# Patient Record
Sex: MALE | Race: WHITE | Employment: FULL TIME | URBAN - METROPOLITAN AREA
[De-identification: names, ages, dates, MRNs, and addresses within clinical notes are randomized per-mention and may not be internally consistent; named-entity substitution may affect disease eponyms.]

---

## 2017-03-23 DIAGNOSIS — I10 ESSENTIAL HYPERTENSION: ICD-10-CM

## 2017-03-24 RX ORDER — LOSARTAN POTASSIUM 100 MG/1
100 TABLET ORAL DAILY
Qty: 30 TABLET | Refills: 0 | Status: SHIPPED | OUTPATIENT
Start: 2017-03-24 | End: 2017-04-04 | Stop reason: SDUPTHER

## 2017-04-04 ENCOUNTER — OFFICE VISIT (OUTPATIENT)
Dept: FAMILY MEDICINE CLINIC | Age: 67
End: 2017-04-04

## 2017-04-04 VITALS
DIASTOLIC BLOOD PRESSURE: 64 MMHG | HEIGHT: 69 IN | WEIGHT: 159 LBS | HEART RATE: 64 BPM | RESPIRATION RATE: 16 BRPM | BODY MASS INDEX: 23.55 KG/M2 | SYSTOLIC BLOOD PRESSURE: 132 MMHG

## 2017-04-04 DIAGNOSIS — K52.831 COLLAGENOUS COLITIS: ICD-10-CM

## 2017-04-04 DIAGNOSIS — N40.0 BENIGN NON-NODULAR PROSTATIC HYPERPLASIA WITHOUT LOWER URINARY TRACT SYMPTOMS: ICD-10-CM

## 2017-04-04 DIAGNOSIS — E78.00 PURE HYPERCHOLESTEROLEMIA: ICD-10-CM

## 2017-04-04 DIAGNOSIS — Z72.0 TOBACCO ABUSE: ICD-10-CM

## 2017-04-04 DIAGNOSIS — L30.9 ECZEMA OF BOTH HANDS: ICD-10-CM

## 2017-04-04 DIAGNOSIS — E73.9 LACTOSE INTOLERANCE: ICD-10-CM

## 2017-04-04 DIAGNOSIS — I10 ESSENTIAL HYPERTENSION: Primary | ICD-10-CM

## 2017-04-04 PROCEDURE — 99213 OFFICE O/P EST LOW 20 MIN: CPT | Performed by: FAMILY MEDICINE

## 2017-04-04 RX ORDER — LOSARTAN POTASSIUM 100 MG/1
100 TABLET ORAL DAILY
Qty: 30 TABLET | Refills: 0 | Status: SHIPPED | OUTPATIENT
Start: 2017-04-04 | End: 2017-05-24 | Stop reason: SDUPTHER

## 2017-04-04 RX ORDER — LORATADINE 10 MG/1
10 TABLET ORAL DAILY
Status: ON HOLD | COMMUNITY
End: 2022-01-01 | Stop reason: ALTCHOICE

## 2017-04-04 RX ORDER — VARENICLINE TARTRATE 25 MG
KIT ORAL
Qty: 1 EACH | Refills: 0 | Status: SHIPPED | OUTPATIENT
Start: 2017-04-04 | End: 2017-08-29 | Stop reason: ALTCHOICE

## 2017-04-04 RX ORDER — FLUOCINONIDE 0.5 MG/G
OINTMENT TOPICAL
Qty: 1 TUBE | Refills: 5 | Status: SHIPPED | OUTPATIENT
Start: 2017-04-04 | End: 2020-02-25 | Stop reason: SDUPTHER

## 2017-04-04 ASSESSMENT — PATIENT HEALTH QUESTIONNAIRE - PHQ9
2. FEELING DOWN, DEPRESSED OR HOPELESS: 0
SUM OF ALL RESPONSES TO PHQ QUESTIONS 1-9: 0
1. LITTLE INTEREST OR PLEASURE IN DOING THINGS: 0
SUM OF ALL RESPONSES TO PHQ9 QUESTIONS 1 & 2: 0

## 2017-04-04 ASSESSMENT — ENCOUNTER SYMPTOMS
COUGH: 0
SHORTNESS OF BREATH: 0
BLURRED VISION: 0
BLOOD IN STOOL: 0
ABDOMINAL PAIN: 0
DIARRHEA: 1
SORE THROAT: 0
BACK PAIN: 0
TROUBLE SWALLOWING: 0
NAUSEA: 0
CHEST TIGHTNESS: 0
CONSTIPATION: 0
EYE PAIN: 0

## 2017-04-26 ENCOUNTER — TELEPHONE (OUTPATIENT)
Dept: FAMILY MEDICINE CLINIC | Age: 67
End: 2017-04-26

## 2017-05-24 DIAGNOSIS — I10 ESSENTIAL HYPERTENSION: ICD-10-CM

## 2017-05-25 RX ORDER — LOSARTAN POTASSIUM 100 MG/1
100 TABLET ORAL DAILY
Qty: 30 TABLET | Refills: 3 | Status: SHIPPED | OUTPATIENT
Start: 2017-05-25 | End: 2017-08-29 | Stop reason: SDUPTHER

## 2017-07-17 DIAGNOSIS — E78.00 PURE HYPERCHOLESTEROLEMIA: ICD-10-CM

## 2017-07-18 RX ORDER — PRAVASTATIN SODIUM 40 MG
40 TABLET ORAL DAILY
Qty: 30 TABLET | Refills: 5 | Status: SHIPPED | OUTPATIENT
Start: 2017-07-18 | End: 2017-08-29 | Stop reason: SDUPTHER

## 2017-08-29 ENCOUNTER — OFFICE VISIT (OUTPATIENT)
Dept: FAMILY MEDICINE CLINIC | Age: 67
End: 2017-08-29

## 2017-08-29 VITALS
RESPIRATION RATE: 14 BRPM | HEART RATE: 68 BPM | DIASTOLIC BLOOD PRESSURE: 72 MMHG | SYSTOLIC BLOOD PRESSURE: 130 MMHG | HEIGHT: 69 IN | WEIGHT: 160.38 LBS | BODY MASS INDEX: 23.76 KG/M2

## 2017-08-29 DIAGNOSIS — J44.9 COPD, MILD (HCC): ICD-10-CM

## 2017-08-29 DIAGNOSIS — I10 ESSENTIAL HYPERTENSION: Primary | ICD-10-CM

## 2017-08-29 DIAGNOSIS — E78.00 PURE HYPERCHOLESTEROLEMIA: ICD-10-CM

## 2017-08-29 DIAGNOSIS — F17.200 SMOKER: ICD-10-CM

## 2017-08-29 DIAGNOSIS — J30.89 ALLERGIC RHINITIS DUE TO OTHER ALLERGEN: ICD-10-CM

## 2017-08-29 DIAGNOSIS — Z91.89 STREPTOCOCCUS PNEUMONIAE VACCINATION INDICATED: ICD-10-CM

## 2017-08-29 PROCEDURE — G0009 ADMIN PNEUMOCOCCAL VACCINE: HCPCS | Performed by: FAMILY MEDICINE

## 2017-08-29 PROCEDURE — 90732 PPSV23 VACC 2 YRS+ SUBQ/IM: CPT | Performed by: FAMILY MEDICINE

## 2017-08-29 PROCEDURE — 99213 OFFICE O/P EST LOW 20 MIN: CPT | Performed by: FAMILY MEDICINE

## 2017-08-29 RX ORDER — LOSARTAN POTASSIUM 100 MG/1
100 TABLET ORAL DAILY
Qty: 30 TABLET | Refills: 5 | Status: SHIPPED | OUTPATIENT
Start: 2017-08-29 | End: 2018-01-02 | Stop reason: SDUPTHER

## 2017-08-29 RX ORDER — PRAVASTATIN SODIUM 40 MG
40 TABLET ORAL DAILY
Qty: 30 TABLET | Refills: 5 | Status: SHIPPED | OUTPATIENT
Start: 2017-08-29 | End: 2018-01-02 | Stop reason: SDUPTHER

## 2017-08-29 RX ORDER — ALBUTEROL SULFATE 90 UG/1
2 AEROSOL, METERED RESPIRATORY (INHALATION) 4 TIMES DAILY
Qty: 1 INHALER | Refills: 4 | Status: SHIPPED | OUTPATIENT
Start: 2017-08-29 | End: 2018-10-16 | Stop reason: SDUPTHER

## 2017-08-29 ASSESSMENT — ENCOUNTER SYMPTOMS
TROUBLE SWALLOWING: 0
CONSTIPATION: 0
BLOOD IN STOOL: 0
SORE THROAT: 0
EYE PAIN: 0
COUGH: 0
CHEST TIGHTNESS: 0
BACK PAIN: 0
ORTHOPNEA: 0
ABDOMINAL PAIN: 0
SHORTNESS OF BREATH: 0
NAUSEA: 0

## 2018-01-02 ENCOUNTER — OFFICE VISIT (OUTPATIENT)
Dept: FAMILY MEDICINE CLINIC | Age: 68
End: 2018-01-02

## 2018-01-02 VITALS
HEIGHT: 69 IN | WEIGHT: 160.25 LBS | RESPIRATION RATE: 14 BRPM | BODY MASS INDEX: 23.74 KG/M2 | DIASTOLIC BLOOD PRESSURE: 74 MMHG | SYSTOLIC BLOOD PRESSURE: 130 MMHG | HEART RATE: 84 BPM

## 2018-01-02 DIAGNOSIS — I10 ESSENTIAL HYPERTENSION: ICD-10-CM

## 2018-01-02 DIAGNOSIS — K52.831 COLLAGENOUS COLITIS: ICD-10-CM

## 2018-01-02 DIAGNOSIS — J44.9 COPD, MILD (HCC): Primary | ICD-10-CM

## 2018-01-02 DIAGNOSIS — E78.00 PURE HYPERCHOLESTEROLEMIA: ICD-10-CM

## 2018-01-02 PROCEDURE — 99213 OFFICE O/P EST LOW 20 MIN: CPT | Performed by: FAMILY MEDICINE

## 2018-01-02 RX ORDER — LOSARTAN POTASSIUM 100 MG/1
100 TABLET ORAL DAILY
Qty: 30 TABLET | Refills: 5 | Status: SHIPPED | OUTPATIENT
Start: 2018-01-02 | End: 2018-05-08 | Stop reason: SDUPTHER

## 2018-01-02 RX ORDER — PRAVASTATIN SODIUM 40 MG
40 TABLET ORAL DAILY
Qty: 30 TABLET | Refills: 5 | Status: SHIPPED | OUTPATIENT
Start: 2018-01-02 | End: 2018-05-08 | Stop reason: SDUPTHER

## 2018-01-02 ASSESSMENT — ENCOUNTER SYMPTOMS
BLOOD IN STOOL: 0
COUGH: 0
CHEST TIGHTNESS: 0
BACK PAIN: 0
ABDOMINAL PAIN: 0
ORTHOPNEA: 0
SHORTNESS OF BREATH: 0
CONSTIPATION: 0
EYE PAIN: 0
NAUSEA: 0
SORE THROAT: 0
TROUBLE SWALLOWING: 0

## 2018-01-02 ASSESSMENT — COPD QUESTIONNAIRES: COPD: 1

## 2018-01-02 NOTE — PROGRESS NOTES
Subjective:      Patient ID: Ambar Banerjee is a 79 y.o. male. Tobacco  Addiction   stable      lipids  Stable       COPD   There is no chest tightness, cough or shortness of breath. This is a new problem. The current episode started today. The problem has been resolved. Pertinent negatives include no appetite change, chest pain, ear pain, fever, headaches, postnasal drip, sore throat or trouble swallowing. He reports complete improvement on treatment. Hypertension   This is a chronic problem. The current episode started more than 1 year ago. The problem has been resolved since onset. Pertinent negatives include no chest pain, headaches, orthopnea, palpitations, peripheral edema or shortness of breath. The current treatment provides significant improvement. There are no compliance problems. Past Medical History:   Diagnosis Date    Asthma     Colon polyps 2000    COPD, mild (HCC)     Eczema of hand     HTN (hypertension)     Hyperlipidemia     Smoker     Ulcerative colitis ([address]      Review of Systems   Constitutional: Negative for appetite change, fatigue and fever. HENT: Negative for congestion, ear pain, postnasal drip, sore throat and trouble swallowing. Eyes: Negative for pain. Respiratory: Negative for cough, chest tightness and shortness of breath. Cardiovascular: Negative for chest pain, palpitations, orthopnea and leg swelling. Gastrointestinal: Negative for abdominal pain, blood in stool, constipation and nausea. Genitourinary: Negative for difficulty urinating, frequency and urgency. Musculoskeletal: Negative for arthralgias, back pain, joint swelling and neck stiffness. Skin: Negative for rash. Neurological: Negative for dizziness, weakness and headaches. Hematological: Negative for adenopathy. Does not bruise/bleed easily. Psychiatric/Behavioral: Negative for behavioral problems, dysphoric mood and sleep disturbance.      /74 (Site: Right Arm, Position: Sitting, Cuff Size: Medium Adult)   Pulse 84   Resp 14   Ht 5' 9\" (1.753 m)   Wt 160 lb 4 oz (72.7 kg)   BMI 23.66 kg/m²   Objective:   Physical Exam   Constitutional: He is oriented to person, place, and time. He appears well-developed and well-nourished. HENT:   Head: Normocephalic and atraumatic. Right Ear: External ear normal.   Left Ear: External ear normal.   Nose: Nose normal.   Mouth/Throat: Oropharynx is clear and moist.   Eyes: Conjunctivae and EOM are normal. Pupils are equal, round, and reactive to light. Fundi nl   Neck: Normal range of motion. Neck supple. No thyromegaly present. Cardiovascular: Normal rate, regular rhythm, normal heart sounds and intact distal pulses. Pulmonary/Chest: Effort normal. He has wheezes (on  the  right ). He has no rales. Abdominal: Soft. Bowel sounds are normal. He exhibits no mass. There is no tenderness. Musculoskeletal: Normal range of motion. Lymphadenopathy:     He has no cervical adenopathy. Neurological: He is alert and oriented to person, place, and time. He has normal reflexes. No cranial nerve deficit. Skin: Skin is warm and dry. No rash noted. Psychiatric: He has a normal mood and affect. Nursing note and vitals reviewed. Assessment:      1. COPD, mild ([address]     2. Essential hypertension  losartan (COZAAR) 100 MG tablet   3. Pure hypercholesterolemia  pravastatin (PRAVACHOL) 40 MG tablet   4. Collagenous colitis           Plan:      Current Outpatient Prescriptions   Medication Sig Dispense Refill    losartan (COZAAR) 100 MG tablet Take 1 tablet by mouth daily 30 tablet 5    pravastatin (PRAVACHOL) 40 MG tablet Take 1 tablet by mouth daily 30 tablet 5    albuterol sulfate  (90 Base) MCG/ACT inhaler Inhale 2 puffs into the lungs 4 times daily 1 Inhaler 4    loratadine (CLARITIN) 10 MG tablet Take 10 mg by mouth 2 times daily      fluocinonide (LIDEX) 0.05 % ointment Apply topically 2 times daily.  1 Tube 5   

## 2018-04-10 RX ORDER — FLUTICASONE FUROATE AND VILANTEROL 100; 25 UG/1; UG/1
1 POWDER RESPIRATORY (INHALATION) DAILY
Qty: 60 EACH | Refills: 3 | Status: SHIPPED | OUTPATIENT
Start: 2018-04-10 | End: 2018-05-08

## 2018-05-08 ENCOUNTER — OFFICE VISIT (OUTPATIENT)
Dept: FAMILY MEDICINE CLINIC | Age: 68
End: 2018-05-08

## 2018-05-08 VITALS
HEART RATE: 72 BPM | BODY MASS INDEX: 23.92 KG/M2 | RESPIRATION RATE: 14 BRPM | HEIGHT: 69 IN | DIASTOLIC BLOOD PRESSURE: 76 MMHG | SYSTOLIC BLOOD PRESSURE: 128 MMHG | WEIGHT: 161.5 LBS

## 2018-05-08 DIAGNOSIS — J44.9 COPD, MILD (HCC): ICD-10-CM

## 2018-05-08 DIAGNOSIS — E78.00 PURE HYPERCHOLESTEROLEMIA: ICD-10-CM

## 2018-05-08 DIAGNOSIS — N40.0 BENIGN PROSTATIC HYPERPLASIA WITHOUT LOWER URINARY TRACT SYMPTOMS: ICD-10-CM

## 2018-05-08 DIAGNOSIS — K52.831 COLLAGENOUS COLITIS: ICD-10-CM

## 2018-05-08 DIAGNOSIS — I10 ESSENTIAL HYPERTENSION: Primary | ICD-10-CM

## 2018-05-08 PROCEDURE — 99213 OFFICE O/P EST LOW 20 MIN: CPT | Performed by: FAMILY MEDICINE

## 2018-05-08 RX ORDER — LOSARTAN POTASSIUM 100 MG/1
100 TABLET ORAL DAILY
Qty: 30 TABLET | Refills: 5 | Status: SHIPPED | OUTPATIENT
Start: 2018-05-08 | End: 2018-10-16 | Stop reason: SDUPTHER

## 2018-05-08 RX ORDER — PRAVASTATIN SODIUM 40 MG
40 TABLET ORAL DAILY
Qty: 30 TABLET | Refills: 5 | Status: SHIPPED | OUTPATIENT
Start: 2018-05-08 | End: 2018-10-16 | Stop reason: SDUPTHER

## 2018-05-08 ASSESSMENT — PATIENT HEALTH QUESTIONNAIRE - PHQ9
1. LITTLE INTEREST OR PLEASURE IN DOING THINGS: 0
2. FEELING DOWN, DEPRESSED OR HOPELESS: 0
SUM OF ALL RESPONSES TO PHQ QUESTIONS 1-9: 0
SUM OF ALL RESPONSES TO PHQ9 QUESTIONS 1 & 2: 0

## 2018-05-08 ASSESSMENT — ENCOUNTER SYMPTOMS
ORTHOPNEA: 0
CHEST TIGHTNESS: 0
DIFFICULTY BREATHING: 0
COUGH: 1
DIARRHEA: 1
WHEEZING: 1

## 2018-05-08 ASSESSMENT — COPD QUESTIONNAIRES: COPD: 1

## 2018-06-07 ENCOUNTER — HOSPITAL ENCOUNTER (OUTPATIENT)
Age: 68
Discharge: HOME | End: 2018-06-07
Payer: MEDICARE

## 2018-06-07 DIAGNOSIS — E78.00 PURE HYPERCHOLESTEROLEMIA: ICD-10-CM

## 2018-06-07 DIAGNOSIS — N40.0 BENIGN PROSTATIC HYPERPLASIA WITHOUT LOWER URINARY TRACT SYMPTOMS: ICD-10-CM

## 2018-06-07 DIAGNOSIS — I10 ESSENTIAL HYPERTENSION: ICD-10-CM

## 2018-06-07 LAB
ALBUMIN SERPL-MCNC: 4.1 G/DL (ref 3.5–5.1)
ALP BLD-CCNC: 49 U/L (ref 38–126)
ALT SERPL-CCNC: 14 U/L (ref 11–66)
ANION GAP SERPL CALCULATED.3IONS-SCNC: 11 MEQ/L (ref 8–16)
AST SERPL-CCNC: 22 U/L (ref 5–40)
BASOPHILS # BLD: 0.7 %
BASOPHILS ABSOLUTE: 0.1 THOU/MM3 (ref 0–0.1)
BILIRUB SERPL-MCNC: 0.5 MG/DL (ref 0.3–1.2)
BUN BLDV-MCNC: 16 MG/DL (ref 7–22)
CALCIUM SERPL-MCNC: 8.9 MG/DL (ref 8.5–10.5)
CHLORIDE BLD-SCNC: 104 MEQ/L (ref 98–111)
CHOLESTEROL, TOTAL: 174 MG/DL (ref 100–199)
CO2: 26 MEQ/L (ref 23–33)
CREAT SERPL-MCNC: 0.9 MG/DL (ref 0.4–1.2)
EOSINOPHIL # BLD: 2.3 %
EOSINOPHILS ABSOLUTE: 0.2 THOU/MM3 (ref 0–0.4)
GFR SERPL CREATININE-BSD FRML MDRD: 84 ML/MIN/1.73M2
GLUCOSE BLD-MCNC: 94 MG/DL (ref 70–108)
HCT VFR BLD CALC: 45 % (ref 42–52)
HDLC SERPL-MCNC: 54 MG/DL
HEMOGLOBIN: 15.6 GM/DL (ref 14–18)
LDL CHOLESTEROL CALCULATED: 98 MG/DL
LYMPHOCYTES # BLD: 27.5 %
LYMPHOCYTES ABSOLUTE: 2.1 THOU/MM3 (ref 1–4.8)
MCH RBC QN AUTO: 34.1 PG (ref 27–31)
MCHC RBC AUTO-ENTMCNC: 34.7 GM/DL (ref 33–37)
MCV RBC AUTO: 98.5 FL (ref 80–94)
MONOCYTES # BLD: 10.7 %
MONOCYTES ABSOLUTE: 0.8 THOU/MM3 (ref 0.4–1.3)
NUCLEATED RED BLOOD CELLS: 0 /100 WBC
PDW BLD-RTO: 13.3 % (ref 11.5–14.5)
PLATELET # BLD: 211 THOU/MM3 (ref 130–400)
PMV BLD AUTO: 8.3 FL (ref 7.4–10.4)
POTASSIUM SERPL-SCNC: 4.8 MEQ/L (ref 3.5–5.2)
PROSTATE SPECIFIC ANTIGEN: 0.9 NG/ML (ref 0–1)
RBC # BLD: 4.57 MILL/MM3 (ref 4.7–6.1)
SEG NEUTROPHILS: 58.8 %
SEGMENTED NEUTROPHILS ABSOLUTE COUNT: 4.6 THOU/MM3 (ref 1.8–7.7)
SODIUM BLD-SCNC: 141 MEQ/L (ref 135–145)
TOTAL PROTEIN: 7 G/DL (ref 6.1–8)
TRIGL SERPL-MCNC: 109 MG/DL (ref 0–199)
TSH SERPL DL<=0.05 MIU/L-ACNC: 2.09 UIU/ML (ref 0.4–4.2)
WBC # BLD: 7.8 THOU/MM3 (ref 4.8–10.8)

## 2018-06-07 PROCEDURE — 84153 ASSAY OF PSA TOTAL: CPT

## 2018-06-07 PROCEDURE — 85025 COMPLETE CBC W/AUTO DIFF WBC: CPT

## 2018-06-07 PROCEDURE — 80061 LIPID PANEL: CPT

## 2018-06-07 PROCEDURE — 84443 ASSAY THYROID STIM HORMONE: CPT

## 2018-06-07 PROCEDURE — 80053 COMPREHEN METABOLIC PANEL: CPT

## 2018-06-07 PROCEDURE — 36415 COLL VENOUS BLD VENIPUNCTURE: CPT

## 2018-06-11 ENCOUNTER — TELEPHONE (OUTPATIENT)
Dept: FAMILY MEDICINE CLINIC | Age: 68
End: 2018-06-11

## 2018-10-06 ENCOUNTER — CARE COORDINATION (OUTPATIENT)
Dept: CARE COORDINATION | Age: 68
End: 2018-10-06

## 2018-10-16 ENCOUNTER — OFFICE VISIT (OUTPATIENT)
Dept: FAMILY MEDICINE CLINIC | Age: 68
End: 2018-10-16

## 2018-10-16 VITALS
DIASTOLIC BLOOD PRESSURE: 74 MMHG | SYSTOLIC BLOOD PRESSURE: 122 MMHG | HEART RATE: 72 BPM | HEIGHT: 69 IN | RESPIRATION RATE: 14 BRPM | WEIGHT: 159 LBS | BODY MASS INDEX: 23.55 KG/M2

## 2018-10-16 DIAGNOSIS — L30.9 DERMATITIS: ICD-10-CM

## 2018-10-16 DIAGNOSIS — J44.9 COPD, MILD (HCC): ICD-10-CM

## 2018-10-16 DIAGNOSIS — Z23 NEED FOR INFLUENZA VACCINATION: ICD-10-CM

## 2018-10-16 DIAGNOSIS — E78.00 PURE HYPERCHOLESTEROLEMIA: ICD-10-CM

## 2018-10-16 DIAGNOSIS — I10 ESSENTIAL HYPERTENSION: Primary | ICD-10-CM

## 2018-10-16 PROCEDURE — 90688 IIV4 VACCINE SPLT 0.5 ML IM: CPT | Performed by: FAMILY MEDICINE

## 2018-10-16 PROCEDURE — G0008 ADMIN INFLUENZA VIRUS VAC: HCPCS | Performed by: FAMILY MEDICINE

## 2018-10-16 PROCEDURE — 99213 OFFICE O/P EST LOW 20 MIN: CPT | Performed by: FAMILY MEDICINE

## 2018-10-16 PROCEDURE — 1101F PT FALLS ASSESS-DOCD LE1/YR: CPT | Performed by: FAMILY MEDICINE

## 2018-10-16 RX ORDER — PRAVASTATIN SODIUM 40 MG
40 TABLET ORAL DAILY
Qty: 30 TABLET | Refills: 5 | Status: SHIPPED | OUTPATIENT
Start: 2018-10-16 | End: 2019-02-19 | Stop reason: SDUPTHER

## 2018-10-16 RX ORDER — MOMETASONE FUROATE 1 MG/ML
SOLUTION TOPICAL
Qty: 30 ML | Refills: 1 | Status: SHIPPED | OUTPATIENT
Start: 2018-10-16 | End: 2020-02-25 | Stop reason: ALTCHOICE

## 2018-10-16 RX ORDER — LOSARTAN POTASSIUM 100 MG/1
100 TABLET ORAL DAILY
Qty: 30 TABLET | Refills: 5 | Status: SHIPPED | OUTPATIENT
Start: 2018-10-16 | End: 2019-02-19 | Stop reason: SDUPTHER

## 2018-10-16 RX ORDER — ALBUTEROL SULFATE 90 UG/1
2 AEROSOL, METERED RESPIRATORY (INHALATION) 4 TIMES DAILY
Qty: 1 INHALER | Refills: 4 | Status: SHIPPED | OUTPATIENT
Start: 2018-10-16 | End: 2019-08-20 | Stop reason: SDUPTHER

## 2018-10-16 ASSESSMENT — ENCOUNTER SYMPTOMS
BLOOD IN STOOL: 0
SHORTNESS OF BREATH: 0
CONSTIPATION: 0
BACK PAIN: 0
CHEST TIGHTNESS: 0
EYE PAIN: 0
TROUBLE SWALLOWING: 0
ABDOMINAL PAIN: 0
SORE THROAT: 0
NAUSEA: 0
COUGH: 0

## 2018-10-16 ASSESSMENT — COPD QUESTIONNAIRES: COPD: 1

## 2018-10-16 NOTE — PROGRESS NOTES
into the lungs 4 times daily 1 Inhaler 4    umeclidinium-vilanterol (ANORO ELLIPTA) 62.5-25 MCG/INH AEPB inhaler Inhale 1 puff into the lungs daily 14 each 3    mometasone (ELOCON) 0.1 % lotion Apply topically daily. At  Night 30 mL 1    loratadine (CLARITIN) 10 MG tablet Take 10 mg by mouth 2 times daily      fluocinonide (LIDEX) 0.05 % ointment Apply topically 2 times daily. 1 Tube 5    aspirin 81 MG tablet Take 81 mg by mouth daily.  Multiple Vitamins-Minerals (CENTRUM SILVER PO) Take  by mouth daily. No current facility-administered medications for this visit.       Orders Placed This Encounter   Procedures    INFLUENZA, QUADV, 6 MO AND OLDER, IM, MDV, 0.5ML (FLULAVAL QUADV)       Elocon  For  Scalp and  Stop  Smoking    see in  6 mths   Hector Rebollar MD

## 2018-12-03 ENCOUNTER — CARE COORDINATION (OUTPATIENT)
Dept: CARE COORDINATION | Age: 68
End: 2018-12-03

## 2019-02-19 ENCOUNTER — OFFICE VISIT (OUTPATIENT)
Dept: FAMILY MEDICINE CLINIC | Age: 69
End: 2019-02-19

## 2019-02-19 VITALS
WEIGHT: 159.25 LBS | SYSTOLIC BLOOD PRESSURE: 126 MMHG | HEIGHT: 69 IN | HEART RATE: 72 BPM | DIASTOLIC BLOOD PRESSURE: 74 MMHG | BODY MASS INDEX: 23.59 KG/M2 | RESPIRATION RATE: 16 BRPM

## 2019-02-19 DIAGNOSIS — E73.9 LACTOSE INTOLERANCE: ICD-10-CM

## 2019-02-19 DIAGNOSIS — E78.00 PURE HYPERCHOLESTEROLEMIA: ICD-10-CM

## 2019-02-19 DIAGNOSIS — K52.831 COLLAGENOUS COLITIS: ICD-10-CM

## 2019-02-19 DIAGNOSIS — I10 ESSENTIAL HYPERTENSION: Primary | ICD-10-CM

## 2019-02-19 DIAGNOSIS — J44.9 COPD, MILD (HCC): ICD-10-CM

## 2019-02-19 PROCEDURE — 3017F COLORECTAL CA SCREEN DOC REV: CPT | Performed by: FAMILY MEDICINE

## 2019-02-19 PROCEDURE — 99213 OFFICE O/P EST LOW 20 MIN: CPT | Performed by: FAMILY MEDICINE

## 2019-02-19 PROCEDURE — 1123F ACP DISCUSS/DSCN MKR DOCD: CPT | Performed by: FAMILY MEDICINE

## 2019-02-19 PROCEDURE — G8420 CALC BMI NORM PARAMETERS: HCPCS | Performed by: FAMILY MEDICINE

## 2019-02-19 PROCEDURE — G8427 DOCREV CUR MEDS BY ELIG CLIN: HCPCS | Performed by: FAMILY MEDICINE

## 2019-02-19 PROCEDURE — 4040F PNEUMOC VAC/ADMIN/RCVD: CPT | Performed by: FAMILY MEDICINE

## 2019-02-19 PROCEDURE — 1101F PT FALLS ASSESS-DOCD LE1/YR: CPT | Performed by: FAMILY MEDICINE

## 2019-02-19 RX ORDER — PRAVASTATIN SODIUM 40 MG
40 TABLET ORAL DAILY
Qty: 30 TABLET | Refills: 5 | Status: SHIPPED | OUTPATIENT
Start: 2019-02-19 | End: 2019-08-20 | Stop reason: SDUPTHER

## 2019-02-19 RX ORDER — LOSARTAN POTASSIUM 100 MG/1
100 TABLET ORAL DAILY
Qty: 30 TABLET | Refills: 5 | Status: SHIPPED | OUTPATIENT
Start: 2019-02-19 | End: 2019-08-20 | Stop reason: SDUPTHER

## 2019-02-19 ASSESSMENT — PATIENT HEALTH QUESTIONNAIRE - PHQ9
SUM OF ALL RESPONSES TO PHQ QUESTIONS 1-9: 0
SUM OF ALL RESPONSES TO PHQ QUESTIONS 1-9: 0
1. LITTLE INTEREST OR PLEASURE IN DOING THINGS: 0
SUM OF ALL RESPONSES TO PHQ9 QUESTIONS 1 & 2: 0
2. FEELING DOWN, DEPRESSED OR HOPELESS: 0

## 2019-02-19 ASSESSMENT — ENCOUNTER SYMPTOMS
ORTHOPNEA: 0
SHORTNESS OF BREATH: 0
BLOOD IN STOOL: 0
ABDOMINAL PAIN: 0
SORE THROAT: 0
EYE PAIN: 0
CHEST TIGHTNESS: 0
COUGH: 0
BACK PAIN: 0
CONSTIPATION: 0
NAUSEA: 0
TROUBLE SWALLOWING: 0

## 2019-08-20 ENCOUNTER — OFFICE VISIT (OUTPATIENT)
Dept: FAMILY MEDICINE CLINIC | Age: 69
End: 2019-08-20

## 2019-08-20 VITALS
BODY MASS INDEX: 23.4 KG/M2 | HEIGHT: 69 IN | HEART RATE: 72 BPM | RESPIRATION RATE: 14 BRPM | SYSTOLIC BLOOD PRESSURE: 120 MMHG | DIASTOLIC BLOOD PRESSURE: 66 MMHG | WEIGHT: 158 LBS

## 2019-08-20 DIAGNOSIS — K52.831 COLLAGENOUS COLITIS: ICD-10-CM

## 2019-08-20 DIAGNOSIS — J44.9 COPD, MILD (HCC): ICD-10-CM

## 2019-08-20 DIAGNOSIS — N40.0 BENIGN PROSTATIC HYPERPLASIA WITHOUT LOWER URINARY TRACT SYMPTOMS: ICD-10-CM

## 2019-08-20 DIAGNOSIS — J45.20 MILD INTERMITTENT ASTHMA WITHOUT COMPLICATION: ICD-10-CM

## 2019-08-20 DIAGNOSIS — I10 ESSENTIAL HYPERTENSION: Primary | ICD-10-CM

## 2019-08-20 DIAGNOSIS — E78.00 PURE HYPERCHOLESTEROLEMIA: ICD-10-CM

## 2019-08-20 DIAGNOSIS — F17.200 SMOKER: ICD-10-CM

## 2019-08-20 PROCEDURE — 99213 OFFICE O/P EST LOW 20 MIN: CPT | Performed by: FAMILY MEDICINE

## 2019-08-20 PROCEDURE — 4040F PNEUMOC VAC/ADMIN/RCVD: CPT | Performed by: FAMILY MEDICINE

## 2019-08-20 PROCEDURE — 1123F ACP DISCUSS/DSCN MKR DOCD: CPT | Performed by: FAMILY MEDICINE

## 2019-08-20 PROCEDURE — G8420 CALC BMI NORM PARAMETERS: HCPCS | Performed by: FAMILY MEDICINE

## 2019-08-20 PROCEDURE — 3017F COLORECTAL CA SCREEN DOC REV: CPT | Performed by: FAMILY MEDICINE

## 2019-08-20 PROCEDURE — G8427 DOCREV CUR MEDS BY ELIG CLIN: HCPCS | Performed by: FAMILY MEDICINE

## 2019-08-20 RX ORDER — PRAVASTATIN SODIUM 40 MG
40 TABLET ORAL DAILY
Qty: 30 TABLET | Refills: 5 | Status: SHIPPED | OUTPATIENT
Start: 2019-08-20 | End: 2020-02-25 | Stop reason: SDUPTHER

## 2019-08-20 RX ORDER — LOSARTAN POTASSIUM 100 MG/1
100 TABLET ORAL DAILY
Qty: 30 TABLET | Refills: 5 | Status: SHIPPED | OUTPATIENT
Start: 2019-08-20 | End: 2020-02-25 | Stop reason: SDUPTHER

## 2019-08-20 RX ORDER — ALBUTEROL SULFATE 90 UG/1
2 AEROSOL, METERED RESPIRATORY (INHALATION) 4 TIMES DAILY
Qty: 1 INHALER | Refills: 4 | Status: SHIPPED | OUTPATIENT
Start: 2019-08-20 | End: 2020-03-31 | Stop reason: SDUPTHER

## 2019-08-20 ASSESSMENT — ENCOUNTER SYMPTOMS
HOARSE VOICE: 0
CHEST TIGHTNESS: 0
SHORTNESS OF BREATH: 0
ORTHOPNEA: 0
BACK PAIN: 0
BLOOD IN STOOL: 0
NAUSEA: 0
TROUBLE SWALLOWING: 0
SORE THROAT: 0
CONSTIPATION: 0
COUGH: 0
HEMOPTYSIS: 0
EYE PAIN: 0
ABDOMINAL PAIN: 0

## 2019-08-20 ASSESSMENT — COPD QUESTIONNAIRES: COPD: 1

## 2019-08-26 DIAGNOSIS — I10 ESSENTIAL HYPERTENSION: ICD-10-CM

## 2019-08-26 NOTE — TELEPHONE ENCOUNTER
The pharmacy is requesting a refill of the below medication which has been pended for you:     Requested Prescriptions     Pending Prescriptions Disp Refills    losartan (COZAAR) 100 MG tablet [Pharmacy Med Name: LOSARTAN 100MG TABLETS] 30 tablet 0     Sig: TAKE 1 TABLET BY MOUTH DAILY       Last Appointment Date: 8/20/2019  Next Appointment Date: Visit date not found    Allergies   Allergen Reactions    Penicillins Rash    Sulfa Antibiotics Rash

## 2019-08-27 RX ORDER — LOSARTAN POTASSIUM 100 MG/1
100 TABLET ORAL DAILY
Qty: 30 TABLET | Refills: 0 | OUTPATIENT
Start: 2019-08-27

## 2019-11-21 ENCOUNTER — TELEPHONE (OUTPATIENT)
Dept: FAMILY MEDICINE CLINIC | Age: 69
End: 2019-11-21

## 2019-11-21 RX ORDER — CEFDINIR 300 MG/1
300 CAPSULE ORAL 2 TIMES DAILY
Qty: 20 CAPSULE | Refills: 0 | Status: SHIPPED | OUTPATIENT
Start: 2019-11-21 | End: 2019-12-01

## 2019-12-30 ENCOUNTER — TELEPHONE (OUTPATIENT)
Dept: FAMILY MEDICINE CLINIC | Age: 69
End: 2019-12-30

## 2019-12-30 RX ORDER — CEFDINIR 300 MG/1
300 CAPSULE ORAL 2 TIMES DAILY
Qty: 20 CAPSULE | Refills: 0 | Status: SHIPPED | OUTPATIENT
Start: 2019-12-30 | End: 2020-01-09

## 2020-01-02 ENCOUNTER — HOSPITAL ENCOUNTER (OUTPATIENT)
Age: 70
Discharge: HOME | End: 2020-01-02
Payer: MEDICARE

## 2020-01-02 LAB
ALBUMIN SERPL-MCNC: 4.3 G/DL (ref 3.5–5.1)
ALP BLD-CCNC: 53 U/L (ref 38–126)
ALT SERPL-CCNC: 22 U/L (ref 11–66)
ANION GAP SERPL CALCULATED.3IONS-SCNC: 13 MEQ/L (ref 8–16)
AST SERPL-CCNC: 28 U/L (ref 5–40)
BASOPHILS # BLD: 0.7 %
BASOPHILS ABSOLUTE: 0 THOU/MM3 (ref 0–0.1)
BILIRUB SERPL-MCNC: 0.3 MG/DL (ref 0.3–1.2)
BUN BLDV-MCNC: 12 MG/DL (ref 7–22)
CALCIUM SERPL-MCNC: 9.9 MG/DL (ref 8.5–10.5)
CHLORIDE BLD-SCNC: 103 MEQ/L (ref 98–111)
CHOLESTEROL, TOTAL: 165 MG/DL (ref 100–199)
CO2: 25 MEQ/L (ref 23–33)
CREAT SERPL-MCNC: 0.9 MG/DL (ref 0.4–1.2)
EOSINOPHIL # BLD: 4.3 %
EOSINOPHILS ABSOLUTE: 0.2 THOU/MM3 (ref 0–0.4)
ERYTHROCYTE [DISTWIDTH] IN BLOOD BY AUTOMATED COUNT: 12.2 % (ref 11.5–14.5)
ERYTHROCYTE [DISTWIDTH] IN BLOOD BY AUTOMATED COUNT: 45.1 FL (ref 35–45)
GFR SERPL CREATININE-BSD FRML MDRD: 84 ML/MIN/1.73M2
GLUCOSE BLD-MCNC: 84 MG/DL (ref 70–108)
HCT VFR BLD CALC: 45.9 % (ref 42–52)
HDLC SERPL-MCNC: 56 MG/DL
HEMOGLOBIN: 15.7 GM/DL (ref 14–18)
IMMATURE GRANS (ABS): 0.02 THOU/MM3 (ref 0–0.07)
IMMATURE GRANULOCYTES: 0.3 %
LDL CHOLESTEROL CALCULATED: 86 MG/DL
LYMPHOCYTES # BLD: 25.6 %
LYMPHOCYTES ABSOLUTE: 1.5 THOU/MM3 (ref 1–4.8)
MCH RBC QN AUTO: 34.1 PG (ref 26–33)
MCHC RBC AUTO-ENTMCNC: 34.2 GM/DL (ref 32.2–35.5)
MCV RBC AUTO: 99.6 FL (ref 80–94)
MONOCYTES # BLD: 9.8 %
MONOCYTES ABSOLUTE: 0.6 THOU/MM3 (ref 0.4–1.3)
NUCLEATED RED BLOOD CELLS: 0 /100 WBC
PLATELET # BLD: 226 THOU/MM3 (ref 130–400)
PMV BLD AUTO: 9.9 FL (ref 9.4–12.4)
POTASSIUM SERPL-SCNC: 5 MEQ/L (ref 3.5–5.2)
PROSTATE SPECIFIC ANTIGEN: 0.63 NG/ML (ref 0–1)
RBC # BLD: 4.61 MILL/MM3 (ref 4.7–6.1)
SEG NEUTROPHILS: 59.3 %
SEGMENTED NEUTROPHILS ABSOLUTE COUNT: 3.4 THOU/MM3 (ref 1.8–7.7)
SODIUM BLD-SCNC: 141 MEQ/L (ref 135–145)
TOTAL PROTEIN: 7.5 G/DL (ref 6.1–8)
TRIGL SERPL-MCNC: 115 MG/DL (ref 0–199)
TSH SERPL DL<=0.05 MIU/L-ACNC: 1.02 UIU/ML (ref 0.4–4.2)
WBC # BLD: 5.8 THOU/MM3 (ref 4.8–10.8)

## 2020-01-02 PROCEDURE — 85025 COMPLETE CBC W/AUTO DIFF WBC: CPT

## 2020-01-02 PROCEDURE — 36415 COLL VENOUS BLD VENIPUNCTURE: CPT

## 2020-01-02 PROCEDURE — 80061 LIPID PANEL: CPT

## 2020-01-02 PROCEDURE — 80053 COMPREHEN METABOLIC PANEL: CPT

## 2020-01-02 PROCEDURE — 84443 ASSAY THYROID STIM HORMONE: CPT

## 2020-01-02 PROCEDURE — 84153 ASSAY OF PSA TOTAL: CPT

## 2020-01-03 ENCOUNTER — TELEPHONE (OUTPATIENT)
Dept: FAMILY MEDICINE CLINIC | Age: 70
End: 2020-01-03

## 2020-02-25 ENCOUNTER — OFFICE VISIT (OUTPATIENT)
Dept: FAMILY MEDICINE CLINIC | Age: 70
End: 2020-02-25

## 2020-02-25 VITALS
HEART RATE: 72 BPM | HEIGHT: 69 IN | WEIGHT: 154.5 LBS | RESPIRATION RATE: 14 BRPM | BODY MASS INDEX: 22.88 KG/M2 | DIASTOLIC BLOOD PRESSURE: 74 MMHG | SYSTOLIC BLOOD PRESSURE: 130 MMHG

## 2020-02-25 PROCEDURE — 1123F ACP DISCUSS/DSCN MKR DOCD: CPT | Performed by: FAMILY MEDICINE

## 2020-02-25 PROCEDURE — 3017F COLORECTAL CA SCREEN DOC REV: CPT | Performed by: FAMILY MEDICINE

## 2020-02-25 PROCEDURE — G8420 CALC BMI NORM PARAMETERS: HCPCS | Performed by: FAMILY MEDICINE

## 2020-02-25 PROCEDURE — 4040F PNEUMOC VAC/ADMIN/RCVD: CPT | Performed by: FAMILY MEDICINE

## 2020-02-25 PROCEDURE — 99213 OFFICE O/P EST LOW 20 MIN: CPT | Performed by: FAMILY MEDICINE

## 2020-02-25 PROCEDURE — G8427 DOCREV CUR MEDS BY ELIG CLIN: HCPCS | Performed by: FAMILY MEDICINE

## 2020-02-25 RX ORDER — VARENICLINE TARTRATE 1 MG/1
TABLET, FILM COATED ORAL
Qty: 60 TABLET | Refills: 3 | Status: SHIPPED | OUTPATIENT
Start: 2020-02-25 | End: 2021-01-06

## 2020-02-25 RX ORDER — VARENICLINE TARTRATE 25 MG
KIT ORAL
Qty: 1 BOX | Refills: 0 | Status: SHIPPED | OUTPATIENT
Start: 2020-02-25 | End: 2021-01-06

## 2020-02-25 RX ORDER — FLUOCINONIDE 0.5 MG/G
OINTMENT TOPICAL
Qty: 1 TUBE | Refills: 5 | Status: SHIPPED | OUTPATIENT
Start: 2020-02-25 | End: 2020-12-29 | Stop reason: SDUPTHER

## 2020-02-25 RX ORDER — PRAVASTATIN SODIUM 40 MG
40 TABLET ORAL DAILY
Qty: 30 TABLET | Refills: 5 | Status: SHIPPED | OUTPATIENT
Start: 2020-02-25 | End: 2020-06-30 | Stop reason: SDUPTHER

## 2020-02-25 RX ORDER — LOSARTAN POTASSIUM 100 MG/1
100 TABLET ORAL DAILY
Qty: 30 TABLET | Refills: 5 | Status: SHIPPED | OUTPATIENT
Start: 2020-02-25 | End: 2020-06-30 | Stop reason: SDUPTHER

## 2020-02-25 ASSESSMENT — ENCOUNTER SYMPTOMS
ORTHOPNEA: 0
COUGH: 1
SHORTNESS OF BREATH: 0
CHEST TIGHTNESS: 0
SPUTUM PRODUCTION: 0

## 2020-02-25 ASSESSMENT — PATIENT HEALTH QUESTIONNAIRE - PHQ9
SUM OF ALL RESPONSES TO PHQ QUESTIONS 1-9: 0
2. FEELING DOWN, DEPRESSED OR HOPELESS: 0
SUM OF ALL RESPONSES TO PHQ QUESTIONS 1-9: 0
SUM OF ALL RESPONSES TO PHQ9 QUESTIONS 1 & 2: 0
1. LITTLE INTEREST OR PLEASURE IN DOING THINGS: 0

## 2020-02-25 ASSESSMENT — COPD QUESTIONNAIRES: COPD: 1

## 2020-02-25 NOTE — PROGRESS NOTES
atraumatic. Right Ear: External ear normal.      Left Ear: External ear normal.      Nose: Nose normal.   Eyes:      Conjunctiva/sclera: Conjunctivae normal.      Pupils: Pupils are equal, round, and reactive to light. Comments: Fundi nl   Neck:      Musculoskeletal: Normal range of motion and neck supple. Thyroid: No thyromegaly. Cardiovascular:      Rate and Rhythm: Normal rate and regular rhythm. Heart sounds: Normal heart sounds. Pulmonary:      Effort: Pulmonary effort is normal.      Breath sounds: Normal breath sounds. No wheezing or rales. Comments:  Slight  Wheeze   Abdominal:      General: Bowel sounds are normal.      Palpations: Abdomen is soft. There is no mass. Tenderness: There is no abdominal tenderness. Musculoskeletal: Normal range of motion. Lymphadenopathy:      Cervical: No cervical adenopathy. Skin:     General: Skin is warm and dry. Findings: No rash. Neurological:      Mental Status: He is alert and oriented to person, place, and time. Cranial Nerves: No cranial nerve deficit. Deep Tendon Reflexes: Reflexes are normal and symmetric. Assessment:       Diagnosis Orders   1. Essential hypertension  losartan (COZAAR) 100 MG tablet   2. Pure hypercholesterolemia  pravastatin (PRAVACHOL) 40 MG tablet   3. Eczema of both hands  fluocinonide (LIDEX) 0.05 % ointment   4. Collagenous colitis     5. COPD, mild ([address]     6. Lactose intolerance     7. Smoker     8. Tobacco abuse           Plan:       Current Outpatient Medications   Medication Sig Dispense Refill    losartan (COZAAR) 100 MG tablet Take 1 tablet by mouth daily 30 tablet 5    pravastatin (PRAVACHOL) 40 MG tablet Take 1 tablet by mouth daily 30 tablet 5    fluocinonide (LIDEX) 0.05 % ointment Apply topically 2 times daily.  1 Tube 5    varenicline (CHANTIX CONTINUING MONTH MANUEL) 1 MG tablet Lone  Tab po bid 60 tablet 3    varenicline (CHANTIX STARTING MONTH MANUEL) 0.5 MG X 11 & 1 MG X 42 tablet Take by mouth. 1 box 0    Tiotropium Bromide-Olodaterol (STIOLTO RESPIMAT) 2.5-2.5 MCG/ACT AERS 2  puffs  daily 1 Inhaler 5    albuterol sulfate  (90 Base) MCG/ACT inhaler Inhale 2 puffs into the lungs 4 times daily 1 Inhaler 4    loratadine (CLARITIN) 10 MG tablet Take 10 mg by mouth 2 times daily      aspirin 81 MG tablet Take 81 mg by mouth daily.  Multiple Vitamins-Minerals (CENTRUM SILVER PO) Take  by mouth daily. No current facility-administered medications for this visit. No orders of the defined types were placed in this encounter. No results found for this visit on 02/25/20.       See in  4 mths   Elyse Guidry MD

## 2020-02-27 ENCOUNTER — TELEPHONE (OUTPATIENT)
Dept: FAMILY MEDICINE CLINIC | Age: 70
End: 2020-02-27

## 2020-03-31 RX ORDER — ALBUTEROL SULFATE 90 UG/1
2 AEROSOL, METERED RESPIRATORY (INHALATION) 4 TIMES DAILY
Qty: 1 INHALER | Refills: 4 | Status: ON HOLD | OUTPATIENT
Start: 2020-03-31 | End: 2021-01-19 | Stop reason: SDUPTHER

## 2020-06-30 ENCOUNTER — OFFICE VISIT (OUTPATIENT)
Dept: FAMILY MEDICINE CLINIC | Age: 70
End: 2020-06-30

## 2020-06-30 VITALS
HEART RATE: 68 BPM | HEIGHT: 69 IN | RESPIRATION RATE: 16 BRPM | WEIGHT: 157 LBS | BODY MASS INDEX: 23.25 KG/M2 | SYSTOLIC BLOOD PRESSURE: 148 MMHG | TEMPERATURE: 96.5 F | DIASTOLIC BLOOD PRESSURE: 66 MMHG

## 2020-06-30 PROCEDURE — 3017F COLORECTAL CA SCREEN DOC REV: CPT | Performed by: FAMILY MEDICINE

## 2020-06-30 PROCEDURE — G8427 DOCREV CUR MEDS BY ELIG CLIN: HCPCS | Performed by: FAMILY MEDICINE

## 2020-06-30 PROCEDURE — 4040F PNEUMOC VAC/ADMIN/RCVD: CPT | Performed by: FAMILY MEDICINE

## 2020-06-30 PROCEDURE — 1123F ACP DISCUSS/DSCN MKR DOCD: CPT | Performed by: FAMILY MEDICINE

## 2020-06-30 PROCEDURE — 99213 OFFICE O/P EST LOW 20 MIN: CPT | Performed by: FAMILY MEDICINE

## 2020-06-30 PROCEDURE — G8420 CALC BMI NORM PARAMETERS: HCPCS | Performed by: FAMILY MEDICINE

## 2020-06-30 RX ORDER — PRAVASTATIN SODIUM 40 MG
40 TABLET ORAL DAILY
Qty: 30 TABLET | Refills: 5 | Status: SHIPPED | OUTPATIENT
Start: 2020-06-30 | End: 2020-12-29 | Stop reason: SDUPTHER

## 2020-06-30 RX ORDER — LOSARTAN POTASSIUM 100 MG/1
100 TABLET ORAL DAILY
Qty: 30 TABLET | Refills: 5 | Status: SHIPPED | OUTPATIENT
Start: 2020-06-30 | End: 2020-12-29 | Stop reason: SDUPTHER

## 2020-06-30 ASSESSMENT — ENCOUNTER SYMPTOMS
TROUBLE SWALLOWING: 0
SHORTNESS OF BREATH: 0
NAUSEA: 0
BLOOD IN STOOL: 0
EYE PAIN: 0
CONSTIPATION: 0
CHEST TIGHTNESS: 0
COUGH: 0
SORE THROAT: 0
BACK PAIN: 0
ABDOMINAL PAIN: 0

## 2020-06-30 NOTE — PROGRESS NOTES
Subjective:      Patient ID: Marty Gr is a 71 y.o. male. Copd  Stable   Smokes  Better       Hypertension   This is a chronic problem. The current episode started more than 1 year ago. The problem has been resolved since onset. The problem is controlled. Pertinent negatives include no anxiety, chest pain, headaches, malaise/fatigue, palpitations, peripheral edema or shortness of breath. The current treatment provides significant improvement. There are no compliance problems. Hyperlipidemia   This is a chronic problem. The current episode started more than 1 year ago. The problem is controlled. Pertinent negatives include no chest pain or shortness of breath. Current antihyperlipidemic treatment includes statins. The current treatment provides significant improvement of lipids. There are no compliance problems. Past Medical History:   Diagnosis Date    Asthma     Collagenous colitis 2014       repeat  in  2024    Colon polyps 2000    COPD, mild (HCC)     Eczema of hand     HTN (hypertension)     Hyperlipidemia     Smoker      Review of Systems   Constitutional: Negative for fatigue, fever and malaise/fatigue. HENT: Negative for congestion, ear pain, postnasal drip, sore throat and trouble swallowing. Eyes: Negative for pain. Respiratory: Negative for cough, chest tightness and shortness of breath. Cardiovascular: Negative for chest pain, palpitations and leg swelling. Gastrointestinal: Negative for abdominal pain, blood in stool, constipation and nausea. Genitourinary: Negative for difficulty urinating, frequency and urgency. Musculoskeletal: Negative for arthralgias, back pain, joint swelling and neck stiffness. Skin: Negative for rash. Neurological: Negative for dizziness, weakness and headaches. Hematological: Negative for adenopathy. Does not bruise/bleed easily. Psychiatric/Behavioral: Negative for behavioral problems, dysphoric mood and sleep disturbance.      BP (!) 148/66 (Site: Right Upper Arm, Position: Sitting, Cuff Size: Medium Adult)   Pulse 68   Temp 96.5 °F (35.8 °C) (Skin)   Resp 16   Ht 5' 9\" (1.753 m)   Wt 157 lb (71.2 kg)   BMI 23.18 kg/m²   Objective:   Physical Exam  Vitals signs and nursing note reviewed. Constitutional:       Appearance: He is well-developed. HENT:      Head: Normocephalic and atraumatic. Right Ear: External ear normal.      Left Ear: External ear normal.      Nose: Nose normal.   Eyes:      Conjunctiva/sclera: Conjunctivae normal.      Pupils: Pupils are equal, round, and reactive to light. Comments: Fundi nl   Neck:      Musculoskeletal: Normal range of motion and neck supple. Thyroid: No thyromegaly. Cardiovascular:      Rate and Rhythm: Normal rate and regular rhythm. Heart sounds: Normal heart sounds. Pulmonary:      Effort: Pulmonary effort is normal.      Breath sounds: Normal breath sounds. No wheezing or rales. Comments:   Some  Decrease  Breathe  Sounds  Slight  wheeze  Abdominal:      General: Bowel sounds are normal.      Palpations: Abdomen is soft. There is no mass. Tenderness: There is no abdominal tenderness. Musculoskeletal: Normal range of motion. Lymphadenopathy:      Cervical: No cervical adenopathy. Skin:     General: Skin is warm and dry. Findings: No rash. Neurological:      Mental Status: He is alert and oriented to person, place, and time. Cranial Nerves: No cranial nerve deficit. Deep Tendon Reflexes: Reflexes are normal and symmetric. Assessment:       Diagnosis Orders   1. Essential hypertension  losartan (COZAAR) 100 MG tablet   2. Pure hypercholesterolemia  pravastatin (PRAVACHOL) 40 MG tablet   3. Lactose intolerance     4. Collagenous colitis     5.  COPD, mild ([address]           Plan:      Current Outpatient Medications   Medication Sig Dispense Refill    albuterol sulfate  (90 Base) MCG/ACT inhaler Inhale 2 puffs into the lungs 4

## 2020-08-06 ENCOUNTER — TELEPHONE (OUTPATIENT)
Dept: FAMILY MEDICINE CLINIC | Age: 70
End: 2020-08-06

## 2020-08-06 NOTE — TELEPHONE ENCOUNTER
Pt called req a brief note stating he has no history of heart disease. Pt needs this for insurance application.     Please call pt once note ready to P/U here

## 2020-08-06 NOTE — LETTER
[facility] [id number]  Phone: [phone]  Fax: [phone]    Freeman Jean MD        August 10, 2020     Patient: Lidia Sears   YOB: 1950   Date of Visit: 8/6/2020       To Whom It May Concern: It is my medical opinion that Ann Mccord  the above patient has no history of heart disease. he has a history of hypertension and hyperlipidemia but no history of any cardiac disease . Speedy Kitchen If you have any questions or concerns, please don't hesitate to call.     Sincerely,        Freeman Jean MD

## 2020-12-29 ENCOUNTER — OFFICE VISIT (OUTPATIENT)
Dept: FAMILY MEDICINE CLINIC | Age: 70
End: 2020-12-29

## 2020-12-29 VITALS
DIASTOLIC BLOOD PRESSURE: 82 MMHG | TEMPERATURE: 97.5 F | BODY MASS INDEX: 23.03 KG/M2 | SYSTOLIC BLOOD PRESSURE: 138 MMHG | HEIGHT: 69 IN | RESPIRATION RATE: 18 BRPM | HEART RATE: 84 BPM | WEIGHT: 155.5 LBS

## 2020-12-29 PROCEDURE — 3017F COLORECTAL CA SCREEN DOC REV: CPT | Performed by: FAMILY MEDICINE

## 2020-12-29 PROCEDURE — G0008 ADMIN INFLUENZA VIRUS VAC: HCPCS | Performed by: FAMILY MEDICINE

## 2020-12-29 PROCEDURE — 90688 IIV4 VACCINE SPLT 0.5 ML IM: CPT | Performed by: FAMILY MEDICINE

## 2020-12-29 PROCEDURE — G8427 DOCREV CUR MEDS BY ELIG CLIN: HCPCS | Performed by: FAMILY MEDICINE

## 2020-12-29 PROCEDURE — 4040F PNEUMOC VAC/ADMIN/RCVD: CPT | Performed by: FAMILY MEDICINE

## 2020-12-29 PROCEDURE — 1123F ACP DISCUSS/DSCN MKR DOCD: CPT | Performed by: FAMILY MEDICINE

## 2020-12-29 PROCEDURE — G8420 CALC BMI NORM PARAMETERS: HCPCS | Performed by: FAMILY MEDICINE

## 2020-12-29 PROCEDURE — 99214 OFFICE O/P EST MOD 30 MIN: CPT | Performed by: FAMILY MEDICINE

## 2020-12-29 PROCEDURE — 93000 ELECTROCARDIOGRAM COMPLETE: CPT | Performed by: FAMILY MEDICINE

## 2020-12-29 RX ORDER — LOSARTAN POTASSIUM 100 MG/1
100 TABLET ORAL DAILY
Qty: 30 TABLET | Refills: 5 | Status: ON HOLD | OUTPATIENT
Start: 2020-12-29 | End: 2021-01-19 | Stop reason: HOSPADM

## 2020-12-29 RX ORDER — FLUOCINONIDE 0.5 MG/G
OINTMENT TOPICAL
Qty: 1 TUBE | Refills: 5 | Status: ON HOLD | OUTPATIENT
Start: 2020-12-29 | End: 2021-01-06

## 2020-12-29 RX ORDER — METOPROLOL TARTRATE 50 MG/1
50 TABLET, FILM COATED ORAL 2 TIMES DAILY
Qty: 60 TABLET | Refills: 3 | Status: SHIPPED | OUTPATIENT
Start: 2020-12-29 | End: 2020-12-30

## 2020-12-29 RX ORDER — PRAVASTATIN SODIUM 40 MG
40 TABLET ORAL DAILY
Qty: 30 TABLET | Refills: 5 | Status: ON HOLD | OUTPATIENT
Start: 2020-12-29 | End: 2021-01-19 | Stop reason: HOSPADM

## 2020-12-29 SDOH — ECONOMIC STABILITY: INCOME INSECURITY: HOW HARD IS IT FOR YOU TO PAY FOR THE VERY BASICS LIKE FOOD, HOUSING, MEDICAL CARE, AND HEATING?: NOT HARD AT ALL

## 2020-12-29 SDOH — ECONOMIC STABILITY: FOOD INSECURITY: WITHIN THE PAST 12 MONTHS, YOU WORRIED THAT YOUR FOOD WOULD RUN OUT BEFORE YOU GOT MONEY TO BUY MORE.: NEVER TRUE

## 2020-12-29 SDOH — ECONOMIC STABILITY: FOOD INSECURITY: WITHIN THE PAST 12 MONTHS, THE FOOD YOU BOUGHT JUST DIDN'T LAST AND YOU DIDN'T HAVE MONEY TO GET MORE.: NEVER TRUE

## 2020-12-29 SDOH — ECONOMIC STABILITY: TRANSPORTATION INSECURITY
IN THE PAST 12 MONTHS, HAS LACK OF TRANSPORTATION KEPT YOU FROM MEETINGS, WORK, OR FROM GETTING THINGS NEEDED FOR DAILY LIVING?: NOT ASKED

## 2020-12-29 SDOH — ECONOMIC STABILITY: TRANSPORTATION INSECURITY
IN THE PAST 12 MONTHS, HAS THE LACK OF TRANSPORTATION KEPT YOU FROM MEDICAL APPOINTMENTS OR FROM GETTING MEDICATIONS?: NOT ASKED

## 2020-12-29 ASSESSMENT — ENCOUNTER SYMPTOMS
BLOOD IN STOOL: 0
BACK PAIN: 0
TROUBLE SWALLOWING: 0
CHEST TIGHTNESS: 0
NAUSEA: 0
ABDOMINAL PAIN: 0
ORTHOPNEA: 0
CONSTIPATION: 0
EYE PAIN: 0
SHORTNESS OF BREATH: 0
COUGH: 0
SORE THROAT: 0

## 2020-12-29 NOTE — PROGRESS NOTES
Subjective:      Patient ID: Jody Nuno is a 79 y.o. male. tobacco  addiction     Noted   1/2  pday    Copd  Stable         Hypertension  This is a chronic problem. The current episode started more than 1 year ago. The problem has been resolved since onset. The problem is controlled. Pertinent negatives include no chest pain, headaches, orthopnea, palpitations, peripheral edema or shortness of breath. Risk factors for coronary artery disease include smoking/tobacco exposure. The current treatment provides significant improvement. There are no compliance problems. Hyperlipidemia  This is a chronic problem. The current episode started more than 1 year ago. The problem is controlled. Pertinent negatives include no chest pain or shortness of breath. Current antihyperlipidemic treatment includes statins. The current treatment provides significant improvement of lipids. There are no compliance problems.       Past Medical History:   Diagnosis Date    Asthma     Collagenous colitis 2014       repeat  in  2024    Colon polyps 2000    COPD, mild (HCC)     Eczema of hand     HTN (hypertension)     Hyperlipidemia     Smoker      Past Surgical History:   Procedure Laterality Date    COLONOSCOPY  2011,6/30/14    nba cardenas-no polyps repeat 10 yr    HERNIA REPAIR      NASAL SINUS SURGERY  6/2008    polyps    OTHER SURGICAL HISTORY  DEC 7TH 2012 DR GET [facility]     IGS ENDOSCOPIC BI LAT MAXILLARY, ETHMOID, SPHENOID, FRONTAL SINUSOTOMY WITH SEPTOPLASTY AND TURBINOPLASTIES    SKIN CANCER EXCISION  9-2014    Left side of Forehead     TOOTH EXTRACTION  02/2017     Social History     Socioeconomic History    Marital status:      Spouse name: Not on file    Number of children: Not on file    Years of education: Not on file    Highest education level: Not on file   Occupational History    Not on file   Social Needs    Financial resource strain: Not hard at all   Harleen-Vianney insecurity Skin: Negative for rash. Neurological: Negative for dizziness, weakness and headaches. Hematological: Negative for adenopathy. Does not bruise/bleed easily. Psychiatric/Behavioral: Negative for behavioral problems, dysphoric mood and sleep disturbance. /82 (Site: Right Upper Arm, Position: Sitting, Cuff Size: Medium Adult)   Pulse 84   Temp 97.5 °F (36.4 °C) (Skin)   Resp 18   Ht 5' 9\" (1.753 m)   Wt 155 lb 8 oz (70.5 kg)   BMI 22.96 kg/m²   Objective:   Physical Exam  Vitals signs and nursing note reviewed. Constitutional:       Appearance: He is well-developed. HENT:      Head: Normocephalic and atraumatic. Right Ear: External ear normal.      Left Ear: External ear normal.      Nose: Nose normal.   Eyes:      Conjunctiva/sclera: Conjunctivae normal.      Pupils: Pupils are equal, round, and reactive to light. Comments: Fundi nl   Neck:      Musculoskeletal: Normal range of motion and neck supple. Thyroid: No thyromegaly. Cardiovascular:      Rate and Rhythm: Tachycardia present. Rhythm irregular. Heart sounds: Normal heart sounds. Pulmonary:      Effort: Pulmonary effort is normal.      Breath sounds: Normal breath sounds. No wheezing or rales. Abdominal:      General: Bowel sounds are normal.      Palpations: Abdomen is soft. There is no mass. Tenderness: There is no abdominal tenderness. Musculoskeletal: Normal range of motion. Lymphadenopathy:      Cervical: No cervical adenopathy. Skin:     General: Skin is warm and dry. Findings: No rash. Neurological:      Mental Status: He is alert and oriented to person, place, and time. Cranial Nerves: No cranial nerve deficit. Deep Tendon Reflexes: Reflexes are normal and symmetric.         ekg  Atrial  Fib   With  Rapid  Rate   Nonspecific   Changes   Assessment:       Diagnosis Orders   1.  Persistent atrial fibrillation (HCC)  ECHO Complete 2D W Doppler W Color Neal Cheema MD, Cardiology, [address] [place] [address]   2. Eczema of both hands  fluocinonide (LIDEX) 0.05 % ointment   3. Essential hypertension  losartan (COZAAR) 100 MG tablet   4. Pure hypercholesterolemia  pravastatin (PRAVACHOL) 40 MG tablet   5. COPD, mild ([address]     6. Mild intermittent asthma without complication     7. Smoker     8. Tachycardia  EKG 12 Lead         Plan:      Current Outpatient Medications   Medication Sig Dispense Refill    fluocinonide (LIDEX) 0.05 % ointment Apply topically 2 times daily. 1 Tube 5    losartan (COZAAR) 100 MG tablet Take 1 tablet by mouth daily 30 tablet 5    pravastatin (PRAVACHOL) 40 MG tablet Take 1 tablet by mouth daily 30 tablet 5    metoprolol tartrate (LOPRESSOR) 50 MG tablet Take 1 tablet by mouth 2 times daily 60 tablet 3    rivaroxaban (XARELTO) 20 MG TABS tablet Take 1 tablet by mouth daily (with breakfast) 28 tablet 0    albuterol sulfate  (90 Base) MCG/ACT inhaler Inhale 2 puffs into the lungs 4 times daily 1 Inhaler 4    Tiotropium Bromide-Olodaterol (STIOLTO RESPIMAT) 2.5-2.5 MCG/ACT AERS 2  puffs  daily 1 Inhaler 5    loratadine (CLARITIN) 10 MG tablet Take 10 mg by mouth 2 times daily      aspirin 81 MG tablet Take 81 mg by mouth daily.  Multiple Vitamins-Minerals (CENTRUM SILVER PO) Take  by mouth daily.  varenicline (CHANTIX CONTINUING MONTH PAK) 1 MG tablet Lone  Tab po bid (Patient not taking: Reported on 12/29/2020) 60 tablet 3    varenicline (CHANTIX STARTING MONTH MANUEL) 0.5 MG X 11 & 1 MG X 42 tablet Take by mouth. (Patient not taking: Reported on 12/29/2020) 1 box 0     No current facility-administered medications for this visit.       Orders Placed This Encounter   Procedures    INFLUENZA, QUADV, 0.5ML, 6 MO AND OLDER, IM, MDV, (Carleen Pal)   Neal Cheema MD, Cardiology, [address] [place] [address]     Referral Priority:   Routine     Referral Type:   Eval and Treat     Referral Reason:   Specialty Services Required Referred to Provider:   Noemy Rogers MD     Requested Specialty:   Cardiology     Number of Visits Requested:   1    EKG 12 Lead     Order Specific Question:   Reason for Exam?     Answer:   Irregular heart rate    ECHO Complete 2D W Doppler W Color     Standing Status:   Future     Standing Expiration Date:   12/29/2021     Order Specific Question:   Reason for exam:     Answer:   lv  function        see  Dr Vladislav Alexandre       see in  [address] MD [place]

## 2020-12-29 NOTE — PROGRESS NOTES
Immunizations Administered     Name Date Dose Route    Influenza, Quadv, IM, (6 mo and older Fluzone, Flulaval, Fluarix and 3 yrs and older Afluria) 12/29/2020 0.5 mL Intramuscular    Site: Deltoid- Left    Lot: [id number]    NDC: 71647-827-03

## 2020-12-29 NOTE — TELEPHONE ENCOUNTER
Scheduled pt for an echo at [facility] on Tuesday Jan 5th 2021 at 2:15 pm. 2nd floor [facility]. Pt informed.

## 2020-12-29 NOTE — TELEPHONE ENCOUNTER
The pharmacy is  requesting a refill of the below medication which has been pended for you:     Requested Prescriptions     Pending Prescriptions Disp Refills    metoprolol tartrate (LOPRESSOR) 50 MG tablet [Pharmacy Med Name: METOPROLOL TARTRATE 50MG TABLETS] 180 tablet      Sig: TAKE 1 TABLET BY MOUTH TWICE DAILY       Last Appointment Date: 12/29/2020  Next Appointment Date: 2/2/2021    Allergies   Allergen Reactions    Penicillins Rash    Sulfa Antibiotics Rash

## 2020-12-30 RX ORDER — METOPROLOL TARTRATE 50 MG/1
TABLET, FILM COATED ORAL
Qty: 180 TABLET | Refills: 1 | Status: ON HOLD | OUTPATIENT
Start: 2020-12-30 | End: 2021-01-19 | Stop reason: HOSPADM

## 2021-01-01 ENCOUNTER — TELEPHONE (OUTPATIENT)
Dept: CARDIOLOGY CLINIC | Age: 71
End: 2021-01-01

## 2021-01-01 ENCOUNTER — OFFICE VISIT (OUTPATIENT)
Dept: FAMILY MEDICINE CLINIC | Age: 71
End: 2021-01-01

## 2021-01-01 ENCOUNTER — HOSPITAL ENCOUNTER (OUTPATIENT)
Age: 71
Discharge: HOME | End: 2021-09-14
Payer: MEDICARE

## 2021-01-01 ENCOUNTER — PROCEDURE VISIT (OUTPATIENT)
Dept: CARDIOLOGY CLINIC | Age: 71
End: 2021-01-01
Payer: MEDICARE

## 2021-01-01 ENCOUNTER — OFFICE VISIT (OUTPATIENT)
Dept: CARDIOLOGY CLINIC | Age: 71
End: 2021-01-01
Payer: MEDICARE

## 2021-01-01 ENCOUNTER — HOSPITAL ENCOUNTER (EMERGENCY)
Age: 71
Discharge: HOME | End: 2021-09-14
Attending: EMERGENCY MEDICINE
Payer: MEDICARE

## 2021-01-01 ENCOUNTER — TELEPHONE (OUTPATIENT)
Dept: FAMILY MEDICINE CLINIC | Age: 71
End: 2021-01-01

## 2021-01-01 ENCOUNTER — NURSE ONLY (OUTPATIENT)
Dept: CARDIOLOGY CLINIC | Age: 71
End: 2021-01-01
Payer: MEDICARE

## 2021-01-01 ENCOUNTER — HOSPITAL ENCOUNTER (OUTPATIENT)
Age: 71
Discharge: HOME | End: 2021-09-07
Payer: MEDICARE

## 2021-01-01 VITALS
HEART RATE: 64 BPM | BODY MASS INDEX: 25.05 KG/M2 | HEIGHT: 68 IN | WEIGHT: 165.25 LBS | SYSTOLIC BLOOD PRESSURE: 118 MMHG | DIASTOLIC BLOOD PRESSURE: 66 MMHG | TEMPERATURE: 95.9 F | RESPIRATION RATE: 16 BRPM

## 2021-01-01 VITALS
SYSTOLIC BLOOD PRESSURE: 170 MMHG | WEIGHT: 160 LBS | HEART RATE: 55 BPM | RESPIRATION RATE: 15 BRPM | BODY MASS INDEX: 24.25 KG/M2 | TEMPERATURE: 98.8 F | DIASTOLIC BLOOD PRESSURE: 75 MMHG | OXYGEN SATURATION: 95 % | HEIGHT: 68 IN

## 2021-01-01 VITALS
HEART RATE: 58 BPM | TEMPERATURE: 97 F | BODY MASS INDEX: 24.83 KG/M2 | HEIGHT: 68 IN | RESPIRATION RATE: 16 BRPM | SYSTOLIC BLOOD PRESSURE: 128 MMHG | WEIGHT: 163.8 LBS | DIASTOLIC BLOOD PRESSURE: 72 MMHG

## 2021-01-01 VITALS
TEMPERATURE: 97 F | WEIGHT: 159.25 LBS | HEIGHT: 68 IN | SYSTOLIC BLOOD PRESSURE: 124 MMHG | RESPIRATION RATE: 16 BRPM | BODY MASS INDEX: 24.13 KG/M2 | HEART RATE: 72 BPM | DIASTOLIC BLOOD PRESSURE: 72 MMHG

## 2021-01-01 VITALS
DIASTOLIC BLOOD PRESSURE: 60 MMHG | SYSTOLIC BLOOD PRESSURE: 140 MMHG | HEART RATE: 55 BPM | WEIGHT: 160.8 LBS | OXYGEN SATURATION: 98 % | BODY MASS INDEX: 24.37 KG/M2 | HEIGHT: 68 IN

## 2021-01-01 DIAGNOSIS — I25.5 ISCHEMIC CARDIOMYOPATHY: ICD-10-CM

## 2021-01-01 DIAGNOSIS — I10 ESSENTIAL HYPERTENSION: ICD-10-CM

## 2021-01-01 DIAGNOSIS — G25.81 RESTLESS LEG SYNDROME: ICD-10-CM

## 2021-01-01 DIAGNOSIS — I10 ESSENTIAL HYPERTENSION: Primary | ICD-10-CM

## 2021-01-01 DIAGNOSIS — Z95.0 PACEMAKER: ICD-10-CM

## 2021-01-01 DIAGNOSIS — E78.00 PURE HYPERCHOLESTEROLEMIA: ICD-10-CM

## 2021-01-01 DIAGNOSIS — I25.10 CORONARY ARTERY DISEASE INVOLVING NATIVE CORONARY ARTERY OF NATIVE HEART WITHOUT ANGINA PECTORIS: Primary | ICD-10-CM

## 2021-01-01 DIAGNOSIS — E73.9 LACTOSE INTOLERANCE: ICD-10-CM

## 2021-01-01 DIAGNOSIS — Z01.89 ENCOUNTER FOR CARDIOVERSION PROCEDURE: ICD-10-CM

## 2021-01-01 DIAGNOSIS — Z95.0 PACEMAKER: Primary | ICD-10-CM

## 2021-01-01 DIAGNOSIS — I10 PRIMARY HYPERTENSION: Primary | ICD-10-CM

## 2021-01-01 DIAGNOSIS — I48.0 PAROXYSMAL ATRIAL FIBRILLATION (HCC): ICD-10-CM

## 2021-01-01 DIAGNOSIS — F51.01 PRIMARY INSOMNIA: ICD-10-CM

## 2021-01-01 DIAGNOSIS — K52.831 COLLAGENOUS COLITIS: ICD-10-CM

## 2021-01-01 DIAGNOSIS — R41.3 MEMORY CHANGES: Primary | ICD-10-CM

## 2021-01-01 DIAGNOSIS — Z95.1 S/P CABG X 3: ICD-10-CM

## 2021-01-01 DIAGNOSIS — I50.22 CHRONIC SYSTOLIC CONGESTIVE HEART FAILURE, NYHA CLASS 1 (HCC): Primary | ICD-10-CM

## 2021-01-01 DIAGNOSIS — J42 CHRONIC BRONCHITIS, UNSPECIFIED CHRONIC BRONCHITIS TYPE (HCC): ICD-10-CM

## 2021-01-01 DIAGNOSIS — I50.22 CHF (CONGESTIVE HEART FAILURE), NYHA CLASS II, CHRONIC, SYSTOLIC (HCC): ICD-10-CM

## 2021-01-01 DIAGNOSIS — F51.01 PRIMARY INSOMNIA: Primary | ICD-10-CM

## 2021-01-01 DIAGNOSIS — I50.22 CHRONIC SYSTOLIC CONGESTIVE HEART FAILURE, NYHA CLASS 1 (HCC): ICD-10-CM

## 2021-01-01 DIAGNOSIS — Z23 NEED FOR INFLUENZA VACCINATION: ICD-10-CM

## 2021-01-01 DIAGNOSIS — I25.10 CORONARY ARTERY DISEASE INVOLVING NATIVE CORONARY ARTERY OF NATIVE HEART WITHOUT ANGINA PECTORIS: ICD-10-CM

## 2021-01-01 DIAGNOSIS — J44.9 COPD, MILD (HCC): ICD-10-CM

## 2021-01-01 DIAGNOSIS — R03.0 ELEVATED BLOOD PRESSURE READING: ICD-10-CM

## 2021-01-01 DIAGNOSIS — Z00.00 ROUTINE GENERAL MEDICAL EXAMINATION AT A HEALTH CARE FACILITY: ICD-10-CM

## 2021-01-01 DIAGNOSIS — F17.200 SMOKER: ICD-10-CM

## 2021-01-01 DIAGNOSIS — Z98.890 S/P LEFT ATRIAL APPENDAGE LIGATION: ICD-10-CM

## 2021-01-01 LAB
ANION GAP SERPL CALCULATED.3IONS-SCNC: 11 MEQ/L (ref 8–16)
ANION GAP SERPL CALCULATED.3IONS-SCNC: 14 MEQ/L (ref 8–16)
BUN BLDV-MCNC: 21 MG/DL (ref 7–22)
BUN BLDV-MCNC: 34 MG/DL (ref 7–22)
CALCIUM SERPL-MCNC: 9.3 MG/DL (ref 8.5–10.5)
CALCIUM SERPL-MCNC: 9.4 MG/DL (ref 8.5–10.5)
CHLORIDE BLD-SCNC: 104 MEQ/L (ref 98–111)
CHLORIDE BLD-SCNC: 99 MEQ/L (ref 98–111)
CO2: 21 MEQ/L (ref 23–33)
CO2: 22 MEQ/L (ref 23–33)
CREAT SERPL-MCNC: 1.4 MG/DL (ref 0.4–1.2)
CREAT SERPL-MCNC: 2.3 MG/DL (ref 0.4–1.2)
EKG ATRIAL RATE: 55 BPM
EKG P AXIS: 70 DEGREES
EKG P-R INTERVAL: 192 MS
EKG Q-T INTERVAL: 468 MS
EKG QRS DURATION: 120 MS
EKG QTC CALCULATION (BAZETT): 447 MS
EKG R AXIS: 76 DEGREES
EKG T AXIS: 58 DEGREES
EKG VENTRICULAR RATE: 55 BPM
GFR SERPL CREATININE-BSD FRML MDRD: 28 ML/MIN/1.73M2
GFR SERPL CREATININE-BSD FRML MDRD: 50 ML/MIN/1.73M2
GLUCOSE BLD-MCNC: 86 MG/DL (ref 70–108)
GLUCOSE BLD-MCNC: 89 MG/DL (ref 70–108)
MAGNESIUM: 2 MG/DL (ref 1.6–2.4)
POTASSIUM SERPL-SCNC: 4.8 MEQ/L (ref 3.5–5.2)
POTASSIUM SERPL-SCNC: 5 MEQ/L (ref 3.5–5.2)
SODIUM BLD-SCNC: 135 MEQ/L (ref 135–145)
SODIUM BLD-SCNC: 136 MEQ/L (ref 135–145)

## 2021-01-01 PROCEDURE — G8420 CALC BMI NORM PARAMETERS: HCPCS | Performed by: FAMILY MEDICINE

## 2021-01-01 PROCEDURE — 93005 ELECTROCARDIOGRAM TRACING: CPT | Performed by: EMERGENCY MEDICINE

## 2021-01-01 PROCEDURE — 99214 OFFICE O/P EST MOD 30 MIN: CPT | Performed by: NURSE PRACTITIONER

## 2021-01-01 PROCEDURE — 99213 OFFICE O/P EST LOW 20 MIN: CPT | Performed by: FAMILY MEDICINE

## 2021-01-01 PROCEDURE — G8427 DOCREV CUR MEDS BY ELIG CLIN: HCPCS | Performed by: FAMILY MEDICINE

## 2021-01-01 PROCEDURE — 3017F COLORECTAL CA SCREEN DOC REV: CPT | Performed by: FAMILY MEDICINE

## 2021-01-01 PROCEDURE — 93280 PM DEVICE PROGR EVAL DUAL: CPT | Performed by: INTERNAL MEDICINE

## 2021-01-01 PROCEDURE — 36415 COLL VENOUS BLD VENIPUNCTURE: CPT

## 2021-01-01 PROCEDURE — 93296 REM INTERROG EVL PM/IDS: CPT | Performed by: INTERNAL MEDICINE

## 2021-01-01 PROCEDURE — G8417 CALC BMI ABV UP PARAM F/U: HCPCS | Performed by: FAMILY MEDICINE

## 2021-01-01 PROCEDURE — 4040F PNEUMOC VAC/ADMIN/RCVD: CPT | Performed by: FAMILY MEDICINE

## 2021-01-01 PROCEDURE — 1123F ACP DISCUSS/DSCN MKR DOCD: CPT | Performed by: FAMILY MEDICINE

## 2021-01-01 PROCEDURE — 83735 ASSAY OF MAGNESIUM: CPT

## 2021-01-01 PROCEDURE — 99282 EMERGENCY DEPT VISIT SF MDM: CPT

## 2021-01-01 PROCEDURE — 80048 BASIC METABOLIC PNL TOTAL CA: CPT

## 2021-01-01 PROCEDURE — 90756 CCIIV4 VACC ABX FREE IM: CPT | Performed by: FAMILY MEDICINE

## 2021-01-01 PROCEDURE — 93294 REM INTERROG EVL PM/LDLS PM: CPT | Performed by: INTERNAL MEDICINE

## 2021-01-01 PROCEDURE — G0008 ADMIN INFLUENZA VIRUS VAC: HCPCS | Performed by: FAMILY MEDICINE

## 2021-01-01 PROCEDURE — G0438 PPPS, INITIAL VISIT: HCPCS | Performed by: FAMILY MEDICINE

## 2021-01-01 RX ORDER — ATORVASTATIN CALCIUM 40 MG/1
40 TABLET, FILM COATED ORAL NIGHTLY
Qty: 90 TABLET | Refills: 3 | Status: SHIPPED | OUTPATIENT
Start: 2021-01-01

## 2021-01-01 RX ORDER — FUROSEMIDE 20 MG/1
TABLET ORAL
Qty: 180 TABLET | Refills: 3 | Status: SHIPPED | OUTPATIENT
Start: 2021-01-01 | End: 2021-01-01 | Stop reason: SDUPTHER

## 2021-01-01 RX ORDER — FUROSEMIDE 20 MG/1
TABLET ORAL
Qty: 90 TABLET | Refills: 3 | Status: SHIPPED | OUTPATIENT
Start: 2021-01-01 | End: 2022-01-01

## 2021-01-01 RX ORDER — ALPRAZOLAM 0.5 MG/1
0.5 TABLET ORAL NIGHTLY PRN
Qty: 30 TABLET | Refills: 0 | Status: SHIPPED | OUTPATIENT
Start: 2021-01-01 | End: 2021-01-01 | Stop reason: SDUPTHER

## 2021-01-01 RX ORDER — SACUBITRIL AND VALSARTAN 49; 51 MG/1; MG/1
1 TABLET, FILM COATED ORAL 2 TIMES DAILY
Qty: 60 TABLET | Refills: 3 | Status: SHIPPED | OUTPATIENT
Start: 2021-01-01 | End: 2021-01-01 | Stop reason: SDUPTHER

## 2021-01-01 RX ORDER — ROPINIROLE 0.5 MG/1
TABLET, FILM COATED ORAL
Qty: 90 TABLET | Refills: 1 | Status: ON HOLD | OUTPATIENT
Start: 2021-01-01 | End: 2022-01-01

## 2021-01-01 RX ORDER — ALPRAZOLAM 0.5 MG/1
0.5 TABLET ORAL NIGHTLY PRN
Qty: 30 TABLET | Refills: 1 | Status: SHIPPED | OUTPATIENT
Start: 2021-01-01 | End: 2022-01-01

## 2021-01-01 RX ORDER — SACUBITRIL AND VALSARTAN 49; 51 MG/1; MG/1
1 TABLET, FILM COATED ORAL 2 TIMES DAILY
Qty: 180 TABLET | Refills: 3 | Status: SHIPPED | OUTPATIENT
Start: 2021-01-01 | End: 2021-01-01 | Stop reason: SINTOL

## 2021-01-01 RX ORDER — METOPROLOL SUCCINATE 100 MG/1
100 TABLET, EXTENDED RELEASE ORAL DAILY
Qty: 90 TABLET | Refills: 3 | Status: SHIPPED | OUTPATIENT
Start: 2021-01-01 | End: 2022-01-01

## 2021-01-01 ASSESSMENT — ENCOUNTER SYMPTOMS
SORE THROAT: 0
VOMITING: 0
CHEST TIGHTNESS: 0
SHORTNESS OF BREATH: 0
NAUSEA: 0
SHORTNESS OF BREATH: 0
SORE THROAT: 0
NAUSEA: 0
BLOOD IN STOOL: 0
EYE PAIN: 0
TROUBLE SWALLOWING: 0
DIARRHEA: 0
BLOOD IN STOOL: 0
CHEST TIGHTNESS: 0
COUGH: 0
COUGH: 0
SORE THROAT: 0
CHEST TIGHTNESS: 0
NAUSEA: 0
SHORTNESS OF BREATH: 0
ABDOMINAL PAIN: 0
BACK PAIN: 0
TROUBLE SWALLOWING: 0
EYE REDNESS: 0
EYE PAIN: 0
CONSTIPATION: 0
EYE PAIN: 0
CONSTIPATION: 0
BACK PAIN: 0
ABDOMINAL PAIN: 0
ABDOMINAL DISTENTION: 0
VOICE CHANGE: 0
ORTHOPNEA: 0
BLOOD IN STOOL: 0
ABDOMINAL PAIN: 0
ABDOMINAL PAIN: 0
DIFFICULTY BREATHING: 0
SINUS PRESSURE: 0
BLOOD IN STOOL: 0
NAUSEA: 0
CONSTIPATION: 0
HOARSE VOICE: 0
CONSTIPATION: 0
COUGH: 0
COUGH: 0
WHEEZING: 0
PHOTOPHOBIA: 0
COUGH: 0
ORTHOPNEA: 0
SHORTNESS OF BREATH: 0
BACK PAIN: 0
SORE THROAT: 0
BACK PAIN: 0
RHINORRHEA: 0
ABDOMINAL DISTENTION: 0
COLOR CHANGE: 0
TROUBLE SWALLOWING: 0
NAUSEA: 0
VOMITING: 0
CHEST TIGHTNESS: 0
SHORTNESS OF BREATH: 0

## 2021-01-01 ASSESSMENT — COPD QUESTIONNAIRES: COPD: 1

## 2021-01-05 ENCOUNTER — HOSPITAL ENCOUNTER (OUTPATIENT)
Dept: NON INVASIVE DIAGNOSTICS | Age: 71
Discharge: HOME | DRG: 233 | End: 2021-01-05
Payer: MEDICARE

## 2021-01-05 DIAGNOSIS — I48.19 PERSISTENT ATRIAL FIBRILLATION (HCC): ICD-10-CM

## 2021-01-05 LAB
LV EF: 25 %
LVEF MODALITY: NORMAL

## 2021-01-05 PROCEDURE — 93306 TTE W/DOPPLER COMPLETE: CPT

## 2021-01-06 ENCOUNTER — HOSPITAL ENCOUNTER (INPATIENT)
Age: 71
LOS: 15 days | Discharge: HOME | DRG: 233 | End: 2021-01-21
Attending: EMERGENCY MEDICINE | Admitting: FAMILY MEDICINE
Payer: MEDICARE

## 2021-01-06 ENCOUNTER — APPOINTMENT (OUTPATIENT)
Dept: GENERAL RADIOLOGY | Age: 71
DRG: 233 | End: 2021-01-06
Payer: MEDICARE

## 2021-01-06 DIAGNOSIS — R77.8 ELEVATED TROPONIN: ICD-10-CM

## 2021-01-06 DIAGNOSIS — N17.9 ACUTE KIDNEY INJURY (HCC): ICD-10-CM

## 2021-01-06 DIAGNOSIS — J44.9 COPD, MILD (HCC): ICD-10-CM

## 2021-01-06 DIAGNOSIS — Z95.1 S/P CABG X 3: ICD-10-CM

## 2021-01-06 DIAGNOSIS — I48.91 ATRIAL FIBRILLATION, UNSPECIFIED TYPE (HCC): Primary | ICD-10-CM

## 2021-01-06 LAB
ALBUMIN SERPL-MCNC: 4.1 G/DL (ref 3.5–5.1)
ALP BLD-CCNC: 76 U/L (ref 38–126)
ALT SERPL-CCNC: 74 U/L (ref 11–66)
ANION GAP SERPL CALCULATED.3IONS-SCNC: 11 MEQ/L (ref 8–16)
AST SERPL-CCNC: 59 U/L (ref 5–40)
BACTERIA: ABNORMAL /HPF
BASOPHILS # BLD: 0.4 %
BASOPHILS ABSOLUTE: 0 THOU/MM3 (ref 0–0.1)
BILIRUB SERPL-MCNC: 0.6 MG/DL (ref 0.3–1.2)
BILIRUBIN DIRECT: < 0.2 MG/DL (ref 0–0.3)
BILIRUBIN URINE: NEGATIVE
BLOOD, URINE: NEGATIVE
BUN BLDV-MCNC: 35 MG/DL (ref 7–22)
CALCIUM SERPL-MCNC: 9.5 MG/DL (ref 8.5–10.5)
CASTS 2: ABNORMAL /LPF
CASTS UA: ABNORMAL /LPF
CHARACTER, URINE: CLEAR
CHLORIDE BLD-SCNC: 101 MEQ/L (ref 98–111)
CO2: 26 MEQ/L (ref 23–33)
COLOR: YELLOW
CREAT SERPL-MCNC: 1.3 MG/DL (ref 0.4–1.2)
CRYSTALS, UA: ABNORMAL
EKG ATRIAL RATE: 81 BPM
EKG Q-T INTERVAL: 332 MS
EKG QRS DURATION: 94 MS
EKG QTC CALCULATION (BAZETT): 475 MS
EKG R AXIS: 89 DEGREES
EKG T AXIS: 87 DEGREES
EKG VENTRICULAR RATE: 123 BPM
EOSINOPHIL # BLD: 2.6 %
EOSINOPHILS ABSOLUTE: 0.2 THOU/MM3 (ref 0–0.4)
EPITHELIAL CELLS, UA: ABNORMAL /HPF
ERYTHROCYTE [DISTWIDTH] IN BLOOD BY AUTOMATED COUNT: 12.7 % (ref 11.5–14.5)
ERYTHROCYTE [DISTWIDTH] IN BLOOD BY AUTOMATED COUNT: 47.9 FL (ref 35–45)
GFR SERPL CREATININE-BSD FRML MDRD: 55 ML/MIN/1.73M2
GLUCOSE BLD-MCNC: 139 MG/DL (ref 70–108)
GLUCOSE URINE: NEGATIVE MG/DL
HCT VFR BLD CALC: 47.6 % (ref 42–52)
HEMOGLOBIN: 16.2 GM/DL (ref 14–18)
IMMATURE GRANS (ABS): 0.05 THOU/MM3 (ref 0–0.07)
IMMATURE GRANULOCYTES: 0.5 %
KETONES, URINE: NEGATIVE
LEUKOCYTE ESTERASE, URINE: NEGATIVE
LIPASE: 43.3 U/L (ref 5.6–51.3)
LYMPHOCYTES # BLD: 28.9 %
LYMPHOCYTES ABSOLUTE: 2.7 THOU/MM3 (ref 1–4.8)
MAGNESIUM: 1.7 MG/DL (ref 1.6–2.4)
MCH RBC QN AUTO: 35.2 PG (ref 26–33)
MCHC RBC AUTO-ENTMCNC: 34 GM/DL (ref 32.2–35.5)
MCV RBC AUTO: 103.5 FL (ref 80–94)
MISCELLANEOUS 2: ABNORMAL
MONOCYTES # BLD: 8.2 %
MONOCYTES ABSOLUTE: 0.8 THOU/MM3 (ref 0.4–1.3)
NITRITE, URINE: NEGATIVE
NUCLEATED RED BLOOD CELLS: 0 /100 WBC
OSMOLALITY CALCULATION: 285.9 MOSMOL/KG (ref 275–300)
PH UA: 5 (ref 5–9)
PLATELET # BLD: 215 THOU/MM3 (ref 130–400)
PMV BLD AUTO: 10 FL (ref 9.4–12.4)
POTASSIUM REFLEX MAGNESIUM: 4.9 MEQ/L (ref 3.5–5.2)
PRO-BNP: 4443 PG/ML (ref 0–900)
PROTEIN UA: ABNORMAL
RBC # BLD: 4.6 MILL/MM3 (ref 4.7–6.1)
RBC URINE: ABNORMAL /HPF
RENAL EPITHELIAL, UA: ABNORMAL
SEG NEUTROPHILS: 59.4 %
SEGMENTED NEUTROPHILS ABSOLUTE COUNT: 5.6 THOU/MM3 (ref 1.8–7.7)
SODIUM BLD-SCNC: 138 MEQ/L (ref 135–145)
SPECIFIC GRAVITY, URINE: 1.02 (ref 1–1.03)
TOTAL PROTEIN: 7.2 G/DL (ref 6.1–8)
TROPONIN T: 0.17 NG/ML
TROPONIN T: 0.18 NG/ML
TSH SERPL DL<=0.05 MIU/L-ACNC: 2.05 UIU/ML (ref 0.4–4.2)
UROBILINOGEN, URINE: 0.2 EU/DL (ref 0–1)
WBC # BLD: 9.4 THOU/MM3 (ref 4.8–10.8)
WBC UA: ABNORMAL /HPF
YEAST: ABNORMAL

## 2021-01-06 PROCEDURE — 96365 THER/PROPH/DIAG IV INF INIT: CPT

## 2021-01-06 PROCEDURE — 99285 EMERGENCY DEPT VISIT HI MDM: CPT

## 2021-01-06 PROCEDURE — G0378 HOSPITAL OBSERVATION PER HR: HCPCS

## 2021-01-06 PROCEDURE — 81001 URINALYSIS AUTO W/SCOPE: CPT

## 2021-01-06 PROCEDURE — 99223 1ST HOSP IP/OBS HIGH 75: CPT | Performed by: INTERNAL MEDICINE

## 2021-01-06 PROCEDURE — 94640 AIRWAY INHALATION TREATMENT: CPT

## 2021-01-06 PROCEDURE — 84484 ASSAY OF TROPONIN QUANT: CPT

## 2021-01-06 PROCEDURE — 96375 TX/PRO/DX INJ NEW DRUG ADDON: CPT

## 2021-01-06 PROCEDURE — 84443 ASSAY THYROID STIM HORMONE: CPT

## 2021-01-06 PROCEDURE — 83880 ASSAY OF NATRIURETIC PEPTIDE: CPT

## 2021-01-06 PROCEDURE — 6370000000 HC RX 637 (ALT 250 FOR IP)

## 2021-01-06 PROCEDURE — 2500000003 HC RX 250 WO HCPCS: Performed by: NURSE PRACTITIONER

## 2021-01-06 PROCEDURE — 83690 ASSAY OF LIPASE: CPT

## 2021-01-06 PROCEDURE — 6370000000 HC RX 637 (ALT 250 FOR IP): Performed by: NURSE PRACTITIONER

## 2021-01-06 PROCEDURE — 36415 COLL VENOUS BLD VENIPUNCTURE: CPT

## 2021-01-06 PROCEDURE — 96366 THER/PROPH/DIAG IV INF ADDON: CPT

## 2021-01-06 PROCEDURE — 4A023N7 MEASUREMENT OF CARDIAC SAMPLING AND PRESSURE, LEFT HEART, PERCUTANEOUS APPROACH: ICD-10-PCS | Performed by: INTERNAL MEDICINE

## 2021-01-06 PROCEDURE — 93005 ELECTROCARDIOGRAM TRACING: CPT | Performed by: NURSE PRACTITIONER

## 2021-01-06 PROCEDURE — B2111ZZ FLUOROSCOPY OF MULTIPLE CORONARY ARTERIES USING LOW OSMOLAR CONTRAST: ICD-10-PCS | Performed by: INTERNAL MEDICINE

## 2021-01-06 PROCEDURE — 80076 HEPATIC FUNCTION PANEL: CPT

## 2021-01-06 PROCEDURE — 2580000003 HC RX 258: Performed by: NURSE PRACTITIONER

## 2021-01-06 PROCEDURE — 94760 N-INVAS EAR/PLS OXIMETRY 1: CPT

## 2021-01-06 PROCEDURE — 6370000000 HC RX 637 (ALT 250 FOR IP): Performed by: FAMILY MEDICINE

## 2021-01-06 PROCEDURE — 6370000000 HC RX 637 (ALT 250 FOR IP): Performed by: INTERNAL MEDICINE

## 2021-01-06 PROCEDURE — 83735 ASSAY OF MAGNESIUM: CPT

## 2021-01-06 PROCEDURE — B2151ZZ FLUOROSCOPY OF LEFT HEART USING LOW OSMOLAR CONTRAST: ICD-10-PCS | Performed by: INTERNAL MEDICINE

## 2021-01-06 PROCEDURE — 71045 X-RAY EXAM CHEST 1 VIEW: CPT

## 2021-01-06 PROCEDURE — 80048 BASIC METABOLIC PNL TOTAL CA: CPT

## 2021-01-06 PROCEDURE — 6360000002 HC RX W HCPCS: Performed by: NURSE PRACTITIONER

## 2021-01-06 PROCEDURE — 85025 COMPLETE CBC W/AUTO DIFF WBC: CPT

## 2021-01-06 PROCEDURE — 1200000003 HC TELEMETRY R&B

## 2021-01-06 RX ORDER — POTASSIUM CHLORIDE 7.45 MG/ML
10 INJECTION INTRAVENOUS PRN
Status: DISCONTINUED | OUTPATIENT
Start: 2021-01-06 | End: 2021-01-21 | Stop reason: HOSPADM

## 2021-01-06 RX ORDER — PROMETHAZINE HYDROCHLORIDE 25 MG/1
12.5 TABLET ORAL EVERY 6 HOURS PRN
Status: DISCONTINUED | OUTPATIENT
Start: 2021-01-06 | End: 2021-01-21 | Stop reason: HOSPADM

## 2021-01-06 RX ORDER — FUROSEMIDE 40 MG/1
40 TABLET ORAL DAILY
Status: DISCONTINUED | OUTPATIENT
Start: 2021-01-06 | End: 2021-01-16

## 2021-01-06 RX ORDER — DILTIAZEM HYDROCHLORIDE 5 MG/ML
10 INJECTION INTRAVENOUS ONCE
Status: COMPLETED | OUTPATIENT
Start: 2021-01-06 | End: 2021-01-06

## 2021-01-06 RX ORDER — SODIUM CHLORIDE 0.9 % (FLUSH) 0.9 %
10 SYRINGE (ML) INJECTION PRN
Status: DISCONTINUED | OUTPATIENT
Start: 2021-01-06 | End: 2021-01-12 | Stop reason: SDUPTHER

## 2021-01-06 RX ORDER — PRAVASTATIN SODIUM 40 MG
40 TABLET ORAL DAILY
Status: DISCONTINUED | OUTPATIENT
Start: 2021-01-06 | End: 2021-01-06 | Stop reason: ALTCHOICE

## 2021-01-06 RX ORDER — 0.9 % SODIUM CHLORIDE 0.9 %
1000 INTRAVENOUS SOLUTION INTRAVENOUS ONCE
Status: COMPLETED | OUTPATIENT
Start: 2021-01-06 | End: 2021-01-06

## 2021-01-06 RX ORDER — ASPIRIN 81 MG/1
81 TABLET, CHEWABLE ORAL DAILY
Status: DISCONTINUED | OUTPATIENT
Start: 2021-01-06 | End: 2021-01-21 | Stop reason: HOSPADM

## 2021-01-06 RX ORDER — MAGNESIUM SULFATE IN WATER 40 MG/ML
2 INJECTION, SOLUTION INTRAVENOUS PRN
Status: DISCONTINUED | OUTPATIENT
Start: 2021-01-06 | End: 2021-01-15

## 2021-01-06 RX ORDER — ACETAMINOPHEN 325 MG/1
650 TABLET ORAL EVERY 6 HOURS PRN
Status: DISCONTINUED | OUTPATIENT
Start: 2021-01-06 | End: 2021-01-12 | Stop reason: SDUPTHER

## 2021-01-06 RX ORDER — TIOTROPIUM BROMIDE AND OLODATEROL 3.124; 2.736 UG/1; UG/1
1 SPRAY, METERED RESPIRATORY (INHALATION)
Status: ON HOLD | COMMUNITY
End: 2021-01-19 | Stop reason: HOSPADM

## 2021-01-06 RX ORDER — ATORVASTATIN CALCIUM 10 MG/1
10 TABLET, FILM COATED ORAL NIGHTLY
Status: DISCONTINUED | OUTPATIENT
Start: 2021-01-06 | End: 2021-01-21 | Stop reason: HOSPADM

## 2021-01-06 RX ORDER — ONDANSETRON 2 MG/ML
4 INJECTION INTRAMUSCULAR; INTRAVENOUS EVERY 6 HOURS PRN
Status: DISCONTINUED | OUTPATIENT
Start: 2021-01-06 | End: 2021-01-12

## 2021-01-06 RX ORDER — ASPIRIN 81 MG/1
324 TABLET, CHEWABLE ORAL ONCE
Status: COMPLETED | OUTPATIENT
Start: 2021-01-06 | End: 2021-01-06

## 2021-01-06 RX ORDER — LOSARTAN POTASSIUM 100 MG/1
100 TABLET ORAL DAILY
Status: DISCONTINUED | OUTPATIENT
Start: 2021-01-06 | End: 2021-01-06

## 2021-01-06 RX ORDER — ALBUTEROL SULFATE 2.5 MG/3ML
2.5 SOLUTION RESPIRATORY (INHALATION) EVERY 6 HOURS PRN
Status: DISCONTINUED | OUTPATIENT
Start: 2021-01-06 | End: 2021-01-13

## 2021-01-06 RX ORDER — ACETAMINOPHEN 650 MG/1
650 SUPPOSITORY RECTAL EVERY 6 HOURS PRN
Status: DISCONTINUED | OUTPATIENT
Start: 2021-01-06 | End: 2021-01-12 | Stop reason: SDUPTHER

## 2021-01-06 RX ORDER — METOPROLOL TARTRATE 50 MG/1
50 TABLET, FILM COATED ORAL 2 TIMES DAILY
Status: DISCONTINUED | OUTPATIENT
Start: 2021-01-06 | End: 2021-01-08

## 2021-01-06 RX ORDER — POLYETHYLENE GLYCOL 3350 17 G/17G
17 POWDER, FOR SOLUTION ORAL DAILY PRN
Status: DISCONTINUED | OUTPATIENT
Start: 2021-01-06 | End: 2021-01-21 | Stop reason: HOSPADM

## 2021-01-06 RX ORDER — FLUOCINONIDE 0.5 MG/G
OINTMENT TOPICAL PRN
COMMUNITY
End: 2022-01-01 | Stop reason: SDUPTHER

## 2021-01-06 RX ORDER — FUROSEMIDE 10 MG/ML
20 INJECTION INTRAMUSCULAR; INTRAVENOUS ONCE
Status: COMPLETED | OUTPATIENT
Start: 2021-01-06 | End: 2021-01-06

## 2021-01-06 RX ORDER — M-VIT,TX,IRON,MINS/CALC/FOLIC 27MG-0.4MG
1 TABLET ORAL DAILY
Status: DISCONTINUED | OUTPATIENT
Start: 2021-01-06 | End: 2021-01-12 | Stop reason: SDUPTHER

## 2021-01-06 RX ORDER — HYDRALAZINE HYDROCHLORIDE 25 MG/1
25 TABLET, FILM COATED ORAL EVERY 8 HOURS SCHEDULED
Status: DISCONTINUED | OUTPATIENT
Start: 2021-01-06 | End: 2021-01-08

## 2021-01-06 RX ORDER — SODIUM CHLORIDE 0.9 % (FLUSH) 0.9 %
10 SYRINGE (ML) INJECTION EVERY 12 HOURS SCHEDULED
Status: DISCONTINUED | OUTPATIENT
Start: 2021-01-06 | End: 2021-01-12 | Stop reason: SDUPTHER

## 2021-01-06 RX ORDER — ISOSORBIDE MONONITRATE 60 MG/1
60 TABLET, EXTENDED RELEASE ORAL DAILY
Status: DISCONTINUED | OUTPATIENT
Start: 2021-01-06 | End: 2021-01-14

## 2021-01-06 RX ORDER — POTASSIUM CHLORIDE 20 MEQ/1
40 TABLET, EXTENDED RELEASE ORAL PRN
Status: DISCONTINUED | OUTPATIENT
Start: 2021-01-06 | End: 2021-01-21 | Stop reason: HOSPADM

## 2021-01-06 RX ADMIN — MULTIPLE VITAMINS W/ MINERALS TAB 1 TABLET: TAB at 11:45

## 2021-01-06 RX ADMIN — DILTIAZEM HYDROCHLORIDE 5 MG/HR: 5 INJECTION INTRAVENOUS at 03:49

## 2021-01-06 RX ADMIN — SODIUM CHLORIDE 1000 ML: 9 INJECTION, SOLUTION INTRAVENOUS at 04:37

## 2021-01-06 RX ADMIN — METOPROLOL TARTRATE 50 MG: 50 TABLET, FILM COATED ORAL at 20:37

## 2021-01-06 RX ADMIN — GLYCOPYRROLATE AND FORMOTEROL FUMARATE 2 PUFF: 9; 4.8 AEROSOL, METERED RESPIRATORY (INHALATION) at 22:24

## 2021-01-06 RX ADMIN — DILTIAZEM HYDROCHLORIDE 10 MG: 5 INJECTION INTRAVENOUS at 03:45

## 2021-01-06 RX ADMIN — DILTIAZEM HYDROCHLORIDE 5 MG/HR: 5 INJECTION INTRAVENOUS at 13:14

## 2021-01-06 RX ADMIN — ASPIRIN 324 MG: 81 TABLET, CHEWABLE ORAL at 05:33

## 2021-01-06 RX ADMIN — METOPROLOL TARTRATE 50 MG: 50 TABLET, FILM COATED ORAL at 11:45

## 2021-01-06 RX ADMIN — FUROSEMIDE 20 MG: 10 INJECTION, SOLUTION INTRAMUSCULAR; INTRAVENOUS at 05:33

## 2021-01-06 RX ADMIN — FUROSEMIDE 40 MG: 40 TABLET ORAL at 11:45

## 2021-01-06 RX ADMIN — ATORVASTATIN CALCIUM 10 MG: 10 TABLET, FILM COATED ORAL at 20:37

## 2021-01-06 RX ADMIN — ISOSORBIDE MONONITRATE 60 MG: 60 TABLET ORAL at 11:45

## 2021-01-06 RX ADMIN — HYDRALAZINE HYDROCHLORIDE 25 MG: 25 TABLET, FILM COATED ORAL at 13:15

## 2021-01-06 RX ADMIN — GLYCOPYRROLATE AND FORMOTEROL FUMARATE 2 PUFF: 9; 4.8 AEROSOL, METERED RESPIRATORY (INHALATION) at 08:08

## 2021-01-06 RX ADMIN — ASPIRIN 81 MG: 81 TABLET, CHEWABLE ORAL at 11:46

## 2021-01-06 ASSESSMENT — ENCOUNTER SYMPTOMS
SHORTNESS OF BREATH: 1
COUGH: 0
RHINORRHEA: 0
NAUSEA: 0
VOMITING: 0
BACK PAIN: 0
CHEST TIGHTNESS: 0

## 2021-01-06 ASSESSMENT — PAIN SCALES - GENERAL
PAINLEVEL_OUTOF10: 0
PAINLEVEL_OUTOF10: 0

## 2021-01-06 NOTE — ED NOTES
Called [facility] and informed Yusuf that the patient will be on their way to the unit. Patient transported via bed in a stable condition.       [place]  01/06/21 [id number]

## 2021-01-06 NOTE — ED NOTES
Yani Gallagher Pt. Resting in bed with even and unlabored respirations. Pt. Denies any pain. Pt. Updated about plan of care and treatment. Family at bedside. Pt. Has no further concerns, questions or needs at this time. Call light within reach.         Nona Siddiqui RN  01/06/21 1751

## 2021-01-06 NOTE — CONSULTS
[facility]    Patient's Name/Date of Birth: Ibeth Gardner / 1950 (67 y.o.)    Date: January 6, 2021     Referring Provider: Jerry Pastor MD    CHIEF COMPLAINT: SOB      HPI: This is a pleasant 79 y.o. male presents with evaluation of sob that awoke him from sleep. He was supposed to be followed up by Dr. Vini Francis. He was found to have Afib last week. HR was 140s. First episode. He could not catch his breath. Started on Dilitazem gtt. HR still 110-120s. BNP 4443. Trop 0.183--0.175. Cr 1.3.  TSH 2.0.  Hgb 16.2. Patient is on Xarelto as outpatient. EF is 25%, with severe global hypokinesis. Mild LE edema. No chest pain. No major drug use, or hx of CAD/MI. Denies major family hx. Drinks 6 beers per day x 2 years. No recent sick contacts. Lots of family stress with kids using drugs. He has worsening LE edema. Prior to this, he states no cardiac concerns. Echo:   Results for orders placed during the hospital encounter of 01/05/21   ECHO Complete 2D W Doppler W Color    Narrative Transthoracic Echocardiography Report (TTE)     Demographics      Patient Name    Glenda Kulkarni Gender               Male      MR #            [id number]    Race                                                    Ethnicity      Account #       [de-identified]    Room Number      Accession       [id number]    Date of Study        01/05/2021   Number      Date of Birth   1950   Referring Physician  Farzad Cortez      Age             79 year(s)   WHIT BrennanT                                   Interpreting         Finesse Koenig MD                                Physician     Procedure    Type of Study      TTE procedure:ECHOCARDIOGRAM COMPLETE 2D W DOPPLER W COLOR.      Procedure Date  Date: 01/05/2021 Start: 02:22 PM    Study Location: Echo Lab  Technical Quality: Adequate visualization    Indications:Evaluate LV size and function and Persistent atrial  fibrillation. Additional Medical History:smoker, hyperlipidemia, asthma, hypertension,  COPD    Patient Status: Routine    Height: 69 inches Weight: 155 pounds BSA: 1.85 m^2 BMI: 22.89 kg/m^2    BP: 138/82 mmHg     Conclusions      Summary   Ejection fraction is visually estimated at 25%. There was severe global hypokinesis of the left ventricle. Moderately dilated left atrium. The IVC is dilated . Signature      ----------------------------------------------------------------   Electronically signed by Isai Lopez MD (Interpreting   physician) on 01/05/2021 at 07:02 PM   ----------------------------------------------------------------      Findings      Mitral Valve   Trace mitral regurgitation is present. Aortic Valve   The aortic valve was trileaflet with normal thickness and cuspal   separation. DOPPLER: Transaortic velocity was within the normal range with   no evidence of aortic stenosis. There was no evidence of aortic   regurgitation. Tricuspid Valve   Trivial tricuspid regurgitation visualized. Pulmonic Valve   The pulmonic valve was not well visualized . Trivial pulmonic regurgitation visualized. Left Atrium   Moderately dilated left atrium. Left Ventricle   Ejection fraction is visually estimated at 25%. There was severe global hypokinesis of the left ventricle. Right Atrium   Right atrial size was normal.      Right Ventricle   The right ventricular size was normal with normal systolic function and   wall thickness. Pericardial Effusion   The pericardium was normal in appearance with no evidence of a pericardial   effusion. Pleural Effusion   No evidence of pleural effusion. Aorta / Great Vessels   The IVC is dilated .      M-Mode/2D Measurements & Calculations      LV Diastolic   LV Systolic Dimension: 4  AV Cusp Separation: 1.6 cmLA   Dimension: 4.3 cm Dimension: 4.5 cmAO Root   cm             LV Volume Diastolic: 68.0 Dimension: 2.9 cmLA Area: 19.4   LV FS:7 %      ml                        cm^2   LV PW          LV Volume Systolic: 57.2   Diastolic: 1.2 ml   cm             LV EDV/LV EDV Index: 49.1   Septum         ml/27 m^2LV ESV/LV ESV    RV Diastolic Dimension: 2.2 cm   Diastolic: 1.1 Index: 41.3 ml/22 m^2   cm             EF Calculated: 15.9 %     LA/Aorta: 1.55                     LV Length: 7.1 cm         LA volume/Index: 60.9 ml /33m^2      LV Area   Diastolic:   78.0 cm^2   LV Area   Systolic: 99.3   cm^2     Doppler Measurements & Calculations                                AV Peak Velocity: 71.8 LVOT Peak Velocity: 61.8                             cm/s                   cm/s                             AV Peak Gradient: 2.06 LVOT Peak Gradient: 2                             mmHg                   mmHg      MV E' Septal Velocity: 7   cm/s   MV A' Septal Velocity:   4.4 cm/s   MV E' Lateral Velocity:                          PV Peak Velocity: 55.7   9.2 cm/s                                         cm/s   MV A' Lateral Velocity:   AV DVI (Vmax):0.86     PV Peak Gradient: 1.24   4.4 cm/s                                         mmHg                                                       AR ED Velocity: 254 cm/s     [email or web address] [id number]        All labs, EKG's, diagnostic testing and images as well as cardiac cath, stress testing were reviewed during this encounter    Past Medical History:   Diagnosis Date    Asthma     Collagenous colitis 2014       repeat  in  2024    Colon polyps 2000    COPD, mild ([address]     Eczema of hand     HTN (hypertension)     Hyperlipidemia     Smoker      Past Surgical History:   Procedure Laterality Date    COLONOSCOPY  2011,6/30/14    nba cardenas-no polyps repeat 10 yr   [address]  NASAL SINUS SURGERY  6/2008    polyps    OTHER SURGICAL HISTORY  DEC 7TH 2012 DR VANAN [address] [facility] [place]     IGS ENDOSCOPIC BI LAT MAXILLARY, ETHMOID, SPHENOID, FRONTAL SINUSOTOMY WITH SEPTOPLASTY AND TURBINOPLASTIES    SKIN CANCER EXCISION  9-2014    Left side of Forehead     TOOTH EXTRACTION  02/2017     Current Facility-Administered Medications   Medication Dose Route Frequency Provider Last Rate Last Admin    dilTIAZem 125 mg in dextrose 5 % 125 mL infusion  5 mg/hr Intravenous Continuous ARCADIO Emerson - CNP 15 mL/hr at 01/06/21 0738 15 mg/hr at 01/06/21 0738    albuterol (PROVENTIL) nebulizer solution 2.5 mg  2.5 mg Nebulization Q6H PRN Galen Randle MD        aspirin chewable tablet 81 mg  81 mg Oral Daily Galen Randle MD        losartan (COZAAR) tablet 100 mg  100 mg Oral Daily Galen Randle MD        metoprolol tartrate (LOPRESSOR) tablet 50 mg  50 mg Oral BID Galen Randle MD        therapeutic multivitamin-minerals 1 tablet  1 tablet Oral Daily Galen Randle MD        rivaroxaban (XARELTO) tablet 20 mg  20 mg Oral Daily with breakfast Galen Randle MD        glycopyrrolate-formoterol (BEVESPI) 9-4.8 MCG/ACT inhaler 2 puff  2 puff Inhalation BID Galen Randle MD   2 puff at 01/06/21 5494    sodium chloride flush 0.9 % injection 10 mL  10 mL Intravenous 2 times per day Galen Randle MD        sodium chloride flush 0.9 % injection 10 mL  10 mL Intravenous PRN Galen Randle MD        promethazine (PHENERGAN) tablet 12.5 mg  12.5 mg Oral Q6H PRN Galen Randle MD        Or    ondansetron (ZOFRAN) injection 4 mg  4 mg Intravenous Q6H PRN Galen Randle MD        polyethylene glycol (GLYCOLAX) packet 17 g  17 g Oral Daily PRN Galen Randle MD        acetaminophen (TYLENOL) tablet 650 mg  650 mg Oral Q6H PRN Galen Randle MD        Or    acetaminophen (TYLENOL) suppository 650 mg  650 mg Rectal Q6H PRN Lali Martínez MD        potassium chloride (KLOR-CON M) extended release tablet 40 mEq  40 mEq Oral PRN Lali Martínez MD        Or    potassium bicarb-citric acid (EFFER-K) effervescent tablet 40 mEq  40 mEq Oral PRN Lali Martínez MD        Or    potassium chloride 10 mEq/100 mL IVPB (Peripheral Line)  10 mEq Intravenous PRN Lali Martínez MD        magnesium sulfate 2 g in 50 mL IVPB premix  2 g Intravenous PRN Lali Martínez MD        atorvastatin (LIPITOR) tablet 10 mg  10 mg Oral Nightly Lali Martínez MD         Prior to Admission medications    Medication Sig Start Date End Date Taking? Authorizing Provider   metoprolol tartrate (LOPRESSOR) 50 MG tablet TAKE 1 TABLET BY MOUTH TWICE DAILY 12/30/20  Yes Lali Martínez MD   losartan (COZAAR) 100 MG tablet Take 1 tablet by mouth daily 12/29/20  Yes Lali Martínez MD   pravastatin (PRAVACHOL) 40 MG tablet Take 1 tablet by mouth daily 12/29/20  Yes Lali Martínez MD   rivaroxaban (XARELTO) 20 MG TABS tablet Take 1 tablet by mouth daily (with breakfast) 12/29/20  Yes Lali Martínez MD   albuterol sulfate  (90 Base) MCG/ACT inhaler Inhale 2 puffs into the lungs 4 times daily 3/31/20  Yes Lali Martínez MD   aspirin 81 MG tablet Take 81 mg by mouth daily. Yes Historical Provider, MD   Multiple Vitamins-Minerals (CENTRUM SILVER PO) Take  by mouth daily. Yes Historical Provider, MD   fluocinonide (LIDEX) 0.05 % ointment Apply topically 2 times daily.  12/29/20   Lali Martínez MD   Tiotropium Bromide-Olodaterol (STIOLTO RESPIMAT) 2.5-2.5 MCG/ACT AERS 2  puffs  daily 2/25/20   Lali Martínez MD   loratadine (CLARITIN) 10 MG tablet Take 10 mg by mouth 2 times daily    Historical Provider, MD   Scheduled Meds:   aspirin  81 mg Oral Daily    losartan  100 mg Oral Daily    metoprolol tartrate  50 mg Oral BID    therapeutic multivitamin-minerals  1 tablet Oral Daily    rivaroxaban  20 organizations: Not on file     Relationship status: Not on file    Intimate partner violence     Fear of current or ex partner: Not on file     Emotionally abused: Not on file     Physically abused: Not on file     Forced sexual activity: Not on file   Other Topics Concern    Not on file   Social History Narrative    Not on file     ROS:   Constitutional: Denies any recent wt change. Eyes:  Denies any blurring or double vision, no glaucoma  Ears/Nose/Mouth/Throat:  Denies any chronic sinus/rhinitis, bleeding gums  Cardiovascular:  As described above. Respiratory:  Denies any frequent cough, wheezing or coughing up blood  Genitourinary:  Denies difficulty with urination and kidney stones  Gastrointestinal:  Denies any chronic problems with abdominal pain, nausea, vomiting or diarrhea  Musculoskeletal:  Denies any joint pain, back pain, or difficulty walking  Integumentary:  Denies any rash  Neurological:  No numbness or tingling  Endocrine:  Denies any polydipsia. Hematologic/Lymphatic:  Denies any hemorrhage or lymphatic drainage problems. Labs:  CBC:   Recent Labs     01/06/21  0258   WBC 9.4   HGB 16.2   HCT 47.6   .5*        BMP:   Recent Labs     01/06/21  0258      K 4.9      CO2 26   BUN 35*   CREATININE 1.3*   MG 1.7     Accucheck Glucoses: No results for input(s): POCGLU in the last 72 hours. Cardiac Enzymes: No results for input(s): CKTOTAL, CKMB, CKMBINDEX, TROPONINI in the last 72 hours. PT/INR: No results for input(s): PROTIME, INR in the last 72 hours. APTT: No results for input(s): APTT in the last 72 hours.   Liver Profile:  Lab Results   Component Value Date    AST 59 01/06/2021    ALT 74 01/06/2021    BILIDIR <0.2 01/06/2021    BILITOT 0.6 01/06/2021    ALKPHOS 76 01/06/2021     Lab Results   Component Value Date    CHOL 165 01/02/2020    HDL 56 01/02/2020    TRIG 115 01/02/2020     TSH:   Lab Results   Component Value Date    TSH 2.050 01/06/2021     UA:   Lab Results   Component Value Date    COLORU YELLOW 01/06/2021    PHUR 5.0 01/06/2021    WBCUA 0-2 01/06/2021    RBCUA 0-2 01/06/2021    YEAST NONE SEEN 01/06/2021    BACTERIA NONE SEEN 01/06/2021    LEUKOCYTESUR NEGATIVE 01/06/2021    UROBILINOGEN 0.2 01/06/2021    BILIRUBINUR NEGATIVE 01/06/2021    BLOODU NEGATIVE 01/06/2021    GLUCOSEU NEGATIVE 01/06/2021         Physical Exam:  Vitals:    01/06/21 0808   BP:    Pulse:    Resp:    Temp:    SpO2: 95%    No intake or output data in the 24 hours ending 01/06/21 0930   General:  No acute distress  Neck: Supple, no JVD, no carotid bruits  Heart: Irreg-irreg normal S1 and S2, no rubs, murmurs or gallops  Lungs: clear to ascultation no rales, wheezes, or rhonchi  Abdomen: positive bowel sounds, soft, non-tender, non-distended, no bruits, no masses  Extremities:no clubbing, cyanosis, 1-2+ edema  Neurologic: alert and oriented x 3, cranial nerves 2-12 grossly intact, motor and sensory intact, moving all extremities  Skin: No rashes    Assessment:  Afib RVR  New LVSF  Trop elevation  Elevated BNP  Normal TSH  + Smoker  COPD  JANETH - Cr 1.3  HTN    Plan:  1. New LV failure with Afib -  Could be tachy related or alcohol, but with smoking, age, CHF, needs cardiac cath  2. Continue Dilitazem  3. Hold Xarelto x 2 days  3. Stop ARB  4.   Start Entresto once Cr has recovered  5. Heparin to start tomorrow  6. Continue ASA, add Imdur/Hydralazine for BP  7. Will need Aldactone 25 mg q day based on renal function once recovers from JANETH  8. Will proceed DEJAH-DCCV once we we do not have to interrupt [address] after cath  9. Further recommendations based on results and clinical course    Thank you for allowing us to participate in the care of this patient. Please do not hesitate to call us with questions.     Electronically signed by Micheline Lopez MD on 1/6/2021 at 9:30 AM    Interventional Cardiology - [facility]

## 2021-01-06 NOTE — ED NOTES
Pt laying on cot with respirations easy and unlabored. Pt denies any further needs at this time. Will continue to monitor.       Lincoln Garcia RN  01/06/21 8019

## 2021-01-06 NOTE — ED PROVIDER NOTES
[facility] Emergency Department    CHIEF COMPLAINT       Chief Complaint   Patient presents with    Shortness of Breath       Nurses Notes reviewed and I agree except as noted in the HPI. HISTORY OF PRESENT ILLNESS    Nichol Palomino janey 79 y.o. male who presents to the ED for evaluation of shortness of breath. The patient woke up tonight and was very short of breath. He has a cardioversion scheduled at 0700 with Dr. Lorna Colon. He is already on blood thinners. Denies CP. Was found to be in afib last week and was sent from his PCP office to cardiology for an echo. Upon arrival, HR is 140's. HPI was provided by the patient. REVIEW OF SYSTEMS     Review of Systems   Constitutional: Negative for chills, fatigue and fever. HENT: Negative for congestion, ear discharge, ear pain, postnasal drip and rhinorrhea. Respiratory: Positive for shortness of breath. Negative for cough and chest tightness. Cardiovascular: Positive for palpitations. Negative for chest pain and leg swelling. Gastrointestinal: Negative for nausea and vomiting. Genitourinary: Negative for difficulty urinating, dysuria, enuresis, flank pain and hematuria. Musculoskeletal: Negative for back pain and joint swelling. Skin: Negative for rash. Neurological: Negative for dizziness, light-headedness, numbness and headaches. Psychiatric/Behavioral: Negative for agitation, behavioral problems and confusion. All other systems negative except as noted. PAST MEDICAL HISTORY     Past Medical History:   Diagnosis Date    Asthma     Collagenous colitis 2014       repeat  in  2024    Colon polyps 2000    COPD, mild (HCC)     Eczema of hand     HTN (hypertension)     Hyperlipidemia     Smoker        SURGICALHISTORY      has a past surgical history that includes Nasal sinus surgery (6/2008);  Colonoscopy (2011,6/30/14); other surgical history (DEC 7TH 2012 DR [facility] ); hernia repair; Skin cancer excision Yes     Alcohol/week: 0.0 standard drinks     Comment: very little    Drug use: No    Sexual activity: Not on file   Lifestyle    Physical activity     Days per week: Not on file     Minutes per session: Not on file    Stress: Not on file   Relationships    Social connections     Talks on phone: Not on file     Gets together: Not on file     Attends Sikhism service: Not on file     Active member of club or organization: Not on file     Attends meetings of clubs or organizations: Not on file     Relationship status: Not on file    Intimate partner violence     Fear of current or ex partner: Not on file     Emotionally abused: Not on file     Physically abused: Not on file     Forced sexual activity: Not on file   Other Topics Concern    Not on file   Social History Narrative    Not on file       PHYSICAL EXAM     INITIAL VITALS:  height is 5' 9\" (1.753 m) and weight is 155 lb (70.3 kg). His oral temperature is 97.4 °F (36.3 °C). His blood pressure is 110/65 and his pulse is 80. His respiration is 20 and oxygen saturation is 94%. Physical Exam  Vitals signs and nursing note reviewed. Constitutional:       General: He is not in acute distress. Appearance: He is well-developed. He is not diaphoretic. HENT:      Head: Normocephalic and atraumatic. Eyes:      General:         Right eye: No discharge. Left eye: No discharge. Conjunctiva/sclera: Conjunctivae normal.   Neck:      Musculoskeletal: Normal range of motion. Trachea: No tracheal deviation. Cardiovascular:      Rate and Rhythm: Tachycardia present. Rhythm irregularly irregular. Heart sounds: Normal heart sounds. No murmur. No gallop. Comments: Normal capillary refill  Pulmonary:      Effort: Pulmonary effort is normal. No respiratory distress. Breath sounds: Normal breath sounds. No stridor. Abdominal:      General: Bowel sounds are normal.      Palpations: Abdomen is soft.    Musculoskeletal: Normal range of motion. General: No tenderness or deformity. Skin:     General: Skin is warm and dry. Coloration: Skin is not pale. Findings: No erythema or rash. Neurological:      Mental Status: He is alert and oriented to person, place, and time. Cranial Nerves: No cranial nerve deficit. Psychiatric:         Behavior: Behavior normal.         DIFFERENTIAL DIAGNOSIS:   afib with RVR, CHF, ACS    DIAGNOSTIC RESULTS     EKG: All EKG's are interpreted by the Emergency Department Physician who eithersigns or Co-signs this chart in the absence of a cardiologist.       EKG 12 Lead (Preliminary result)   Component (Lab Inquiry)  Collection Time Result Time Ventricular Rate Atrial Rate QRS Duration Q-T Interval QTc Calculation (Bazett) R Axis T Axis   01/06/21 02:53:37 01/06/21 03:28:48 123 81 94 332 475 89 87   Previous Results   11/12/12 07:05:36 11/12/12 23:05:38 68 68 92 406 431 81 68         Preliminary result                Narrative:    Atrial fibrillation with rapid ventricular response   Nonspecific ST abnormality   Abnormal ECG   When compared with ECG of 12-NOV-2012 07:05,   Atrial fibrillation has replaced Sinus rhythm   Ventricular rate has increased BY  55 BPM   ST now depressed in Lateral leads                      RADIOLOGY: non-plainfilm images(s) such as CT, Ultrasound and MRI are read by the radiologist.  Plain radiographic images are visualized and preliminarily interpreted by the emergency physician unless otherwise stated below. XR CHEST PORTABLE   Final Result   Left lung base opacity could be developing pneumonia. Follow-up to    clearing recommended. This document has been electronically signed by:  Fátima Vang MD on 01/06/2021 03:56 AM               LABS:   Labs Reviewed   BASIC METABOLIC PANEL W/ REFLEX TO MG FOR LOW K - Abnormal; Notable for the following components:       Result Value    Glucose 139 (*)     BUN 35 (*)     CREATININE 1.3 (*)     All other components within normal limits   BRAIN NATRIURETIC PEPTIDE - Abnormal; Notable for the following components:    Pro-BNP 4443.0 (*)     All other components within normal limits   CBC WITH AUTO DIFFERENTIAL - Abnormal; Notable for the following components:    RBC 4.60 (*)     .5 (*)     MCH 35.2 (*)     RDW-SD 47.9 (*)     All other components within normal limits   TROPONIN - Abnormal; Notable for the following components:    Troponin T 0.183 (*)     All other components within normal limits   HEPATIC FUNCTION PANEL - Abnormal; Notable for the following components:    AST 59 (*)     ALT 74 (*)     All other components within normal limits   GLOMERULAR FILTRATION RATE, ESTIMATED - Abnormal; Notable for the following components:    Est, Glom Filt Rate 55 (*)     All other components within normal limits   URINE WITH REFLEXED MICRO - Abnormal; Notable for the following components:    Protein, UA TRACE (*)     All other components within normal limits   LIPASE   MAGNESIUM   TSH WITH REFLEX   ANION GAP   OSMOLALITY       EMERGENCY DEPARTMENT COURSE:   Vitals:    Vitals:    01/06/21 0437 01/06/21 0531 01/06/21 0653 01/06/21 0710   BP: 119/87 127/88 110/65    Pulse: 104 103 85 80   Resp: 19 18 20 20   Temp:       TempSrc:       SpO2: 94% 94% 93% 94%   Weight:       Height:             MDM                         MDM    Patient was seen in the ER for shortness of breath. Upon arrival he is found to be in afib with RVR. He is started on a cardizem gtt. Labs and imaging are ordered and reviewed. Patient has an acute kidney injury with an elevated creatinine. His BNP is up and his troponin is elevated. Patient is treated with lasix and ASA. I notified MANUEL Lackey for cardiology that the patient was here. I spoke to Dr. Smitha Glasgow and she instructed me to admit to Dr. Lis Jordan. Dr. Esvin Farr called me before patient went upstairs and he will assume care. Dr. Lis Jordan notified.       Medications   dilTIAZem injection 10 mg (10 mg Intravenous Given 1/6/21 9085)     Followed by   dilTIAZem 125 mg in dextrose 5 % 125 mL infusion (15 mg/hr Intravenous Rate/Dose Change 1/6/21 3923)   0.9 % sodium chloride bolus (1,000 mLs Intravenous New Bag 1/6/21 1850)   furosemide (LASIX) injection 20 mg (20 mg Intravenous Given 1/6/21 0533)   aspirin chewable tablet 324 mg (324 mg Oral Given 1/6/21 0533)         Patient was seen independently by myself. The patient's final impression and disposition and plan was determined by myself. Strict return precautions and follow up instructions were discussed with the patient prior to discharge, with which the patient agrees. Physical assessment findings, diagnostic testing(s) if applicable, and vital signs reviewed with patient/patient representative. Questions answered. Medications asdirected, including OTC medications for supportive care. Education provided on medications. Differential diagnosis(s) discussed with patient/patient representative. Home care/self care instructions reviewed withpatient/patient representative. Patient is to follow-up with family care provider in 2-3 days if no improvement. Patient is to go to the emergency department if symptoms worsen. Patient/patient representative isaware of care plan, questions answered, verbalizes understanding and is in agreement. CRITICAL CARE:   None    CONSULTS:  Dr. Alisia Mederos,  Dr. Mendes Care  MANUEL Pennington    PROCEDURES:  None    FINAL IMPRESSION     1. Atrial fibrillation, unspecified type (HCC)    2. Elevated troponin    3. Acute kidney injury [facility])          DISPOSITION/PLAN   DISPOSITION Admitted 01/06/2021 06:48:00 AM      PATIENT REFERREDTO:  No follow-up provider specified.     DISCHARGE MEDICATIONS:  New Prescriptions    No medications on file       (Please note that portions of this note were completed with a voice recognition program.  Efforts were made to edit the dictations but occasionally words are mis-transcribed.)         Vanessa Dorantes, ARCADIO - NOAH Dorantes, APRN - CNP  01/06/21 5579

## 2021-01-06 NOTE — Clinical Note
Patient Class: Observation [104]   REQUIRED: Diagnosis: Atrial fibrillation with rapid ventricular response ([facility] [address] [[id number]]   Estimated Length of Stay: Estimated stay of less than 2 midnights   Admitting Provider: Elizabeth  [[id number]]   Telemetry Bed Required?: Yes

## 2021-01-06 NOTE — ED NOTES
Pt. Resting in bed with even and unlabored respirations. Pt. Denies any pain at this time. Pt. Updated about plan for admission. Family at bedside. Pt. Has no further concerns, questions or needs at this time. Call light within reach.         Fredy Gallardo RN  01/06/21 2577

## 2021-01-06 NOTE — CARE COORDINATION
DISASTER CHARTING    1/6/21, 10:18 AM EST    DISCHARGE ONGOING EVALUATION:     Jamie Walters day: 0  Location: [address] [place] [address]A Reason for admit: Atrial fibrillation with rapid ventricular response ([facility] [address] [I48.91]   Barriers to Discharge: Admitted through ED as observation with SOB. Found to be in afib with RVR. Started on Cardizem gtt and consulted Cardiology. Plan to start [place] [place]. Heparin to start tomorrow. Planning cardiac cath. PCP: Aleja Atkins MD  Patient Goals/Plan/Treatment Preferences: Spoke with pt he lives at home with wife and is independent. He drives and and gets to appts. No difficulty paying for meds. He is current with his PCP. Denies any DME or HH needs.

## 2021-01-06 NOTE — FLOWSHEET NOTE
Advanced Directives Consult: The document was dropped off but the pt said that he wanted his wife to be present before it is completed. His nurse will call the  whenever he is ready. 01/06/21 1400   Encounter Summary   Services provided to: Patient   Referral/Consult From: Rounding   Support System Spouse   Continue Visiting Yes  (1/6 )   Complexity of Encounter Low   Length of Encounter 15 minutes   Advance Care Planning Yes   Routine   Type Initial   Assessment Approachable;Calm   Intervention Nurtured hope; Active listening;Empowerment   Outcome Acceptance;Expressed gratitude;Encouraged; Hopeful

## 2021-01-06 NOTE — ED NOTES
Pt arrives to the ED for sob that began about 0200. Pt states he woke up from sleeping and couldn't catch his breath. Pt states he has been in afib RVR for about a week now. Pt is supposed to see his cardiologist for cardioversion this morning at 0730. Pt states he smokes and has a hx of COPD. Pt denies any chest pain, fever or cough at this time. Pt does not appear to be in respiratory distress at this time. Pt is oxygenating at 98%. Pt vitals are stable. Pt respirations are even and unlabored.       Fabi Abad RN  01/06/21 0300       Fabi Abad RN  01/06/21 1408

## 2021-01-06 NOTE — ED NOTES
ED to inpatient nurses report    Chief Complaint   Patient presents with    Shortness of Breath      Present to ED from home  LOC: alert and orientated to name, place, date  Vital signs   Vitals:    01/06/21 0354 01/06/21 0437 01/06/21 0531 01/06/21 0653   BP: (!) 131/91 119/87 127/88 110/65   Pulse: 102 104 103 85   Resp: 20 19 18 20   Temp:       TempSrc:       SpO2: 92% 94% 94% 93%   Weight:       Height:          Oxygen Baseline room air     Current needs required room air  Bipap/Cpap No  LDAs:   Peripheral IV 01/06/21 Left Antecubital (Active)   Site Assessment Clean;Dry; Intact 01/06/21 0531   Line Status Infusing 01/06/21 0531   Dressing Status Clean;Dry; Intact 01/06/21 0531   Dressing Intervention New 01/06/21 0258     Mobility: Requires assistance * 1  Pending ED orders: none  Present condition: Pt resting in bed with even and unlabored respirations. Pt vitals are stable. Pt heart rate has been stabilized with medication per mar.    Person of contract, phone number   Our promise was given to patient    Electronically signed by Eric Quintana RN on 1/6/2021 at 7:07 AM       Eric Quintana RN  01/06/21 2054

## 2021-01-06 NOTE — PROGRESS NOTES
Pharmacy Medication History Note      List of current medications patient is taking is complete. Source of information: patient, Surescripts    Changes made to medication list:  Medications removed (include reason, ex. therapy complete or physician discontinued):  Aspirin 81mg-patient states it was stopped when xarelto was started    Medications added/doses adjusted:  Loratadine daily-previously listed as BID  Stiolto Respimat-pt states he takes 1 puff 1-2x a day    Other notes (ex. Recent course of antibiotics, Coumadin dosing):  Denies use of other OTC or herbal medications.         Allergies reviewed      Electronically signed by Wali العلي on 1/6/2021 at 10:39 AM

## 2021-01-06 NOTE — ED NOTES
Pt. Resting in bed with even and unlabored respirations. Pt. Denies any pain at this time. Pt. Updated about plan diltialzem. Family at bedside. Pt. Has no further concerns, questions or needs at this time. Call light within reach.         Emelina Miramontes RN  01/06/21 0403

## 2021-01-07 LAB
ALBUMIN SERPL-MCNC: 3.5 G/DL (ref 3.5–5.1)
ALP BLD-CCNC: 54 U/L (ref 38–126)
ALT SERPL-CCNC: 48 U/L (ref 11–66)
ANION GAP SERPL CALCULATED.3IONS-SCNC: 9 MEQ/L (ref 8–16)
APTT: 142.8 SECONDS (ref 22–38)
APTT: 28.4 SECONDS (ref 22–38)
AST SERPL-CCNC: 35 U/L (ref 5–40)
BASOPHILS # BLD: 0.4 %
BASOPHILS ABSOLUTE: 0 THOU/MM3 (ref 0–0.1)
BILIRUB SERPL-MCNC: 0.6 MG/DL (ref 0.3–1.2)
BILIRUBIN DIRECT: < 0.2 MG/DL (ref 0–0.3)
BUN BLDV-MCNC: 27 MG/DL (ref 7–22)
CALCIUM SERPL-MCNC: 8.9 MG/DL (ref 8.5–10.5)
CHLORIDE BLD-SCNC: 101 MEQ/L (ref 98–111)
CHOLESTEROL, TOTAL: 126 MG/DL (ref 100–199)
CO2: 25 MEQ/L (ref 23–33)
CREAT SERPL-MCNC: 1.3 MG/DL (ref 0.4–1.2)
EOSINOPHIL # BLD: 3.2 %
EOSINOPHILS ABSOLUTE: 0.3 THOU/MM3 (ref 0–0.4)
ERYTHROCYTE [DISTWIDTH] IN BLOOD BY AUTOMATED COUNT: 12.7 % (ref 11.5–14.5)
ERYTHROCYTE [DISTWIDTH] IN BLOOD BY AUTOMATED COUNT: 12.7 % (ref 11.5–14.5)
ERYTHROCYTE [DISTWIDTH] IN BLOOD BY AUTOMATED COUNT: 47.2 FL (ref 35–45)
ERYTHROCYTE [DISTWIDTH] IN BLOOD BY AUTOMATED COUNT: 48 FL (ref 35–45)
GFR SERPL CREATININE-BSD FRML MDRD: 55 ML/MIN/1.73M2
GLUCOSE BLD-MCNC: 101 MG/DL (ref 70–108)
HCT VFR BLD CALC: 38.5 % (ref 42–52)
HCT VFR BLD CALC: 39.6 % (ref 42–52)
HDLC SERPL-MCNC: 36 MG/DL
HEMOGLOBIN: 13.4 GM/DL (ref 14–18)
HEMOGLOBIN: 13.6 GM/DL (ref 14–18)
IMMATURE GRANS (ABS): 0.04 THOU/MM3 (ref 0–0.07)
IMMATURE GRANULOCYTES: 0.4 %
LDL CHOLESTEROL CALCULATED: 71 MG/DL
LYMPHOCYTES # BLD: 19 %
LYMPHOCYTES ABSOLUTE: 1.8 THOU/MM3 (ref 1–4.8)
MCH RBC QN AUTO: 35.1 PG (ref 26–33)
MCH RBC QN AUTO: 35.3 PG (ref 26–33)
MCHC RBC AUTO-ENTMCNC: 34.3 GM/DL (ref 32.2–35.5)
MCHC RBC AUTO-ENTMCNC: 34.8 GM/DL (ref 32.2–35.5)
MCV RBC AUTO: 101.3 FL (ref 80–94)
MCV RBC AUTO: 102.1 FL (ref 80–94)
MONOCYTES # BLD: 9.4 %
MONOCYTES ABSOLUTE: 0.9 THOU/MM3 (ref 0.4–1.3)
NUCLEATED RED BLOOD CELLS: 0 /100 WBC
PLATELET # BLD: 185 THOU/MM3 (ref 130–400)
PLATELET # BLD: 204 THOU/MM3 (ref 130–400)
PMV BLD AUTO: 9.8 FL (ref 9.4–12.4)
PMV BLD AUTO: 9.9 FL (ref 9.4–12.4)
POTASSIUM REFLEX MAGNESIUM: 4.1 MEQ/L (ref 3.5–5.2)
RBC # BLD: 3.8 MILL/MM3 (ref 4.7–6.1)
RBC # BLD: 3.88 MILL/MM3 (ref 4.7–6.1)
SEG NEUTROPHILS: 67.6 %
SEGMENTED NEUTROPHILS ABSOLUTE COUNT: 6.3 THOU/MM3 (ref 1.8–7.7)
SODIUM BLD-SCNC: 135 MEQ/L (ref 135–145)
TOTAL PROTEIN: 6.2 G/DL (ref 6.1–8)
TRIGL SERPL-MCNC: 96 MG/DL (ref 0–199)
WBC # BLD: 8.2 THOU/MM3 (ref 4.8–10.8)
WBC # BLD: 9.3 THOU/MM3 (ref 4.8–10.8)

## 2021-01-07 PROCEDURE — 6360000002 HC RX W HCPCS: Performed by: INTERNAL MEDICINE

## 2021-01-07 PROCEDURE — 6370000000 HC RX 637 (ALT 250 FOR IP): Performed by: FAMILY MEDICINE

## 2021-01-07 PROCEDURE — 85730 THROMBOPLASTIN TIME PARTIAL: CPT

## 2021-01-07 PROCEDURE — 6370000000 HC RX 637 (ALT 250 FOR IP): Performed by: INTERNAL MEDICINE

## 2021-01-07 PROCEDURE — 36415 COLL VENOUS BLD VENIPUNCTURE: CPT

## 2021-01-07 PROCEDURE — 80048 BASIC METABOLIC PNL TOTAL CA: CPT

## 2021-01-07 PROCEDURE — 80061 LIPID PANEL: CPT

## 2021-01-07 PROCEDURE — 2500000003 HC RX 250 WO HCPCS: Performed by: INTERNAL MEDICINE

## 2021-01-07 PROCEDURE — 94640 AIRWAY INHALATION TREATMENT: CPT

## 2021-01-07 PROCEDURE — 85027 COMPLETE CBC AUTOMATED: CPT

## 2021-01-07 PROCEDURE — 85025 COMPLETE CBC W/AUTO DIFF WBC: CPT

## 2021-01-07 PROCEDURE — 99232 SBSQ HOSP IP/OBS MODERATE 35: CPT | Performed by: PHYSICIAN ASSISTANT

## 2021-01-07 PROCEDURE — 1200000003 HC TELEMETRY R&B

## 2021-01-07 PROCEDURE — 80076 HEPATIC FUNCTION PANEL: CPT

## 2021-01-07 RX ORDER — HEPARIN SODIUM 10000 [USP'U]/100ML
18 INJECTION, SOLUTION INTRAVENOUS CONTINUOUS
Status: DISCONTINUED | OUTPATIENT
Start: 2021-01-07 | End: 2021-01-12

## 2021-01-07 RX ORDER — HEPARIN SODIUM 1000 [USP'U]/ML
40 INJECTION, SOLUTION INTRAVENOUS; SUBCUTANEOUS PRN
Status: DISCONTINUED | OUTPATIENT
Start: 2021-01-07 | End: 2021-01-14

## 2021-01-07 RX ORDER — HEPARIN SODIUM 1000 [USP'U]/ML
80 INJECTION, SOLUTION INTRAVENOUS; SUBCUTANEOUS ONCE
Status: COMPLETED | OUTPATIENT
Start: 2021-01-07 | End: 2021-01-07

## 2021-01-07 RX ORDER — HEPARIN SODIUM 1000 [USP'U]/ML
80 INJECTION, SOLUTION INTRAVENOUS; SUBCUTANEOUS PRN
Status: DISCONTINUED | OUTPATIENT
Start: 2021-01-07 | End: 2021-01-14

## 2021-01-07 RX ADMIN — ATORVASTATIN CALCIUM 10 MG: 10 TABLET, FILM COATED ORAL at 20:50

## 2021-01-07 RX ADMIN — GLYCOPYRROLATE AND FORMOTEROL FUMARATE 2 PUFF: 9; 4.8 AEROSOL, METERED RESPIRATORY (INHALATION) at 20:29

## 2021-01-07 RX ADMIN — ASPIRIN 81 MG: 81 TABLET, CHEWABLE ORAL at 08:04

## 2021-01-07 RX ADMIN — FUROSEMIDE 40 MG: 40 TABLET ORAL at 08:04

## 2021-01-07 RX ADMIN — HYDRALAZINE HYDROCHLORIDE 25 MG: 25 TABLET, FILM COATED ORAL at 16:56

## 2021-01-07 RX ADMIN — METOPROLOL TARTRATE 50 MG: 50 TABLET, FILM COATED ORAL at 20:50

## 2021-01-07 RX ADMIN — GLYCOPYRROLATE AND FORMOTEROL FUMARATE 2 PUFF: 9; 4.8 AEROSOL, METERED RESPIRATORY (INHALATION) at 10:05

## 2021-01-07 RX ADMIN — MULTIPLE VITAMINS W/ MINERALS TAB 1 TABLET: TAB at 08:03

## 2021-01-07 RX ADMIN — HEPARIN SODIUM 5550 UNITS: 1000 INJECTION INTRAVENOUS; SUBCUTANEOUS at 15:37

## 2021-01-07 RX ADMIN — HEPARIN SODIUM 18 UNITS/KG/HR: 10000 INJECTION, SOLUTION INTRAVENOUS at 15:37

## 2021-01-07 RX ADMIN — METOPROLOL TARTRATE 50 MG: 50 TABLET, FILM COATED ORAL at 08:03

## 2021-01-07 RX ADMIN — ISOSORBIDE MONONITRATE 60 MG: 60 TABLET ORAL at 08:03

## 2021-01-07 RX ADMIN — HYDRALAZINE HYDROCHLORIDE 25 MG: 25 TABLET, FILM COATED ORAL at 20:50

## 2021-01-07 ASSESSMENT — PAIN SCALES - GENERAL
PAINLEVEL_OUTOF10: 0
PAINLEVEL_OUTOF10: 0

## 2021-01-07 NOTE — H&P
[address] [place] [address]                              HISTORY AND PHYSICAL    PATIENT NAME: Jourdan Diaz                       :        1950  MED REC NO:   [id number]                           ROOM:       [id number]  ACCOUNT NO:   [de-identified]                           ADMIT DATE: 2021  PROVIDER:     Edgar Herrmann M.D.    [address]:  Shortness of breath and palpitations. HISTORY OF PRESENT ILLNESS:  This is a 80-year-old male who comes in  with symptoms of atrial fibrillation and a rapid ventricular rate at  this time. The patient actually was seen by the family physician  approximately one week ago. At that time, it was noted that he was  complaining of some palpitations, noted that he was in atrial fib with  rapid rate. The patient thus was started on some Lopressor at the time  because of the rapid rate. Echocardiogram was arranged in addition. EKG was consistent with tachycardia being present with a rapid  ventricular rate. The patient had been on losartan for blood pressure  and Lopressor was added in for the rapid heart rate. In general, he was  overall doing well; however, on the morning on admission today, he  apparently awoke with a sensation of palpitations and discomfort being  present along with shortness of breath being present. He states that he  felt more short of breath when he had been with the increased heart rate  being present. When the patient was seen last week, he was started on  Xarelto in the office along with the Lopressor. Echocardiogram was  already arranged. At presentation in the emergency room, his heart rate  was 140s to 160s and in atrial fibrillation and rapid rate. The patient  was started on Cardizem drip which decreased the patient's heart rate. The patient felt much improved following the Cardizem drip being  started.   Heart rate following the use of Cardizem dropped down to 60s  and 70s. EKG noted atrial fibrillation with nonspecific T-wave changes  being present. His chest x-ray was noted to be normal.  His BNP was  elevated which was to suggest that he had some congestive heart failure. Initially, this was felt to be most likely a diastolic congestive heart  failure; however, echocardiogram was obtained, was done the day before,  and on reading of the echo, it was noted that the patient had global  hypokinesis being present with ejection fraction of 25%. Thus, the  patient has underlying cardiac myopathy with systolic congestive heart  failure with the rapid ventricular atrial fibrillation being present. He needs appropriate workup considering his cardiomyopathy and causes  with the resultant atrial fibrillation. Consequently, the patient now  being admitted for further evaluation and treatment at this time. The  Cardizem drip has controlled his blood pressure and his heart rate at  this time. PAST MEDICAL HISTORY:  MEDICATIONS:  Notes medications prior to admission includes the Lidex  ointment to the hands, he is on Stiolto inhaler 2 puffs once a day,  Lopressor 50 mg twice a day, losartan 100 mg daily, Pravachol 40 mg  daily, Xarelto 20 mg once a day, albuterol inhaler 2 puffs q.4 hours as  needed, multivitamin, Claritin as needed. ALLERGIES:  Notes he has allergy to PENICILLIN and SULFA. SURGERIES:  Notes he has had a nasal sinus surgery in the past.  He has  had appropriate EGD in the past.  He has had hernia repair, tooth  extraction, and has had colonoscopy in the past in addition. HOSPITALIZATIONS:  No recent hospitalizations being present. ILLNESSES:  He has diffuse underlying COPD, tobacco addiction being  present, only stopped smoking four days ago. History of hypertension,  COPD, hyperlipidemia, collagenous colitis being present in addition.    Now with underlying atrial fibrillation and cardiomyopathy being present  in addition. 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 He has  been a current everyday smoker, about a pack a day, states that he  stopped four days ago. Alcohol, about six packets of beer daily in  addition. FAMILY HISTORY:  Heart disease in mother and father. Father had bypass  surgery. Twin brother also with bypass surgery. There is some diabetes  being present in addition. REVIEW OF SYSTEMS:  CONSTITUTIONAL:  He has had no fever or chills present, increased amount  of fatigue being present, however. EYES:  Without any blurred vision. EARS, NOSE, AND THROAT:  Otherwise clear, except he has some chronic  nasal congestion being present at this time. NECK:  Without complaints of pain, discomfort. CARDIAC:  Stable with palpitation being present, now better with the  Cardizem drip. He has not had any chest pressure or discomfort,  heaviness being present although at times, he felt some significant  shortness of breath being present. PULMONARY:  States shortness of breath because of the rapid heart rate,  he has not had any wheezing. He does have some COPD, but he has not  sensed any significant cough or wheeze present at this time. GI:  Does get some heartburn and indigestion. Otherwise, controlled  with medications. He denies any other significant heartburn or  indigestion or other symptoms being present at this time. SKIN:  Without lesions. MUSCULOSKELETAL:  Generalized aches and pains being present at this  time. NEUROLOGICAL:  No other numbness, weakness, dizziness, or  lightheadedness otherwise being present at this point. PHYSICAL EXAMINATION:  Notes,  VITAL SIGNS:  With a temp 97.8, pulse 83, respiratory rate 18, pulse is  irregular, blood pressure 155/94, 98% pulse ox. GENERAL:  Notes a middle-aged male, appearing in no distress at this  time. HEAD:  Atraumatic, normocephalic. EYES:  PERRL and EOMI with normal fundi. EARS:  Normal TMs.   NOSE:  Slight congestion. THROAT:  Without exudate. NECK:  Supple without adenopathy or increased thyroid. LUNGS:  Decreased in the bases, prolonged expiration phase but no wheeze  present. Lungs are clear, but decreased in the bases as stated. No  wheeze, little bit on expiration. CARDIAC:  Irregularly irregular rhythm. Normal S1 and S2.  Grade 1/6  systolic murmur. Rate is not rapid at this point. There are no carotid  bruits. Peripheral pulse 2+. No peripheral edema. There is no JVD. ABDOMEN:  Soft, positive bowel sounds, nontender. No  hepatosplenomegaly. No other masses noted. No distinct tenderness  noted. EXTREMITIES:  Full range of motion. No CVA tenderness. NEURO:  Alert and oriented x3. Cranial nerves II through XII intact. Motor and sensory intact. SKIN:  Without lesions. LABORATORY DATA:  Notes his sodium was 138, potassium 4.9, chloride 101,  CO2 of 26, BUN 35, creatinine of 1.3, magnesium normal at 1.7. BNP  elevated at 4443, troponin level initially elevated at 0.183. Liver  panel was noted to be normal.  TSH was noted to be 2.05. White count  was 9400, hemoglobin was 16.4, hematocrit 47.6, MCV with slight  elevation at 103.5. Urinalysis was otherwise essentially negative. EKG as following:  Controlled rates still noted, atrial fibrillation  with non-specific T-wave changes being present. Chest x-ray noted some  ill-defined left lung base opacity and right lung granuloma present, no  fracture, no other abnormalities being present at this time. Questionable atelectasis of left lobe versus developing pneumonia. IMPRESSION:  1. Atrial fibrillation with rapid ventricular rate. 2.  Cardiomyopathy with some underlying acute systolic CHF. 3.  Hypertension. 4.  Hyperlipidemia. 5.  COPD. PLAN:  At this time, go ahead and admit. We need further evaluation  concerning the cause of his cardiomyopathy. He clearly has not had this  evaluated.   At this point, his ejection fraction is only 25%. Heart  rate is controlled with Cardizem. Needs further evaluation concerning  cardiac status; appropriate labs have been obtained at this time,  consultation with Cardiology in addition at this point.         Margairto Waddell M.D.    D: 01/06/2021 23:41:33       T: 01/07/2021 1:36:25     [email or web address]  Job#: [id number]     Doc#: [id number]    CC:

## 2021-01-07 NOTE — PROGRESS NOTES
The pt. has been educated, and given written material for he and his wife to read. ACS/angioplasty Maryjane Kelin folder was explained to him. Heart cath procedure was thoroughly explained to him, from leaving the room in his bed, to returning to his room after procedure. Afib rhythm and Cardioversion were explained, with written material given to read. MI folder was given to the pt. Heart Healthy diet explained and dietary consult was placed fr him. New medications were thoroughly explained,  with side effects given. His wife Eleanor George arrived to  the room approx. 1115. She was reading the written material also, and stated that if she had questions that she would ask me. Both the pt. And his wife verbalized understanding.

## 2021-01-07 NOTE — CARE COORDINATION
DISASTER CHARTING    1/7/21, 3:10 PM EST    DISCHARGE ONGOING EVALUATION:     Reta Leo day: 1  Location: [address] [place] [address]A Reason for admit: Atrial fibrillation with rapid ventricular response ([facility] [address] [I48.91]  Atrial fibrillation with RVR ([facility] [address] [I48.91]   Barriers to Discharge: Echo showed EF of 25%, severe global hypokinesis of the left ventricle and moderately dilated left atrium. Planning cardiac cath tomorrow. New Life Vest ordered. Heparin gtt. Afib is now controlled. Troponins 0.183 and 0.175. BNP 4443. HF clinic ordered. PCP: Ila Valentino MD  Patient Goals/Plan/Treatment Preferences: Plans home with wife. Will need new Life Vest. Referral called to [place] with Digna Khalil. Paperwork faxed.

## 2021-01-07 NOTE — PLAN OF CARE
Problem: Pain Control  Goal: Maintain pain level at or below patient's acceptable level (or 5 if patient is unable to determine acceptable level)  Outcome: Ongoing  Flowsheets (Taken 1/6/2021 1727)  Patient's Stated Pain Goal: No pain  Note: Patient denies pain this shift. Continue to monitor for pain with assessments and purposeful hourly rounding. Problem: Cardiac:  Goal: Ability to maintain an adequate cardiac output will improve  Description: Ability to maintain an adequate cardiac output will improve  Outcome: Ongoing  Note: Patient remains on cardizem gtt. Rate controlled. Plan for heart cath on Friday. Problem: Respiratory:  Goal: Respiratory status will improve  Description: Respiratory status will improve  Outcome: Ongoing  Note: Patient denies shortness of breath this shift. Continue to monitor. Care plan reviewed with patient. Patient verbalizes understanding of the plan of care and contributes to goal setting.

## 2021-01-07 NOTE — PROGRESS NOTES
Cardiology Progress Note      Patient:  Tam Coker  YOB: 1950  MRN: [id number]   Acct: [de-identified]  Admit Date:  1/6/2021  Primary Cardiologist: none    Note per dr Johanna Ireland: SOB        HPI: This is a pleasant 79 y.o. male presents with evaluation of sob that awoke him from sleep. He was supposed to be followed up by Dr. Savannah Haile. He was found to have Afib last week. HR was 140s. First episode. He could not catch his breath. Started on Dilitazem gtt. HR still 110-120s. BNP 4443. Trop 0.183--0.175. Cr 1.3.  TSH 2.0.  Hgb 16.2. Patient is on Xarelto as outpatient. EF is 25%, with severe global hypokinesis. Mild LE edema. No chest pain. No major drug use, or hx of CAD/MI. Denies major family hx. Drinks 6 beers per day x 2 years. No recent sick contacts. Lots of family stress with kids using drugs. He has worsening LE edema. Prior to this, he states no cardiac concerns. \"    Subjective (Events in last 24 hours): pt awake and alert. NAD. No cp or sob.   No complaints today  On cdz gtt at 3mg/hr      Objective:   /63   Pulse 122   Temp 97.7 °F (36.5 °C) (Oral)   Resp 18   Ht 5' 9\" (1.753 m)   Wt 152 lb 14.4 oz (69.4 kg)   SpO2 96%   BMI 22.58 kg/m²        TELEMETRY: afib cvr    Physical Exam:  General Appearance: alert and oriented to person, place and time, in no acute distress  Cardiovascular: irregularly irregular  Pulmonary/Chest: clear to auscultation bilaterally- no wheezes, rales or rhonchi, normal air movement, no respiratory distress  Abdomen: soft, non-tender, non-distended, normal bowel sounds, no masses Extremities: no cyanosis, clubbing or edema, pulse   Skin: warm and dry, no rash or erythema  Head: normocephalic and atraumatic  Eyes: pupils equal, round, and reactive to light  Neck: supple and non-tender without mass, no thyromegaly   Musculoskeletal: normal range of motion, no joint swelling, deformity or tenderness  Neurological: alert, oriented, normal speech, no focal findings or movement disorder noted    Medications:    aspirin  81 mg Oral Daily    metoprolol tartrate  50 mg Oral BID    therapeutic multivitamin-minerals  1 tablet Oral Daily    glycopyrrolate-formoterol  2 puff Inhalation BID    sodium chloride flush  10 mL Intravenous 2 times per day    atorvastatin  10 mg Oral Nightly    hydrALAZINE  25 mg Oral 3 times per day    isosorbide mononitrate  60 mg Oral Daily    furosemide  40 mg Oral Daily      dilTIAZem (CARDIZEM) 125 mg in dextrose 5% 125 mL infusion 5 mg/hr (01/06/21 1314)         albuterol, 2.5 mg, Q6H PRN      sodium chloride flush, 10 mL, PRN      promethazine, 12.5 mg, Q6H PRN    Or      ondansetron, 4 mg, Q6H PRN      polyethylene glycol, 17 g, Daily PRN      acetaminophen, 650 mg, Q6H PRN    Or      acetaminophen, 650 mg, Q6H PRN      potassium chloride, 40 mEq, PRN    Or      potassium alternative oral replacement, 40 mEq, PRN    Or      potassium chloride, 10 mEq, PRN      magnesium sulfate, 2 g, PRN        Diagnostics:  TTE 1/5/21  Summary   Ejection fraction is visually estimated at 25%. There was severe global hypokinesis of the left ventricle. Moderately dilated left atrium. The IVC is dilated . Signature      ----------------------------------------------------------------   Electronically signed by Amelia Corcoran MD (Interpreting   physician) on 01/05/2021 at 07:02 PM    Lab Data:    Cardiac Enzymes:  No results for input(s): CKTOTAL, CKMB, CKMBINDEX, TROPONINI in the last 72 hours.     CBC:   Lab Results   Component Value Date    WBC 9.3 01/07/2021    RBC 3.88 01/07/2021    RBC 4.84 11/07/2011    HGB 13.6 01/07/2021    HCT 39.6 01/07/2021     01/07/2021       CMP:    Lab Results   Component Value Date     01/07/2021    K 4.1 01/07/2021     01/07/2021    CO2 25 01/07/2021    BUN 27 01/07/2021    CREATININE 1.3 01/07/2021    LABGLOM 55 01/07/2021    GLUCOSE 101 01/07/2021    GLUCOSE 94 11/07/2011    CALCIUM 8.9 01/07/2021       Hepatic Function Panel:    Lab Results   Component Value Date    ALKPHOS 54 01/07/2021    ALT 48 01/07/2021    AST 35 01/07/2021    PROT 6.2 01/07/2021    BILITOT 0.6 01/07/2021    BILIDIR <0.2 01/07/2021    LABALBU 3.5 01/07/2021    LABALBU 4.3 11/07/2011       Magnesium:    Lab Results   Component Value Date    MG 1.7 01/06/2021       PT/INR:  No results found for: PROTIME, INR    HgBA1c:  No results found for: LABA1C    FLP:    Lab Results   Component Value Date    TRIG 96 01/07/2021    HDL 36 01/07/2021    LDLCALC 71 01/07/2021       TSH:    Lab Results   Component Value Date    TSH 2.050 01/06/2021         Assessment:    Recent new onset afib rvr of unknown duration - now cvr   chadsvasc = 3   On xarelto prior to admission  New CMP - ef 25 per TTE 1/5/21 - ?ischemic vs nonischemic, ?tachy/ETOH induced  Elevated troponins  Elevated bnp  HTN  dyslipidemia  JANETH  Tobacco abuse  Hx COPD  ETOH abuse      Plan:    Daily I/o and weights  2 liter fluid restriction and 2gm sodium diet  Keep mag >2 and K >4, replace prn  Normal TSH  Cont asa/statin/BB/imdur/hydralazine/lasix  Hold ace/arb for now, consider entresto if kidneys ok and bp will tolerate  Hold xarelto  Start heparin gtt  Npo after midnight  LHC tomorrow  Will need DEJAH[facility]CV post [facility]  Referral to [facility] clinic         Electronically signed by Emily Leon PA-C on 1/7/2021 at 2:00 PM

## 2021-01-08 ENCOUNTER — APPOINTMENT (OUTPATIENT)
Dept: CARDIAC CATH/INVASIVE PROCEDURES | Age: 71
DRG: 233 | End: 2021-01-08
Payer: MEDICARE

## 2021-01-08 LAB
ABO CHECK: NORMAL
ANION GAP SERPL CALCULATED.3IONS-SCNC: 10 MEQ/L (ref 8–16)
ANTIBODY SCREEN: NORMAL
APTT: 44.2 SECONDS (ref 22–38)
APTT: 68.7 SECONDS (ref 22–38)
APTT: 81.8 SECONDS (ref 22–38)
APTT: 84.6 SECONDS (ref 22–38)
BUN BLDV-MCNC: 22 MG/DL (ref 7–22)
CALCIUM SERPL-MCNC: 9.1 MG/DL (ref 8.5–10.5)
CHLORIDE BLD-SCNC: 101 MEQ/L (ref 98–111)
CO2: 27 MEQ/L (ref 23–33)
CREAT SERPL-MCNC: 1 MG/DL (ref 0.4–1.2)
EKG ATRIAL RATE: 102 BPM
EKG Q-T INTERVAL: 370 MS
EKG QRS DURATION: 96 MS
EKG QTC CALCULATION (BAZETT): 479 MS
EKG R AXIS: 90 DEGREES
EKG T AXIS: 77 DEGREES
EKG VENTRICULAR RATE: 101 BPM
ERYTHROCYTE [DISTWIDTH] IN BLOOD BY AUTOMATED COUNT: 12.6 % (ref 11.5–14.5)
ERYTHROCYTE [DISTWIDTH] IN BLOOD BY AUTOMATED COUNT: 47.3 FL (ref 35–45)
GFR SERPL CREATININE-BSD FRML MDRD: 74 ML/MIN/1.73M2
GLUCOSE BLD-MCNC: 93 MG/DL (ref 70–108)
HCT VFR BLD CALC: 42 % (ref 42–52)
HEMOGLOBIN: 14.5 GM/DL (ref 14–18)
INR BLD: 1.19 (ref 0.85–1.13)
LV EF: 25 %
LVEF MODALITY: NORMAL
MAGNESIUM: 1.9 MG/DL (ref 1.6–2.4)
MCH RBC QN AUTO: 35.3 PG (ref 26–33)
MCHC RBC AUTO-ENTMCNC: 34.5 GM/DL (ref 32.2–35.5)
MCV RBC AUTO: 102.2 FL (ref 80–94)
PLATELET # BLD: 198 THOU/MM3 (ref 130–400)
PMV BLD AUTO: 10.2 FL (ref 9.4–12.4)
POTASSIUM REFLEX MAGNESIUM: 4.5 MEQ/L (ref 3.5–5.2)
RBC # BLD: 4.11 MILL/MM3 (ref 4.7–6.1)
RH FACTOR: NORMAL
SODIUM BLD-SCNC: 138 MEQ/L (ref 135–145)
WBC # BLD: 9.1 THOU/MM3 (ref 4.8–10.8)

## 2021-01-08 PROCEDURE — 2500000003 HC RX 250 WO HCPCS: Performed by: NURSE PRACTITIONER

## 2021-01-08 PROCEDURE — 6370000000 HC RX 637 (ALT 250 FOR IP): Performed by: INTERNAL MEDICINE

## 2021-01-08 PROCEDURE — 99232 SBSQ HOSP IP/OBS MODERATE 35: CPT | Performed by: PHYSICIAN ASSISTANT

## 2021-01-08 PROCEDURE — 93005 ELECTROCARDIOGRAM TRACING: CPT | Performed by: PHYSICIAN ASSISTANT

## 2021-01-08 PROCEDURE — 92960 CARDIOVERSION ELECTRIC EXT: CPT | Performed by: INTERNAL MEDICINE

## 2021-01-08 PROCEDURE — 2500000003 HC RX 250 WO HCPCS

## 2021-01-08 PROCEDURE — 80048 BASIC METABOLIC PNL TOTAL CA: CPT

## 2021-01-08 PROCEDURE — 2580000003 HC RX 258: Performed by: PHYSICIAN ASSISTANT

## 2021-01-08 PROCEDURE — 94761 N-INVAS EAR/PLS OXIMETRY MLT: CPT

## 2021-01-08 PROCEDURE — 6360000002 HC RX W HCPCS: Performed by: INTERNAL MEDICINE

## 2021-01-08 PROCEDURE — 93325 DOPPLER ECHO COLOR FLOW MAPG: CPT

## 2021-01-08 PROCEDURE — 85730 THROMBOPLASTIN TIME PARTIAL: CPT

## 2021-01-08 PROCEDURE — 5A2204Z RESTORATION OF CARDIAC RHYTHM, SINGLE: ICD-10-PCS | Performed by: INTERNAL MEDICINE

## 2021-01-08 PROCEDURE — 83735 ASSAY OF MAGNESIUM: CPT

## 2021-01-08 PROCEDURE — 86901 BLOOD TYPING SEROLOGIC RH(D): CPT

## 2021-01-08 PROCEDURE — 2580000003 HC RX 258: Performed by: NURSE PRACTITIONER

## 2021-01-08 PROCEDURE — 99223 1ST HOSP IP/OBS HIGH 75: CPT | Performed by: THORACIC SURGERY (CARDIOTHORACIC VASCULAR SURGERY)

## 2021-01-08 PROCEDURE — 93312 ECHO TRANSESOPHAGEAL: CPT

## 2021-01-08 PROCEDURE — 94640 AIRWAY INHALATION TREATMENT: CPT

## 2021-01-08 PROCEDURE — 2500000003 HC RX 250 WO HCPCS: Performed by: INTERNAL MEDICINE

## 2021-01-08 PROCEDURE — 36415 COLL VENOUS BLD VENIPUNCTURE: CPT

## 2021-01-08 PROCEDURE — 2709999900 HC NON-CHARGEABLE SUPPLY

## 2021-01-08 PROCEDURE — 6370000000 HC RX 637 (ALT 250 FOR IP): Performed by: PHYSICIAN ASSISTANT

## 2021-01-08 PROCEDURE — 6360000002 HC RX W HCPCS

## 2021-01-08 PROCEDURE — C1894 INTRO/SHEATH, NON-LASER: HCPCS

## 2021-01-08 PROCEDURE — 1200000003 HC TELEMETRY R&B

## 2021-01-08 PROCEDURE — 92960 CARDIOVERSION ELECTRIC EXT: CPT

## 2021-01-08 PROCEDURE — 6360000004 HC RX CONTRAST MEDICATION: Performed by: FAMILY MEDICINE

## 2021-01-08 PROCEDURE — 6360000002 HC RX W HCPCS: Performed by: FAMILY MEDICINE

## 2021-01-08 PROCEDURE — 86850 RBC ANTIBODY SCREEN: CPT

## 2021-01-08 PROCEDURE — C1769 GUIDE WIRE: HCPCS

## 2021-01-08 PROCEDURE — 85610 PROTHROMBIN TIME: CPT

## 2021-01-08 PROCEDURE — 6370000000 HC RX 637 (ALT 250 FOR IP)

## 2021-01-08 PROCEDURE — 93320 DOPPLER ECHO COMPLETE: CPT

## 2021-01-08 PROCEDURE — 2580000003 HC RX 258: Performed by: FAMILY MEDICINE

## 2021-01-08 PROCEDURE — 93458 L HRT ARTERY/VENTRICLE ANGIO: CPT

## 2021-01-08 PROCEDURE — 6360000002 HC RX W HCPCS: Performed by: PHYSICIAN ASSISTANT

## 2021-01-08 PROCEDURE — 6370000000 HC RX 637 (ALT 250 FOR IP): Performed by: FAMILY MEDICINE

## 2021-01-08 PROCEDURE — 85027 COMPLETE CBC AUTOMATED: CPT

## 2021-01-08 PROCEDURE — 93458 L HRT ARTERY/VENTRICLE ANGIO: CPT | Performed by: INTERNAL MEDICINE

## 2021-01-08 PROCEDURE — 86900 BLOOD TYPING SEROLOGIC ABO: CPT

## 2021-01-08 RX ORDER — DILTIAZEM HYDROCHLORIDE 120 MG/1
120 CAPSULE, COATED, EXTENDED RELEASE ORAL DAILY
Status: DISCONTINUED | OUTPATIENT
Start: 2021-01-08 | End: 2021-01-14

## 2021-01-08 RX ORDER — METOPROLOL SUCCINATE 100 MG/1
100 TABLET, EXTENDED RELEASE ORAL DAILY
Status: DISCONTINUED | OUTPATIENT
Start: 2021-01-08 | End: 2021-01-20

## 2021-01-08 RX ORDER — SODIUM CHLORIDE 0.9 % (FLUSH) 0.9 %
10 SYRINGE (ML) INJECTION PRN
Status: DISCONTINUED | OUTPATIENT
Start: 2021-01-08 | End: 2021-01-08

## 2021-01-08 RX ORDER — SODIUM CHLORIDE 0.9 % (FLUSH) 0.9 %
10 SYRINGE (ML) INJECTION EVERY 12 HOURS SCHEDULED
Status: DISCONTINUED | OUTPATIENT
Start: 2021-01-08 | End: 2021-01-08 | Stop reason: SDUPTHER

## 2021-01-08 RX ORDER — ACETAMINOPHEN 325 MG/1
650 TABLET ORAL EVERY 4 HOURS PRN
Status: DISCONTINUED | OUTPATIENT
Start: 2021-01-08 | End: 2021-01-08

## 2021-01-08 RX ORDER — SODIUM CHLORIDE 9 MG/ML
INJECTION, SOLUTION INTRAVENOUS CONTINUOUS
Status: DISCONTINUED | OUTPATIENT
Start: 2021-01-08 | End: 2021-01-10

## 2021-01-08 RX ORDER — SODIUM CHLORIDE 0.9 % (FLUSH) 0.9 %
10 SYRINGE (ML) INJECTION EVERY 12 HOURS SCHEDULED
Status: DISCONTINUED | OUTPATIENT
Start: 2021-01-08 | End: 2021-01-08

## 2021-01-08 RX ORDER — SODIUM CHLORIDE 0.9 % (FLUSH) 0.9 %
10 SYRINGE (ML) INJECTION PRN
Status: DISCONTINUED | OUTPATIENT
Start: 2021-01-08 | End: 2021-01-12 | Stop reason: SDUPTHER

## 2021-01-08 RX ADMIN — ISOSORBIDE MONONITRATE 60 MG: 60 TABLET ORAL at 11:56

## 2021-01-08 RX ADMIN — FUROSEMIDE 40 MG: 40 TABLET ORAL at 12:47

## 2021-01-08 RX ADMIN — DILTIAZEM HYDROCHLORIDE 120 MG: 120 CAPSULE, EXTENDED RELEASE ORAL at 12:47

## 2021-01-08 RX ADMIN — HYDRALAZINE HYDROCHLORIDE 25 MG: 25 TABLET, FILM COATED ORAL at 05:37

## 2021-01-08 RX ADMIN — SODIUM CHLORIDE: 9 INJECTION, SOLUTION INTRAVENOUS at 05:36

## 2021-01-08 RX ADMIN — ALBUTEROL SULFATE 2.5 MG: 2.5 SOLUTION RESPIRATORY (INHALATION) at 16:21

## 2021-01-08 RX ADMIN — IOPAMIDOL 70 ML: 755 INJECTION, SOLUTION INTRAVENOUS at 09:48

## 2021-01-08 RX ADMIN — SODIUM CHLORIDE: 9 INJECTION, SOLUTION INTRAVENOUS at 19:38

## 2021-01-08 RX ADMIN — HEPARIN SODIUM 2780 UNITS: 1000 INJECTION INTRAVENOUS; SUBCUTANEOUS at 20:00

## 2021-01-08 RX ADMIN — ACETAMINOPHEN 650 MG: 325 TABLET ORAL at 12:43

## 2021-01-08 RX ADMIN — METOPROLOL SUCCINATE 100 MG: 100 TABLET, FILM COATED, EXTENDED RELEASE ORAL at 11:53

## 2021-01-08 RX ADMIN — HEPARIN SODIUM 14 UNITS/KG/HR: 10000 INJECTION, SOLUTION INTRAVENOUS at 19:37

## 2021-01-08 RX ADMIN — GLYCOPYRROLATE AND FORMOTEROL FUMARATE 2 PUFF: 9; 4.8 AEROSOL, METERED RESPIRATORY (INHALATION) at 16:18

## 2021-01-08 RX ADMIN — ATORVASTATIN CALCIUM 10 MG: 10 TABLET, FILM COATED ORAL at 19:57

## 2021-01-08 RX ADMIN — ASPIRIN 81 MG: 81 TABLET, CHEWABLE ORAL at 11:48

## 2021-01-08 RX ADMIN — MULTIPLE VITAMINS W/ MINERALS TAB 1 TABLET: TAB at 11:56

## 2021-01-08 RX ADMIN — SODIUM CHLORIDE, PRESERVATIVE FREE 10 ML: 5 INJECTION INTRAVENOUS at 11:54

## 2021-01-08 RX ADMIN — DILTIAZEM HYDROCHLORIDE 2.5 MG/HR: 5 INJECTION INTRAVENOUS at 05:36

## 2021-01-08 RX ADMIN — MAGNESIUM SULFATE HEPTAHYDRATE 1 G: 500 INJECTION, SOLUTION INTRAMUSCULAR; INTRAVENOUS at 16:43

## 2021-01-08 ASSESSMENT — PAIN SCALES - GENERAL
PAINLEVEL_OUTOF10: 0
PAINLEVEL_OUTOF10: 6

## 2021-01-08 ASSESSMENT — ENCOUNTER SYMPTOMS
CHEST TIGHTNESS: 0
NAUSEA: 0
WHEEZING: 0
SHORTNESS OF BREATH: 0
ABDOMINAL DISTENTION: 0
CONSTIPATION: 0

## 2021-01-08 ASSESSMENT — PAIN DESCRIPTION - PAIN TYPE
TYPE: ACUTE PAIN
TYPE: ACUTE PAIN

## 2021-01-08 ASSESSMENT — PAIN DESCRIPTION - ORIENTATION: ORIENTATION: MID

## 2021-01-08 ASSESSMENT — PAIN DESCRIPTION - LOCATION: LOCATION: CHEST

## 2021-01-08 NOTE — BRIEF OP NOTE
[facility]  Sedation/Analgesia Post Sedation Record    Pt Name: Evette Marley  Account number: [de-identified]  MRN: [id number]  YOB: 1950  Procedure Performed By: Marcio Trinidad MD   Primary Care Physician: Galen Randle MD  Date: 1/8/2021    POST-PROCEDURE    Physicians/Assistants: Marcio Trinidad MD     Procedure Performed:Cardioversion      Sedation/Anesthesia: Versed/ Fentanyl and 2% xylocaine local anesthesia. Estimated Blood Loss: < 50 ml. Specimens Removed: None         Disposition of Specimen: N/A        Complications: No Immediate Complications. Post-procedure Diagnosis/Findings:        Attempted DEJAH guided DCCV*3, unsuccessful      Recommendations:  Cont anticoagulation. Monitor on telemetry. Medications adjusted. Above findings and plan of care were discussed with patient and family, questions were answered, agreeable with plan.        Marcio Trinidad MD, [facility]   Electronically signed 1/8/2021 at 10:40 AM  Interventional Cardiology

## 2021-01-08 NOTE — PROGRESS NOTES
Inpatient Cardiac Rehabilitation Consult    Received consult for Phase II Cardiac Rehabilitation. Cardiac Rehab education completed with patient. Spoke to pt and his wife. They are waiting to talk to the surgeon about possible [place]. Informed pt and wife that cardiac rehab will be working with him if they do decide surgery and if not then we will call him at home to talk more about rehab/schedule. Brochure given.

## 2021-01-08 NOTE — PROGRESS NOTES
Cardiology Progress Note      Patient:  Maggie Collins  YOB: 1950  MRN: [id number]   Acct: [de-identified]  Admit Date:  1/6/2021  Primary Cardiologist: none    Note per dr Thakur Care: SOB        HPI: This is a pleasant 79 y.o. male presents with evaluation of sob that awoke him from sleep. He was supposed to be followed up by Dr. Lewis Bundy. He was found to have Afib last week. HR was 140s. First episode. He could not catch his breath. Started on Dilitazem gtt. HR still 110-120s. BNP 4443. Trop 0.183--0.175. Cr 1.3.  TSH 2.0.  Hgb 16.2. Patient is on Xarelto as outpatient. EF is 25%, with severe global hypokinesis. Mild LE edema. No chest pain. No major drug use, or hx of CAD/MI. Denies major family hx. Drinks 6 beers per day x 2 years. No recent sick contacts. Lots of family stress with kids using drugs. He has worsening LE edema. Prior to this, he states no cardiac concerns. \"    Subjective (Events in last 24 hours):   Pt awake and alert. NAD. No cp or sob.   No complaints    On cdz gtt at 2.5 mg/hr  On heparin gtt      Objective:   /67   Pulse 86   Temp 98.3 °F (36.8 °C) (Oral)   Resp 18   Ht 5' 9\" (1.753 m)   Wt 149 lb 9.6 oz (67.9 kg)   SpO2 95%   BMI 22.09 kg/m²        TELEMETRY: afib cvr    Physical Exam:  General Appearance: alert and oriented to person, place and time, in no acute distress  Cardiovascular: irregularly irregular  Pulmonary/Chest: clear to auscultation bilaterally- no wheezes, rales or rhonchi, normal air movement, no respiratory distress  Abdomen: soft, non-tender, non-distended, normal bowel sounds, no masses Extremities: no cyanosis, clubbing or edema, pulse   Skin: warm and dry, no rash or erythema  Head: normocephalic and atraumatic  Eyes: pupils equal, round, and reactive to light  Neck: supple and non-tender without mass, no thyromegaly   Musculoskeletal: normal range of motion, no joint swelling, deformity or tenderness  Neurological: alert, oriented, normal speech, no focal findings or movement disorder noted    Medications:    sodium chloride flush  10 mL Intravenous 2 times per day    aspirin  81 mg Oral Daily    metoprolol tartrate  50 mg Oral BID    therapeutic multivitamin-minerals  1 tablet Oral Daily    glycopyrrolate-formoterol  2 puff Inhalation BID    sodium chloride flush  10 mL Intravenous 2 times per day    atorvastatin  10 mg Oral Nightly    hydrALAZINE  25 mg Oral 3 times per day    isosorbide mononitrate  60 mg Oral Daily    furosemide  40 mg Oral Daily      sodium chloride 75 mL/hr at 01/08/21 0536    heparin (PORCINE) Infusion 14 Units/kg/hr (01/08/21 0700)    dilTIAZem (CARDIZEM) 125 mg in dextrose 5% 125 mL infusion 2.5 mg/hr (01/08/21 0536)         sodium chloride flush, 10 mL, PRN      heparin (porcine), 80 Units/kg, PRN      heparin (porcine), 40 Units/kg, PRN      albuterol, 2.5 mg, Q6H PRN      sodium chloride flush, 10 mL, PRN      promethazine, 12.5 mg, Q6H PRN    Or      ondansetron, 4 mg, Q6H PRN      polyethylene glycol, 17 g, Daily PRN      acetaminophen, 650 mg, Q6H PRN    Or      acetaminophen, 650 mg, Q6H PRN      potassium chloride, 40 mEq, PRN    Or      potassium alternative oral replacement, 40 mEq, PRN    Or      potassium chloride, 10 mEq, PRN      magnesium sulfate, 2 g, PRN        Diagnostics:  TTE 1/5/21  Summary   Ejection fraction is visually estimated at 25%. There was severe global hypokinesis of the left ventricle. Moderately dilated left atrium. The IVC is dilated . Signature      ----------------------------------------------------------------   Electronically signed by Aubrey Rod MD (Interpreting   physician) on 01/05/2021 at 07:02 PM    Lab Data:    Cardiac Enzymes:  No results for input(s): CKTOTAL, CKMB, CKMBINDEX, TROPONINI in the last 72 hours.     CBC:   Lab Results   Component Value Date    WBC 9.1 01/08/2021    RBC 4.11 01/08/2021    RBC 4.84 11/07/2011    HGB 14.5 01/08/2021    HCT 42.0 01/08/2021     01/08/2021       CMP:    Lab Results   Component Value Date     01/08/2021    K 4.5 01/08/2021     01/08/2021    CO2 27 01/08/2021    BUN 22 01/08/2021    CREATININE 1.0 01/08/2021    LABGLOM 74 01/08/2021    GLUCOSE 93 01/08/2021    GLUCOSE 94 11/07/2011    CALCIUM 9.1 01/08/2021       Hepatic Function Panel:    Lab Results   Component Value Date    ALKPHOS 54 01/07/2021    ALT 48 01/07/2021    AST 35 01/07/2021    PROT 6.2 01/07/2021    BILITOT 0.6 01/07/2021    BILIDIR <0.2 01/07/2021    LABALBU 3.5 01/07/2021    LABALBU 4.3 11/07/2011       Magnesium:    Lab Results   Component Value Date    MG 1.7 01/06/2021       PT/INR:    Lab Results   Component Value Date    INR 1.19 01/08/2021       HgBA1c:  No results found for: LABA1C    FLP:    Lab Results   Component Value Date    TRIG 96 01/07/2021    HDL 36 01/07/2021    LDLCALC 71 01/07/2021       TSH:    Lab Results   Component Value Date    TSH 2.050 01/06/2021         Assessment:    Recent new onset afib rvr of unknown duration - now cvr   chadsvasc = 3   On xarelto prior to admission  New CMP - ef 25 per TTE 1/5/21 - ?ischemic vs nonischemic, ?tachy/ETOH induced  Elevated troponins  Elevated bnp  HTN  dyslipidemia  JANETH - improved  Tobacco abuse  Hx COPD  ETOH abuse      Plan:    Daily I/o and weights  2 liter fluid restriction and 2gm sodium diet  Keep mag >2 and K >4, replace prn  Normal TSH  Cont asa/statin/BB/imdur/hydralazine/lasix  Change lopressor to toprol xl  Stop cardizem gtt  uptitrate BB as needed for rate control  Hold ace/arb for now, consider entresto if kidneys ok and bp will tolerate  Hold xarelto  Cont heparin gtt  Npo  LHC today  DEJAH/CV post cath if no reason not to pursue  lifevest  Referral to chf clinic         Electronically signed by Cat Florentino PA-C on 1/8/2021 at 8:33 AM

## 2021-01-08 NOTE — PRE SEDATION
[facility]  Sedation/Analgesia History & Physical    Pt Name: Abby Arellano  Account number: [de-identified]  MRN: [id number]  YOB: 1950  Provider Performing Procedure: Eric Ortega MD  Referring Provider: Agusto Saunders MD   Primary Care Physician: Agusto Saunders MD  Date: 1/8/2021    PRE-PROCEDURE    Code Status: FULL CODE  Brief History/Pre-Procedure Diagnosis:     Recent new onset afib rvr of unknown duration - now cvr              chadsvasc = 3              On xarelto prior to admission  New CMP - ef 25 per TTE 1/5/21 - ?ischemic vs nonischemic, ?tachy/ETOH induced  Elevated troponins  Elevated bnp  HTN  dyslipidemia  JANETH - improved  Tobacco abuse  Hx COPD  ETOH abuse    Consent: : I have discussed with the patient risks, benefits, and alternatives (and relevant risks, benefits, and side effects related to alternatives or not receiving care), and likelihood of the success. The patient and/or representative appear to understand and agree to proceed. The discussion encompasses risks, benefits, and side effects related to the alternatives and the risks related to not receiving the proposed care, treatment, and services. The indication, risks and benefits of the procedure and possible therapeutic consequences and alternatives were discussed with the patient. The patient was given the opportunity to ask questions and to have them answered to his/her satisfaction.  Risks of the procedure include but are not limited to the following: Bleeding, hematoma including retroperitoneal hemmorhage, infection, pain and discomfort, injury to the aorta and other blood vessels, rhythm disturbance, low blood pressure, myocardial infarction, stroke, kidney damage/failure, myocardial perforation, allergic reactions to sedatives/contrast material, loss of pulse/vascular compromise requiring surgery, aneurysm/pseudoaneurysm formation, possible loss of a limb/hand/leg, needing blood transfusion, requiring emergent open heart surgery or emergent coronary intervention, the need for intubation/respiratory support, the requirement for defibrillation/cardioversion, and death. Alternatives to and omission of the suggested procedure were discussed. The patient had no further questions and wished to proceed; the consent form was signed. MEDICAL HISTORY  []ASHD/ANGINA/MI/CHF   []Hypertension  []Diabetes  []Hyperlipidemia  []Smoking  []Family Hx of ASHD  []Additional information:       has a past medical history of Asthma, Collagenous colitis, Colon polyps, COPD, mild ([address] Eczema of hand, HTN (hypertension), Hyperlipidemia, and Smoker. SURGICAL HISTORY   has a past surgical history that includes Nasal sinus surgery (6/2008); Colonoscopy (2011,6/30/14); other surgical history (DEC 7TH 2012 [facility] ); hernia repair; Skin cancer excision (0-4411); and Tooth Extraction (02/2017).   Additional information:       ALLERGIES   Allergies as of 01/06/2021 - Review Complete 01/06/2021   Allergen Reaction Noted    Penicillins Rash 04/16/2012    Sulfa antibiotics Rash 04/16/2012     Additional information:       MEDICATIONS   Aspirin  [] 81 mg  [] 325 mg  [] None  Antiplatelet drug therapy use last 5 days  []No []Yes  Coumadin Use Last 5 Days []No []Yes  Other anticoagulant use last 5 days  []No []Yes    Current Facility-Administered Medications:     0.9 % sodium chloride infusion, , Intravenous, Continuous, Flor Law PA-C, Last Rate: 75 mL/hr at 01/08/21 0536, New Bag at 01/08/21 0536    sodium chloride flush 0.9 % injection 10 mL, 10 mL, Intravenous, 2 times per day, Flor Law PA-C    sodium chloride flush 0.9 % injection 10 mL, 10 mL, Intravenous, PRN, Flor Law PA-C    metoprolol succinate (TOPROL XL) extended release tablet 100 mg, 100 mg, Oral, Daily, Flor Law PA-C    heparin (porcine) injection 5,550 Units, 80 Units/kg, Intravenous, PRN, Pilar Watkins MD    heparin (porcine) injection 2,780 Units, 40 Units/kg, Intravenous, PRN, Pilar Watkins MD    heparin 25,000 unit in sodium chloride 0.45% 250 mL infusion, 18 Units/kg/hr, Intravenous, Continuous, Pilar Watkins MD, Last Rate: 9.7 mL/hr at 01/08/21 0700, 14 Units/kg/hr at 01/08/21 0700    albuterol (PROVENTIL) nebulizer solution 2.5 mg, 2.5 mg, Nebulization, Q6H PRN, Agusto Saunders MD    aspirin chewable tablet 81 mg, 81 mg, Oral, Daily, Agusto Saunders MD, 81 mg at 01/07/21 0804    therapeutic multivitamin-minerals 1 tablet, 1 tablet, Oral, Daily, Agusto Saunders MD, 1 tablet at 01/07/21 0803    glycopyrrolate-formoterol (BEVESPI) 9-4.8 MCG/ACT inhaler 2 puff, 2 puff, Inhalation, BID, Agusto Saunders MD, 2 puff at 01/07/21 2029    sodium chloride flush 0.9 % injection 10 mL, 10 mL, Intravenous, 2 times per day, Agusto Saunders MD    sodium chloride flush 0.9 % injection 10 mL, 10 mL, Intravenous, PRN, Agusto Saunders MD    promethazine (PHENERGAN) tablet 12.5 mg, 12.5 mg, Oral, Q6H PRN **OR** ondansetron (ZOFRAN) injection 4 mg, 4 mg, Intravenous, Q6H PRN, Agusto Saunders MD    polyethylene glycol (GLYCOLAX) packet 17 g, 17 g, Oral, Daily PRN, Agusto Saunders MD    acetaminophen (TYLENOL) tablet 650 mg, 650 mg, Oral, Q6H PRN **OR** acetaminophen (TYLENOL) suppository 650 mg, 650 mg, Rectal, Q6H PRN, Agusto Saunders MD    potassium chloride (KLOR-CON M) extended release tablet 40 mEq, 40 mEq, Oral, PRN **OR** potassium bicarb-citric acid (EFFER-K) effervescent tablet 40 mEq, 40 mEq, Oral, PRN **OR** potassium chloride 10 mEq/100 mL IVPB (Peripheral Line), 10 mEq, Intravenous, PRN, Agusto Saunders MD    magnesium sulfate 2 g in 50 mL IVPB premix, 2 g, Intravenous, PRN, Agusto Saunders MD    atorvastatin (LIPITOR) tablet 10 mg, 10 mg, Oral, Nightly, Agusto Saunders MD, 10 mg at 01/07/21 2050    hydrALAZINE (APRESOLINE) tablet 25 mg, 25 mg, Oral, 3 times per day, Bradley Norton MD, 25 mg at 01/08/21 0537    isosorbide mononitrate (IMDUR) extended release tablet 60 mg, 60 mg, Oral, Daily, Bradley Norton MD, 60 mg at 01/07/21 0803    furosemide (LASIX) tablet 40 mg, 40 mg, Oral, Daily, Bradley Norton MD, 40 mg at 01/07/21 0804  Prior to Admission medications    Medication Sig Start Date End Date Taking? Authorizing Provider   fluocinonide (LIDEX) 0.05 % ointment Apply topically as needed   Yes Historical Provider, MD   Tiotropium Bromide-Olodaterol (STIOLTO RESPIMAT) 2.5-2.5 MCG/ACT AERS Inhale 1 puff into the lungs 1-2 times daily   Yes Historical Provider, MD   metoprolol tartrate (LOPRESSOR) 50 MG tablet TAKE 1 TABLET BY MOUTH TWICE DAILY 12/30/20  Yes Randall Belle MD   losartan (COZAAR) 100 MG tablet Take 1 tablet by mouth daily 12/29/20  Yes Randall Belle MD   pravastatin (PRAVACHOL) 40 MG tablet Take 1 tablet by mouth daily 12/29/20  Yes Randall Belle MD   rivaroxaban (XARELTO) 20 MG TABS tablet Take 1 tablet by mouth daily (with breakfast) 12/29/20  Yes Randall Belle MD   albuterol sulfate  (90 Base) MCG/ACT inhaler Inhale 2 puffs into the lungs 4 times daily 3/31/20  Yes Randall Belle MD   Multiple Vitamins-Minerals (CENTRUM SILVER PO) Take  by mouth daily. Yes Historical Provider, MD   loratadine (CLARITIN) 10 MG tablet Take 10 mg by mouth daily     Historical Provider, MD     Additional information:       VITAL SIGNS   Vitals:    01/08/21 0845   BP:    Pulse: 113   Resp:    Temp:    SpO2:        PHYSICAL:   General: No acute distress  HEENT:  Unremarkable for age  Neck: without increased JVD, carotid pulses 2+ bilaterally without bruits  Heart: IIR, S1 & S2 WNL.  No murmurs   Lungs: Clear to auscultation    Abdomen: BS present, without HSM, masses, or tenderness    Extremities: without C,C,E.  Pulses 2+ bilaterally   Mental Status: Alert & Oriented        PLANNED PROCEDURE   [x]Cath  [x]PCI                []Pacemaker/AICD  [x]DEJAH             [x]Cardioversion []Peripheral angiography/PTA  []Other:      SEDATION  Planned agent:[x]Midazolam []Meperidine []Sublimaze []Morphine  []Diazepam  []Other:     ASA Classification:  []1 []2 [x]3 []4 []5   Class 1: A normal healthy patient  Class 2: Pt with mild to moderate systemic disease  Class 3: Severe systemic disease or disturbance  Class 4: Severe systemic disorders that are already life threatening. Class 5: Moribund pt with little chances of survival, for more than 24 hours. Mallampati I Airway Classification:   []1 [x]2 []3 []4     [x]Pre-procedure diagnostic studies complete and results available. Comment:    [x]Previous sedation/anesthesia experiences assessed. Comment:    [x]The patient is an appropriate candidate to undergo the planned procedure sedation and anesthesia. (Refer to nursing sedation/analgesia documentation record)  [x]Formulation and discussion of sedation/procedure plan, risks, and expectations with patient and/or responsible adult completed. [x]Patient examined immediately prior to the procedure.  (Refer to nursing sedation/analgesia documentation record)      Angélica Christianson MD, Adryan Kruse    Electronically signed 1/8/2021 at 9:18 AM

## 2021-01-08 NOTE — PROCEDURES
[address] [id number]                            CARDIAC CATHETERIZATION    PATIENT NAME: Suresh Daniels                       :        1950  MED REC NO:   [id number]                           ROOM:       0037  ACCOUNT NO:   [de-identified]                           ADMIT DATE: 2021  PROVIDER:     Amber Monae MD    DATE OF PROCEDURE:  2021    INDICATIONS FOR PROCEDURE:  1. Severe new onset cardiomyopathy. 2.  Chest pain. 3.  Angina equivalent symptoms. 4.  Atrial fibrillation with rapid ventricular response. 5.  Acute congestive heart failure. 6.  Hypertension. 7.  Dyslipidemia. PROCEDURES PERFORMED:  1. Left cardiac catheterization, selective coronary angiogram.  2.  Left ventriculography. DESCRIPTION OF PROCEDURE:  After informed consent, the patient was  brought to the cardiac catheterization room. He was prepped and draped  in a sterile fashion. 2% lidocaine was injected in the skin and  subcutaneous tissue overlying the right radial artery. Using modified  Seldinger technique, I was able to obtain access in the right radial  artery. A 5/6 Slender sheath was inserted. Standard  antithrombotic/antispasmodic medications were given. I used 6-Sammarinese  JR-4, 6-Sammarinese JL-3.5 diagnostic catheters to complete the procedure. FINDINGS:  LEFT VENTRICULOGRAPHY:  Severe global hypokinesis was noted on the  ventriculography with an ejection fraction estimated at 25%. LVEDP:  20 mmHg. CORONARY ANGIOGRAM:  1. Right coronary artery:  Ostial RCA has 30% to 50% stenosis. The  proximal RCA has luminal irregularities. Mid RCA has chronic total  occlusion. 2.  Left main coronary artery:  The left main coronary artery has 70% to  80% bifurcation lesion. 3.  Ramus intermedius:  Ostial ramus intermedius has 20% stenosis  followed by mild luminal irregularities.   4.  Left circumflex artery:  Left circumflex artery has mild luminal  irregularities in the proximal segment. Large, dominant vessel. Distal  LCX into LPL has 60% to 70% stenosis. 5.  Left anterior descending artery:  Ostial LAD is diseased with 50% to  60% stenosis as well as CAD involving proximal segment of LAD, 70% to  80% in severity with mild calcification. Mid LAD has 70% stenosis,  bifurcation lesion. D1 is a relatively large vessel with 50% to 60%  ostial stenosis. Distal LAD with mild diffuse disease. MEDICATIONS:  See MAR. COMPLICATIONS:  None. ESTIMATED BLOOD LOSS:  Less than 50 mL. ACCESS:  Right radial artery access. Vasc Band was applied. Hemostasis  was achieved. IMPRESSION:  1. Severe ischemic cardiomyopathy. 2.  Multivessel coronary artery disease as discussed above including  severely stenotic lesion of distal left main coronary artery, 70% to 80%  in severity. RECOMMENDATIONS:  Continue inpatient care. Continue to monitor the  patient on telemetry. Continue aspirin. Continue on the statin. We  would recommend increasing the dose to 40 mg p.o. daily. Monitor volume  status. Consult Cardiovascular Surgery to assess for possible coronary  artery bypass graft surgery. Heart team discussion regarding  revascularization. Findings and plan of care was discussed with the patient. Questions  were answered.         Génesis Isabel MD    D: 01/08/2021 9:52:45       T: 01/08/2021 10:46:26     [email or web address]  Job#: [id number]     Doc#: [id number]    CC:

## 2021-01-08 NOTE — PROGRESS NOTES
Educated patient regarding heart failure management by explaining the importance of obtaining daily weights at the same time every day with approximately the same amount of clothing, how to recognize symptoms, low sodium diet and taking prescribed medications as ordered. Patient was given our heart failure booklet, a weight chart and a low sodium diet sheet. Patient was receptive to the education given and verbalized understanding. New patient appointment scheduled in CHF clinic.

## 2021-01-08 NOTE — BRIEF OP NOTE
[address] [id number]  Sedation/Analgesia Post Sedation Record    Pt Name: Mayuri Moreno  Account number: [de-identified]  MRN: [id number]  YOB: 1950  Procedure Performed By: Bridger Tobar MD   Primary Care Physician: Omid Crouch MD  Date: 1/8/2021    POST-PROCEDURE    Physicians/Assistants: Bridger Tobar MD     Procedure Performed:Cath      Sedation/Anesthesia: Versed/ Fentanyl and 2% xylocaine local anesthesia. Estimated Blood Loss: < 50 ml. Specimens Removed: None         Disposition of Specimen: N/A        Complications: No Immediate Complications. Post-procedure Diagnosis/Findings:        Severe ischemic CMP   Multivessel CAD, including 70%-80% distal LM stenosis    Recommendations:   Continue to monitor on telemetry   Consult CT surgery, heart barrera discussion regarding revascularization      Above findings and plan of care were discussed with patient, questions were answered, agreeable with plan.        Bridger Tobar MD, [facility]   Electronically signed 1/8/2021 at 9:44 AM  Interventional Cardiology

## 2021-01-08 NOTE — PROGRESS NOTES
Inpatient Cardiac Rehabilitation Consult    Received consult for Phase II Cardiac Rehabilitation.   Will follow Dioyn's progress

## 2021-01-08 NOTE — PROCEDURES
[address] [place] [address]                            CARDIAC CATHETERIZATION    PATIENT NAME: Meliza Choe                       :        1950  MED REC NO:   [id number]                           ROOM:       0037  ACCOUNT NO:   [de-identified]                           ADMIT DATE: 2021  PROVIDER:     Erci Ortega MD    DATE OF PROCEDURE:  2021    INDICATION FOR PROCEDURE:  1.  New-onset severe cardiomyopathy. 2.  Ejection fraction 25%. 3.  Atrial fibrillation with rapid ventricular response, poorly  controlled, symptomatic. 4.  Acute congestive heart failure. PROCEDURE PERFORMED:  Transesophageal echocardiogram-guided direct  current cardioversion attempt. DESCRIPTION OF PROCEDURE:  After informed consent, the patient was  brought to the cardiac catheterization room. The patient received  fentanyl, Versed. Lidocaine was sprayed in the pharynx. I proceeded  with transesophageal echocardiogram, which revealed no evidence for  intracardiac thrombus. Full DEJAH report to follow. After we ensured  that the patient received adequate sedation, I proceeded with an attempt  for direct current cardioversion. First 300 joules were delivered,  which was not successful in converting the patient to sinus rhythm. On  the second attempt, 360 joules were delivered. The patient transiently  converted to sinus rhythm with evidence for sinus bradycardia, however,  soon he flared back into atrial fibrillation. Third attempt was done  with 360 joules direct current cardioversion was given and again,  although, the patient transiently converted to sinus bradycardia, he  soon flipped back to atrial fibrillation. MEDICATIONS:  See MAR. COMPLICATIONS:  None. IMPRESSION:  1. Attempted DEJAH-guided direct current cardioversion, unsuccessful. 2.  Atrial fibrillation with rapid ventricular response. RECOMMENDATIONS:  1. Continue on anticoagulation. 2.  Continue to monitor on telemetry. 3.  We will adjust medications. The patient was started today on Toprol  higher dose. 4.  Lopressor was stopped. 5.  We will increase Cardizem dose. 6.  Above measures failed, may consider electrophysiology  recommendations. 7.  The patient also has multivessel coronary artery disease and is  being considered for CABG. If the patient goes for CABG, may consider  doing Maze procedure at the time of CABG. 8.  Findings and plan of care were discussed with the patient and his  wife. They are agreeable to the plan.         Rondi Gitelman, MD    D: 01/08/2021 10:33:26       T: 01/08/2021 10:44:24     [email or web address]  Job#: [id number]     Doc#: [id number]    CC:

## 2021-01-09 ENCOUNTER — APPOINTMENT (OUTPATIENT)
Dept: CT IMAGING | Age: 71
DRG: 233 | End: 2021-01-09
Payer: MEDICARE

## 2021-01-09 PROBLEM — I25.10 CORONARY ARTERY DISEASE INVOLVING NATIVE CORONARY ARTERY: Status: ACTIVE | Noted: 2021-01-09

## 2021-01-09 LAB
ANION GAP SERPL CALCULATED.3IONS-SCNC: 9 MEQ/L (ref 8–16)
APTT: 152.1 SECONDS (ref 22–38)
APTT: 49 SECONDS (ref 22–38)
APTT: 59.6 SECONDS (ref 22–38)
APTT: 63.7 SECONDS (ref 22–38)
BUN BLDV-MCNC: 17 MG/DL (ref 7–22)
CALCIUM SERPL-MCNC: 8.9 MG/DL (ref 8.5–10.5)
CHLORIDE BLD-SCNC: 103 MEQ/L (ref 98–111)
CO2: 26 MEQ/L (ref 23–33)
CREAT SERPL-MCNC: 1.1 MG/DL (ref 0.4–1.2)
ERYTHROCYTE [DISTWIDTH] IN BLOOD BY AUTOMATED COUNT: 13 % (ref 11.5–14.5)
ERYTHROCYTE [DISTWIDTH] IN BLOOD BY AUTOMATED COUNT: 49.1 FL (ref 35–45)
GFR SERPL CREATININE-BSD FRML MDRD: 66 ML/MIN/1.73M2
GLUCOSE BLD-MCNC: 89 MG/DL (ref 70–108)
HCT VFR BLD CALC: 39.9 % (ref 42–52)
HEMOGLOBIN: 13.6 GM/DL (ref 14–18)
MCH RBC QN AUTO: 35.1 PG (ref 26–33)
MCHC RBC AUTO-ENTMCNC: 34.1 GM/DL (ref 32.2–35.5)
MCV RBC AUTO: 103.1 FL (ref 80–94)
PLATELET # BLD: 180 THOU/MM3 (ref 130–400)
PMV BLD AUTO: 10.2 FL (ref 9.4–12.4)
POTASSIUM REFLEX MAGNESIUM: 5.3 MEQ/L (ref 3.5–5.2)
RBC # BLD: 3.87 MILL/MM3 (ref 4.7–6.1)
SODIUM BLD-SCNC: 138 MEQ/L (ref 135–145)
WBC # BLD: 8.2 THOU/MM3 (ref 4.8–10.8)

## 2021-01-09 PROCEDURE — 6370000000 HC RX 637 (ALT 250 FOR IP): Performed by: PHYSICIAN ASSISTANT

## 2021-01-09 PROCEDURE — 85027 COMPLETE CBC AUTOMATED: CPT

## 2021-01-09 PROCEDURE — 85730 THROMBOPLASTIN TIME PARTIAL: CPT

## 2021-01-09 PROCEDURE — 99232 SBSQ HOSP IP/OBS MODERATE 35: CPT | Performed by: PHYSICIAN ASSISTANT

## 2021-01-09 PROCEDURE — 71250 CT THORAX DX C-: CPT

## 2021-01-09 PROCEDURE — 2580000003 HC RX 258: Performed by: PHYSICIAN ASSISTANT

## 2021-01-09 PROCEDURE — 2060000000 HC ICU INTERMEDIATE R&B

## 2021-01-09 PROCEDURE — 80048 BASIC METABOLIC PNL TOTAL CA: CPT

## 2021-01-09 PROCEDURE — 94640 AIRWAY INHALATION TREATMENT: CPT

## 2021-01-09 PROCEDURE — 6370000000 HC RX 637 (ALT 250 FOR IP): Performed by: FAMILY MEDICINE

## 2021-01-09 PROCEDURE — 2500000003 HC RX 250 WO HCPCS: Performed by: INTERNAL MEDICINE

## 2021-01-09 PROCEDURE — 36415 COLL VENOUS BLD VENIPUNCTURE: CPT

## 2021-01-09 PROCEDURE — 6370000000 HC RX 637 (ALT 250 FOR IP): Performed by: INTERNAL MEDICINE

## 2021-01-09 RX ADMIN — GLYCOPYRROLATE AND FORMOTEROL FUMARATE 2 PUFF: 9; 4.8 AEROSOL, METERED RESPIRATORY (INHALATION) at 09:42

## 2021-01-09 RX ADMIN — ATORVASTATIN CALCIUM 10 MG: 10 TABLET, FILM COATED ORAL at 20:36

## 2021-01-09 RX ADMIN — ASPIRIN 81 MG: 81 TABLET, CHEWABLE ORAL at 09:35

## 2021-01-09 RX ADMIN — SODIUM CHLORIDE: 9 INJECTION, SOLUTION INTRAVENOUS at 09:43

## 2021-01-09 RX ADMIN — MULTIPLE VITAMINS W/ MINERALS TAB 1 TABLET: TAB at 09:35

## 2021-01-09 RX ADMIN — HEPARIN SODIUM 14.99 UNITS/KG/HR: 10000 INJECTION, SOLUTION INTRAVENOUS at 21:45

## 2021-01-09 RX ADMIN — FUROSEMIDE 40 MG: 40 TABLET ORAL at 09:35

## 2021-01-09 RX ADMIN — METOPROLOL SUCCINATE 100 MG: 100 TABLET, FILM COATED, EXTENDED RELEASE ORAL at 09:35

## 2021-01-09 RX ADMIN — SODIUM CHLORIDE: 9 INJECTION, SOLUTION INTRAVENOUS at 21:45

## 2021-01-09 RX ADMIN — ISOSORBIDE MONONITRATE 60 MG: 60 TABLET ORAL at 09:35

## 2021-01-09 RX ADMIN — DILTIAZEM HYDROCHLORIDE 120 MG: 120 CAPSULE, EXTENDED RELEASE ORAL at 09:35

## 2021-01-09 RX ADMIN — GLYCOPYRROLATE AND FORMOTEROL FUMARATE 2 PUFF: 9; 4.8 AEROSOL, METERED RESPIRATORY (INHALATION) at 20:15

## 2021-01-09 ASSESSMENT — PAIN SCALES - GENERAL
PAINLEVEL_OUTOF10: 0

## 2021-01-09 ASSESSMENT — ENCOUNTER SYMPTOMS
ABDOMINAL DISTENTION: 0
WHEEZING: 0
DIARRHEA: 0
ABDOMINAL PAIN: 0
VOMITING: 0
NAUSEA: 0
SHORTNESS OF BREATH: 0
CHEST TIGHTNESS: 0

## 2021-01-09 NOTE — PROGRESS NOTES
Family Medicine Progress Notes/Coverage    Today's Date: 1/9/21  [id number]  Medical Record # [id number]  CSN/Account # [de-identified]      Mr. Claudette Bottom admitted on 1/6/2021        Subjective / Interval History :     Covering Dr Aura Woodard  Failed cardioversion yesterday  Reviewed notes, consults, laboratory and radiology results,    Objective:       Physical Exam:  Patient Vitals for the past 24 hrs:   BP Temp Temp src Pulse Resp SpO2 Weight   01/09/21 0400 115/71 97.9 °F (36.6 °C) Oral 89 19 93 % 151 lb 6.4 oz (68.7 kg)   01/08/21 2315 129/80 97.8 °F (36.6 °C) Oral 80 18 96 % --   01/08/21 1945 109/72 97.6 °F (36.4 °C) Oral 105 18 95 % --   01/08/21 1500 115/69 -- -- 102 -- -- --   01/08/21 1400 91/68 -- -- 99 -- -- --   01/08/21 1300 (!) 103/58 -- -- 102 -- -- --   01/08/21 1200 126/68 -- -- 82 -- 96 % --   01/08/21 1130 127/69 97.8 °F (36.6 °C) Oral 82 18 94 % --   01/08/21 1115 123/70 -- -- 75 -- 93 % --   01/08/21 1100 118/69 -- -- 90 -- 92 % --   01/08/21 1045 134/88 -- -- 96 -- 94 % --   01/08/21 1030 118/69 -- -- 83 -- 93 % --   01/08/21 1015 134/88 -- -- 95 -- 93 % --       General Appearance:  Alert, cooperative, no distress, appears stated age   HEENT:  Neck:      Chest/Lungs:  Heart: CTA  IRRR   Abdomen:  Back: Soft non tender   Extremities:  Neurological Exam: No edema  WNL       Assessment:     Admitting Diagnosis:    Atrial fibrillation with rapid ventricular response (HCC) [I48.91]  Atrial fibrillation with RVR ([address] [I48.91]    Active Hospital Problems    Diagnosis Date Noted    Coronary artery disease involving native coronary artery [I25.10] 01/09/2021     Priority: High    Atrial fibrillation with rapid ventricular response ([facility] [address] [I48.91] 01/06/2021     Priority: High    Atrial fibrillation ([address] [I48.91] 01/06/2021     Priority: High    COPD, mild (HCC) [J44.9]      Priority: Medium    HTN (hypertension) [I10]      Priority: Medium    Smoker [F17.200] 11/25/2012     Priority: Medium    Hyperlipidemia [E78.5]      Priority: Medium       Plan:     Discussed plans with the nursing staff  CABG next week    Medications, Laboratories and Imaging results:    Scheduled Meds:   metoprolol succinate  100 mg Oral Daily    dilTIAZem  120 mg Oral Daily    aspirin  81 mg Oral Daily    therapeutic multivitamin-minerals  1 tablet Oral Daily    glycopyrrolate-formoterol  2 puff Inhalation BID    sodium chloride flush  10 mL Intravenous 2 times per day    atorvastatin  10 mg Oral Nightly    isosorbide mononitrate  60 mg Oral Daily    furosemide  40 mg Oral Daily     Continuous Infusions:   sodium chloride 75 mL/hr at 01/08/21 1938    heparin (PORCINE) Infusion 13 Units/kg/hr (01/09/21 0400)     PRN Meds:sodium chloride flush, heparin (porcine), heparin (porcine), albuterol, sodium chloride flush, promethazine **OR** ondansetron, polyethylene glycol, acetaminophen **OR** acetaminophen, potassium chloride **OR** potassium alternative oral replacement **OR** potassium chloride, magnesium sulfate    Imaging:    Lab Review :    Lab Results   Component Value Date    WBC 8.2 01/09/2021    HGB 13.6 (L) 01/09/2021    HCT 39.9 (L) 01/09/2021    .1 (H) 01/09/2021     01/09/2021     Lab Results   Component Value Date    CREATININE 1.0 01/08/2021    BUN 22 01/08/2021     01/08/2021    K 4.5 01/08/2021     01/08/2021    CO2 27 01/08/2021     No results found for: CKTOTAL, CKMB, CKMBINDEX, TROPONINI  Lab Results   Component Value Date    ALT 48 01/07/2021    AST 35 01/07/2021    ALKPHOS 54 01/07/2021    BILITOT 0.6 01/07/2021     Lab Results   Component Value Date    LIPASE 43.3 01/06/2021         Electronically signed by Nivia Moreno MD on 1/9/21 at 8:57 AM EST Carrie Lozano M.D.

## 2021-01-09 NOTE — CONSULTS
CT/CV Surgery Consult Note    2021 8:40 PM    Reason for Consult: Surgical evaluation for left main coronary artery disease    CC: Worsening of his shortness of breath, new onset of atrial fibrillation, lack of energy and occasional midsternal chest pain for 5-month    HPI:    Rony Roach. Yoko Dimas  is a 79 year old male with a PMH including hypertension, dyslipidemia, and chronic heavy smoking, who was found to have a severe left main and triple-vessel coronary artery disease on cardiac catheterization today. I was asked to evaluate the patient for coronary bypass grafting. He stated he developed worsening of shortness of breath 3 days before this admission. He saw his primary care physician as a routine follow-up 3 days ago. And he was found to have atrial fibrillations on electrocardiogram at the time of examination. He was sent for a work-up including echocardiogram.  He has been on Xarelto 20 mg daily for atrial fibrillation for 1 week. He states that he has been having intermittent vague anterior chest pain for 5-months. His chest pain usually associated with shortness of breath. The pain is 3 out of 10 in intensity, lasting less than 1 minute, resolved spontaneously at rest, and occurs 2-3 times per week. And his symptoms got worse over last 3 days.     Vital Signs: /72   Pulse 105   Temp 97.6 °F (36.4 °C) (Oral)   Resp 18   Ht 5' 9\" (1.753 m)   Wt 149 lb 9.6 oz (67.9 kg)   SpO2 95%   BMI 22.09 kg/m²    Temp (24hrs), Av.8 °F (36.6 °C), Min:97.6 °F (36.4 °C), Max:98.3 °F (36.8 °C)      PULSE OXIMETRY RANGE: SpO2  Av.2 %  Min: 92 %  Max: 96 %    SUPPLEMENTAL O2:       Labs:   CBC:     Recent Labs     21  0430 21  1524 21  1524 21  0545 21  1054 21  1844   WBC 9.3 8.2  --  9.1  --   --    HGB 13.6* 13.4*  --  14.5  --   --    HCT 39.6* 38.5*  --  42.0  --   --    .1* 101.3*  --  102.2*  --   --     204  --  198  --   --    APTT --  28.4   < > 81.8* 84.6* 44.2*   INR  --   --   --  1.19*  --   --     < > = values in this interval not displayed. BMP:   Recent Labs     01/06/21  0258 01/07/21  0430 01/08/21  0545    135 138   K 4.9 4.1 4.5    101 101   CO2 26 25 27   BUN 35* 27* 22   CREATININE 1.3* 1.3* 1.0   MG 1.7  --  1.9     Last HgA1C: No results found for: LABA1C    Imaging:  CXR: I have reviewed the CXR image. I have reviewed the cardiac catheterization and selective coronary angiograms. Left main; 70 to 80%, right coronary artery; totally occluded at its midportion, left anterior descending artery; 70 to 80%, circumflex artery 60 to 70%. Ejection fraction around 25%. Intake/Output Summary (Last 24 hours) at 1/8/2021 2040  Last data filed at 1/8/2021 2004  Gross per 24 hour   Intake 2733.88 ml   Output 0 ml   Net 2733.88 ml       Scheduled Meds:    metoprolol succinate  100 mg Oral Daily    dilTIAZem  120 mg Oral Daily    aspirin  81 mg Oral Daily    therapeutic multivitamin-minerals  1 tablet Oral Daily    glycopyrrolate-formoterol  2 puff Inhalation BID    sodium chloride flush  10 mL Intravenous 2 times per day    atorvastatin  10 mg Oral Nightly    isosorbide mononitrate  60 mg Oral Daily    furosemide  40 mg Oral Daily         PastMedical History:  Shahla Eldridge  has a past medical history of Asthma, Collagenous colitis, Colon polyps, COPD, mild ([address] Eczema of hand, HTN (hypertension), Hyperlipidemia, and Smoker. Past Surgical History:  The patient  has a past surgical history that includes Nasal sinus surgery (6/2008); Colonoscopy (2011,6/30/14); other surgical history (DEC 7TH 2012 DR Schultz [facility] ); hernia repair; Skin cancer excision (1-1541); and Tooth Extraction (02/2017). Allergies: The patient is allergic to penicillins and sulfa antibiotics. Family History:   This patient's family history includes Diabetes in his brother; Heart Disease in his brother, father, and mother. Social History:  [facility]  reports that he has been smoking cigarettes. He has a 40.00 pack-year smoking history. He has never used smokeless tobacco. He reports current alcohol use. He reports that he does not use drugs. He drinks beer 6 to 9 cans/day    ROS:  Constitutional: Negative for activity change, chills, fatigue, fever and unexpected weight change. HENT: Negative for congestion, facial swelling, sore throat, and changes in voice. Eyes: Negative for photophobia, redness, itching and visual disturbance. Respiratory: Negative for apnea, choking, shortness of breath, wheezing and stridor. Cardiovascular: Negative for chest pain, palpitations and leg swelling. Gastrointestinal: Negative for abdominal distention, constipation, nausea and vomiting. Endocrine: Negative for cold intolerance, heat intolerance, polyphagia and polyuria. Musculoskeletal: Negative for arthralgias, gait problem, and myalgias. Skin: Negative for color change, rash and wound. Allergic/Immunologic: Negative for food allergies and immunocompromised state. Neurological: Negative for dizziness, tremors, speech difficulty, weakness, numbness and headaches. Hematological: Negative for adenopathy. Does not bruise/bleed easily. Psychiatric/Behavioral: Negative for agitation, confusion, and dysphoric mood. Physical Exam:   General appearance:  No apparent distress, appears stated age and cooperative. HEENT:  Normal cephalic, atraumatic without obvious deformity. Conjunctivae/corneas clear. Neck: Supple, with full range of motion. No jugular venous distention. Trachea midline. No bruit of the carotid artery. Respiratory:  Normal respiratory effort. Clear to auscultation, bilaterally without rales/wheezes/rhonchi. Cardiovascular:   Irregular heartbeat. No obvious murmurs. Abdomen: Soft, non-tender, non-distended with normal bowel sounds. Musculoskeletal:  No clubbing, cyanosis or edema bilaterally.   Full range of motion without deformity. Mild varicose veins in the left leg. Skin: Skin color, texture, turgor normal.  No rashes or lesions. Neurologic:  Neurovascularly intact without any focal sensory/motor deficits. Psychiatric:  Alert and oriented, thought content appropriate, normal insight. Capillary Refill: Brisk,< 3 seconds   Peripheral Pulses: Palpable bilateral femoral pulses. Weak but palpable dorsalis pedis pulses. Problem List:  Patient Active Problem List   Diagnosis    Hyperlipidemia    Asthma    Smoker    Allergic rhinitis due to other allergen    Collagenous colitis    Lactose intolerance    HTN (hypertension)    COPD, mild (HCC)    Atrial fibrillation with rapid ventricular response (HCC)    Atrial fibrillation (HCC)       Assessment: Left main and triple-vessel coronary disease with reduced left ventricular function. Has been on Xarelto for atrial fibrillation. Last dose was 2 days ago. Plan: 1/8/21  1. Preop work-up. 2. Coronary to bypass grafting during this admission. 3.   4. Risk, benefits, and alternative of coronary bypass grafting were discussed at length with the patient in the presence of his wife and his best friend. He agreed to proceed. Issues discussed in detail with the patient, who understands and has no further questions. Time spent with patient: 80 minutes, of which more than 50% was spent counseling/coordinating the patient's care.     Anushka Barreto MD

## 2021-01-09 NOTE — PROGRESS NOTES
Progress Note  Date:1/9/2021       VVIX:2S-21/153-A  Patient Ivone Chu     YOB: 1950     Age:70 y.o. Subjective    Subjective:  Symptoms:  Stable. No shortness of breath, chest pain or diarrhea. Diet:  NPO. No nausea or vomiting. Activity level: Impaired due to weakness. Pain:  He reports no pain. Review of Systems   Constitutional: Negative for activity change, fatigue and fever. HENT: Negative for congestion. Respiratory: Negative for chest tightness, shortness of breath and wheezing. Cardiovascular: Negative for chest pain and palpitations. No chest pain and no palpitations    Gastrointestinal: Negative for abdominal distention, abdominal pain, diarrhea, nausea and vomiting. Musculoskeletal: Negative for arthralgias, joint swelling and myalgias. Neurological: Negative for dizziness, light-headedness and headaches.      Current Facility-Administered Medications   Medication Dose Route Frequency Provider Last Rate Last Admin    0.9 % sodium chloride infusion   Intravenous Continuous Loraine Pool PA-C 75 mL/hr at 01/08/21 1938 New Bag at 01/08/21 1938    sodium chloride flush 0.9 % injection 10 mL  10 mL Intravenous PRN Loraine Pool PA-C        metoprolol succinate (TOPROL XL) extended release tablet 100 mg  100 mg Oral Daily Loraine Pool PA-C   100 mg at 01/08/21 1153    dilTIAZem (CARDIZEM CD) extended release capsule 120 mg  120 mg Oral Daily Loraine Pool PA-C   120 mg at 01/08/21 1247    heparin (porcine) injection 5,550 Units  80 Units/kg Intravenous PRN Unique Squires MD        heparin (porcine) injection 2,780 Units  40 Units/kg Intravenous PRN Unique Squires MD   2,780 Units at 01/08/21 2000    heparin 25,000 unit in sodium chloride 0.45% 250 mL infusion  18 Units/kg/hr Intravenous Continuous Unique Squires MD 9 mL/hr at 01/09/21 0400 13 Units/kg/hr at 01/09/21 0400    albuterol (PROVENTIL) nebulizer solution 2.5 mg  2.5 mg Nebulization Q6H PRN Fernanda Arita MD   2.5 mg at 01/08/21 1621    aspirin chewable tablet 81 mg  81 mg Oral Daily Fernanda Arita MD   81 mg at 01/08/21 1148    therapeutic multivitamin-minerals 1 tablet  1 tablet Oral Daily Fernanda Arita MD   1 tablet at 01/08/21 1156    glycopyrrolate-formoterol (BEVESPI) 9-4.8 MCG/ACT inhaler 2 puff  2 puff Inhalation BID Fernanda Arita MD   2 puff at 01/08/21 1618    sodium chloride flush 0.9 % injection 10 mL  10 mL Intravenous 2 times per day Fernanda Arita MD   10 mL at 01/08/21 1154    sodium chloride flush 0.9 % injection 10 mL  10 mL Intravenous PRN Fernanda Arita MD        promethazine (PHENERGAN) tablet 12.5 mg  12.5 mg Oral Q6H PRN Fernanda Arita MD        Or    ondansetron [facility]) injection 4 mg  4 mg Intravenous Q6H PRN Fernanda Arita MD        polyethylene glycol (GLYCOLAX) packet 17 g  17 g Oral Daily PRN Fernanda Arita MD        acetaminophen (TYLENOL) tablet 650 mg  650 mg Oral Q6H PRN Fernanda Arita MD   650 mg at 01/08/21 1243    Or    acetaminophen (TYLENOL) suppository 650 mg  650 mg Rectal Q6H PRN Fernanda Arita MD        potassium chloride (KLOR-CON M) extended release tablet 40 mEq  40 mEq Oral PRN Fernanda Arita MD        Or    potassium bicarb-citric acid (EFFER-K) effervescent tablet 40 mEq  40 mEq Oral PRN Fernanda Arita MD        Or    potassium chloride 10 mEq/100 mL IVPB (Peripheral Line)  10 mEq Intravenous PRN Fernanda Arita MD        magnesium sulfate 2 g in 50 mL IVPB premix  2 g Intravenous PRN Fernanda Arita MD        atorvastatin (LIPITOR) tablet 10 mg  10 mg Oral Nightly Fernanda Arita MD   10 mg at 01/08/21 1957    isosorbide mononitrate (IMDUR) extended release tablet 60 mg  60 mg Oral Daily Mounika Hahn MD   60 mg at 01/08/21 1156    furosemide (LASIX) tablet 40 mg  40 mg Oral Daily Mounika Hahn MD   40 mg at 21 1247         Objective         Vitals Last 24 Hours:  TEMPERATURE:  Temp  Av.8 °F (36.6 °C)  Min: 97.6 °F (36.4 °C)  Max: 97.9 °F (36.6 °C)  RESPIRATIONS RANGE: Resp  Av.3  Min: 18  Max: 19  PULSE OXIMETRY RANGE: SpO2  Av.9 %  Min: 92 %  Max: 96 %  PULSE RANGE: Pulse  Av.4  Min: 75  Max: 113  BLOOD PRESSURE RANGE: Systolic (91WRS), WNM:668 , Min:91 , RCI:259   ; Diastolic (56ROS), NPA:24, Min:58, Max:88    I/O (24Hr): Intake/Output Summary (Last 24 hours) at 2021 0611  Last data filed at 2021 0400  Gross per 24 hour   Intake 2868.49 ml   Output 0 ml   Net 2868.49 ml     Objective:  General Appearance:  Comfortable. Vital signs: (most recent): Blood pressure 115/71, pulse 89, temperature 97.9 °F (36.6 °C), temperature source Oral, resp. rate 19, height 5' 9\" (1.753 m), weight 151 lb 6.4 oz (68.7 kg), SpO2 93 %. Vital signs are normal.  No fever. Output: Producing urine and producing stool. HEENT: Normal HEENT exam.    Lungs:  Normal effort and normal respiratory rate. Breath sounds clear to auscultation. There are wheezes (minimal). Heart: Normal rate. Irregular rhythm. S1 normal and S2 normal.  Positive for murmur. Abdomen: Abdomen is soft. Bowel sounds are normal.   There is no abdominal tenderness. Extremities: Normal range of motion. Neurological: Patient is alert and oriented to person, place and time. Patient has normal reflexes and normal muscle tone. Skin:  Warm and dry.       Labs/Imaging/Diagnostics    Labs:  CBC:  Recent Labs     21  1524 21  0545 21  0206   WBC 8.2 9.1 8.2   RBC 3.80* 4.11* 3.87*   HGB 13.4* 14.5 13.6*   HCT 38.5* 42.0 39.9*   .3* 102.2* 103.1*    198 180     CHEMISTRIES:  Recent Labs     21  0430 21  0545    138   K 4.1 4.5    101   CO2 25 27   BUN 27* 22   CREATININE 1.3* 1.0   GLUCOSE 101 93   MG  --  1.9     PT/INR:  Recent Labs     21  0545   INR 1.19* APTT:  Recent Labs     01/08/21  1054 01/08/21  1844 01/09/21  0206   APTT 84.6* 44.2* 152.1*     LIVER PROFILE:  Recent Labs     01/07/21  0430   AST 35   ALT 48   BILIDIR <0.2   BILITOT 0.6   ALKPHOS 54       Imaging Last 24 Hours:  Echo Transesophageal    Result Date: 1/8/2021  Transesophageal Echocardiography Report (DEJAH)   Demographics   Patient Name    Vinicius Jensen Gender                Male   MR #            [id number]    Race                                                  Ethnicity   Account #       [de-identified]    Room Number           1417   Accession       [id number]   Date of Study         01/08/2021  Number   Date of Birth   1950   Referring Physician   Azul Hager MD   Age             79 year(s)   Sonographer           Florestine Romberg, RDCS, RVT                                Interpreting          Vanessa Acharya MD                               Physician  Procedure Type of Study   DEJAH procedure:ECHOCARDIOGRAM TRANSESOPHAGEAL. Procedure Date Date: 01/08/2021 Start: 09:42 AM Study Location: Cath lab Technical Quality: Adequate visualization Indications:Atrial fibrillation and Rule out thrombus prior to cardioversion. Additional Medical History:A-fib, Hyperlipidemia, Asthma, Smoker, Hypertension, COPD. Patient Status: Routine Contrast Medium: Bubble Study. Height: 69 inches Weight: 149 pounds BSA: 1.82 m^2 BMI: 22 kg/m^2 BP: 130/67 mmHg DEJAH Performed By: the attending and the sonographer Procedure Medications   - Cetacaine topical spray . - Versed I.V. 2 mg.   - Fentanyl I.V. 50 mcg. Procedure Descriptor:Probe easily inserted by Cardiologist. Procedure performed without complications. The study included complete 2D imaging, complete spectral doppler, and color doppler. The transesophageal approach was used. Risk benefits and alternatives were explained to the patient and informed consent was obtained. Allergies   - See Epic. Conclusions   Summary  Left ventricle size is normal.  No asymmetrical left ventricular hypertrophy was seen. There was severe global hypokinesis of the left ventricle. Ejection fraction is visually estimated at 25%. Mildly dilated left atrium. Mildly enlarged right atrium size. Mild mitral regurgitation. Mild tricuspid regurgitation. Signature   ----------------------------------------------------------------  Electronically signed by Rene Carlos MD (Interpreting  physician) on 01/08/2021 at 02:28 PM  ----------------------------------------------------------------   Findings   Mitral Valve  Normal structure and function. Mild mitral regurgitation is present. Aortic Valve  The aortic valve was trileaflet with normal thickness and cuspal  separation. There was no echocardiographic evidence of a vegetation. DOPPLER: Transaortic velocity was within the normal range with no evidence  of aortic stenosis or regurgitaiton. Tricuspid Valve  Tricuspid valve is structurally normal.  Mild tricuspid regurgitation. Pulmonic Valve  The pulmonic valve leaflets exhibited normal thickness, no calcification,  and normal cuspal separation. There was no echocardiographic evidence of  vegetation. DOPPLER: The transpulmonic velocity was within the normal  range with no evidence for regurgitation. Left Atrium  Mildly dilated left atrium. No evidence of thrombus within left atrium. No evidence of patent foramen ovale. Mild spontaneous echo contrast was present in LA cavity. Left Ventricle  Left ventricle size is normal.  No asymmetrical left ventricular hypertrophy was seen. There was severe global hypokinesis of the left ventricle. Ejection fraction is visually estimated at 25%. Right Atrium  Mildly enlarged right atrium size. Right Ventricle  The right ventricular size was normal with normal systolic function and  wall thickness.    Pericardial Effusion  The pericardium was normal

## 2021-01-09 NOTE — PROGRESS NOTES
Comprehensive Nutrition Assessment    Type and Reason for Visit:  Initial, Consult, Patient Education(cardiac CHF diet)    Nutrition Recommendations/Plan:   Continue current diet and MVI. Bowel meds as needed. Nutrition Assessment:   Pt deemed low nutritional risk at this time. Awaiting MAZE/CAGB procedure. Diet education provided. Malnutrition Assessment:  Malnutrition Status:  No malnutrition    Context:  Acute Illness     Findings of the 6 clinical characteristics of malnutrition:  Energy Intake:  No significant decrease in energy intake  Weight Loss:  No significant weight loss     Body Fat Loss:  No significant body fat loss     Muscle Mass Loss:  No significant muscle mass loss    Fluid Accumulation:  Unable to assess     Strength:  Not Performed    Nutrition Related Findings:  Thin. Pt eating breakfast at time of visit earlier this morning, appetite good prior to admit and now. Awaiting MAZE and CAGB, likely early next week per pt. Reports his wife has heart issues so he is familar with heart healthy diet and watching sodium but admits he really wasn't following diet closely prior to admit. Reports bowels are not moving like they usually do, 1 BM recorded in the last 24 hours,  agreeable to [place] and decaf hot beverage with meals to assist with bowel regulation. 1/7/21: Cholesterol 126, HDL 36, LDL 71, TG 96. Rx: lasix, lipitor, MVI. Wounds:  None       Current Nutrition Therapies:    DIET CARDIAC; Dietary Nutrition Supplements: Other Oral Supplement (see comment)activia and hot decaf beverage with meals. Anthropometric Measures:  · Height: 5' 9\" (175.3 cm)  · Current Body Weight: 151 lb 6.4 oz (68.7 kg)(1/9/21)   · Admission Body Weight: 152 lb 14.4 oz (69.4 kg)(1/6/20 no edema)    · Usual Body Weight: (~ 155# per pt.   Per pt: 2/25/20: 154#8oz, 12/29/20: 155#8oz)     · Ideal Body Weight: 160 lbs;   · BMI: 22.3  · Adjusted Body Weight:  ; No Adjustment   · BMI Categories: Normal Weight (BMI 22.0 to 24.9) age over 72       Nutrition Diagnosis:   · Limited adherence to nutrition-related recommendations related to (not ready for diet/lifestyle change prior to admit) as evidenced by (pt report.)      Nutrition Interventions:   Food and/or Nutrient Delivery:  Continue Current Diet(Will provide activia and hot decaf beverage with meals to assist with bowel regulation.)  Nutrition Education/Counseling:  Education completed(Cardiac, CHF diet and fluid recommendations discussed, handouts provided as well 1/9/21.)   Coordination of Nutrition Care:  Continue to monitor while inpatient    Goals:     Pt will consume 75% or more of cardiac diet during LOS. Nutrition Monitoring and Evaluation:   Behavioral-Environmental Outcomes:  None Identified   Food/Nutrient Intake Outcomes:  Food and Nutrient Intake  Physical Signs/Symptoms Outcomes:  Biochemical Data, GI Status, Nutrition Focused Physical Findings, Weight, Skin     Discharge Planning:     Too soon to determine     Electronically signed by aRdha Ly RD, LD on 1/9/21 at 10:30 AM EST    Contact: [phone]

## 2021-01-09 NOTE — PROGRESS NOTES
normal speech, no focal findings or movement disorder noted  Right wrist - no hematoma, +2 radial pulse    Medications:    metoprolol succinate  100 mg Oral Daily    dilTIAZem  120 mg Oral Daily    aspirin  81 mg Oral Daily    therapeutic multivitamin-minerals  1 tablet Oral Daily    glycopyrrolate-formoterol  2 puff Inhalation BID    sodium chloride flush  10 mL Intravenous 2 times per day    atorvastatin  10 mg Oral Nightly    isosorbide mononitrate  60 mg Oral Daily    furosemide  40 mg Oral Daily      sodium chloride 75 mL/hr at 01/09/21 0943    heparin (PORCINE) Infusion 13 Units/kg/hr (01/09/21 0400)         sodium chloride flush, 10 mL, PRN      heparin (porcine), 80 Units/kg, PRN      heparin (porcine), 40 Units/kg, PRN      albuterol, 2.5 mg, Q6H PRN      sodium chloride flush, 10 mL, PRN      promethazine, 12.5 mg, Q6H PRN    Or      ondansetron, 4 mg, Q6H PRN      polyethylene glycol, 17 g, Daily PRN      acetaminophen, 650 mg, Q6H PRN    Or      acetaminophen, 650 mg, Q6H PRN      potassium chloride, 40 mEq, PRN    Or      potassium alternative oral replacement, 40 mEq, PRN    Or      potassium chloride, 10 mEq, PRN      magnesium sulfate, 2 g, PRN        Diagnostics:  TTE 1/5/21  Summary   Ejection fraction is visually estimated at 25%. There was severe global hypokinesis of the left ventricle. Moderately dilated left atrium. The IVC is dilated . Signature      ----------------------------------------------------------------   Electronically signed by Elma Hooks MD (Interpreting   physician) on 01/05/2021 at 07:02 PM    Cath 1/8/21  FINDINGS:  LEFT VENTRICULOGRAPHY:  Severe global hypokinesis was noted on the  ventriculography with an ejection fraction estimated at 25%.     LVEDP:  20 mmHg.     CORONARY ANGIOGRAM:  1. Right coronary artery:  Ostial RCA has 30% to 50% stenosis. The  proximal RCA has luminal irregularities.   Mid RCA has chronic total  occlusion. 2.  Left main coronary artery:  The left main coronary artery has 70% to  80% bifurcation lesion. 3.  Ramus intermedius:  Ostial ramus intermedius has 20% stenosis  followed by mild luminal irregularities. 4.  Left circumflex artery:  Left circumflex artery has mild luminal  irregularities in the proximal segment. Large, dominant vessel. Distal  LCX into LPL has 60% to 70% stenosis. 5.  Left anterior descending artery:  Ostial LAD is diseased with 50% to  60% stenosis as well as CAD involving proximal segment of LAD, 70% to  80% in severity with mild calcification. Mid LAD has 70% stenosis,  bifurcation lesion. D1 is a relatively large vessel with 50% to 60%  ostial stenosis. Distal LAD with mild diffuse disease.     MEDICATIONS:  See MAR.     COMPLICATIONS:  None.     ESTIMATED BLOOD LOSS:  Less than 50 mL.     ACCESS:  Right radial artery access. Vasc Band was applied. Hemostasis  was achieved.     IMPRESSION:  1. Severe ischemic cardiomyopathy. 2.  Multivessel coronary artery disease as discussed above including  severely stenotic lesion of distal left main coronary artery, 70% to 80%  in severity.     RECOMMENDATIONS:  Continue inpatient care. Continue to monitor the  patient on telemetry. Continue aspirin. Continue on the statin. We  would recommend increasing the dose to 40 mg p.o. daily. Monitor volume  status. Consult Cardiovascular Surgery to assess for possible coronary  artery bypass graft surgery. Heart team discussion regarding  revascularization.     Findings and plan of care was discussed with the patient. Questions  were answered.           Seth Sandhoff, MD  Lab Data:    Cardiac Enzymes:  No results for input(s): CKTOTAL, CKMB, CKMBINDEX, TROPONINI in the last 72 hours.     CBC:   Lab Results   Component Value Date    WBC 8.2 01/09/2021    RBC 3.87 01/09/2021    RBC 4.84 11/07/2011    HGB 13.6 01/09/2021    HCT 39.9 01/09/2021     01/09/2021       CMP: Lab Results   Component Value Date     01/08/2021    K 4.5 01/08/2021     01/08/2021    CO2 27 01/08/2021    BUN 22 01/08/2021    CREATININE 1.0 01/08/2021    LABGLOM 74 01/08/2021    GLUCOSE 93 01/08/2021    GLUCOSE 94 11/07/2011    CALCIUM 9.1 01/08/2021       Hepatic Function Panel:    Lab Results   Component Value Date    ALKPHOS 54 01/07/2021    ALT 48 01/07/2021    AST 35 01/07/2021    PROT 6.2 01/07/2021    BILITOT 0.6 01/07/2021    BILIDIR <0.2 01/07/2021    LABALBU 3.5 01/07/2021    LABALBU 4.3 11/07/2011       Magnesium:    Lab Results   Component Value Date    MG 1.9 01/08/2021       PT/INR:    Lab Results   Component Value Date    INR 1.19 01/08/2021       HgBA1c:  No results found for: LABA1C    FLP:    Lab Results   Component Value Date    TRIG 96 01/07/2021    HDL 36 01/07/2021    LDLCALC 71 01/07/2021       TSH:    Lab Results   Component Value Date    TSH 2.050 01/06/2021         Assessment:    Recent new onset afib rvr of unknown duration - now cvr   S/p attempted DEJAH/CVx3 - unsuccessful   chadsvasc = 3   On xarelto prior to admission  S/p cath 1/8/21 - severe MV CAD including distal LM 70-80 - CVS consulted  New severe ICMP - ef 25 per TTE 1/5/21  Elevated troponins  Elevated bnp  HTN  dyslipidemia  JANETH - improved  Tobacco abuse  Hx COPD  ETOH abuse      Plan:    Daily I/o and weights  2 liter fluid restriction and 2gm sodium diet  Keep mag >2 and K >4, replace prn  Normal TSH  Cont asa/statin/BB/cdz/imdur/lasix  uptitrate BB as needed for rate control  Hold ace/arb for now, consider entresto if kidneys ok and bp will tolerate  Hold xarelto  Cont heparin gtt  lifevest  Referral to chf clinic  [facility]S plan for CABG this coming week  Will see prn  Call with any concerns         Electronically signed by Tabatha Edwards PA-C on 1/9/2021 at 10:21 AM

## 2021-01-10 LAB
APTT: 64.1 SECONDS (ref 22–38)
APTT: 68.9 SECONDS (ref 22–38)
APTT: 78.4 SECONDS (ref 22–38)
BILIRUBIN URINE: NEGATIVE
BLOOD, URINE: NEGATIVE
CHARACTER, URINE: CLEAR
COLOR: YELLOW
EKG ATRIAL RATE: 119 BPM
EKG Q-T INTERVAL: 362 MS
EKG QRS DURATION: 104 MS
EKG QTC CALCULATION (BAZETT): 474 MS
EKG R AXIS: 78 DEGREES
EKG T AXIS: 57 DEGREES
EKG VENTRICULAR RATE: 103 BPM
GLUCOSE, URINE: NEGATIVE MG/DL
KETONES, URINE: NEGATIVE
LEUKOCYTE EST, POC: NEGATIVE
NITRITE, URINE: NEGATIVE
PH UA: 6.5 (ref 5–9)
PROTEIN UA: NEGATIVE MG/DL
SPECIFIC GRAVITY UA: 1.01 (ref 1–1.03)
UROBILINOGEN, URINE: 0.2 EU/DL (ref 0–1)

## 2021-01-10 PROCEDURE — 94760 N-INVAS EAR/PLS OXIMETRY 1: CPT

## 2021-01-10 PROCEDURE — 6370000000 HC RX 637 (ALT 250 FOR IP): Performed by: INTERNAL MEDICINE

## 2021-01-10 PROCEDURE — 2060000000 HC ICU INTERMEDIATE R&B

## 2021-01-10 PROCEDURE — 93005 ELECTROCARDIOGRAM TRACING: CPT | Performed by: THORACIC SURGERY (CARDIOTHORACIC VASCULAR SURGERY)

## 2021-01-10 PROCEDURE — 36415 COLL VENOUS BLD VENIPUNCTURE: CPT

## 2021-01-10 PROCEDURE — 6370000000 HC RX 637 (ALT 250 FOR IP): Performed by: FAMILY MEDICINE

## 2021-01-10 PROCEDURE — 2500000003 HC RX 250 WO HCPCS: Performed by: INTERNAL MEDICINE

## 2021-01-10 PROCEDURE — 94640 AIRWAY INHALATION TREATMENT: CPT

## 2021-01-10 PROCEDURE — 81003 URINALYSIS AUTO W/O SCOPE: CPT

## 2021-01-10 PROCEDURE — 85730 THROMBOPLASTIN TIME PARTIAL: CPT

## 2021-01-10 PROCEDURE — 2580000003 HC RX 258: Performed by: FAMILY MEDICINE

## 2021-01-10 PROCEDURE — 6370000000 HC RX 637 (ALT 250 FOR IP): Performed by: PHYSICIAN ASSISTANT

## 2021-01-10 RX ADMIN — ISOSORBIDE MONONITRATE 60 MG: 60 TABLET ORAL at 08:35

## 2021-01-10 RX ADMIN — HEPARIN SODIUM 15 UNITS/KG/HR: 10000 INJECTION, SOLUTION INTRAVENOUS at 23:09

## 2021-01-10 RX ADMIN — SODIUM CHLORIDE, PRESERVATIVE FREE 10 ML: 5 INJECTION INTRAVENOUS at 20:30

## 2021-01-10 RX ADMIN — METOPROLOL SUCCINATE 100 MG: 100 TABLET, FILM COATED, EXTENDED RELEASE ORAL at 08:35

## 2021-01-10 RX ADMIN — ATORVASTATIN CALCIUM 10 MG: 10 TABLET, FILM COATED ORAL at 20:30

## 2021-01-10 RX ADMIN — SODIUM CHLORIDE, PRESERVATIVE FREE 10 ML: 5 INJECTION INTRAVENOUS at 08:36

## 2021-01-10 RX ADMIN — DILTIAZEM HYDROCHLORIDE 120 MG: 120 CAPSULE, EXTENDED RELEASE ORAL at 08:35

## 2021-01-10 RX ADMIN — ASPIRIN 81 MG: 81 TABLET, CHEWABLE ORAL at 08:35

## 2021-01-10 RX ADMIN — FUROSEMIDE 40 MG: 40 TABLET ORAL at 08:35

## 2021-01-10 RX ADMIN — MULTIPLE VITAMINS W/ MINERALS TAB 1 TABLET: TAB at 08:35

## 2021-01-10 RX ADMIN — GLYCOPYRROLATE AND FORMOTEROL FUMARATE 2 PUFF: 9; 4.8 AEROSOL, METERED RESPIRATORY (INHALATION) at 17:46

## 2021-01-10 RX ADMIN — GLYCOPYRROLATE AND FORMOTEROL FUMARATE 2 PUFF: 9; 4.8 AEROSOL, METERED RESPIRATORY (INHALATION) at 09:11

## 2021-01-10 ASSESSMENT — PAIN SCALES - GENERAL: PAINLEVEL_OUTOF10: 0

## 2021-01-10 NOTE — FLOWSHEET NOTE
Pt is a 70y. o. male, propped up in bed watching television, in 4K-08.  was called to room, responding to a consult entered, regarding advance directives. Jagjit Mcwilliams shared that he and his wife need to do a fresh copy, after realizing they erred on a particular page.  presented documentation, explained and then walked through the document, filling it out to completion. Two photocopies were made and given to Jagjit Mcwilliams, original was faxed to medical records.  prayed with Jagjit Mcwilliams, whom expressed gratitude for our time together and began tearing up. Conversation began, but he received a call from his son-which he took. Giana Velazquez will note a follow-up visit be done tomorrow in regard. 01/10/21 0949   Encounter Summary   Services provided to: Patient   Referral/Consult From: Multi-disciplinary team   Support System Spouse   Continue Visiting Yes  (1/10)   Complexity of Encounter Moderate   Length of Encounter 45 minutes   Advance Care Planning Yes   Advance Directives (For Healthcare)   Pre-existing DNR Comfort Care/DNR Arrest/DNI Order No   Healthcare Directive No, patient does not have an advance directive for healthcare treatment   Information on Healthcare Directives Requested Yes   Patient Requests Assistance Yes, referral made to    Advance Directives Documents given;Documents explained; Healthcare power of attornery completed

## 2021-01-10 NOTE — PROGRESS NOTES
Family Medicine Progress Notes/Coverage    Today's Date: 1/10/21  4K-08/008-A  Medical Record # [id number]  CSN/Account # [de-identified]      Mr. Kalyani Manning admitted on 1/6/2021        Subjective / Interval History :     doing well, No SOB, No chest pain , No fatigue.  No nausea nor abdominal pain  Eating well  Reviewed notes, consults, laboratory and radiology results,    Objective:       Physical Exam:  Patient Vitals for the past 24 hrs:   BP Temp Temp src Pulse Resp SpO2 Weight   01/10/21 0911 -- -- -- -- -- 93 % --   01/10/21 0833 (!) 157/96 98.1 °F (36.7 °C) Oral 81 18 94 % --   01/10/21 0323 129/87 97.9 °F (36.6 °C) Oral 97 17 93 % 152 lb 6.4 oz (69.1 kg)   01/09/21 2329 139/62 98.5 °F (36.9 °C) Oral 105 17 99 % --   01/09/21 1949 (!) 141/65 98.5 °F (36.9 °C) Oral 96 16 94 % --   01/09/21 1707 (!) 142/85 98.6 °F (37 °C) Oral 98 17 95 % --   01/09/21 1632 122/84 98.2 °F (36.8 °C) Oral 94 20 95 % --   01/09/21 1137 (!) 127/58 98.8 °F (37.1 °C) Oral 87 20 95 % --       General Appearance:  Alert, cooperative, no distress, appears stated age   HEENT:  Neck:      Chest/Lungs:  Heart: Decrease BS otherwise CTA  IRRR   Abdomen:  Back: soft   Extremities:  Neurological Exam: No edema  WNL       Assessment:     Admitting Diagnosis:    Atrial fibrillation with rapid ventricular response (HCC) [I48.91]  Atrial fibrillation with RVR ([address] [I48.91]    Active Hospital Problems    Diagnosis Date Noted    Coronary artery disease involving native coronary artery [I25.10] 01/09/2021     Priority: High    Atrial fibrillation with rapid ventricular response ([address] [I48.91] 01/06/2021     Priority: High    Atrial fibrillation (HCC) [I48.91] 01/06/2021     Priority: High    COPD, mild (HCC) [J44.9]      Priority: Medium    HTN (hypertension) [I10]      Priority: Medium    Smoker [F17.200] 11/25/2012     Priority: Medium    Hyperlipidemia [E78.5]      Priority: Medium       Plan:     Discussed plans with the nursing staff  Awaiting CABG coming week  IVF to Int.     Medications, Laboratories and Imaging results:    Scheduled Meds:   metoprolol succinate  100 mg Oral Daily    dilTIAZem  120 mg Oral Daily    aspirin  81 mg Oral Daily    therapeutic multivitamin-minerals  1 tablet Oral Daily    glycopyrrolate-formoterol  2 puff Inhalation BID    sodium chloride flush  10 mL Intravenous 2 times per day    atorvastatin  10 mg Oral Nightly    isosorbide mononitrate  60 mg Oral Daily    furosemide  40 mg Oral Daily     Continuous Infusions:   sodium chloride 75 mL/hr at 01/09/21 2145    heparin (PORCINE) Infusion 14.986 Units/kg/hr (01/10/21 0631)     PRN Meds:sodium chloride flush, heparin (porcine), heparin (porcine), albuterol, sodium chloride flush, promethazine **OR** ondansetron, polyethylene glycol, acetaminophen **OR** acetaminophen, potassium chloride **OR** potassium alternative oral replacement **OR** potassium chloride, magnesium sulfate    Imaging:    Lab Review :    Lab Results   Component Value Date    WBC 8.2 01/09/2021    HGB 13.6 (L) 01/09/2021    HCT 39.9 (L) 01/09/2021    .1 (H) 01/09/2021     01/09/2021     Lab Results   Component Value Date    CREATININE 1.1 01/09/2021    BUN 17 01/09/2021     01/09/2021    K 5.3 (H) 01/09/2021     01/09/2021    CO2 26 01/09/2021     No results found for: CKTOTAL, CKMB, CKMBINDEX, TROPONINI  Lab Results   Component Value Date    ALT 48 01/07/2021    AST 35 01/07/2021    ALKPHOS 54 01/07/2021    BILITOT 0.6 01/07/2021     Lab Results   Component Value Date    LIPASE 43.3 01/06/2021         Electronically signed by My Choudhury MD on 1/10/21 at 10:49 AM ROLE Quinn M.D.

## 2021-01-11 ENCOUNTER — APPOINTMENT (OUTPATIENT)
Dept: INTERVENTIONAL RADIOLOGY/VASCULAR | Age: 71
DRG: 233 | End: 2021-01-11
Payer: MEDICARE

## 2021-01-11 ENCOUNTER — APPOINTMENT (OUTPATIENT)
Dept: GENERAL RADIOLOGY | Age: 71
DRG: 233 | End: 2021-01-11
Payer: MEDICARE

## 2021-01-11 ENCOUNTER — ANESTHESIA EVENT (OUTPATIENT)
Dept: OPERATING ROOM | Age: 71
DRG: 233 | End: 2021-01-11
Payer: MEDICARE

## 2021-01-11 LAB
ABO CHECK: NORMAL
ALBUMIN SERPL-MCNC: 3.9 G/DL (ref 3.5–5.1)
ALP BLD-CCNC: 54 U/L (ref 38–126)
ALT SERPL-CCNC: 82 U/L (ref 11–66)
ANION GAP SERPL CALCULATED.3IONS-SCNC: 9 MEQ/L (ref 8–16)
ANTIBODY SCREEN: NORMAL
APTT: 69.1 SECONDS (ref 22–38)
APTT: 74.7 SECONDS (ref 22–38)
AST SERPL-CCNC: 95 U/L (ref 5–40)
AVERAGE GLUCOSE: 108 MG/DL (ref 70–126)
BASE EXCESS (CALCULATED): 1.8 MMOL/L (ref -2.5–2.5)
BASOPHILS # BLD: 0.5 %
BASOPHILS ABSOLUTE: 0 THOU/MM3 (ref 0–0.1)
BILIRUB SERPL-MCNC: 0.4 MG/DL (ref 0.3–1.2)
BILIRUBIN DIRECT: < 0.2 MG/DL (ref 0–0.3)
BUN BLDV-MCNC: 18 MG/DL (ref 7–22)
CALCIUM SERPL-MCNC: 9.8 MG/DL (ref 8.5–10.5)
CHLORIDE BLD-SCNC: 99 MEQ/L (ref 98–111)
CO2: 28 MEQ/L (ref 23–33)
COLLECTED BY:: NORMAL
CREAT SERPL-MCNC: 1.2 MG/DL (ref 0.4–1.2)
DEVICE: NORMAL
EOSINOPHIL # BLD: 3.1 %
EOSINOPHILS ABSOLUTE: 0.3 THOU/MM3 (ref 0–0.4)
ERYTHROCYTE [DISTWIDTH] IN BLOOD BY AUTOMATED COUNT: 12.6 % (ref 11.5–14.5)
ERYTHROCYTE [DISTWIDTH] IN BLOOD BY AUTOMATED COUNT: 12.7 % (ref 11.5–14.5)
ERYTHROCYTE [DISTWIDTH] IN BLOOD BY AUTOMATED COUNT: 47.3 FL (ref 35–45)
ERYTHROCYTE [DISTWIDTH] IN BLOOD BY AUTOMATED COUNT: 47.8 FL (ref 35–45)
GFR SERPL CREATININE-BSD FRML MDRD: 60 ML/MIN/1.73M2
GLUCOSE BLD-MCNC: 136 MG/DL (ref 70–108)
HBA1C MFR BLD: 5.6 % (ref 4.4–6.4)
HCO3: 26 MMOL/L (ref 23–28)
HCT VFR BLD CALC: 43.2 % (ref 42–52)
HCT VFR BLD CALC: 44.3 % (ref 42–52)
HEMOGLOBIN: 15 GM/DL (ref 14–18)
HEMOGLOBIN: 15.1 GM/DL (ref 14–18)
IMMATURE GRANS (ABS): 0.03 THOU/MM3 (ref 0–0.07)
IMMATURE GRANULOCYTES: 0.4 %
INR BLD: 1.13 (ref 0.85–1.13)
LDL CHOLESTEROL DIRECT: 79.82 MG/DL
LYMPHOCYTES # BLD: 18.4 %
LYMPHOCYTES ABSOLUTE: 1.5 THOU/MM3 (ref 1–4.8)
MCH RBC QN AUTO: 34.7 PG (ref 26–33)
MCH RBC QN AUTO: 35.2 PG (ref 26–33)
MCHC RBC AUTO-ENTMCNC: 34.1 GM/DL (ref 32.2–35.5)
MCHC RBC AUTO-ENTMCNC: 34.7 GM/DL (ref 32.2–35.5)
MCV RBC AUTO: 101.4 FL (ref 80–94)
MCV RBC AUTO: 101.8 FL (ref 80–94)
MONOCYTES # BLD: 8.1 %
MONOCYTES ABSOLUTE: 0.7 THOU/MM3 (ref 0.4–1.3)
MRSA SCREEN RT-PCR: NEGATIVE
NUCLEATED RED BLOOD CELLS: 0 /100 WBC
O2 SATURATION: 96 %
PCO2: 39 MMHG (ref 35–45)
PH BLOOD GAS: 7.43 (ref 7.35–7.45)
PLATELET # BLD: 196 THOU/MM3 (ref 130–400)
PLATELET # BLD: 216 THOU/MM3 (ref 130–400)
PMV BLD AUTO: 10.2 FL (ref 9.4–12.4)
PMV BLD AUTO: 10.3 FL (ref 9.4–12.4)
PO2: 76 MMHG (ref 71–104)
POTASSIUM SERPL-SCNC: 4.6 MEQ/L (ref 3.5–5.2)
RBC # BLD: 4.26 MILL/MM3 (ref 4.7–6.1)
RBC # BLD: 4.35 MILL/MM3 (ref 4.7–6.1)
RH FACTOR: NORMAL
SARS-COV-2, NAAT: NOT DETECTED
SEG NEUTROPHILS: 69.5 %
SEGMENTED NEUTROPHILS ABSOLUTE COUNT: 5.8 THOU/MM3 (ref 1.8–7.7)
SODIUM BLD-SCNC: 136 MEQ/L (ref 135–145)
SOURCE, BLOOD GAS: NORMAL
TOTAL PROTEIN: 7.2 G/DL (ref 6.1–8)
VANCOMYCIN RESISTANT ENTEROCOCCUS: NEGATIVE
WBC # BLD: 8.3 THOU/MM3 (ref 4.8–10.8)
WBC # BLD: 8.5 THOU/MM3 (ref 4.8–10.8)

## 2021-01-11 PROCEDURE — 2580000003 HC RX 258: Performed by: FAMILY MEDICINE

## 2021-01-11 PROCEDURE — 87500 VANOMYCIN DNA AMP PROBE: CPT

## 2021-01-11 PROCEDURE — 36415 COLL VENOUS BLD VENIPUNCTURE: CPT

## 2021-01-11 PROCEDURE — 2500000003 HC RX 250 WO HCPCS: Performed by: INTERNAL MEDICINE

## 2021-01-11 PROCEDURE — 87641 MR-STAPH DNA AMP PROBE: CPT

## 2021-01-11 PROCEDURE — 2060000000 HC ICU INTERMEDIATE R&B

## 2021-01-11 PROCEDURE — 85025 COMPLETE CBC W/AUTO DIFF WBC: CPT

## 2021-01-11 PROCEDURE — 94640 AIRWAY INHALATION TREATMENT: CPT

## 2021-01-11 PROCEDURE — 85610 PROTHROMBIN TIME: CPT

## 2021-01-11 PROCEDURE — 36600 WITHDRAWAL OF ARTERIAL BLOOD: CPT

## 2021-01-11 PROCEDURE — 86850 RBC ANTIBODY SCREEN: CPT

## 2021-01-11 PROCEDURE — U0002 COVID-19 LAB TEST NON-CDC: HCPCS

## 2021-01-11 PROCEDURE — 86900 BLOOD TYPING SEROLOGIC ABO: CPT

## 2021-01-11 PROCEDURE — 85027 COMPLETE CBC AUTOMATED: CPT

## 2021-01-11 PROCEDURE — 93971 EXTREMITY STUDY: CPT

## 2021-01-11 PROCEDURE — 6360000002 HC RX W HCPCS: Performed by: PHYSICIAN ASSISTANT

## 2021-01-11 PROCEDURE — 71046 X-RAY EXAM CHEST 2 VIEWS: CPT

## 2021-01-11 PROCEDURE — 2580000003 HC RX 258: Performed by: PHYSICIAN ASSISTANT

## 2021-01-11 PROCEDURE — 6370000000 HC RX 637 (ALT 250 FOR IP): Performed by: INTERNAL MEDICINE

## 2021-01-11 PROCEDURE — 6370000000 HC RX 637 (ALT 250 FOR IP): Performed by: PHYSICIAN ASSISTANT

## 2021-01-11 PROCEDURE — 93880 EXTRACRANIAL BILAT STUDY: CPT

## 2021-01-11 PROCEDURE — 83036 HEMOGLOBIN GLYCOSYLATED A1C: CPT

## 2021-01-11 PROCEDURE — 83721 ASSAY OF BLOOD LIPOPROTEIN: CPT

## 2021-01-11 PROCEDURE — 82248 BILIRUBIN DIRECT: CPT

## 2021-01-11 PROCEDURE — 80053 COMPREHEN METABOLIC PANEL: CPT

## 2021-01-11 PROCEDURE — 87081 CULTURE SCREEN ONLY: CPT

## 2021-01-11 PROCEDURE — 6370000000 HC RX 637 (ALT 250 FOR IP): Performed by: FAMILY MEDICINE

## 2021-01-11 PROCEDURE — 85730 THROMBOPLASTIN TIME PARTIAL: CPT

## 2021-01-11 PROCEDURE — 86901 BLOOD TYPING SEROLOGIC RH(D): CPT

## 2021-01-11 PROCEDURE — 86923 COMPATIBILITY TEST ELECTRIC: CPT

## 2021-01-11 PROCEDURE — 82803 BLOOD GASES ANY COMBINATION: CPT

## 2021-01-11 PROCEDURE — 94760 N-INVAS EAR/PLS OXIMETRY 1: CPT

## 2021-01-11 RX ORDER — CHLORHEXIDINE GLUCONATE 4 G/100ML
SOLUTION TOPICAL SEE ADMIN INSTRUCTIONS
Status: DISCONTINUED | OUTPATIENT
Start: 2021-01-11 | End: 2021-01-12 | Stop reason: HOSPADM

## 2021-01-11 RX ORDER — SODIUM CHLORIDE 0.9 % (FLUSH) 0.9 %
10 SYRINGE (ML) INJECTION EVERY 12 HOURS SCHEDULED
Status: DISCONTINUED | OUTPATIENT
Start: 2021-01-11 | End: 2021-01-12 | Stop reason: HOSPADM

## 2021-01-11 RX ORDER — CHLORHEXIDINE GLUCONATE 0.12 MG/ML
15 RINSE ORAL ONCE
Status: COMPLETED | OUTPATIENT
Start: 2021-01-11 | End: 2021-01-12

## 2021-01-11 RX ORDER — SODIUM CHLORIDE 0.9 % (FLUSH) 0.9 %
10 SYRINGE (ML) INJECTION PRN
Status: DISCONTINUED | OUTPATIENT
Start: 2021-01-11 | End: 2021-01-12 | Stop reason: HOSPADM

## 2021-01-11 RX ORDER — CLINDAMYCIN PHOSPHATE 900 MG/50ML
900 INJECTION INTRAVENOUS
Status: ACTIVE | OUTPATIENT
Start: 2021-01-11 | End: 2021-01-12

## 2021-01-11 RX ORDER — AMIODARONE HYDROCHLORIDE 200 MG/1
200 TABLET ORAL 3 TIMES DAILY
Status: CANCELLED | OUTPATIENT
Start: 2021-01-11

## 2021-01-11 RX ADMIN — ATORVASTATIN CALCIUM 10 MG: 10 TABLET, FILM COATED ORAL at 21:32

## 2021-01-11 RX ADMIN — Medication 10 ML: at 21:33

## 2021-01-11 RX ADMIN — MAGNESIUM SULFATE HEPTAHYDRATE 1.5 G: 500 INJECTION, SOLUTION INTRAMUSCULAR; INTRAVENOUS at 21:33

## 2021-01-11 RX ADMIN — SODIUM CHLORIDE, PRESERVATIVE FREE 10 ML: 5 INJECTION INTRAVENOUS at 10:08

## 2021-01-11 RX ADMIN — ISOSORBIDE MONONITRATE 60 MG: 60 TABLET ORAL at 10:08

## 2021-01-11 RX ADMIN — MULTIPLE VITAMINS W/ MINERALS TAB 1 TABLET: TAB at 10:08

## 2021-01-11 RX ADMIN — ASPIRIN 81 MG: 81 TABLET, CHEWABLE ORAL at 10:08

## 2021-01-11 RX ADMIN — HEPARIN SODIUM 15 UNITS/KG/HR: 10000 INJECTION, SOLUTION INTRAVENOUS at 23:48

## 2021-01-11 RX ADMIN — GLYCOPYRROLATE AND FORMOTEROL FUMARATE 2 PUFF: 9; 4.8 AEROSOL, METERED RESPIRATORY (INHALATION) at 16:58

## 2021-01-11 RX ADMIN — METOPROLOL SUCCINATE 100 MG: 100 TABLET, FILM COATED, EXTENDED RELEASE ORAL at 10:08

## 2021-01-11 RX ADMIN — SODIUM CHLORIDE, PRESERVATIVE FREE 10 ML: 5 INJECTION INTRAVENOUS at 21:57

## 2021-01-11 RX ADMIN — DILTIAZEM HYDROCHLORIDE 120 MG: 120 CAPSULE, EXTENDED RELEASE ORAL at 10:08

## 2021-01-11 RX ADMIN — FUROSEMIDE 40 MG: 40 TABLET ORAL at 10:08

## 2021-01-11 ASSESSMENT — ENCOUNTER SYMPTOMS
WHEEZING: 0
NAUSEA: 0
DIARRHEA: 0
SHORTNESS OF BREATH: 0
VOMITING: 0
ABDOMINAL DISTENTION: 0

## 2021-01-11 NOTE — PLAN OF CARE
Consult received, see KARL note, Jan 11, home with wife and [facility]

## 2021-01-11 NOTE — CARE COORDINATION
DISCHARGE/PLANNING EVALUATION  1/11/21, 12:09 PM EST    Reason for Referral:  \"need HH after CABG this week\"  Mental Status:  Alert and oriented  Decision Making:  Makes his own decisions  Family/Social/Home Environment:  SW spoke to patient and his wife. They live in a street level apartment. They have a . She is able to manage the laundry and will cook, but have gone out to eat or their son and his wife will bring them food. They have 2 sons and they are both in the area. The patient was not using any DME to ambulate. He was using a bathtub shower with grab bar and he has a seat but was not using it. He also has a walk-in shower, just not using it. They are both able to drive. Current Services including food security, transportation and housekeeping:   See above  Current Equipment: see above  Payment Source:  Medicare and [employer]  Concerns or Barriers to Discharge: We discussed HH and the Medicare coverage. His wife had worked for [place] and prefers this agency. She will be able to be with him 24/7 after discharge. Post acute provider list with quality measures, geographic area and applicable managed care information provided. Questions regarding selection process answered:  No, prefers [place]    Teach Back Method used with patient and his wife regarding care plan and needs  Patient and wife verbalize understanding of the plan of care and contribute to goal setting. Patient goals, treatment preferences and discharge plan:  Referral will be made to [place] closer to discharge.     Electronically signed by MARIANA Camilo on 1/11/2021 at 12:09 PM

## 2021-01-11 NOTE — CARE COORDINATION
DISASTER CHARTING    1/11/21, 12:42 PM EST    DISCHARGE ONGOING EVALUATION:     Tien Reyes day: 5  Location: [facility]08/008-A Reason for admit: Atrial fibrillation with rapid ventricular response ([facility] [I48.91]  Atrial fibrillation with RVR ([facility] [I48.91]   Barriers to Discharge: A-fib. 1/8 Cardiac Cath w multi-vessel disease; CTS plans CABG/Maze procedure.  Heparin gtt continued, pre-op workup continued  PCP: Ish Cochran MD  Patient Goals/Plan/Treatment Preferences: plans home w spouse and new HH (nsg, therapy), monitor for possible [facility], Cardiac Rehab, CHF RN following; collaborated w Sergio Velazquez

## 2021-01-12 ENCOUNTER — APPOINTMENT (OUTPATIENT)
Dept: GENERAL RADIOLOGY | Age: 71
DRG: 233 | End: 2021-01-12
Payer: MEDICARE

## 2021-01-12 ENCOUNTER — ANESTHESIA (OUTPATIENT)
Dept: OPERATING ROOM | Age: 71
DRG: 233 | End: 2021-01-12
Payer: MEDICARE

## 2021-01-12 VITALS — DIASTOLIC BLOOD PRESSURE: 69 MMHG | OXYGEN SATURATION: 100 % | SYSTOLIC BLOOD PRESSURE: 138 MMHG | TEMPERATURE: 98.1 F

## 2021-01-12 LAB
ALLEN TEST: ABNORMAL
ALLEN TEST: ABNORMAL
ANION GAP SERPL CALCULATED.3IONS-SCNC: 9 MEQ/L (ref 8–16)
APTT: 38.2 SECONDS (ref 22–38)
BASE EXCESS (CALCULATED): -0.3 MMOL/L (ref -2.5–2.5)
BASE EXCESS (CALCULATED): -0.8 MMOL/L (ref -2.5–2.5)
BASE EXCESS (CALCULATED): -2.2 MMOL/L (ref -2.5–2.5)
BASE EXCESS (CALCULATED): -2.3 MMOL/L (ref -2.5–2.5)
BASE EXCESS (CALCULATED): 0.5 MMOL/L (ref -2.5–2.5)
BASE EXCESS (CALCULATED): 1.1 MMOL/L (ref -2.5–2.5)
BASE EXCESS (CALCULATED): 2.2 MMOL/L (ref -2.5–2.5)
BASE EXCESS (CALCULATED): 2.2 MMOL/L (ref -2.5–2.5)
BASE EXCESS (CALCULATED): 3.8 MMOL/L (ref -2.5–2.5)
BASE EXCESS MIXED: 4.4 MMOL/L (ref -2–3)
BUN BLDV-MCNC: 16 MG/DL (ref 7–22)
CALCIUM IONIZED SERUM: 0.83 MMOL/L (ref 1.12–1.32)
CALCIUM IONIZED SERUM: 0.85 MMOL/L (ref 1.12–1.32)
CALCIUM IONIZED SERUM: 0.89 MMOL/L (ref 1.12–1.32)
CALCIUM IONIZED SERUM: 1.04 MMOL/L (ref 1.12–1.32)
CALCIUM IONIZED SERUM: 1.05 MMOL/L (ref 1.12–1.32)
CALCIUM IONIZED SERUM: 1.07 MMOL/L (ref 1.12–1.32)
CALCIUM IONIZED SERUM: 1.4 MMOL/L (ref 1.12–1.32)
CALCIUM IONIZED: 0.91 MMOL/L (ref 1.12–1.32)
CALCIUM SERPL-MCNC: 7.3 MG/DL (ref 8.5–10.5)
CHLORIDE BLD-SCNC: 101 MEQ/L (ref 98–111)
CO2: 25 MEQ/L (ref 23–33)
COLLECTED BY:: ABNORMAL
COMMENT: ABNORMAL
CREAT SERPL-MCNC: 1.2 MG/DL (ref 0.4–1.2)
DEVICE: ABNORMAL
ERYTHROCYTE [DISTWIDTH] IN BLOOD BY AUTOMATED COUNT: 12.6 % (ref 11.5–14.5)
ERYTHROCYTE [DISTWIDTH] IN BLOOD BY AUTOMATED COUNT: 13 % (ref 11.5–14.5)
ERYTHROCYTE [DISTWIDTH] IN BLOOD BY AUTOMATED COUNT: 47.6 FL (ref 35–45)
ERYTHROCYTE [DISTWIDTH] IN BLOOD BY AUTOMATED COUNT: 48.7 FL (ref 35–45)
GFR SERPL CREATININE-BSD FRML MDRD: 60 ML/MIN/1.73M2
GLUCOSE BLD-MCNC: 134 MG/DL (ref 70–108)
GLUCOSE BLD-MCNC: 145 MG/DL (ref 70–108)
GLUCOSE BLD-MCNC: 159 MG/DL (ref 70–108)
GLUCOSE BLD-MCNC: 174 MG/DL (ref 70–108)
GLUCOSE BLD-MCNC: 176 MG/DL (ref 70–108)
GLUCOSE BLD-MCNC: 178 MG/DL (ref 70–108)
GLUCOSE BLD-MCNC: 94 MG/DL (ref 70–108)
GLUCOSE, WHOLE BLOOD: 114 MG/DL (ref 70–108)
GLUCOSE, WHOLE BLOOD: 119 MG/DL (ref 70–108)
GLUCOSE, WHOLE BLOOD: 126 MG/DL (ref 70–108)
GLUCOSE, WHOLE BLOOD: 127 MG/DL (ref 70–108)
GLUCOSE, WHOLE BLOOD: 136 MG/DL (ref 70–108)
GLUCOSE, WHOLE BLOOD: 138 MG/DL (ref 70–108)
GLUCOSE, WHOLE BLOOD: 141 MG/DL (ref 70–108)
GLUCOSE, WHOLE BLOOD: 144 MG/DL (ref 70–108)
GLUCOSE, WHOLE BLOOD: 145 MG/DL (ref 70–108)
GLUCOSE, WHOLE BLOOD: 155 MG/DL (ref 70–108)
HCO3, MIXED: 29 MMOL/L (ref 23–28)
HCO3: 24 MMOL/L (ref 23–28)
HCO3: 24 MMOL/L (ref 23–28)
HCO3: 26 MMOL/L (ref 23–28)
HCO3: 27 MMOL/L (ref 23–28)
HCO3: 27 MMOL/L (ref 23–28)
HCO3: 28 MMOL/L (ref 23–28)
HCT VFR BLD CALC: 22.6 % (ref 42–52)
HCT VFR BLD CALC: 24.8 % (ref 42–52)
HCT VFR BLD CALC: 32.9 % (ref 42–52)
HCT VFR BLD CALC: 36.5 % (ref 42–52)
HEMOGLOBIN FINGERSTICK, POC: 13.1 G/DL (ref 14–18)
HEMOGLOBIN FINGERSTICK, POC: 7.8 G/DL (ref 14–18)
HEMOGLOBIN FINGERSTICK, POC: 7.8 G/DL (ref 14–18)
HEMOGLOBIN FINGERSTICK, POC: 8.5 G/DL (ref 14–18)
HEMOGLOBIN FINGERSTICK, POC: 8.8 G/DL (ref 14–18)
HEMOGLOBIN FINGERSTICK, POC: 8.9 G/DL (ref 14–18)
HEMOGLOBIN: 11.2 GM/DL (ref 14–18)
HEMOGLOBIN: 13 GM/DL (ref 14–18)
HEMOGLOBIN: 8.1 GM/DL (ref 14–18)
HEMOGLOBIN: 8.5 GM/DL (ref 14–18)
IFIO2: 40
IFIO2: 40
IFIO2: 80
MAGNESIUM: 3.6 MG/DL (ref 1.6–2.4)
MCH RBC QN AUTO: 35 PG (ref 26–33)
MCH RBC QN AUTO: 35.7 PG (ref 26–33)
MCHC RBC AUTO-ENTMCNC: 34 GM/DL (ref 32.2–35.5)
MCHC RBC AUTO-ENTMCNC: 34.3 GM/DL (ref 32.2–35.5)
MCV RBC AUTO: 102.8 FL (ref 80–94)
MCV RBC AUTO: 104.2 FL (ref 80–94)
MODE: ABNORMAL
MODE: ABNORMAL
O2 SAT, MIXED: 81 %
O2 SATURATION: 100 %
O2 SATURATION: 94 %
O2 SATURATION: 94 %
PCO2, MIXED VENOUS: 44 MMHG (ref 41–51)
PCO2: 38 MMHG (ref 35–45)
PCO2: 41 MMHG (ref 35–45)
PCO2: 42 MMHG (ref 35–45)
PCO2: 42 MMHG (ref 35–45)
PCO2: 45 MMHG (ref 35–45)
PCO2: 47 MMHG (ref 35–45)
PCO2: 48 MMHG (ref 35–45)
PCO2: 49 MMHG (ref 35–45)
PCO2: 49 MMHG (ref 35–45)
PH BLOOD GAS: 7.3 (ref 7.35–7.45)
PH BLOOD GAS: 7.31 (ref 7.35–7.45)
PH BLOOD GAS: 7.32 (ref 7.35–7.45)
PH BLOOD GAS: 7.34 (ref 7.35–7.45)
PH BLOOD GAS: 7.38 (ref 7.35–7.45)
PH BLOOD GAS: 7.39 (ref 7.35–7.45)
PH BLOOD GAS: 7.42 (ref 7.35–7.45)
PH BLOOD GAS: 7.45 (ref 7.35–7.45)
PH BLOOD GAS: 7.45 (ref 7.35–7.45)
PH, MIXED: 7.43 (ref 7.31–7.41)
PIP: 16 CMH2O
PLATELET # BLD: 102 THOU/MM3 (ref 130–400)
PLATELET # BLD: 118 THOU/MM3 (ref 130–400)
PLATELET # BLD: 140 THOU/MM3 (ref 130–400)
PLATELET # BLD: 195 THOU/MM3 (ref 130–400)
PMV BLD AUTO: 10.3 FL (ref 9.4–12.4)
PMV BLD AUTO: 10.6 FL (ref 9.4–12.4)
PO2 MIXED: 44 MMHG (ref 25–40)
PO2: 195 MMHG (ref 71–104)
PO2: 256 MMHG (ref 71–104)
PO2: 310 MMHG (ref 71–104)
PO2: 346 MMHG (ref 71–104)
PO2: 359 MMHG (ref 71–104)
PO2: 369 MMHG (ref 71–104)
PO2: 594 MMHG (ref 71–104)
PO2: 79 MMHG (ref 71–104)
PO2: 80 MMHG (ref 71–104)
POTASSIUM SERPL-SCNC: 4.1 MEQ/L (ref 3.5–5.2)
POTASSIUM SERPL-SCNC: 4.7 MEQ/L (ref 3.5–5.2)
POTASSIUM, WHOLE BLOOD: 3.7 MEQ/L (ref 3.5–4.9)
POTASSIUM, WHOLE BLOOD: 4.2 MEQ/L (ref 3.5–4.9)
POTASSIUM, WHOLE BLOOD: 4.3 MEQ/L (ref 3.5–4.9)
POTASSIUM, WHOLE BLOOD: 4.5 MEQ/L (ref 3.5–4.9)
POTASSIUM, WHOLE BLOOD: 4.9 MEQ/L (ref 3.5–4.9)
POTASSIUM, WHOLE BLOOD: 5 MEQ/L (ref 3.5–4.9)
POTASSIUM, WHOLE BLOOD: 5.3 MEQ/L (ref 3.5–4.9)
RBC # BLD: 2.38 MILL/MM3 (ref 4.7–6.1)
RBC # BLD: 3.2 MILL/MM3 (ref 4.7–6.1)
SET PEEP: 5 MMHG
SET RESPIRATORY RATE: 16 BPM
SET RESPIRATORY RATE: 16 BPM
SET TIDAL VOLUME: 550 ML
SET TIDAL VOLUME: 550 ML
SODIUM BLD-SCNC: 135 MEQ/L (ref 135–145)
SODIUM, WHOLE BLOOD: 134 MEQ/L (ref 138–146)
SODIUM, WHOLE BLOOD: 135 MEQ/L (ref 138–146)
SODIUM, WHOLE BLOOD: 135 MEQ/L (ref 138–146)
SODIUM, WHOLE BLOOD: 136 MEQ/L (ref 138–146)
SODIUM, WHOLE BLOOD: 137 MEQ/L (ref 138–146)
SOURCE, BLOOD GAS: ABNORMAL
WBC # BLD: 18.3 THOU/MM3 (ref 4.8–10.8)
WBC # BLD: 24.4 THOU/MM3 (ref 4.8–10.8)

## 2021-01-12 PROCEDURE — P9041 ALBUMIN (HUMAN),5%, 50ML: HCPCS | Performed by: PHYSICIAN ASSISTANT

## 2021-01-12 PROCEDURE — 3600000008 HC SURGERY OHS BASE: Performed by: THORACIC SURGERY (CARDIOTHORACIC VASCULAR SURGERY)

## 2021-01-12 PROCEDURE — P9047 ALBUMIN (HUMAN), 25%, 50ML: HCPCS

## 2021-01-12 PROCEDURE — 6370000000 HC RX 637 (ALT 250 FOR IP): Performed by: FAMILY MEDICINE

## 2021-01-12 PROCEDURE — 2500000003 HC RX 250 WO HCPCS: Performed by: THORACIC SURGERY (CARDIOTHORACIC VASCULAR SURGERY)

## 2021-01-12 PROCEDURE — 3600000006 HC SURGERY LEVEL 6 BASE: Performed by: THORACIC SURGERY (CARDIOTHORACIC VASCULAR SURGERY)

## 2021-01-12 PROCEDURE — 2000000000 HC ICU R&B

## 2021-01-12 PROCEDURE — 2500000003 HC RX 250 WO HCPCS

## 2021-01-12 PROCEDURE — 3700000001 HC ADD 15 MINUTES (ANESTHESIA): Performed by: THORACIC SURGERY (CARDIOTHORACIC VASCULAR SURGERY)

## 2021-01-12 PROCEDURE — 94002 VENT MGMT INPAT INIT DAY: CPT

## 2021-01-12 PROCEDURE — 71045 X-RAY EXAM CHEST 1 VIEW: CPT

## 2021-01-12 PROCEDURE — 2580000003 HC RX 258: Performed by: PHYSICIAN ASSISTANT

## 2021-01-12 PROCEDURE — 6360000002 HC RX W HCPCS

## 2021-01-12 PROCEDURE — 84295 ASSAY OF SERUM SODIUM: CPT

## 2021-01-12 PROCEDURE — 80048 BASIC METABOLIC PNL TOTAL CA: CPT

## 2021-01-12 PROCEDURE — 06BQ3ZZ EXCISION OF LEFT SAPHENOUS VEIN, PERCUTANEOUS APPROACH: ICD-10-PCS | Performed by: THORACIC SURGERY (CARDIOTHORACIC VASCULAR SURGERY)

## 2021-01-12 PROCEDURE — P9045 ALBUMIN (HUMAN), 5%, 250 ML: HCPCS

## 2021-01-12 PROCEDURE — 2720000010 HC SURG SUPPLY STERILE: Performed by: THORACIC SURGERY (CARDIOTHORACIC VASCULAR SURGERY)

## 2021-01-12 PROCEDURE — 5A1935Z RESPIRATORY VENTILATION, LESS THAN 24 CONSECUTIVE HOURS: ICD-10-PCS | Performed by: THORACIC SURGERY (CARDIOTHORACIC VASCULAR SURGERY)

## 2021-01-12 PROCEDURE — 33533 CABG ARTERIAL SINGLE: CPT | Performed by: THORACIC SURGERY (CARDIOTHORACIC VASCULAR SURGERY)

## 2021-01-12 PROCEDURE — 85018 HEMOGLOBIN: CPT

## 2021-01-12 PROCEDURE — 02HV33Z INSERTION OF INFUSION DEVICE INTO SUPERIOR VENA CAVA, PERCUTANEOUS APPROACH: ICD-10-PCS | Performed by: FAMILY MEDICINE

## 2021-01-12 PROCEDURE — 6370000000 HC RX 637 (ALT 250 FOR IP): Performed by: THORACIC SURGERY (CARDIOTHORACIC VASCULAR SURGERY)

## 2021-01-12 PROCEDURE — 33518 CABG ARTERY-VEIN TWO: CPT | Performed by: PHYSICIAN ASSISTANT

## 2021-01-12 PROCEDURE — 2580000003 HC RX 258

## 2021-01-12 PROCEDURE — 33533 CABG ARTERIAL SINGLE: CPT | Performed by: PHYSICIAN ASSISTANT

## 2021-01-12 PROCEDURE — 03HY32Z INSERTION OF MONITORING DEVICE INTO UPPER ARTERY, PERCUTANEOUS APPROACH: ICD-10-PCS | Performed by: FAMILY MEDICINE

## 2021-01-12 PROCEDURE — 021109W BYPASS CORONARY ARTERY, TWO ARTERIES FROM AORTA WITH AUTOLOGOUS VENOUS TISSUE, OPEN APPROACH: ICD-10-PCS | Performed by: THORACIC SURGERY (CARDIOTHORACIC VASCULAR SURGERY)

## 2021-01-12 PROCEDURE — 6360000002 HC RX W HCPCS: Performed by: PHYSICIAN ASSISTANT

## 2021-01-12 PROCEDURE — 02100Z9 BYPASS CORONARY ARTERY, ONE ARTERY FROM LEFT INTERNAL MAMMARY, OPEN APPROACH: ICD-10-PCS | Performed by: THORACIC SURGERY (CARDIOTHORACIC VASCULAR SURGERY)

## 2021-01-12 PROCEDURE — P9041 ALBUMIN (HUMAN),5%, 50ML: HCPCS

## 2021-01-12 PROCEDURE — 33508 ENDOSCOPIC VEIN HARVEST: CPT | Performed by: PHYSICIAN ASSISTANT

## 2021-01-12 PROCEDURE — 84132 ASSAY OF SERUM POTASSIUM: CPT

## 2021-01-12 PROCEDURE — 82948 REAGENT STRIP/BLOOD GLUCOSE: CPT

## 2021-01-12 PROCEDURE — 82803 BLOOD GASES ANY COMBINATION: CPT

## 2021-01-12 PROCEDURE — 6370000000 HC RX 637 (ALT 250 FOR IP): Performed by: PHYSICIAN ASSISTANT

## 2021-01-12 PROCEDURE — 6360000002 HC RX W HCPCS: Performed by: THORACIC SURGERY (CARDIOTHORACIC VASCULAR SURGERY)

## 2021-01-12 PROCEDURE — 3600000016 HC SURGERY LEVEL 6 ADDTL 15MIN: Performed by: THORACIC SURGERY (CARDIOTHORACIC VASCULAR SURGERY)

## 2021-01-12 PROCEDURE — 85730 THROMBOPLASTIN TIME PARTIAL: CPT

## 2021-01-12 PROCEDURE — 82330 ASSAY OF CALCIUM: CPT

## 2021-01-12 PROCEDURE — 82947 ASSAY GLUCOSE BLOOD QUANT: CPT

## 2021-01-12 PROCEDURE — 85027 COMPLETE CBC AUTOMATED: CPT

## 2021-01-12 PROCEDURE — 93005 ELECTROCARDIOGRAM TRACING: CPT | Performed by: PHYSICIAN ASSISTANT

## 2021-01-12 PROCEDURE — 3600000018 HC SURGERY OHS ADDTL 15MIN: Performed by: THORACIC SURGERY (CARDIOTHORACIC VASCULAR SURGERY)

## 2021-01-12 PROCEDURE — 2500000003 HC RX 250 WO HCPCS: Performed by: PHYSICIAN ASSISTANT

## 2021-01-12 PROCEDURE — 36415 COLL VENOUS BLD VENIPUNCTURE: CPT

## 2021-01-12 PROCEDURE — 33508 ENDOSCOPIC VEIN HARVEST: CPT | Performed by: THORACIC SURGERY (CARDIOTHORACIC VASCULAR SURGERY)

## 2021-01-12 PROCEDURE — 37799 UNLISTED PX VASCULAR SURGERY: CPT

## 2021-01-12 PROCEDURE — 2709999900 HC NON-CHARGEABLE SUPPLY: Performed by: THORACIC SURGERY (CARDIOTHORACIC VASCULAR SURGERY)

## 2021-01-12 PROCEDURE — 83735 ASSAY OF MAGNESIUM: CPT

## 2021-01-12 PROCEDURE — 3700000000 HC ANESTHESIA ATTENDED CARE: Performed by: THORACIC SURGERY (CARDIOTHORACIC VASCULAR SURGERY)

## 2021-01-12 PROCEDURE — 6370000000 HC RX 637 (ALT 250 FOR IP)

## 2021-01-12 PROCEDURE — 94660 CPAP INITIATION&MGMT: CPT

## 2021-01-12 PROCEDURE — 33518 CABG ARTERY-VEIN TWO: CPT | Performed by: THORACIC SURGERY (CARDIOTHORACIC VASCULAR SURGERY)

## 2021-01-12 PROCEDURE — 02L70CK OCCLUSION OF LEFT ATRIAL APPENDAGE WITH EXTRALUMINAL DEVICE, OPEN APPROACH: ICD-10-PCS | Performed by: THORACIC SURGERY (CARDIOTHORACIC VASCULAR SURGERY)

## 2021-01-12 PROCEDURE — 94761 N-INVAS EAR/PLS OXIMETRY MLT: CPT

## 2021-01-12 DEVICE — DEVICE OCCL CLP L40MM PLUNG GRP FLX SHFT FOR GILLINOV: Type: IMPLANTABLE DEVICE | Site: HEART | Status: FUNCTIONAL

## 2021-01-12 DEVICE — CLIP LIG M TI 6 SIL HNDL FOR OPN AND ENDOSCP SGL APPL: Type: IMPLANTABLE DEVICE | Site: HEART | Status: FUNCTIONAL

## 2021-01-12 DEVICE — Z INACTIVE USE 2540311 LEAD PACE L475MM CHN A OR V MYOCARDIAL STEROID ELUT SIL: Type: IMPLANTABLE DEVICE | Site: HEART | Status: FUNCTIONAL

## 2021-01-12 DEVICE — CLIP LIG SM TI 6 BLU HNDL FOR OPN AND ENDOSCP SGL APPL: Type: IMPLANTABLE DEVICE | Site: HEART | Status: FUNCTIONAL

## 2021-01-12 RX ORDER — FENTANYL CITRATE 50 UG/ML
INJECTION, SOLUTION INTRAMUSCULAR; INTRAVENOUS PRN
Status: DISCONTINUED | OUTPATIENT
Start: 2021-01-12 | End: 2021-01-12 | Stop reason: SDUPTHER

## 2021-01-12 RX ORDER — NITROGLYCERIN 20 MG/100ML
INJECTION INTRAVENOUS PRN
Status: DISCONTINUED | OUTPATIENT
Start: 2021-01-12 | End: 2021-01-12 | Stop reason: SDUPTHER

## 2021-01-12 RX ORDER — VANCOMYCIN HYDROCHLORIDE 1 G/20ML
INJECTION, POWDER, LYOPHILIZED, FOR SOLUTION INTRAVENOUS PRN
Status: DISCONTINUED | OUTPATIENT
Start: 2021-01-12 | End: 2021-01-12 | Stop reason: HOSPADM

## 2021-01-12 RX ORDER — METOPROLOL TARTRATE 5 MG/5ML
2.5 INJECTION INTRAVENOUS EVERY 10 MIN PRN
Status: DISCONTINUED | OUTPATIENT
Start: 2021-01-12 | End: 2021-01-15

## 2021-01-12 RX ORDER — DEXTROSE MONOHYDRATE 25 G/50ML
12.5 INJECTION, SOLUTION INTRAVENOUS PRN
Status: DISCONTINUED | OUTPATIENT
Start: 2021-01-12 | End: 2021-01-21 | Stop reason: HOSPADM

## 2021-01-12 RX ORDER — ALBUTEROL SULFATE 90 UG/1
2 AEROSOL, METERED RESPIRATORY (INHALATION) EVERY 6 HOURS PRN
Status: DISCONTINUED | OUTPATIENT
Start: 2021-01-12 | End: 2021-01-13

## 2021-01-12 RX ORDER — SODIUM CHLORIDE 0.9 % (FLUSH) 0.9 %
10 SYRINGE (ML) INJECTION EVERY 12 HOURS SCHEDULED
Status: DISCONTINUED | OUTPATIENT
Start: 2021-01-12 | End: 2021-01-15

## 2021-01-12 RX ORDER — ONDANSETRON 2 MG/ML
4 INJECTION INTRAMUSCULAR; INTRAVENOUS EVERY 6 HOURS PRN
Status: DISCONTINUED | OUTPATIENT
Start: 2021-01-12 | End: 2021-01-12 | Stop reason: SDUPTHER

## 2021-01-12 RX ORDER — AMIODARONE HYDROCHLORIDE 200 MG/1
200 TABLET ORAL 2 TIMES DAILY
Status: DISCONTINUED | OUTPATIENT
Start: 2021-01-12 | End: 2021-01-15

## 2021-01-12 RX ORDER — AMIODARONE HYDROCHLORIDE 200 MG/1
200 TABLET ORAL ONCE
Status: COMPLETED | OUTPATIENT
Start: 2021-01-12 | End: 2021-01-12

## 2021-01-12 RX ORDER — EPHEDRINE SULFATE/0.9% NACL/PF 50 MG/5 ML
SYRINGE (ML) INTRAVENOUS PRN
Status: DISCONTINUED | OUTPATIENT
Start: 2021-01-12 | End: 2021-01-12 | Stop reason: SDUPTHER

## 2021-01-12 RX ORDER — SODIUM CHLORIDE, SODIUM GLUCONATE, SODIUM ACETATE, POTASSIUM CHLORIDE AND MAGNESIUM CHLORIDE 526; 502; 368; 37; 30 MG/100ML; MG/100ML; MG/100ML; MG/100ML; MG/100ML
INJECTION, SOLUTION INTRAVENOUS CONTINUOUS PRN
Status: DISCONTINUED | OUTPATIENT
Start: 2021-01-12 | End: 2021-01-12 | Stop reason: SDUPTHER

## 2021-01-12 RX ORDER — SENNOSIDES 8.8 MG/5ML
10 LIQUID ORAL DAILY PRN
Status: DISCONTINUED | OUTPATIENT
Start: 2021-01-12 | End: 2021-01-21 | Stop reason: HOSPADM

## 2021-01-12 RX ORDER — NICOTINE POLACRILEX 4 MG
15 LOZENGE BUCCAL PRN
Status: DISCONTINUED | OUTPATIENT
Start: 2021-01-12 | End: 2021-01-21 | Stop reason: HOSPADM

## 2021-01-12 RX ORDER — HEPARIN SODIUM 1000 [USP'U]/ML
INJECTION, SOLUTION INTRAVENOUS; SUBCUTANEOUS PRN
Status: DISCONTINUED | OUTPATIENT
Start: 2021-01-12 | End: 2021-01-12 | Stop reason: SDUPTHER

## 2021-01-12 RX ORDER — OXYCODONE HYDROCHLORIDE 5 MG/1
10 TABLET ORAL EVERY 4 HOURS PRN
Status: DISCONTINUED | OUTPATIENT
Start: 2021-01-12 | End: 2021-01-21 | Stop reason: HOSPADM

## 2021-01-12 RX ORDER — ACETAMINOPHEN 325 MG/1
650 TABLET ORAL EVERY 4 HOURS PRN
Status: DISCONTINUED | OUTPATIENT
Start: 2021-01-12 | End: 2021-01-21 | Stop reason: HOSPADM

## 2021-01-12 RX ORDER — ALBUMIN, HUMAN INJ 5% 5 %
SOLUTION INTRAVENOUS PRN
Status: DISCONTINUED | OUTPATIENT
Start: 2021-01-12 | End: 2021-01-12 | Stop reason: SDUPTHER

## 2021-01-12 RX ORDER — PROPOFOL 10 MG/ML
INJECTION, EMULSION INTRAVENOUS PRN
Status: DISCONTINUED | OUTPATIENT
Start: 2021-01-12 | End: 2021-01-12 | Stop reason: SDUPTHER

## 2021-01-12 RX ORDER — ACETAMINOPHEN 650 MG/1
650 SUPPOSITORY RECTAL EVERY 4 HOURS PRN
Status: DISCONTINUED | OUTPATIENT
Start: 2021-01-12 | End: 2021-01-15

## 2021-01-12 RX ORDER — MIDAZOLAM HYDROCHLORIDE 1 MG/ML
INJECTION INTRAMUSCULAR; INTRAVENOUS PRN
Status: DISCONTINUED | OUTPATIENT
Start: 2021-01-12 | End: 2021-01-12 | Stop reason: SDUPTHER

## 2021-01-12 RX ORDER — LIDOCAINE HCL/PF 100 MG/5ML
SYRINGE (ML) INJECTION PRN
Status: DISCONTINUED | OUTPATIENT
Start: 2021-01-12 | End: 2021-01-12 | Stop reason: SDUPTHER

## 2021-01-12 RX ORDER — ROCURONIUM BROMIDE 10 MG/ML
INJECTION, SOLUTION INTRAVENOUS PRN
Status: DISCONTINUED | OUTPATIENT
Start: 2021-01-12 | End: 2021-01-12 | Stop reason: SDUPTHER

## 2021-01-12 RX ORDER — ALBUMIN, HUMAN INJ 5% 5 %
SOLUTION INTRAVENOUS
Status: COMPLETED
Start: 2021-01-12 | End: 2021-01-12

## 2021-01-12 RX ORDER — VECURONIUM BROMIDE 1 MG/ML
INJECTION, POWDER, LYOPHILIZED, FOR SOLUTION INTRAVENOUS PRN
Status: DISCONTINUED | OUTPATIENT
Start: 2021-01-12 | End: 2021-01-12 | Stop reason: SDUPTHER

## 2021-01-12 RX ORDER — OXYCODONE HYDROCHLORIDE 5 MG/1
5 TABLET ORAL EVERY 4 HOURS PRN
Status: DISCONTINUED | OUTPATIENT
Start: 2021-01-12 | End: 2021-01-21 | Stop reason: HOSPADM

## 2021-01-12 RX ORDER — PHENYLEPHRINE HYDROCHLORIDE 10 MG/ML
INJECTION INTRAVENOUS PRN
Status: DISCONTINUED | OUTPATIENT
Start: 2021-01-12 | End: 2021-01-12 | Stop reason: SDUPTHER

## 2021-01-12 RX ORDER — SODIUM CHLORIDE 9 MG/ML
INJECTION, SOLUTION INTRAVENOUS CONTINUOUS
Status: DISCONTINUED | OUTPATIENT
Start: 2021-01-12 | End: 2021-01-15

## 2021-01-12 RX ORDER — CEFAZOLIN SODIUM 1 G/3ML
INJECTION, POWDER, FOR SOLUTION INTRAMUSCULAR; INTRAVENOUS PRN
Status: DISCONTINUED | OUTPATIENT
Start: 2021-01-12 | End: 2021-01-12 | Stop reason: SDUPTHER

## 2021-01-12 RX ORDER — ATORVASTATIN CALCIUM 20 MG/1
20 TABLET, FILM COATED ORAL NIGHTLY
Status: DISCONTINUED | OUTPATIENT
Start: 2021-01-13 | End: 2021-01-12

## 2021-01-12 RX ORDER — POTASSIUM CHLORIDE 29.8 MG/ML
20 INJECTION INTRAVENOUS PRN
Status: DISCONTINUED | OUTPATIENT
Start: 2021-01-12 | End: 2021-01-21 | Stop reason: HOSPADM

## 2021-01-12 RX ORDER — PAPAVERINE HYDROCHLORIDE 30 MG/ML
INJECTION INTRAMUSCULAR; INTRAVENOUS PRN
Status: DISCONTINUED | OUTPATIENT
Start: 2021-01-12 | End: 2021-01-12 | Stop reason: HOSPADM

## 2021-01-12 RX ORDER — PROTAMINE SULFATE 10 MG/ML
INJECTION, SOLUTION INTRAVENOUS PRN
Status: DISCONTINUED | OUTPATIENT
Start: 2021-01-12 | End: 2021-01-12 | Stop reason: SDUPTHER

## 2021-01-12 RX ORDER — ALBUMIN, HUMAN INJ 5% 5 %
25 SOLUTION INTRAVENOUS PRN
Status: DISCONTINUED | OUTPATIENT
Start: 2021-01-12 | End: 2021-01-15

## 2021-01-12 RX ORDER — DEXTROSE MONOHYDRATE 50 MG/ML
100 INJECTION, SOLUTION INTRAVENOUS PRN
Status: DISCONTINUED | OUTPATIENT
Start: 2021-01-12 | End: 2021-01-21 | Stop reason: HOSPADM

## 2021-01-12 RX ORDER — AMINOCAPROIC ACID 250 MG/ML
INJECTION, SOLUTION INTRAVENOUS PRN
Status: DISCONTINUED | OUTPATIENT
Start: 2021-01-12 | End: 2021-01-12 | Stop reason: SDUPTHER

## 2021-01-12 RX ORDER — PROTAMINE SULFATE 10 MG/ML
50 INJECTION, SOLUTION INTRAVENOUS
Status: ACTIVE | OUTPATIENT
Start: 2021-01-12 | End: 2021-01-12

## 2021-01-12 RX ORDER — EPINEPHRINE 1 MG/ML
INJECTION, SOLUTION, CONCENTRATE INTRAVENOUS PRN
Status: DISCONTINUED | OUTPATIENT
Start: 2021-01-12 | End: 2021-01-12 | Stop reason: SDUPTHER

## 2021-01-12 RX ORDER — ALBUMIN (HUMAN) 12.5 G/50ML
SOLUTION INTRAVENOUS PRN
Status: DISCONTINUED | OUTPATIENT
Start: 2021-01-12 | End: 2021-01-12 | Stop reason: SDUPTHER

## 2021-01-12 RX ORDER — SODIUM CHLORIDE 0.9 % (FLUSH) 0.9 %
10 SYRINGE (ML) INJECTION PRN
Status: DISCONTINUED | OUTPATIENT
Start: 2021-01-12 | End: 2021-01-21 | Stop reason: HOSPADM

## 2021-01-12 RX ORDER — ENALAPRILAT 2.5 MG/2ML
0.62 INJECTION INTRAVENOUS
Status: DISCONTINUED | OUTPATIENT
Start: 2021-01-12 | End: 2021-01-15

## 2021-01-12 RX ORDER — MORPHINE SULFATE 2 MG/ML
2 INJECTION, SOLUTION INTRAMUSCULAR; INTRAVENOUS
Status: DISCONTINUED | OUTPATIENT
Start: 2021-01-12 | End: 2021-01-15

## 2021-01-12 RX ORDER — M-VIT,TX,IRON,MINS/CALC/FOLIC 27MG-0.4MG
1 TABLET ORAL
Status: DISCONTINUED | OUTPATIENT
Start: 2021-01-13 | End: 2021-01-21 | Stop reason: HOSPADM

## 2021-01-12 RX ADMIN — ALBUMIN (HUMAN) 12.5 G: 25 SOLUTION INTRAVENOUS at 14:26

## 2021-01-12 RX ADMIN — VECURONIUM BROMIDE 6 MG: 10 INJECTION, POWDER, LYOPHILIZED, FOR SOLUTION INTRAVENOUS at 09:37

## 2021-01-12 RX ADMIN — OXYCODONE HYDROCHLORIDE 5 MG: 5 TABLET ORAL at 18:31

## 2021-01-12 RX ADMIN — ROCURONIUM BROMIDE 100 MG: 10 INJECTION INTRAVENOUS at 07:52

## 2021-01-12 RX ADMIN — PROPOFOL 30 MG: 10 INJECTION, EMULSION INTRAVENOUS at 07:55

## 2021-01-12 RX ADMIN — CALCIUM GLUCONATE 1 G: 98 INJECTION, SOLUTION INTRAVENOUS at 20:50

## 2021-01-12 RX ADMIN — MORPHINE SULFATE 2 MG: 2 INJECTION, SOLUTION INTRAMUSCULAR; INTRAVENOUS at 17:17

## 2021-01-12 RX ADMIN — AMINOCAPROIC ACID 5000 MG: 250 INJECTION, SOLUTION INTRAVENOUS at 14:58

## 2021-01-12 RX ADMIN — PHENYLEPHRINE HYDROCHLORIDE 100 MCG: 10 INJECTION INTRAVENOUS at 10:47

## 2021-01-12 RX ADMIN — AMINOCAPROIC ACID 5000 MG: 250 INJECTION, SOLUTION INTRAVENOUS at 08:29

## 2021-01-12 RX ADMIN — MORPHINE SULFATE 2 MG: 2 INJECTION, SOLUTION INTRAMUSCULAR; INTRAVENOUS at 20:47

## 2021-01-12 RX ADMIN — Medication 15 MG: at 15:11

## 2021-01-12 RX ADMIN — PHENYLEPHRINE HYDROCHLORIDE 100 MCG: 10 INJECTION INTRAVENOUS at 13:52

## 2021-01-12 RX ADMIN — SODIUM CHLORIDE: 9 INJECTION, SOLUTION INTRAVENOUS at 18:09

## 2021-01-12 RX ADMIN — CEFAZOLIN 2000 MG: 1 INJECTION, POWDER, FOR SOLUTION INTRAMUSCULAR; INTRAVENOUS; PARENTERAL at 12:54

## 2021-01-12 RX ADMIN — PROTAMINE SULFATE 180 MG: 10 INJECTION, SOLUTION INTRAVENOUS at 14:16

## 2021-01-12 RX ADMIN — Medication 30 MG: at 07:52

## 2021-01-12 RX ADMIN — SODIUM CHLORIDE, PRESERVATIVE FREE 10 ML: 5 INJECTION INTRAVENOUS at 21:33

## 2021-01-12 RX ADMIN — CEFAZOLIN 2000 MG: 1 INJECTION, POWDER, FOR SOLUTION INTRAMUSCULAR; INTRAVENOUS; PARENTERAL at 09:00

## 2021-01-12 RX ADMIN — Medication 10 MG: at 09:37

## 2021-01-12 RX ADMIN — PHENYLEPHRINE HYDROCHLORIDE 100 MCG: 10 INJECTION INTRAVENOUS at 11:31

## 2021-01-12 RX ADMIN — PHENYLEPHRINE HYDROCHLORIDE 100 MCG: 10 INJECTION INTRAVENOUS at 10:30

## 2021-01-12 RX ADMIN — FENTANYL CITRATE 50 MCG: 50 INJECTION, SOLUTION INTRAMUSCULAR; INTRAVENOUS at 09:18

## 2021-01-12 RX ADMIN — HEPARIN SODIUM 1000 UNITS: 1000 INJECTION INTRAVENOUS; SUBCUTANEOUS at 09:02

## 2021-01-12 RX ADMIN — Medication 5 MG: at 11:30

## 2021-01-12 RX ADMIN — ATORVASTATIN CALCIUM 10 MG: 10 TABLET, FILM COATED ORAL at 23:31

## 2021-01-12 RX ADMIN — PHENYLEPHRINE HYDROCHLORIDE 100 MCG: 10 INJECTION INTRAVENOUS at 10:24

## 2021-01-12 RX ADMIN — CEFAZOLIN 2 G: 10 INJECTION, POWDER, FOR SOLUTION INTRAVENOUS at 20:00

## 2021-01-12 RX ADMIN — FENTANYL CITRATE 50 MCG: 50 INJECTION, SOLUTION INTRAMUSCULAR; INTRAVENOUS at 08:59

## 2021-01-12 RX ADMIN — FENTANYL CITRATE 100 MCG: 50 INJECTION, SOLUTION INTRAMUSCULAR; INTRAVENOUS at 11:33

## 2021-01-12 RX ADMIN — PHENYLEPHRINE HYDROCHLORIDE 50 MCG/MIN: 10 INJECTION INTRAVENOUS at 14:17

## 2021-01-12 RX ADMIN — AMINOCAPROIC ACID 5000 MG: 250 INJECTION, SOLUTION INTRAVENOUS at 13:34

## 2021-01-12 RX ADMIN — HEPARIN SODIUM 5000 UNITS: 1000 INJECTION INTRAVENOUS; SUBCUTANEOUS at 11:38

## 2021-01-12 RX ADMIN — PHENYLEPHRINE HYDROCHLORIDE 100 MCG: 10 INJECTION INTRAVENOUS at 08:10

## 2021-01-12 RX ADMIN — PHENYLEPHRINE HYDROCHLORIDE 200 MCG: 10 INJECTION INTRAVENOUS at 11:09

## 2021-01-12 RX ADMIN — Medication 10 MG: at 11:15

## 2021-01-12 RX ADMIN — MIDAZOLAM HYDROCHLORIDE 2 MG: 1 INJECTION, SOLUTION INTRAMUSCULAR; INTRAVENOUS at 13:30

## 2021-01-12 RX ADMIN — AMIODARONE HYDROCHLORIDE 200 MG: 200 TABLET ORAL at 06:25

## 2021-01-12 RX ADMIN — SODIUM CHLORIDE, SODIUM GLUCONATE, SODIUM ACETATE, POTASSIUM CHLORIDE AND MAGNESIUM CHLORIDE: 526; 502; 368; 37; 30 INJECTION, SOLUTION INTRAVENOUS at 07:43

## 2021-01-12 RX ADMIN — PHENYLEPHRINE HYDROCHLORIDE 50 MCG: 10 INJECTION INTRAVENOUS at 11:13

## 2021-01-12 RX ADMIN — VECURONIUM BROMIDE 4 MG: 10 INJECTION, POWDER, LYOPHILIZED, FOR SOLUTION INTRAVENOUS at 13:30

## 2021-01-12 RX ADMIN — FENTANYL CITRATE 100 MCG: 50 INJECTION, SOLUTION INTRAMUSCULAR; INTRAVENOUS at 13:30

## 2021-01-12 RX ADMIN — HEPARIN SODIUM 5000 UNITS: 1000 INJECTION INTRAVENOUS; SUBCUTANEOUS at 10:49

## 2021-01-12 RX ADMIN — HEPARIN SODIUM 26000 UNITS: 1000 INJECTION INTRAVENOUS; SUBCUTANEOUS at 10:25

## 2021-01-12 RX ADMIN — PHENYLEPHRINE HYDROCHLORIDE 150 MCG: 10 INJECTION INTRAVENOUS at 08:01

## 2021-01-12 RX ADMIN — EPINEPHRINE 10 MCG: 1 INJECTION, SOLUTION, CONCENTRATE INTRAVENOUS at 08:23

## 2021-01-12 RX ADMIN — FAMOTIDINE 20 MG: 10 INJECTION INTRAVENOUS at 21:33

## 2021-01-12 RX ADMIN — PROPOFOL 50 MG: 10 INJECTION, EMULSION INTRAVENOUS at 07:52

## 2021-01-12 RX ADMIN — ALBUMIN (HUMAN) 25 G: 12.5 INJECTION, SOLUTION INTRAVENOUS at 16:08

## 2021-01-12 RX ADMIN — Medication 2 UNITS/HR: at 10:40

## 2021-01-12 RX ADMIN — METOPROLOL SUCCINATE 100 MG: 100 TABLET, FILM COATED, EXTENDED RELEASE ORAL at 06:25

## 2021-01-12 RX ADMIN — VECURONIUM BROMIDE 4 MG: 10 INJECTION, POWDER, LYOPHILIZED, FOR SOLUTION INTRAVENOUS at 11:13

## 2021-01-12 RX ADMIN — FENTANYL CITRATE 100 MCG: 50 INJECTION, SOLUTION INTRAMUSCULAR; INTRAVENOUS at 14:59

## 2021-01-12 RX ADMIN — Medication 10 MG: at 09:20

## 2021-01-12 RX ADMIN — PHENYLEPHRINE HYDROCHLORIDE 50 MCG: 10 INJECTION INTRAVENOUS at 11:03

## 2021-01-12 RX ADMIN — ALBUMIN (HUMAN) 25 G: 12.5 INJECTION, SOLUTION INTRAVENOUS at 21:09

## 2021-01-12 RX ADMIN — FENTANYL CITRATE 50 MCG: 50 INJECTION, SOLUTION INTRAMUSCULAR; INTRAVENOUS at 09:49

## 2021-01-12 RX ADMIN — ALBUMIN (HUMAN) 25 G: 12.5 INJECTION, SOLUTION INTRAVENOUS at 15:45

## 2021-01-12 RX ADMIN — EPINEPHRINE 5 MCG/MIN: 1 INJECTION INTRAMUSCULAR; INTRAVENOUS; SUBCUTANEOUS at 13:29

## 2021-01-12 RX ADMIN — NITROGLYCERIN 10 MCG: 20 INJECTION INTRAVENOUS at 09:50

## 2021-01-12 RX ADMIN — POTASSIUM CHLORIDE 20 MEQ: 29.8 INJECTION, SOLUTION INTRAVENOUS at 22:53

## 2021-01-12 RX ADMIN — SODIUM CHLORIDE 2.3 UNITS/HR: 9 INJECTION, SOLUTION INTRAVENOUS at 18:52

## 2021-01-12 RX ADMIN — Medication 15 ML: at 06:25

## 2021-01-12 RX ADMIN — ALBUMIN (HUMAN) 12.5 G: 12.5 SOLUTION INTRAVENOUS at 14:30

## 2021-01-12 RX ADMIN — FENTANYL CITRATE 50 MCG: 50 INJECTION, SOLUTION INTRAMUSCULAR; INTRAVENOUS at 09:43

## 2021-01-12 RX ADMIN — DESMOPRESSIN ACETATE 24 MCG: 4 INJECTION, SOLUTION INTRAVENOUS; SUBCUTANEOUS at 13:40

## 2021-01-12 RX ADMIN — FENTANYL CITRATE 100 MCG: 50 INJECTION, SOLUTION INTRAMUSCULAR; INTRAVENOUS at 07:52

## 2021-01-12 RX ADMIN — BISACODYL 10 MG: 5 TABLET ORAL at 23:31

## 2021-01-12 RX ADMIN — OXYCODONE HYDROCHLORIDE 10 MG: 5 TABLET ORAL at 23:48

## 2021-01-12 RX ADMIN — MIDAZOLAM HYDROCHLORIDE 2 MG: 1 INJECTION, SOLUTION INTRAMUSCULAR; INTRAVENOUS at 07:43

## 2021-01-12 RX ADMIN — EPINEPHRINE 20 MCG: 1 INJECTION, SOLUTION, CONCENTRATE INTRAVENOUS at 14:09

## 2021-01-12 RX ADMIN — AMIODARONE HYDROCHLORIDE 200 MG: 200 TABLET ORAL at 21:29

## 2021-01-12 RX ADMIN — PHENYLEPHRINE HYDROCHLORIDE 100 MCG: 10 INJECTION INTRAVENOUS at 14:14

## 2021-01-12 RX ADMIN — PHENYLEPHRINE HYDROCHLORIDE 150 MCG: 10 INJECTION INTRAVENOUS at 07:59

## 2021-01-12 RX ADMIN — PHENYLEPHRINE HYDROCHLORIDE 100 MCG: 10 INJECTION INTRAVENOUS at 11:23

## 2021-01-12 ASSESSMENT — PULMONARY FUNCTION TESTS
PIF_VALUE: 1
PIF_VALUE: 14
PIF_VALUE: 19
PIF_VALUE: 14
PIF_VALUE: 1
PIF_VALUE: 1
PIF_VALUE: 15
PIF_VALUE: 14
PIF_VALUE: 13
PIF_VALUE: 15
PIF_VALUE: 15
PIF_VALUE: 14
PIF_VALUE: 14
PIF_VALUE: 15
PIF_VALUE: 1
PIF_VALUE: 1
PIF_VALUE: 15
PIF_VALUE: 1
PIF_VALUE: 15
PIF_VALUE: 14
PIF_VALUE: 1
PIF_VALUE: 1
PIF_VALUE: 17
PIF_VALUE: 1
PIF_VALUE: 16
PIF_VALUE: 3
PIF_VALUE: 18
PIF_VALUE: 15
PIF_VALUE: 1
PIF_VALUE: 14
PIF_VALUE: 15
PIF_VALUE: 1
PIF_VALUE: 1
PIF_VALUE: 14
PIF_VALUE: 15
PIF_VALUE: 1
PIF_VALUE: 1
PIF_VALUE: 15
PIF_VALUE: 15
PIF_VALUE: 1
PIF_VALUE: 16
PIF_VALUE: 14
PIF_VALUE: 16
PIF_VALUE: 1
PIF_VALUE: 16
PIF_VALUE: 15
PIF_VALUE: 14
PIF_VALUE: 17
PIF_VALUE: 14
PIF_VALUE: 16
PIF_VALUE: 1
PIF_VALUE: 14
PIF_VALUE: 15
PIF_VALUE: 16
PIF_VALUE: 15
PIF_VALUE: 18
PIF_VALUE: 14
PIF_VALUE: 14
PIF_VALUE: 13
PIF_VALUE: 15
PIF_VALUE: 16
PIF_VALUE: 13
PIF_VALUE: 1
PIF_VALUE: 15
PIF_VALUE: 1
PIF_VALUE: 15
PIF_VALUE: 1
PIF_VALUE: 15
PIF_VALUE: 1
PIF_VALUE: 1
PIF_VALUE: 2
PIF_VALUE: 14
PIF_VALUE: 15
PIF_VALUE: 15
PIF_VALUE: 18
PIF_VALUE: 0
PIF_VALUE: 1
PIF_VALUE: 1
PIF_VALUE: 16
PIF_VALUE: 14
PIF_VALUE: 1
PIF_VALUE: 16
PIF_VALUE: 14
PIF_VALUE: 13
PIF_VALUE: 15
PIF_VALUE: 1
PIF_VALUE: 14
PIF_VALUE: 14
PIF_VALUE: 18
PIF_VALUE: 1
PIF_VALUE: 19
PIF_VALUE: 13
PIF_VALUE: 1
PIF_VALUE: 15
PIF_VALUE: 15
PIF_VALUE: 1
PIF_VALUE: 1
PIF_VALUE: 15
PIF_VALUE: 16
PIF_VALUE: 16
PIF_VALUE: 1
PIF_VALUE: 15
PIF_VALUE: 14
PIF_VALUE: 1
PIF_VALUE: 1
PIF_VALUE: 15
PIF_VALUE: 1
PIF_VALUE: 18
PIF_VALUE: 13
PIF_VALUE: 1
PIF_VALUE: 18
PIF_VALUE: 1
PIF_VALUE: 15
PIF_VALUE: 14
PIF_VALUE: 1
PIF_VALUE: 1
PIF_VALUE: 18
PIF_VALUE: 14
PIF_VALUE: 15
PIF_VALUE: 18
PIF_VALUE: 18
PIF_VALUE: 12
PIF_VALUE: 14
PIF_VALUE: 15
PIF_VALUE: 1
PIF_VALUE: 1
PIF_VALUE: 16
PIF_VALUE: 15
PIF_VALUE: 16
PIF_VALUE: 1
PIF_VALUE: 16
PIF_VALUE: 1
PIF_VALUE: 0
PIF_VALUE: 1
PIF_VALUE: 16
PIF_VALUE: 14
PIF_VALUE: 15
PIF_VALUE: 18
PIF_VALUE: 15
PIF_VALUE: 18
PIF_VALUE: 16
PIF_VALUE: 15
PIF_VALUE: 18
PIF_VALUE: 16
PIF_VALUE: 1
PIF_VALUE: 16
PIF_VALUE: 14
PIF_VALUE: 17
PIF_VALUE: 1
PIF_VALUE: 15
PIF_VALUE: 23
PIF_VALUE: 1
PIF_VALUE: 18
PIF_VALUE: 14
PIF_VALUE: 14
PIF_VALUE: 1
PIF_VALUE: 20
PIF_VALUE: 1
PIF_VALUE: 13
PIF_VALUE: 1
PIF_VALUE: 15
PIF_VALUE: 12
PIF_VALUE: 18
PIF_VALUE: 13
PIF_VALUE: 2
PIF_VALUE: 18
PIF_VALUE: 13
PIF_VALUE: 13
PIF_VALUE: 14
PIF_VALUE: 14
PIF_VALUE: 1
PIF_VALUE: 13
PIF_VALUE: 15
PIF_VALUE: 1
PIF_VALUE: 14
PIF_VALUE: 16
PIF_VALUE: 1
PIF_VALUE: 15
PIF_VALUE: 1
PIF_VALUE: 14
PIF_VALUE: 14
PIF_VALUE: 1
PIF_VALUE: 17
PIF_VALUE: 16
PIF_VALUE: 15
PIF_VALUE: 13
PIF_VALUE: 13
PIF_VALUE: 18
PIF_VALUE: 15
PIF_VALUE: 15
PIF_VALUE: 16
PIF_VALUE: 1
PIF_VALUE: 15
PIF_VALUE: 1
PIF_VALUE: 15
PIF_VALUE: 15
PIF_VALUE: 17
PIF_VALUE: 1
PIF_VALUE: 16
PIF_VALUE: 1
PIF_VALUE: 15
PIF_VALUE: 14
PIF_VALUE: 15
PIF_VALUE: 16
PIF_VALUE: 14
PIF_VALUE: 15
PIF_VALUE: 1
PIF_VALUE: 15
PIF_VALUE: 1
PIF_VALUE: 14
PIF_VALUE: 15
PIF_VALUE: 1
PIF_VALUE: 14
PIF_VALUE: 15
PIF_VALUE: 13
PIF_VALUE: 14
PIF_VALUE: 1
PIF_VALUE: 13
PIF_VALUE: 2
PIF_VALUE: 1
PIF_VALUE: 16
PIF_VALUE: 1
PIF_VALUE: 16
PIF_VALUE: 12
PIF_VALUE: 13
PIF_VALUE: 18
PIF_VALUE: 16
PIF_VALUE: 17
PIF_VALUE: 1
PIF_VALUE: 18
PIF_VALUE: 2

## 2021-01-12 ASSESSMENT — PAIN DESCRIPTION - DESCRIPTORS
DESCRIPTORS: ACHING;CONSTANT
DESCRIPTORS: ACHING;CONSTANT

## 2021-01-12 ASSESSMENT — PAIN SCALES - GENERAL
PAINLEVEL_OUTOF10: 10
PAINLEVEL_OUTOF10: 6
PAINLEVEL_OUTOF10: 4

## 2021-01-12 ASSESSMENT — PAIN DESCRIPTION - ORIENTATION: ORIENTATION: MID

## 2021-01-12 ASSESSMENT — PAIN DESCRIPTION - LOCATION: LOCATION: CHEST

## 2021-01-12 ASSESSMENT — PAIN DESCRIPTION - FREQUENCY: FREQUENCY: CONTINUOUS

## 2021-01-12 NOTE — ANESTHESIA PROCEDURE NOTES
Arterial Line:    An arterial line was placed using surface landmarks, in the OR for the following indication(s): Stanley Angry A 22 gauge (size), 1 and 1/4 inch (length), Arrow (type) catheter was placed, Seldinger technique not used, into the left radial artery, secured by suture, tape and Tegaderm. Anesthesia type: General    Events:  patient tolerated procedure well with no complications and EBL < 5mL. 1/12/2021 8:05 AM1/12/2021 8:10 AM  Anesthesiologist: Tri Vides MD  Resident/CRNA: ARCADIO Rosa - CRNA  Other anesthesia staff: Ilsa Hernandez RN  Performed:  Other anesthesia staff   Preanesthetic Checklist  Completed: patient identified, IV checked, site marked, risks and benefits discussed, surgical consent, monitors and equipment checked, pre-op evaluation, timeout performed, anesthesia consent given, oxygen available and patient being monitored

## 2021-01-12 NOTE — PROGRESS NOTES
Progress Note  Date:2021       Room:[address] [place] [address]  Patient Anna Maharaj     YOB: 1950     Age:70 y.o. Subjective    Subjective:  Symptoms:  Stable. No shortness of breath, chest pain, weakness or diarrhea. Diet:  Adequate intake. No nausea or vomiting. Activity level: Impaired due to weakness. Pain:  He reports no pain. Review of Systems   Constitutional: Negative for activity change. HENT: Negative for congestion. Respiratory: Negative for shortness of breath and wheezing. Cardiovascular: Negative for chest pain. Gastrointestinal: Negative for abdominal distention, diarrhea, nausea and vomiting. Genitourinary: Negative for difficulty urinating. Musculoskeletal: Negative for arthralgias. Neurological: Negative for dizziness, weakness and headaches. Hematological: Negative for adenopathy. Objective         Vitals Last 24 Hours:  TEMPERATURE:  Temp  Av.2 °F (36.8 °C)  Min: 97.6 °F (36.4 °C)  Max: 98.9 °F (37.2 °C)  RESPIRATIONS RANGE: Resp  Av  Min: 16  Max: 20  PULSE OXIMETRY RANGE: SpO2  Av.6 %  Min: 92 %  Max: 97 %  PULSE RANGE: Pulse  Av.3  Min: 69  Max: 96  BLOOD PRESSURE RANGE: Systolic (29SZO), KXB:059 , Min:122 , TDN:457   ; Diastolic (65ZHJ), LKZ:39, Min:60, Max:90    I/O (24Hr): Intake/Output Summary (Last 24 hours) at 2021 2312  Last data filed at 2021  Gross per 24 hour   Intake 941.93 ml   Output 0 ml   Net 941.93 ml     Objective:  General Appearance:  Comfortable. Vital signs: (most recent): Blood pressure 122/78, pulse 84, temperature 97.6 °F (36.4 °C), temperature source Oral, resp. rate 20, height 5' 9\" (1.753 m), weight 148 lb 3.2 oz (67.2 kg), SpO2 95 %. Vital signs are normal.    Output: Producing urine and producing stool. HEENT: Normal HEENT exam.    Lungs:  Normal effort and normal respiratory rate. Breath sounds clear to auscultation.   (No wheeze but slight prolongation on expiration)  Heart: Normal rate. Regular rhythm. S1 normal and S2 normal.  No murmur. Abdomen: Abdomen is soft. Bowel sounds are normal.   There is no abdominal tenderness. Extremities: Normal range of motion. Neurological: Patient is alert and oriented to person, place and time. Patient has normal reflexes and normal muscle tone. Labs/Imaging/Diagnostics    Labs:  CBC:  Recent Labs     01/09/21  0206 01/11/21  0535 01/11/21  1232   WBC 8.2 8.5 8.3   RBC 3.87* 4.26* 4.35*   HGB 13.6* 15.0 15.1   HCT 39.9* 43.2 44.3   .1* 101.4* 101.8*    196 216     CHEMISTRIES:  Recent Labs     01/09/21  0700 01/11/21  1232    136   K 5.3* 4.6    99   CO2 26 28   BUN 17 18   CREATININE 1.1 1.2   GLUCOSE 89 136*     PT/INR:  Recent Labs     01/11/21  1232   INR 1.13     APTT:  Recent Labs     01/10/21  1735 01/11/21  0535 01/11/21  1823   APTT 68.9* 74.7* 69.1*     LIVER PROFILE:  Recent Labs     01/11/21  1232   AST 95*   ALT 82*   BILIDIR <0.2   BILITOT 0.4   ALKPHOS 54       Imaging Last 24 Hours:  Xr Chest (2 Vw)    Result Date: 1/11/2021  PROCEDURE: XR CHEST (2 VW) CLINICAL INFORMATION: Pre-CABG testing COMPARISON: 1/6/2021 TECHNIQUE: PA and lateral views of the chest were obtained. 1. Lungs somewhat hyperinflated, suggesting underlying COPD. Evidence for old, healed granulomatous disease. 2. Cardiac silhouette small in size. Obscuration of the posterior costophrenic angles, suggesting mild residual atelectasis/pneumonia posteriorly. 3. Overall appearance of chest has improved since prior. **This report has been created using voice recognition software. It may contain minor errors which are inherent in voice recognition technology. ** Final report electronically signed by Dr. Sung Moreland on 1/11/2021 10:55 AM    Vl Dup Carotid Bilateral    Result Date: 1/11/2021  PROCEDURE: VL DUP CAROTID BILATERAL CLINICAL INFORMATION: Preop CABG; history of hypertension, hypercholesterolemia EVALUATED: Both greater and lesser saphenous veins TECHNIQUE: Multiple sonographic images of both greater and lesser saphenous veins were obtained and measurements were made. FINDINGS: The size ranges for the veins evaluated are listed below: RIGHT VEIN SFJ --------------------->3.9 mm GSV MID --------------> 1.7 mm GSV ABV KNEE ----------> 1.2 mm GSV BEL KNEE ----------> 1.6 mm GSV MID CALF -----------> 1.6 mm GSV DIST CALF ----------> 1.5 mm GSV MED MALL ----------> 1.6 mm LSV PROX --------------> 2.0 mm LSV MID -----------------> 1.6 mm LSV DIST ----------------> 1.8 mm LEFT VEIN SFJ -------------------->3.6 mm GSV MID --------------> 1.9 mm GSV ABV KNEE ----------> 1.7 mm GSV BEL KNEE ----------> 1.9 mm GSV MID CALF -----------> 1.4 mm GSV DIST CALF ----------> 1.9 mm GSV MED MALL ----------> 2.1 mm LSV PROX -------------> 1.5 mm LSV MID ----------------> 1.9 mm LSV DIST --------------> 2.6 mm     Status post vein mapping procedure with charted diameters as reported above. **This report has been created using voice recognition software. It may contain minor errors which are inherent in voice recognition technology. ** Final report electronically signed by Dr. Swapnil Adrian on 1/11/2021 8:23 AM    Assessment//Plan           Hospital Problems           Last Modified POA    Coronary artery disease involving native coronary artery 1/9/2021 Yes    Hyperlipidemia 1/9/2021 Yes    Smoker 1/9/2021 Yes    HTN (hypertension) 1/9/2021 Yes    COPD, mild ([address] 1/9/2021 Yes    Atrial fibrillation with rapid ventricular response ([address] 1/9/2021 Yes    Atrial fibrillation ([address] 1/9/2021 Yes        Assessment:    Condition: In stable condition. Improving. ( ashd stable and for  CABG in am    htn stable     copd  Stable      lipids  Stable     atrial   Fib    stable       ). Plan:   Out of bed and up to chair. Respiratory Plan:  continue  inhalers   Advance diet as tolerated. Administer medications as ordered.    (  For Surgery  In am      labs  For this  Am      overall stable ).        Electronically signed by Esmer Sin MD on 1/11/21 at 11:12 PM EST

## 2021-01-12 NOTE — ANESTHESIA PRE PROCEDURE
Department of Anesthesiology  Preprocedure Note       Name:  Kelli Rosario   Age:  79 y.o.  :  1950                                          MRN:  [id number]         Date:  2021      Surgeon: Mana Aquino):  Srinivas Randall MD    Procedure: Procedure(s):  CABG  X 3 WITH DEJAH    Medications prior to admission:   Prior to Admission medications    Medication Sig Start Date End Date Taking? Authorizing Provider   fluocinonide (LIDEX) 0.05 % ointment Apply topically as needed   Yes Historical Provider, MD   Tiotropium Bromide-Olodaterol (STIOLTO RESPIMAT) 2.5-2.5 MCG/ACT AERS Inhale 1 puff into the lungs 1-2 times daily   Yes Historical Provider, MD   metoprolol tartrate (LOPRESSOR) 50 MG tablet TAKE 1 TABLET BY MOUTH TWICE DAILY 20  Yes Gin Lizarraga MD   losartan (COZAAR) 100 MG tablet Take 1 tablet by mouth daily 20  Yes Gin Lizarraga MD   pravastatin (PRAVACHOL) 40 MG tablet Take 1 tablet by mouth daily 20  Yes Gin Lizarraga MD   rivaroxaban (XARELTO) 20 MG TABS tablet Take 1 tablet by mouth daily (with breakfast) 20  Yes Gin Lizarraga MD   albuterol sulfate  (90 Base) MCG/ACT inhaler Inhale 2 puffs into the lungs 4 times daily 3/31/20  Yes Gin Lizarraga MD   Multiple Vitamins-Minerals (CENTRUM SILVER PO) Take  by mouth daily.      Yes Historical Provider, MD   loratadine (CLARITIN) 10 MG tablet Take 10 mg by mouth daily     Historical Provider, MD       Current medications:    Current Facility-Administered Medications   Medication Dose Route Frequency Provider Last Rate Last Admin    sodium chloride flush 0.9 % injection 10 mL  10 mL Intravenous 2 times per day Elyssa Prieto PA-C   10 mL at 21    sodium chloride flush 0.9 % injection 10 mL  10 mL Intravenous PRN Elyssa Prieto PA-C        chlorhexidine (HIBICLENS) 4 % liquid   Topical See Admin Instructions Elyssa Prieto PA-C        sodium chloride flush 0.9 % injection 10 mL  10 mL Intravenous PRN Harlon Kussmaul, PA-C        metoprolol succinate (TOPROL XL) extended release tablet 100 mg  100 mg Oral Daily Harlon Kussmaul, PA-C   100 mg at 01/12/21 3748    dilTIAZem (CARDIZEM CD) extended release capsule 120 mg  120 mg Oral Daily Harlon Kussmaul, PA-C   120 mg at 01/11/21 1008    heparin (porcine) injection 5,550 Units  80 Units/kg Intravenous  [address], MD        heparin (porcine) injection 2,780 Units  40 Units/kg Intravenous  [address], MD   2,780 Units at 01/08/21 2000    heparin 25,000 unit in sodium chloride 0.45% 250 mL infusion  18 Units/kg/hr Intravenous Continuous [address], MD 10.4 mL/hr at 01/11/21 2348 15 Units/kg/hr at 01/11/21 2348    albuterol (PROVENTIL) nebulizer solution 2.5 mg  2.5 mg Nebulization Q6H PRN Ish Cochran MD   2.5 mg at 01/08/21 1621    aspirin chewable tablet 81 mg  81 mg Oral Daily Ish Cochran MD   81 mg at 01/11/21 1008    therapeutic multivitamin-minerals 1 tablet  1 tablet Oral Daily Ish Cochran MD   1 tablet at 01/11/21 1008    glycopyrrolate-formoterol (BEVESPI) 9-4.8 MCG/ACT inhaler 2 puff  2 puff Inhalation BID Ish Cochran MD   2 puff at 01/11/21 1658    sodium chloride flush 0.9 % injection 10 mL  10 mL Intravenous 2 times per day Ish Cochran MD   10 mL at 01/11/21 2157    sodium chloride flush 0.9 % injection 10 mL  10 mL Intravenous PRN Ish Cochran MD        promethazine (PHENERGAN) tablet 12.5 mg  12.5 mg Oral Q6H PRN Ish Cochran MD        Or    ondansetron [facility]) injection 4 mg  4 mg Intravenous Q6H PRN Ish Cochran MD        polyethylene glycol (GLYCOLAX) packet 17 g  17 g Oral Daily PRN Ish Cochran MD        acetaminophen (TYLENOL) tablet 650 mg  650 mg Oral Q6H PRN Ish Cochran MD   650 mg at 01/08/21 1243    Or    acetaminophen (TYLENOL) suppository 650 mg  650 mg Rectal Q6H PRN Ish Cochran MD        potassium chloride (KLOR-CON M) extended release tablet 40 mEq  40 mEq Oral PRN Shantell Pittman MD        Or    potassium bicarb-citric acid (EFFER-K) effervescent tablet 40 mEq  40 mEq Oral PRN Shantell Pittman MD        Or    potassium chloride 10 mEq/100 mL IVPB (Peripheral Line)  10 mEq Intravenous PRN Shantell Pittman MD        magnesium sulfate 2 g in 50 mL IVPB premix  2 g Intravenous PRN Shantell Pittman MD        atorvastatin (LIPITOR) tablet 10 mg  10 mg Oral Nightly Shantell Pittman MD   10 mg at 01/11/21 2132    isosorbide mononitrate (IMDUR) extended release tablet 60 mg  60 mg Oral Daily Librado MD Pelon   60 mg at 01/11/21 1008    furosemide (LASIX) tablet 40 mg  40 mg Oral Daily Librado MD Pelon   40 mg at 01/11/21 1008       Allergies:     Allergies   Allergen Reactions    Penicillins Rash    Sulfa Antibiotics Rash       Problem List:    Patient Active Problem List   Diagnosis Code    Hyperlipidemia E78.5    Asthma J45.909    Smoker F17.200    Allergic rhinitis due to other allergen J30.89    Collagenous colitis K52.831    Lactose intolerance E73.9    HTN (hypertension) I10    COPD, mild (HCC) J44.9    Atrial fibrillation with rapid ventricular response (HCC) I48.91    Atrial fibrillation (HCC) I48.91    Coronary artery disease involving native coronary artery I25.10       Past Medical History:        Diagnosis Date    Asthma     Collagenous colitis 2014       repeat  in  2024    Colon polyps 2000    COPD, mild (HCC)     Eczema of hand     HTN (hypertension)     Hyperlipidemia     Smoker        Past Surgical History:        Procedure Laterality Date    COLONOSCOPY  2011,6/30/14    nba cardenas-no polyps repeat 10 yr    HERNIA REPAIR      NASAL SINUS SURGERY  6/2008    polyps    OTHER SURGICAL HISTORY  DEC 7TH 2012 DR [address] [place]     IGS ENDOSCOPIC BI LAT MAXILLARY, ETHMOID, SPHENOID, FRONTAL SINUSOTOMY WITH SEPTOPLASTY AND TURBINOPLASTIES    SKIN CANCER EXCISION  9-2014    Left side of Forehead     TOOTH EXTRACTION  02/2017       Social History:    Social History     Tobacco Use    Smoking status: Current Every Day Smoker     Packs/day: 1.00     Years: 40.00     Pack years: 40.00     Types: Cigarettes    Smokeless tobacco: Never Used   Substance Use Topics    Alcohol use: Yes     Alcohol/week: 0.0 standard drinks     Comment: very little                                Ready to quit: Not Answered  Counseling given: Not Answered      Vital Signs (Current):   Vitals:    01/11/21 2330 01/12/21 0500 01/12/21 0505 01/12/21 0615   BP: 114/75 107/70 111/70 120/78   Pulse: 79 82     Resp: 17 18     Temp: 97.8 °F (36.6 °C) 97.9 °F (36.6 °C)     TempSrc: Oral Oral     SpO2: 96% 96%     Weight:   146 lb 14.4 oz (66.6 kg)    Height:   5' 9\" (1.753 m)                                               BP Readings from Last 3 Encounters:   01/12/21 120/78   12/29/20 138/82   06/30/20 (!) 148/66       NPO Status:                                                                                 BMI:   Wt Readings from Last 3 Encounters:   01/12/21 146 lb 14.4 oz (66.6 kg)   12/29/20 155 lb 8 oz (70.5 kg)   06/30/20 157 lb (71.2 kg)     Body mass index is 21.69 kg/m².     CBC:   Lab Results   Component Value Date    WBC 8.3 01/11/2021    RBC 4.35 01/11/2021    RBC 4.84 11/07/2011    HGB 15.1 01/11/2021    HCT 44.3 01/11/2021    .8 01/11/2021    RDW 13.3 06/07/2018     01/11/2021       CMP:   Lab Results   Component Value Date     01/11/2021    K 4.6 01/11/2021    K 5.3 01/09/2021    CL 99 01/11/2021    CO2 28 01/11/2021    BUN 18 01/11/2021    CREATININE 1.2 01/11/2021    LABGLOM 60 01/11/2021    GLUCOSE 136 01/11/2021    GLUCOSE 94 11/07/2011    PROT 7.2 01/11/2021    CALCIUM 9.8 01/11/2021    BILITOT 0.4 01/11/2021    ALKPHOS 54 01/11/2021    AST 95 01/11/2021    ALT 82 01/11/2021       POC Tests:   Recent Labs     01/12/21  0618   POCGLU 94 Coags:   Lab Results   Component Value Date    INR 1.13 01/11/2021    APTT 38.2 01/12/2021       HCG (If Applicable): No results found for: PREGTESTUR, PREGSERUM, HCG, HCGQUANT     ABGs: No results found for: PHART, PO2ART, BMF9IZV, QGW1XVZ, BEART, B7KKBJWU     Type & Screen (If Applicable):  Lab Results   Component Value Date    LABRH POS 01/11/2021       Drug/Infectious Status (If Applicable):  No results found for: HIV, HEPCAB    COVID-19 Screening (If Applicable):   Lab Results   Component Value Date    COVID19 NOT DETECTED 01/11/2021         Anesthesia Evaluation   no history of anesthetic complications:   Airway: Mallampati: II  TM distance: >3 FB   Neck ROM: full  Mouth opening: > = 3 FB Dental:          Pulmonary:normal exam    (+) COPD:            Patient did not smoke on day of surgery. Cardiovascular:  Exercise tolerance: good (>4 METS),   (+) hypertension:, CAD:, dysrhythmias: atrial fibrillation,         Rhythm: irregular  Rate: normal      Cleared by cardiology              Neuro/Psych:   Negative Neuro/Psych ROS              GI/Hepatic/Renal: Neg GI/Hepatic/Renal ROS            Endo/Other: Negative Endo/Other ROS             Pt had no PAT visit       Abdominal:           Vascular: negative vascular ROS. Anesthesia Plan      general     ASA 3       Induction: intravenous. arterial line, BIS, central line, PA catheter and DEJAH  MIPS: Postoperative ventilation. Anesthetic plan and risks discussed with patient. Use of blood products discussed with patient whom consented to blood products. Plan discussed with CRNA.                   Yann Pino MD   1/12/2021

## 2021-01-12 NOTE — CARE COORDINATION
DISASTER CHARTING    1/12/21, 11:00 AM EST    DISCHARGE ONGOING EVALUATION:     Serina Nam day: 6  Location: [facility] OR (General) POOL R* Reason for admit: Atrial fibrillation with rapid ventricular response ([address] [I48.91]  Atrial fibrillation with RVR ([address] [I48.91]   Barriers to Discharge:   A-fib.  1/8 Cardiac Cath w multi-vessel disease; CTS plans CABG/Maze procedure today; updated Linh, ICU CM  PCP: Lali Martínez MD  Patient Goals/Plan/Treatment Preferences: plans home w spouse and new HH (nsg, therapy), new [facility] referral made today, Cardiac Rehab, CHF RN following; collaborated w Kamille Blake, [address]

## 2021-01-12 NOTE — ANESTHESIA PROCEDURE NOTES
Central Venous Line:    A central venous line was placed using ultrasound guidance, in the OR for the following indication(s): central venous access and CVP monitoring. Sterility preparation included the following: hand hygiene performed prior to procedure, maximum sterile barriers used and sterile technique used to drape from head to toe. The patient was placed in Trendelenburg position. The right internal jugular vein was prepped. The site was prepped with Chloraprep. A 9 Fr (size), 10 (length), introducer single lumen was placed. During the procedure, the following specific steps were taken: target vein identified, needle advanced into vein and blood aspirated and guidewire advanced into vein. Intravenous verification was obtained by ultrasound, venous blood return and DEJAH. Post insertion care included: all ports aspirated, all ports flushed easily, guidewire removed intact, Biopatch applied, line sutured in place and dressing applied. During the procedure the patient experienced: patient tolerated procedure well with no complications. Insertion site scrubbed per usage guidelines?: Yes  Skin prep agent dried for 3 minutes prior to procedure?:yes  Anesthesia type: generalA(n) non-oximetric, 7.5 (size) Pulmonary Artery Catheter (PAC) was placed through the Introducer CVL in the right internal jugular vein. The PAC placement was confirmed by pressure tracing changes and DEJAH. The patient experienced the following events during the procedure: dysrhythmias and Patient developed episode of complete heart block. Catheter retracted and patient converted to sinus bradycardia. Bhargavi JACKSON Cath placed?: Yes  Staffing  Performed: Anesthesiologist   Anesthesiologist: Adriano Brandon MD  Preanesthetic Checklist  Completed: patient identified, IV checked, site marked, risks and benefits discussed, surgical consent, monitors and equipment checked, pre-op evaluation, timeout performed, anesthesia consent given, oxygen available and patient being monitored

## 2021-01-12 NOTE — OP NOTE
Operative Note      Patient: Xander Wallace  YOB: 1950  MRN: [id number]    Date of Procedure: 1/12/2021    Pre-Op Diagnosis: Unstable angina due to left main coronary artery stenosis with reduced left ventricular function to 25%, and paroxysmal atrial fibrillation. Post-Op Diagnosis: Unstable angina due to left main coronary artery stenosis with reduced left ventricular function to 25%, and paroxysmal atrial fibrillation       Procedure(s):  CABG  X 3 WITH DEJAH, Atrial Appendage Clip    a. Left internal mammary artery to left anterior descending artery. b.  Aorto-right coronary artery bypass grafting with a reversed greater saphenous vein graft. c.  Aorto-ramus intermedius branch bypass grafting with a reversed greater saphenous vein graft. d.  Left atrial appendage closure with a AtriCure clip. Surgeon(s):  Anushka Barreto MD    Assistant:   Physician Assistant: Isela Quick PA-C; Zach Hoffman PA-C   Because of the complexity of the procedure, 2 physician assistants scrubbed throughout the case. Anesthesia: General    Estimated Blood Loss (mL): 500 cc and Cell Saver    Complications: None. Specimen: None. Implants:  Implant Name Type Inv.  Item Serial No.  Lot No. LRB No. Used Action   CLIP LIG SM TI 6 ASHA HNDL FOR OPN AND ENDOSCP SGL APPL  CLIP LIG SM TI 6 ASHA HNDL FOR OPN AND ENDOSCP SGL APPL  seedchange [employer]-WD [id number] N/A 13 Implanted   CLIP LIG M TI 6 CARY HNDL FOR OPN AND ENDOSCP SGL APPL  CLIP LIG M TI 6 CARY HNDL FOR OPN AND ENDOSCP SGL APPL  seedchange [employer]-WD [id number] N/A 6 Implanted   ZINACTIVE USE [id number] LEAD PACE L475MM CHN A OR V MYOCARDIAL STEROID ELUT CARY  ZINACTIVE USE [id number] LEAD PACE L475MM CHN A OR V MYOCARDIAL STEROID ELUT CARY  [employer] CARDIAC SURGERY- [id number] N/A 1 Implanted   ZINACTIVE USE [id number] LEAD PACE L475MM CHN A OR V MYOCARDIAL STEROID ELUT CARY  ZINACTIVE USE [id number] LEAD PACE L475MM CHN A OR V MYOCARDIAL STEROID ELUT CARY  [employer] CARDIAC SURGERY- [id number] N/A 1 Implanted   DEVICE OCCL CLP L40MM PLUNG GRP FLX SHFT FOR GILLINOV  DEVICE OCCL CLP L40MM PLUNG GRP FLX SHFT FOR GILLINOV  ATRICURE INCEssentia Health [id number] N/A 1 Implanted         Drains:   Chest Tube 1 Anterior Pleural (Active)   Suction -20 cm H2O 01/12/21 1541   Chest Tube Airleak No 01/12/21 1541   Drainage Description Bright red 01/12/21 1541   Dressing Status Clean;Dry; Intact 01/12/21 1541   Dressing Type Dry dressing 01/12/21 1541   Site Assessment Not assessed 01/12/21 1541   Surrounding Skin Unable to view 01/12/21 1541   Output (ml) 2 ml 01/12/21 1630       Chest Tube 2 Anterior Mediastinal (Active)   Suction -20 cm H2O 01/12/21 1541   Chest Tube Airleak No 01/12/21 1541   Drainage Description Bright red 01/12/21 1541   Dressing Status Clean;Dry; Intact 01/12/21 1541   Dressing Type Dry dressing 01/12/21 1541   Site Assessment Not assessed 01/12/21 1541   Surrounding Skin Unable to view 01/12/21 1541   Output (ml) 9 ml 01/12/21 1630       Urethral Catheter 16 fr (Active)   Catheter Indications Perioperative use in selected surgeries including but not limited to urologic, pelvic or need for intraoperative monitoring of urinary output due to prolonged surgery, large volume infusion or need for diuretic therapy in surgery 01/12/21 1541   Site Assessment Pink 01/12/21 1541   Urine Color Yellow 01/12/21 1541   Urine Appearance Clear 01/12/21 1541   Output (mL) 20 mL 01/12/21 1630       Findings: Diffuse hypokinesis preoperatively, but improved after completion of the bypass grafting. Left internal mammary artery was 1.5 mm in diameter, has an excellent blood flow. Greater saphenous vein was found to be small and mildly calcified. Detailed Description of Procedure:   Patient was brought into the operating room and was placed in a supine position. After routine preparation, and general endotracheal anesthesia, he was prepped and draped in a straight fashion.     A median sternotomy incision was made and the heart was exposed. Pericardium was opened. Small amount of pericardial effusion was noted. No evidence of old myocardial infarction scar. No dizziness at the pericardial needle. Left pleural space was entered. Left internal mammary artery was taken down as a pedicled graft. It was divided distally after heparin was given. One chest tube was inserted to the left pleural space. The second surgical team harvested greater saphenous vein using endoscopic technique. The vein was taken out and was prepared for bypass grafting. The leg incision was closed later using absorbable sutures. A cardiopulmonary bypass circuit was established with a 22 [place] cannula in the distal portion of ascending aorta, a two-stage venous cannula through the right atrium auricular appendage, and an aortic root vent in the ascending aorta. He was carried onto cardiopulmonary bypass. The aorta was crossclamped and cardioplegia solution given through the aortic root. Local cooling was also obtained. Throughout the procedure, approximately 300 cc of cold blood cardioplegia was given at the completion of each distal anastomosis. The first the distal anastomosis was done in the distal portion of the right coronary artery anastomosing with a reversed greater saphenous graft. The right coronary artery was found to be a codominant system and small in diameter. It was only 1.2 mm in diameter. Blood flow was measured through the cardioplegia delivery system, which shows 50 cc/min. Second distal anastomosis was done in the ramus intermedius branch with a reversed greater saphenous vein graft. The ramus intermedius branch was found to be 1.6 mm in diameter. Blood flow through the graft was quite excellent. The left atrial appendage was closed with AtriCure clip.     The third the distal anastomosis was done in the proximal portion of the left anterior descending artery with the left internal mammary artery pedicled graft. The midportion of left anterior descending artery was found to be inside of myocardium. And distal one third was appeared on the epicardial surface. The proximal portion of left anterior descending artery was exposed near the diagonal branch bifurcation. From this area, dissection was continued distally until soft spot was found. An small atriotomy opening was made and anastomosis was performed. The aortic cross-clamp was released. The heart was defibrillated, and was paced temporarily. Ascending aorta was partially occluded with a side-biting clamp after complete de-airing. Two proximal openings were made. The saphenous vein graft was cut in length, beveled, and was anastomosed to a separate opening in an end-to-side fashion. Complete hemostasis was confirmed. Two temporary pacing wires were inserted. One to the right atrium and one to right ventricle. Cardiopulmonary bypass was weaned off without difficulty. Venous cannula was removed. Protamine was given to reverse the heparin. The arterial cannula was removed later. Upper half of the pericardium was closed. Lower half was left open. One additional large bore chest tube was inserted to the mediastinum. Sternum was closed with stainless steel wires. Total 8 wires were used in a simple fashion. Subcutaneous layer, fascia, and skin were closed with absorbable suture. An occlusive dressing was applied. He tolerated the procedure well. He was then transferred to the intensive care unit in a stable condition.             Electronically signed by Jonas Amaral MD on 1/12/2021 at 4:59 PM

## 2021-01-12 NOTE — ANESTHESIA PROCEDURE NOTES
Arterial Line:    An arterial line was placed using surface landmarks, in the OR for the following indication(s): continuous blood pressure monitoring and blood sampling needed. A 20 gauge (size), (length), Arrow (type) catheter was placed, Seldinger technique used, into the left radial artery, secured by tape, suture and Tegaderm. Anesthesia type: General    Events:  patient tolerated procedure well with no complications.   Anesthesiologist: Yue Bustamante MD  Performed: Anesthesiologist   Preanesthetic Checklist  Completed: patient identified, IV checked, site marked, risks and benefits discussed, surgical consent, monitors and equipment checked, pre-op evaluation, timeout performed, anesthesia consent given, oxygen available and patient being monitored

## 2021-01-12 NOTE — PROGRESS NOTES
[facility] Adult Cardiac Surgery Database Version 4.20 RISK SCORES     Procedure: Isolated CAB CALCULATE     Risk of Mortality:  2.953%    Renal Failure:  2.039%    Permanent Stroke:  1.770%    Prolonged Ventilation:  14.065%    DSW Infection:  0.185%    Reoperation:  3.420%    Morbidity or Mortality:  18.738%    Short Length of Stay:  29.225%    Long Length of Stay:  10.189%

## 2021-01-12 NOTE — ANESTHESIA PROCEDURE NOTES
Procedure Performed: DEJAH     Start Time:        End Time:      Preanesthesia Checklist:  Patient identified, IV assessed, risks and benefits discussed, monitors and equipment assessed, procedure being performed at surgeon's request and anesthesia consent obtained. General Procedure Information  Diagnostic Indications for Echo:  assessment of ascending aorta, assessment of surgical repair, hemodynamic monitoring and assessment of valve function  Physician Requesting Echo: Nancy Medina MD  CPT Code:  [id number] [id number]  Location performed:  OR  Intubated  Bite block placed  Heart visualized  Probe Insertion:  Easy  Probe Type:  3D  Modalities:  2D only, color flow mapping, continuous wave Doppler, pulse wave Doppler and M-mode    Echocardiographic and Doppler Measurements    Ventricles    Right Ventricle:  Cavity size normal.    Left Ventricle:  Cavity size normal.  Thrombus not present. Global Function mildly impaired. Ejection Fraction 45%. Ventricular Regional Function:  1- Basal Anteroseptal:  hypokinetic  2- Basal Anterior:  hypokinetic  3- Basal Anterolateral:  hypokinetic  4- Basal Inferolateral:  hypokinetic  5- Basal Inferior:  hypokinetic  6- Basal Inferoseptal:  hypokinetic  7- Mid Anteroseptal:  hypokinetic  8- Mid Anterior:  hypokinetic  9- Mid Anterolateral:  hypokinetic  10- Mid Inferolateral:  hypokinetic  11- Mid Inferior:  hypokinetic  12- Mid Inferoseptal:  hypokinetic  13- Apical Anterior:  hypokinetic  14- Apical Lateral:  hypokinetic  15- Apical Inferior:  hypokinetic  16- Apical Septal:  hypokinetic  17- [place]:  hypokinetic      Valves    Aortic Valve: Annulus normal.  Stenosis not present. Regurgitation none. Leaflets normal.  Leaflet motions normal.      Mitral Valve: Annulus normal.  Stenosis not present. Regurgitation mild. Leaflets normal.  Leaflet motions normal.      Tricuspid Valve: Annulus normal.  Stenosis not present. Regurgitation none.   Leaflets normal.  Leaflet motions normal.    Pulmonic Valve: Annulus normal.  Stenosis not present. Regurgitation mild. Aorta    Ascending Aorta:  Size normal.  Dissection not present. Aortic Arch:  Size normal.  Dissection not present. Descending Aorta:  Size normal.  Dissection not present. Atria    Right Atrium:  Size normal.    Left Atrium:  Size normal.  Spontaneous echo contrast not present. Thrombus not present. Left atrial appendage normal.      Septa    Atrial Septum:  Intra-atrial septal morphology normal.      Ventricular Septum:  Intra-ventricular septum morphology normal.          Other Findings  Pericardium:  normal      Anesthesia Information  Performed Personally  Anesthesiologist:  Enrique Fernandes MD      Echocardiogram Comments:       ASSISTED DE AIRING PROCESS. ASSISTED WEAN OFFCPB. LV FUNCTION NO RWMA, EF 45%  STROKE VOLUME 54 M, CO = 55 X 80 = 4.5 L/MIN  TRACE MR  OBLITERATED ZAHRA.   FINDINGS DISCUSSED WITH DR Miller Plants

## 2021-01-13 ENCOUNTER — APPOINTMENT (OUTPATIENT)
Dept: GENERAL RADIOLOGY | Age: 71
DRG: 233 | End: 2021-01-13
Payer: MEDICARE

## 2021-01-13 LAB
ANION GAP SERPL CALCULATED.3IONS-SCNC: 11 MEQ/L (ref 8–16)
ANION GAP SERPL CALCULATED.3IONS-SCNC: 14 MEQ/L (ref 8–16)
BUN BLDV-MCNC: 18 MG/DL (ref 7–22)
BUN BLDV-MCNC: 26 MG/DL (ref 7–22)
CALCIUM IONIZED: 1.01 MMOL/L (ref 1.12–1.32)
CALCIUM IONIZED: 1.08 MMOL/L (ref 1.12–1.32)
CALCIUM SERPL-MCNC: 8 MG/DL (ref 8.5–10.5)
CALCIUM SERPL-MCNC: 8.4 MG/DL (ref 8.5–10.5)
CHLORIDE BLD-SCNC: 100 MEQ/L (ref 98–111)
CHLORIDE BLD-SCNC: 104 MEQ/L (ref 98–111)
CO2: 19 MEQ/L (ref 23–33)
CO2: 24 MEQ/L (ref 23–33)
CREAT SERPL-MCNC: 1.5 MG/DL (ref 0.4–1.2)
CREAT SERPL-MCNC: 2.3 MG/DL (ref 0.4–1.2)
EKG ATRIAL RATE: 69 BPM
EKG ATRIAL RATE: 78 BPM
EKG P AXIS: 81 DEGREES
EKG P AXIS: 89 DEGREES
EKG P-R INTERVAL: 144 MS
EKG P-R INTERVAL: 154 MS
EKG Q-T INTERVAL: 434 MS
EKG Q-T INTERVAL: 434 MS
EKG QRS DURATION: 92 MS
EKG QRS DURATION: 94 MS
EKG QTC CALCULATION (BAZETT): 465 MS
EKG QTC CALCULATION (BAZETT): 494 MS
EKG R AXIS: 59 DEGREES
EKG R AXIS: 85 DEGREES
EKG T AXIS: 61 DEGREES
EKG T AXIS: 92 DEGREES
EKG VENTRICULAR RATE: 69 BPM
EKG VENTRICULAR RATE: 78 BPM
ERYTHROCYTE [DISTWIDTH] IN BLOOD BY AUTOMATED COUNT: 13 % (ref 11.5–14.5)
ERYTHROCYTE [DISTWIDTH] IN BLOOD BY AUTOMATED COUNT: 49.8 FL (ref 35–45)
GFR SERPL CREATININE-BSD FRML MDRD: 28 ML/MIN/1.73M2
GFR SERPL CREATININE-BSD FRML MDRD: 46 ML/MIN/1.73M2
GLUCOSE BLD-MCNC: 107 MG/DL (ref 70–108)
GLUCOSE BLD-MCNC: 108 MG/DL (ref 70–108)
GLUCOSE BLD-MCNC: 110 MG/DL (ref 70–108)
GLUCOSE BLD-MCNC: 110 MG/DL (ref 70–108)
GLUCOSE BLD-MCNC: 114 MG/DL (ref 70–108)
GLUCOSE BLD-MCNC: 114 MG/DL (ref 70–108)
GLUCOSE BLD-MCNC: 118 MG/DL (ref 70–108)
GLUCOSE BLD-MCNC: 126 MG/DL (ref 70–108)
GLUCOSE BLD-MCNC: 127 MG/DL (ref 70–108)
GLUCOSE BLD-MCNC: 148 MG/DL (ref 70–108)
GLUCOSE BLD-MCNC: 164 MG/DL (ref 70–108)
GLUCOSE BLD-MCNC: 167 MG/DL (ref 70–108)
GLUCOSE BLD-MCNC: 63 MG/DL (ref 70–108)
GLUCOSE BLD-MCNC: 91 MG/DL (ref 70–108)
HCT VFR BLD CALC: 26.2 % (ref 42–52)
HEMOGLOBIN: 8.7 GM/DL (ref 14–18)
MAGNESIUM: 2.8 MG/DL (ref 1.6–2.4)
MAGNESIUM: 2.9 MG/DL (ref 1.6–2.4)
MCH RBC QN AUTO: 34.9 PG (ref 26–33)
MCHC RBC AUTO-ENTMCNC: 33.2 GM/DL (ref 32.2–35.5)
MCV RBC AUTO: 105.2 FL (ref 80–94)
MRSA SCREEN: NORMAL
PLATELET # BLD: 109 THOU/MM3 (ref 130–400)
PMV BLD AUTO: 10.8 FL (ref 9.4–12.4)
POTASSIUM SERPL-SCNC: 4.9 MEQ/L (ref 3.5–5.2)
POTASSIUM SERPL-SCNC: 5.1 MEQ/L (ref 3.5–5.2)
POTASSIUM SERPL-SCNC: 5.3 MEQ/L (ref 3.5–5.2)
RBC # BLD: 2.49 MILL/MM3 (ref 4.7–6.1)
SODIUM BLD-SCNC: 133 MEQ/L (ref 135–145)
SODIUM BLD-SCNC: 139 MEQ/L (ref 135–145)
WBC # BLD: 17.8 THOU/MM3 (ref 4.8–10.8)

## 2021-01-13 PROCEDURE — 94640 AIRWAY INHALATION TREATMENT: CPT

## 2021-01-13 PROCEDURE — 82330 ASSAY OF CALCIUM: CPT

## 2021-01-13 PROCEDURE — 71045 X-RAY EXAM CHEST 1 VIEW: CPT

## 2021-01-13 PROCEDURE — 93005 ELECTROCARDIOGRAM TRACING: CPT | Performed by: PHYSICIAN ASSISTANT

## 2021-01-13 PROCEDURE — 6370000000 HC RX 637 (ALT 250 FOR IP): Performed by: PHYSICIAN ASSISTANT

## 2021-01-13 PROCEDURE — 97530 THERAPEUTIC ACTIVITIES: CPT

## 2021-01-13 PROCEDURE — 2500000003 HC RX 250 WO HCPCS: Performed by: THORACIC SURGERY (CARDIOTHORACIC VASCULAR SURGERY)

## 2021-01-13 PROCEDURE — 80048 BASIC METABOLIC PNL TOTAL CA: CPT

## 2021-01-13 PROCEDURE — 85027 COMPLETE CBC AUTOMATED: CPT

## 2021-01-13 PROCEDURE — 97166 OT EVAL MOD COMPLEX 45 MIN: CPT

## 2021-01-13 PROCEDURE — 36415 COLL VENOUS BLD VENIPUNCTURE: CPT

## 2021-01-13 PROCEDURE — 6370000000 HC RX 637 (ALT 250 FOR IP): Performed by: FAMILY MEDICINE

## 2021-01-13 PROCEDURE — 2000000000 HC ICU R&B

## 2021-01-13 PROCEDURE — 84132 ASSAY OF SERUM POTASSIUM: CPT

## 2021-01-13 PROCEDURE — 2500000003 HC RX 250 WO HCPCS: Performed by: PHYSICIAN ASSISTANT

## 2021-01-13 PROCEDURE — 6370000000 HC RX 637 (ALT 250 FOR IP): Performed by: THORACIC SURGERY (CARDIOTHORACIC VASCULAR SURGERY)

## 2021-01-13 PROCEDURE — 94761 N-INVAS EAR/PLS OXIMETRY MLT: CPT

## 2021-01-13 PROCEDURE — 37799 UNLISTED PX VASCULAR SURGERY: CPT

## 2021-01-13 PROCEDURE — 2580000003 HC RX 258: Performed by: PHYSICIAN ASSISTANT

## 2021-01-13 PROCEDURE — 6360000002 HC RX W HCPCS: Performed by: PHYSICIAN ASSISTANT

## 2021-01-13 PROCEDURE — 97110 THERAPEUTIC EXERCISES: CPT

## 2021-01-13 PROCEDURE — 94660 CPAP INITIATION&MGMT: CPT

## 2021-01-13 PROCEDURE — 97163 PT EVAL HIGH COMPLEX 45 MIN: CPT

## 2021-01-13 PROCEDURE — 83735 ASSAY OF MAGNESIUM: CPT

## 2021-01-13 PROCEDURE — 2700000000 HC OXYGEN THERAPY PER DAY

## 2021-01-13 PROCEDURE — 82948 REAGENT STRIP/BLOOD GLUCOSE: CPT

## 2021-01-13 RX ORDER — AMIODARONE HYDROCHLORIDE 200 MG/1
200 TABLET ORAL ONCE
Status: COMPLETED | OUTPATIENT
Start: 2021-01-13 | End: 2021-01-13

## 2021-01-13 RX ORDER — ALBUTEROL SULFATE 2.5 MG/3ML
2.5 SOLUTION RESPIRATORY (INHALATION) 3 TIMES DAILY
Status: DISCONTINUED | OUTPATIENT
Start: 2021-01-13 | End: 2021-01-21 | Stop reason: HOSPADM

## 2021-01-13 RX ORDER — ALBUTEROL SULFATE 2.5 MG/3ML
2.5 SOLUTION RESPIRATORY (INHALATION) EVERY 4 HOURS PRN
Status: DISCONTINUED | OUTPATIENT
Start: 2021-01-13 | End: 2021-01-21 | Stop reason: HOSPADM

## 2021-01-13 RX ORDER — AMIODARONE HYDROCHLORIDE 200 MG/1
200 TABLET ORAL ONCE
Status: DISCONTINUED | OUTPATIENT
Start: 2021-01-13 | End: 2021-01-15

## 2021-01-13 RX ORDER — ALBUMIN, HUMAN INJ 5% 5 %
25 SOLUTION INTRAVENOUS PRN
Status: DISCONTINUED | OUTPATIENT
Start: 2021-01-13 | End: 2021-01-13

## 2021-01-13 RX ADMIN — SODIUM CHLORIDE, PRESERVATIVE FREE 10 ML: 5 INJECTION INTRAVENOUS at 08:49

## 2021-01-13 RX ADMIN — CEFAZOLIN 2 G: 10 INJECTION, POWDER, FOR SOLUTION INTRAVENOUS at 04:22

## 2021-01-13 RX ADMIN — MORPHINE SULFATE 2 MG: 2 INJECTION, SOLUTION INTRAMUSCULAR; INTRAVENOUS at 09:08

## 2021-01-13 RX ADMIN — INSULIN LISPRO 2 UNITS: 100 INJECTION, SOLUTION INTRAVENOUS; SUBCUTANEOUS at 19:01

## 2021-01-13 RX ADMIN — OXYCODONE HYDROCHLORIDE 10 MG: 5 TABLET ORAL at 12:47

## 2021-01-13 RX ADMIN — SODIUM CHLORIDE, PRESERVATIVE FREE 10 ML: 5 INJECTION INTRAVENOUS at 20:58

## 2021-01-13 RX ADMIN — ALBUTEROL SULFATE 2.5 MG: 2.5 SOLUTION RESPIRATORY (INHALATION) at 17:30

## 2021-01-13 RX ADMIN — CALCIUM GLUCONATE 1 G: 98 INJECTION, SOLUTION INTRAVENOUS at 21:58

## 2021-01-13 RX ADMIN — AMIODARONE HYDROCHLORIDE 1 MG/MIN: 1.8 INJECTION, SOLUTION INTRAVENOUS at 14:50

## 2021-01-13 RX ADMIN — AMIODARONE HYDROCHLORIDE 200 MG: 200 TABLET ORAL at 12:46

## 2021-01-13 RX ADMIN — DEXTROSE MONOHYDRATE 7.5 G: 25 INJECTION, SOLUTION INTRAVENOUS at 08:39

## 2021-01-13 RX ADMIN — CEFAZOLIN 2 G: 10 INJECTION, POWDER, FOR SOLUTION INTRAVENOUS at 12:47

## 2021-01-13 RX ADMIN — CEFAZOLIN 2 G: 10 INJECTION, POWDER, FOR SOLUTION INTRAVENOUS at 20:55

## 2021-01-13 RX ADMIN — GLYCOPYRROLATE AND FORMOTEROL FUMARATE 2 PUFF: 9; 4.8 AEROSOL, METERED RESPIRATORY (INHALATION) at 20:16

## 2021-01-13 RX ADMIN — BISACODYL 10 MG: 5 TABLET ORAL at 08:50

## 2021-01-13 RX ADMIN — AMIODARONE HYDROCHLORIDE 200 MG: 200 TABLET ORAL at 08:50

## 2021-01-13 RX ADMIN — GLYCOPYRROLATE AND FORMOTEROL FUMARATE 2 PUFF: 9; 4.8 AEROSOL, METERED RESPIRATORY (INHALATION) at 09:04

## 2021-01-13 RX ADMIN — ATORVASTATIN CALCIUM 10 MG: 10 TABLET, FILM COATED ORAL at 20:57

## 2021-01-13 RX ADMIN — ALBUTEROL SULFATE 2.5 MG: 2.5 SOLUTION RESPIRATORY (INHALATION) at 13:28

## 2021-01-13 RX ADMIN — AMIODARONE HYDROCHLORIDE 150 MG: 1.5 INJECTION, SOLUTION INTRAVENOUS at 14:22

## 2021-01-13 RX ADMIN — ACETAMINOPHEN 650 MG: 325 TABLET ORAL at 20:57

## 2021-01-13 RX ADMIN — CALCIUM GLUCONATE 1 G: 98 INJECTION, SOLUTION INTRAVENOUS at 04:56

## 2021-01-13 RX ADMIN — FAMOTIDINE 20 MG: 10 INJECTION INTRAVENOUS at 08:48

## 2021-01-13 RX ADMIN — ASPIRIN 81 MG: 81 TABLET, CHEWABLE ORAL at 08:46

## 2021-01-13 RX ADMIN — BISACODYL 10 MG: 5 TABLET ORAL at 20:56

## 2021-01-13 RX ADMIN — FAMOTIDINE 20 MG: 10 INJECTION INTRAVENOUS at 20:56

## 2021-01-13 RX ADMIN — AMIODARONE HYDROCHLORIDE 0.5 MG/MIN: 1.8 INJECTION, SOLUTION INTRAVENOUS at 20:57

## 2021-01-13 RX ADMIN — OXYCODONE HYDROCHLORIDE 10 MG: 5 TABLET ORAL at 08:46

## 2021-01-13 ASSESSMENT — PAIN DESCRIPTION - ORIENTATION
ORIENTATION: MID
ORIENTATION: MID

## 2021-01-13 ASSESSMENT — PAIN DESCRIPTION - PAIN TYPE
TYPE: SURGICAL PAIN

## 2021-01-13 ASSESSMENT — PAIN - FUNCTIONAL ASSESSMENT: PAIN_FUNCTIONAL_ASSESSMENT: ACTIVITIES ARE NOT PREVENTED

## 2021-01-13 ASSESSMENT — ENCOUNTER SYMPTOMS
ABDOMINAL DISTENTION: 0
BACK PAIN: 0
DIARRHEA: 0
NAUSEA: 0
EYE DISCHARGE: 0
CHEST TIGHTNESS: 0
WHEEZING: 0
APNEA: 0
VOMITING: 0
SHORTNESS OF BREATH: 0

## 2021-01-13 ASSESSMENT — PAIN DESCRIPTION - LOCATION
LOCATION: STERNUM
LOCATION: CHEST
LOCATION: STERNUM

## 2021-01-13 ASSESSMENT — PAIN DESCRIPTION - FREQUENCY
FREQUENCY: CONTINUOUS
FREQUENCY: CONTINUOUS

## 2021-01-13 ASSESSMENT — PAIN DESCRIPTION - DESCRIPTORS
DESCRIPTORS: ACHING
DESCRIPTORS: ACHING;CONSTANT
DESCRIPTORS: ACHING;SHARP

## 2021-01-13 ASSESSMENT — PAIN SCALES - GENERAL: PAINLEVEL_OUTOF10: 9

## 2021-01-13 ASSESSMENT — PAIN DESCRIPTION - ONSET
ONSET: ON-GOING
ONSET: ON-GOING

## 2021-01-13 ASSESSMENT — PAIN DESCRIPTION - PROGRESSION
CLINICAL_PROGRESSION: NOT CHANGED
CLINICAL_PROGRESSION: GRADUALLY IMPROVING

## 2021-01-13 NOTE — FLOWSHEET NOTE
01/13/21 0935   Encounter Summary   Services provided to: Patient   Referral/Consult From: [facility]   Support System Spouse   Continue Visiting Yes  (1/13 NR)   Complexity of Encounter Low   Length of Encounter 15 minutes   Routine   Type Follow up   Assessment Sleeping   Intervention Prayer   Outcome Did not respond   During my (Post- heart surgery) encounter with the 79 yr old patient, I attempted to visit with the pt on ICU/4D . The patient appears to be resting now and I didnt want to disturb the patient. The pt was admitted due to coronary artery disease. I or another Meliza Wolff will attempt to visit the patient or the family at another time.

## 2021-01-13 NOTE — PROGRESS NOTES
[address] THERAPY  [facility] ICU 4D  EVALUATION    Time:   Time In: 7630  Time Out: 0840  Timed Code Treatment Minutes: 15 Minutes  Minutes: 30          Date: 2021  Patient Name: Chinedu Up,   Gender: male      MRN: [id number]  : 1950  (79 y.o.)  Referring Practitioner: Johnnie Ramirez PA-C  Diagnosis: Atrial Fibrillation with Rapid Ventricular Response  Additional Pertinent Hx: Pt presents with evaluation of sob that awoke him from sleep. He was supposed to be followed up by Dr. Antonio Krueger. He was found to have Afib last week. HR was 140s. First episode. He could not catch his breath. Pt underwent DEJAH and had CABG x 3 on 21. Restrictions/Precautions:  Restrictions/Precautions: Surgical Protocols  Position Activity Restriction  Sternal Precautions: No Pushing, No Pulling, 5# Lifting Restrictions  Other position/activity restrictions: Incision on RLE from the vein harvest    Subjective  Chart Reviewed: Yes, Orders, History and Physical    Subjective: Pleasant and cooperative. Pt was sitting in the chair at start of session. Comments: RN approved session. Pt agreed to work with therapy. Lethargy noted. He was C/O pain at his incision and chest tube sites once he had gotten up and returned to bed. Pt's nurse was able to provide pt with pain medication following session. Pain:  Pain Assessment  Patient Currently in Pain: Yes  Pain Assessment: Faces  Reid-Baker Pain Rating: Hurts even more  Pain Type: Surgical pain  Pain Location: Sternum; Chest  Pain Orientation: Mid  Pain Descriptors: Aching; Sharp  Pain Frequency: Continuous  Clinical Progression: Not changed  Patient's Stated Pain Goal: No pain  Response to Pain Intervention: Patient Satisfied  Multiple Pain Sites: No    Social/Functional History:  Lives With: Spouse  Type of Home: Apartment  Home Layout: One level  Home Access: Stairs to enter without rails  Entrance Stairs - Number of Steps: 1 step  Home Equipment: Cane   Bathroom Shower/Tub: Tub/Shower unit, Shower chair with back  [employer]: Standard  Bathroom Equipment: Grab bars in shower  Bathroom Accessibility: Accessible    Receives Help From: Family  ADL Assistance: [address]: Independent  Homemaking Responsibilities: Yes  Ambulation Assistance: Independent  Transfer Assistance: Independent    Active : Yes  Mode of Transportation: Car  Occupation: Full time employment  Type of occupation: [employer] for 48 years  Leisure & Hobbies: Follows sports  Additional Comments: Pt was independent prior to admission. He did not use any AD for ambulating. Pt spends alot of time of his feet while working. Pt sits if needed while taking a shower. Pt smokes . 5 ppd. Cognition/Orientation:  Overall Orientation Status: Impaired  Orientation Level: Oriented to place, Oriented to situation, Oriented to person, Disoriented to time  Overall Cognitive Status: Exceptions  Cognition Comment: Cues for safety awareness and problem solving current situation    ADL's:  Feeding: Minimal assistance(pt had several drinks from a cup and helped to hold into it at times while drinking from a straw)  Additional Comments: No other ADLs demonstrated. Pt has a turcios catheter. Vision - Basic Assessment  Prior Vision: Wears glasses only for reading    Functional Mobility:  Bed mobility  Sit to Supine: 2 Person assistance, Moderate assistance(help provided for his upper and lower body)  Comment: Pt did scoot while in supine to bring his shoulders into middle of bed. MAX A provided to bring his hips to middle of bed. Functional Mobility  Functional - Mobility Device: No device  Activity: Other(4 ft from chair to the bed)  Assist Level: Minimal assistance  Functional Mobility Comments: Pt walked with favoring of his LLE and forward posture noted. Pt had no LOB. Help needed to manage the multiple lines.      Balance:  Balance  Sitting Balance: Stand by assistance(at the edge of the chair while doing leg exercise and preparing to stand)  Standing Balance: Minimal assistance(preparing to take steps)  Standing Balance  Time: 30 seconds  Activity: preparing to take steps    Transfers:  Sit to stand: Minimal assistance(from recliner chair)  Stand to sit: Minimal assistance(to the chair)       Upper Extremity Assessment:Hand Dominance: Right  LUE AROM : WFL  Left Hand AROM: WFL  RUE AROM : WFL  Right Hand AROM: WFL    LUE Strength  L Hand General: 4/5  LUE Strength Comment: NT secondary to sternal precaution  RUE Strength  R Hand General: 4/5  RUE Strength Comment: NT secondary to sternal precaution    Sensation  Overall Sensation Status: Impaired(Tingling reported in his hands)  Fine Motor Skills  Fine Motor Comment: No difficulty noted with using B hands for holding onto his \"micha bear\" while moving or coughing. Activity Tolerance: Patient limited by pain, Patient limited by fatigue  Pt was using 4L of o2. He shows O2 saturation of 93%. Pt was breathing through his mouth most of the time. Cues needed to attempt pursed lip breathing. Pt stated that his pain was slightly better once settled in supine with head of bed elevated. His nurse was able to provide him with a pain medication at end of session. Assessment:  Assessment: Patient would benefit from continued skilled OT services to address above deficits. He presents with atrial fibrillation with rapid ventricular block. Pt underwent CABG x 3 on 1/12/21. He was independent prior to admission and did not use any AD for ambulation. He worked full time as a oates. Pt demonstrated doing leg exercises while in sitting with cues provided for the CABG Step II exercises. He transfers with MIN A from the recliner chair. MOD A x 2 for returning to supine when getting into bed. Pt followed sternal precautions well.   He had walked a few steps with MIN A and has an incision in his RLE.    Performance deficits / Impairments: Decreased functional mobility , Decreased ADL status, Decreased endurance, Decreased sensation, Decreased safe awareness, Decreased balance, Decreased strength  Prognosis: Good  REQUIRES OT FOLLOW UP: Yes  Decision Making: Medium Complexity    Treatment Initiated: Treatment and education initiated within context of evaluation. Evaluation time included review of current medical information, gathering information related to past medical, social and functional history, completion of standardized testing, formal and informal observation of tasks, assessment of data and development of plan of care and goals. Treatment time included skilled education and facilitation of tasks to increase safety and independence with ADL's for improved functional independence and quality of life. Discussed sternal precautions and the purpose of the \"micha bear\" to help him protect that sternal incision. Pt completed heel and toe raises x  6 Reps each with tactile and verbal cues provided. Pt has difficulty with pursed lip breathing. Pt indicated that he was not noticing that he had not smoked a cigarette. He stated that they had not given him a nicotine patch. Discharge Recommendations:  Continue to assess pending progress    Patient Education:  OT Education: OT Role, Plan of Care, Precautions  Patient Education: Benefit of using pursed lip breathing to help his lungs get more oxygen from the air he is breathing    Equipment Recommendations:  Equipment Needed: No    Plan:  Times per week: 6x- CABG  Current Treatment Recommendations: Functional Mobility Training, Endurance Training, Self-Care / ADL, Safety Education & Training, Strengthening, Balance Training, Patient/Caregiver Education & Training  Plan Comment: Pt would benefit from continued skilled OT services when medically stable and discharged from Acute.   He may need a stay on IP Rehab or a SNF prior to returning home.  Specific instructions for Next Treatment: Functional mobility; ADLs and standing tolerance; CABG Step II exercises; transfers and sternal precautions    Goals:  Patient goals : \"Get to feeling better and be able to return home. \" pt states. Short term goals  Time Frame for Short term goals: By discharge  Short term goal 1: Pt will demonstrate functional mobility walking to/from the bathroom with CGA while using any AD needed to prepare for doing self care at the sink. Short term goal 2: Pt will complete transfers to/from various surfaces with SBA while using rocking method and following sternal precautions to increase his independence with toileting routine. Short term goal 3: Pt will complete CABG Step II exercises x 12 reps each with cues as needed to increase his endurance and strength for ease of doing self care and functional mobility. Short term goal 4: Pt will tolerate standing ADLs for over 3 minutes while using 1-2 hand release with cues for pursed lip breathing to increase his endurance for ease of dressing or spongebathing. See long-term goal time frame for expected duration of plan of care. If no long-term goals established, a short length of stay is anticipated. Following session, patient left in safe position with all fall risk precautions in place.

## 2021-01-13 NOTE — CARE COORDINATION
DISASTER CHARTING    1/13/21, 10:46 AM EST    DISCHARGE ONGOING EVALUATION:     Chioma Lebron day: 7  Location: -04/004-A Reason for admit: Atrial fibrillation with rapid ventricular response ([facility] [address] [I48.91]  Atrial fibrillation with RVR ([facility] [address] [I48.91]     1/12 3v CABG, left atrial appendage clip  1/12 Intubated - 1/12 Extubated    Barriers to Discharge: POD #1. Extubated yesterday to bipap. Now on 2L O2 with sats 97%. Afebrile. NSR. Ox4. CT x2 to -20sx with 782 ml out since surgery. CR/PT/OT. Dietitian & HF RN consulted. Dietary ileus prevention protocol. Telemetry, I&O, daily weight, IS, sternal precautions, CT care, wound care, SCDs, turcios care, ambulate. Epi @ 1.5 mcg/min, insulin drip, Amio, nebs, asa, lipitor, IV ancef, pepcid, po lasix daily, inhaler, prn IV morphine, prn roxicodone, electrolyte replacement protocols. Creat up to 1.5, Mg 2.9, wbc 17.8, hgb 8.7, plt 109. PCP: Bhavya Kelley MD  Readmission Risk Score: 20  Patient Goals/Plan/Treatment Preferences: Home with wife and [facility]. Spoke with Marianela Pineda; states he does not have a walker, BSC, or shower chair/seat. He states his toilets at home are standard height; monitor for possible DME needs at discharge. SW on case.

## 2021-01-13 NOTE — PROGRESS NOTES
Progress Note  Date:2021       Room:[address] [place] [address]  Patient Kassi Du     YOB: 1950     Age:70 y.o. Subjective    Subjective:  Symptoms:  Stable. No shortness of breath, chest pain, weakness or diarrhea. Diet:  NPO. No nausea or vomiting. Activity level: Normal.    Pain:  He reports no pain. Review of Systems   Constitutional: Negative for activity change. Eyes: Negative for discharge. Respiratory: Negative for apnea, chest tightness, shortness of breath and wheezing. Cardiovascular: Negative for chest pain. Gastrointestinal: Negative for abdominal distention, diarrhea, nausea and vomiting. Genitourinary: Negative for decreased urine volume and difficulty urinating. Musculoskeletal: Negative for arthralgias and back pain. Neurological: Negative for tremors, facial asymmetry and weakness. Hematological: Negative for adenopathy. Objective         Vitals Last 24 Hours:  TEMPERATURE:  Temp  Av.7 °F (35.9 °C)  Min: 93.6 °F (34.2 °C)  Max: 99.1 °F (37.3 °C)  RESPIRATIONS RANGE: Resp  Av.5  Min: 15  Max: 32  PULSE OXIMETRY RANGE: SpO2  Av.1 %  Min: 81 %  Max: 100 %  PULSE RANGE: Pulse  Av.4  Min: 67  Max: 86  BLOOD PRESSURE RANGE: Systolic (91SAM), VJW:739 , Min:62 , CEA:158   ; Diastolic (14AJJ), DIS:99, Min:39, Max:90    I/O (24Hr): Intake/Output Summary (Last 24 hours) at 2021 0443  Last data filed at 2021 0400  Gross per 24 hour   Intake 4897.31 ml   Output 2537 ml   Net 2360.31 ml     Objective:  General Appearance:  Comfortable. Vital signs: (most recent): Blood pressure (!) 106/57, pulse 70, temperature 98.4 °F (36.9 °C), temperature source Core, resp. rate 15, height 5' 9\" (1.753 m), weight 146 lb 14.4 oz (66.6 kg), SpO2 95 %. Vital signs are normal.    Output: Producing urine and producing stool. HEENT: Normal HEENT exam.    Lungs:  Normal effort. There are decreased breath sounds.   (Very slight wheeze on expiration)  Heart: Normal rate. Irregular rhythm. S1 normal and S2 normal.  No murmur. Abdomen: Abdomen is soft. Bowel sounds are normal.   There is no abdominal tenderness. Extremities: Normal range of motion. Neurological: Patient is alert and oriented to person, place and time. Patient has normal reflexes and normal muscle tone. Skin:  Warm and dry. Labs/Imaging/Diagnostics    Labs:  CBC:  Recent Labs     01/12/21  1535 01/12/21  2215 01/13/21  0305   WBC 24.4* 18.3* 17.8*   RBC 3.20* 2.38* 2.49*   HGB 11.2* 8.5* 8.7*   HCT 32.9* 24.8* 26.2*   .8* 104.2* 105.2*   * 102* 109*     CHEMISTRIES:  Recent Labs     01/11/21  1232 01/11/21  1232 01/12/21  1433 01/12/21  1535 01/12/21  2210 01/13/21  0205 01/13/21  0305      < > 136* 135  --   --  139   K 4.6   < > 4.2 4.7 4.1 4.9 5.1   CL 99  --   --  101  --   --  104   CO2 28  --   --  25  --   --  24   BUN 18  --   --  16  --   --  18   CREATININE 1.2  --   --  1.2  --   --  1.5*   GLUCOSE 136*  --   --  145*  --   --  107   MG  --   --   --  3.6*  --   --  2.9*    < > = values in this interval not displayed. PT/INR:  Recent Labs     01/11/21  1232   INR 1.13     APTT:  Recent Labs     01/11/21  0535 01/11/21  1823 01/12/21  0551   APTT 74.7* 69.1* 38.2*     LIVER PROFILE:  Recent Labs     01/11/21  1232   AST 95*   ALT 82*   BILIDIR <0.2   BILITOT 0.4   ALKPHOS 54       Imaging Last 24 Hours:  Xr Chest (2 Vw)    Result Date: 1/11/2021  PROCEDURE: XR CHEST (2 VW) CLINICAL INFORMATION: Pre-CABG testing COMPARISON: 1/6/2021 TECHNIQUE: PA and lateral views of the chest were obtained. 1. Lungs somewhat hyperinflated, suggesting underlying COPD. Evidence for old, healed granulomatous disease. 2. Cardiac silhouette small in size. Obscuration of the posterior costophrenic angles, suggesting mild residual atelectasis/pneumonia posteriorly. 3. Overall appearance of chest has improved since prior.  **This report has been created using voice recognition software. It may contain minor errors which are inherent in voice recognition technology. ** Final report electronically signed by Dr. Cassidy Castro on 1/11/2021 10:55 AM    Xr Chest Portable    Result Date: 1/13/2021  Chest X-ray, 1 View COMPARISON:  [facility]  - XR CHEST PORTABLE  - 01/12/2021 03:55 PM EST FINDINGS: Bilateral lower lung atelectasis or infiltrates slightly increased from prior. No pleural effusion. No visible pneumothorax. Stable heart size. Atrial appendage exclusion device in place. No acute fracture. Status post median sternotomy. Left chest tube in place. Right internal jugular [place]-Dmitri catheter with tip projecting over the central mediastinum. Slightly increased bilateral lower lung atelectasis or infiltrates. No visible residual pneumothorax. Support devices as above. This document has been electronically signed by: Eric Lr MD on 01/13/2021 03:58 AM     Xr Chest Portable    Result Date: 1/12/2021  PROCEDURE: XR CHEST PORTABLE CLINICAL INFORMATION: Post op open heart surgery COMPARISON: 11/20/2021 TECHNIQUE: A single mobile view of the chest was obtained. 1. Normal heart size. An atrial appendage clip is present. NG tube passes into stomach. ET tube in good position. Metallic sternotomy sutures and clips are present from recent surgery. Right jugular [place]-Dmitri catheter with tip in main pulmonary outflow tract. A large bore chest tube projects over left side of chest. 2. Mild bibasilar postop atelectasis. No effusion. Suggestion of a small less than 10% pneumothorax left side. **This report has been created using voice recognition software. It may contain minor errors which are inherent in voice recognition technology. ** Final report electronically signed by Dr. Cassidy Castro on 1/12/2021 4:10 PM    Vl Dup Carotid Bilateral    Result Date: 1/11/2021  PROCEDURE: VL DUP CAROTID BILATERAL CLINICAL INFORMATION: Preop CABG; history of hypertension, hypercholesterolemia and coronary artery disease. COMPARISON: No prior study. TECHNIQUE: Grayscale and color Doppler sonographic imaging of the extracranial carotid arteries performed in longitudinal and transverse planes. TECHNICAL DATA: RIGHT PSV/EDV DIST CCA-------->65/16cm/s PROX ICA-------->134/44cm/s ECA---------------->90/19cm/s VERT-------------->41/8cm/s LEFT PSV/EDV DIST CCA-------->52/21cm/s PROX ICA-------->117/43cm/s ECA---------------->96/30cm/s VERT-------------->75/32cm/s FINDINGS: RIGHT: Right common carotid artery: Unremarkable. Right carotid bulb/internal carotid artery:  1. There is hyperechoic and calcified plaque within the right carotid bulb and proximal right internal carotid artery with flow velocities indicating 50-69% stenosis. Right external carotid artery: Unremarkable. Right vertebral artery:  Unremarkable with antegrade flow. LEFT: Left common carotid artery: Unremarkable. Left carotid bulb/internal carotid artery:  1. There is calcified plaque at the junction of the left carotid bulb and proximal left internal carotid artery with flow velocities indicating less than 50% stenosis. Left external carotid artery: Unremarkable. Left vertebral artery:  Unremarkable with antegrade flow. 1. There is hyperechoic and calcified plaque within the right carotid bulb and proximal right internal carotid artery with flow velocities indicating 50-69% stenosis. 2. There is calcified plaque at the junction of the left carotid bulb and proximal left internal carotid artery with flow velocities indicating less than 50% stenosis. **This report has been created using voice recognition software. It may contain minor errors which are inherent in voice recognition technology. ** Final report electronically signed by Dr. Julia Navarrete on 1/11/2021 8:23 AM    Vl Pre Op Vein Mapping    Result Date: 1/11/2021  PROCEDURE: ULTRASOUND lower extremity-venous/bilateral/VEIN MAPPING PROCEDURE: CLINICAL INFORMATION: pre-op for CABG VEINS EVALUATED: Both greater and lesser saphenous veins TECHNIQUE: Multiple sonographic images of both greater and lesser saphenous veins were obtained and measurements were made. FINDINGS: The size ranges for the veins evaluated are listed below: RIGHT VEIN SFJ --------------------->3.9 mm GSV MID --------------> 1.7 mm GSV ABV KNEE ----------> 1.2 mm GSV BEL KNEE ----------> 1.6 mm GSV MID CALF -----------> 1.6 mm GSV DIST CALF ----------> 1.5 mm GSV MED MALL ----------> 1.6 mm LSV PROX --------------> 2.0 mm LSV MID -----------------> 1.6 mm LSV DIST ----------------> 1.8 mm LEFT VEIN SFJ -------------------->3.6 mm GSV MID --------------> 1.9 mm GSV ABV KNEE ----------> 1.7 mm GSV BEL KNEE ----------> 1.9 mm GSV MID CALF -----------> 1.4 mm GSV DIST CALF ----------> 1.9 mm GSV MED MALL ----------> 2.1 mm LSV PROX -------------> 1.5 mm LSV MID ----------------> 1.9 mm LSV DIST --------------> 2.6 mm     Status post vein mapping procedure with charted diameters as reported above. **This report has been created using voice recognition software. It may contain minor errors which are inherent in voice recognition technology. ** Final report electronically signed by Dr. Benjy Mcnair on 1/11/2021 8:23 AM    Assessment//Plan           Hospital Problems           Last Modified POA    Coronary artery disease involving native coronary artery 1/9/2021 Yes    Hyperlipidemia 1/9/2021 Yes    Smoker 1/9/2021 Yes    HTN (hypertension) 1/9/2021 Yes    COPD, mild ([address] 1/9/2021 Yes    Atrial fibrillation with rapid ventricular response ([address] 1/9/2021 Yes    Atrial fibrillation ([address] 1/9/2021 Yes    S/P CABG x 3 1/12/2021 Yes        Assessment:    Condition: In stable condition. Unchanged. (Atrial fib   controlled rate and hope with bypass and in time to sonus      htn stable      copd stable    Lipids  On meds      ashd  For  cabg  today). Plan:   Transfer Plan:   for  cabg and post op ICU.   Out of bed and up to chair. Add bronchodilators and continue respiratory treatments. Consults: cardiology (  thoracic surgery). NPO. Administer medications as ordered. ( For  cabg  Today and then ICu     atrial fib rate  cotrolled      copd  Better as not  Been smoking ).        Electronically signed by Ila Valentino MD on 1/13/21 at 4:43 AM EST

## 2021-01-13 NOTE — ANESTHESIA POSTPROCEDURE EVALUATION
Department of Anesthesiology  Postprocedure Note    Patient: Naina Crandall  MRN: [id number]  YOB: 1950  Date of evaluation: 1/13/2021  Time:  3:07 PM     Procedure Summary     Date: 01/12/21 [place] / [place]: [place] [facility] [place] [facility]    Anesthesia Start: 5286 Anesthesia Stop: 8387    Procedure: CABG  X 3 WITH DEJAH, Atrial Appendage Clip (N/A Chest) Diagnosis: (CORONARY ARTERY DISEASE)    Surgeons: Reina Kramer MD Responsible Provider: Genoveva Rodriguez MD    Anesthesia Type: general ASA Status: 3          Anesthesia Type: general    Nessa Phase I:      Nessa Phase II:      Last vitals: Reviewed and per EMR flowsheets. Anesthesia Post Evaluation    Patient location during evaluation: ICU  Patient participation: complete - patient participated  Level of consciousness: awake and alert  Pain score: 3  Airway patency: patent  Nausea & Vomiting: no vomiting and no nausea  Complications: no  Cardiovascular status: hypotensive and vasoactive/inotropes  Respiratory status: acceptable and nasal cannula  Hydration status: stable  Comments: Patient still remains on epi drip. Patient converted to afib during the day. On amio gtt at this time. No plans for discharge out of icu at this time.

## 2021-01-13 NOTE — FLOWSHEET NOTE
01/13/21 0448   Provider Notification   Reason for Communication Review case  (EKG results)   Provider Name Dr. Rico Stern   Provider Notification Physician   Method of Communication Secure Message   Response No new orders   Notification Time 0448     Notified of pt EKG results this morning and current pt gtts, CI, CO, and labs. No new orders received.

## 2021-01-13 NOTE — CARE COORDINATION
1/13/21, 2:49 PM EST    DISCHARGE PLANNING EVALUATION    Spoke with patient's daughter and she expressed concerns about discharges plan. She stated that she would prefer patient go somewhere for rehab. Daughter is aware that patient would need to agree. Encouraged daughter to talk with patient and encourage patient to go to rehab. Will follow up with patient about options.

## 2021-01-13 NOTE — PROGRESS NOTES
bases  Dyspnea and level of distress   1 - Only on exertion or increased respiratory rate 21-25  Peak flow   Not able to perform    Total Score 1    Frequency and assessment based on total score   Score 0-1 Every 4 hours PRN for wheezing or increased respiratory distress. No need to reassess.

## 2021-01-13 NOTE — PROGRESS NOTES
Attempted to do incentive spirometer with patient at this time. I coached patient multiple time on how to perform and he was not able to lift the disc at all  out of several attempts. He was unable to take one deep inhalation. RN notified at this time, patient will require EZPAP.

## 2021-01-13 NOTE — PROGRESS NOTES
[address] [address]  INPATIENT PHYSICAL THERAPY  EVALUATION  [facility] ICU 4D - 4D-04/004-A    Time In: 3359  Time Out: 1400  Timed Code Treatment Minutes: 8 Minutes  Minutes: 19          Date: 2021  Patient Name: Hood Razo,  Gender:  male        MRN: [id number]  : 1950  (79 y.o.)      Referring Practitioner: Lani Calvin PA-C  Diagnosis: Atrial fibrillation with rapid ventricular response  Additional Pertinent Hx: Pt presents with evaluation of sob that awoke him from sleep. He was supposed to be followed up by Dr. Jayden Briggs. He was found to have Afib last week. HR was 140s. First episode. He could not catch his breath. Pt underwent DEJAH and had CABG x 3 on 21. Restrictions/Precautions:  Restrictions/Precautions: Surgical Protocols  Position Activity Restriction  Sternal Precautions: No Pushing, No Pulling, 5# Lifting Restrictions  Other position/activity restrictions: Incision on RLE from the vein harvest    Subjective:  Chart Reviewed: Yes  Patient assessed for rehabilitation services?: Yes  Family / Caregiver Present: Yes  Subjective: RN approved session initially, then holds OOB activity due to pt going into afib, ok'd supine therex. Pt lethargic, multiple cues to keep awake.     General:  Overall Orientation Status: Within Functional Limits    Vision: Impaired  Vision Exceptions: Wears glasses for reading    Hearing: Within functional limits         Pain: 7/10:    Social/Functional History:    Lives With: Spouse  Type of Home: Apartment  Home Layout: One level  Home Access: Stairs to enter without rails  Entrance Stairs - Number of Steps: 1 step  Home Equipment: Cane     Bathroom Shower/Tub: Tub/Shower unit, Shower chair with back  [employer]: Standard  Bathroom Equipment: Grab bars in shower  Bathroom Accessibility: Accessible    Receives Help From: Family  ADL Assistance: [facility] [address]: Independent  Homemaking Responsibilities: Yes  Ambulation Assistance: Independent  Transfer Assistance: Independent    Active : Yes  Mode of Transportation: Car  Occupation: Full time employment  Type of occupation: [employer] for 48 years  Leisure & Hobbies: Follows sports  Additional Comments: Pt was independent prior to admission. He did not use any AD for ambulating. Pt spends alot of time of his feet while working. Pt sits if needed while taking a shower. Pt smokes . 5 ppd. OBJECTIVE:  Range of Motion:  Bilateral Lower Extremity: WFL    Strength:  Bilateral Lower Extremity: Impaired - grossly 2+/5    **OOB mobility held per RN due to pt in afib, pt lethargy    Exercise:  Patient was guided in 1 set(s) 10 reps of exercise to both lower extremities. Ankle pumps, Quad sets, AAROM Heelslides and AAROM Hip abduction/adduction, shoulder shrugs. Exercises were completed for increased independence with functional mobility. Functional Outcome Measures: Not completed     ASSESSMENT:  Activity Tolerance:  Patient tolerance of  treatment: fair. Treatment Initiated: Treatment and education initiated within context of evaluation. Evaluation time included review of current medical information, gathering information related to past medical, social and functional history, completion of standardized testing, formal and informal observation of tasks, assessment of data and development of plan of care and goals. Treatment time included skilled education and facilitation of tasks to increase safety and independence with functional mobility for improved independence and quality of life. See above exercises, education on POC. Assessment: Body structures, Functions, Activity limitations: Decreased functional mobility , Increased pain, Decreased strength, Decreased endurance  Assessment: Pt tolerates session fair, limited by afib, lethargy. PT to assess OOB mobility tomorrow. PT to continue to progress strength and functional mobility.   Prognosis: Good    REQUIRES PT FOLLOW UP: Yes    Discharge Recommendations:  Discharge Recommendations: 24 hour supervision or assist, Home with Home health PT    Patient Education:  PT Education: PT Role, Plan of Care    Equipment Recommendations: Other: May need RW- will monitor for needs    Plan:  Times per week: 6x CABG  Current Treatment Recommendations: Strengthening    Goals:  Patient goals : to feel better  Short term goals  Time Frame for Short term goals: by discharge  Short term goal 1: Pt to tolerate supine LE ther ex x 20 reps to increase strength for improved mobility. Short term goal 2: PT to assess mobility. Long term goals  Time Frame for Long term goals : NA due to short length of stay. Following session, patient left in safe position with all fall risk precautions in place.

## 2021-01-13 NOTE — PROGRESS NOTES
[facility] RESPIRATORY ASSESSMENT PROGRAM (RAP)  30 kg and over      Patient Name: Yarely Rodriguez Room#: 4D-04/004-A : 1950     Admitting diagnosis:      ASSESSMENT     Vitals  Pulse: 68   Resp: 24  BP: (!) 107/56  SpO2: 99 %  Temp: 97.9 °F (36.6 °C)  Breath Sounds:Clear throughout with diminished bilateral bases  Comment: Encouraged IS usage    PEF   Predicted: 507  Personal Best: na Actual:  150 (29.5% of predicted)    Inspiratory Capacity:   Preoperative/predictive value: 2000 ml   33% of value: 660 ml      75% of value: 1500 ml   10 ml/kg of IBW: 707 ml   Patient's Actual Inspiratory Capacity: 500 ml    CARE PLAN  All Care Plan selections must be based on the approved algorithms located on line in the Policy and Procedures under Respiratory Assessment Adult Protocol Handbook    INDICATIONS FOR THERAPY BASED ON HISTORY AND ASSESSMENT    Bronchial Hygiene Goal: Improvement in sputum mobilization in patients with ineffective airway clearance. Reverse the atelectasis. No indications  Volume Expansion Goal: Prevent atelectasis, Absence or improvement in signs of atelectasis, improve respiratory muscle performance   Presence of pulmonary atelectasis or conditions predisposing to the development of pulmonary atelectasis. Example: Upper/lower abdominal surgery, thoracic surgery, surgery in COPD patient, prolonged bed rest, lack of pain control, presence of thoracic or abdominal binders. Inhaled Medications Goal: Improve respiratory functions in patients with airway disease and decrease WOB  Albuterol Indications   Home regimen. Ipratropium Bromide Indications   No indications  Oxygenation Goal: Reverse hypoxemia and improve tissue oxygenation. (See oxygen protocol order)   SpO2 < 90% (Check order for SpO2 goal).     THERAPIES SELECTED BASED ON ALGORITHMS    Bronchial Hygiene   No therapy recommended  Volume expansion   EZPAP  Inhaled Medication   Metered dose inhaler  Oxygenation   Nasal cannula    BRONCHODILATOR ASSESSMENT SCORE    Breath Sounds   0 - Normal or no wheezing with diminished bases  Dyspnea and level of distress   1 - Only on exertion or increased respiratory rate 21-25  Peak flow   4 - < 50% or unable to perform    Total Score 5    Frequency and assessment based on total score   Score 2-5 Three times a day and every 4 hours PRN for wheezing or increased respiratory distress. Reassess every day. ASSESSMENT OUTCOMES     Bronchial Hygiene    Other na and Not assessed. Volume Expansion   Resolution of fever    and Normal pulse rate        Inhaled Medication   Other na    Oxygenation   Other na    Action Plan Based on Assessment:    Change therapy based on algorithm. Comments: Will assess daily. Agrees with continuing Jeffry Truong but will ask physian about starting albuterol aerosols TID and Q4 prn.

## 2021-01-13 NOTE — PROGRESS NOTES
Pt beginning to awaken - follows commands x 4 extremtities. No eye opening at present . Nods head yes and no appropriately . 1630  Wife  At bedside . Expresses understanding of condition   1730 pt wake with eyes open - able to pick head up off bed - continues to follow commands x4 extremities . Placed on spontaneous via vent per rt . No distress . 1900 placed on cpap mask per rt- 1915 resting quietly on cpap . No distress .

## 2021-01-14 ENCOUNTER — APPOINTMENT (OUTPATIENT)
Dept: GENERAL RADIOLOGY | Age: 71
DRG: 233 | End: 2021-01-14
Payer: MEDICARE

## 2021-01-14 LAB
ANION GAP SERPL CALCULATED.3IONS-SCNC: 13 MEQ/L (ref 8–16)
APTT: 30.9 SECONDS (ref 22–38)
BUN BLDV-MCNC: 33 MG/DL (ref 7–22)
CALCIUM SERPL-MCNC: 8.4 MG/DL (ref 8.5–10.5)
CHLORIDE BLD-SCNC: 101 MEQ/L (ref 98–111)
CO2: 21 MEQ/L (ref 23–33)
CREAT SERPL-MCNC: 2.3 MG/DL (ref 0.4–1.2)
ERYTHROCYTE [DISTWIDTH] IN BLOOD BY AUTOMATED COUNT: 12.8 % (ref 11.5–14.5)
ERYTHROCYTE [DISTWIDTH] IN BLOOD BY AUTOMATED COUNT: 13 % (ref 11.5–14.5)
ERYTHROCYTE [DISTWIDTH] IN BLOOD BY AUTOMATED COUNT: 48.1 FL (ref 35–45)
ERYTHROCYTE [DISTWIDTH] IN BLOOD BY AUTOMATED COUNT: 49.8 FL (ref 35–45)
GFR SERPL CREATININE-BSD FRML MDRD: 28 ML/MIN/1.73M2
GLUCOSE BLD-MCNC: 126 MG/DL (ref 70–108)
GLUCOSE BLD-MCNC: 133 MG/DL (ref 70–108)
GLUCOSE BLD-MCNC: 136 MG/DL (ref 70–108)
GLUCOSE BLD-MCNC: 98 MG/DL (ref 70–108)
HCT VFR BLD CALC: 25.7 % (ref 42–52)
HCT VFR BLD CALC: 26 % (ref 42–52)
HEMOGLOBIN: 8.8 GM/DL (ref 14–18)
HEMOGLOBIN: 9 GM/DL (ref 14–18)
MAGNESIUM: 2.7 MG/DL (ref 1.6–2.4)
MCH RBC QN AUTO: 35.6 PG (ref 26–33)
MCH RBC QN AUTO: 35.6 PG (ref 26–33)
MCHC RBC AUTO-ENTMCNC: 34.2 GM/DL (ref 32.2–35.5)
MCHC RBC AUTO-ENTMCNC: 34.6 GM/DL (ref 32.2–35.5)
MCV RBC AUTO: 102.8 FL (ref 80–94)
MCV RBC AUTO: 104 FL (ref 80–94)
PLATELET # BLD: 87 THOU/MM3 (ref 130–400)
PLATELET # BLD: 97 THOU/MM3 (ref 130–400)
PMV BLD AUTO: 11.4 FL (ref 9.4–12.4)
PMV BLD AUTO: 11.6 FL (ref 9.4–12.4)
POTASSIUM SERPL-SCNC: 4.9 MEQ/L (ref 3.5–5.2)
RBC # BLD: 2.47 MILL/MM3 (ref 4.7–6.1)
RBC # BLD: 2.53 MILL/MM3 (ref 4.7–6.1)
REASON FOR REJECTION: NORMAL
REJECTED TEST: NORMAL
SODIUM BLD-SCNC: 135 MEQ/L (ref 135–145)
WBC # BLD: 14.1 THOU/MM3 (ref 4.8–10.8)
WBC # BLD: 15.7 THOU/MM3 (ref 4.8–10.8)

## 2021-01-14 PROCEDURE — 6360000002 HC RX W HCPCS: Performed by: THORACIC SURGERY (CARDIOTHORACIC VASCULAR SURGERY)

## 2021-01-14 PROCEDURE — 2700000000 HC OXYGEN THERAPY PER DAY

## 2021-01-14 PROCEDURE — 71045 X-RAY EXAM CHEST 1 VIEW: CPT

## 2021-01-14 PROCEDURE — 6360000002 HC RX W HCPCS: Performed by: PHYSICIAN ASSISTANT

## 2021-01-14 PROCEDURE — 97530 THERAPEUTIC ACTIVITIES: CPT

## 2021-01-14 PROCEDURE — 85027 COMPLETE CBC AUTOMATED: CPT

## 2021-01-14 PROCEDURE — APPSS30 APP SPLIT SHARED TIME 16-30 MINUTES: Performed by: PHYSICIAN ASSISTANT

## 2021-01-14 PROCEDURE — 93005 ELECTROCARDIOGRAM TRACING: CPT | Performed by: PHYSICIAN ASSISTANT

## 2021-01-14 PROCEDURE — 2500000003 HC RX 250 WO HCPCS: Performed by: PHYSICIAN ASSISTANT

## 2021-01-14 PROCEDURE — 6370000000 HC RX 637 (ALT 250 FOR IP): Performed by: PHYSICIAN ASSISTANT

## 2021-01-14 PROCEDURE — 2580000003 HC RX 258: Performed by: THORACIC SURGERY (CARDIOTHORACIC VASCULAR SURGERY)

## 2021-01-14 PROCEDURE — 2580000003 HC RX 258: Performed by: PHYSICIAN ASSISTANT

## 2021-01-14 PROCEDURE — 82948 REAGENT STRIP/BLOOD GLUCOSE: CPT

## 2021-01-14 PROCEDURE — 36415 COLL VENOUS BLD VENIPUNCTURE: CPT

## 2021-01-14 PROCEDURE — 2500000003 HC RX 250 WO HCPCS: Performed by: THORACIC SURGERY (CARDIOTHORACIC VASCULAR SURGERY)

## 2021-01-14 PROCEDURE — 94640 AIRWAY INHALATION TREATMENT: CPT

## 2021-01-14 PROCEDURE — 99232 SBSQ HOSP IP/OBS MODERATE 35: CPT | Performed by: NURSE PRACTITIONER

## 2021-01-14 PROCEDURE — 83735 ASSAY OF MAGNESIUM: CPT

## 2021-01-14 PROCEDURE — 2500000003 HC RX 250 WO HCPCS: Performed by: NURSE PRACTITIONER

## 2021-01-14 PROCEDURE — 6370000000 HC RX 637 (ALT 250 FOR IP): Performed by: FAMILY MEDICINE

## 2021-01-14 PROCEDURE — 2000000000 HC ICU R&B

## 2021-01-14 PROCEDURE — 94761 N-INVAS EAR/PLS OXIMETRY MLT: CPT

## 2021-01-14 PROCEDURE — 85730 THROMBOPLASTIN TIME PARTIAL: CPT

## 2021-01-14 PROCEDURE — 6370000000 HC RX 637 (ALT 250 FOR IP): Performed by: INTERNAL MEDICINE

## 2021-01-14 PROCEDURE — 80048 BASIC METABOLIC PNL TOTAL CA: CPT

## 2021-01-14 RX ORDER — METOPROLOL TARTRATE 50 MG/1
50 TABLET, FILM COATED ORAL 2 TIMES DAILY
Status: DISCONTINUED | OUTPATIENT
Start: 2021-01-14 | End: 2021-01-19

## 2021-01-14 RX ORDER — HEPARIN SODIUM 1000 [USP'U]/ML
80 INJECTION, SOLUTION INTRAVENOUS; SUBCUTANEOUS PRN
Status: DISCONTINUED | OUTPATIENT
Start: 2021-01-14 | End: 2021-01-18 | Stop reason: ALTCHOICE

## 2021-01-14 RX ORDER — HEPARIN SODIUM 10000 [USP'U]/100ML
INJECTION, SOLUTION INTRAVENOUS
Status: DISCONTINUED
Start: 2021-01-14 | End: 2021-01-15

## 2021-01-14 RX ORDER — MILRINONE LACTATE 1 MG/ML
50 INJECTION, SOLUTION INTRAVENOUS ONCE
Status: DISCONTINUED | OUTPATIENT
Start: 2021-01-14 | End: 2021-01-14

## 2021-01-14 RX ORDER — HEPARIN SODIUM 1000 [USP'U]/ML
40 INJECTION, SOLUTION INTRAVENOUS; SUBCUTANEOUS PRN
Status: DISCONTINUED | OUTPATIENT
Start: 2021-01-14 | End: 2021-01-18 | Stop reason: ALTCHOICE

## 2021-01-14 RX ORDER — HEPARIN SODIUM 1000 [USP'U]/ML
80 INJECTION, SOLUTION INTRAVENOUS; SUBCUTANEOUS ONCE
Status: DISCONTINUED | OUTPATIENT
Start: 2021-01-14 | End: 2021-01-14

## 2021-01-14 RX ORDER — HEPARIN SODIUM 10000 [USP'U]/100ML
18 INJECTION, SOLUTION INTRAVENOUS CONTINUOUS
Status: DISCONTINUED | OUTPATIENT
Start: 2021-01-14 | End: 2021-01-18

## 2021-01-14 RX ORDER — HEPARIN SODIUM 1000 [USP'U]/ML
INJECTION, SOLUTION INTRAVENOUS; SUBCUTANEOUS
Status: DISCONTINUED
Start: 2021-01-14 | End: 2021-01-15

## 2021-01-14 RX ADMIN — METOPROLOL TARTRATE 2.5 MG: 5 INJECTION INTRAVENOUS at 08:11

## 2021-01-14 RX ADMIN — HEPARIN SODIUM 18 UNITS/KG/HR: 10000 INJECTION, SOLUTION INTRAVENOUS at 14:27

## 2021-01-14 RX ADMIN — METOPROLOL TARTRATE 50 MG: 50 TABLET, FILM COATED ORAL at 20:17

## 2021-01-14 RX ADMIN — ALBUTEROL SULFATE 2.5 MG: 2.5 SOLUTION RESPIRATORY (INHALATION) at 07:59

## 2021-01-14 RX ADMIN — OXYCODONE HYDROCHLORIDE 5 MG: 5 TABLET ORAL at 00:13

## 2021-01-14 RX ADMIN — GLYCOPYRROLATE AND FORMOTEROL FUMARATE 2 PUFF: 9; 4.8 AEROSOL, METERED RESPIRATORY (INHALATION) at 18:11

## 2021-01-14 RX ADMIN — ENOXAPARIN SODIUM 30 MG: 30 INJECTION SUBCUTANEOUS at 10:57

## 2021-01-14 RX ADMIN — ALBUTEROL SULFATE 2.5 MG: 2.5 SOLUTION RESPIRATORY (INHALATION) at 23:59

## 2021-01-14 RX ADMIN — BISACODYL 10 MG: 5 TABLET ORAL at 10:57

## 2021-01-14 RX ADMIN — ENALAPRILAT 0.62 MG: 1.25 INJECTION INTRAVENOUS at 06:42

## 2021-01-14 RX ADMIN — ATORVASTATIN CALCIUM 10 MG: 10 TABLET, FILM COATED ORAL at 20:16

## 2021-01-14 RX ADMIN — AMIODARONE HYDROCHLORIDE 200 MG: 200 TABLET ORAL at 10:57

## 2021-01-14 RX ADMIN — AMIODARONE HYDROCHLORIDE 0.5 MG/MIN: 1.8 INJECTION, SOLUTION INTRAVENOUS at 10:48

## 2021-01-14 RX ADMIN — AMIODARONE HYDROCHLORIDE 200 MG: 200 TABLET ORAL at 20:16

## 2021-01-14 RX ADMIN — SODIUM CHLORIDE, PRESERVATIVE FREE 10 ML: 5 INJECTION INTRAVENOUS at 20:18

## 2021-01-14 RX ADMIN — SODIUM CHLORIDE 3300 MCG: 9 INJECTION, SOLUTION INTRAVENOUS at 05:20

## 2021-01-14 RX ADMIN — SODIUM CHLORIDE 0.2 MCG/KG/MIN: 9 INJECTION, SOLUTION INTRAVENOUS at 05:34

## 2021-01-14 RX ADMIN — CEFAZOLIN 2 G: 10 INJECTION, POWDER, FOR SOLUTION INTRAVENOUS at 04:08

## 2021-01-14 RX ADMIN — OXYCODONE HYDROCHLORIDE 5 MG: 5 TABLET ORAL at 04:14

## 2021-01-14 RX ADMIN — AMIODARONE HYDROCHLORIDE 0.5 MG/MIN: 1.8 INJECTION, SOLUTION INTRAVENOUS at 22:11

## 2021-01-14 RX ADMIN — ASPIRIN 81 MG: 81 TABLET, CHEWABLE ORAL at 10:57

## 2021-01-14 RX ADMIN — OXYCODONE HYDROCHLORIDE 5 MG: 5 TABLET ORAL at 17:31

## 2021-01-14 RX ADMIN — SODIUM CHLORIDE, PRESERVATIVE FREE 10 ML: 5 INJECTION INTRAVENOUS at 08:13

## 2021-01-14 RX ADMIN — ALBUTEROL SULFATE 2.5 MG: 2.5 SOLUTION RESPIRATORY (INHALATION) at 18:11

## 2021-01-14 RX ADMIN — METOPROLOL TARTRATE 2.5 MG: 5 INJECTION INTRAVENOUS at 07:02

## 2021-01-14 RX ADMIN — FUROSEMIDE 40 MG: 40 TABLET ORAL at 10:57

## 2021-01-14 RX ADMIN — FAMOTIDINE 20 MG: 10 INJECTION INTRAVENOUS at 10:57

## 2021-01-14 RX ADMIN — BISACODYL 10 MG: 5 TABLET ORAL at 20:16

## 2021-01-14 RX ADMIN — AMIODARONE HYDROCHLORIDE 150 MG: 1.5 INJECTION, SOLUTION INTRAVENOUS at 08:53

## 2021-01-14 RX ADMIN — OXYCODONE HYDROCHLORIDE 5 MG: 5 TABLET ORAL at 10:30

## 2021-01-14 RX ADMIN — ALBUTEROL SULFATE 2.5 MG: 2.5 SOLUTION RESPIRATORY (INHALATION) at 13:59

## 2021-01-14 RX ADMIN — ENALAPRILAT 0.62 MG: 1.25 INJECTION INTRAVENOUS at 15:04

## 2021-01-14 RX ADMIN — MULTIPLE VITAMINS W/ MINERALS TAB 1 TABLET: TAB at 10:57

## 2021-01-14 RX ADMIN — GLYCOPYRROLATE AND FORMOTEROL FUMARATE 2 PUFF: 9; 4.8 AEROSOL, METERED RESPIRATORY (INHALATION) at 07:46

## 2021-01-14 RX ADMIN — METOPROLOL TARTRATE 50 MG: 50 TABLET, FILM COATED ORAL at 10:58

## 2021-01-14 ASSESSMENT — PAIN SCALES - GENERAL
PAINLEVEL_OUTOF10: 6

## 2021-01-14 ASSESSMENT — PAIN DESCRIPTION - PAIN TYPE
TYPE: SURGICAL PAIN
TYPE: SURGICAL PAIN

## 2021-01-14 ASSESSMENT — PAIN DESCRIPTION - DESCRIPTORS: DESCRIPTORS: ACHING

## 2021-01-14 ASSESSMENT — PAIN DESCRIPTION - LOCATION: LOCATION: STERNUM

## 2021-01-14 ASSESSMENT — PAIN DESCRIPTION - ONSET: ONSET: ON-GOING

## 2021-01-14 NOTE — PROGRESS NOTES
Mobility:  Rolling to Right: Minimal Assistance   Supine to Sit: Minimal Assistance, cues for log rolling technique  Scooting: Minimal Assistance, several verbal cues to comply with sternal precautions when scooting to EOB    Transfers:  Sit to Stand: Minimal Assistance, X 1, good use of heart hugger pillow for compliance with sternal precautions  Stand to Sit:Minimal Assistance, X 1    Ambulation:  Minimal Assistance, X 2  Distance: 3 feet to recliner  Surface: Level Tile  Device:Hand-Held Assist  Gait Deviations: Forward Flexed Posture, Slow Nelly, Decreased Step Length Bilaterally and Decreased Gait Speed  **Verbal cues for upright posture and direction, tachy into 130's with mobility    Balance:  Static Sitting Balance:  Contact Guard Assistance  Static Standing Balance: Minimal Assistance  Dynamic Standing Balance: Minimal Assistance, X 2    Exercise:  Patient was guided in 1 set(s) 10 reps of exercise to both lower extremities. Ankle pumps, Quad sets, Hip abduction/adduction and Shoulder rolls. Exercises were completed for increased independence with functional mobility. Functional Outcome Measures: Completed  AM-PAC Inpatient Mobility Raw Score : 14  AM-PAC Inpatient T-Scale Score : 38.1    ASSESSMENT:  Assessment: Patient progressing toward established goals. Activity Tolerance:  Patient tolerance of  treatment: good. Equipment Recommendations: Other: May need RW- will monitor for needs  Discharge Recommendations:  Continue to assess pending progress, 24 hour supervision or assist    Plan: Times per week: 6x CABG  Current Treatment Recommendations: Strengthening, Balance Training, Transfer Training, Functional Mobility Training, Neuromuscular Re-education, Home Exercise Program, Gait Training, Stair training, Endurance Training, Patient/Caregiver Education & Training, Safety Education & Training, Equipment Evaluation, Education, & procurement    Patient Education  Patient Education: Precautions/Restrictions, Transfers, Gait    Goals:  Patient goals : to feel better  Short term goals  Time Frame for Short term goals: by discharge  Short term goal 1: Pt to transfer supine <--> sit SBA to enable pt to get in/out of bed. Short term goal 2: Pt to transfer sit <--> stand SBA for increased functional mobility. Short term goal 3: Pt to ambulate >250 feet with LRAD or without AD SBA for household mobility. Short term goal 4: Pt to ascend/descend 1 step without HR SBA for home entry. Long term goals  Time Frame for Long term goals : NA due to short length of stay. Following session, patient left in safe position with all fall risk precautions in place.

## 2021-01-14 NOTE — FLOWSHEET NOTE
1700-Dr Carter on unit. Hemodynamics reviewed. OK to remove PA catheter and arterial line. Will keep in unit overnight since pt not yet out of bed.

## 2021-01-14 NOTE — PROGRESS NOTES
Cardiology Progress Note      Patient:  Cameron Rivera  YOB: 1950  MRN: [id number]   Acct: [de-identified]  Admit Date:  1/6/2021  Primary Cardiologist: none  Seen by dr. Josue Painter    Per prior cardiology consult note-  CHIEF COMPLAINT: SOB        HPI: This is a pleasant 79 y.o. male presents with evaluation of sob that awoke him from sleep. He was supposed to be followed up by Dr. [facility]ginn[facility]. He was found to have Afib last week. HR was 140s. First episode. He could not catch his breath. Started on Dilitazem gtt. HR still 110-120s. BNP 4443. Trop 0.183--0.175. Cr 1.3.  TSH 2.0.  Hgb 16.2. Patient is on Xarelto as outpatient. EF is 25%, with severe global hypokinesis. Mild LE edema. No chest pain. No major drug use, or hx of CAD/MI. Denies major family hx. Drinks 6 beers per day x 2 years. No recent sick contacts. Lots of family stress with kids using drugs. He has worsening LE edema.   Prior to this, he states no cardiac concerns.         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 - underwent CABG x3 w/ LA clip EF 25%  He had AFB while waiting for CABG and had an unsuccessful CV x3     Post-op he went back into AFB RVR - on IV amio   Remains AFB -140 -- pt asymptomatic  / VSS    Pt and wife ok with DEJAH / CV         Objective:   BP (!) 129/94   Pulse 126   Temp 98.2 °F (36.8 °C) (Core)   Resp 22   Ht 5' 9\" (1.753 m)   Wt 146 lb 14.4 oz (66.6 kg)   SpO2 99%   BMI 21.69 kg/m²        TELEMETRY: AFB     Physical Exam:  General Appearance: alert and oriented to person, place and time, in no acute distress  Cardiovascular: irregular rhythm, normal S1 and S2, no murmurs, rubs, clicks, or gallops, distal pulses intact  Pulmonary/Chest: clear upper - diminished lower to auscultation bilaterally- no wheezes, rales or rhonchi, normal air movement, no respiratory distress  Abdomen: soft, non-tender, non-distended, normal bowel sounds, no masses Extremities: no cyanosis, clubbing or edema, pulses present    Skin: warm and dry, no rash or erythema  Head: normocephalic and atraumatic  Musculoskeletal: normal range of motion, no joint swelling, deformity or tenderness  Neurological: alert, oriented, normal speech, no focal findings or movement disorder noted    Medications:    metoprolol tartrate  50 mg Oral BID    famotidine (PEPCID) injection  20 mg Intravenous Daily    enoxaparin  30 mg Subcutaneous Daily    amiodarone  200 mg Oral Once    albuterol  2.5 mg Nebulization TID    insulin lispro  0-12 Units Subcutaneous TID WC    insulin lispro  0-6 Units Subcutaneous Nightly    sodium chloride flush  10 mL Intravenous 2 times per day    therapeutic multivitamin-minerals  1 tablet Oral Daily with breakfast    bisacodyl  10 mg Oral BID    calcium replacement protocol   Other RX Placeholder    amiodarone  200 mg Oral BID    [Held by provider] metoprolol succinate  100 mg Oral Daily    aspirin  81 mg Oral Daily    glycopyrrolate-formoterol  2 puff Inhalation BID    atorvastatin  10 mg Oral Nightly    furosemide  40 mg Oral Daily      milrinone 0.1 mcg/kg/min (01/14/21 1113)    amiodarone 0.5 mg/min (01/14/21 1048)    sodium chloride 20 mL/hr at 01/12/21 1809    dextrose      EPINEPHrine infusion Stopped (01/14/21 0428)         albuterol, 2.5 mg, Q4H PRN      sodium chloride flush, 10 mL, PRN      acetaminophen, 650 mg, Q4H PRN      acetaminophen, 650 mg, Q4H PRN      oxyCODONE, 5 mg, Q4H PRN    Or      oxyCODONE, 10 mg, Q4H PRN      morphine, 2 mg, Q15 Min PRN      enalaprilat, 0.625 mg, Q1H PRN      metoprolol, 2.5 mg, Q10 Min PRN      senna, 10 mL, Daily PRN      magnesium hydroxide, 30 mL, Daily PRN      potassium chloride, 20 mEq, PRN      magnesium sulfate, 1.5 g, PRN      albumin human, 25 g, PRN      glucose, 15 g, PRN      dextrose, 12.5 g, PRN      glucagon (rDNA), 1 mg, PRN      dextrose, 100 mL/hr, PRN      EPINEPHrine infusion, 1 mcg/min, Continuous PRN      promethazine, 12.5 mg, Q6H PRN      polyethylene glycol, 17 g, Daily PRN      potassium chloride, 40 mEq, PRN    Or      potassium alternative oral replacement, 40 mEq, PRN    Or      potassium chloride, 10 mEq, PRN      magnesium sulfate, 2 g, PRN        Diagnostics:  EKG:     DEJAH:   Electronically signed by Vanessa Acharya MD (Interpreting   physician) on 01/08/2021 at 02:28 PM   ----------------------------------------------------------------      Findings      Mitral Valve   Normal structure and function. Mild mitral regurgitation is present. Aortic Valve   The aortic valve was trileaflet with normal thickness and cuspal   separation. There was no echocardiographic evidence of a vegetation. DOPPLER: Transaortic velocity was within the normal range with no evidence   of aortic stenosis or regurgitaiton. Tricuspid Valve   Tricuspid valve is structurally normal.   Mild tricuspid regurgitation. Pulmonic Valve   The pulmonic valve leaflets exhibited normal thickness, no calcification,   and normal cuspal separation. There was no echocardiographic evidence of   vegetation. DOPPLER: The transpulmonic velocity was within the normal   range with no evidence for regurgitation. Left Atrium   Mildly dilated left atrium. No evidence of thrombus within left atrium. No evidence of patent foramen ovale. Mild spontaneous echo contrast was present in LA cavity. Left Ventricle   Left ventricle size is normal.   No asymmetrical left ventricular hypertrophy was seen. There was severe global hypokinesis of the left ventricle. Ejection fraction is visually estimated at 25%. Right Atrium   Mildly enlarged right atrium size. Right Ventricle   The right ventricular size was normal with normal systolic function and   wall thickness.       Pericardial Effusion   The pericardium was normal in appearance with no evidence of a pericardial   effusion. Pleural Effusion   No evidence of pleural effusion. Aorta / Great Vessels   -Aortic root dimension within normal limits.   -The Pulmonary artery is within normal limits. -IVC size is within normal limits with normal respiratory phasic changes. Procedure Descriptor   Probe easily inserted by Cardiologist.   Procedure performed without complications. The study included complete 2D imaging, complete spectral doppler, and   color doppler. The transesophageal approach was used. Risk benefits and alternatives were explained to the patient and informed   consent was obtained. Left Heart Cath:   TTE 1/5/21  Summary   Ejection fraction is visually estimated at 25%.   There was severe global hypokinesis of the left ventricle.   Moderately dilated left atrium.   The IVC is dilated .      Signature      ----------------------------------------------------------------   Electronically signed by Miley Ray MD (Interpreting   physician) on 01/05/2021 at 07:02 PM     Cath 1/8/21  FINDINGS:  LEFT VENTRICULOGRAPHY:  Severe global hypokinesis was noted on the  ventriculography with an ejection fraction estimated at 25%.     LVEDP:  20 mmHg.     CORONARY ANGIOGRAM:  1.  Right coronary artery:  Ostial RCA has 30% to 50% stenosis.  The  proximal RCA has luminal irregularities.  Mid RCA has chronic total  occlusion. 2.  Left main coronary artery:  The left main coronary artery has 70% to  80% bifurcation lesion. 3.  Ramus intermedius:  Ostial ramus intermedius has 20% stenosis  followed by mild luminal irregularities. 4.  Left circumflex artery:  Left circumflex artery has mild luminal  irregularities in the proximal segment.  Large, dominant vessel.  Distal  LCX into LPL has 60% to 70% stenosis.   5.  Left anterior descending artery:  Ostial LAD is diseased with 50% to  60% stenosis as well as CAD involving proximal segment of LAD, 70% 11/07/2011       Magnesium:    Lab Results   Component Value Date    MG 2.7 01/14/2021       PT/INR:    Lab Results   Component Value Date    INR 1.13 01/11/2021       HgBA1c:    Lab Results   Component Value Date    LABA1C 5.6 01/11/2021       FLP:    Lab Results   Component Value Date    TRIG 96 01/07/2021    HDL 36 01/07/2021    LDLCALC 71 01/07/2021    LDLDIRECT 79.82 01/11/2021       TSH:    Lab Results   Component Value Date    TSH 2.050 01/06/2021         Assessment:  PAFB - more persistent Post-op w/ RVR  - on IV amio   [id number] =    Failed CVx3  1/8/2021      MV CAD   S\p CABG  X 3 WITH DEJAH /  Atrial Appendage Clip    a. Left internal mammary artery to left anterior descending artery. b.  Aorto-right coronary artery bypass grafting with a reversed greater saphenous vein graft. c.  Aorto-ramus intermedius branch bypass grafting with a reversed greater saphenous vein graft. d.  Left atrial appendage closure with a AtriCure clip.         ICDMP EF 25%      HLP  HTN    Hx COPD   Hx ETOH         Plan:  · CTS asked us to do CV for AFB RVR- recurrent this admit w/ HxPAFB   · Pt has been off [address] and is s\p LA clip - he will need DEJAH and then [address]   · He does need LV also   · CDMP meds when stable BP        Electronically signed by ARCADIO Valero CNP on 1/14/2021 at 1:03 PM

## 2021-01-14 NOTE — PLAN OF CARE
[address] THERAPY MISSED TREATMENT NOTE  [facility] ICU 4D  4D-004-A      Date: 2021  Patient Name: Kelli Rosario        CSN: [id number]   : 1950  (79 y.o.)  Gender: male   Referring Practitioner: Oralia Kc PA-C  Diagnosis: Atrial Fibrillation with Rapid Ventricular Response         REASON FOR MISSED TREATMENT: Hold Treatment per Nursing Pt was having atrial fibrillation and RN in AM indicated to hold treatment this AM.  Pt was going to be having a DEJAH this afternoon. Will retry tomorrow.

## 2021-01-14 NOTE — PROGRESS NOTES
Pharmacy Renal Adjustment    Maggie Collins is a 79 y.o. male. Pharmacy renally adjust the following medications per P[employer]T approved policy: enoxaparin    Recent Labs     01/13/21  1530 01/14/21  0352   BUN 26* 33*     Recent Labs     01/13/21  1530 01/14/21  0352   CREATININE 2.3* 2.3*     Estimated Creatinine Clearance: 28 mL/min (A) (based on SCr of 2.3 mg/dL (H)).     Height:   Ht Readings from Last 1 Encounters:   01/12/21 5' 9\" (1.753 m)     Weight:  Wt Readings from Last 1 Encounters:   01/12/21 146 lb 14.4 oz (66.6 kg)     JANETH    Plan: Adjustments based on renal function:          Decrease enoxaparin 30 mg twice daily to 30 mg once daily                Ever Lobe, PharmD, BCPS, BCCCP  1/14/2021 10:40 AM

## 2021-01-14 NOTE — PROGRESS NOTES
CT/CV Surgery Progress Note    2021 8:35 AM  Surgeon:  Dr. Jameson Cantu     Subjective: Mr. Stephanie Coker is resting in bed. Only c/o is mild soreness. RA.  IV Primacor and Amio drip. He went back into Afib around 2pm yesterday and has been persistent ever since. I/O +425 cc past 8 hours. Chest tube drainage 490 cc past 24 hours. Vital Signs: /89   Pulse 125   Temp 98.2 °F (36.8 °C) (Core)   Resp 16   Ht 5' 9\" (1.753 m)   Wt 146 lb 14.4 oz (66.6 kg)   SpO2 99% Comment: weaned to RA  BMI 21.69 kg/m²    Temp (24hrs), Av.2 °F (36.8 °C), Min:97.9 °F (36.6 °C), Max:98.4 °F (36.9 °C)    Labs:   CBC:   Recent Labs     21  1232 21  1823 21  0551 21  0551 21  2215 21  0305 21  0352   WBC 8.3  --   --    < > 18.3* 17.8* 14.1*   HGB 15.1  --   --    < > 8.5* 8.7* 8.8*   HCT 44.3  --   --    < > 24.8* 26.2* 25.7*   .8*  --   --    < > 104.2* 105.2* 104.0*     --   --    < > 102* 109* 87*   APTT  --  69.1* 38.2*  --   --   --   --    INR 1.13  --   --   --   --   --   --     < > = values in this interval not displayed. BMP:   Recent Labs     21  03021  03021  1530 21  15321  0352     --  133*  --   --  135   K 5.1  --  5.3*  --   --  4.9     --  100  --   --  101   CO2 24  --  19*  --   --  21*   BUN 18  --  26*  --   --  33*   CREATININE 1.5*  --  2.3*  --   --  2.3*   MG 2.9*  --  2.8*  --   --  2.7*   POCGLU  --    < >  --    < > 167*  --     < > = values in this interval not displayed. Trop:   Lab Results   Component Value Date    TROPONINT 0.175 (A) 2021     Last HgA1C:   Lab Results   Component Value Date    LABA1C 5.6 2021     Imaging:  CXR 21  Trace left apical pneumothorax. Left chest tube in place. Similar bibasilar atelectasis or infiltrates. Support devices as above.          Intake/Output Summary (Last 24 hours) at 2021 0858  Last data filed at 1/14/2021 0532  Gross per 24 hour   Intake 1643.48 ml   Output 1215 ml   Net 428.48 ml     Scheduled Meds:    metoprolol tartrate  50 mg Oral BID    amiodarone  150 mg Intravenous Once    amiodarone  200 mg Oral Once    albuterol  2.5 mg Nebulization TID    insulin lispro  0-12 Units Subcutaneous TID WC    insulin lispro  0-6 Units Subcutaneous Nightly    sodium chloride flush  10 mL Intravenous 2 times per day    therapeutic multivitamin-minerals  1 tablet Oral Daily with breakfast    bisacodyl  10 mg Oral BID    famotidine (PEPCID) injection  20 mg Intravenous BID    calcium replacement protocol   Other RX Placeholder    amiodarone  200 mg Oral BID    [Held by provider] metoprolol succinate  100 mg Oral Daily    aspirin  81 mg Oral Daily    glycopyrrolate-formoterol  2 puff Inhalation BID    atorvastatin  10 mg Oral Nightly    isosorbide mononitrate  60 mg Oral Daily    furosemide  40 mg Oral Daily     ROS: All neg unless specifically mentioned in subjective section. Exam:  General Appearance: alert ,conversing, in no acute distress  Cardiovascular: IIRR with no murmurs, rubs, clicks, or gallops  Pulmonary/Chest: clear to auscultation bilaterally- no wheezes, rales or rhonchi, normal air movement, no respiratory distress  Neurological: alert, oriented, normal speech, no focal findings or movement disorder noted  Sternum: Incision healing appropriately and no wound dehiscence noted. Assessment:   Patient Active Problem List   Diagnosis    Hyperlipidemia    Asthma    Smoker    Allergic rhinitis due to other allergen    Collagenous colitis    Lactose intolerance    HTN (hypertension)    COPD, mild (HCC)    Atrial fibrillation with rapid ventricular response (HCC)    Atrial fibrillation ([address]    Coronary artery disease involving native coronary artery    S/P CABG x 3     Plan:   1. CXR reviewed- Continue daily CXR's   2.  Give an additional Amio 150 mg IV bolus over 10 minutes  3. Stop Cardizem due to negative inotropic effect with LVEF dysfunction  4. Start Metoprolol 50 mg one po BID for heart rate control of Afib with RVR  5. Pt had Lt atrial appenage clip placed during OHS  -  Consult Cardiology  -  Afib persist despite medical therapy, is it reasonable to consider CV since he was in SR for 36 hours post op and prior to being in Afib >48 hours  6.  Wean Primacor per protocol    The plan of care was discussed in detail with Dr. Lj Chen     Electronically signed by Hilary Mares PA-C on 1/14/2021 at 8:35 AM

## 2021-01-14 NOTE — PROGRESS NOTES
PHASE 1 CARDIAC REHABILITATION   CABG, AVR, MVR, TVR, AMI, PCI's  Exercise Physiologists    No tx per RN:

## 2021-01-14 NOTE — PROGRESS NOTES
Comprehensive Nutrition Assessment    Type and Reason for Visit:  Reassess, Consult(heart healthy nutrition education & ileus prevention protocol)    Nutrition Recommendations/Plan:   Pt currently NPO. Diet start per Dr as pt able. When diet is FL or more, plan magic cup tid & activa tid with decaf beverage tid ( ileus prevention protocol). Monitor renal status. Nutrition Assessment:    Pt. declining from a nutritional standpoint AEB NPO status . Remains at risk for further nutritional compromise r/t s/p OHS 1/12, extubated to bipap 1/12, no Bipap this morning, however per RN to continue NPO per Dr as Ramona Rowland need further intervention, AFib, JANETH and underlying medical condition ( COPD, HTN, tobacco hx. ). Nutrition recommendations/interventions as per above. Malnutrition Assessment:  Malnutrition Status: At risk for malnutrition (Comment)    Context:  Acute Illness     Findings of the 6 clinical characteristics of malnutrition:  Energy Intake:  (NPO from 1/12-1/14 so far)  Weight Loss:  (weight down since admit. Note lasix this admit.)     Body Fat Loss:  No significant body fat loss     Muscle Mass Loss:  No significant muscle mass loss    Fluid Accumulation:  Lasix this admit     Strength:  Not Performed    Estimated Daily Nutrient Needs:  Energy (kcal):  ~4632-6931 kcals (25-30); Weight Used for Energy Requirements:  Admission(69.4 kg)     Protein (g):  ~83 grams ( 1.2); Weight Used for Protein Requirements:  Admission(69.4 kg)        Fluid (ml/day):  per Dr;         Nutrition Related Findings: Thin pt seen, s/p OHS 1/12, extubated 1/12 to [facility]. Pt with no mask on at this time & reoprts not sure if had on last night. Pt with telesitter  in room, currently NPO & per Dee Jacinto RN, continuing NPO per Drs d/t may need further intervention. Pt reports no bm since admit & no bm recorded since admit 1/9. Pt agreeable to magic cup start when OK for po. meds include bm meds, lasix, humalog, MVI & pepcid.  IVF at 20 ml/hr. 1/12 BUN 16, Cr 1.2. 1/14 BUN 33, Cr 2.3, glucose 136, WBC 14.1, Hgb 8.8      Wounds:  Surgical Incision(s/p CABG X3 & Atrial Appendage 1/2)       Current Nutrition Therapies:    NPO. Diet start per Dr    Anthropometric Measures:  · Height: 5' 9\" (175.3 cm)  · Current Body Weight: 146 lb 13.2 oz (66.6 kg)(1/12 no edema)   · Admission Body Weight: 152 lb 14.4 oz (69.4 kg)(1/6/20 no edema)    · Usual Body Weight: (~ 155# per pt. Per pt: 2/25/20: 154#8oz, 12/29/20: 155#8oz)     · Ideal Body Weight: 160 lbs;     · BMI: 21.7  · Adjusted Body Weight:  ; No Adjustment   · BMI Categories: Normal Weight (BMI 22.0 to 24.9) age over 72       Nutrition Diagnosis:   · Inadequate oral intake related to inadequate protein-energy intake as evidenced by (NPO status)      Nutrition Interventions:   Food and/or Nutrient Delivery:  (diet start per Dr as pt able. When diet is FL or more, plan magic cup tid & activa tid with decaf beverage tid ( ileus prevention protocol))  Nutrition Education/Counseling:  Education completed(Cardiac, CHF diet and fluid recommendations discussed, handouts provided as well 1/9/21.)   Coordination of Nutrition Care:  Continue to monitor while inpatient    Goals:  75% or more of healthy diet to assist with healing during LOS       Nutrition Monitoring and Evaluation:   Behavioral-Environmental Outcomes:  None Identified   Food/Nutrient Intake Outcomes:  Diet Advancement/Tolerance(NPO status)  Physical Signs/Symptoms Outcomes:  Biochemical Data, GI Status, Nutrition Focused Physical Findings, Weight, Skin     Discharge Planning:     Too soon to determine     Electronically signed by Yinka Villalta RD, LD on 1/14/21 at 10:25 AM EST    Contact: [phone]

## 2021-01-14 NOTE — PROGRESS NOTES
[facility] RESPIRATORY ASSESSMENT PROGRAM (RAP)  30 kg and over      Patient Name: Ramon Arevalo Room#: 4D-04/004-A : 1950     Admitting diagnosis:      ASSESSMENT     Vitals  Pulse: 125   Resp: 16  BP: 133/89  SpO2: 99 %(weaned to RA)  Temp: 98.2 °F (36.8 °C)  Breath Sounds: clear/diminished  Comment: throughout all lung fields    Inspiratory Capacity:   Preoperative/predictive value: 2000 ml            33% of value: 660 ml                                        75% of value: 1500 ml              10 ml/kg of IBW: 707 ml           Patient's Actual Inspiratory Capacity: 750 ml    CARE PLAN  All Care Plan selections must be based on the approved algorithms located on line in the Policy and Procedures under Respiratory Assessment Adult Protocol Handbook    INDICATIONS FOR THERAPY BASED ON HISTORY AND ASSESSMENT    Bronchial Hygiene Goal: Improvement in sputum mobilization in patients with ineffective airway clearance. Reverse the atelectasis. No indications  Volume Expansion Goal: Prevent atelectasis, Absence or improvement in signs of atelectasis, improve respiratory muscle performance   Presence of pulmonary atelectasis or conditions predisposing to the development of pulmonary atelectasis. Example: Upper/lower abdominal surgery, thoracic surgery, surgery in COPD patient, prolonged bed rest, lack of pain control, presence of thoracic or abdominal binders. Inhaled Medications Goal: Improve respiratory functions in patients with airway disease and decrease WOB  Albuterol Indications   Home regimen. Ipratropium Bromide Indications   No indications  Oxygenation Goal: Reverse hypoxemia and improve tissue oxygenation.  (See oxygen protocol order)   No indications    THERAPIES SELECTED BASED ON ALGORITHMS    Bronchial Hygiene   No therapy recommended  Volume expansion   Incentive spirometry and EZPAP  Inhaled Medication   Hand held nebulizer and Metered dose inhaler  Oxygenation   No therapy recommended ASSESSMENT OUTCOMES     Bronchial Hygiene    Not assessed. Volume Expansion   Resolution of fever    and Normal pulse rate        Inhaled Medication   Not assessed. Oxygenation   Return of patient to home regime- pt weaned to [address] Based on Assessment:    Continue plan as ordered. Comments: Pt weaned to RA this a.m. and has been maintaining SpO2 goal. Pt continues to do EZPAP Q2WA for another 24hrs. Pt doing well with EZPAP. Will continue to assess daily.

## 2021-01-14 NOTE — CARE COORDINATION
DISASTER CHARTING    1/14/21, 12:32 PM EST    DISCHARGE ONGOING EVALUATION:     Jamie Walters day: 8  Location: -04/004-A Reason for admit: Atrial fibrillation with rapid ventricular response ([facility] [address] [I48.91]  Atrial fibrillation with RVR ([facility] [address] [I48.91]     1/12 3v CABG, left atrial appendage clip  1/12 Intubated - 1/12 Extubated    Barriers to Discharge: POD #2. Went into afib yesterday, started on amio bolus and drip. Additional amio bolus given again this morning. Cardiology consulted for persistent afib despite medical therapy - possible CV. Epi weaned off this morning. Weaning primacor. On room air. Afebrile. Afib 100-110's. Ox4. CT x2 to -20sx with 490 ml out in 24 hours. CR/PT/OT. Dietitian & HF RN consulted. Dietary ileus prevention protocol. Telemetry, I&O, daily weight, IS, sternal precautions, CT care, wound care, SCDs, turcios care, ambulate. Amio @ 0.5 mg/min, primacor @ 0.1 mcg/kg/min, Amio, nebs, asa, lipitor, lovenox, pepcid, po lasix daily, inhaler, SSI, prn IV lopressor, lopressor, prn IV morphine, prn roxicodone, electrolyte replacement protocols. IV ATB completed. CO2 21, BUN 33, Creat 2.3, wbc 14.1, hgb 8.8, plt 87. PCP: Aleja Atkins MD  Readmission Risk Score: 23%  Patient Goals/Plan/Treatment Preferences: Home with wife and [facility]. Monitor for DME needs. SW on case.

## 2021-01-14 NOTE — CARE COORDINATION
1/14/21, 11:13 AM EST    DISCHARGE PLANNING EVALUATION    SW spoke with Ruth Edwards with [facility], [facility].

## 2021-01-15 ENCOUNTER — ANESTHESIA (OUTPATIENT)
Dept: CARDIAC CATH/INVASIVE PROCEDURES | Age: 71
DRG: 233 | End: 2021-01-15
Payer: MEDICARE

## 2021-01-15 ENCOUNTER — ANESTHESIA EVENT (OUTPATIENT)
Dept: CARDIAC CATH/INVASIVE PROCEDURES | Age: 71
DRG: 233 | End: 2021-01-15
Payer: MEDICARE

## 2021-01-15 ENCOUNTER — APPOINTMENT (OUTPATIENT)
Dept: GENERAL RADIOLOGY | Age: 71
DRG: 233 | End: 2021-01-15
Payer: MEDICARE

## 2021-01-15 LAB
ANION GAP SERPL CALCULATED.3IONS-SCNC: 16 MEQ/L (ref 8–16)
APTT: 40.7 SECONDS (ref 22–38)
APTT: 67.6 SECONDS (ref 22–38)
APTT: > 200 SECONDS (ref 22–38)
BUN BLDV-MCNC: 32 MG/DL (ref 7–22)
CALCIUM SERPL-MCNC: 8.2 MG/DL (ref 8.5–10.5)
CHLORIDE BLD-SCNC: 99 MEQ/L (ref 98–111)
CO2: 18 MEQ/L (ref 23–33)
CREAT SERPL-MCNC: 1.8 MG/DL (ref 0.4–1.2)
EKG ATRIAL RATE: 100 BPM
EKG ATRIAL RATE: 117 BPM
EKG Q-T INTERVAL: 368 MS
EKG Q-T INTERVAL: 406 MS
EKG QRS DURATION: 106 MS
EKG QRS DURATION: 128 MS
EKG QTC CALCULATION (BAZETT): 513 MS
EKG QTC CALCULATION (BAZETT): 539 MS
EKG R AXIS: 70 DEGREES
EKG R AXIS: 73 DEGREES
EKG T AXIS: -37 DEGREES
EKG T AXIS: 36 DEGREES
EKG VENTRICULAR RATE: 106 BPM
EKG VENTRICULAR RATE: 117 BPM
ERYTHROCYTE [DISTWIDTH] IN BLOOD BY AUTOMATED COUNT: 13 % (ref 11.5–14.5)
ERYTHROCYTE [DISTWIDTH] IN BLOOD BY AUTOMATED COUNT: 45.9 FL (ref 35–45)
GFR SERPL CREATININE-BSD FRML MDRD: 37 ML/MIN/1.73M2
GLUCOSE BLD-MCNC: 102 MG/DL (ref 70–108)
GLUCOSE BLD-MCNC: 128 MG/DL (ref 70–108)
GLUCOSE BLD-MCNC: 86 MG/DL (ref 70–108)
GLUCOSE BLD-MCNC: 87 MG/DL (ref 70–108)
HCT VFR BLD CALC: 28.7 % (ref 42–52)
HEMOGLOBIN: 10.3 GM/DL (ref 14–18)
LV EF: 38 %
LVEF MODALITY: NORMAL
MAGNESIUM: 2.4 MG/DL (ref 1.6–2.4)
MCH RBC QN AUTO: 35 PG (ref 26–33)
MCHC RBC AUTO-ENTMCNC: 35.9 GM/DL (ref 32.2–35.5)
MCV RBC AUTO: 97.6 FL (ref 80–94)
PLATELET # BLD: 107 THOU/MM3 (ref 130–400)
PMV BLD AUTO: 11.7 FL (ref 9.4–12.4)
POTASSIUM SERPL-SCNC: 4.3 MEQ/L (ref 3.5–5.2)
RBC # BLD: 2.94 MILL/MM3 (ref 4.7–6.1)
REASON FOR REJECTION: NORMAL
REJECTED TEST: NORMAL
SODIUM BLD-SCNC: 133 MEQ/L (ref 135–145)
WBC # BLD: 18 THOU/MM3 (ref 4.8–10.8)

## 2021-01-15 PROCEDURE — 6370000000 HC RX 637 (ALT 250 FOR IP): Performed by: INTERNAL MEDICINE

## 2021-01-15 PROCEDURE — 93320 DOPPLER ECHO COMPLETE: CPT

## 2021-01-15 PROCEDURE — 6370000000 HC RX 637 (ALT 250 FOR IP): Performed by: PHYSICIAN ASSISTANT

## 2021-01-15 PROCEDURE — 83735 ASSAY OF MAGNESIUM: CPT

## 2021-01-15 PROCEDURE — 97535 SELF CARE MNGMENT TRAINING: CPT

## 2021-01-15 PROCEDURE — 2580000003 HC RX 258: Performed by: PHYSICIAN ASSISTANT

## 2021-01-15 PROCEDURE — 85027 COMPLETE CBC AUTOMATED: CPT

## 2021-01-15 PROCEDURE — 6360000002 HC RX W HCPCS: Performed by: NURSE ANESTHETIST, CERTIFIED REGISTERED

## 2021-01-15 PROCEDURE — 2060000000 HC ICU INTERMEDIATE R&B

## 2021-01-15 PROCEDURE — 6360000002 HC RX W HCPCS: Performed by: PHYSICIAN ASSISTANT

## 2021-01-15 PROCEDURE — 71045 X-RAY EXAM CHEST 1 VIEW: CPT

## 2021-01-15 PROCEDURE — 97530 THERAPEUTIC ACTIVITIES: CPT

## 2021-01-15 PROCEDURE — 93312 ECHO TRANSESOPHAGEAL: CPT

## 2021-01-15 PROCEDURE — 85730 THROMBOPLASTIN TIME PARTIAL: CPT

## 2021-01-15 PROCEDURE — 2500000003 HC RX 250 WO HCPCS: Performed by: NURSE PRACTITIONER

## 2021-01-15 PROCEDURE — 82948 REAGENT STRIP/BLOOD GLUCOSE: CPT

## 2021-01-15 PROCEDURE — 94761 N-INVAS EAR/PLS OXIMETRY MLT: CPT

## 2021-01-15 PROCEDURE — 97110 THERAPEUTIC EXERCISES: CPT

## 2021-01-15 PROCEDURE — 36415 COLL VENOUS BLD VENIPUNCTURE: CPT

## 2021-01-15 PROCEDURE — 94640 AIRWAY INHALATION TREATMENT: CPT

## 2021-01-15 PROCEDURE — 6370000000 HC RX 637 (ALT 250 FOR IP): Performed by: FAMILY MEDICINE

## 2021-01-15 PROCEDURE — 99232 SBSQ HOSP IP/OBS MODERATE 35: CPT | Performed by: NURSE PRACTITIONER

## 2021-01-15 PROCEDURE — 80048 BASIC METABOLIC PNL TOTAL CA: CPT

## 2021-01-15 PROCEDURE — 93325 DOPPLER ECHO COLOR FLOW MAPG: CPT

## 2021-01-15 PROCEDURE — 92960 CARDIOVERSION ELECTRIC EXT: CPT | Performed by: INTERNAL MEDICINE

## 2021-01-15 PROCEDURE — APPSS30 APP SPLIT SHARED TIME 16-30 MINUTES: Performed by: PHYSICIAN ASSISTANT

## 2021-01-15 PROCEDURE — 93005 ELECTROCARDIOGRAM TRACING: CPT | Performed by: NURSE PRACTITIONER

## 2021-01-15 PROCEDURE — 2500000003 HC RX 250 WO HCPCS: Performed by: THORACIC SURGERY (CARDIOTHORACIC VASCULAR SURGERY)

## 2021-01-15 PROCEDURE — 2500000003 HC RX 250 WO HCPCS: Performed by: PHYSICIAN ASSISTANT

## 2021-01-15 PROCEDURE — 97116 GAIT TRAINING THERAPY: CPT

## 2021-01-15 RX ORDER — PROPOFOL 10 MG/ML
INJECTION, EMULSION INTRAVENOUS PRN
Status: DISCONTINUED | OUTPATIENT
Start: 2021-01-15 | End: 2021-01-15 | Stop reason: SDUPTHER

## 2021-01-15 RX ORDER — AMIODARONE HYDROCHLORIDE 200 MG/1
200 TABLET ORAL DAILY
Status: DISCONTINUED | OUTPATIENT
Start: 2021-01-16 | End: 2021-01-16

## 2021-01-15 RX ADMIN — ATORVASTATIN CALCIUM 10 MG: 10 TABLET, FILM COATED ORAL at 20:55

## 2021-01-15 RX ADMIN — ALBUTEROL SULFATE 2.5 MG: 2.5 SOLUTION RESPIRATORY (INHALATION) at 14:49

## 2021-01-15 RX ADMIN — ALBUTEROL SULFATE 2.5 MG: 2.5 SOLUTION RESPIRATORY (INHALATION) at 08:35

## 2021-01-15 RX ADMIN — OXYCODONE HYDROCHLORIDE 5 MG: 5 TABLET ORAL at 09:46

## 2021-01-15 RX ADMIN — HEPARIN SODIUM 2660 UNITS: 1000 INJECTION INTRAVENOUS; SUBCUTANEOUS at 17:49

## 2021-01-15 RX ADMIN — METOPROLOL TARTRATE 50 MG: 50 TABLET, FILM COATED ORAL at 20:55

## 2021-01-15 RX ADMIN — ASPIRIN 81 MG: 81 TABLET, CHEWABLE ORAL at 09:46

## 2021-01-15 RX ADMIN — PROPOFOL 150 MG: 10 INJECTION, EMULSION INTRAVENOUS at 14:25

## 2021-01-15 RX ADMIN — SODIUM CHLORIDE, PRESERVATIVE FREE 10 ML: 5 INJECTION INTRAVENOUS at 09:46

## 2021-01-15 RX ADMIN — SODIUM CHLORIDE, PRESERVATIVE FREE 10 ML: 5 INJECTION INTRAVENOUS at 20:54

## 2021-01-15 RX ADMIN — FAMOTIDINE 20 MG: 10 INJECTION INTRAVENOUS at 09:46

## 2021-01-15 RX ADMIN — AMIODARONE HYDROCHLORIDE 200 MG: 200 TABLET ORAL at 10:03

## 2021-01-15 RX ADMIN — AMIODARONE HYDROCHLORIDE 0.5 MG/MIN: 1.8 INJECTION, SOLUTION INTRAVENOUS at 11:00

## 2021-01-15 RX ADMIN — GLYCOPYRROLATE AND FORMOTEROL FUMARATE 2 PUFF: 9; 4.8 AEROSOL, METERED RESPIRATORY (INHALATION) at 08:40

## 2021-01-15 RX ADMIN — MULTIPLE VITAMINS W/ MINERALS TAB 1 TABLET: TAB at 09:46

## 2021-01-15 RX ADMIN — AMIODARONE HYDROCHLORIDE 0.5 MG/MIN: 1.8 INJECTION, SOLUTION INTRAVENOUS at 23:48

## 2021-01-15 RX ADMIN — OXYCODONE HYDROCHLORIDE 5 MG: 5 TABLET ORAL at 17:28

## 2021-01-15 RX ADMIN — SODIUM CHLORIDE: 9 INJECTION, SOLUTION INTRAVENOUS at 05:28

## 2021-01-15 RX ADMIN — METOPROLOL TARTRATE 50 MG: 50 TABLET, FILM COATED ORAL at 09:46

## 2021-01-15 RX ADMIN — BISACODYL 10 MG: 5 TABLET ORAL at 20:55

## 2021-01-15 RX ADMIN — ALBUTEROL SULFATE 2.5 MG: 2.5 SOLUTION RESPIRATORY (INHALATION) at 19:52

## 2021-01-15 RX ADMIN — FUROSEMIDE 40 MG: 40 TABLET ORAL at 09:46

## 2021-01-15 RX ADMIN — HEPARIN SODIUM 14 UNITS/KG/HR: 10000 INJECTION, SOLUTION INTRAVENOUS at 15:30

## 2021-01-15 RX ADMIN — GLYCOPYRROLATE AND FORMOTEROL FUMARATE 2 PUFF: 9; 4.8 AEROSOL, METERED RESPIRATORY (INHALATION) at 19:51

## 2021-01-15 ASSESSMENT — PAIN SCALES - GENERAL
PAINLEVEL_OUTOF10: 0
PAINLEVEL_OUTOF10: 6
PAINLEVEL_OUTOF10: 0
PAINLEVEL_OUTOF10: 0
PAINLEVEL_OUTOF10: 6

## 2021-01-15 NOTE — PROGRESS NOTES
[facility] ICU 4D  Occupational Therapy  Daily Note  Time:   Time In: 845  Time Out: 0908  Timed Code Treatment Minutes: 23 Minutes  Minutes: 23          Date: 1/15/2021  Patient Name: Seven Vizcaino,   Gender: male      Room: [facility]004-A  MRN: [id number]  : 1950  (79 y.o.)  Referring Practitioner: Татьяна Scott PA-C  Diagnosis: Atrial Fibrillation with Rapid Ventricular Response  Additional Pertinent Hx: Pt presents with evaluation of sob that awoke him from sleep. He was supposed to be followed up by Dr. Steffanie Hurtado. He was found to have Afib last week. HR was 140s. First episode. He could not catch his breath. Pt underwent DEJAH and had CABG x 3 on 21. Restrictions/Precautions:  Restrictions/Precautions: Surgical Protocols  Position Activity Restriction  Sternal Precautions: No Pushing, No Pulling, 5# Lifting Restrictions  Other position/activity restrictions: Incision on RLE from the vein harvest     SUBJECTIVE: Pt lying supine upon arrival, requesting up to BR- RN present initially for line management during mobility to STS. PAIN: Did not rate: Surgical pain- Wife notifying RN at end of session for pain pill    COGNITION: Decreased Recall, Decreased Problem Solving, Decreased Safety Awareness and Impulsive    ADL:   Toileting: Maximum Assistance. For hygiene after BM- extended time on STS for attempt. Toilet Transfer: Minimal Assistance and X 1. From STS. BALANCE:  Sitting Balance:  Stand By Assistance. Standing Balance: Contact Guard Assistance, X 1, X 2.    x1-2 minutes within toileting task. BED MOBILITY:  Supine to Sit: Moderate Assistance, X 1 Rasing trunk to seated position in order to maintain sternal precautions  Scooting: Maximum Assistance, X 1 EOB    TRANSFERS:  Sit to Stand:  Contact Guard Assistance, X 1, X 2.    Stand to Sit: [employer], X 1, X 2.     Comment: Pt min impulsive iniating transfers- education for sternal precautions and safety awareness. FUNCTIONAL MOBILITY:  Assistive Device: Hand Held Assist  Assist Level:  Contact Guard Assistance and X 1-2. Distance:   Completed functional mobility within pt room from EOB to STS, STS to chair at slow pace, no LOB noted. Pt requires min cues for safety with line management- lenghty seated rest break after trial of mobility, mod fatigue noted. ADDITIONAL ACTIVITIES:  None completed this session- pt politely declined d/t pain/fatigue. ASSESSMENT:     Activity Tolerance:  Patient tolerance of  treatment: fair. Pt limited by pain/fatigue. Discharge Recommendations: Continue to assess pending progress   Equipment Recommendations: Equipment Needed: No  Plan: Times per week: 6x- CABG  Specific instructions for Next Treatment: Functional mobility; ADLs and standing tolerance; CABG Step II exercises; transfers and sternal precautions  Current Treatment Recommendations: Functional Mobility Training, Endurance Training, Self-Care / ADL, Safety Education & Training, Strengthening, Balance Training, Patient/Caregiver Education & Training  Plan Comment: Pt would benefit from continued skilled OT services when medically stable and discharged from Acute. He may need a stay on IP Rehab or a SNF prior to returning home. Patient Education  Patient Education: ADL's, Precautions and Safety with transfers and mobility. Goals  Short term goals  Time Frame for Short term goals: By discharge  Short term goal 1: Pt will demonstrate functional mobility walking to/from the bathroom with CGA while using any AD needed to prepare for doing self care at the sink. Short term goal 2: Pt will complete transfers to/from various surfaces with SBA while using rocking method and following sternal precautions to increase his independence with toileting routine.   Short term goal 3: Pt will complete CABG Step II exercises x 12 reps each with cues as needed to increase his endurance and strength for ease of doing self care and functional mobility. Short term goal 4: Pt will tolerate standing ADLs for over 3 minutes while using 1-2 hand release with cues for pursed lip breathing to increase his endurance for ease of dressing or spongebathing. Following session, patient left in safe position with all fall risk precautions in place.

## 2021-01-15 NOTE — PROGRESS NOTES
[facility] RESPIRATORY ASSESSMENT PROGRAM (RAP)  30 kg and over      Patient Name: Arielle Delarosa Room#: 4D-04/004-A : 1950     Admitting diagnosis:      ASSESSMENT     Vitals  Pulse: 111   Resp: 17  BP: 129/84  SpO2: 100 %  Temp: 98.2 °F (36.8 °C)  Breath Sounds:clear and diminished  Comment: encourage IS    PEF   Predicted:507  Personal Best: NA  ( Actual 230) 45% of predicted     Inspiratory Capacity:   Preoperative/predictive value: 2000 ml   33% of value: 660 ml      75% of value: 1500 ml   10 ml/kg of IBW: 707 ml   Patient's Actual Inspiratory Capacity: 700 ml    CARE PLAN  All Care Plan selections must be based on the approved algorithms located on line in the Policy and Procedures under Respiratory Assessment Adult Protocol Handbook    INDICATIONS FOR THERAPY BASED ON HISTORY AND ASSESSMENT    Bronchial Hygiene Goal: Improvement in sputum mobilization in patients with ineffective airway clearance. Reverse the atelectasis. No indications  Volume Expansion Goal: Prevent atelectasis, Absence or improvement in signs of atelectasis, improve respiratory muscle performance   Presence of pulmonary atelectasis or conditions predisposing to the development of pulmonary atelectasis. Example: Upper/lower abdominal surgery, thoracic surgery, surgery in COPD patient, prolonged bed rest, lack of pain control, presence of thoracic or abdominal binders. Inhaled Medications Goal: Improve respiratory functions in patients with airway disease and decrease WOB  Albuterol Indications   Home regimen. Ipratropium Bromide Indications   No indications  Oxygenation Goal: Reverse hypoxemia and improve tissue oxygenation.  (See oxygen protocol order)   No indications    THERAPIES SELECTED BASED ON ALGORITHMS    Bronchial Hygiene   No therapy recommended  Volume expansion   Incentive spirometry  Inhaled Medication   Hand held nebulizer and MDI  Oxygenation   No therapy recommended    BRONCHODILATOR ASSESSMENT SCORE    Breath Sounds   0 - Normal or no wheezing with diminished bases  Dyspnea and level of distress   0 - None, respiratory rate 12-20, regular pattern  Peak flow   4 - < 50% or unable to perform    Total Score 4    Frequency and assessment based on total score   Score 2-5 Three times a day and every 4 hours PRN for wheezing or increased respiratory distress. Reassess every day. ASSESSMENT OUTCOMES     Bronchial Hygiene    Not assessed. Volume Expansion   Decrease respiratory rate      Inhaled Medication   Return of patient to home regime    Oxygenation   SpO2 greater than or equal to physician's ordered SpO2 goal.    Action Plan Based on Assessment:    Change therapy based on algorithm. Comments: Continue albuterol and bevespi home regimine and encourage IS.

## 2021-01-15 NOTE — PROGRESS NOTES
PHASE 1 CARDIAC REHABILITATION   CABG, AVR, MVR, TVR, AMI, PCI's  Exercise Physiologists      Treatment Length (l: long, n: normal, s: short): n  Treatment Length Note:   Vitals Stable?: Yes. If No:     Any ECG-Rhythm Issues?: No.  If Yes:   Transfers (bc: Bed to Chair, cb: Chair to Bed): na  Strengthening/ROM Treatment Exercises: Step 2     DEJAH/cardioversion later today    Notes: Call light left within reach.     Education given:   Phase II Information (Given upon Discharge):

## 2021-01-15 NOTE — PROGRESS NOTES
[address] [address]  INPATIENT PHYSICAL THERAPY  DAILY NOTE  [facility] ICU 4D - 4D-04/004-A    Time In: 1320  Time Out: 1349  Timed Code Treatment Minutes: 29 Minutes  Minutes: 29          Date: 1/15/2021  Patient Name: Jones Ferguson,  Gender:  male        MRN: [id number]  : 1950  (79 y.o.)     Referring Practitioner: Katherine Muñoz PA-C  Diagnosis: Atrial fibrillation with rapid ventricular response  Additional Pertinent Hx: Pt presents with evaluation of sob that awoke him from sleep. He was supposed to be followed up by Dr. Cyn Ruano. He was found to have Afib last week. HR was 140s. First episode. He could not catch his breath. Pt underwent DEJAH and had CABG x 3 on 21. Prior Level of Function:  Lives With: Spouse  Type of Home: Apartment  Home Layout: One level  Home Access: Stairs to enter without rails  Entrance Stairs - Number of Steps: 1 step  Home Equipment: Cane   Bathroom Shower/Tub: Tub/Shower unit, Shower chair with back  [employer]: Standard  Bathroom Equipment: Grab bars in shower  Bathroom Accessibility: Accessible    Receives Help From: Family  ADL Assistance: [address]: Independent  Homemaking Responsibilities: Yes  Ambulation Assistance: Independent  Transfer Assistance: Independent  Active : Yes  Additional Comments: Pt was independent prior to admission. He did not use any AD for ambulating. Pt spends alot of time of his feet while working. Pt sits if needed while taking a shower. Pt smokes . 5 ppd. Restrictions/Precautions:  Restrictions/Precautions: Surgical Protocols  Position Activity Restriction  Sternal Precautions: No Pushing, No Pulling, 5# Lifting Restrictions  Other position/activity restrictions: Incision on RLE from the vein harvest    SUBJECTIVE: RN approved session, pt is seated in recliner, agreeable to PT. Pt continues to be sleepy, cues to keep awake throughout session.   Wife present, stating pt is going for DEJAH supine <--> sit SBA to enable pt to get in/out of bed. Short term goal 2: Pt to transfer sit <--> stand SBA for increased functional mobility. Short term goal 3: Pt to ambulate >250 feet with LRAD or without AD SBA for household mobility. Short term goal 4: Pt to ascend/descend 1 step without HR SBA for home entry. Long term goals  Time Frame for Long term goals : NA due to short length of stay. Following session, patient left in safe position with all fall risk precautions in place.

## 2021-01-15 NOTE — ANESTHESIA POSTPROCEDURE EVALUATION
Department of Anesthesiology  Postprocedure Note    Patient: Miguelangel Todd  MRN: [id number]  YOB: 1950  Date of evaluation: 1/15/2021  Time:  3:14 PM     Procedure Summary     Date: 01/15/21 Room / Location: [facility]    Anesthesia Start: 1425 Anesthesia Stop: 1450    Procedure: STR CARDIOVERSION W/ ANES Diagnosis:     Scheduled Providers:  Responsible Provider: Yann Pino MD    Anesthesia Type: MAC ASA Status: 3          Anesthesia Type: No value filed. Nessa Phase I:      Nessa Phase II:      Last vitals: Reviewed and per EMR flowsheets.        Anesthesia Post Evaluation    Patient location during evaluation: ICU  Patient participation: complete - patient participated  Level of consciousness: awake and alert  Airway patency: patent  Nausea & Vomiting: no nausea  Complications: no  Cardiovascular status: blood pressure returned to baseline and hemodynamically stable  Respiratory status: acceptable and spontaneous ventilation  Hydration status: euvolemic

## 2021-01-15 NOTE — PROGRESS NOTES
Attempted Synch DC-CV-200, 300  And 360 J  Plan   Rate control  Consider PVI   Cont anticaog  [id number]    Kris Suárez MD

## 2021-01-15 NOTE — PROGRESS NOTES
1518- Discussed plan of care with DINORAH Sykes to transfer patient to stepdown after pt returns to baseline post procedure. Deferred to cardiology for management of Heparin and Amiodarone infusions. Dorcas. APRN messaged regarding continuation of Heparin and Amiodarone infusions. Orders received to continue both infusions and to consult Dr. Luis Gomez with EP.

## 2021-01-15 NOTE — ANESTHESIA PRE PROCEDURE
Department of Anesthesiology  Preprocedure Note       Name:  Nhi Hess   Age:  79 y.o.  :  1950                                          MRN:  [id number]         Date:  1/15/2021      Surgeon: * No surgeons listed *    Procedure: * No procedures listed *    Medications prior to admission:   Prior to Admission medications    Medication Sig Start Date End Date Taking? Authorizing Provider   fluocinonide (LIDEX) 0.05 % ointment Apply topically as needed   Yes Historical Provider, MD   Tiotropium Bromide-Olodaterol (STIOLTO RESPIMAT) 2.5-2.5 MCG/ACT AERS Inhale 1 puff into the lungs 1-2 times daily   Yes Historical Provider, MD   metoprolol tartrate (LOPRESSOR) 50 MG tablet TAKE 1 TABLET BY MOUTH TWICE DAILY 20  Yes Reginaldo Onofre MD   losartan (COZAAR) 100 MG tablet Take 1 tablet by mouth daily 20  Yes Reginaldo Onofre MD   pravastatin (PRAVACHOL) 40 MG tablet Take 1 tablet by mouth daily 20  Yes Reginaldo Onofre MD   rivaroxaban (XARELTO) 20 MG TABS tablet Take 1 tablet by mouth daily (with breakfast) 20  Yes Reginaldo Onofre MD   albuterol sulfate  (90 Base) MCG/ACT inhaler Inhale 2 puffs into the lungs 4 times daily 3/31/20  Yes Reginaldo Onofre MD   Multiple Vitamins-Minerals (CENTRUM SILVER PO) Take  by mouth daily.      Yes Historical Provider, MD   loratadine (CLARITIN) 10 MG tablet Take 10 mg by mouth daily     Historical Provider, MD       Current medications:    Current Facility-Administered Medications   Medication Dose Route Frequency Provider Last Rate Last Admin    [START ON 2021] amiodarone (CORDARONE) tablet 200 mg  200 mg Oral Daily Claudio Parikh, APRN - CNP        milrinone lactate (PRIMACOR) 50,000 mcg in sodium chloride 0.9 % 250 mL infusion  0.2 mcg/kg/min Intravenous Continuous Jojo Das MD   Stopped at 21 1500    metoprolol tartrate (LOPRESSOR) tablet 50 mg  50 mg Oral BID Nora Ruby PA-C   50 mg at 01/15/21 0652  famotidine (PEPCID) injection 20 mg  20 mg Intravenous Daily Nicole Moss PA-C   20 mg at 01/15/21 0946    heparin (porcine) injection 5,330 Units  80 Units/kg Intravenous PRN Gustavo AcrIRIS herculesN - CNP        heparin (porcine) injection 2,660 Units  40 Units/kg Intravenous PRN Gustavo Acron, APRN - CNP        heparin 25,000 unit in sodium chloride 0.45% 250 mL infusion  18 Units/kg/hr Intravenous Continuous ARCADIO Stevens - CNP 9.3 mL/hr at 01/15/21 0945 14 Units/kg/hr at 01/15/21 0945    amiodarone (CORDARONE) tablet 200 mg  200 mg Oral Once Nicole Moss PA-C        albuterol (PROVENTIL) nebulizer solution 2.5 mg  2.5 mg Nebulization TID Nicole Moss PA-C   2.5 mg at 01/15/21 1449    albuterol (PROVENTIL) nebulizer solution 2.5 mg  2.5 mg Nebulization Q4H PRN MANUEL Ruelas-C   2.5 mg at 01/14/21 2359    amiodarone (NEXTERONE) 360 mg in dextrose 5% 200 ml  0.5 mg/min Intravenous Continuous Alex Shaw MD 16.7 mL/hr at 01/15/21 1100 0.5 mg/min at 01/15/21 1100    insulin lispro (HUMALOG) injection vial 0-12 Units  0-12 Units Subcutaneous TID WC Alex Shaw MD   2 Units at 01/13/21 1901    insulin lispro (HUMALOG) injection vial 0-6 Units  0-6 Units Subcutaneous Nightly Alex Shaw MD   1 Units at 01/13/21 2059    0.9 % sodium chloride infusion   Intravenous Continuous MANUEL Ruelas-C 20 mL/hr at 01/15/21 0528 New Bag at 01/15/21 0528    sodium chloride flush 0.9 % injection 10 mL  10 mL Intravenous 2 times per day Nicole Moss PA-C   10 mL at 01/15/21 0946    sodium chloride flush 0.9 % injection 10 mL  10 mL Intravenous PRN MANUEL Ruelas-C        acetaminophen (TYLENOL) tablet 650 mg  650 mg Oral Q4H PRN Nicole Moss PA-C   650 mg at 01/13/21 2057    acetaminophen (TYLENOL) suppository 650 mg  650 mg Rectal Q4H PRN Nicole Moss PA-C        oxyCODONE (ROXICODONE) immediate release tablet 5 mg  5 mg Oral Q4H PRN Nicole Moss PA-C   5 mg at 01/15/21 4362    Or    oxyCODONE (ROXICODONE) immediate release tablet 10 mg  10 mg Oral Q4H PRN Kevin Hernandezino, PA-C   10 mg at 01/13/21 1247    morphine (PF) injection 2 mg  2 mg Intravenous Q15 Min PRN Kevin Blackburn, PA-C   2 mg at 01/13/21 0908    enalaprilat (VASOTEC) injection 0.625 mg  0.625 mg Intravenous Q1H PRN Kevin Hernandezino, PA-C   0.625 mg at 01/14/21 1504    metoprolol (LOPRESSOR) injection 2.5 mg  2.5 mg Intravenous Q10 Min PRN Kevin Blackburn, PA-C   2.5 mg at 01/14/21 3089    therapeutic multivitamin-minerals 1 tablet  1 tablet Oral Daily with breakfast Kevin Blackburn PA-C   1 tablet at 01/15/21 0946    bisacodyl (DULCOLAX) EC tablet 10 mg  10 mg Oral BID Kevin Blackburn, PA-C   10 mg at 01/14/21 2016    senna (SENOKOT) 8.8 MG/5ML syrup 17.6 mg  10 mL Oral Daily PRN Kevin Blackburn, PA-C        magnesium hydroxide (MILK OF MAGNESIA) 400 MG/5ML suspension 30 mL  30 mL Oral Daily PRN Kevin Hernandezino, PA-C        potassium chloride 20 mEq/50 mL IVPB (Central Line)  20 mEq Intravenous PRN Kevin Blackburn, PA-C 50 mL/hr at 01/12/21 2253 20 mEq at 01/12/21 2253    magnesium sulfate 1.5 g in dextrose 5 % 100 mL IVPB  1.5 g Intravenous PRN Kevin Blackburn, PA-C        calcium replacement protocol   Other RX Placeholder Kevin Blackburn, PA-C        albumin human 5 % IV solution 25 g  25 g Intravenous PRN Kevin Blackburn, PA-C   25 g at 01/12/21 2109    glucose (GLUTOSE) 40 % oral gel 15 g  15 g Oral PRN Kevin Blackburn, PA-C        dextrose 50 % IV solution  12.5 g Intravenous PRN Kevin Blackburn, PA-C   7.5 g at 01/13/21 0839    glucagon (rDNA) injection 1 mg  1 mg Intramuscular PRN Kevin Blackburn, PA-C        dextrose 5 % solution  100 mL/hr Intravenous PRN Kevin Blackburn, PA-C        EPINEPHrine (EPINEPHrine HCL) 5 mg in dextrose 5 % 250 mL infusion  1 mcg/min Intravenous Continuous PRN Ilya Monsivais MD   Stopped at 01/14/21 0428    [Held by provider] metoprolol succinate (TOPROL XL) extended release tablet 100 mg  100 mg Oral Daily Lazaro Lopez PA-C   100 mg at 01/12/21 3680    aspirin chewable tablet 81 mg  81 mg Oral Daily Fernanda Arita MD   81 mg at 01/15/21 0946    glycopyrrolate-formoterol (BEVESPI) 9-4.8 MCG/ACT inhaler 2 puff  2 puff Inhalation BID Fernanda Arita MD   2 puff at 01/15/21 0840    promethazine (PHENERGAN) tablet 12.5 mg  12.5 mg Oral Q6H PRN Fernanda Arita MD        polyethylene glycol (GLYCOLAX) packet 17 g  17 g Oral Daily PRN Fernanda Arita MD        potassium chloride (KLOR-CON M) extended release tablet 40 mEq  40 mEq Oral PRN Fernanda Arita MD        Or    potassium bicarb-citric acid (EFFER-K) effervescent tablet 40 mEq  40 mEq Oral PRN Fernanda Arita MD        Or    potassium chloride 10 mEq/100 mL IVPB (Peripheral Line)  10 mEq Intravenous PRN Fernanda Arita MD        magnesium sulfate 2 g in 50 mL IVPB premix  2 g Intravenous PRN Fernanda Arita MD        atorvastatin (LIPITOR) tablet 10 mg  10 mg Oral Nightly Fernanda Arita MD   10 mg at 01/14/21 2016    furosemide (LASIX) tablet 40 mg  40 mg Oral Daily Mounika Hahn MD   40 mg at 01/15/21 0946       Allergies:     Allergies   Allergen Reactions    Penicillins Rash    Sulfa Antibiotics Rash       Problem List:    Patient Active Problem List   Diagnosis Code    Hyperlipidemia E78.5    Asthma J45.909    Smoker F17.200    Allergic rhinitis due to other allergen J30.89    Collagenous colitis K52.831    Lactose intolerance E73.9    HTN (hypertension) I10    COPD, mild (HCC) J44.9    Atrial fibrillation with rapid ventricular response (HCC) I48.91    Atrial fibrillation (HCC) I48.91    Coronary artery disease involving native coronary artery I25.10    S/P CABG x 3 Z95.1       Past Medical History:        Diagnosis Date    Asthma     Collagenous colitis 2014       repeat  in  2024    Colon polyps 2000    COPD, mild (HCC)     Eczema of hand     HTN (hypertension)     Hyperlipidemia     Smoker        Past Surgical History:        Procedure Laterality Date    COLONOSCOPY  2011,6/30/14    nba cardenas-no polyps repeat 10 yr    CORONARY ARTERY BYPASS GRAFT  01/12/2021    cabg x 3 with lima and atrial appendage clip  dr Maddie Siu N/A 1/12/2021    CABG  X 3 WITH DEJAH, Atrial Appendage Clip performed by Sandy Mathis MD at [address] [place] [address] Road  06/2008    polyps    OTHER SURGICAL HISTORY  DEC 7TH 2012 DR [address] [place]     IGS ENDOSCOPIC BI LAT MAXILLARY, ETHMOID, SPHENOID, FRONTAL SINUSOTOMY WITH SEPTOPLASTY AND TURBINOPLASTIES    SKIN CANCER EXCISION  09/2014    Left side of Forehead     TOOTH EXTRACTION  02/2017       Social History:    Social History     Tobacco Use    Smoking status: Current Every Day Smoker     Packs/day: 1.00     Years: 40.00     Pack years: 40.00     Types: Cigarettes    Smokeless tobacco: Never Used   Substance Use Topics    Alcohol use: Yes     Alcohol/week: 0.0 standard drinks     Comment: very little                                Ready to quit: Not Answered  Counseling given: Not Answered      Vital Signs (Current):   Vitals:    01/15/21 1440 01/15/21 1445 01/15/21 1449 01/15/21 1500   BP: 89/68 (!) 102/57  (!) 90/55   Pulse: 93 96  105   Resp: 13 14  12   Temp:       TempSrc:       SpO2: 100% 100% 100%    Weight:       Height:                                                  BP Readings from Last 3 Encounters:   01/15/21 (!) 90/55   01/12/21 138/69   12/29/20 138/82       NPO Status:                                                                                 BMI:   Wt Readings from Last 3 Encounters:   01/12/21 146 lb 14.4 oz (66.6 kg)   12/29/20 155 lb 8 oz (70.5 kg)   06/30/20 157 lb (71.2 kg)     Body mass index is 21.69 kg/m².     CBC:   Lab Results   Component Value Date    WBC 18.0 01/15/2021    RBC 2.94 01/15/2021    RBC 4.84 11/07/2011    HGB 10.3 01/15/2021    HCT 28.7 01/15/2021    MCV 97.6 01/15/2021    RDW 13.3 06/07/2018     01/15/2021       CMP:   Lab Results   Component Value Date     01/15/2021    K 4.3 01/15/2021    K 5.3 01/09/2021    CL 99 01/15/2021    CO2 18 01/15/2021    BUN 32 01/15/2021    CREATININE 1.8 01/15/2021    LABGLOM 37 01/15/2021    GLUCOSE 128 01/15/2021    GLUCOSE 94 11/07/2011    PROT 7.2 01/11/2021    CALCIUM 8.2 01/15/2021    BILITOT 0.4 01/11/2021    ALKPHOS 54 01/11/2021    AST 95 01/11/2021    ALT 82 01/11/2021       POC Tests:   Recent Labs     01/15/21  1213   POCGLU 86       Coags:   Lab Results   Component Value Date    INR 1.13 01/11/2021    APTT > 200.0 01/15/2021       HCG (If Applicable): No results found for: PREGTESTUR, PREGSERUM, HCG, HCGQUANT     ABGs: No results found for: PHART, PO2ART, CZU4FOA, CRH5ENZ, BEART, E9XACBJA     Type & Screen (If Applicable):  Lab Results   Component Value Date    LABRH POS 01/11/2021       Drug/Infectious Status (If Applicable):  No results found for: HIV, HEPCAB    COVID-19 Screening (If Applicable):   Lab Results   Component Value Date    COVID19 NOT DETECTED 01/11/2021         Anesthesia Evaluation  Patient summary reviewed and Nursing notes reviewed no history of anesthetic complications:   Airway: Mallampati: II  TM distance: >3 FB   Neck ROM: full  Mouth opening: > = 3 FB Dental: normal exam         Pulmonary:normal exam    (+) COPD:  asthma:                            Cardiovascular:    (+) hypertension:, CAD: obstructive, CABG/stent:, dysrhythmias: atrial fibrillation, hyperlipidemia      ECG reviewed  Rhythm: irregular    Echocardiogram reviewed         Beta Blocker:  Dose within 24 Hrs         Neuro/Psych:   Negative Neuro/Psych ROS              GI/Hepatic/Renal: Neg GI/Hepatic/Renal ROS            Endo/Other: Negative Endo/Other ROS                    Abdominal:           Vascular: negative vascular ROS.                                        Anesthesia Plan      MAC     ASA 3 Anesthetic plan and risks discussed with patient. Plan discussed with attending.                   ARCADIO Kendall - CRNA   1/15/2021

## 2021-01-15 NOTE — PROGRESS NOTES
Cardiology Progress Note      Patient:  Jones Ferguson  YOB: 1950  MRN: [id number]   Acct: [de-identified]  Admit Date:  1/6/2021  Primary Cardiologist: none  Seen by dr. Coty Silvestre    Per prior cardiology consult note-  CHIEF COMPLAINT: SOB        HPI: This is a pleasant 79 y.o. male presents with evaluation of sob that awoke him from sleep. He was supposed to be followed up by Dr. Cyn Ruano. He was found to have Afib last week. HR was 140s. First episode. He could not catch his breath. Started on Dilitazem gtt. HR still 110-120s. BNP 4443. Trop 0.183--0.175. Cr 1.3.  TSH 2.0.  Hgb 16.2. Patient is on Xarelto as outpatient. EF is 25%, with severe global hypokinesis. Mild LE edema. No chest pain. No major drug use, or hx of CAD/MI. Denies major family hx. Drinks 6 beers per day x 2 years. No recent sick contacts. Lots of family stress with kids using drugs. He has worsening LE edema.   Prior to this, he states no cardiac concerns.         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 - underwent CABG x3 w/ LA clip EF 25%  He had AFB while waiting for CABG and had an unsuccessful CV x3     Post-op he went back into AFB RVR - on IV amio   Remains AFB -140 -- pt asymptomatic  / VSS    Pt and wife ok with EDJAH / CV       1/15/2021  Not sure what happened with DEJAH / CV that was scheduled for yesterday     Pt up in chair - he is tired and sore   Tele - AFB remains with -120  BP stable - pt asymptomatic with AFB     Will arrange DEJAH / CV again for today       Objective:   /74   Pulse 113   Temp 98.2 °F (36.8 °C) (Oral)   Resp 17   Ht 5' 9\" (1.753 m)   Wt 146 lb 14.4 oz (66.6 kg)   SpO2 (!) 82%   BMI 21.69 kg/m²        TELEMETRY: AFB     Physical Exam:  General Appearance: alert and oriented to person, place and time, in no acute distress  Cardiovascular: irregular rhythm, normal S1 and S2, no murmurs, rubs, clicks, or gallops, distal pulses intact  Pulmonary/Chest: clear upper - diminished lower to auscultation bilaterally- no wheezes, rales or rhonchi, normal air movement, no respiratory distress  Abdomen: soft, non-tender, non-distended, normal bowel sounds, no masses Extremities: no cyanosis, clubbing or edema, pulses present    Skin: warm and dry, no rash or erythema  Head: normocephalic and atraumatic  Musculoskeletal: normal range of motion, no joint swelling, deformity or tenderness  Neurological: alert, oriented, normal speech, no focal findings or movement disorder noted    Medications:    metoprolol tartrate  50 mg Oral BID    famotidine (PEPCID) injection  20 mg Intravenous Daily    amiodarone  200 mg Oral Once    albuterol  2.5 mg Nebulization TID    insulin lispro  0-12 Units Subcutaneous TID WC    insulin lispro  0-6 Units Subcutaneous Nightly    sodium chloride flush  10 mL Intravenous 2 times per day    therapeutic multivitamin-minerals  1 tablet Oral Daily with breakfast    bisacodyl  10 mg Oral BID    calcium replacement protocol   Other RX Placeholder    amiodarone  200 mg Oral BID    [Held by provider] metoprolol succinate  100 mg Oral Daily    aspirin  81 mg Oral Daily    glycopyrrolate-formoterol  2 puff Inhalation BID    atorvastatin  10 mg Oral Nightly    furosemide  40 mg Oral Daily      milrinone Stopped (01/14/21 1500)    heparin (PORCINE) Infusion Stopped (01/15/21 1223)    amiodarone 0.5 mg/min (01/14/21 2211)    sodium chloride 20 mL/hr at 01/15/21 0528    dextrose      EPINEPHrine infusion Stopped (01/14/21 0428)         heparin (porcine), 80 Units/kg, PRN      heparin (porcine), 40 Units/kg, PRN      albuterol, 2.5 mg, Q4H PRN      sodium chloride flush, 10 mL, PRN      acetaminophen, 650 mg, Q4H PRN      acetaminophen, 650 mg, Q4H PRN      oxyCODONE, 5 mg, Q4H PRN    Or      oxyCODONE, 10 mg, Q4H PRN      morphine, 2 mg, Q15 Min PRN      enalaprilat, 0.625 mg, Q1H PRN      metoprolol, 2.5 mg, Q10 Min PRN      senna, 10 mL, Daily PRN      magnesium hydroxide, 30 mL, Daily PRN      potassium chloride, 20 mEq, PRN      magnesium sulfate, 1.5 g, PRN      albumin human, 25 g, PRN      glucose, 15 g, PRN      dextrose, 12.5 g, PRN      glucagon (rDNA), 1 mg, PRN      dextrose, 100 mL/hr, PRN      EPINEPHrine infusion, 1 mcg/min, Continuous PRN      promethazine, 12.5 mg, Q6H PRN      polyethylene glycol, 17 g, Daily PRN      potassium chloride, 40 mEq, PRN    Or      potassium alternative oral replacement, 40 mEq, PRN    Or      potassium chloride, 10 mEq, PRN      magnesium sulfate, 2 g, PRN        Diagnostics:  EKG:   15-DIDI-2021 08:27:47 [facility]-ICU ROUTINE RETRIEVAL  Atrial fibrillation with rapid ventricular response  Intraventricular conduction delay  Abnormal ECG  When compared with ECG of 14-JAN-2021 05:52,  Questionable change in QRS duration        DEJAH:   Electronically signed by Jhoana Cordova MD (Interpreting   physician) on 01/08/2021 at 02:28 PM   ----------------------------------------------------------------      Findings      Mitral Valve   Normal structure and function. Mild mitral regurgitation is present. Aortic Valve   The aortic valve was trileaflet with normal thickness and cuspal   separation. There was no echocardiographic evidence of a vegetation. DOPPLER: Transaortic velocity was within the normal range with no evidence   of aortic stenosis or regurgitaiton. Tricuspid Valve   Tricuspid valve is structurally normal.   Mild tricuspid regurgitation. Pulmonic Valve   The pulmonic valve leaflets exhibited normal thickness, no calcification,   and normal cuspal separation. There was no echocardiographic evidence of   vegetation. DOPPLER: The transpulmonic velocity was within the normal   range with no evidence for regurgitation.       Left Atrium   Mildly dilated left atrium. No evidence of thrombus within left atrium. No evidence of patent foramen ovale. Mild spontaneous echo contrast was present in LA cavity. Left Ventricle   Left ventricle size is normal.   No asymmetrical left ventricular hypertrophy was seen. There was severe global hypokinesis of the left ventricle. Ejection fraction is visually estimated at 25%. Right Atrium   Mildly enlarged right atrium size. Right Ventricle   The right ventricular size was normal with normal systolic function and   wall thickness. Pericardial Effusion   The pericardium was normal in appearance with no evidence of a pericardial   effusion. Pleural Effusion   No evidence of pleural effusion. Aorta / Great Vessels   -Aortic root dimension within normal limits.   -The Pulmonary artery is within normal limits. -IVC size is within normal limits with normal respiratory phasic changes. Procedure Descriptor   Probe easily inserted by Cardiologist.   Procedure performed without complications. The study included complete 2D imaging, complete spectral doppler, and   color doppler. The transesophageal approach was used. Risk benefits and alternatives were explained to the patient and informed   consent was obtained.       Left Heart Cath:   TTE 1/5/21  Summary   Ejection fraction is visually estimated at 25%.   There was severe global hypokinesis of the left ventricle.   Moderately dilated left atrium.   The IVC is dilated .      Signature      ----------------------------------------------------------------   Electronically signed by Antonio Puckett MD (Interpreting   physician) on 01/05/2021 at 07:02 PM     Cath 1/8/21  FINDINGS:  LEFT VENTRICULOGRAPHY:  Severe global hypokinesis was noted on the  ventriculography with an ejection fraction estimated at 25%.     LVEDP:  20 mmHg.     CORONARY ANGIOGRAM:  1.  Right coronary artery:  Ostial RCA has 30% to 50% stenosis.  The  proximal RCA has luminal irregularities.  Mid RCA has chronic total  occlusion. 2.  Left main coronary artery:  The left main coronary artery has 70% to  80% bifurcation lesion. 3.  Ramus intermedius:  Ostial ramus intermedius has 20% stenosis  followed by mild luminal irregularities. 4.  Left circumflex artery:  Left circumflex artery has mild luminal  irregularities in the proximal segment.  Large, dominant vessel.  Distal  LCX into LPL has 60% to 70% stenosis. 5.  Left anterior descending artery:  Ostial LAD is diseased with 50% to  60% stenosis as well as CAD involving proximal segment of LAD, 70% to  80% in severity with mild calcification.  Mid LAD has 70% stenosis,  bifurcation lesion.  D1 is a relatively large vessel with 50% to 60%  ostial stenosis.  Distal LAD with mild diffuse disease.     MEDICATIONS:  See MAR.     COMPLICATIONS:  None.     ESTIMATED BLOOD LOSS:  Less than 50 mL.     ACCESS:  Right radial artery access.  Vasc Band was applied.  Hemostasis  was achieved.     IMPRESSION:  1.  Severe ischemic cardiomyopathy. 2.  Multivessel coronary artery disease as discussed above including  severely stenotic lesion of distal left main coronary artery, 70% to 80%  in severity.     RECOMMENDATIONS:  Continue inpatient care.  Continue to monitor the  patient on telemetry.  Continue aspirin.  Continue on the statin.  We  would recommend increasing the dose to 40 mg p.o. daily.  Monitor volume  status.  Consult Cardiovascular Surgery to assess for possible coronary  artery bypass graft surgery.  Heart team discussion regarding  revascularization.     Findings and plan of care was discussed with the patient.  Questions  were answered.           Génesis Isabel MD    Lab Data:    Cardiac Enzymes:  No results for input(s): CKTOTAL, CKMB, CKMBINDEX, TROPONINI in the last 72 hours.     CBC:   Lab Results   Component Value Date    WBC 18.0 01/15/2021    RBC 2.94 01/15/2021    RBC 4.84 11/07/2011    HGB 10.3 01/15/2021    HCT 28.7 01/15/2021     01/15/2021       CMP:    Lab Results   Component Value Date     01/15/2021    K 4.3 01/15/2021    K 5.3 01/09/2021    CL 99 01/15/2021    CO2 18 01/15/2021    BUN 32 01/15/2021    CREATININE 1.8 01/15/2021    LABGLOM 37 01/15/2021    GLUCOSE 128 01/15/2021    GLUCOSE 94 11/07/2011    CALCIUM 8.2 01/15/2021       Hepatic Function Panel:    Lab Results   Component Value Date    ALKPHOS 54 01/11/2021    ALT 82 01/11/2021    AST 95 01/11/2021    PROT 7.2 01/11/2021    BILITOT 0.4 01/11/2021    BILIDIR <0.2 01/11/2021    LABALBU 3.9 01/11/2021    LABALBU 4.3 11/07/2011       Magnesium:    Lab Results   Component Value Date    MG 2.4 01/15/2021       PT/INR:    Lab Results   Component Value Date    INR 1.13 01/11/2021       HgBA1c:    Lab Results   Component Value Date    LABA1C 5.6 01/11/2021       FLP:    Lab Results   Component Value Date    TRIG 96 01/07/2021    HDL 36 01/07/2021    LDLCALC 71 01/07/2021    LDLDIRECT 79.82 01/11/2021       TSH:    Lab Results   Component Value Date    TSH 2.050 01/06/2021         Assessment:  PAFB - more persistent Post-op w/ RVR  - on IV amio   [id number] = 4   Failed CVx3  1/8/2021 (prior to OR)      MV CAD   S\p CABG  X 3 WITH DEJAH /  Atrial Appendage Clip    a. Left internal mammary artery to left anterior descending artery. b.  Aorto-right coronary artery bypass grafting with a reversed greater saphenous vein graft. c.  Aorto-ramus intermedius branch bypass grafting with a reversed greater saphenous vein graft. d.  Left atrial appendage closure with a AtriCure clip.         ICDMP EF 25%      HLP  HTN    Hx COPD   Hx ETOH         Plan:  · CTS asked us to do CV for AFB RVR- recurrent this admit w/ Hx PAFB   · Pt has been off [address] [place] [address] Road and is s\p LA clip - he will need DEJAH and then [address]   · He does need LV also   · CDMP meds when stable BP  · Once CV complete -- stop IV amio - watch EKG / QTC         Electronically signed by ARCADIO [place] - CNP on 1/15/2021 at 10:18 AM

## 2021-01-15 NOTE — PROGRESS NOTES
CT/CV Surgery Progress Note    1/15/2021 8:13 AM  Surgeon:  Dr. Jody Garcia     Subjective: Mr. Evi Stroud is resting in bed. Only c/o is mild soreness. RA.  IV Heparin and Amio drip. He went back into Afib around 2pm on 21 and has been persistent ever since. I/O +30 cc past 24 hours. Chest tube drainage 595 cc past 24 hours. Vital Signs: /70   Pulse 107   Temp 98 °F (36.7 °C) (Oral)   Resp 22   Ht 5' 9\" (1.753 m)   Wt 146 lb 14.4 oz (66.6 kg)   SpO2 96%   BMI 21.69 kg/m²    Temp (24hrs), Av.4 °F (36.9 °C), Min:97.6 °F (36.4 °C), Max:99.3 °F (37.4 °C)    Labs:   CBC:   Recent Labs     21  0305 21  0352 21  1525 21  2338 01/15/21  0705   WBC 17.8* 14.1* 15.7*  --   --    HGB 8.7* 8.8* 9.0*  --   --    HCT 26.2* 25.7* 26.0*  --   --    .2* 104.0* 102.8*  --   --    * 87* 97*  --   --    APTT  --   --  30.9 67.6* > 200.0*     BMP:   Recent Labs     21  1530 21  1530 21  0352 01/14/21  0352 01/14/21  2016 01/15/21  0705   *  --  135  --   --  133*   K 5.3*  --  4.9  --   --  4.3     --  101  --   --  99   CO2 19*  --  21*  --   --  18*   BUN 26*  --  33*  --   --  32*   CREATININE 2.3*  --  2.3*  --   --  1.8*   MG 2.8*  --  2.7*  --   --  2.4   POCGLU  --    < >  --    < > 133*  --     < > = values in this interval not displayed. Trop:   Lab Results   Component Value Date    TROPONINT 0.175 (A) 2021     Last HgA1C:   Lab Results   Component Value Date    LABA1C 5.6 2021     Imaging:  CXR 1/15/21  Decreased bibasilar atelectasis or infiltrates. Possible left pleural effusion. Support devices as above.            Intake/Output Summary (Last 24 hours) at 1/15/2021 0813  Last data filed at 1/15/2021 1852  Gross per 24 hour   Intake 1285 ml   Output 1255 ml   Net 30 ml     Scheduled Meds:    metoprolol tartrate  50 mg Oral BID    famotidine (PEPCID) injection  20 mg Intravenous Daily    amiodarone  200 mg Oral Once signed by Christoph Tran PA-C on 1/15/2021 at 8:13 AM

## 2021-01-15 NOTE — CARE COORDINATION
DISASTER CHARTING    1/15/21, 11:02 AM EST    DISCHARGE ONGOING EVALUATION:     Gorge Genao day: 9  Location: -04/004-A Reason for admit: Atrial fibrillation with rapid ventricular response ([facility] [address] [I48.91]  Atrial fibrillation with RVR ([address] [I48.91]     1/7 Echo with EF 25%  1/8 Cardiac Cath: MVD, EF 20%  1/8 Cardioverted x3 - failed  1/12 3v CABG, left atrial appendage clip; EF 45%  1/12 Intubated - 1/12 Extubated     Barriers to Discharge: POD #3. Primacor drip stopped yesterday afternoon. Plan for cardioversion today. On room air. Tmax 99.3. Afib 100-110's. Ox4. CT x2 to -20sx with 595 ml out in 24 hours. CR/PT/OT. Dietitian & HF RN consulted. Dietary ileus prevention protocol. Family Medicine, CVS, and Cardiology following. Telemetry, I&O, daily weight, IS, sternal precautions, CT care, wound care, SCDs, turcios care, ambulate. Amio @ 0.5 mg/min, heparin drip, amio, nebs, asa, lipitor, lovenox, pepcid, po lasix daily, inhaler, SSI, lopressor, prn IV morphine, prn roxicodone, electrolyte replacement protocols. Na+ 133, CO2 18, BUN 32, Creat 1.8, wbc 18, hgb 10.3, plt 107. Plan to transfer to [address] [place] [address] today after cardioverted. PCP: Jorge Saab MD  Readmission Risk Score: 23%  Patient Goals/Plan/Treatment Preferences: Home with wife and [facility]. Monitor for DME needs. Plan for Life Vest at discharge; referral made. SW on case.

## 2021-01-15 NOTE — PROGRESS NOTES
0- Dr. Andres Polanco at bedside preparing for DEJAH with cardioversion. 1424- Diprivan adminstered per CRNA    1425- Procedure initiated. 1429- Gunnar-synephrine given per anesthesia for BP 73/58  Pt saturations remain 100% via nasal cannula. 1433- Gunnar-synephrine given per anesthesia for BP 85/61    1434- Pt BP improved to 104/64    1437- Pt cardioverted at 200 J. Remains in atrial fib    1438- Pt cardioverted at 300 J. Remains in atrial fib. 1439- Pt cardioverted at 360 J. Remains in atrial fib.

## 2021-01-16 ENCOUNTER — APPOINTMENT (OUTPATIENT)
Dept: GENERAL RADIOLOGY | Age: 71
DRG: 233 | End: 2021-01-16
Payer: MEDICARE

## 2021-01-16 LAB
ANION GAP SERPL CALCULATED.3IONS-SCNC: 12 MEQ/L (ref 8–16)
APTT: 68.7 SECONDS (ref 22–38)
APTT: 71.2 SECONDS (ref 22–38)
APTT: 85.3 SECONDS (ref 22–38)
APTT: 97.4 SECONDS (ref 22–38)
BUN BLDV-MCNC: 34 MG/DL (ref 7–22)
CALCIUM SERPL-MCNC: 8.5 MG/DL (ref 8.5–10.5)
CHLORIDE BLD-SCNC: 100 MEQ/L (ref 98–111)
CO2: 24 MEQ/L (ref 23–33)
CREAT SERPL-MCNC: 1.6 MG/DL (ref 0.4–1.2)
EKG ATRIAL RATE: 117 BPM
EKG Q-T INTERVAL: 410 MS
EKG QRS DURATION: 134 MS
EKG QTC CALCULATION (BAZETT): 539 MS
EKG R AXIS: 76 DEGREES
EKG T AXIS: 8 DEGREES
EKG VENTRICULAR RATE: 104 BPM
ERYTHROCYTE [DISTWIDTH] IN BLOOD BY AUTOMATED COUNT: 13.6 % (ref 11.5–14.5)
ERYTHROCYTE [DISTWIDTH] IN BLOOD BY AUTOMATED COUNT: 51.5 FL (ref 35–45)
GFR SERPL CREATININE-BSD FRML MDRD: 43 ML/MIN/1.73M2
GLUCOSE BLD-MCNC: 100 MG/DL (ref 70–108)
GLUCOSE BLD-MCNC: 89 MG/DL (ref 70–108)
HCT VFR BLD CALC: 27.2 % (ref 42–52)
HEMOGLOBIN: 9.2 GM/DL (ref 14–18)
MAGNESIUM: 2.2 MG/DL (ref 1.6–2.4)
MCH RBC QN AUTO: 35.2 PG (ref 26–33)
MCHC RBC AUTO-ENTMCNC: 33.8 GM/DL (ref 32.2–35.5)
MCV RBC AUTO: 104.2 FL (ref 80–94)
PLATELET # BLD: 141 THOU/MM3 (ref 130–400)
PMV BLD AUTO: 11.1 FL (ref 9.4–12.4)
POTASSIUM SERPL-SCNC: 4.4 MEQ/L (ref 3.5–5.2)
RBC # BLD: 2.61 MILL/MM3 (ref 4.7–6.1)
SODIUM BLD-SCNC: 136 MEQ/L (ref 135–145)
WBC # BLD: 12.2 THOU/MM3 (ref 4.8–10.8)

## 2021-01-16 PROCEDURE — 85730 THROMBOPLASTIN TIME PARTIAL: CPT

## 2021-01-16 PROCEDURE — 6360000002 HC RX W HCPCS: Performed by: PHYSICIAN ASSISTANT

## 2021-01-16 PROCEDURE — 93005 ELECTROCARDIOGRAM TRACING: CPT | Performed by: NURSE PRACTITIONER

## 2021-01-16 PROCEDURE — 97116 GAIT TRAINING THERAPY: CPT

## 2021-01-16 PROCEDURE — 82948 REAGENT STRIP/BLOOD GLUCOSE: CPT

## 2021-01-16 PROCEDURE — 83735 ASSAY OF MAGNESIUM: CPT

## 2021-01-16 PROCEDURE — 71045 X-RAY EXAM CHEST 1 VIEW: CPT

## 2021-01-16 PROCEDURE — 6370000000 HC RX 637 (ALT 250 FOR IP): Performed by: PHYSICIAN ASSISTANT

## 2021-01-16 PROCEDURE — 6360000002 HC RX W HCPCS: Performed by: THORACIC SURGERY (CARDIOTHORACIC VASCULAR SURGERY)

## 2021-01-16 PROCEDURE — 2500000003 HC RX 250 WO HCPCS: Performed by: PHYSICIAN ASSISTANT

## 2021-01-16 PROCEDURE — 2580000003 HC RX 258: Performed by: PHYSICIAN ASSISTANT

## 2021-01-16 PROCEDURE — 94640 AIRWAY INHALATION TREATMENT: CPT

## 2021-01-16 PROCEDURE — 36415 COLL VENOUS BLD VENIPUNCTURE: CPT

## 2021-01-16 PROCEDURE — 6370000000 HC RX 637 (ALT 250 FOR IP): Performed by: THORACIC SURGERY (CARDIOTHORACIC VASCULAR SURGERY)

## 2021-01-16 PROCEDURE — 6370000000 HC RX 637 (ALT 250 FOR IP): Performed by: NURSE PRACTITIONER

## 2021-01-16 PROCEDURE — 97110 THERAPEUTIC EXERCISES: CPT

## 2021-01-16 PROCEDURE — 85027 COMPLETE CBC AUTOMATED: CPT

## 2021-01-16 PROCEDURE — 2060000000 HC ICU INTERMEDIATE R&B

## 2021-01-16 PROCEDURE — 80048 BASIC METABOLIC PNL TOTAL CA: CPT

## 2021-01-16 RX ORDER — FUROSEMIDE 10 MG/ML
40 INJECTION INTRAMUSCULAR; INTRAVENOUS 2 TIMES DAILY
Status: DISCONTINUED | OUTPATIENT
Start: 2021-01-16 | End: 2021-01-17

## 2021-01-16 RX ORDER — PANTOPRAZOLE SODIUM 40 MG/1
40 TABLET, DELAYED RELEASE ORAL
Status: DISCONTINUED | OUTPATIENT
Start: 2021-01-17 | End: 2021-01-21 | Stop reason: HOSPADM

## 2021-01-16 RX ORDER — AMIODARONE HYDROCHLORIDE 200 MG/1
200 TABLET ORAL 2 TIMES DAILY
Status: DISCONTINUED | OUTPATIENT
Start: 2021-01-16 | End: 2021-01-20

## 2021-01-16 RX ORDER — FUROSEMIDE 10 MG/ML
20 INJECTION INTRAMUSCULAR; INTRAVENOUS 2 TIMES DAILY
Status: DISCONTINUED | OUTPATIENT
Start: 2021-01-16 | End: 2021-01-16

## 2021-01-16 RX ADMIN — SODIUM CHLORIDE, PRESERVATIVE FREE 10 ML: 5 INJECTION INTRAVENOUS at 08:18

## 2021-01-16 RX ADMIN — FUROSEMIDE 40 MG: 40 TABLET ORAL at 08:21

## 2021-01-16 RX ADMIN — MULTIPLE VITAMINS W/ MINERALS TAB 1 TABLET: TAB at 08:21

## 2021-01-16 RX ADMIN — ACETAMINOPHEN 650 MG: 325 TABLET ORAL at 23:01

## 2021-01-16 RX ADMIN — AMIODARONE HYDROCHLORIDE 200 MG: 200 TABLET ORAL at 19:46

## 2021-01-16 RX ADMIN — ASPIRIN 81 MG: 81 TABLET, CHEWABLE ORAL at 08:19

## 2021-01-16 RX ADMIN — GLYCOPYRROLATE AND FORMOTEROL FUMARATE 2 PUFF: 9; 4.8 AEROSOL, METERED RESPIRATORY (INHALATION) at 08:50

## 2021-01-16 RX ADMIN — ALBUTEROL SULFATE 2.5 MG: 2.5 SOLUTION RESPIRATORY (INHALATION) at 18:41

## 2021-01-16 RX ADMIN — HEPARIN SODIUM 17 UNITS/KG/HR: 10000 INJECTION, SOLUTION INTRAVENOUS at 14:03

## 2021-01-16 RX ADMIN — FAMOTIDINE 20 MG: 10 INJECTION INTRAVENOUS at 08:19

## 2021-01-16 RX ADMIN — ACETAMINOPHEN 650 MG: 325 TABLET ORAL at 18:02

## 2021-01-16 RX ADMIN — ACETAMINOPHEN 650 MG: 325 TABLET ORAL at 14:01

## 2021-01-16 RX ADMIN — METOPROLOL TARTRATE 50 MG: 50 TABLET, FILM COATED ORAL at 19:46

## 2021-01-16 RX ADMIN — GLYCOPYRROLATE AND FORMOTEROL FUMARATE 2 PUFF: 9; 4.8 AEROSOL, METERED RESPIRATORY (INHALATION) at 18:41

## 2021-01-16 RX ADMIN — ALBUTEROL SULFATE 2.5 MG: 2.5 SOLUTION RESPIRATORY (INHALATION) at 13:17

## 2021-01-16 RX ADMIN — ALBUTEROL SULFATE 2.5 MG: 2.5 SOLUTION RESPIRATORY (INHALATION) at 08:50

## 2021-01-16 RX ADMIN — FUROSEMIDE 40 MG: 10 INJECTION, SOLUTION INTRAMUSCULAR; INTRAVENOUS at 11:57

## 2021-01-16 RX ADMIN — ATORVASTATIN CALCIUM 10 MG: 10 TABLET, FILM COATED ORAL at 19:46

## 2021-01-16 RX ADMIN — FUROSEMIDE 40 MG: 10 INJECTION, SOLUTION INTRAMUSCULAR; INTRAVENOUS at 17:20

## 2021-01-16 RX ADMIN — METOPROLOL TARTRATE 50 MG: 50 TABLET, FILM COATED ORAL at 08:20

## 2021-01-16 RX ADMIN — OXYCODONE HYDROCHLORIDE 5 MG: 5 TABLET ORAL at 08:18

## 2021-01-16 RX ADMIN — AMIODARONE HYDROCHLORIDE 200 MG: 200 TABLET ORAL at 08:20

## 2021-01-16 RX ADMIN — SODIUM CHLORIDE, PRESERVATIVE FREE 10 ML: 5 INJECTION INTRAVENOUS at 17:20

## 2021-01-16 ASSESSMENT — PAIN DESCRIPTION - DESCRIPTORS
DESCRIPTORS: DISCOMFORT
DESCRIPTORS: DISCOMFORT

## 2021-01-16 ASSESSMENT — PAIN DESCRIPTION - PAIN TYPE
TYPE: SURGICAL PAIN

## 2021-01-16 ASSESSMENT — PAIN DESCRIPTION - PROGRESSION
CLINICAL_PROGRESSION: NOT CHANGED
CLINICAL_PROGRESSION: NOT CHANGED
CLINICAL_PROGRESSION: GRADUALLY IMPROVING

## 2021-01-16 ASSESSMENT — ENCOUNTER SYMPTOMS
APNEA: 0
CONSTIPATION: 0
ABDOMINAL DISTENTION: 0
NAUSEA: 0
SHORTNESS OF BREATH: 0
ABDOMINAL PAIN: 0
CHEST TIGHTNESS: 0
DIARRHEA: 0
COUGH: 0

## 2021-01-16 ASSESSMENT — PAIN SCALES - GENERAL
PAINLEVEL_OUTOF10: 2
PAINLEVEL_OUTOF10: 3
PAINLEVEL_OUTOF10: 8
PAINLEVEL_OUTOF10: 3
PAINLEVEL_OUTOF10: 4

## 2021-01-16 ASSESSMENT — PAIN DESCRIPTION - LOCATION
LOCATION: INCISION;STERNUM

## 2021-01-16 ASSESSMENT — PAIN DESCRIPTION - ORIENTATION
ORIENTATION: MID
ORIENTATION: MID

## 2021-01-16 ASSESSMENT — PAIN DESCRIPTION - FREQUENCY
FREQUENCY: CONTINUOUS
FREQUENCY: INTERMITTENT
FREQUENCY: CONTINUOUS

## 2021-01-16 ASSESSMENT — PAIN - FUNCTIONAL ASSESSMENT: PAIN_FUNCTIONAL_ASSESSMENT: PREVENTS OR INTERFERES SOME ACTIVE ACTIVITIES AND ADLS

## 2021-01-16 ASSESSMENT — PAIN DESCRIPTION - ONSET
ONSET: ON-GOING
ONSET: ON-GOING

## 2021-01-16 NOTE — PROGRESS NOTES
Progress Note  Date:2021       Room:[facility]18/018-A  Patient Mariel Castaneda     YOB: 1950     Age:70 y.o. Subjective    Subjective:  Symptoms:  Stable. No shortness of breath, cough, chest pain or diarrhea. Diet:  Adequate intake. No nausea. Activity level: Returning to normal.    Pain:  He complains of pain that is mild. Review of Systems   Constitutional: Negative for activity change. HENT: Negative for congestion and dental problem. Respiratory: Negative for apnea, cough, chest tightness and shortness of breath. Cardiovascular: Negative for chest pain. Gastrointestinal: Negative for abdominal distention, abdominal pain, constipation, diarrhea and nausea. Genitourinary: Negative for difficulty urinating, dysuria, frequency and hematuria. Musculoskeletal: Negative for arthralgias. Neurological: Negative for dizziness, light-headedness, numbness and headaches. Hematological: Negative for adenopathy. Objective         Vitals Last 24 Hours:  TEMPERATURE:  Temp  Av.9 °F (36.6 °C)  Min: 97.6 °F (36.4 °C)  Max: 98.3 °F (36.8 °C)  RESPIRATIONS RANGE: Resp  Avg: 15.6  Min: 12  Max: 20  PULSE OXIMETRY RANGE: SpO2  Av.8 %  Min: 93 %  Max: 98 %  PULSE RANGE: Pulse  Av.4  Min: 104  Max: 122  BLOOD PRESSURE RANGE: Systolic (50FBQ), HAV:396 , Min:90 , BJR:996   ; Diastolic (10ZER), RSF:54, Min:55, Max:92    I/O (24Hr): Intake/Output Summary (Last 24 hours) at 2021 1452  Last data filed at 2021 1253  Gross per 24 hour   Intake 1124.2 ml   Output 800 ml   Net 324.2 ml     Objective:  General Appearance:  Comfortable. Vital signs: (most recent): Blood pressure (!) 142/86, pulse 110, temperature 97.6 °F (36.4 °C), temperature source Oral, resp. rate 18, height 5' 9\" (1.753 m), weight 164 lb 6.4 oz (74.6 kg), SpO2 93 %. Vital signs are normal.    Output: Producing urine and producing stool.     HEENT: Normal HEENT exam.    Lungs:  Normal effort and normal respiratory rate. Breath sounds clear to auscultation. There are decreased breath sounds. (  No  wheeze)  Heart: Normal rate. Irregular rhythm. S1 normal and S2 normal.  No murmur. Abdomen: Abdomen is soft and non-distended. Bowel sounds are normal.   There is no abdominal tenderness. Extremities: Normal range of motion. Neurological: Patient is alert and oriented to person, place and time. Labs/Imaging/Diagnostics    Labs:  CBC:  Recent Labs     01/14/21  1525 01/15/21  0705 01/16/21  0821   WBC 15.7* 18.0* 12.2*   RBC 2.53* 2.94* 2.61*   HGB 9.0* 10.3* 9.2*   HCT 26.0* 28.7* 27.2*   .8* 97.6* 104.2*   PLT 97* 107* 141     CHEMISTRIES:  Recent Labs     01/14/21  0352 01/15/21  0705 01/16/21  0821    133* 136   K 4.9 4.3 4.4    99 100   CO2 21* 18* 24   BUN 33* 32* 34*   CREATININE 2.3* 1.8* 1.6*   GLUCOSE 136* 128* 89   MG 2.7* 2.4 2.2     PT/INR:No results for input(s): PROTIME, INR in the last 72 hours. APTT:  Recent Labs     01/15/21  2242 01/16/21  0031 01/16/21  0821   APTT 97.4* 85.3* 68.7*     LIVER PROFILE:No results for input(s): AST, ALT, BILIDIR, BILITOT, ALKPHOS in the last 72 hours.     Imaging Last 24 Hours:  Echo Transesophageal    Result Date: 1/15/2021  Transesophageal Echocardiography Report (DEJAH)   Demographics   Patient Name    Go Altman Gender               Male   MR #            [id number]    Race                                                 Ethnicity   Account #       [de-identified]    Room Number          0018   Accession       [id number]   Date of Study        01/15/2021  Number   Date of Birth   1950   Referring Physician  Liu Msos   Age             79 year(s)   WHIT ParksT                                Interpreting         Liu Canales MD Physician  Procedure Type of Study   DEJAH procedure:ECHOCARDIOGRAM TRANSESOPHAGEAL. Procedure Date Date: 01/15/2021 Start: 02:03 PM Study Location: Bedside Indications:Atrial fibrillation. Additional Medical History:smoker, coronary artery disease, hyperlipidemia, COPD, atrial fibrillation, CABG, asthma Patient Status: Routine Height: 69 inches Weight: 149 pounds BSA: 1.82 m^2 BMI: 22 kg/m^2 BP: 132/74 mmHg Procedure Medications   - Lidocaine Viscous 2% . - Propofol 100 mg. Procedure Descriptor:-The transesophageal approach was used. -Probe inserted with no complications by cardiologist. -The study included complete 2D imaging, complete spectral doppler, and color doppler. -Procedure performed without complications. Allergies   - See Epic. Conclusions   Summary  Left ventricle size is normal.  Normal left ventricle wall thickness. There was moderate global hypokinesis of the left ventricle. Systolic function was moderately reduced. Ejection fraction is visually estimated in the range of 35% to 40%. Left atrium mild to moderately dilated  Right atrium mildly dilated  When this echo is compared with the echo from Jan 5, 2021, EF improved  from 25% to 37% now   Signature   ----------------------------------------------------------------  Electronically signed by Charanjit Vela MD (Interpreting  physician) on 01/15/2021 at 08:41 PM  ----------------------------------------------------------------   Findings   Mitral Valve  The mitral valve structure was normal with normal leaflet separation. DOPPLER: The transmitral velocity was within the normal range with no  evidence for mitral stenosis. Mild mitral regurgitation is present. Aortic Valve  The aortic valve was trileaflet with normal thickness and cuspal  separation. There was no echocardiographic evidence of a vegetation. DOPPLER: Transaortic velocity was within the normal range with no evidence  of aortic stenosis or regurgitaiton.    Tricuspid Valve  The tricuspid valve structure was normal with normal leaflet separation. There was no echocardiographic evidence of a vegetation. No evidence of tricuspid stenosis. Mild tricuspid regurgitation visualized. Pulmonic Valve  The pulmonic valve leaflets exhibited normal thickness, no calcification,  and normal cuspal separation. There was no echocardiographic evidence of  vegetation. DOPPLER: The transpulmonic velocity was within the normal  range with no evidence for regurgitation. Left Atrium  Left atrium mild to moderately dilated with no thrombus identified. APPENDAGE: The left atrial appendage size was normal with no thrombus  identified. DOPPLER: The function was normal (normal emptying velocity). Left Ventricle  Left ventricle size is normal.  Normal left ventricle wall thickness. There was moderate global hypokinesis of the left ventricle. Systolic function was moderately reduced. Ejection fraction is visually estimated in the range of 35% to 40%. Right Atrium  Right atrium mildly dilated with no thrombus identified. ATRIAL SEPTUM: No  defect or patent foramen ovale was identified. There was no left to-right  shunt on color doppler. Right Ventricle  The right ventricular size was normal with normal systolic function and  wall thickness. Pericardial Effusion  The pericardium was normal in appearance with no evidence of a pericardial  effusion. Pleural Effusion  No evidence of pleural effusion. Aorta / Great Vessels  -Aortic root dimension within normal limits.  -The Pulmonary artery is within normal limits. -IVC size is within normal limits with normal respiratory phasic changes. Procedure Descriptor  -The transesophageal approach was used. -Probe inserted with no complications by cardiologist.  -The study included complete 2D imaging, complete spectral doppler, and  color doppler.  -Procedure performed without complications. [email or web address] [id number]    Xr Chest Portable    Result Date: 1/16/2021  Chest X-ray, 1 View COMPARISON:  CR,SR  - XR CHEST PORTABLE  - 01/15/2021 03:11 AM EST FINDINGS: Increased atelectasis or infiltrate at the right lung base. Similar probable atelectasis at the left lung base. Calcified right lower lung granuloma. Mild blunting of the right costophrenic angle. No visible pneumothorax. Left chest tube and mediastinal drain have been removed. Stable cardiomegaly. No acute fracture. Status post median sternotomy. Increased atelectasis or infiltrate at the right lung base. Small right pleural effusion. This document has been electronically signed by: Elva Rodriguez MD on 01/16/2021 01:45 AM     Xr Chest Portable    Result Date: 1/15/2021  Chest X-ray, 1 View COMPARISON:  CR,SR  - XR CHEST PORTABLE  - 01/14/2021 12:53 AM EST FINDINGS: Decreased bibasilar atelectasis or infiltrates. Calcified right lower lung granuloma. Possible left pleural effusion. Possible trace left apical pneumothorax. Stable cardiomegaly. No acute fracture. Status post median sternotomy. [place]-Dmitri catheter has been removed. Left chest tube remains in place. Mediastinal drain in place. Decreased bibasilar atelectasis or infiltrates. Possible left pleural effusion. Support devices as above.  This document has been electronically signed by: Elva Rodriguez MD on 01/15/2021 04:04 AM     Assessment//Plan           Hospital Problems           Last Modified POA    Coronary artery disease involving native coronary artery 1/9/2021 Yes    Hyperlipidemia 1/9/2021 Yes    Smoker 1/9/2021 Yes    HTN (hypertension) 1/9/2021 Yes    COPD, mild ([address] 1/9/2021 Yes    Atrial fibrillation with rapid ventricular response ([address] 1/9/2021 Yes    Atrial fibrillation ([address] 1/9/2021 Yes    S/P CABG x 3 1/12/2021 Yes    Encounter for cardioversion procedure 1/15/2021 Yes        Assessment:    Condition: In stable condition. Improving. (Status post cabg and doing well    Ischemic cardiomyopathy and with bypass hopefully improves    Atrial fibrillation with controlled rate    Underlying COPD remaining stable    Hypertension stable    Anemia postoperatively Due to blood loss). Plan:   Out of bed and up to chair and per physical therapy. Consults: physical therapy, occupational therapy and cardiology. Advance diet as tolerated. Administer medications as ordered. (  Doing very well postoperatively    Heart rate controlled with the teacher for ablation    Following COPD and remaining stable And postop we'll resume tomorrow the stioto).        Electronically signed by Jerry Pastor MD on 1/16/21 at 2:52 PM EST

## 2021-01-16 NOTE — PROCEDURES
[address] [id number]                            CARDIAC CATHETERIZATION    PATIENT NAME: Starr Miranda                       :        1950  MED REC NO:   [id number]                           ROOM:       0018  ACCOUNT NO:   [de-identified]                           ADMIT DATE: 2021  PROVIDER:     Aletha Paulson. ZACKERY Neri Citizen:  01/15/2021    SYNCHRONIZED DIRECT CURRENT ELECTRICAL CARDIOVERSION    INDICATION:  Persistent atrial fibrillation. INDICATION FOR PROCEDURE:  This is a 79-year-old gentleman known to have  atrial fibrillation and recently have coronary artery bypass surgery  done and left atrial clip was performed. However, the patient is known  to have paroxysmal atrial fibrillation prior to surgery for several  months per the information from the family member. The patient has  failed cardioversion x3 on 2021 prior to the open heart surgery. However, postoperatively, I was called to perform cardioversion on this  patient and so, the patient was scheduled for DEJAH cardioversion. Informed consent obtained and we proceeded for cardioversion. PROCEDURES:  1.  Sedation by anesthesiologist.  DEJAH was performed to rule out  intracardiac thrombus, to make sure also the atrial clip was in placed. The patient is not on anticoagulation currently. 2.  Nonetheless DEJAH was performed. No intracardiac thrombus noted. Synchronized direct current electrical shock delivered. PROCEDURE DETAIL:  Under continuous EKG monitoring and intermittent  blood pressure monitoring, the patient was sedated and DEJAH was  performed. No intracardiac thrombus noted. Ejection fraction has  improved certainly, but left atrial clip are well situated. Synchronized electrical shock has been delivered with 200 joules first  then with 300 joules then failed.   That one converted to sinus but went  back into atrial fibrillation, then with 360 joules has been tried and  that also, the patient still remains in atrial fibrillation. So,  basically is an unsuccessful attempt. Complication none. The patient  regained consciousness, alert, awake, oriented. CONCLUSION:  Unsuccessful synchronized direct current electrical  cardioversion attempted with 200 joules, 300 joules, and 360 joules  biphasic. PLAN AND RECOMMENDATIONS:  Continue with rate control. Continue the  anticoagulation. Based on the course, we will gauge further care. Estrella Pond M.D.    D: 01/15/2021 17:56:57       T: 01/15/2021 18:51:38     [email or web address]  Job#: [id number]     Doc#: [id number]    CC:

## 2021-01-16 NOTE — PROGRESS NOTES
[address] [address]  INPATIENT PHYSICAL THERAPY  DAILY NOTE  [facility] ICU STEPDOWN TELEMETRY 4K - 4K-18/018-A    Time In: 1125  Time Out: 1108  Timed Code Treatment Minutes: 25 Minutes  Minutes: 35   (-10 minutes when MD came in to talk to pt and wife)       Date: 2021  Patient Name: Arielle Delarosa,  Gender:  male        MRN: [id number]  : 1950  (79 y.o.)     Referring Practitioner: Kevin Blackburn PA-C  Diagnosis: Atrial fibrillation with rapid ventricular response  Additional Pertinent Hx: Pt presents with evaluation of sob that awoke him from sleep. He was supposed to be followed up by Dr. Jose Luis Burr. He was found to have Afib last week. HR was 140s. First episode. He could not catch his breath. Pt underwent DEJAH and had CABG x 3 on 21. Prior Level of Function:  Lives With: Spouse  Type of Home: Apartment  Home Layout: One level  Home Access: Stairs to enter without rails  Entrance Stairs - Number of Steps: 1 step  Home Equipment: Cane   Bathroom Shower/Tub: Tub/Shower unit, Shower chair with back  [employer]: Standard  Bathroom Equipment: Grab bars in shower  Bathroom Accessibility: Accessible    Receives Help From: Family  ADL Assistance: [facility] [address]: Independent  Homemaking Responsibilities: Yes  Ambulation Assistance: Independent  Transfer Assistance: Independent  Active : Yes  Additional Comments: Pt was independent prior to admission. He did not use any AD for ambulating. Pt spends alot of time of his feet while working. Pt sits if needed while taking a shower. Pt smokes . 5 ppd. Restrictions/Precautions:  Restrictions/Precautions: Surgical Protocols  Position Activity Restriction  Sternal Precautions: No Pushing, No Pulling, 5# Lifting Restrictions  Other position/activity restrictions: Incision on RLE from the vein harvest     SUBJECTIVE: RN approved PT session.   Patient sitting up in a chair upon PT arrival, pleasant and agreeable to therapy. States he is feeling tired and has been sitting up in the chair since 6. PAIN: 3/10: chest    OBJECTIVE:  Bed Mobility:  Sit to Supine: Moderate Assistance, with verbal cues      Transfers:  Sit to Stand: Contact Guard Assistance, with verbal cues  Stand to Sit:Contact [employer] Assistance    Ambulation:  [address], with verbal cues   Distance: 15', 20', 35'  Surface: Level Tile  Device:No Device  Gait Deviations: Forward Flexed Posture, Slow Nelly, Decreased Step Length Bilaterally, Decreased Arm Swing, Decreased Trunk Rotation, Decreased Heel Strike Bilaterally and Mild Path Deviations. Frequent cues needed throughout all mobility for upright posture. Exercise:  Patient was guided in 1 set(s) 10 reps of exercise to both upper/lower extremities. Ankle pumps, Long arc quads and Shoulder rolls. Exercises were completed for increased independence with functional mobility. Functional Outcome Measures: Completed  AM-PAC Inpatient Mobility Raw Score : 17  AM-PAC Inpatient T-Scale Score : 42.13    ASSESSMENT:  Assessment: Patient progressing toward established goals. Activity Tolerance:  Patient tolerance of  treatment: good. Equipment Recommendations: Other: May need RW- will monitor for needs  Discharge Recommendations:  Continue to assess pending progress, 24 hour supervision or assist; home health PT    Plan: Times per week: 6x CABG  Current Treatment Recommendations: Strengthening, Balance Training, Transfer Training, Functional Mobility Training, Neuromuscular Re-education, Home Exercise Program, Gait Training, Stair training, Endurance Training, Patient/Caregiver Education & Training, Safety Education & Training, Equipment Evaluation, Education, & procurement    Patient Education  Patient Education: Plan of Care, Precautions/Restrictions, [employer] Mobility, Transfers, Gait, Verbal Exercise Instruction    Goals:  Patient goals : to feel better  Short term goals  Time Frame for Short term goals: by discharge  Short term goal 1: Pt to transfer supine <--> sit SBA to enable pt to get in/out of bed. Short term goal 2: Pt to transfer sit <--> stand SBA for increased functional mobility. Short term goal 3: Pt to ambulate >250 feet with LRAD or without AD SBA for household mobility. Short term goal 4: Pt to ascend/descend 1 step without HR SBA for home entry. Long term goals  Time Frame for Long term goals : NA due to short length of stay. Following session, patient left in safe position with all fall risk precautions in place.     Luis F Espinoza, PT, DPT

## 2021-01-17 ENCOUNTER — APPOINTMENT (OUTPATIENT)
Dept: GENERAL RADIOLOGY | Age: 71
DRG: 233 | End: 2021-01-17
Payer: MEDICARE

## 2021-01-17 LAB
ANION GAP SERPL CALCULATED.3IONS-SCNC: 14 MEQ/L (ref 8–16)
APTT: 66.2 SECONDS (ref 22–38)
APTT: 73.8 SECONDS (ref 22–38)
BUN BLDV-MCNC: 36 MG/DL (ref 7–22)
CALCIUM SERPL-MCNC: 8.8 MG/DL (ref 8.5–10.5)
CHLORIDE BLD-SCNC: 97 MEQ/L (ref 98–111)
CO2: 25 MEQ/L (ref 23–33)
CREAT SERPL-MCNC: 1.5 MG/DL (ref 0.4–1.2)
ERYTHROCYTE [DISTWIDTH] IN BLOOD BY AUTOMATED COUNT: 13.5 % (ref 11.5–14.5)
ERYTHROCYTE [DISTWIDTH] IN BLOOD BY AUTOMATED COUNT: 51.1 FL (ref 35–45)
GFR SERPL CREATININE-BSD FRML MDRD: 46 ML/MIN/1.73M2
GLUCOSE BLD-MCNC: 105 MG/DL (ref 70–108)
GLUCOSE BLD-MCNC: 105 MG/DL (ref 70–108)
HCT VFR BLD CALC: 29.6 % (ref 42–52)
HEMOGLOBIN: 9.9 GM/DL (ref 14–18)
MCH RBC QN AUTO: 35 PG (ref 26–33)
MCHC RBC AUTO-ENTMCNC: 33.4 GM/DL (ref 32.2–35.5)
MCV RBC AUTO: 104.6 FL (ref 80–94)
PLATELET # BLD: 214 THOU/MM3 (ref 130–400)
PMV BLD AUTO: 11 FL (ref 9.4–12.4)
POTASSIUM SERPL-SCNC: 3.7 MEQ/L (ref 3.5–5.2)
RBC # BLD: 2.83 MILL/MM3 (ref 4.7–6.1)
SODIUM BLD-SCNC: 136 MEQ/L (ref 135–145)
WBC # BLD: 12.5 THOU/MM3 (ref 4.8–10.8)

## 2021-01-17 PROCEDURE — 6370000000 HC RX 637 (ALT 250 FOR IP): Performed by: PHYSICIAN ASSISTANT

## 2021-01-17 PROCEDURE — 6360000002 HC RX W HCPCS: Performed by: THORACIC SURGERY (CARDIOTHORACIC VASCULAR SURGERY)

## 2021-01-17 PROCEDURE — 94760 N-INVAS EAR/PLS OXIMETRY 1: CPT

## 2021-01-17 PROCEDURE — 2580000003 HC RX 258: Performed by: PHYSICIAN ASSISTANT

## 2021-01-17 PROCEDURE — 80048 BASIC METABOLIC PNL TOTAL CA: CPT

## 2021-01-17 PROCEDURE — 85027 COMPLETE CBC AUTOMATED: CPT

## 2021-01-17 PROCEDURE — 6370000000 HC RX 637 (ALT 250 FOR IP): Performed by: THORACIC SURGERY (CARDIOTHORACIC VASCULAR SURGERY)

## 2021-01-17 PROCEDURE — 2500000003 HC RX 250 WO HCPCS: Performed by: PHYSICIAN ASSISTANT

## 2021-01-17 PROCEDURE — 94640 AIRWAY INHALATION TREATMENT: CPT

## 2021-01-17 PROCEDURE — 97110 THERAPEUTIC EXERCISES: CPT

## 2021-01-17 PROCEDURE — 2060000000 HC ICU INTERMEDIATE R&B

## 2021-01-17 PROCEDURE — 6360000002 HC RX W HCPCS: Performed by: PHYSICIAN ASSISTANT

## 2021-01-17 PROCEDURE — 71046 X-RAY EXAM CHEST 2 VIEWS: CPT

## 2021-01-17 PROCEDURE — 85730 THROMBOPLASTIN TIME PARTIAL: CPT

## 2021-01-17 PROCEDURE — 82948 REAGENT STRIP/BLOOD GLUCOSE: CPT

## 2021-01-17 PROCEDURE — 97530 THERAPEUTIC ACTIVITIES: CPT

## 2021-01-17 PROCEDURE — 36415 COLL VENOUS BLD VENIPUNCTURE: CPT

## 2021-01-17 RX ORDER — FUROSEMIDE 10 MG/ML
20 INJECTION INTRAMUSCULAR; INTRAVENOUS 2 TIMES DAILY
Status: DISCONTINUED | OUTPATIENT
Start: 2021-01-17 | End: 2021-01-21 | Stop reason: HOSPADM

## 2021-01-17 RX ADMIN — GLYCOPYRROLATE AND FORMOTEROL FUMARATE 2 PUFF: 9; 4.8 AEROSOL, METERED RESPIRATORY (INHALATION) at 07:50

## 2021-01-17 RX ADMIN — ALBUTEROL SULFATE 2.5 MG: 2.5 SOLUTION RESPIRATORY (INHALATION) at 12:23

## 2021-01-17 RX ADMIN — ACETAMINOPHEN 650 MG: 325 TABLET ORAL at 08:46

## 2021-01-17 RX ADMIN — SODIUM CHLORIDE, PRESERVATIVE FREE 10 ML: 5 INJECTION INTRAVENOUS at 09:21

## 2021-01-17 RX ADMIN — SODIUM CHLORIDE, PRESERVATIVE FREE 10 ML: 5 INJECTION INTRAVENOUS at 18:31

## 2021-01-17 RX ADMIN — ATORVASTATIN CALCIUM 10 MG: 10 TABLET, FILM COATED ORAL at 21:10

## 2021-01-17 RX ADMIN — FUROSEMIDE 40 MG: 10 INJECTION, SOLUTION INTRAMUSCULAR; INTRAVENOUS at 09:21

## 2021-01-17 RX ADMIN — PANTOPRAZOLE SODIUM 40 MG: 40 TABLET, DELAYED RELEASE ORAL at 07:56

## 2021-01-17 RX ADMIN — FUROSEMIDE 20 MG: 10 INJECTION, SOLUTION INTRAMUSCULAR; INTRAVENOUS at 18:31

## 2021-01-17 RX ADMIN — METOPROLOL TARTRATE 50 MG: 50 TABLET, FILM COATED ORAL at 21:10

## 2021-01-17 RX ADMIN — ALBUTEROL SULFATE 2.5 MG: 2.5 SOLUTION RESPIRATORY (INHALATION) at 07:50

## 2021-01-17 RX ADMIN — METOPROLOL TARTRATE 50 MG: 50 TABLET, FILM COATED ORAL at 07:56

## 2021-01-17 RX ADMIN — ACETAMINOPHEN 650 MG: 325 TABLET ORAL at 13:49

## 2021-01-17 RX ADMIN — AMIODARONE HYDROCHLORIDE 200 MG: 200 TABLET ORAL at 21:10

## 2021-01-17 RX ADMIN — HEPARIN SODIUM 17 UNITS/KG/HR: 10000 INJECTION, SOLUTION INTRAVENOUS at 12:02

## 2021-01-17 RX ADMIN — ASPIRIN 81 MG: 81 TABLET, CHEWABLE ORAL at 07:56

## 2021-01-17 RX ADMIN — ALBUTEROL SULFATE 2.5 MG: 2.5 SOLUTION RESPIRATORY (INHALATION) at 19:01

## 2021-01-17 RX ADMIN — GLYCOPYRROLATE AND FORMOTEROL FUMARATE 2 PUFF: 9; 4.8 AEROSOL, METERED RESPIRATORY (INHALATION) at 19:01

## 2021-01-17 RX ADMIN — ACETAMINOPHEN 650 MG: 325 TABLET ORAL at 21:10

## 2021-01-17 RX ADMIN — OXYCODONE HYDROCHLORIDE 5 MG: 5 TABLET ORAL at 18:31

## 2021-01-17 RX ADMIN — OXYCODONE HYDROCHLORIDE 5 MG: 5 TABLET ORAL at 23:02

## 2021-01-17 RX ADMIN — AMIODARONE HYDROCHLORIDE 200 MG: 200 TABLET ORAL at 07:57

## 2021-01-17 RX ADMIN — MULTIPLE VITAMINS W/ MINERALS TAB 1 TABLET: TAB at 07:56

## 2021-01-17 ASSESSMENT — PAIN SCALES - GENERAL
PAINLEVEL_OUTOF10: 0
PAINLEVEL_OUTOF10: 5
PAINLEVEL_OUTOF10: 5
PAINLEVEL_OUTOF10: 3
PAINLEVEL_OUTOF10: 5
PAINLEVEL_OUTOF10: 5

## 2021-01-17 ASSESSMENT — PAIN DESCRIPTION - LOCATION: LOCATION: INCISION;STERNUM

## 2021-01-17 ASSESSMENT — ENCOUNTER SYMPTOMS
CONSTIPATION: 0
ABDOMINAL DISTENTION: 0
WHEEZING: 0
DIARRHEA: 0
SHORTNESS OF BREATH: 0
VOMITING: 0
COUGH: 0
ANAL BLEEDING: 0

## 2021-01-17 ASSESSMENT — PAIN DESCRIPTION - PROGRESSION
CLINICAL_PROGRESSION: NOT CHANGED

## 2021-01-17 ASSESSMENT — PAIN DESCRIPTION - PAIN TYPE: TYPE: SURGICAL PAIN

## 2021-01-17 ASSESSMENT — PAIN - FUNCTIONAL ASSESSMENT: PAIN_FUNCTIONAL_ASSESSMENT: PREVENTS OR INTERFERES SOME ACTIVE ACTIVITIES AND ADLS

## 2021-01-17 ASSESSMENT — PAIN DESCRIPTION - DESCRIPTORS: DESCRIPTORS: DISCOMFORT

## 2021-01-17 ASSESSMENT — PAIN DESCRIPTION - FREQUENCY: FREQUENCY: CONTINUOUS

## 2021-01-17 NOTE — PLAN OF CARE
Problem: Impaired respiratory status  Goal: Clear lung sounds  Description: Clear lung sounds  Outcome: Ongoing   Continue Therapy as ordered to improve breath sounds/aeration.

## 2021-01-17 NOTE — PROGRESS NOTES
Cardiovascular Surgery Progress Note      Comfortable on RA  Rate controlled AF  CXR clear, no effusion  Peripheral edema improved  BMP pending  -BMP ordered, may start apixaban today if Cr normalized  -Decrease lasix

## 2021-01-17 NOTE — PROGRESS NOTES
I called lab and phlebotomy regarding stat BMP ordered at 11:00.  1348 I called lab and phlebotomy ext [phone] again regarding stat lab ordered. Someone will be coming to floor now.

## 2021-01-17 NOTE — PROGRESS NOTES
Yani Gallagher [address] ICU STEPDOWN TELEMETRY 4K  Occupational Therapy  Daily Note  Time:   Time In: 6694  Time Out: 9984  Timed Code Treatment Minutes: 28 Minutes  Minutes: 28          Date: 2021  Patient Name: Ramon Arevalo,   Gender: male      Room: [facility]18/018-A  MRN: [id number]  : 1950  (79 y.o.)  Referring Practitioner: Tere Gallardo PA-C  Diagnosis: Atrial Fibrillation with Rapid Ventricular Response  Additional Pertinent Hx: Pt presents with evaluation of sob that awoke him from sleep. He was supposed to be followed up by Dr. Willis Fraser. He was found to have Afib last week. HR was 140s. First episode. He could not catch his breath. Pt underwent DEJAH and had CABG x 3 on 21. Restrictions/Precautions:  Restrictions/Precautions: Surgical Protocols  Position Activity Restriction  Sternal Precautions: No Pushing, No Pulling, 5# Lifting Restrictions  Other position/activity restrictions: Incision on RLE from the vein harvest     SUBJECTIVE: pt sitting in recliner when arrived. Pt agreeable to OT     PAIN: pt stated little burning sensation down RLE when first stood up     COGNITION: Decreased Insight, Decreased Problem Solving and Decreased Safety Awareness    ADL:   No ADL's completed this session. Yani Gallagher BALANCE:  Standing Balance: Contact Guard Assistance, with verbal cues , with increased time for completion. standing at recliner     BED MOBILITY:  Not Tested    TRANSFERS:  Sit to Stand:  Contact Guard Assistance. from recliner   Stand to Sit: [employer]. to recliner     FUNCTIONAL MOBILITY:  Assistive Device: None  Assist Level:  Contact Guard Assistance, with verbal cues  and with increased time for completion. Distance: around RN station   Pt had no LOB but unsteady at times during mobility. Pt tends to push self too much at times and increase HR occurs. Talked a lot to pt of taking time and not pushing too hard but continue to exercise.  Pt was SOB upon returning to room long rest break needed to decrease HR        ADDITIONAL ACTIVITIES:  .Pt completed CABG Step II exercises x10 reps x1 set this date in order to increase strength and improve activity tolerance for ADLs and homemaking tasks. Pt exhibited mild  fatigue during task, requring short  rest breaks and min  VCs for technique. Pt was able to recall  2/3 sternal precautions     ASSESSMENT:     Activity Tolerance:  Patient tolerance of  treatment: good. Discharge Recommendations: Continue to assess pending progress   Equipment Recommendations: Equipment Needed: No  Plan: Times per week: 6x- CABG  Specific instructions for Next Treatment: Functional mobility; ADLs and standing tolerance; CABG Step II exercises; transfers and sternal precautions  Current Treatment Recommendations: Functional Mobility Training, Endurance Training, Self-Care / ADL, Safety Education & Training, Strengthening, Balance Training, Patient/Caregiver Education & Training  Plan Comment: Pt would benefit from continued skilled OT services when medically stable and discharged from Acute. He may need a stay on IP Rehab or a SNF prior to returning home. Patient Education  Patient Education: Energy Conservation, Home Exercise Program, Precautions and Importance of Increasing Activity    Goals  Short term goals  Time Frame for Short term goals: By discharge  Short term goal 1: Pt will demonstrate functional mobility walking to/from the bathroom with CGA while using any AD needed to prepare for doing self care at the sink. Short term goal 2: Pt will complete transfers to/from various surfaces with SBA while using rocking method and following sternal precautions to increase his independence with toileting routine. Short term goal 3: Pt will complete CABG Step II exercises x 12 reps each with cues as needed to increase his endurance and strength for ease of doing self care and functional mobility.   Short term goal 4: Pt will tolerate standing ADLs for over 3 minutes while using 1-2 hand release with cues for pursed lip breathing to increase his endurance for ease of dressing or spongebathing. Following session, patient left in safe position with all fall risk precautions in place.

## 2021-01-18 ENCOUNTER — APPOINTMENT (OUTPATIENT)
Dept: GENERAL RADIOLOGY | Age: 71
DRG: 233 | End: 2021-01-18
Payer: MEDICARE

## 2021-01-18 LAB
ANION GAP SERPL CALCULATED.3IONS-SCNC: 10 MEQ/L (ref 8–16)
APTT: 101.2 SECONDS (ref 22–38)
BUN BLDV-MCNC: 35 MG/DL (ref 7–22)
CALCIUM SERPL-MCNC: 8.5 MG/DL (ref 8.5–10.5)
CHLORIDE BLD-SCNC: 99 MEQ/L (ref 98–111)
CO2: 25 MEQ/L (ref 23–33)
CREAT SERPL-MCNC: 1.6 MG/DL (ref 0.4–1.2)
ERYTHROCYTE [DISTWIDTH] IN BLOOD BY AUTOMATED COUNT: 13.6 % (ref 11.5–14.5)
ERYTHROCYTE [DISTWIDTH] IN BLOOD BY AUTOMATED COUNT: 49.7 FL (ref 35–45)
GFR SERPL CREATININE-BSD FRML MDRD: 43 ML/MIN/1.73M2
GLUCOSE BLD-MCNC: 97 MG/DL (ref 70–108)
HCT VFR BLD CALC: 24.4 % (ref 42–52)
HEMOGLOBIN: 8.3 GM/DL (ref 14–18)
MCH RBC QN AUTO: 35 PG (ref 26–33)
MCHC RBC AUTO-ENTMCNC: 34 GM/DL (ref 32.2–35.5)
MCV RBC AUTO: 103 FL (ref 80–94)
PLATELET # BLD: 198 THOU/MM3 (ref 130–400)
PMV BLD AUTO: 10.5 FL (ref 9.4–12.4)
POTASSIUM SERPL-SCNC: 3.4 MEQ/L (ref 3.5–5.2)
RBC # BLD: 2.37 MILL/MM3 (ref 4.7–6.1)
SODIUM BLD-SCNC: 134 MEQ/L (ref 135–145)
WBC # BLD: 11.5 THOU/MM3 (ref 4.8–10.8)

## 2021-01-18 PROCEDURE — 85730 THROMBOPLASTIN TIME PARTIAL: CPT

## 2021-01-18 PROCEDURE — 80048 BASIC METABOLIC PNL TOTAL CA: CPT

## 2021-01-18 PROCEDURE — 6370000000 HC RX 637 (ALT 250 FOR IP): Performed by: PHYSICIAN ASSISTANT

## 2021-01-18 PROCEDURE — 6370000000 HC RX 637 (ALT 250 FOR IP): Performed by: THORACIC SURGERY (CARDIOTHORACIC VASCULAR SURGERY)

## 2021-01-18 PROCEDURE — 97116 GAIT TRAINING THERAPY: CPT

## 2021-01-18 PROCEDURE — 85027 COMPLETE CBC AUTOMATED: CPT

## 2021-01-18 PROCEDURE — 97110 THERAPEUTIC EXERCISES: CPT

## 2021-01-18 PROCEDURE — 71046 X-RAY EXAM CHEST 2 VIEWS: CPT

## 2021-01-18 PROCEDURE — 94640 AIRWAY INHALATION TREATMENT: CPT

## 2021-01-18 PROCEDURE — 36415 COLL VENOUS BLD VENIPUNCTURE: CPT

## 2021-01-18 PROCEDURE — 2060000000 HC ICU INTERMEDIATE R&B

## 2021-01-18 PROCEDURE — 6370000000 HC RX 637 (ALT 250 FOR IP): Performed by: NURSE PRACTITIONER

## 2021-01-18 PROCEDURE — 2580000003 HC RX 258: Performed by: PHYSICIAN ASSISTANT

## 2021-01-18 PROCEDURE — 6360000002 HC RX W HCPCS: Performed by: THORACIC SURGERY (CARDIOTHORACIC VASCULAR SURGERY)

## 2021-01-18 PROCEDURE — 94760 N-INVAS EAR/PLS OXIMETRY 1: CPT

## 2021-01-18 PROCEDURE — 97530 THERAPEUTIC ACTIVITIES: CPT

## 2021-01-18 PROCEDURE — 97535 SELF CARE MNGMENT TRAINING: CPT

## 2021-01-18 PROCEDURE — APPNB15 APP NON BILLABLE TIME 0-15 MINS: Performed by: NURSE PRACTITIONER

## 2021-01-18 PROCEDURE — 6360000002 HC RX W HCPCS: Performed by: PHYSICIAN ASSISTANT

## 2021-01-18 RX ADMIN — ATORVASTATIN CALCIUM 10 MG: 10 TABLET, FILM COATED ORAL at 20:37

## 2021-01-18 RX ADMIN — APIXABAN 5 MG: 5 TABLET, FILM COATED ORAL at 20:37

## 2021-01-18 RX ADMIN — SODIUM CHLORIDE, PRESERVATIVE FREE 10 ML: 5 INJECTION INTRAVENOUS at 09:48

## 2021-01-18 RX ADMIN — METOPROLOL TARTRATE 50 MG: 50 TABLET, FILM COATED ORAL at 20:37

## 2021-01-18 RX ADMIN — ALBUTEROL SULFATE 2.5 MG: 2.5 SOLUTION RESPIRATORY (INHALATION) at 20:57

## 2021-01-18 RX ADMIN — ACETAMINOPHEN 650 MG: 325 TABLET ORAL at 16:55

## 2021-01-18 RX ADMIN — OXYCODONE HYDROCHLORIDE 5 MG: 5 TABLET ORAL at 15:13

## 2021-01-18 RX ADMIN — OXYCODONE HYDROCHLORIDE 5 MG: 5 TABLET ORAL at 19:32

## 2021-01-18 RX ADMIN — SODIUM CHLORIDE, PRESERVATIVE FREE 10 ML: 5 INJECTION INTRAVENOUS at 17:01

## 2021-01-18 RX ADMIN — POTASSIUM CHLORIDE 40 MEQ: 1500 TABLET, EXTENDED RELEASE ORAL at 05:32

## 2021-01-18 RX ADMIN — METOPROLOL TARTRATE 50 MG: 50 TABLET, FILM COATED ORAL at 07:41

## 2021-01-18 RX ADMIN — MULTIPLE VITAMINS W/ MINERALS TAB 1 TABLET: TAB at 07:42

## 2021-01-18 RX ADMIN — GLYCOPYRROLATE AND FORMOTEROL FUMARATE 2 PUFF: 9; 4.8 AEROSOL, METERED RESPIRATORY (INHALATION) at 20:57

## 2021-01-18 RX ADMIN — OXYCODONE HYDROCHLORIDE 5 MG: 5 TABLET ORAL at 09:48

## 2021-01-18 RX ADMIN — FUROSEMIDE 20 MG: 10 INJECTION, SOLUTION INTRAMUSCULAR; INTRAVENOUS at 17:01

## 2021-01-18 RX ADMIN — ALBUTEROL SULFATE 2.5 MG: 2.5 SOLUTION RESPIRATORY (INHALATION) at 13:24

## 2021-01-18 RX ADMIN — ASPIRIN 81 MG: 81 TABLET, CHEWABLE ORAL at 07:41

## 2021-01-18 RX ADMIN — ACETAMINOPHEN 650 MG: 325 TABLET ORAL at 23:51

## 2021-01-18 RX ADMIN — AMIODARONE HYDROCHLORIDE 200 MG: 200 TABLET ORAL at 20:37

## 2021-01-18 RX ADMIN — APIXABAN 5 MG: 5 TABLET, FILM COATED ORAL at 12:36

## 2021-01-18 RX ADMIN — FUROSEMIDE 20 MG: 10 INJECTION, SOLUTION INTRAMUSCULAR; INTRAVENOUS at 09:48

## 2021-01-18 RX ADMIN — ACETAMINOPHEN 650 MG: 325 TABLET ORAL at 05:35

## 2021-01-18 RX ADMIN — GLYCOPYRROLATE AND FORMOTEROL FUMARATE 2 PUFF: 9; 4.8 AEROSOL, METERED RESPIRATORY (INHALATION) at 08:51

## 2021-01-18 RX ADMIN — ALBUTEROL SULFATE 2.5 MG: 2.5 SOLUTION RESPIRATORY (INHALATION) at 08:50

## 2021-01-18 RX ADMIN — ACETAMINOPHEN 650 MG: 325 TABLET ORAL at 12:42

## 2021-01-18 RX ADMIN — AMIODARONE HYDROCHLORIDE 200 MG: 200 TABLET ORAL at 07:42

## 2021-01-18 RX ADMIN — PANTOPRAZOLE SODIUM 40 MG: 40 TABLET, DELAYED RELEASE ORAL at 05:11

## 2021-01-18 ASSESSMENT — PAIN DESCRIPTION - ORIENTATION: ORIENTATION: MID

## 2021-01-18 ASSESSMENT — PAIN DESCRIPTION - PAIN TYPE
TYPE: SURGICAL PAIN

## 2021-01-18 ASSESSMENT — PAIN DESCRIPTION - PROGRESSION
CLINICAL_PROGRESSION: NOT CHANGED

## 2021-01-18 ASSESSMENT — PAIN DESCRIPTION - LOCATION
LOCATION: STERNUM
LOCATION: STERNUM

## 2021-01-18 ASSESSMENT — PAIN DESCRIPTION - FREQUENCY
FREQUENCY: CONTINUOUS
FREQUENCY: CONTINUOUS

## 2021-01-18 ASSESSMENT — PAIN SCALES - GENERAL
PAINLEVEL_OUTOF10: 5
PAINLEVEL_OUTOF10: 5
PAINLEVEL_OUTOF10: 3
PAINLEVEL_OUTOF10: 5

## 2021-01-18 ASSESSMENT — PAIN DESCRIPTION - DESCRIPTORS
DESCRIPTORS: ACHING
DESCRIPTORS: ACHING

## 2021-01-18 ASSESSMENT — PAIN DESCRIPTION - ONSET: ONSET: ON-GOING

## 2021-01-18 NOTE — FLOWSHEET NOTE
01/18/21 1059   Provider Notification   Reason for Communication Review case  (change eliquis from 2.5 mg to 5mg BID)   Provider Name Dave Perrin   Provider Notification Advance Practice Clinician (CNS, NP, CNM, CRNA, PA)   Method of Communication Secure Message   Response See orders   Notification Time 1100   cardiology change dose

## 2021-01-18 NOTE — PROGRESS NOTES
PHASE 1 CARDIAC REHABILITATION   CABG, AVR, MVR, TVR, AMI, PCI's  Exercise Physiologists      Treatment Length (l: long, n: normal, s: short): N  Treatment Length Note:   Vitals Stable?: Yes. If No:     Any ECG-Rhythm Issues?: No.  If Yes:   Transfers (bc: Bed to Chair, cb: Chair to Bed): pt in bed and returned to bed after walk  Strengthening/ROM Treatment Exercises: Step 2    FITT:     Frequency: 2 x per day   Intensity: <15 RPE, <120bpm   Time: >30-60 seconds longer or >20% increase in distance each walk   Type: Bed/Chair Exercises/stationary marching or ambulation    Aerobic treatment: Pt ambulated 100 ft. Stopped once because he was a little short of breath. Gait (i: Independent, s: Steady, u: Unsteady): S  Assists: 1  Patient tolerated treatment (w: well, f: fair, p: poor): W  Goals:    Short term:  Progression on each aerobic treatment. Long term: Independent ambulation and discharge. Ashwini Ireland is to follow sternal precautions. Will learn techniques for getting in and out of bed/chairs/car without using the arms. Using stairs will be addressed if applicable. Home activity instructions (Frequency, Intensity, Time, Type), and progression will be addressed prior to discharge (unless Ashwini Ireland goes to TCU or an ECF where they will follow PT/OT protocols). Notes: Call light left within reach.     Education given:   Phase II Information (Given upon Discharge):

## 2021-01-18 NOTE — PROGRESS NOTES
Charley Vale is a 79years old male who underwent coronary to bypass grafting x3 with ligation of left atrial appendage on 01/12/2021. This is postop day 6. He is awake and alert. Sitting in a chair. Vital signs are stable. Telemetry shows atrial fibrillation. Currently on intravenous heparin. He walked inside his room. Physical examination: Sternotomy incision healing well. Leg incision is also healing well. Heart sounds are regular. No obvious murmurs. Lung sounds are relatively clear. Laboratory findings: WBC 11.5k, creatinine 1.6, platelet count 237C, chest x-ray is pending. Assessment: Slowly progressing. Plan: Continue physical therapy. DC intravenous heparin. Switch to oral anticoagulation. Discharge home soon.

## 2021-01-18 NOTE — PLAN OF CARE
Problem: Impaired respiratory status  Goal: Clear lung sounds  Description: Clear lung sounds  1/17/2021 1948 by Hood Matute RCP  Outcome: Ongoing   Continue aerosol therapy to improve aeration

## 2021-01-18 NOTE — PROGRESS NOTES
Comprehensive Nutrition Assessment    Type and Reason for Visit:  Reassess    Nutrition Recommendations/Plan:   Continue currrent diet. Continue magic cup TID per pt request to aid with wound healing. May discontinue ONS  when discharged if po continues to be >75% . Continue MVI. Nutrition Assessment:     Pt improving from a nutritional standpoint AEB % intake. Remains at risk for further nutritional compromise r/t  S/p OHS 1/12, Afib, JANETH, increased nutrient needs for wound healing and underlying medical condition (COPD, HTN, tobacco Hx). Nutrition recommendations/interventions as per above. Malnutrition Assessment:  Malnutrition Status: At risk for malnutrition (Comment)    Context:  Acute Illness     Findings of the 6 clinical characteristics of malnutrition:  Energy Intake:  No significant decrease in energy intake  Weight Loss:  No significant weight loss     Body Fat Loss:  No significant body fat loss     Muscle Mass Loss:  No significant muscle mass loss    Fluid Accumulation:  Unable to assess     Strength:  Not Performed    Estimated Daily Nutrient Needs:  Energy (kcal):  ~5904-6417 kcals (25-30); Weight Used for Energy Requirements:  Admission(69.4 kg)     Protein (g):  ~83 grams ( 1.2); Weight Used for Protein Requirements:  Admission(69.4 kg)        Fluid (ml/day):  per Dr;   Nutrition Related Findings:  Pt seen ~1330, states good appetite, delcines ileus protocol Arzella Bowbells & hot decaf beverage), denies constipation, no further diet questions ( RD educated pt on diet 1/9/21). He denies difficulty chewing/swallowing foods. Denies N/V. BM x 2 (1/18)Meds: Lasix, MVI. labs: (1/18) na 134, K+ 3.4, BUN 35, Creatinine 1.6, Hemoglobin 8.3.       Wounds:  Surgical Incision(s/p CABG X3 & Atrial Appendage 1/2)       Current Nutrition Therapies:    Dietary Nutrition Supplements: Frozen Oral Supplement  DIET CARDIAC; Carb Control: 4 carb choices (60 gms)/meal; Low Sodium (2 GM)    Anthropometric Measures:  · Height: 5' 9\" (175.3 cm)  · Current Body Weight: 159 lb 4.8 oz (72.3 kg)(+ 1 pitting RLE & LLE edema)   · Admission Body Weight: 152 lb 14.4 oz (69.4 kg)(1/6/20 no edema)    · Usual Body Weight: (~ 155# per pt.   Per pt: 2/25/20: 154#8oz, 12/29/20: 155#8oz)     · Ideal Body Weight: 160 lbs;     · BMI: 23.5  · Adjusted Body Weight:  ; No Adjustment   · BMI Categories: Normal Weight (BMI 22.0 to 24.9) age over 72       Nutrition Diagnosis:   · Increased nutrient needs related to increase demand for energy/nutrients as evidenced by wounds      Nutrition Interventions:   Food and/or Nutrient Delivery:  Continue Current Diet, Continue Oral Nutrition Supplement, Vitamin Supplement  Nutrition Education/Counseling:  Education completed(Cardiac, CHF diet and fluid recommendations discussed, handouts provided as well 1/9/21.)   Coordination of Nutrition Care:  Continue to monitor while inpatient    Goals:  75% or more of healthy diet to assist with healing during LOS       Nutrition Monitoring and Evaluation:   Behavioral-Environmental Outcomes:  None Identified   Food/Nutrient Intake Outcomes:  Diet Advancement/Tolerance(NPO status)  Physical Signs/Symptoms Outcomes:  Biochemical Data, GI Status, Nutrition Focused Physical Findings, Weight, Skin     Discharge Planning:    Continue current diet     Electronically signed by Karissa Moore RD, LD on 1/18/21 at 3:08 PM EST    Contact: [phone]

## 2021-01-18 NOTE — CARE COORDINATION
DISASTER CHARTING    1/18/21, 12:59 PM EST    DISCHARGE ONGOING EVALUATION:     Lyubov Goodson day: 12  Location: [facility]18/018-A Reason for admit: Atrial fibrillation with rapid ventricular response ([facility] [I48.91]  Atrial fibrillation with RVR ([facility] [I48.91]   Barriers to Discharge: POD #6. Chest tubes removed on 1/15. Cardioverted x3 on 1/15; unsuccessful. Heparin drip stopped today and started on eliquis. On room air. Afebrile. Afib 50's. Ox4. +BM. CR/PT/OT. Dietitian & HF RN consulted. Dietary ileus prevention protocol. Family Medicine, CVS, and Cardiology following. Telemetry, I&O, daily weight, IS, sternal precautions, wound care, SCDs, ambulate. Amio, nebs, eliquis, asa, lipitor, IV lasix 20 mg bid, inhaler, lopressor, prn roxicodone, protonix, electrolyte replacement protocols. Na+ 134, K+ 3.4, BUN 35, Creat 1.6, wbc 11.5, hgb 8.3. PCP: Carissa Solis MD  Readmission Risk Score: 19%  Patient Goals/Plan/Treatment Preferences: Home with wife and [facility]. Monitor for DME needs. Plan for Life Vest at discharge; referral made.  SW on case.

## 2021-01-18 NOTE — PROGRESS NOTES
[address] [address]  INPATIENT PHYSICAL THERAPY  DAILY NOTE  [facility] ICU STEPDOWN TELEMETRY 4K - 4K-18/018-A      Time In: 1320  Time Out: 1401  Timed Code Treatment Minutes: 41 Minutes  Minutes: 41          Date: 2021  Patient Name: Nhi Hess,  Gender:  male        MRN: [id number]  : 1950  (79 y.o.)     Referring Practitioner: Arnav Noonan PA-C  Diagnosis: Atrial fibrillation with rapid ventricular response  Additional Pertinent Hx: Pt presents with evaluation of sob that awoke him from sleep. He was supposed to be followed up by Dr. Phuc Cabral. He was found to have Afib last week. HR was 140s. First episode. He could not catch his breath. Pt underwent DEJAH and had CABG x 3 on 21. Prior Level of Function:  Lives With: Spouse  Type of Home: Apartment  Home Layout: One level  Home Access: Stairs to enter without rails  Entrance Stairs - Number of Steps: 1 step  Home Equipment: Cane   Bathroom Shower/Tub: Tub/Shower unit, Shower chair with back  [employer]: Standard  Bathroom Equipment: Grab bars in shower  Bathroom Accessibility: Accessible    Receives Help From: Family  ADL Assistance: [address]: Independent  Homemaking Responsibilities: Yes  Ambulation Assistance: Independent  Transfer Assistance: Independent  Active : Yes  Additional Comments: Pt was independent prior to admission. He did not use any AD for ambulating. Pt spends alot of time of his feet while working. Pt sits if needed while taking a shower. Pt smokes . 5 ppd.     Restrictions/Precautions:  Restrictions/Precautions: Surgical Protocols  Position Activity Restriction  Sternal Precautions: No Pushing, No Pulling, 5# Lifting Restrictions  Other position/activity restrictions: Incision on RLE from the vein harvest       SUBJECTIVE: nursing ok'd therapy pt was in bed on arrival and agreeable to get up and wanted to lay back down at end of session- spent time educating pt on his Program, Gait Training, Stair training, Endurance Training, Patient/Caregiver Education & Training, Safety Education & Training, Equipment Evaluation, Education, & procurement    Patient Education  Patient Education: Plan of Care, Home Exercise Program, Precautions/Restrictions, Gait, Heart Booklet    Goals:  Patient goals : to feel better  Short term goals  Time Frame for Short term goals: by discharge  Short term goal 1: Pt to transfer supine <--> sit SBA to enable pt to get in/out of bed. MET  Short term goal 2: Pt to transfer sit <--> stand SBA for increased functional mobility. MET  Short term goal 3: Pt to ambulate >250 feet with LRAD or without AD SBA for household mobility. Short term goal 4: Pt to ascend/descend 1 step without HR SBA for home entry. Long term goals  Time Frame for Long term goals : NA due to short length of stay. Following session, patient left in safe position with all fall risk precautions in place.

## 2021-01-18 NOTE — PROGRESS NOTES
[address] ICU STEPDOWN TELEMETRY 4K  Occupational Therapy  Daily Note  Time:    Time In: 7006  Time Out: 5845  Timed Code Treatment Minutes: 24 Minutes  Minutes: 24          Date: 2021  Patient Name: Ibeth Gardner,   Gender: male      Room: [facility]018-A  MRN: [id number]  : 1950  (79 y.o.)  Referring Practitioner: Denis Garcia PA-C  Diagnosis: Atrial Fibrillation with Rapid Ventricular Response  Additional Pertinent Hx: Pt presents with evaluation of sob that awoke him from sleep. He was supposed to be followed up by Dr. Vini Francis. He was found to have Afib last week. HR was 140s. First episode. He could not catch his breath. Pt underwent DEJAH and had CABG x 3 on 21. Restrictions/Precautions:  Restrictions/Precautions: Surgical Protocols  Position Activity Restriction  Sternal Precautions: No Pushing, No Pulling, 5# Lifting Restrictions  Other position/activity restrictions: Incision on RLE from the vein harvest      SUBJECTIVE: Hari Topete RN approving session stating pt is now allowed up after he had his pacemaker lines sniped. Pt agreeable to OT session. Pt stating he feels like he did not much yesterday and his heart rate got to high. PAIN: 0 /10: no complaints throughout session     COGNITION: WNL    ADL:   Grooming: Contact Guard Assistance. standing at sink to wash hands prior to breakfast  Lower Extremity Dressing: Maximum Assistance. Julio Cordova BALANCE:  Sitting Balance:  Stand By Assistance. at EOB with no complaints  Standing Balance: Contact Guard Assistance. with occasional 1 UE uspport on IV pole during    BED MOBILITY:  Supine to Sit: Stand By Assistance with 1 cues for maintaining sternal precautions    TRANSFERS:  Sit to Stand:  Contact Guard Assistance. from eOB with 1 cues to maintain steranal precautions when scooting to EOB  Stand to Sit: Stand By Assistance.  into bedsdie chair with 1 cues for sternal precautions when pt attempting to use UEs to scoot and functional mobility. Short term goal 4: Pt will tolerate standing ADLs for over 3 minutes while using 1-2 hand release with cues for pursed lip breathing to increase his endurance for ease of dressing or spongebathing. Following session, patient left in safe position with all fall risk precautions in place.

## 2021-01-18 NOTE — PROGRESS NOTES
Progress Note  Date:2021       Room:[address] [place] [address]  Patient Omkar Garcia     YOB: 1950     Age:70 y.o. Subjective    Subjective:  Symptoms:  Stable. He reports chest pain (incisional). No shortness of breath, cough or diarrhea. Diet:  Adequate intake. No vomiting. Activity level: Returning to normal.    Pain:  He reports no pain. Review of Systems   HENT: Negative for congestion. Respiratory: Negative for cough, shortness of breath and wheezing. Cardiovascular: Positive for chest pain (incisional). Gastrointestinal: Negative for abdominal distention, anal bleeding, constipation, diarrhea and vomiting. Musculoskeletal: Negative for arthralgias and gait problem. Neurological: Negative for dizziness, seizures, facial asymmetry and light-headedness. Hematological: Does not bruise/bleed easily. Objective         Vitals Last 24 Hours:  TEMPERATURE:  Temp  Av.5 °F (36.9 °C)  Min: 98.2 °F (36.8 °C)  Max: 98.7 °F (37.1 °C)  RESPIRATIONS RANGE: Resp  Av  Min: 16  Max: 20  PULSE OXIMETRY RANGE: SpO2  Av.7 %  Min: 95 %  Max: 98 %  PULSE RANGE: Pulse  Av.8  Min: 93  Max: 118  BLOOD PRESSURE RANGE: Systolic (44ONA), XIMENA:087 , Min:97 , WSU:460   ; Diastolic (76MMF), VWR:39, Min:66, Max:82    I/O (24Hr): Intake/Output Summary (Last 24 hours) at 2021 2343  Last data filed at 2021 1956  Gross per 24 hour   Intake 801.04 ml   Output 1050 ml   Net -248.96 ml     Objective:  General Appearance:  Comfortable and uncomfortable. Vital signs: (most recent): Blood pressure 103/69, pulse 102, temperature 98.6 °F (37 °C), temperature source Oral, resp. rate 20, height 5' 9\" (1.753 m), weight 159 lb 14.4 oz (72.5 kg), SpO2 95 %. Vital signs are normal.    Output: Producing urine and producing stool. HEENT: Normal HEENT exam.    Lungs:  Normal effort and normal respiratory rate. Breath sounds clear to auscultation. No wheezes or rhonchi. (Slight wheeze on forced expiration)  Heart: Tachycardia. Irregular rhythm. S1 normal and S2 normal.  No murmur. Abdomen: Abdomen is soft. There is no abdominal tenderness. Extremities: Normal range of motion. Neurological: Patient is alert and oriented to person, place and time. Patient has normal reflexes and normal muscle tone. Skin:  Warm and dry. Labs/Imaging/Diagnostics    Labs:  CBC:  Recent Labs     01/15/21  0705 01/16/21  0821 01/17/21  0857   WBC 18.0* 12.2* 12.5*   RBC 2.94* 2.61* 2.83*   HGB 10.3* 9.2* 9.9*   HCT 28.7* 27.2* 29.6*   MCV 97.6* 104.2* 104.6*   * 141 214     CHEMISTRIES:  Recent Labs     01/15/21  0705 01/16/21  0821 01/17/21  1407   * 136 136   K 4.3 4.4 3.7   CL 99 100 97*   CO2 18* 24 25   BUN 32* 34* 36*   CREATININE 1.8* 1.6* 1.5*   GLUCOSE 128* 89 105   MG 2.4 2.2  --      PT/INR:No results for input(s): PROTIME, INR in the last 72 hours. APTT:  Recent Labs     01/16/21 2007 01/17/21  0857 01/17/21 2017   APTT 71.2* 73.8* 66.2*     LIVER PROFILE:No results for input(s): AST, ALT, BILIDIR, BILITOT, ALKPHOS in the last 72 hours. Imaging Last 24 Hours:  Xr Chest (2 Vw)    Result Date: 1/17/2021  PROCEDURE: XR CHEST (2 VW) CLINICAL INFORMATION: Post op open heart surgery. COMPARISON: Chest x-ray dated 16 January 2020 TECHNIQUE: PA and lateral views the chest. FINDINGS: There is mild cardiomegaly in this patient status post previous sternotomy. The mediastinum is not widened. There is evidence for granulomatous disease at the right lung base. There is a small right pleural effusion. There is increased density at both lung bases, improved compared to previous study dated the 16 January 2021. The pulmonary vascularity is normal. No suspicious osseous lesions are present. 1. Interval improvement previous study dated  16 January 2021. **This report has been created using voice recognition software.  It may contain minor errors which are inherent in voice recognition technology. ** Final report electronically signed by DR Aye Sterling on 1/17/2021 7:04 AM    Xr Chest Portable    Result Date: 1/16/2021  Chest X-ray, 1 View COMPARISON:  NIR,SR  - XR CHEST PORTABLE  - 01/15/2021 03:11 AM EST FINDINGS: Increased atelectasis or infiltrate at the right lung base. Similar probable atelectasis at the left lung base. Calcified right lower lung granuloma. Mild blunting of the right costophrenic angle. No visible pneumothorax. Left chest tube and mediastinal drain have been removed. Stable cardiomegaly. No acute fracture. Status post median sternotomy. Increased atelectasis or infiltrate at the right lung base. Small right pleural effusion. This document has been electronically signed by: Silas Wolfe MD on 01/16/2021 01:45 AM     Assessment//Plan           Hospital Problems           Last Modified POA    Coronary artery disease involving native coronary artery 1/9/2021 Yes    Hyperlipidemia 1/9/2021 Yes    Smoker 1/9/2021 Yes    HTN (hypertension) 1/9/2021 Yes    COPD, mild ([address] 1/9/2021 Yes    Atrial fibrillation with rapid ventricular response ([address] 1/9/2021 Yes    Atrial fibrillation ([address] 1/9/2021 Yes    S/P CABG x 3 1/12/2021 Yes    Encounter for cardioversion procedure 1/15/2021 Yes        Assessment:    Condition: In stable condition. Improving. (S/p cabg and  Stable      atrial  Fib  Noted and still with activity  Rapid  Rate      copd and use  Of inhaler and minimal wheeze     htn stable     lipids  Stable      gerd stable      collagenous colitis  Noted     ). Plan:   Encourage ambulation and per physical therapy. Continue respiratory treatments. Consults: physical therapy and occupational therapy. Administer medications as ordered. (Overall sable      heart  Rate  Too fast  With activity  On lopressor  With amiodarone      overall stable as  copd stable but heart rate  With atrial fib at times  Rapid  Rate ).        Electronically signed by Catalina Leo MD on 1/17/21 at 11:43 PM EST

## 2021-01-19 LAB
ANION GAP SERPL CALCULATED.3IONS-SCNC: 10 MEQ/L (ref 8–16)
BUN BLDV-MCNC: 31 MG/DL (ref 7–22)
CALCIUM SERPL-MCNC: 8.7 MG/DL (ref 8.5–10.5)
CHLORIDE BLD-SCNC: 95 MEQ/L (ref 98–111)
CO2: 24 MEQ/L (ref 23–33)
CREAT SERPL-MCNC: 1.3 MG/DL (ref 0.4–1.2)
ERYTHROCYTE [DISTWIDTH] IN BLOOD BY AUTOMATED COUNT: 13.9 % (ref 11.5–14.5)
ERYTHROCYTE [DISTWIDTH] IN BLOOD BY AUTOMATED COUNT: 51.7 FL (ref 35–45)
GFR SERPL CREATININE-BSD FRML MDRD: 55 ML/MIN/1.73M2
GLUCOSE BLD-MCNC: 107 MG/DL (ref 70–108)
HCT VFR BLD CALC: 25.5 % (ref 42–52)
HEMOGLOBIN: 8.5 GM/DL (ref 14–18)
MCH RBC QN AUTO: 34.6 PG (ref 26–33)
MCHC RBC AUTO-ENTMCNC: 33.3 GM/DL (ref 32.2–35.5)
MCV RBC AUTO: 103.7 FL (ref 80–94)
PLATELET # BLD: 256 THOU/MM3 (ref 130–400)
PMV BLD AUTO: 10.5 FL (ref 9.4–12.4)
POTASSIUM SERPL-SCNC: 4 MEQ/L (ref 3.5–5.2)
RBC # BLD: 2.46 MILL/MM3 (ref 4.7–6.1)
SODIUM BLD-SCNC: 129 MEQ/L (ref 135–145)
WBC # BLD: 14.6 THOU/MM3 (ref 4.8–10.8)

## 2021-01-19 PROCEDURE — 97535 SELF CARE MNGMENT TRAINING: CPT

## 2021-01-19 PROCEDURE — 80048 BASIC METABOLIC PNL TOTAL CA: CPT

## 2021-01-19 PROCEDURE — 36415 COLL VENOUS BLD VENIPUNCTURE: CPT

## 2021-01-19 PROCEDURE — 94760 N-INVAS EAR/PLS OXIMETRY 1: CPT

## 2021-01-19 PROCEDURE — 6370000000 HC RX 637 (ALT 250 FOR IP): Performed by: PHYSICIAN ASSISTANT

## 2021-01-19 PROCEDURE — 94640 AIRWAY INHALATION TREATMENT: CPT

## 2021-01-19 PROCEDURE — 85027 COMPLETE CBC AUTOMATED: CPT

## 2021-01-19 PROCEDURE — 2580000003 HC RX 258: Performed by: PHYSICIAN ASSISTANT

## 2021-01-19 PROCEDURE — 6370000000 HC RX 637 (ALT 250 FOR IP): Performed by: THORACIC SURGERY (CARDIOTHORACIC VASCULAR SURGERY)

## 2021-01-19 PROCEDURE — 6360000002 HC RX W HCPCS: Performed by: PHYSICIAN ASSISTANT

## 2021-01-19 PROCEDURE — APPSS60 APP SPLIT SHARED TIME 46-60 MINUTES: Performed by: PHYSICIAN ASSISTANT

## 2021-01-19 PROCEDURE — 2060000000 HC ICU INTERMEDIATE R&B

## 2021-01-19 PROCEDURE — 6360000002 HC RX W HCPCS: Performed by: THORACIC SURGERY (CARDIOTHORACIC VASCULAR SURGERY)

## 2021-01-19 PROCEDURE — 6370000000 HC RX 637 (ALT 250 FOR IP): Performed by: NURSE PRACTITIONER

## 2021-01-19 RX ORDER — ATORVASTATIN CALCIUM 40 MG/1
40 TABLET, FILM COATED ORAL NIGHTLY
Qty: 30 TABLET | Refills: 3 | Status: SHIPPED | OUTPATIENT
Start: 2021-01-19 | End: 2021-02-11 | Stop reason: SDUPTHER

## 2021-01-19 RX ORDER — ATORVASTATIN CALCIUM 10 MG/1
20 TABLET, FILM COATED ORAL NIGHTLY
Qty: 30 TABLET | Refills: 3 | Status: SHIPPED | OUTPATIENT
Start: 2021-01-19 | End: 2021-01-19

## 2021-01-19 RX ORDER — DIGOXIN 125 MCG
125 TABLET ORAL DAILY
Qty: 30 TABLET | Refills: 3 | Status: SHIPPED
Start: 2021-01-19 | End: 2021-01-27 | Stop reason: ALTCHOICE

## 2021-01-19 RX ORDER — POTASSIUM CHLORIDE 750 MG/1
10 TABLET, EXTENDED RELEASE ORAL DAILY
Qty: 30 TABLET | Refills: 0 | Status: SHIPPED | OUTPATIENT
Start: 2021-01-19 | End: 2021-01-27

## 2021-01-19 RX ORDER — AMIODARONE HYDROCHLORIDE 200 MG/1
200 TABLET ORAL 2 TIMES DAILY
Qty: 60 TABLET | Refills: 0 | Status: SHIPPED
Start: 2021-01-19 | End: 2021-01-20 | Stop reason: HOSPADM

## 2021-01-19 RX ORDER — POTASSIUM CHLORIDE 20 MEQ/1
20 TABLET, EXTENDED RELEASE ORAL DAILY
Qty: 30 TABLET | Refills: 0 | Status: SHIPPED | OUTPATIENT
Start: 2021-01-19 | End: 2021-01-19 | Stop reason: SDUPTHER

## 2021-01-19 RX ORDER — METOPROLOL TARTRATE 75 MG/1
75 TABLET, FILM COATED ORAL 2 TIMES DAILY
Qty: 60 TABLET | Refills: 3 | Status: SHIPPED
Start: 2021-01-19 | End: 2021-01-20 | Stop reason: HOSPADM

## 2021-01-19 RX ORDER — FUROSEMIDE 20 MG/1
20 TABLET ORAL DAILY
Qty: 30 TABLET | Refills: 0 | Status: SHIPPED | OUTPATIENT
Start: 2021-01-19 | End: 2021-01-27

## 2021-01-19 RX ORDER — METOPROLOL TARTRATE 50 MG/1
50 TABLET, FILM COATED ORAL 2 TIMES DAILY
Status: DISCONTINUED | OUTPATIENT
Start: 2021-01-19 | End: 2021-01-20

## 2021-01-19 RX ORDER — OXYCODONE HYDROCHLORIDE 5 MG/1
5 TABLET ORAL EVERY 8 HOURS PRN
Qty: 21 TABLET | Refills: 0 | Status: SHIPPED | OUTPATIENT
Start: 2021-01-19 | End: 2021-01-26

## 2021-01-19 RX ORDER — ALBUTEROL SULFATE 90 UG/1
2 AEROSOL, METERED RESPIRATORY (INHALATION) EVERY 6 HOURS PRN
Qty: 1 INHALER | Refills: 4
Start: 2021-01-19 | End: 2022-01-01 | Stop reason: SDUPTHER

## 2021-01-19 RX ORDER — ASPIRIN 81 MG/1
81 TABLET, CHEWABLE ORAL DAILY
Qty: 30 TABLET | Refills: 3 | Status: SHIPPED | OUTPATIENT
Start: 2021-01-20

## 2021-01-19 RX ORDER — DIGOXIN 125 MCG
125 TABLET ORAL DAILY
Status: DISCONTINUED | OUTPATIENT
Start: 2021-01-19 | End: 2021-01-21 | Stop reason: HOSPADM

## 2021-01-19 RX ADMIN — OXYCODONE HYDROCHLORIDE 5 MG: 5 TABLET ORAL at 20:06

## 2021-01-19 RX ADMIN — AMIODARONE HYDROCHLORIDE 200 MG: 200 TABLET ORAL at 08:16

## 2021-01-19 RX ADMIN — AMIODARONE HYDROCHLORIDE 200 MG: 200 TABLET ORAL at 20:02

## 2021-01-19 RX ADMIN — GLYCOPYRROLATE AND FORMOTEROL FUMARATE 2 PUFF: 9; 4.8 AEROSOL, METERED RESPIRATORY (INHALATION) at 07:47

## 2021-01-19 RX ADMIN — ALBUTEROL SULFATE 2.5 MG: 2.5 SOLUTION RESPIRATORY (INHALATION) at 16:00

## 2021-01-19 RX ADMIN — ACETAMINOPHEN 650 MG: 325 TABLET ORAL at 10:48

## 2021-01-19 RX ADMIN — METOPROLOL TARTRATE 50 MG: 50 TABLET, FILM COATED ORAL at 22:12

## 2021-01-19 RX ADMIN — OXYCODONE HYDROCHLORIDE 5 MG: 5 TABLET ORAL at 14:35

## 2021-01-19 RX ADMIN — OXYCODONE HYDROCHLORIDE 5 MG: 5 TABLET ORAL at 05:57

## 2021-01-19 RX ADMIN — PANTOPRAZOLE SODIUM 40 MG: 40 TABLET, DELAYED RELEASE ORAL at 05:57

## 2021-01-19 RX ADMIN — FUROSEMIDE 20 MG: 10 INJECTION, SOLUTION INTRAMUSCULAR; INTRAVENOUS at 16:44

## 2021-01-19 RX ADMIN — SODIUM CHLORIDE, PRESERVATIVE FREE 10 ML: 5 INJECTION INTRAVENOUS at 09:39

## 2021-01-19 RX ADMIN — ALBUTEROL SULFATE 2.5 MG: 2.5 SOLUTION RESPIRATORY (INHALATION) at 11:10

## 2021-01-19 RX ADMIN — SODIUM CHLORIDE, PRESERVATIVE FREE 10 ML: 5 INJECTION INTRAVENOUS at 20:02

## 2021-01-19 RX ADMIN — FUROSEMIDE 20 MG: 10 INJECTION, SOLUTION INTRAMUSCULAR; INTRAVENOUS at 09:39

## 2021-01-19 RX ADMIN — DIGOXIN 125 MCG: 125 TABLET ORAL at 12:08

## 2021-01-19 RX ADMIN — ALBUTEROL SULFATE 2.5 MG: 2.5 SOLUTION RESPIRATORY (INHALATION) at 07:47

## 2021-01-19 RX ADMIN — APIXABAN 5 MG: 5 TABLET, FILM COATED ORAL at 08:15

## 2021-01-19 RX ADMIN — MULTIPLE VITAMINS W/ MINERALS TAB 1 TABLET: TAB at 08:16

## 2021-01-19 RX ADMIN — ATORVASTATIN CALCIUM 10 MG: 10 TABLET, FILM COATED ORAL at 20:02

## 2021-01-19 RX ADMIN — GLYCOPYRROLATE AND FORMOTEROL FUMARATE 2 PUFF: 9; 4.8 AEROSOL, METERED RESPIRATORY (INHALATION) at 20:45

## 2021-01-19 RX ADMIN — APIXABAN 5 MG: 5 TABLET, FILM COATED ORAL at 20:02

## 2021-01-19 RX ADMIN — METOPROLOL TARTRATE 75 MG: 50 TABLET, FILM COATED ORAL at 08:17

## 2021-01-19 RX ADMIN — ASPIRIN 81 MG: 81 TABLET, CHEWABLE ORAL at 08:15

## 2021-01-19 ASSESSMENT — PAIN DESCRIPTION - FREQUENCY: FREQUENCY: CONTINUOUS

## 2021-01-19 ASSESSMENT — PAIN SCALES - GENERAL: PAINLEVEL_OUTOF10: 5

## 2021-01-19 ASSESSMENT — ENCOUNTER SYMPTOMS
CONSTIPATION: 0
APNEA: 0
SHORTNESS OF BREATH: 0
ANAL BLEEDING: 0
ABDOMINAL PAIN: 0
VOMITING: 0
COUGH: 0
NAUSEA: 0
ABDOMINAL DISTENTION: 0
BACK PAIN: 0
DIARRHEA: 0

## 2021-01-19 ASSESSMENT — PAIN DESCRIPTION - DESCRIPTORS: DESCRIPTORS: SHARP

## 2021-01-19 ASSESSMENT — PAIN DESCRIPTION - PAIN TYPE: TYPE: SURGICAL PAIN

## 2021-01-19 NOTE — DISCHARGE SUMMARY
CT/CV Surgery Discharge Summary     Pt Name: Tam Coker  MRN: [id number]  YOB: 1950  Primary Care Physician: Tonia Connors MD    Admit date:  1/6/2021  2:50 AM      Discharge date: 1/19/21    Disposition: Home    Admitting Diagnosis:   Unstable angina due to left main coronary artery stenosis with reduced left ventricular function to 25%, and paroxysmal atrial fibrillation           Discharge Diagnosis:   Unstable angina due to left main coronary artery stenosis with reduced left ventricular function to 25%, and paroxysmal atrial fibrillation         Condition: Stable    Problem List:   Patient Active Problem List   Diagnosis Code    Hyperlipidemia E78.5    Asthma J45.909    Smoker F17.200    Allergic rhinitis due to other allergen J30.89    Collagenous colitis K52.831    Lactose intolerance E73.9    HTN (hypertension) I10    COPD, mild (HCC) J44.9    Atrial fibrillation with rapid ventricular response (HCC) I48.91    Atrial fibrillation ([address] I48.91    Coronary artery disease involving native coronary artery I25.10    S/P CABG x 3 Z95.1    Encounter for cardioversion procedure Z01.89    S/P left atrial appendage ligation Z98.890    Ischemic cardiomyopathy I25.5    Chronic bronchitis ([address] J42         Procedures:     CABG  X 3 WITH DEJAH, Atrial Appendage Clip    a. Left internal mammary artery to left anterior descending artery. b.  Aorto-right coronary artery bypass grafting with a reversed greater saphenous vein graft. c.  Aorto-ramus intermedius branch bypass grafting with a reversed greater saphenous vein graft. d.  Left atrial appendage closure with a AtriCure clip.     Reason for Admission: John Green  is a 79 year old male with a PMH including hypertension, dyslipidemia, and chronic heavy smoking, who was found to have a severe left main and triple-vessel coronary artery disease on cardiac catheterization today. I was asked to evaluate the patient for coronary bypass grafting.     He stated he developed worsening of shortness of breath 3 days before this admission. He saw his primary care physician as a routine follow-up 3 days ago. And he was found to have atrial fibrillations on electrocardiogram at the time of examination. He was sent for a work-up including echocardiogram.  He has been on Xarelto 20 mg daily for atrial fibrillation for 1 week.     He states that he has been having intermittent vague anterior chest pain for 5-months. His chest pain usually associated with shortness of breath. The pain is 3 out of 10 in intensity, lasting less than 1 minute, resolved spontaneously at rest, and occurs 2-3 times per week. And his symptoms got worse over last 3 days. Hospital Course: Following an uncomplicated  CABG, the patient had an unremarkable and progressive convalescence without adverse events. He had a failed DEJAH cardioversion. At time of discharge, Monico Mcghee was alert, tolerating a regular diet, having bowel movements, ambulating on his own accord and had adequate analgesia on oral pain medications, and had no signs of any complications. Discharge Vitals:  height is 5' 9\" (1.753 m) and weight is 163 lb 4.8 oz (74.1 kg). His oral temperature is 98.5 °F (36.9 °C). His blood pressure is 105/74 and his pulse is 100. His respiration is 16 and oxygen saturation is 95%.      DISCHARGE INSTRUCTIONS:  Discharge Medications:         Medication List      START taking these medications    amiodarone 200 MG tablet  Commonly known as: CORDARONE  Take 1 tablet by mouth 2 times daily     apixaban 5 MG Tabs tablet  Commonly known as: ELIQUIS  Take 1 tablet by mouth 2 times daily Replaced by: glycopyrrolate-formoterol 9-4.8 MCG/ACT Aero  You also have another medication with the same name that you need to continue taking as instructed. varenicline 0.5 MG X 11 & 1 MG X 42 tablet  Commonly known as: Chantix Starting Month Pak     varenicline 1 MG tablet  Commonly known as: Chantix Continuing Month Pak           Where to Get Your Medications      These medications were sent to [address] [place] [address] , [address]  [facility] [address]    Phone: [phone]   · amiodarone 200 MG tablet  · apixaban 5 MG Tabs tablet  · aspirin 81 MG chewable tablet  · atorvastatin 10 MG tablet  · digoxin 125 MCG tablet  · furosemide 20 MG tablet  · glycopyrrolate-formoterol 9-4.8 MCG/ACT Aero  · Metoprolol Tartrate 75 MG Tabs  · potassium chloride 20 MEQ extended release tablet     You can get these medications from any pharmacy    Bring a paper prescription for each of these medications  · oxyCODONE 5 MG immediate release tablet       Diet: AHA diet as tolerated  Activity: As instructed on discharge, no lifting more than 10 lbs for one month, no driving while on narcotics. Aerobic activity (walking, climbing stairs, etc.) is encouraged. Wound Care: Clean wounds as instructed on discharge    Discharge Medications for PCI/MI (performed or attempted):    ASA:                            Yes                      Statin:                          Yes  ACE/ARB:                   No hypotension          P2Y12 Inhibitor:           No on asa and eliquis        Beta Blocker:               Yes        Cardiac Rehab:            Yes  Dietary Consult: Yes             PT NEEDS COMMODE DUE TO MEDICAL DECONDITIONING AND S/P CABG. PT NEED NEBULIZER DUE TO HX OF COPD AND HX OF CABG.      Follow-up:    1   Follow up with Jorge Saab MD 2-3 weeks

## 2021-01-19 NOTE — PROGRESS NOTES
PHASE 1 CARDIAC REHABILITATION   CABG, AVR, MVR, TVR, AMI, PCI's  Exercise Physiologists        Vitals Stable?: Yes. If No:     Any ECG-Rhythm Issues?: No.    Transfers (bc: Bed to Chair, cb: Chair to Bed): bc  Strengthening/ROM Treatment Exercises: Step na    FITT:     Frequency: 2 x per day   Intensity: <15 RPE, <120bpm   Time: >30-60 seconds longer or >20% increase in distance each walk   Type: Bed/Chair Exercises/stationary marching or ambulation    Aerobic treatment: Pt ambulated 180 ft        Gait (i: Independent, s: Steady, u: Unsteady): mostly steady  Assists: 1  Patient tolerated treatment (w: well, f: fair, p: poor): w  Goals:    Short term:  Progression on each aerobic treatment. Long term: Independent ambulation and discharge. Sravanthi Gusman is to follow sternal precautions. Will learn techniques for getting in and out of bed/chairs/car without using the arms. Using stairs will be addressed if applicable. Home activity instructions (Frequency, Intensity, Time, Type), and progression will be addressed prior to discharge (unless Sravanthi Gusman goes to TCU or an ECF where they will follow PT/OT protocols). Notes: Call light left within reach.     Education given:   Phase II Information (Given upon Discharge):

## 2021-01-19 NOTE — PROGRESS NOTES
Progress Note  Date:2021       Room:[facility]18/018-A  Patient Yolis Moise     YOB: 1950     Age:70 y.o. Subjective    Subjective:  Symptoms:  Stable. He reports chest pain (midline  incisioin pain). No shortness of breath, cough or diarrhea. Diet:  Adequate intake. No nausea or vomiting. Activity level: Returning to normal.    Pain:  He complains of pain that is mild. (Chest  incisional pain). Review of Systems   Constitutional: Negative for activity change, appetite change and chills. Respiratory: Negative for cough and shortness of breath. Cardiovascular: Positive for chest pain (midline  incisioin pain). Negative for leg swelling. Gastrointestinal: Negative for abdominal distention, constipation, diarrhea, nausea and vomiting. Genitourinary: Negative for difficulty urinating, dysuria and flank pain. Musculoskeletal: Negative for arthralgias, back pain and myalgias. Neurological: Negative for dizziness, light-headedness and headaches. Objective         Vitals Last 24 Hours:  TEMPERATURE:  Temp  Av.3 °F (36.8 °C)  Min: 97.7 °F (36.5 °C)  Max: 99.1 °F (37.3 °C)  RESPIRATIONS RANGE: Resp  Av  Min: 17  Max: 28  PULSE OXIMETRY RANGE: SpO2  Av.7 %  Min: 95 %  Max: 98 %  PULSE RANGE: Pulse  Av.3  Min: 59  Max: 118  BLOOD PRESSURE RANGE: Systolic (49OEZ), PYO:500 , Min:102 , IEL:656   ; Diastolic (51WUU), RES:52, Min:54, Max:77    I/O (24Hr): Intake/Output Summary (Last 24 hours) at 2021 0445  Last data filed at 2021 0424  Gross per 24 hour   Intake 1150 ml   Output 1225 ml   Net -75 ml     Objective:  General Appearance:  Comfortable. Vital signs: (most recent): Blood pressure 110/73, pulse 110, temperature 97.8 °F (36.6 °C), temperature source Oral, resp. rate 17, height 5' 9\" (1.753 m), weight 159 lb 4.8 oz (72.3 kg), SpO2 95 %. Vital signs are normal.    Output: Producing urine and producing stool.     HEENT: Normal HEENT atelectasis or pneumonia. **This report has been created using voice recognition software. It may contain minor errors which are inherent in voice recognition technology. ** Final report electronically signed by Dr. Beba Quintero on 1/18/2021 7:34 AM    Xr Chest (2 Vw)    Result Date: 1/17/2021  PROCEDURE: XR CHEST (2 VW) CLINICAL INFORMATION: Post op open heart surgery. COMPARISON: Chest x-ray dated 16 January 2020 TECHNIQUE: PA and lateral views the chest. FINDINGS: There is mild cardiomegaly in this patient status post previous sternotomy. The mediastinum is not widened. There is evidence for granulomatous disease at the right lung base. There is a small right pleural effusion. There is increased density at both lung bases, improved compared to previous study dated the 16 January 2021. The pulmonary vascularity is normal. No suspicious osseous lesions are present. 1. Interval improvement previous study dated  16 January 2021. **This report has been created using voice recognition software. It may contain minor errors which are inherent in voice recognition technology. ** Final report electronically signed by DR Viji Douglas on 1/17/2021 7:04 AM    Assessment//Plan           Hospital Problems           Last Modified POA    Coronary artery disease involving native coronary artery 1/9/2021 Yes    S/P left atrial appendage ligation 1/18/2021 Yes    Ischemic cardiomyopathy 1/18/2021 Yes    Chronic bronchitis ([address] 1/18/2021 Yes    Hyperlipidemia 1/9/2021 Yes    Smoker 1/9/2021 Yes    HTN (hypertension) 1/9/2021 Yes    COPD, mild ([address] 1/9/2021 Yes    Atrial fibrillation with rapid ventricular response ([address] 1/9/2021 Yes    Atrial fibrillation ([address] 1/9/2021 Yes    S/P CABG x 3 1/12/2021 Yes    Encounter for cardioversion procedure 1/15/2021 Yes        Assessment:    Condition: In stable condition. Improving.    (Copd as noted  And fairly stable  With inhalers      atrial Fib  Noted  And was on eliquis and at times rate  Is fast and as per surgery    Ischemic cardiomyopathy noted  And should in time improve with the bypass     htn stable     ashd post cabg doing well     Some diarrhea  With history of collagenous colitis      lipids  On meds    ). Plan:   Per physical therapy. Consults: physical therapy and occupational therapy. Advance diet as tolerated. Administer medications as ordered. (  Continue inhalers      copd stable      stable post op but heart rate slightly tachycardic).        Electronically signed by Sybil Galicia MD on 1/19/21 at 4:45 AM EST

## 2021-01-19 NOTE — PROGRESS NOTES
CLINICAL PHARMACY: DISCHARGE MED RECONCILIATION/REVIEW    [facility]) Select Patient?: Yes  Total # of Interventions Recommended: 4 - Increased Dose #: 1  - Decreased Dose #: 1  - New Order #: 1  - Updated Order #: 1   -   Total # Interventions Accepted: 4  Intervention Severity:   - Level 1 Intervention Present?: No   - Level 2 #: 0   - Level 3 #: 4   Time Spent (min): 60    Additional Documentation:    Chacha Awad PharmD, BCPS   1/19/2021  2:43 PM

## 2021-01-19 NOTE — PROGRESS NOTES
Spoke with Cardiology about increased HR. I titrated BB this AM but still elevated. Spoke with cardiology and they recommended trying digoxin . Will try digoxin and see how he does today. Dig level in one week.

## 2021-01-19 NOTE — PROGRESS NOTES
[facility] ICU STEPDOWN TELEMETRY 4K  Occupational Therapy  Daily Note  Time:   Time In: 7333  Time Out: 0932  Timed Code Treatment Minutes: 28 Minutes  Minutes: 28          Date: 2021  Patient Name: Amanda Woodson,   Gender: male      Room: [facility]18/018-A  MRN: [id number]  : 1950  (79 y.o.)  Referring Practitioner: Jim Dick PA-C  Diagnosis: Atrial Fibrillation with Rapid Ventricular Response  Additional Pertinent Hx: Pt presents with evaluation of sob that awoke him from sleep. He was supposed to be followed up by Dr. Laisha Desai. He was found to have Afib last week. HR was 140s. First episode. He could not catch his breath. Pt underwent DEJAH and had CABG x 3 on 21. Restrictions/Precautions:  Restrictions/Precautions: Surgical Protocols  Position Activity Restriction  Sternal Precautions: No Pushing, No Pulling, 5# Lifting Restrictions  Other position/activity restrictions: Incision on RLE from the vein harvest     SUBJECTIVE: Pleasant. Pt had just finished breakfast.  He was sitting in the chair. He agreed to do his spongebathing while sitting on the toilet seat. He returned to supine at the end of the session. Pt C/O pain at his incision only. PAIN: 5/10: sternal incision. Sharp ache. COGNITION: Decreased Problem Solving    ADL:   Grooming: Stand By Assistance. washing and drying and combing his hair  Bathing: Stand By Assistance. spongebathing while in sitting- pt did not wash below his knees  Upper Extremity Dressing: [employer].  don/doffing a hospital gown  Lower Extremity Dressing: Stand By Assistance. doffing his underwear  Toileting: Stand By Assistance. no difficulty noted with clothing management   Toilet Transfer: Stand By Assistance. don/doffing a standard toilet seat without using his arms strenuously. BALANCE:  Sitting Balance:  Stand By Assistance. Spongebathing and washing his hair  Standing Balance: Stand By Assistance. preparing to walk to the bathroom and combing his hair    BED MOBILITY:  Sit to Supine: Stand By Assistance  with head of bed elevated  Pt had use of the mechanical draw sheet to get boosted up in the bed  TRANSFERS:  Sit to Stand:  Stand By Assistance. from the recliner chair without using his arms  Stand to Sit: Stand By Assistance. to the edge of bed    FUNCTIONAL MOBILITY:  Assistive Device: None  Assist Level:  Stand By Assistance. Distance: To and from bathroom  Pt has an even gait. Pt had his LifeVest battery pack over his shoulder     ADDITIONAL ACTIVITIES:   Pt asked whether a rolling walker would help him. He stated that he is concerned about his transfers and following sternal precautions. Informed pt that the walker would not make that any easier. ASSESSMENT:     Activity Tolerance:  Patient tolerance of  treatment: fair. Pt sat for the duration of his bath and washing his hair. He stood at the sink for 35 seconds while combing his hair. Discharge Recommendations: Continue to assess pending progress  Home with spouse and Cardiac Rehab program when able. Equipment Recommendations: Equipment Needed: No  Plan: Times per week: 6x- CABG  Specific instructions for Next Treatment: Functional mobility; ADLs and standing tolerance; CABG Step II exercises; transfers and sternal precautions  Current Treatment Recommendations: Functional Mobility Training, Endurance Training, Self-Care / ADL, Safety Education & Training, Strengthening, Balance Training, Patient/Caregiver Education & Training  Plan Comment: Pt would benefit from continued skilled OT services when medically stable and discharged from Acute. He may need a stay on IP Rehab or a SNF prior to returning home.     Patient Education  Patient Education: Precautions and Reviewed Prior Education    Goals  Short term goals  Time Frame for Short term goals: By discharge  Short term goal 1: Pt will demonstrate functional mobility walking to/from

## 2021-01-19 NOTE — PROGRESS NOTES
Progress Note  Date:2021       Room:[facility]18/018-  Patient Marylou López     YOB: 1950     Age:70 y.o. Subjective    Subjective:  Symptoms:  Stable. He reports chest pain. No shortness of breath or cough. Diet:  Adequate intake. Activity level: Normal.    Pain:  He reports no pain. (  Chest  Wall pain). Review of Systems   Constitutional: Negative for activity change, fatigue and fever. Respiratory: Negative for apnea, cough and shortness of breath. Cardiovascular: Positive for chest pain. Chest  Wall pain   Gastrointestinal: Negative for abdominal distention, abdominal pain and anal bleeding. Genitourinary: Negative for difficulty urinating and dysuria. Musculoskeletal: Negative for arthralgias and back pain. Neurological: Negative for dizziness, facial asymmetry and headaches. Hematological: Negative for adenopathy. Objective         Vitals Last 24 Hours:  TEMPERATURE:  Temp  Av.3 °F (36.8 °C)  Min: 97.7 °F (36.5 °C)  Max: 99.1 °F (37.3 °C)  RESPIRATIONS RANGE: Resp  Av.3  Min: 16  Max: 21  PULSE OXIMETRY RANGE: SpO2  Av.8 %  Min: 94 %  Max: 98 %  PULSE RANGE: Pulse  Av.5  Min: 100  Max: 120  BLOOD PRESSURE RANGE: Systolic (90HOY), BZQ:369 , Min:102 , TVE:756   ; Diastolic (89GWW), EIA:16, Min:65, Max:77    I/O (24Hr): Intake/Output Summary (Last 24 hours) at 2021 1215  Last data filed at 2021 0424  Gross per 24 hour   Intake 790 ml   Output 825 ml   Net -35 ml     Objective:  General Appearance:  Comfortable. Vital signs: (most recent): Blood pressure 104/71, pulse 120, temperature 98.7 °F (37.1 °C), temperature source Oral, resp. rate 16, height 5' 9\" (1.753 m), weight 163 lb 4.8 oz (74.1 kg), SpO2 94 %. Vital signs are normal.  No fever. HEENT: Normal HEENT exam.    Lungs:  Normal effort and normal respiratory rate. Breath sounds clear to auscultation. Heart: Normal rate. Irregular rhythm.   S1 normal and S2 normal.  No murmur. Abdomen: Abdomen is soft. Bowel sounds are normal.   There is no abdominal tenderness. Neurological: Patient is alert and oriented to person, place and time. Patient has normal reflexes and normal muscle tone. Skin:  (  Chest incision  Healing  Well )    Labs/Imaging/Diagnostics    Labs:  CBC:  Recent Labs     01/17/21  0857 01/18/21  0356 01/19/21  0418   WBC 12.5* 11.5* 14.6*   RBC 2.83* 2.37* 2.46*   HGB 9.9* 8.3* 8.5*   HCT 29.6* 24.4* 25.5*   .6* 103.0* 103.7*    198 256     CHEMISTRIES:  Recent Labs     01/17/21  1407 01/18/21  0356 01/19/21  0418    134* 129*   K 3.7 3.4* 4.0   CL 97* 99 95*   CO2 25 25 24   BUN 36* 35* 31*   CREATININE 1.5* 1.6* 1.3*   GLUCOSE 105 97 107     PT/INR:No results for input(s): PROTIME, INR in the last 72 hours. APTT:  Recent Labs     01/17/21  0857 01/17/21 2017 01/18/21  0815   APTT 73.8* 66.2* 101.2*     LIVER PROFILE:No results for input(s): AST, ALT, BILIDIR, BILITOT, ALKPHOS in the last 72 hours. Imaging Last 24 Hours:  Xr Chest (2 Vw)    Result Date: 1/18/2021  PROCEDURE: XR CHEST (2 VW) CLINICAL INFORMATION: post op open heart COMPARISON:  1/17/2021 TECHNIQUE:  PA and lateral chest x-ray  FINDINGS: The heart size is normal. Small bilateral right greater than left pleural effusions are seen. No pneumothorax is seen. Mild bibasilar airspace opacities are present. There is mild diffuse osteopenia. There is evidence of prior median sternotomy and CABG. There is a calcified granuloma at the right lung base. 1. Small bilateral right greater than left pleural effusions. 2. Mild bibasilar airspace opacities may represent mild atelectasis or pneumonia. **This report has been created using voice recognition software. It may contain minor errors which are inherent in voice recognition technology. ** Final report electronically signed by Dr. Filomena Anaya on 1/18/2021 7:34 AM    Assessment//Plan Hospital Problems           Last Modified POA    Coronary artery disease involving native coronary artery 1/9/2021 Yes    S/P left atrial appendage ligation 1/18/2021 Yes    Ischemic cardiomyopathy 1/18/2021 Yes    Chronic bronchitis ([address] 1/18/2021 Yes    Hyperlipidemia 1/9/2021 Yes    Smoker 1/9/2021 Yes    HTN (hypertension) 1/9/2021 Yes    COPD, mild ([address] 1/9/2021 Yes    Atrial fibrillation with rapid ventricular response ([address] 1/9/2021 Yes    Atrial fibrillation ([address] 1/9/2021 Yes    S/P CABG x 3 1/12/2021 Yes    Encounter for cardioversion procedure 1/15/2021 Yes        Assessment:    Condition: In stable condition. Improving. (  atrila  Fib and  Still  Tachy      cabg and  Stable  Post op      post  Op  Anemia  Stable       Copd   Stable with  Inhalers       Ischemic  Cardiomyopathy  With life  Vest   For  Now ). Plan:   Discharge home. Start/continue incentive spirometry (  use  inhalers  with  spacers). Advance diet as tolerated. Administer medications as ordered. (  Stable       Home   Today       Stable  And  Use  Of  The  Inhaler).        Electronically signed by Rosario Meléndez MD on 1/19/21 at 12:15 PM EST

## 2021-01-20 ENCOUNTER — TELEPHONE (OUTPATIENT)
Dept: FAMILY MEDICINE CLINIC | Age: 71
End: 2021-01-20

## 2021-01-20 DIAGNOSIS — F41.9 ANXIETY: Primary | ICD-10-CM

## 2021-01-20 LAB
ANION GAP SERPL CALCULATED.3IONS-SCNC: 9 MEQ/L (ref 8–16)
BUN BLDV-MCNC: 35 MG/DL (ref 7–22)
CALCIUM SERPL-MCNC: 9.2 MG/DL (ref 8.5–10.5)
CHLORIDE BLD-SCNC: 95 MEQ/L (ref 98–111)
CO2: 29 MEQ/L (ref 23–33)
CREAT SERPL-MCNC: 1.4 MG/DL (ref 0.4–1.2)
ERYTHROCYTE [DISTWIDTH] IN BLOOD BY AUTOMATED COUNT: 14 % (ref 11.5–14.5)
ERYTHROCYTE [DISTWIDTH] IN BLOOD BY AUTOMATED COUNT: 52.5 FL (ref 35–45)
GFR SERPL CREATININE-BSD FRML MDRD: 50 ML/MIN/1.73M2
GLUCOSE BLD-MCNC: 105 MG/DL (ref 70–108)
GLUCOSE BLD-MCNC: 96 MG/DL (ref 70–108)
HCT VFR BLD CALC: 25.8 % (ref 42–52)
HEMOGLOBIN: 8.6 GM/DL (ref 14–18)
MCH RBC QN AUTO: 35 PG (ref 26–33)
MCHC RBC AUTO-ENTMCNC: 33.3 GM/DL (ref 32.2–35.5)
MCV RBC AUTO: 104.9 FL (ref 80–94)
PLATELET # BLD: 301 THOU/MM3 (ref 130–400)
PMV BLD AUTO: 10.2 FL (ref 9.4–12.4)
POTASSIUM SERPL-SCNC: 4.7 MEQ/L (ref 3.5–5.2)
RBC # BLD: 2.46 MILL/MM3 (ref 4.7–6.1)
SODIUM BLD-SCNC: 133 MEQ/L (ref 135–145)
WBC # BLD: 13.8 THOU/MM3 (ref 4.8–10.8)

## 2021-01-20 PROCEDURE — 85027 COMPLETE CBC AUTOMATED: CPT

## 2021-01-20 PROCEDURE — 97116 GAIT TRAINING THERAPY: CPT

## 2021-01-20 PROCEDURE — 6370000000 HC RX 637 (ALT 250 FOR IP): Performed by: PHYSICIAN ASSISTANT

## 2021-01-20 PROCEDURE — 6370000000 HC RX 637 (ALT 250 FOR IP): Performed by: FAMILY MEDICINE

## 2021-01-20 PROCEDURE — 99232 SBSQ HOSP IP/OBS MODERATE 35: CPT | Performed by: PHYSICIAN ASSISTANT

## 2021-01-20 PROCEDURE — 80048 BASIC METABOLIC PNL TOTAL CA: CPT

## 2021-01-20 PROCEDURE — 97110 THERAPEUTIC EXERCISES: CPT

## 2021-01-20 PROCEDURE — 94760 N-INVAS EAR/PLS OXIMETRY 1: CPT

## 2021-01-20 PROCEDURE — 6370000000 HC RX 637 (ALT 250 FOR IP): Performed by: THORACIC SURGERY (CARDIOTHORACIC VASCULAR SURGERY)

## 2021-01-20 PROCEDURE — 2580000003 HC RX 258: Performed by: PHYSICIAN ASSISTANT

## 2021-01-20 PROCEDURE — 6370000000 HC RX 637 (ALT 250 FOR IP): Performed by: NURSE PRACTITIONER

## 2021-01-20 PROCEDURE — 94640 AIRWAY INHALATION TREATMENT: CPT

## 2021-01-20 PROCEDURE — APPSS60 APP SPLIT SHARED TIME 46-60 MINUTES: Performed by: PHYSICIAN ASSISTANT

## 2021-01-20 PROCEDURE — 82948 REAGENT STRIP/BLOOD GLUCOSE: CPT

## 2021-01-20 PROCEDURE — 97535 SELF CARE MNGMENT TRAINING: CPT

## 2021-01-20 PROCEDURE — 6360000002 HC RX W HCPCS: Performed by: THORACIC SURGERY (CARDIOTHORACIC VASCULAR SURGERY)

## 2021-01-20 PROCEDURE — 6360000002 HC RX W HCPCS: Performed by: PHYSICIAN ASSISTANT

## 2021-01-20 PROCEDURE — 2060000000 HC ICU INTERMEDIATE R&B

## 2021-01-20 PROCEDURE — 97530 THERAPEUTIC ACTIVITIES: CPT

## 2021-01-20 PROCEDURE — 36415 COLL VENOUS BLD VENIPUNCTURE: CPT

## 2021-01-20 RX ORDER — METOPROLOL TARTRATE 100 MG/1
100 TABLET ORAL 2 TIMES DAILY
Status: DISCONTINUED | OUTPATIENT
Start: 2021-01-20 | End: 2021-01-21 | Stop reason: HOSPADM

## 2021-01-20 RX ORDER — MIDODRINE HYDROCHLORIDE 5 MG/1
5 TABLET ORAL
Status: DISCONTINUED | OUTPATIENT
Start: 2021-01-20 | End: 2021-01-21 | Stop reason: HOSPADM

## 2021-01-20 RX ORDER — ALPRAZOLAM 0.5 MG/1
0.5 TABLET ORAL 3 TIMES DAILY PRN
Qty: 30 TABLET | Refills: 0 | Status: SHIPPED | OUTPATIENT
Start: 2021-01-20 | End: 2021-02-19

## 2021-01-20 RX ORDER — METOPROLOL TARTRATE 50 MG/1
50 TABLET, FILM COATED ORAL 2 TIMES DAILY
Qty: 60 TABLET | Refills: 3 | Status: SHIPPED
Start: 2021-01-20 | End: 2021-01-21 | Stop reason: HOSPADM

## 2021-01-20 RX ORDER — METOPROLOL TARTRATE 50 MG/1
50 TABLET, FILM COATED ORAL 2 TIMES DAILY
Qty: 60 TABLET | Refills: 3 | Status: SHIPPED | OUTPATIENT
Start: 2021-01-20 | End: 2021-01-20

## 2021-01-20 RX ORDER — METOPROLOL TARTRATE 50 MG/1
50 TABLET, FILM COATED ORAL ONCE
Status: COMPLETED | OUTPATIENT
Start: 2021-01-20 | End: 2021-01-20

## 2021-01-20 RX ORDER — ALPRAZOLAM 0.5 MG/1
0.5 TABLET ORAL 3 TIMES DAILY PRN
Status: DISCONTINUED | OUTPATIENT
Start: 2021-01-20 | End: 2021-01-21 | Stop reason: HOSPADM

## 2021-01-20 RX ADMIN — BISACODYL 10 MG: 5 TABLET ORAL at 08:45

## 2021-01-20 RX ADMIN — FUROSEMIDE 20 MG: 10 INJECTION, SOLUTION INTRAMUSCULAR; INTRAVENOUS at 16:17

## 2021-01-20 RX ADMIN — APIXABAN 5 MG: 5 TABLET, FILM COATED ORAL at 19:54

## 2021-01-20 RX ADMIN — OXYCODONE HYDROCHLORIDE 5 MG: 5 TABLET ORAL at 09:54

## 2021-01-20 RX ADMIN — PANTOPRAZOLE SODIUM 40 MG: 40 TABLET, DELAYED RELEASE ORAL at 06:33

## 2021-01-20 RX ADMIN — MIDODRINE HYDROCHLORIDE 5 MG: 5 TABLET ORAL at 11:59

## 2021-01-20 RX ADMIN — FUROSEMIDE 20 MG: 10 INJECTION, SOLUTION INTRAMUSCULAR; INTRAVENOUS at 08:46

## 2021-01-20 RX ADMIN — ALBUTEROL SULFATE 2.5 MG: 2.5 SOLUTION RESPIRATORY (INHALATION) at 09:10

## 2021-01-20 RX ADMIN — ATORVASTATIN CALCIUM 10 MG: 10 TABLET, FILM COATED ORAL at 19:54

## 2021-01-20 RX ADMIN — METOPROLOL TARTRATE 50 MG: 50 TABLET, FILM COATED ORAL at 08:45

## 2021-01-20 RX ADMIN — OXYCODONE HYDROCHLORIDE 5 MG: 5 TABLET ORAL at 16:34

## 2021-01-20 RX ADMIN — MULTIPLE VITAMINS W/ MINERALS TAB 1 TABLET: TAB at 08:45

## 2021-01-20 RX ADMIN — APIXABAN 5 MG: 5 TABLET, FILM COATED ORAL at 08:46

## 2021-01-20 RX ADMIN — METOPROLOL TARTRATE 50 MG: 50 TABLET, FILM COATED ORAL at 13:25

## 2021-01-20 RX ADMIN — SODIUM CHLORIDE, PRESERVATIVE FREE 10 ML: 5 INJECTION INTRAVENOUS at 19:54

## 2021-01-20 RX ADMIN — ACETAMINOPHEN 650 MG: 325 TABLET ORAL at 06:34

## 2021-01-20 RX ADMIN — GLYCOPYRROLATE AND FORMOTEROL FUMARATE 2 PUFF: 9; 4.8 AEROSOL, METERED RESPIRATORY (INHALATION) at 09:18

## 2021-01-20 RX ADMIN — ACETAMINOPHEN 650 MG: 325 TABLET ORAL at 19:54

## 2021-01-20 RX ADMIN — ALPRAZOLAM 0.5 MG: 0.5 TABLET ORAL at 11:55

## 2021-01-20 RX ADMIN — SODIUM CHLORIDE, PRESERVATIVE FREE 10 ML: 5 INJECTION INTRAVENOUS at 08:47

## 2021-01-20 RX ADMIN — GLYCOPYRROLATE AND FORMOTEROL FUMARATE 2 PUFF: 9; 4.8 AEROSOL, METERED RESPIRATORY (INHALATION) at 20:22

## 2021-01-20 RX ADMIN — METOPROLOL TARTRATE 100 MG: 100 TABLET, FILM COATED ORAL at 19:55

## 2021-01-20 RX ADMIN — ALBUTEROL SULFATE 2.5 MG: 2.5 SOLUTION RESPIRATORY (INHALATION) at 13:59

## 2021-01-20 RX ADMIN — MIDODRINE HYDROCHLORIDE 5 MG: 5 TABLET ORAL at 16:17

## 2021-01-20 RX ADMIN — ALBUTEROL SULFATE 2.5 MG: 2.5 SOLUTION RESPIRATORY (INHALATION) at 20:22

## 2021-01-20 RX ADMIN — DIGOXIN 125 MCG: 125 TABLET ORAL at 08:45

## 2021-01-20 RX ADMIN — ASPIRIN 81 MG: 81 TABLET, CHEWABLE ORAL at 08:46

## 2021-01-20 ASSESSMENT — PAIN SCALES - GENERAL
PAINLEVEL_OUTOF10: 5
PAINLEVEL_OUTOF10: 5
PAINLEVEL_OUTOF10: 2
PAINLEVEL_OUTOF10: 4

## 2021-01-20 ASSESSMENT — ENCOUNTER SYMPTOMS
DIARRHEA: 0
WHEEZING: 1
ABDOMINAL DISTENTION: 0
BACK PAIN: 0
SHORTNESS OF BREATH: 0
ABDOMINAL PAIN: 0
SINUS PRESSURE: 0
NAUSEA: 0
COUGH: 0

## 2021-01-20 ASSESSMENT — PAIN DESCRIPTION - FREQUENCY
FREQUENCY: CONTINUOUS
FREQUENCY: CONTINUOUS

## 2021-01-20 ASSESSMENT — PAIN DESCRIPTION - PAIN TYPE
TYPE: SURGICAL PAIN

## 2021-01-20 ASSESSMENT — PAIN DESCRIPTION - ONSET: ONSET: ON-GOING

## 2021-01-20 ASSESSMENT — PAIN SCALES - WONG BAKER
WONGBAKER_NUMERICALRESPONSE: 4
WONGBAKER_NUMERICALRESPONSE: 4

## 2021-01-20 ASSESSMENT — PAIN DESCRIPTION - LOCATION
LOCATION: STERNUM
LOCATION: STERNUM

## 2021-01-20 ASSESSMENT — PAIN DESCRIPTION - DESCRIPTORS: DESCRIPTORS: ACHING

## 2021-01-20 ASSESSMENT — PAIN DESCRIPTION - ORIENTATION: ORIENTATION: MID

## 2021-01-20 NOTE — PROGRESS NOTES
[facility] ICU STEPDOWN TELEMETRY 4K  Occupational Therapy  Daily Note  Time:   Time In: 745  Time Out: 6992  Timed Code Treatment Minutes: 36 Minutes  Minutes: 36          Date: 2021  Patient Name: Hood Razo,   Gender: male      Room: [facility]18/018-A  MRN: [id number]  : 1950  (79 y.o.)  Referring Practitioner: Lani Calvin PA-C  Diagnosis: Atrial Fibrillation with Rapid Ventricular Response  Additional Pertinent Hx: Pt presents with evaluation of sob that awoke him from sleep. He was supposed to be followed up by Dr. Jayden Briggs. He was found to have Afib last week. HR was 140s. First episode. He could not catch his breath. Pt underwent DEJAH and had CABG x 3 on 21. Restrictions/Precautions:  Restrictions/Precautions: Surgical Protocols  Position Activity Restriction  Sternal Precautions: No Pushing, No Pulling, 5# Lifting Restrictions  Other position/activity restrictions: Incision on RLE from the vein harvest     SUBJECTIVE: Nursing okayed patient for treatment this AM.  Pt. Sitting up in bed side chair pleasant and agreeable to OT treatment. Pt. Anticipating return home this date. PAIN: 5/10: sternal incision site    COGNITION: WNL    ADL:   Grooming: Stand By Assistance. to stand at sink and complete oral and hair care. Toileting: Contact Guard Assistance. Toilet Transfer: Contact Guard Assistance. Delsa Severance BALANCE:  Standing Balance: Stand By Assistance, [address] to complete grooming task at sink. Pt. Stood for 3-4 mins. Consecutively with no LOB or increased SOB. BED MOBILITY:  Not Tested    TRANSFERS:  Sit to Stand:  Contact Guard Assistance. Stand to Sit: Contact Guard Assistance. FUNCTIONAL MOBILITY:  Assistive Device: None  Assist Level:  Contact Guard Assistance. Distance: To and from bathroom and [facility] distance in room and hallway. Pt. demo increased SOB with functional mobility into the hallway returned to room Pt. required seated rest break and O2 checked and at 94% HR at 133 BPM patient nurse notified and aware. ADDITIONAL ACTIVITIES:  Pt completed CABG Step II exercises x15 reps x1 set this date in order to increase strength and improve activity tolerance for ADLs and homemaking tasks. Pt exhibited minimal fatigue during task, requring brief rest breaks and  VCs for technique. Pt. Instructed on sternal precautions and voices recall. ASSESSMENT:     Activity Tolerance:  Patient tolerance of  treatment: fair. Discharge Recommendations: Pt. Will return home with spouse and [facility]  Equipment Recommendations: Equipment Needed: No  Plan: Times per week: 6x- CABG  Specific instructions for Next Treatment: Functional mobility; ADLs and standing tolerance; CABG Step II exercises; transfers and sternal precautions  Current Treatment Recommendations: Functional Mobility Training, Endurance Training, Self-Care / ADL, Safety Education & Training, Strengthening, Balance Training, Patient/Caregiver Education & Training  Plan Comment: Pt would benefit from continued skilled OT services when medically stable and discharged from Acute. He may need a stay on IP Rehab or a SNF prior to returning home. Patient Education  Patient Education: Plan of Care, ADL's, Energy Conservation, Home Exercise Program, Precautions and Importance of Increasing Activity and safety with functional mobility and transfers. Goals  Short term goals  Time Frame for Short term goals: By discharge  Short term goal 1: Pt will demonstrate functional mobility walking to/from the bathroom with CGA while using any AD needed to prepare for doing self care at the sink. Short term goal 2: Pt will complete transfers to/from various surfaces with SBA while using rocking method and following sternal precautions to increase his independence with toileting routine.   Short term goal 3: Pt will complete CABG Step II exercises x 12 reps each with cues as needed

## 2021-01-20 NOTE — PROGRESS NOTES
Progress Note  Date:2021       Room:[facility]18/018  Patient Veronica Moon     YOB: 1950     Age:70 y.o. Subjective    Subjective:  Symptoms:  Stable. He reports chest pain (chest  wall pain better ). No shortness of breath, cough or diarrhea. (  Still  Tachycardia and  Decrease  bp  With  Higher  Dose  Of  lopressor). Diet:  Adequate intake. No nausea. Activity level: Impaired due to weakness. Pain:  He complains of pain that is mild. Review of Systems   Constitutional: Negative for activity change and appetite change. HENT: Negative for congestion, postnasal drip and sinus pressure. Respiratory: Positive for wheezing (mild). Negative for cough and shortness of breath. Cardiovascular: Positive for chest pain (chest  wall pain better ). Negative for leg swelling. Sense  Some increase  Heart  Rate    Gastrointestinal: Negative for abdominal distention, abdominal pain, diarrhea and nausea. Genitourinary: Negative for difficulty urinating and frequency. Musculoskeletal: Negative for arthralgias and back pain. Skin: Negative for rash. Neurological: Negative for dizziness, facial asymmetry and light-headedness. Objective         Vitals Last 24 Hours:  TEMPERATURE:  Temp  Av.4 °F (36.9 °C)  Min: 98.3 °F (36.8 °C)  Max: 98.5 °F (36.9 °C)  RESPIRATIONS RANGE: Resp  Av.3  Min: 16  Max: 18  PULSE OXIMETRY RANGE: SpO2  Av.1 %  Min: 93 %  Max: 95 %  PULSE RANGE: Pulse  Av.4  Min: 104  Max: 121  BLOOD PRESSURE RANGE: Systolic (51OSG), SJP:228 , Min:91 , AUL:638   ; Diastolic (70SAR), TUL:67, Min:53, Max:84    I/O (24Hr): Intake/Output Summary (Last 24 hours) at 2021 1714  Last data filed at 2021 1557  Gross per 24 hour   Intake 640 ml   Output 1100 ml   Net -460 ml     Objective:  General Appearance:  Comfortable.     Vital signs: (most recent): Blood pressure 112/74, pulse 117, temperature 98.3 °F (36.8 °C), temperature source Oral, resp. rate 18, height 5' 9\" (1.753 m), weight 164 lb 3.2 oz (74.5 kg), SpO2 94 %. Vital signs are normal.    Output: Producing urine and producing stool. HEENT: Normal HEENT exam.    Lungs:  Normal effort and normal respiratory rate. (Slight wheeze)  Heart: Tachycardia. Irregular rhythm. S1 normal and S2 normal.  No murmur. Abdomen: Abdomen is soft. Bowel sounds are normal.   There is no abdominal tenderness. Extremities: Normal range of motion. Neurological: Patient is alert and oriented to person, place and time. Normal strength. Patient has normal reflexes and normal muscle tone. Skin:  Warm. Labs/Imaging/Diagnostics    Labs:  CBC:  Recent Labs     01/18/21 0356 01/19/21 0418 01/20/21 0318   WBC 11.5* 14.6* 13.8*   RBC 2.37* 2.46* 2.46*   HGB 8.3* 8.5* 8.6*   HCT 24.4* 25.5* 25.8*   .0* 103.7* 104.9*    256 301     CHEMISTRIES:  Recent Labs     01/18/21  0356 01/19/21 0418 01/20/21 0318   * 129* 133*   K 3.4* 4.0 4.7   CL 99 95* 95*   CO2 25 24 29   BUN 35* 31* 35*   CREATININE 1.6* 1.3* 1.4*   GLUCOSE 97 107 105     PT/INR:No results for input(s): PROTIME, INR in the last 72 hours. APTT:  Recent Labs     01/17/21 2017 01/18/21  0815   APTT 66.2* 101.2*     LIVER PROFILE:No results for input(s): AST, ALT, BILIDIR, BILITOT, ALKPHOS in the last 72 hours. Imaging Last 24 Hours:  No results found.   Assessment//Plan           Hospital Problems           Last Modified POA    Coronary artery disease involving native coronary artery 1/9/2021 Yes    S/P left atrial appendage ligation 1/18/2021 Yes    Ischemic cardiomyopathy 1/18/2021 Yes    Chronic bronchitis ([address] 1/18/2021 Yes    Hyperlipidemia 1/9/2021 Yes    Smoker 1/9/2021 Yes    HTN (hypertension) 1/9/2021 Yes    COPD, mild ([address] 1/9/2021 Yes    Atrial fibrillation with rapid ventricular response ([address] 1/9/2021 Yes    Atrial fibrillation ([address] 1/9/2021 Yes    S/P CABG x 3 1/12/2021 Yes Encounter for cardioversion procedure 1/15/2021 Yes        Assessment:    Condition: In stable condition. Unchanged. (Atrial  Fib  Still with  Rapid  Rate and meds  To  Be  Adjusted  Per  Cardio      Copd  Noted   Use  Of  Inhalers      post op  anemia  Stable     htn  Stable       gerd  stable      anxiety and use  Of  Low  Dose  xanax    ). Plan:   Per physical therapy. Consults: physical therapy, occupational therapy and cardiology. Advance diet as tolerated. Administer medications as ordered. ( Still  Tachycardic as on  Lanoxin and  the  Beta blocker       bp  Slight low      Anxiety    Noted   Xanax   To  Use as needed            ).        Electronically signed by Nikki Pineda MD on 1/20/21 at 5:14 PM EST

## 2021-01-20 NOTE — PROGRESS NOTES
Cardiology Progress Note      Patient:  Abby Arellano  YOB: 1950  MRN: [id number]   Acct: [de-identified]  Admit Date:  1/6/2021  Primary Cardiologist: none    Note per dr Flaco Ceballos: SOB        HPI: This is a pleasant 79 y.o. male presents with evaluation of sob that awoke him from sleep. He was supposed to be followed up by Dr. Yosvany Londono. He was found to have Afib last week. HR was 140s. First episode. He could not catch his breath. Started on Dilitazem gtt. HR still 110-120s. BNP 4443. Trop 0.183--0.175. Cr 1.3.  TSH 2.0.  Hgb 16.2. Patient is on Xarelto as outpatient. EF is 25%, with severe global hypokinesis. Mild LE edema. No chest pain. No major drug use, or hx of CAD/MI. Denies major family hx. Drinks 6 beers per day x 2 years. No recent sick contacts. Lots of family stress with kids using drugs. He has worsening LE edema. Prior to this, he states no cardiac concerns. \"    Subjective (Events in last 24 hours):   Pt awake and alert. NAD. No cp. +ANDERSON.   On RA  -120 at rest, up to 120-130 with ambulation    Objective:   /84   Pulse 104   Temp 98.4 °F (36.9 °C) (Oral)   Resp 16   Ht 5' 9\" (1.753 m)   Wt 164 lb 3.2 oz (74.5 kg)   SpO2 95%   BMI 24.25 kg/m²        TELEMETRY: afib rvr 112    Physical Exam:  General Appearance: alert and oriented to person, place and time, in no acute distress  Cardiovascular: irregularly irregular  Pulmonary/Chest: clear to auscultation bilaterally- no wheezes, rales or rhonchi, normal air movement, no respiratory distress  Abdomen: soft, non-tender, non-distended, normal bowel sounds, no masses Extremities: no cyanosis, clubbing or edema, pulse   Skin: warm and dry, no rash or erythema  Head: normocephalic and atraumatic  Eyes: pupils equal, round, and reactive to light  Neck: supple and non-tender without mass, no thyromegaly   Musculoskeletal: normal range of motion, no joint swelling, deformity or tenderness  Neurological: alert, oriented, normal speech, no focal findings or movement disorder noted  Right wrist - no hematoma, +2 radial pulse    Medications:    digoxin  125 mcg Oral Daily    metoprolol tartrate  50 mg Oral BID    apixaban  5 mg Oral BID    furosemide  20 mg Intravenous BID    pantoprazole  40 mg Oral QAM AC    albuterol  2.5 mg Nebulization TID    therapeutic multivitamin-minerals  1 tablet Oral Daily with breakfast    bisacodyl  10 mg Oral BID    calcium replacement protocol   Other RX Placeholder    [Held by provider] metoprolol succinate  100 mg Oral Daily    aspirin  81 mg Oral Daily    glycopyrrolate-formoterol  2 puff Inhalation BID    atorvastatin  10 mg Oral Nightly      dextrose           ALPRAZolam, 0.5 mg, TID PRN      albuterol, 2.5 mg, Q4H PRN      sodium chloride flush, 10 mL, PRN      acetaminophen, 650 mg, Q4H PRN      oxyCODONE, 5 mg, Q4H PRN    Or      oxyCODONE, 10 mg, Q4H PRN      senna, 10 mL, Daily PRN      potassium chloride, 20 mEq, PRN      glucose, 15 g, PRN      dextrose, 12.5 g, PRN      glucagon (rDNA), 1 mg, PRN      dextrose, 100 mL/hr, PRN      promethazine, 12.5 mg, Q6H PRN      polyethylene glycol, 17 g, Daily PRN      potassium chloride, 40 mEq, PRN    Or      potassium alternative oral replacement, 40 mEq, PRN    Or      potassium chloride, 10 mEq, PRN        Diagnostics:  TTE 1/5/21  Summary   Ejection fraction is visually estimated at 25%. There was severe global hypokinesis of the left ventricle. Moderately dilated left atrium. The IVC is dilated .       Signature      ----------------------------------------------------------------   Electronically signed by Eduar Hawk MD (Interpreting   physician) on 01/05/2021 at 07:02 PM    Cath 1/8/21  FINDINGS:  LEFT VENTRICULOGRAPHY:  Severe global hypokinesis was noted on the  ventriculography with an ejection fraction estimated at 25%.     LVEDP:  20 mmHg.     CORONARY ANGIOGRAM:  1. Right coronary artery:  Ostial RCA has 30% to 50% stenosis. The  proximal RCA has luminal irregularities. Mid RCA has chronic total  occlusion. 2.  Left main coronary artery:  The left main coronary artery has 70% to  80% bifurcation lesion. 3.  Ramus intermedius:  Ostial ramus intermedius has 20% stenosis  followed by mild luminal irregularities. 4.  Left circumflex artery:  Left circumflex artery has mild luminal  irregularities in the proximal segment. Large, dominant vessel. Distal  LCX into LPL has 60% to 70% stenosis. 5.  Left anterior descending artery:  Ostial LAD is diseased with 50% to  60% stenosis as well as CAD involving proximal segment of LAD, 70% to  80% in severity with mild calcification. Mid LAD has 70% stenosis,  bifurcation lesion. D1 is a relatively large vessel with 50% to 60%  ostial stenosis. Distal LAD with mild diffuse disease.     MEDICATIONS:  See MAR.     COMPLICATIONS:  None.     ESTIMATED BLOOD LOSS:  Less than 50 mL.     ACCESS:  Right radial artery access. Vasc Band was applied. Hemostasis  was achieved.     IMPRESSION:  1. Severe ischemic cardiomyopathy. 2.  Multivessel coronary artery disease as discussed above including  severely stenotic lesion of distal left main coronary artery, 70% to 80%  in severity.     RECOMMENDATIONS:  Continue inpatient care. Continue to monitor the  patient on telemetry. Continue aspirin. Continue on the statin. We  would recommend increasing the dose to 40 mg p.o. daily. Monitor volume  status. Consult Cardiovascular Surgery to assess for possible coronary  artery bypass graft surgery. Heart team discussion regarding  revascularization.     Findings and plan of care was discussed with the patient. Questions  were answered.           Yaneth Contreras MD  Lab Data:    Cardiac Enzymes:  No results for input(s): CKTOTAL, CKMB, CKMBINDEX, TROPONINI in the last 72 hours.     CBC:   Lab Results   Component Value Date WBC 13.8 01/20/2021    RBC 2.46 01/20/2021    RBC 4.84 11/07/2011    HGB 8.6 01/20/2021    HCT 25.8 01/20/2021     01/20/2021       CMP:    Lab Results   Component Value Date     01/20/2021    K 4.7 01/20/2021    K 5.3 01/09/2021    CL 95 01/20/2021    CO2 29 01/20/2021    BUN 35 01/20/2021    CREATININE 1.4 01/20/2021    LABGLOM 50 01/20/2021    GLUCOSE 105 01/20/2021    GLUCOSE 94 11/07/2011    CALCIUM 9.2 01/20/2021       Hepatic Function Panel:    Lab Results   Component Value Date    ALKPHOS 54 01/11/2021    ALT 82 01/11/2021    AST 95 01/11/2021    PROT 7.2 01/11/2021    BILITOT 0.4 01/11/2021    BILIDIR <0.2 01/11/2021    LABALBU 3.9 01/11/2021    LABALBU 4.3 11/07/2011       Magnesium:    Lab Results   Component Value Date    MG 2.2 01/16/2021       PT/INR:    Lab Results   Component Value Date    INR 1.13 01/11/2021       HgBA1c:    Lab Results   Component Value Date    LABA1C 5.6 01/11/2021       FLP:    Lab Results   Component Value Date    TRIG 96 01/07/2021    HDL 36 01/07/2021    LDLCALC 71 01/07/2021    LDLDIRECT 79.82 01/11/2021       TSH:    Lab Results   Component Value Date    TSH 2.050 01/06/2021         Assessment:    Recent new onset afib rvr of unknown duration - now cvr   S/p attempted DEJAH/CVx3 1/8/21 - unsuccessful   S/p Unsuccessful synchronized direct current electrical cardioversion attempted with 200 joules, 300 joules, and 360 joules biphasic. - 1/15/21   chadsvasc = 3   On xarelto prior to admission   Currently on eliquis  S/p cath 1/8/21 - severe MV CAD including distal LM 70-80 - CVS consulted  S/p CABG x3 with DEJAH, atrial appendage clip 1/12/21  New severe ICMP - ef 25 per TTE 1/5/21, ef 35-40 per DEJAH 1/15/21  Elevated troponins  Elevated bnp  HTN  dyslipidemia  JANETH - improved  Tobacco abuse  Hx COPD  ETOH abuse      Plan:    Daily I/o and weights  2 liter fluid restriction and 2gm sodium diet  Keep mag >2 and K >4, replace prn  Normal TSH  Cont asa/statin/BB/dig/lasix  Change lasix to po  Add midodrine 5 tid  Increase lopressor 100 bid  No ace/arb due to low bp and needing to uptitrate BB for tachycardia  EP consulted - dr Lindsay Ortega discussed with deven and recommended rate control  Referral to chf clinic  No discharge today  Monitor hr/bp    Addendum - discussed with dr Deni Winn 35-40 on DEJAH, ok to remove lifevest    Electronically signed by Giorgio Guerra PA-C on 1/20/2021 at 10:48 AM

## 2021-01-20 NOTE — PROGRESS NOTES
PHASE 1 CARDIAC REHABILITATION   CABG, AVR, MVR, TVR, AMI, PCI's  Exercise Physiologists      Treatment Length (l: long, n: normal, s: short): N  Treatment Length Note:   Vitals Stable?: Yes. If No:     Any ECG-Rhythm Issues?: No.  If Yes:   Transfers (bc: Bed to Chair, cb: Chair to Bed): pt just returned to bed. Waiting to be discharged home. Strengthening/ROM Treatment Exercises: FITT:     Frequency: 2 x per day   Intensity: <15 RPE, <120bpm   Time: >30-60 seconds longer or >20% increase in distance each walk   Type: Bed/Chair Exercises/stationary marching or ambulation    Aerobic treatment: Na. Pt just returned to bed. Walked with OT. Discharge education completed         Gait (i: Independent, s: Steady, u: Unsteady):   Assists:   Patient tolerated treatment (w: well, f: fair, p: poor):   Goals:    Short term:  Progression on each aerobic treatment. Long term: Independent ambulation and discharge. Suha Mancini is to follow sternal precautions. Will learn techniques for getting in and out of bed/chairs/car without using the arms. Using stairs will be addressed if applicable. Home activity instructions (Frequency, Intensity, Time, Type), and progression will be addressed prior to discharge (unless Suha Mancini goes to TCU or an ECF where they will follow PT/OT protocols). Notes: Call light left within reach. Education given: Suha Mancini would like to come here to [facility] for cardiac rehab. Went over home exercise, sternal precautions and scheduling outpatient rehab. Will call him in a few days to set up evaluation.    Phase II Information (Given upon Discharge):

## 2021-01-20 NOTE — DISCHARGE SUMMARY
CT/CV Surgery Discharge Summary     Pt Name: Nichol Palomino  MRN: [id number]  YOB: 1950  Primary Care Physician: Demetrio Gale MD    Admit date:  1/6/2021  2:50 AM      Discharge date: 1/20/21    Disposition: Home    Admitting Diagnosis:   Unstable angina due to left main coronary artery stenosis with reduced left ventricular function to 25%, and paroxysmal atrial fibrillation           Discharge Diagnosis:   Unstable angina due to left main coronary artery stenosis with reduced left ventricular function to 25%, and paroxysmal atrial fibrillation         Condition: Stable    Problem List:   Patient Active Problem List   Diagnosis Code    Hyperlipidemia E78.5    Asthma J45.909    Smoker F17.200    Allergic rhinitis due to other allergen J30.89    Collagenous colitis K52.831    Lactose intolerance E73.9    HTN (hypertension) I10    COPD, mild ([address] J44.9    Atrial fibrillation with rapid ventricular response (HCC) I48.91    Atrial fibrillation ([address] I48.91    Coronary artery disease involving native coronary artery I25.10    S/P CABG x 3 Z95.1    Encounter for cardioversion procedure Z01.89    S/P left atrial appendage ligation Z98.890    Ischemic cardiomyopathy I25.5    Chronic bronchitis ([address] J42         Procedures:     CABG  X 3 WITH DEJAH, Atrial Appendage Clip    a. Left internal mammary artery to left anterior descending artery. b.  Aorto-right coronary artery bypass grafting with a reversed greater saphenous vein graft. c.  Aorto-ramus intermedius branch bypass grafting with a reversed greater saphenous vein graft. d.  Left atrial appendage closure with a AtriCure clip.     Reason for Admission: Pérez Bose  is a 79 year old male with a PMH including hypertension, dyslipidemia, and chronic heavy smoking, who was found to have a severe left main and triple-vessel coronary artery disease on cardiac catheterization today. I was asked to evaluate the patient for coronary bypass grafting.     He stated he developed worsening of shortness of breath 3 days before this admission. He saw his primary care physician as a routine follow-up 3 days ago. And he was found to have atrial fibrillations on electrocardiogram at the time of examination. He was sent for a work-up including echocardiogram.  He has been on Xarelto 20 mg daily for atrial fibrillation for 1 week.     He states that he has been having intermittent vague anterior chest pain for 5-months. His chest pain usually associated with shortness of breath. The pain is 3 out of 10 in intensity, lasting less than 1 minute, resolved spontaneously at rest, and occurs 2-3 times per week. And his symptoms got worse over last 3 days. Hospital Course: Following an uncomplicated  CABG, the patient had an unremarkable and progressive convalescence without adverse events. He had a failed DEJAH cardioversion. At time of discharge, Emmett Almanzar was alert, tolerating a regular diet, having bowel movements, ambulating on his own accord and had adequate analgesia on oral pain medications, and had no signs of any complications. Discharge Vitals:  height is 5' 9\" (1.753 m) and weight is 164 lb 3.2 oz (74.5 kg). His oral temperature is 98.4 °F (36.9 °C). His blood pressure is 111/72 and his pulse is 108. His respiration is 16 and oxygen saturation is 93%.      DISCHARGE INSTRUCTIONS:  Discharge Medications:         Medication List      START taking these medications    amiodarone 200 MG tablet  Commonly known as: CORDARONE  Take 1 tablet by mouth 2 times daily     apixaban 5 MG Tabs tablet  Commonly known as: ELIQUIS  Take 1 tablet by mouth 2 times daily aspirin 81 MG chewable tablet  Take 1 tablet by mouth daily  Replaces: aspirin 81 MG tablet     atorvastatin 40 MG tablet  Commonly known as: LIPITOR  Take 1 tablet by mouth nightly     digoxin 125 MCG tablet  Commonly known as: LANOXIN  Take 1 tablet by mouth daily     furosemide 20 MG tablet  Commonly known as: Lasix  Take 1 tablet by mouth daily     oxyCODONE 5 MG immediate release tablet  Commonly known as: ROXICODONE  Take 1 tablet by mouth every 8 hours as needed for Pain for up to 7 days. potassium chloride 10 MEQ extended release tablet  Commonly known as: KLOR-CON M  Take 1 tablet by mouth daily     umeclidinium-vilanterol 62.5-25 MCG/INH Aepb inhaler  Commonly known as: ANORO ELLIPTA  Inhale 1 puff into the lungs daily        CHANGE how you take these medications    albuterol sulfate  (90 Base) MCG/ACT inhaler  Inhale 2 puffs into the lungs every 6 hours as needed for Wheezing  What changed:   · when to take this  · reasons to take this     Metoprolol Tartrate 75 MG Tabs  Take 75 mg by mouth 2 times daily  What changed:   · medication strength  · See the new instructions.         CONTINUE taking these medications    CENTRUM SILVER PO     fluocinonide 0.05 % ointment  Commonly known as: LIDEX     loratadine 10 MG tablet  Commonly known as: CLARITIN        STOP taking these medications    aspirin 81 MG tablet  Replaced by: aspirin 81 MG chewable tablet     losartan 100 MG tablet  Commonly known as: Cozaar     pravastatin 40 MG tablet  Commonly known as: PRAVACHOL     rivaroxaban 20 MG Tabs tablet  Commonly known as: Xarelto     Stiolto Respimat 2.5-2.5 MCG/ACT Aers  Generic drug: Tiotropium Bromide-Olodaterol     Tiotropium Bromide-Olodaterol 2.5-2.5 MCG/ACT Aers  Commonly known as: Stiolto Respimat     varenicline 0.5 MG X 11 & 1 MG X 42 tablet  Commonly known as: Chantix Starting Month Clayton     varenicline 1 MG tablet  Commonly known as: Chantix Continuing [facility] [facility] Where to Get Your Medications      These medications were sent to [address], [address] [facility] [address] [facility]  [address] [place] [address]  1st Floor, [address] [facility] [place] [address]    Phone: [phone]   · amiodarone 200 MG tablet  · apixaban 5 MG Tabs tablet  · aspirin 81 MG chewable tablet  · atorvastatin 40 MG tablet  · digoxin 125 MCG tablet  · furosemide 20 MG tablet  · Metoprolol Tartrate 75 MG Tabs  · potassium chloride 10 MEQ extended release tablet  · umeclidinium-vilanterol 62.5-25 MCG/INH Aepb inhaler     You can get these medications from any pharmacy    Bring a paper prescription for each of these medications  · oxyCODONE 5 MG immediate release tablet     Information about where to get these medications is not yet available    Ask your nurse or doctor about these medications  · albuterol sulfate  (90 Base) MCG/ACT inhaler       Diet: AHA diet as tolerated  Activity: As instructed on discharge, no lifting more than 10 lbs for one month, no driving while on narcotics. Aerobic activity (walking, climbing stairs, etc.) is encouraged. Wound Care: Clean wounds as instructed on discharge    Discharge Medications for PCI/MI (performed or attempted):    ASA:                            Yes                      Statin:                          Yes  ACE/ARB:                   No hypotension          P2Y12 Inhibitor:           No on asa and eliquis        Beta Blocker:               Yes        Cardiac Rehab:            Yes  Dietary Consult: Yes             PT NEEDS COMMODE DUE TO MEDICAL DECONDITIONING AND S/P CABG. PT NEED NEBULIZER DUE TO HX OF COPD AND HX OF CABG. You must complete the order parameters below and add the medical necessity documentation for this DME in a separate note.     Nebulizer with compressor  Disposable Med Nebs 2 per month  Reusable Med Nebs 1 per 6 months  Aerosol Mask 1 per month  Replacement Filters 2 per month All other related supplies as needed per month    Medications being used: Albuterol . 083 unit dose vial    Frequency:  Twice daily    Diagnosis: COPD/ s/p cabg medical conditioning  Length of Need: 12 months        You must complete the order parameters below and add the medical necessity documentation for this DME in a separate note. Commode Chair with Fixed Arms    Current patient weight: Weight: 164 lb 3.2 oz (74.5 kg)  Current patient height: Height: 5' 9\" (175.3 cm)  Diagnosis: s/p cabg and medical deconditioning  Duration: Purchase      Follow-up:    1   Follow up with Aleja Atkins MD 2-3 weeks  2. Follow up with Mony Pollard PA-C  in 3-4 weeks, with PA and Lateral CXR, EKG, CBC, and BMP. 3. The pt was agreeable to discharge and future plan of care. All questions were thoroughly answered.        Mony Pollard   Electronincally signed 1/20/2021 at 7:57 AM    CC: Aleja Atkins MD

## 2021-01-20 NOTE — PROGRESS NOTES
[facility]  INPATIENT PHYSICAL THERAPY  DAILY NOTE  STRZ ICU STEPDOWN TELEMETRY 4K - 4K-18/018-A      Time In: 1226  Time Out: 1252  Timed Code Treatment Minutes: 26 Minutes  Minutes: 26          Date: 2021  Patient Name: Ann Rogers,  Gender:  male        MRN: [id number]  : 1950  (79 y.o.)     Referring Practitioner: Le Fontaine PA-C  Diagnosis: Atrial fibrillation with rapid ventricular response  Additional Pertinent Hx: Pt presents with evaluation of sob that awoke him from sleep. He was supposed to be followed up by Dr. Mervin Ren. He was found to have Afib last week. HR was 140s. First episode. He could not catch his breath. Pt underwent DEJAH and had CABG x 3 on 21. Prior Level of Function:  Lives With: Spouse  Type of Home: Apartment  Home Layout: One level  Home Access: Stairs to enter without rails  Entrance Stairs - Number of Steps: 1 step  Home Equipment: Cane   Bathroom Shower/Tub: Tub/Shower unit, Shower chair with back  [employer]: Standard  Bathroom Equipment: Grab bars in shower  Bathroom Accessibility: Accessible    Receives Help From: Family  ADL Assistance: [address]: Independent  Homemaking Responsibilities: Yes  Ambulation Assistance: Independent  Transfer Assistance: Independent  Active : Yes  Additional Comments: Pt was independent prior to admission. He did not use any AD for ambulating. Pt spends alot of time of his feet while working. Pt sits if needed while taking a shower. Pt smokes . 5 ppd.     Restrictions/Precautions:  Restrictions/Precautions: Surgical Protocols  Position Activity Restriction  Sternal Precautions: No Pushing, No Pulling, 5# Lifting Restrictions  Other position/activity restrictions: Incision on RLE from the vein harvest--- life vest       SUBJECTIVE:  Nursing ok'd therapy reported that he is still in afib his resting HR was in the 100's he was SOB during session and required rest breaks to get in/out of bed. Short term goal 2: Pt to transfer sit <--> stand SBA for increased functional mobility. Short term goal 3: Pt to ambulate >250 feet with LRAD or without AD SBA for household mobility. Short term goal 4: Pt to ascend/descend 1 step without HR SBA for home entry. Long term goals  Time Frame for Long term goals : NA due to short length of stay. Following session, patient left in safe position with all fall risk precautions in place.

## 2021-01-21 ENCOUNTER — APPOINTMENT (OUTPATIENT)
Dept: GENERAL RADIOLOGY | Age: 71
DRG: 233 | End: 2021-01-21
Payer: MEDICARE

## 2021-01-21 VITALS
HEART RATE: 87 BPM | DIASTOLIC BLOOD PRESSURE: 60 MMHG | BODY MASS INDEX: 23.86 KG/M2 | OXYGEN SATURATION: 95 % | HEIGHT: 69 IN | SYSTOLIC BLOOD PRESSURE: 99 MMHG | RESPIRATION RATE: 18 BRPM | WEIGHT: 161.1 LBS | TEMPERATURE: 97.4 F

## 2021-01-21 LAB
ANION GAP SERPL CALCULATED.3IONS-SCNC: 8 MEQ/L (ref 8–16)
BUN BLDV-MCNC: 36 MG/DL (ref 7–22)
CALCIUM SERPL-MCNC: 8.7 MG/DL (ref 8.5–10.5)
CHLORIDE BLD-SCNC: 92 MEQ/L (ref 98–111)
CO2: 29 MEQ/L (ref 23–33)
CREAT SERPL-MCNC: 1.5 MG/DL (ref 0.4–1.2)
ERYTHROCYTE [DISTWIDTH] IN BLOOD BY AUTOMATED COUNT: 14.4 % (ref 11.5–14.5)
ERYTHROCYTE [DISTWIDTH] IN BLOOD BY AUTOMATED COUNT: 53.3 FL (ref 35–45)
GFR SERPL CREATININE-BSD FRML MDRD: 46 ML/MIN/1.73M2
GLUCOSE BLD-MCNC: 103 MG/DL (ref 70–108)
HCT VFR BLD CALC: 25.6 % (ref 42–52)
HEMOGLOBIN: 8.4 GM/DL (ref 14–18)
MCH RBC QN AUTO: 34.7 PG (ref 26–33)
MCHC RBC AUTO-ENTMCNC: 32.8 GM/DL (ref 32.2–35.5)
MCV RBC AUTO: 105.8 FL (ref 80–94)
PLATELET # BLD: 352 THOU/MM3 (ref 130–400)
PMV BLD AUTO: 10 FL (ref 9.4–12.4)
POTASSIUM SERPL-SCNC: 4.9 MEQ/L (ref 3.5–5.2)
RBC # BLD: 2.42 MILL/MM3 (ref 4.7–6.1)
SODIUM BLD-SCNC: 129 MEQ/L (ref 135–145)
WBC # BLD: 15 THOU/MM3 (ref 4.8–10.8)

## 2021-01-21 PROCEDURE — 6370000000 HC RX 637 (ALT 250 FOR IP): Performed by: PHYSICIAN ASSISTANT

## 2021-01-21 PROCEDURE — 99232 SBSQ HOSP IP/OBS MODERATE 35: CPT | Performed by: PHYSICIAN ASSISTANT

## 2021-01-21 PROCEDURE — 36415 COLL VENOUS BLD VENIPUNCTURE: CPT

## 2021-01-21 PROCEDURE — 6360000002 HC RX W HCPCS: Performed by: THORACIC SURGERY (CARDIOTHORACIC VASCULAR SURGERY)

## 2021-01-21 PROCEDURE — 71046 X-RAY EXAM CHEST 2 VIEWS: CPT

## 2021-01-21 PROCEDURE — 97110 THERAPEUTIC EXERCISES: CPT

## 2021-01-21 PROCEDURE — APPSS60 APP SPLIT SHARED TIME 46-60 MINUTES: Performed by: PHYSICIAN ASSISTANT

## 2021-01-21 PROCEDURE — 2580000003 HC RX 258: Performed by: PHYSICIAN ASSISTANT

## 2021-01-21 PROCEDURE — 6360000002 HC RX W HCPCS: Performed by: PHYSICIAN ASSISTANT

## 2021-01-21 PROCEDURE — 94640 AIRWAY INHALATION TREATMENT: CPT

## 2021-01-21 PROCEDURE — 6370000000 HC RX 637 (ALT 250 FOR IP): Performed by: THORACIC SURGERY (CARDIOTHORACIC VASCULAR SURGERY)

## 2021-01-21 PROCEDURE — 6370000000 HC RX 637 (ALT 250 FOR IP): Performed by: NURSE PRACTITIONER

## 2021-01-21 PROCEDURE — 85027 COMPLETE CBC AUTOMATED: CPT

## 2021-01-21 PROCEDURE — 97116 GAIT TRAINING THERAPY: CPT

## 2021-01-21 PROCEDURE — 80048 BASIC METABOLIC PNL TOTAL CA: CPT

## 2021-01-21 RX ORDER — METOPROLOL TARTRATE 100 MG/1
100 TABLET ORAL 2 TIMES DAILY
Qty: 60 TABLET | Refills: 3 | Status: SHIPPED | OUTPATIENT
Start: 2021-01-21 | End: 2021-01-27

## 2021-01-21 RX ORDER — MIDODRINE HYDROCHLORIDE 5 MG/1
5 TABLET ORAL
Qty: 90 TABLET | Refills: 3 | Status: SHIPPED | OUTPATIENT
Start: 2021-01-21 | End: 2021-01-27

## 2021-01-21 RX ADMIN — PANTOPRAZOLE SODIUM 40 MG: 40 TABLET, DELAYED RELEASE ORAL at 06:07

## 2021-01-21 RX ADMIN — SODIUM CHLORIDE, PRESERVATIVE FREE 10 ML: 5 INJECTION INTRAVENOUS at 08:05

## 2021-01-21 RX ADMIN — APIXABAN 5 MG: 5 TABLET, FILM COATED ORAL at 08:03

## 2021-01-21 RX ADMIN — DIGOXIN 125 MCG: 125 TABLET ORAL at 08:03

## 2021-01-21 RX ADMIN — FUROSEMIDE 20 MG: 10 INJECTION, SOLUTION INTRAMUSCULAR; INTRAVENOUS at 08:04

## 2021-01-21 RX ADMIN — METOPROLOL TARTRATE 100 MG: 100 TABLET, FILM COATED ORAL at 08:03

## 2021-01-21 RX ADMIN — MIDODRINE HYDROCHLORIDE 5 MG: 5 TABLET ORAL at 12:14

## 2021-01-21 RX ADMIN — MIDODRINE HYDROCHLORIDE 5 MG: 5 TABLET ORAL at 08:04

## 2021-01-21 RX ADMIN — ASPIRIN 81 MG: 81 TABLET, CHEWABLE ORAL at 08:04

## 2021-01-21 RX ADMIN — GLYCOPYRROLATE AND FORMOTEROL FUMARATE 2 PUFF: 9; 4.8 AEROSOL, METERED RESPIRATORY (INHALATION) at 09:34

## 2021-01-21 RX ADMIN — ACETAMINOPHEN 650 MG: 325 TABLET ORAL at 06:07

## 2021-01-21 RX ADMIN — ALBUTEROL SULFATE 2.5 MG: 2.5 SOLUTION RESPIRATORY (INHALATION) at 09:34

## 2021-01-21 RX ADMIN — ALBUTEROL SULFATE 2.5 MG: 2.5 SOLUTION RESPIRATORY (INHALATION) at 13:14

## 2021-01-21 RX ADMIN — MULTIPLE VITAMINS W/ MINERALS TAB 1 TABLET: TAB at 08:04

## 2021-01-21 ASSESSMENT — PAIN DESCRIPTION - FREQUENCY: FREQUENCY: CONTINUOUS

## 2021-01-21 ASSESSMENT — PAIN DESCRIPTION - PROGRESSION: CLINICAL_PROGRESSION: NOT CHANGED

## 2021-01-21 ASSESSMENT — PAIN SCALES - GENERAL: PAINLEVEL_OUTOF10: 4

## 2021-01-21 ASSESSMENT — PAIN DESCRIPTION - LOCATION: LOCATION: STERNUM

## 2021-01-21 ASSESSMENT — PAIN DESCRIPTION - ORIENTATION: ORIENTATION: MID

## 2021-01-21 NOTE — PROGRESS NOTES
Cardiology Progress Note      Patient:  Abby Arellano  YOB: 1950  MRN: [id number]   Acct: [de-identified]  Admit Date:  1/6/2021  Primary Cardiologist: none    Note per dr Flaco Ceballos: SOB        HPI: This is a pleasant 79 y.o. male presents with evaluation of sob that awoke him from sleep. He was supposed to be followed up by Dr. Yosvany Londono. He was found to have Afib last week. HR was 140s. First episode. He could not catch his breath. Started on Dilitazem gtt. HR still 110-120s. BNP 4443. Trop 0.183--0.175. Cr 1.3.  TSH 2.0.  Hgb 16.2. Patient is on Xarelto as outpatient. EF is 25%, with severe global hypokinesis. Mild LE edema. No chest pain. No major drug use, or hx of CAD/MI. Denies major family hx. Drinks 6 beers per day x 2 years. No recent sick contacts. Lots of family stress with kids using drugs. He has worsening LE edema. Prior to this, he states no cardiac concerns. \"    Subjective (Events in last 24 hours):   Pt awake and alert. NAD. No cp. +ANDERSON. No edema or orthopnea. No fever or chills.  On RA  Pt hoping to go home  HR improved    Objective:   BP 99/60   Pulse 87   Temp 97.4 °F (36.3 °C) (Oral)   Resp 18   Ht 5' 9\" (1.753 m)   Wt 161 lb 1.6 oz (73.1 kg)   SpO2 95%   BMI 23.79 kg/m²        TELEMETRY: afib HR improved , 117 with ambulation    Physical Exam:  General Appearance: alert and oriented to person, place and time, in no acute distress  Cardiovascular: irregularly irregular  Pulmonary/Chest: clear to auscultation bilaterally- no wheezes, rales or rhonchi, normal air movement, no respiratory distress  Abdomen: soft, non-tender, non-distended, normal bowel sounds, no masses Extremities: no cyanosis, clubbing or edema, pulse   Skin: warm and dry, no rash or erythema  Head: normocephalic and atraumatic  Eyes: pupils equal, round, and reactive to light  Neck: supple and non-tender without mass, no thyromegaly   Musculoskeletal: normal range of motion, no joint swelling, deformity or tenderness  Neurological: alert, oriented, normal speech, no focal findings or movement disorder noted  Right wrist - no hematoma, +2 radial pulse    Medications:    midodrine  5 mg Oral TID WC    metoprolol tartrate  100 mg Oral BID    digoxin  125 mcg Oral Daily    apixaban  5 mg Oral BID    furosemide  20 mg Intravenous BID    pantoprazole  40 mg Oral QAM AC    albuterol  2.5 mg Nebulization TID    therapeutic multivitamin-minerals  1 tablet Oral Daily with breakfast    bisacodyl  10 mg Oral BID    calcium replacement protocol   Other RX Placeholder    aspirin  81 mg Oral Daily    glycopyrrolate-formoterol  2 puff Inhalation BID    atorvastatin  10 mg Oral Nightly      dextrose           ALPRAZolam, 0.5 mg, TID PRN      albuterol, 2.5 mg, Q4H PRN      sodium chloride flush, 10 mL, PRN      acetaminophen, 650 mg, Q4H PRN      oxyCODONE, 5 mg, Q4H PRN    Or      oxyCODONE, 10 mg, Q4H PRN      senna, 10 mL, Daily PRN      potassium chloride, 20 mEq, PRN      glucose, 15 g, PRN      dextrose, 12.5 g, PRN      glucagon (rDNA), 1 mg, PRN      dextrose, 100 mL/hr, PRN      promethazine, 12.5 mg, Q6H PRN      polyethylene glycol, 17 g, Daily PRN      potassium chloride, 40 mEq, PRN    Or      potassium alternative oral replacement, 40 mEq, PRN    Or      potassium chloride, 10 mEq, PRN        Diagnostics:  TTE 1/5/21  Summary   Ejection fraction is visually estimated at 25%. There was severe global hypokinesis of the left ventricle. Moderately dilated left atrium. The IVC is dilated .       Signature      ----------------------------------------------------------------   Electronically signed by Jamaal Garcia MD (Interpreting   physician) on 01/05/2021 at 07:02 PM    Cath 1/8/21  FINDINGS:  LEFT VENTRICULOGRAPHY:  Severe global hypokinesis was noted on the  ventriculography with an ejection fraction estimated at 25%.     LVEDP:  20 mmHg.     CORONARY ANGIOGRAM:  1. Right coronary artery:  Ostial RCA has 30% to 50% stenosis. The  proximal RCA has luminal irregularities. Mid RCA has chronic total  occlusion. 2.  Left main coronary artery:  The left main coronary artery has 70% to  80% bifurcation lesion. 3.  Ramus intermedius:  Ostial ramus intermedius has 20% stenosis  followed by mild luminal irregularities. 4.  Left circumflex artery:  Left circumflex artery has mild luminal  irregularities in the proximal segment. Large, dominant vessel. Distal  LCX into LPL has 60% to 70% stenosis. 5.  Left anterior descending artery:  Ostial LAD is diseased with 50% to  60% stenosis as well as CAD involving proximal segment of LAD, 70% to  80% in severity with mild calcification. Mid LAD has 70% stenosis,  bifurcation lesion. D1 is a relatively large vessel with 50% to 60%  ostial stenosis. Distal LAD with mild diffuse disease.     MEDICATIONS:  See MAR.     COMPLICATIONS:  None.     ESTIMATED BLOOD LOSS:  Less than 50 mL.     ACCESS:  Right radial artery access. Vasc Band was applied. Hemostasis  was achieved.     IMPRESSION:  1. Severe ischemic cardiomyopathy. 2.  Multivessel coronary artery disease as discussed above including  severely stenotic lesion of distal left main coronary artery, 70% to 80%  in severity.     RECOMMENDATIONS:  Continue inpatient care. Continue to monitor the  patient on telemetry. Continue aspirin. Continue on the statin. We  would recommend increasing the dose to 40 mg p.o. daily. Monitor volume  status. Consult Cardiovascular Surgery to assess for possible coronary  artery bypass graft surgery. Heart team discussion regarding  revascularization.     Findings and plan of care was discussed with the patient. Questions  were answered.           Lolita England MD  Lab Data:    Cardiac Enzymes:  No results for input(s): CKTOTAL, CKMB, CKMBINDEX, TROPONINI in the last 72 hours.     CBC:   Lab Results Component Value Date    WBC 15.0 01/21/2021    RBC 2.42 01/21/2021    RBC 4.84 11/07/2011    HGB 8.4 01/21/2021    HCT 25.6 01/21/2021     01/21/2021       CMP:    Lab Results   Component Value Date     01/21/2021    K 4.9 01/21/2021    K 5.3 01/09/2021    CL 92 01/21/2021    CO2 29 01/21/2021    BUN 36 01/21/2021    CREATININE 1.5 01/21/2021    LABGLOM 46 01/21/2021    GLUCOSE 103 01/21/2021    GLUCOSE 94 11/07/2011    CALCIUM 8.7 01/21/2021       Hepatic Function Panel:    Lab Results   Component Value Date    ALKPHOS 54 01/11/2021    ALT 82 01/11/2021    AST 95 01/11/2021    PROT 7.2 01/11/2021    BILITOT 0.4 01/11/2021    BILIDIR <0.2 01/11/2021    LABALBU 3.9 01/11/2021    LABALBU 4.3 11/07/2011       Magnesium:    Lab Results   Component Value Date    MG 2.2 01/16/2021       PT/INR:    Lab Results   Component Value Date    INR 1.13 01/11/2021       HgBA1c:    Lab Results   Component Value Date    LABA1C 5.6 01/11/2021       FLP:    Lab Results   Component Value Date    TRIG 96 01/07/2021    HDL 36 01/07/2021    LDLCALC 71 01/07/2021    LDLDIRECT 79.82 01/11/2021       TSH:    Lab Results   Component Value Date    TSH 2.050 01/06/2021         Assessment:    Recent new onset afib rvr of unknown duration - now cvr   S/p attempted DEJAH/CVx3 1/8/21 - unsuccessful   S/p Unsuccessful synchronized direct current electrical cardioversion attempted with 200 joules, 300 joules, and 360 joules biphasic. - 1/15/21   chadsvasc = 3   On xarelto prior to admission   Currently on eliquis  S/p cath 1/8/21 - severe MV CAD including distal LM 70-80 - CVS consulted  S/p CABG x3 with DEJAH, atrial appendage clip 1/12/21  New severe ICMP - ef 25 per TTE 1/5/21, ef 35-40 per DEJAH 1/15/21  Elevated troponins  Elevated bnp  HTN  dyslipidemia  JANETH - improved  Tobacco abuse  Hx COPD  ETOH abuse      Plan:    Daily I/o and weights  2 liter fluid restriction and 2gm sodium diet  Keep mag >2 and K >4, replace prn  Normal TSH  Cont asa/statin/BB/dig/midodrine  Change lasix to po  No ace/arb due to low bp and needing to uptitrate BB for tachycardia  EP consulted - dr Beatrice Interiano discussed with deven and recommended rate control and f/up outpt  Referral to chf clinic  Will see prn  F/up dr Melida Manzo 4 weeks  F/up dr Beatrice Interiano 2 weeks  Return to ER for chest pain, shortness of breath, syncope, or any change in medical status or emergent health concerns  Pt and wife agree with above plan    Electronically signed by Prakash Roberts PA-C on 1/21/2021 at 2:11 PM

## 2021-01-21 NOTE — DISCHARGE SUMMARY
CT/CV Surgery Discharge Summary     Pt Name: Ibeth Gardner  MRN: [id number]  YOB: 1950  Primary Care Physician: Jerry Pastor MD    Admit date:  1/6/2021  2:50 AM      Discharge date: 1/20/21    Disposition: Home    Admitting Diagnosis:   Unstable angina due to left main coronary artery stenosis with reduced left ventricular function to 25%, and paroxysmal atrial fibrillation           Discharge Diagnosis:   Unstable angina due to left main coronary artery stenosis with reduced left ventricular function to 25%, and paroxysmal atrial fibrillation         Condition: Stable    Problem List:   Patient Active Problem List   Diagnosis Code    Hyperlipidemia E78.5    Asthma J45.909    Smoker F17.200    Allergic rhinitis due to other allergen J30.89    Collagenous colitis K52.831    Lactose intolerance E73.9    HTN (hypertension) I10    COPD, mild ([address] J44.9    Atrial fibrillation with rapid ventricular response (HCC) I48.91    Atrial fibrillation ([address] I48.91    Coronary artery disease involving native coronary artery I25.10    S/P CABG x 3 Z95.1    Encounter for cardioversion procedure Z01.89    S/P left atrial appendage ligation Z98.890    Ischemic cardiomyopathy I25.5    Chronic bronchitis ([address] J42         Procedures:     CABG  X 3 WITH DEJAH, Atrial Appendage Clip    a. Left internal mammary artery to left anterior descending artery. b.  Aorto-right coronary artery bypass grafting with a reversed greater saphenous vein graft. c.  Aorto-ramus intermedius branch bypass grafting with a reversed greater saphenous vein graft. d.  Left atrial appendage closure with a AtriCure clip. Reason for Admission:    Izzy Cote. Claudette Bottom  is a 79 year old male with a PMH including hypertension, dyslipidemia, and chronic heavy smoking, who was found to have a severe left main and triple-vessel coronary artery disease on cardiac catheterization today.   I was asked to evaluate the patient for coronary bypass grafting.     He stated he developed worsening of shortness of breath 3 days before this admission. He saw his primary care physician as a routine follow-up 3 days ago. And he was found to have atrial fibrillations on electrocardiogram at the time of examination. He was sent for a work-up including echocardiogram.  He has been on Xarelto 20 mg daily for atrial fibrillation for 1 week.     He states that he has been having intermittent vague anterior chest pain for 5-months. His chest pain usually associated with shortness of breath. The pain is 3 out of 10 in intensity, lasting less than 1 minute, resolved spontaneously at rest, and occurs 2-3 times per week. And his symptoms got worse over last 3 days. Hospital Course: Following an uncomplicated  CABG, the patient had an unremarkable and progressive convalescence without adverse events. He had a failed DEJAH cardioversion. Hospital stay was extended due to irregular HR. At time of discharge, Camilla Prince was alert, tolerating a regular diet, having bowel movements, ambulating on his own accord and had adequate analgesia on oral pain medications, and had no signs of any complications. Discharge Vitals:  height is 5' 9\" (1.753 m) and weight is 161 lb 1.6 oz (73.1 kg). His oral temperature is 97.6 °F (36.4 °C). His blood pressure is 103/56 (abnormal) and his pulse is 115. His respiration is 18 and oxygen saturation is 92%.      DISCHARGE INSTRUCTIONS:  Discharge Medications:         Medication List      START taking these medications    apixaban 5 MG Tabs tablet  Commonly known as: ELIQUIS  Take 1 tablet by mouth 2 times daily     aspirin 81 MG chewable tablet  Take 1 tablet by mouth daily  Replaces: aspirin 81 MG tablet     atorvastatin 40 MG tablet  Commonly known as: LIPITOR  Take 1 tablet by mouth nightly     digoxin 125 MCG tablet  Commonly known as: LANOXIN  Take 1 tablet by mouth daily     furosemide 20 MG tablet  Commonly known as: [address], [facility] [place] [address]    Phone: [phone]   · ALPRAZolam 0.5 MG tablet  · apixaban 5 MG Tabs tablet  · aspirin 81 MG chewable tablet  · atorvastatin 40 MG tablet  · digoxin 125 MCG tablet  · furosemide 20 MG tablet  · metoprolol tartrate 50 MG tablet  · potassium chloride 10 MEQ extended release tablet  · umeclidinium-vilanterol 62.5-25 MCG/INH Aepb inhaler     You can get these medications from any pharmacy    Bring a paper prescription for each of these medications  · oxyCODONE 5 MG immediate release tablet     Information about where to get these medications is not yet available    Ask your nurse or doctor about these medications  · albuterol sulfate  (90 Base) MCG/ACT inhaler       Diet: AHA diet as tolerated  Activity: As instructed on discharge, no lifting more than 10 lbs for one month, no driving while on narcotics. Aerobic activity (walking, climbing stairs, etc.) is encouraged. Wound Care: Clean wounds as instructed on discharge    Discharge Medications for PCI/MI (performed or attempted):    ASA:                            Yes                      Statin:                          Yes  ACE/ARB:                   No hypotension          P2Y12 Inhibitor:           No on asa and eliquis        Beta Blocker:               Yes        Cardiac Rehab:            Yes  Dietary Consult: Yes             PT NEEDS COMMODE DUE TO MEDICAL DECONDITIONING AND S/P CABG. PT NEED NEBULIZER DUE TO HX OF COPD AND HX OF CABG. You must complete the order parameters below and add the medical necessity documentation for this DME in a separate note. Nebulizer with compressor  Disposable Med Nebs 2 per month  Reusable Med Nebs 1 per 6 months  Aerosol Mask 1 per month  Replacement Filters 2 per month  All other related supplies as needed per month    Medications being used: Albuterol . 083 unit dose vial    Frequency:  Twice daily    Diagnosis: COPD/ s/p cabg medical conditioning  Length of Need: 12 months        You must complete the order parameters below and add the medical necessity documentation for this DME in a separate note. Commode Chair with Fixed Arms    Current patient weight: Weight: 164 lb 3.2 oz (74.5 kg)  Current patient height: Height: 5' 9\" (175.3 cm)  Diagnosis: s/p cabg and medical deconditioning  Duration: Purchase      Follow-up:    1   Follow up with Donato Becker MD 2-3 weeks  2. Follow up with Aren Mckeon PA-C  in 3-4 weeks, with PA and Lateral CXR, EKG, CBC, and BMP. 3. Close follow up with cardiology due to tachycardia  4. The pt was agreeable to discharge and future plan of care. All questions were thoroughly answered.        Aren Mckeon   Electronincally signed 1/21/2021 at 9:10 AM    CC: Donato Becker MD

## 2021-01-21 NOTE — FLOWSHEET NOTE
Pt was with his wife at the time of the visit. He head open heart surgery (3 bypasses) He said that he was doing well. Prayer was appreciated     01/21/21 9819   Encounter Summary   Services provided to: Patient and family together   Referral/Consult From: Francine Hairston Visiting Yes  (1/21 )   Complexity of Encounter Low   Length of Encounter 15 minutes   Routine   Type Follow up   Assessment Approachable;Calm   Intervention Prayer;[facility];Nurtured hope   Outcome Acceptance;Expressed gratitude;Encouraged; Hopeful;Receptive   .

## 2021-01-21 NOTE — PROGRESS NOTES
CLINICAL PHARMACY: DISCHARGE MED RECONCILIATION/REVIEW    [facility] ([facility]) Select Patient?: Yes  Total # of Interventions Recommended: 1 -   - Discontinued Medication #: 1   -   Total # Interventions Accepted: 1  Intervention Severity:   - Level 1 Intervention Present?: No   - Level 2 #: 1   - Level 3 #: 0   Time Spent (min): 60    Additional Documentation:    Rosio BrownD, BCPS   1/21/2021  2:51 PM

## 2021-01-21 NOTE — PROGRESS NOTES
RN spoke with patient's wife, Mark Hidalgo, at this time. Updated on patient's status and plan of care. All questions answered and no further concerns at this time.

## 2021-01-21 NOTE — PROGRESS NOTES
[address] [address]  INPATIENT PHYSICAL THERAPY  DAILY NOTE  [facility] ICU STEPDOWN TELEMETRY 4K - 4K-18/018-A    Time In: 1330  Time Out: 1403  Timed Code Treatment Minutes: 33 Minutes  Minutes: 33          Date: 2021  Patient Name: Chinedu Up,  Gender:  male        MRN: [id number]  : 1950  (79 y.o.)     Referring Practitioner: Johnnie Ramirez PA-C  Diagnosis: Atrial fibrillation with rapid ventricular response  Additional Pertinent Hx: Pt presents with evaluation of sob that awoke him from sleep. He was supposed to be followed up by Dr. Antonio Krueger. He was found to have Afib last week. HR was 140s. First episode. He could not catch his breath. Pt underwent DEJAH and had CABG x 3 on 21. Prior Level of Function:  Lives With: Spouse  Type of Home: Apartment  Home Layout: One level  Home Access: Stairs to enter without rails  Entrance Stairs - Number of Steps: 1 step  Home Equipment: Cane   Bathroom Shower/Tub: Tub/Shower unit, Shower chair with back  [employer]: Standard  Bathroom Equipment: Grab bars in shower  Bathroom Accessibility: Accessible    Receives Help From: Family  ADL Assistance: [address]: Independent  Homemaking Responsibilities: Yes  Ambulation Assistance: Independent  Transfer Assistance: Independent  Active : Yes  Additional Comments: Pt was independent prior to admission. He did not use any AD for ambulating. Pt spends alot of time of his feet while working. Pt sits if needed while taking a shower. Pt smokes . 5 ppd. Restrictions/Precautions:  Restrictions/Precautions: Surgical Protocols  Position Activity Restriction  Sternal Precautions: No Pushing, No Pulling, 5# Lifting Restrictions  Other position/activity restrictions: Incision on RLE from the vein harvest--- life vest     SUBJECTIVE: RN approved session. Pt in bed upon arrival and agrees to therapy. Pt pleasant and cooperative. pts wife present for session.     PAIN: 6/10: Strengthening, Balance Training, Transfer Training, Functional Mobility Training, Neuromuscular Re-education, Home Exercise Program, Gait Training, Stair training, Endurance Training, Patient/Caregiver Education & Training, Safety Education & Training, Equipment Evaluation, Education, & procurement    Patient Education  Patient Education: Plan of Care, Home Exercise Program, [employer] Mobility, Transfers, Gait, RPE Scale, Verbal Exercise Instruction, Home Safety Education, Heart Booklet, precautions/restrictions    Goals:  Patient goals : to feel better  Short term goals  Time Frame for Short term goals: by discharge  Short term goal 1: Pt to transfer supine <--> sit SBA to enable pt to get in/out of bed. Short term goal 2: Pt to transfer sit <--> stand SBA for increased functional mobility. Short term goal 3: Pt to ambulate >250 feet with LRAD or without AD SBA for household mobility. Short term goal 4: Pt to ascend/descend 1 step without HR SBA for home entry. Long term goals  Time Frame for Long term goals : NA due to short length of stay. Following session, patient left in safe position with all fall risk precautions in place.

## 2021-01-21 NOTE — PROGRESS NOTES
[facility] ICU STEPDOWN TELEMETRY 4K  Occupational Therapy  Daily Note  Time:   Time In: 730  Time Out: 3183  Timed Code Treatment Minutes: 17 Minutes  Minutes: 17          Date: 2021  Patient Name: Hood Razo,   Gender: male      Room: [facility]18/018-A  MRN: [id number]  : 1950  (79 y.o.)  Referring Practitioner: Lani Calvin PA-C  Diagnosis: Atrial Fibrillation with Rapid Ventricular Response  Additional Pertinent Hx: Pt presents with evaluation of sob that awoke him from sleep. He was supposed to be followed up by Dr. Jayden Briggs. He was found to have Afib last week. HR was 140s. First episode. He could not catch his breath. Pt underwent DEJAH and had CABG x 3 on 21. Restrictions/Precautions:  Restrictions/Precautions: Surgical Protocols  Position Activity Restriction  Sternal Precautions: No Pushing, No Pulling, 5# Lifting Restrictions  Other position/activity restrictions: Incision on RLE from the vein harvest--- life vest     SUBJECTIVE: Pt seated in bedside chair upon arrival; agreeable to therapy this date. Nursing ok'd session. Pt states he may be going home today and is waiting for doctor to come in this am.  Pt states he ordered raised commode however does not think he needs it now. Therapist recommended to patient to take home as his toilet seat is lower and may need it. PAIN: 5/10:     COGNITION: WFL    ADL:   No ADL's completed this session. Delsa Severance BALANCE:  Sitting Balance:  Modified Independent. Standing Balance: Supervision. BED MOBILITY:  Not Tested    TRANSFERS:  Sit to Stand:  Supervision. with verbal cues for technique with CABG precautions  Stand to Sit: Supervision. demonstrates independent carry over with CABG precautions    ADDITIONAL ACTIVITIES:  Pt completed CABG Step II exercises x 15reps x 1 set to increase strength and activity tolerance for ADLs/IADLs.   Pt completed all exercises independently with min rest breaks between each set.    ASSESSMENT:     Activity Tolerance:  Patient tolerance of  treatment: good. Discharge Recommendations: Continue to assess pending progress   Equipment Recommendations: Equipment Needed: No  Plan: Times per week: 6x- CABG  Specific instructions for Next Treatment: Functional mobility; ADLs and standing tolerance; CABG Step II exercises; transfers and sternal precautions  Current Treatment Recommendations: Functional Mobility Training, Endurance Training, Self-Care / ADL, Safety Education & Training, Strengthening, Balance Training, Patient/Caregiver Education & Training  Plan Comment: Pt would benefit from continued skilled OT services when medically stable and discharged from Acute. He may need a stay on IP Rehab or a SNF prior to returning home. Patient Education  Patient Education: ADL's, Energy Conservation, Home Exercise Program, Precautions, Equipment Education, Home Safety and Importance of Increasing Activity    Goals  Short term goals  Time Frame for Short term goals: By discharge  Short term goal 1: Pt will demonstrate functional mobility walking to/from the bathroom with CGA while using any AD needed to prepare for doing self care at the sink. Short term goal 2: Pt will complete transfers to/from various surfaces with SBA while using rocking method and following sternal precautions to increase his independence with toileting routine. Short term goal 3: Pt will complete CABG Step II exercises x 12 reps each with cues as needed to increase his endurance and strength for ease of doing self care and functional mobility. Short term goal 4: Pt will tolerate standing ADLs for over 3 minutes while using 1-2 hand release with cues for pursed lip breathing to increase his endurance for ease of dressing or spongebathing. Following session, patient left in safe position with all fall risk precautions in place.

## 2021-01-22 ENCOUNTER — TELEPHONE (OUTPATIENT)
Dept: CARDIOTHORACIC SURGERY | Age: 71
End: 2021-01-22

## 2021-01-22 ENCOUNTER — HOSPITAL ENCOUNTER (EMERGENCY)
Age: 71
Discharge: HOME | End: 2021-01-22
Attending: EMERGENCY MEDICINE
Payer: MEDICARE

## 2021-01-22 ENCOUNTER — CARE COORDINATION (OUTPATIENT)
Dept: CASE MANAGEMENT | Age: 71
End: 2021-01-22

## 2021-01-22 ENCOUNTER — APPOINTMENT (OUTPATIENT)
Dept: GENERAL RADIOLOGY | Age: 71
End: 2021-01-22
Payer: MEDICARE

## 2021-01-22 VITALS
TEMPERATURE: 97.7 F | WEIGHT: 159 LBS | RESPIRATION RATE: 13 BRPM | HEIGHT: 69 IN | HEART RATE: 53 BPM | SYSTOLIC BLOOD PRESSURE: 137 MMHG | BODY MASS INDEX: 23.55 KG/M2 | DIASTOLIC BLOOD PRESSURE: 61 MMHG | OXYGEN SATURATION: 100 %

## 2021-01-22 DIAGNOSIS — Z48.89 ENCOUNTER FOR POSTOPERATIVE WOUND CHECK: Primary | ICD-10-CM

## 2021-01-22 DIAGNOSIS — Z95.1 S/P CABG X 3: Primary | ICD-10-CM

## 2021-01-22 LAB
ANISOCYTOSIS: PRESENT
BASOPHILIA: ABNORMAL
BASOPHILS # BLD: 0.7 %
BASOPHILS ABSOLUTE: 0.1 THOU/MM3 (ref 0–0.1)
EOSINOPHIL # BLD: 2 %
EOSINOPHILS ABSOLUTE: 0.3 THOU/MM3 (ref 0–0.4)
ERYTHROCYTE [DISTWIDTH] IN BLOOD BY AUTOMATED COUNT: 14.6 % (ref 11.5–14.5)
ERYTHROCYTE [DISTWIDTH] IN BLOOD BY AUTOMATED COUNT: 60.5 FL (ref 35–45)
HCT VFR BLD CALC: 31.2 % (ref 42–52)
HEMOGLOBIN: 9.3 GM/DL (ref 14–18)
IMMATURE GRANS (ABS): 0.27 THOU/MM3 (ref 0–0.07)
IMMATURE GRANULOCYTES: 1.7 %
LYMPHOCYTES # BLD: 10.5 %
LYMPHOCYTES ABSOLUTE: 1.7 THOU/MM3 (ref 1–4.8)
MACROCYTES: PRESENT
MCH RBC QN AUTO: 34.4 PG (ref 26–33)
MCHC RBC AUTO-ENTMCNC: 29.8 GM/DL (ref 32.2–35.5)
MCV RBC AUTO: 115.6 FL (ref 80–94)
MONOCYTES # BLD: 10.2 %
MONOCYTES ABSOLUTE: 1.7 THOU/MM3 (ref 0.4–1.3)
NUCLEATED RED BLOOD CELLS: 0 /100 WBC
PLATELET # BLD: 456 THOU/MM3 (ref 130–400)
PMV BLD AUTO: 9.8 FL (ref 9.4–12.4)
RBC # BLD: 2.7 MILL/MM3 (ref 4.7–6.1)
SCAN OF BLOOD SMEAR: NORMAL
SEG NEUTROPHILS: 74.9 %
SEGMENTED NEUTROPHILS ABSOLUTE COUNT: 12.1 THOU/MM3 (ref 1.8–7.7)
TOXIC GRANULATION: PRESENT
WBC # BLD: 16.2 THOU/MM3 (ref 4.8–10.8)

## 2021-01-22 PROCEDURE — 85025 COMPLETE CBC W/AUTO DIFF WBC: CPT

## 2021-01-22 PROCEDURE — 71045 X-RAY EXAM CHEST 1 VIEW: CPT

## 2021-01-22 PROCEDURE — 1111F DSCHRG MED/CURRENT MED MERGE: CPT | Performed by: FAMILY MEDICINE

## 2021-01-22 PROCEDURE — 36415 COLL VENOUS BLD VENIPUNCTURE: CPT

## 2021-01-22 PROCEDURE — 99283 EMERGENCY DEPT VISIT LOW MDM: CPT

## 2021-01-22 ASSESSMENT — ENCOUNTER SYMPTOMS
RHINORRHEA: 0
CHEST TIGHTNESS: 0
SHORTNESS OF BREATH: 0
EYE PAIN: 0
SHORTNESS OF BREATH: 0
SINUS PRESSURE: 0
WHEEZING: 0
ABDOMINAL DISTENTION: 0
ABDOMINAL DISTENTION: 0
DIARRHEA: 0
BACK PAIN: 0
EYE DISCHARGE: 0
COUGH: 0
RHINORRHEA: 0
NAUSEA: 0
VOMITING: 0
COUGH: 0
ABDOMINAL PAIN: 0
SORE THROAT: 0

## 2021-01-22 ASSESSMENT — PAIN SCALES - GENERAL: PAINLEVEL_OUTOF10: 5

## 2021-01-22 ASSESSMENT — PAIN DESCRIPTION - PAIN TYPE: TYPE: ACUTE PAIN;SURGICAL PAIN

## 2021-01-22 NOTE — PROGRESS NOTES
Progress Note  Date:2021       Room:[facility]18/018-A  Mike Talley     YOB: 1950     Age:70 y.o. Subjective    Subjective:  Symptoms:  Stable. No shortness of breath, cough or chest pain. Diet:  Adequate intake. Activity level: Returning to normal.    Pain:  He complains of pain that is mild. Review of Systems   Constitutional: Negative for activity change, fatigue and fever. HENT: Negative for congestion, postnasal drip, rhinorrhea and sinus pressure. Respiratory: Negative for cough, chest tightness and shortness of breath. Cardiovascular: Negative for chest pain, palpitations and leg swelling. Gastrointestinal: Negative for abdominal distention. Genitourinary: Negative for difficulty urinating, dysuria, frequency and hematuria. Musculoskeletal: Negative for arthralgias, back pain and gait problem. Neurological: Negative for dizziness, facial asymmetry, light-headedness and numbness. Psychiatric/Behavioral: Negative for agitation. Objective         Vitals Last 24 Hours:  TEMPERATURE:  Temp  Av.8 °F (36.6 °C)  Min: 97.4 °F (36.3 °C)  Max: 98.3 °F (36.8 °C)  RESPIRATIONS RANGE: Resp  Av.3  Min: 16  Max: 18  PULSE OXIMETRY RANGE: SpO2  Av %  Min: 92 %  Max: 95 %  PULSE RANGE: Pulse  Av.7  Min: 87  Max: 115  BLOOD PRESSURE RANGE: Systolic (41FQA), GXE:639 , Min:99 , YGV:420   ; Diastolic (80JTW), MNW:18, Min:56, Max:71    I/O (24Hr): Intake/Output Summary (Last 24 hours) at 2021 0048  Last data filed at 2021 1315  Gross per 24 hour   Intake 1220 ml   Output 1475 ml   Net -255 ml     Objective:  General Appearance:  Comfortable. Vital signs: (most recent): Blood pressure 99/60, pulse 87, temperature 97.4 °F (36.3 °C), temperature source Oral, resp. rate 18, height 5' 9\" (1.753 m), weight 161 lb 1.6 oz (73.1 kg), SpO2 95 %. Vital signs are normal.  No fever. Output: Producing urine and producing stool.     HEENT: Normal HEENT exam.    Lungs:  Normal effort and normal respiratory rate. Breath sounds clear to auscultation. No decreased breath sounds or wheezes. Heart: Normal rate. Irregular rhythm. S1 normal and S2 normal.  Positive for murmur (1/6 kaiser). Abdomen: Abdomen is soft. Bowel sounds are normal.   There is no abdominal tenderness. Extremities: Normal range of motion. Neurological: Patient is alert and oriented to person, place and time. Patient has normal reflexes and normal muscle tone. Skin:  Warm and dry. Labs/Imaging/Diagnostics    Labs:  CBC:  Recent Labs     01/19/21 0418 01/20/21 0318 01/21/21  0308   WBC 14.6* 13.8* 15.0*   RBC 2.46* 2.46* 2.42*   HGB 8.5* 8.6* 8.4*   HCT 25.5* 25.8* 25.6*   .7* 104.9* 105.8*    301 352     CHEMISTRIES:  Recent Labs     01/19/21 0418 01/20/21 0318 01/21/21  0309   * 133* 129*   K 4.0 4.7 4.9   CL 95* 95* 92*   CO2 24 29 29   BUN 31* 35* 36*   CREATININE 1.3* 1.4* 1.5*   GLUCOSE 107 105 103     PT/INR:No results for input(s): PROTIME, INR in the last 72 hours. APTT:No results for input(s): APTT in the last 72 hours. LIVER PROFILE:No results for input(s): AST, ALT, BILIDIR, BILITOT, ALKPHOS in the last 72 hours. Imaging Last 24 Hours:  Xr Chest (2 Vw)    Result Date: 1/21/2021  1 view chest x-ray. Comparison:  CR,SR  - XR CHEST STANDARD (2 VW)  - 01/18/2021 07:10 AM EST  CR,SR  - XR CHEST STANDARD (2 VW)  - 01/17/2021 06:56 AM EST Findings: Portable AP view. New left lower lobe infiltrate and/or atelectasis and small left pleural effusion. Stable blunting of the right costophrenic angle probable pleural thickening or small right pleural effusion. Calcified granuloma right lower lobe. No cardiomegaly or passive venous congestion. Post CABG hardware and atrial appendage exclusion device in situ. No mediastinal shift or tracheal deviation. Osseous structures intact. Impression: 1.   New pleural-parenchymal disease left lower lobe probably pneumonic or atelectatic. Stable pleural thickening or small right pleural effusion. 2.  No cardiomegaly or passive venous congestion. 3.  Supportive devices in situ. This document has been electronically signed by: Kristin Olmos MD on 01/21/2021 08:10 AM     Assessment//Plan           Hospital Problems           Last Modified POA    Coronary artery disease involving native coronary artery 1/9/2021 Yes    S/P left atrial appendage ligation 1/18/2021 Yes    Ischemic cardiomyopathy 1/18/2021 Yes    Chronic bronchitis ([address] 1/18/2021 Yes    Hyperlipidemia 1/9/2021 Yes    Smoker 1/9/2021 Yes    HTN (hypertension) 1/9/2021 Yes    COPD, mild ([address] 1/9/2021 Yes    Atrial fibrillation with rapid ventricular response ([address] 1/9/2021 Yes    Atrial fibrillation ([address] 1/9/2021 Yes    S/P CABG x 3 1/12/2021 Yes    Encounter for cardioversion procedure 1/15/2021 Yes        Assessment:    Condition: In stable condition. Improving.   ( Atrial  Fib with more  Controlled rate      copd  Overall stable      post  cabg and stable      htn stable      gerd stable      anxiety  Noted and  Low  Dose xanax     lipids sable      chronic diarrhea as collagenous colitis ). Plan:   Discharge home. Encourage ambulation. Consults: cardiology, physical therapy and occupational therapy. Advance diet as tolerated. Administer medications as ordered. (Overall stable     home today     use  bevespi and  Prn aerosols      bp  meds adjusted  And  Lanoxin increased as well as lopressor increased b     home  today).        Electronically signed by Ish Cochran MD on 1/22/21 at 12:48 AM EST

## 2021-01-22 NOTE — CARE COORDINATION
introduction to self, and explanation of the CTN role. CTN reviewed discharge instructions, medical action plan and red flags with family who verbalized understanding. Family given an opportunity to ask questions and does not have any further questions or concerns at this time. Were discharge instructions available to patient? Yes. Reviewed appropriate site of care based on symptoms and resources available to patient including: PCP and Specialist.     The family agrees to contact the PCP office for questions related to their healthcare. Non-face-to-face services provided:  Obtained and reviewed discharge summary and/or continuity of care documents     Medication reconciliation was performed with family, who verbalizes understanding of administration of home medications. Advised obtaining a 90-day supply of all daily and as-needed medications. Discussed follow-up appointments. If no appointment was previously scheduled, appointment scheduling offered: Yes. Is follow up appointment scheduled within 7 days of discharge? Yes  Non-[facility] follow up appointment(s): 1/27/2021    Plan for follow-up call in 7-10 days based on severity of symptoms and risk factors. Plan for next call: symptom management-Chest Pain     CTN provided contact information for future needs.     Yan Waldron LPN    [phone]  [facility] / [facility] Transitions 24 Hour Call    Schedule Follow Up Appointment with PCP: Completed  Do you have any ongoing symptoms?: No  Do you have a copy of your discharge instructions?: Yes  Do you have all of your prescriptions and are they filled?: Yes  Have you been contacted by a [facility] Pharmacist?: No  Have you scheduled your follow up appointment?: Yes  How are you going to get to your appointment?: Car - family or friend to transport (Comment: 1/27/2021)  Were you discharged with any Home Care or Post Acute Services: Yes  Post Acute Services: (Comment: [id number] [facility])  Do you feel like you have everything you need to keep you well at home?: Yes  Care Transitions Interventions  No Identified Needs         Follow Up  Future Appointments   Date Time Provider Ramo Gladys   1/27/2021  9:45 AM ARCADIO Medina - CNP N SRPX CHF P - [place]   1/28/2021 12:00 PM Ish Cochran MD [facility]   2/2/2021 12:20 PM Ish Cochrna MD [facility]   2/3/2021 10:45 AM MD VALDEZ Aleman SRPX Heart [facility] - DARINEL DOBBS AM [place] [address]   2/8/2021  9:30 AM SARA Dominguez SRPX CT/CV [facility] - [place]   2/25/2021 10:00 AM MD VALDEZ Lazo SRPX Heart [facility] - Girish Odonnell LPN

## 2021-01-22 NOTE — TELEPHONE ENCOUNTER
Returned phone call to patients wife. She said chest tube drainage is slowing down and has a pinkish tinge. I advised taping 4x4's over chest tube sites & replace as needed to absorb the drainage. She said Diony's current B/P is 146/47 & pulse 45. He is not symptomatic so I advised no change in medication.

## 2021-01-22 NOTE — TELEPHONE ENCOUNTER
Patient's wife called c/o red blood seeping from chest tube sites, HR of 46, and BP of 96/63. He is post-op CABGx3 from 1/12/21. Wife has changed his dressing 2x in one hour. Please advise. Thank you.   Sindy Morrow phone # [phone]

## 2021-01-22 NOTE — ED NOTES
Bed: 029A  Expected date:   Expected time:   [facility]  Comments:     Rufina Runner, RN  01/22/21 2526

## 2021-01-22 NOTE — ED NOTES
Old dressing removed at this time. No new bleeding currently noted at site. New dressing placed and secured by tegaderm and tape.       Lincoln Bañuelos RN  01/22/21 8651

## 2021-01-23 NOTE — ED PROVIDER NOTES
[facility] ENCOUN[facility]          Pt Name: Nichol Palomino  MRN: [id number]  [place] 1950  Date of evaluation: 1/22/2021  Treating Resident Physician: Alice Main MD  Supervising Physician: Karina Segura MD    [address] [place] [address]       Chief Complaint   Patient presents with    Post-op Problem    Other     bleeding from incision site      History obtained from the patient and his spouse      HISTORY OF PRESENT ILLNESS    HPI  Nichol Palomino is a 79 y.o. male who presents to the emergency department for evaluation of drainage from where chest tube was removed. Per patient he had CABG on 1/15/2021 and had chest tube removed on 1/20/2021. She states that he was active as normal today and noticed that approximately had 2 hours prior to arrival he had some drainage from left chest tube area. Patient denies any pain, shortness of breath, dizziness or weakness at this time. The patient has no other acute complaints at this time. REVIEW OF SYSTEMS   Review of Systems   Constitutional: Negative for fatigue and fever. HENT: Negative for congestion, ear pain, rhinorrhea and sore throat. Eyes: Negative for pain and discharge. Respiratory: Negative for cough, shortness of breath and wheezing. Cardiovascular: Negative for chest pain, palpitations and leg swelling. Bloody drainage from left side where chest tube was removed. Gastrointestinal: Negative for abdominal distention, abdominal pain, diarrhea, nausea and vomiting. Genitourinary: Negative for difficulty urinating, flank pain and frequency. Musculoskeletal: Negative for arthralgias. Neurological: Negative for dizziness, tremors, syncope, weakness and numbness.          PAST MEDICAL AND SURGICAL HISTORY     Past Medical History:   Diagnosis Date    Asthma     Collagenous colitis 2014       repeat  in  2024    Colon polyps 2000    COPD, mild (HCC)     Eczema of hand     HTN (hypertension)     Hyperlipidemia     Smoker      Past Surgical History:   Procedure Laterality Date    COLONOSCOPY  2011,6/30/14    nba cardenas-no polyps repeat 10 yr    CORONARY ARTERY BYPASS GRAFT  01/12/2021    cabg x 3 with lima and atrial appendage clip  dr Todd Cones GRAFT N/A 1/12/2021    CABG  X 3 WITH DEJAH, Atrial Appendage Clip performed by Vanessa Godinez MD at [address] [place] [address] Road  06/2008    polyps    OTHER SURGICAL HISTORY  DEC 7TH 2012 DR GET [place]     IGS ENDOSCOPIC BI LAT MAXILLARY, ETHMOID, SPHENOID, FRONTAL SINUSOTOMY WITH SEPTOPLASTY AND TURBINOPLASTIES    SKIN CANCER EXCISION  09/2014    Left side of Forehead     TOOTH EXTRACTION  02/2017         MEDICATIONS   No current facility-administered medications for this encounter. Current Outpatient Medications:     midodrine (PROAMATINE) 5 MG tablet, Take 1 tablet by mouth 3 times daily (with meals), Disp: 90 tablet, Rfl: 3    metoprolol (LOPRESSOR) 100 MG tablet, Take 1 tablet by mouth 2 times daily, Disp: 60 tablet, Rfl: 3    ALPRAZolam (XANAX) 0.5 MG tablet, Take 1 tablet by mouth 3 times daily as needed for Sleep or Anxiety for up to 30 days. , Disp: 30 tablet, Rfl: 0    oxyCODONE (ROXICODONE) 5 MG immediate release tablet, Take 1 tablet by mouth every 8 hours as needed for Pain for up to 7 days. , Disp: 21 tablet, Rfl: 0    aspirin 81 MG chewable tablet, Take 1 tablet by mouth daily, Disp: 30 tablet, Rfl: 3    apixaban (ELIQUIS) 5 MG TABS tablet, Take 1 tablet by mouth 2 times daily, Disp: 60 tablet, Rfl: 1    furosemide (LASIX) 20 MG tablet, Take 1 tablet by mouth daily, Disp: 30 tablet, Rfl: 0    digoxin (LANOXIN) 125 MCG tablet, Take 1 tablet by mouth daily, Disp: 30 tablet, Rfl: 3    albuterol sulfate  (90 Base) MCG/ACT inhaler, Inhale 2 puffs into the lungs every 6 hours as needed for Wheezing, Disp: 1 Inhaler, Rfl: 4    atorvastatin (LIPITOR) 40 MG tablet, Take 1 tablet by mouth nightly, Disp: 30 tablet, Rfl: 3    umeclidinium-vilanterol (ANORO ELLIPTA) 62.5-25 MCG/INH AEPB inhaler, Inhale 1 puff into the lungs daily, Disp: 1 each, Rfl: 3    potassium chloride (KLOR-CON M) 10 MEQ extended release tablet, Take 1 tablet by mouth daily, Disp: 30 tablet, Rfl: 0    fluocinonide (LIDEX) 0.05 % ointment, Apply topically as needed, Disp: , Rfl:     loratadine (CLARITIN) 10 MG tablet, Take 10 mg by mouth daily , Disp: , Rfl:     Multiple Vitamins-Minerals (CENTRUM SILVER PO), Take  by mouth daily. , Disp: , Rfl:       SOCIAL HISTORY     Social History     Social History Narrative    Not on file     Social History     Tobacco Use    Smoking status: Current Every Day Smoker     Packs/day: 1.00     Years: 40.00     Pack years: 40.00     Types: Cigarettes    Smokeless tobacco: Never Used   Substance Use Topics    Alcohol use: Yes     Alcohol/week: 0.0 standard drinks     Comment: very little    Drug use: No         ALLERGIES     Allergies   Allergen Reactions    Penicillins Rash    Sulfa Antibiotics Rash         FAMILY HISTORY     Family History   Problem Relation Age of Onset    Heart Disease Mother     Heart Disease Father         cabg    Diabetes Brother     Heart Disease Brother          PREVIOUS RECORDS   Previous records reviewed today    PHYSICAL EXAM     ED Triage Vitals   BP Temp Temp Source Pulse Resp SpO2 Height Weight   01/22/21 1546 01/22/21 1546 01/22/21 1546 01/22/21 1546 01/22/21 1546 01/22/21 1634 01/22/21 1546 01/22/21 1546   (!) 130/55 97.7 °F (36.5 °C) Oral 50 17 100 % 5' 9\" (1.753 m) 159 lb (72.1 kg)     Initial vital signs and nursing assessment reviewed and normal. Body mass index is 23.48 kg/m². Pulsoximetry is normal per my interpretation. Additional Vital Signs:  Vitals:    01/22/21 1634   BP: 137/61   Pulse: 53   Resp: 13   Temp:    SpO2: 100%       Physical Exam  Constitutional:       Appearance: Normal appearance.    HENT: Head: Normocephalic. Right Ear: External ear normal.      Left Ear: External ear normal.      Nose: Nose normal.      Mouth/Throat:      Mouth: Mucous membranes are moist.      Pharynx: Oropharynx is clear. Eyes:      Conjunctiva/sclera: Conjunctivae normal.      Pupils: Pupils are equal, round, and reactive to light. Neck:      Musculoskeletal: Normal range of motion and neck supple. Cardiovascular:      Rate and Rhythm: Normal rate and regular rhythm. Pulses: Normal pulses. Heart sounds: Normal heart sounds. Pulmonary:      Effort: Pulmonary effort is normal.      Breath sounds: Normal breath sounds. Abdominal:      General: Bowel sounds are normal.      Palpations: Abdomen is soft. Musculoskeletal: Normal range of motion. Skin:     General: Skin is warm and dry. Capillary Refill: Capillary refill takes less than 2 seconds. Comments: Midline chest incision intact with no erythema or drainage. Left chest tube incisional site with no surrounding erythema or tenderness. Noted a small amount of serosanguinous drainage noted. Neurological:      General: No focal deficit present. Mental Status: He is alert. MEDICAL DECISION MAKING   Initial Assessment: Patient is a 80-year-old male with past medical history of CABG on 1/15/2021 who presented today with serosanguineous drainage from site of removal of left-sided chest tube. Called and spoke with cardiothoracic surgeon Dr. Rojelio Kang and he stated to complete chest x-ray and CBC and to have patient follow-up with him outpatient. Patient chest x-ray showed left-sided pleural effusion but in comparing to 1/21/2021 pleural effusion has improved. Also CBC shows improvement with current H&H of 9.3, and 31.2. Patient is not have any other symptoms besides drainage does not appear in any distress.   After speaking with Dr. Rojelio Kang he instructed that this drainage is to be expected and no intervention needed at this time.    Plan: Patient will be discharged at this time with precautions. Patient and his spouse were instructed to return to the ED if drainage from chest tube site continued or worsened. Also instructed to return to the ER for further evaluation if started to feel symptomatic including fever, increased fatigue, dizziness, shortness of breath, or chest pain. Patient instructed to follow-up with cardiothoracic surgeon next business day and verbalize good understanding. ED RESULTS   Laboratory results:  Labs Reviewed   CBC WITH AUTO DIFFERENTIAL - Abnormal; Notable for the following components:       Result Value    WBC 16.2 (*)     RBC 2.70 (*)     Hemoglobin 9.3 (*)     Hematocrit 31.2 (*)     .6 (*)     MCH 34.4 (*)     MCHC 29.8 (*)     RDW-CV 14.6 (*)     RDW-SD 60.5 (*)     Platelets 475 (*)     Segs Absolute 12.1 (*)     Monocytes Absolute 1.7 (*)     Immature Grans (Abs) 0.27 (*)     All other components within normal limits   SCAN OF BLOOD SMEAR       Radiologic studies results:  XR CHEST PORTABLE   Final Result   Left pleural effusion and bilateral lower lobe subsegmental atelectasis. **This report has been created using voice recognition software. It may contain minor errors which are inherent in voice recognition technology. **      Final report electronically signed by Dr. Kamla Patel on 1/22/2021 4:38 PM          ED Medications administered this visit: Medications - No data to display      ED COURSE      Strict return precautions and follow up instructions were discussed with the patient prior to discharge, with which the patient agrees. MEDICATION CHANGES     Discharge Medication List as of 1/22/2021  6:31 PM            FINAL DISPOSITION     Final diagnoses:   Encounter for postoperative wound check     Condition: condition: good  Dispo: Discharge to home      This transcription was electronically signed.  Parts of this transcriptions may have been dictated by use of voice recognition software and electronically transcribed, and parts may have been transcribed with the assistance of an ED scribe. The transcription may contain errors not detected in proofreading. Please refer to my supervising physician's documentation if my documentation differs.     Electronically Signed: Jessica Ramsay, 01/22/21, 7:17 PM       Jessica Ramsay MD  Resident  01/22/21 2003

## 2021-01-25 ENCOUNTER — CARE COORDINATION (OUTPATIENT)
Dept: CASE MANAGEMENT | Age: 71
End: 2021-01-25

## 2021-01-25 NOTE — CARE COORDINATION
[facility] Trans[facility] Follow Up Call    2021    Patient: Brunilda Gordon  Patient : 1950   MRN: [id number]  Reason for Admission: CABG X 3 / Incisional bleeding  Discharge Date: 21 RARS: Readmission Risk Score: 21         Spoke with: Wife    CARE TRANSITION CALL FOLLOW UP:     CHARTING SUMMARY:  Patient's wife states patient is doing well. Has dime sized dark blood on chest 4X4. Wife changes daily. No chest pain. Some incisional pain as before. Patient is up and about. No SOB. Patient has all medications is taking medications as directed and has no questions concerning medications at this time. Will continue to follow. Viri Romo LPN    [phone]  Kamryn Cobb / [facility] Coordinator      Verified name and  with family as identifiers. Care Transition Nurse (CTN) contacted the family by telephone to follow up after admission on 2021. Addressed changes since last contact: symptom management-Furhter incisional drainage  Discharged needs reviewed: none  Follow up appointment completed? No    Advance Care Planning:   Does patient have an Advance Directive: On file. CTN reviewed discharge instructions, medical action plan and red flags with family and discussed any barriers to care and/or understanding of plan of care after discharge. Discussed appropriate site of care based on symptoms and resources available to patient including: When to call 911. The family agrees to contact the PCP office for questions related to their healthcare. Non-face-to-face services provided:  Obtained and reviewed discharge summary and/or continuity of care documents    Patients top risk factors for readmission: medical condition and medication management  Interventions to address risk factors: Obtained and reviewed discharge summary and/or continuity of care documents    Discussed follow-up appointments.  If no appointment was previously scheduled, appointment

## 2021-01-27 ENCOUNTER — TELEPHONE (OUTPATIENT)
Dept: FAMILY MEDICINE CLINIC | Age: 71
End: 2021-01-27

## 2021-01-27 ENCOUNTER — OFFICE VISIT (OUTPATIENT)
Dept: CARDIOLOGY CLINIC | Age: 71
End: 2021-01-27
Payer: MEDICARE

## 2021-01-27 VITALS
SYSTOLIC BLOOD PRESSURE: 136 MMHG | DIASTOLIC BLOOD PRESSURE: 60 MMHG | HEIGHT: 68 IN | HEART RATE: 56 BPM | WEIGHT: 156 LBS | BODY MASS INDEX: 23.64 KG/M2

## 2021-01-27 DIAGNOSIS — I10 ESSENTIAL HYPERTENSION: ICD-10-CM

## 2021-01-27 DIAGNOSIS — I48.91 ATRIAL FIBRILLATION, UNSPECIFIED TYPE (HCC): ICD-10-CM

## 2021-01-27 DIAGNOSIS — R00.1 BRADYCARDIA: ICD-10-CM

## 2021-01-27 DIAGNOSIS — I25.10 CORONARY ARTERY DISEASE INVOLVING NATIVE CORONARY ARTERY OF NATIVE HEART WITHOUT ANGINA PECTORIS: ICD-10-CM

## 2021-01-27 DIAGNOSIS — I50.22 CHF (CONGESTIVE HEART FAILURE), NYHA CLASS II, CHRONIC, SYSTOLIC (HCC): Primary | ICD-10-CM

## 2021-01-27 DIAGNOSIS — Z95.1 S/P CABG X 3: ICD-10-CM

## 2021-01-27 PROCEDURE — 93000 ELECTROCARDIOGRAM COMPLETE: CPT | Performed by: NURSE PRACTITIONER

## 2021-01-27 PROCEDURE — 4004F PT TOBACCO SCREEN RCVD TLK: CPT | Performed by: NURSE PRACTITIONER

## 2021-01-27 PROCEDURE — 1123F ACP DISCUSS/DSCN MKR DOCD: CPT | Performed by: NURSE PRACTITIONER

## 2021-01-27 PROCEDURE — G8420 CALC BMI NORM PARAMETERS: HCPCS | Performed by: NURSE PRACTITIONER

## 2021-01-27 PROCEDURE — 1111F DSCHRG MED/CURRENT MED MERGE: CPT | Performed by: NURSE PRACTITIONER

## 2021-01-27 PROCEDURE — 99215 OFFICE O/P EST HI 40 MIN: CPT | Performed by: NURSE PRACTITIONER

## 2021-01-27 PROCEDURE — G8482 FLU IMMUNIZE ORDER/ADMIN: HCPCS | Performed by: NURSE PRACTITIONER

## 2021-01-27 PROCEDURE — 3017F COLORECTAL CA SCREEN DOC REV: CPT | Performed by: NURSE PRACTITIONER

## 2021-01-27 PROCEDURE — 4040F PNEUMOC VAC/ADMIN/RCVD: CPT | Performed by: NURSE PRACTITIONER

## 2021-01-27 PROCEDURE — G8427 DOCREV CUR MEDS BY ELIG CLIN: HCPCS | Performed by: NURSE PRACTITIONER

## 2021-01-27 RX ORDER — LOSARTAN POTASSIUM 25 MG/1
12.5 TABLET ORAL DAILY
Qty: 30 TABLET | Refills: 3 | Status: SHIPPED | OUTPATIENT
Start: 2021-01-27 | End: 2021-02-25

## 2021-01-27 RX ORDER — MIDODRINE HYDROCHLORIDE 5 MG/1
2.5 TABLET ORAL
Qty: 90 TABLET | Refills: 3
Start: 2021-01-27 | End: 2021-02-25

## 2021-01-27 RX ORDER — METOPROLOL TARTRATE 100 MG/1
50 TABLET ORAL 2 TIMES DAILY
Qty: 60 TABLET | Refills: 3
Start: 2021-01-27 | End: 2021-02-03

## 2021-01-27 RX ORDER — FUROSEMIDE 20 MG/1
40 TABLET ORAL DAILY
Qty: 60 TABLET | Refills: 3 | Status: SHIPPED | OUTPATIENT
Start: 2021-01-27 | End: 2021-02-01

## 2021-01-27 RX ORDER — POTASSIUM CHLORIDE 750 MG/1
20 TABLET, EXTENDED RELEASE ORAL DAILY
Qty: 60 TABLET | Refills: 3 | Status: SHIPPED | OUTPATIENT
Start: 2021-01-27 | End: 2021-02-01

## 2021-01-27 ASSESSMENT — ENCOUNTER SYMPTOMS
ABDOMINAL PAIN: 0
COUGH: 0
WHEEZING: 0
NAUSEA: 0
SHORTNESS OF BREATH: 1
COLOR CHANGE: 0
CHEST TIGHTNESS: 0
ABDOMINAL DISTENTION: 0
APNEA: 0

## 2021-01-27 NOTE — PATIENT INSTRUCTIONS
You may receive a survey regarding the care you received during your visit. Your input is valuable to us. We encourage you to complete and return your survey. We hope you will choose us in the future for your healthcare needs. NEW ORDERS FROM TODAY:      Continue:  · Take all medications as prescribed   · Daily weights and record - please try to weigh yourself upon awakening before eating or drinking   · Fluid restriction of 2 Liters per day (64 oz)  · Limit sodium in diet to around 1001-3950 mg/day  · Monitor BP - take BP 1 hour after meds  · Increase activity as tolerated     Call the [facility] for any of the following symptoms: [phone]   Weight gain of 3 pounds in 1 day or 5 pounds in 1 week   Increased shortness of breath   Shortness of breath while laying down   Cough   Chest pain   Swelling in feet, ankles or legs   Tenderness or bloating in the abdomen   Fatigue    Decreased appetite or feeling \"full\"    Nausea    Confusion     **PLEASE bring all medications in original bottles with you to your next appointment**    Educational websites:    http://www.[employer]/. org (American Heart Association)    PromotionVideum.nl. com ([employer])    Caterna HF.com ([employer])    Too much sodium causes your body to hold on to extra water. This can raise your blood pressure and force your heart and kidneys to work harder. In very serious cases, this could cause you to be put in the hospital. It might even be life-threatening. By limiting sodium, you will feel better and lower your risk of serious problems. The most common source of sodium is salt. People get most of the salt in their diet from canned, prepared, and packaged foods. Fast food and restaurant meals also are very high in sodium. Your doctor will probably limit your sodium to less than 2,000 milligrams (mg) a day.  This limit counts all the sodium in prepared and packaged foods and any salt you add to your food.  Follow-up care is a key part of your treatment and safety. Be sure to make and go to all appointments, and call your doctor if you are having problems. It's also a good idea to know your test results and keep a list of the medicines you take. How can you care for yourself at home? Read food labels  · Read labels on cans and food packages. The labels tell you how much sodium is in each serving. Make sure that you look at the serving size. If you eat more than the serving size, you have eaten more sodium. · Food labels also tell you the Percent Daily Value for sodium. Choose products with low Percent Daily Values for sodium. · Be aware that sodium can come in forms other than salt, including monosodium glutamate (MSG), sodium citrate, and sodium bicarbonate (baking soda). MSG is often added to Asian food. When you eat out, you can sometimes ask for food without MSG or added salt. Buy low-sodium foods  · Buy foods that are labeled \"unsalted\" (no salt added), \"sodium-free\" (less than 5 mg of sodium per serving), or \"low-sodium\" (less than 140 mg of sodium per serving). Foods labeled \"reduced-sodium\" and \"light sodium\" may still have too much sodium. Be sure to read the label to see how much sodium you are getting. · Buy fresh vegetables, or frozen vegetables without added sauces. Buy low-sodium versions of canned vegetables, soups, and other canned goods. Prepare low-sodium meals  · Cut back on the amount of salt you use in cooking. This will help you adjust to the taste. Do not add salt after cooking. One teaspoon of salt has about 2,300 mg of sodium. · Take the salt shaker off the table. · Flavor your food with garlic, lemon juice, onion, vinegar, herbs, and spices. Do not use soy sauce, lite soy sauce, steak sauce, onion salt, garlic salt, celery salt, mustard, or ketchup on your food. · Use low-sodium salad dressings, sauces, and ketchup.  Or make your own salad dressings and sauces without adding salt.  · Use less salt (or none) when recipes call for it. You can often use half the salt a recipe calls for without losing flavor. Other foods such as rice, pasta, and grains do not need added salt. · Rinse canned vegetables, and cook them in fresh water. This removes some--but not all--of the salt. · Avoid water that is naturally high in sodium or that has been treated with water softeners, which add sodium. Call your local water company to find out the sodium content of your water supply. If you buy bottled water, read the label and choose a sodium-free brand. Avoid high-sodium foods  · Avoid eating:  ? Smoked, cured, salted, and canned meat, fish, and poultry. ? Ham, ferrer, hot dogs, and luncheon meats. ? Regular, hard, and processed cheese and regular peanut butter. ? Crackers with salted tops, and other salted snack foods such as pretzels, chips, and salted popcorn. ? Frozen prepared meals, unless labeled low-sodium. ? Canned and dried soups, broths, and bouillon, unless labeled sodium-free or low-sodium. ? Canned vegetables, unless labeled sodium-free or low-sodium. ? Western Kait fries, pizza, tacos, and other fast foods. ? Pickles, olives, ketchup, and other condiments, especially soy sauce, unless labeled sodium-free or low-sodium.

## 2021-01-27 NOTE — PROGRESS NOTES
[place]       Visit Date: 1/27/2021  Cardiologist:  Dr. Itzel Menard  Primary Care Physician: Dr. Fernanda Arita MD    Alea Bean is a 79 y.o. male who presents today for:  Chief Complaint   Patient presents with    Congestive Heart Failure     New Patient        HPI: Obtained from patient and chart    Alea Bean is a 79 y.o. male who presents to the office for a new patient visit in the heart failure clinic. Hx A fib unsuccessful DCCV,CAD recent 3V CABG 1/12/2, ICM EF 25% 1/5/21 did improve to 35-40% 1/15/21, ETOH and smoker. He has quit both smoking and drinking since being admitted and since his CABG. He is not wearing the LifeVest, Dr Itzel Menard had told him he does not need it. He does have a left pleural effusion on CXR 1/22/21 when he went to the ER for wound drainage. He has some LE edema. He has had help with ADLs until this morning. He does feel SOB with activity although this is improving. He sleeps with the [facility] elevated.        Accompanied by: wife  Last hospital admission related to Heart Failure:  Jan 2021 new ICM EF 35-40%  Chest Pain: no  Worsening SOB: at baseline   Worsening Orthopnea/PND: no  Edema: yes  Any extra diuretic use: no  Weight gain: no  Fatigue: improving  Abdominal bloating: no  Appetite: good  CHACHO: never  Cough: occasional   Compliant checking home weight: yes 153-155 lbs  Compliant checking blood pressure: yes 140's HR 42-50 bpm      Past Medical History:   Diagnosis Date    Asthma     Collagenous colitis 2014       repeat  in  2024    Colon polyps 2000    COPD, mild ([address]     Eczema of hand     HTN (hypertension)     Hyperlipidemia     Smoker      Past Surgical History:   Procedure Laterality Date    COLONOSCOPY  2011,6/30/14    nba cardenas-no polyps repeat 10 yr    CORONARY ARTERY BYPASS GRAFT  01/12/2021    cabg x 3 with lima and atrial appendage clip  dr Julee Irby N/A 1/12/2021    CABG  X 3 WITH DEJAH, Atrial Appendage Clip performed by Luna Han MD at [facility] [address]  06/2008    polyps    OTHER SURGICAL HISTORY  DEC 7TH 2012 DR GET DAY [facility]     IGS ENDOSCOPIC BI LAT MAXILLARY, ETHMOID, SPHENOID, FRONTAL SINUSOTOMY WITH SEPTOPLASTY AND TURBINOPLASTIES    SKIN CANCER EXCISION  09/2014    Left side of Forehead     TOOTH EXTRACTION  02/2017     Family History   Problem Relation Age of Onset    Heart Disease Mother     Heart Disease Father         cabg    Diabetes Brother     Heart Disease Brother      Social History     Tobacco Use    Smoking status: Current Every Day Smoker     Packs/day: 1.00     Years: 40.00     Pack years: 40.00     Types: Cigarettes    Smokeless tobacco: Never Used   Substance Use Topics    Alcohol use: Yes     Alcohol/week: 0.0 standard drinks     Comment: very little     Current Outpatient Medications   Medication Sig Dispense Refill    midodrine (PROAMATINE) 5 MG tablet Take 0.5 tablets by mouth 3 times daily (with meals) 90 tablet 3    furosemide (LASIX) 20 MG tablet Take 2 tablets by mouth daily 60 tablet 3    potassium chloride (KLOR-CON M) 10 MEQ extended release tablet Take 2 tablets by mouth daily 60 tablet 3    losartan (COZAAR) 25 MG tablet Take 0.5 tablets by mouth daily 30 tablet 3    metoprolol (LOPRESSOR) 100 MG tablet Take 0.5 tablets by mouth 2 times daily 60 tablet 3    ALPRAZolam (XANAX) 0.5 MG tablet Take 1 tablet by mouth 3 times daily as needed for Sleep or Anxiety for up to 30 days.  30 tablet 0    aspirin 81 MG chewable tablet Take 1 tablet by mouth daily 30 tablet 3    apixaban (ELIQUIS) 5 MG TABS tablet Take 1 tablet by mouth 2 times daily 60 tablet 1    albuterol sulfate  (90 Base) MCG/ACT inhaler Inhale 2 puffs into the lungs every 6 hours as needed for Wheezing 1 Inhaler 4    atorvastatin (LIPITOR) 40 MG tablet Take 1 tablet by mouth nightly 30 tablet 3    umeclidinium-vilanterol (ANORO ELLIPTA) 62.5-25 MCG/INH AEPB inhaler Inhale 1 puff into the lungs daily 1 each 3    loratadine (CLARITIN) 10 MG tablet Take 10 mg by mouth daily       Multiple Vitamins-Minerals (CENTRUM SILVER PO) Take  by mouth daily.  fluocinonide (LIDEX) 0.05 % ointment Apply topically as needed       No current facility-administered medications for this visit. Allergies   Allergen Reactions    Penicillins Rash    Sulfa Antibiotics Rash       SUBJECTIVE:   Review of Systems   Constitutional: Negative for activity change, appetite change, fatigue and fever. HENT: Negative for congestion. Respiratory: Positive for shortness of breath (with activity ). Negative for apnea, cough, chest tightness and wheezing. Cardiovascular: Positive for leg swelling. Negative for chest pain and palpitations. Gastrointestinal: Negative for abdominal distention, abdominal pain and nausea. Genitourinary: Negative for difficulty urinating and dysuria. Musculoskeletal: Negative for arthralgias and gait problem. Skin: Negative for color change. Neurological: Negative for dizziness, numbness and headaches. Psychiatric/Behavioral: Negative for agitation, confusion and sleep disturbance. The patient is not nervous/anxious. OBJECTIVE:   Today's Vitals:  /60   Pulse 56   Ht 5' 8\" (1.727 m)   Wt 156 lb (70.8 kg)   BMI 23.72 kg/m²     Physical Exam  Vitals signs reviewed. Constitutional:       Appearance: He is well-developed. HENT:      Head: Normocephalic and atraumatic. Eyes:      Pupils: Pupils are equal, round, and reactive to light. Neck:      Musculoskeletal: Normal range of motion. Vascular: No JVD. Cardiovascular:      Rate and Rhythm: Regular rhythm. Bradycardia present. Heart sounds: Normal heart sounds. No murmur. Pulmonary:      Effort: Pulmonary effort is normal. No respiratory distress. Breath sounds: No rales.       Comments: Diminished left base  Abdominal: General: There is no distension. Palpations: Abdomen is soft. Tenderness: There is no abdominal tenderness. Musculoskeletal:         General: No tenderness. Right lower leg: Edema (1++) present. Left lower leg: Edema (1+) present. Skin:     General: Skin is warm and dry. Capillary Refill: Capillary refill takes less than 2 seconds. Neurological:      Mental Status: He is alert and oriented to person, place, and time. Psychiatric:         Mood and Affect: Mood normal.         Behavior: Behavior normal.         Wt Readings from Last 3 Encounters:   01/27/21 156 lb (70.8 kg)   01/22/21 159 lb (72.1 kg)   01/21/21 161 lb 1.6 oz (73.1 kg)     BP Readings from Last 3 Encounters:   01/27/21 136/60   01/22/21 137/61   01/21/21 99/60     Pulse Readings from Last 3 Encounters:   01/27/21 56   01/22/21 53   01/21/21 87     Body mass index is 23.72 kg/m². ECHO:   1/15/21   Summary   Left ventricle size is normal.   Normal left ventricle wall thickness. There was moderate global hypokinesis of the left ventricle. Systolic function was moderately reduced. Ejection fraction is visually estimated in the range of 35% to 40%. Left atrium mild to moderately dilated   Right atrium mildly dilated   When this echo is compared with the echo from Jan 5, 2021, EF improved   from 25% to 37% now      Signature      ----------------------------------------------------------------   Electronically signed by Le Cespedes MD (Interpreting   physician) on 01/15/2021 at 08:41 PM   ----------------------------------------------------------------      Findings      Mitral Valve   The mitral valve structure was normal with normal leaflet separation. DOPPLER: The transmitral velocity was within the normal range with no   evidence for mitral stenosis. Mild mitral regurgitation is present. Aortic Valve   The aortic valve was trileaflet with normal thickness and cuspal   separation.  There was no echocardiographic evidence of a vegetation. DOPPLER: Transaortic velocity was within the normal range with no evidence   of aortic stenosis or regurgitaiton. Tricuspid Valve   The tricuspid valve structure was normal with normal leaflet separation. There was no echocardiographic evidence of a vegetation. No evidence of tricuspid stenosis. Mild tricuspid regurgitation visualized. Pulmonic Valve   The pulmonic valve leaflets exhibited normal thickness, no calcification,   and normal cuspal separation. There was no echocardiographic evidence of   vegetation. DOPPLER: The transpulmonic velocity was within the normal   range with no evidence for regurgitation. Left Atrium   Left atrium mild to moderately dilated with no thrombus identified. APPENDAGE: The left atrial appendage size was normal with no thrombus   identified. DOPPLER: The function was normal (normal emptying velocity). Left Ventricle   Left ventricle size is normal.   Normal left ventricle wall thickness. There was moderate global hypokinesis of the left ventricle. Systolic function was moderately reduced. Ejection fraction is visually estimated in the range of 35% to 40%. Right Atrium   Right atrium mildly dilated with no thrombus identified. ATRIAL SEPTUM: No   defect or patent foramen ovale was identified. There was no left to-right   shunt on color doppler. Right Ventricle   The right ventricular size was normal with normal systolic function and   wall thickness. Pericardial Effusion   The pericardium was normal in appearance with no evidence of a pericardial   effusion. Pleural Effusion   No evidence of pleural effusion. Aorta / Great Vessels   -Aortic root dimension within normal limits.   -The Pulmonary artery is within normal limits. -IVC size is within normal limits with normal respiratory phasic changes.        Results reviewed:  BNP:   Lab Results   Component Value Date    PROBNP 4443.0 (H) 01/06/2021     CBC:   Lab Results   Component Value Date    WBC 13.3 01/26/2021    RBC 3.16 01/26/2021    RBC 4.84 11/07/2011    HGB 10.9 01/26/2021    HCT 33.9 01/26/2021     01/26/2021     CMP:    Lab Results   Component Value Date     01/26/2021    K 5.3 01/26/2021    K 5.3 01/09/2021    CL 99 01/26/2021    CO2 29 01/26/2021    BUN 23 01/26/2021    CREATININE 1.2 01/26/2021    LABGLOM 60 01/26/2021    GLUCOSE 88 01/26/2021    GLUCOSE 94 11/07/2011    CALCIUM 9.6 01/26/2021     Hepatic Function Panel:    Lab Results   Component Value Date    ALKPHOS 54 01/11/2021    ALT 82 01/11/2021    AST 95 01/11/2021    PROT 7.2 01/11/2021    BILITOT 0.4 01/11/2021    BILIDIR <0.2 01/11/2021    LABALBU 3.9 01/11/2021    LABALBU 4.3 11/07/2011     Magnesium:    Lab Results   Component Value Date    MG 2.2 01/16/2021     PT/INR:    Lab Results   Component Value Date    INR 1.13 01/11/2021     Lipids:    Lab Results   Component Value Date    TRIG 96 01/07/2021    HDL 36 01/07/2021    LDLCALC 71 01/07/2021    LDLDIRECT 79.82 01/11/2021       ASSESSMENT AND PLAN:   The patient's condition/symptoms are Stable      Diagnosis Orders   1. CHF (congestive heart failure), NYHA class II, chronic, systolic (HCC)  Basic Metabolic Panel   2. Bradycardia  EKG 12 Lead   3. Coronary artery disease involving native coronary artery of native heart without angina pectoris     4. Essential hypertension     5. Atrial fibrillation, unspecified type ([address]     6. S/P CABG x 3         Plan:  GDMT   ACE/ARB/ARNi: add low dose Losartan 12.5 mg for now d/t lower BP.  If able to be weaned off Midodrine consider Entresto if kidney function and BP will tolerate   Beta Blocker: decrease Metoprolol 50 mg bid (from 100 mg bid) d/t Bradycardia   Aldosterone antagonist: consider if able to tolerate ARB  Diuretic/Potassium: double Lasix 40 mg daily, Potassium 20 mEq daily d/t LE edema and recent left pleural effusion on CXR  Hydralazine/Nitrate: no  Other: wean midodrine 2.5 mg bid and monitor BP, continue Eliquis, Lipitor. Stop Digoxin d/t Bradycardia   Wean Midodrine. Add low dose Losartan (he was on 100 mg previously he states). Consider Entresto if BP and kidney function allows and if affordable. Increase Lasix and Potassium as above d/t LE edema and recent left plerual effusion on CXR and he is still SOB with activity. EKG in the office today reviewed and revealed bradycardia. Discussed with Dr Lali Quiros whom he has a f/u with next week. Stop Digoxin and decrease the Toprol as above. He plans to start Cardiac Rehab the end of Feb.  HF Zones reviewed. Daily weights and record  Fluid restriction of 2 Liters per day (64 oz)   Limit sodium in diet to 4960-9860 mg/day  Healthier food options were discussed   Monitor BP daily 1 hour after meds are taken  Increase activity as tolerated     Patient was instructed to call the [facility] for changes in the following symptoms:   Weight gain of 3 pounds in 1 day or 5 pounds in 1 week  Increased shortness of breath  Shortness of breath while laying down  Cough  Chest pain  Swelling in feet, ankles or legs  Tenderness or bloating in the abdomen  Fatigue   Decreased appetite or feeling \"full\"  Nausea   Confusion      Return in about 2 months (around 3/27/2021). or sooner if needed     Patient given educational materials - see patient instructions. We discussed the importance of weighing oneself and recording daily. We also discussed the importance of a low sodium diet, higher sodium foods to avoid and appropriate low sodium food choices. Discussed use, benefit, and side effects of prescribed medications. All patient questions answered. Patient verbalizes understanding of plan of care using teach back method, and is agreeable to the treatment plan.      Greater then 45 minutes of time was spent reviewing the chart and educating the patient about HF, medications, diet, exercise, and discussing the plan of care. I personally spent more then 50% of the appt time face to face with the patient counseling/coordinating patient's care.       Electronicallysigned by ARCADIO Nguyen CNP on 1/27/2021 at 10:44 AM

## 2021-01-27 NOTE — TELEPHONE ENCOUNTER
----- Message from Divina Gilliam MD sent at 1/27/2021  6:06 AM EST -----  Call labs stable and keep appt  Here this week

## 2021-01-28 NOTE — PLAN OF CARE
Hospital Facility-Based Program  [facility]/Traditional Cardiac Rehab  PHYSICIAN ORDER  Class I Level B based on research  Medical Director:  Dr. Martín Mares MD     Patient Name: Miguelangel Todd : 1950  Referring:  Jonny Braun PA-C  Date: 2021  Allergies: Allergies as of 2021 - Review Complete 2021   Allergen Reaction Noted    Penicillins Rash 2012    Sulfa antibiotics Rash 2012        Diagnosis:  CABG on 21    [x] [facility] with telemetry monitoring, resting and exercise        BPs & HRs with each session. Hospital setting for patient safety. [x] 72 sessions: 36 exercise sessions, 36 education sessions   [] 36 sessions: 18 exercise sessions, 18 education sessions  [] Traditional Cardiac Rehab with telemetry monitoring, resting and exercise BPs &       HRs with each session. Hospital setting for patient safety. [] 36 sessions:  32 exercise sessions, 4 education sessions     Per Patient symptoms, proceed with:   [x]Nitroglycerine 0.4mg SL every 5 minutes prn, maximum of 3, for chest pain   [x]12-lead EKG for symptoms of chest pain or noted change in heart rhythm   [x]Administer O2 per nasal cannula for symptoms of chest pain or acute dyspnea    Physician Prescribed Exercise:  Plan of Care:  Patient to attend exercise sessions with aerobic endurance and strength training for a total of 31-60 min/day, 3 days/week with supplemented 30+ minutes of aerobic exercise at home on days not participating in Cardiac Rehab. Aerobic Endurance Training  Aerobic Endurance mode (TM, AD, NS) starting at 5-8 minutes progressing by 2-3 minutes each week to a total of 15-30 minutes 2-3x/week. Arms only 5 min  Stair step increasing to 2 min  Resistance/strength training:  Hand weights starting at 1-5 lbs increasing in weight by 1-2 lbs and/or per patient tolerance weekly.   Start with 8 repetitions and increase the repetitions each exercise session per patient tolerance for a total of 15 repetitions. Measurable Endurance Goal:  Aerobic endurance goal to be measured in minutes. Start endurance training per patient tolerance at 1-8 minutes per exercise type, progressing to a total of 31-60 minutes using various modes of training (see Exercise Prescription). Progression:    [x] Weekly 5-10% intensity progression, as tolerated, during cardiac rehab sessions. [x] 13-17 Rate of Perceived Exertion on the Daniel Scale (6-20). Measurable Muscular Strength Goal:  Starting at 1-5 lbs x 8 reps, progressing to 5-25 lbs x 15 reps per patient tolerance.       Electronically signed by Nereyda Cosby on 1/28/2021 at 7:55 AM    Exercise Physiologist/Date/Time

## 2021-01-29 ENCOUNTER — OFFICE VISIT (OUTPATIENT)
Dept: FAMILY MEDICINE CLINIC | Age: 71
End: 2021-01-29

## 2021-01-29 VITALS
WEIGHT: 149.25 LBS | DIASTOLIC BLOOD PRESSURE: 80 MMHG | RESPIRATION RATE: 14 BRPM | HEART RATE: 64 BPM | TEMPERATURE: 97.3 F | BODY MASS INDEX: 22.62 KG/M2 | HEIGHT: 68 IN | SYSTOLIC BLOOD PRESSURE: 144 MMHG

## 2021-01-29 DIAGNOSIS — J44.9 COPD, MILD (HCC): Primary | ICD-10-CM

## 2021-01-29 DIAGNOSIS — I10 ESSENTIAL HYPERTENSION: ICD-10-CM

## 2021-01-29 DIAGNOSIS — I50.22 CHF (CONGESTIVE HEART FAILURE), NYHA CLASS II, CHRONIC, SYSTOLIC (HCC): ICD-10-CM

## 2021-01-29 DIAGNOSIS — I48.91 ATRIAL FIBRILLATION, UNSPECIFIED TYPE (HCC): ICD-10-CM

## 2021-01-29 DIAGNOSIS — E78.00 PURE HYPERCHOLESTEROLEMIA: ICD-10-CM

## 2021-01-29 DIAGNOSIS — I25.5 ISCHEMIC CARDIOMYOPATHY: ICD-10-CM

## 2021-01-29 PROCEDURE — 99214 OFFICE O/P EST MOD 30 MIN: CPT | Performed by: FAMILY MEDICINE

## 2021-01-29 PROCEDURE — 1111F DSCHRG MED/CURRENT MED MERGE: CPT | Performed by: FAMILY MEDICINE

## 2021-01-29 ASSESSMENT — ENCOUNTER SYMPTOMS
COUGH: 0
NAUSEA: 0
ABDOMINAL PAIN: 0
BACK PAIN: 0
CHEST TIGHTNESS: 0
SORE THROAT: 0
TROUBLE SWALLOWING: 0
EYE PAIN: 0
BLOOD IN STOOL: 0
SHORTNESS OF BREATH: 0
CONSTIPATION: 0

## 2021-01-29 ASSESSMENT — PATIENT HEALTH QUESTIONNAIRE - PHQ9
7. TROUBLE CONCENTRATING ON THINGS, SUCH AS READING THE NEWSPAPER OR WATCHING TELEVISION: 0
6. FEELING BAD ABOUT YOURSELF - OR THAT YOU ARE A FAILURE OR HAVE LET YOURSELF OR YOUR FAMILY DOWN: 0
10. IF YOU CHECKED OFF ANY PROBLEMS, HOW DIFFICULT HAVE THESE PROBLEMS MADE IT FOR YOU TO DO YOUR WORK, TAKE CARE OF THINGS AT HOME, OR GET ALONG WITH OTHER PEOPLE: 1
2. FEELING DOWN, DEPRESSED OR HOPELESS: 2
1. LITTLE INTEREST OR PLEASURE IN DOING THINGS: 2
9. THOUGHTS THAT YOU WOULD BE BETTER OFF DEAD, OR OF HURTING YOURSELF: 0

## 2021-01-29 NOTE — PROGRESS NOTES
Post-Discharge Transitional Care Management Services or Hospital Follow Up      Jones Ferguson   YOB: 1950    Date of Office Visit:  1/29/2021  Date of Hospital Admission: 1/6/21  Date of Hospital Discharge: 1/22/21  Readmission Risk Score(high >=14%. Medium >=10%):Readmission Risk Score: 21      Care management risk score Rising risk (score 2-5) and Complex Care (Scores >=6): 4     Non face to face  following discharge, date last encounter closed (first attempt may have been earlier): 1/22/2021  1:28 PM 1/22/2021  1:28 PM    Call initiated 2 business days of discharge: Yes     Patient Active Problem List   Diagnosis    Hyperlipidemia    Asthma    Smoker    Allergic rhinitis due to other allergen    Collagenous colitis    Lactose intolerance    HTN (hypertension)    COPD, mild ([address]    Atrial fibrillation with rapid ventricular response ([address]    Atrial fibrillation ([address]    Coronary artery disease involving native coronary artery    S/P CABG x 3    Encounter for cardioversion procedure    S/P left atrial appendage ligation    Ischemic cardiomyopathy    Chronic bronchitis ([address]    CHF (congestive heart failure), NYHA class II, chronic, systolic (HCC)       Allergies   Allergen Reactions    Penicillins Rash    Sulfa Antibiotics Rash       Medications listed as ordered at the time of discharge from [facility]   Home Medication Instructions HILARY:    Printed on:01/29/21 8646   Medication Information                      albuterol sulfate  (90 Base) MCG/ACT inhaler  Inhale 2 puffs into the lungs every 6 hours as needed for Wheezing             ALPRAZolam (XANAX) 0.5 MG tablet  Take 1 tablet by mouth 3 times daily as needed for Sleep or Anxiety for up to 30 days.              apixaban (ELIQUIS) 5 MG TABS tablet  Take 1 tablet by mouth 2 times daily             aspirin 81 MG chewable tablet  Take 1 tablet by mouth daily             atorvastatin (LIPITOR) 40 MG tablet Take 1 tablet by mouth nightly             fluocinonide (LIDEX) 0.05 % ointment  Apply topically as needed             furosemide (LASIX) 20 MG tablet  Take 2 tablets by mouth daily             loratadine (CLARITIN) 10 MG tablet  Take 10 mg by mouth daily              losartan (COZAAR) 25 MG tablet  Take 0.5 tablets by mouth daily             metoprolol (LOPRESSOR) 100 MG tablet  Take 0.5 tablets by mouth 2 times daily             midodrine (PROAMATINE) 5 MG tablet  Take 0.5 tablets by mouth 3 times daily (with meals)             Multiple Vitamins-Minerals (CENTRUM SILVER PO)  Take  by mouth daily. potassium chloride (KLOR-CON M) 10 MEQ extended release tablet  Take 2 tablets by mouth daily             umeclidinium-vilanterol (ANORO ELLIPTA) 62.5-25 MCG/INH AEPB inhaler  Inhale 1 puff into the lungs daily                   Medications marked \"taking\" at this time  Outpatient Medications Marked as Taking for the 1/29/21 encounter (Office Visit) with Keshia Naranjo MD   Medication Sig Dispense Refill    midodrine (PROAMATINE) 5 MG tablet Take 0.5 tablets by mouth 3 times daily (with meals) 90 tablet 3    furosemide (LASIX) 20 MG tablet Take 2 tablets by mouth daily 60 tablet 3    potassium chloride (KLOR-CON M) 10 MEQ extended release tablet Take 2 tablets by mouth daily 60 tablet 3    losartan (COZAAR) 25 MG tablet Take 0.5 tablets by mouth daily 30 tablet 3    metoprolol (LOPRESSOR) 100 MG tablet Take 0.5 tablets by mouth 2 times daily 60 tablet 3    ALPRAZolam (XANAX) 0.5 MG tablet Take 1 tablet by mouth 3 times daily as needed for Sleep or Anxiety for up to 30 days.  30 tablet 0    aspirin 81 MG chewable tablet Take 1 tablet by mouth daily 30 tablet 3    apixaban (ELIQUIS) 5 MG TABS tablet Take 1 tablet by mouth 2 times daily 60 tablet 1    albuterol sulfate  (90 Base) MCG/ACT inhaler Inhale 2 puffs into the lungs every 6 hours as needed for Wheezing 1 Inhaler 4  atorvastatin (LIPITOR) 40 MG tablet Take 1 tablet by mouth nightly 30 tablet 3    umeclidinium-vilanterol (ANORO ELLIPTA) 62.5-25 MCG/INH AEPB inhaler Inhale 1 puff into the lungs daily 1 each 3    fluocinonide (LIDEX) 0.05 % ointment Apply topically as needed      loratadine (CLARITIN) 10 MG tablet Take 10 mg by mouth daily       Multiple Vitamins-Minerals (CENTRUM SILVER PO) Take  by mouth daily. Medications patient taking as of now reconciled against medications ordered at time of hospital discharge: Yes    Chief Complaint   Patient presents with    Follow-Up from Hospital       HPI    Inpatient course: Discharge summary reviewed- see chart. Interval history/Current status  Post   cabg and  With  Post  Op  Tachy and  stable   Now       copd  Stable       Lipids   stable       Atrial  Fib    And  Echo  Up  To  35  To  40%      Stable  Post op    Review of Systems   Constitutional: Negative for fatigue and fever. HENT: Negative for congestion, ear pain, postnasal drip, sore throat and trouble swallowing. Eyes: Negative for pain. Respiratory: Negative for cough, chest tightness and shortness of breath. Cardiovascular: Negative for chest pain, palpitations and leg swelling. Gastrointestinal: Negative for abdominal pain, blood in stool, constipation and nausea. Genitourinary: Negative for difficulty urinating, frequency and urgency. Musculoskeletal: Negative for arthralgias, back pain, joint swelling and neck stiffness. Skin: Negative for rash. Neurological: Negative for dizziness, weakness and headaches. Hematological: Negative for adenopathy. Does not bruise/bleed easily. Psychiatric/Behavioral: Negative for behavioral problems, dysphoric mood and sleep disturbance.        Vitals:    01/29/21 1340   BP: (!) 144/80   Site: Right Upper Arm   Position: Sitting   Cuff Size: Medium Adult   Pulse: 64   Resp: 14   Temp: 97.3 °F (36.3 °C)   TempSrc: Skin Medical Decision Making: moderate complexity    PLAN      Current Outpatient Medications   Medication Sig Dispense Refill    midodrine (PROAMATINE) 5 MG tablet Take 0.5 tablets by mouth 3 times daily (with meals) 90 tablet 3    furosemide (LASIX) 20 MG tablet Take 2 tablets by mouth daily 60 tablet 3    potassium chloride (KLOR-CON M) 10 MEQ extended release tablet Take 2 tablets by mouth daily 60 tablet 3    losartan (COZAAR) 25 MG tablet Take 0.5 tablets by mouth daily 30 tablet 3    metoprolol (LOPRESSOR) 100 MG tablet Take 0.5 tablets by mouth 2 times daily 60 tablet 3    ALPRAZolam (XANAX) 0.5 MG tablet Take 1 tablet by mouth 3 times daily as needed for Sleep or Anxiety for up to 30 days. 30 tablet 0    aspirin 81 MG chewable tablet Take 1 tablet by mouth daily 30 tablet 3    apixaban (ELIQUIS) 5 MG TABS tablet Take 1 tablet by mouth 2 times daily 60 tablet 1    albuterol sulfate  (90 Base) MCG/ACT inhaler Inhale 2 puffs into the lungs every 6 hours as needed for Wheezing 1 Inhaler 4    atorvastatin (LIPITOR) 40 MG tablet Take 1 tablet by mouth nightly 30 tablet 3    umeclidinium-vilanterol (ANORO ELLIPTA) 62.5-25 MCG/INH AEPB inhaler Inhale 1 puff into the lungs daily 1 each 3    fluocinonide (LIDEX) 0.05 % ointment Apply topically as needed      loratadine (CLARITIN) 10 MG tablet Take 10 mg by mouth daily       Multiple Vitamins-Minerals (CENTRUM SILVER PO) Take  by mouth daily. No current facility-administered medications for this visit. No orders of the defined types were placed in this encounter.   No results found for this visit on 01/29/21.        doing  Well        No  Issues   As  Stable       See in  One month

## 2021-02-01 ENCOUNTER — HOSPITAL ENCOUNTER (OUTPATIENT)
Dept: GENERAL RADIOLOGY | Age: 71
Discharge: HOME | End: 2021-02-01
Payer: MEDICARE

## 2021-02-01 ENCOUNTER — HOSPITAL ENCOUNTER (OUTPATIENT)
Age: 71
Discharge: HOME | End: 2021-02-01
Payer: MEDICARE

## 2021-02-01 DIAGNOSIS — Z95.1 S/P CABG X 3: ICD-10-CM

## 2021-02-01 LAB
EKG ATRIAL RATE: 57 BPM
EKG P AXIS: 85 DEGREES
EKG P-R INTERVAL: 148 MS
EKG Q-T INTERVAL: 438 MS
EKG QRS DURATION: 120 MS
EKG QTC CALCULATION (BAZETT): 426 MS
EKG R AXIS: 91 DEGREES
EKG T AXIS: 72 DEGREES
EKG VENTRICULAR RATE: 57 BPM

## 2021-02-01 PROCEDURE — 93010 ELECTROCARDIOGRAM REPORT: CPT | Performed by: NUCLEAR MEDICINE

## 2021-02-01 PROCEDURE — 71046 X-RAY EXAM CHEST 2 VIEWS: CPT

## 2021-02-01 PROCEDURE — 93005 ELECTROCARDIOGRAM TRACING: CPT | Performed by: PHYSICIAN ASSISTANT

## 2021-02-01 NOTE — PROGRESS NOTES
[address] [facility] () [address] [facility] [address]  Dept: [phone]          CARDIAC ELECTROPHYSIOLOGY: CONSULTATION NOTE    PATIENT DEMOGRAPHICS:  Date:   2/3/2021  Patient name:              Sigrid Kelly  YOB: 1950  Sex: male   MRN:   [id number]    PRIMARY CARE PHYSICIAN:   Lali Martínez MD    REFERRING PROVIDER:  Phillip Garcia MD    REASON FOR CONSULTATION:  Atrial fibrillation and ischemic cardiomyopathy, recently discharged from the hospital.    HISTORY OF PRESENT ILLNESS:  Mr. Crystal Bose is 70/M who was admitted to entry to [facility] on 1/6/2020 with recurrent chest pain. Upon evaluation he was noted to have severe left ventricle systolic dysfunction, ejection fraction 20%. Coronary angiogram showed multivessel coronary artery disease. He underwent coronary artery bypass surgery x3 along with left atrial appendage clipping. Postoperatively the patient developed atrial fibrillation with rapid ventricular rate. Attempted cardioversion x2 was unsuccessful. Rate control strategy with metoprolol was perceived. He was anticoagulated apixaban. He made nice recovery from his surgery. During the follow-up. His ejection fraction improved to 35 to 40%. He is here for a follow-up for his atrial fibrillation. The patient complains of fatigue with physical activity and occasional lightheadedness. He complains of mild shortness of breath with physical activity but denies chest pain, orthopnea or lower extremity swelling. His incision sites have healed nicely. He had left pleural effusion that was drained via chest tube. Denies weight gain or syncope. REVIEW OF SYSTEMS:    Constitutional: Negative for chills and fever  HENT: Negative for congestion, sinus pressure, sneezing and sore throat. Eyes: Negative for pain, discharge, redness and itching.    Respiratory: Negative for apnea, cough  Gastrointestinal: Negative for blood in stool, constipation, diarrhea   Endocrine: Negative for cold intolerance, heat intolerance, polydipsia. Genitourinary: Negative for dysuria, enuresis, flank pain and hematuria. Musculoskeletal: Negative for arthralgias, joint swelling and neck pain. Neurological: Negative for numbness and headaches. Psychiatric/Behavioral: Negative for agitation, confusion, decreased concentration and dysphoric mood. PAST MEDICAL HISTORY:  1.  Multivessel coronary artery disease/CABG x3) LIMA to LAD, reverse SVG to RCA and reverse SVG to ramus intermedius) on 1/12/2021 by Dr. Gisele Alvarado. 2.  Persistent atrial fibrillation, status post left atrial appendage AtriCure clipping, attempted cardioversion on 1/15/2021 unsuccessful. 3.  Severe ischemic cardiomyopathy, ejection fraction 35-40%  4. Hypertension, hyperlipidemia and chronic smoking. 5.  Mild chronic Pulmonary disease.   Past Medical History:   Diagnosis Date    Asthma     Collagenous colitis 2014       repeat  in  2024    Colon polyps 2000    COPD, mild (HCC)     Eczema of hand     HTN (hypertension)     Hyperlipidemia     Smoker        PSH:  Past Surgical History:   Procedure Laterality Date    COLONOSCOPY  2011,6/30/14    nba cardenas-no polyps repeat 10 yr    CORONARY ARTERY BYPASS GRAFT  01/12/2021    cabg x 3 with lima and atrial appendage clip  dr Rukhsana Huber GRAFT N/A 1/12/2021    CABG  X 3 WITH DEJAH, Atrial Appendage Clip performed by Amilcar Brown MD at [address] [place] [address] Road  06/2008    polyps    OTHER SURGICAL HISTORY  DEC 7TH 2012 DR VANAN [address] [place]     IGS ENDOSCOPIC BI LAT MAXILLARY, ETHMOID, SPHENOID, FRONTAL SINUSOTOMY WITH SEPTOPLASTY AND TURBINOPLASTIES    SKIN CANCER EXCISION  09/2014    Left side of Forehead     TOOTH EXTRACTION  02/2017       FAMILY HISTORY:  Family History   Problem Relation Age of Onset    Heart Disease Mother     Heart Disease Father         cabg    Diabetes Brother     Heart Disease Brother         SOCIAL HISTORY:  A oates.  for 24 years. Two biological children. Quite smoking 1/2021. Used to drin 6 bears daily and has quite recently. Social History     Socioeconomic History    Marital status:      Spouse name: Not on file    Number of children: Not on file    Years of education: Not on file    Highest education level: Not on file   Occupational History    Not on file   Social Needs    Financial resource strain: Not hard at all   [employer] insecurity     Worry: Never true     Inability: Never true   [employer] needs     Medical: Not on file     Non-medical: Not on file   Tobacco Use    Smoking status: Current Every Day Smoker     Packs/day: 1.00     Years: 40.00     Pack years: 40.00     Types: Cigarettes    Smokeless tobacco: Never Used   Substance and Sexual Activity    Alcohol use:  Yes     Alcohol/week: 0.0 standard drinks     Comment: very little    Drug use: No    Sexual activity: Not on file   Lifestyle    Physical activity     Days per week: Not on file     Minutes per session: Not on file    Stress: Not on file   Relationships    Social connections     Talks on phone: Not on file     Gets together: Not on file     Attends [employer] service: Not on file     Active member of club or organization: Not on file     Attends meetings of clubs or organizations: Not on file     Relationship status: Not on file    Intimate partner violence     Fear of current or ex partner: Not on file     Emotionally abused: Not on file     Physically abused: Not on file     Forced sexual activity: Not on file   Other Topics Concern    Not on file   Social History Narrative    Not on file        ALLERGY HISTORY:  Allergies   Allergen Reactions    Penicillins Rash    Sulfa Antibiotics Rash        MEDICATIONS:  Current Outpatient Medications   Medication Sig Dispense Refill    furosemide (LASIX) 20 MG tablet TAKE 2 TABLETS BY MOUTH DAILY 180 tablet 3    potassium chloride (KLOR-CON M) 10 MEQ extended release tablet TAKE 2 TABLETS BY MOUTH DAILY 180 tablet 3    midodrine (PROAMATINE) 5 MG tablet Take 0.5 tablets by mouth 3 times daily (with meals) 90 tablet 3    losartan (COZAAR) 25 MG tablet Take 0.5 tablets by mouth daily 30 tablet 3    metoprolol (LOPRESSOR) 100 MG tablet Take 0.5 tablets by mouth 2 times daily 60 tablet 3    ALPRAZolam (XANAX) 0.5 MG tablet Take 1 tablet by mouth 3 times daily as needed for Sleep or Anxiety for up to 30 days. 30 tablet 0    aspirin 81 MG chewable tablet Take 1 tablet by mouth daily 30 tablet 3    apixaban (ELIQUIS) 5 MG TABS tablet Take 1 tablet by mouth 2 times daily 60 tablet 1    albuterol sulfate  (90 Base) MCG/ACT inhaler Inhale 2 puffs into the lungs every 6 hours as needed for Wheezing 1 Inhaler 4    atorvastatin (LIPITOR) 40 MG tablet Take 1 tablet by mouth nightly 30 tablet 3    umeclidinium-vilanterol (ANORO ELLIPTA) 62.5-25 MCG/INH AEPB inhaler Inhale 1 puff into the lungs daily 1 each 3    fluocinonide (LIDEX) 0.05 % ointment Apply topically as needed      loratadine (CLARITIN) 10 MG tablet Take 10 mg by mouth daily       Multiple Vitamins-Minerals (CENTRUM SILVER PO) Take  by mouth daily. No current facility-administered medications for this visit. PHYSICAL EXAM:  Vitals:    02/03/21 1047   BP: 118/63   Pulse: (!) 42     Body mass index is 22.9 kg/m². GENERAL: Alert and oriented. No distress. EYES: No pallor or icterus. ENT: No central cyanosis. VESSELS: No jugular venous distension or carotid bruits. HEART: Normal S1/S2. No murmur, rub or gallop. LUNGS: Decreased breath sounds left lower scapular region and infra axillary regions. CHEST WALL: STERNOTOMY WOUND HAS HEALED NICELY. ABDOMEN: Soft and non-tender. EXTREMITIES: No lower extremity edema. Feet are warm. NEUROLOGICAL: Higher function gains are normal.  No motor deficits.     LABORATORY DATA AND DIAGNOSTIC of atrial fibrillation and any episodes of significant bradycardia. No need for antiarrhythmic therapy. Discussed the plan with the patient and his questions were answered. Follow-up with me in 3 months.     Sonya Jin MD, [facility], Lyubov Willett on 2/3/2021 at 11:21 AM

## 2021-02-02 ENCOUNTER — CARE COORDINATION (OUTPATIENT)
Dept: CASE MANAGEMENT | Age: 71
End: 2021-02-02

## 2021-02-02 NOTE — CARE COORDINATION
[facility] Transitions Follow Up Call    2021    Patient: Arielle Delarosa  Patient : 1950   MRN: [id number]  Reason for Admission: CABG X 3  Discharge Date: 21 RARS: Readmission Risk Score: 21         Spoke with: Wife    CARE TRANSITION CALL FOLLOW UP:     CHARTING SUMMARY:    Patient's wife states patient is doing well. \"He has everything that he needs\". Incision clean dry and intact. Healing well. Very little pain. Patient has all medications is taking medications as directed and has no questions concerning medications at this time. Appetite good, drinking adequate fluids. Normal elimination patterns (BM & Urination). Has HHC coming in a few times a week. Patient doing well with therapy. Patient has no needs or concerns for writer at this time. Will continue to follow. Elizabeth Olivares LPN    [phone]  Annie Osborn / Alla Cantrell Coordinator    Verified name and  with patient as identifiers. Care Transition Nurse (CTN) contacted the family by telephone to follow up after admission on 2021. Addressed changes since last contact: No changes  Discharged needs reviewed: home health care-Coming twice a week  Follow up appointment completed? Yes    Advance Care Planning:   Does patient have an Advance Directive:  reviewed and current. CTN reviewed discharge instructions, medical action plan and red flags with family and discussed any barriers to care and/or understanding of plan of care after discharge. Discussed appropriate site of care based on symptoms and resources available to patient including: Home health. The family agrees to contact the PCP office for questions related to their healthcare.      Non-face-to-face services provided:  Obtained and reviewed discharge summary and/or continuity of care documents    Patients top risk factors for readmission: functional physical ability, lack of knowledge about disease, medical condition and medication management    Discussed follow-up appointments. If no appointment was previously scheduled, appointment scheduling offered: Yes   Is follow up appointment scheduled within 7 days of discharge? Yes  [facility] follow up appointment(s): 1/29/2021 PCP      Plan for follow-up call in 5-7 days based on severity of symptoms and risk factors. Plan for next call: symptom management-Incinsional pain      CTN provided contact information for future needs. Ino Rico LPN    [phone]  Merrick Marlon / [address] [place] [address] Transitions Subsequent and Final Call    Subsequent and Final Calls  Do you have any ongoing symptoms?: No  Have your medications changed?: No  Do you have any questions related to your medications?: No  Do you currently have any active services?: No  Are you currently active with any services?: Home Health  Do you have any needs or concerns that I can assist you with?: No  Identified Barriers: None  Care Transitions Interventions  Other Interventions:            Follow Up  Future Appointments   Date Time Provider Ramo Graham   2/3/2021 10:45 AM Josr Pastor MD N SRPX Heart [facility] - [address] [facility]   2/8/2021  9:30 AM George Carrasco CT/CV [facility] - [address] [place] [address]   2/23/2021 12:30 PM STR CARDIOPULMONARY CONFERENCE ROOM STR CAR PUL [place]   2/25/2021 10:00 AM Randy Esparza MD N SRPX Heart [facility] - [facility]   3/1/2021  2:30 PM Tonia Connors MD [facility]   3/31/2021 10:30 AM ARCADIO Murphy - CNP N SRPX CHF P - Ed Smith LPN

## 2021-02-03 ENCOUNTER — OFFICE VISIT (OUTPATIENT)
Dept: CARDIOLOGY CLINIC | Age: 71
End: 2021-02-03
Payer: MEDICARE

## 2021-02-03 VITALS
WEIGHT: 150.6 LBS | DIASTOLIC BLOOD PRESSURE: 63 MMHG | BODY MASS INDEX: 22.82 KG/M2 | HEIGHT: 68 IN | SYSTOLIC BLOOD PRESSURE: 118 MMHG | HEART RATE: 42 BPM

## 2021-02-03 DIAGNOSIS — R53.83 FATIGUE, UNSPECIFIED TYPE: ICD-10-CM

## 2021-02-03 DIAGNOSIS — I48.91 ATRIAL FIBRILLATION, UNSPECIFIED TYPE (HCC): Primary | ICD-10-CM

## 2021-02-03 PROCEDURE — G8482 FLU IMMUNIZE ORDER/ADMIN: HCPCS | Performed by: INTERNAL MEDICINE

## 2021-02-03 PROCEDURE — 99203 OFFICE O/P NEW LOW 30 MIN: CPT | Performed by: INTERNAL MEDICINE

## 2021-02-03 PROCEDURE — 4040F PNEUMOC VAC/ADMIN/RCVD: CPT | Performed by: INTERNAL MEDICINE

## 2021-02-03 PROCEDURE — 3017F COLORECTAL CA SCREEN DOC REV: CPT | Performed by: INTERNAL MEDICINE

## 2021-02-03 PROCEDURE — 1123F ACP DISCUSS/DSCN MKR DOCD: CPT | Performed by: INTERNAL MEDICINE

## 2021-02-03 PROCEDURE — G8427 DOCREV CUR MEDS BY ELIG CLIN: HCPCS | Performed by: INTERNAL MEDICINE

## 2021-02-03 PROCEDURE — 93000 ELECTROCARDIOGRAM COMPLETE: CPT | Performed by: INTERNAL MEDICINE

## 2021-02-03 PROCEDURE — 1111F DSCHRG MED/CURRENT MED MERGE: CPT | Performed by: INTERNAL MEDICINE

## 2021-02-03 PROCEDURE — G8420 CALC BMI NORM PARAMETERS: HCPCS | Performed by: INTERNAL MEDICINE

## 2021-02-03 PROCEDURE — 4004F PT TOBACCO SCREEN RCVD TLK: CPT | Performed by: INTERNAL MEDICINE

## 2021-02-03 RX ORDER — METOPROLOL TARTRATE 100 MG/1
TABLET ORAL
Qty: 60 TABLET | Refills: 3 | Status: SHIPPED | OUTPATIENT
Start: 2021-02-03 | End: 2021-02-08

## 2021-02-03 NOTE — PROGRESS NOTES
Pt C/O sob on exertions, getting stronger every day.     Pt denies CP, Headache, dizziness, heart palpitations, swelling, fatigue

## 2021-02-04 ENCOUNTER — TELEPHONE (OUTPATIENT)
Dept: CARDIOLOGY CLINIC | Age: 71
End: 2021-02-04

## 2021-02-08 ENCOUNTER — TELEPHONE (OUTPATIENT)
Dept: FAMILY MEDICINE CLINIC | Age: 71
End: 2021-02-08

## 2021-02-08 ENCOUNTER — OFFICE VISIT (OUTPATIENT)
Dept: CARDIOTHORACIC SURGERY | Age: 71
End: 2021-02-08

## 2021-02-08 VITALS
BODY MASS INDEX: 22.73 KG/M2 | SYSTOLIC BLOOD PRESSURE: 117 MMHG | HEART RATE: 49 BPM | WEIGHT: 150 LBS | HEIGHT: 68 IN | DIASTOLIC BLOOD PRESSURE: 66 MMHG

## 2021-02-08 DIAGNOSIS — Z95.1 S/P CABG (CORONARY ARTERY BYPASS GRAFT): Primary | ICD-10-CM

## 2021-02-08 PROCEDURE — 99024 POSTOP FOLLOW-UP VISIT: CPT | Performed by: PHYSICIAN ASSISTANT

## 2021-02-08 NOTE — PROGRESS NOTES
CT/CV Surgery Follow Up Office Visit      Patient's Name/Date of Birth: Sigrid Kelly / 1950 (42 y.o.)    Cardiologist: Dr. Melva Ward    PCP: Lali Martínez MD      Date: February 8, 2021    We had the pleasure of seeing Sigrid Kelly in the office today, as you know this is a very pleasant 79y.o. year old male with a history of CAD who underwent CABG x. 3 on 01/12/21. The pt is doing well no complaints. He is sinus ericka with fatigue. The pt denies chest pressure, SOB, fever, chills, N/V/D. PastMedical History:  Emmett Almanzar  has a past medical history of Asthma, Collagenous colitis, Colon polyps, COPD, mild ([address] Eczema of hand, HTN (hypertension), Hyperlipidemia, and Smoker. Past Surgical History:  The patient  has a past surgical history that includes Nasal sinus surgery (06/2008); Colonoscopy (2011,6/30/14); other surgical history (DEC 7TH 2012 DR Liam Evans [facility] ); hernia repair; Skin cancer excision (09/2014); Tooth Extraction (02/2017); Coronary artery bypass graft (01/12/2021); and Coronary artery bypass graft (N/A, 1/12/2021). Allergies: The patient is allergic to penicillins and sulfa antibiotics. Medications:    Current Outpatient Medications:     metoprolol (LOPRESSOR) 100 MG tablet, 25 mg twice daily, Disp: 60 tablet, Rfl: 3    furosemide (LASIX) 20 MG tablet, TAKE 2 TABLETS BY MOUTH DAILY, Disp: 180 tablet, Rfl: 3    potassium chloride (KLOR-CON M) 10 MEQ extended release tablet, TAKE 2 TABLETS BY MOUTH DAILY, Disp: 180 tablet, Rfl: 3    midodrine (PROAMATINE) 5 MG tablet, Take 0.5 tablets by mouth 3 times daily (with meals), Disp: 90 tablet, Rfl: 3    losartan (COZAAR) 25 MG tablet, Take 0.5 tablets by mouth daily, Disp: 30 tablet, Rfl: 3    ALPRAZolam (XANAX) 0.5 MG tablet, Take 1 tablet by mouth 3 times daily as needed for Sleep or Anxiety for up to 30 days. , Disp: 30 tablet, Rfl: 0    aspirin 81 MG chewable tablet, Take 1 tablet by mouth daily, Disp: 30 tablet, Rfl: 3    apixaban (ELIQUIS) 5 MG TABS tablet, Take 1 tablet by mouth 2 times daily, Disp: 60 tablet, Rfl: 1    albuterol sulfate  (90 Base) MCG/ACT inhaler, Inhale 2 puffs into the lungs every 6 hours as needed for Wheezing, Disp: 1 Inhaler, Rfl: 4    atorvastatin (LIPITOR) 40 MG tablet, Take 1 tablet by mouth nightly, Disp: 30 tablet, Rfl: 3    umeclidinium-vilanterol (ANORO ELLIPTA) 62.5-25 MCG/INH AEPB inhaler, Inhale 1 puff into the lungs daily, Disp: 1 each, Rfl: 3    fluocinonide (LIDEX) 0.05 % ointment, Apply topically as needed, Disp: , Rfl:     loratadine (CLARITIN) 10 MG tablet, Take 10 mg by mouth daily , Disp: , Rfl:     Multiple Vitamins-Minerals (CENTRUM SILVER PO), Take  by mouth daily. , Disp: , Rfl:     Family History: This patient's family history includes Diabetes in his brother; Heart Disease in his brother, father, and mother. Social History:  Dorethia Hammans  reports that he has been smoking cigarettes. He has a 40.00 pack-year smoking history. He has never used smokeless tobacco. He reports current alcohol use. He reports that he does not use drugs. Vital Signs:   Vitals:    02/08/21 0937   BP: 117/66   Pulse: (!) 49   Weight: 150 lb (68 kg)   Height: 5' 8\" (1.727 m)     ROS:   Constitutional: Negative for activity change, chills, fatigue, fever and unexpected weight change. Respiratory: Negative for apnea, shortness of breath, wheezing and stridor. Cardiovascular: Negative for chest pain, palpitations and leg swelling. Gastrointestinal: Negative for hematochezia, melana, constipation, and N/V/D. Musculoskeletal: Negative for myalgias  Skin: Negative for color change, rash and wound. Neurological: Negative for dizziness or syncope. Physical Exam:  General appearance:  No acute distress, appears stated age and cooperative. Neck: No jugular venous distention. Trachea midline. No carotid bruits. Respiratory:  Normal respiratory effort.  Clear to auscultation, bilaterally without Rales/Wheezes/Rhonch. Cardiovascular:  Regular rate and rhythm with normal S1/S2 without murmurs, rubs or gallops. Abdomen: Soft, non-tender, non-distended with normal bowel sounds. Ext: No clubbing, cyanosis or edema bilaterally. Full range of motion without deformity. Skin: Skin color, texture, turgor normal.  No rashes or lesions. Neurologic:  Neurovascularly intact without any focal sensory/motor deficits. Psychiatric:  Alert and oriented, thought content appropriate, normal insight. Capillary Refill: Brisk,< 3 seconds   Peripheral Pulses: +2 palpable, equal bilaterally     Labs:    CBC:  Lab Results   Component Value Date    WBC 13.3 01/26/2021    HGB 10.9 01/26/2021    HCT 33.9 01/26/2021    .3 01/26/2021     01/26/2021    INR 1.13 01/11/2021     BMP:   Lab Results   Component Value Date     01/26/2021    K 5.3 01/26/2021    K 5.3 01/09/2021    CL 99 01/26/2021    CO2 29 01/26/2021    BUN 23 01/26/2021    CREATININE 1.2 01/26/2021    MG 2.2 01/16/2021       Imaging  CXR PA & LATERAL: I have reviewed the CXR images. Active Problem List:  Patient Active Problem List   Diagnosis    Hyperlipidemia    Asthma    Smoker    Allergic rhinitis due to other allergen    Collagenous colitis    Lactose intolerance    HTN (hypertension)    COPD, mild (HCC)    Atrial fibrillation with rapid ventricular response (HCC)    Atrial fibrillation ([facility] [address]    Coronary artery disease involving native coronary artery    S/P CABG x 3    Encounter for cardioversion procedure    S/P left atrial appendage ligation    Ischemic cardiomyopathy    Chronic bronchitis (HCC)    CHF (congestive heart failure), NYHA class II, chronic, systolic (HCC)       Assessment:  S/p cabg    Plan 2/8/21:  1) Continue current medical therapy. 2) CXR and labs reviewed.    3)Sternal precautions are still in place for 2 full months postop with no heavy lifting, but they are cleared to start driving locally. 4) Rediscussed cardiac rehab. 5) No follow up  Thank you for allowing us to be involved in the patient's care.     Electronically by Sophy Dixon PA-C  on 2/8/2021 at 8:37 AM

## 2021-02-08 NOTE — TELEPHONE ENCOUNTER
Jael Caraballo called stating pt's pulse has been running in the 40's and 50's since increasing Metoprolol to 50 mg BID. Pt is having no symptoms. Pt's BP has been running 138/53 115/66 124/47 this weekend also.      Please call Jael Caraballo once addressed  21 [phone]

## 2021-02-11 ENCOUNTER — TELEPHONE (OUTPATIENT)
Dept: CARDIOLOGY CLINIC | Age: 71
End: 2021-02-11

## 2021-02-11 ENCOUNTER — CARE COORDINATION (OUTPATIENT)
Dept: CASE MANAGEMENT | Age: 71
End: 2021-02-11

## 2021-02-11 RX ORDER — POTASSIUM CHLORIDE 750 MG/1
10 TABLET, EXTENDED RELEASE ORAL DAILY
Qty: 180 TABLET | Refills: 3
Start: 2021-02-11 | End: 2022-01-01 | Stop reason: ALTCHOICE

## 2021-02-11 RX ORDER — FUROSEMIDE 20 MG/1
TABLET ORAL
Qty: 180 TABLET | Refills: 3
Start: 2021-02-11 | End: 2021-01-01

## 2021-02-11 NOTE — CARE COORDINATION
[facility] Transitions Follow Up Call    2021    Patient: Nhi Hess  Patient : 1950   MRN: [id number]  Reason for Admission: CABG X 3 / A-fib  Discharge Date: 21 RARS: Readmission Risk Score: 21       Attempted to contact patient for follow up Care Transition call. No answer - Unable to leave message. Will continue to follow. India Taylor LPN    [phone]  Noretta Frieze / [address] Transitions Subsequent and Final Call    Subsequent and Final Calls  Care Transitions Interventions  Other Interventions:            Follow Up  Future Appointments   Date Time Provider Ramo Graham   2/15/2021  1:45 PM STR HM EXAM RM 01 STRZ EKG No [facility]   2021 12:30 PM STR CARDIOPULMONARY CONFERENCE ROOM [facility]   2021 10:00 AM Geovani Ryan MD N SRPX [facility] [place]   3/1/2021  2:30 PM Reginaldo Onofre MD [facility]   3/31/2021 10:30 AM ARCADIO Pedro - CNP N SRPX CHF [facility] [place]   2021  1:30 PM Corey Morel MD [address] [place] [address]       India Taylor LPN

## 2021-02-11 NOTE — TELEPHONE ENCOUNTER
BMP reviewed  Kidney function has worsened from previous but is at the high end of his baseline  Decrease Lasix 20 mg daily except 40 mg on M-W-F  Decrease Potassium 10 mEq daily  We will recheck a BMP at his appt with me next month

## 2021-02-12 RX ORDER — ATORVASTATIN CALCIUM 40 MG/1
40 TABLET, FILM COATED ORAL NIGHTLY
Qty: 90 TABLET | Refills: 3 | Status: SHIPPED | OUTPATIENT
Start: 2021-02-12 | End: 2021-01-01 | Stop reason: SDUPTHER

## 2021-02-12 NOTE — TELEPHONE ENCOUNTER
Spoke with patient spouse, Yuan Ba, who was notified and verbalized understanding. 65 years old woman with vascular risk factors of age, HTN, DM II, HPLD and CAD was evaluated at [facility] for language disturbance. On 11/27 she underwent T10 laminectomy and fusion. Postoperatively, she was noted to have Wernicke's aphasia due to left temporal lobar ICH of undetermined etiology. She reported to have had significant improvement in her language deficit in the rehabilitation. In the evening of 12/24, she was reported to have worsening of her aphasia, prompting her presentation to OSH and subsequent transfer to [facility]. CT brain or admission showed expected evolution/resolution of left temporal ICH leading to development of encephalomalacia. MRI brain showed acute infarct in the left MCA distribution adjacent to the prior hemorrhage in the late subacute to chronic stage and expected evolution of prior left frontotemporal convexal subarachnoid hemorrhage as well as was concerning for a dilated cortical vein anterior to the hematoma.     Impression: Left MCA distribution stroke of cryptogenic but of presumed embolic etiology from undetermined source  Recent left temporal lobar ICH with associated left frontotemporal convexal subarachnoid hemorrhage - likely etiology could be intracranial hemorrhages in the setting of PRES (post-operative) but possibility of underlying vascular malformation like micro-AVM or large vessel vasculopathy like vasculitis needs to be ruled out    NEURO: Neurologically? Continue monitoring for neurologic deterioration in setting of cerebral edema and mass effect, Plan for conventional angiogram to rule out underlying vascular malformation with neuro-nterventionist- Dr. Buchanan, keep SBP <160/90, Lipid profile pending- titrate statin to LDL goal less than 70, MRI Brain w/wo contrast noted above. Physical therapy/OT evaluations pending.    ANTITHROMBOTIC THERAPY: Considering CT/MRI findings suggestive of late subacute to chronic ICH and new ischemic infarct, benefits of starting aspirin at this time would outweigh potential risks.    PULMONARY: CXR clear, protecting airway, saturating well     CARDIOVASCULAR: check TTE/IMTIAZ to rule out cardiac source of embolism, continue cardiac monitoring, prolonged cardiac monitoring with ICM to screen for occult cardiac arrhythmia like atrial fibrillation being the cause of possible cardiac source of embolism to be considered based on results of above mentioned cardiac tests                               SBP goal: Gradual normotension    GASTROINTESTINAL:  dysphagia screen passed- tolerating diet       Diet: Regular    RENAL: BUN/Cr ?, good urine output      Na Goal: Greater than 135     Rodriguez: no    HEMATOLOGY: H/H ?, Platelets ?     DVT ppx: LMWH    ID: afebrile, no leukocytosis     OTHER:     DISPOSITION: Rehab or home depending on PT eval once stable and workup is complete    CORE MEASURES:        Admission NIHSS: 0     TPA: [] YES [x] NO      LDL/HDL: p     Depression Screen: p     Statin Therapy: y     Dysphagia Screen: [x] PASS [] FAIL     Smoking [] YES [x] NO      Afib [] YES [x] NO     Stroke Education [x] YES [] NO 65 years old woman with vascular risk factors of age, HTN, DM II, HPLD and CAD was evaluated at [facility] for language disturbance. On 11/27 she underwent T10 laminectomy and fusion. Postoperatively, she was noted to have Wernicke's aphasia due to left temporal lobar ICH of undetermined etiology. She reported to have had significant improvement in her language deficit in the rehabilitation. In the evening of 12/24, she was reported to have worsening of her aphasia, prompting her presentation to OSH and subsequent transfer to [facility]. CT brain or admission showed expected evolution/resolution of left temporal ICH leading to development of encephalomalacia. MRI brain showed acute infarct in the left MCA distribution adjacent to the prior hemorrhage in the late subacute to chronic stage and expected evolution of prior left frontotemporal convexal subarachnoid hemorrhage as well as was concerning for a dilated cortical vein anterior to the hematoma. Of note, MRI brain (12/7) showed left temporal lobar ICH, left frontotemporal convexal SAH and juxtacortical FLAIR hyperintensities concerning for PRES to my eye.     Impression:   Left MCA distribution stroke - likely etiology being cryptogenic stroke, probably related to embolism from a proximal source, like cardiac/paradoxical source of embolism  Recent left temporal lobar ICH with associated left frontotemporal convexal subarachnoid hemorrhage - likely etiology could be intracranial hemorrhages in the setting of PRES but possibility of underlying vascular malformation like micro-AVM or large vessel vasculopathy like vasculitis needs to be ruled out     NEURO: Neurologically continue monitoring for neurologic deterioration in setting of cerebral edema and mass effect, Plan for conventional angiogram to rule out underlying vascular malformation with neuro-nterventionist- Dr. Buchanan, keep SBP <160/90, Lipid profile pending- titrate statin to LDL goal less than 70, MRI Brain w/wo contrast noted above. Physical therapy/OT evaluations pending.    ANTITHROMBOTIC THERAPY: Considering CT/MRI findings suggestive of late subacute to chronic ICH and new/acute ischemic infarct, benefits of continuing aspirin at this time would outweigh potential risks     PULMONARY: CXR clear, protecting airway, saturating well     CARDIOVASCULAR: check TTE/IMTIAZ to rule out cardiac source of embolism, continue cardiac monitoring, prolonged cardiac monitoring with ICM to screen for occult cardiac arrhythmia like atrial fibrillation being the cause of possible cardiac source of embolism to be considered based on results of above mentioned cardiac tests                               SBP goal: Gradual normotension    GASTROINTESTINAL:  dysphagia screen passed- tolerating diet       Diet: Regular    RENAL: BUN/Cr, good urine output      Na Goal: Greater than 135     Rodriguez: no    HEMATOLOGY: H/H and Platelets without acute change      DVT ppx: pharmacological DVT prophylaxis    ID: afebrile, no leukocytosis     OTHER:     DISPOSITION: Rehab or home depending on PT eval once stable and workup is complete    CORE MEASURES:        Admission NIHSS: 0     TPA: [] YES [x] NO      LDL/HDL: p     Depression Screen: p     Statin Therapy: y     Dysphagia Screen: [x] PASS [] FAIL     Smoking [] YES [x] NO      Afib [] YES [x] NO     Stroke Education [x] YES [] NO 65 years old woman with vascular risk factors of age, HTN, DM II, HPLD and CAD was evaluated at [facility] for language disturbance. On 11/27 she underwent T10 laminectomy and fusion. Postoperatively, she was noted to have Wernicke's aphasia due to left temporal lobar ICH of undetermined etiology. She reported to have had significant improvement in her language deficit in the rehabilitation. In the evening of 12/24, she was reported to have worsening of her aphasia, prompting her presentation to OSH and subsequent transfer to [facility]. CT brain or admission showed expected evolution/resolution of left temporal ICH leading to development of encephalomalacia. MRI brain showed acute infarct in the left MCA distribution adjacent to the prior hemorrhage in the late subacute to chronic stage and expected evolution of prior left frontotemporal convexal subarachnoid hemorrhage as well as was concerning for a dilated cortical vein anterior to the hematoma. Of note, MRI brain (12/7) showed left temporal lobar ICH, left frontotemporal convexal SAH and juxtacortical FLAIR hyperintensities concerning for PRES to my eye.     Impression:   Left MCA distribution stroke - likely etiology being cryptogenic stroke, probably related to embolism from a proximal source, like cardiac/paradoxical source of embolism  Recent left temporal lobar ICH with associated left frontotemporal convexal subarachnoid hemorrhage - likely etiology could be intracranial hemorrhages in the setting of PRES but possibility of underlying vascular malformation like micro-AVM or large vessel vasculopathy like vasculitis needs to be ruled out     NEURO: Neurologically continue monitoring for neurologic deterioration in setting of cerebral edema and mass effect, keep SBP <160/90, Lipid profile pending- titrate statin to LDL goal less than 70, MRI Brain w/wo contrast noted above. Physical therapy/OT evaluations pending.    ANTITHROMBOTIC THERAPY: Considering CT/MRI findings suggestive of late subacute to chronic ICH and new/acute ischemic infarct, benefits of continuing aspirin at this time would outweigh potential risks     PULMONARY: CXR clear, protecting airway, saturating well     CARDIOVASCULAR: IMTIAZ showed EF 74% moderate mitral regurgitation. Moderate focal atheroma noted in aortic arch/descending aorta, continue cardiac monitoring, prolonged cardiac monitoring with ICM to screen for occult cardiac arrhythmia like atrial fibrillation being the cause of possible cardiac source of embolism to be considered based on results of above mentioned cardiac tests                               SBP goal: Gradual normotension    GASTROINTESTINAL:  dysphagia screen passed- tolerating diet       Diet: Regular    RENAL: BUN/Cr without acute change, Hypokalemia- s/p KCl supplementation good urine output      Na Goal: Greater than 135     Rodirguez: no    HEMATOLOGY: H/H and Platelets without acute change      DVT ppx: pharmacological DVT prophylaxis    ID: afebrile, no leukocytosis     OTHER: Plan endorsed to patient at bedside.     DISPOSITION: Rehab or home depending on PT eval once stable and workup is complete    CORE MEASURES:        Admission NIHSS: 0     TPA: [] YES [x] NO      LDL/HDL: p     Depression Screen: p     Statin Therapy: y     Dysphagia Screen: [x] PASS [] FAIL     Smoking [] YES [x] NO      Afib [] YES [x] NO     Stroke Education [x] YES [] NO

## 2021-02-15 ENCOUNTER — TELEPHONE (OUTPATIENT)
Dept: CARDIOLOGY CLINIC | Age: 71
End: 2021-02-15

## 2021-02-15 NOTE — TELEPHONE ENCOUNTER
Patient wife is calling in stating the pharmacy was supposed to fax something to the office about his eliquis needing prior authorized maybe? He will be out of medication in just a couple of days and she would like someone to call her back asap today to update them. Please advise.

## 2021-02-15 NOTE — TELEPHONE ENCOUNTER
Spoke with pharmacy. Insurance prefers Xarelto. Pharmacy faxing paperwork. Spoke with pts wife. Informed wife.

## 2021-02-17 ENCOUNTER — CARE COORDINATION (OUTPATIENT)
Dept: CASE MANAGEMENT | Age: 71
End: 2021-02-17

## 2021-02-17 NOTE — CARE COORDINATION
[facility] Transitions Follow Up Call    2021    Patient: Nhi Hess  Patient : 1950   MRN: [id number]  Reason for Admission:  CABG X 3 / A-fib  Discharge Date: 21 RARS: Readmission Risk Score: 21    Needs to be reviewed by the provider   Additional needs identified to be addressed with provider No  none  Discussed COVID-19 related testing which was available at this time. Test results were negative. Patient informed of results, if available? Yes         Method of communication with provider : none    Care Transition Nurse (CTN) contacted the patient by telephone to follow up after admission on 21 Verified name and  with patient as identifiers. Addressed changes since last contact: doing well  Discharged needs reviewed: none  Follow up appointment completed? Yes    Advance Care Planning:   Does patient have an Advance Directive:  reviewed and current. CTN reviewed discharge instructions, medical action plan and red flags with patient and discussed any barriers to care and/or understanding of plan of care after discharge. Discussed appropriate site of care based on symptoms and resources available to patient including: PCP and Specialist. The patient agrees to contact the PCP office for questions related to their healthcare. Patients top risk factors for readmission: medical condition  Interventions to address risk factors: call for any concerns, worsening symptoms      Final call based on severity of symptoms and risk factors. CTN provided contact information for future needs.     Care Transitions Subsequent and Final Call    Subsequent and Final Calls  Do you have any ongoing symptoms?: No  Have your medications changed?: No  Do you have any questions related to your medications?: No  Do you currently have any active services?: Yes  Are you currently active with any services?: Home Health  Do you have any needs or concerns that I can assist you with?: No  Identified Barriers: None  Care Transitions Interventions  No Identified Needs  Other Interventions:         Called & spoke with the pt ,  Pt reported he is doing well. Pt stated incision looks good. Pt stated occasional incisional discomfort, not bad. Pt denied any SOB or palpitations, weight has been stable. Pt stated he feels good, has left ankle numbness @ times. Pt denied any swelling or redness noted. Pt stated he will discuss with the PCP. Pt stated he has completed PT. Pt denied need for further call. Pt informed this is the final transition of care call. Instructed to call the primary care provider for any concerns. Please call during the regular office hours if possible, for urgent problems,  there is a provider on call. Call the office & follow the prompts. The answering service is able to make appointments for the following day. Call 911 for emergencies.       Follow Up  Future Appointments   Date Time Provider Ramo Graham   2/23/2021 12:30 PM STR CARDIOPULMONARY CONFERENCE ROOM STRZ CAR PUL [facility]   2/24/2021  1:00 PM STR HM EXAM RM 01 STRZ EKG [facility]   2/25/2021 10:00 AM Geovani Martell MD N SRPX Heart [facility]   3/1/2021  2:30 PM Nida Vizcaino MD [facility]   3/31/2021 10:30 AM ARCADIO Quintanilla - CNP N SRPX CHF [facility] [place]   5/5/2021  1:30 PM Garth Rangel MD N SRPX Heart [facility] - Christine Bray RN  Care Transition Nurse  [phone]

## 2021-02-23 ENCOUNTER — HOSPITAL ENCOUNTER (OUTPATIENT)
Dept: CARDIAC REHAB | Age: 71
Setting detail: THERAPIES SERIES
Discharge: HOME | End: 2021-02-23
Payer: MEDICARE

## 2021-02-23 VITALS — OXYGEN SATURATION: 98 % | WEIGHT: 143 LBS | BODY MASS INDEX: 21.67 KG/M2 | HEART RATE: 47 BPM | HEIGHT: 68 IN

## 2021-02-23 PROCEDURE — G0423 INTENS CARDIAC REHAB NO EXER: HCPCS

## 2021-02-23 PROCEDURE — G0422 INTENS CARDIAC REHAB W/EXERC: HCPCS

## 2021-02-23 NOTE — PLAN OF CARE
[facility] - [id number]  [id number] Facility-Based Program  Individualized Cardiac Treatment Plan    Patient Name:  Nighat Hwang  :  1950  Age:  79 y.o. MRN:  [id number]  Diagnosis: CABG  Date of Event: 21   Physician:  Jose Atwood Office Visit:  21  Date Entered Program: 21  Risk Stratifications: [] Low [] Intermediate [x] High  Allergies: Allergies   Allergen Reactions    Penicillins Rash    Sulfa Antibiotics Rash       COVID -19 Screen  Do you have any of the following symptoms:  [] Fever [] Cough [] SOB [] Muscle/Body Ache [] Loss of taste/smell [x] None    Have you traveled outside of the [place]? [] Yes      [x] No    Have you been around anyone who has tested Positive for COVID 19?  [] Yes      [x] No    The following Education has been completed with patient. [x] Wait in the lobby until we call you back to rehab. [x] You must wear a mask while in the medical center, and in cardiac rehab. Please bring your own. [x] Bring your own bottle of water with you. [x] You can bring a visitor with you, however, they will need to sit in the waiting room while you are in cardiac rehab.     Individual Cardiac Treatment Plan -EXERCISE  INITIAL 30 DAY 60 DAY 90  DAY FINAL DAY   EXERCISE  ASSESSMENT/PLAN EXERCISE  REASSESSMENT EXERCISE   REASSESSMENT EXERCISE   REASSESSMENT EXERCISE   REASSESSMENT EXERCISE  DISCHARGE/FOLLOW-UP   Date: 21 Date: Date: Date: Date: Date:   Session #1 Session # ** Session # ** Session # ** Session # ** Session # **  Last session completed on **   Stages of Change Stages of Change Stages of Change Stages of Change Stages of Change Stages of Change   [] Pre Contemplation  [] Contemplation  [] Preparation  [x] Action  [] Maintenance  [] Relapse [] Pre Contemplation  [] Contemplation  [] Preparation  [] Action  [] Maintenance  [] Relapse [] Pre Contemplation  [] Contemplation  [] Preparation  [] Action  [] Maintenance  [] Relapse [] Pre Contemplation  [] Contemplation  [] Preparation  [] Action  [] Maintenance  [] Relapse [] Pre Contemplation  [] Contemplation  [] Preparation  [] Action  [] Maintenance  [] Relapse [] Pre Contemplation  [] Contemplation  [] Preparation  [] Action  [] Maintenance  [] Relapse   EXERCISE ASSESSMENT EXERCISE ASSESSMENT EXERCISE ASSESSMENT EXERCISE ASSESSMENT EXERCISE ASSESSMENT EXERCISE ASSESSMENT   6 Min Walk Test  Distance walked:   0.09 miles  475 ft.  1.7 METs  Max HR:68 BPM      RPE:  12    THR:    Rhythm:  Sinus Ludwig/ NSR     6 Min Walk Test  Distance walked:   ** miles  ** ft  ** METs  Max HR:** BPM      RPE:  **  %Change ft= **    Rhythm:  **   DASI: 4.6 METs DASI: ** METs DASI: ** METs DASI: ** METs DASI: ** METs DASI: ** METs   Return to Work  Time [place] on returning to work? [x] Yes              [] No   [] Disabled     [] Retired    If yes:  *Job description: oates, standing & raising arms  *Required MET level=4-5  *Return to Work Date: ? Return to work: Has Aminah Martinez returned to work? [] Yes    [] No    Return to work date set? [] Yes, **    [] No    Aminah Martinez is doing ** at work. Return to work: Has Aminah Martinez returned to work? [] Yes    [] No    Return to work date set? [] Yes, **    [] No    Aminah Martinez is doing ** at work. Return to work: Has Aminah Martinez returned to work? [] Yes    [] No    Return to work date set? [] Yes, **    [] No    Aminah Martinez is doing ** at work. Return to work: Has Aminah Martinez returned to work? [] Yes    [] No    Return to work date set? [] Yes, **    [] No    Aminah Martinez is doing ** at work. Return to work: Has Aminah Martinez returned to work? [] Yes    [] No    Return to work? [] Yes, **    [] No    *Required MET Level achieved for job duties?    [] Yes    [] No   Orthopedic Limitations/  [] Yes    [x] No       Orthopedic Limitations  *If patient has orthopedic issue:   Actions/  accomodations needed to make Aminah Martinez successful : Orthopedic Limitations   Orthopedic Limitations   Orthopedic Limitations Orthopedic Limitations     Fall Risk  Fall risk assessed? [x] Yes      [] No    Balance Issues? [] Yes      [x] No     [] Walker [] Cane    [x] Safety issues reviewed      Fall Risk  *If patient is a fall risk, action needed to accommodate:  Fall Risk Fall Risk Fall Risk Fall Risk   Home Exercise  [x] Yes    [] No  Type: walking  Frequency: daily  Duration: 20 min Home Exercise  [] Yes    [] No  Type: **  Frequency:**  Duration: ** Home Exercise  [] Yes    [] No  Type: **  Frequency: **  Duration: ** Home Exercise  [] Yes    [] No  Type: **  Frequency: **  Duration: ** Home Exercise  [] Yes    [] No  Type: **  Frequency: **  Duration: ** Home Exercise  [] Yes    [] No  Type: **  Frequency: **  Duration: **   Angina with Activity? [] Yes    [x] No  Angina Management: na Angina with Activity? [] Yes    [] No  Angina Management: ** Angina with Activity? [] Yes    [] No  Angina Management: ** Angina with Activity? [] Yes    [] No  Angina Management: ** Angina with Activity? [] Yes    [] No  Angina Management: ** Angina with Activity?   [] Yes    [] No  Angina Management: **   EXERCISE PLAN EXERCISE PLAN EXERCISE PLAN EXERCISE PLAN EXERCISE PLAN EXERCISE PLAN   *Interventions* *Interventions* *Interventions* *Interventions* *Interventions* *Interventions*   Exercise Prescription  (per physician & CR staff) Exercise Prescription  (per physician & CR staff) Exercise Prescription  (per physician & CR staff) Exercise Prescription  (per physician & CR staff) Exercise Prescription  (per physician & CR staff) Exercise Prescription  (per physician & CR staff)   Cardiovascular Cardiovascular Cardiovascular Cardiovascular Cardiovascular Cardiovascular   Mode:    [x] Treadmill (TM)  [x] Schwinn Airdyne (AD)  [x] Arms Ergometer (AE)  [] NuStep  [] Elliptical (E) MODE:    [] Treadmill (TM)  [] Schwinn Airdyne (AD)  [] Arms Ergometer (AE)  [] NuStep  [] Elliptical (E) MODE:    [] Treadmill (TM)  [] Schwinn Airdyne (AD)  [] Arms Ergometer (AE)  [] NuStep  [] Elliptical (E) MODE:    [] Treadmill (TM)  [] Schwinn Airdyne (AD)  [] Arms Ergometer (AE)  [] NuStep  [] Elliptical (E) MODE:    [] Treadmill (TM)  [] Schwinn Airdyne (AD)  [] Arms Ergometer (AE)  [] NuStep  [] Elliptical (E) MODE:    [] Treadmill (TM)  [] Schwinn Airdyne (AD)  [] Arms Ergometer (AE)  [] NuStep  [] Elliptical (E)   Initial Workloads  TM: [email or web address] 1.8 METs  AD: 0.3 level = 1.7 METs  NS: 18  Cagle= 1.7 METs  AE: 0.1 level = 1.2 METs Current Workloads  TM:  @ %=  METs  AD:  level =  METs  NS:   Cagle=  METs  AE:  level =  METs Current Workloads  TM:  @ %=  METs  AD:  level =  METs  NS:   Cagle=  METs  AE:  level =  METs Current Workloads  TM:  @ %=  METs  AD:  level =  METs  NS:   Cagle=  METs  AE:  level =  METs Current Workloads  TM:  @ %=  METs  AD:  level =  METs  NS:   Cagle=  METs  AE:  level =  METs Current Workloads  TM:  @ %=  METs  AD:  level =  METs  NS:   Cagle=  METs  AE:  level =  METs     Frequency:    ICR: 3x/week  Home: 2-3x/wk Frequency:   ICR: 3x/week  Home: 3x/wk Frequency:  ICR: 3x/week  Home: 3-4x/wk Frequency:  ICR: 3x/week  Home: 3-4x/wk Frequency:  ICR: 3x/week  Home: 3-4x/wk Frequency:  Jimmy will continue exercise at  5-7 days/week   Duration:   Total aerobic exercise = 30-45 min    5-8 min/mode Duration:  Total aerobic exercises = ** min     **min/mode Duration:  Total aerobic exercises = ** min     **min/mode Duration:  Total aerobic exercises = ** min     **min/mode Duration:  Total aerobic exercises = ** min     **min/mode Duration:  Total erobic exercise =  60-90 min   Intensity:   MET Level = 1.7-1.8  RPE = 12-15 Intensity:  Max MET Level = **  RPE = 12-15 Intensity:  Max MET Level = **  RPE = 12-15 Intensity:  Max MET Level = **  RPE = 12-15 Intensity:  Max MET Level = **  RPE = 12-15 Intensity:  Max MET Level = ** RPE = 12-15   Progression: increase aerobic activity up to 15 min over next 4 weeks by increasing time 2-3 Exercise  *Diony verbalizes planning to ** ** days/week for ** min on days not in rehab. Home Exercise  *Diony verbalizes planning to ** ** days/week for ** min on days not in rehab. Home Exercise  *Otto Metzger his/her plan to ** ** days/week for ** min @ **   *Education* *Education* *Education* *Education* *Education* *Education*   RPE Scale  [x] Yes      [] No  Exercise Safety  [x] Yes      [] No  Equipment Orientation  [x] Yes      [] No  S/S to Report  [x] Yes      [] No  Warm Up/Cool Down  [x] Yes      [] No  Home Exercise  [x] Yes      [] No     All Exercise Education Completed  [] Yes      [] No   Exercise Education Recommended    Workshops  [x] Improving Performance  [x] Balance Training & Fall Prevention  [x] Intro to Yoga - Video  [x] Resistance Training & Core Strength Exercise Education Attended/Date Exercise Education Attended/Date Exercise Education Attended/Date Exercise Education Attended/Date All Sessions Completed?     [] Yes  [] No   *Goals* *Goals* *Goals* *Goals* *Goals* *Goals*   Initial Exercise Goals Exercise Goals  Exercise Goals   Exercise Goals  Exercise Goals  Exercise Goals   Diony plans to:  [x] Attend exercise sessions 3x/wk  [x] initiate home exercise 2-3x/wk for 10-20 min  [x] Increase 6 min walk distance by 10%  [x] Attend Exercise workshops Camilla Prince plans to:  [x] Attend exercise sessions 3x/wk  [x] continue home exercise 2-3x/wk for 20-30 min  [x] Attend Exercise workshops Diony's plans to:  [x] Attend exercise sessions 3x/wk  [x] continue home exercise 3-4x/wk for 30-45 min  [x] Determine plan of exercise following rehab  [x] Attend Exercise workshops Diony's plans to:  [x] Attend exercise sessions 3x/wk  [x] continue home exercise 3-4x/wk for 30-45 min  [x] Determine plan of exercise following rehab  [x] Attend Exercise workshops Diony's plans to:  [x] Attend exercise sessions 3x/wk  [x] continue home exercise 3-4x/wk for 30-45 min  [x] Determine plan of exercise following rehab  [x] Attend Exercise workshops Gerald Husbands achieved exercise goals?     []  Yes    [] No  If no, why?  **  [] Increased 6 min walk distance by 10%  [] Currently exercising 30-60 min/day, 5-7days/wk   [] Plans to continue exercise on own  [] Plans to join a local fitness center to continue exercise  [] Does not plan to continue to exercise after rehab   Return to ADL or Hobbies:  Gerald Husbands would like to improve strength and endurance so he is able to return to going to car shows, fair Return to ADL or Hobbies:  Ethelle Husbands would like to improve strength and endurance so he/she is able to return to ** Return to ADL or Hobbies:  Ethelle Husbands would like to improve strength and endurance so he/she is able to return to ** Return to ADL or Hobbies:  Ethelle Husbands would like to improve strength and endurance so he/she is able to return to ** Return to ADL or Hobbies:  Ethelle Husbands would like to improve strength and endurance so he/she is able to return to ** Return to ADL or Hobbies:  Ethelle Husbands would like to improve strength and endurance so he/she is able to return to **    *MET level required for above goal:  5-6 METs MET level Achieved:  **METs MET level Achieved:  **METs MET level Achieved:  **METs MET level Achieved:  **METs MET level Achieved:  **METs     Individual Cardiac Treatment Plan - Nutrition  NUTRITION  ASSESSMENT/PLAN NUTRITION  REASSESSMENT NUTRITION   REASSESSMENT NUTRITION   REASSESSMENT NUTRITION  DISCHARGE/FOLLOW-UP NUTRITION  DISCHARGE/FOLLOW-UP   Stages of Change Stages of Change Stages of Change Stages of Change Stages of Change Stages of Change   [] Pre Contemplation  [x] Contemplation  [] Preparation  [] Action  [] Maintenance  [] Relapse [] Pre Contemplation  [] Contemplation  [] Preparation  [] Action  [] Maintenance  [] Relapse [] Pre Contemplation  [] Contemplation  [] Preparation  [] Action  [] Maintenance  [] Relapse [] Pre Contemplation  [] Contemplation  [] Preparation  [] Action  [] Maintenance  [] Relapse [] Pre Contemplation  [] Contemplation  [] Preparation  [] Action  [] Maintenance  [] Relapse [] Pre Contemplation  [] Contemplation  [] Preparation  [] Action  [] Maintenance  [] Relapse   NUTRITION ASSESSMENT NUTRITION ASSESSMENT NUTRITION ASSESSMENT NUTRITION ASSESSMENT NUTRITION ASSESSMENT NUTRITION ASSESSMENT   Weight Management  Weight: 146.8        Height: 68\"   BMI: 22  Weight Management  Weight: **                  Weight Management  Weight: **                  Weight Management  Weight: ** Weight Management  Weight: ** Weight Management  Weight: **                    BMI: **   Eating Plan  Current eating habits: low salt Eating Plan  Changes: Eating Plan  Changes: Eating Plan  Changes: Eating Plan  Changes: Eating Plan Improvements:   Alcohol Use  [x] none          [] daily  [] weekly      [] special           Diet Assessment Tool:  RATE YOUR PLATE  *Given to patient to complete and return. Diet Assessment Tool:    Score: **/69        Diet Assessment Tool: RATE YOUR PLATE  Score: **/29   NUTRITION PLAN NUTRITION PLAN NUTRITION PLAN NUTRITION PLAN NUTRITION PLAN NUTRITION PLAN   *Interventions* *Interventions* *Interventions* *Interventions* *Interventions* *Interventions*   Initial Survey given Goal Setting Discussion:   [] Yes      [] No        Follow Up Survey Reviewed & Goals Updated:     Professional Referral  Please check if needed. [] Dietitian Consult   [] Wt. Management Referral  [] Other:  Professional Referral  Please check if needed. [] Dietitian Consult   [] Wt. Management Referral  [] Other: Professional Referral  Please check if needed. [] Dietitian Consult   [] Wt. Management Referral  [] Other: Professional Referral  Please check if needed. [] Dietitian Consult   [] Wt. Management Referral  [] Other: Professional Referral  Please check if needed. [] Dietitian Consult   [] Wt. Management Referral  [] Other: Professional Referral  Please check if needed. [] Dietitian Consult   [] Wt.  Management Referral  [] Other:   *Education* *Education* *Education* *Education* *Education* *Education*   Nutritional Education Recommended    [x] 1:1 Registered Dietitian    Workshops  [x] Label Reading   [x] Menu  [x] Targeting Nutrition Priorities  [x] Fueling a Healthy Body   Nutritional Education Attended/Date Nutritional Education Attended/Date Nutritional Education Attended/Date Nutritional Education Attended/Date All Sessions Completed? [] Yes  [] No   Cooking School  Recommended     [x] Adding Flavor  [x] Fast & Healthy     Breakfasts  [x] Salads & Dressings  [x] Savory Soups  [x] Simple Sides & Sauces  [x] Appetizers &     Snacks  [x] Delicious Desserts  [x] Plant-Based Proteins  [x] Fast Evening Meals  [x] Weekend Breakfasts  [x] Efficiency Cooking  [x] One-Pot [employer]  Sessions Completed   [facility]  Sessions Completed [facility]  Sessions Completed     [facility] [employer]  Sessions Completed [facility]    # of sessions completed:  **   *Goals* *Goals* *Goals* *Goals* *Goals* *Goals*   Diony's nutritional goals are as follows:  Complete and return diet survey Diony's nutritional goals are as follows:  [] Attend Nutrition Workshops  [] Attend 1:1   [] Attend Cooking Classes  [] ** Dinoy's nutritional goals are as follows:  [] Attend Nutrition Workshops  [] Attend 1:1   [] Attend Cooking Classes  [] Complete and return diet survey  [] ** Diony's nutritional goals are as follows:  [] Attend Nutrition Workshops  [] Attend 1:1   [] Attend Cooking Classes  [] ** Diony's nutritional goals are as follows:  [] Attend Nutrition Workshops  [] Attend 1:1   [] Attend Cooking Classes  [] ** Diony achieved nutritional goals   [] Yes    [] No  If no, why?   Use knowledge gained to continue Pritikin eating plan at home       Individual Cardiac Treatment Plan - Psychosocial  PSYCHOSOCIAL  ASSESSMENT/PLAN PSYCHOSOCIAL  REASSESSMENT PSYCHOSOCIAL   REASSESSMENT PSYCHOSOCIAL   REASSESSMENT PSYCHOSOCIAL  DISCHARGE/FOLLOW-UP you could go to sleep and not wake up? [] Yes      [x] No  *Have you had any thoughts of killing yourself? [] Yes      [x] No          Using a scale of 0-10, 0=none, 10=very:   Rate your depression: 5  Rate your anxiety:  4  Using a scale of 0-10, 0=none, 10=very:   Rate your depression: **  Rate your anxiety:  ** Using a scale of 0-10, 0=none, 10=very:   Rate your depression: **  Rate your anxiety:  ** Using a scale of 0-10, 0=none, 10=very:   Rate your depression: **  Rate your anxiety:  ** Using a scale of 0-10, 0=none, 10=very:   Rate your depression: **  Rate your anxiety:  ** Using a scale of 0-10, 0=none, 10=very:   Rate your depression: **  Rate your anxiety:  **   Signs and Symptoms of Depression Present? [x] Yes      [] No  If yes, please explain: Pt states he has been depressed but it is getting better since he is able to do things now. Signs and Symptoms of Depression Present? [] Yes      [] No  If yes, please explain:  ** Signs and Symptoms of Depression Present? [] Yes      [] No  If yes, please explain:  ** Signs and Symptoms of Depression Present? [] Yes      [] No  If yes, please explain:  ** Signs and Symptoms of Depression Present? [] Yes      [] No  If yes, please explain:  ** Signs and Symptoms of Depression Present? [] Yes      [] No  If yes, please explain:  **   Signs and Symptoms of Anxiety Present? [x] Yes      [] No  If yes, please explain:  See above Signs and Symptoms of Anxiety Present? [] Yes      [] No  If yes, please explain:  ** Signs and Symptoms of Anxiety Present? [] Yes      [] No  If yes, please explain:  ** Signs and Symptoms of Anxiety Present? [] Yes      [] No  If yes, please explain:  ** Signs and Symptoms of Anxiety Present? [] Yes      [] No  If yes, please explain:  ** Signs and Symptoms of Anxiety Present? [] Yes      [] No  If yes, please explain:  **   [] Patient has poor eye contact   [x] Flat affect present.   [x] Signs of anxiety, but no anger or hostility    [] Signs social isolation present. []  Signs of alcohol or substance abuse        PSYCHOSOCIAL PLAN PSYCHOSOCIAL PLAN PSYCHOSOCIAL PLAN PSYCHOSOCIAL PLAN PSYCHOSOCIAL PLAN PSYCHOSOCIAL PLAN   *Interventions* *Interventions* *Interventions* *Interventions* *Interventions* *Interventions*   *Please check if needed  [] Psych Consult  [x] Physician Referral- suggested discussing depression and anxiety with PCP  [x] Stress Management Skills *Please check if needed  [] Psych Consult  [] Physician Referral  [] Stress Management Skills *Please check if needed  [] Psych Consult  [] Physician Referral  [] Stress Management Skills *Please check if needed  [] Psych Consult  [] Physician Referral  [] Stress Management Skills *Please check if needed  [] Psych Consult  [] Physician Referral  [] Stress Management Skills *Please check if needed  [] Psych Consult  [] Physician Referral  [] Stress Management Skills   Is patient currently taking anti-depressant or anti-anxiety medications? [] Yes      [x] No   Change in anti-depressant or anti-anxiety medications? [] Yes      [] No  If yes, please list medications:  ** Change in anti-depressant or anti-anxiety medications? [] Yes      [] No  If yes, please list medications:  ** Change in anti-depressant or anti-anxiety medications? [] Yes      [] No  If yes, please list medications:  ** Change in anti-depressant or anti-anxiety medications? [] Yes      [] No  If yes, please list medications:  ** Change in anti-depressant or anti-anxiety medications?   [] Yes      [] No  If yes, please list medications:  **   *Education* *Education* *Education* *Education* *Education* *Education*   Healthy Mind-Set Workshops Recommended  [x] Stress & Health  [x] Taking Charge of Stress  [x] New Thoughts, New Behaviors  [x] Managing Moods & Relationships Healthy Mind-Set Workshops Attended/Date Healthy Mind-Set Workshops Attended/Date Healthy Mind-Set Workshops Attended/Date Healthy Mind-Set Workshops  Completed  [] Yes      [] No Healthy Mind-Set Workshops  Completed  [] Yes      [] No   *Goals* *Goals* *Goals* *Goals* *Goals* *Goals*   Maria Fernanda psychosocial goals are as follows:  Complete HADS & Vikki & Andree, Quality of Life Index, Cardiac Version IV Maria Fernanda psychosocial goals are as follows:  [] Attend Healthy Mind-Set Workshops  [] Reduce depression symptom severity by 1 level  [] ** Madies psychosocial goals are as follows:  [] Attend Healthy Mind-Set Workshops  [] Reduce depression symptom severity by 1 level  [] ** Maria Fernanda psychosocial goals are as follows:  [] Attend Healthy Mind-Set Workshops  [] Reduce depression symptom severity by 1 level  [] Ruth Ann Kent achieved psychosocial goals? [] Yes    [] No  If no, why?  **  [] Use methods learned to continue to reduce depression symptom severity by 1 level  [] ** Diony achieved psychosocial goals?   [] Yes    [] No  If no, why?  **  [] Use methods learned to continue to reduce depression symptom severity by 1 level  [] **     Individual Cardiac Treatment Plan - Other:  Risk Factor/Education  RISK FACTOR  ASSESSMENT/PLAN RISK FACTOR  REASSESSMENT  RISK FACTOR  REASSESSMENT RISK FACTOR  REASSESSMENT RISK FACTOR   DISCHARGE/FOLLOW-UP RISK FACTOR   DISCHARGE/FOLLOW-UP   Stages of Change Stages of Change Stages of Change Stages of Change Stages of Change Stages of Change   [] Pre Contemplation  [x] Contemplation  [] Preparation  [] Action  [] Maintenance  [] Relapse [] Pre Contemplation  [] Contemplation  [] Preparation  [] Action  [] Maintenance  [] Relapse [] Pre Contemplation  [] Contemplation  [] Preparation  [] Action  [] Maintenance  [] Relapse [] Pre Contemplation  [] Contemplation  [] Preparation  [] Action  [] Maintenance  [] Relapse [] Pre Contemplation  [] Contemplation  [] Preparation  [] Action  [] Maintenance  [] Relapse [] Pre Contemplation  [] Contemplation  [] Preparation  [] Action  [] Maintenance  [] Relapse RISK FACTOR/EDUCATION ASSESSMENT RISK FACTOR/EDUCATION ASSESSMENT RISK FACTOR/EDUCATION ASSESSMENT RISK FACTOR/EDUCATION ASSESSMENT RISK FACTOR /EDUCATION ASSESSMENT RISK FACTOR /EDUCATION ASSESSMENT   Hypertension  [x] Yes      [] No    Resting BP: 112/62  Peak Ex BP:130/82  Medication: losartan, metoprolol   Hypertension  Resting BP: **  Peak Ex BP:**  Medication Changes:  [] Yes      [] No Hypertension  Resting BP: **  Peak Ex BP:**  Medication Changes:  [] Yes      [] No Hypertension  Resting BP: **  Peak Ex BP:**  Medication Changes:  [] Yes      [] No Hypertension  Resting BP: **  Peak Ex BP:**  Medication Changes:  [] Yes      [] No Hypertension  Resting BP: **  Peak Ex BP:**  Medication Changes:  [] Yes      [] No   Lipids  HLD/DLD  [x] Yes      [] No  TOTAL CHOL: 126  HDL:  36  LDL:  71  TRI  Medication: atorvastatin Lipids  Medication Changes:  [] Yes      [] No     Lipids  Medication Changes:  [] Yes      [] No     Lipids  Medication Changes:  [] Yes      [] No     Lipids    TOTAL CHOL: **  HDL:  **  LDL:  **  TRIG:  **  Medication Changes:  [] Yes      [] No Lipids    TOTAL CHOL: **  HDL:  **  LDL:  **  TRIG:  **  Medication Changes:  [] Yes      [] No   Diabetes  [] Yes      [x] No   Diabetes  Most Recent BS:  BS have been in range  [] Yes      [] No  Medication Changes  [] Yes      [] No   Diabetes  Most Recent BS:  BS have been in range  [] Yes      [] No  Medication Changes  [] Yes      [] No     Diabetes  Most Recent BS:  BS have been in range  [] Yes      [] No  Medication Changes  [] Yes      [] No     Diabetes  Most Recent BS:  BS have been in range  [] Yes      [] No  Medication Changes  [] Yes      [] No Diabetes  Most Recent BS:  BS have been in range  [] Yes      [] No  Medication Changes  [] Yes      [] No       Tobacco Use  [] Current  [x] Former  [] Never    Years smoked: 35-40:    Date Quit: 21    # cigarettes smoked/day: 1  Smokeless Tobacco use:   [] Yes      [x] No Tobacco Use  Change in smoking status   [] Yes      [] No    Quit date: **   Tobacco Use  Change in smoking status   [] Yes      [] No    Quit date: **   Tobacco Use  Change in smoking status   [] Yes      [] No    Quit date: ** Tobacco Use  Change in smoking status   [] Yes      [] No    Quit date: ** Tobacco Use  Change in smoking status   [] Yes      [] No    Quit date: **             Learning Barriers  Please select one:  [] Speech  [] Literacy  [] Hearing  [] Cognitive  [] Vision  [x] Ready to Learn Learning Barriers Addressed:   [] Yes      [] No   Learning Barriers Addressed:   [] Yes      [] No   Learning Barriers Addressed:  [] Yes      [] No Learning Barriers Addressed:  [] Yes      [] No Learning Barriers Addressed:  [] Yes      [] No     RISK FACTOR/EDUCATION PLAN RISK FACTOR/EDUCATION PLAN RISK FACTOR/EDUCATION PLAN RISK FACTOR/EDUCATION PLAN RISK FACTOR/EDUCATION PLAN RISK FACTOR/EDUCATION PLAN   *Interventions* *Interventions* *Interventions* *Interventions* *Interventions* *Interventions*   Recommended Educational Videos    [x] Overview of The Pritikin Eating Plan  [x] Heart Disease Risk Reduction  [x] Move It! [x] Calorie Density  [x] Healthy Minds, Bodies, Hearts  [x] Label Reading  [x] Metabolic Syndrome & Belly   Or  [] How Our Thoughts Can Heal our Heart  [x] Dining Out-Part 1  [x] Biomechanical Limitations  [x] Facts on Fat  [x] Hypertension & Heart Disease  [x] Diseases of Our Times-Focusing on Diabetes  [x] Body Composition  [x] Nurtition Action Plan  [x] Exercise Action Plan   Completed Videos/Date      2/23/21  Overview of The Pritikin Eating Plan Completed Videos/Date Completed Videos/Date Completed Videos/Date Recommended Educational Videos Completed    [] Yes      [] No    **If not completed, Why? **           Smoking Cessation/Relaspe Prevention Intervention needed? [x] Yes      [] No  *Pt verbalizes and agrees to attend intervention Smoking Cessation/Relapse Prevention Scheduled? [] Yes      [] No  Date:  ** Smoking Cessation/Relapse Prevention completed? [] Yes      [] No  Date: ** Smoking Cessation/Relapse Prevention completed? [] Yes      [] No  Date: ** Smoking Cessation/Relapse Prevention completed? [] Yes      [] No  Date: ** Smoking Cessation Counseling attended  [] Yes      [] No  **If not completed, Why? **   Professional Referrals:  *Please check if needed  [] Diabetes Clinic  [] Lipid Clinic   [] Other:      Professional Referrals:  *Please check if needed  [] Diabetes Clinic  [] Lipid Clinic   [] Other:   Preventative Medication Preventative Medication Preventative Medication Preventative Medication Preventative Medication Preventative Medication   Aspirin  [x] Yes    [] No  Blood Thinner: Clopidogrel/Effient/Brillinta  [x] Yes    [] No  Beta Blocker  [x] Yes    [] No  Ace Inhibitor  [] Yes    [x] No  Statin/Lipid Lowering  [x] Yes    [] No Medication Changes? [] Yes    [] No Medication Changes? [] Yes    [] No Medication Changes? [] Yes    [] No Medication Changes? [] Yes    [] No Medication Changes? [] Yes    [] No   *Education* *Education* *Education* *Education* *Education* *Education*   Does Christine Almanzar require any additional education? [x] Yes    [] No   Does Christine Almanzar require any additional education? [] Yes    [] No Does Christine Almanzar require any additional education? [] Yes    [] No Does Christine Almanzar require any additional education? [] Yes    [] No Does Christine Almanzar require any additional education? [] Yes    [] No Does Christine Almanzar require any additional education?   [] Yes    [] No   Additional Educational Videos    Exercise  [] Improve Performance    Medical  [] Aging Enhancing Your QoL  [] Biology of Weight Control  [] Decoding Lab Results  [] Diseases of Our Time - Overview  [] Sleep Disorders    Nutrition  [] Dining Out - Part 2  [] Fueling a Healthy Body  [] Menu Workshop  [] Planning Your Eating Strategy  [] Targeting Your Nutrition Priorities  [] Vitamins & Minerals    Healthy Mind-Set  [x] Smoking Cessation    Culinary  []Becoming a Pritikin    [] Cooking - Breakfast & Snacks  [] Cooking -Healhty Salads & Dressing  [] Cooking -Dinner & Sides  [] Cooking -Soups & Desserts    Overview  [] The Pritikin Solution Additional Educational Videos Completed Additional Educational Videos Completed Additional Educational Videos Completed Additional Educational Videos Completed Additional Educational Videos Completed    [] Yes    [] No   *Goals* *Goals* *Goals* *Goals* *Goals* *Goals*   Diony's risk factor/education goals are as follows:    [x] Optimal BP <140/90  [] Blood Sugar <120  [x] Attend recommended video education sessions  [x] Takes medications as prescribed 100% of the time   [] ** Diony's risk factor/education goals are as follows:    [x] Optimal BP <140/90  [] Blood Sugar <120  [x] Attend recommended video education sessions  [x] Takes medications as prescribed 100% of the time   [] ** Diony's risk factor/education goals are as follows:    [x] Optimal BP <140/90  [] Blood Sugar <120  [x] Attend recommended video education sessions  [x] Takes medications as prescribed 100% of the time   [] ** Diony's risk factor/education goals are as follows:    [x] Optimal BP <140/90  [] Blood Sugar <120  [x] Attend recommended video education sessions  [x] Takes medications as prescribed 100% of the time   [] ** Diony's risk factor/education goals are as follows:    [x] Optimal BP <140/90  [] Blood Sugar <120  [x] Attend recommended video education sessions  [x] Takes medications as prescribed 100% of the time   [] ** Diony achieved risk factor goals?   [] Yes    [] No  If no, why?  **     Monitored telemetry has revealed Sinus Ludwig/ NSR   Monitored telemetry has revealed **  [] documented arrhythmia at increasing workloads  [] associated symptoms ** Monitored telemetry has revealed  [] documented arrhythmia at increasing workloads  [] associated symptoms ** Monitored telemetry has revealed **  [] documented arrhythmia at increasing workloads  [] associated symptoms ** Monitored telemetry has revealed **  [] documented arrhythmia at increasing workloads  [] associated symptoms ** Monitored telemetry has revealed **  [] documented arrhythmia at increasing workloads  [] associated symptoms **   Physician Response    [x] Cardiac rehab is reasonably and medically necessary for continuous cardiac monitoring surveillance  of patient's cardiac activity  [x] Initiate continuous telemetry monitoring and notify me with any concerns  [] Other   Physician Response    [x] Cardiac rehab is reasonably and medically necessary for continuous cardiac monitoring surveillance  of patient's cardiac activity  [x] Continue continuous telemetry monitoring and notify me with any concerns  [] Other     Physician Response    [x] Cardiac rehab is reasonably and medically necessary for continuous cardiac monitoring surveillance  of patient's cardiac activity  [x] Continue continuous telemetry monitoring and notify me with any concerns   [] Other     Physician Response    [x] Cardiac rehab is reasonably and medically necessary for continuous cardiac monitoring surveillance  of patient's cardiac activity  [x] Continue continuous telemetry monitoring and notify me with any concerns   [] Other     Physician Response    [x] Cardiac rehab is reasonably and medically necessary for continuous cardiac monitoring surveillance  of patient's cardiac activity  [x] Continue continuous telemetry monitoring and notify me with any concerns   [] Other

## 2021-02-24 ENCOUNTER — HOSPITAL ENCOUNTER (OUTPATIENT)
Dept: NON INVASIVE DIAGNOSTICS | Age: 71
Discharge: HOME | End: 2021-02-24
Payer: MEDICARE

## 2021-02-24 DIAGNOSIS — R53.83 FATIGUE, UNSPECIFIED TYPE: ICD-10-CM

## 2021-02-24 DIAGNOSIS — I48.91 ATRIAL FIBRILLATION, UNSPECIFIED TYPE (HCC): ICD-10-CM

## 2021-02-24 PROCEDURE — 93270 REMOTE 30 DAY ECG REV/REPORT: CPT

## 2021-02-25 ENCOUNTER — OFFICE VISIT (OUTPATIENT)
Dept: CARDIOLOGY CLINIC | Age: 71
End: 2021-02-25
Payer: MEDICARE

## 2021-02-25 ENCOUNTER — TELEPHONE (OUTPATIENT)
Dept: CARDIOLOGY CLINIC | Age: 71
End: 2021-02-25

## 2021-02-25 VITALS
DIASTOLIC BLOOD PRESSURE: 70 MMHG | WEIGHT: 149.25 LBS | HEART RATE: 54 BPM | HEIGHT: 68 IN | SYSTOLIC BLOOD PRESSURE: 124 MMHG | BODY MASS INDEX: 22.62 KG/M2

## 2021-02-25 DIAGNOSIS — Z95.1 S/P CABG X 3: ICD-10-CM

## 2021-02-25 DIAGNOSIS — I25.5 ISCHEMIC CARDIOMYOPATHY: Primary | ICD-10-CM

## 2021-02-25 PROCEDURE — 3017F COLORECTAL CA SCREEN DOC REV: CPT | Performed by: INTERNAL MEDICINE

## 2021-02-25 PROCEDURE — G8428 CUR MEDS NOT DOCUMENT: HCPCS | Performed by: INTERNAL MEDICINE

## 2021-02-25 PROCEDURE — 4004F PT TOBACCO SCREEN RCVD TLK: CPT | Performed by: INTERNAL MEDICINE

## 2021-02-25 PROCEDURE — G8482 FLU IMMUNIZE ORDER/ADMIN: HCPCS | Performed by: INTERNAL MEDICINE

## 2021-02-25 PROCEDURE — 4040F PNEUMOC VAC/ADMIN/RCVD: CPT | Performed by: INTERNAL MEDICINE

## 2021-02-25 PROCEDURE — G8420 CALC BMI NORM PARAMETERS: HCPCS | Performed by: INTERNAL MEDICINE

## 2021-02-25 PROCEDURE — 1123F ACP DISCUSS/DSCN MKR DOCD: CPT | Performed by: INTERNAL MEDICINE

## 2021-02-25 PROCEDURE — 99214 OFFICE O/P EST MOD 30 MIN: CPT | Performed by: INTERNAL MEDICINE

## 2021-02-25 RX ORDER — SACUBITRIL AND VALSARTAN 24; 26 MG/1; MG/1
1 TABLET, FILM COATED ORAL 2 TIMES DAILY
Qty: 28 TABLET | Refills: 0 | COMMUNITY
Start: 2021-02-25 | End: 2021-03-01 | Stop reason: SDUPTHER

## 2021-02-25 RX ORDER — SACUBITRIL AND VALSARTAN 24; 26 MG/1; MG/1
1 TABLET, FILM COATED ORAL 2 TIMES DAILY
Qty: 60 TABLET | Refills: 3 | Status: SHIPPED | OUTPATIENT
Start: 2021-02-25 | End: 2021-04-13 | Stop reason: SDUPTHER

## 2021-02-25 NOTE — PROGRESS NOTES
Pt here for check up recent open heart sx with Dr. Sunday Correa-  Pt wearing heart monitor started wearing yesterday    Pt states med list is correct from last appt with Dr. Everardo Luna 2/3/21    Pt brought b/p log and hr for review

## 2021-02-25 NOTE — PROGRESS NOTES
[address] [facility] [address]  Dept: [phone]  Dept Fax: [phone]  Loc: [phone]    Visit Date: 2/25/2021    Mr. Sonia Hartman is a 79 y.o. male  who presented for:  Chief Complaint   Patient presents with    Check-Up     fu cath     Atrial Fibrillation    Congestive Heart Failure       HPI:   HPI   80 yo M s/p CABG x 3 (LIMA-LAD, SVG-RCA, SVG-OM), ZAHRA clip, ICM- EF 35-40%, he is on an event monitor to evaluate for bradycardia. No bleeding. He is ARB. He is still on post-op midodrine. He has more energy. No volume. No chest pain. Eliquis has been expensive. Lasix is stable. He thinks he needs to be on something for CVA ppx. No chest pain, angina, ANDERSON, orthopnea, PND, sob at rest, palpitations, LE edema, or syncope. He is going to have cardiac rehab.        Current Outpatient Medications:     apixaban (ELIQUIS) 5 MG TABS tablet, Take 1 tablet by mouth 2 times daily, Disp: 180 tablet, Rfl: 3    atorvastatin (LIPITOR) 40 MG tablet, Take 1 tablet by mouth nightly, Disp: 90 tablet, Rfl: 3    potassium chloride (KLOR-CON M) 10 MEQ extended release tablet, Take 1 tablet by mouth daily, Disp: 180 tablet, Rfl: 3    furosemide (LASIX) 20 MG tablet, 20 mg daily except M-W-F 40 mg daily, Disp: 180 tablet, Rfl: 3    metoprolol (LOPRESSOR) 25 MG tablet, 25 mg twice daily, Disp: 30 tablet, Rfl: 3    midodrine (PROAMATINE) 5 MG tablet, Take 0.5 tablets by mouth 3 times daily (with meals), Disp: 90 tablet, Rfl: 3    losartan (COZAAR) 25 MG tablet, Take 0.5 tablets by mouth daily, Disp: 30 tablet, Rfl: 3    aspirin 81 MG chewable tablet, Take 1 tablet by mouth daily, Disp: 30 tablet, Rfl: 3    albuterol sulfate  (90 Base) MCG/ACT inhaler, Inhale 2 puffs into the lungs every 6 hours as needed for Wheezing, Disp: 1 Inhaler, Rfl: 4    umeclidinium-vilanterol (ANORO ELLIPTA) 62.5-25 MCG/INH AEPB inhaler, Inhale 1 puff into the lungs daily, Disp: 1 each, Rfl: 3    fluocinonide (LIDEX) 0.05 % ointment, Apply topically as needed, Disp: , Rfl:     loratadine (CLARITIN) 10 MG tablet, Take 10 mg by mouth daily , Disp: , Rfl:     Multiple Vitamins-Minerals (CENTRUM SILVER PO), Take  by mouth daily. , Disp: , Rfl:     Past Medical History  Omer Christie  has a past medical history of Asthma, Collagenous colitis, Colon polyps, COPD, mild ([address] Eczema of hand, HTN (hypertension), Hyperlipidemia, and Smoker. Social History  Omer Christie  reports that he has been smoking cigarettes. He has a 40.00 pack-year smoking history. He has never used smokeless tobacco. He reports current alcohol use. He reports that he does not use drugs. Family History  Omer Christie family history includes Diabetes in his brother; Heart Disease in his brother, father, and mother. There is no family history of bicuspid aortic valve, aneurysms, heart transplant, pacemakers, defibrillators, or sudden cardiac death. Past Surgical History   Past Surgical History:   Procedure Laterality Date    COLONOSCOPY  2011,6/30/14    nba cardenas-no polyps repeat 10 yr    CORONARY ARTERY BYPASS GRAFT  01/12/2021    cabg x 3 with lima and atrial appendage clip  dr Shailesh Stauffer GRAFT N/A 1/12/2021    CABG  X 3 WITH DEJAH, Atrial Appendage Clip performed by Mary Ann Byrd MD at [address] [place] [address] Road  06/2008    polyps    OTHER SURGICAL HISTORY  DEC 7TH 2012 DR [address]NAN [address] [facility]     IGS ENDOSCOPIC BI LAT MAXILLARY, ETHMOID, SPHENOID, FRONTAL SINUSOTOMY WITH SEPTOPLASTY AND TURBINOPLASTIES    SKIN CANCER EXCISION  09/2014    Left side of Forehead     TOOTH EXTRACTION  02/2017       Review of Systems   Constitutional: Negative for chills and fever  HENT: Negative for congestion, sinus pressure, sneezing and sore throat. Eyes: Negative for pain, discharge, redness and itching.    Respiratory: Negative for apnea, cough  Gastrointestinal: Negative for blood in stool, constipation, diarrhea   Endocrine: Negative for cold intolerance, heat intolerance, polydipsia. Genitourinary: Negative for dysuria, enuresis, flank pain and hematuria. Musculoskeletal: Negative for arthralgias, joint swelling and neck pain. Neurological: Negative for numbness and headaches. Psychiatric/Behavioral: Negative for agitation, confusion, decreased concentration and dysphoric mood. Objective:     /70   Pulse 54   Ht 5' 8\" (1.727 m)   Wt 149 lb 4 oz (67.7 kg)   BMI 22.69 kg/m²     Wt Readings from Last 3 Encounters:   02/25/21 149 lb 4 oz (67.7 kg)   02/23/21 143 lb (64.9 kg)   02/08/21 150 lb (68 kg)     BP Readings from Last 3 Encounters:   02/25/21 124/70   02/08/21 117/66   02/03/21 118/63       Nursing note and vitals reviewed. Physical Exam   Constitutional: Oriented to person, place, and time. Appears well-developed and well-nourished. HENT:   Head: Normocephalic and atraumatic. Eyes: EOM are normal. Pupils are equal, round, and reactive to light. Neck: Normal range of motion. Neck supple. No JVD present. Cardiovascular: Normal rate, regular rhythm, normal heart sounds and intact distal pulses. No murmur heard. Pulmonary/Chest: Effort normal and breath sounds normal. No respiratory distress. No wheezes. No rales. Abdominal: Soft. Bowel sounds are normal. No distension. There is no tenderness. Musculoskeletal: Normal range of motion. No edema. Neurological: Alert and oriented to person, place, and time. No cranial nerve deficit. Coordination normal.   Skin: Skin is warm and dry. Psychiatric: Normal mood and affect.        No results found for: CKTOTAL, CKMB, CKMBINDEX    Lab Results   Component Value Date    WBC 13.3 01/26/2021    RBC 3.16 01/26/2021    RBC 4.84 11/07/2011    HGB 10.9 01/26/2021    HCT 33.9 01/26/2021    .3 01/26/2021    MCH 34.5 01/26/2021    MCHC 32.2 01/26/2021    RDW 13.3 06/07/2018     01/26/2021    MPV 9.5 01/26/2021       Lab Results   Component Value Date     02/11/2021    K 5.0 02/11/2021    K 5.3 01/09/2021    CL 97 02/11/2021    CO2 31 02/11/2021    BUN 31 02/11/2021    LABALBU 3.9 01/11/2021    LABALBU 4.3 11/07/2011    CREATININE 1.6 02/11/2021    CALCIUM 9.6 02/11/2021    LABGLOM 43 02/11/2021    GLUCOSE 88 02/11/2021    GLUCOSE 94 11/07/2011       Lab Results   Component Value Date    ALKPHOS 54 01/11/2021    ALT 82 01/11/2021    AST 95 01/11/2021    PROT 7.2 01/11/2021    BILITOT 0.4 01/11/2021    BILIDIR <0.2 01/11/2021    LABALBU 3.9 01/11/2021    LABALBU 4.3 11/07/2011       Lab Results   Component Value Date    MG 2.2 01/16/2021       Lab Results   Component Value Date    INR 1.13 01/11/2021    INR 1.19 (H) 01/08/2021         Lab Results   Component Value Date    LABA1C 5.6 01/11/2021       Lab Results   Component Value Date    TRIG 96 01/07/2021    HDL 36 01/07/2021    LDLCALC 71 01/07/2021    LDLDIRECT 79.82 01/11/2021       Lab Results   Component Value Date    TSH 2.050 01/06/2021         Testing Reviewed:      I have individually reviewed the cardiac test below:    ECHO:   Results for orders placed during the hospital encounter of 01/05/21   ECHO Complete 2D W Doppler W Color    Narrative Transthoracic Echocardiography Report (TTE)     Demographics      Patient Name    Edmundo Masker Gender               Male      MR #            [id number]    Race                                                    Ethnicity      Account #       [de-identified]    Room Number      Accession       [id number]    Date of Study        01/05/2021   Number      Date of Birth   1950   Referring Physician  Jaki Jaramillo      Age             79 year(s)   Sonographer          Alex Vyas RVT                                   Interpreting         David Whitehead MD                                Physician pleural effusion. Aorta / Great Vessels   The IVC is dilated . M-Mode/2D Measurements & Calculations      LV Diastolic   LV Systolic Dimension: 4  AV Cusp Separation: 1.6 cmLA   Dimension: 4.3 cm                        Dimension: 4.5 cmAO Root   cm             LV Volume Diastolic: 33.2 Dimension: 2.9 cmLA Area: 19.4   LV FS:7 %      ml                        cm^2   LV PW          LV Volume Systolic: 06.0   Diastolic: 1.2 ml   cm             LV EDV/LV EDV Index: 49.1   Septum         ml/27 m^2LV ESV/LV ESV    RV Diastolic Dimension: 2.2 cm   Diastolic: 1.1 Index: 24.2 ml/22 m^2   cm             EF Calculated: 15.9 %     LA/Aorta: 1.55                     LV Length: 7.1 cm         LA volume/Index: 60.9 ml /33m^2      LV Area   Diastolic:   49.3 cm^2   LV Area   Systolic: 06.5   cm^2     Doppler Measurements & Calculations                                AV Peak Velocity: 71.8 LVOT Peak Velocity: 61.8                             cm/s                   cm/s                             AV Peak Gradient: 2.06 LVOT Peak Gradient: 2                             mmHg                   mmHg      MV E' Septal Velocity: 7   cm/s   MV A' Septal Velocity:   4.4 cm/s   MV E' Lateral Velocity:                          PV Peak Velocity: 55.7   9.2 cm/s                                         cm/s   MV A' Lateral Velocity:   AV DVI (Vmax):0.86     PV Peak Gradient: 1.24   4.4 cm/s                                         mmHg                                                       NC ED Velocity: 254 cm/s     [id number]        Assessment/Plan   CABG x 3 (LIMA-LAD, SVG-RCA, SVG-OM) - 1/12/2021  Afib, s/p DCCV x 2 ZAHRA clip - he wants to continue Eliquis, but may switch to Xarelto later due to cost for non-ZAHRA, Afib related CVA  ICM- EF 35-40%, NYHA II  Smoking -  Quit  COPD  HTN  Alcohol use - quit  D/c ARB, RX Entresto. D/c Midodrine. He has ZAHRA clip, although he wants to continue some form of [address] at this time. Await event monitor. EF improving. Will check TTE after 3 months of OMT. The patient was advised on risk/benefits of the new Rx and he agreed to proceed with the medication(s). He is doing cardiac rehab. Continue Lasix for now. Discussed diet/exercise/BP/weight loss/health lifestyle choices/lipids; the patient understands the goals and will try to comply.       Disposition:  6 months         Electronically signed by Brice Martínez MD   2/25/2021 at 10:15 AM EST

## 2021-03-01 ENCOUNTER — HOSPITAL ENCOUNTER (OUTPATIENT)
Dept: CARDIAC REHAB | Age: 71
Setting detail: THERAPIES SERIES
Discharge: HOME | End: 2021-03-01
Payer: MEDICARE

## 2021-03-01 ENCOUNTER — TELEPHONE (OUTPATIENT)
Dept: CARDIOLOGY CLINIC | Age: 71
End: 2021-03-01

## 2021-03-01 ENCOUNTER — OFFICE VISIT (OUTPATIENT)
Dept: FAMILY MEDICINE CLINIC | Age: 71
End: 2021-03-01

## 2021-03-01 VITALS
BODY MASS INDEX: 21.35 KG/M2 | HEIGHT: 70 IN | SYSTOLIC BLOOD PRESSURE: 122 MMHG | TEMPERATURE: 96 F | RESPIRATION RATE: 12 BRPM | HEART RATE: 60 BPM | DIASTOLIC BLOOD PRESSURE: 68 MMHG | WEIGHT: 149.13 LBS

## 2021-03-01 DIAGNOSIS — I25.5 ISCHEMIC CARDIOMYOPATHY: ICD-10-CM

## 2021-03-01 DIAGNOSIS — Z95.1 S/P CABG X 3: ICD-10-CM

## 2021-03-01 DIAGNOSIS — I47.1 PAROXYSMAL SUPRAVENTRICULAR TACHYCARDIA (HCC): Primary | ICD-10-CM

## 2021-03-01 DIAGNOSIS — I48.91 ATRIAL FIBRILLATION, UNSPECIFIED TYPE (HCC): ICD-10-CM

## 2021-03-01 DIAGNOSIS — K52.831 COLLAGENOUS COLITIS: ICD-10-CM

## 2021-03-01 DIAGNOSIS — I25.10 CORONARY ARTERY DISEASE INVOLVING NATIVE CORONARY ARTERY OF NATIVE HEART WITHOUT ANGINA PECTORIS: ICD-10-CM

## 2021-03-01 DIAGNOSIS — J44.9 COPD, MILD (HCC): ICD-10-CM

## 2021-03-01 DIAGNOSIS — I50.22 CHF (CONGESTIVE HEART FAILURE), NYHA CLASS II, CHRONIC, SYSTOLIC (HCC): ICD-10-CM

## 2021-03-01 DIAGNOSIS — I10 ESSENTIAL HYPERTENSION: ICD-10-CM

## 2021-03-01 PROCEDURE — G0422 INTENS CARDIAC REHAB W/EXERC: HCPCS

## 2021-03-01 PROCEDURE — 1123F ACP DISCUSS/DSCN MKR DOCD: CPT | Performed by: FAMILY MEDICINE

## 2021-03-01 PROCEDURE — 3017F COLORECTAL CA SCREEN DOC REV: CPT | Performed by: FAMILY MEDICINE

## 2021-03-01 PROCEDURE — G8427 DOCREV CUR MEDS BY ELIG CLIN: HCPCS | Performed by: FAMILY MEDICINE

## 2021-03-01 PROCEDURE — 4040F PNEUMOC VAC/ADMIN/RCVD: CPT | Performed by: FAMILY MEDICINE

## 2021-03-01 PROCEDURE — 99213 OFFICE O/P EST LOW 20 MIN: CPT | Performed by: FAMILY MEDICINE

## 2021-03-01 PROCEDURE — G0423 INTENS CARDIAC REHAB NO EXER: HCPCS

## 2021-03-01 PROCEDURE — G8420 CALC BMI NORM PARAMETERS: HCPCS | Performed by: FAMILY MEDICINE

## 2021-03-01 RX ORDER — METOPROLOL TARTRATE 50 MG/1
50 TABLET, FILM COATED ORAL 2 TIMES DAILY
Qty: 60 TABLET | Refills: 5 | Status: SHIPPED | OUTPATIENT
Start: 2021-03-01 | End: 2021-03-23

## 2021-03-01 ASSESSMENT — ENCOUNTER SYMPTOMS
COUGH: 0
CONSTIPATION: 0
NAUSEA: 0
CHEST TIGHTNESS: 0
TROUBLE SWALLOWING: 0
BACK PAIN: 0
SORE THROAT: 0
SHORTNESS OF BREATH: 0
EYE PAIN: 0
ABDOMINAL PAIN: 0
BLOOD IN STOOL: 0

## 2021-03-01 NOTE — PROGRESS NOTES
Subjective:      Patient ID: Merna Espinoza is a 79 y.o. male. ashd     Stable  1-12-21 and   n o  Symptoms        Rehab  Stable      lipids    Stable      chf    Noted       Copd  stable    not  Smoking        Not  etoh  stable               Past Medical History:   Diagnosis Date    Asthma     Collagenous colitis 2014       repeat  in  2024    Colon polyps 2000    COPD, mild (HCC)     Eczema of hand     HTN (hypertension)     Hyperlipidemia     Smoker        Review of Systems   Constitutional: Negative for fatigue and fever. HENT: Negative for congestion, ear pain, postnasal drip, sore throat and trouble swallowing. Eyes: Negative for pain. Respiratory: Negative for cough, chest tightness and shortness of breath. Cardiovascular: Negative for chest pain, palpitations and leg swelling. Gastrointestinal: Negative for abdominal pain, blood in stool, constipation and nausea. Genitourinary: Negative for difficulty urinating, frequency and urgency. Musculoskeletal: Negative for arthralgias, back pain, joint swelling and neck stiffness. Skin: Negative for rash. Neurological: Negative for dizziness, weakness and headaches. Hematological: Negative for adenopathy. Does not bruise/bleed easily. Psychiatric/Behavioral: Negative for behavioral problems, dysphoric mood and sleep disturbance. /68 (Site: Right Upper Arm, Position: Sitting, Cuff Size: Medium Adult)   Pulse 60   Temp 96 °F (35.6 °C) (Oral)   Resp 12   Ht 5' 10\" (1.778 m)   Wt 149 lb 2 oz (67.6 kg)   BMI 21.40 kg/m²   Objective:   Physical Exam  Vitals signs and nursing note reviewed. Constitutional:       Appearance: He is well-developed. HENT:      Head: Normocephalic and atraumatic. Right Ear: External ear normal.      Left Ear: External ear normal.      Nose: Nose normal.   Eyes:      Conjunctiva/sclera: Conjunctivae normal.      Pupils: Pupils are equal, round, and reactive to light. Comments: Fundi nl   Neck:      Musculoskeletal: Normal range of motion and neck supple. Thyroid: No thyromegaly. Cardiovascular:      Rate and Rhythm: Regular rhythm. Tachycardia present. Heart sounds: Normal heart sounds. No murmur. Pulmonary:      Effort: Pulmonary effort is normal.      Breath sounds: Normal breath sounds. No wheezing or rales. Abdominal:      General: Bowel sounds are normal.      Palpations: Abdomen is soft. There is no mass. Tenderness: There is no abdominal tenderness. Musculoskeletal: Normal range of motion. Lymphadenopathy:      Cervical: No cervical adenopathy. Skin:     General: Skin is warm and dry. Findings: No rash. Neurological:      Mental Status: He is alert and oriented to person, place, and time. Cranial Nerves: No cranial nerve deficit. Deep Tendon Reflexes: Reflexes are normal and symmetric. Assessment:       Diagnosis Orders   1. Paroxysmal supraventricular tachycardia ([address]     2. Atrial fibrillation, unspecified type (HCC)  metoprolol tartrate (LOPRESSOR) 50 MG tablet   3. Essential hypertension     4. Ischemic cardiomyopathy     5. Coronary artery disease involving native coronary artery of native heart without angina pectoris     6. Collagenous colitis     7. COPD, mild ([address]     8. CHF (congestive heart failure), NYHA class II, chronic, systolic (HCC)     9.  S/P CABG x 3           Plan:      Current Outpatient Medications   Medication Sig Dispense Refill    metoprolol tartrate (LOPRESSOR) 50 MG tablet Take 1 tablet by mouth 2 times daily 60 tablet 5    sacubitril-valsartan (ENTRESTO) 24-26 MG per tablet Take 1 tablet by mouth 2 times daily 60 tablet 3    apixaban (ELIQUIS) 5 MG TABS tablet Take 1 tablet by mouth 2 times daily 180 tablet 3    atorvastatin (LIPITOR) 40 MG tablet Take 1 tablet by mouth nightly 90 tablet 3  potassium chloride (KLOR-CON M) 10 MEQ extended release tablet Take 1 tablet by mouth daily 180 tablet 3    furosemide (LASIX) 20 MG tablet 20 mg daily except M-W-F 40 mg daily 180 tablet 3    aspirin 81 MG chewable tablet Take 1 tablet by mouth daily 30 tablet 3    albuterol sulfate  (90 Base) MCG/ACT inhaler Inhale 2 puffs into the lungs every 6 hours as needed for Wheezing 1 Inhaler 4    umeclidinium-vilanterol (ANORO ELLIPTA) 62.5-25 MCG/INH AEPB inhaler Inhale 1 puff into the lungs daily 1 each 3    fluocinonide (LIDEX) 0.05 % ointment Apply topically as needed      loratadine (CLARITIN) 10 MG tablet Take 10 mg by mouth daily       Multiple Vitamins-Minerals (CENTRUM SILVER PO) Take  by mouth daily. No current facility-administered medications for this visit. No orders of the defined types were placed in this encounter. Giving parking placard. This is for one year.  Lopressor 25 mg 2 tabs twice a day and once finished go to the 50 mg prescription  Ila Valentino MD

## 2021-03-01 NOTE — PATIENT INSTRUCTIONS
Giving parking placard. This is for one year.  Lopressor 25 mg 2 tabs twice a day and once finished go to the  50  Mg a day

## 2021-03-01 NOTE — PROGRESS NOTES
Video Education Report - ICR/CR  Name:  Kelli Rosario     Date:  3/1/2021  MRN: [id number]     Session #:  2  Session Length: 40 min    Core Videos        [x]Heart Disease Risk Reduction       []Overview of Pritikin Eating Plan      []Move it          []Calorie Density         []Healthy Minds, Bodies, Hearts        []Label Reading - Part 1       []Metabolic Syndrome and Belly Fat        []How Our Thoughts Can Heal Our Hearts   []Dining Out - Part 1      []Biomechanical Limitations  []Facts on Fat        []Hypertension & Heart Disease    []Diseases of Our Time - Focusing on Diabetes   []Body Composition  []Nutrition Action Plan    []Exercise Action Plan      Comments:  Video completed, group discussion

## 2021-03-03 ENCOUNTER — HOSPITAL ENCOUNTER (OUTPATIENT)
Dept: CARDIAC REHAB | Age: 71
Setting detail: THERAPIES SERIES
Discharge: HOME | End: 2021-03-03
Payer: MEDICARE

## 2021-03-03 PROCEDURE — G0422 INTENS CARDIAC REHAB W/EXERC: HCPCS

## 2021-03-03 PROCEDURE — G0423 INTENS CARDIAC REHAB NO EXER: HCPCS

## 2021-03-05 ENCOUNTER — HOSPITAL ENCOUNTER (OUTPATIENT)
Dept: CARDIAC REHAB | Age: 71
Setting detail: THERAPIES SERIES
Discharge: HOME | End: 2021-03-05
Payer: MEDICARE

## 2021-03-05 PROCEDURE — G0422 INTENS CARDIAC REHAB W/EXERC: HCPCS

## 2021-03-05 PROCEDURE — G0423 INTENS CARDIAC REHAB NO EXER: HCPCS

## 2021-03-05 NOTE — PROGRESS NOTES
Jaiden SCHUSTER.:  1950  Acct Number: [de-identified]  MRN:  [id number]                  [facility] COOKING SCHOOL WORKSHOP             Date: 3/5/2021        Session # ________   Ana Kirby class covered:      () Adding Flavor     () Fast Breakfasts     () Salads and Dressings     () Savory Soups      () Sauces & Simple Sides     (X) Appetizers and Snacks     () Delicious Desserts     () Plant Based Proteins     () Fast Evening Meals     () Weekend Breakfasts     () Efficiency Cooking     () One Pot Wonders     Patients were shown how to choose, prep, and cook; substitutions and other options were given. Samples were offered. Recipes were given and questions answered. The patient above was in the [employer] for 59 minutes.       Electronically signed by Sophia WOODS [facility]   on 3/5/2021 at 12:03 PM

## 2021-03-08 ENCOUNTER — HOSPITAL ENCOUNTER (OUTPATIENT)
Dept: CARDIAC REHAB | Age: 71
Setting detail: THERAPIES SERIES
Discharge: HOME | End: 2021-03-08
Payer: MEDICARE

## 2021-03-08 PROCEDURE — G0423 INTENS CARDIAC REHAB NO EXER: HCPCS

## 2021-03-08 PROCEDURE — G0422 INTENS CARDIAC REHAB W/EXERC: HCPCS

## 2021-03-08 NOTE — PROGRESS NOTES
Hospital Facility-Based Program  Phase 2 Cardiac Rehab Weekly Progress Report      Patient prescribed exercise:  9:00 class. 3 times per week in rehab, 1-4 times per week at home for the amount of sessions/weeks specified by insurance. Current Levels: Treadmill: 1.0mph/0% for 15 minutes, NuStep:  26 Cagle for 5 minutes, UBE: 24watts for 5 minutes. Progression Discussion: Maintain/Increase Aerobic exercise 25 minutes to work on endurance. Attempt to increase intensity by 5-20% for each modality this week. Try to increase intensities until Ashwini Ireland rates the exercises a 13-17 on Daniel RPE.

## 2021-03-08 NOTE — PROGRESS NOTES
Ignacia SCHUSTER.:  1950    MRN:  [id number]    Date: 3/8/2021      Session Length:  35 min   Session # _______    EXERCISE WORKSHOP:  Resistance Training & Core Strength                        Todays class addressed ways to improve overall muscular fitness and core strength. Reviewed a variety of resistance training, and core strengthening exercises that are designed to improve muscular endurance, strength, stability, and balance. Encouraged participants to incorporate resistance training and core strength exercises into their current home exercise program.      Readiness to change:    ( ) Pre-contemplative   ( ) Contemplative - ambivalent about change    (x) Action - ready to set action plan and implement   ( ) Maintenance - has made change and is trying, and or practicing different alternative behaviors     Additional Notes:      Emily Bass was in the Workshop with the Exercise Physiologist for 35 minutes. The content was presented via Powerpoint, lecture, and patient participation based format. Motivational interviewing was utilized when needed, to promote change. Patient voiced understanding.     Electronically signed by Koko Watson on 3/8/2021 at 10:27 AM

## 2021-03-10 ENCOUNTER — HOSPITAL ENCOUNTER (OUTPATIENT)
Dept: CARDIAC REHAB | Age: 71
Setting detail: THERAPIES SERIES
Discharge: HOME | End: 2021-03-10
Payer: MEDICARE

## 2021-03-10 PROCEDURE — G0422 INTENS CARDIAC REHAB W/EXERC: HCPCS

## 2021-03-10 PROCEDURE — G0423 INTENS CARDIAC REHAB NO EXER: HCPCS

## 2021-03-10 NOTE — PROGRESS NOTES
Tam SCHUSTER.:  1950    Acct Number: [de-identified]   MRN:  [id number]                             [facility] NUTRITION 1:1 Counseling             Date: 3/10/2021       Session # _______     Ngoc Moore class covered:    1:1 Counseling Session  Receptiveness to education/goals:  ( x ) Agreeable ( ) No Interest   ( ) Refused  Evaluation of education:  (x ) Indicates understanding   ( ) Needs reinforcement     () Unsuccessful     Readiness to change:    ( ) Pre-contemplative   ( ) Contemplative - ambivalent about change    ( ) Action - ready to set action plan and implement   (x ) Maintenance - has made change and is trying, and or practicing different alternative behaviors     GOALS:  1. Continue with resistance bands & walking. 2.  Weight gain. Discussed adding calories, portion sizes and differences in fats. Jagjit Mcwilliams reports making some changes since starting [facility] including cutting WAY down on sodium intake. Reports stable weight, 140's over past few months. Reports 15 lb weight loss since surgery. Reports wakling in his house for 20 min twice daily. Reports he has been doing exercises with his resistance bands. Food Recall:  Breakfast:  Bananas and raisin bran cereal, milk, whole wheat toast (lower calorie), small glass OJ, decaff coffee, hospital jug of water. Lunch:  Low sodium turkey (lunchmeat) whole wheat bread, low sodium potato chips,  Dinner:  Homemade chili, no salt crackers, skim milk cottage cheese with peaches  Snacks:  Handful of NSA trail mix, apples  Main Beverages:  2 hospital jugs of water with ice    Grocery Shopping: mostly he does, and reads labels    Jagjit Mcwilliams was counseled by the Dietitian for 45 minutes. Motivational Interviewing was used to help promote change. Patient voiced understanding.      Electronically signed by Lelo Waddell RD LD [address] [place]   on 3/10/2021 at 9:48 AM

## 2021-03-12 ENCOUNTER — HOSPITAL ENCOUNTER (OUTPATIENT)
Dept: CARDIAC REHAB | Age: 71
Setting detail: THERAPIES SERIES
Discharge: HOME | End: 2021-03-12
Payer: MEDICARE

## 2021-03-12 PROCEDURE — G0423 INTENS CARDIAC REHAB NO EXER: HCPCS

## 2021-03-12 PROCEDURE — G0422 INTENS CARDIAC REHAB W/EXERC: HCPCS

## 2021-03-12 NOTE — PROGRESS NOTES
Kajal TOUSSAINTO.B.:  1950  Acct Number: [de-identified]  MRN:  [id number]                  [facility] COOKING SCHOOL WORKSHOP             Date: 3/12/2021        Session # ________    Pound class covered:      () Adding Flavor     () Fast Breakfasts     () Salads and Dressings     () Savory Soups      () Sauces & Simple Sides     () Appetizers and Snacks     (X) Delicious Desserts     () Plant Based Proteins     () Fast Evening Meals     () Weekend Breakfasts     () Efficiency Cooking     () One Pot Wonders     Patients were shown how to choose, prep, and cook; substitutions and other options were given. Samples were offered. Recipes were given and questions answered. The patient above was in the [employer] for 45 minutes.       Electronically signed by Lalo WOODS [address] [place]   on 3/12/2021 at 11:54 AM

## 2021-03-15 ENCOUNTER — TELEPHONE (OUTPATIENT)
Dept: CARDIOLOGY CLINIC | Age: 71
End: 2021-03-15

## 2021-03-15 ENCOUNTER — HOSPITAL ENCOUNTER (OUTPATIENT)
Dept: CARDIAC REHAB | Age: 71
Setting detail: THERAPIES SERIES
Discharge: HOME | End: 2021-03-15
Payer: MEDICARE

## 2021-03-15 PROCEDURE — G0422 INTENS CARDIAC REHAB W/EXERC: HCPCS

## 2021-03-15 PROCEDURE — G0423 INTENS CARDIAC REHAB NO EXER: HCPCS

## 2021-03-15 NOTE — PROGRESS NOTES
Video Education Report - ICR/CR  Name:  Nighat Hwang     Date:  3/15/2021  MRN: [id number]     Session #:  36  Session Length: 8 min    Core Videos        []Heart Disease Risk Reduction       []Overview of Pritikin Eating Plan      [x]Move it          []Calorie Density         []Healthy Minds, Bodies, Hearts        []Label Reading - Part 1       []Metabolic Syndrome and Belly Fat        []How Our Thoughts Can Heal Our Hearts   []Dining Out - Part 1      []Biomechanical Limitations  []Facts on Fat        []Hypertension & Heart Disease    []Diseases of Our Time - Focusing on Diabetes   []Body Composition  []Nutrition Action Plan    []Exercise Action Plan        Comments:  Video completed, group discussion.

## 2021-03-15 NOTE — TELEPHONE ENCOUNTER
Discussed event monitor strips with Dr. Lali Quiros. I did talk to patient and he states he was awake at that time. He also denied having any dizziness or lightheadedness. Dr. Lali Quiros wanting to see patient on Wednesday. Appt scheduled, OK with Johanna since schedule full. Patient confirmed appt date and time. I instructed patient that if he gets dizzy, lightheaded or feels like he is going to pass out to go to ER for evaluation. Please agree Dr. Lali Quiros.

## 2021-03-17 ENCOUNTER — HOSPITAL ENCOUNTER (OUTPATIENT)
Dept: CARDIAC REHAB | Age: 71
Setting detail: THERAPIES SERIES
Discharge: HOME | End: 2021-03-17
Payer: MEDICARE

## 2021-03-17 ENCOUNTER — OFFICE VISIT (OUTPATIENT)
Dept: CARDIOLOGY CLINIC | Age: 71
End: 2021-03-17
Payer: MEDICARE

## 2021-03-17 ENCOUNTER — TELEPHONE (OUTPATIENT)
Dept: FAMILY MEDICINE CLINIC | Age: 71
End: 2021-03-17

## 2021-03-17 VITALS
WEIGHT: 154.2 LBS | HEART RATE: 48 BPM | BODY MASS INDEX: 23.37 KG/M2 | OXYGEN SATURATION: 98 % | DIASTOLIC BLOOD PRESSURE: 59 MMHG | HEIGHT: 68 IN | SYSTOLIC BLOOD PRESSURE: 93 MMHG

## 2021-03-17 DIAGNOSIS — R00.1 BRADYCARDIA: ICD-10-CM

## 2021-03-17 DIAGNOSIS — I48.91 ATRIAL FIBRILLATION, UNSPECIFIED TYPE (HCC): ICD-10-CM

## 2021-03-17 DIAGNOSIS — I25.5 ISCHEMIC CARDIOMYOPATHY: Primary | ICD-10-CM

## 2021-03-17 DIAGNOSIS — G25.81 RESTLESS LEG SYNDROME: Primary | ICD-10-CM

## 2021-03-17 PROCEDURE — G8482 FLU IMMUNIZE ORDER/ADMIN: HCPCS | Performed by: INTERNAL MEDICINE

## 2021-03-17 PROCEDURE — 99213 OFFICE O/P EST LOW 20 MIN: CPT | Performed by: INTERNAL MEDICINE

## 2021-03-17 PROCEDURE — G8420 CALC BMI NORM PARAMETERS: HCPCS | Performed by: INTERNAL MEDICINE

## 2021-03-17 PROCEDURE — 1123F ACP DISCUSS/DSCN MKR DOCD: CPT | Performed by: INTERNAL MEDICINE

## 2021-03-17 PROCEDURE — G8427 DOCREV CUR MEDS BY ELIG CLIN: HCPCS | Performed by: INTERNAL MEDICINE

## 2021-03-17 PROCEDURE — 3017F COLORECTAL CA SCREEN DOC REV: CPT | Performed by: INTERNAL MEDICINE

## 2021-03-17 PROCEDURE — G0422 INTENS CARDIAC REHAB W/EXERC: HCPCS

## 2021-03-17 PROCEDURE — 4004F PT TOBACCO SCREEN RCVD TLK: CPT | Performed by: INTERNAL MEDICINE

## 2021-03-17 PROCEDURE — 4040F PNEUMOC VAC/ADMIN/RCVD: CPT | Performed by: INTERNAL MEDICINE

## 2021-03-17 PROCEDURE — 93000 ELECTROCARDIOGRAM COMPLETE: CPT | Performed by: INTERNAL MEDICINE

## 2021-03-17 PROCEDURE — G0423 INTENS CARDIAC REHAB NO EXER: HCPCS

## 2021-03-17 NOTE — PROGRESS NOTES
Jaiden SCHUSTER.:  1950    Acct Number: [de-identified]   MRN:  [id number]                             [facility] NUTRITION WORKSHOP             Date: 3/17/2021        Session # _______    Ana Kirby class covered:    ()  Label Reading  ()  Menus  (x)  Targeting Nutrition Priorities  ()  Fueling a Healthy Body    Readiness to change:    ( ) Pre-contemplative   ( ) Contemplative - ambivalent about change    ( ) Action - ready to set action plan and implement   (x ) Maintenance - has made change and is trying, and or practicing different alternative behaviors     Monica Earl was in the Workshop with the Dietitian for 45 minutes. The content was presented via Powerpoint, lecture, and patient participation based format. Motivational interviewing was utilized when needed, to promote change. Patient voiced understanding.     Electronically signed by Sophia Doss RD LD [address] [place]  on 3/17/2021 at 10:47 AM

## 2021-03-17 NOTE — PLAN OF CARE
[facility] Program - 90 [id number]  [id number] Facility-Based Program  Individualized Cardiac Treatment Plan    Patient Name:  Derrick Boyle  :  1950  Age:  79 y.o. MRN:  [id number]  Diagnosis: CABG  Date of Event: 21   Physician:  Muriel White  Next Office Visit:  21  Date Entered Program: 21  Risk Stratifications: [] Low [] Intermediate [x] High  Allergies: Allergies   Allergen Reactions    Penicillins Rash    Sulfa Antibiotics Rash       COVID -19 Screen  Do you have any of the following symptoms:  [] Fever [] Cough [] SOB [] Muscle/Body Ache [] Loss of taste/smell [x] None    Have you traveled outside of the [place]? [] Yes      [x] No    Have you been around anyone who has tested Positive for COVID 19?  [] Yes      [x] No    The following Education has been completed with patient. [x] Wait in the lobby until we call you back to rehab. [x] You must wear a mask while in the medical center, and in cardiac rehab. Please bring your own. [x] Bring your own bottle of water with you. [x] You can bring a visitor with you, however, they will need to sit in the waiting room while you are in cardiac rehab.     Individual Cardiac Treatment Plan -EXERCISE  INITIAL 30 DAY 60 DAY 90  DAY FINAL DAY   EXERCISE  ASSESSMENT/PLAN EXERCISE  REASSESSMENT EXERCISE   REASSESSMENT EXERCISE   REASSESSMENT EXERCISE   REASSESSMENT EXERCISE  DISCHARGE/FOLLOW-UP   Date: 21 Date: 3/17/21 Date: Date: Date: Date:   Session #1 Session # 16 Session # ** Session # ** Session # ** Session # **  Last session completed on **   Stages of Change Stages of Change Stages of Change Stages of Change Stages of Change Stages of Change   [] Pre Contemplation  [] Contemplation  [] Preparation  [x] Action  [] Maintenance  [] Relapse [] Pre Contemplation  [] Contemplation  [] Preparation  [x] Action  [] Maintenance  [] Relapse [] Pre Contemplation  [] Contemplation  [] Preparation  [] Action  [] Maintenance  [] Relapse [] Pre Contemplation  [] Contemplation  [] Preparation  [] Action  [] Maintenance  [] Relapse [] Pre Contemplation  [] Contemplation  [] Preparation  [] Action  [] Maintenance  [] Relapse [] Pre Contemplation  [] Contemplation  [] Preparation  [] Action  [] Maintenance  [] Relapse   EXERCISE ASSESSMENT EXERCISE ASSESSMENT EXERCISE ASSESSMENT EXERCISE ASSESSMENT EXERCISE ASSESSMENT EXERCISE ASSESSMENT   6 Min Walk Test  Distance walked:   0.09 miles  475 ft.  1.7 METs  Max HR:68 BPM      RPE:  12    THR:    Rhythm:  Sinus Ludwig/ NSR     6 Min Walk Test  Distance walked:   ** miles  ** ft  ** METs  Max HR:** BPM      RPE:  **  %Change ft= **    Rhythm:  **   DASI: 4.6 METs DASI: 5.6METs DASI: ** METs DASI: ** METs DASI: ** METs DASI: ** METs   Return to Work  Time [place] on returning to work? [x] Yes              [] No   [] Disabled     [] Retired    If yes:  *Job description: oates, standing & raising arms  *Required MET level=4-5  *Return to Work Date: ? Return to work: Has Marianela Pineda returned to work? [x] Yes    [] No    Return to work date set? [x] Yes   [] No        Return to work: Has Marianela  returned to work? [] Yes    [] No    Return to work date set? [] Yes, **    [] No    Marianela  is doing ** at work. Return to work: Has Marianela  returned to work? [] Yes    [] No    Return to work date set? [] Yes, **    [] No    Marianela  is doing ** at work. Return to work: Has Marianela  returned to work? [] Yes    [] No    Return to work date set? [] Yes, **    [] No    Marianela Bard is doing ** at work. Return to work: Has Marianela  returned to work? [] Yes    [] No    Return to work? [] Yes, **    [] No    *Required MET Level achieved for job duties? [] Yes    [] No   Orthopedic Limitations/  [] Yes    [x] No       Orthopedic Limitations  na Orthopedic Limitations   Orthopedic Limitations   Orthopedic Limitations Orthopedic Limitations     Fall Risk  Fall risk assessed?   [x] Yes      [] No    Balance Issues? [] Yes      [x] No     [] Walker [] Cane    [x] Safety issues reviewed      Fall Risk  na Fall Risk Fall Risk Fall Risk Fall Risk   Home Exercise  [x] Yes    [] No  Type: walking  Frequency: daily  Duration: 20 min Home Exercise  [x] Yes    [] No  Type: walking  Frequency:4 x per week  Duration: 20 min Home Exercise  [] Yes    [] No  Type: **  Frequency: **  Duration: ** Home Exercise  [] Yes    [] No  Type: **  Frequency: **  Duration: ** Home Exercise  [] Yes    [] No  Type: **  Frequency: **  Duration: ** Home Exercise  [] Yes    [] No  Type: **  Frequency: **  Duration: **   Angina with Activity? [] Yes    [x] No  Angina Management: na Angina with Activity? [] Yes    [x] No  Angina Management: na Angina with Activity? [] Yes    [] No  Angina Management: ** Angina with Activity? [] Yes    [] No  Angina Management: ** Angina with Activity? [] Yes    [] No  Angina Management: ** Angina with Activity?   [] Yes    [] No  Angina Management: **   EXERCISE PLAN EXERCISE PLAN EXERCISE PLAN EXERCISE PLAN EXERCISE PLAN EXERCISE PLAN   *Interventions* *Interventions* *Interventions* *Interventions* *Interventions* *Interventions*   Exercise Prescription  (per physician & CR staff) Exercise Prescription  (per physician & CR staff) Exercise Prescription  (per physician & CR staff) Exercise Prescription  (per physician & CR staff) Exercise Prescription  (per physician & CR staff) Exercise Prescription  (per physician & CR staff)   Cardiovascular Cardiovascular Cardiovascular Cardiovascular Cardiovascular Cardiovascular   Mode:    [x] Treadmill (TM)  [x] Schwinn Airdyne (AD)  [x] Arms Ergometer (AE)  [] NuStep  [] Elliptical (E) MODE:    [x] Treadmill (TM)  [x] Schwinn Airdyne (AD)  [x] Arms Ergometer (AE)  [] NuStep  [] Elliptical (E) MODE:    [] Treadmill (TM)  [] Schwinn Airdyne (AD)  [] Arms Ergometer (AE)  [] NuStep  [] Elliptical (E) MODE:    [] Treadmill (TM)  [] Schwinn Airdyne (AD)  [] Arms Ergometer (AE)  [] NuStep  [] Elliptical (E) MODE:    [] Treadmill (TM)  [] Schwinn Airdyne (AD)  [] Arms Ergometer (AE)  [] NuStep  [] Elliptical (E) MODE:    [] Treadmill (TM)  [] Schwinn Airdyne (AD)  [] Arms Ergometer (AE)  [] NuStep  [] Elliptical (E)   Initial Workloads  TM: [email or web address] 1.8 METs  AD: 0.3 level = 1.7 METs  NS: 18  Cagle= 1.7 METs  AE: 0.1 level = 1.2 METs Current Workloads  TM:  [email or web address] %= 1.9 METs    NS:  30 Cagle= 2 METs  AE: 18 Cagle= 1.7 METs Current Workloads  TM:  @ %=  METs  AD:  level =  METs  NS:   Cagle=  METs  AE:  level =  METs Current Workloads  TM:  @ %=  METs  AD:  level =  METs  NS:   Cagle=  METs  AE:  level =  METs Current Workloads  TM:  @ %=  METs  AD:  level =  METs  NS:   Cagle=  METs  AE:  level =  METs Current Workloads  TM:  @ %=  METs  AD:  level =  METs  NS:   Cagle=  METs  AE:  level =  METs     Frequency:    ICR: 3x/week  Home: 2-3x/wk Frequency:   ICR: 3x/week  Home: 3x/wk Frequency:  ICR: 3x/week  Home: 3-4x/wk Frequency:  ICR: 3x/week  Home: 3-4x/wk Frequency:  ICR: 3x/week  Home: 3-4x/wk Frequency:  Jimmy will continue exercise at  5-7 days/week   Duration:   Total aerobic exercise = 30-45 min    5-8 min/mode Duration:  Total aerobic exercises = 25-35 min     10 min/mode Duration:  Total aerobic exercises = ** min     **min/mode Duration:  Total aerobic exercises = ** min     **min/mode Duration:  Total aerobic exercises = ** min     **min/mode Duration:  Total erobic exercise =  60-90 min   Intensity:   MET Level = 1.7-1.8  RPE = 12-15 Intensity:  Max MET Level = 2.0  RPE = 12-15 Intensity:  Max MET Level = **  RPE = 12-15 Intensity:  Max MET Level = **  RPE = 12-15 Intensity:  Max MET Level = **  RPE = 12-15 Intensity:  Max MET Level = ** RPE = 12-15   Progression: increase aerobic activity up to 15 min over next 4 weeks by increasing time 2-3 min/week.  Progression:  Increase duration 12-15 min over the next 4 weeks as tolerated Progression:   Progression: Progression: not in rehab. Home Exercise  *Diony verbalizes planning to ** ** days/week for ** min on days not in rehab. Home Exercise  *Judd Juarez his/her plan to ** ** days/week for ** min @ **   *Education* *Education* *Education* *Education* *Education* *Education*   RPE Scale  [x] Yes      [] No  Exercise Safety  [x] Yes      [] No  Equipment Orientation  [x] Yes      [] No  S/S to Report  [x] Yes      [] No  Warm Up/Cool Down  [x] Yes      [] No  Home Exercise  [x] Yes      [] No     All Exercise Education Completed  [] Yes      [] No   Exercise Education Recommended    Workshops  [x] Improving Performance  [x] Balance Training & Fall Prevention  [x] Intro to Yoga - Video  [x] Resistance Training & Core Strength Exercise Education Attended/Date  3/8/21  Resistance Training & Core Strength Exercise Education Attended/Date Exercise Education Attended/Date Exercise Education Attended/Date All Sessions Completed?     [] Yes  [] No   *Goals* *Goals* *Goals* *Goals* *Goals* *Goals*   Initial Exercise Goals Exercise Goals  Exercise Goals   Exercise Goals  Exercise Goals  Exercise Goals   Diony plans to:  [x] Attend exercise sessions 3x/wk  [x] initiate home exercise 2-3x/wk for 10-20 min  [x] Increase 6 min walk distance by 10%  [x] Attend Exercise workshops [place] plans to:  [x] Attend exercise sessions 3x/wk  [x] continue home exercise 2-3x/wk for 20-30 min  [x] Attend Exercise workshops Diony's plans to:  [x] Attend exercise sessions 3x/wk  [x] continue home exercise 3-4x/wk for 30-45 min  [x] Determine plan of exercise following rehab  [x] Attend Exercise workshops Diony's plans to:  [x] Attend exercise sessions 3x/wk  [x] continue home exercise 3-4x/wk for 30-45 min  [x] Determine plan of exercise following rehab  [x] Attend Exercise workshops Diony's plans to:  [x] Attend exercise sessions 3x/wk  [x] continue home exercise 3-4x/wk for 30-45 min  [x] Determine plan of exercise following rehab  [x] Attend Exercise workshops [place] achieved exercise goals?     []  Yes    [] No  If no, why?  **  [] Increased 6 min walk distance by 10%  [] Currently exercising 30-60 min/day, 5-7days/wk   [] Plans to continue exercise on own  [] Plans to join a local fitness center to continue exercise  [] Does not plan to continue to exercise after rehab   Return to ADL or Hobbies:  Emily Hunting would like to improve strength and endurance so he is able to return to going to car shows, fair Return to ADL or Hobbies:  Emily Hunting would like to improve strength and endurance so he is able to return to work and car shows Return to ADL or Hobbies:  Emily Hunting would like to improve strength and endurance so he/she is able to return to ** Return to ADL or Hobbies:  Emily Hunting would like to improve strength and endurance so he/she is able to return to ** Return to ADL or Hobbies:  [place] Hunting would like to improve strength and endurance so he/she is able to return to ** Return to ADL or Hobbies:  Emily Hunting would like to improve strength and endurance so he/she is able to return to **    *MET level required for above goal:  5-6 METs MET level Achieved:  2METs MET level Achieved:  **METs MET level Achieved:  **METs MET level Achieved:  **METs MET level Achieved:  **METs     Individual Cardiac Treatment Plan - Nutrition  NUTRITION  ASSESSMENT/PLAN NUTRITION  REASSESSMENT NUTRITION   REASSESSMENT NUTRITION   REASSESSMENT NUTRITION  DISCHARGE/FOLLOW-UP NUTRITION  DISCHARGE/FOLLOW-UP   Stages of Change Stages of Change Stages of Change Stages of Change Stages of Change Stages of Change   [] Pre Contemplation  [x] Contemplation  [] Preparation  [] Action  [] Maintenance  [] Relapse [] Pre Contemplation  [x] Contemplation  [] Preparation  [] Action  [] Maintenance  [] Relapse [] Pre Contemplation  [] Contemplation  [] Preparation  [] Action  [] Maintenance  [] Relapse [] Pre Contemplation  [] Contemplation  [] Preparation  [] Action  [] Maintenance  [] Relapse [] Pre Contemplation  [] Contemplation  [] Preparation  [] Action  [] Maintenance  [] Relapse [] Pre Contemplation  [] Contemplation  [] Preparation  [] Action  [] Maintenance  [] Relapse   NUTRITION ASSESSMENT NUTRITION ASSESSMENT NUTRITION ASSESSMENT NUTRITION ASSESSMENT NUTRITION ASSESSMENT NUTRITION ASSESSMENT   Weight Management  Weight: 146.8        Height: 68\"   BMI: 22  Weight Management  Weight: 152.6                Weight Management  Weight: **                  Weight Management  Weight: ** Weight Management  Weight: ** Weight Management  Weight: **                    BMI: **   Eating Plan  Current eating habits: low salt Eating Plan  Changes: low sodium Eating Plan  Changes: Eating Plan  Changes: Eating Plan  Changes: Eating Plan Improvements:   Alcohol Use  [x] none          [] daily  [] weekly      [] special           Diet Assessment Tool:  RATE YOUR PLATE  *Given to patient to complete and return. Diet Assessment Tool:    Score: 58/69        Diet Assessment Tool: RATE YOUR PLATE  Score: **/30   NUTRITION PLAN NUTRITION PLAN NUTRITION PLAN NUTRITION PLAN NUTRITION PLAN NUTRITION PLAN   *Interventions* *Interventions* *Interventions* *Interventions* *Interventions* *Interventions*   Initial Survey given Goal Setting Discussion:   [x] Yes      [] No        Follow Up Survey Reviewed & Goals Updated:     Professional Referral  Please check if needed. [] Dietitian Consult   [] Wt. Management Referral  [] Other:  Professional Referral  Please check if needed. [] Dietitian Consult   [] Wt. Management Referral  [] Other: Professional Referral  Please check if needed. [] Dietitian Consult   [] Wt. Management Referral  [] Other: Professional Referral  Please check if needed. [] Dietitian Consult   [] Wt. Management Referral  [] Other: Professional Referral  Please check if needed. [] Dietitian Consult   [] Wt. Management Referral  [] Other: Professional Referral  Please check if needed. [] Dietitian Consult   [] Wt.  Management Referral  [] Other: *Education* *Education* *Education* *Education* *Education* *Education*   Nutritional Education Recommended    [x] 1:1 Registered Dietitian    Workshops  [x] Label Reading   [x] Menu  [x] Targeting Nutrition Priorities  [x] Fueling a Healthy Body   Nutritional Education Attended/Date    3/10/21 1:1 Registered Dietitian    3/17/21  Fueling a Healthy Body Nutritional Education Attended/Date Nutritional Education Attended/Date Nutritional Education Attended/Date All Sessions Completed? [] Yes  [] No   Cooking School  Recommended     [x] Adding Flavor  [x] Fast & Healthy     Breakfasts  [x] Salads & Dressings  [x] Savory Soups  [x] Simple Sides & Sauces  [x] Appetizers &     Snacks  [x] Delicious Desserts  [x] Plant-Based Proteins  [x] Fast Evening Meals  [x] Weekend Breakfasts  [x] Efficiency Cooking  [x] [employer]  Sessions Completed  3/5/21   Appetizers &     Snacks    1/33/11  Delicious Desserts     [facility] [employer]  Sessions Completed [facility] [employer]  Sessions Completed     [facility] [employer]  Sessions Completed Cooking School    # of sessions completed:  **   *Goals* *Goals* *Goals* *Goals* *Goals* *Goals*   Diony's nutritional goals are as follows:  Complete and return diet survey Diony's nutritional goals are as follows:  [x] Attend Nutrition Workshops  [x] Attend 1:1   [x] Attend Cooking Classes  [] ** Diony's nutritional goals are as follows:  [] Attend Nutrition Workshops  [] Attend 1:1   [] Attend Cooking Classes  [] Complete and return diet survey  [] ** Diony's nutritional goals are as follows:  [] Attend Nutrition Workshops  [] Attend 1:1   [] Attend Cooking Classes  [] ** Diony's nutritional goals are as follows:  [] Attend Nutrition Workshops  [] Attend 1:1   [] Attend Cooking Classes  [] ** Diony achieved nutritional goals   [] Yes    [] No  If no, why?   Use knowledge gained to continue Pritikin eating plan at home       Individual Cardiac Treatment Plan - Psychosocial  PSYCHOSOCIAL  ASSESSMENT/PLAN PSYCHOSOCIAL  REASSESSMENT PSYCHOSOCIAL   REASSESSMENT PSYCHOSOCIAL   REASSESSMENT PSYCHOSOCIAL  DISCHARGE/FOLLOW-UP PSYCHOSOCIAL  DISCHARGE/FOLLOW-UP   Stages of Change Stages of Change Stages of Change Stages of Change Stages of Change Stages of Change   [] Pre Contemplation  [x] Contemplation  [] Preparation  [] Action  [] Maintenance  [] Relapse [] Pre Contemplation  [x] Contemplation  [] Preparation  [] Action  [] Maintenance  [] Relapse [] Pre Contemplation  [] Contemplation  [] Preparation  [] Action  [] Maintenance  [] Relapse [] Pre Contemplation  [] Contemplation  [] Preparation  [] Action  [] Maintenance  [] Relapse [] Pre Contemplation  [] Contemplation  [] Preparation  [] Action  [] Maintenance  [] Relapse [] Pre Contemplation  [] Contemplation  [] Preparation  [] Action  [] Maintenance  [] Relapse   PSYCHOSOCIAL ASSESSMENT PSYCHOSOCIAL ASSESSMENT PSYCHOSOCIAL ASSESSMENT PSYCHOSOCIAL ASSESSMENT PSYCHOSOCIAL ASSESSMENT PSYCHOSOCIAL ASSESSMENT   Behavioral Outcomes Behavioral Outcomes Behavioral Outcomes Behavioral Outcomes Behavioral Outcomes Behavioral Outcomes   Tool Used:  Vikki & Andree, Quality of Life Index, Cardiac Version IV  *Given to patient to complete.  Tool Used:    Vikki Candelario, Quality of Life Index, Cardiac Version IV     QOL Index Score: **  HF:**  S&E:**  P&S: **  Family: **   Tool Used:     Vikki & Andree, Quality of Life Index, Cardiac Version IV    QOL Index Score: **  HF:**  S&E:**  P&S: **  Family: ** Tool Used:     Vikki & Andree, Quality of Life Index, Cardiac Version IV    QOL Index Score: **  HF:**  S&E:**  P&S: **  Family: **   PHQ-9 score 4  Depression Severity  [x]Minimal  []Mild   []Moderate  []Moderately Severe  []Severe    PHQ-9 score **  Depression Severity  []Minimal  []Mild   []Moderate  []Moderately Severe []Severe PHQ-9 score **  Depression Severity  []Minimal  []Mild   []Moderate  []Moderately Severe []Severe Does patient have Family Support? [x] Yes      [] No  No signs of marital/family distress        Within the Past Month:  *Have you wished you were dead or wished you could go to sleep and not wake up? [] Yes      [x] No  *Have you had any thoughts of killing yourself? [] Yes      [x] No          Using a scale of 0-10, 0=none, 10=very:   Rate your depression: 5  Rate your anxiety:  4  Using a scale of 0-10, 0=none, 10=very:   Rate your depression: 5  Rate your anxiety:  7 Using a scale of 0-10, 0=none, 10=very:   Rate your depression: **  Rate your anxiety:  ** Using a scale of 0-10, 0=none, 10=very:   Rate your depression: **  Rate your anxiety:  ** Using a scale of 0-10, 0=none, 10=very:   Rate your depression: **  Rate your anxiety:  ** Using a scale of 0-10, 0=none, 10=very:   Rate your depression: **  Rate your anxiety:  **   Signs and Symptoms of Depression Present? [x] Yes      [] No  If yes, please explain: Pt states he has been depressed but it is getting better since he is able to do things now. Signs and Symptoms of Depression Present? [x] Yes      [] No  If yes, please explain:  Upset about his current heart complications Signs and Symptoms of Depression Present? [] Yes      [] No  If yes, please explain:  ** Signs and Symptoms of Depression Present? [] Yes      [] No  If yes, please explain:  ** Signs and Symptoms of Depression Present? [] Yes      [] No  If yes, please explain:  ** Signs and Symptoms of Depression Present? [] Yes      [] No  If yes, please explain:  **   Signs and Symptoms of Anxiety Present? [x] Yes      [] No  If yes, please explain:  See above Signs and Symptoms of Anxiety Present? [x] Yes      [] No  If yes, please explain:  See above Signs and Symptoms of Anxiety Present? [] Yes      [] No  If yes, please explain:  ** Signs and Symptoms of Anxiety Present? [] Yes      [] No  If yes, please explain:  ** Signs and Symptoms of Anxiety Present?     [] Yes      [] No  If yes, please explain:  ** Signs and Symptoms of Anxiety Present? [] Yes      [] No  If yes, please explain:  **   [] Patient has poor eye contact   [x] Flat affect present. [x] Signs of anxiety, but no anger or hostility    [] Signs social isolation present. []  Signs of alcohol or substance abuse        PSYCHOSOCIAL PLAN PSYCHOSOCIAL PLAN PSYCHOSOCIAL PLAN PSYCHOSOCIAL PLAN PSYCHOSOCIAL PLAN PSYCHOSOCIAL PLAN   *Interventions* *Interventions* *Interventions* *Interventions* *Interventions* *Interventions*   *Please check if needed  [] Psych Consult  [x] Physician Referral- suggested discussing depression and anxiety with PCP  [x] Stress Management Skills *Please check if needed  [] Psych Consult  [x] Physician Referral- encouraged Omer Christie to talk to his PCP about depression and anxiety  [x] Stress Management Skills *Please check if needed  [] Psych Consult  [] Physician Referral  [] Stress Management Skills *Please check if needed  [] Psych Consult  [] Physician Referral  [] Stress Management Skills *Please check if needed  [] Psych Consult  [] Physician Referral  [] Stress Management Skills *Please check if needed  [] Psych Consult  [] Physician Referral  [] Stress Management Skills   Is patient currently taking anti-depressant or anti-anxiety medications? [] Yes      [x] No   Change in anti-depressant or anti-anxiety medications? [] Yes      [x] No   Change in anti-depressant or anti-anxiety medications? [] Yes      [] No  If yes, please list medications:  ** Change in anti-depressant or anti-anxiety medications? [] Yes      [] No  If yes, please list medications:  ** Change in anti-depressant or anti-anxiety medications? [] Yes      [] No  If yes, please list medications:  ** Change in anti-depressant or anti-anxiety medications?   [] Yes      [] No  If yes, please list medications:  **   *Education* *Education* *Education* *Education* *Education* *Education*   Healthy Mind-Set Workshops Pre Contemplation  [] Contemplation  [] Preparation  [] Action  [] Maintenance  [] Relapse [] Pre Contemplation  [] Contemplation  [] Preparation  [] Action  [] Maintenance  [] Relapse [] Pre Contemplation  [] Contemplation  [] Preparation  [] Action  [] Maintenance  [] Relapse   RISK FACTOR/EDUCATION ASSESSMENT RISK FACTOR/EDUCATION ASSESSMENT RISK FACTOR/EDUCATION ASSESSMENT RISK FACTOR/EDUCATION ASSESSMENT RISK FACTOR /EDUCATION ASSESSMENT RISK FACTOR /EDUCATION ASSESSMENT   Hypertension  [x] Yes      [] No    Resting BP: 112/62  Peak Ex BP:130/82  Medication: losartan, metoprolol   Hypertension  Resting BP: 114/52  Peak Ex BP:126/64  Medication Changes:  [] Yes      [x] No Hypertension  Resting BP: **  Peak Ex BP:**  Medication Changes:  [] Yes      [] No Hypertension  Resting BP: **  Peak Ex BP:**  Medication Changes:  [] Yes      [] No Hypertension  Resting BP: **  Peak Ex BP:**  Medication Changes:  [] Yes      [] No Hypertension  Resting BP: **  Peak Ex BP:**  Medication Changes:  [] Yes      [] No   Lipids  HLD/DLD  [x] Yes      [] No  TOTAL CHOL: 126  HDL:  36  LDL:  71  TRI  Medication: atorvastatin Lipids  Medication Changes:  [] Yes      [x] No     Lipids  Medication Changes:  [] Yes      [] No     Lipids  Medication Changes:  [] Yes      [] No     Lipids    TOTAL CHOL: **  HDL:  **  LDL:  **  TRIG:  **  Medication Changes:  [] Yes      [] No Lipids    TOTAL CHOL: **  HDL:  **  LDL:  **  TRIG:  **  Medication Changes:  [] Yes      [] No   Diabetes  [] Yes      [x] No   Diabetes  na   Diabetes  Most Recent BS:  BS have been in range  [] Yes      [] No  Medication Changes  [] Yes      [] No     Diabetes  Most Recent BS:  BS have been in range  [] Yes      [] No  Medication Changes  [] Yes      [] No     Diabetes  Most Recent BS:  BS have been in range  [] Yes      [] No  Medication Changes  [] Yes      [] No Diabetes  Most Recent BS:  BS have been in range  [] Yes      [] No  Medication Changes  [] Yes      [] No       Tobacco Use  [] Current  [x] Former  [] Never    Years smoked: 35-40:    Date Quit: 1/6/21    # cigarettes smoked/day: 1  Smokeless Tobacco use:   [] Yes      [x] No   Tobacco Use  Change in smoking status   [] Yes      [x] No       Tobacco Use  Change in smoking status   [] Yes      [] No    Quit date: **   Tobacco Use  Change in smoking status   [] Yes      [] No    Quit date: ** Tobacco Use  Change in smoking status   [] Yes      [] No    Quit date: ** Tobacco Use  Change in smoking status   [] Yes      [] No    Quit date: **             Learning Barriers  Please select one:  [] Speech  [] Literacy  [] Hearing  [] Cognitive  [] Vision  [x] Ready to Learn Learning Barriers Addressed:   [] Yes      [] No  na Learning Barriers Addressed:   [] Yes      [] No   Learning Barriers Addressed:  [] Yes      [] No Learning Barriers Addressed:  [] Yes      [] No Learning Barriers Addressed:  [] Yes      [] No     RISK FACTOR/EDUCATION PLAN RISK FACTOR/EDUCATION PLAN RISK FACTOR/EDUCATION PLAN RISK FACTOR/EDUCATION PLAN RISK FACTOR/EDUCATION PLAN RISK FACTOR/EDUCATION PLAN   *Interventions* *Interventions* *Interventions* *Interventions* *Interventions* *Interventions*   Recommended Educational Videos    [x] Overview of The Pritikin Eating Plan  [x] Heart Disease Risk Reduction  [x] Move It! [x] Calorie Density  [x] Healthy Minds, Bodies, Hearts  [x] Label Reading  [x] Metabolic Syndrome & Belly   Or  [] How Our Thoughts Can Heal our Heart  [x] Dining Out-Part 1  [x] Biomechanical Limitations  [x] Facts on Fat  [x] Hypertension & Heart Disease  [x] Diseases of Our Times-Focusing on Diabetes  [x] Body Composition  [x] Nurtition Action Plan  [x] Exercise Action Plan   Completed Videos/Date      2/23/21  Overview of The Pritikin Eating Plan  3/1/21   Heart Disease Risk Reduction    3/15/21   Move It!  Completed Videos/Date Completed Videos/Date Completed Videos/Date Recommended Educational Videos Completed    [] Yes      [] No    **If not completed, Why? **           Smoking Cessation/Relaspe Prevention Intervention needed? [x] Yes      [] No  *Pt verbalizes and agrees to attend intervention Smoking Cessation/Relapse Prevention Scheduled? [] Yes      [x] No   Smoking Cessation/Relapse Prevention completed? [] Yes      [] No  Date: ** Smoking Cessation/Relapse Prevention completed? [] Yes      [] No  Date: ** Smoking Cessation/Relapse Prevention completed? [] Yes      [] No  Date: ** Smoking Cessation Counseling attended  [] Yes      [] No  **If not completed, Why? **   Professional Referrals:  *Please check if needed  [] Diabetes Clinic  [] Lipid Clinic   [] Other:      Professional Referrals:  *Please check if needed  [] Diabetes Clinic  [] Lipid Clinic   [] Other:   Preventative Medication Preventative Medication Preventative Medication Preventative Medication Preventative Medication Preventative Medication   Aspirin  [x] Yes    [] No  Blood Thinner: Clopidogrel/Effient/Brillinta  [x] Yes    [] No  Beta Blocker  [x] Yes    [] No  Ace Inhibitor  [] Yes    [x] No  Statin/Lipid Lowering  [x] Yes    [] No Medication Changes? [] Yes    [x] No Medication Changes? [] Yes    [] No Medication Changes? [] Yes    [] No Medication Changes? [] Yes    [] No Medication Changes? [] Yes    [] No   *Education* *Education* *Education* *Education* *Education* *Education*   Does Ethelle Husbands require any additional education? [x] Yes    [] No   Does Ethelle Husbands require any additional education? [] Yes    [x] No Does Ethelle Husbands require any additional education? [] Yes    [] No Does Ethelle Husbands require any additional education? [] Yes    [] No Does Ethelle Husbands require any additional education? [] Yes    [] No Does Ethelle Husbands require any additional education?   [] Yes    [] No   Additional Educational Videos    Exercise  [] Improve Performance    Medical  [] Aging Enhancing Your QoL  [] Biology of Weight Control  [] Decoding Lab Results  [] Diseases of Our Time - Overview  [] Sleep Disorders    Nutrition  [] Dining Out - Part 2  [] Fueling a Healthy Body  [] Menu Workshop  [] Planning Your Eating Strategy  [] Targeting Your Nutrition Priorities  [] Vitamins & Minerals    Healthy Mind-Set  [x] Smoking Cessation    Culinary  []Becoming a Pritikin    [] Cooking - Breakfast & Snacks  [] Cooking -Healhty Salads & Dressing  [] Cooking -Dinner & Sides  [] Cooking -Soups & Desserts    Overview  [] The Pritikin Solution Additional Educational Videos Completed     Additional Educational Videos Completed Additional Educational Videos Completed Additional Educational Videos Completed Additional Educational Videos Completed    [] Yes    [] No   *Goals* *Goals* *Goals* *Goals* *Goals* *Goals*   Diony's risk factor/education goals are as follows:    [x] Optimal BP <140/90  [] Blood Sugar <120  [x] Attend recommended video education sessions  [x] Takes medications as prescribed 100% of the time   [] ** Diony's risk factor/education goals are as follows:    [x] Optimal BP <140/90  [] Blood Sugar <120  [x] Attend recommended video education sessions  [x] Takes medications as prescribed 100% of the time   [] ** Diony's risk factor/education goals are as follows:    [x] Optimal BP <140/90  [] Blood Sugar <120  [x] Attend recommended video education sessions  [x] Takes medications as prescribed 100% of the time   [] ** Diony's risk factor/education goals are as follows:    [x] Optimal BP <140/90  [] Blood Sugar <120  [x] Attend recommended video education sessions  [x] Takes medications as prescribed 100% of the time   [] ** Diony's risk factor/education goals are as follows:    [x] Optimal BP <140/90  [] Blood Sugar <120  [x] Attend recommended video education sessions  [x] Takes medications as prescribed 100% of the time   [] ** Diony achieved risk factor goals?   [] Yes    [] No  If no, why?  **     Monitored telemetry has revealed Sinus Ludwig/ NSR   Monitored telemetry has revealed Sinus Ludwig/ NSR/At-fib   Monitored telemetry has revealed  [] documented arrhythmia at increasing workloads  [] associated symptoms ** Monitored telemetry has revealed **  [] documented arrhythmia at increasing workloads  [] associated symptoms ** Monitored telemetry has revealed **  [] documented arrhythmia at increasing workloads  [] associated symptoms ** Monitored telemetry has revealed **  [] documented arrhythmia at increasing workloads  [] associated symptoms **   Physician Response    [x] Cardiac rehab is reasonably and medically necessary for continuous cardiac monitoring surveillance  of patient's cardiac activity  [x] Initiate continuous telemetry monitoring and notify me with any concerns  [] Other   Physician Response    [x] Cardiac rehab is reasonably and medically necessary for continuous cardiac monitoring surveillance  of patient's cardiac activity  [x] Continue continuous telemetry monitoring and notify me with any concerns  [] Other     Physician Response    [x] Cardiac rehab is reasonably and medically necessary for continuous cardiac monitoring surveillance  of patient's cardiac activity  [x] Continue continuous telemetry monitoring and notify me with any concerns   [] Other     Physician Response    [x] Cardiac rehab is reasonably and medically necessary for continuous cardiac monitoring surveillance  of patient's cardiac activity  [x] Continue continuous telemetry monitoring and notify me with any concerns   [] Other     Physician Response    [x] Cardiac rehab is reasonably and medically necessary for continuous cardiac monitoring surveillance  of patient's cardiac activity  [x] Continue continuous telemetry monitoring and notify me with any concerns   [] Other

## 2021-03-17 NOTE — PROGRESS NOTES
[address] [facility] () [address] [facility] [address]  Dept: [phone]         CARDIAC ELECTROPHYSIOLOGY: FOLLOW UP NOTE  PATIENT DEMOGRAPHICS:  Date:   3/17/2021  Patient name:              Mayuri Moreno  YOB: 1950  Sex: male   MRN:   [id number]    PRIMARY CARE PHYSICIAN:   Omid Crouch MD    CARDIOLOGIST:  Chris Mackey MD    REASON FOR FOLLOW UP:  Atrial flutter and atrial fibrillation and     HISTORY OF PRESENT ILLNESS:  Mr. Leopoldo Bench is 70/M who was admitted to entry to [facility] on 1/6/2020 with recurrent chest pain. Upon evaluation he was noted to have severe left ventricle systolic dysfunction, ejection fraction 20%. Coronary angiogram showed multivessel coronary artery disease. He underwent coronary artery bypass surgery x3 along with left atrial appendage clipping. Postoperatively the patient developed atrial fibrillation with rapid ventricular rate. Attempted cardioversion x2 was unsuccessful. Rate control strategy with metoprolol was perceived. He was anticoagulated apixaban. He made nice recovery from his surgery. During the follow-up. His ejection fraction improved to 35 to 40%. I saw him on 2/3/2021 and he was noted to have symptomatic sinus bradycardia. The dose of metoprolol was reduced from 50 mg twice daily to 25 mg daily. A 30-day event monitor was prescribed that shows runs of atrial flutter/atrial fibrillation (ventricular rate 150 bpm) and sinus pauses, longest pause 3 seconds on 3/13/2021 at 2:59 AM.    Patient has no complaints. He denies fatigue, chest pain, shortness of breath, lightheadedness or lower extremity swelling. He is participating cardiac rehabilitation without any problems. He tells me that he rides the bike and the treadmill for 20 minutes without any problems. His symptoms of fatigue has improved following decreasing the dose of metoprolol.       REVIEW OF SYSTEMS:    Constitutional: Negative for chills and fever  HENT: Negative for congestion, sinus pressure, sneezing and sore throat. Eyes: Negative for pain, discharge, redness and itching. Respiratory: Negative for apnea, cough  Gastrointestinal: Negative for blood in stool, constipation, diarrhea   Endocrine: Negative for cold intolerance, heat intolerance, polydipsia. Genitourinary: Negative for dysuria, enuresis, flank pain and hematuria. Musculoskeletal: Negative for arthralgias, joint swelling and neck pain. Neurological: Negative for numbness and headaches. Psychiatric/Behavioral: Negative for agitation, confusion, decreased concentration and dysphoric mood. PAST MEDICAL HISTORY:  Past Medical History:   Diagnosis Date    Asthma     Collagenous colitis 2014       repeat  in  2024    Colon polyps 2000    COPD, mild (HCC)     Eczema of hand     HTN (hypertension)     Hyperlipidemia     Smoker        PSH:  1.  Multivessel coronary artery disease/CABG x3) LIMA to LAD, reverse SVG to RCA and reverse SVG to ramus intermedius) on 1/12/2021 by Dr. Frank Cantu. 2.  Persistent atrial fibrillation, status post left atrial appendage AtriCure clipping, attempted cardioversion on 1/15/2021 unsuccessful. 3.  Severe ischemic cardiomyopathy, ejection fraction 35-40%  4. Hypertension, hyperlipidemia and chronic smoking. 5.  Mild chronic Pulmonary disease. 6.  Sinus bradycardia and sinus pauses, longest 3 seconds while awake per event monitor dated 2/24/2021.   Past Surgical History:   Procedure Laterality Date    COLONOSCOPY  2011,6/30/14    nba cardenas-no polyps repeat 10 yr    CORONARY ARTERY BYPASS GRAFT  01/12/2021    cabg x 3 with lima and atrial appendage clip  dr Wallace Reece N/A 1/12/2021    CABG  X 3 WITH DEJAH, Atrial Appendage Clip performed by Rosemarie Mendoza MD at [address] [place] [address] Road  06/2008    polyps    OTHER SURGICAL HISTORY  DEC 7TH 2012 DR Iker MIRAMONTES [place]ART[place] IGS ENDOSCOPIC BI LAT MAXILLARY, ETHMOID, SPHENOID, FRONTAL SINUSOTOMY WITH SEPTOPLASTY AND TURBINOPLASTIES    SKIN CANCER EXCISION  09/2014    Left side of Forehead     TOOTH EXTRACTION  02/2017       FAMILY HISTORY:  Family History   Problem Relation Age of Onset    Heart Disease Mother     Heart Disease Father         cabg    Diabetes Brother     Heart Disease Brother         SOCIAL HISTORY:  A oates.  for 24 years. Two biological children. Quite smoking 1/2021. Used to drin 6 bears daily and has quite recently. Social History     Socioeconomic History    Marital status:      Spouse name: Not on file    Number of children: Not on file    Years of education: Not on file    Highest education level: Not on file   Occupational History    Not on file   Social Needs    Financial resource strain: Not hard at all   V[employer] insecurity     Worry: Never true     Inability: Never true   [email or web address] needs     Medical: Not on file     Non-medical: Not on file   Tobacco Use    Smoking status: Current Every Day Smoker     Packs/day: 1.00     Years: 40.00     Pack years: 40.00     Types: Cigarettes    Smokeless tobacco: Never Used   Substance and Sexual Activity    Alcohol use:  Yes     Alcohol/week: 0.0 standard drinks     Comment: very little    Drug use: No    Sexual activity: Not on file   Lifestyle    Physical activity     Days per week: Not on file     Minutes per session: Not on file    Stress: Not on file   Relationships    Social connections     Talks on phone: Not on file     Gets together: Not on file     Attends Confucianist service: Not on file     Active member of club or organization: Not on file     Attends meetings of clubs or organizations: Not on file     Relationship status: Not on file    Intimate partner violence     Fear of current or ex partner: Not on file     Emotionally abused: Not on file     Physically abused: Not on file     Forced sexual activity: Not on file Other Topics Concern    Not on file   Social History Narrative    Not on file        ALLERGY HISTORY:  Allergies   Allergen Reactions    Penicillins Rash    Sulfa Antibiotics Rash        MEDICATIONS:  Current Outpatient Medications   Medication Sig Dispense Refill    metoprolol tartrate (LOPRESSOR) 50 MG tablet Take 1 tablet by mouth 2 times daily 60 tablet 5    sacubitril-valsartan (ENTRESTO) 24-26 MG per tablet Take 1 tablet by mouth 2 times daily 60 tablet 3    apixaban (ELIQUIS) 5 MG TABS tablet Take 1 tablet by mouth 2 times daily 180 tablet 3    atorvastatin (LIPITOR) 40 MG tablet Take 1 tablet by mouth nightly 90 tablet 3    potassium chloride (KLOR-CON M) 10 MEQ extended release tablet Take 1 tablet by mouth daily 180 tablet 3    furosemide (LASIX) 20 MG tablet 20 mg daily except M-W-F 40 mg daily 180 tablet 3    aspirin 81 MG chewable tablet Take 1 tablet by mouth daily 30 tablet 3    albuterol sulfate  (90 Base) MCG/ACT inhaler Inhale 2 puffs into the lungs every 6 hours as needed for Wheezing 1 Inhaler 4    umeclidinium-vilanterol (ANORO ELLIPTA) 62.5-25 MCG/INH AEPB inhaler Inhale 1 puff into the lungs daily 1 each 3    fluocinonide (LIDEX) 0.05 % ointment Apply topically as needed      loratadine (CLARITIN) 10 MG tablet Take 10 mg by mouth daily       Multiple Vitamins-Minerals (CENTRUM SILVER PO) Take  by mouth daily. No current facility-administered medications for this visit. PHYSICAL EXAM:  Vitals:    03/17/21 1255   BP: (!) 93/59   Pulse: 51     Body mass index is 23.45 kg/m². GENERAL: Alert and oriented. No distress. EYES: No pallor or icterus. ENT: No central cyanosis. VESSELS: No jugular venous distension or carotid bruits. HEART: Normal S1/S2. No murmur, rub or gallop. LUNGS: Decreased breath sounds left lower scapular region and infra axillary regions. CHEST WALL: Well-healed median sternotomy surgical site. ABDOMEN: Soft and non-tender. EXTREMITIES: No lower extremity edema. Feet are warm. NEUROLOGICAL: Higher function gains are normal.  No motor deficits. LABORATORY DATA AND DIAGNOSTIC DATA:  I have personally reviewed and interpreted the results of the following diagnostic testing    Lab Results   Component Value Date    WBC 13.3 (H) 01/26/2021    HGB 10.9 (L) 01/26/2021    HCT 33.9 (L) 01/26/2021     (H) 01/26/2021    CHOL 126 01/07/2021    TRIG 96 01/07/2021    HDL 36 01/07/2021    LDLDIRECT 79.82 01/11/2021    ALT 82 (H) 01/11/2021    AST 95 (H) 01/11/2021     02/11/2021    K 5.0 02/11/2021    CL 97 (L) 02/11/2021    CREATININE 1.6 (H) 02/11/2021    BUN 31 (H) 02/11/2021    CO2 31 02/11/2021    TSH 2.050 01/06/2021    INR 1.13 01/11/2021    LABA1C 5.6 01/11/2021     Echocardiogram dated 1/6/2021: DEJAH, LVEF 35-40% (TTE dated 1/5/2021, LVEF 25%, moderately dilated left atrium and no significant valvular abnormalities). ECG dated 2/1/2021: Sinus rhythm, right bundle branch block, left posterior fascicular block. Telemetry strip from 1/6/2021: Atrial fibrillation with rapid ventricular rate. Coronary angiogram dated 1/6/2021: Multivessel coronary artery disease  Electrocardiogram today: Sinus bradycardia, rate 40 bpm.  Event monitor dated 2/24/2021: Paroxysmal atrial flutter/atrial fibrillation and sinus pauses, longest 3 seconds while awake. ECG today: Sinus rhythm.     IMPRESSION:  1. Proximal atrial fibrillation/atrial flutter. [id number] score 4.  2.  Symptomatic sinus bradycardia/sinus pauses, longest 3 seconds during sleep. 3. Postoperative atrial fibrillation, currently in sinus rhythm. Status post left atrial appendage clipping at the time of his CABG. 4.  Coronary artery disease/CABG x3. Denies angina pectoris. 5.  Ischemic cardiomyopathy, LVEF 35 to 40%. NYHA class III. 6.  Hypertension hyperlipidemia.     ASSESSMENT AND PLAN:  The patient's bradycardia has improved following increase the dose of

## 2021-03-17 NOTE — TELEPHONE ENCOUNTER
Pt's wife called said that he saw Dr. Filomena Sever today and he said that the [place] is not causing his restless leg. Pt's wife is wondering if he could be started on a med for it. Said he is not able to sleep. If he does sleep it is only in the recliner. Really needs something. She said that he recently mentioned this to you.     Kypha

## 2021-03-17 NOTE — PROGRESS NOTES
EKG obtained    30 day monitor.  Still wearing    Patient admits to SOB,    Patient denies heart racing, chest pain light headed,  dizziness

## 2021-03-19 ENCOUNTER — NURSE ONLY (OUTPATIENT)
Dept: FAMILY MEDICINE CLINIC | Age: 71
End: 2021-03-19

## 2021-03-19 ENCOUNTER — HOSPITAL ENCOUNTER (OUTPATIENT)
Dept: CARDIAC REHAB | Age: 71
Setting detail: THERAPIES SERIES
Discharge: HOME | End: 2021-03-19
Payer: MEDICARE

## 2021-03-19 DIAGNOSIS — D62 ACUTE BLOOD LOSS ANEMIA: Primary | ICD-10-CM

## 2021-03-19 DIAGNOSIS — G25.81 RESTLESS LEG SYNDROME: ICD-10-CM

## 2021-03-19 LAB — HGB, POC: 14.4

## 2021-03-19 PROCEDURE — 85018 HEMOGLOBIN: CPT | Performed by: FAMILY MEDICINE

## 2021-03-19 PROCEDURE — G0422 INTENS CARDIAC REHAB W/EXERC: HCPCS

## 2021-03-19 PROCEDURE — G0423 INTENS CARDIAC REHAB NO EXER: HCPCS

## 2021-03-19 RX ORDER — ROPINIROLE 0.5 MG/1
TABLET, FILM COATED ORAL
Qty: 90 TABLET | Refills: 1 | Status: SHIPPED | OUTPATIENT
Start: 2021-03-19 | End: 2021-01-01

## 2021-03-19 RX ORDER — ROPINIROLE 0.5 MG/1
0.5 TABLET, FILM COATED ORAL NIGHTLY
Qty: 30 TABLET | Refills: 3 | Status: SHIPPED | OUTPATIENT
Start: 2021-03-19 | End: 2021-03-19

## 2021-03-19 NOTE — TELEPHONE ENCOUNTER
His anemia  Will contribute and stop for a hgb check in office and start requip 0.5 mg at sleep one at  Night and sent  To viki

## 2021-03-19 NOTE — TELEPHONE ENCOUNTER
Date of last visit:  3/1/2021  Date of next visit:  5/4/2021    Requested Prescriptions     Pending Prescriptions Disp Refills    rOPINIRole (REQUIP) 0.5 MG tablet [Pharmacy Med Name: ROPINIROLE 0.5MG TABLETS] 90 tablet 1     Sig: TAKE 1 TABLET BY MOUTH EVERY NIGHT

## 2021-03-19 NOTE — PROGRESS NOTES
Ibeth SCHUSTER.:  1950  Acct Number: [de-identified]  MRN:  [id number]                  [facility] COOKING SCHOOL WORKSHOP             Date: 3/19/2021        Session # ________   David Beverlyve class covered:      () Adding Flavor     () Fast Breakfasts     () Salads and Dressings     () Savory Soups      () Sauces & Simple Sides     () Appetizers and Snacks     () Delicious Desserts     (x) Plant Based Proteins     () Fast Evening Meals     () Weekend Breakfasts     () Efficiency Cooking     () One Pot Wonders     Patients were shown how to choose, prep, and cook; substitutions and other options were given. Samples were offered. Recipes were given and questions answered. The patient above was in the [employer] for 35 minutes.       Electronically signed by Alvarez Steward on 3/19/2021 at 12:18 PM

## 2021-03-22 ENCOUNTER — HOSPITAL ENCOUNTER (OUTPATIENT)
Dept: CARDIAC REHAB | Age: 71
Setting detail: THERAPIES SERIES
Discharge: HOME | End: 2021-03-22
Payer: MEDICARE

## 2021-03-22 ENCOUNTER — TELEPHONE (OUTPATIENT)
Dept: FAMILY MEDICINE CLINIC | Age: 71
End: 2021-03-22

## 2021-03-22 PROCEDURE — G0423 INTENS CARDIAC REHAB NO EXER: HCPCS

## 2021-03-22 PROCEDURE — G0422 INTENS CARDIAC REHAB W/EXERC: HCPCS

## 2021-03-22 NOTE — TELEPHONE ENCOUNTER
I called this pt back and he had many significant pauses yesterday. He had a pause in the afternoon for almost 5 seconds and he got very lt headed and dizzy and said he thought he was going to pass out. I told the pt that if he gets another episode like this again, he is to call 911 immediately and get to the ER. He said a dr called him last night at home and told him not to take his night time dose of metroprolol. DO YOU THINK HE SHOULD BE TAKING IT AT ALL.  HAS NO F/U WITH DR MENEZES IN THE FUTURE

## 2021-03-22 NOTE — PROGRESS NOTES
Hospital Facility-Based Program  Phase 2 Cardiac Rehab Weekly Progress Report      Patient prescribed exercise:  9:00 class. 3 times per week in rehab, 1-4 times per week at home for the amount of sessions/weeks specified by insurance. Current Levels: Treadmill: 1. 2mph/0% for 10 minutes,  NuStep:  30 Cagle for 10 minutes, UBE: Level 18W for 5 minutes. Progression Discussion: Maintain/Increase Aerobic exercise 15 minutes to work on endurance. Attempt to increase intensity by 5-20% for each modality this week. Try to increase intensities until Dorethia Hammans rates the exercises a 13-17 on Daniel RPE.

## 2021-03-22 NOTE — PROGRESS NOTES
Video Education Report - ICR/CR  Name:  Jaiden Goodson     Date:  3/22/2021  MRN: [id number]     Session #:  11  Session Length: 40 min    Core Videos        []Heart Disease Risk Reduction       []Overview of Pritikin Eating Plan      []Move it          [x]Calorie Density         []Healthy Minds, Bodies, Hearts        []Label Reading - Part 1       []Metabolic Syndrome and Belly Fat        []How Our Thoughts Can Heal Our Hearts   []Dining Out - Part 1      []Biomechanical Limitations  []Facts on Fat        []Hypertension & Heart Disease    []Diseases of Our Time - Focusing on Diabetes   []Body Composition  []Nutrition Action Plan    []Exercise Action Plan    Exercise      Healthy Mind-Set  []Improving Performance    []Smoking Cessation    []Introduction to [address]  []Aging-Enhancing the Quality of Your Life  []Becoming a Pritikin   []Biology of Weight Control    []Cooking Breakfasts   []Decoding Lab Results    and Snacks  []Diseases of Our Time - Overview   []Cooking Dinner and   []Sleep Disorders     Sides  []Targeting Your Nutrition Priorities   []Healthy Salads &   Dressings  Nutrition      []Cooking Soups and   []Dining Out - Part 2     Desserts  []Fueling a Healthy Body   []Menu Workshop     Overview  []Planning Your Eating Strategy   []The Pritikin Solution  []Vitamins and Minerals    Comments:  Video completed, group discussion

## 2021-03-23 ENCOUNTER — OFFICE VISIT (OUTPATIENT)
Dept: CARDIOLOGY CLINIC | Age: 71
End: 2021-03-23
Payer: MEDICARE

## 2021-03-23 ENCOUNTER — TELEPHONE (OUTPATIENT)
Dept: CARDIOLOGY CLINIC | Age: 71
End: 2021-03-23

## 2021-03-23 VITALS
HEIGHT: 68 IN | WEIGHT: 155.8 LBS | DIASTOLIC BLOOD PRESSURE: 61 MMHG | HEART RATE: 42 BPM | BODY MASS INDEX: 23.61 KG/M2 | SYSTOLIC BLOOD PRESSURE: 98 MMHG

## 2021-03-23 DIAGNOSIS — I48.91 ATRIAL FIBRILLATION, UNSPECIFIED TYPE (HCC): ICD-10-CM

## 2021-03-23 PROCEDURE — G8420 CALC BMI NORM PARAMETERS: HCPCS | Performed by: INTERNAL MEDICINE

## 2021-03-23 PROCEDURE — 4040F PNEUMOC VAC/ADMIN/RCVD: CPT | Performed by: INTERNAL MEDICINE

## 2021-03-23 PROCEDURE — 4004F PT TOBACCO SCREEN RCVD TLK: CPT | Performed by: INTERNAL MEDICINE

## 2021-03-23 PROCEDURE — 99212 OFFICE O/P EST SF 10 MIN: CPT | Performed by: INTERNAL MEDICINE

## 2021-03-23 PROCEDURE — 1123F ACP DISCUSS/DSCN MKR DOCD: CPT | Performed by: INTERNAL MEDICINE

## 2021-03-23 PROCEDURE — G8427 DOCREV CUR MEDS BY ELIG CLIN: HCPCS | Performed by: INTERNAL MEDICINE

## 2021-03-23 PROCEDURE — 3017F COLORECTAL CA SCREEN DOC REV: CPT | Performed by: INTERNAL MEDICINE

## 2021-03-23 PROCEDURE — G8482 FLU IMMUNIZE ORDER/ADMIN: HCPCS | Performed by: INTERNAL MEDICINE

## 2021-03-23 RX ORDER — METOPROLOL TARTRATE 50 MG/1
25 TABLET, FILM COATED ORAL 2 TIMES DAILY
Qty: 60 TABLET | Refills: 5 | Status: SHIPPED | OUTPATIENT
Start: 2021-03-23 | End: 2021-03-23 | Stop reason: ALTCHOICE

## 2021-03-23 RX ORDER — METOPROLOL SUCCINATE 25 MG/1
25 TABLET, EXTENDED RELEASE ORAL DAILY
Qty: 90 TABLET | Refills: 3 | OUTPATIENT
Start: 2021-03-23

## 2021-03-23 RX ORDER — METOPROLOL SUCCINATE 25 MG/1
25 TABLET, EXTENDED RELEASE ORAL DAILY
Qty: 30 TABLET | Refills: 5 | Status: SHIPPED | OUTPATIENT
Start: 2021-03-23 | End: 2021-03-23 | Stop reason: SDUPTHER

## 2021-03-23 RX ORDER — METOPROLOL TARTRATE 50 MG/1
TABLET, FILM COATED ORAL
Qty: 90 TABLET | OUTPATIENT
Start: 2021-03-23

## 2021-03-23 RX ORDER — METOPROLOL SUCCINATE 25 MG/1
25 TABLET, EXTENDED RELEASE ORAL DAILY
Qty: 30 TABLET | Refills: 5 | Status: ON HOLD | OUTPATIENT
Start: 2021-03-23 | End: 2021-04-07 | Stop reason: SDUPTHER

## 2021-03-23 NOTE — PROGRESS NOTES
Patient is seen today for abnormal holter results  Pause on Sunday - Patient did experience dizziness and lightheaded during the episode. Only stopped the metoprolol on Sunday night.

## 2021-03-23 NOTE — PROGRESS NOTES
The patient has tachycardia-bradycardia syndrome. He is currently wearing a 30-day event monitor. He was noted to have postconversion pauses, longest 3 seconds while awake. The patient apparently was asymptomatic at that time. He was asked to come here to discuss the management options. The patient has ischemic cardiomyopathy, LVEF 35 to 40%. He is on metoprolol tartrate 50 mg twice daily. Will switch him to metoprolol succinate 25 mg daily. We will continue monitoring home. If he continues to have symptomatic bradycardia arrhythmias we will consider dual-chamber pacemaker implantation. Plan discussed with him. Questions answered.     Aris Velasquez MD, MRCP, [facility] on 3/23/2021 at 11:33 AM

## 2021-03-23 NOTE — TELEPHONE ENCOUNTER
Pt was seen in the office today by dr Kera Finney. He would like to know how much longer you want him to wear the event monitor. He said he had an issue with the monitor and he missed almost 10 days of not having it on. DO YOU WANT HIM TO KEEP IT ON FOR THE NEXT 10 DAYS, OTHERWISE HE SAID IT IS SUPPOSED TO COME OFF SOON. HE IS CONCERNED ABOUT WHEN HE TAKES IT OFF, AND HE HAS NO F/U WITH YOU, HOW WILL HE KNOW HE HAS ISSUES. I TOLD HIM WE WILL GO BY HIS SIGNS AND SYMPTOMS. TOLD HIM IF HE GETS LT HEADED OR DIZZY OR FEELS LIKE PASSING OUT, HE NEEDS TO GET TO THE ER IMMEDIATELY   HOW LONG DO YOU WANT HIM TO KEEP MONITOR ON?? Progress Notes       The patient has tachycardia-bradycardia syndrome. He is currently wearing a 30-day event monitor. He was noted to have postconversion pauses, longest 3 seconds while awake. The patient apparently was asymptomatic at that time. He was asked to come here to discuss the management options. The patient has ischemic cardiomyopathy, LVEF 35 to 40%. He is on metoprolol tartrate 50 mg twice daily. Will switch him to metoprolol succinate 25 mg daily. We will continue monitoring home. If he continues to have symptomatic bradycardia arrhythmias we will consider dual-chamber pacemaker implantation. Plan discussed with him.   Questions answered.     Rod Moseley MD, MRCP, [facility], [facility] on 3/23/2021 at 11:33 AM

## 2021-03-23 NOTE — TELEPHONE ENCOUNTER
I CALLED THIS PT TO LET HIM KNOW TO WEAR THE MONITOR FOR A TOTAL OF 30 DAYS . PT VERBALIZES UNDERSTANDING.

## 2021-03-24 ENCOUNTER — HOSPITAL ENCOUNTER (OUTPATIENT)
Dept: CARDIAC REHAB | Age: 71
Setting detail: THERAPIES SERIES
Discharge: HOME | End: 2021-03-24
Payer: MEDICARE

## 2021-03-24 PROCEDURE — G0422 INTENS CARDIAC REHAB W/EXERC: HCPCS

## 2021-03-24 PROCEDURE — G0423 INTENS CARDIAC REHAB NO EXER: HCPCS

## 2021-03-26 ENCOUNTER — HOSPITAL ENCOUNTER (OUTPATIENT)
Dept: CARDIAC REHAB | Age: 71
Setting detail: THERAPIES SERIES
End: 2021-03-26
Payer: MEDICARE

## 2021-03-26 ENCOUNTER — TELEPHONE (OUTPATIENT)
Dept: CARDIOLOGY CLINIC | Age: 71
End: 2021-03-26

## 2021-03-26 ENCOUNTER — APPOINTMENT (OUTPATIENT)
Dept: CARDIAC REHAB | Age: 71
End: 2021-03-26
Payer: MEDICARE

## 2021-03-26 NOTE — TELEPHONE ENCOUNTER
Patient called back into the office Left another message regarding his monitor \"going off\"? ?    I did call the patient back to further discuss-  Had to [facility]

## 2021-03-26 NOTE — TELEPHONE ENCOUNTER
Pt LM his event monitor \" quit working\" so he turned it in. Had another 10 days or so left on it.   Do you Mirta Bolls what you need\" or does he need to get another event monitor applied

## 2021-03-26 NOTE — TELEPHONE ENCOUNTER
IVY his event monitor \"went off\"  ? I think he turned it back in.   IVY with him, I received his message and if he needs anything else to call the office

## 2021-03-29 ENCOUNTER — HOSPITAL ENCOUNTER (OUTPATIENT)
Dept: CARDIAC REHAB | Age: 71
Setting detail: THERAPIES SERIES
Discharge: HOME | End: 2021-03-29
Payer: MEDICARE

## 2021-03-29 PROCEDURE — G0422 INTENS CARDIAC REHAB W/EXERC: HCPCS

## 2021-03-29 PROCEDURE — G0423 INTENS CARDIAC REHAB NO EXER: HCPCS

## 2021-03-29 NOTE — PROGRESS NOTES
Hospital Facility-Based Program  Phase 2 Cardiac Rehab Weekly Progress Report      Patient prescribed exercise:  9:00 class. 3 times per week in rehab, 1-4 times per week at home for the amount of sessions/weeks specified by insurance. Current Levels: Treadmill: 1.0mph/0% for 5-10 minutes x 2,  NuStep:  30 Cagle for 12 minutes, UBE: 20watts for 5 minutes. Progression Discussion: Maintain/Increase Aerobic exercise 32 minutes to work on endurance. Attempt to increase intensity by 5-20% for each modality this week. Try to increase intensities until Demetrius Gil rates the exercises a 13-17 on Daniel RPE.

## 2021-03-29 NOTE — PROGRESS NOTES
Hood SCHUSTER.:  1950    MRN:  [id number]    Date: 3/29/2021      Session Length:  35 min   Session # _______    EXERCISE WORKSHOP:  Improving Performance                        Todays class addressed ways to build on the foundation of patient's core exercise program to achieve greater health benefits. Discussion of the SAID (Specific Adaptation to Imposed Demands) and FITT (Frequency, Intensity, Time, and Type) principles, and how these principles impact fitness levels. Techniques on overcoming muscle soreness and safety concerns was also addressed. In addition, the importance of a varied weekly exercise routine in order to improve overall fitness level was also discussed. Readiness to change:    ( ) Pre-contemplative   (x) Contemplative - ambivalent about change    ( ) Action - ready to set action plan and implement   ( ) Maintenance - has made change and is trying, and or practicing different alternative behaviors     Additional Notes:      Gemma Trejo was in the Workshop with the Exercise Physiologist for 35 minutes. The content was presented via Powerpoint, lecture, and patient participation based format. Motivational interviewing was utilized when needed, to promote change. Patient voiced understanding.     Electronically signed by Katrin Car on 3/29/2021 at 10:29 AM

## 2021-03-31 ENCOUNTER — HOSPITAL ENCOUNTER (OUTPATIENT)
Dept: CARDIAC REHAB | Age: 71
Setting detail: THERAPIES SERIES
End: 2021-03-31
Payer: MEDICARE

## 2021-03-31 ENCOUNTER — HOSPITAL ENCOUNTER (OUTPATIENT)
Dept: CARDIAC REHAB | Age: 71
Setting detail: THERAPIES SERIES
Discharge: HOME | End: 2021-03-31
Payer: MEDICARE

## 2021-03-31 ENCOUNTER — OFFICE VISIT (OUTPATIENT)
Dept: CARDIOLOGY CLINIC | Age: 71
End: 2021-03-31
Payer: MEDICARE

## 2021-03-31 ENCOUNTER — TELEPHONE (OUTPATIENT)
Dept: CARDIOLOGY CLINIC | Age: 71
End: 2021-03-31

## 2021-03-31 VITALS
SYSTOLIC BLOOD PRESSURE: 90 MMHG | OXYGEN SATURATION: 99 % | HEIGHT: 68 IN | WEIGHT: 155.6 LBS | HEART RATE: 70 BPM | DIASTOLIC BLOOD PRESSURE: 56 MMHG | BODY MASS INDEX: 23.58 KG/M2

## 2021-03-31 DIAGNOSIS — I10 ESSENTIAL HYPERTENSION: ICD-10-CM

## 2021-03-31 DIAGNOSIS — I48.91 ATRIAL FIBRILLATION, UNSPECIFIED TYPE (HCC): ICD-10-CM

## 2021-03-31 DIAGNOSIS — I25.10 CORONARY ARTERY DISEASE INVOLVING NATIVE CORONARY ARTERY OF NATIVE HEART WITHOUT ANGINA PECTORIS: ICD-10-CM

## 2021-03-31 DIAGNOSIS — I50.22 CHF (CONGESTIVE HEART FAILURE), NYHA CLASS II, CHRONIC, SYSTOLIC (HCC): Primary | ICD-10-CM

## 2021-03-31 DIAGNOSIS — Z95.1 S/P CABG X 3: ICD-10-CM

## 2021-03-31 LAB
ANION GAP SERPL CALCULATED.3IONS-SCNC: 10 MEQ/L (ref 8–16)
BUN BLDV-MCNC: 26 MG/DL (ref 7–22)
CALCIUM SERPL-MCNC: 9.3 MG/DL (ref 8.5–10.5)
CHLORIDE BLD-SCNC: 101 MEQ/L (ref 98–111)
CO2: 27 MEQ/L (ref 23–33)
CREAT SERPL-MCNC: 1.5 MG/DL (ref 0.4–1.2)
GFR SERPL CREATININE-BSD FRML MDRD: 46 ML/MIN/1.73M2
GLUCOSE BLD-MCNC: 123 MG/DL (ref 70–108)
POTASSIUM SERPL-SCNC: 5.1 MEQ/L (ref 3.5–5.2)
SODIUM BLD-SCNC: 138 MEQ/L (ref 135–145)

## 2021-03-31 PROCEDURE — G0422 INTENS CARDIAC REHAB W/EXERC: HCPCS

## 2021-03-31 PROCEDURE — G8482 FLU IMMUNIZE ORDER/ADMIN: HCPCS | Performed by: NURSE PRACTITIONER

## 2021-03-31 PROCEDURE — 1123F ACP DISCUSS/DSCN MKR DOCD: CPT | Performed by: NURSE PRACTITIONER

## 2021-03-31 PROCEDURE — 4040F PNEUMOC VAC/ADMIN/RCVD: CPT | Performed by: NURSE PRACTITIONER

## 2021-03-31 PROCEDURE — 99214 OFFICE O/P EST MOD 30 MIN: CPT | Performed by: NURSE PRACTITIONER

## 2021-03-31 PROCEDURE — 4004F PT TOBACCO SCREEN RCVD TLK: CPT | Performed by: NURSE PRACTITIONER

## 2021-03-31 PROCEDURE — 36415 COLL VENOUS BLD VENIPUNCTURE: CPT | Performed by: NURSE PRACTITIONER

## 2021-03-31 PROCEDURE — G8420 CALC BMI NORM PARAMETERS: HCPCS | Performed by: NURSE PRACTITIONER

## 2021-03-31 PROCEDURE — G8427 DOCREV CUR MEDS BY ELIG CLIN: HCPCS | Performed by: NURSE PRACTITIONER

## 2021-03-31 PROCEDURE — 3017F COLORECTAL CA SCREEN DOC REV: CPT | Performed by: NURSE PRACTITIONER

## 2021-03-31 ASSESSMENT — ENCOUNTER SYMPTOMS
NAUSEA: 0
ABDOMINAL DISTENTION: 0
ABDOMINAL PAIN: 0
COLOR CHANGE: 0
WHEEZING: 0
CHEST TIGHTNESS: 0
SHORTNESS OF BREATH: 0
COUGH: 0
APNEA: 0

## 2021-03-31 NOTE — TELEPHONE ENCOUNTER
BMP reviewed, kidney function stable with Entresto   No changes  Nurse visit with his home BP cuff next week as scheduled

## 2021-03-31 NOTE — TELEPHONE ENCOUNTER
No further orders from Dr Marisol Franklin. Here to see Sol Tahira aware no f/u with Joelle Dunn is aware of post cardioversion pauses. Per Rosi from [facility], they have no further strips available today other than what has already been faxed to us. Final report from event monitor not back yet, will go to Dr Forrest Garcia once it is back        Chaitanya Daniels MD  Cardiology  Atrial fibrillation, unspecified type [facility])  Dx  Results    Reason for Visit   Progress Notes       The patient has tachycardia-bradycardia syndrome. He is currently wearing a 30-day event monitor. He was noted to have postconversion pauses, longest 3 seconds while awake. The patient apparently was asymptomatic at that time. He was asked to come here to discuss the management options. The patient has ischemic cardiomyopathy, LVEF 35 to 40%. He is on metoprolol tartrate 50 mg twice daily. Will switch him to metoprolol succinate 25 mg daily. We will continue monitoring home. If he continues to have symptomatic bradycardia arrhythmias we will consider dual-chamber pacemaker implantation. Plan discussed with him.   Questions answered.     Antonietta Steve MD, MRCP, [facility], [facility] on 3/23/2021 at 11:33 AM

## 2021-03-31 NOTE — PATIENT INSTRUCTIONS
You may receive a survey regarding the care you received during your visit. Your input is valuable to us. We encourage you to complete and return your survey. We hope you will choose us in the future for your healthcare needs. NEW ORDERS FROM TODAY:      Continue:  · Take all medications as prescribed   · Daily weights and record - please try to weigh yourself upon awakening before eating or drinking   · Fluid restriction of 2 Liters per day (64 oz)  · Limit sodium in diet to around 8913-6985 mg/day  · Monitor BP - take BP 1 hour after meds  · Increase activity as tolerated     Call the [facility] for any of the following symptoms: [phone]   Weight gain of 3 pounds in 1 day or 5 pounds in 1 week   Increased shortness of breath   Shortness of breath while laying down   Cough   Chest pain   Swelling in feet, ankles or legs   Tenderness or bloating in the abdomen   Fatigue    Decreased appetite or feeling \"full\"    Nausea    Confusion     **PLEASE bring all medications in original bottles with you to your next appointment**    Educational websites:    http://www.[employer] [place]/. org (American Heart Association)    [employer]. com (Entresto/Novartis)    [employer].com (CardioMEMS)    Too much sodium causes your body to hold on to extra water. This can raise your blood pressure and force your heart and kidneys to work harder. In very serious cases, this could cause you to be put in the hospital. It might even be life-threatening. By limiting sodium, you will feel better and lower your risk of serious problems. The most common source of sodium is salt. People get most of the salt in their diet from canned, prepared, and packaged foods. Fast food and restaurant meals also are very high in sodium. Your doctor will probably limit your sodium to less than 2,000 milligrams (mg) a day.  This limit counts all the sodium in prepared and packaged foods and any salt you add to your food.  Follow-up care is a key part of your treatment and safety. Be sure to make and go to all appointments, and call your doctor if you are having problems. It's also a good idea to know your test results and keep a list of the medicines you take. How can you care for yourself at home? Read food labels  · Read labels on cans and food packages. The labels tell you how much sodium is in each serving. Make sure that you look at the serving size. If you eat more than the serving size, you have eaten more sodium. · Food labels also tell you the Percent Daily Value for sodium. Choose products with low Percent Daily Values for sodium. · Be aware that sodium can come in forms other than salt, including monosodium glutamate (MSG), sodium citrate, and sodium bicarbonate (baking soda). MSG is often added to Asian food. When you eat out, you can sometimes ask for food without MSG or added salt. Buy low-sodium foods  · Buy foods that are labeled \"unsalted\" (no salt added), \"sodium-free\" (less than 5 mg of sodium per serving), or \"low-sodium\" (less than 140 mg of sodium per serving). Foods labeled \"reduced-sodium\" and \"light sodium\" may still have too much sodium. Be sure to read the label to see how much sodium you are getting. · Buy fresh vegetables, or frozen vegetables without added sauces. Buy low-sodium versions of canned vegetables, soups, and other canned goods. Prepare low-sodium meals  · Cut back on the amount of salt you use in cooking. This will help you adjust to the taste. Do not add salt after cooking. One teaspoon of salt has about 2,300 mg of sodium. · Take the salt shaker off the table. · Flavor your food with garlic, lemon juice, onion, vinegar, herbs, and spices. Do not use soy sauce, lite soy sauce, steak sauce, onion salt, garlic salt, celery salt, mustard, or ketchup on your food. · Use low-sodium salad dressings, sauces, and ketchup.  Or make your own salad dressings and sauces without adding salt.  · Use less salt (or none) when recipes call for it. You can often use half the salt a recipe calls for without losing flavor. Other foods such as rice, pasta, and grains do not need added salt. · Rinse canned vegetables, and cook them in fresh water. This removes some--but not all--of the salt. · Avoid water that is naturally high in sodium or that has been treated with water softeners, which add sodium. Call your local water company to find out the sodium content of your water supply. If you buy bottled water, read the label and choose a sodium-free brand. Avoid high-sodium foods  · Avoid eating:  ? Smoked, cured, salted, and canned meat, fish, and poultry. ? Ham, ferrer, hot dogs, and luncheon meats. ? Regular, hard, and processed cheese and regular peanut butter. ? Crackers with salted tops, and other salted snack foods such as pretzels, chips, and salted popcorn. ? Frozen prepared meals, unless labeled low-sodium. ? Canned and dried soups, broths, and bouillon, unless labeled sodium-free or low-sodium. ? Canned vegetables, unless labeled sodium-free or low-sodium. ? Western Kait fries, pizza, tacos, and other fast foods. ? Pickles, olives, ketchup, and other condiments, especially soy sauce, unless labeled sodium-free or low-sodium.

## 2021-03-31 NOTE — PROGRESS NOTES
ENDOSCOPIC BI LAT MAXILLARY, ETHMOID, SPHENOID, FRONTAL SINUSOTOMY WITH SEPTOPLASTY AND TURBINOPLASTIES    SKIN CANCER EXCISION  09/2014    Left side of Forehead     TOOTH EXTRACTION  02/2017     Family History   Problem Relation Age of Onset    Heart Disease Mother     Heart Disease Father         cabg    Diabetes Brother     Heart Disease Brother      Social History     Tobacco Use    Smoking status: Current Every Day Smoker     Packs/day: 1.00     Years: 40.00     Pack years: 40.00     Types: Cigarettes    Smokeless tobacco: Never Used   Substance Use Topics    Alcohol use: Yes     Alcohol/week: 0.0 standard drinks     Comment: very little     Current Outpatient Medications   Medication Sig Dispense Refill    metoprolol succinate (TOPROL XL) 25 MG extended release tablet Take 1 tablet by mouth daily 30 tablet 5    rOPINIRole (REQUIP) 0.5 MG tablet TAKE 1 TABLET BY MOUTH EVERY NIGHT 90 tablet 1    sacubitril-valsartan (ENTRESTO) 24-26 MG per tablet Take 1 tablet by mouth 2 times daily 60 tablet 3    apixaban (ELIQUIS) 5 MG TABS tablet Take 1 tablet by mouth 2 times daily 180 tablet 3    atorvastatin (LIPITOR) 40 MG tablet Take 1 tablet by mouth nightly 90 tablet 3    potassium chloride (KLOR-CON M) 10 MEQ extended release tablet Take 1 tablet by mouth daily 180 tablet 3    furosemide (LASIX) 20 MG tablet 20 mg daily except M-W-F 40 mg daily 180 tablet 3    aspirin 81 MG chewable tablet Take 1 tablet by mouth daily 30 tablet 3    albuterol sulfate  (90 Base) MCG/ACT inhaler Inhale 2 puffs into the lungs every 6 hours as needed for Wheezing 1 Inhaler 4    umeclidinium-vilanterol (ANORO ELLIPTA) 62.5-25 MCG/INH AEPB inhaler Inhale 1 puff into the lungs daily 1 each 3    fluocinonide (LIDEX) 0.05 % ointment Apply topically as needed      loratadine (CLARITIN) 10 MG tablet Take 10 mg by mouth daily       Multiple Vitamins-Minerals (CENTRUM SILVER PO) Take  by mouth daily.          No current facility-administered medications for this visit. Allergies   Allergen Reactions    Penicillins Rash    Sulfa Antibiotics Rash       SUBJECTIVE:   Review of Systems   Constitutional: Negative for activity change, appetite change, fatigue and fever. HENT: Negative for congestion. Respiratory: Negative for apnea, cough, chest tightness, shortness of breath and wheezing. Cardiovascular: Negative for chest pain, palpitations and leg swelling. Gastrointestinal: Negative for abdominal distention, abdominal pain and nausea. Genitourinary: Negative for difficulty urinating and dysuria. Musculoskeletal: Negative for arthralgias and gait problem. Skin: Negative for color change. Neurological: Negative for dizziness, numbness and headaches. Psychiatric/Behavioral: Negative for agitation, confusion and sleep disturbance. The patient is not nervous/anxious. OBJECTIVE:   Today's Vitals:  BP (!) 90/56   Pulse 70   Ht 5' 8\" (1.727 m)   Wt 155 lb 9.6 oz (70.6 kg)   SpO2 99%   BMI 23.66 kg/m²     Physical Exam  Vitals signs reviewed. Constitutional:       Appearance: He is well-developed. HENT:      Head: Normocephalic and atraumatic. Eyes:      Pupils: Pupils are equal, round, and reactive to light. Neck:      Musculoskeletal: Normal range of motion. Vascular: No JVD. Cardiovascular:      Rate and Rhythm: Normal rate and regular rhythm. Heart sounds: Normal heart sounds. No murmur. Pulmonary:      Effort: Pulmonary effort is normal. No respiratory distress. Breath sounds: No rales. Abdominal:      General: There is no distension. Palpations: Abdomen is soft. Tenderness: There is no abdominal tenderness. Musculoskeletal:         General: No tenderness. Right lower leg: No edema. Left lower leg: No edema. Skin:     General: Skin is warm and dry. Capillary Refill: Capillary refill takes less than 2 seconds.    Neurological:      Mental Status: He is alert and oriented to person, place, and time. Psychiatric:         Mood and Affect: Mood normal.         Behavior: Behavior normal.         Wt Readings from Last 3 Encounters:   03/31/21 155 lb 9.6 oz (70.6 kg)   03/23/21 155 lb 12.8 oz (70.7 kg)   03/17/21 154 lb 3.2 oz (69.9 kg)     BP Readings from Last 3 Encounters:   03/31/21 (!) 90/56   03/23/21 98/61   03/17/21 (!) 93/59     Pulse Readings from Last 3 Encounters:   03/31/21 70   03/23/21 (!) 42   03/17/21 (!) 48     Body mass index is 23.66 kg/m². ECHO:   1/15/21   Summary   Left ventricle size is normal.   Normal left ventricle wall thickness. There was moderate global hypokinesis of the left ventricle. Systolic function was moderately reduced. Ejection fraction is visually estimated in the range of 35% to 40%. Left atrium mild to moderately dilated   Right atrium mildly dilated   When this echo is compared with the echo from Jan 5, 2021, EF improved   from 25% to 37% now      Signature      ----------------------------------------------------------------   Electronically signed by Uriel Gaona MD (Interpreting   physician) on 01/15/2021 at 08:41 PM   ----------------------------------------------------------------      Findings      Mitral Valve   The mitral valve structure was normal with normal leaflet separation. DOPPLER: The transmitral velocity was within the normal range with no   evidence for mitral stenosis. Mild mitral regurgitation is present. Aortic Valve   The aortic valve was trileaflet with normal thickness and cuspal   separation. There was no echocardiographic evidence of a vegetation. DOPPLER: Transaortic velocity was within the normal range with no evidence   of aortic stenosis or regurgitaiton. Tricuspid Valve   The tricuspid valve structure was normal with normal leaflet separation. There was no echocardiographic evidence of a vegetation. No evidence of tricuspid stenosis.    Mild tricuspid regurgitation visualized. Pulmonic Valve   The pulmonic valve leaflets exhibited normal thickness, no calcification,   and normal cuspal separation. There was no echocardiographic evidence of   vegetation. DOPPLER: The transpulmonic velocity was within the normal   range with no evidence for regurgitation. Left Atrium   Left atrium mild to moderately dilated with no thrombus identified. APPENDAGE: The left atrial appendage size was normal with no thrombus   identified. DOPPLER: The function was normal (normal emptying velocity). Left Ventricle   Left ventricle size is normal.   Normal left ventricle wall thickness. There was moderate global hypokinesis of the left ventricle. Systolic function was moderately reduced. Ejection fraction is visually estimated in the range of 35% to 40%. Right Atrium   Right atrium mildly dilated with no thrombus identified. ATRIAL SEPTUM: No   defect or patent foramen ovale was identified. There was no left to-right   shunt on color doppler. Right Ventricle   The right ventricular size was normal with normal systolic function and   wall thickness. Pericardial Effusion   The pericardium was normal in appearance with no evidence of a pericardial   effusion. Pleural Effusion   No evidence of pleural effusion. Aorta / Great Vessels   -Aortic root dimension within normal limits.   -The Pulmonary artery is within normal limits. -IVC size is within normal limits with normal respiratory phasic changes.          Results reviewed:  BNP:   Lab Results   Component Value Date    PROBNP 4443.0 (H) 01/06/2021     CBC:   Lab Results   Component Value Date    WBC 13.3 01/26/2021    RBC 3.16 01/26/2021    RBC 4.84 11/07/2011    HGB 14.4 03/19/2021    HGB 10.9 01/26/2021    HCT 33.9 01/26/2021     01/26/2021     CMP:    Lab Results   Component Value Date     03/31/2021    K 5.1 03/31/2021    K 5.3 01/09/2021     03/31/2021    CO2 27 laying down  Cough  Chest pain  Swelling in feet, ankles or legs  Tenderness or bloating in the abdomen  Fatigue   Decreased appetite or feeling \"full\"  Nausea   Confusion      Return in about 3 months (around 6/30/2021). or sooner if needed     Patient given educational materials - see patient instructions. We discussed the importance of weighing oneself and recording daily. We also discussed the importance of a low sodium diet, higher sodium foods to avoid and appropriate low sodium food choices. Discussed use, benefit, and side effects of prescribed medications. All patient questions answered. Patient verbalizes understanding of plan of care using teach back method, and is agreeable to the treatment plan. Greater then 30 minutes of time was spent reviewing the chart and educating the patient about HF, medications, diet, exercise, and discussing the plan of care. I personally spent more then 50% of the appt time face to face with the patient counseling/coordinating patient's care.       Electronicallysigned by ARCADIO Noel CNP on 3/31/2021 at 12:45 PM

## 2021-04-02 ENCOUNTER — HOSPITAL ENCOUNTER (OUTPATIENT)
Dept: CARDIAC REHAB | Age: 71
Setting detail: THERAPIES SERIES
Discharge: HOME | End: 2021-04-02
Payer: MEDICARE

## 2021-04-02 PROCEDURE — G0423 INTENS CARDIAC REHAB NO EXER: HCPCS

## 2021-04-02 PROCEDURE — G0422 INTENS CARDIAC REHAB W/EXERC: HCPCS

## 2021-04-02 NOTE — PROGRESS NOTES
Melissa SCHUSTER.:  1950  Acct Number: [de-identified]  MRN:  [id number]                  [facility] COOKING SCHOOL WORKSHOP             Date: 2021        Session # ________   Karen Matute class covered:      () Adding Flavor     () Fast Breakfasts     () Salads and Dressings     () Savory Soups      () Sauces & Simple Sides     () Appetizers and Snacks     () Delicious Desserts     () Plant Based Proteins     () Fast Evening Meals     (x) Weekend Breakfasts     () Efficiency Cooking     () One Pot Wonders     Patients were shown how to choose, prep, and cook; substitutions and other options were given. Samples were offered. Recipes were given and questions answered. The patient above was in the [facility] for 40 minutes.       Electronically signed by Xochitl Gregory on 2021 at 11:10 AM

## 2021-04-05 ENCOUNTER — HOSPITAL ENCOUNTER (INPATIENT)
Age: 71
LOS: 2 days | Discharge: HOME | DRG: 243 | End: 2021-04-07
Attending: EMERGENCY MEDICINE | Admitting: FAMILY MEDICINE
Payer: MEDICARE

## 2021-04-05 ENCOUNTER — HOSPITAL ENCOUNTER (OUTPATIENT)
Dept: CARDIAC REHAB | Age: 71
Setting detail: THERAPIES SERIES
Discharge: HOME | End: 2021-04-05
Payer: MEDICARE

## 2021-04-05 ENCOUNTER — HOSPITAL ENCOUNTER (OUTPATIENT)
Dept: CARDIAC REHAB | Age: 71
Setting detail: THERAPIES SERIES
End: 2021-04-05
Payer: MEDICARE

## 2021-04-05 ENCOUNTER — APPOINTMENT (OUTPATIENT)
Dept: GENERAL RADIOLOGY | Age: 71
DRG: 243 | End: 2021-04-05
Payer: MEDICARE

## 2021-04-05 DIAGNOSIS — I48.91 ATRIAL FIBRILLATION WITH RAPID VENTRICULAR RESPONSE (HCC): Primary | ICD-10-CM

## 2021-04-05 LAB
ALBUMIN SERPL-MCNC: 4 G/DL (ref 3.5–5.1)
ALP BLD-CCNC: 60 U/L (ref 38–126)
ALT SERPL-CCNC: 16 U/L (ref 11–66)
ANION GAP SERPL CALCULATED.3IONS-SCNC: 11 MEQ/L (ref 8–16)
AST SERPL-CCNC: 21 U/L (ref 5–40)
BASOPHILS # BLD: 0.5 %
BASOPHILS ABSOLUTE: 0 THOU/MM3 (ref 0–0.1)
BILIRUB SERPL-MCNC: 0.2 MG/DL (ref 0.3–1.2)
BILIRUBIN URINE: NEGATIVE
BLOOD, URINE: NEGATIVE
BUN BLDV-MCNC: 22 MG/DL (ref 7–22)
CALCIUM SERPL-MCNC: 9.5 MG/DL (ref 8.5–10.5)
CHARACTER, URINE: CLEAR
CHLORIDE BLD-SCNC: 103 MEQ/L (ref 98–111)
CO2: 26 MEQ/L (ref 23–33)
COLOR: YELLOW
CREAT SERPL-MCNC: 1.4 MG/DL (ref 0.4–1.2)
EKG ATRIAL RATE: 214 BPM
EKG ATRIAL RATE: 50 BPM
EKG P AXIS: 86 DEGREES
EKG P-R INTERVAL: 126 MS
EKG Q-T INTERVAL: 296 MS
EKG Q-T INTERVAL: 422 MS
EKG QRS DURATION: 100 MS
EKG QRS DURATION: 98 MS
EKG QTC CALCULATION (BAZETT): 384 MS
EKG QTC CALCULATION (BAZETT): 445 MS
EKG R AXIS: 69 DEGREES
EKG R AXIS: 80 DEGREES
EKG T AXIS: 32 DEGREES
EKG T AXIS: 66 DEGREES
EKG VENTRICULAR RATE: 136 BPM
EKG VENTRICULAR RATE: 50 BPM
EOSINOPHIL # BLD: 2.5 %
EOSINOPHILS ABSOLUTE: 0.2 THOU/MM3 (ref 0–0.4)
ERYTHROCYTE [DISTWIDTH] IN BLOOD BY AUTOMATED COUNT: 13.5 % (ref 11.5–14.5)
ERYTHROCYTE [DISTWIDTH] IN BLOOD BY AUTOMATED COUNT: 49.5 FL (ref 35–45)
GFR SERPL CREATININE-BSD FRML MDRD: 50 ML/MIN/1.73M2
GLUCOSE BLD-MCNC: 92 MG/DL (ref 70–108)
GLUCOSE URINE: NEGATIVE MG/DL
HCT VFR BLD CALC: 48.9 % (ref 42–52)
HEMOGLOBIN: 15.6 GM/DL (ref 14–18)
IMMATURE GRANS (ABS): 0.03 THOU/MM3 (ref 0–0.07)
IMMATURE GRANULOCYTES: 0.3 %
KETONES, URINE: NEGATIVE
LEUKOCYTE ESTERASE, URINE: NEGATIVE
LYMPHOCYTES # BLD: 20.6 %
LYMPHOCYTES ABSOLUTE: 2 THOU/MM3 (ref 1–4.8)
MCH RBC QN AUTO: 31.6 PG (ref 26–33)
MCHC RBC AUTO-ENTMCNC: 31.9 GM/DL (ref 32.2–35.5)
MCV RBC AUTO: 99.2 FL (ref 80–94)
MONOCYTES # BLD: 10.6 %
MONOCYTES ABSOLUTE: 1 THOU/MM3 (ref 0.4–1.3)
NITRITE, URINE: NEGATIVE
NUCLEATED RED BLOOD CELLS: 0 /100 WBC
OSMOLALITY CALCULATION: 282.4 MOSMOL/KG (ref 275–300)
PH UA: 5 (ref 5–9)
PLATELET # BLD: 259 THOU/MM3 (ref 130–400)
PMV BLD AUTO: 10.1 FL (ref 9.4–12.4)
POTASSIUM REFLEX MAGNESIUM: 5 MEQ/L (ref 3.5–5.2)
PRO-BNP: 576.6 PG/ML (ref 0–900)
PROTEIN UA: NEGATIVE
RBC # BLD: 4.93 MILL/MM3 (ref 4.7–6.1)
SEG NEUTROPHILS: 65.5 %
SEGMENTED NEUTROPHILS ABSOLUTE COUNT: 6.4 THOU/MM3 (ref 1.8–7.7)
SODIUM BLD-SCNC: 140 MEQ/L (ref 135–145)
SPECIFIC GRAVITY, URINE: 1.01 (ref 1–1.03)
TOTAL PROTEIN: 7.6 G/DL (ref 6.1–8)
TROPONIN T: < 0.01 NG/ML
TSH SERPL DL<=0.05 MIU/L-ACNC: 1.49 UIU/ML (ref 0.4–4.2)
UROBILINOGEN, URINE: 0.2 EU/DL (ref 0–1)
WBC # BLD: 9.7 THOU/MM3 (ref 4.8–10.8)

## 2021-04-05 PROCEDURE — 84484 ASSAY OF TROPONIN QUANT: CPT

## 2021-04-05 PROCEDURE — 2060000000 HC ICU INTERMEDIATE R&B

## 2021-04-05 PROCEDURE — 83880 ASSAY OF NATRIURETIC PEPTIDE: CPT

## 2021-04-05 PROCEDURE — 94640 AIRWAY INHALATION TREATMENT: CPT

## 2021-04-05 PROCEDURE — 2580000003 HC RX 258: Performed by: STUDENT IN AN ORGANIZED HEALTH CARE EDUCATION/TRAINING PROGRAM

## 2021-04-05 PROCEDURE — 84443 ASSAY THYROID STIM HORMONE: CPT

## 2021-04-05 PROCEDURE — 94760 N-INVAS EAR/PLS OXIMETRY 1: CPT

## 2021-04-05 PROCEDURE — 6370000000 HC RX 637 (ALT 250 FOR IP): Performed by: FAMILY MEDICINE

## 2021-04-05 PROCEDURE — 99223 1ST HOSP IP/OBS HIGH 75: CPT | Performed by: INTERNAL MEDICINE

## 2021-04-05 PROCEDURE — 2500000003 HC RX 250 WO HCPCS: Performed by: STUDENT IN AN ORGANIZED HEALTH CARE EDUCATION/TRAINING PROGRAM

## 2021-04-05 PROCEDURE — 80053 COMPREHEN METABOLIC PANEL: CPT

## 2021-04-05 PROCEDURE — 36415 COLL VENOUS BLD VENIPUNCTURE: CPT

## 2021-04-05 PROCEDURE — 6360000002 HC RX W HCPCS: Performed by: FAMILY MEDICINE

## 2021-04-05 PROCEDURE — 85025 COMPLETE CBC W/AUTO DIFF WBC: CPT

## 2021-04-05 PROCEDURE — 2580000003 HC RX 258: Performed by: FAMILY MEDICINE

## 2021-04-05 PROCEDURE — 71045 X-RAY EXAM CHEST 1 VIEW: CPT

## 2021-04-05 PROCEDURE — 93005 ELECTROCARDIOGRAM TRACING: CPT | Performed by: FAMILY MEDICINE

## 2021-04-05 PROCEDURE — 99283 EMERGENCY DEPT VISIT LOW MDM: CPT

## 2021-04-05 PROCEDURE — 93005 ELECTROCARDIOGRAM TRACING: CPT | Performed by: EMERGENCY MEDICINE

## 2021-04-05 PROCEDURE — 81003 URINALYSIS AUTO W/O SCOPE: CPT

## 2021-04-05 PROCEDURE — 96365 THER/PROPH/DIAG IV INF INIT: CPT

## 2021-04-05 RX ORDER — SODIUM CHLORIDE 0.9 % (FLUSH) 0.9 %
10 SYRINGE (ML) INJECTION EVERY 12 HOURS SCHEDULED
Status: DISCONTINUED | OUTPATIENT
Start: 2021-04-05 | End: 2021-04-07 | Stop reason: HOSPADM

## 2021-04-05 RX ORDER — DILTIAZEM HYDROCHLORIDE 5 MG/ML
0.25 INJECTION INTRAVENOUS ONCE
Status: COMPLETED | OUTPATIENT
Start: 2021-04-05 | End: 2021-04-05

## 2021-04-05 RX ORDER — SODIUM CHLORIDE 9 MG/ML
INJECTION, SOLUTION INTRAVENOUS CONTINUOUS
Status: DISCONTINUED | OUTPATIENT
Start: 2021-04-05 | End: 2021-04-07 | Stop reason: HOSPADM

## 2021-04-05 RX ORDER — PROMETHAZINE HYDROCHLORIDE 25 MG/1
12.5 TABLET ORAL EVERY 6 HOURS PRN
Status: DISCONTINUED | OUTPATIENT
Start: 2021-04-05 | End: 2021-04-07 | Stop reason: HOSPADM

## 2021-04-05 RX ORDER — SODIUM CHLORIDE 9 MG/ML
25 INJECTION, SOLUTION INTRAVENOUS PRN
Status: DISCONTINUED | OUTPATIENT
Start: 2021-04-05 | End: 2021-04-07 | Stop reason: HOSPADM

## 2021-04-05 RX ORDER — ONDANSETRON 2 MG/ML
4 INJECTION INTRAMUSCULAR; INTRAVENOUS EVERY 6 HOURS PRN
Status: DISCONTINUED | OUTPATIENT
Start: 2021-04-05 | End: 2021-04-07 | Stop reason: HOSPADM

## 2021-04-05 RX ORDER — ATORVASTATIN CALCIUM 40 MG/1
40 TABLET, FILM COATED ORAL NIGHTLY
Status: DISCONTINUED | OUTPATIENT
Start: 2021-04-05 | End: 2021-04-07 | Stop reason: HOSPADM

## 2021-04-05 RX ORDER — ACETAMINOPHEN 650 MG/1
650 SUPPOSITORY RECTAL EVERY 6 HOURS PRN
Status: DISCONTINUED | OUTPATIENT
Start: 2021-04-05 | End: 2021-04-07 | Stop reason: HOSPADM

## 2021-04-05 RX ORDER — M-VIT,TX,IRON,MINS/CALC/FOLIC 27MG-0.4MG
1 TABLET ORAL DAILY
Status: DISCONTINUED | OUTPATIENT
Start: 2021-04-06 | End: 2021-04-07 | Stop reason: HOSPADM

## 2021-04-05 RX ORDER — METOPROLOL SUCCINATE 25 MG/1
25 TABLET, EXTENDED RELEASE ORAL DAILY
Status: DISCONTINUED | OUTPATIENT
Start: 2021-04-06 | End: 2021-04-07 | Stop reason: HOSPADM

## 2021-04-05 RX ORDER — SODIUM CHLORIDE 0.9 % (FLUSH) 0.9 %
10 SYRINGE (ML) INJECTION PRN
Status: DISCONTINUED | OUTPATIENT
Start: 2021-04-05 | End: 2021-04-07 | Stop reason: HOSPADM

## 2021-04-05 RX ORDER — POLYETHYLENE GLYCOL 3350 17 G/17G
17 POWDER, FOR SOLUTION ORAL DAILY PRN
Status: DISCONTINUED | OUTPATIENT
Start: 2021-04-05 | End: 2021-04-07 | Stop reason: HOSPADM

## 2021-04-05 RX ORDER — ROPINIROLE 0.5 MG/1
0.5 TABLET, FILM COATED ORAL NIGHTLY
Status: DISCONTINUED | OUTPATIENT
Start: 2021-04-05 | End: 2021-04-07 | Stop reason: HOSPADM

## 2021-04-05 RX ORDER — ACETAMINOPHEN 325 MG/1
650 TABLET ORAL EVERY 6 HOURS PRN
Status: DISCONTINUED | OUTPATIENT
Start: 2021-04-05 | End: 2021-04-07 | Stop reason: HOSPADM

## 2021-04-05 RX ORDER — IPRATROPIUM BROMIDE AND ALBUTEROL SULFATE 2.5; .5 MG/3ML; MG/3ML
1 SOLUTION RESPIRATORY (INHALATION) EVERY 4 HOURS PRN
Status: DISCONTINUED | OUTPATIENT
Start: 2021-04-05 | End: 2021-04-07 | Stop reason: HOSPADM

## 2021-04-05 RX ORDER — ASPIRIN 81 MG/1
81 TABLET, CHEWABLE ORAL DAILY
Status: DISCONTINUED | OUTPATIENT
Start: 2021-04-06 | End: 2021-04-07 | Stop reason: HOSPADM

## 2021-04-05 RX ORDER — CETIRIZINE HYDROCHLORIDE 10 MG/1
10 TABLET ORAL DAILY
Status: DISCONTINUED | OUTPATIENT
Start: 2021-04-06 | End: 2021-04-07 | Stop reason: HOSPADM

## 2021-04-05 RX ORDER — MAGNESIUM SULFATE IN WATER 40 MG/ML
2000 INJECTION, SOLUTION INTRAVENOUS PRN
Status: DISCONTINUED | OUTPATIENT
Start: 2021-04-05 | End: 2021-04-07 | Stop reason: HOSPADM

## 2021-04-05 RX ORDER — POTASSIUM CHLORIDE 20 MEQ/1
40 TABLET, EXTENDED RELEASE ORAL PRN
Status: DISCONTINUED | OUTPATIENT
Start: 2021-04-05 | End: 2021-04-07 | Stop reason: HOSPADM

## 2021-04-05 RX ORDER — POTASSIUM CHLORIDE 7.45 MG/ML
10 INJECTION INTRAVENOUS PRN
Status: DISCONTINUED | OUTPATIENT
Start: 2021-04-05 | End: 2021-04-07 | Stop reason: HOSPADM

## 2021-04-05 RX ADMIN — DILTIAZEM HYDROCHLORIDE 5 MG/HR: 5 INJECTION, SOLUTION INTRAVENOUS at 10:25

## 2021-04-05 RX ADMIN — GLYCOPYRROLATE AND FORMOTEROL FUMARATE 2 PUFF: 9; 4.8 AEROSOL, METERED RESPIRATORY (INHALATION) at 21:02

## 2021-04-05 RX ADMIN — ATORVASTATIN CALCIUM 40 MG: 40 TABLET, FILM COATED ORAL at 21:35

## 2021-04-05 RX ADMIN — ENOXAPARIN SODIUM 40 MG: 40 INJECTION SUBCUTANEOUS at 14:15

## 2021-04-05 RX ADMIN — ROPINIROLE HYDROCHLORIDE 0.5 MG: 0.5 TABLET, FILM COATED ORAL at 21:35

## 2021-04-05 RX ADMIN — SODIUM CHLORIDE: 9 INJECTION, SOLUTION INTRAVENOUS at 14:16

## 2021-04-05 RX ADMIN — APIXABAN 5 MG: 5 TABLET, FILM COATED ORAL at 23:50

## 2021-04-05 RX ADMIN — DILTIAZEM HYDROCHLORIDE 17.5 MG: 5 INJECTION INTRAVENOUS at 10:25

## 2021-04-05 ASSESSMENT — ENCOUNTER SYMPTOMS
NAUSEA: 0
BACK PAIN: 0
DIARRHEA: 0
RHINORRHEA: 0
VOMITING: 0
SORE THROAT: 0
SHORTNESS OF BREATH: 0
SINUS PAIN: 0
COUGH: 0
EYE REDNESS: 0
ABDOMINAL PAIN: 0

## 2021-04-05 NOTE — PROGRESS NOTES
Phase 2 Cardiac Rehab  [facility] Event  Physician Notification    Patient Name: Donna Sanchez    :     1950  Diagnosis  S/P CABG on 2021    Physician:    Dr. Alatorre/Dr Xochitl Buchanan    2021    ()Chest Pain/Discomfort  (x)New Arrhythmia/Ectopy  ()New Signs/Symptoms  ()Findings Exceed Acceptable Parameters  ()Other:     Description of event:  Mr. Seth Marmolejo presented to cardiac rehab this morning in Atrial Fib with the rate between 130-170 bpm.  Mr. Seth Marmolejo is asymptomatic. We attempted to call Dr. Stefany George office. We were unable to leave a message due to voice box being full. We discussed the situation with patient, and recommended he go to the emergency department to be evaluated due to his rate hovering in the 160s. Patient was agreeable, and transported to ED via wheelchair. Action taken:    (x)Transported to ED  ()Rapid Response Called  ()Symptoms resolved, sent patient home  (x)Called Physician's Office  ()Appointment Scheduled    Please contact Cardiac Rehab with any changes, or to put Cardiac Rehab on hold.          Cardiac Rehab Staff  ()faxed a copy to office, (x)called office   [phone]

## 2021-04-05 NOTE — ED NOTES
Pt assisted to bathroom. Urine sample collected and sent to lab. Pt returned to bed. Xray at bedside.       Lyndon Kurtz RN  04/05/21 1417

## 2021-04-05 NOTE — ED NOTES
Pt presents with c/o tachycardia and dizziness. Pt was at outpt test and found to have abnormal EKG with rapid rate. Pt denies CP or SOB but does report some dizziness. Pt is alert and oriented. resp even and unlabored. He does report hx of afib.      Brian Stearns RN  04/05/21 0939

## 2021-04-05 NOTE — PROGRESS NOTES
SEE  HISTORY AND PHYSICAL     IMPRESSION    Atrial fib with rvr    ashd recent cabg x3     htn     hyperlipidemia     copd     gerd     collagenous colitis     PLAn   Converted with the cardiazem drip and continue  Low  dose  As on beta- blocker . His eliquis resumed and await cardiology as prior history of pause and with adding on meds will need to monitior . troponin  Will follow

## 2021-04-05 NOTE — PROGRESS NOTES
Hospital Facility-Based Program  Phase 2 Cardiac Rehab Weekly Progress Report      Patient prescribed exercise:  9:00 class. 3 times per week in rehab, 1-4 times per week at home for the amount of sessions/weeks specified by insurance. Current Levels: Treadmill: 1. 2mph/0% for 12 minutes,  NuStep:  30Watts for 12 minutes, UBE: Level 20W for 5 minutes. Progression Discussion: Maintain/Increase Aerobic exercise 15 minutes to work on endurance. Attempt to increase intensity by 5-20% for each modality this week. Try to increase intensities until Gia Mar rates the exercises a 13-17 on Daniel RPE.

## 2021-04-05 NOTE — CONSULTS
atr fib with RVR now back NSR  Hx of A 30-day event monitor was prescribed that shows runs of atrial flutter/atrial fibrillation (ventricular rate 150 bpm) and sinus pauses, longest pause 3 seconds on 3/13/2021 at 2:59 AM  ? Tachy ericka     Plan  Cont TOprol xl 25  EPS eval for decision for the need of pacer  [id number]    Tariq Olivier MD

## 2021-04-05 NOTE — ED PROVIDER NOTES
Ivana ENCOUNTER          Pt Name: Jesus Hawthorne  MRN: [id number]  [place] 1950  Date of evaluation: 4/5/2021  Treating Resident Physician: Julian Prince MD  Supervising Physician: Dr. Michaels Bryan       Chief Complaint   Patient presents with    Atrial Fibrillation    Tachycardia     History obtained from the patient. HISTORY OF PRESENT ILLNESS    HPI  Jesus Hawthorne is a 79 y.o. male past medical history of CHF, COPD, CAD, atrial fibrillation who presents to the emergency department for evaluation of persistent tachycardia since this a.m. Patient was currently at his cardiac rehab this a.m. here in the hospital since he is a few months status post triple bypass that was done in January. When they noted his heart rate to be 140s and advised under the emergency department. Patient denies having any symptoms. He denies having chest pain, cough, fever, chills, recent illnesses or shortness of breath. He also denies having any palpitations. The patient has no other acute complaints at this time. REVIEW OF SYSTEMS   Review of Systems   Constitutional: Negative for chills and fever. HENT: Negative for rhinorrhea, sinus pain and sore throat. Eyes: Negative for redness. Respiratory: Negative for cough and shortness of breath. Cardiovascular: Negative for chest pain. Gastrointestinal: Negative for abdominal pain, diarrhea, nausea and vomiting. Genitourinary: Negative for dysuria. Musculoskeletal: Negative for back pain. Skin: Negative for rash. Neurological: Negative for light-headedness and headaches. Psychiatric/Behavioral: Negative for agitation.          PAST MEDICAL AND SURGICAL HISTORY     Past Medical History:   Diagnosis Date    Asthma     Collagenous colitis 2014       repeat  in  2024    Colon polyps 2000    COPD, mild (HCC)     Eczema of hand     HTN (hypertension)     Hyperlipidemia     Smoker      Past Surgical History:   Procedure Laterality Date    COLONOSCOPY  2011,6/30/14    nba cardenas-no polyps repeat 10 yr    CORONARY ARTERY BYPASS GRAFT  01/12/2021    cabg x 3 with lima and atrial appendage clip  dr Zari Romero GRAFT N/A 1/12/2021    CABG  X 3 WITH DEJAH, Atrial Appendage Clip [facility]  06/2008    polyps    OTHER SURGICAL HISTORY  DEC 7TH 2012 DR [address]NAN [address] [place]     IGS ENDOSCOPIC BI LAT MAXILLARY, ETHMOID, SPHENOID, FRONTAL SINUSOTOMY WITH SEPTOPLASTY AND TURBINOPLASTIES    SKIN CANCER EXCISION  09/2014    Left side of Forehead     TOOTH EXTRACTION  02/2017         MEDICATIONS     Current Facility-Administered Medications:     [COMPLETED] dilTIAZem injection 17.5 mg, 0.25 mg/kg, Intravenous, Once, 17.5 mg at 04/05/21 1025 **FOLLOWED BY** dilTIAZem 125 mg in dextrose 5 % 125 mL infusion, 5-15 mg/hr, Intravenous, Continuous, Richard Quevedo MD, Stopped at 04/05/21 1154    Current Outpatient Medications:     metoprolol succinate (TOPROL XL) 25 MG extended release tablet, Take 1 tablet by mouth daily, Disp: 30 tablet, Rfl: 5    rOPINIRole (REQUIP) 0.5 MG tablet, TAKE 1 TABLET BY MOUTH EVERY NIGHT, Disp: 90 tablet, Rfl: 1    sacubitril-valsartan (ENTRESTO) 24-26 MG per tablet, Take 1 tablet by mouth 2 times daily, Disp: 60 tablet, Rfl: 3    apixaban (ELIQUIS) 5 MG TABS tablet, Take 1 tablet by mouth 2 times daily, Disp: 180 tablet, Rfl: 3    atorvastatin (LIPITOR) 40 MG tablet, Take 1 tablet by mouth nightly, Disp: 90 tablet, Rfl: 3    potassium chloride (KLOR-CON M) 10 MEQ extended release tablet, Take 1 tablet by mouth daily, Disp: 180 tablet, Rfl: 3    furosemide (LASIX) 20 MG tablet, 20 mg daily except M-W-F 40 mg daily, Disp: 180 tablet, Rfl: 3    aspirin 81 MG chewable tablet, Take 1 tablet by mouth daily, Disp: 30 tablet, Rfl: 3    albuterol sulfate  (90 Base) MCG/ACT inhaler, Inhale 2 puffs into the lungs every 6 hours as needed for Wheezing, Disp: 1 Inhaler, Rfl: 4    umeclidinium-vilanterol (ANORO ELLIPTA) 62.5-25 MCG/INH AEPB inhaler, Inhale 1 puff into the lungs daily, Disp: 1 each, Rfl: 3    fluocinonide (LIDEX) 0.05 % ointment, Apply topically as needed, Disp: , Rfl:     loratadine (CLARITIN) 10 MG tablet, Take 10 mg by mouth daily , Disp: , Rfl:     Multiple Vitamins-Minerals (CENTRUM SILVER PO), Take  by mouth daily. , Disp: , Rfl:       SOCIAL HISTORY     Social History     Social History Narrative    Not on file     Social History     Tobacco Use    Smoking status: Current Every Day Smoker     Packs/day: 1.00     Years: 40.00     Pack years: 40.00     Types: Cigarettes    Smokeless tobacco: Never Used   Substance Use Topics    Alcohol use: Yes     Alcohol/week: 0.0 standard drinks     Comment: very little    Drug use: No         ALLERGIES     Allergies   Allergen Reactions    Penicillins Rash    Sulfa Antibiotics Rash         FAMILY HISTORY     Family History   Problem Relation Age of Onset    Heart Disease Mother     Heart Disease Father         cabg    Diabetes Brother     Heart Disease Brother          PREVIOUS RECORDS   Previous records reviewed: Patient was seen here 1/6/2021 for atrial fibrillation. PHYSICAL EXAM     ED Triage Vitals   BP Temp Temp Source Pulse Resp SpO2 Height Weight   04/05/21 0929 04/05/21 0931 04/05/21 0929 04/05/21 0929 04/05/21 0929 -- -- --   (!) 147/80 98.7 °F (37.1 °C) Oral 128 20        Initial vital signs and nursing assessment reviewed and abnormal from tachycardia. Pulsoximetry is normal per my interpretation. Additional Vital Signs:  Vitals:    04/05/21 1154   BP: 120/70   Pulse: 51   Resp: 15   Temp:    SpO2: 100%       Physical Exam  Constitutional:       General: He is not in acute distress. Appearance: Normal appearance. He is not toxic-appearing.    HENT:      Head: Normocephalic and atraumatic. Right Ear: External ear normal.      Left Ear: External ear normal.      Nose: Nose normal.      Mouth/Throat:      Mouth: Mucous membranes are moist.      Pharynx: Oropharynx is clear. Eyes:      General: No scleral icterus. Extraocular Movements: Extraocular movements intact. Conjunctiva/sclera: Conjunctivae normal.   Neck:      Musculoskeletal: Normal range of motion and neck supple. No neck rigidity. Cardiovascular:      Rate and Rhythm: Tachycardia present. Rhythm irregular. Pulses: Normal pulses. Heart sounds: Normal heart sounds. Pulmonary:      Effort: Pulmonary effort is normal. No respiratory distress. Breath sounds: Normal breath sounds. No wheezing. Chest:      Chest wall: No tenderness. Abdominal:      General: Abdomen is flat. Bowel sounds are normal. There is no distension. Palpations: Abdomen is soft. Tenderness: There is no abdominal tenderness. There is no guarding or rebound. Musculoskeletal: Normal range of motion. Right lower leg: No edema. Left lower leg: No edema. Lymphadenopathy:      Cervical: No cervical adenopathy. Skin:     General: Skin is warm and dry. Capillary Refill: Capillary refill takes less than 2 seconds. Neurological:      General: No focal deficit present. Mental Status: He is alert and oriented to person, place, and time. Psychiatric:         Mood and Affect: Mood normal.         MEDICAL DECISION MAKING   Initial Assessment: Is a 9year-old male with past medical history of atrial fibrillation, CABG in January with a triple bypass is coming in with atrial fibrillation with RVR. He denies any recent symptoms, or any other complaints. Plan: We will obtain laboratory studies including a troponin TSH chest x-ray. Patient will be started on diltiazem bolus as well as a drip.         ED RESULTS   Laboratory results:  Labs Reviewed   CBC WITH AUTO DIFFERENTIAL - Abnormal; Notable for the following components:       Result Value    MCV 99.2 (*)     MCHC 31.9 (*)     RDW-SD 49.5 (*)     All other components within normal limits   COMPREHENSIVE METABOLIC PANEL W/ REFLEX TO MG FOR LOW K - Abnormal; Notable for the following components:    CREATININE 1.4 (*)     Total Bilirubin 0.2 (*)     All other components within normal limits   GLOMERULAR FILTRATION RATE, ESTIMATED - Abnormal; Notable for the following components:    Est, Glom Filt Rate 50 (*)     All other components within normal limits   TROPONIN   BRAIN NATRIURETIC PEPTIDE   TSH WITH REFLEX   URINE RT REFLEX TO CULTURE   ANION GAP   OSMOLALITY       Radiologic studies results:  XR CHEST PORTABLE   Final Result   1. Status post sternotomy. 2. Small calcified granuloma at the right lung base. 3. Clearing of left pleural effusion since previous study dated 2/01/2021. **This report has been created using voice recognition software. It may contain minor errors which are inherent in voice recognition technology. **      Final report electronically signed by DR Kelechi Zhao on 4/5/2021 10:08 AM          ED Medications administered this visit:   Medications   dilTIAZem injection 17.5 mg (17.5 mg Intravenous Given 4/5/21 1025)     Followed by   dilTIAZem 125 mg in dextrose 5 % 125 mL infusion (0 mg/hr Intravenous Stopped 4/5/21 1154)         ED COURSE     ED Course as of Apr 05 1207   Mon Apr 05, 2021   1043 Within normal limits   CBC auto differential(!):    WBC 9.7   RBC 4.93   Hemoglobin Quant 15.6   Hematocrit 48.9   MCV 99.2(!)   MCH 31.6   MCHC 31.9(!)   RDW-CV 13.5   RDW-SD 49.5(!)   Platelet Count 787   MPV 10.1   Seg Neutrophils 65.5   Lymphocytes 20.6   Monocytes 10.6   Eosinophils 2.5   Basophils 0.5   Immature Granulocytes 0.3   Segs Absolute 6.4   Lymphocytes Absolute 2.0   Monocytes Absolute 1.0   Eosinophils Absolute 0.2   Basophils Absolute 0.0   Immature Grans (Abs) 0.03   Nucleated Red Blood Cells 0 [AL] 1044 Mild elevation of creatinine seems to be a patient's baseline. Comprehensive Metabolic Panel w/ Reflex to MG(!):    Glucose 92   Creatinine 1.4(!)   BUN 22   Sodium 140   Potassium 5.0   Chloride 103   CO2 26   Calcium 9.5   AST 21   Alk Phos 60   Total Protein 7.6   Albumin 4.0   Bilirubin 0.2(!)   ALT 16 [AL]   1044 Troponin T: < 0.010 [AL]   1044 TSH: 1.490 [AL]   1044 Pro-BNP: 576.6 [AL]   1044 Osmolality Ca.4 [AL]   1044 No signs of infection. Urinalysis Reflex to Culture:    Glucose, UA NEGATIVE   Bilirubin, Urine NEGATIVE   Ketones, Urine NEGATIVE   Specific Gravity, Urine 1.010   Blood, Urine NEGATIVE   pH, UA 5.0   Protein, UA NEGATIVE   Urobilinogen, Urine 0.2   Nitrite, Urine NEGATIVE   Leukocyte Esterase, Urine NEGATIVE   Color, UA YELLOW   Character, Urine CLEAR [AL]   4928 \"IMPRESSION:  1. Status post sternotomy. 2. Small calcified granuloma at the right lung base. 3. Clearing of left pleural effusion since previous study dated 2021. \"   XR CHEST PORTABLE [AL]   2899 Dr. Cinthya Rashid was contacted and has agreed for admission. Dr. Yoni Brown will be consulted and patient will be admitted. [AL]      ED Course User Index  [AL] Julian Prince MD         MEDICATION CHANGES     New Prescriptions    No medications on file         FINAL DISPOSITION     Final diagnoses:   Atrial fibrillation with rapid ventricular response ([address]     Condition: condition: fair  Dispo: Admit to telemetry      This transcription was electronically signed. Parts of this transcriptions may have been dictated by use of voice recognition software and electronically transcribed, and parts may have been transcribed with the assistance of an ED scribe. The transcription may contain errors not detected in proofreading. Please refer to my supervising physician's documentation if my documentation differs.     Electronically Signed: Julian Prince, 21, 12:07 PM          Julian Prince MD  Resident  04/05/21 5645

## 2021-04-05 NOTE — PROGRESS NOTES
Pt arrives to [phone] with a HR or 49. Cardizem drip stopped in pt's room. Consult to cardiology. Physician made aware of HR. Will continue to monitor.

## 2021-04-05 NOTE — ED NOTES
Pt appeared to convert back to a sinus rhythm. Dr Spencer Gee notified. Order for repeat EKG and to stop Cardizem.       Paresh Ruiz RN  04/05/21 7288

## 2021-04-06 ENCOUNTER — TELEPHONE (OUTPATIENT)
Dept: CARDIOLOGY CLINIC | Age: 71
End: 2021-04-06

## 2021-04-06 LAB
ABO CHECK: NORMAL
ANION GAP SERPL CALCULATED.3IONS-SCNC: 8 MEQ/L (ref 8–16)
ANTIBODY SCREEN: NORMAL
APTT: 32.4 SECONDS (ref 22–38)
BASOPHILS # BLD: 0.5 %
BASOPHILS ABSOLUTE: 0 THOU/MM3 (ref 0–0.1)
BUN BLDV-MCNC: 17 MG/DL (ref 7–22)
CALCIUM SERPL-MCNC: 9.2 MG/DL (ref 8.5–10.5)
CHLORIDE BLD-SCNC: 105 MEQ/L (ref 98–111)
CO2: 26 MEQ/L (ref 23–33)
CREAT SERPL-MCNC: 1.3 MG/DL (ref 0.4–1.2)
EKG ATRIAL RATE: 51 BPM
EKG ATRIAL RATE: 58 BPM
EKG P AXIS: 83 DEGREES
EKG P AXIS: 84 DEGREES
EKG P-R INTERVAL: 146 MS
EKG P-R INTERVAL: 152 MS
EKG Q-T INTERVAL: 428 MS
EKG Q-T INTERVAL: 456 MS
EKG QRS DURATION: 90 MS
EKG QRS DURATION: 96 MS
EKG QTC CALCULATION (BAZETT): 420 MS
EKG QTC CALCULATION (BAZETT): 420 MS
EKG R AXIS: 79 DEGREES
EKG R AXIS: 80 DEGREES
EKG T AXIS: 72 DEGREES
EKG T AXIS: 74 DEGREES
EKG VENTRICULAR RATE: 51 BPM
EKG VENTRICULAR RATE: 58 BPM
EOSINOPHIL # BLD: 2.5 %
EOSINOPHILS ABSOLUTE: 0.2 THOU/MM3 (ref 0–0.4)
ERYTHROCYTE [DISTWIDTH] IN BLOOD BY AUTOMATED COUNT: 13.5 % (ref 11.5–14.5)
ERYTHROCYTE [DISTWIDTH] IN BLOOD BY AUTOMATED COUNT: 48.8 FL (ref 35–45)
GFR SERPL CREATININE-BSD FRML MDRD: 55 ML/MIN/1.73M2
GLUCOSE BLD-MCNC: 87 MG/DL (ref 70–108)
HCT VFR BLD CALC: 42.7 % (ref 42–52)
HEMOGLOBIN: 13.9 GM/DL (ref 14–18)
IMMATURE GRANS (ABS): 0.02 THOU/MM3 (ref 0–0.07)
IMMATURE GRANULOCYTES: 0.2 %
INR BLD: 1.25 (ref 0.85–1.13)
LYMPHOCYTES # BLD: 27.7 %
LYMPHOCYTES ABSOLUTE: 2.3 THOU/MM3 (ref 1–4.8)
MCH RBC QN AUTO: 31.7 PG (ref 26–33)
MCHC RBC AUTO-ENTMCNC: 32.6 GM/DL (ref 32.2–35.5)
MCV RBC AUTO: 97.3 FL (ref 80–94)
MONOCYTES # BLD: 11 %
MONOCYTES ABSOLUTE: 0.9 THOU/MM3 (ref 0.4–1.3)
NUCLEATED RED BLOOD CELLS: 0 /100 WBC
PLATELET # BLD: 208 THOU/MM3 (ref 130–400)
PMV BLD AUTO: 10.1 FL (ref 9.4–12.4)
POTASSIUM REFLEX MAGNESIUM: 4.4 MEQ/L (ref 3.5–5.2)
RBC # BLD: 4.39 MILL/MM3 (ref 4.7–6.1)
RH FACTOR: NORMAL
SEG NEUTROPHILS: 58.1 %
SEGMENTED NEUTROPHILS ABSOLUTE COUNT: 4.8 THOU/MM3 (ref 1.8–7.7)
SODIUM BLD-SCNC: 139 MEQ/L (ref 135–145)
TROPONIN T: < 0.01 NG/ML
WBC # BLD: 8.2 THOU/MM3 (ref 4.8–10.8)

## 2021-04-06 PROCEDURE — 94640 AIRWAY INHALATION TREATMENT: CPT

## 2021-04-06 PROCEDURE — 84484 ASSAY OF TROPONIN QUANT: CPT

## 2021-04-06 PROCEDURE — 2580000003 HC RX 258: Performed by: PHYSICIAN ASSISTANT

## 2021-04-06 PROCEDURE — 85610 PROTHROMBIN TIME: CPT

## 2021-04-06 PROCEDURE — 80048 BASIC METABOLIC PNL TOTAL CA: CPT

## 2021-04-06 PROCEDURE — 6370000000 HC RX 637 (ALT 250 FOR IP): Performed by: INTERNAL MEDICINE

## 2021-04-06 PROCEDURE — 93005 ELECTROCARDIOGRAM TRACING: CPT | Performed by: FAMILY MEDICINE

## 2021-04-06 PROCEDURE — 36415 COLL VENOUS BLD VENIPUNCTURE: CPT

## 2021-04-06 PROCEDURE — 86901 BLOOD TYPING SEROLOGIC RH(D): CPT

## 2021-04-06 PROCEDURE — 02HK3JZ INSERTION OF PACEMAKER LEAD INTO RIGHT VENTRICLE, PERCUTANEOUS APPROACH: ICD-10-PCS | Performed by: INTERNAL MEDICINE

## 2021-04-06 PROCEDURE — 85025 COMPLETE CBC W/AUTO DIFF WBC: CPT

## 2021-04-06 PROCEDURE — 33208 INSRT HEART PM ATRIAL & VENT: CPT | Performed by: INTERNAL MEDICINE

## 2021-04-06 PROCEDURE — 94760 N-INVAS EAR/PLS OXIMETRY 1: CPT

## 2021-04-06 PROCEDURE — 2580000003 HC RX 258: Performed by: FAMILY MEDICINE

## 2021-04-06 PROCEDURE — C1785 PMKR, DUAL, RATE-RESP: HCPCS

## 2021-04-06 PROCEDURE — 85730 THROMBOPLASTIN TIME PARTIAL: CPT

## 2021-04-06 PROCEDURE — 02H63JZ INSERTION OF PACEMAKER LEAD INTO RIGHT ATRIUM, PERCUTANEOUS APPROACH: ICD-10-PCS | Performed by: INTERNAL MEDICINE

## 2021-04-06 PROCEDURE — 6360000002 HC RX W HCPCS

## 2021-04-06 PROCEDURE — 99221 1ST HOSP IP/OBS SF/LOW 40: CPT | Performed by: INTERNAL MEDICINE

## 2021-04-06 PROCEDURE — 86850 RBC ANTIBODY SCREEN: CPT

## 2021-04-06 PROCEDURE — 6360000002 HC RX W HCPCS: Performed by: PHYSICIAN ASSISTANT

## 2021-04-06 PROCEDURE — C1898 LEAD, PMKR, OTHER THAN TRANS: HCPCS

## 2021-04-06 PROCEDURE — 2500000003 HC RX 250 WO HCPCS

## 2021-04-06 PROCEDURE — 6370000000 HC RX 637 (ALT 250 FOR IP): Performed by: FAMILY MEDICINE

## 2021-04-06 PROCEDURE — 0JH606Z INSERTION OF PACEMAKER, DUAL CHAMBER INTO CHEST SUBCUTANEOUS TISSUE AND FASCIA, OPEN APPROACH: ICD-10-PCS | Performed by: INTERNAL MEDICINE

## 2021-04-06 PROCEDURE — 86900 BLOOD TYPING SEROLOGIC ABO: CPT

## 2021-04-06 PROCEDURE — 2060000000 HC ICU INTERMEDIATE R&B

## 2021-04-06 RX ORDER — OXYCODONE HYDROCHLORIDE 5 MG/1
5 TABLET ORAL EVERY 4 HOURS PRN
Status: DISCONTINUED | OUTPATIENT
Start: 2021-04-06 | End: 2021-04-07 | Stop reason: HOSPADM

## 2021-04-06 RX ORDER — SODIUM CHLORIDE 0.9 % (FLUSH) 0.9 %
5-40 SYRINGE (ML) INJECTION EVERY 12 HOURS SCHEDULED
Status: DISCONTINUED | OUTPATIENT
Start: 2021-04-06 | End: 2021-04-06 | Stop reason: SDUPTHER

## 2021-04-06 RX ORDER — SODIUM CHLORIDE 9 MG/ML
INJECTION, SOLUTION INTRAVENOUS CONTINUOUS
Status: DISCONTINUED | OUTPATIENT
Start: 2021-04-06 | End: 2021-04-07 | Stop reason: HOSPADM

## 2021-04-06 RX ORDER — OXYCODONE HYDROCHLORIDE 5 MG/1
10 TABLET ORAL EVERY 4 HOURS PRN
Status: DISCONTINUED | OUTPATIENT
Start: 2021-04-06 | End: 2021-04-07 | Stop reason: HOSPADM

## 2021-04-06 RX ORDER — SODIUM CHLORIDE 0.9 % (FLUSH) 0.9 %
5-40 SYRINGE (ML) INJECTION PRN
Status: DISCONTINUED | OUTPATIENT
Start: 2021-04-06 | End: 2021-04-06 | Stop reason: SDUPTHER

## 2021-04-06 RX ORDER — SODIUM CHLORIDE 0.9 % (FLUSH) 0.9 %
10 SYRINGE (ML) INJECTION PRN
Status: DISCONTINUED | OUTPATIENT
Start: 2021-04-06 | End: 2021-04-07 | Stop reason: HOSPADM

## 2021-04-06 RX ORDER — SODIUM CHLORIDE 0.9 % (FLUSH) 0.9 %
10 SYRINGE (ML) INJECTION EVERY 12 HOURS SCHEDULED
Status: DISCONTINUED | OUTPATIENT
Start: 2021-04-06 | End: 2021-04-07 | Stop reason: HOSPADM

## 2021-04-06 RX ORDER — CHLORHEXIDINE GLUCONATE 4 G/100ML
SOLUTION TOPICAL ONCE
Status: COMPLETED | OUTPATIENT
Start: 2021-04-06 | End: 2021-04-06

## 2021-04-06 RX ADMIN — ROPINIROLE HYDROCHLORIDE 0.5 MG: 0.5 TABLET, FILM COATED ORAL at 21:37

## 2021-04-06 RX ADMIN — CHLORHEXIDINE GLUCONATE: 4 SOLUTION TOPICAL at 12:57

## 2021-04-06 RX ADMIN — ATORVASTATIN CALCIUM 40 MG: 40 TABLET, FILM COATED ORAL at 21:38

## 2021-04-06 RX ADMIN — SACUBITRIL AND VALSARTAN 1 TABLET: 24; 26 TABLET, FILM COATED ORAL at 21:37

## 2021-04-06 RX ADMIN — SODIUM CHLORIDE, PRESERVATIVE FREE 10 ML: 5 INJECTION INTRAVENOUS at 21:37

## 2021-04-06 RX ADMIN — CEFAZOLIN 2000 MG: 10 INJECTION, POWDER, FOR SOLUTION INTRAVENOUS at 14:42

## 2021-04-06 RX ADMIN — OXYCODONE 5 MG: 5 TABLET ORAL at 21:38

## 2021-04-06 RX ADMIN — GLYCOPYRROLATE AND FORMOTEROL FUMARATE 2 PUFF: 9; 4.8 AEROSOL, METERED RESPIRATORY (INHALATION) at 07:36

## 2021-04-06 RX ADMIN — APIXABAN 5 MG: 5 TABLET, FILM COATED ORAL at 21:38

## 2021-04-06 RX ADMIN — SODIUM CHLORIDE, PRESERVATIVE FREE 10 ML: 5 INJECTION INTRAVENOUS at 21:38

## 2021-04-06 ASSESSMENT — PAIN - FUNCTIONAL ASSESSMENT: PAIN_FUNCTIONAL_ASSESSMENT: PREVENTS OR INTERFERES SOME ACTIVE ACTIVITIES AND ADLS

## 2021-04-06 ASSESSMENT — PAIN DESCRIPTION - DESCRIPTORS: DESCRIPTORS: SORE

## 2021-04-06 ASSESSMENT — PAIN DESCRIPTION - LOCATION: LOCATION: SHOULDER

## 2021-04-06 ASSESSMENT — PAIN DESCRIPTION - PAIN TYPE: TYPE: SURGICAL PAIN

## 2021-04-06 ASSESSMENT — PAIN SCALES - GENERAL
PAINLEVEL_OUTOF10: 5
PAINLEVEL_OUTOF10: 0

## 2021-04-06 NOTE — CONSULTS
BYPASS GRAFT  01/12/2021    cabg x 3 with lima and atrial appendage clip  dr Schuler Larger GRAFT N/A 1/12/2021    CABG  X 3 WITH DEJAH, Atrial Appendage Clip performed by Shana Quintana MD at [facility] [address]  06/2008    polyps    OTHER SURGICAL HISTORY  DEC 7TH 2012 DR [address] [facility] [place]     IGS ENDOSCOPIC BI LAT MAXILLARY, ETHMOID, SPHENOID, FRONTAL SINUSOTOMY WITH SEPTOPLASTY AND TURBINOPLASTIES    SKIN CANCER EXCISION  09/2014    Left side of Forehead     TOOTH EXTRACTION  02/2017       FAMILY HISTORY:  Family History   Problem Relation Age of Onset    Heart Disease Mother     Heart Disease Father         cabg    Diabetes Brother     Heart Disease Brother         SOCIAL HISTORY:  A oates.  for 24 years. Two biological children. Quite smoking 1/2021. Used to [employer] bears daily and has quite recently.   Social History     Socioeconomic History    Marital status:      Spouse name: None    Number of children: None    Years of education: None    Highest education level: None   Occupational History    None   Social Needs    Financial resource strain: Not hard at all   [address] insecurity     Worry: Never true     Inability: Never true   [employer] needs     Medical: None     Non-medical: None   Tobacco Use    Smoking status: Current Every Day Smoker     Packs/day: 1.00     Years: 40.00     Pack years: 40.00     Types: Cigarettes    Smokeless tobacco: Never Used   Substance and Sexual Activity    Alcohol use:  Yes     Alcohol/week: 0.0 standard drinks     Comment: very little    Drug use: No    Sexual activity: None   Lifestyle    Physical activity     Days per week: None     Minutes per session: None    Stress: None   Relationships    Social connections     Talks on phone: None     Gets together: None     Attends Latter day service: None     Active member of club or organization: None     Attends meetings of clubs or organizations: None     Relationship status: None    Intimate partner violence     Fear of current or ex partner: None     Emotionally abused: None     Physically abused: None     Forced sexual activity: None   Other Topics Concern    None   Social History Narrative    None        ALLERGY HISTORY:  Allergies   Allergen Reactions    Penicillins Rash    Sulfa Antibiotics Rash        MEDICATIONS:  Current Facility-Administered Medications   Medication Dose Route Frequency Provider Last Rate Last Admin    dilTIAZem 125 mg in dextrose 5 % 125 mL infusion  5-15 mg/hr Intravenous Continuous Rivas Houser MD   Stopped at 04/05/21 1154    sodium chloride flush 0.9 % injection 10 mL  10 mL Intravenous 2 times per day Alina Noonan MD        sodium chloride flush 0.9 % injection 10 mL  10 mL Intravenous PRN Alina Noonan MD        0.9 % sodium chloride infusion  25 mL Intravenous PRN Alina Noonan MD        promethazine (PHENERGAN) tablet 12.5 mg  12.5 mg Oral Q6H PRN Alina Noonan MD        Or    ondansetron (ZOFRAN) injection 4 mg  4 mg Intravenous Q6H PRN Alina Noonan MD        polyethylene glycol (GLYCOLAX) packet 17 g  17 g Oral Daily PRN Alina Noonan MD        acetaminophen (TYLENOL) tablet 650 mg  650 mg Oral Q6H PRN Alina Noonan MD        Or    acetaminophen (TYLENOL) suppository 650 mg  650 mg Rectal Q6H PRN Alina Noonan MD        0.9 % sodium chloride infusion   Intravenous Continuous Alina Noonan MD 50 mL/hr at 04/05/21 1416 New Bag at 04/05/21 1416    potassium chloride (KLOR-CON M) extended release tablet 40 mEq  40 mEq Oral PRN Alina Noonan MD        Or    potassium bicarb-citric acid (EFFER-K) effervescent tablet 40 mEq  40 mEq Oral PRN Alina Noonan MD        Or    potassium chloride 10 mEq/100 mL IVPB (Peripheral Line)  10 mEq Intravenous PRN Alina Noonan MD        magnesium sulfate 2000 mg in 50 mL IVPB premix  2,000 mg Intravenous PRN Lina Hou MD        apixaban Ernestonely Hatfield) tablet 5 mg  5 mg Oral BID Lina Hou MD   5 mg at 04/05/21 2350    aspirin chewable tablet 81 mg  81 mg Oral Daily Lina Hou MD        atorvastatin (LIPITOR) tablet 40 mg  40 mg Oral Nightly Lina Hou MD   40 mg at 04/05/21 2135    metoprolol succinate (TOPROL XL) extended release tablet 25 mg  25 mg Oral Daily Lina Hou MD        cetirizine (ZYRTEC) tablet 10 mg  10 mg Oral Daily Lina Hou MD        sacubitril-valsartan (ENTRESTO) 24-26 MG per tablet 1 tablet  1 tablet Oral BID Lina Hou MD        rOPINIRole (REQUIP) tablet 0.5 mg  0.5 mg Oral Nightly Lina Hou MD   0.5 mg at 04/05/21 2135    therapeutic multivitamin-minerals 1 tablet  1 tablet Oral Daily Lina Hou MD        glycopyrrolate-formoterol (BEVESPI) 9-4.8 MCG/ACT inhaler 2 puff  2 puff Inhalation BID Lina Hou MD   2 puff at 04/06/21 0736    ipratropium-albuterol (DUONEB) nebulizer solution 1 ampule  1 ampule Inhalation Q4H PRN Lina Hou MD           PHYSICAL EXAM:  /60   Pulse 58   Temp 97.5 °F (36.4 °C) (Oral)   Resp 18   Ht 5' 8\" (1.727 m)   Wt 155 lb (70.3 kg)   SpO2 100% Comment: no O2 needed per protocol  BMI 23.57 kg/m²     Intake/Output Summary (Last 24 hours) at 4/6/2021 0819  Last data filed at 4/6/2021 0600  Gross per 24 hour   Intake 1039.72 ml   Output 750 ml   Net 289.72 ml     Patient Vitals for the past 96 hrs (Last 3 readings):   Weight   04/05/21 0947 155 lb (70.3 kg)     GENERAL: Alert and oriented. No distress. EYES: No pallor or icterus. ENT: No central cyanosis. VESSELS: No jugular venous distension or carotid bruits. HEART: Normal S1/S2. No murmur, rub or gallop. LUNGS: Decreased breath sounds left lower scapular region and infra axillary regions. CHEST WALL: Well-healed median sternotomy surgical site. ABDOMEN: Soft and non-tender. EXTREMITIES: No lower extremity edema. Feet are warm. NEUROLOGICAL: Higher function gains are normal.  No motor deficits. LABORATORY DATA AND DIAGNOSTIC DATA:  I have personally reviewed and interpreted the results of the following diagnostic testing    Lab Results   Component Value Date    WBC 8.2 04/06/2021    HGB 13.9 (L) 04/06/2021    HCT 42.7 04/06/2021     04/06/2021    CHOL 126 01/07/2021    TRIG 96 01/07/2021    HDL 36 01/07/2021    LDLDIRECT 79.82 01/11/2021    ALT 16 04/05/2021    AST 21 04/05/2021     04/06/2021    K 4.4 04/06/2021     04/06/2021    CREATININE 1.3 (H) 04/06/2021    BUN 17 04/06/2021    CO2 26 04/06/2021    TSH 1.490 04/05/2021    INR 1.13 01/11/2021    LABA1C 5.6 01/11/2021   Echocardiogram dated 1/6/2021: DEJAH, LVEF 35-40% (TTE dated 1/5/2021, LVEF 25%, moderately dilated left atrium and no significant valvular abnormalities). ECG dated 2/1/2021: Sinus rhythm, right bundle branch block, left posterior fascicular block. Telemetry strip from 1/6/2021: Atrial fibrillation with rapid ventricular rate. Coronary angiogram dated 1/6/2021: Multivessel coronary artery disease  Electrocardiogram today: Sinus bradycardia, rate 40 bpm.  Event monitor dated 2/24/2021: Paroxysmal atrial flutter/atrial fibrillation and sinus pauses, longest 3 seconds while awake. ECG today: Sinus rhythm.     IMPRESSION:  1. Paroxysmal atrial fibrillation/atrial flutter. [id number] score 4.  2.  Symptomatic sinus bradycardia/sinus pauses, longest 3 seconds. 3.  Status post left atrial appendage clipping at the time of his CABG. 4.  Coronary artery disease/CABG x3.  Denies angina pectoris. 5.  Ischemic cardiomyopathy, LVEF 35 to 40%.  NYHA class III. 6.  Hypertension hyperlipidemia. ASSESSMENT AND RECOMMENDATIONS:  The patient is having more frequent episodes of atrial tachycardia/atrial flutter.   He has baseline sinus bradycardia requiring frequent changes in his metoprolol dosage. This has also precluded use of antiarrhythmic therapy. The management options have been discussed with the patient previously in the outpatient clinic. Discussed the options of having a dual-chamber pacemaker implantation so that we can optimize his beta-blockers and initiate antiarrhythmic therapy. Alternatively, radiofrequency catheter ablation and a short-term treatment with antiarrhythmic drugs were discussed as well. Patient prefers to have dual-chamber pacemaker implantation. he risk and benefits of pacemaker implantation including pneumothorax, hemothorax, bleeding, vascular complications, infection, pericardial tamponade requiring emergency pericardiocentesis, lead dislodgement requiring reposition, future operations for generator change or device revision or upgrade were discussed with the patient. He verbalized understanding of the discussion. His questions were answered. The patient wishes to proceed. Thank you for allowing me to participate in the care of your patient. Please call me if you have any questions. **This report has been created using voice recognition software. It may contain minor errors which are inherent in voice recognition technology. **       Electronically signed by Holden Basilio MD, [facility], Brian Betancourt on 4/6/2021 at 8:19 AM

## 2021-04-06 NOTE — PROCEDURES
[facility] ()  [address] [place] [id number]  Dept: [phone]      ELECTROPHYSIOLOGY PROCEDURE NOTE  Date:   4/6/2021  Patient name:              Keon Allison  YOB: 1950  Sex: male   MRN:   [id number]    Primary care provider:    Alina Noonan MD     Procedure planned:  Dual chamber pacemaker implantation. Cardiologist:  Chelsea Corral MD     Indications for procedure  Symptomatic sinus bradycardia and sinus pauses. Brief medical history:  70/M with symptomatic paroxysmal atrial fibrillation/flutter and sinus bradycardia and sinus pauses (longest 3 seconds) presents for elective dual chamber pacemaker implantation. Medical comorbidities: multivessel CAD/CABG, ICM (EF 35-40%), HTN, HPL, DM2, chronic smoking and mild COPD. Normal AV conduction (expected to pace ventricle minimal). Procedure performed:  1. Dual chamber pacemaker implantation (right prepectoral). 2. Ultrasound guided left axillary vein accesses. 3. Fluoroscopy of the leads. DESCRIPTION OF THE PROCEDURE:  The patient was brought to the electrophysiology lab in a fasting and non-sedated state. He was prepped and draped in the usual sterile fashion. The right pectoral region was liberally infiltrated with 2% lidocaine for local anesthesia. Right axillary vein was cannulated x2 under ultrasound guidance using micro-puncture needle and 2 standard J-tip 0.035 inch guidewires were advanced into IVC. A 4 cm long incision was made in the right pectoral region using #10 blade and a subcutaneous pocket was made. Two 7-Armenian hemostatic peel away sheaths were advanced into the axillary/subclavian vein over the guide wires. Through one of the sheaths right ventricular pacemaker lead was inserted and advanced under fluoro guidance and positioned into the right ventricular mid-septum.  In identical fashion, the right atrial lead was advanced through another sheath and positioned into the right atrial appendage. After deployment of helix the electric parameters of the leads were checked using external analyzing system and noted to be adequate. High output pacing with 10 volts was performed with both the leads and there was no phrenic nerve capture. The leads were secured to the pectoral fascia using 0 Ethibond over the suture sleeves. The pocket was thoroughly irrigated with antibiotic solution. The leads were attached to the pulse generator and lead parameters were tested one more time. The leads were wrapped around the device and the device was placed inside the pocket. The pocket was closed in 3 layers using, 2-0, 3-0 and 4-0 Vicryl sutures. The incision was covered by steri-strips and site covered with by a sterile light dressing. The fluoroscopy of the chest showed no pneumothorax or pericardial or pleural effusion. The patient's hemodynamics were monitored throughout the procedure. He tolerated the procedure well. Medications:  1. Midazolam 1 mg intravenously. 2. Fentanyl 50 mcg intravenously. 3. Cefazolin 2 gm intravenously for surgical prophylaxis. 4. Lidocaine/Marcaine 20 cc for local anaesthesia. Fluoroscopy time:  2.2 minutes. Blood loss:  Minimal.     Complication:  None. Implanted device:  1. Pulse generator: NovoDynamics ([employer] MRI), model [id number] and serial [id number].  2. Right atrial lead: NovoDynamics ([place] [employer]), model 6207 and serial [id number].  3. Right ventricular lead: NovoDynamics ([place] [employer]), model 5298 and serial [id number]. Intraoperative device parameters:  1. RA lead: P waves 5.4 mV, threshold 0.9 V at 0.4 ms and impedance 591 ohms. 2. RV lead: R waves 10.7 mV, threshold 0.5 V 0.4 minutes impedance 782 ohms. Bradycardia settings:  DDDR  beats. Summary:  1. Successful dual chamber pacemaker implantation. 2. Fluoroscopy of the leads. 3. Normal pacemaker functions. Recommendations   1.  Observation

## 2021-04-06 NOTE — CONSULTS
[address] [place] [address]                                  CONSULTATION    PATIENT NAME: Karen Swift                       :        1950  MED REC NO:   [id number]                           ROOM:       0015  ACCOUNT NO:   [de-identified]                           ADMIT DATE: 2021  PROVIDER:     Saadia Fxo. ZACKERY Crespo Read:  2021    REASON FOR CONSULTATION:  AFib with rapid ventricular response. PRIMARY CARDIOLOGISTS:  Rui Trujillo MD; and Blake Young MD.    HISTORY OF PRESENT ILLNESS:  This is a 80-year-old  gentleman  with history of coronary artery disease, status post recent bypass;  COPD; paroxysmal atrial fibrillation presented, was sent from  rehabilitation center to the emergency room because of AFib with rapid  ventricular response. The patient went into the rehab this morning, was  found to have heart rate in the 150 to 160 beats per minute and so, he  did not start the rehab and in view of the fast heart rate, the patient  was sent to the emergency room. In the emergency room, in the 140s and  so, started on Cardizem drip and when he arrived in the floor, the  patient converted into normal sinus rhythm, actually initially sinus  bradycardia in the 40s to 50s and subsequently, the Cardizem drip was  stopped and heart rate came up to 60 to 70 beats per minute. Cardiology  evaluation was sought for atrial fibrillation management and further  care of the patient and the patient denied having any chest pain. Did  have some mild palpitations and mild dizziness. Otherwise, no chest  pain, no orthopnea, no shortness of breath, no paroxysmal nocturnal  dyspnea.   The patient is curious how to proceed with further care as  recently, the patient was seen by Dr. Mary Heath and his medication,  Toprol-XL has been decreased from 50 to 25 mg daily because the heart  rate was around the 40s at rest.  So, now, after the dose has been  decreased, the heart rate is in the 70s, but when he goes to AFib, he  goes into rapid ventricular response. REVIEW OF SYSTEMS:  Negative except the above-mentioned ones. PAST MEDICAL HISTORY:  History of recent coronary artery multivessel  disease with coronary artery bypass x3 with left atrial appendage clip,  history of previous cardioversion for atrial fibrillation and  successful, history of atrial fibrillation, history of symptomatic  bradycardia for which the dose of metoprolol has been decreased, history  of sinus pause of 3 seconds on 03/13/2011 on event monitor, history of  severe ischemic cardiomyopathy, hypertension, hyperlipidemia, chronic  tobacco abuse, history of COPD, mild. PAST SURGICAL HISTORY:  Three-vessel coronary artery bypass on  01/12/2021, hernia repair, tooth extraction, colonoscopy, sinus surgery. FAMILY HISTORY:  Positive for heart disease, diabetes. SOCIAL HISTORY:  He is a current smoker still. Occasional alcohol  consumption and no drug use. ALLERGIES:  He is allergic to PENICILLIN and SULFA. HOME MEDICATIONS:  Prior to this admission, albuterol, Eliquis, aspirin,  Lipitor, Lasix 10 mg four times a week, Toprol-XL 25 daily, Entresto one  tablet twice a day, ropinirole, potassium chloride, multivitamin. PHYSICAL EXAMINATION:  GENERAL:  Looks sick, in no form of distress. VITAL SIGNS:  Blood pressure 131/62, pulse rate 58, respiratory rate 16,  temperature 96.8. On arrival to the emergency room, heart rate was 136  to 140s. HEENT:  Pink conjunctivae. Anicteric sclerae. Pupils are equal and  reactive bilaterally to light. NECK:  No lymphadenopathy. No goiter. No JVD. CHEST:  Clear to auscultation and resonant to percussion. CARDIOVASCULAR:  Arteries are felt; carotid, femoral, and pedal.  No  bruit. S1 and S2 well heard. No murmur or gallop rhythm. ABDOMEN:  Soft, nontender, nondistended.   Bowel sounds are positive. GENITOURINARY:  No CVA, flank or suprapubic tenderness. LOCOMOTOR:  No cyanosis, clubbing, muscle or joint tenderness. SKIN:  No rashes, ulcers, or nodules. CNS:  Alert, awake, and oriented to time, person, and place. Cranial  nerves are intact. Sensation is intact to light touch and pain. Motor:  Normal muscle strength and tone. Appropriate mood and affect. WORKUP:  Today, EKG:  Atrial fibrillation, rapid ventricular response,  rate of 136 beats per minute, premature ventricular contractions noted. Nonspecific ST-T segment changes noted. Otherwise, no acute EKG  abnormality. Subsequent EKG:  Converted into sinus rhythm, sinus  bradycardia rate of 58 beats per minute, now rate in the 70s. BLOOD CHEMISTRY:  Sodium 140, potassium 5, BUN 22, creatinine 1.4. Cardiac enzymes:  Troponin less than 0.01. HEMATOLOGY:  White blood cells 9.7, hemoglobin 15.6, platelets 144. CHEST X-RAY:  No acute abnormality noted. Echocardiogram in 01/2021, ejection fraction is 35% to 40%. The patient  had transesophageal echocardiogram done on 01/16/2021. There is an  improvement in ejection fraction from 25% to 37%. ASSESSMENT:  1. Atrial fibrillation with rapid ventricular response and background  history of paroxysmal atrial fibrillation. 2.  History of AFib with rapid ventricular response. History of 30-day  event monitor revealed AFib with rapid ventricular response when he is  in AFib and sinus pause of 3 seconds when he is in sinus and also  bradycardia when he is in sinus and after Toprol decreased, rate seems  to be better; however, when he goes into AFib, he goes into AFib with  rapid ventricular response. 3.  Severe ischemic cardiomyopathy. Ejection fraction 35% to 40%. 4.  Hypertension. 5.  Hyperlipidemia. 6.  Chronic smoking. 7.  Mild COPD. 8.  History of sinus bradycardia with pause longest 3 seconds while the  patient was awake, noted on the event monitor.   9.  Coronary artery disease, status post three-vessel bypass on  01/12/2021 with status post left appendage clip. This is a gentleman who basically goes into AFib with rapid ventricular  response when he is on low dose of Toprol and goes into sinus  bradycardia and pause when the dose of Toprol increased to 50. 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    PLAN AND RECOMMENDATIONS:  At this level, continue his home medications,  resume Toprol-XL 25 daily. Electrophysiologist evaluation for the  decision for pacemaker need as the patient is going into recurrent  atrial fibrillation with rapid ventricular response at a lower dose of  Toprol 25 daily or the patient may need rhythm control. Consideration  for pulmonary vein isolation and basically keeping him in rhythm will be  reasonable as well or else, the patient may need just permanent  pacemaker placement and control the rate with AV jagruti blocking agent. So, basically, the patient needs an optimization of the medical therapy. He needs his beta blocker also for the underlying cardiomyopathy,  coronary artery disease. So, at this level, probably pacemaker  placement and adjusting the medication to control the AFib rate will be  a plausible course of action. Final verdict to surgery will be left to  the decision of the electrophysiologist.    Thank you for allowing me to participate in the care of this patient. I  will follow up with you. Carolina Powell.  Kathryn Kimball M.D.    D: 04/05/2021 19:55:09       T: 04/05/2021 22:39:11     [email or web address]_JOANNA  Job#: [id number]     Doc#: [id number]    CC:

## 2021-04-06 NOTE — PROGRESS NOTES
This nurse called Cath lab to find out scheduled/anticipated time for pacemaker placement today. Pt's wife at bedside and wondering. Pt NPO for now and awaiting call back with approx time. Consents signed.

## 2021-04-06 NOTE — PROGRESS NOTES
Pharmacy Medication History Note      List of current medications patient is taking is complete. Source of information: Patient, [employer] dispense report    Changes made to medication list:  Medications removed (include reason, ex. therapy complete or physician discontinued):  None    Medications added/doses adjusted:  Adjusted dose of furosemide 20 mg - 2 tabs on Mon/Wed/Fri, 1 tab on Sun/Tue/Thur/Sat    Other notes (ex. Recent course of antibiotics, Coumadin dosing):  Patient fills at [facility] [place] on [address] [place] [address] Drive, [address]. Dispense report showed fills of amiodarone, digoxin, and losartan in January 2021 for 30 days - patient does not recall these medications and confirms he is not taking  Denies use of other OTC or herbal medications.       Allergies reviewed      Electronically signed by Travon Collazo on 4/6/2021 at 3:12 PM

## 2021-04-06 NOTE — PRE SEDATION
Sedation Pre-Procedure Note    Patient Name: Jose Brannon   YOB: 1950  Room/Bed: -15/015-A  Medical Record Number: [id number]  Date: 4/6/2021   Time: 2:30 PM       Indication:  Tachycardia-bradycardia planned to undergo dual chamber pacemaker implantation. Brief clinical summary:  70/M with symptomatic paroxysmal atrial fibrillation/flutter and sinus bradycardia and sinus pauses (longest 3 seconds) presents for elective dual chamber pacemaker implantation. Medical comorbidities: multivessel-CAD/CABG, ICM (EF 35-40%), HTN, HPL, DM2, chronic smoking and mild COPD. Normal AV conduction (expected to pace ventricle minimal). Vital Signs:   Vitals:    04/06/21 1215   BP: 134/67   Pulse: 55   Resp: 16   Temp: 97.6 °F (36.4 °C)   SpO2: 98%       Past Medical History:   has a past medical history of Asthma, Collagenous colitis, Colon polyps, COPD, mild ([address] Eczema of hand, HTN (hypertension), Hyperlipidemia, and Smoker. Past Surgical History:   has a past surgical history that includes Nasal sinus surgery (06/2008); Colonoscopy (2011,6/30/14); other surgical history (DEC 7TH 2012 DR Eric Covington [facility] ); hernia repair; Skin cancer excision (09/2014); Tooth Extraction (02/2017); Coronary artery bypass graft (01/12/2021); and Coronary artery bypass graft (N/A, 1/12/2021).     Medications:   Scheduled Meds:    sodium chloride flush  10 mL Intravenous 2 times per day    ceFAZolin (ANCEF) IVPB  2,000 mg Intravenous On Call to OR    vancomycin irrigation  250 mg Irrigation Once    sodium chloride flush  10 mL Intravenous 2 times per day    apixaban  5 mg Oral BID    aspirin  81 mg Oral Daily    atorvastatin  40 mg Oral Nightly    metoprolol succinate  25 mg Oral Daily    cetirizine  10 mg Oral Daily    sacubitril-valsartan  1 tablet Oral BID    rOPINIRole  0.5 mg Oral Nightly    therapeutic multivitamin-minerals  1 tablet Oral Daily    glycopyrrolate-formoterol  2 puff Inhalation BID Continuous Infusions:    sodium chloride 75 mL/hr at 04/06/21 1220    dilTIAZem (CARDIZEM) 125 mg in dextrose 5% 125 mL infusion Stopped (04/05/21 1154)    sodium chloride      sodium chloride 50 mL/hr at 04/05/21 1416     PRN Meds: sodium chloride flush, sodium chloride flush, sodium chloride, promethazine **OR** ondansetron, polyethylene glycol, acetaminophen **OR** acetaminophen, potassium chloride **OR** potassium alternative oral replacement **OR** potassium chloride, magnesium sulfate, ipratropium-albuterol  Home Meds:   Prior to Admission medications    Medication Sig Start Date End Date Taking?  Authorizing Provider   Furosemide (LASIX PO) Take 10 mg by mouth four times a week Tues-Thurs-Sat-Sun   Yes Historical Provider, MD   metoprolol succinate (TOPROL XL) 25 MG extended release tablet Take 1 tablet by mouth daily 3/23/21  Yes Ro Mullen MD   rOPINIRole (REQUIP) 0.5 MG tablet TAKE 1 TABLET BY MOUTH EVERY NIGHT 3/19/21  Yes Taylor Ortega MD   sacubitril-valsartan (ENTRESTO) 24-26 MG per tablet Take 1 tablet by mouth 2 times daily 2/25/21  Yes Arsalan Green MD   apixaban (ELIQUIS) 5 MG TABS tablet Take 1 tablet by mouth 2 times daily 2/12/21  Yes Arsalan Green MD   atorvastatin (LIPITOR) 40 MG tablet Take 1 tablet by mouth nightly 2/12/21  Yes Arsalan Green MD   potassium chloride (KLOR-CON M) 10 MEQ extended release tablet Take 1 tablet by mouth daily 2/11/21  Yes ARCADIO Santana CNP   furosemide (LASIX) 20 MG tablet 20 mg daily except M-W-F 40 mg daily  Patient taking differently: Take 20 mg by mouth three times a week Mon-Wed-Fri 2/11/21  Yes ARCADIO Santana CNP   aspirin 81 MG chewable tablet Take 1 tablet by mouth daily 1/20/21  Yes Jorden Ormond, PA-C   umeclidinium-vilanterol (ANORO ELLIPTA) 62.5-25 MCG/INH AEPB inhaler Inhale 1 puff into the lungs daily 1/19/21  Yes Jorden Ormond, PA-C   loratadine (CLARITIN) 10 MG tablet Take 10 mg by mouth daily Yes Historical Provider, MD   Multiple Vitamins-Minerals (CENTRUM SILVER PO) Take  by mouth daily. Yes Historical Provider, MD   albuterol sulfate  (90 Base) MCG/ACT inhaler Inhale 2 puffs into the lungs every 6 hours as needed for Wheezing 1/19/21   Antonio Mcclellan PA-C   fluocinonide (LIDEX) 0.05 % ointment Apply topically as needed    Historical Provider, MD     Coumadin Use Last 7 Days:  no  Antiplatelet drug therapy use last 7 days: yes - aspirin  Other anticoagulant use last 7 days: yes - apixaban  Additional Medication Information:  Per medical records. Pre-Sedation Documentation and Exam:   I have personally completed a history, physical exam & review of systems for this patient (see notes). Mallampati Airway Assessment:  Mallampati Class II - (soft palate, fauces & uvula are visible)    Prior History of Anesthesia Complications:   none    ASA Classification:  Class 3 - A patient with severe systemic disease that limits activity but is not incapacitating    Sedation/ Anesthesia Plan:   intravenous sedation    Medications Planned:   midazolam (Versed) intravenously and fentanyl intravenously    Patient is an appropriate candidate for plan of sedation: yes    IMPRESSION:  1.  Paroxysmal atrial fibrillation/atrial flutter.  [id number] score 4.  2.  Symptomatic sinus bradycardia/sinus pauses, longest 3 seconds. 3.  Status post left atrial appendage clipping at the time of his CABG. 4.  Coronary artery disease/CABG x3.  Denies angina pectoris. 5.  Ischemic cardiomyopathy, LVEF 35 to 40%.  NYHA class III. 6.  Hypertension hyperlipidemia.     Plan: Proceed with dual chamber permanent pacemaker implantation (right sided implant).     Consent: The risk and benefits of pacemaker implantation including pneumothorax, hemothorax, bleeding, vascular complications, infection, pericardial tamponade requiring emergency pericardiocentesis, lead dislodgement requiring reposition, future operations for generator change or device revision or upgrade were discussed with the patient. He verbalized understanding of the discussion. His questions were answered. The patient wishes to proceed.     Electronically signed by Jaimie Galindo MD on 4/6/2021 at 2:30 PM

## 2021-04-06 NOTE — H&P
[address] [id number]                              HISTORY AND PHYSICAL    PATIENT NAME: Britt Rascon                       :        1950  MED REC NO:   [id number]                           ROOM:       [id number]  ACCOUNT NO:   [de-identified]                           ADMIT DATE: 2021  PROVIDER:     Aida Herrmann M.D.    [address]:  Palpitations. HISTORY OF PRESENT ILLNESS:  This is a 79-year-old male, recently  admitted and underwent bypass surgery on 2021. The patient had  coronary artery bypass grafting x3. Postoperatively, it was noted that  the patient had significant symptoms of some atrial fibrillation being  present. This was of a controlled rate at the time of discharge and  then he did convert. He postoperatively was able to be converted, but  it was noted on prior Holter that the patient had intermittent spells of  atrial fibrillation being present. Cardiac event monitor noted he  actually had an episode of 3-second pause with some intermittent atrial  fibrillation having occurred and then this self terminates. Because of  the above issues, the patient has remained on Eliquis along with the use  of Toprol. The patient did have underlying evidence of some  cardiomyopathy being present. At the time of surgery, the patient did  have an ejection fraction of 35% to 40%. This was improvement, his  original echo is about 25%. The patient did present with an original  episode of chest pain and discomfort. The patient overall did well  postoperatively without significant problems or symptoms as stated, the  question is now that had been noticed was the persistent evidence of  paroxysmal atrial fibrillation. Hopes were that this would eventually  resolve with the correction of cardiac circulation.   As stated, at the  time, the patient did remain in atrial fibrillation with controlled rate  having been present. As stated, when the patient originally presented,  his complaints were of rapid heart rate and atrial fibrillation being  present. This then, a workup noted there was some skipped nature along  with cardiomyopathy and thus the bypass surgery was finished. Hopes  were, with improvement of the cardiac function that the arrhythmia would  be resolved. However, it was noted on an event monitor post bypass  surgery, the patient did have some episodes of 3-second pauses being  present with then intermittent atrial fibrillation. As stated, the  patient presented in the a.m. with atrial fibrillation. The patient had  gone to bed sensing regular heart rate when he states that he gotten up  in the morning, it was noted that he had sensed the increased heart rate  being present, up to 139. Consequently, he presented to the emergency  room, was noted he was in atrial fibrillation. Cardizem drip was  started. He has now been seen up on the floor; however, with the  Cardizem drip, the patient already converted to normal sinus rhythm. He  does not appreciate any other symptoms being present. Concerns was that  he did have some pauses, although he has not felt any sensation being  present at this time. EKG today noted, initial EKG in the emergency  room was consistent with atrial fibrillation with the rapid rate being  present. Rate was approximately 135 to 140. Post conversion, it was  noted that he had sinus bradycardia with heart rate of 58. He is now  being admitted for control and evaluation concerning this atrial  fibrillation and arrhythmia being present at this time.     PAST MEDICAL HISTORY:  Notes,  MEDICATIONS:  Prior to admission include Lasix that he takes 10 mg four  times a week and 20 mg the other days a week, Toprol-XL 25 mg once a  day, Requip 0.5 mg at bedtime, Entresto 24/26 one tab twice daily,  Eliquis 5 mg twice a day, Lipitor 40 mg a day, Klor-Con 10 mEq daily,  Lasix 20 mg as stated above with the different doses of 20 mg three days  a week and 10 mg the other four days a week, aspirin 81 mg a day, Anoro  one inhalation once a day, albuterol inhaler as needed, Claritin 10 mg a  day in addition. ALLERGIES:  He is allergic to PENICILLIN and SULFA. SURGERIES:  Notes he had prior nasal surgery. He has had appropriate  colonoscopies, last being done in 2014. He has had some sinus surgery,  hernia repair, skin cancer removal - left-sided forehead basal cell,  tooth extraction, and as stated coronary artery bypass grafting x3 in  01/2021. HOSPITALIZATIONS:  Last being before the admission because of the atrial  fibrillation and also with the bypass surgery. ILLNESSES:  He has a history of underlying colon polyps, hyperlipidemia,  hypertension, COPD, collagenous colitis, atherosclerotic heart disease,  and atrial fibrillation - paroxysmal along with a history of colon  polyps. FAMILY HISTORY:  Notes mother and father both with heart disease, father  with bypass surgery, one brother with diabetes and heart disease in  addition. REVIEW OF SYSTEMS:  CONSTITUTIONAL:  Denies any fever, chills present at this time. EYES:  Denies any blurred vision. EARS, NOSE, AND THROAT:  Without congestion. CARDIAC:  Sensed the palpitations, not had any chest pressure,  discomfort, or heaviness. He does not sense the irregular beat or  palpitation, rapid heart rate at this time. PULMONARY:  He has not any significant pulmonary symptoms, cough,  shortness of breath, or wheeze present. GI:  No heartburn or indigestion, sometimes a little bit bloatiness. He  had no problems with stools. RENAL:  Without voiding problems. SKIN:  Without lesions. MUSCULOSKELETAL:  Generalized muscle aches being present. NEUROLOGIC:  No other numbness, weakness, dizziness, lightheadedness, or  other significant symptoms being present at this time.     PHYSICAL EXAMINATION:  VITAL SIGNS:  Notes with a temp of 98, pulse 61 and regular, respiratory  rate 16, blood pressure 135/75, pulse ox 99%. GENERAL:  Notes elderly male, appearing younger than stated age, no  distress. HEENT:  Head is atraumatic, normocephalic. Eyes, PERRL and EOMI with  normal fundi. Ears, nose, and throat:  noted normal nose, slight  congestion. Throat is clear without exudate. NECK:  Supple without adenopathy or increased thyroid. LUNGS:  Clear with normal respiratory rate and effort. CARDIAC:  Regular rate and rhythm. Normal S1, S2; without murmur,  gallop, or rub appreciated. Pulses are 2+. No carotid bruits. ABDOMEN:  Soft. Positive bowel sounds. Nontender. No  hepatosplenomegaly. No masses. EXTREMITIES:  Full range of motion. No CVA tenderness. NEUROLOGIC:  Alert and oriented x3. Cranial nerves II through XII are  intact. Reflexes are 2+. Motor and sensory intact. LABORATORY DATA:  EKG initially with rapid rate, irregular with atrial  fibrillation, rate of 137. Repeat EKG up in the floor following  Cardizem drip noted normal sinus rhythm, heart rate 58 with sinus  bradycardia with no acute changes being present. Other labs notes sodium is 140, potassium is 5, chloride 103, CO2 is 26,  BUN 22, creatinine 1.4. Calcium is normal at 9.5. BNP is only 576. Troponin level less than 0.010. Liver panel was noted to be normal.   Glucose 94. TSH is 1.49. White count 9700; hemoglobin 15.6; hematocrit  48.9; platelet count 450,535; normal differential.  Urinalysis is  completely clear. Chest x-ray notes granuloma of right lung base, clearing of the prior  left pleural effusion. So, x-ray is completely clear with  post-sternotomy wire being present. Initial EKG had atrial fib with rapid ventricular rate. No evidence of  any T-wave abnormalities being present at the time. IMPRESSION:  1. Paroxysmal atrial fibrillation with rapid ventricular rate. 2.  Underlying atherosclerotic heart disease.   3.  Mild

## 2021-04-06 NOTE — PROGRESS NOTES
Physician Progress Note      PATIENTSima Reyes  CSN #:                  [id number]  :                       1950  ADMIT DATE:       2021 9:25 AM  DISCH DATE:  RESPONDING  PROVIDER #:        Alyssa Malagon MD          QUERY TEXT:    Pt admitted with Afic with RVR  and has  history of CHF documented. If   possible, please  make selection below ,document in progress notes and   discharge summary further specificity regarding the type and acuity of CHF:    The medical record reflects the following:  Risk Factors: cad, htn, severe ischemic cardiomyopathy , afib  Clinical Indicators: Know history, last ECHO  1/15/21  - Left ventricle size   is normal.  Normal left ventricle wall thickness. There was moderate global hypokinesis of the left ventricle. Systolic function was moderately reduced. Ejection fraction is visually estimated in the range of 35% to 40%. Left atrium mild to moderately dilated  Right atrium mildly dilated  Treatment: home heart meds, rate and rhythm control, cardiac diet, cardiac   rehab and follow up    Thank you,  Pily Palmer RN, BSN, [employer]   Clinical   P: [phone]  F: [phone]  Options provided:  -- Chronic Systolic CHF/HFrEF NOT in Exacerbation  -- Chronic Systolic and Diastolic CHF  -- Acute on Chronic Systolic CHF/HFrEF  -- Other - I will add my own diagnosis  -- Disagree - Not applicable / Not valid  -- Disagree - Clinically unable to determine / Unknown  -- Refer to Clinical Documentation Reviewer    PROVIDER RESPONSE TEXT:    This patient has chronic systolic CHF/HFrEF NOT in Exacerbation .     Query created by: Sawyer Ramey on 2021 7:26 AM      Electronically signed by:  Alyssa Malagon MD 2021 11:39 AM

## 2021-04-07 ENCOUNTER — HOSPITAL ENCOUNTER (OUTPATIENT)
Dept: CARDIAC REHAB | Age: 71
Setting detail: THERAPIES SERIES
End: 2021-04-07
Payer: MEDICARE

## 2021-04-07 ENCOUNTER — APPOINTMENT (OUTPATIENT)
Dept: GENERAL RADIOLOGY | Age: 71
DRG: 243 | End: 2021-04-07
Payer: MEDICARE

## 2021-04-07 VITALS
WEIGHT: 155.7 LBS | SYSTOLIC BLOOD PRESSURE: 128 MMHG | HEART RATE: 63 BPM | HEIGHT: 68 IN | TEMPERATURE: 98.1 F | OXYGEN SATURATION: 97 % | DIASTOLIC BLOOD PRESSURE: 61 MMHG | RESPIRATION RATE: 18 BRPM | BODY MASS INDEX: 23.6 KG/M2

## 2021-04-07 PROCEDURE — 99024 POSTOP FOLLOW-UP VISIT: CPT | Performed by: INTERNAL MEDICINE

## 2021-04-07 PROCEDURE — 94760 N-INVAS EAR/PLS OXIMETRY 1: CPT

## 2021-04-07 PROCEDURE — 2580000003 HC RX 258: Performed by: FAMILY MEDICINE

## 2021-04-07 PROCEDURE — 6370000000 HC RX 637 (ALT 250 FOR IP): Performed by: INTERNAL MEDICINE

## 2021-04-07 PROCEDURE — 94640 AIRWAY INHALATION TREATMENT: CPT

## 2021-04-07 PROCEDURE — 6370000000 HC RX 637 (ALT 250 FOR IP): Performed by: FAMILY MEDICINE

## 2021-04-07 PROCEDURE — 71046 X-RAY EXAM CHEST 2 VIEWS: CPT

## 2021-04-07 RX ORDER — METOPROLOL SUCCINATE 25 MG/1
50 TABLET, EXTENDED RELEASE ORAL DAILY
Qty: 30 TABLET | Refills: 5
Start: 2021-04-07 | End: 2021-04-13 | Stop reason: DRUGHIGH

## 2021-04-07 RX ADMIN — OXYCODONE 10 MG: 5 TABLET ORAL at 03:37

## 2021-04-07 RX ADMIN — CETIRIZINE HYDROCHLORIDE 10 MG: 10 TABLET, FILM COATED ORAL at 09:35

## 2021-04-07 RX ADMIN — OXYCODONE 10 MG: 5 TABLET ORAL at 09:09

## 2021-04-07 RX ADMIN — OXYCODONE 5 MG: 5 TABLET ORAL at 13:21

## 2021-04-07 RX ADMIN — METOPROLOL SUCCINATE 25 MG: 25 TABLET, FILM COATED, EXTENDED RELEASE ORAL at 09:35

## 2021-04-07 RX ADMIN — Medication 1 TABLET: at 09:34

## 2021-04-07 RX ADMIN — SACUBITRIL AND VALSARTAN 1 TABLET: 24; 26 TABLET, FILM COATED ORAL at 09:34

## 2021-04-07 RX ADMIN — GLYCOPYRROLATE AND FORMOTEROL FUMARATE 2 PUFF: 9; 4.8 AEROSOL, METERED RESPIRATORY (INHALATION) at 07:21

## 2021-04-07 RX ADMIN — SODIUM CHLORIDE: 9 INJECTION, SOLUTION INTRAVENOUS at 03:36

## 2021-04-07 RX ADMIN — ASPIRIN 81 MG: 81 TABLET, CHEWABLE ORAL at 10:31

## 2021-04-07 ASSESSMENT — PAIN SCALES - GENERAL
PAINLEVEL_OUTOF10: 4
PAINLEVEL_OUTOF10: 3
PAINLEVEL_OUTOF10: 7
PAINLEVEL_OUTOF10: 4
PAINLEVEL_OUTOF10: 8
PAINLEVEL_OUTOF10: 7

## 2021-04-07 ASSESSMENT — ENCOUNTER SYMPTOMS
NAUSEA: 0
ABDOMINAL DISTENTION: 0
SHORTNESS OF BREATH: 0
RECTAL PAIN: 0
COLOR CHANGE: 0
DIARRHEA: 0
CHEST TIGHTNESS: 0
CONSTIPATION: 0

## 2021-04-07 ASSESSMENT — PAIN DESCRIPTION - ORIENTATION: ORIENTATION: RIGHT

## 2021-04-07 ASSESSMENT — PAIN DESCRIPTION - LOCATION: LOCATION: SHOULDER

## 2021-04-07 ASSESSMENT — PAIN DESCRIPTION - DESCRIPTORS: DESCRIPTORS: SORE;ACHING

## 2021-04-07 NOTE — PROGRESS NOTES
Patient awake and oriented. Lung sounds clear in all lobes bilaterally. Heart sounds S1S2 with NSR. He states surgical pain 8/10. UE full sensation with no numbness or tingling bilaterally. Radial pulses +1 bilateral. Hand grasp strong bilateral. Bowel sounds auscultated in all 4 quadrants in less 15 seconds. Abdomen free of numbness, pain or tingling. LE full of sensation skin color appropriate for ethnicity. Full ROM in LE. Pedal Pulses +1 bilateral. Pedal push and pull strong bilateral.    Clarita TOUSSAINT  [facility]

## 2021-04-07 NOTE — PROGRESS NOTES
Progress Note  Date:4/6/2021      [id number]  Patient Francia Roberts     YOB: 1950     Age:70 y.o. Subjective    Subjective:  Symptoms:  Stable. No shortness of breath, chest pain or diarrhea. Diet:  Adequate intake. No nausea. Activity level: Impaired due to weakness. Pain:  He reports no pain.        Current Facility-Administered Medications   Medication Dose Route Frequency Provider Last Rate Last Admin    0.9 % sodium chloride infusion   Intravenous Continuous Griselda Credit, PA-C 75 mL/hr at 04/06/21 1220 Rate Change at 04/06/21 1220    sodium chloride flush 0.9 % injection 10 mL  10 mL Intravenous 2 times per day Griselda Credit, PA-C   10 mL at 04/06/21 2137    sodium chloride flush 0.9 % injection 10 mL  10 mL Intravenous PRN Griselda Credit, PA-C        vancomycin (VANCOCIN) 250 mg in sodium chloride 0.9 % 250 mL irrigation  250 mg Irrigation Once Jenaro Henderson MD        oxyCODONE (ROXICODONE) immediate release tablet 5 mg  5 mg Oral Q4H PRN Jenaro Henderson MD   5 mg at 04/06/21 2138    Or    oxyCODONE (ROXICODONE) immediate release tablet 10 mg  10 mg Oral Q4H PRN Jenaro Henderson MD   10 mg at 04/07/21 0337    dilTIAZem 125 mg in dextrose 5 % 125 mL infusion  5-15 mg/hr Intravenous Continuous Jairo Gee MD   Stopped at 04/05/21 1154    sodium chloride flush 0.9 % injection 10 mL  10 mL Intravenous 2 times per day Malena Buricaga MD   10 mL at 04/06/21 2138    sodium chloride flush 0.9 % injection 10 mL  10 mL Intravenous PRN Malena Burciaga MD        0.9 % sodium chloride infusion  25 mL Intravenous PRN Malena Burciaga MD        promethazine (PHENERGAN) tablet 12.5 mg  12.5 mg Oral Q6H PRN Malena Burciaga MD        Or    ondansetron [facility]) injection 4 mg  4 mg Intravenous Q6H PRN Malena Burciaga MD        polyethylene glycol (GLYCOLAX) packet 17 g  17 g Oral Daily PRN MD Wilfred Purcell acetaminophen (TYLENOL) tablet 650 mg  650 mg Oral Q6H PRN Fredrick Coffey MD        Or    acetaminophen (TYLENOL) suppository 650 mg  650 mg Rectal Q6H PRN Fredrick Coffey MD        0.9 % sodium chloride infusion   Intravenous Continuous Fredrick Coffey MD 50 mL/hr at 04/07/21 0336 New Bag at 04/07/21 0336    potassium chloride (KLOR-CON M) extended release tablet 40 mEq  40 mEq Oral PRN Fredrick Coffey MD        Or    potassium bicarb-citric acid (EFFER-K) effervescent tablet 40 mEq  40 mEq Oral PRN Fredrick Coffey MD        Or    potassium chloride 10 mEq/100 mL IVPB (Peripheral Line)  10 mEq Intravenous PRN Fredrick Coffey MD        magnesium sulfate 2000 mg in 50 mL IVPB premix  2,000 mg Intravenous PRN Fredrick Coffey MD        apixaban (ELIQUIS) tablet 5 mg  5 mg Oral BID Fredrick Coffey MD   5 mg at 04/06/21 2138    aspirin chewable tablet 81 mg  81 mg Oral Daily Fredrick Coffey MD        atorvastatin (LIPITOR) tablet 40 mg  40 mg Oral Nightly Fredrick Coffey MD   40 mg at 04/06/21 2138    metoprolol succinate (TOPROL XL) extended release tablet 25 mg  25 mg Oral Daily Fredrick Coffey MD        cetirizine (ZYRTEC) tablet 10 mg  10 mg Oral Daily Fredrick Coffey MD        sacubitril-valsartan (ENTRESTO) 24-26 MG per tablet 1 tablet  1 tablet Oral BID Fredrick Coffey MD   1 tablet at 04/06/21 2137    rOPINIRole (REQUIP) tablet 0.5 mg  0.5 mg Oral Nightly Fredrick Coffey MD   0.5 mg at 04/06/21 2137    therapeutic multivitamin-minerals 1 tablet  1 tablet Oral Daily Fredrick Coffey MD        glycopyrrolate-formoterol (BEVESPI) 9-4.8 MCG/ACT inhaler 2 puff  2 puff Inhalation BID Fredrick Coffey MD   2 puff at 04/06/21 0736    ipratropium-albuterol (DUONEB) nebulizer solution 1 ampule  1 ampule Inhalation Q4H PRN Fredrick Coffey MD          Review of Systems   Constitutional: Negative for activity change.    HENT: Negative for congestion. Respiratory: Negative for chest tightness and shortness of breath. Cardiovascular: Negative for chest pain, palpitations and leg swelling. Gastrointestinal: Negative for abdominal distention, constipation, diarrhea, nausea and rectal pain. Genitourinary: Negative for difficulty urinating. Musculoskeletal: Negative for arthralgias. Skin: Negative for color change and rash. Neurological: Negative for dizziness, light-headedness and numbness. Hematological: Negative for adenopathy. Objective         Vitals Last 24 Hours:  TEMPERATURE:  Temp  Av.6 °F (36.4 °C)  Min: 97.4 °F (36.3 °C)  Max: 98.1 °F (36.7 °C)  RESPIRATIONS RANGE: Resp  Av  Min: 15  Max: 18  PULSE OXIMETRY RANGE: SpO2  Av.6 %  Min: 97 %  Max: 100 %  PULSE RANGE: Pulse  Av  Min: 55  Max: 70  BLOOD PRESSURE RANGE: Systolic (73QHW), SHT:269 , Min:121 , BIM:278   ; Diastolic (97XFZ), UFN:66, Min:65, Max:84    I/O (24Hr): Intake/Output Summary (Last 24 hours) at 2021 0451  Last data filed at 2021 0331  Gross per 24 hour   Intake 2230.54 ml   Output 950 ml   Net 1280.54 ml     Objective:  General Appearance:  Comfortable. Vital signs: (most recent): Blood pressure (!) 140/67, pulse 65, temperature 97.6 °F (36.4 °C), temperature source Oral, resp. rate 18, height 5' 8\" (1.727 m), weight 155 lb 11.2 oz (70.6 kg), SpO2 99 %. Vital signs are normal.    Output: Producing urine and producing stool. HEENT: Normal HEENT exam.    Lungs:  Normal effort and normal respiratory rate. Breath sounds clear to auscultation. No decreased breath sounds or wheezes. Heart: Normal rate. Regular rhythm. S1 normal and S2 normal.  No murmur. Abdomen: Abdomen is soft. Bowel sounds are normal.   There is no abdominal tenderness. Extremities: Normal range of motion. Neurological: Patient is alert and oriented to person, place and time. Patient has normal reflexes and normal muscle tone.     Skin: Warm.      Labs/Imaging/Diagnostics    Labs:  CBC:  Recent Labs     04/05/21  0934 04/06/21  0345   WBC 9.7 8.2   RBC 4.93 4.39*   HGB 15.6 13.9*   HCT 48.9 42.7   MCV 99.2* 97.3*    208     CHEMISTRIES:  Recent Labs     04/05/21  0934 04/06/21  0345    139   K 5.0 4.4    105   CO2 26 26   BUN 22 17   CREATININE 1.4* 1.3*   GLUCOSE 92 87     PT/INR:  Recent Labs     04/06/21  1136   INR 1.25*     APTT:  Recent Labs     04/06/21  1136   APTT 32.4     LIVER PROFILE:  Recent Labs     04/05/21  0934   AST 21   ALT 16   BILITOT 0.2*   ALKPHOS 60       Imaging Last 24 Hours:  Xr Chest Portable    Result Date: 4/5/2021  PROCEDURE: XR CHEST PORTABLE CLINICAL INFORMATION: other. COMPARISON: Chest x-ray dated 2/01/2021. TECHNIQUE: AP upright view of the chest. FINDINGS: The patient is status post previous sternotomy and coronary bypass surgery. The heart size is normal.The mediastinum is not widened. There is a small calcified granuloma in the right lower lobe. There has been clearing of left pleural effusion since previous study dated 1 February 2021. The pulmonary vascularity is normal. No suspicious osseous lesions are present. 1. Status post sternotomy. 2. Small calcified granuloma at the right lung base. 3. Clearing of left pleural effusion since previous study dated 2/01/2021. **This report has been created using voice recognition software. It may contain minor errors which are inherent in voice recognition technology. ** Final report electronically signed by DR Standley Runner on 4/5/2021 10:08 AM    Assessment//Plan           Hospital Problems           Last Modified POA    Atrial fibrillation with rapid ventricular response ([address] 4/5/2021 Yes    Atrial fibrillation with RVR ([address] 4/5/2021 Yes    Tachycardia-bradycardia ([address] 4/6/2021 Yes        Assessment:    Condition: In stable condition. Unchanged.    (Par atrial fib  Converted and normal sinus now     Bradycardia and with pause up to 3 seconds Tachy-bradycardia symptoms     ashd stable post  bypass     chronic systolic chf stable      hyperlipidemia       copd stable ). Plan:   Out of bed and up to chair. Start/continue incentive spirometry. Consults: cardiology. NPO and clear liquid diet. Administer medications as ordered. (     no  Further atrial fib and off cardiazem drip     on just low dose betablocker normal sinus and bradycardia in upper 40 to low 50    Stable but clearly unable to add meds and will have recurrent atrial fib and on eliquis. Further bradycardia if more meds       tachy ericka and as per cardiology will need pacer most likely and explained to patient but await cardiology eps input).        Electronically signed by Stefany George MD on 4/7/21 at 4:51 AM EDT

## 2021-04-07 NOTE — PROGRESS NOTES
Patient heart sounds S1S2 NSR. Radial pulses +1 bilaterally. Cap refill less than 3 seconds. Skin turgor less than 3 seconds. Jace sounds clear in all lobes bilaterally. Patient tolerated blood pressure medications. Surgical pain Will continue to monitor. Francia TOUSSAINT  [facility]

## 2021-04-07 NOTE — CARE COORDINATION

## 2021-04-07 NOTE — PROGRESS NOTES
The patient underwent dual-chamber pacemaker implantation yesterday for tachycardia-bradycardia syndrome. Complains of mild soreness at the pacemaker pocket site. Mild swelling and tenderness. Chest x-ray showed stable lead position. No pneumothorax or pleural effusion. Device interrogation normal.  Continue pressure dressing for 48 hours. Hold apixaban for 3 days. Postoperative instructions explained to the patient. The patient can be discharged home today with follow-up in pacemaker clinic in 1 week.     Gretchen Palacio MD, MRCP, Rikki León on 4/7/2021 at 7:51 AM

## 2021-04-07 NOTE — PROGRESS NOTES
Patient up to the bathroom independently. He tolerated well. His pain level decreased after med administration to 4/10. Will continue to monitor. Zeenat TOUSSAINT  [facility] [place]

## 2021-04-07 NOTE — PROGRESS NOTES
CR handoff to [address] [facility]    Conception Elva Mckeon [place] [facility] [place] [facility]

## 2021-04-07 NOTE — PROGRESS NOTES
Patient discharged from [facility] at this time with AVS/personal belongings and box with items r/t [facility] in possession upon departure. All discharge instructions provided to patient and wife, both denied further questions or concerns. Transported off unit via wheelchair, d/c home. Chart broken down and files placed in yellow bin.

## 2021-04-08 ENCOUNTER — CARE COORDINATION (OUTPATIENT)
Dept: CASE MANAGEMENT | Age: 71
End: 2021-04-08

## 2021-04-08 DIAGNOSIS — I48.91 ATRIAL FIBRILLATION WITH RVR (HCC): Primary | ICD-10-CM

## 2021-04-08 PROCEDURE — 1111F DSCHRG MED/CURRENT MED MERGE: CPT | Performed by: FAMILY MEDICINE

## 2021-04-08 NOTE — CARE COORDINATION
All[facility] Transitions Initial Follow Up Call    Call within 2 business days of discharge: Yes    Patient: Mari Amor Patient : 1950   MRN: [id number]  Reason for Admission: AFib with RVR   Discharge Date: 21 RARS: Readmission Risk Score: 23      Last Discharge [facility]       Complaint Diagnosis Description Type Department Provider    21 Atrial Fibrillation; Tachycardia Atrial fibrillation with rapid ventricular response [facility]) ED to Hosp-Admission (Discharged) (ADMITTED) PAULA Ortega MD; James Zambrano . .. Challenges to be reviewed by the provider   Additional needs identified to be addressed with provider No  none             Method of communication with provider : none    Discussed COVID-19 related testing which was not done at this time. Test results were not done. Patient informed of results, if available? No COVID testing done with recent admission    Advance Care Planning:   Does patient have an Advance Directive:  decision maker updated. Was this a readmission? No  Patient stated reason for admission: Pt states he was at OP cardio rehab and HR was 160 and sent to ED and subsequently admissed with s/p pacemaker insertion. Patients top risk factors for readmission: medical condition and polypharmacy    Care Transition Nurse (CTN) contacted the patient by telephone to perform post hospital discharge assessment. Verified name and  with patient as identifiers. Provided introduction to self, and explanation of the CTN role. CTN reviewed discharge instructions, medical action plan and red flags with patient who verbalized understanding. Patient given an opportunity to ask questions and does not have any further questions or concerns at this time. Were discharge instructions available to patient? Yes.  Reviewed appropriate site of care based on symptoms and resources available to patient including: PCP, Specialist, When to call 911 and Condition related references. The patient agrees to contact the PCP office for questions related to their healthcare. Medication reconciliation was performed with patient, who verbalizes understanding of administration of home medications. Advised obtaining a 90-day supply of all daily and as-needed medications. Covid Risk Education    Patient has following risk factors of: heart failure, COPD and asthma. Education provided regarding infection prevention, and signs and symptoms of COVID-19 and when to seek medical attention with patient who verbalized understanding. Discussed exposure protocols and quarantine From CDC: Are you at higher risk for severe illness?   and given an opportunity for questions and concerns. The patient agrees to contact the COVID-19 hotline [phone] or PCP office for questions related to COVID-19. For more information on steps you can take to protect yourself, see CDC's How to Protect Yourself     Was patient discharged with a pulse oximeter? No Discussed and confirmed pulse oximeter discharge instructions and when to notify provider or seek emergency care. Patient/family/caregiver given information for [facility] and agrees to enroll no  Patient's preferred e-mail: declines  Patient's preferred phone number: declines    Discussed follow-up appointments. If no appointment was previously scheduled, appointment scheduling offered: No. Is follow up appointment scheduled within 7 days of discharge? Yes, CTN confirmed with patient he is scheduled to follow up with Dr. Wally Blackburn (PCP) on 4/13/21 at 11:30 am.       Plan for follow-up call in 7-10 days based on severity of symptoms and risk factors. Plan for next call: symptom management-Has post-op pain impoved?     Non-face-to-face services provided:  Scheduled appointment with PCP-CTN confirmed with patient he is scheduled to follow up with Dr. Wally Blackburn (PCP) on 4/13/21 at 11:30 am.   Obtained and reviewed discharge summary and/or on Toprol XL. States Eliquis is on HOLD for three days d/t surgery and will resume on Friday 4/9/21 however, continues with daily ASA therapy. 1111F code entered. Patient denies being a current smoker admitting he quit but verbalizes last cigarette was 2 weeks ago. CTN commended patient for taking action to improve his overall health with smoking cessation. Denies any LE swelling or sudden weight gain. [facility] weight today was 155 lbs which remains unchanged from discharge weight. Reinforced the importance to monitor for 2-3 lbs/day and/or 5 lbs/week weight gain and call doctor for rapid weight gain. Confirmed with patient he keeps daily log of weight/BP/HR. Reviewed COPD/CHF zone tools and knowing when to call doctor or seek medical attention. Confirmed with patient he is scheduled for follow up with Dr. Panfilo Villalta (PCP) on 4/13/21 at 11:30 am. Denies any transportation issues as patient verbalizes her is independent in driving. Denies any other community services other than resuming with OP cardiac rehab. Denies any other needs or concerns at this time. CTN will continue to follow for Care Transition.     Follow Up  Future Appointments   Date Time Provider Ramo Graham   4/9/2021  9:00 AM STR CARDIAC EXERCISE RM STRZ CAR PUL [address] [facility]   4/9/2021 10:00 AM STR ICR COOK CLASSROOM STRZ CAR PUL [address] [facility]   4/12/2021  9:00 AM STR CARDIAC EXERCISE RM STRZ CAR PUL [address] [facility]   4/12/2021 10:00 AM STR EDUCATION CLASSROOM STRZ CAR PUL [address] [facility]   4/13/2021 11:30 AM Jose Quiñonez MD [employer]   4/14/2021  9:00 AM STR CARDIAC EXERCISE RM STRZ CAR PUL [address] [facility]   4/14/2021 10:00 AM STR EDUCATION CLASSROOM STRZ CAR PUL [address] [facility]   4/15/2021 11:00 AM SCHEDULE, SRPS PACER NURSE N SRPX PACER [facility] - [address] [facility]   4/16/2021  9:00 AM STR CARDIAC EXERCISE RM STRZ CAR PUL [address] [facility]   4/16/2021 10:00 AM STR ICR COOK CLASSROOM STRZ CAR PUL [address] [facility]   4/19/2021  9:00 AM STR CARDIAC EXERCISE RM STRZ CAR PUL [facility] 4/19/2021 10:00 AM STR EDUCATION CLASSROOM STRZ CAR PUL [place] [facility]   4/21/2021  9:00 AM STR CARDIAC EXERCISE RM STRZ CAR PUL [place] [facility]   4/21/2021 10:00 AM STR EDUCATION CLASSROOM STRZ CAR PUL [place] [facility]   4/23/2021  9:00 AM STR CARDIAC EXERCISE RM STRZ CAR PUL [place] [facility]   4/23/2021 10:00 AM STR ICR COOK CLASSROOM STRZ CAR PUL [place] [facility]   4/26/2021  9:00 AM STR CARDIAC EXERCISE RM STRZ CAR PUL [place] [facility]   4/26/2021 10:00 AM STR EDUCATION CLASSROOM STRZ CAR PUL [place] [facility]   4/28/2021  9:00 AM STR CARDIAC EXERCISE RM STRZ CAR PUL [place] [facility]   4/28/2021 10:00 AM STR EDUCATION CLASSROOM STRZ CAR PUL [place] [facility]   4/30/2021  9:00 AM STR CARDIAC EXERCISE RM STRZ CAR PUL [place] [facility]   4/30/2021 10:00 AM STR ICR COOK CLASSROOM STRZ CAR PUL [place] [facility]   5/3/2021  9:00 AM STR CARDIAC EXERCISE RM STRZ CAR PUL [place] [facility]   5/3/2021 10:00 AM STR EDUCATION CLASSROOM STRZ CAR PUL [place] [facility]   5/4/2021  1:20 PM Taylor Ortega MD [employer]   5/5/2021  9:00 AM STR CARDIAC EXERCISE RM STRZ CAR PUL [place] [facility]   5/5/2021 10:00 AM STR EDUCATION CLASSROOM STRZ CAR PUL [place] [facility]   5/7/2021  9:00 AM STR CARDIAC EXERCISE RM STRZ CAR PUL [place] [facility]   5/7/2021 10:00 AM STR ICR COOK CLASSROOM STRZ CAR PUL [place] [facility]   5/10/2021  9:00 AM STR CARDIAC EXERCISE RM STRZ CAR PUL [place] [facility]   5/10/2021 10:00 AM STR EDUCATION CLASSROOM STRZ CAR PUL [place] [facility]   5/12/2021  9:00 AM STR CARDIAC EXERCISE RM STRZ CAR PUL [place] [facility]   5/12/2021 10:00 AM STR EDUCATION CLASSROOM STRZ CAR PUL [place] [facility]   5/14/2021  9:00 AM STR CARDIAC EXERCISE RM STRZ CAR PUL [place] [facility]   5/14/2021 10:00 AM STR ICR COOK CLASSROOM STRZ CAR PUL [place] [facility]   5/17/2021  9:00 AM STR CARDIAC EXERCISE RM STRZ CAR PUL [place] [facility]   5/17/2021 10:00 AM STR EDUCATION CLASSROOM STRZ CAR PUL [place] [facility]   5/19/2021  9:00 AM STR CARDIAC EXERCISE RM STRZ CAR PUL [place] [facility]   5/19/2021 10:00 AM STR EDUCATION CLASSROOM STRZ CAR PUL [place] [facility]   5/27/2021  2:30 PM STR ECHO RM1 STRZ ECHO No HOD   6/22/2021  2:15 PM Joanne FOSTER ARCADIO Friedman - CNP N SRPX CHF [facility] [place]   9/2/2021  9:45 AM Geovani Bear MD N SRPX Heart [facility]     CTN provided contact information for future needs.     ARCADIO Bond

## 2021-04-08 NOTE — CARE COORDINATION
4/8/2021 Final  Patient no longer eligible for BPCI bundle due to excluded DRG. Admission 4/5/21 - 4/7/2021.     Deisy Calderon LPN    [phone]  [facility] / [place] [address] Coordinator

## 2021-04-09 ENCOUNTER — APPOINTMENT (OUTPATIENT)
Dept: CARDIAC REHAB | Age: 71
End: 2021-04-09
Payer: MEDICARE

## 2021-04-09 ENCOUNTER — HOSPITAL ENCOUNTER (OUTPATIENT)
Dept: CARDIAC REHAB | Age: 71
Setting detail: THERAPIES SERIES
End: 2021-04-09
Payer: MEDICARE

## 2021-04-09 ASSESSMENT — ENCOUNTER SYMPTOMS
VOMITING: 0
APNEA: 0
CONSTIPATION: 0
DIARRHEA: 0
COUGH: 0
ABDOMINAL DISTENTION: 0
SHORTNESS OF BREATH: 0
ABDOMINAL PAIN: 0
NAUSEA: 0

## 2021-04-09 NOTE — PROGRESS NOTES
Progress Note  Date:4/9/2021       [id number]  Patient Leonel Braswell     YOB: 1950     Age:70 y.o. Subjective    Subjective:  Symptoms:  Stable. No shortness of breath, cough, chest pain or diarrhea. Diet:  Adequate intake. No nausea or vomiting. Activity level: Normal.    Pain:  He reports no pain. No current facility-administered medications for this encounter. Current Outpatient Medications   Medication Sig Dispense Refill    metoprolol succinate (TOPROL XL) 25 MG extended release tablet Take 2 tablets by mouth daily 30 tablet 5    rOPINIRole (REQUIP) 0.5 MG tablet TAKE 1 TABLET BY MOUTH EVERY NIGHT 90 tablet 1    sacubitril-valsartan (ENTRESTO) 24-26 MG per tablet Take 1 tablet by mouth 2 times daily 60 tablet 3    apixaban (ELIQUIS) 5 MG TABS tablet Take 1 tablet by mouth 2 times daily 180 tablet 3    atorvastatin (LIPITOR) 40 MG tablet Take 1 tablet by mouth nightly 90 tablet 3    potassium chloride (KLOR-CON M) 10 MEQ extended release tablet Take 1 tablet by mouth daily 180 tablet 3    furosemide (LASIX) 20 MG tablet 20 mg daily except M-W-F 40 mg daily 180 tablet 3    aspirin 81 MG chewable tablet Take 1 tablet by mouth daily 30 tablet 3    umeclidinium-vilanterol (ANORO ELLIPTA) 62.5-25 MCG/INH AEPB inhaler Inhale 1 puff into the lungs daily 1 each 3    loratadine (CLARITIN) 10 MG tablet Take 10 mg by mouth daily       Multiple Vitamins-Minerals (CENTRUM SILVER PO) Take 1 tablet by mouth daily       albuterol sulfate  (90 Base) MCG/ACT inhaler Inhale 2 puffs into the lungs every 6 hours as needed for Wheezing 1 Inhaler 4    fluocinonide (LIDEX) 0.05 % ointment Apply topically as needed        Review of Systems   Constitutional: Negative for activity change. HENT: Negative for congestion. Respiratory: Negative for apnea, cough and shortness of breath. Cardiovascular: Negative for chest pain, palpitations and leg swelling. BILITOT, ALKPHOS in the last 72 hours. Imaging Last 24 Hours:  Xr Chest (2 Vw)    Result Date: 4/7/2021  PA and lateral chest. Comparison:  DX,SR  - XR CHEST STANDARD (2 VW)  - 02/01/2021 03:37 PM EST Findings: Right chest wall dual-lead cardiac pacer has been placed with well-positioned leads. No pneumothorax or complication. The cardia mediastinal silhouette is normal. Prior median sternotomy and atrial appendage clip. Lungs are hyperinflated. Stable right lower lobe calcified granuloma. No acute osseous abnormality. Impression: 1. Well-positioned right chest wall dual-lead cardiac pacer without complication or pneumothorax. 2. Hyperinflation may be secondary to emphysema. This document has been electronically signed by: Rosemary Friedman. Moraima Pinon MD on 42/75/2453 07:36 AM    Assessment//Plan           Hospital Problems           Last Modified POA    Atrial fibrillation with rapid ventricular response ([address] 4/5/2021 Yes    Atrial fibrillation with RVR ([address] 4/5/2021 Yes    Tachycardia-bradycardia ([address] 4/6/2021 Yes        Assessment:    Condition: In stable condition. Improving.   (  Atrial  Fib  Noted  And with bradycardia      pacer  Placed  And stable      htn and atrial fib  With toprol increased      ashd stable  Post  cabg     copd stable ). Plan:   Discharge home. Ad lars activity. Consults: cardiology. Advance diet as tolerated. Administer medications as ordered. 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        Electronically signed by Hodan Garcia MD on 4/9/21 at 1:11 AM EDT

## 2021-04-12 ENCOUNTER — APPOINTMENT (OUTPATIENT)
Dept: CARDIAC REHAB | Age: 71
End: 2021-04-12
Payer: MEDICARE

## 2021-04-12 ENCOUNTER — HOSPITAL ENCOUNTER (OUTPATIENT)
Dept: CARDIAC REHAB | Age: 71
Setting detail: THERAPIES SERIES
Discharge: HOME | End: 2021-04-12
Payer: MEDICARE

## 2021-04-12 NOTE — PROGRESS NOTES
Hospital Facility-Based Program  Phase 2 Cardiac Rehab Weekly Progress Report      Patient prescribed exercise:  9:00 class. 3 times per week in rehab, 1-4 times per week at home for the amount of sessions/weeks specified by insurance. Fernando Lopez is on hold from cardiac rehab due to recent pacemaker procedure.

## 2021-04-13 ENCOUNTER — OFFICE VISIT (OUTPATIENT)
Dept: FAMILY MEDICINE CLINIC | Age: 71
End: 2021-04-13

## 2021-04-13 VITALS
HEIGHT: 68 IN | RESPIRATION RATE: 14 BRPM | BODY MASS INDEX: 23.36 KG/M2 | TEMPERATURE: 97.9 F | WEIGHT: 154.13 LBS | SYSTOLIC BLOOD PRESSURE: 126 MMHG | DIASTOLIC BLOOD PRESSURE: 76 MMHG | HEART RATE: 60 BPM

## 2021-04-13 DIAGNOSIS — I25.5 ISCHEMIC CARDIOMYOPATHY: ICD-10-CM

## 2021-04-13 DIAGNOSIS — J44.9 COPD, MILD (HCC): ICD-10-CM

## 2021-04-13 DIAGNOSIS — I48.91 ATRIAL FIBRILLATION, UNSPECIFIED TYPE (HCC): Primary | ICD-10-CM

## 2021-04-13 DIAGNOSIS — I50.22 CHF (CONGESTIVE HEART FAILURE), NYHA CLASS II, CHRONIC, SYSTOLIC (HCC): ICD-10-CM

## 2021-04-13 DIAGNOSIS — I49.5 TACHYCARDIA-BRADYCARDIA (HCC): ICD-10-CM

## 2021-04-13 PROCEDURE — 1111F DSCHRG MED/CURRENT MED MERGE: CPT | Performed by: FAMILY MEDICINE

## 2021-04-13 PROCEDURE — 93000 ELECTROCARDIOGRAM COMPLETE: CPT | Performed by: FAMILY MEDICINE

## 2021-04-13 PROCEDURE — 99496 TRANSJ CARE MGMT HIGH F2F 7D: CPT | Performed by: FAMILY MEDICINE

## 2021-04-13 RX ORDER — METOPROLOL SUCCINATE 50 MG/1
100 TABLET, EXTENDED RELEASE ORAL DAILY
Qty: 30 TABLET | Refills: 0 | Status: SHIPPED
Start: 2021-04-13 | End: 2021-04-15 | Stop reason: SDUPTHER

## 2021-04-13 RX ORDER — METOPROLOL SUCCINATE 50 MG/1
50 TABLET, EXTENDED RELEASE ORAL DAILY
COMMUNITY
End: 2021-04-13 | Stop reason: DRUGHIGH

## 2021-04-13 RX ORDER — SACUBITRIL AND VALSARTAN 24; 26 MG/1; MG/1
1 TABLET, FILM COATED ORAL 2 TIMES DAILY
Qty: 60 TABLET | Refills: 5 | Status: SHIPPED | OUTPATIENT
Start: 2021-04-13 | End: 2021-01-01 | Stop reason: CLARIF

## 2021-04-13 ASSESSMENT — ENCOUNTER SYMPTOMS
NAUSEA: 0
BLOOD IN STOOL: 0
SHORTNESS OF BREATH: 0
EYE PAIN: 0
CONSTIPATION: 0
COUGH: 0
BACK PAIN: 0
TROUBLE SWALLOWING: 0
SORE THROAT: 0
CHEST TIGHTNESS: 0
ABDOMINAL PAIN: 0

## 2021-04-13 NOTE — PROGRESS NOTES
Encounters:   04/13/21 126/76   04/07/21 128/61   03/31/21 (!) 90/56       Physical Exam  Vitals signs and nursing note reviewed. Constitutional:       Appearance: He is well-developed. HENT:      Head: Normocephalic and atraumatic. Right Ear: External ear normal.      Left Ear: External ear normal.      Nose: Nose normal.   Eyes:      Conjunctiva/sclera: Conjunctivae normal.      Pupils: Pupils are equal, round, and reactive to light. Comments: Fundi nl   Neck:      Musculoskeletal: Normal range of motion and neck supple. Thyroid: No thyromegaly. Cardiovascular:      Rate and Rhythm: Tachycardia present. Rhythm irregular. Heart sounds: Normal heart sounds. Pulmonary:      Effort: Pulmonary effort is normal.      Breath sounds: Normal breath sounds. No wheezing or rales. Abdominal:      General: Bowel sounds are normal.      Palpations: Abdomen is soft. There is no mass. Tenderness: There is no abdominal tenderness. Musculoskeletal: Normal range of motion. Lymphadenopathy:      Cervical: No cervical adenopathy. Skin:     General: Skin is warm and dry. Findings: No rash. Neurological:      Mental Status: He is alert and oriented to person, place, and time. Cranial Nerves: No cranial nerve deficit. Deep Tendon Reflexes: Reflexes are normal and symmetric. Assessment/Plan:     Diagnosis Orders   1. Atrial fibrillation, unspecified type ([address]     2. Tachycardia-bradycardia ([address]  EKG 12 Lead   3. CHF (congestive heart failure), NYHA class II, chronic, systolic (HCC)  sacubitril-valsartan (ENTRESTO) 24-26 MG per tablet   4. COPD, mild ([address]     5. Ischemic cardiomyopathy       There are no diagnoses linked to this encounter.       Medical Decision Making: moderate complexity    plamn    Current Outpatient Medications   Medication Sig Dispense Refill    sacubitril-valsartan (ENTRESTO) 24-26 MG per tablet Take 1 tablet by mouth 2 times daily 60 tablet 5  metoprolol succinate (TOPROL XL) 50 MG extended release tablet Take 2 tablets by mouth daily 30 tablet 0    rOPINIRole (REQUIP) 0.5 MG tablet TAKE 1 TABLET BY MOUTH EVERY NIGHT 90 tablet 1    apixaban (ELIQUIS) 5 MG TABS tablet Take 1 tablet by mouth 2 times daily 180 tablet 3    atorvastatin (LIPITOR) 40 MG tablet Take 1 tablet by mouth nightly 90 tablet 3    potassium chloride (KLOR-CON M) 10 MEQ extended release tablet Take 1 tablet by mouth daily 180 tablet 3    furosemide (LASIX) 20 MG tablet 20 mg daily except M-W-F 40 mg daily 180 tablet 3    aspirin 81 MG chewable tablet Take 1 tablet by mouth daily 30 tablet 3    albuterol sulfate  (90 Base) MCG/ACT inhaler Inhale 2 puffs into the lungs every 6 hours as needed for Wheezing 1 Inhaler 4    umeclidinium-vilanterol (ANORO ELLIPTA) 62.5-25 MCG/INH AEPB inhaler Inhale 1 puff into the lungs daily 1 each 3    fluocinonide (LIDEX) 0.05 % ointment Apply topically as needed      loratadine (CLARITIN) 10 MG tablet Take 10 mg by mouth daily       Multiple Vitamins-Minerals (CENTRUM SILVER PO) Take 1 tablet by mouth daily        No current facility-administered medications for this visit.       Orders Placed This Encounter   Procedures    EKG 12 Lead     Order Specific Question:   Reason for Exam?     Answer:   Irregular heart rate         Fib   flutter and  abberant beats         See  Cardio   In  2  Days  torprol  100 mg       See in one month

## 2021-04-14 ENCOUNTER — HOSPITAL ENCOUNTER (OUTPATIENT)
Dept: CARDIAC REHAB | Age: 71
Setting detail: THERAPIES SERIES
Discharge: HOME | End: 2021-04-14
Payer: MEDICARE

## 2021-04-14 ENCOUNTER — HOSPITAL ENCOUNTER (OUTPATIENT)
Dept: CARDIAC REHAB | Age: 71
Setting detail: THERAPIES SERIES
End: 2021-04-14
Payer: MEDICARE

## 2021-04-14 NOTE — PLAN OF CARE
[facility] - [address] [id number]  [id number] Facility-Based Program  Individualized Cardiac Treatment Plan    Patient Name:  Misty Whitten  :  1950  Age:  79 y.o. MRN:  [id number]  Diagnosis: CABG  Date of Event: 21   Physician:  Yue Peralta  Next Office Visit:  21  Date Entered Program: 21  Risk Stratifications: [] Low [] Intermediate [x] High  Allergies: Allergies   Allergen Reactions    Penicillins Rash    Sulfa Antibiotics Rash       COVID -19 Screen  Do you have any of the following symptoms:  [] Fever [] Cough [] SOB [] Muscle/Body Ache [] Loss of taste/smell [x] None    Have you traveled outside of the [place]? [] Yes      [x] No    Have you been around anyone who has tested Positive for COVID 19?  [] Yes      [x] No    The following Education has been completed with patient. [x] Wait in the lobby until we call you back to rehab. [x] You must wear a mask while in the medical center, and in cardiac rehab. Please bring your own. [x] Bring your own bottle of water with you. [x] You can bring a visitor with you, however, they will need to sit in the waiting room while you are in cardiac rehab.     Individual Cardiac Treatment Plan -EXERCISE  INITIAL 30 DAY 60 DAY 90  DAY FINAL DAY   EXERCISE  ASSESSMENT/PLAN EXERCISE  REASSESSMENT EXERCISE   REASSESSMENT EXERCISE   REASSESSMENT EXERCISE   REASSESSMENT EXERCISE  DISCHARGE/FOLLOW-UP   Date: 21 Date: 3/17/21 Date: 21 Date: Date: Date:   Session #1 Session # 16 Session # 29 Session # ** Session # ** Session # **  Last session completed on **   Stages of Change Stages of Change Stages of Change Stages of Change Stages of Change Stages of Change   [] Pre Contemplation  [] Contemplation  [] Preparation  [x] Action  [] Maintenance  [] Relapse [] Pre Contemplation  [] Contemplation  [] Preparation  [x] Action  [] Maintenance  [] Relapse [] Pre Contemplation  [] Contemplation  [x] Preparation  [] Action  [] Maintenance  [] Relapse [] Pre Contemplation  [] Contemplation  [] Preparation  [] Action  [] Maintenance  [] Relapse [] Pre Contemplation  [] Contemplation  [] Preparation  [] Action  [] Maintenance  [] Relapse [] Pre Contemplation  [] Contemplation  [] Preparation  [] Action  [] Maintenance  [] Relapse   EXERCISE ASSESSMENT EXERCISE ASSESSMENT EXERCISE ASSESSMENT EXERCISE ASSESSMENT EXERCISE ASSESSMENT EXERCISE ASSESSMENT   6 Min Walk Test  Distance walked:   0.09 miles  475 ft.  1.7 METs  Max HR:68 BPM      RPE:  12    THR:    Rhythm:  Sinus Ludwig/ NSR First Exercise Session:3/1/21 Patient has been on hold since 4/5/21, Pacemaker on 4/6/21.   6 Min Walk Test  Distance walked:   ** miles  ** ft  ** METs  Max HR:** BPM      RPE:  **  %Change ft= **    Rhythm:  **   DASI: 4.6 METs DASI: 5.6METs DASI: na DASI: ** METs DASI: ** METs DASI: ** METs   Return to Work  Time [place] on returning to work? [x] Yes              [] No   [] Disabled     [] Retired    If yes:  *Job description: oates, standing & raising arms  *Required MET level=4-5  *Return to Work Date: ? Return to work: Has Dahlia Dodson returned to work? [x] Yes    [] No    Return to work date set? [x] Yes   [] No        Return to work: Has Dahlia Dodson returned to work? [x] Yes    [] No     Return to work: Has Dahlia Dodson returned to work? [] Yes    [] No    Return to work date set? [] Yes, **    [] No    Dahlia Dodson is doing ** at work. Return to work: Has Dahlia Dodson returned to work? [] Yes    [] No    Return to work date set? [] Yes, **    [] No    Dahlia Dodson is doing ** at work. Return to work: Has Dahlia Dodson returned to work? [] Yes    [] No    Return to work? [] Yes, **    [] No    *Required MET Level achieved for job duties? [] Yes    [] No   Orthopedic Limitations/  [] Yes    [x] No       Orthopedic Limitations  na Orthopedic Limitations  NA   Orthopedic Limitations   Orthopedic Limitations Orthopedic Limitations     Fall Risk  Fall risk assessed?   [x] Yes      [] No    Balance Issues? [] Yes      [x] No     [] Walker [] Cane    [x] Safety issues reviewed      Fall Risk  na Fall Risk  NA Fall Risk Fall Risk Fall Risk   Home Exercise  [x] Yes    [] No  Type: walking  Frequency: daily  Duration: 20 min Home Exercise  [x] Yes    [] No  Type: walking  Frequency:4 x per week  Duration: 20 min Home Exercise  [x] Yes    [] No  Type:walking  Frequency: 4d/wk  Duration: 20mins Home Exercise  [] Yes    [] No  Type: **  Frequency: **  Duration: ** Home Exercise  [] Yes    [] No  Type: **  Frequency: **  Duration: ** Home Exercise  [] Yes    [] No  Type: **  Frequency: **  Duration: **   Angina with Activity? [] Yes    [x] No  Angina Management: na Angina with Activity? [] Yes    [x] No  Angina Management: na Angina with Activity? [] Yes    [x] No  Angina Management: na Angina with Activity? [] Yes    [] No  Angina Management: ** Angina with Activity? [] Yes    [] No  Angina Management: ** Angina with Activity?   [] Yes    [] No  Angina Management: **   EXERCISE PLAN EXERCISE PLAN EXERCISE PLAN EXERCISE PLAN EXERCISE PLAN EXERCISE PLAN   *Interventions* *Interventions* *Interventions* *Interventions* *Interventions* *Interventions*   Exercise Prescription  (per physician & CR staff) Exercise Prescription  (per physician & CR staff) Exercise Prescription  (per physician & CR staff) Exercise Prescription  (per physician & CR staff) Exercise Prescription  (per physician & CR staff) Exercise Prescription  (per physician & CR staff)   Cardiovascular Cardiovascular Cardiovascular Cardiovascular Cardiovascular Cardiovascular   Mode:    [x] Treadmill (TM)  [x] Schwinn Airdyne (AD)  [x] Arms Ergometer (AE)  [] NuStep  [] Elliptical (E) MODE:    [x] Treadmill (TM)  [x] Schwinn Airdyne (AD)  [x] Arms Ergometer (AE)  [] NuStep  [] Elliptical (E) MODE:    [x] Treadmill (TM)  [x] Schwinn Airdyne (AD)  [x] Arms Ergometer (AE)  [] NuStep  [] Elliptical (E) MODE:    [] Treadmill (TM)  [] Schwinn Airdyne (AD)  [] Arms Ergometer (AE)  [] NuStep  [] Elliptical (E) MODE:    [] Treadmill (TM)  [] Schwinn Airdyne (AD)  [] Arms Ergometer (AE)  [] NuStep  [] Elliptical (E) MODE:    [] Treadmill (TM)  [] Schwinn Airdyne (AD)  [] Arms Ergometer (AE)  [] NuStep  [] Elliptical (E)   Initial Workloads  TM: [email or web address] 1.8 METs  AD: 0.3 level = 1.7 METs  NS: 18  Cagle= 1.7 METs  AE: 0.1 level = 1.2 METs Current Workloads  TM:  [email or web address] %= 1.9 METs    NS:  30 Cagle= 2 METs  AE: 18 Cagle= 1.7 METs Current Workloads  TM:  [email or web address] %= 1.9 METs  NS:  30 Cagle= 2 METs  AE: 20 Cagle= 1.7 METs Current Workloads  TM:  @ %=  METs  AD:  level =  METs  NS:   Cagle=  METs  AE:  level =  METs Current Workloads  TM:  @ %=  METs  AD:  level =  METs  NS:   Cagle=  METs  AE:  level =  METs Current Workloads  TM:  @ %=  METs  AD:  level =  METs  NS:   Cagle=  METs  AE:  level =  METs     Frequency:    ICR: 3x/week  Home: 2-3x/wk Frequency:   ICR: 3x/week  Home: 3x/wk Frequency:  ICR: 3x/week  Home: 3-4x/wk Frequency:  ICR: 3x/week  Home: 3-4x/wk Frequency:  ICR: 3x/week  Home: 3-4x/wk Frequency:  Hanskaye Fuentes will continue exercise at  5-7 days/week   Duration:   Total aerobic exercise = 30-45 min    5-8 min/mode Duration:  Total aerobic exercises = 25-35 min     10 min/mode Duration:  Total aerobic exercises = 25-35 min     12min/mode Duration:  Total aerobic exercises = ** min     **min/mode Duration:  Total aerobic exercises = ** min     **min/mode Duration:  Total erobic exercise =  60-90 min   Intensity:   MET Level = 1.7-1.8  RPE = 12-15 Intensity:  Max MET Level = 2.0  RPE = 12-15 Intensity:  Max MET Level =  2.0  RPE = 12-15 Intensity:  Max MET Level = **  RPE = 12-15 Intensity:  Max MET Level = **  RPE = 12-15 Intensity:  Max MET Level = ** RPE = 12-15   Progression: increase aerobic activity up to 15 min over next 4 weeks by increasing time 2-3 min/week.  Progression:  Increase duration 12-15 min over the next 4 weeks as tolerated Progression: Progress as tolerated once patient returns   Progression: Progression: Progression:  Increase time/intensity when RPE <13, and HR is in [facility]   [x] Yes      [] No  Upper and Lower body strength training 2x/wk    Wt: 2#       Reps:  8-15    *Increase wt. after completing 15 reps with an RPE of <12/13. [x] Yes      [] No  Upper and Lower body strength training 2x/wk    Wt: 3#       Reps:  8-15    *Increase wt. after completing 15 reps with an RPE of <12/13. [x] Yes      [] No  Upper and Lower body strength training 2x/wk    Wt: 3#       Reps:  8-15    *Increase wt. after completing 15 reps with an RPE of <12/13. [] Yes      [] No  Upper and Lower body strength training 2x/wk    Wt: **#       Reps:  8-15    *Increase wt. after completing 15 reps with an RPE of <12/13. [] Yes      [] No  Upper and Lower body strength training 2x/wk    Wt: **#       Reps:  8-15    *Increase wt. after completing 15 reps with an RPE of <12/13. Continue Strength Training at home   [] Exercise Log & Strength training handout given     Wt: **#       Reps:  8-15    *Increase wt. after completing 15 reps with an RPE of <12/13. Flexibility Flexibility Flexibility Flexibility Flexibility Flexibility   [x] Yes      [] No  25 min session of Core Strength & Flexibility 1x/per week  Attends Core Strength & Flexibility   [x] Yes      [] No Attends Core Strength & Flexibility   [] Yes      [] No Attends Core Strength & Flexibility   [] Yes      [] No Attends Core Strength & Flexibility   [] Yes      [] No Continue Core Strength & Flexibility at home   Home Exercise  *Diony verbalizes planning to walk 3-4 days/week for 20 min on days not in rehab. Home Exercise  *Diony verbalizes planning to walk 3-4 days/week for 30 min on days not in rehab. Home Exercise  *Diony verbalizes planning to walk 3-4 days/week for 30 min on days not in rehab.  Home Exercise  *Diony verbalizes planning to ** ** days/week for ** min on days not in rehab. Home Exercise  *Diony verbalizes planning to ** ** days/week for ** min on days not in rehab. Home Exercise  *Kenya Joey his/her plan to ** ** days/week for ** min @ **   *Education* *Education* *Education* *Education* *Education* *Education*   RPE Scale  [x] Yes      [] No  Exercise Safety  [x] Yes      [] No  Equipment Orientation  [x] Yes      [] No  S/S to Report  [x] Yes      [] No  Warm Up/Cool Down  [x] Yes      [] No  Home Exercise  [x] Yes      [] No     All Exercise Education Completed  [] Yes      [] No   Exercise Education Recommended    Workshops  [x] Improving Performance  [x] Balance Training & Fall Prevention  [x] Intro to Yoga - Video  [x] Resistance Training & Core Strength Exercise Education Attended/Date  3/8/21  Resistance Training & Core Strength Exercise Education Attended/Date    3/29/21  Improving Performance Exercise Education Attended/Date Exercise Education Attended/Date All Sessions Completed?     [] Yes  [] No   *Goals* *Goals* *Goals* *Goals* *Goals* *Goals*   Initial Exercise Goals Exercise Goals  Exercise Goals   Exercise Goals  Exercise Goals  Exercise Goals   Diony plans to:  [x] Attend exercise sessions 3x/wk  [x] initiate home exercise 2-3x/wk for 10-20 min  [x] Increase 6 min walk distance by 10%  [x] Attend Exercise workshops [facility] plans to:  [x] Attend exercise sessions 3x/wk  [x] continue home exercise 2-3x/wk for 20-30 min  [x] Attend Exercise workshops Diony's plans to:  [x] Attend exercise sessions 3x/wk  [x] continue home exercise 3-4x/wk for 30-45 min  [x] Determine plan of exercise following rehab  [x] Attend Exercise workshops Diony's plans to:  [x] Attend exercise sessions 3x/wk  [x] continue home exercise 3-4x/wk for 30-45 min  [x] Determine plan of exercise following rehab  [x] Attend Exercise workshops Diony's plans to:  [x] Attend exercise sessions 3x/wk  [x] continue home exercise 3-4x/wk for 30-45 min  [x] Determine plan of exercise following rehab  [x] Attend Exercise workshops Aleks Adamson achieved exercise goals? []  Yes    [] No  If no, why?  **  [] Increased 6 min walk distance by 10%  [] Currently exercising 30-60 min/day, 5-7days/wk   [] Plans to continue exercise on own  [] Plans to join a local fitness center to continue exercise  [] Does not plan to continue to exercise after rehab   Return to ADL or Hobbies:  Aleks Foots would like to improve strength and endurance so he is able to return to going to car shows, fair Return to ADL or Hobbies:  Aleks Foots would like to improve strength and endurance so he is able to return to work and car shows Return to ADL or Hobbies:  Aleks Foots would like to improve strength and endurance so he/she is able to return to ** Return to ADL or Hobbies:  Aleks Foots would like to improve strength and endurance so he/she is able to return to ** Return to ADL or Hobbies:  Aleks Foots would like to improve strength and endurance so he/she is able to return to ** Return to ADL or Hobbies:  Aleks Foots would like to improve strength and endurance so he/she is able to return to **    *MET level required for above goal:  5-6 METs MET level Achieved:  2METs MET level Achieved: 2. 0METs MET level Achieved:  **METs MET level Achieved:  **METs MET level Achieved:  **METs     Individual Cardiac Treatment Plan - Nutrition  NUTRITION  ASSESSMENT/PLAN NUTRITION  REASSESSMENT NUTRITION   REASSESSMENT NUTRITION   REASSESSMENT NUTRITION  DISCHARGE/FOLLOW-UP NUTRITION  DISCHARGE/FOLLOW-UP   Stages of Change Stages of Change Stages of Change Stages of Change Stages of Change Stages of Change   [] Pre Contemplation  [x] Contemplation  [] Preparation  [] Action  [] Maintenance  [] Relapse [] Pre Contemplation  [x] Contemplation  [] Preparation  [] Action  [] Maintenance  [] Relapse [] Pre Contemplation  [x] Contemplation  [] Preparation  [] Action  [] Maintenance  [] Relapse [] Pre Contemplation  [] Contemplation  [] Preparation  [] Action  [] Maintenance  [] Relapse [] Pre Contemplation  [] Contemplation  [] Preparation  [] Action  [] Maintenance  [] Relapse [] Pre Contemplation  [] Contemplation  [] Preparation  [] Action  [] Maintenance  [] Relapse   NUTRITION ASSESSMENT NUTRITION ASSESSMENT NUTRITION ASSESSMENT NUTRITION ASSESSMENT NUTRITION ASSESSMENT NUTRITION ASSESSMENT   Weight Management  Weight: 146.8        Height: 68\"   BMI: 22  Weight Management  Weight: 152.6                Weight Management  Weight: 155.4                 Weight Management  Weight: ** Weight Management  Weight: ** Weight Management  Weight: **                    BMI: **   Eating Plan  Current eating habits: low salt Eating Plan  Changes: low sodium Eating Plan  Changes: unknown Eating Plan  Changes: Eating Plan  Changes: Eating Plan Improvements:   Alcohol Use  [x] none          [] daily  [] weekly      [] special           Diet Assessment Tool:  RATE YOUR PLATE  *Given to patient to complete and return. Diet Assessment Tool:    Score: 58/69        Diet Assessment Tool: RATE YOUR PLATE  Score: **/95   NUTRITION PLAN NUTRITION PLAN NUTRITION PLAN NUTRITION PLAN NUTRITION PLAN NUTRITION PLAN   *Interventions* *Interventions* *Interventions* *Interventions* *Interventions* *Interventions*   Initial Survey given Goal Setting Discussion:   [x] Yes      [] No        Follow Up Survey Reviewed & Goals Updated:     Professional Referral  Please check if needed. [] Dietitian Consult   [] Wt. Management Referral  [] Other:  Professional Referral  Please check if needed. [] Dietitian Consult   [] Wt. Management Referral  [] Other: Professional Referral  Please check if needed. [] Dietitian Consult   [] Wt. Management Referral  [] Other: Professional Referral  Please check if needed. [] Dietitian Consult   [] Wt. Management Referral  [] Other: Professional Referral  Please check if needed. [] Dietitian Consult   [] Wt.  Management Referral  [] Other: Professional Referral  Please check if needed. [] Dietitian Consult   [] Wt. Management Referral  [] Other:   *Education* *Education* *Education* *Education* *Education* *Education*   Nutritional Education Recommended    [x] 1:1 Registered Dietitian    Workshops  [x] Label Reading   [x] Menu  [x] Targeting Nutrition Priorities  [x] Fueling a Healthy Body   Nutritional Education Attended/Date    3/10/21 1:1 Registered Dietitian    3/17/21  Fueling a Healthy Body Nutritional Education Attended/Date    3/17/21  Targeting Priorities Nutritional Education Attended/Date Nutritional Education Attended/Date All Sessions Completed?     [] Yes  [] No   Cooking School  Recommended     [x] Adding Flavor  [x] Fast & Healthy     Breakfasts  [x] Salads & Dressings  [x] Savory Soups  [x] Simple Sides & Sauces  [x] Appetizers &     Snacks  [x] Delicious Desserts  [x] Plant-Based Proteins  [x] Fast Evening Meals  [x] Weekend Breakfasts  [x] Efficiency Cooking  [x] One-Pot [employer]  Sessions Completed  3/5/21   Appetizers &     Snacks    4/41/52  Delicious Desserts     [facility]  Sessions Completed    3/19/21  Plant Protein    4/2/21  Weekend Breakfast Cooking School  Sessions Completed     [facility] [employer]  Sessions Completed Cooking School    # of sessions completed:  **   *Goals* *Goals* *Goals* *Goals* *Goals* *Goals*   Diony's nutritional goals are as follows:  Complete and return diet survey Diony's nutritional goals are as follows:  [x] Attend Nutrition Workshops  [x] Attend 1:1   [x] Attend Cooking Classes  [] ** Diony's nutritional goals are as follows:  [x] Attend Nutrition Workshops  [] Attend 1:1   [x] Attend Cooking Classes  [x] Complete and return diet survey  [] ** Diony's nutritional goals are as follows:  [] Attend Nutrition Workshops  [] Attend 1:1   [] Attend Cooking Classes  [] ** Diony's nutritional goals are as follows:  [] Attend Nutrition Workshops  [] Attend 1:1   [] Attend Cooking Classes  [] ** Diony achieved nutritional goals   [] Yes    [] No  If no, why? Use knowledge gained to continue Pritikin eating plan at home       Individual Cardiac Treatment Plan - Psychosocial  PSYCHOSOCIAL  ASSESSMENT/PLAN PSYCHOSOCIAL  REASSESSMENT PSYCHOSOCIAL   REASSESSMENT PSYCHOSOCIAL   REASSESSMENT PSYCHOSOCIAL  DISCHARGE/FOLLOW-UP PSYCHOSOCIAL  DISCHARGE/FOLLOW-UP   Stages of Change Stages of Change Stages of Change Stages of Change Stages of Change Stages of Change   [] Pre Contemplation  [x] Contemplation  [] Preparation  [] Action  [] Maintenance  [] Relapse [] Pre Contemplation  [x] Contemplation  [] Preparation  [] Action  [] Maintenance  [] Relapse [] Pre Contemplation  [x] Contemplation  [] Preparation  [] Action  [] Maintenance  [] Relapse [] Pre Contemplation  [] Contemplation  [] Preparation  [] Action  [] Maintenance  [] Relapse [] Pre Contemplation  [] Contemplation  [] Preparation  [] Action  [] Maintenance  [] Relapse [] Pre Contemplation  [] Contemplation  [] Preparation  [] Action  [] Maintenance  [] Relapse   PSYCHOSOCIAL ASSESSMENT PSYCHOSOCIAL ASSESSMENT PSYCHOSOCIAL ASSESSMENT PSYCHOSOCIAL ASSESSMENT PSYCHOSOCIAL ASSESSMENT PSYCHOSOCIAL ASSESSMENT   Behavioral Outcomes Behavioral Outcomes Behavioral Outcomes Behavioral Outcomes Behavioral Outcomes Behavioral Outcomes   Tool Used:  Vikki & Candelario, Quality of Life Index, Cardiac Version IV  *Given to patient to complete.  Tool Used:    Ferrans & Candelario, Quality of Life Index, Cardiac Version IV     QOL Index Score: **  HF:**  S&E:**  P&S: **  Family: **   Tool Used:     Vikki & Candelario, Quality of Life Index, Cardiac Version IV    QOL Index Score: **  HF:**  S&E:**  P&S: **  Family: ** Tool Used:     Ferrans & Candelario, Quality of Life Index, Cardiac Version IV    QOL Index Score: **  HF:**  S&E:**  P&S: **  Family: **   PHQ-9 score 4  Depression Severity  [x]Minimal  []Mild   []Moderate  []Moderately Severe  []Severe    PHQ-9 score **  Depression Severity  []Minimal  []Mild   []Moderate  []Moderately Severe []Severe PHQ-9 score **  Depression Severity  []Minimal  []Mild   []Moderate  []Moderately Severe []Severe   Does patient have Family Support? [x] Yes      [] No  No signs of marital/family distress        Within the Past Month:  *Have you wished you were dead or wished you could go to sleep and not wake up? [] Yes      [x] No  *Have you had any thoughts of killing yourself? [] Yes      [x] No          Using a scale of 0-10, 0=none, 10=very:   Rate your depression: 5  Rate your anxiety:  4  Using a scale of 0-10, 0=none, 10=very:   Rate your depression: 5  Rate your anxiety:  7 Using a scale of 0-10, 0=none, 10=very:   Rate your depression: na  Rate your anxiety: na Using a scale of 0-10, 0=none, 10=very:   Rate your depression: **  Rate your anxiety:  ** Using a scale of 0-10, 0=none, 10=very:   Rate your depression: **  Rate your anxiety:  ** Using a scale of 0-10, 0=none, 10=very:   Rate your depression: **  Rate your anxiety:  **   Signs and Symptoms of Depression Present? [x] Yes      [] No  If yes, please explain: Pt states he has been depressed but it is getting better since he is able to do things now. Signs and Symptoms of Depression Present? [x] Yes      [] No  If yes, please explain:  Upset about his current heart complications Signs and Symptoms of Depression Present? [x] Yes      [] No  If yes, please explain:  See previous Signs and Symptoms of Depression Present? [] Yes      [] No  If yes, please explain:  ** Signs and Symptoms of Depression Present? [] Yes      [] No  If yes, please explain:  ** Signs and Symptoms of Depression Present? [] Yes      [] No  If yes, please explain:  **   Signs and Symptoms of Anxiety Present? [x] Yes      [] No  If yes, please explain:  See above Signs and Symptoms of Anxiety Present?     [x] Yes      [] No  If yes, please explain:  See above Signs and Symptoms of Anxiety Present? [x] Yes      [] No  If yes, please explain:  See previous Signs and Symptoms of Anxiety Present? [] Yes      [] No  If yes, please explain:  ** Signs and Symptoms of Anxiety Present? [] Yes      [] No  If yes, please explain:  ** Signs and Symptoms of Anxiety Present? [] Yes      [] No  If yes, please explain:  **   [] Patient has poor eye contact   [x] Flat affect present. [x] Signs of anxiety, but no anger or hostility    [] Signs social isolation present. []  Signs of alcohol or substance abuse        PSYCHOSOCIAL PLAN PSYCHOSOCIAL PLAN PSYCHOSOCIAL PLAN PSYCHOSOCIAL PLAN PSYCHOSOCIAL PLAN PSYCHOSOCIAL PLAN   *Interventions* *Interventions* *Interventions* *Interventions* *Interventions* *Interventions*   *Please check if needed  [] Psych Consult  [x] Physician Referral- suggested discussing depression and anxiety with PCP  [x] Stress Management Skills *Please check if needed  [] Psych Consult  [x] Physician Referral- encouraged Marion Das to talk to his PCP about depression and anxiety  [x] Stress Management Skills *Please check if needed  [] Psych Consult  [x] Physician Referral  [x] Stress Management Skills *Please check if needed  [] Psych Consult  [] Physician Referral  [] Stress Management Skills *Please check if needed  [] Psych Consult  [] Physician Referral  [] Stress Management Skills *Please check if needed  [] Psych Consult  [] Physician Referral  [] Stress Management Skills   Is patient currently taking anti-depressant or anti-anxiety medications? [] Yes      [x] No   Change in anti-depressant or anti-anxiety medications? [] Yes      [x] No   Change in anti-depressant or anti-anxiety medications? [] Yes      [x] No   Change in anti-depressant or anti-anxiety medications? [] Yes      [] No  If yes, please list medications:  ** Change in anti-depressant or anti-anxiety medications?   [] Yes      [] No  If yes, please list medications:  ** Change in anti-depressant or anti-anxiety medications? [] Yes      [] No  If yes, please list medications:  **   *Education* *Education* *Education* *Education* *Education* *Education*   Healthy Mind-Set Workshops Recommended  [x] Stress & Health  [x] Taking Charge of Stress  [x] New Thoughts, New Behaviors  [x] Managing Moods & Relationships Healthy Mind-Set Workshops Attended/Date  3/3/21New Thoughts, New Behaviors Healthy Mind-Set Workshops Attended/Date    3/24/21  Managing Moods Healthy Mind-Set Workshops Attended/Date Healthy Mind-Set Workshops  Completed  [] Yes      [] No Healthy Mind-Set Workshops  Completed  [] Yes      [] No   *Goals* *Goals* *Goals* *Goals* *Goals* *Goals*   Diony's psychosocial goals are as follows:  Complete HADS & Vikki Candelario, Quality of Life Index, Cardiac Version IV Diony's psychosocial goals are as follows:  [x] Attend Healthy Mind-Set Workshops  [x] Reduce depression symptom severity by 1 level  [] ** Diony's psychosocial goals are as follows:  [x] Attend Healthy Mind-Set Workshops  [x] Reduce depression symptom severity by 1 level  [] ** Diony's psychosocial goals are as follows:  [] Attend Healthy Mind-Set Workshops  [] Reduce depression symptom severity by 1 level  [] Franco Jin achieved psychosocial goals? [] Yes    [] No  If no, why?  **  [] Use methods learned to continue to reduce depression symptom severity by 1 level  [] ** Diony achieved psychosocial goals?   [] Yes    [] No  If no, why?  **  [] Use methods learned to continue to reduce depression symptom severity by 1 level  [] **     Individual Cardiac Treatment Plan - Other:  Risk Factor/Education  RISK FACTOR  ASSESSMENT/PLAN RISK FACTOR  REASSESSMENT  RISK FACTOR  REASSESSMENT RISK FACTOR  REASSESSMENT RISK FACTOR   DISCHARGE/FOLLOW-UP RISK FACTOR   DISCHARGE/FOLLOW-UP   Stages of Change Stages of Change Stages of Change Stages of Change Stages of Change Stages of Change   [] Pre Contemplation  [x] Contemplation  [] Preparation  [] Action  [] Maintenance  [] Relapse [] Pre Contemplation  [] Contemplation  [x] Preparation  [] Action  [] Maintenance  [] Relapse [] Pre Contemplation  [] Contemplation  [x] Preparation  [] Action  [] Maintenance  [] Relapse [] Pre Contemplation  [] Contemplation  [] Preparation  [] Action  [] Maintenance  [] Relapse [] Pre Contemplation  [] Contemplation  [] Preparation  [] Action  [] Maintenance  [] Relapse [] Pre Contemplation  [] Contemplation  [] Preparation  [] Action  [] Maintenance  [] Relapse   RISK FACTOR/EDUCATION ASSESSMENT RISK FACTOR/EDUCATION ASSESSMENT RISK FACTOR/EDUCATION ASSESSMENT RISK FACTOR/EDUCATION ASSESSMENT RISK FACTOR /EDUCATION ASSESSMENT RISK FACTOR /EDUCATION ASSESSMENT   Hypertension  [x] Yes      [] No    Resting BP: 112/62  Peak Ex BP:130/82  Medication: losartan, metoprolol   Hypertension  Resting BP: 114/52  Peak Ex BP:126/64  Medication Changes:  [] Yes      [x] No Hypertension  Resting BP: 120/70  Peak Ex BP:142/78  Medication Changes:  Unknown Hypertension  Resting BP: **  Peak Ex BP:**  Medication Changes:  [] Yes      [] No Hypertension  Resting BP: **  Peak Ex BP:**  Medication Changes:  [] Yes      [] No Hypertension  Resting BP: **  Peak Ex BP:**  Medication Changes:  [] Yes      [] No   Lipids  HLD/DLD  [x] Yes      [] No  TOTAL CHOL: 126  HDL:  36  LDL:  71  TRI  Medication: atorvastatin Lipids  Medication Changes:  [] Yes      [x] No     Lipids  Medication Changes:  nUnknown   Lipids  Medication Changes:  [] Yes      [] No     Lipids    TOTAL CHOL: **  HDL:  **  LDL:  **  TRIG:  **  Medication Changes:  [] Yes      [] No Lipids    TOTAL CHOL: **  HDL:  **  LDL:  **  TRIG:  **  Medication Changes:  [] Yes      [] No   Diabetes  [] Yes      [x] No   Diabetes  na   Diabetes  NA     Diabetes  Most Recent BS:  BS have been in range  [] Yes      [] No  Medication Changes  [] Yes      [] No     Diabetes  Most Recent BS:  BS have been in range  [] Yes      [] No  Medication Completed Videos/Date    NA Completed Videos/Date Completed Videos/Date Recommended Educational Videos Completed    [] Yes      [] No    **If not completed, Why? **           Smoking Cessation/Relaspe Prevention Intervention needed? [x] Yes      [] No  *Pt verbalizes and agrees to attend intervention Smoking Cessation/Relapse Prevention Scheduled? [] Yes      [x] No   Smoking Cessation/Relapse Prevention completed? [] Yes      [x] No   Smoking Cessation/Relapse Prevention completed? [] Yes      [] No  Date: ** Smoking Cessation/Relapse Prevention completed? [] Yes      [] No  Date: ** Smoking Cessation Counseling attended  [] Yes      [] No  **If not completed, Why? **   Professional Referrals:  *Please check if needed  [] Diabetes Clinic  [] Lipid Clinic   [] Other:      Professional Referrals:  *Please check if needed  [] Diabetes Clinic  [] Lipid Clinic   [] Other:   Preventative Medication Preventative Medication Preventative Medication Preventative Medication Preventative Medication Preventative Medication   Aspirin  [x] Yes    [] No  Blood Thinner: Clopidogrel/Effient/Brillinta  [x] Yes    [] No  Beta Blocker  [x] Yes    [] No  Ace Inhibitor  [] Yes    [x] No  Statin/Lipid Lowering  [x] Yes    [] No Medication Changes? [] Yes    [x] No Medication Changes? Unknown Medication Changes? [] Yes    [] No Medication Changes? [] Yes    [] No Medication Changes? [] Yes    [] No   *Education* *Education* *Education* *Education* *Education* *Education*   Does Coty Lacy require any additional education? [x] Yes    [] No   Does Coty Lacy require any additional education? [] Yes    [x] No Does Coty Lacy require any additional education? [x] Yes    [] No Does Coty Lacy require any additional education? [] Yes    [] No Does Coty Lacy require any additional education? [] Yes    [] No Does Coty Lacy require any additional education?   [] Yes    [] No   Additional Educational Videos    Exercise  [] Improve Performance    Medical  [] Aging Action   2/24/2021  2:35 PM Vikram Montero MD Physician Cosign   2/23/2021  1:34 PM [address] Exercise Physiologist Sign       Cosigned by: Vikram Montero MD at 3/17/2021  3:07 PM   Revision History  Date/Time User Provider Type Action   3/17/2021  3:07 PM Vikram Montero MD Physician Cosign   3/17/2021  2:35 PM [address] Exercise Physiologist Sign

## 2021-04-15 ENCOUNTER — CARE COORDINATION (OUTPATIENT)
Dept: CASE MANAGEMENT | Age: 71
End: 2021-04-15

## 2021-04-15 ENCOUNTER — NURSE ONLY (OUTPATIENT)
Dept: CARDIOLOGY CLINIC | Age: 71
End: 2021-04-15
Payer: MEDICARE

## 2021-04-15 DIAGNOSIS — Z95.0 PACEMAKER: ICD-10-CM

## 2021-04-15 PROCEDURE — 93280 PM DEVICE PROGR EVAL DUAL: CPT | Performed by: INTERNAL MEDICINE

## 2021-04-15 RX ORDER — METOPROLOL SUCCINATE 100 MG/1
100 TABLET, EXTENDED RELEASE ORAL DAILY
Qty: 90 TABLET | Refills: 1 | Status: SHIPPED | OUTPATIENT
Start: 2021-04-15 | End: 2021-01-01 | Stop reason: SDUPTHER

## 2021-04-15 NOTE — PROGRESS NOTES
faraz sci dual pacer initial check     . Mary Childs Battery longevity:  14.5 years on device   Presenting rhythm  AP VS     Atrial impedance 540  RV impedance 665    P wave sensing 7  R wave sensing 10.1    7 % atrial paced  10 % RV paced     Atrial threshold 0.6 V  at 0.4ms  RV threshold 0.6 V at 0.4ms  Mode switches   Known ZAHRA tie off       . .Large dressing removed, steri strips intact, minor puffiness, shadow of bruising, no drainage, tender to touch.     If, at any point, there is any increased redness, swelling, increased discomfort, fever, or the incision opens up, the patient is aware to go to the emergency room

## 2021-04-15 NOTE — TELEPHONE ENCOUNTER
Date of last visit:  4/13/2021  Date of next visit:  5/18/2021    Requested Prescriptions     Signed Prescriptions Disp Refills    metoprolol succinate (TOPROL XL) 100 MG extended release tablet 90 tablet 1     Sig: Take 1 tablet by mouth daily     Authorizing Provider: Yan Hatfield     Ordering User: Ayden Goldberg

## 2021-04-15 NOTE — CARE COORDINATION
[facility] Transitions Follow Up Call    4/15/2021    Patient: Dewayne Pitt  Patient : 1950   MRN: <[id number]>  Reason for Admission: a fib RVR  Discharge Date: 21 RARS: Readmission Risk Score: 23         Spoke with: Kayce Chi Transitions Subsequent and Final Call    Subsequent and Final Calls  Do you have any ongoing symptoms?: No  Have your medications changed?: No  Do you have any questions related to your medications?: No  Do you currently have any active services?: Yes  Are you currently active with any services?: Outpatient/Community Services  Do you have any needs or concerns that I can assist you with?: No  Care Transitions Interventions  Other Interventions:       States he's doing really well. States he had his pacemaker checked today and the bandage removed. Reports the site looks good with no drainage, redness or irritation. States he's followed his discharge instructions and everything is healing really well. Denies CP, palpitations, dizziness, lightheadedness, SOB, fever or other symptoms. States he's taking medications as prescribed and denies questions or concerns at this time.      Follow Up  Future Appointments   Date Time Provider Ramo Graham   2021  9:00 AM STR CARDIAC EXERCISE RM STRZ CAR PUL [address] [facility]   2021 10:00 AM STR ICR COOK CLASSROOM STRZ CAR PUL [address] [facility]   2021  9:00 AM STR CARDIAC EXERCISE RM STRZ CAR PUL [address] [facility]   2021 10:00 AM STR EDUCATION CLASSROOM STRZ CAR PUL [address] [facility]   2021  9:00 AM STR CARDIAC EXERCISE RM STRZ CAR PUL [address] [facility]   2021 10:00 AM STR EDUCATION CLASSROOM STRZ CAR PUL [address] [facility]   2021  9:00 AM STR CARDIAC EXERCISE RM STRZ CAR PUL [address] [facility]   2021 10:00 AM STR ICR COOK CLASSROOM STRZ CAR PUL [address] [facility]   2021  9:00 AM STR CARDIAC EXERCISE RM STRZ CAR PUL [address] [facility]   2021 10:00 AM STR EDUCATION CLASSROOM STRZ CAR PUL [address] [facility]   2021  9:00 AM STR CARDIAC EXERCISE RM STRZ CAR [place] No HOD 4/28/2021 10:00 AM STR EDUCATION CLASSROOM STR[place]   4/30/2021  9:00 AM STR CARDIAC EXERCISE RM STRZ CAR PUL [place]   4/30/2021 10:00 AM STR ICR COOK CLASSROOM STRZ CAR PUL [place] H[facility]   5/3/2021  9:00 AM STR CARDIAC EXERCISE RM STRZ CAR PUL [place]   5/3/2021 10:00 AM STR EDUCATION CLASSROOM STRZ CAR PUL [place]   5/5/2021  9:00 AM STR CARDIAC EXERCISE RM STRZ CAR [place]UL [place]   5/5/2021 10:00 AM STR EDUCATION CLASSROOM STRZ CAR [place]UL [place]   5/7/2021  9:00 AM STR CARDIAC EXERCISE RM STRZ CAR PUL [place]   5/7/2021 10:00 AM STR ICR COOK CLASSROOM STRZ CAR PUL [place]   5/10/2021  9:00 AM STR CARDIAC EXERCISE RM STRZ CAR PUL [place]   5/10/2021 10:00 AM STR EDUCATION CLASSROOM STRZ CAR PUL [place]   5/12/2021  9:00 AM STR CARDIAC EXERCISE RM STRZ CAR PUL [place]   5/12/2021 10:00 AM STR EDUCATION CLASSROOM STRZ CAR PUL [place]   5/14/2021  9:00 AM STR CARDIAC EXERCISE RM STRZ CAR PUL [place]   5/14/2021 10:00 AM STR ICR COOK CLASSROOM STRZ CAR PUL [place]   5/17/2021  9:00 AM STR CARDIAC EXERCISE RM STRZ CAR PUL [place]   5/17/2021 10:00 AM STR EDUCATION CLASSROOM STRZ CAR PUL [place]   5/18/2021  1:20 PM Alina Noonan MD [employer]   5/19/2021  9:00 AM STR CARDIAC EXERCISE RM STRZ CAR [place]UL [place]   5/19/2021 10:00 AM STR EDUCATION CLASSROOM STRZ CAR PUL [place]   5/27/2021  2:30 PM STR ECHO RM1 STRZ ECHO [place]   6/22/2021  2:15 PM Joanne Friedman APRN - CNP N SRPX CHF [facility] [place]   7/27/2021  1:30 PM SCHEDULE, SRPS PACER NURSE N SRPX PACER [facility] [place]   9/2/2021  9:45 AM Geovani Greenfield MD N SRPX Heart [facility] - [place]

## 2021-04-16 ENCOUNTER — HOSPITAL ENCOUNTER (OUTPATIENT)
Dept: CARDIAC REHAB | Age: 71
Setting detail: THERAPIES SERIES
End: 2021-04-16
Payer: MEDICARE

## 2021-04-16 ENCOUNTER — APPOINTMENT (OUTPATIENT)
Dept: CARDIAC REHAB | Age: 71
End: 2021-04-16
Payer: MEDICARE

## 2021-04-19 ENCOUNTER — APPOINTMENT (OUTPATIENT)
Dept: CARDIAC REHAB | Age: 71
End: 2021-04-19
Payer: MEDICARE

## 2021-04-19 ENCOUNTER — HOSPITAL ENCOUNTER (OUTPATIENT)
Dept: CARDIAC REHAB | Age: 71
Setting detail: THERAPIES SERIES
Discharge: HOME | End: 2021-04-19
Payer: MEDICARE

## 2021-04-19 NOTE — PROGRESS NOTES
Hospital Facility-Based Program  Phase 2 Cardiac Rehab Weekly Progress Report      Patient prescribed exercise:  9:00 class. 3 times per week in rehab, 1-4 times per week at home for the amount of sessions/weeks specified by insurance. Pt has been on hold from cardiac rehab due to pacemaker procedure. Will call pt this week to see if he can return this week.

## 2021-04-20 ENCOUNTER — TELEPHONE (OUTPATIENT)
Dept: CARDIAC REHAB | Age: 71
End: 2021-04-20

## 2021-04-20 NOTE — TELEPHONE ENCOUNTER
Aaron Mistry had a pacemaker procedure on 4/6/2021 and has been on hold from cardiac rehab. Is he okay to return to rehab on Monday 4/26/2021? Thank you.

## 2021-04-21 ENCOUNTER — HOSPITAL ENCOUNTER (OUTPATIENT)
Dept: CARDIAC REHAB | Age: 71
Setting detail: THERAPIES SERIES
End: 2021-04-21
Payer: MEDICARE

## 2021-04-21 ENCOUNTER — APPOINTMENT (OUTPATIENT)
Dept: CARDIAC REHAB | Age: 71
End: 2021-04-21
Payer: MEDICARE

## 2021-04-23 ENCOUNTER — APPOINTMENT (OUTPATIENT)
Dept: CARDIAC REHAB | Age: 71
End: 2021-04-23
Payer: MEDICARE

## 2021-04-23 ENCOUNTER — CARE COORDINATION (OUTPATIENT)
Dept: CASE MANAGEMENT | Age: 71
End: 2021-04-23

## 2021-04-23 ENCOUNTER — HOSPITAL ENCOUNTER (OUTPATIENT)
Dept: CARDIAC REHAB | Age: 71
Setting detail: THERAPIES SERIES
End: 2021-04-23
Payer: MEDICARE

## 2021-04-23 NOTE — CARE COORDINATION
appt.   Do you have any questions related to your medications?: No  Do you currently have any active services?: Yes  Are you currently active with any services?: Outpatient/Community Services  Do you have any needs or concerns that I can assist you with?: No  Identified Barriers: None  Care Transitions Interventions  No Identified Needs  Other Interventions:         Spoke with patient today 4/23/21 for TCM/hospital discharge sub call. Patient states he continues doing well since last CTN call. Denies any shortness of breath, chest pain, palpitations, pain, LE swelling, sudden weight gain,  nausea, vomiting, diarrhea, chills or fever. States had bandage removed from left chest pacer insertion site which is dry and has steri stirps that are intact. Denies any pain or abnormal redness or swelling at site. Confirmed with patient he completed HFU appt with Dr. Roge Linares (PCP) last week and had Toprol dose increased from 50 mg daily to 100 mg daily. States he continues monitoring BP/HR anhd adits readings today was BP= 126/64 and HR= 57. States weight today was 150.8 lbs. Reiterated CHF/COPD zone tools and knowing when to seek medical attention. States he starts back with OP cardiac rehab on Monday. Denies any needs or concerns at this time. CTN will continue to follow for Care Transition.      Follow Up  Future Appointments   Date Time Provider Ramo Graham   4/26/2021  9:00 AM STR CARDIAC EXERCISE RM STRZ CAR [place] [facility]   4/26/2021 10:00 AM STR EDUCATION CLASSROOM STRZ CAR PUL [place] [facility]   4/28/2021  9:00 AM STR CARDIAC EXERCISE RM STRZ CAR PUL [place] [facility]   4/28/2021 10:00 AM STR EDUCATION CLASSROOM STRZ CAR PUL [place] [facility]   4/30/2021  9:00 AM STR CARDIAC EXERCISE RM STRZ CAR PUL [place] [facility]   4/30/2021 10:00 AM STR ICR COOK CLASSROOM STRZ CAR PUL [place] [facility]   5/3/2021  9:00 AM STR CARDIAC EXERCISE RM STRZ CAR [facility]UL [place]   5/3/2021 10:00 AM STR EDUCATION CLASSROOM STRZ CAR [facility]UL [place]   5/5/2021  9:00 AM STR

## 2021-04-26 ENCOUNTER — HOSPITAL ENCOUNTER (OUTPATIENT)
Dept: CARDIAC REHAB | Age: 71
Setting detail: THERAPIES SERIES
Discharge: HOME | End: 2021-04-26
Payer: MEDICARE

## 2021-04-26 PROCEDURE — G0422 INTENS CARDIAC REHAB W/EXERC: HCPCS

## 2021-04-26 PROCEDURE — G0423 INTENS CARDIAC REHAB NO EXER: HCPCS

## 2021-04-26 NOTE — PROGRESS NOTES
Video Education Report - ICR/CR  Name:  Antonio Manzo     Date:  4/26/2021  MRN: [id number]     Session #:  15  Session Length: 40 min    Core Videos        []Heart Disease Risk Reduction       []Overview of Pritikin Eating Plan      []Move it          []Calorie Density         []Healthy Minds, Bodies, Hearts        []Label Reading - Part 1       []Metabolic Syndrome and Belly Fat        []How Our Thoughts Can Heal Our Hearts   [x]Dining Out - Part 1      []Biomechanical Limitations  []Facts on Fat        []Hypertension & Heart Disease    []Diseases of Our Time - Focusing on Diabetes   []Body Composition  []Nutrition Action Plan    []Exercise Action Plan      Comments:  Video completed, group discussion

## 2021-04-28 ENCOUNTER — HOSPITAL ENCOUNTER (OUTPATIENT)
Dept: CARDIAC REHAB | Age: 71
Setting detail: THERAPIES SERIES
Discharge: HOME | End: 2021-04-28
Payer: MEDICARE

## 2021-04-28 PROCEDURE — G0423 INTENS CARDIAC REHAB NO EXER: HCPCS

## 2021-04-28 PROCEDURE — G0422 INTENS CARDIAC REHAB W/EXERC: HCPCS

## 2021-04-30 ENCOUNTER — HOSPITAL ENCOUNTER (OUTPATIENT)
Dept: CARDIAC REHAB | Age: 71
Setting detail: THERAPIES SERIES
Discharge: HOME | End: 2021-04-30
Payer: MEDICARE

## 2021-04-30 ENCOUNTER — CARE COORDINATION (OUTPATIENT)
Dept: CASE MANAGEMENT | Age: 71
End: 2021-04-30

## 2021-04-30 PROCEDURE — G0422 INTENS CARDIAC REHAB W/EXERC: HCPCS

## 2021-04-30 PROCEDURE — G0423 INTENS CARDIAC REHAB NO EXER: HCPCS

## 2021-04-30 NOTE — CARE COORDINATION
[facility] Transitions Follow Up Call    2021    Patient: Inna Newberry  Patient : 1950   MRN: [id number]  Reason for Admission: AFib with RVR  Discharge Date: 21 RARS: Readmission Risk Score: 23         Spoke with: [address] Transitions Subsequent and Final Call    Subsequent and Final Calls  Do you have any ongoing symptoms?: No  Have your medications changed?: No  Do you have any questions related to your medications?: No  Do you currently have any active services?: Yes  Are you currently active with any services?: Outpatient/Community Services  Do you have any needs or concerns that I can assist you with?: No  Identified Barriers: None  Care Transitions Interventions  No Identified Needs  Other Interventions:         Spoke with patient today 21 for TCM/hospital discharge follow up sub call. Patient states he continues doing well since last CTN call and offers no complaints. Denies any shortness of breath, chest pain, palpitations, LW swelling, sudden weight gain, nausea, vomiting, diarrhea, chills or fever. States pacemake insertion site is dry and healing. Denies any abnormal pain, redness or swelling at site. States he is scheduled to follow up with Dr. Tomasa Bansal (cardiology) on 21. States he continues to tolerate dose change on   Toprol. States BP today was 127/64 and HR was 62. [facility] he continues to attend OP cardio rehab and had visit today as attends three times weekly. States weight today was 151.8 lbs. Reinforced CHF zone tool and knowing when to call doctor or seek medial attention. Denies any complaints or concerns at this time. CTN will continue to follow for Care Transition.        Follow Up  Future Appointments   Date Time Provider Ramo Graham   5/3/2021  9:00 AM STR CARDIAC EXERCISE RM STRZ CAR [place]UL [place]   5/3/2021 10:00 AM STR EDUCATION CLASSROOM STRZ CAR PUL [place]   2021  9:00 AM STR CARDIAC EXERCISE RM STRZ CAR PUL [address] [facility] 5/5/2021 10:00 AM STR EDUCATION CLASSROOM STRZ CAR PUL [place]   5/7/2021  9:00 AM STR CARDIAC EXERCISE RM STRZ CAR PUL [place]   5/7/2021 10:00 AM STR ICR COOK CLASSROOM STRZ CAR PUL [place]   5/10/2021  9:00 AM STR CARDIAC EXERCISE RM STRZ CAR PUL [place]   5/10/2021 10:00 AM STR EDUCATION CLASSROOM STRZ CAR PUL [place]   5/12/2021  9:00 AM STR CARDIAC EXERCISE RM STRZ CAR PUL [place]   5/12/2021 10:00 AM STR EDUCATION CLASSROOM STRZ CAR PUL [place]   5/14/2021  9:00 AM STR CARDIAC EXERCISE RM STRZ CAR PUL [place]   5/14/2021 10:00 AM STR ICR COOK CLASSROOM STRZ CAR PUL [place]   5/17/2021  9:00 AM STR CARDIAC EXERCISE RM STRZ CAR PUL [place]   5/17/2021 10:00 AM STR EDUCATION CLASSROOM STRZ CAR PUL [place]   5/18/2021  1:20 PM Colt Puga MD [employer]   5/19/2021  9:00 AM STR CARDIAC EXERCISE RM STRZ CAR PUL [place]   5/19/2021 10:00 AM STR EDUCATION CLASSROOM STRZ CAR PUL [place]   5/19/2021 11:30 AM Geovani Nolan MD N SRPX Heart [facility] [place]   5/27/2021  2:30 PM STR ECHO RM1 STRZ ECHO [place] [facility]   6/22/2021  2:15 PM ARCADIO Santana - CNP [facility] [place]   7/27/2021  1:30 PM SCHEDULE, SRPS PACER NURSE N SRPX PACER [facility] [place]   9/2/2021  9:45 AM Geovani Nolan MD N SRPX Heart [facility] - ARCADIO Mullen

## 2021-04-30 NOTE — PROGRESS NOTES
Jesus SCHUSTER.:  1950  Acct Number: [de-identified]  MRN:  [id number]                  [facility] COOKING SCHOOL WORKSHOP             Date: 2021        Session # ________   [address] [employer] class covered:      () Adding Flavor     (x) Fast Breakfasts     () Salads and Dressings     () Savory Soups      () Sauces & Simple Sides     () Appetizers and Snacks     () Delicious Desserts     () Plant Based Proteins     () Fast Evening Meals     () Weekend Breakfasts     () Efficiency Cooking     () One Pot Wonders     Patients were shown how to choose, prep, and cook; substitutions and other options were given. Samples were offered. Recipes were given and questions answered. The patient above was in the [employer] for 45 minutes.       Electronically signed by Jose Azar 2021 at 11:13 AM

## 2021-05-03 ENCOUNTER — HOSPITAL ENCOUNTER (OUTPATIENT)
Dept: CARDIAC REHAB | Age: 71
Setting detail: THERAPIES SERIES
Discharge: HOME | End: 2021-05-03
Payer: MEDICARE

## 2021-05-03 PROCEDURE — G0422 INTENS CARDIAC REHAB W/EXERC: HCPCS

## 2021-05-03 PROCEDURE — G0423 INTENS CARDIAC REHAB NO EXER: HCPCS

## 2021-05-03 NOTE — PROGRESS NOTES
Hospital Facility-Based Program  Phase 2 Cardiac Rehab Weekly Progress Report      Patient prescribed exercise:  9:00 class. 3 times per week in rehab, 1-4 times per week at home for the amount of sessions/weeks specified by insurance. Current Levels: Treadmill: 1. 2mph/0% for 12 minutes, NuStep:  32 Briceño for 12 minutes, UBE: 20 briceño for 5 minutes. Progression Discussion: Maintain/Increase Aerobic exercise 30-45 minutes to work on endurance. Attempt to increase intensity by 5-20% for each modality this week. Try to increase intensities until Susi Calderon rates the exercises a 13-17 on Daniel RPE.

## 2021-05-03 NOTE — PROGRESS NOTES
Video Education Report - ICR/CR  Name:  Shashi King     Date:  5/3/2021  MRN: [id number]     Session #:    Session Length: 45 min    Core Videos        []Heart Disease Risk Reduction       []Overview of Pritikin Eating Plan      []Move it          []Calorie Density         [x]Healthy Minds, Bodies, Hearts        []Label Reading - Part 1       []Metabolic Syndrome and Belly Fat        []How Our Thoughts Can Heal Our Hearts   []Dining Out - Part 1      []Biomechanical Limitations  []Facts on Fat        []Hypertension & Heart Disease    []Diseases of Our Time - Focusing on Diabetes   []Body Composition  []Nutrition Action Plan    []Exercise Action Plan      Comments:  Video completed, group discussion

## 2021-05-05 ENCOUNTER — HOSPITAL ENCOUNTER (OUTPATIENT)
Dept: CARDIAC REHAB | Age: 71
Setting detail: THERAPIES SERIES
Discharge: HOME | End: 2021-05-05
Payer: MEDICARE

## 2021-05-05 PROCEDURE — G0422 INTENS CARDIAC REHAB W/EXERC: HCPCS

## 2021-05-05 PROCEDURE — G0423 INTENS CARDIAC REHAB NO EXER: HCPCS

## 2021-05-05 NOTE — PROGRESS NOTES
Raza Clarke YOB: 1950    Acct Number: [de-identified]   MRN:  [id number]                             [facility] HEALTHY MIND-SET WORKSHOP             Date: 2021        Session #________    Diego Counts class covered:    ( )  Stress and Health Workshop:  [facility] patient will learn about the body's adaptive response that is triggered by a variety of stressors. Patient will gain insight into the toll that chronic stress takes on their health, both emotionally and physically. (X ) Taking Charge of Stress Workshop:  [facility] patient will learn and practice a variety of stress management techniques. Patient will be able to effectively apply coping mechanisms in perceived stressful situations. ( ) New Thoughts New Behaviors Workshop: [facility] patient will learn and practice techniques for developing effective health and lifestyle goals. Patient will be able to effectively apply the goal setting process learned to develop at least one new personal goal.     ( ) Managing Moods & Relationships Workshop:  [facility] patient will learn how emotional and chronic stress factors can impact their hearts. They will learn healthy ways to handle stress and utilize positive coping mechanisms. In addition, [facility] patient will learn ways to improve communication skills. Marion Das actively participated and verbalized understanding. Total time in the Healthy Mind-Set class was 60 minutes.     Electronically signed by Honey Gaucher, LSW on 2021 at 12:53 PM

## 2021-05-07 ENCOUNTER — HOSPITAL ENCOUNTER (OUTPATIENT)
Dept: CARDIAC REHAB | Age: 71
Setting detail: THERAPIES SERIES
Discharge: HOME | End: 2021-05-07
Payer: MEDICARE

## 2021-05-07 PROCEDURE — G0423 INTENS CARDIAC REHAB NO EXER: HCPCS

## 2021-05-07 PROCEDURE — G0422 INTENS CARDIAC REHAB W/EXERC: HCPCS

## 2021-05-07 NOTE — PROGRESS NOTES
Jamel SCHUSTER.:  1950  Acct Number: [de-identified]  MRN:  [id number]                  [facility] COOKING SCHOOL WORKSHOP             Date: 2021        Session # ________   Jenise Ricas class covered:      () Adding Flavor     () Fast Breakfasts     (X) Salads and Dressings     () Savory Soups      () Sauces & Simple Sides     () Appetizers and Snacks     () Delicious Desserts     () Plant Based Proteins     () Fast Evening Meals     () Weekend Breakfasts     () Efficiency Cooking     () One Pot Wonders     Patients were shown how to choose, prep, and cook; substitutions and other options were given. Samples were offered. Recipes were given and questions answered. The patient above was in the [employer] for 45 minutes.       Electronically signed by Kamila WOODS [address] [place]  on 2021 at 11:57 AM

## 2021-05-09 ENCOUNTER — HOSPITAL ENCOUNTER (OUTPATIENT)
Age: 71
Discharge: HOME | End: 2021-05-09
Payer: MEDICARE

## 2021-05-09 PROCEDURE — 83520 IMMUNOASSAY QUANT NOS NONAB: CPT

## 2021-05-09 PROCEDURE — 82705 FATS/LIPIDS FECES QUAL: CPT

## 2021-05-09 PROCEDURE — 87493 C DIFF AMPLIFIED PROBE: CPT

## 2021-05-10 ENCOUNTER — CARE COORDINATION (OUTPATIENT)
Dept: CASE MANAGEMENT | Age: 71
End: 2021-05-10

## 2021-05-10 ENCOUNTER — HOSPITAL ENCOUNTER (OUTPATIENT)
Dept: CARDIAC REHAB | Age: 71
Setting detail: THERAPIES SERIES
Discharge: HOME | End: 2021-05-10
Payer: MEDICARE

## 2021-05-10 PROCEDURE — G0423 INTENS CARDIAC REHAB NO EXER: HCPCS

## 2021-05-10 PROCEDURE — G0422 INTENS CARDIAC REHAB W/EXERC: HCPCS

## 2021-05-10 NOTE — PROGRESS NOTES
Enmanuel SCHUSTER.:  1950    MRN:  [id number]    Date: 5/10/2021      Session Length:  45 min   Session # _______    EXERCISE WORKSHOP:  Heart Disease Risk Reduction                        Todays class reviewed the anatomy and physiology of the heart. Additionally, we reviewed how the three Pritikin pillars impact risk factors, the progression, and the management of heart disease. Readiness to change:    ( ) Pre-contemplative   ( ) Contemplative - ambivalent about change    (x) Action - ready to set action plan and implement   ( ) Maintenance - has made change and is trying, and or practicing different alternative behaviors     Additional Notes:      Dennise Rojas was in the Workshop with the Exercise Physiologist for 45 minutes. The content was presented via Powerpoint, lecture, and patient participation based format. Motivational interviewing was utilized when needed, to promote change. Patient voiced understanding.     Electronically signed by Simón Romo on 5/10/2021 at 10:46 AM

## 2021-05-10 NOTE — PROGRESS NOTES
Hospital Facility-Based Program  Phase 2 Cardiac Rehab Weekly Progress Report      Patient prescribed exercise:  9:00 class. 3 times per week in rehab, 1-4 times per week at home for the amount of sessions/weeks specified by insurance. Current Levels: Treadmill: 1. 4mph/0% for 15 minutes, NuStep:  36 Briceño for 15 minutes, UBE: 20 briceño for 5 minutes. Progression Discussion: Maintain/Increase Aerobic exercise 30-45 minutes to work on endurance. Attempt to increase intensity by 5-20% for each modality this week. Try to increase intensities until Mi Fuentes rates the exercises a 13-17 on Daniel RPE.

## 2021-05-10 NOTE — CARE COORDINATION
[facility] Transitions Follow Up Call    5/10/2021    Patient: Jeremias Rainey  Patient : 1950   MRN: [id number]  Reason for Admission: AFib with RVR  Discharge Date: 21 RARS: Readmission Risk Score: 23    Attempted to contact patient today 5/10/21 for TCM/hospital discharge final follow up call. Left message on home/mobile number requesting a return call back to [facility] and provided contact information. [facility] signing off if no return call.      ARCADIO Luciano

## 2021-05-12 ENCOUNTER — HOSPITAL ENCOUNTER (OUTPATIENT)
Dept: CARDIAC REHAB | Age: 71
Setting detail: THERAPIES SERIES
End: 2021-05-12
Payer: MEDICARE

## 2021-05-12 ENCOUNTER — HOSPITAL ENCOUNTER (OUTPATIENT)
Dept: CARDIAC REHAB | Age: 71
Setting detail: THERAPIES SERIES
Discharge: HOME | End: 2021-05-12
Payer: MEDICARE

## 2021-05-12 LAB — C DIFFICILE TOXINS, PCR: NORMAL

## 2021-05-12 NOTE — PLAN OF CARE
[facility] - [address] [id number]  [id number] Facility-Based Program  Individualized Cardiac Treatment Plan    Patient Name:  Iva Portillo  :  1950  Age:  79 y.o. MRN:  [id number]  Diagnosis: CABG  Date of Event: 21   Physician:  Renetta Atwood Office Visit:  21  Date Entered Program: 21  Risk Stratifications: [] Low [] Intermediate [x] High  Allergies: Allergies   Allergen Reactions    Penicillins Rash    Sulfa Antibiotics Rash       COVID -19 Screen  Do you have any of the following symptoms:  [] Fever [] Cough [] SOB [] Muscle/Body Ache [] Loss of taste/smell [x] None    Have you traveled outside of the [place]? [] Yes      [x] No    Have you been around anyone who has tested Positive for COVID 19?  [] Yes      [x] No    The following Education has been completed with patient. [x] Wait in the lobby until we call you back to rehab. [x] You must wear a mask while in the medical center, and in cardiac rehab. Please bring your own. [x] Bring your own bottle of water with you. [x] You can bring a visitor with you, however, they will need to sit in the waiting room while you are in cardiac rehab. Individual Cardiac Treatment Plan -EXERCISE  INITIAL 30 DAY 60 DAY 90  DAY FINAL DAY   EXERCISE  ASSESSMENT/PLAN EXERCISE  REASSESSMENT EXERCISE   REASSESSMENT EXERCISE   REASSESSMENT EXERCISE   REASSESSMENT EXERCISE  DISCHARGE/FOLLOW-UP   Date: 21 Date: 3/17/21 Date: 21 Date: 21 Date: Date:   Session #1 Session # 16 Session # 29 Session # 55    Pt not here for reassessment.     Session # ** Session # **  Last session completed on **   Stages of Change Stages of Change Stages of Change Stages of Change Stages of Change Stages of Change   [] Pre Contemplation  [] Contemplation  [] Preparation  [x] Action  [] Maintenance  [] Relapse [] Pre Contemplation  [] Contemplation  [] Preparation  [x] Action  [] Maintenance  [] Relapse [] Pre Contemplation  [] Contemplation  [x] Preparation  [] Action  [] Maintenance  [] Relapse [] Pre Contemplation  [] Contemplation  [x] Preparation  [] Action  [] Maintenance  [] Relapse [] Pre Contemplation  [] Contemplation  [] Preparation  [] Action  [] Maintenance  [] Relapse [] Pre Contemplation  [] Contemplation  [] Preparation  [] Action  [] Maintenance  [] Relapse   EXERCISE ASSESSMENT EXERCISE ASSESSMENT EXERCISE ASSESSMENT EXERCISE ASSESSMENT EXERCISE ASSESSMENT EXERCISE ASSESSMENT   6 Min Walk Test  Distance walked:   0.09 miles  475 ft.  1.7 METs  Max HR:68 BPM      RPE:  12    THR:    Rhythm:  Sinus Ludwig/ NSR First Exercise Session:3/1/21 Patient has been on hold since 4/5/21, Pacemaker on 4/6/21.   6 Min Walk Test  Distance walked:   ** miles  ** ft  ** METs  Max HR:** BPM      RPE:  **  %Change ft= **    Rhythm:  **   DASI: 4.6 METs DASI: 5.6METs DASI: na DASI: ** METs DASI: ** METs DASI: ** METs   Return to Work  Time [place] on returning to work? [x] Yes              [] No   [] Disabled     [] Retired    If yes:  *Job description: oates, standing & raising arms  *Required MET level=4-5  *Return to Work Date: ? Return to work: Has Johnnie Newell returned to work? [x] Yes    [] No    Return to work date set? [x] Yes   [] No        Return to work: Has Johnnie Newell returned to work? [x] Yes    [] No     Return to work: Has Johnnie Newell returned to work? [x] Yes    [] No    Johnnie Newell is doing ok at work. Return to work: Has Johnnie Newell returned to work? [] Yes    [] No    Return to work date set? [] Yes, **    [] No    Johnnie Newell is doing ** at work. Return to work: Has Johnnie Newell returned to work? [] Yes    [] No    Return to work? [] Yes, **    [] No    *Required MET Level achieved for job duties? [] Yes    [] No   Orthopedic Limitations/  [] Yes    [x] No       Orthopedic Limitations  na Orthopedic Limitations  NA   Orthopedic Limitations  NuStep instead of AD Orthopedic Limitations Orthopedic Limitations     Fall Risk  Fall risk assessed?   [x] Yes [] No    Balance Issues? [] Yes      [x] No     [] Walker [] Cane    [x] Safety issues reviewed      Fall Risk  na Fall Risk  NA Fall Risk  NA Fall Risk Fall Risk   Home Exercise  [x] Yes    [] No  Type: walking  Frequency: daily  Duration: 20 min Home Exercise  [x] Yes    [] No  Type: walking  Frequency:4 x per week  Duration: 20 min Home Exercise  [x] Yes    [] No  Type:walking  Frequency: 4d/wk  Duration: 20mins Home Exercise  [x] Yes    [] No  Type: walking  Frequency: 4d/wk  Duration: 20mins Home Exercise  [] Yes    [] No  Type: **  Frequency: **  Duration: ** Home Exercise  [] Yes    [] No  Type: **  Frequency: **  Duration: **   Angina with Activity? [] Yes    [x] No  Angina Management: na Angina with Activity? [] Yes    [x] No  Angina Management: na Angina with Activity? [] Yes    [x] No  Angina Management: na Angina with Activity? [] Yes    [x] No  Angina Management: na Angina with Activity? [] Yes    [] No  Angina Management: ** Angina with Activity?   [] Yes    [] No  Angina Management: **   EXERCISE PLAN EXERCISE PLAN EXERCISE PLAN EXERCISE PLAN EXERCISE PLAN EXERCISE PLAN   *Interventions* *Interventions* *Interventions* *Interventions* *Interventions* *Interventions*   Exercise Prescription  (per physician & CR staff) Exercise Prescription  (per physician & CR staff) Exercise Prescription  (per physician & CR staff) Exercise Prescription  (per physician & CR staff) Exercise Prescription  (per physician & CR staff) Exercise Prescription  (per physician & CR staff)   Cardiovascular Cardiovascular Cardiovascular Cardiovascular Cardiovascular Cardiovascular   Mode:    [x] Treadmill (TM)  [x] Schwinn Airdyne (AD)  [x] Arms Ergometer (AE)  [] NuStep  [] Elliptical (E) MODE:    [x] Treadmill (TM)  [x] Schwinn Airdyne (AD)  [x] Arms Ergometer (AE)  [] NuStep  [] Elliptical (E) MODE:    [x] Treadmill (TM)  [x] Schwinn Airdyne (AD)  [x] Arms Ergometer (AE)  [] NuStep  [] Elliptical (E) MODE:    [x] Treadmill (TM)  [] Schwinn Airdyne (AD)  [x] Arms Ergometer (AE)  [x] NuStep  [] Elliptical (E) MODE:    [] Treadmill (TM)  [] Schwinn Airdyne (AD)  [] Arms Ergometer (AE)  [] NuStep  [] Elliptical (E) MODE:    [] Treadmill (TM)  [] Schwinn Airdyne (AD)  [] Arms Ergometer (AE)  [] NuStep  [] Elliptical (E)   Initial Workloads  TM: [email or web address] 1.8 METs  AD: 0.3 level = 1.7 METs  NS: 18  Briceño= 1.7 METs  AE: 0.1 level = 1.2 METs Current Workloads  TM:  [email or web address] %= 1.9 METs    NS:  30 Briceño= 2 METs  AE: 18 Briceño= 1.7 METs Current Workloads  TM:  [email or web address] %= 1.9 METs  NS:  30 Briceño= 2 METs  AE: 20 Briceño= 1.7 METs Current Workloads  TM: 1.4 @ 0%= 2.1 METs  NS:  36 Briceño= 2.2 METs  AE: 20 briceño = 1.7METs Current Workloads  TM:  @ %=  METs  AD:  level =  METs  NS:   Briceño=  METs  AE:  level =  METs Current Workloads  TM:  @ %=  METs  AD:  level =  METs  NS:   Briceño=  METs  AE:  level =  METs     Frequency:    ICR: 3x/week  Home: 2-3x/wk Frequency:   ICR: 3x/week  Home: 3x/wk Frequency:  ICR: 3x/week  Home: 3-4x/wk Frequency:  ICR: 3x/week  Home: 3-4x/wk Frequency:  ICR: 3x/week  Home: 3-4x/wk Frequency:  Marion Das will continue exercise at  5-7 days/week   Duration:   Total aerobic exercise = 30-45 min    5-8 min/mode Duration:  Total aerobic exercises = 25-35 min     10 min/mode Duration:  Total aerobic exercises = 25-35 min     12min/mode Duration:  Total aerobic exercises = 30-45 min     15 min/mode Duration:  Total aerobic exercises = ** min     **min/mode Duration:  Total erobic exercise =  60-90 min   Intensity:   MET Level = 1.7-1.8  RPE = 12-15 Intensity:  Max MET Level = 2.0  RPE = 12-15 Intensity:  Max MET Level =  2.0  RPE = 12-15 Intensity:  Max MET Level = 2.2  RPE = 12-15 Intensity:  Max MET Level = **  RPE = 12-15 Intensity:  Max MET Level = ** RPE = 12-15   Progression: increase aerobic activity up to 15 min over next 4 weeks by increasing time 2-3 min/week.  Progression:  Increase duration 12-15 min over the next 4 weeks as tolerated Progression: Progress as tolerated once patient returns   Progression:  Progress as tolerated. Progression: Progression:  Increase time/intensity when RPE <13, and HR is in [facility]   [x] Yes      [] No  Upper and Lower body strength training 2x/wk    Wt: 2#       Reps:  8-15    *Increase wt. after completing 15 reps with an RPE of <12/13. [x] Yes      [] No  Upper and Lower body strength training 2x/wk    Wt: 3#       Reps:  8-15    *Increase wt. after completing 15 reps with an RPE of <12/13. [x] Yes      [] No  Upper and Lower body strength training 2x/wk    Wt: 3#       Reps:  8-15    *Increase wt. after completing 15 reps with an RPE of <12/13. [x] Yes      [] No  Upper and Lower body strength training 2x/wk    Wt: 3#       Reps:  8-15    *Increase wt. after completing 15 reps with an RPE of <12/13. [] Yes      [] No  Upper and Lower body strength training 2x/wk    Wt: **#       Reps:  8-15    *Increase wt. after completing 15 reps with an RPE of <12/13. Continue Strength Training at home   [] Exercise Log & Strength training handout given     Wt: **#       Reps:  8-15    *Increase wt. after completing 15 reps with an RPE of <12/13. Flexibility Flexibility Flexibility Flexibility Flexibility Flexibility   [x] Yes      [] No  25 min session of Core Strength & Flexibility 1x/per week  Attends Core Strength & Flexibility   [x] Yes      [] No Attends Core Strength & Flexibility   [x] Yes      [] No Attends Core Strength & Flexibility   [x] Yes      [] No Attends Core Strength & Flexibility   [] Yes      [] No Continue Core Strength & Flexibility at home   Home Exercise  *Diony verbalizes planning to walk 3-4 days/week for 20 min on days not in rehab. Home Exercise  *Diony verbalizes planning to walk 3-4 days/week for 30 min on days not in rehab.  Home Exercise  *Diony verbalizes planning to walk 3-4 days/week for 30 min on days not in rehab. Home Exercise  *Diony verbalizes planning to walk 3-4 days/week for 30 min on days not in rehab. Home Exercise  *Diony verbalizes planning to ** ** days/week for ** min on days not in rehab. Home Exercise  *Gildardo [place] his/her plan to ** ** days/week for ** min @ **   *Education* *Education* *Education* *Education* *Education* *Education*   RPE Scale  [x] Yes      [] No  Exercise Safety  [x] Yes      [] No  Equipment Orientation  [x] Yes      [] No  S/S to Report  [x] Yes      [] No  Warm Up/Cool Down  [x] Yes      [] No  Home Exercise  [x] Yes      [] No     All Exercise Education Completed  [] Yes      [] No   Exercise Education Recommended    Workshops  [x] Improving Performance  [x] Balance Training & Fall Prevention  [x] Intro to Yoga - Video  [x] Resistance Training & Core Strength Exercise Education Attended/Date  3/8/21  Resistance Training & Core Strength Exercise Education Attended/Date    3/29/21  Improving Performance Exercise Education Attended/Date      5/10/21  Heart Disease Risk Reduction   Exercise Education Attended/Date All Sessions Completed?     [] Yes  [] No   *Goals* *Goals* *Goals* *Goals* *Goals* *Goals*   Initial Exercise Goals Exercise Goals  Exercise Goals   Exercise Goals  Exercise Goals  Exercise Goals   Diony plans to:  [x] Attend exercise sessions 3x/wk  [x] initiate home exercise 2-3x/wk for 10-20 min  [x] Increase 6 min walk distance by 10%  [x] Attend Exercise workshops Mi Fuentes plans to:  [x] Attend exercise sessions 3x/wk  [x] continue home exercise 2-3x/wk for 20-30 min  [x] Attend Exercise workshops Diony's plans to:  [x] Attend exercise sessions 3x/wk  [x] continue home exercise 3-4x/wk for 30-45 min  [x] Determine plan of exercise following rehab  [x] Attend Exercise workshops Diony's plans to:  [x] Attend exercise sessions 3x/wk  [x] continue home exercise 3-4x/wk for 30-45 min  [x] Determine plan of exercise following rehab  [x] Attend Exercise workshops Diony's plans to:  [x] Attend exercise sessions 3x/wk  [x] continue home exercise 3-4x/wk for 30-45 min  [x] Determine plan of exercise following rehab  [x] Attend Exercise workshops Nano Chew achieved exercise goals? []  Yes    [] No  If no, why?  **  [] Increased 6 min walk distance by 10%  [] Currently exercising 30-60 min/day, 5-7days/wk   [] Plans to continue exercise on own  [] Plans to join a local fitness center to continue exercise  [] Does not plan to continue to exercise after rehab   Return to ADL or Hobbies:  Nano Chew would like to improve strength and endurance so he is able to return to going to car shows, fair Return to ADL or Hobbies:  Nano Chew would like to improve strength and endurance so he is able to return to work and car shows Return to ADL or Hobbies:  Nano Chew would like to improve strength and endurance so he/she is able to return to ** Return to ADL or Hobbies:  Nano Chew is progressing. Back at work. Return to ADL or Hobbies:  Nano Chew would like to improve strength and endurance so he/she is able to return to ** Return to ADL or Hobbies:  Nano Chew would like to improve strength and endurance so he/she is able to return to **    *MET level required for above goal:  5-6 METs MET level Achieved:  2METs MET level Achieved: 2. 0METs MET level Achieved: 2.2 METs MET level Achieved:  **METs MET level Achieved:  **METs     Individual Cardiac Treatment Plan - Nutrition  NUTRITION  ASSESSMENT/PLAN NUTRITION  REASSESSMENT NUTRITION   REASSESSMENT NUTRITION   REASSESSMENT NUTRITION  DISCHARGE/FOLLOW-UP NUTRITION  DISCHARGE/FOLLOW-UP   Stages of Change Stages of Change Stages of Change Stages of Change Stages of Change Stages of Change   [] Pre Contemplation  [x] Contemplation  [] Preparation  [] Action  [] Maintenance  [] Relapse [] Pre Contemplation  [x] Contemplation  [] Preparation  [] Action  [] Maintenance  [] Relapse [] Pre Contemplation  [x] Contemplation  [] Preparation  [] Action  [] Maintenance  [] Relapse [] Pre Contemplation  [x] Contemplation  [] Preparation  [] Action  [] Maintenance  [] Relapse [] Pre Contemplation  [] Contemplation  [] Preparation  [] Action  [] Maintenance  [] Relapse [] Pre Contemplation  [] Contemplation  [] Preparation  [] Action  [] Maintenance  [] Relapse   NUTRITION ASSESSMENT NUTRITION ASSESSMENT NUTRITION ASSESSMENT NUTRITION ASSESSMENT NUTRITION ASSESSMENT NUTRITION ASSESSMENT   Weight Management  Weight: 146.8        Height: 68\"   BMI: 22  Weight Management  Weight: 152.6                Weight Management  Weight: 155.4                 Weight Management  Weight: 159.6 Weight Management  Weight: ** Weight Management  Weight: **                    BMI: **   Eating Plan  Current eating habits: low salt Eating Plan  Changes: low sodium Eating Plan  Changes: unknown Eating Plan  Changes: n/a Eating Plan  Changes: Eating Plan Improvements:   Alcohol Use  [x] none          [] daily  [] weekly      [] special           Diet Assessment Tool:  RATE YOUR PLATE  *Given to patient to complete and return. Diet Assessment Tool:    Score: 58/69        Diet Assessment Tool: RATE YOUR PLATE  Score: **/82   NUTRITION PLAN NUTRITION PLAN NUTRITION PLAN NUTRITION PLAN NUTRITION PLAN NUTRITION PLAN   *Interventions* *Interventions* *Interventions* *Interventions* *Interventions* *Interventions*   Initial Survey given Goal Setting Discussion:   [x] Yes      [] No        Follow Up Survey Reviewed & Goals Updated:     Professional Referral  Please check if needed. [] Dietitian Consult   [] Wt. Management Referral  [] Other:  Professional Referral  Please check if needed. [] Dietitian Consult   [] Wt. Management Referral  [] Other: Professional Referral  Please check if needed. [] Dietitian Consult   [] Wt. Management Referral  [] Other: Professional Referral  Please check if needed. [] Dietitian Consult   [] Wt.  Management Referral  [] Other: Professional Referral  Please check if Attend 1:1   [x] Attend Cooking Classes   Diony's nutritional goals are as follows:  [] Attend Nutrition Workshops  [] Attend 1:1   [] Attend Cooking Classes  [] ** Diony achieved nutritional goals   [] Yes    [] No  If no, why? Use knowledge gained to continue Pritikin eating plan at home       Individual Cardiac Treatment Plan - Psychosocial  PSYCHOSOCIAL  ASSESSMENT/PLAN PSYCHOSOCIAL  REASSESSMENT PSYCHOSOCIAL   REASSESSMENT PSYCHOSOCIAL   REASSESSMENT PSYCHOSOCIAL  DISCHARGE/FOLLOW-UP PSYCHOSOCIAL  DISCHARGE/FOLLOW-UP   Stages of Change Stages of Change Stages of Change Stages of Change Stages of Change Stages of Change   [] Pre Contemplation  [x] Contemplation  [] Preparation  [] Action  [] Maintenance  [] Relapse [] Pre Contemplation  [x] Contemplation  [] Preparation  [] Action  [] Maintenance  [] Relapse [] Pre Contemplation  [x] Contemplation  [] Preparation  [] Action  [] Maintenance  [] Relapse [] Pre Contemplation  [x] Contemplation  [] Preparation  [] Action  [] Maintenance  [] Relapse [] Pre Contemplation  [] Contemplation  [] Preparation  [] Action  [] Maintenance  [] Relapse [] Pre Contemplation  [] Contemplation  [] Preparation  [] Action  [] Maintenance  [] Relapse   PSYCHOSOCIAL ASSESSMENT PSYCHOSOCIAL ASSESSMENT PSYCHOSOCIAL ASSESSMENT PSYCHOSOCIAL ASSESSMENT PSYCHOSOCIAL ASSESSMENT PSYCHOSOCIAL ASSESSMENT   Behavioral Outcomes Behavioral Outcomes Behavioral Outcomes Behavioral Outcomes Behavioral Outcomes Behavioral Outcomes   Tool Used:  Vikki & Candelario, Quality of Life Index, Cardiac Version IV  *Given to patient to complete.  Tool Used:    Vikki & Candelario, Quality of Life Index, Cardiac Version IV     QOL Index Score: **  HF:**  S&E:**  P&S: **  Family: **   Tool Used:     Vikki & Andree, Quality of Life Index, Cardiac Version IV    QOL Index Score: **  HF:**  S&E:**  P&S: **  Family: ** Tool Used:     Vikki & Candelario, Quality of Life Index, Cardiac Version IV    QOL Index Score: **  HF:**  S&E:**  P&S: **  Family: **   PHQ-9 score 4  Depression Severity  [x]Minimal  []Mild   []Moderate  []Moderately Severe  []Severe    PHQ-9 score **  Depression Severity  []Minimal  []Mild   []Moderate  []Moderately Severe []Severe PHQ-9 score **  Depression Severity  []Minimal  []Mild   []Moderate  []Moderately Severe []Severe   Does patient have Family Support? [x] Yes      [] No  No signs of marital/family distress        Within the Past Month:  *Have you wished you were dead or wished you could go to sleep and not wake up? [] Yes      [x] No  *Have you had any thoughts of killing yourself? [] Yes      [x] No          Using a scale of 0-10, 0=none, 10=very:   Rate your depression: 5  Rate your anxiety:  4  Using a scale of 0-10, 0=none, 10=very:   Rate your depression: 5  Rate your anxiety:  7 Using a scale of 0-10, 0=none, 10=very:   Rate your depression: na  Rate your anxiety: na Using a scale of 0-10, 0=none, 10=very:   Rate your depression: na  Rate your anxiety:  na Using a scale of 0-10, 0=none, 10=very:   Rate your depression: **  Rate your anxiety:  ** Using a scale of 0-10, 0=none, 10=very:   Rate your depression: **  Rate your anxiety:  **   Signs and Symptoms of Depression Present? [x] Yes      [] No  If yes, please explain: Pt states he has been depressed but it is getting better since he is able to do things now. Signs and Symptoms of Depression Present? [x] Yes      [] No  If yes, please explain:  Upset about his current heart complications Signs and Symptoms of Depression Present? [x] Yes      [] No  If yes, please explain:  See previous Signs and Symptoms of Depression Present? [x] Yes      [] No  If yes, please explain: Will update next assessment. Signs and Symptoms of Depression Present? [] Yes      [] No  If yes, please explain:  ** Signs and Symptoms of Depression Present? [] Yes      [] No  If yes, please explain:  **   Signs and Symptoms of Anxiety Present? [x] Yes      [] No  If yes, please explain:  See above Signs and Symptoms of Anxiety Present? [x] Yes      [] No  If yes, please explain:  See above Signs and Symptoms of Anxiety Present? [x] Yes      [] No  If yes, please explain:  See previous Signs and Symptoms of Anxiety Present? [x] Yes      [] No  If yes, please explain: Will update next assessment. Signs and Symptoms of Anxiety Present? [] Yes      [] No  If yes, please explain:  ** Signs and Symptoms of Anxiety Present? [] Yes      [] No  If yes, please explain:  **   [] Patient has poor eye contact   [x] Flat affect present. [x] Signs of anxiety, but no anger or hostility    [] Signs social isolation present. []  Signs of alcohol or substance abuse        PSYCHOSOCIAL PLAN PSYCHOSOCIAL PLAN PSYCHOSOCIAL PLAN PSYCHOSOCIAL PLAN PSYCHOSOCIAL PLAN PSYCHOSOCIAL PLAN   *Interventions* *Interventions* *Interventions* *Interventions* *Interventions* *Interventions*   *Please check if needed  [] Psych Consult  [x] Physician Referral- suggested discussing depression and anxiety with PCP  [x] Stress Management Skills *Please check if needed  [] Psych Consult  [x] Physician Referral- encouraged Abbie Stokes to talk to his PCP about depression and anxiety  [x] Stress Management Skills *Please check if needed  [] Psych Consult  [x] Physician Referral  [x] Stress Management Skills *Please check if needed  [] Psych Consult  [x] Physician Referral - Will update next referral  [x] Stress Management Skills *Please check if needed  [] Psych Consult  [] Physician Referral  [] Stress Management Skills *Please check if needed  [] Psych Consult  [] Physician Referral  [] Stress Management Skills   Is patient currently taking anti-depressant or anti-anxiety medications? [] Yes      [x] No   Change in anti-depressant or anti-anxiety medications? [] Yes      [x] No   Change in anti-depressant or anti-anxiety medications?   [] Yes      [x] No   Change in anti-depressant or anti-anxiety medications? [] Yes      [x] No   Change in anti-depressant or anti-anxiety medications? [] Yes      [] No  If yes, please list medications:  ** Change in anti-depressant or anti-anxiety medications? [] Yes      [] No  If yes, please list medications:  **   *Education* *Education* *Education* *Education* *Education* *Education*   Healthy Mind-Set Workshops Recommended  [x] Stress & Health  [x] Taking Charge of Stress  [x] New Thoughts, New Behaviors  [x] Managing Moods & Relationships Healthy Mind-Set Workshops Attended/Date  3/3/21New Thoughts, New Behaviors Healthy Mind-Set Workshops Attended/Date    3/24/21  Managing Moods Healthy Mind-Set Workshops Attended/Date    5/3/21  Taking Charge of Stress Healthy Mind-Set Workshops  Completed  [] Yes      [] No Healthy Mind-Set Workshops  Completed  [] Yes      [] No   *Goals* *Goals* *Goals* *Goals* *Goals* *Goals*   Diony's psychosocial goals are as follows:  Complete HADS & Vikki & Andree, Quality of Life Index, Cardiac Version IV Diony's psychosocial goals are as follows:  [x] Attend Healthy Mind-Set Workshops  [x] Reduce depression symptom severity by 1 level   Diony's psychosocial goals are as follows:  [x] Attend Healthy Mind-Set Workshops  [x] Reduce depression symptom severity by 1 level   Diony's psychosocial goals are as follows:  [x] Attend Healthy Mind-Set Workshops  [x] Reduce depression symptom severity by 1 level   Naomie Up achieved psychosocial goals? [] Yes    [] No  If no, why?  **  [] Use methods learned to continue to reduce depression symptom severity by 1 level  [] ** Diony achieved psychosocial goals?   [] Yes    [] No  If no, why?  **  [] Use methods learned to continue to reduce depression symptom severity by 1 level  [] **     Individual Cardiac Treatment Plan - Other:  Risk Factor/Education  RISK FACTOR  ASSESSMENT/PLAN RISK FACTOR  REASSESSMENT  RISK FACTOR  REASSESSMENT RISK FACTOR  REASSESSMENT RISK FACTOR   DISCHARGE/FOLLOW-UP Diabetes  na   Diabetes  NA     Diabetes  [x] No     Diabetes  Most Recent BS:  BS have been in range  [] Yes      [] No  Medication Changes  [] Yes      [] No Diabetes  Most Recent BS:  BS have been in range  [] Yes      [] No  Medication Changes  [] Yes      [] No       Tobacco Use  [] Current  [x] Former  [] Never    Years smoked: 35-40:    Date Quit: 1/6/21    # cigarettes smoked/day: 1  Smokeless Tobacco use:   [] Yes      [x] No   Tobacco Use  Change in smoking status   [] Yes      [x] No       Tobacco Use  Change in smoking status   [] Yes      [x] No       Tobacco Use  Change in smoking status   [] Yes      [x] No     Tobacco Use  Change in smoking status   [] Yes      [] No    Quit date: ** Tobacco Use  Change in smoking status   [] Yes      [] No    Quit date: **             Learning Barriers  Please select one:  [] Speech  [] Literacy  [] Hearing  [] Cognitive  [] Vision  [x] Ready to Learn Learning Barriers Addressed:   [] Yes      [] No  na Learning Barriers Addressed:   [x] Yes      [] No   Learning Barriers Addressed:  [x] Yes      [] No Learning Barriers Addressed:  [] Yes      [] No Learning Barriers Addressed:  [] Yes      [] No     RISK FACTOR/EDUCATION PLAN RISK FACTOR/EDUCATION PLAN RISK FACTOR/EDUCATION PLAN RISK FACTOR/EDUCATION PLAN RISK FACTOR/EDUCATION PLAN RISK FACTOR/EDUCATION PLAN   *Interventions* *Interventions* *Interventions* *Interventions* *Interventions* *Interventions*   Recommended Educational Videos    [x] Overview of The Pritikin Eating Plan  [x] Heart Disease Risk Reduction  [x] Move It!   [x] Calorie Density  [x] Healthy Minds, Bodies, Hearts  [x] Label Reading  [x] Metabolic Syndrome & Belly   Or  [] How Our Thoughts Can Heal our Heart  [x] Dining Out-Part 1  [x] Biomechanical Limitations  [x] Facts on Fat  [x] Hypertension & Heart Disease  [x] Diseases of Our Times-Focusing on Diabetes  [x] Body Composition  [x] Nurtition Action Plan  [x] Exercise Action Plan   Completed Videos/Date      2/23/21  Overview of The Jannette Eating Plan    3/1/21  Heart Disease Risk Reduction    3/15/21   Move It! Completed Videos/Date    NA Completed Videos/Date      5/3/21  Healthy minds, bodies, hearts    4/26/21  Dining out part 1 Completed Videos/Date Recommended Educational Videos Completed    [] Yes      [] No    **If not completed, Why? **           Smoking Cessation/Relaspe Prevention Intervention needed? [x] Yes      [] No  *Pt verbalizes and agrees to attend intervention Smoking Cessation/Relapse Prevention Scheduled? [] Yes      [x] No   Smoking Cessation/Relapse Prevention completed? [] Yes      [x] No   Smoking Cessation/Relapse Prevention completed? [] Yes      [x] No   Smoking Cessation/Relapse Prevention completed? [] Yes      [] No  Date: ** Smoking Cessation Counseling attended  [] Yes      [] No  **If not completed, Why? **   Professional Referrals:  *Please check if needed  [] Diabetes Clinic  [] Lipid Clinic   [] Other:      Professional Referrals:  *Please check if needed  [] Diabetes Clinic  [] Lipid Clinic   [] Other:   Preventative Medication Preventative Medication Preventative Medication Preventative Medication Preventative Medication Preventative Medication   Aspirin  [x] Yes    [] No  Blood Thinner: Clopidogrel/Effient/Brillinta  [x] Yes    [] No  Beta Blocker  [x] Yes    [] No  Ace Inhibitor  [] Yes    [x] No  Statin/Lipid Lowering  [x] Yes    [] No Medication Changes? [] Yes    [x] No Medication Changes? Unknown Medication Changes? [] Yes    [x] No Medication Changes? [] Yes    [] No Medication Changes? [] Yes    [] No   *Education* *Education* *Education* *Education* *Education* *Education*   Does Navin Galdamez require any additional education? [x] Yes    [] No   Does Navin Rudolph require any additional education? [] Yes    [x] No Does Navin Rudolph require any additional education? [x] Yes    [] No Does Navin Rudolph require any additional education?   [x] Yes    [] No Does Navin Rudolph require any additional education? [] Yes    [] No Does Dahlia Dodson require any additional education?   [] Yes    [] No   Additional Educational Videos    Exercise  [] Improve Performance    Medical  [] Aging Enhancing Your QoL  [] Biology of Weight Control  [] Decoding Lab Results  [] Diseases of Our Time - Overview  [] Sleep Disorders    Nutrition  [] Dining Out - Part 2  [] Fueling a Healthy Body  [] Menu Workshop  [] Planning Your Eating Strategy  [] Targeting Your Nutrition Priorities  [] Vitamins & Minerals    Healthy Mind-Set  [x] Smoking Cessation    Culinary  []Becoming a Pritikin    [] Cooking - Breakfast & Snacks  [] Cooking -Healhty Salads & Dressing  [] Cooking -Dinner & Sides  [] Cooking -Soups & Desserts    Overview  [] The Pritikin Solution Additional Educational Videos Completed     Additional Educational Videos Completed Additional Educational Videos Completed Additional Educational Videos Completed Additional Educational Videos Completed    [] Yes    [] No   *Goals* *Goals* *Goals* *Goals* *Goals* *Goals*   Diony's risk factor/education goals are as follows:    [x] Optimal BP <140/90  [] Blood Sugar <120  [x] Attend recommended video education sessions  [x] Takes medications as prescribed 100% of the time   [] ** Diony's risk factor/education goals are as follows:    [x] Optimal BP <140/90  [] Blood Sugar <120  [x] Attend recommended video education sessions  [x] Takes medications as prescribed 100% of the time   [] ** Diony's risk factor/education goals are as follows:    [x] Optimal BP <140/90  [] Blood Sugar <120  [x] Attend recommended video education sessions  [x] Takes medications as prescribed 100% of the time   [] ** Diony's risk factor/education goals are as follows:    [x] Optimal BP <140/90  [] Blood Sugar <120  [x] Attend recommended video education sessions  [x] Takes medications as prescribed 100% of the time    Diony's risk factor/education goals are as follows:    [x] Optimal BP <140/90  [] Blood Sugar <120  [x] Attend recommended video education sessions  [x] Takes medications as prescribed 100% of the time   [] ** Diony achieved risk factor goals? [] Yes    [] No  If no, why?  **     Monitored telemetry has revealed Sinus Ericka/ NSR   Monitored telemetry has revealed Sinus Ericka/ NSR/At-fib   Monitored telemetry has revealed Crystal Flurry, NSR w/ PVCs and PACs, At.  Fib  [] documented arrhythmia at increasing workloads  [] associated symptoms  Monitored telemetry has revealed NSR, sinus ericka, PVCs    [] documented arrhythmia at increasing workloads  [] associated symptoms  Monitored telemetry has revealed **  [] documented arrhythmia at increasing workloads  [] associated symptoms ** Monitored telemetry has revealed **  [] documented arrhythmia at increasing workloads  [] associated symptoms **   Physician Response    [x] Cardiac rehab is reasonably and medically necessary for continuous cardiac monitoring surveillance  of patient's cardiac activity  [x] Initiate continuous telemetry monitoring and notify me with any concerns  [] Other   Physician Response    [x] Cardiac rehab is reasonably and medically necessary for continuous cardiac monitoring surveillance  of patient's cardiac activity  [x] Continue continuous telemetry monitoring and notify me with any concerns  [] Other     Physician Response    [x] Cardiac rehab is reasonably and medically necessary for continuous cardiac monitoring surveillance  of patient's cardiac activity  [x] Continue continuous telemetry monitoring and notify me with any concerns   [] Other     Physician Response    [x] Cardiac rehab is reasonably and medically necessary for continuous cardiac monitoring surveillance  of patient's cardiac activity  [x] Continue continuous telemetry monitoring and notify me with any concerns   [] Other     Physician Response    [x] Cardiac rehab is reasonably and medically necessary for continuous cardiac monitoring surveillance  of patient's cardiac activity  [x] Continue continuous telemetry monitoring and notify me with any concerns   [] Other      Cosigned by: Ninfa Mckinnon MD at 2/24/2021  2:35 PM   Revision History  Date/Time User Provider Type Action   2/24/2021  2:35 PM Ninfa Mckinnon MD Physician Cosign   2/23/2021  1:34 PM [address] [facility] Physiologist Sign       Cosigned by: Ninfa Mckinnon MD at 3/17/2021  3:07 PM   Revision History  Date/Time User Provider Type Action   3/17/2021  3:07 PM Ninfa Mckinnon MD Physician Cosign   3/17/2021  2:35 PM [address] Exercise Physiologist Sign     Cosigned by: Ninfa Mckinnon MD at 4/15/2021 11:16 AM   Revision History  Date/Time User Provider Type Action   4/15/2021 11:16 AM Ninfa Mckinnon MD Physician Cosign   4/14/2021  8:48 AM Candy Mays Exercise Physiologist Sign   4/14/2021  8:48 AM Candy Mays Exercise Physiologist Sign    View Details Report

## 2021-05-13 LAB
FECAL FAT, QUALITATIVE: NORMAL
PANCREATIC ELASTASE, FECAL: 332 UG/G

## 2021-05-14 ENCOUNTER — HOSPITAL ENCOUNTER (OUTPATIENT)
Dept: CARDIAC REHAB | Age: 71
Setting detail: THERAPIES SERIES
Discharge: HOME | End: 2021-05-14
Payer: MEDICARE

## 2021-05-14 PROCEDURE — G0423 INTENS CARDIAC REHAB NO EXER: HCPCS

## 2021-05-14 PROCEDURE — G0422 INTENS CARDIAC REHAB W/EXERC: HCPCS

## 2021-05-14 NOTE — PROGRESS NOTES
Elsa SCHUSTER.:  1950  Acct Number: [de-identified]  MRN:  [id number]                  [facility] COOKING SCHOOL WORKSHOP             Date: 2021        Session # ________   Kenrick Rosas class covered:      () Adding Flavor     () Fast Breakfasts     () Salads and Dressings     (x) Savory Soups      () Sauces & Simple Sides     () Appetizers and Snacks     () Delicious Desserts     () Plant Based Proteins     () Fast Evening Meals     () Weekend Breakfasts     () Efficiency Cooking     () One Pot Wonders     Patients were shown how to choose, prep, and cook; substitutions and other options were given. Samples were offered. Recipes were given and questions answered. The patient above was in the [employer] for 35 minutes.       Electronically signed by Ciro Velasco on 2021 at 10:44 AM

## 2021-05-17 ENCOUNTER — HOSPITAL ENCOUNTER (OUTPATIENT)
Dept: CARDIAC REHAB | Age: 71
Setting detail: THERAPIES SERIES
Discharge: HOME | End: 2021-05-17
Payer: MEDICARE

## 2021-05-17 PROCEDURE — G0423 INTENS CARDIAC REHAB NO EXER: HCPCS

## 2021-05-17 PROCEDURE — G0422 INTENS CARDIAC REHAB W/EXERC: HCPCS

## 2021-05-17 NOTE — PROGRESS NOTES
Hospital Facility-Based Program  Phase 2 Cardiac Rehab Weekly Progress Report      Patient prescribed exercise:  9:00 class. 3 times per week in rehab, 1-4 times per week at home for the amount of sessions/weeks specified by insurance. Current Levels: Treadmill: 1. 4mph/0% for 15 minutes,  NuStep:  39 Cagle for 15 minutes, UBE: Level 22W for 5 minutes. Progression Discussion: Maintain/Increase Aerobic exercise 15 minutes to work on endurance. Attempt to increase intensity by 5-20% for each modality this week. Try to increase intensities until Ita Cuenca rates the exercises a 13-17 on Daniel RPE.

## 2021-05-18 ENCOUNTER — OFFICE VISIT (OUTPATIENT)
Dept: FAMILY MEDICINE CLINIC | Age: 71
End: 2021-05-18

## 2021-05-18 VITALS
SYSTOLIC BLOOD PRESSURE: 126 MMHG | RESPIRATION RATE: 12 BRPM | BODY MASS INDEX: 24 KG/M2 | HEIGHT: 68 IN | HEART RATE: 68 BPM | DIASTOLIC BLOOD PRESSURE: 74 MMHG | WEIGHT: 158.38 LBS | TEMPERATURE: 97 F

## 2021-05-18 DIAGNOSIS — I48.91 ATRIAL FIBRILLATION, UNSPECIFIED TYPE (HCC): ICD-10-CM

## 2021-05-18 DIAGNOSIS — J44.9 COPD, MILD (HCC): Primary | ICD-10-CM

## 2021-05-18 DIAGNOSIS — F17.200 SMOKER: ICD-10-CM

## 2021-05-18 DIAGNOSIS — I49.5 TACHYCARDIA-BRADYCARDIA (HCC): ICD-10-CM

## 2021-05-18 DIAGNOSIS — I10 ESSENTIAL HYPERTENSION: ICD-10-CM

## 2021-05-18 DIAGNOSIS — Z95.0 PACEMAKER: ICD-10-CM

## 2021-05-18 DIAGNOSIS — E78.00 PURE HYPERCHOLESTEROLEMIA: ICD-10-CM

## 2021-05-18 DIAGNOSIS — Z95.1 S/P CABG X 3: ICD-10-CM

## 2021-05-18 DIAGNOSIS — I25.5 ISCHEMIC CARDIOMYOPATHY: ICD-10-CM

## 2021-05-18 DIAGNOSIS — K52.831 COLLAGENOUS COLITIS: ICD-10-CM

## 2021-05-18 PROCEDURE — G8420 CALC BMI NORM PARAMETERS: HCPCS | Performed by: FAMILY MEDICINE

## 2021-05-18 PROCEDURE — 4040F PNEUMOC VAC/ADMIN/RCVD: CPT | Performed by: FAMILY MEDICINE

## 2021-05-18 PROCEDURE — 3017F COLORECTAL CA SCREEN DOC REV: CPT | Performed by: FAMILY MEDICINE

## 2021-05-18 PROCEDURE — 1123F ACP DISCUSS/DSCN MKR DOCD: CPT | Performed by: FAMILY MEDICINE

## 2021-05-18 PROCEDURE — G8427 DOCREV CUR MEDS BY ELIG CLIN: HCPCS | Performed by: FAMILY MEDICINE

## 2021-05-18 PROCEDURE — 99213 OFFICE O/P EST LOW 20 MIN: CPT | Performed by: FAMILY MEDICINE

## 2021-05-18 ASSESSMENT — ENCOUNTER SYMPTOMS
EYE PAIN: 0
BLOOD IN STOOL: 0
SORE THROAT: 0
CONSTIPATION: 0
COUGH: 0
CHEST TIGHTNESS: 0
BACK PAIN: 0
SHORTNESS OF BREATH: 0
NAUSEA: 0
TROUBLE SWALLOWING: 0
ABDOMINAL PAIN: 0
ORTHOPNEA: 0

## 2021-05-18 NOTE — PROGRESS NOTES
Subjective:      Patient ID: Eli eRyes is a 79 y.o. male. ashd  Stable       Copd   Stable       Atrial  Fib   Stable       cardiomyopathy       Pacer  Stable               Hypertension  This is a chronic problem. The current episode started more than 1 year ago. The problem has been resolved since onset. The problem is controlled. Pertinent negatives include no chest pain, headaches, orthopnea, palpitations, peripheral edema or shortness of breath. The current treatment provides significant improvement. There are no compliance problems. Past Medical History:   Diagnosis Date    Asthma     [employer] dual pacemaker  4/15/2021    Collagenous colitis 2014       repeat  in  2024    Colon polyps 2000    COPD, mild ([address]     Eczema of hand     HTN (hypertension)     Hyperlipidemia     Smoker      Review of Systems   Constitutional: Negative for fatigue and fever. HENT: Negative for congestion, ear pain, postnasal drip, sore throat and trouble swallowing. Eyes: Negative for pain. Respiratory: Negative for cough, chest tightness and shortness of breath. Cardiovascular: Negative for chest pain, palpitations, orthopnea and leg swelling. Gastrointestinal: Negative for abdominal pain, blood in stool, constipation and nausea. Genitourinary: Negative for difficulty urinating, frequency and urgency. Musculoskeletal: Negative for arthralgias, back pain, joint swelling and neck stiffness. Skin: Negative for rash. Neurological: Negative for dizziness, weakness and headaches. Hematological: Negative for adenopathy. Does not bruise/bleed easily. Psychiatric/Behavioral: Negative for behavioral problems, dysphoric mood and sleep disturbance.      /74 (Site: Right Upper Arm, Position: Sitting, Cuff Size: Medium Adult)   Pulse 68   Temp 97 °F (36.1 °C) (Infrared)   Resp 12   Ht 5' 8\" (1.727 m)   Wt 158 lb 6 oz (71.8 kg)   BMI 24.08 kg/m²   Objective:   Physical Exam Vitals and nursing note reviewed. Constitutional:       Appearance: He is well-developed. HENT:      Head: Normocephalic and atraumatic. Right Ear: External ear normal.      Left Ear: External ear normal.      Nose: Nose normal.   Eyes:      Conjunctiva/sclera: Conjunctivae normal.      Pupils: Pupils are equal, round, and reactive to light. Comments: Fundi nl   Neck:      Thyroid: No thyromegaly. Cardiovascular:      Rate and Rhythm: Normal rate. Rhythm irregular. Heart sounds: Normal heart sounds. Comments:   Regular  To  Irregular    Rate   Pulmonary:      Effort: Pulmonary effort is normal.      Breath sounds: Normal breath sounds. No wheezing or rales. Abdominal:      General: Bowel sounds are normal.      Palpations: Abdomen is soft. There is no mass. Tenderness: There is no abdominal tenderness. Musculoskeletal:         General: Normal range of motion. Cervical back: Normal range of motion and neck supple. Lymphadenopathy:      Cervical: No cervical adenopathy. Skin:     General: Skin is warm and dry. Findings: No rash. Neurological:      Mental Status: He is alert and oriented to person, place, and time. Cranial Nerves: No cranial nerve deficit. Deep Tendon Reflexes: Reflexes are normal and symmetric. Assessment:       Diagnosis Orders   1. COPD, mild ([address]     2. Collagenous colitis     3. Atrial fibrillation, unspecified type ([address]     4. [employer] dual pacemaker      5. Essential hypertension     6. Pure hypercholesterolemia     7. Ischemic cardiomyopathy     8. S/P CABG x 3     9. Tachycardia-bradycardia ([address]     10.  Smoker           Plan:      Current Outpatient Medications   Medication Sig Dispense Refill    metoprolol succinate (TOPROL XL) 100 MG extended release tablet Take 1 tablet by mouth daily 90 tablet 1    sacubitril-valsartan (ENTRESTO) 24-26 MG per tablet Take 1 tablet by mouth 2 times daily 60 tablet 5    rOPINIRole (REQUIP) 0.5 MG tablet TAKE 1 TABLET BY MOUTH EVERY NIGHT 90 tablet 1    apixaban (ELIQUIS) 5 MG TABS tablet Take 1 tablet by mouth 2 times daily 180 tablet 3    atorvastatin (LIPITOR) 40 MG tablet Take 1 tablet by mouth nightly 90 tablet 3    potassium chloride (KLOR-CON M) 10 MEQ extended release tablet Take 1 tablet by mouth daily 180 tablet 3    furosemide (LASIX) 20 MG tablet 20 mg daily except M-W-F 40 mg daily 180 tablet 3    aspirin 81 MG chewable tablet Take 1 tablet by mouth daily 30 tablet 3    albuterol sulfate  (90 Base) MCG/ACT inhaler Inhale 2 puffs into the lungs every 6 hours as needed for Wheezing 1 Inhaler 4    umeclidinium-vilanterol (ANORO ELLIPTA) 62.5-25 MCG/INH AEPB inhaler Inhale 1 puff into the lungs daily 1 each 3    fluocinonide (LIDEX) 0.05 % ointment Apply topically as needed      loratadine (CLARITIN) 10 MG tablet Take 10 mg by mouth daily       Multiple Vitamins-Minerals (CENTRUM SILVER PO) Take 1 tablet by mouth daily        No current facility-administered medications for this visit. See in  3  mths     No results found for this visit on 05/18/21.     Casper Doyle MD

## 2021-05-19 ENCOUNTER — HOSPITAL ENCOUNTER (OUTPATIENT)
Dept: CARDIAC REHAB | Age: 71
Setting detail: THERAPIES SERIES
Discharge: HOME | End: 2021-05-19
Payer: MEDICARE

## 2021-05-19 ENCOUNTER — OFFICE VISIT (OUTPATIENT)
Dept: CARDIOLOGY CLINIC | Age: 71
End: 2021-05-19
Payer: MEDICARE

## 2021-05-19 VITALS
DIASTOLIC BLOOD PRESSURE: 60 MMHG | HEART RATE: 60 BPM | BODY MASS INDEX: 24.17 KG/M2 | SYSTOLIC BLOOD PRESSURE: 122 MMHG | WEIGHT: 159.5 LBS | HEIGHT: 68 IN

## 2021-05-19 DIAGNOSIS — Z95.1 S/P CABG X 3: ICD-10-CM

## 2021-05-19 DIAGNOSIS — I48.91 ATRIAL FIBRILLATION, UNSPECIFIED TYPE (HCC): Primary | ICD-10-CM

## 2021-05-19 DIAGNOSIS — Z95.0 PACEMAKER: ICD-10-CM

## 2021-05-19 PROCEDURE — 4004F PT TOBACCO SCREEN RCVD TLK: CPT | Performed by: INTERNAL MEDICINE

## 2021-05-19 PROCEDURE — G0422 INTENS CARDIAC REHAB W/EXERC: HCPCS

## 2021-05-19 PROCEDURE — G8428 CUR MEDS NOT DOCUMENT: HCPCS | Performed by: INTERNAL MEDICINE

## 2021-05-19 PROCEDURE — 1123F ACP DISCUSS/DSCN MKR DOCD: CPT | Performed by: INTERNAL MEDICINE

## 2021-05-19 PROCEDURE — 4040F PNEUMOC VAC/ADMIN/RCVD: CPT | Performed by: INTERNAL MEDICINE

## 2021-05-19 PROCEDURE — G8420 CALC BMI NORM PARAMETERS: HCPCS | Performed by: INTERNAL MEDICINE

## 2021-05-19 PROCEDURE — G0423 INTENS CARDIAC REHAB NO EXER: HCPCS

## 2021-05-19 PROCEDURE — 3017F COLORECTAL CA SCREEN DOC REV: CPT | Performed by: INTERNAL MEDICINE

## 2021-05-19 PROCEDURE — 99213 OFFICE O/P EST LOW 20 MIN: CPT | Performed by: INTERNAL MEDICINE

## 2021-05-19 NOTE — PROGRESS NOTES
Pt here for check up fu pacer check     Pt concerned about the cost of entresto and eliquis      Pt states med list is correct from 5/18/21

## 2021-05-19 NOTE — PROGRESS NOTES
Iva Portillo YOB: 1950    Acct Number: [de-identified]   MRN:  [id number]                             [facility] NUTRITION WORKSHOP             Date: 2021        Session # _______    Dario Lemos class covered:    ()  Label Reading  (X)  Menus  ()  Targeting Nutrition Priorities  ()  Fueling a Healthy Body    Readiness to change:    ( ) Pre-contemplative   ( ) Contemplative - ambivalent about change    ( ) Action - ready to set action plan and implement   (X ) Maintenance - has made change and is trying, and or practicing different alternative behaviors     Fernando Lopez was in the Workshop with the Dietitian for 45 minutes. The content was presented via Powerpoint, lecture, and patient participation based format. Motivational interviewing was utilized when needed, to promote change. Patient voiced understanding.     Electronically signed by Stanford Olivia RD LD [id number] [place]  on 2021 at 12:14 PM

## 2021-05-21 ENCOUNTER — HOSPITAL ENCOUNTER (OUTPATIENT)
Dept: CARDIAC REHAB | Age: 71
Setting detail: THERAPIES SERIES
Discharge: HOME | End: 2021-05-21
Payer: MEDICARE

## 2021-05-21 NOTE — PROGRESS NOTES
Sophy SCHUSTER.:  1950  Acct Number: [de-identified]  MRN:  [id number]                  [facility] COOKING SCHOOL WORKSHOP             Date: 2021        Session # ________   Faye Quispe class covered:      () Adding Flavor     () Fast Breakfasts     () Salads and Dressings     () Savory Soups      (X) Sauces & Simple Sides     () Appetizers and Snacks     () Delicious Desserts     () Plant Based Proteins     () Fast Evening Meals     () Weekend Breakfasts     () Efficiency Cooking     () One Pot Wonders     Patients were shown how to choose, prep, and cook; substitutions and other options were given. Samples were offered. Recipes were given and questions answered. The patient above was in the [employer] for 55 minutes.       Electronically signed by Misael WOODS [address] [place]  on 2021 at 12:36 PM

## 2021-05-24 ENCOUNTER — HOSPITAL ENCOUNTER (OUTPATIENT)
Dept: CARDIAC REHAB | Age: 71
Setting detail: THERAPIES SERIES
Discharge: HOME | End: 2021-05-24
Payer: MEDICARE

## 2021-05-24 PROCEDURE — G0423 INTENS CARDIAC REHAB NO EXER: HCPCS

## 2021-05-24 PROCEDURE — G0422 INTENS CARDIAC REHAB W/EXERC: HCPCS

## 2021-05-24 NOTE — PROGRESS NOTES
Hospital Facility-Based Program  Phase 2 Cardiac Rehab Weekly Progress Report      Patient prescribed exercise:  9:00 class. 3 times per week in rehab, 1-4 times per week at home for the amount of sessions/weeks specified by insurance. Current Levels: Treadmill: 1. 4mph/0% for 15 minutes, NuStep:  36 Briceño for 15 minutes, UBE: 22 briceño for 5 minutes. Progression Discussion: Maintain/Increase Aerobic exercise 30-45 minutes to work on endurance. Attempt to increase intensity by 5-20% for each modality this week. Try to increase intensities until Abbie Stokes rates the exercises a 13-17 on Daniel RPE.

## 2021-05-24 NOTE — PROGRESS NOTES
Video Education Report - ICR/CR  Name:  Cornelio Neville     Date:  5/24/2021  MRN: [id number]     Session #:    Session Length: 40 min    Core Videos        []Heart Disease Risk Reduction       []Overview of Pritikin Eating Plan      []Move it          []Calorie Density         []Healthy Minds, Bodies, Hearts        [x]Label Reading - Part 1       []Metabolic Syndrome and Belly Fat        []How Our Thoughts Can Heal Our Hearts   []Dining Out - Part 1      []Biomechanical Limitations  []Facts on Fat        []Hypertension & Heart Disease    []Diseases of Our Time - Focusing on Diabetes   []Body Composition  []Nutrition Action Plan    []Exercise Action Plan    Exercise      Healthy Mind-Set  []Improving Performance    []Smoking Cessation    []Introduction to [address]  []Aging-Enhancing the Quality of Your Life  []Becoming a Pritikin   []Biology of Weight Control    []Cooking Breakfasts   []Decoding Lab Results    and Snacks  []Diseases of Our Time - Overview   []Cooking Dinner and   []Sleep Disorders     Sides  []Targeting Your Nutrition Priorities   []Healthy Salads &   Dressings  Nutrition      []Cooking Soups and   []Dining Out - Part 2     Desserts  []Fueling a Healthy Body   []Menu Workshop     Overview  []Planning Your Eating Strategy   []The Pritikin Solution  []Vitamins and Minerals    Comments:  Video completed, group discussion

## 2021-05-26 ENCOUNTER — HOSPITAL ENCOUNTER (OUTPATIENT)
Dept: CARDIAC REHAB | Age: 71
Setting detail: THERAPIES SERIES
Discharge: HOME | End: 2021-05-26
Payer: MEDICARE

## 2021-05-26 PROCEDURE — G0422 INTENS CARDIAC REHAB W/EXERC: HCPCS

## 2021-05-26 PROCEDURE — G0423 INTENS CARDIAC REHAB NO EXER: HCPCS

## 2021-05-26 NOTE — PROGRESS NOTES
Video Education Report - ICR/CR  Name:  Sophy Maria     Date:  5/26/2021  MRN: [id number]     Session #:    Session Length: 40min    Core Videos        []Heart Disease Risk Reduction       []Overview of Pritikin Eating Plan      []Move it          []Calorie Density         []Healthy Minds, Bodies, Hearts        []Label Reading - Part 1       [x]Metabolic Syndrome and Belly Fat        []How Our Thoughts Can Heal Our Hearts   []Dining Out - Part 1      []Biomechanical Limitations  []Facts on Fat        []Hypertension & Heart Disease    []Diseases of Our Time - Focusing on Diabetes   []Body Composition  []Nutrition Action Plan    []Exercise Action Plan    Exercise      Healthy Mind-Set  []Improving Performance    []Smoking Cessation    []Introduction to [address]  []Aging-Enhancing the Quality of Your Life  []Becoming a Pritikin   []Biology of Weight Control    []Cooking Breakfasts   []Decoding Lab Results    and Snacks  []Diseases of Our Time - Overview   []Cooking Dinner and   []Sleep Disorders     Sides  []Targeting Your Nutrition Priorities   []Healthy Salads &   Dressings  Nutrition      []Cooking Soups and   []Dining Out - Part 2     Desserts  []Fueling a Healthy Body   []Menu Workshop     Overview  []Planning Your Eating Strategy   []The Pritikin Solution  []Vitamins and Minerals    Comments:  Video completed, group discussion

## 2021-05-28 ENCOUNTER — HOSPITAL ENCOUNTER (OUTPATIENT)
Dept: CARDIAC REHAB | Age: 71
Setting detail: THERAPIES SERIES
Discharge: HOME | End: 2021-05-28
Payer: MEDICARE

## 2021-05-28 ENCOUNTER — NURSE ONLY (OUTPATIENT)
Dept: CARDIOLOGY CLINIC | Age: 71
End: 2021-05-28
Payer: MEDICARE

## 2021-05-28 DIAGNOSIS — Z95.0 PACEMAKER: Primary | ICD-10-CM

## 2021-05-28 DIAGNOSIS — Z79.899 ON AMIODARONE THERAPY: Primary | ICD-10-CM

## 2021-05-28 PROCEDURE — 93288 INTERROG EVL PM/LDLS PM IP: CPT | Performed by: INTERNAL MEDICINE

## 2021-05-28 PROCEDURE — G0422 INTENS CARDIAC REHAB W/EXERC: HCPCS

## 2021-05-28 PROCEDURE — G0423 INTENS CARDIAC REHAB NO EXER: HCPCS

## 2021-05-28 RX ORDER — AMIODARONE HYDROCHLORIDE 200 MG/1
200 TABLET ORAL DAILY
Qty: 90 TABLET | Refills: 3 | Status: ON HOLD | OUTPATIENT
Start: 2021-05-28 | End: 2022-01-01 | Stop reason: HOSPADM

## 2021-05-28 RX ORDER — AMIODARONE HYDROCHLORIDE 400 MG/1
400 TABLET ORAL 2 TIMES DAILY
Qty: 28 TABLET | Refills: 0 | Status: SHIPPED | OUTPATIENT
Start: 2021-05-28 | End: 2021-01-01

## 2021-05-28 NOTE — PROGRESS NOTES
Patient brought down in wheel chair to the pacer clinic for pacer check and underlying atrial fib rhythm  bpm while patient in cardiac rehab and patient symptomatic dizziness. Camille Link and Kris spoke and Camille Link will be giving patient a phone call at home per Aaron Delarosa unable to see patient at the moment. Patient may continue in cardiac rehab and patient wheeled down in wheelchair to cooking class.

## 2021-05-28 NOTE — PROGRESS NOTES
Dewayne SCHUSTER.:  1950  Acct Number: [de-identified]  MRN:  [id number]                  [facility] COOKING SCHOOL WORKSHOP             Date: 2021        Session # ________   Culver Luquillo class covered:      () Adding Flavor     () Fast Breakfasts     () Salads and Dressings     () Savory Soups      () Sauces & Simple Sides     (X) Appetizers and Snacks     () Delicious Desserts     () Plant Based Proteins     () Fast Evening Meals     () Weekend Breakfasts     () Efficiency Cooking     () One Pot Wonders     Patients were shown how to choose, prep, and cook; substitutions and other options were given. Samples were offered. Recipes were given and questions answered. The patient above was in the [employer] for 45 minutes.       Electronically signed by Elisabet WOODS [address] [place]  on 2021 at 10:52 AM

## 2021-05-28 NOTE — PROGRESS NOTES
faraz sci dual pacer brought over from cardiac rehab, at [facility] RVR kerry with activity that is making him lightheaded. PAF  Rehab states HR's in 140's    Reviewed interrogation with Daniel, orders for amiodarone and labwork in separate encounter, waiting for pt to call me back     Rates in office 90's-120's, states he feels better now but gets lighthead kerry with activity    . Sandra Millard Battery longevity:  13.5 years   Presenting rhythm  At [facility] vent rate mostly VS with few     Atrial impedance 539  RV impedance 680    P wave sensing 10.6 atrial fib today  R wave sensing 24.8    35 % atrial paced  5 % RV paced     Atrial threshold at fib   RV threshold 0.9 V at 0.4ms

## 2021-05-28 NOTE — TELEPHONE ENCOUNTER
Pt notified of amiodarone and labwork, state will have labs drawn at [address] [place] [address] on Tuesday, amiodarone pended to [facility]

## 2021-05-28 NOTE — TELEPHONE ENCOUNTER
Per Daniel, start pt on amiodarone 400mg bid x 2 weeks then decrease to 200mg daily~not ordered~ where does he want this sent     Liver and thyroid per Daniel ~ordered, where does he get his labwork done     [facility] to call office

## 2021-06-01 ENCOUNTER — HOSPITAL ENCOUNTER (OUTPATIENT)
Dept: CARDIAC REHAB | Age: 71
Setting detail: THERAPIES SERIES
Discharge: HOME | End: 2021-06-01
Payer: MEDICARE

## 2021-06-01 PROCEDURE — G0423 INTENS CARDIAC REHAB NO EXER: HCPCS

## 2021-06-01 PROCEDURE — G0422 INTENS CARDIAC REHAB W/EXERC: HCPCS

## 2021-06-01 NOTE — PROGRESS NOTES
Hospital Facility-Based Program  Phase 2 Cardiac Rehab Weekly Progress Report      Patient prescribed exercise:  9:00 class. 3 times per week in rehab, 1-4 times per week at home for the amount of sessions/weeks specified by insurance. Current Levels: Treadmill: 1. 5mph/0% for 15 minutes,  NuStep:  39 Cagle for 15 minutes, UBE: Level 22W for 5 minutes. Progression Discussion: Maintain/Increase Aerobic exercise 15 minutes to work on endurance. Attempt to increase intensity by 5-20% for each modality this week. Try to increase intensities until Geovanna Mendez rates the exercises a 13-17 on Daniel RPE.

## 2021-06-01 NOTE — PROGRESS NOTES
Video Education Report - ICR/CR  Name:  Sophy Maria     Date:  6/1/2021  MRN: [id number]     Session #:  34  Session Length: 40 min    Core Videos        []Heart Disease Risk Reduction       []Overview of Pritikin Eating Plan      []Move it          []Calorie Density         []Healthy Minds, Bodies, Hearts        []Label Reading - Part 1       []Metabolic Syndrome and Belly Fat        []How Our Thoughts Can Heal Our Hearts   []Dining Out - Part 1      [x]Biomechanical Limitations  []Facts on Fat        []Hypertension & Heart Disease    []Diseases of Our Time - Focusing on Diabetes   []Body Composition  []Nutrition Action Plan    []Exercise Action Plan        Comments:  Video completed, group discussion

## 2021-06-04 ENCOUNTER — HOSPITAL ENCOUNTER (OUTPATIENT)
Dept: CARDIAC REHAB | Age: 71
Setting detail: THERAPIES SERIES
Discharge: HOME | End: 2021-06-04
Payer: MEDICARE

## 2021-06-04 PROCEDURE — G0422 INTENS CARDIAC REHAB W/EXERC: HCPCS

## 2021-06-04 PROCEDURE — G0423 INTENS CARDIAC REHAB NO EXER: HCPCS

## 2021-06-04 NOTE — PROGRESS NOTES
Inna SCHUSTER.:  1950  Acct Number: [de-identified]  MRN:  [id number]                  [facility] COOKING SCHOOL WORKSHOP             Date: 2021        Session # ________   Vee Faustin class covered:      () Adding Flavor     () Fast Breakfasts     () Salads and Dressings     () Savory Soups      () Sauces & Simple Sides     () Appetizers and Snacks     (X) Delicious Desserts     () Plant Based Proteins     () Fast Evening Meals     () Weekend Breakfasts     () Efficiency Cooking     () One Pot Wonders     Patients were shown how to choose, prep, and cook; substitutions and other options were given. Samples were offered. Recipes were given and questions answered. The patient above was in the [employer] for 55 minutes.       Electronically signed by Aye WOODS [facility] [facility]  on 2021 at 12:04 PM

## 2021-06-07 ENCOUNTER — HOSPITAL ENCOUNTER (OUTPATIENT)
Dept: CARDIAC REHAB | Age: 71
Setting detail: THERAPIES SERIES
Discharge: HOME | End: 2021-06-07
Payer: MEDICARE

## 2021-06-07 PROCEDURE — G0423 INTENS CARDIAC REHAB NO EXER: HCPCS

## 2021-06-07 PROCEDURE — G0422 INTENS CARDIAC REHAB W/EXERC: HCPCS

## 2021-06-07 NOTE — PROGRESS NOTES
Video Education Report - ICR/CR  Name:  Yolanda Noonan     Date:  6/7/2021  MRN: [id number]     Session #:  32  Session Length: 40 min    Core Videos        []Heart Disease Risk Reduction       []Overview of Pritikin Eating Plan      []Move it          []Calorie Density         []Healthy Minds, Bodies, Hearts        []Label Reading - Part 1       []Metabolic Syndrome and Belly Fat        []How Our Thoughts Can Heal Our Hearts   []Dining Out - Part 1      []Biomechanical Limitations  [x]Facts on Fat        []Hypertension & Heart Disease    []Diseases of Our Time - Focusing on Diabetes   []Body Composition  []Nutrition Action Plan    []Exercise Action Plan        Comments:  Video completed, group discussion

## 2021-06-09 ENCOUNTER — HOSPITAL ENCOUNTER (OUTPATIENT)
Dept: CARDIAC REHAB | Age: 71
Setting detail: THERAPIES SERIES
Discharge: HOME | End: 2021-06-09
Payer: MEDICARE

## 2021-06-09 PROCEDURE — G0422 INTENS CARDIAC REHAB W/EXERC: HCPCS

## 2021-06-09 PROCEDURE — G0423 INTENS CARDIAC REHAB NO EXER: HCPCS

## 2021-06-09 NOTE — PROGRESS NOTES
Osmani SCHUSTER.:  1950    Acct Number: [de-identified]   MRN:  [id number]                             [facility] NUTRITION WORKSHOP             Date: 2021        Session # _______    Roxie Davies class covered:    ()  Label Reading  ()  Menus  ()  Targeting Nutrition Priorities  (x)  Fueling a Healthy Body/Mindful Eating    Readiness to change:    ( ) Pre-contemplative   ( ) Contemplative - ambivalent about change    ( ) Action - ready to set action plan and implement   ( x) Maintenance - has made change and is trying, and or practicing different alternative behaviors       Rice Hanny was in the Workshop with the Dietitian for 45 minutes. The content was presented via Powerpoint, lecture, and patient participation based format. Motivational interviewing was utilized when needed, to promote change. Patient voiced understanding.     Electronically signed by Eulalio Koenig RD LD [address] [place]  on 2021 at 11:50 AM

## 2021-06-09 NOTE — PLAN OF CARE
[facility] Program - [id number] Facility-Based Program  Individualized Cardiac Treatment Plan    Patient Name:  Melissa Dent  :  1950  Age:  79 y.o. MRN:  [id number]  Diagnosis: CABG  Date of Event: 21   Physician:  Arsh Atwood Office Visit:  21  Date Entered Program: 21  Risk Stratifications: [] Low [] Intermediate [x] High  Allergies: Allergies   Allergen Reactions    Penicillins Rash    Sulfa Antibiotics Rash       COVID -19 Screen  Do you have any of the following symptoms:  [] Fever [] Cough [] SOB [] Muscle/Body Ache [] Loss of taste/smell [x] None    Have you traveled outside of the [place]? [] Yes      [x] No    Have you been around anyone who has tested Positive for COVID 19?  [] Yes      [x] No    The following Education has been completed with patient. [x] Wait in the lobby until we call you back to rehab. [x] You must wear a mask while in the medical center, and in cardiac rehab. Please bring your own. [x] Bring your own bottle of water with you. [x] You can bring a visitor with you, however, they will need to sit in the waiting room while you are in cardiac rehab. Individual Cardiac Treatment Plan -EXERCISE  INITIAL 30 DAY 60 DAY 90 DAY FINAL DAY   EXERCISE  ASSESSMENT/PLAN EXERCISE  REASSESSMENT EXERCISE   REASSESSMENT EXERCISE   REASSESSMENT EXERCISE  DISCHARGE/FOLLOW-UP   Date: 21 Date: 3/17/21 Date: 21 Date: 21 Date: 21   Session #1 Session # 17 Session # 28 Session # 55    Pt not here for reassessment.     Session # 66-72  Last session completed on **   Stages of Change Stages of Change Stages of Change Stages of Change Stages of Change   [] Pre Contemplation  [] Contemplation  [] Preparation  [x] Action  [] Maintenance  [] Relapse [] Pre Contemplation  [] Contemplation  [] Preparation  [x] Action  [] Maintenance  [] Relapse [] Pre Contemplation  [] Contemplation  [x] Preparation  [] Action  [] Maintenance  [] Relapse [] Pre Contemplation  [] Contemplation  [x] Preparation  [] Action  [] Maintenance  [] Relapse [] Pre Contemplation  [] Contemplation  [x] Preparation  [] Action  [] Maintenance  [] Relapse   EXERCISE ASSESSMENT EXERCISE ASSESSMENT EXERCISE ASSESSMENT EXERCISE ASSESSMENT EXERCISE ASSESSMENT   6 Min Walk Test  Distance walked:   0.09 miles  475 ft.  1.7 METs  Max HR:68 BPM      RPE:  12    THR:    Rhythm:  Sinus Ludwig/ NSR First Exercise Session:3/1/21 Patient has been on hold since 4/5/21, Pacemaker on 4/6/21.  6 Min Walk Test  Distance walked:   ** miles  ** ft  ** METs  Max HR:** BPM      RPE:  **  %Change ft= **    Rhythm:  **   DASI: 4.6 METs DASI: 5.6METs DASI: na DASI: ** METs DASI: 8.33 METs   Return to Work  Time [place] on returning to work? [x] Yes              [] No   [] Disabled     [] Retired    If yes:  *Job description: oates, standing & raising arms  *Required MET level=4-5  *Return to Work Date: ? Return to work: Has Monroe Ho returned to work? [x] Yes    [] No    Return to work date set? [x] Yes   [] No        Return to work: Has Monroe Ho returned to work? [x] Yes    [] No     Return to work: Has Monroe Ho returned to work? [x] Yes    [] No    Monroe Ho is doing ok at work. Return to work: Has Monroe Ho returned to work? [x] Yes    [] No    *Required MET Level achieved for job duties? [x] Yes    [] No   Orthopedic Limitations/  [] Yes    [x] No       Orthopedic Limitations  na Orthopedic Limitations  NA   Orthopedic Limitations  NuStep instead of AD Orthopedic Limitations    NS    Fall Risk  Fall risk assessed? [x] Yes      [] No    Balance Issues?   [] Yes      [x] No     [] Walker [] Cane    [x] Safety issues reviewed      Fall Risk  na Fall Risk  NA Fall Risk  NA Fall Risk    NS   Home Exercise  [x] Yes    [] No  Type: walking  Frequency: daily  Duration: 20 min Home Exercise  [x] Yes    [] No  Type: walking  Frequency:4 x per week  Duration: 20 min Home Exercise  [x] Yes    [] No  Type:walking  Frequency: 4d/wk  Duration: 20mins Home Exercise  [x] Yes    [] No  Type: walking  Frequency: 4d/wk  Duration: 20mins Home Exercise  [] Yes    [x] No     Angina with Activity? [] Yes    [x] No  Angina Management: na Angina with Activity? [] Yes    [x] No  Angina Management: na Angina with Activity? [] Yes    [x] No  Angina Management: na Angina with Activity? [] Yes    [x] No  Angina Management: na Angina with Activity?   [] Yes    [x] No  Angina Management: na   EXERCISE PLAN EXERCISE PLAN EXERCISE PLAN EXERCISE PLAN EXERCISE PLAN   *Interventions* *Interventions* *Interventions* *Interventions* *Interventions*   Exercise Prescription  (per physician & CR staff) Exercise Prescription  (per physician & CR staff) Exercise Prescription  (per physician & CR staff) Exercise Prescription  (per physician & CR staff) Exercise Prescription  (per physician & CR staff)   Cardiovascular Cardiovascular Cardiovascular Cardiovascular Cardiovascular   Mode:    [x] Treadmill (TM)  [x] Schwinn Airdyne (AD)  [x] Arms Ergometer (AE)  [] NuStep  [] Elliptical (E) MODE:    [x] Treadmill (TM)  [x] Schwinn Airdyne (AD)  [x] Arms Ergometer (AE)  [] NuStep  [] Elliptical (E) MODE:    [x] Treadmill (TM)  [x] Schwinn Airdyne (AD)  [x] Arms Ergometer (AE)  [] NuStep  [] Elliptical (E) MODE:    [x] Treadmill (TM)  [] Schwinn Airdyne (AD)  [x] Arms Ergometer (AE)  [x] NuStep  [] Elliptical (E) MODE:    [x] Treadmill (TM)  [] Schwinn Airdyne (AD)  [x] Arms Ergometer (AE)  [x] NuStep  [] Elliptical (E)   Initial Workloads  TM: [email or web address] 1.8 METs  AD: 0.3 level = 1.7 METs  NS: 18  Briceño= 1.7 METs  AE: 0.1 level = 1.2 METs Current Workloads  TM:  [email or web address] %= 1.9 METs    NS:  30 Briceño= 2 METs  AE: 18 Briceño= 1.7 METs Current Workloads  TM:  [email or web address] %= 1.9 METs  NS:  30 Briceño= 2 METs  AE: 20 Briceño= 1.7 METs Current Workloads  TM: 1.4 @ 0%= 2.1 METs  NS:  36 Briceño= 2.2 METs  AE: 20 briceño = 1.7METs Current Workloads  TM: 1.5 @ 0%= 2.2 METs  NS: 36 Briceño= 2.2 METs  AE: 22 briceño = 1.8 METs     Frequency:    ICR: 3x/week  Home: 2-3x/wk Frequency:   ICR: 3x/week  Home: 3x/wk Frequency:  ICR: 3x/week  Home: 3-4x/wk Frequency:  ICR: 3x/week  Home: 3-4x/wk Frequency:  Ita Cuenca will continue exercise at  5-7 days/week   Duration:   Total aerobic exercise = 30-45 min    5-8 min/mode Duration:  Total aerobic exercises = 25-35 min     10 min/mode Duration:  Total aerobic exercises = 25-35 min     12min/mode Duration:  Total aerobic exercises = 30-45 min     15 min/mode Duration:  Total erobic exercise =  60-90 min   Intensity:   MET Level = 1.7-1.8  RPE = 12-15 Intensity:  Max MET Level = 2.0  RPE = 12-15 Intensity:  Max MET Level =  2.0  RPE = 12-15 Intensity:  Max MET Level = 2.2  RPE = 12-15 Intensity:  Max MET Level = 2.2  RPE = 12-15   Progression: increase aerobic activity up to 15 min over next 4 weeks by increasing time 2-3 min/week. Progression:  Increase duration 12-15 min over the next 4 weeks as tolerated Progression: Progress as tolerated once patient returns   Progression:  Progress as tolerated. Progression:  Increase time/intensity when RPE <13, and HR is in [facility]   [x] Yes      [] No  Upper and Lower body strength training 2x/wk    Wt: 2#       Reps:  8-15    *Increase wt. after completing 15 reps with an RPE of <12/13. [x] Yes      [] No  Upper and Lower body strength training 2x/wk    Wt: 3#       Reps:  8-15    *Increase wt. after completing 15 reps with an RPE of <12/13. [x] Yes      [] No  Upper and Lower body strength training 2x/wk    Wt: 3#       Reps:  8-15    *Increase wt. after completing 15 reps with an RPE of <12/13. [x] Yes      [] No  Upper and Lower body strength training 2x/wk    Wt: 3#       Reps:  8-15    *Increase wt. after completing 15 reps with an RPE of <12/13.  Continue Strength Training at home   [x] Exercise Log & Strength training handout given     Wt: 3-4#       Reps:  8-15    *Increase wt. after completing 15 reps with an RPE of <12/13. Flexibility Flexibility Flexibility Flexibility Flexibility   [x] Yes      [] No  25 min session of Core Strength & Flexibility 1x/per week  Attends Core Strength & Flexibility   [x] Yes      [] No Attends Core Strength & Flexibility   [x] Yes      [] No Attends Core Strength & Flexibility   [x] Yes      [] No Continue Core Strength & Flexibility at home   Home Exercise  *Diony verbalizes planning to walk 3-4 days/week for 20 min on days not in rehab. Home Exercise  *Diony verbalizes planning to walk 3-4 days/week for 30 min on days not in rehab. Home Exercise  *Diony verbalizes planning to walk 3-4 days/week for 30 min on days not in rehab. Home Exercise  *Diony verbalizes planning to walk 3-4 days/week for 30 min on days not in rehab. Home Exercise  *Diony verbalizes his plan to walk 3-4 days/week for 30 min on days not in rehab. *Education* *Education* *Education* *Education* *Education*   RPE Scale  [x] Yes      [] No  Exercise Safety  [x] Yes      [] No  Equipment Orientation  [x] Yes      [] No  S/S to Report  [x] Yes      [] No  Warm Up/Cool Down  [x] Yes      [] No  Home Exercise  [x] Yes      [] No    All Exercise Education Completed  [x] Yes      [] No   Exercise Education Recommended    Workshops  [x] Improving Performance  [x] Balance Training & Fall Prevention  [x] Intro to Yoga - Video  [x] Resistance Training & Core Strength Exercise Education Attended/Date  3/8/21  Resistance Training & Core Strength Exercise Education Attended/Date    3/29/21  Improving Performance Exercise Education Attended/Date      5/10/21  Heart Disease Risk Reduction   All Sessions Completed?     [x] Yes  [] No   *Goals* *Goals* *Goals* *Goals* *Goals*   Initial Exercise Goals Exercise Goals  Exercise Goals   Exercise Goals  Exercise Goals   Diony plans to:  [x] Attend exercise sessions 3x/wk  [x] initiate home exercise 2-3x/wk for 10-20 min  [x] Increase 6 min walk distance by 10%  [x] Attend Exercise workshops Geovanna Mendez plans to:  [x] Attend exercise sessions 3x/wk  [x] continue home exercise 2-3x/wk for 20-30 min  [x] Attend Exercise workshops Diony's plans to:  [x] Attend exercise sessions 3x/wk  [x] continue home exercise 3-4x/wk for 30-45 min  [x] Determine plan of exercise following rehab  [x] Attend Exercise workshops Diony's plans to:  [x] Attend exercise sessions 3x/wk  [x] continue home exercise 3-4x/wk for 30-45 min  [x] Determine plan of exercise following rehab  [x] Attend Exercise workshops Geovanna Mendez achieved exercise goals? []  Yes    [] No  [] Increased 6 min walk distance by 10%  [x] Currently exercising 30-60 min/day, 5-7days/wk   [x] Plans to continue exercise on own  [x] Plans to join a local fitness center to continue exercise  [] Does not plan to continue to exercise after rehab   Return to ADL or Hobbies:  Geovanna Mendez would like to improve strength and endurance so he is able to return to going to car shows, fair Return to ADL or Hobbies:  Geovanna Mendez would like to improve strength and endurance so he is able to return to work and car shows Return to ADL or Hobbies:  Geovanna Mendez would like to improve strength and endurance so he/she is able to return to ** Return to ADL or Hobbies:  Geovanna Mendez is progressing. Back at work. Return to ADL or Hobbies:  Geovanna Mendez back at work. Progressing slowly. Recently joined a rec center to exercise at when finished. *MET level required for above goal:  5-6 METs MET level Achieved:  2METs MET level Achieved: 2. 0METs MET level Achieved: 2.2 METs MET level Achieved: 2.2 METs     Individual Cardiac Treatment Plan - Nutrition  NUTRITION  ASSESSMENT/PLAN NUTRITION  REASSESSMENT NUTRITION   REASSESSMENT NUTRITION   REASSESSMENT NUTRITION  DISCHARGE/FOLLOW-UP   Stages of Change Stages of Change Stages of Change Stages of Change Stages of Change   [] Pre Contemplation  [x] Contemplation  [] Preparation  [] Action  [] Maintenance  [] Relapse [] Pre Contemplation  [x] Contemplation  [] Preparation  [] Action  [] Maintenance  [] Relapse [] Pre Contemplation  [x] Contemplation  [] Preparation  [] Action  [] Maintenance  [] Relapse [] Pre Contemplation  [x] Contemplation  [] Preparation  [] Action  [] Maintenance  [] Relapse [] Pre Contemplation  [x] Contemplation  [] Preparation  [] Action  [] Maintenance  [] Relapse   NUTRITION ASSESSMENT NUTRITION ASSESSMENT NUTRITION ASSESSMENT NUTRITION ASSESSMENT NUTRITION ASSESSMENT   Weight Management  Weight: 146.8        Height: 68\"   BMI: 22  Weight Management  Weight: 152.6                Weight Management  Weight: 155.4                 Weight Management  Weight: 159.6 Weight Management  Weight: 160.8                    BMI: **   Eating Plan  Current eating habits: low salt Eating Plan  Changes: low sodium Eating Plan  Changes: unknown Eating Plan  Changes: n/a Eating Plan Improvements: n/a   Alcohol Use  [x] none          [] daily  [] weekly      [] special          Diet Assessment Tool:  RATE YOUR PLATE  *Given to patient to complete and return. Diet Assessment Tool:    Score: 58/69       Diet Assessment Tool: RATE YOUR PLATE  Score: 62/65   NUTRITION PLAN NUTRITION PLAN NUTRITION PLAN NUTRITION PLAN NUTRITION PLAN   *Interventions* *Interventions* *Interventions* *Interventions* *Interventions*   Initial Survey given Goal Setting Discussion:   [x] Yes      [] No       Follow Up Survey Reviewed & Goals Updated: yes     Professional Referral  Please check if needed. [] Dietitian Consult   [] Wt. Management Referral  [] Other:  Professional Referral  Please check if needed. [] Dietitian Consult   [] Wt. Management Referral  [] Other: Professional Referral  Please check if needed. [] Dietitian Consult   [] Wt. Management Referral  [] Other: Professional Referral  Please check if needed. [] Dietitian Consult   [] Wt.  Management Referral  [] Other: Professional Referral  Please check if needed. [] Dietitian Consult   [] Wt. Management Referral  [] Other:   *Education* *Education* *Education* *Education* *Education*   Nutritional Education Recommended    [x] 1:1 Registered Dietitian    Workshops  [x] Label Reading   [x] Menu  [x] Targeting Nutrition Priorities  [x] Fueling a Healthy Body   Nutritional Education Attended/Date    3/10/21   1:1 Registered Dietitian    3/17/21  Fueling a Healthy Body Nutritional Education Attended/Date    3/17/21  Targeting Priorities Nutritional Education Attended/Date      4/28/21  Label Reading All Sessions Completed?     [x] Yes  [] No    6/9/21  Fueling a healthy body   Cooking School  Recommended     [x] Adding Flavor  [x] Fast & Healthy     Breakfasts  [x] Salads & Dressings  [x] Savory Soups  [x] Simple Sides & Sauces  [x] Appetizers &     Snacks  [x] Delicious Desserts  [x] Plant-Based Proteins  [x] Fast Evening Meals  [x] Weekend Breakfasts  [x] Efficiency Cooking  [x] One-Pot [employer]  Sessions Completed  3/5/21   Appetizers &     Snacks    3/34/28  Delicious Desserts     [facility] [employer]  Sessions Completed    3/19/21  Plant Protein    4/2/21  Weekend Breakfast [employer]ing School  Sessions Completed    5/7/21  Salads & Dressings    4/30/21  Fast & Healthy     Breakfasts Cooking School    # of sessions completed:  9    5/21/21  Simple Sides & Sauces    4/8/74  Delicious Desserts    0/39  Soups, sauces   *Goals* *Goals* *Goals* *Goals* *Goals*   Diony's nutritional goals are as follows:  Complete and return diet survey Diony's nutritional goals are as follows:  [x] Attend Nutrition Workshops  [x] Attend 1:1   [x] Attend Cooking Classes  [] ** Diony's nutritional goals are as follows:  [x] Attend Nutrition Workshops  [] Attend 1:1   [x] Attend Cooking Classes  [x] Complete and return diet survey  [] ** Diony's nutritional goals are as follows:  [x] Attend Nutrition Workshops  [] Attend 1:1   [x] Attend Cooking Classes   Inga Singh 0=none, 10=very:   Rate your depression: 5  Rate your anxiety:  4  Using a scale of 0-10, 0=none, 10=very:   Rate your depression: 5  Rate your anxiety:  7 Using a scale of 0-10, 0=none, 10=very:   Rate your depression: na  Rate your anxiety: na Using a scale of 0-10, 0=none, 10=very:   Rate your depression: na  Rate your anxiety:  na Using a scale of 0-10, 0=none, 10=very:   Rate your depression: 0  Rate your anxiety:  3   Signs and Symptoms of Depression Present? [x] Yes      [] No  If yes, please explain: Pt states he has been depressed but it is getting better since he is able to do things now. Signs and Symptoms of Depression Present? [x] Yes      [] No  If yes, please explain:  Upset about his current heart complications Signs and Symptoms of Depression Present? [x] Yes      [] No  If yes, please explain:  See previous Signs and Symptoms of Depression Present? [x] Yes      [] No  If yes, please explain: Will update next assessment. Signs and Symptoms of Depression Present? [] Yes      [x] No  If yes, please explain: Seems to be    Signs and Symptoms of Anxiety Present? [x] Yes      [] No  If yes, please explain:  See above Signs and Symptoms of Anxiety Present? [x] Yes      [] No  If yes, please explain:  See above Signs and Symptoms of Anxiety Present? [x] Yes      [] No  If yes, please explain:  See previous Signs and Symptoms of Anxiety Present? [x] Yes      [] No  If yes, please explain: Will update next assessment. Signs and Symptoms of Anxiety Present? [x] Yes      [] No  If yes, please explain:  Pt doing better. Back at work. [] Patient has poor eye contact   [x] Flat affect present. [x] Signs of anxiety, but no anger or hostility    [] Signs social isolation present.    []  Signs of alcohol or substance abuse       PSYCHOSOCIAL PLAN PSYCHOSOCIAL PLAN PSYCHOSOCIAL PLAN PSYCHOSOCIAL PLAN PSYCHOSOCIAL PLAN   *Interventions* *Interventions* *Interventions* *Interventions* *Interventions*   *Please check if needed  [] Psych Consult  [x] Physician Referral- suggested discussing depression and anxiety with PCP  [x] Stress Management Skills *Please check if needed  [] Psych Consult  [x] Physician Referral- encouraged Ana Laura Thomas to talk to his PCP about depression and anxiety  [x] Stress Management Skills *Please check if needed  [] Psych Consult  [x] Physician Referral  [x] Stress Management Skills *Please check if needed  [] Psych Consult  [x] Physician Referral - Will update next referral  [x] Stress Management Skills *Please check if needed  [] Psych Consult  [] Physician Referral  [x] Stress Management Skills   Is patient currently taking anti-depressant or anti-anxiety medications? [] Yes      [x] No   Change in anti-depressant or anti-anxiety medications? [] Yes      [x] No   Change in anti-depressant or anti-anxiety medications? [] Yes      [x] No   Change in anti-depressant or anti-anxiety medications? [] Yes      [x] No   Change in anti-depressant or anti-anxiety medications?   [] Yes      [x] No     *Education* *Education* *Education* *Education* *Education*   Healthy Mind-Set Workshops Recommended  [x] Stress & Health  [x] Taking Charge of Stress  [x] New Thoughts, New Behaviors  [x] Managing Moods & Relationships Healthy Mind-Set Workshops Attended/Date  3/3/21New Thoughts, New Behaviors Healthy Mind-Set Workshops Attended/Date    3/24/21  Managing Moods Healthy Mind-Set Workshops Attended/Date    5/3/21  Taking Charge of Stress Healthy Mind-Set Workshops  Completed  [x] Yes      [] No   *Goals* *Goals* *Goals* *Goals* *Goals*   Madies psychosocial goals are as follows:  Complete HADS & Vikki & Andree, Quality of Life Index, Cardiac Version IV Madies psychosocial goals are as follows:  [x] Attend Healthy Mind-Set Workshops  [x] Reduce depression symptom severity by 1 level   Madies psychosocial goals are as follows:  [x] Attend Healthy Mind-Set Workshops  [x] Reduce depression BP:**  Medication Changes:  [] Yes      [] No   Lipids  HLD/DLD  [x] Yes      [] No  TOTAL CHOL: 126  HDL:  36  LDL:  71  TRI  Medication: atorvastatin Lipids  Medication Changes:  [] Yes      [x] No     Lipids  Medication Changes:  nUnknown   Lipids  Medication Changes:  [] Yes      [x] No     Lipids  TOTAL CHOL: 126  HDL:  36  LDL:  71  TRI  Medication Changes:  [] Yes      [x] No Lipids    TOTAL CHOL: **  HDL:  **  LDL:  **  TRIG:  **  Medication Changes:  [] Yes      [] No   Diabetes  [] Yes      [x] No   Diabetes  na   Diabetes  NA     Diabetes  [x] No     Diabetes  [x] No Diabetes  Most Recent BS:  BS have been in range  [] Yes      [] No  Medication Changes  [] Yes      [] No       Tobacco Use  [] Current  [x] Former  [] Never    Years smoked: 35-40:    Date Quit: 21    # cigarettes smoked/day: 1  Smokeless Tobacco use:   [] Yes      [x] No   Tobacco Use  Change in smoking status   [] Yes      [x] No       Tobacco Use  Change in smoking status   [] Yes      [x] No       Tobacco Use  Change in smoking status   [] Yes      [x] No     Tobacco Use  Change in smoking status   [] Yes      [x] No     Tobacco Use  Change in smoking status   [] Yes      [] No    Quit date: **             Learning Barriers  Please select one:  [] Speech  [] Literacy  [] Hearing  [] Cognitive  [] Vision  [x] Ready to Learn Learning Barriers Addressed:   [] Yes      [] No  na Learning Barriers Addressed:   [x] Yes      [] No   Learning Barriers Addressed:  [x] Yes      [] No Learning Barriers Addressed:  [x] Yes      [] No Learning Barriers Addressed:  [] Yes      [] No     RISK FACTOR/EDUCATION PLAN RISK FACTOR/EDUCATION PLAN RISK FACTOR/EDUCATION PLAN RISK FACTOR/EDUCATION PLAN RISK FACTOR/EDUCATION PLAN RISK FACTOR/EDUCATION PLAN   *Interventions* *Interventions* *Interventions* *Interventions* *Interventions* *Interventions*   Recommended Educational Videos    [x] Overview of The Pritikin Eating Plan  [x] Heart Disease Risk Reduction  [x] Move It! [x] Calorie Density  [x] Healthy Minds, Bodies, Hearts  [x] Label Reading  [x] Metabolic Syndrome & Belly   Or  [] How Our Thoughts Can Heal our Heart  [x] Dining Out-Part 1  [x] Biomechanical Limitations  [x] Facts on Fat  [x] Hypertension & Heart Disease  [x] Diseases of Our Times-Focusing on Diabetes  [x] Body Composition  [x] Nurtition Action Plan  [x] Exercise Action Plan   Completed Videos/Date      2/23/21  Overview of The Pritikin Eating Plan    3/1/21  Heart Disease Risk Reduction    3/15/21   Move It! Completed Videos/Date    NA Completed Videos/Date      5/3/21  Healthy minds, bodies, hearts    4/26/21  Dining out part 1 Recommended Educational Videos Completed    [x] Yes      [] No      5/24  Label Reading    6/59  Metabolic Syndrome    6/1  Biomechanical Limitations    6/7  Facts on Fat    5/17  Hypertension & Heart Disease Recommended Educational Videos Completed    [] Yes      [] No    **If not completed, Why? **           Smoking Cessation/Relaspe Prevention Intervention needed? [x] Yes      [] No  *Pt verbalizes and agrees to attend intervention Smoking Cessation/Relapse Prevention Scheduled? [] Yes      [x] No   Smoking Cessation/Relapse Prevention completed? [] Yes      [x] No   Smoking Cessation/Relapse Prevention completed? [] Yes      [x] No   Smoking Cessation/Relapse Prevention completed? [] Yes      [x] No    Pt stated he is currently not smoking.    Smoking Cessation Counseling attended  [] Yes      [] No  **If not completed, Why? **   Professional Referrals:  *Please check if needed  [] [facility]  [] [address]   [] Other:     Professional Referrals:  *Please check if needed  [] Diabetes Clinic  [] Lipid Clinic   [] Other: Professional Referrals:  *Please check if needed  [] Diabetes Clinic  [] Lipid Clinic   [] Other:   Preventative Medication Preventative Medication Preventative Medication Preventative Medication Preventative Medication Preventative Medication   Aspirin  [x] Yes    [] No  Blood Thinner: Clopidogrel/Effient/Brillinta  [x] Yes    [] No  Beta Blocker  [x] Yes    [] No  Ace Inhibitor  [] Yes    [x] No  Statin/Lipid Lowering  [x] Yes    [] No Medication Changes? [] Yes    [x] No Medication Changes? Unknown Medication Changes? [] Yes    [x] No Medication Changes? [] Yes    [x] No Medication Changes? [] Yes    [] No   *Education* *Education* *Education* *Education* *Education* *Education*   Does Obed Motts require any additional education? [x] Yes    [] No   Does Obed Motts require any additional education? [] Yes    [x] No Does Obed Motts require any additional education? [x] Yes    [] No Does Obed Motts require any additional education? [x] Yes    [] No Does Obed Motts require any additional education? [] Yes    [x] No Does Obed Motts require any additional education?   [] Yes    [] No   Additional Educational Videos    Exercise  [] Improve Performance    Medical  [] Aging Enhancing Your QoL  [] Biology of Weight Control  [] Decoding Lab Results  [] Diseases of Our Time - Overview  [] Sleep Disorders    Nutrition  [] Dining Out - Part 2  [] Fueling a Healthy Body  [] Menu Workshop  [] Planning Your Eating Strategy  [] Targeting Your Nutrition Priorities  [] Vitamins & Minerals    Healthy Mind-Set  [x] Smoking Cessation    Culinary  []Becoming a Pritikin    [] Cooking - Breakfast & Snacks  [] Cooking -Healhty Salads & Dressing  [] Cooking -Dinner & Sides  [] Cooking -Soups & Desserts    Overview  [] The Pritikin Solution Additional Educational Videos Completed     Additional Educational Videos Completed Additional Educational Videos Completed Additional Educational Videos Completed Additional Educational Videos Completed    [] Yes    [] No   *Goals* *Goals* *Goals* *Goals* *Goals* *Goals*   Diony's risk factor/education goals are as follows:    [x] Optimal BP <140/90  [] Blood Sugar <120  [x] Attend recommended video education sessions  [x] Takes surveillance  of patient's cardiac activity  [x] Continue continuous telemetry monitoring and notify me with any concerns  [] Other     Physician Response    [x] Cardiac rehab is reasonably and medically necessary for continuous cardiac monitoring surveillance  of patient's cardiac activity  [x] Continue continuous telemetry monitoring and notify me with any concerns   [] Other     Physician Response    [x] Cardiac rehab is reasonably and medically necessary for continuous cardiac monitoring surveillance  of patient's cardiac activity  [x] Continue continuous telemetry monitoring and notify me with any concerns   [] Other     Physician Response    [x] Cardiac rehab is reasonably and medically necessary for continuous cardiac monitoring surveillance  of patient's cardiac activity  [x] Continue continuous telemetry monitoring and notify me with any concerns   [] Other      Cosigned by: Ninfa Mckinnon MD at 2/24/2021  2:35 PM   Revision History  Date/Time User Provider Type Action   2/24/2021  2:35 PM Ninfa Mckinnon MD Physician Cosign   2/23/2021  1:34 PM Nancy Alexander Exercise Physiologist Sign       Cosigned by: Ninfa Mckinnon MD at 3/17/2021  3:07 PM   Revision History  Date/Time User Provider Type Action   3/17/2021  3:07 PM Ninfa Mckinnon MD Physician Cosign   3/17/2021  2:35 PM Akin Cline Exercise Physiologist Sign     Cosigned by: Ninfa Mckinnon MD at 4/15/2021 11:16 AM   Revision History  Date/Time User Provider Type Action   4/15/2021 11:16 AM Ninfa Mckinnon MD Physician Cosign   4/14/2021  8:48 AM Candy Mays Exercise Physiologist Sign   4/14/2021  8:48 AM Candy Mays Exercise Physiologist Sign    View Details Report        Cosigned by: Hanane Robb MD at 5/12/2021 12:20 PM   Revision History  Date/Time User Provider Type Action   5/12/2021 12:20 PM Hanane Robb MD Physician Cosign   5/12/2021 10:22 AM Jess Cali Exercise Physiologist Sign

## 2021-06-10 ENCOUNTER — NURSE ONLY (OUTPATIENT)
Dept: LAB | Age: 71
End: 2021-06-10

## 2021-06-11 ENCOUNTER — HOSPITAL ENCOUNTER (OUTPATIENT)
Dept: CARDIAC REHAB | Age: 71
Setting detail: THERAPIES SERIES
Discharge: HOME | End: 2021-06-11
Payer: MEDICARE

## 2021-06-11 PROCEDURE — G0423 INTENS CARDIAC REHAB NO EXER: HCPCS

## 2021-06-11 PROCEDURE — G0422 INTENS CARDIAC REHAB W/EXERC: HCPCS

## 2021-06-11 NOTE — PROGRESS NOTES
Nadiya SCHUSTER.:  1950  Acct Number: [de-identified]  MRN:  [id number]                  [facility] [employer] WORKSHOP             Date: 2021        Session # ________   [employer]op[employer] class covered:      () Adding Flavor     () Fast Breakfasts     () Salads and Dressings     () Savory Soups      () Sauces & Simple Sides     () Appetizers and Snacks     () Delicious Desserts     (X) Plant Based Proteins     () Fast Evening Meals     () Weekend Breakfasts     () Efficiency Cooking     () One Pot Wonders     Patients were shown how to choose, prep, and cook; substitutions and other options were given. Samples were offered. Recipes were given and questions answered. The patient above was in the [employer] for 59 minutes.       Electronically signed by Mel[facility]S [id number]  on 2021 at 12:07 PM

## 2021-06-14 ENCOUNTER — HOSPITAL ENCOUNTER (OUTPATIENT)
Dept: CARDIAC REHAB | Age: 71
Setting detail: THERAPIES SERIES
Discharge: HOME | End: 2021-06-14
Payer: MEDICARE

## 2021-06-14 VITALS — HEIGHT: 68 IN | WEIGHT: 160 LBS | BODY MASS INDEX: 24.25 KG/M2

## 2021-06-14 PROCEDURE — G0422 INTENS CARDIAC REHAB W/EXERC: HCPCS

## 2021-06-14 PROCEDURE — G0423 INTENS CARDIAC REHAB NO EXER: HCPCS

## 2021-06-14 NOTE — PROGRESS NOTES
Video Education Report - ICR/CR  Name:  Elsa Kerns     Date:  6/14/2021  MRN: [id number]     Session #:  29  Session Length: 40 min    Core Videos        []Heart Disease Risk Reduction       []Overview of Pritikin Eating Plan      []Move it          []Calorie Density         []Healthy Minds, Bodies, Hearts        []Label Reading - Part 1       []Metabolic Syndrome and Belly Fat        []How Our Thoughts Can Heal Our Hearts   []Dining Out - Part 1      []Biomechanical Limitations  []Facts on Fat        []Hypertension & Heart Disease    []Diseases of Our Time - Focusing on Diabetes   []Body Composition  []Nutrition Action Plan    [x]Exercise Action Plan    Comments:  Video completed, group discussion

## 2021-06-14 NOTE — PLAN OF CARE
[facility] Program - 14 UFKGNOI  [id number] Facility-Based Program  Individualized Cardiac Treatment Plan    Patient Name:  Ralph Bryan  :  1950  Age:  79 y.o. MRN:  [id number]  Diagnosis: CABG  Date of Event: 21   Physician:  Elizabeth Babcock  Next Office Visit:  21  Date Entered Program: 21  Risk Stratifications: [] Low [] Intermediate [x] High  Allergies: Allergies   Allergen Reactions    Penicillins Rash    Sulfa Antibiotics Rash       COVID -19 Screen  Do you have any of the following symptoms:  [] Fever [] Cough [] SOB [] Muscle/Body Ache [] Loss of taste/smell [x] None    Have you traveled outside of the [place]? [] Yes      [x] No    Have you been around anyone who has tested Positive for COVID 19?  [] Yes      [x] No    The following Education has been completed with patient. [x] Wait in the lobby until we call you back to rehab. [x] You must wear a mask while in the medical center, and in cardiac rehab. Please bring your own. [x] Bring your own bottle of water with you. [x] You can bring a visitor with you, however, they will need to sit in the waiting room while you are in cardiac rehab. Individual Cardiac Treatment Plan -EXERCISE  INITIAL 30 DAY 60 DAY 90 DAY FINAL DAY   EXERCISE  ASSESSMENT/PLAN EXERCISE  REASSESSMENT EXERCISE   REASSESSMENT EXERCISE   REASSESSMENT EXERCISE  DISCHARGE/FOLLOW-UP   Date: 21 Date: 3/17/21 Date: 21 Date: 21 Date: 21   Session #1 Session # 17 Session # 28 Session # 55    Pt not here for reassessment.     Session # 94-12  Last session completed on 21   Stages of Change Stages of Change Stages of Change Stages of Change Stages of Change   [] Pre Contemplation  [] Contemplation  [] Preparation  [x] Action  [] Maintenance  [] Relapse [] Pre Contemplation  [] Contemplation  [] Preparation  [x] Action  [] Maintenance  [] Relapse [] Pre Contemplation  [] Contemplation  [x] Preparation  [] Action  [] Maintenance  [] Relapse [] Pre Contemplation  [] Contemplation  [x] Preparation  [] Action  [] Maintenance  [] Relapse [] Pre Contemplation  [] Contemplation  [x] Preparation  [] Action  [] Maintenance  [] Relapse   EXERCISE ASSESSMENT EXERCISE ASSESSMENT EXERCISE ASSESSMENT EXERCISE ASSESSMENT EXERCISE ASSESSMENT   6 Min Walk Test  Distance walked:   0.09 miles  475 ft.  1.7 METs  Max HR:68 BPM      RPE:  12    THR:    Rhythm:  Sinus Ludwig/ NSR First Exercise Session:3/1/21 Patient has been on hold since 4/5/21, Pacemaker on 4/6/21.  6 Min Walk Test  Distance walked:   0.17 miles  897.6 ft  2.3 METs  Max HR:62 BPM      RPE:  12  %Change ft= 89%    Rhythm:  paced   DASI: 4.6 METs DASI: 5.6METs DASI: na DASI: ** METs DASI: 8.33 METs   Return to Work  Time [place] on returning to work? [x] Yes              [] No   [] Disabled     [] Retired    If yes:  *Job description: oates, standing & raising arms  *Required MET level=4-5  *Return to Work Date: ? Return to work: Has Navin Galdamez returned to work? [x] Yes    [] No    Return to work date set? [x] Yes   [] No        Return to work: Has Navin Galdamez returned to work? [x] Yes    [] No     Return to work: Has Navin Galdamez returned to work? [x] Yes    [] No    Navin Galdamez is doing ok at work. Return to work: Has Navin Galdamez returned to work? [x] Yes    [] No    *Required MET Level achieved for job duties? [x] Yes    [] No   Orthopedic Limitations/  [] Yes    [x] No       Orthopedic Limitations  na Orthopedic Limitations  NA   Orthopedic Limitations  NuStep instead of AD Orthopedic Limitations    NS    Fall Risk  Fall risk assessed? [x] Yes      [] No    Balance Issues?   [] Yes      [x] No     [] Walker [] Cane    [x] Safety issues reviewed      Fall Risk  na Fall Risk  NA Fall Risk  NA Fall Risk    NS   Home Exercise  [x] Yes    [] No  Type: walking  Frequency: daily  Duration: 20 min Home Exercise  [x] Yes    [] No  Type: walking  Frequency:4 x per week  Duration: 20 min Home Exercise  [x] 2. 2 METs  NS: 36 Briceño= 2.2 METs  AE: 22 briceño = 1.8 METs     Frequency:    ICR: 3x/week  Home: 2-3x/wk Frequency:   ICR: 3x/week  Home: 3x/wk Frequency:  ICR: 3x/week  Home: 3-4x/wk Frequency:  ICR: 3x/week  Home: 3-4x/wk Frequency:  Colome Skill will continue exercise at  5-7 days/week   Duration:   Total aerobic exercise = 30-45 min    5-8 min/mode Duration:  Total aerobic exercises = 25-35 min     10 min/mode Duration:  Total aerobic exercises = 25-35 min     12min/mode Duration:  Total aerobic exercises = 30-45 min     15 min/mode Duration:  Total erobic exercise =  60-90 min   Intensity:   MET Level = 1.7-1.8  RPE = 12-15 Intensity:  Max MET Level = 2.0  RPE = 12-15 Intensity:  Max MET Level =  2.0  RPE = 12-15 Intensity:  Max MET Level = 2.2  RPE = 12-15 Intensity:  Max MET Level = 2.2  RPE = 12-15   Progression: increase aerobic activity up to 15 min over next 4 weeks by increasing time 2-3 min/week. Progression:  Increase duration 12-15 min over the next 4 weeks as tolerated Progression: Progress as tolerated once patient returns   Progression:  Progress as tolerated. Progression:  Increase time/intensity when RPE <13, and HR is in [facility]   [x] Yes      [] No  Upper and Lower body strength training 2x/wk    Wt: 2#       Reps:  8-15    *Increase wt. after completing 15 reps with an RPE of <12/13. [x] Yes      [] No  Upper and Lower body strength training 2x/wk    Wt: 3#       Reps:  8-15    *Increase wt. after completing 15 reps with an RPE of <12/13. [x] Yes      [] No  Upper and Lower body strength training 2x/wk    Wt: 3#       Reps:  8-15    *Increase wt. after completing 15 reps with an RPE of <12/13. [x] Yes      [] No  Upper and Lower body strength training 2x/wk    Wt: 3#       Reps:  8-15    *Increase wt. after completing 15 reps with an RPE of <12/13.  Continue Strength Training at home   [x] Exercise Log & Strength Action  [] Maintenance  [] Relapse [] Pre Contemplation  [x] Contemplation  [] Preparation  [] Action  [] Maintenance  [] Relapse [] Pre Contemplation  [x] Contemplation  [] Preparation  [] Action  [] Maintenance  [] Relapse [] Pre Contemplation  [x] Contemplation  [] Preparation  [] Action  [] Maintenance  [] Relapse [] Pre Contemplation  [x] Contemplation  [] Preparation  [] Action  [] Maintenance  [] Relapse   NUTRITION ASSESSMENT NUTRITION ASSESSMENT NUTRITION ASSESSMENT NUTRITION ASSESSMENT NUTRITION ASSESSMENT   Weight Management  Weight: 146.8        Height: 68\"   BMI: 22  Weight Management  Weight: 152.6                Weight Management  Weight: 155.4                 Weight Management  Weight: 159.6 Weight Management  Weight: 160.8                    BMI: 24.4   Eating Plan  Current eating habits: low salt Eating Plan  Changes: low sodium Eating Plan  Changes: unknown Eating Plan  Changes: n/a Eating Plan Improvements: n/a   Alcohol Use  [x] none          [] daily  [] weekly      [] special          Diet Assessment Tool:  RATE YOUR PLATE  *Given to patient to complete and return. Diet Assessment Tool:    Score: 58/69       Diet Assessment Tool: RATE YOUR PLATE  Score: 20/49   NUTRITION PLAN NUTRITION PLAN NUTRITION PLAN NUTRITION PLAN NUTRITION PLAN   *Interventions* *Interventions* *Interventions* *Interventions* *Interventions*   Initial Survey given Goal Setting Discussion:   [x] Yes      [] No       Follow Up Survey Reviewed & Goals Updated: yes     Professional Referral  Please check if needed. [] Dietitian Consult   [] Wt. Management Referral  [] Other:  Professional Referral  Please check if needed. [] Dietitian Consult   [] Wt. Management Referral  [] Other: Professional Referral  Please check if needed. [] Dietitian Consult   [] Wt. Management Referral  [] Other: Professional Referral  Please check if needed. [] Dietitian Consult   [] Wt.  Management Referral  [] Other: Professional Workshops  [] Attend 1:1   [x] Attend Cooking Classes   Claude Jewel achieved nutritional goals   [x] Yes    [] No  If no, why? Use knowledge gained to continue Pritikin eating plan at home       Individual Cardiac Treatment Plan - Psychosocial  PSYCHOSOCIAL  ASSESSMENT/PLAN PSYCHOSOCIAL  REASSESSMENT PSYCHOSOCIAL   REASSESSMENT PSYCHOSOCIAL   REASSESSMENT PSYCHOSOCIAL  DISCHARGE/FOLLOW-UP   Stages of Change Stages of Change Stages of Change Stages of Change Stages of Change   [] Pre Contemplation  [x] Contemplation  [] Preparation  [] Action  [] Maintenance  [] Relapse [] Pre Contemplation  [x] Contemplation  [] Preparation  [] Action  [] Maintenance  [] Relapse [] Pre Contemplation  [x] Contemplation  [] Preparation  [] Action  [] Maintenance  [] Relapse [] Pre Contemplation  [x] Contemplation  [] Preparation  [] Action  [] Maintenance  [] Relapse [] Pre Contemplation  [] Contemplation  [x] Preparation  [] Action  [] Maintenance  [] Relapse   PSYCHOSOCIAL ASSESSMENT PSYCHOSOCIAL ASSESSMENT PSYCHOSOCIAL ASSESSMENT PSYCHOSOCIAL ASSESSMENT PSYCHOSOCIAL ASSESSMENT   Behavioral Outcomes Behavioral Outcomes Behavioral Outcomes Behavioral Outcomes Behavioral Outcomes   Tool Used:  Ferrans & Candelario, Quality of Life Index, Cardiac Version IV  *Given to patient to complete. Tool Used:    Ferrans & Candelario, Quality of Life Index, Cardiac Version IV     Pt did not complete. Tool Used:     Ferrans & Candelario, Quality of Life Index, Cardiac Version IV    Pt did not complete. PHQ-9 score 4  Depression Severity  [x]Minimal  []Mild   []Moderate  []Moderately Severe  []Severe    PHQ-9 score 2  Depression Severity  [x]Minimal  []Mild   []Moderate  []Moderately Severe []Severe   Does patient have Family Support? [x] Yes      [] No  No signs of marital/family distress       Within the Past Month:  *Have you wished you were dead or wished you could go to sleep and not wake up?   [] Yes      [x] No  *Have you had any thoughts of killing yourself? [] Yes      [x] No         Using a scale of 0-10, 0=none, 10=very:   Rate your depression: 5  Rate your anxiety:  4  Using a scale of 0-10, 0=none, 10=very:   Rate your depression: 5  Rate your anxiety:  7 Using a scale of 0-10, 0=none, 10=very:   Rate your depression: na  Rate your anxiety: na Using a scale of 0-10, 0=none, 10=very:   Rate your depression: na  Rate your anxiety:  na Using a scale of 0-10, 0=none, 10=very:   Rate your depression: 0  Rate your anxiety:  3   Signs and Symptoms of Depression Present? [x] Yes      [] No  If yes, please explain: Pt states he has been depressed but it is getting better since he is able to do things now. Signs and Symptoms of Depression Present? [x] Yes      [] No  If yes, please explain:  Upset about his current heart complications Signs and Symptoms of Depression Present? [x] Yes      [] No  If yes, please explain:  See previous Signs and Symptoms of Depression Present? [x] Yes      [] No  If yes, please explain: Will update next assessment. Signs and Symptoms of Depression Present? [] Yes      [x] No  If yes, please explain: Seems to be    Signs and Symptoms of Anxiety Present? [x] Yes      [] No  If yes, please explain:  See above Signs and Symptoms of Anxiety Present? [x] Yes      [] No  If yes, please explain:  See above Signs and Symptoms of Anxiety Present? [x] Yes      [] No  If yes, please explain:  See previous Signs and Symptoms of Anxiety Present? [x] Yes      [] No  If yes, please explain: Will update next assessment. Signs and Symptoms of Anxiety Present? [x] Yes      [] No  If yes, please explain:  Pt doing better. Back at work. [] Patient has poor eye contact   [x] Flat affect present. [x] Signs of anxiety, but no anger or hostility    [] Signs social isolation present.    []  Signs of alcohol or substance abuse       PSYCHOSOCIAL PLAN PSYCHOSOCIAL PLAN PSYCHOSOCIAL PLAN PSYCHOSOCIAL PLAN PSYCHOSOCIAL PLAN *Interventions* *Interventions* *Interventions* *Interventions* *Interventions*   *Please check if needed  [] Psych Consult  [x] Physician Referral- suggested discussing depression and anxiety with PCP  [x] Stress Management Skills *Please check if needed  [] Psych Consult  [x] Physician Referral- encouraged Jodi Mcleod to talk to his PCP about depression and anxiety  [x] Stress Management Skills *Please check if needed  [] Psych Consult  [x] Physician Referral  [x] Stress Management Skills *Please check if needed  [] Psych Consult  [x] Physician Referral - Will update next referral  [x] Stress Management Skills *Please check if needed  [] Psych Consult  [] Physician Referral  [x] Stress Management Skills   Is patient currently taking anti-depressant or anti-anxiety medications? [] Yes      [x] No   Change in anti-depressant or anti-anxiety medications? [] Yes      [x] No   Change in anti-depressant or anti-anxiety medications? [] Yes      [x] No   Change in anti-depressant or anti-anxiety medications? [] Yes      [x] No   Change in anti-depressant or anti-anxiety medications?   [] Yes      [x] No     *Education* *Education* *Education* *Education* *Education*   Healthy Mind-Set Workshops Recommended  [x] Stress & Health  [x] Taking Charge of Stress  [x] New Thoughts, New Behaviors  [x] Managing Moods & Relationships Healthy Mind-Set Workshops Attended/Date  3/3/21New Thoughts, New Behaviors Healthy Mind-Set Workshops Attended/Date    3/24/21  Managing Moods Healthy Mind-Set Workshops Attended/Date    5/3/21  Taking Charge of Stress Healthy Mind-Set Workshops  Completed  [x] Yes      [] No   *Goals* *Goals* *Goals* *Goals* *Goals*   Maria Fernanda psychosocial goals are as follows:  Complete HADS & Vikki & Andree, Quality of Life Index, Cardiac Version IV Maria Fernanda psychosocial goals are as follows:  [x] Attend Healthy Mind-Set Workshops  [x] Reduce depression symptom severity by 1 level   Maria Fernanda psychosocial goals are as follows:  [x] Attend Healthy Mind-Set Workshops  [x] Reduce depression symptom severity by 1 level   Diony's psychosocial goals are as follows:  [x] Attend Healthy Mind-Set Workshops  [x] Reduce depression symptom severity by 1 level   Albanian Jamieer achieved psychosocial goals?   [x] Yes    [] No  [x] Use methods learned to continue to reduce depression symptom severity by 1 level       Individual Cardiac Treatment Plan - Other:  Risk Factor/Education  RISK FACTOR  ASSESSMENT/PLAN RISK FACTOR  REASSESSMENT  RISK FACTOR  REASSESSMENT RISK FACTOR  REASSESSMENT RISK FACTOR   DISCHARGE/FOLLOW-UP   Stages of Change Stages of Change Stages of Change Stages of Change Stages of Change   [] Pre Contemplation  [x] Contemplation  [] Preparation  [] Action  [] Maintenance  [] Relapse [] Pre Contemplation  [] Contemplation  [x] Preparation  [] Action  [] Maintenance  [] Relapse [] Pre Contemplation  [] Contemplation  [x] Preparation  [] Action  [] Maintenance  [] Relapse [] Pre Contemplation  [] Contemplation  [x] Preparation  [] Action  [] Maintenance  [] Relapse [] Pre Contemplation  [] Contemplation  [x] Preparation  [] Action  [] Maintenance  [] Relapse   RISK FACTOR/EDUCATION ASSESSMENT RISK FACTOR/EDUCATION ASSESSMENT RISK FACTOR/EDUCATION ASSESSMENT RISK FACTOR/EDUCATION ASSESSMENT RISK FACTOR /EDUCATION ASSESSMENT   Hypertension  [x] Yes      [] No    Resting BP: 112/62  Peak Ex BP:130/82  Medication: losartan, metoprolol   Hypertension  Resting BP: 114/52  Peak Ex BP:126/64  Medication Changes:  [] Yes      [x] No Hypertension  Resting BP: 120/70  Peak Ex BP:142/78  Medication Changes:  Unknown Hypertension  Resting BP: 108/58  Peak Ex BP: 122/68  Medication Changes:  [] Yes      [x] No Hypertension  Resting BP: 84/42  Peak Ex BP: 100/62  Medication Changes:  [] Yes      [x] No   Lipids  HLD/DLD  [x] Yes      [] No  TOTAL CHOL: 126  HDL:  36  LDL:  71  TRI  Medication: atorvastatin Lipids  Medication Changes:  [] Yes [x] No     Lipids  Medication Changes:  nUnknown   Lipids  Medication Changes:  [] Yes      [x] No     Lipids  TOTAL CHOL: 126  HDL:  36  LDL:  71  TRI  Medication Changes:  [] Yes      [x] No   Diabetes  [] Yes      [x] No   Diabetes  na   Diabetes  NA     Diabetes  [x] No     Diabetes  [x] No   Tobacco Use  [] Current  [x] Former  [] Never    Years smoked: 35-40:    Date Quit: 21    # cigarettes smoked/day: 1  Smokeless Tobacco use:   [] Yes      [x] No   Tobacco Use  Change in smoking status   [] Yes      [x] No       Tobacco Use  Change in smoking status   [] Yes      [x] No       Tobacco Use  Change in smoking status   [] Yes      [x] No     Tobacco Use  Change in smoking status   [] Yes      [x] No              Learning Barriers  Please select one:  [] Speech  [] Literacy  [] Hearing  [] Cognitive  [] Vision  [x] Ready to Learn Learning Barriers Addressed:   [] Yes      [] No  na Learning Barriers Addressed:   [x] Yes      [] No   Learning Barriers Addressed:  [x] Yes      [] No Learning Barriers Addressed:  [x] Yes      [] No   RISK FACTOR/EDUCATION PLAN RISK FACTOR/EDUCATION PLAN RISK FACTOR/EDUCATION PLAN RISK FACTOR/EDUCATION PLAN RISK FACTOR/EDUCATION PLAN   *Interventions* *Interventions* *Interventions* *Interventions* *Interventions*   Recommended Educational Videos    [x] Overview of The Pritikin Eating Plan  [x] Heart Disease Risk Reduction  [x] Move It! [x] Calorie Density  [x] Healthy Minds, Bodies, Hearts  [x] Label Reading  [x] Metabolic Syndrome & Belly   Or  [] How Our Thoughts Can Heal our Heart  [x] Dining Out-Part 1  [x] Biomechanical Limitations  [x] Facts on Fat  [x] Hypertension & Heart Disease  [x] Diseases of Our Times-Focusing on Diabetes  [x] Body Composition  [x] Nurtition Action Plan  [x] Exercise Action Plan   Completed Videos/Date      21  Overview of The Pritikin Eating Plan    3/1/21  Heart Disease Risk Reduction    3/15/21   Move It!  Completed Videos/Date    NA Completed Videos/Date      5/3/21  Healthy minds, bodies, hearts    4/26/21  Dining out part 1 Recommended Educational Videos Completed    [x] Yes      [] No      5/24  Label Reading    2/98  Metabolic Syndrome    6/1  Biomechanical Limitations    6/7  Facts on Fat    5/17  Hypertension & Heart Disease    6/1/21 Biomechanical Limitations    6/7/21  Facts on Fat    6/14/21  Exercise Action Plan          Smoking Cessation/Relaspe Prevention Intervention needed? [x] Yes      [] No  *Pt verbalizes and agrees to attend intervention Smoking Cessation/Relapse Prevention Scheduled? [] Yes      [x] No   Smoking Cessation/Relapse Prevention completed? [] Yes      [x] No   Smoking Cessation/Relapse Prevention completed? [] Yes      [x] No   Smoking Cessation/Relapse Prevention completed? [] Yes      [x] No    Pt stated he is currently not smoking. Professional Referrals:  *Please check if needed  [] Diabetes Clinic  [] Lipid Clinic   [] Other:     Professional Referrals:  *Please check if needed  [] Diabetes Clinic  [] Lipid Clinic   [] Other:   Preventative Medication Preventative Medication Preventative Medication Preventative Medication Preventative Medication   Aspirin  [x] Yes    [] No  Blood Thinner: Clopidogrel/Effient/Brillinta  [x] Yes    [] No  Beta Blocker  [x] Yes    [] No  Ace Inhibitor  [] Yes    [x] No  Statin/Lipid Lowering  [x] Yes    [] No Medication Changes? [] Yes    [x] No Medication Changes? Unknown Medication Changes? [] Yes    [x] No Medication Changes? [] Yes    [x] No   *Education* *Education* *Education* *Education* *Education*   Does Tiney Service require any additional education? [x] Yes    [] No   Does Tiney Service require any additional education? [] Yes    [x] No Does Tiney Service require any additional education? [x] Yes    [] No Does Tiney Service require any additional education? [x] Yes    [] No Does Tiney Service require any additional education?   [] Yes    [x] No   Additional Educational Videos    Exercise  [] Improve Performance    Medical  [] Aging Enhancing Your QoL  [] Biology of Weight Control  [] Decoding Lab Results  [] Diseases of Our Time - Overview  [] Sleep Disorders    Nutrition  [] Dining Out - Part 2  [] Fueling a Healthy Body  [] Menu Workshop  [] Planning Your Eating Strategy  [] Targeting Your Nutrition Priorities  [] Vitamins & Minerals    Healthy Mind-Set  [x] Smoking Cessation    Culinary  []Becoming a Pritikin    [] Cooking - Breakfast & Snacks  [] Cooking -Healhty Salads & Dressing  [] Cooking -Dinner & Sides  [] Cooking -Soups & Desserts    Overview  [] The Pritikin Solution Additional Educational Videos Completed     Additional Educational Videos Completed Additional Educational Videos Completed Additional Educational Videos Completed   *Goals* *Goals* *Goals* *Goals* *Goals*   Diony's risk factor/education goals are as follows:    [x] Optimal BP <140/90  [] Blood Sugar <120  [x] Attend recommended video education sessions  [x] Takes medications as prescribed 100% of the time   [] ** Diony's risk factor/education goals are as follows:    [x] Optimal BP <140/90  [] Blood Sugar <120  [x] Attend recommended video education sessions  [x] Takes medications as prescribed 100% of the time   [] ** Diony's risk factor/education goals are as follows:    [x] Optimal BP <140/90  [] Blood Sugar <120  [x] Attend recommended video education sessions  [x] Takes medications as prescribed 100% of the time   [] ** Diony's risk factor/education goals are as follows:    [x] Optimal BP <140/90  [] Blood Sugar <120  [x] Attend recommended video education sessions  [x] Takes medications as prescribed 100% of the time    Diony's risk factor/education goals are as follows:    [x] Optimal BP <140/90  [] Blood Sugar <120  [x] Attend recommended video education sessions  [x] Takes medications as prescribed 100% of the time   [] **     Monitored telemetry has revealed Sinus Ludwig/ NSR   Monitored telemetry has revealed Sinus Ericka/ NSR/At-fib   Monitored telemetry has revealed Rossy Peals, NSR w/ PVCs and PACs, At.  Fib  [] documented arrhythmia at increasing workloads  [] associated symptoms  Monitored telemetry has revealed NSR, sinus ericka, PVCs    [] documented arrhythmia at increasing workloads  [] associated symptoms  Monitored telemetry has revealed paced, NSR    [] documented arrhythmia at increasing workloads  [] associated symptoms **   Physician Response    [x] Cardiac rehab is reasonably and medically necessary for continuous cardiac monitoring surveillance  of patient's cardiac activity  [x] Initiate continuous telemetry monitoring and notify me with any concerns  [] Other   Physician Response    [x] Cardiac rehab is reasonably and medically necessary for continuous cardiac monitoring surveillance  of patient's cardiac activity  [x] Continue continuous telemetry monitoring and notify me with any concerns  [] Other     Physician Response    [x] Cardiac rehab is reasonably and medically necessary for continuous cardiac monitoring surveillance  of patient's cardiac activity  [x] Continue continuous telemetry monitoring and notify me with any concerns   [] Other     Physician Response    [x] Cardiac rehab is reasonably and medically necessary for continuous cardiac monitoring surveillance  of patient's cardiac activity  [x] Continue continuous telemetry monitoring and notify me with any concerns   [] Other     Physician Response    [x] Cardiac rehab is reasonably and medically necessary for continuous cardiac monitoring surveillance  of patient's cardiac activity  [x] Continue continuous telemetry monitoring and notify me with any concerns   [] Other     Cosigned by: Lisette Armendariz MD at 2/24/2021  2:35 PM   Revision History  Date/Time User Provider Type Action   2/24/2021  2:35 PM Lisette Armendariz MD Physician Cosign   2/23/2021  1:34 PM [facility] Exercise Physiologist Sign       Cosigned by: Lisette Armendariz MD at 3/17/2021

## 2021-06-14 NOTE — PROGRESS NOTES
[facility]ospital Facility-Based Program  Phase 2 Cardiac Rehab Weekly Progress Report      Patient prescribed exercise:  9:00 class. 3 times per week in rehab, 1-4 times per week at home for the amount of sessions/weeks specified by insurance. Current Levels: Treadmill: 1. 5mph/0% for 15 minutes, NuStep:  36 Cagle for 15 minutes, UBE: 22watts for 5 minutes. Progression Discussion: Maintain/Increase Aerobic exercise 35 minutes to work on endurance. Attempt to increase intensity by 5-20% for each modality this week. Try to increase intensities until Silver Dominguez rates the exercises a 13-17 on Daniel RPE.

## 2021-06-15 ENCOUNTER — HOSPITAL ENCOUNTER (OUTPATIENT)
Dept: NON INVASIVE DIAGNOSTICS | Age: 71
Discharge: HOME | End: 2021-06-15
Payer: MEDICARE

## 2021-06-15 DIAGNOSIS — I25.5 ISCHEMIC CARDIOMYOPATHY: ICD-10-CM

## 2021-06-15 LAB
LV EF: 48 %
LVEF MODALITY: NORMAL

## 2021-06-15 PROCEDURE — 93306 TTE W/DOPPLER COMPLETE: CPT

## 2021-06-21 NOTE — TELEPHONE ENCOUNTER
Date of last visit:  5/18/2021  Date of next visit:  8/24/2021    Requested Prescriptions     Pending Prescriptions Disp Refills    rOPINIRole (REQUIP) 0.5 MG tablet [Pharmacy Med Name: ROPINIROLE 0.5MG TABLETS] 90 tablet 1     Sig: TAKE 1 TABLET BY MOUTH EVERY EVENING

## 2021-08-24 NOTE — PROGRESS NOTES
faraz sci dual pacer in office/chronics     . Krunal Armas Battery longevity:  15 years on device   Presenting rhythm  AP VS     Atrial impedance 575  RV impedance 669    P wave sensing 4.4  R wave sensing 9    63 % atrial paced  4 % RV paced     Atrial threshold 0.8 V  at 0.4ms chronic amplitude to 2V   RV threshold 1.1 V at 0.4 ms chronic amplitude 2.5  Mode switches   PAF

## 2021-08-24 NOTE — PATIENT INSTRUCTIONS
You may receive a survey regarding the care you received during your visit. Your input is valuable to us. We encourage you to complete and return your survey. We hope you will choose us in the future for your healthcare needs. Continue:  · Continue current medications  · Daily weights and record  · Fluid restriction of 2 Liters per day  · Limit sodium in diet to around 7695-6143 mg/day  · Monitor BP  · Activity as tolerated     Call the [facility] for any of the following symptoms: [phone]   Weight gain of 2-3 pounds in 1 day or 5 pounds in 1 week   Increased shortness of breath   Shortness of breath while laying down   Cough   Chest pain   Swelling in feet, ankles or legs   Tenderness or bloating in the abdomen   Fatigue    Decreased appetite or nausea    Confusion       Start checking BP after taking medications  Increased Entresto, changed lasix to 20mg daily  Rechecking labs in 14 days. Call with weight gain as discussed and will go back to old Lasix regimen.

## 2021-08-24 NOTE — PROGRESS NOTES
[facility] Failure [facility]       Visit Date: 8/24/2021  Cardiologist:  Dr. Karuna Gallo  Primary Care Physician: Dr. Prosper Dial MD    Roxanna Mendoza is a 79 y.o. male who presents today for:  Chief Complaint   Patient presents with    Congestive Heart Failure       HPI:   Roxanna Mendoza is a 79 y.o. male who presents to the office for a follow up patient visit in the heart failure clinic. Accompanied by self. TYPE HF: HFrEF (25% on 1/5/21) (40-50% on 6/15/21)  Cause: ischemic cardiomyopathy  Device: Dual chamber pacemaker  HX: CAD, Ischemic cardiomyopathy, HLD, Afib s/p CABG, HTN, COPD, Smoker (40pack yr, has stopped since 1/2021). CABG w/ clip of left atrial appendage (1/2021)    Dry Wt:  160    Hospitalization:    4/5/21: palpitations, Afib w/ RVR. Increased metoprolol      Concerns today:   Activity: treadmill, bicycle in doors. Diet: low sodium low fluid diet. Patient has:  Chest Pain: no  SOB: no  Orthopnea/PND: yes, has bed that elevates  CHACHO: no  Edema: no  Fatigue: no  Abdominal bloating: no  Cough: no  Appetite: good  Home weight: daily, 160s  Home blood pressure: 140s but has been checking prior to blood pressure.      Past Medical History:   Diagnosis Date    Asthma     Atrial fibrillation with RVR ([address] 04/05/2021    [employer] dual pacemaker  04/15/2021    Collagenous colitis 2021    per scope 2021    Colon polyps 2021    dr Wanda Blank    COPD, mild ([address]     Eczema of hand     HTN (hypertension)     Hyperlipidemia     Smoker      Past Surgical History:   Procedure Laterality Date    COLONOSCOPY  06/10/2021    theresa ccolon polyps and collagenous colitis    CORONARY ARTERY BYPASS GRAFT  01/12/2021    cabg x 3 with lima and atrial appendage clip  dr Lexi Hess GRAFT N/A 01/12/2021    CABG  X 3 WITH DEJAH, Atrial Appendage Clip performed by Al Reed MD at [address] [place] [address] Road  06/2008    polyps    OTHER SURGICAL HISTORY  DEC 7TH 2012 DR Sid [facility]     IGS ENDOSCOPIC BI LAT MAXILLARY, ETHMOID, SPHENOID, FRONTAL SINUSOTOMY WITH SEPTOPLASTY AND TURBINOPLASTIES    SKIN CANCER EXCISION  09/2014    Left side of Forehead     TOOTH EXTRACTION  02/2017     Family History   Problem Relation Age of Onset    Heart Disease Mother     Heart Disease Father         cabg    Diabetes Brother     Heart Disease Brother      Social History     Tobacco Use    Smoking status: Current Every Day Smoker     Packs/day: 1.00     Years: 40.00     Pack years: 40.00     Types: Cigarettes    Smokeless tobacco: Never Used   Substance Use Topics    Alcohol use:  Yes     Alcohol/week: 0.0 standard drinks     Comment: very little     Current Outpatient Medications   Medication Sig Dispense Refill    furosemide (LASIX) 20 MG tablet 20 mg daily 180 tablet 3    sacubitril-valsartan (ENTRESTO) 49-51 MG per tablet Take 1 tablet by mouth 2 times daily 60 tablet 3    rOPINIRole (REQUIP) 0.5 MG tablet TAKE 1 TABLET BY MOUTH EVERY EVENING 90 tablet 1    amiodarone (CORDARONE) 200 MG tablet Take 1 tablet by mouth daily 90 tablet 3    rivaroxaban (XARELTO) 20 MG TABS tablet Take 1 tablet by mouth daily (with breakfast) 90 tablet 3    metoprolol succinate (TOPROL XL) 100 MG extended release tablet Take 1 tablet by mouth daily 90 tablet 1    atorvastatin (LIPITOR) 40 MG tablet Take 1 tablet by mouth nightly 90 tablet 3    potassium chloride (KLOR-CON M) 10 MEQ extended release tablet Take 1 tablet by mouth daily 180 tablet 3    aspirin 81 MG chewable tablet Take 1 tablet by mouth daily 30 tablet 3    albuterol sulfate  (90 Base) MCG/ACT inhaler Inhale 2 puffs into the lungs every 6 hours as needed for Wheezing 1 Inhaler 4    umeclidinium-vilanterol (ANORO ELLIPTA) 62.5-25 MCG/INH AEPB inhaler Inhale 1 puff into the lungs daily 1 each 3    fluocinonide (LIDEX) 0.05 % ointment Apply topically as needed      loratadine (CLARITIN) 10 MG tablet Take 10 mg by mouth daily       Multiple Vitamins-Minerals (CENTRUM SILVER PO) Take 1 tablet by mouth daily        No current facility-administered medications for this visit. Allergies   Allergen Reactions    Penicillins Rash    Sulfa Antibiotics Rash       SUBJECTIVE:   Review of Systems   Constitutional: Negative for activity change, appetite change, diaphoresis and fatigue. Respiratory: Negative for cough and shortness of breath. Cardiovascular: Negative for chest pain, palpitations and leg swelling. Gastrointestinal: Negative for abdominal distention, nausea and vomiting. Neurological: Negative for weakness, light-headedness and headaches. Hematological: Negative for adenopathy. Psychiatric/Behavioral: Negative for sleep disturbance. OBJECTIVE:   Today's Vitals:  BP (!) 140/60   Pulse 55   Ht 5' 8\" (1.727 m)   Wt 160 lb 12.8 oz (72.9 kg)   SpO2 98%   BMI 24.45 kg/m²     Physical Exam  Vitals reviewed. Constitutional:       General: He is not in acute distress. Appearance: Normal appearance. He is well-developed. He is not diaphoretic. HENT:      Head: Normocephalic and atraumatic. Eyes:      Conjunctiva/sclera: Conjunctivae normal.   Cardiovascular:      Rate and Rhythm: Normal rate and regular rhythm. Heart sounds: Normal heart sounds. No murmur heard. Pulmonary:      Effort: Pulmonary effort is normal. No respiratory distress. Breath sounds: Normal breath sounds. No wheezing or rales. Abdominal:      General: Bowel sounds are normal. There is no distension. Palpations: Abdomen is soft. Tenderness: There is no abdominal tenderness. Musculoskeletal:         General: Normal range of motion. Cervical back: Normal range of motion and neck supple. Right lower leg: No edema. Left lower leg: No edema. Skin:     General: Skin is warm and dry. Capillary Refill: Capillary refill takes less than 2 seconds.    Neurological:      Mental Status: He is alert and oriented to person, place, and time. Coordination: Coordination normal.   Psychiatric:         Behavior: Behavior normal.         Wt Readings from Last 3 Encounters:   08/24/21 160 lb 12.8 oz (72.9 kg)   06/14/21 160 lb (72.6 kg)   05/19/21 159 lb 8 oz (72.3 kg)     BP Readings from Last 3 Encounters:   08/24/21 (!) 140/60   05/19/21 122/60   05/18/21 126/74     Pulse Readings from Last 3 Encounters:   08/24/21 55   05/19/21 60   05/18/21 68     Body mass index is 24.45 kg/m². ECHO:   Summary   Normal left ventricular size and systolic function. There were no regional wall motion abnormalities. Wall thickness was within normal limits. Ejection fraction was estimated at 45-50%. Doppler parameters were consistent with abnormal left ventricular   relaxation (grade 1 diastolic dysfunction). IVC size is within normal limits with normal respiratory phasic changes. Signature      ----------------------------------------------------------------   Electronically signed by Cherylene Boys MD (Interpreting   physician) on 06/15/2021 at 04:26 PM   ----------------------------------------------------------------      CATH/STRESS:     LVEDP 20  IMPRESSION:   1. Severe ischemic cardiomyopathy. 2.  Multivessel coronary artery disease as discussed above including  severely stenotic lesion of distal left main coronary artery, 70% to 80%  in severity.     RECOMMENDATIONS:  Continue inpatient care. Continue to monitor the  patient on telemetry. Continue aspirin. Continue on the statin. We  would recommend increasing the dose to 40 mg p.o. daily. Monitor volume  status. Consult Cardiovascular Surgery to assess for possible coronary  artery bypass graft surgery. Heart team discussion regarding  revascularization.     Findings and plan of care was discussed with the patient.   Questions  were answered.           Eitan Sauceda MD     D: 01/08/2021 9:52:45       T: 01/08/2021 10:46:26 [email or web address]  Job#: [id number]     Doc#: [id number]      Results reviewed:  BNP: No results found for: BNP  CBC:   Lab Results   Component Value Date    WBC 8.2 04/06/2021    RBC 4.39 04/06/2021    RBC 4.84 11/07/2011    HGB 13.9 04/06/2021    HCT 42.7 04/06/2021     04/06/2021     CMP:    Lab Results   Component Value Date     04/06/2021    K 4.4 04/06/2021     04/06/2021    CO2 26 04/06/2021    BUN 17 04/06/2021    CREATININE 1.3 04/06/2021    LABGLOM 55 04/06/2021    GLUCOSE 87 04/06/2021    GLUCOSE 94 11/07/2011    CALCIUM 9.2 04/06/2021     Hepatic Function Panel:    Lab Results   Component Value Date    ALKPHOS 60 04/05/2021    ALT 16 04/05/2021    AST 21 04/05/2021    PROT 7.6 04/05/2021    BILITOT 0.2 04/05/2021    BILIDIR <0.2 01/11/2021    LABALBU 4.0 04/05/2021    LABALBU 4.3 11/07/2011     Magnesium:    Lab Results   Component Value Date    MG 2.2 01/16/2021     PT/INR:    Lab Results   Component Value Date    INR 1.25 04/06/2021     Lipids:    Lab Results   Component Value Date    TRIG 96 01/07/2021    HDL 36 01/07/2021    LDLCALC 71 01/07/2021    LDLDIRECT 79.82 01/11/2021       ASSESSMENT AND PLAN:   The patient's condition/symptoms are Stable: No clinical evidence of fluid overload today. Continue current medical regimen without changes at present time. Diagnosis Orders   1. Chronic systolic congestive heart failure, NYHA class 1 ([address]     2. Pacemaker     3. Ischemic cardiomyopathy     4. Essential hypertension       Continue:  GDMT:   ACE/ARB/ARNI - Entresto 24/26 BID   BB - Metoprolol 100 daily   Diuretic - Lasix 20 mg daily except M-W-F 40 mg daily  AA - none  SGLT2 -  none  Vasodilator - none  Other - amiodarone 400 BID, ASA 81, Potassium 10 daily, Xarelto 20 daily    Tolerating above noted HF meds, no ill side effects noted. Will continue to monitor kidney function and electrolytes. Will optimize as tolerated. Pt is compliant w/ medications.     Total visit time of 30 minutes has been spent with patient on education of symptoms, management, medication, and plan of care; as well as review of chart: labs, ECHO, radiology reports, etc.   I personally spent more then 50% of the appt time face to face with the patient. Daily weights  Fluid restriction of 2 Liters per day  Limit sodium in diet to around 0253-7571 mg/day  Monitor BP  Activity as tolerated     Stable, appears Euvolemic  Lab reviewed - stable  Repeat blood work in 14 days  BP/HR stable   Increasing Entresto dosage, changing lasix to 20mg daily. Continue diet/fluid adherence  Continue daily wts. F/U w/ Cardiology  F/U in clinic in 6months      Patient was instructed to call the [employer] [facility] for any changes in symptoms as noted in AVS.      Return in about 6 months (around 2/24/2022). or sooner if needed     Patient given educational materials - see patient instructions. We discussed the importance of weighing oneself and recording daily. We also discussed the importance of a low sodium diet, higher sodium foods to avoid and better low sodium food options. Patient verbalizes understanding of plan of care using teach back method, and is agreeable to the treatment plan.        Electronically signed by ARCADIO Jules CNP on 8/24/2021 at 12:21 PM

## 2021-08-25 NOTE — TELEPHONE ENCOUNTER
Saba Lewis called req refills for  Anoro inhaler. 1 puff daily. RX was ordered when pt was last in hospital per Saba Lewis.       [facility]

## 2021-08-25 NOTE — TELEPHONE ENCOUNTER
Date of last visit:  5/18/2021  Date of next visit:  9/7/2021    Requested Prescriptions     Pending Prescriptions Disp Refills    umeclidinium-vilanterol (ANORO ELLIPTA) 62.5-25 MCG/INH AEPB inhaler 1 each 5     Sig: Inhale 1 puff into the lungs daily

## 2021-09-07 NOTE — PROGRESS NOTES
Readings from Last 3 Encounters:   09/07/21 163 lb 12.8 oz (74.3 kg)   08/24/21 160 lb 12.8 oz (72.9 kg)   06/14/21 160 lb (72.6 kg)     Vitals:    09/07/21 1437   BP: 128/72   Site: Right Upper Arm   Position: Sitting   Cuff Size: Medium Adult   Pulse: 58   Resp: 16   Temp: 97 °F (36.1 °C)   TempSrc: Infrared   Weight: 163 lb 12.8 oz (74.3 kg)   Height: 5' 8\" (1.727 m)     Body mass index is 24.91 kg/m². Based upon direct observation of the patient, evaluation of cognition reveals recent and remote memory intact. Living  Will  Noted and  And  Then  Step son  [address]      Colonoscopy  2021      Patient's complete Health Risk Assessment and screening values have been reviewed and are found in Flowsheets. The following problems were reviewed today and where indicated follow up appointments were made and/or referrals ordered. Positive Risk Factor Screenings with Interventions:         Substance History:  Social History     Tobacco History     Smoking Status  Current Every Day Smoker Smoking Frequency  1 pack/day for 40 years (40 pk yrs) Smoking Tobacco Type  Cigarettes    Smokeless Tobacco Use  Never Used          Alcohol History     Alcohol Use Status  Yes Amount  0.0 standard drinks of alcohol/wk Comment  very little          Drug Use     Drug Use Status  No          Sexual Activity     Sexually Active  Not Asked               Alcohol Screening:       A score of 8 or more is associated with harmful or hazardous drinking. A score of 13 or more in women, and 15 or more in men, is likely to indicate alcohol dependence.   Substance Abuse Interventions:  ·  no  longer  drinks         Personalized Preventive Plan   Current Health Maintenance Status  Immunization History   Administered Date(s) Administered    COVID-19, Moderna, PF, 100mcg/0.5mL 08/03/2021    Influenza 10/23/2012    Influenza Vaccine, unspecified formulation 01/21/2018    Influenza Virus Vaccine 11/03/2013, 11/04/2014, 2017    Influenza Whole 2015    Influenza, Quadv, 6 mo and older, IM (Fluzone, Flulaval) 10/16/2018    Influenza, Quadv, IM, (6 mo and older Fluzone, Flulaval, Fluarix and 3 yrs and older Afluria) 2020    Pneumococcal Conjugate 13-valent ([id number]) 2016    Pneumococcal Polysaccharide ([id number]) 2017    Tetanus 2000        Health Maintenance   Topic Date Due    AAA screen  Never done    Hepatitis C screen  Never done    DTaP/Tdap/Td vaccine (1 - Tdap) Never done    Shingles Vaccine (1 of 2) Never done   [employer] Visit (AWV)  Never done    COVID-19 Vaccine (2 - Moderna 2-dose series) 2021    Flu vaccine (1) 2021    Low dose CT lung screening  2022    Lipid screen  2022    TSH testing  2022    Potassium monitoring  2022    Creatinine monitoring  2022    Colon cancer screen colonoscopy  2024    Pneumococcal 65+ years Vaccine  Completed    Hepatitis A vaccine  Aged Out    Hepatitis B vaccine  Aged Out    Hib vaccine  Aged Out    Meningococcal (ACWY) vaccine  Aged Out     Recommendations for [employer] Due: see orders and patient instructions/AVS.  . Recommended screening schedule for the next 5-10 years is provided to the patient in written form: see Patient Instructions/AVS.    There are no diagnoses linked to this encounter. Porfirio Montez (:  1950) is a 79 y.o. male,Established patient, here for evaluation of the following chief complaint(s):  3 Month Follow-Up         ASSESSMENT/     Diagnosis Orders   1. Coronary artery disease involving native coronary artery of native heart without angina pectoris     2. S/P left atrial appendage ligation     3. Pure hypercholesterolemia     4. Chronic bronchitis, unspecified chronic bronchitis type ([address]     5. Essential hypertension     6. COPD, mild ([address]     7. Paroxysmal atrial fibrillation ([address]     8. S/P CABG x 3     9.  [employer] dual pacemaker      10. Collagenous colitis       PLAN:        Current Outpatient Medications   Medication Sig Dispense Refill    umeclidinium-vilanterol (ANORO ELLIPTA) 62.5-25 MCG/INH AEPB inhaler Inhale 1 puff into the lungs daily 1 each 5    atorvastatin (LIPITOR) 40 MG tablet Take 1 tablet by mouth nightly 90 tablet 3    sacubitril-valsartan (ENTRESTO) 49-51 MG per tablet Take 1 tablet by mouth 2 times daily 180 tablet 3    metoprolol succinate (TOPROL XL) 100 MG extended release tablet Take 1 tablet by mouth daily 90 tablet 3    furosemide (LASIX) 20 MG tablet 20 mg daily 90 tablet 3    rOPINIRole (REQUIP) 0.5 MG tablet TAKE 1 TABLET BY MOUTH EVERY EVENING 90 tablet 1    amiodarone (CORDARONE) 200 MG tablet Take 1 tablet by mouth daily 90 tablet 3    rivaroxaban (XARELTO) 20 MG TABS tablet Take 1 tablet by mouth daily (with breakfast) 90 tablet 3    potassium chloride (KLOR-CON M) 10 MEQ extended release tablet Take 1 tablet by mouth daily 180 tablet 3    aspirin 81 MG chewable tablet Take 1 tablet by mouth daily 30 tablet 3    albuterol sulfate  (90 Base) MCG/ACT inhaler Inhale 2 puffs into the lungs every 6 hours as needed for Wheezing 1 Inhaler 4    fluocinonide (LIDEX) 0.05 % ointment Apply topically as needed      loratadine (CLARITIN) 10 MG tablet Take 10 mg by mouth daily       Multiple Vitamins-Minerals (CENTRUM SILVER PO) Take 1 tablet by mouth daily       ALPRAZolam (XANAX) 0.5 MG tablet Take 1 tablet by mouth nightly as needed for Sleep or Anxiety for up to 30 days. (Patient not taking: Reported on 9/7/2021) 30 tablet 0     No current facility-administered medications for this visit. No orders of the defined types were placed in this encounter.         See in  3 mths     Subjective   SUBJECTIVE/OBJECTIVE:  HPI       Insomnia   At  Times        ashd  Stable  Post  cabg      Copd   Stable       Atrial  Fib  stable    Echo  Was 25%  Now  45%  Lipids    Noted       chf Noted    smoke   4  Per  Day      Lipids  Stable      Collagenous  colitis     back   To  Work     Review of Systems   Constitutional: Negative for fatigue and fever. HENT: Negative for congestion, ear pain, postnasal drip, sore throat and trouble swallowing. Eyes: Negative for pain. Respiratory: Negative for cough, chest tightness and shortness of breath. Cardiovascular: Negative for chest pain, palpitations and leg swelling. Gastrointestinal: Negative for abdominal pain, blood in stool, constipation and nausea. Genitourinary: Negative for difficulty urinating, frequency and urgency. Musculoskeletal: Negative for arthralgias, back pain, joint swelling and neck stiffness. Skin: Negative for rash. Neurological: Negative for dizziness, weakness and headaches. Hematological: Negative for adenopathy. Does not bruise/bleed easily. Psychiatric/Behavioral: Negative for behavioral problems, dysphoric mood and sleep disturbance. /72 (Site: Right Upper Arm, Position: Sitting, Cuff Size: Medium Adult)   Pulse 58   Temp 97 °F (36.1 °C) (Infrared)   Resp 16   Ht 5' 8\" (1.727 m)   Wt 163 lb 12.8 oz (74.3 kg)   BMI 24.91 kg/m²   Objective   Physical Exam  Vitals and nursing note reviewed. Constitutional:       Appearance: He is well-developed. HENT:      Head: Normocephalic and atraumatic. Right Ear: External ear normal.      Left Ear: External ear normal.      Nose: Nose normal.   Eyes:      Conjunctiva/sclera: Conjunctivae normal.      Pupils: Pupils are equal, round, and reactive to light. Comments: Fundi nl   Neck:      Thyroid: No thyromegaly. Cardiovascular:      Rate and Rhythm: Normal rate and regular rhythm. Heart sounds: Normal heart sounds. Pulmonary:      Effort: Pulmonary effort is normal.      Breath sounds: Normal breath sounds. No wheezing or rales.       Comments:   Initial  wheeze  Abdominal:      General: Bowel sounds are normal. Palpations: Abdomen is soft. There is no mass. Tenderness: There is no abdominal tenderness. Musculoskeletal:         General: Normal range of motion. Cervical back: Normal range of motion and neck supple. Lymphadenopathy:      Cervical: No cervical adenopathy. Skin:     General: Skin is warm and dry. Findings: No rash. Neurological:      Mental Status: He is alert and oriented to person, place, and time. Cranial Nerves: No cranial nerve deficit. Deep Tendon Reflexes: Reflexes are normal and symmetric. An electronic signature was used to authenticate this note.     --Abelardo Andrade MD

## 2021-09-07 NOTE — PATIENT INSTRUCTIONS
Personalized Preventive Plan for Denis Velasquez - 9/7/2021  Medicare offers a range of preventive health benefits. Some of the tests and screenings are paid in full while other may be subject to a deductible, co-insurance, and/or copay. Some of these benefits include a comprehensive review of your medical history including lifestyle, illnesses that may run in your family, and various assessments and screenings as appropriate. After reviewing your medical record and screening and assessments performed today your provider may have ordered immunizations, labs, imaging, and/or referrals for you. A list of these orders (if applicable) as well as your Preventive Care list are included within your After Visit Summary for your review. Other Preventive Recommendations:    · A preventive eye exam performed by an eye specialist is recommended every 1-2 years to screen for glaucoma; cataracts, macular degeneration, and other eye disorders. · A preventive dental visit is recommended every 6 months. · Try to get at least 150 minutes of exercise per week or 10,000 steps per day on a pedometer . · Order or download the FREE \"Exercise & Physical Activity: Your Everyday Guide\" from [employer]. Call 0-858.817.4633 or search [employer] online. · You need 7925-5791 mg of calcium and 1499-2066 IU of vitamin D per day. It is possible to meet your calcium requirement with diet alone, but a vitamin D supplement is usually necessary to meet this goal.  · When exposed to the sun, use a sunscreen that protects against both UVA and UVB radiation with an SPF of 30 or greater. Reapply every 2 to 3 hours or after sweating, drying off with a towel, or swimming. · Always wear a seat belt when traveling in a car. Always wear a helmet when riding a bicycle or motorcycle.

## 2021-09-08 NOTE — TELEPHONE ENCOUNTER
Reviewed results. After increasing his entresto he had significant jump in his creatinine levels. Pt is to hold both the Entresto and lasix for 5 days. Start lasix regimen back up on day 6 and go back to lower dose Entresto on day 6. Repeat lab work on 9/14. **If noticing weight gain, please take a lasix.      Thank you

## 2021-09-14 NOTE — ED PROVIDER NOTES
[facility] Emergency Department    CHIEF COMPLAINT       Chief Complaint   Patient presents with    Other     EKG       Nurses Notes reviewed and I agree except as noted in the HPI. HISTORY OF PRESENT ILLNESS    Balwinder Benito is a 79 y.o. male who presents to the ED for evaluation of EKG. Patient has history of coronary artery disease, had CABG within the last year has been recovering from that, recent followed up with his doctor who started him on Xanax for some increased stress. Apparently today he was at home sleeping his wife woke him up and he was slumped over she had some slurring of his speech and he became very angry at her and left. She called him and told him to come to the ER for evaluation. He currently denies any abnormality. He notes that he has been mildly forgetful lately he lost some and this is unusual for him. He notes they are under increased stress because his wife's son is going to penitentiary. He currently denies any trouble swallowing speaking denies any unilateral weakness denies any chest pain or shortness of breath denies headache dizziness lightheadedness. HPI was provided by the patient. REVIEW OF SYSTEMS     Review of Systems   Constitutional: Negative for appetite change, chills, diaphoresis, fatigue, fever and unexpected weight change. HENT: Negative for congestion, hearing loss, postnasal drip, rhinorrhea, sinus pressure, sore throat and voice change. Eyes: Negative for photophobia, redness and visual disturbance. Respiratory: Negative for cough, chest tightness, shortness of breath and wheezing. Cardiovascular: Negative for chest pain and palpitations. Gastrointestinal: Negative for abdominal distention, abdominal pain, blood in stool, constipation, diarrhea, nausea and vomiting. Endocrine: Negative for cold intolerance, heat intolerance, polydipsia, polyphagia and polyuria.    Genitourinary: Negative for decreased urine volume, difficulty urinating, dysuria, flank pain and frequency. Musculoskeletal: Negative for arthralgias, back pain, gait problem, joint swelling, neck pain and neck stiffness. Skin: Negative for color change and rash. Allergic/Immunologic: Negative for immunocompromised state. Neurological: Negative for dizziness, tremors, weakness, light-headedness, numbness and headaches. Hematological: Does not bruise/bleed easily. Psychiatric/Behavioral: Negative for behavioral problems, confusion, decreased concentration, hallucinations, self-injury and suicidal ideas. The patient is not nervous/anxious. PAST MEDICAL HISTORY     Past Medical History:   Diagnosis Date    Asthma     Atrial fibrillation with RVR ([facility] 04/05/2021    [employer] dual pacemaker  04/15/2021    Collagenous colitis 2021    per scope 2021    Colon polyps 2021    dr Lennie Steen    COPD, mild ([facility] [address]     Eczema of hand     HTN (hypertension)     Hyperlipidemia     Smoker        SURGICALHISTORY      has a past surgical history that includes Nasal sinus surgery (06/2008); Colonoscopy (06/10/2021); other surgical history (DEC 7TH 2012 DR Alan Padilla [facility] ); hernia repair; Skin cancer excision (09/2014); Tooth Extraction (02/2017); Coronary artery bypass graft (01/12/2021); and Coronary artery bypass graft (N/A, 01/12/2021). CURRENT MEDICATIONS       Discharge Medication List as of 9/14/2021  3:48 PM      CONTINUE these medications which have NOT CHANGED    Details   sacubitril-valsartan (ENTRESTO) 24-26 MG per tablet Take 1 tablet by mouth 2 times daily, Disp-180 tablet, R-3NO PRINT      ALPRAZolam (XANAX) 0.5 MG tablet Take 1 tablet by mouth nightly as needed for Sleep or Anxiety for up to 30 days. , Disp-30 tablet, R-0Normal      umeclidinium-vilanterol (ANORO ELLIPTA) 62.5-25 MCG/INH AEPB inhaler Inhale 1 puff into the lungs daily, Disp-1 each, R-5Normal      atorvastatin (LIPITOR) 40 MG tablet Take 1 tablet by mouth nightly, Disp-90 tablet, R-3Normal      metoprolol succinate (TOPROL XL) 100 MG extended release tablet Take 1 tablet by mouth daily, Disp-90 tablet, R-3Normal      furosemide (LASIX) 20 MG tablet 20 mg daily, Disp-90 tablet, R-3**Patient requests 90 days supply**Normal      rOPINIRole (REQUIP) 0.5 MG tablet TAKE 1 TABLET BY MOUTH EVERY EVENING, Disp-90 tablet, R-1Normal      amiodarone (CORDARONE) 200 MG tablet Take 1 tablet by mouth daily, Disp-90 tablet, R-3Normal      rivaroxaban (XARELTO) 20 MG TABS tablet Take 1 tablet by mouth daily (with breakfast), Disp-90 tablet, R-3Normal      potassium chloride (KLOR-CON M) 10 MEQ extended release tablet Take 1 tablet by mouth daily, Disp-180 tablet, R-3**Patient requests 90 days supply**NO PRINT      aspirin 81 MG chewable tablet Take 1 tablet by mouth daily, Disp-30 tablet, R-3Normal      albuterol sulfate  (90 Base) MCG/ACT inhaler Inhale 2 puffs into the lungs every 6 hours as needed for Wheezing, Disp-1 Inhaler, R-4NO PRINT      fluocinonide (LIDEX) 0.05 % ointment Apply topically as needed, Topical, PRN, Historical Med      loratadine (CLARITIN) 10 MG tablet Take 10 mg by mouth daily Historical Med      Multiple Vitamins-Minerals (CENTRUM SILVER PO) Take 1 tablet by mouth daily Historical Med             ALLERGIES     is allergic to penicillins and sulfa antibiotics. FAMILY HISTORY     He indicated that his mother is . He indicated that his father is . He indicated that his sister is alive. He indicated that his brother is alive. family history includes Diabetes in his brother; Heart Disease in his brother, father, and mother.     SOCIAL HISTORY       Social History     Socioeconomic History    Marital status:      Spouse name: Not on file    Number of children: Not on file    Years of education: Not on file    Highest education level: Not on file   Occupational History    Not on file   Tobacco Use    Smoking status: Current Every Day Smoker Packs/day: 1.00     Years: 40.00     Pack years: 40.00     Types: Cigarettes    Smokeless tobacco: Never Used   Substance and Sexual Activity    Alcohol use: Yes     Alcohol/week: 0.0 standard drinks     Comment: very little    Drug use: No    Sexual activity: Not on file   Other Topics Concern    Not on file   Social History Narrative    Not on file     Social Determinants of Health     Financial Resource Strain: Low Risk     Difficulty of Paying Living Expenses: Not hard at all   Food Insecurity: No Food Insecurity    Worried About [address] in the Last Year: Never true    [facility] in the Last Year: Never true   Transportation Needs:     Lack of Transportation (Medical):  Lack of Transportation (Non-Medical):    Physical Activity:     Days of Exercise per Week:     Minutes of Exercise per Session:    Stress:     Feeling of Stress :    Social Connections:     Frequency of Communication with Friends and Family:     Frequency of Social Gatherings with Friends and Family:     Attends Mu-ism Services:     Active Member of Clubs or Organizations:     Attends Club or Organization Meetings:     Marital Status:    Intimate Partner Violence:     Fear of Current or Ex-Partner:     Emotionally Abused:     Physically Abused:     Sexually Abused:        PHYSICAL EXAM     INITIAL VITALS:  height is 5' 8\" (1.727 m) and weight is 160 lb (72.6 kg). His oral temperature is 98.8 °F (37.1 °C). His blood pressure is 170/75 (abnormal) and his pulse is 55. His respiration is 15 and oxygen saturation is 95%. Physical Exam  Vitals and nursing note reviewed. Constitutional:       Appearance: Normal appearance. He is well-developed. HENT:      Head: Normocephalic. Right Ear: External ear normal.      Left Ear: External ear normal.      Nose: Nose normal.      Mouth/Throat:      Pharynx: Uvula midline.    Eyes:      Conjunctiva/sclera: Conjunctivae normal.   Cardiovascular:      Rate and Rhythm: Normal rate and regular rhythm. Heart sounds: Normal heart sounds, S1 normal and S2 normal.   Pulmonary:      Effort: Pulmonary effort is normal. No respiratory distress. Breath sounds: Normal breath sounds. Chest:      Chest wall: No tenderness. Abdominal:      General: Bowel sounds are normal. There is no distension. Palpations: Abdomen is soft. Tenderness: There is no abdominal tenderness. Musculoskeletal:         General: Normal range of motion. Cervical back: Normal range of motion and neck supple. Skin:     General: Skin is warm and dry. Coloration: Skin is not pale. Findings: No erythema or rash. Neurological:      Mental Status: He is alert and oriented to person, place, and time. Psychiatric:         Behavior: Behavior normal.         Thought Content: Thought content normal.         Judgment: Judgment normal.         DIFFERENTIAL DIAGNOSIS:   TIA, Alzheimer's, dementia,  DIAGNOSTIC RESULTS       RADIOLOGY: non-plainfilm images(s) such as CT, Ultrasound and MRI are read by the radiologist.  Plain radiographic images are visualized and preliminarily interpreted by the emergency physician unless otherwise stated below. No orders to display         LABS:   Labs Reviewed - No data to display    EMERGENCY DEPARTMENT COURSE:   Vitals:    Vitals:    09/14/21 1508   BP: (!) 170/75   Pulse: 55   Resp: 15   Temp: 98.8 °F (37.1 °C)   TempSrc: Oral   SpO2: 95%   Weight: 160 lb (72.6 kg)   Height: 5' 8\" (1.727 m)       MDM    Patient was seen and evaluated in the emergency department, patient appears to be in no acute distress, vital signs reviewed, hypertension noted. Physical exam was completed, no significant normality noted. Lab work was not obtained. I spoke with the patient's primary care provider he notes he can follow-up with the patient in 2 days. Went to discuss this with the patient and he had already left.   He was called by the nursing staff and advised to follow-up with his primary care provider in 2 days. He verbalized understanding plan of care. Medications - No data to display    Patient was seenindependently by myself. The patient's final impression and disposition and plan was determined by myself. CRITICAL CARE:   None    CONSULTS:  None    PROCEDURES:  None    FINAL IMPRESSION     1. Memory changes    2. Elevated blood pressure reading          DISPOSITION/PLAN   Patient discharged in stable condition    PATIENT REFERREDTO:  Lorenza Marti MD  [address] [place] [address]  [phone]    Go in 2 days  Appointment Thursday at 1140am      DISCHARGE MEDICATIONS:  Discharge Medication List as of 9/14/2021  3:48 PM          (Please note that portions of this note were completed with a voice recognition program.  Efforts were made to edit the dictations but occasionally words are mis-transcribed.)    Provider:  I personally performed the services described in the documentation,reviewed and edited the documentation which was dictated to the scribe in my presence, and it accurately records my words and actions.     Dayne Riley CNP 09/14/21 6:53 PM    ARCADIO Leblanc - NOAH        [employer], ARCADIO - CNP  09/14/21 1184

## 2021-09-16 NOTE — PROGRESS NOTES
Subjective:      Patient ID: Hal Leal is a 79 y.o. male. Atrial  Fib  Stable        chf  stable        Copd  Stable  3  To 4  As day        Pacer  Note       In  Er and  Stable  And  Seen in  Er  As  Awoke and  Slight confusion and  Stable     Hypertension  This is a chronic problem. The current episode started more than 1 year ago. The problem has been resolved since onset. The problem is controlled. Pertinent negatives include no chest pain, headaches, orthopnea, palpitations, peripheral edema or shortness of breath. The current treatment provides significant improvement. There are no compliance problems. Past Medical History:   Diagnosis Date    Asthma     Atrial fibrillation with RVR ([address] 04/05/2021    [employer] dual pacemaker  04/15/2021    Collagenous colitis 2021    per scope 2021    Colon polyps 2021    dr Pancho Gonzalez    COPD, mild ([address]     Eczema of hand     HTN (hypertension)     Hyperlipidemia     Smoker      Review of Systems   Constitutional: Negative for fatigue and fever. HENT: Negative for congestion, ear pain, postnasal drip, sore throat and trouble swallowing. Eyes: Negative for pain. Respiratory: Negative for cough, chest tightness and shortness of breath. Cardiovascular: Negative for chest pain, palpitations, orthopnea and leg swelling. Gastrointestinal: Negative for abdominal pain, blood in stool, constipation and nausea. Genitourinary: Negative for difficulty urinating, frequency and urgency. Musculoskeletal: Negative for arthralgias, back pain, joint swelling and neck stiffness. Skin: Negative for rash. Neurological: Negative for dizziness, weakness and headaches. Hematological: Negative for adenopathy. Does not bruise/bleed easily. Psychiatric/Behavioral: Negative for behavioral problems, dysphoric mood and sleep disturbance.      /66 (Site: Right Upper Arm, Position: Sitting, Cuff Size: Medium Adult)   Pulse 64   Temp 95.9 °F (35.5 °C) (Infrared)   Resp 16   Ht 5' 8\" (1.727 m)   Wt 165 lb 4 oz (75 kg)   BMI 25.13 kg/m²   Objective:   Physical Exam  Vitals and nursing note reviewed. Constitutional:       Appearance: He is well-developed. HENT:      Head: Normocephalic and atraumatic. Right Ear: External ear normal.      Left Ear: External ear normal.      Nose: Nose normal.   Eyes:      Conjunctiva/sclera: Conjunctivae normal.      Pupils: Pupils are equal, round, and reactive to light. Comments: Fundi nl   Neck:      Thyroid: No thyromegaly. Cardiovascular:      Rate and Rhythm: Normal rate and regular rhythm. Heart sounds: Normal heart sounds. Pulmonary:      Effort: Pulmonary effort is normal.      Breath sounds: Normal breath sounds. No wheezing or rales. Abdominal:      General: Bowel sounds are normal.      Palpations: Abdomen is soft. There is no mass. Tenderness: There is no abdominal tenderness. Musculoskeletal:         General: Normal range of motion. Cervical back: Normal range of motion and neck supple. Lymphadenopathy:      Cervical: No cervical adenopathy. Skin:     General: Skin is warm and dry. Findings: No rash. Neurological:      Mental Status: He is alert and oriented to person, place, and time. Cranial Nerves: No cranial nerve deficit. Deep Tendon Reflexes: Reflexes are normal and symmetric. Assessment / Plan:          Diagnosis Orders   1. Essential hypertension     2. Smoker     3. Coronary artery disease involving native coronary artery of native heart without angina pectoris     4. CHF (congestive heart failure), NYHA class II, chronic, systolic (HCC)     5. Encounter for cardioversion procedure     6. S/P CABG x 3     7. Paroxysmal atrial fibrillation (HCC)     8. COPD, mild ([address]     9. [employer] dual pacemaker      10.  Pure hypercholesterolemia           PLAn    Current Outpatient Medications   Medication Sig Dispense Refill    sacubitril-valsartan (ENTRESTO) 24-26 MG per tablet Take 0.5 tablets by mouth 2 times daily 180 tablet 3    ALPRAZolam (XANAX) 0.5 MG tablet Take 1 tablet by mouth nightly as needed for Sleep or Anxiety for up to 30 days. (Patient taking differently: Take 0.25 mg by mouth nightly as needed for Sleep or Anxiety. ) 30 tablet 0    umeclidinium-vilanterol (ANORO ELLIPTA) 62.5-25 MCG/INH AEPB inhaler Inhale 1 puff into the lungs daily 1 each 5    atorvastatin (LIPITOR) 40 MG tablet Take 1 tablet by mouth nightly 90 tablet 3    metoprolol succinate (TOPROL XL) 100 MG extended release tablet Take 1 tablet by mouth daily 90 tablet 3    furosemide (LASIX) 20 MG tablet 20 mg daily 90 tablet 3    rOPINIRole (REQUIP) 0.5 MG tablet TAKE 1 TABLET BY MOUTH EVERY EVENING 90 tablet 1    amiodarone (CORDARONE) 200 MG tablet Take 1 tablet by mouth daily 90 tablet 3    rivaroxaban (XARELTO) 20 MG TABS tablet Take 1 tablet by mouth daily (with breakfast) 90 tablet 3    potassium chloride (KLOR-CON M) 10 MEQ extended release tablet Take 1 tablet by mouth daily 180 tablet 3    aspirin 81 MG chewable tablet Take 1 tablet by mouth daily 30 tablet 3    albuterol sulfate  (90 Base) MCG/ACT inhaler Inhale 2 puffs into the lungs every 6 hours as needed for Wheezing 1 Inhaler 4    fluocinonide (LIDEX) 0.05 % ointment Apply topically as needed      loratadine (CLARITIN) 10 MG tablet Take 10 mg by mouth daily       Multiple Vitamins-Minerals (CENTRUM SILVER PO) Take 1 tablet by mouth daily        No current facility-administered medications for this visit.          2  Weeks  bp  Check and  Get  Flu  Shot  Then       Get  covid  Now       Stable and       Feel  Xanax  Just low  Dose and   Keep  entresto  1/2  Dose  As kidney

## 2021-10-11 NOTE — TELEPHONE ENCOUNTER
Date of last visit:  9/16/2021  Date of next visit:  12/7/2021    Requested Prescriptions     Pending Prescriptions Disp Refills    ALPRAZolam (XANAX) 0.5 MG tablet 30 tablet 0     Sig: Take 1 tablet by mouth nightly as needed for Sleep or Anxiety for up to 30 days.

## 2021-10-21 NOTE — PROGRESS NOTES
[facility] dual pacer     . [facility] Battery longevity:  15 years   Presenting rhythm  AP VS    Atrial impedance 566  RV impedance 634    P wave sensing 6.6  R wave sensing 9    73 % atrial paced  3 % RV paced     Atrial threshold 0.6 V  at 0.4ms  RV threshold 1.1 V at 0.4ms  Mode switches   xarelto

## 2021-11-15 NOTE — TELEPHONE ENCOUNTER
[facility] called requesting a refill of their:    ALPRAZolam (XANAX) 0.5 MG tablet Take 1 tablet by mouth nightly as needed for Sleep or Anxiety     Send to [facility] on [address]

## 2021-12-14 NOTE — PROGRESS NOTES
Subjective:      Patient ID: Yodit Costa is a 70 y.o. male. ashd  Stable      pacemaker  Stable  And  Placed  psot   cabg       Last  Echo  45%  Ef      Copd   Still   1/3  Ppd   Now  And    2  Cups  decaf    Atrial  Fib  stable     Hypertension  This is a chronic problem. The current episode started more than 1 year ago. The problem has been resolved since onset. The problem is controlled. Pertinent negatives include no chest pain, headaches, neck pain, orthopnea, palpitations, peripheral edema or shortness of breath. The current treatment provides significant improvement. There are no compliance problems. COPD  There is no chest tightness, cough, difficulty breathing, hoarse voice or shortness of breath. This is a chronic problem. The current episode started more than 1 year ago. The problem occurs daily. The problem has been resolved. Pertinent negatives include no chest pain, dyspnea on exertion, ear pain, fever, headaches, myalgias, postnasal drip, sore throat or trouble swallowing. He reports significant improvement on treatment. Hyperlipidemia  This is a chronic problem. The current episode started more than 1 year ago. The problem is controlled. Recent lipid tests were reviewed and are normal. Pertinent negatives include no chest pain, myalgias or shortness of breath. Current antihyperlipidemic treatment includes statins. The current treatment provides significant improvement of lipids. There are no compliance problems. Past Medical History:   Diagnosis Date    Asthma     Atrial fibrillation with RVR ([address] 04/05/2021    [employer] dual pacemaker  04/15/2021    Collagenous colitis 2021    per scope 2021    Colon polyps 2021    dr Kaitlynn Mcmullen    COPD, mild ([address]     Eczema of hand     HTN (hypertension)     Hyperlipidemia     Smoker        Review of Systems   Constitutional: Negative for fatigue and fever.    HENT: Negative for congestion, ear pain, hoarse voice, postnasal drip, sore throat and trouble swallowing. Eyes: Negative for pain. Respiratory: Negative for cough, chest tightness and shortness of breath. Cardiovascular: Negative for chest pain, dyspnea on exertion, palpitations, orthopnea and leg swelling. Gastrointestinal: Negative for abdominal pain, blood in stool, constipation and nausea. Genitourinary: Negative for difficulty urinating, frequency and urgency. Musculoskeletal: Negative for arthralgias, back pain, joint swelling, myalgias, neck pain and neck stiffness. Skin: Negative for rash. Neurological: Negative for dizziness, weakness and headaches. Hematological: Negative for adenopathy. Does not bruise/bleed easily. Psychiatric/Behavioral: Negative for behavioral problems, dysphoric mood and sleep disturbance. /72 (Site: Right Upper Arm, Position: Sitting, Cuff Size: Medium Adult)   Pulse 72   Temp 97 °F (36.1 °C) (Infrared)   Resp 16   Ht 5' 8\" (1.727 m)   Wt 159 lb 4 oz (72.2 kg)   BMI 24.21 kg/m²   Objective:   Physical Exam  Vitals and nursing note reviewed. Constitutional:       Appearance: He is well-developed. HENT:      Head: Normocephalic and atraumatic. Right Ear: External ear normal.      Left Ear: External ear normal.      Nose: Nose normal.   Eyes:      Conjunctiva/sclera: Conjunctivae normal.      Pupils: Pupils are equal, round, and reactive to light. Comments: Fundi nl   Neck:      Thyroid: No thyromegaly. Cardiovascular:      Rate and Rhythm: Normal rate and regular rhythm. Heart sounds: Normal heart sounds. Pulmonary:      Effort: Pulmonary effort is normal.      Breath sounds: Normal breath sounds. No wheezing or rales. Comments:    Few  rhonci  only  Abdominal:      General: Bowel sounds are normal.      Palpations: Abdomen is soft. There is no mass. Tenderness: There is no abdominal tenderness. Musculoskeletal:         General: Normal range of motion.       Cervical back: Normal range of motion and neck supple. Lymphadenopathy:      Cervical: No cervical adenopathy. Skin:     General: Skin is warm and dry. Findings: No rash. Neurological:      Mental Status: He is alert and oriented to person, place, and time. Cranial Nerves: No cranial nerve deficit. Deep Tendon Reflexes: Reflexes are normal and symmetric. Assessment:       Diagnosis Orders   1. Coronary artery disease involving native coronary artery of native heart without angina pectoris     2. Primary insomnia     3. Need for influenza vaccination  INFLUENZA, MDCK QUADV, 2 YRS AND OLDER, IM, MDV, 0.5ML (FLUCELVAX QUADV)   4. Lactose intolerance     5. CHF (congestive heart failure), NYHA class II, chronic, systolic ([facility])     6. COPD, mild ([facility]     7. Primary hypertension     8. Paroxysmal atrial fibrillation (HCC)     9. Pure hypercholesterolemia     10. S/P CABG x 3     11. [employer] dual pacemaker            Plan:      Current Outpatient Medications   Medication Sig Dispense Refill    ALPRAZolam (XANAX) 0.5 MG tablet Take 1 tablet by mouth nightly as needed for Sleep or Anxiety for up to 30 days.  30 tablet 0    sacubitril-valsartan (ENTRESTO) 24-26 MG per tablet Take 0.5 tablets by mouth 2 times daily 180 tablet 3    umeclidinium-vilanterol (ANORO ELLIPTA) 62.5-25 MCG/INH AEPB inhaler Inhale 1 puff into the lungs daily 1 each 5    atorvastatin (LIPITOR) 40 MG tablet Take 1 tablet by mouth nightly 90 tablet 3    metoprolol succinate (TOPROL XL) 100 MG extended release tablet Take 1 tablet by mouth daily 90 tablet 3    furosemide (LASIX) 20 MG tablet 20 mg daily 90 tablet 3    rOPINIRole (REQUIP) 0.5 MG tablet TAKE 1 TABLET BY MOUTH EVERY EVENING 90 tablet 1    amiodarone (CORDARONE) 200 MG tablet Take 1 tablet by mouth daily 90 tablet 3    rivaroxaban (XARELTO) 20 MG TABS tablet Take 1 tablet by mouth daily (with breakfast) 90 tablet 3    potassium chloride (KLOR-CON M) 10 MEQ extended release tablet Take 1 tablet by mouth daily 180 tablet 3    aspirin 81 MG chewable tablet Take 1 tablet by mouth daily 30 tablet 3    albuterol sulfate  (90 Base) MCG/ACT inhaler Inhale 2 puffs into the lungs every 6 hours as needed for Wheezing 1 Inhaler 4    fluocinonide (LIDEX) 0.05 % ointment Apply topically as needed      loratadine (CLARITIN) 10 MG tablet Take 10 mg by mouth daily       Multiple Vitamins-Minerals (CENTRUM SILVER PO) Take 1 tablet by mouth daily        No current facility-administered medications for this visit.      Orders Placed This Encounter   Procedures    INFLUENZA, MDCK QUADV, 2 YRS AND OLDER, IM, MDV, 0.5ML ([facility])         See in  3  mths   Malgorzata De La Vega MD

## 2021-12-14 NOTE — PROGRESS NOTES
Immunizations Administered     Name Date Dose Route    Influenza, [facility] Quadv, with preserv IM (Flucelvax 2 yrs and older) 12/14/2021 0.5 mL Intramuscular    Site: Deltoid- Right    Lot: [id number]    [address]: 38683-197-40

## 2022-01-01 ENCOUNTER — APPOINTMENT (OUTPATIENT)
Dept: GENERAL RADIOLOGY | Age: 72
DRG: 871 | End: 2022-01-01
Payer: MEDICARE

## 2022-01-01 ENCOUNTER — APPOINTMENT (OUTPATIENT)
Dept: GENERAL RADIOLOGY | Age: 72
DRG: 193 | End: 2022-01-01
Payer: MEDICARE

## 2022-01-01 ENCOUNTER — APPOINTMENT (OUTPATIENT)
Dept: GENERAL RADIOLOGY | Age: 72
DRG: 163 | End: 2022-01-01
Payer: MEDICARE

## 2022-01-01 ENCOUNTER — ANESTHESIA EVENT (OUTPATIENT)
Dept: ENDOSCOPY | Age: 72
DRG: 163 | End: 2022-01-01
Payer: MEDICARE

## 2022-01-01 ENCOUNTER — OFFICE VISIT (OUTPATIENT)
Dept: FAMILY MEDICINE CLINIC | Age: 72
End: 2022-01-01

## 2022-01-01 ENCOUNTER — ANESTHESIA EVENT (OUTPATIENT)
Dept: OPERATING ROOM | Age: 72
DRG: 163 | End: 2022-01-01
Payer: MEDICARE

## 2022-01-01 ENCOUNTER — APPOINTMENT (OUTPATIENT)
Dept: INTERVENTIONAL RADIOLOGY/VASCULAR | Age: 72
DRG: 163 | End: 2022-01-01
Payer: MEDICARE

## 2022-01-01 ENCOUNTER — APPOINTMENT (OUTPATIENT)
Dept: GENERAL RADIOLOGY | Age: 72
End: 2022-01-01
Attending: INTERNAL MEDICINE
Payer: COMMERCIAL

## 2022-01-01 ENCOUNTER — CARE COORDINATION (OUTPATIENT)
Dept: CARE COORDINATION | Age: 72
End: 2022-01-01

## 2022-01-01 ENCOUNTER — TELEPHONE (OUTPATIENT)
Dept: CARDIOLOGY CLINIC | Age: 72
End: 2022-01-01

## 2022-01-01 ENCOUNTER — HOSPITAL ENCOUNTER (INPATIENT)
Age: 72
LOS: 10 days | Discharge: HOSPICE | DRG: 193 | End: 2022-03-17
Attending: FAMILY MEDICINE | Admitting: FAMILY MEDICINE
Payer: MEDICARE

## 2022-01-01 ENCOUNTER — ANESTHESIA (OUTPATIENT)
Dept: ENDOSCOPY | Age: 72
DRG: 163 | End: 2022-01-01
Payer: MEDICARE

## 2022-01-01 ENCOUNTER — HOSPITAL ENCOUNTER (INPATIENT)
Age: 72
LOS: 7 days | Discharge: NURSING, REHAB OR LONG-TERM CARE | DRG: 871 | End: 2022-04-01
Attending: EMERGENCY MEDICINE
Payer: MEDICARE

## 2022-01-01 ENCOUNTER — APPOINTMENT (OUTPATIENT)
Dept: CT IMAGING | Age: 72
DRG: 871 | End: 2022-01-01
Payer: MEDICARE

## 2022-01-01 ENCOUNTER — OFFICE VISIT (OUTPATIENT)
Dept: CARDIOLOGY CLINIC | Age: 72
End: 2022-01-01
Payer: MEDICARE

## 2022-01-01 ENCOUNTER — APPOINTMENT (OUTPATIENT)
Dept: INTERVENTIONAL RADIOLOGY/VASCULAR | Age: 72
End: 2022-01-01
Attending: INTERNAL MEDICINE
Payer: COMMERCIAL

## 2022-01-01 ENCOUNTER — HOSPITAL ENCOUNTER (OUTPATIENT)
Dept: PULMONOLOGY | Age: 72
Discharge: HOME | End: 2022-04-06
Payer: COMMERCIAL

## 2022-01-01 ENCOUNTER — PROCEDURE VISIT (OUTPATIENT)
Dept: CARDIOLOGY CLINIC | Age: 72
End: 2022-01-01

## 2022-01-01 ENCOUNTER — OFFICE VISIT (OUTPATIENT)
Dept: PULMONOLOGY | Age: 72
End: 2022-01-01
Payer: MEDICARE

## 2022-01-01 ENCOUNTER — NURSE ONLY (OUTPATIENT)
Dept: CARDIOLOGY CLINIC | Age: 72
End: 2022-01-01

## 2022-01-01 ENCOUNTER — APPOINTMENT (OUTPATIENT)
Dept: CT IMAGING | Age: 72
DRG: 193 | End: 2022-01-01
Payer: MEDICARE

## 2022-01-01 ENCOUNTER — CARE COORDINATION (OUTPATIENT)
Dept: CASE MANAGEMENT | Age: 72
End: 2022-01-01

## 2022-01-01 ENCOUNTER — TELEPHONE (OUTPATIENT)
Dept: FAMILY MEDICINE CLINIC | Age: 72
End: 2022-01-01

## 2022-01-01 ENCOUNTER — HOSPITAL ENCOUNTER (INPATIENT)
Age: 72
LOS: 19 days | Discharge: NURSING, REHAB OR LONG-TERM CARE | DRG: 163 | End: 2022-05-13
Attending: EMERGENCY MEDICINE | Admitting: INTERNAL MEDICINE
Payer: MEDICARE

## 2022-01-01 ENCOUNTER — ANESTHESIA (OUTPATIENT)
Dept: OPERATING ROOM | Age: 72
DRG: 163 | End: 2022-01-01
Payer: MEDICARE

## 2022-01-01 ENCOUNTER — APPOINTMENT (OUTPATIENT)
Dept: ULTRASOUND IMAGING | Age: 72
End: 2022-01-01
Attending: INTERNAL MEDICINE
Payer: COMMERCIAL

## 2022-01-01 ENCOUNTER — PROCEDURE VISIT (OUTPATIENT)
Dept: CARDIOLOGY CLINIC | Age: 72
End: 2022-01-01
Payer: MEDICARE

## 2022-01-01 ENCOUNTER — OFFICE VISIT (OUTPATIENT)
Dept: ENT CLINIC | Age: 72
End: 2022-01-01
Payer: MEDICARE

## 2022-01-01 ENCOUNTER — HOSPITAL ENCOUNTER (OUTPATIENT)
Age: 72
Discharge: HOME | End: 2022-06-17
Attending: INTERNAL MEDICINE | Admitting: INTERNAL MEDICINE
Payer: COMMERCIAL

## 2022-01-01 ENCOUNTER — HOSPITAL ENCOUNTER (EMERGENCY)
Age: 72
Discharge: HOME | End: 2022-02-15
Payer: MEDICARE

## 2022-01-01 VITALS
BODY MASS INDEX: 23.49 KG/M2 | HEIGHT: 68 IN | RESPIRATION RATE: 16 BRPM | TEMPERATURE: 98 F | SYSTOLIC BLOOD PRESSURE: 138 MMHG | WEIGHT: 154.98 LBS | OXYGEN SATURATION: 92 % | HEART RATE: 59 BPM | DIASTOLIC BLOOD PRESSURE: 65 MMHG

## 2022-01-01 VITALS
HEIGHT: 68 IN | SYSTOLIC BLOOD PRESSURE: 124 MMHG | DIASTOLIC BLOOD PRESSURE: 64 MMHG | RESPIRATION RATE: 14 BRPM | WEIGHT: 152 LBS | OXYGEN SATURATION: 91 % | BODY MASS INDEX: 23.04 KG/M2 | TEMPERATURE: 97.2 F | HEART RATE: 74 BPM

## 2022-01-01 VITALS
TEMPERATURE: 96.4 F | HEART RATE: 64 BPM | WEIGHT: 150.38 LBS | SYSTOLIC BLOOD PRESSURE: 96 MMHG | DIASTOLIC BLOOD PRESSURE: 54 MMHG | BODY MASS INDEX: 22.79 KG/M2 | HEIGHT: 68 IN | RESPIRATION RATE: 16 BRPM

## 2022-01-01 VITALS
HEIGHT: 68 IN | WEIGHT: 155.8 LBS | HEART RATE: 59 BPM | BODY MASS INDEX: 23.61 KG/M2 | SYSTOLIC BLOOD PRESSURE: 130 MMHG | TEMPERATURE: 92.3 F | OXYGEN SATURATION: 86 % | DIASTOLIC BLOOD PRESSURE: 62 MMHG

## 2022-01-01 VITALS — TEMPERATURE: 97.3 F | SYSTOLIC BLOOD PRESSURE: 132 MMHG | OXYGEN SATURATION: 75 % | DIASTOLIC BLOOD PRESSURE: 60 MMHG

## 2022-01-01 VITALS
HEIGHT: 68 IN | HEART RATE: 55 BPM | DIASTOLIC BLOOD PRESSURE: 75 MMHG | BODY MASS INDEX: 23.67 KG/M2 | OXYGEN SATURATION: 100 % | SYSTOLIC BLOOD PRESSURE: 151 MMHG | WEIGHT: 156.2 LBS | RESPIRATION RATE: 22 BRPM | TEMPERATURE: 97.7 F

## 2022-01-01 VITALS
TEMPERATURE: 98.1 F | HEIGHT: 68 IN | RESPIRATION RATE: 16 BRPM | OXYGEN SATURATION: 94 % | BODY MASS INDEX: 22.52 KG/M2 | DIASTOLIC BLOOD PRESSURE: 74 MMHG | SYSTOLIC BLOOD PRESSURE: 127 MMHG | HEART RATE: 63 BPM | WEIGHT: 148.6 LBS

## 2022-01-01 VITALS
DIASTOLIC BLOOD PRESSURE: 95 MMHG | SYSTOLIC BLOOD PRESSURE: 147 MMHG | TEMPERATURE: 97.8 F | RESPIRATION RATE: 16 BRPM | HEART RATE: 57 BPM | OXYGEN SATURATION: 96 %

## 2022-01-01 VITALS
DIASTOLIC BLOOD PRESSURE: 72 MMHG | SYSTOLIC BLOOD PRESSURE: 125 MMHG | OXYGEN SATURATION: 98 % | BODY MASS INDEX: 23.34 KG/M2 | HEIGHT: 68 IN | HEART RATE: 54 BPM | WEIGHT: 154 LBS

## 2022-01-01 VITALS — SYSTOLIC BLOOD PRESSURE: 209 MMHG | DIASTOLIC BLOOD PRESSURE: 85 MMHG | OXYGEN SATURATION: 89 %

## 2022-01-01 VITALS
HEART RATE: 54 BPM | BODY MASS INDEX: 23.31 KG/M2 | DIASTOLIC BLOOD PRESSURE: 60 MMHG | WEIGHT: 153.8 LBS | HEIGHT: 68 IN | SYSTOLIC BLOOD PRESSURE: 120 MMHG | OXYGEN SATURATION: 100 %

## 2022-01-01 VITALS
BODY MASS INDEX: 22.57 KG/M2 | WEIGHT: 152.4 LBS | OXYGEN SATURATION: 89 % | SYSTOLIC BLOOD PRESSURE: 116 MMHG | HEART RATE: 147 BPM | DIASTOLIC BLOOD PRESSURE: 67 MMHG | HEIGHT: 69 IN

## 2022-01-01 DIAGNOSIS — J84.9 ILD (INTERSTITIAL LUNG DISEASE) (HCC): Primary | ICD-10-CM

## 2022-01-01 DIAGNOSIS — Z95.0 PACEMAKER: Primary | ICD-10-CM

## 2022-01-01 DIAGNOSIS — I50.22 CHRONIC SYSTOLIC CONGESTIVE HEART FAILURE, NYHA CLASS 1 (HCC): ICD-10-CM

## 2022-01-01 DIAGNOSIS — R06.02 SHORTNESS OF BREATH: Primary | ICD-10-CM

## 2022-01-01 DIAGNOSIS — J18.9 PNEUMONIA OF BOTH LOWER LOBES DUE TO INFECTIOUS ORGANISM: ICD-10-CM

## 2022-01-01 DIAGNOSIS — R04.0 ANTERIOR EPISTAXIS: Primary | ICD-10-CM

## 2022-01-01 DIAGNOSIS — R06.89 DYSPNEA AND RESPIRATORY ABNORMALITIES: ICD-10-CM

## 2022-01-01 DIAGNOSIS — R00.2 PALPITATIONS: Primary | ICD-10-CM

## 2022-01-01 DIAGNOSIS — E87.20 LACTIC ACIDOSIS: ICD-10-CM

## 2022-01-01 DIAGNOSIS — J44.9 CHRONIC OBSTRUCTIVE PULMONARY DISEASE, UNSPECIFIED COPD TYPE (HCC): Primary | ICD-10-CM

## 2022-01-01 DIAGNOSIS — R65.10 SIRS (SYSTEMIC INFLAMMATORY RESPONSE SYNDROME) (HCC): ICD-10-CM

## 2022-01-01 DIAGNOSIS — D72.829 LEUKOCYTOSIS, UNSPECIFIED TYPE: ICD-10-CM

## 2022-01-01 DIAGNOSIS — I48.0 PAROXYSMAL ATRIAL FIBRILLATION (HCC): Primary | ICD-10-CM

## 2022-01-01 DIAGNOSIS — Z95.0 PACEMAKER: ICD-10-CM

## 2022-01-01 DIAGNOSIS — N18.9 CHRONIC KIDNEY DISEASE, UNSPECIFIED CKD STAGE: ICD-10-CM

## 2022-01-01 DIAGNOSIS — I50.22 CHRONIC SYSTOLIC CONGESTIVE HEART FAILURE, NYHA CLASS 1 (HCC): Primary | ICD-10-CM

## 2022-01-01 DIAGNOSIS — I25.10 CORONARY ARTERY DISEASE INVOLVING NATIVE CORONARY ARTERY OF NATIVE HEART WITHOUT ANGINA PECTORIS: ICD-10-CM

## 2022-01-01 DIAGNOSIS — R65.21 SEPTIC SHOCK (HCC): Primary | ICD-10-CM

## 2022-01-01 DIAGNOSIS — I25.5 ISCHEMIC CARDIOMYOPATHY: ICD-10-CM

## 2022-01-01 DIAGNOSIS — J44.9 COPD, MILD (HCC): ICD-10-CM

## 2022-01-01 DIAGNOSIS — R93.89 ABNORMAL CT OF THE CHEST: ICD-10-CM

## 2022-01-01 DIAGNOSIS — A41.9 SEPTICEMIA (HCC): Primary | ICD-10-CM

## 2022-01-01 DIAGNOSIS — E78.00 PURE HYPERCHOLESTEROLEMIA: ICD-10-CM

## 2022-01-01 DIAGNOSIS — N17.9 ACUTE KIDNEY INJURY SUPERIMPOSED ON CKD (HCC): ICD-10-CM

## 2022-01-01 DIAGNOSIS — R60.9 EDEMA, UNSPECIFIED TYPE: ICD-10-CM

## 2022-01-01 DIAGNOSIS — J18.9 PNEUMONIA DUE TO INFECTIOUS ORGANISM, UNSPECIFIED LATERALITY, UNSPECIFIED PART OF LUNG: ICD-10-CM

## 2022-01-01 DIAGNOSIS — R06.89 DYSPNEA AND RESPIRATORY ABNORMALITIES: Primary | ICD-10-CM

## 2022-01-01 DIAGNOSIS — R77.8 TROPONIN LEVEL ELEVATED: ICD-10-CM

## 2022-01-01 DIAGNOSIS — I50.9 ACUTE ON CHRONIC CONGESTIVE HEART FAILURE, UNSPECIFIED HEART FAILURE TYPE (HCC): ICD-10-CM

## 2022-01-01 DIAGNOSIS — J44.9 MODERATE COPD (CHRONIC OBSTRUCTIVE PULMONARY DISEASE) (HCC): ICD-10-CM

## 2022-01-01 DIAGNOSIS — Z79.899 MEDICATION TAKEN AT HIGHER DOSE THAN RECOMMENDED: ICD-10-CM

## 2022-01-01 DIAGNOSIS — I10 PRIMARY HYPERTENSION: ICD-10-CM

## 2022-01-01 DIAGNOSIS — F41.9 ANXIETY: ICD-10-CM

## 2022-01-01 DIAGNOSIS — I48.91 ATRIAL FIBRILLATION WITH RVR (HCC): ICD-10-CM

## 2022-01-01 DIAGNOSIS — R06.00 DYSPNEA AND RESPIRATORY ABNORMALITIES: Primary | ICD-10-CM

## 2022-01-01 DIAGNOSIS — I25.118 ATHEROSCLEROTIC HEART DISEASE OF NATIVE CORONARY ARTERY WITH OTHER FORMS OF ANGINA PECTORIS (HCC): ICD-10-CM

## 2022-01-01 DIAGNOSIS — R06.02 SOB (SHORTNESS OF BREATH): ICD-10-CM

## 2022-01-01 DIAGNOSIS — I50.22 CHF (CONGESTIVE HEART FAILURE), NYHA CLASS II, CHRONIC, SYSTOLIC (HCC): Primary | ICD-10-CM

## 2022-01-01 DIAGNOSIS — J18.9 PNEUMONIA OF BOTH LUNGS DUE TO INFECTIOUS ORGANISM, UNSPECIFIED PART OF LUNG: ICD-10-CM

## 2022-01-01 DIAGNOSIS — R77.8 ELEVATED TROPONIN: ICD-10-CM

## 2022-01-01 DIAGNOSIS — J44.1 COPD EXACERBATION (HCC): ICD-10-CM

## 2022-01-01 DIAGNOSIS — R06.00 DYSPNEA AND RESPIRATORY ABNORMALITIES: ICD-10-CM

## 2022-01-01 DIAGNOSIS — I50.22 CHF (CONGESTIVE HEART FAILURE), NYHA CLASS II, CHRONIC, SYSTOLIC (HCC): ICD-10-CM

## 2022-01-01 DIAGNOSIS — I49.5 TACHYCARDIA-BRADYCARDIA (HCC): ICD-10-CM

## 2022-01-01 DIAGNOSIS — R60.9 SWELLING: ICD-10-CM

## 2022-01-01 DIAGNOSIS — N18.9 ACUTE KIDNEY INJURY SUPERIMPOSED ON CKD (HCC): ICD-10-CM

## 2022-01-01 DIAGNOSIS — R04.0 EPISTAXIS: ICD-10-CM

## 2022-01-01 DIAGNOSIS — I48.0 PAROXYSMAL ATRIAL FIBRILLATION (HCC): ICD-10-CM

## 2022-01-01 DIAGNOSIS — Z79.899 LONG TERM CURRENT USE OF AMIODARONE: ICD-10-CM

## 2022-01-01 DIAGNOSIS — A41.9 SEPTIC SHOCK (HCC): Primary | ICD-10-CM

## 2022-01-01 DIAGNOSIS — J96.21 ACUTE ON CHRONIC RESPIRATORY FAILURE WITH HYPOXEMIA (HCC): ICD-10-CM

## 2022-01-01 LAB
A1 ANTIGEN: NORMAL
ABO CHECK: NORMAL
ACINETOBACTER BAUMANNII FILM ARRAY: NOT DETECTED
ACINETOBACTER CALCOACETICUS-BAUMANNII BY PCR: NOT DETECTED
ADENOVIRUS BY PCR: NOT DETECTED
AEROBIC CULTURE: NORMAL
AEROBIC CULTURE: NORMAL
AFB CULTURE & SMEAR: NORMAL
AFB SMEAR: NORMAL
ALBUMIN SERPL-MCNC: 2.7 G/DL (ref 3.5–5.1)
ALBUMIN SERPL-MCNC: 2.7 G/DL (ref 3.5–5.1)
ALBUMIN SERPL-MCNC: 2.8 G/DL (ref 3.5–5.1)
ALBUMIN SERPL-MCNC: 3 G/DL (ref 3.5–5.1)
ALBUMIN SERPL-MCNC: 3.1 G/DL (ref 3.5–5.1)
ALBUMIN SERPL-MCNC: 3.2 G/DL (ref 3.5–5.1)
ALBUMIN SERPL-MCNC: 3.5 G/DL (ref 3.5–5.1)
ALLEN TEST: ABNORMAL
ALLEN TEST: ABNORMAL
ALLEN TEST: POSITIVE
ALLEN TEST: POSITIVE
ALP BLD-CCNC: 101 U/L (ref 38–126)
ALP BLD-CCNC: 50 U/L (ref 38–126)
ALP BLD-CCNC: 57 U/L (ref 38–126)
ALP BLD-CCNC: 57 U/L (ref 38–126)
ALP BLD-CCNC: 65 U/L (ref 38–126)
ALP BLD-CCNC: 67 U/L (ref 38–126)
ALP BLD-CCNC: 69 U/L (ref 38–126)
ALT SERPL-CCNC: 13 U/L (ref 11–66)
ALT SERPL-CCNC: 14 U/L (ref 11–66)
ALT SERPL-CCNC: 17 U/L (ref 11–66)
ALT SERPL-CCNC: 20 U/L (ref 11–66)
ALT SERPL-CCNC: 24 U/L (ref 11–66)
ALT SERPL-CCNC: 25 U/L (ref 11–66)
ALT SERPL-CCNC: 29 U/L (ref 11–66)
ANA SCREEN: NORMAL
ANAEROBIC CULTURE: NORMAL
ANGIOTENSIN CONVERTING ENZYME: 21 U/L (ref 8–52)
ANION GAP SERPL CALCULATED.3IONS-SCNC: 10 MEQ/L (ref 8–16)
ANION GAP SERPL CALCULATED.3IONS-SCNC: 11 MEQ/L (ref 8–16)
ANION GAP SERPL CALCULATED.3IONS-SCNC: 12 MEQ/L (ref 8–16)
ANION GAP SERPL CALCULATED.3IONS-SCNC: 13 MEQ/L (ref 8–16)
ANION GAP SERPL CALCULATED.3IONS-SCNC: 15 MEQ/L (ref 8–16)
ANION GAP SERPL CALCULATED.3IONS-SCNC: 17 MEQ/L (ref 8–16)
ANION GAP SERPL CALCULATED.3IONS-SCNC: 18 MEQ/L (ref 8–16)
ANION GAP SERPL CALCULATED.3IONS-SCNC: 6 MEQ/L (ref 8–16)
ANION GAP SERPL CALCULATED.3IONS-SCNC: 6 MEQ/L (ref 8–16)
ANION GAP SERPL CALCULATED.3IONS-SCNC: 7 MEQ/L (ref 8–16)
ANION GAP SERPL CALCULATED.3IONS-SCNC: 8 MEQ/L (ref 8–16)
ANION GAP SERPL CALCULATED.3IONS-SCNC: 9 MEQ/L (ref 8–16)
ANTI RNP AB: 3 UNITS (ref 0–19)
ANTIBODY SCREEN: NORMAL
APTT: 22.6 SECONDS (ref 22–38)
APTT: 25.3 SECONDS (ref 22–38)
AST SERPL-CCNC: 18 U/L (ref 5–40)
AST SERPL-CCNC: 20 U/L (ref 5–40)
AST SERPL-CCNC: 23 U/L (ref 5–40)
AST SERPL-CCNC: 25 U/L (ref 5–40)
AST SERPL-CCNC: 27 U/L (ref 5–40)
AST SERPL-CCNC: 31 U/L (ref 5–40)
AST SERPL-CCNC: 37 U/L (ref 5–40)
BAL CHARACTER: CLEAR
BAL COLLECTION SITE: ABNORMAL
BAL COLOR: ABNORMAL
BASE EXCESS (CALCULATED): -2.8 MMOL/L (ref -2.5–2.5)
BASE EXCESS (CALCULATED): 2 MMOL/L (ref -2.5–2.5)
BASE EXCESS (CALCULATED): 2.9 MMOL/L (ref -2.5–2.5)
BASE EXCESS (CALCULATED): 4.7 MMOL/L (ref -2.5–2.5)
BASOPHILS # BLD: 0.1 %
BASOPHILS # BLD: 0.2 %
BASOPHILS # BLD: 0.3 %
BASOPHILS # BLD: 0.4 %
BASOPHILS # BLD: 0.5 %
BASOPHILS # BLD: 0.5 %
BASOPHILS ABSOLUTE: 0 THOU/MM3 (ref 0–0.1)
BASOPHILS ABSOLUTE: 0.1 THOU/MM3 (ref 0–0.1)
BILIRUB SERPL-MCNC: 0.4 MG/DL (ref 0.3–1.2)
BILIRUB SERPL-MCNC: 0.5 MG/DL (ref 0.3–1.2)
BILIRUB SERPL-MCNC: 0.6 MG/DL (ref 0.3–1.2)
BILIRUB SERPL-MCNC: 0.7 MG/DL (ref 0.3–1.2)
BILIRUB SERPL-MCNC: 0.7 MG/DL (ref 0.3–1.2)
BILIRUBIN DIRECT: < 0.2 MG/DL (ref 0–0.3)
BLOOD CULTURE, ROUTINE: ABNORMAL
BLOOD CULTURE, ROUTINE: ABNORMAL
BLOOD CULTURE, ROUTINE: NORMAL
BOTTLE TYPE: ABNORMAL
BUN BLDV-MCNC: 29 MG/DL (ref 7–22)
BUN BLDV-MCNC: 30 MG/DL (ref 7–22)
BUN BLDV-MCNC: 30 MG/DL (ref 7–22)
BUN BLDV-MCNC: 32 MG/DL (ref 7–22)
BUN BLDV-MCNC: 32 MG/DL (ref 7–22)
BUN BLDV-MCNC: 34 MG/DL (ref 7–22)
BUN BLDV-MCNC: 36 MG/DL (ref 7–22)
BUN BLDV-MCNC: 38 MG/DL (ref 7–22)
BUN BLDV-MCNC: 38 MG/DL (ref 7–22)
BUN BLDV-MCNC: 39 MG/DL (ref 7–22)
BUN BLDV-MCNC: 39 MG/DL (ref 7–22)
BUN BLDV-MCNC: 40 MG/DL (ref 7–22)
BUN BLDV-MCNC: 41 MG/DL (ref 7–22)
BUN BLDV-MCNC: 41 MG/DL (ref 7–22)
BUN BLDV-MCNC: 42 MG/DL (ref 7–22)
BUN BLDV-MCNC: 44 MG/DL (ref 7–22)
BUN BLDV-MCNC: 45 MG/DL (ref 7–22)
BUN BLDV-MCNC: 47 MG/DL (ref 7–22)
BUN BLDV-MCNC: 49 MG/DL (ref 7–22)
BUN BLDV-MCNC: 49 MG/DL (ref 7–22)
BUN BLDV-MCNC: 50 MG/DL (ref 7–22)
BUN BLDV-MCNC: 51 MG/DL (ref 7–22)
BUN BLDV-MCNC: 51 MG/DL (ref 7–22)
BUN BLDV-MCNC: 52 MG/DL (ref 7–22)
BUN BLDV-MCNC: 52 MG/DL (ref 7–22)
BUN BLDV-MCNC: 53 MG/DL (ref 7–22)
BUN BLDV-MCNC: 54 MG/DL (ref 7–22)
BUN BLDV-MCNC: 57 MG/DL (ref 7–22)
BUN BLDV-MCNC: 58 MG/DL (ref 7–22)
BUN BLDV-MCNC: 67 MG/DL (ref 7–22)
C-REACTIVE PROTEIN: 8.15 MG/DL (ref 0–1)
C3 COMPLEMENT: 119 MG/DL (ref 90–180)
CALCIUM SERPL-MCNC: 7.7 MG/DL (ref 8.5–10.5)
CALCIUM SERPL-MCNC: 8 MG/DL (ref 8.5–10.5)
CALCIUM SERPL-MCNC: 8.1 MG/DL (ref 8.5–10.5)
CALCIUM SERPL-MCNC: 8.2 MG/DL (ref 8.5–10.5)
CALCIUM SERPL-MCNC: 8.3 MG/DL (ref 8.5–10.5)
CALCIUM SERPL-MCNC: 8.4 MG/DL (ref 8.5–10.5)
CALCIUM SERPL-MCNC: 8.5 MG/DL (ref 8.5–10.5)
CALCIUM SERPL-MCNC: 8.5 MG/DL (ref 8.5–10.5)
CALCIUM SERPL-MCNC: 8.6 MG/DL (ref 8.5–10.5)
CALCIUM SERPL-MCNC: 8.7 MG/DL (ref 8.5–10.5)
CALCIUM SERPL-MCNC: 8.7 MG/DL (ref 8.5–10.5)
CALCIUM SERPL-MCNC: 8.8 MG/DL (ref 8.5–10.5)
CALCIUM SERPL-MCNC: 8.9 MG/DL (ref 8.5–10.5)
CALCIUM SERPL-MCNC: 9 MG/DL (ref 8.5–10.5)
CALCIUM SERPL-MCNC: 9 MG/DL (ref 8.5–10.5)
CALCIUM SERPL-MCNC: 9.1 MG/DL (ref 8.5–10.5)
CALCIUM SERPL-MCNC: 9.2 MG/DL (ref 8.5–10.5)
CANDIDA ALBICANS FILM ARRAY: NOT DETECTED
CANDIDA GLABRATA FILM ARRAY: NOT DETECTED
CANDIDA KRUSEI FILM ARRAY: NOT DETECTED
CANDIDA PARAPSILOSIS FILM ARRAY: NOT DETECTED
CANDIDA TROPICALIS FILM ARRAY: NOT DETECTED
CARBAPENEM RESITANT FILM ARRAY: ABNORMAL
CHLAMYDIA PNEUMONIAE BY PCR: NOT DETECTED
CHLORIDE BLD-SCNC: 100 MEQ/L (ref 98–111)
CHLORIDE BLD-SCNC: 100 MEQ/L (ref 98–111)
CHLORIDE BLD-SCNC: 101 MEQ/L (ref 98–111)
CHLORIDE BLD-SCNC: 103 MEQ/L (ref 98–111)
CHLORIDE BLD-SCNC: 104 MEQ/L (ref 98–111)
CHLORIDE BLD-SCNC: 104 MEQ/L (ref 98–111)
CHLORIDE BLD-SCNC: 91 MEQ/L (ref 98–111)
CHLORIDE BLD-SCNC: 93 MEQ/L (ref 98–111)
CHLORIDE BLD-SCNC: 95 MEQ/L (ref 98–111)
CHLORIDE BLD-SCNC: 96 MEQ/L (ref 98–111)
CHLORIDE BLD-SCNC: 97 MEQ/L (ref 98–111)
CHLORIDE BLD-SCNC: 98 MEQ/L (ref 98–111)
CHLORIDE BLD-SCNC: 99 MEQ/L (ref 98–111)
CILIATED/EPITHELIAL CELLS BAL: 31 % (ref 0–5)
CO2: 18 MEQ/L (ref 23–33)
CO2: 19 MEQ/L (ref 23–33)
CO2: 20 MEQ/L (ref 23–33)
CO2: 21 MEQ/L (ref 23–33)
CO2: 22 MEQ/L (ref 23–33)
CO2: 22 MEQ/L (ref 23–33)
CO2: 23 MEQ/L (ref 23–33)
CO2: 23 MEQ/L (ref 23–33)
CO2: 24 MEQ/L (ref 23–33)
CO2: 25 MEQ/L (ref 23–33)
CO2: 26 MEQ/L (ref 23–33)
CO2: 26 MEQ/L (ref 23–33)
CO2: 27 MEQ/L (ref 23–33)
CO2: 28 MEQ/L (ref 23–33)
CO2: 29 MEQ/L (ref 23–33)
CO2: 30 MEQ/L (ref 23–33)
CO2: 31 MEQ/L (ref 23–33)
CO2: 32 MEQ/L (ref 23–33)
CO2: 32 MEQ/L (ref 23–33)
CO2: 34 MEQ/L (ref 23–33)
COLLECTED BY:: ABNORMAL
COMPLEMENT C4: 13 MG/DL (ref 10–40)
CREAT SERPL-MCNC: 0.9 MG/DL (ref 0.4–1.2)
CREAT SERPL-MCNC: 1 MG/DL (ref 0.4–1.2)
CREAT SERPL-MCNC: 1.1 MG/DL (ref 0.4–1.2)
CREAT SERPL-MCNC: 1.2 MG/DL (ref 0.4–1.2)
CREAT SERPL-MCNC: 1.3 MG/DL (ref 0.4–1.2)
CREAT SERPL-MCNC: 1.3 MG/DL (ref 0.4–1.2)
CREAT SERPL-MCNC: 1.4 MG/DL (ref 0.4–1.2)
CREAT SERPL-MCNC: 1.5 MG/DL (ref 0.4–1.2)
CREAT SERPL-MCNC: 1.6 MG/DL (ref 0.4–1.2)
CREAT SERPL-MCNC: 1.7 MG/DL (ref 0.4–1.2)
CREAT SERPL-MCNC: 1.7 MG/DL (ref 0.4–1.2)
CREAT SERPL-MCNC: 1.8 MG/DL (ref 0.4–1.2)
CREAT SERPL-MCNC: 1.9 MG/DL (ref 0.4–1.2)
CREAT SERPL-MCNC: 2 MG/DL (ref 0.4–1.2)
CYCLIC CITRULLINATED PEPTIDE ANTIBODY IGG: 4.3 U/ML (ref 0–7)
DEVICE: ABNORMAL
DIFFERENTIAL TYPE: ABNORMAL
DIGOXIN LEVEL: 1 NG/ML (ref 0.5–2)
DIGOXIN LEVEL: 1.2 NG/ML (ref 0.5–2)
DIGOXIN LEVEL: 1.5 NG/ML (ref 0.5–2)
EKG ATRIAL RATE: 121 BPM
EKG ATRIAL RATE: 138 BPM
EKG ATRIAL RATE: 238 BPM
EKG ATRIAL RATE: 264 BPM
EKG ATRIAL RATE: 268 BPM
EKG ATRIAL RATE: 55 BPM
EKG ATRIAL RATE: 59 BPM
EKG ATRIAL RATE: 60 BPM
EKG ATRIAL RATE: 62 BPM
EKG ATRIAL RATE: 63 BPM
EKG ATRIAL RATE: 64 BPM
EKG ATRIAL RATE: 64 BPM
EKG ATRIAL RATE: 79 BPM
EKG P AXIS: -111 DEGREES
EKG P AXIS: -143 DEGREES
EKG P AXIS: -92 DEGREES
EKG P AXIS: 51 DEGREES
EKG P AXIS: 62 DEGREES
EKG P AXIS: 75 DEGREES
EKG P AXIS: 76 DEGREES
EKG P AXIS: 78 DEGREES
EKG P AXIS: 78 DEGREES
EKG P AXIS: 80 DEGREES
EKG P AXIS: 81 DEGREES
EKG P AXIS: 82 DEGREES
EKG P AXIS: 88 DEGREES
EKG P-R INTERVAL: 144 MS
EKG P-R INTERVAL: 144 MS
EKG P-R INTERVAL: 146 MS
EKG P-R INTERVAL: 146 MS
EKG P-R INTERVAL: 148 MS
EKG P-R INTERVAL: 148 MS
EKG P-R INTERVAL: 150 MS
EKG P-R INTERVAL: 150 MS
EKG P-R INTERVAL: 160 MS
EKG P-R INTERVAL: 174 MS
EKG P-R INTERVAL: 186 MS
EKG P-R INTERVAL: 256 MS
EKG Q-T INTERVAL: 278 MS
EKG Q-T INTERVAL: 356 MS
EKG Q-T INTERVAL: 358 MS
EKG Q-T INTERVAL: 360 MS
EKG Q-T INTERVAL: 366 MS
EKG Q-T INTERVAL: 386 MS
EKG Q-T INTERVAL: 392 MS
EKG Q-T INTERVAL: 396 MS
EKG Q-T INTERVAL: 408 MS
EKG Q-T INTERVAL: 432 MS
EKG Q-T INTERVAL: 456 MS
EKG Q-T INTERVAL: 458 MS
EKG Q-T INTERVAL: 458 MS
EKG Q-T INTERVAL: 460 MS
EKG Q-T INTERVAL: 462 MS
EKG Q-T INTERVAL: 464 MS
EKG Q-T INTERVAL: 474 MS
EKG Q-T INTERVAL: 476 MS
EKG QRS DURATION: 100 MS
EKG QRS DURATION: 102 MS
EKG QRS DURATION: 104 MS
EKG QRS DURATION: 110 MS
EKG QRS DURATION: 112 MS
EKG QRS DURATION: 116 MS
EKG QRS DURATION: 122 MS
EKG QRS DURATION: 128 MS
EKG QRS DURATION: 128 MS
EKG QRS DURATION: 130 MS
EKG QRS DURATION: 130 MS
EKG QRS DURATION: 136 MS
EKG QRS DURATION: 82 MS
EKG QRS DURATION: 92 MS
EKG QRS DURATION: 94 MS
EKG QRS DURATION: 96 MS
EKG QTC CALCULATION (BAZETT): 394 MS
EKG QTC CALCULATION (BAZETT): 419 MS
EKG QTC CALCULATION (BAZETT): 432 MS
EKG QTC CALCULATION (BAZETT): 453 MS
EKG QTC CALCULATION (BAZETT): 455 MS
EKG QTC CALCULATION (BAZETT): 456 MS
EKG QTC CALCULATION (BAZETT): 464 MS
EKG QTC CALCULATION (BAZETT): 472 MS
EKG QTC CALCULATION (BAZETT): 472 MS
EKG QTC CALCULATION (BAZETT): 474 MS
EKG QTC CALCULATION (BAZETT): 481 MS
EKG QTC CALCULATION (BAZETT): 506 MS
EKG QTC CALCULATION (BAZETT): 509 MS
EKG QTC CALCULATION (BAZETT): 514 MS
EKG QTC CALCULATION (BAZETT): 530 MS
EKG QTC CALCULATION (BAZETT): 562 MS
EKG QTC CALCULATION (BAZETT): 576 MS
EKG QTC CALCULATION (BAZETT): 593 MS
EKG R AXIS: 49 DEGREES
EKG R AXIS: 58 DEGREES
EKG R AXIS: 58 DEGREES
EKG R AXIS: 66 DEGREES
EKG R AXIS: 68 DEGREES
EKG R AXIS: 70 DEGREES
EKG R AXIS: 75 DEGREES
EKG R AXIS: 76 DEGREES
EKG R AXIS: 77 DEGREES
EKG R AXIS: 81 DEGREES
EKG R AXIS: 83 DEGREES
EKG R AXIS: 84 DEGREES
EKG R AXIS: 85 DEGREES
EKG R AXIS: 86 DEGREES
EKG R AXIS: 86 DEGREES
EKG R AXIS: 87 DEGREES
EKG T AXIS: -23 DEGREES
EKG T AXIS: -35 DEGREES
EKG T AXIS: -35 DEGREES
EKG T AXIS: 32 DEGREES
EKG T AXIS: 38 DEGREES
EKG T AXIS: 38 DEGREES
EKG T AXIS: 45 DEGREES
EKG T AXIS: 53 DEGREES
EKG T AXIS: 58 DEGREES
EKG T AXIS: 58 DEGREES
EKG T AXIS: 62 DEGREES
EKG T AXIS: 62 DEGREES
EKG T AXIS: 64 DEGREES
EKG T AXIS: 64 DEGREES
EKG T AXIS: 65 DEGREES
EKG T AXIS: 7 DEGREES
EKG T AXIS: 75 DEGREES
EKG T AXIS: 8 DEGREES
EKG VENTRICULAR RATE: 108 BPM
EKG VENTRICULAR RATE: 114 BPM
EKG VENTRICULAR RATE: 119 BPM
EKG VENTRICULAR RATE: 121 BPM
EKG VENTRICULAR RATE: 123 BPM
EKG VENTRICULAR RATE: 124 BPM
EKG VENTRICULAR RATE: 134 BPM
EKG VENTRICULAR RATE: 138 BPM
EKG VENTRICULAR RATE: 55 BPM
EKG VENTRICULAR RATE: 59 BPM
EKG VENTRICULAR RATE: 60 BPM
EKG VENTRICULAR RATE: 62 BPM
EKG VENTRICULAR RATE: 63 BPM
EKG VENTRICULAR RATE: 64 BPM
EKG VENTRICULAR RATE: 64 BPM
EKG VENTRICULAR RATE: 79 BPM
ENTERBACTER CLOACAE FILM ARRAY: NOT DETECTED
ENTERBACTERIACEAE FILM ARRAY: NOT DETECTED
ENTEROBACTER CLOACAE COMPLEX BY PCR: NOT DETECTED
ENTEROCOCCUS FILM ARRAY: NOT DETECTED
EOSINOPHIL # BLD: 0 %
EOSINOPHIL # BLD: 0.1 %
EOSINOPHIL # BLD: 0.2 %
EOSINOPHIL # BLD: 0.3 %
EOSINOPHIL # BLD: 0.3 %
EOSINOPHIL # BLD: 0.5 %
EOSINOPHIL # BLD: 0.5 %
EOSINOPHIL # BLD: 0.7 %
EOSINOPHIL # BLD: 0.8 %
EOSINOPHIL # BLD: 0.8 %
EOSINOPHIL # BLD: 1.1 %
EOSINOPHIL # BLD: 1.4 %
EOSINOPHILS ABSOLUTE: 0 THOU/MM3 (ref 0–0.4)
EOSINOPHILS ABSOLUTE: 0.1 THOU/MM3 (ref 0–0.4)
EOSINOPHILS ABSOLUTE: 0.2 THOU/MM3 (ref 0–0.4)
ERYTHROCYTE [DISTWIDTH] IN BLOOD BY AUTOMATED COUNT: 12.9 % (ref 11.5–14.5)
ERYTHROCYTE [DISTWIDTH] IN BLOOD BY AUTOMATED COUNT: 13 % (ref 11.5–14.5)
ERYTHROCYTE [DISTWIDTH] IN BLOOD BY AUTOMATED COUNT: 13.2 % (ref 11.5–14.5)
ERYTHROCYTE [DISTWIDTH] IN BLOOD BY AUTOMATED COUNT: 13.5 % (ref 11.5–14.5)
ERYTHROCYTE [DISTWIDTH] IN BLOOD BY AUTOMATED COUNT: 13.5 % (ref 11.5–14.5)
ERYTHROCYTE [DISTWIDTH] IN BLOOD BY AUTOMATED COUNT: 13.6 % (ref 11.5–14.5)
ERYTHROCYTE [DISTWIDTH] IN BLOOD BY AUTOMATED COUNT: 14.3 % (ref 11.5–14.5)
ERYTHROCYTE [DISTWIDTH] IN BLOOD BY AUTOMATED COUNT: 14.5 % (ref 11.5–14.5)
ERYTHROCYTE [DISTWIDTH] IN BLOOD BY AUTOMATED COUNT: 14.5 % (ref 11.5–14.5)
ERYTHROCYTE [DISTWIDTH] IN BLOOD BY AUTOMATED COUNT: 14.6 % (ref 11.5–14.5)
ERYTHROCYTE [DISTWIDTH] IN BLOOD BY AUTOMATED COUNT: 14.6 % (ref 11.5–14.5)
ERYTHROCYTE [DISTWIDTH] IN BLOOD BY AUTOMATED COUNT: 14.7 % (ref 11.5–14.5)
ERYTHROCYTE [DISTWIDTH] IN BLOOD BY AUTOMATED COUNT: 14.7 % (ref 11.5–14.5)
ERYTHROCYTE [DISTWIDTH] IN BLOOD BY AUTOMATED COUNT: 14.8 % (ref 11.5–14.5)
ERYTHROCYTE [DISTWIDTH] IN BLOOD BY AUTOMATED COUNT: 14.9 % (ref 11.5–14.5)
ERYTHROCYTE [DISTWIDTH] IN BLOOD BY AUTOMATED COUNT: 15.1 % (ref 11.5–14.5)
ERYTHROCYTE [DISTWIDTH] IN BLOOD BY AUTOMATED COUNT: 15.1 % (ref 11.5–14.5)
ERYTHROCYTE [DISTWIDTH] IN BLOOD BY AUTOMATED COUNT: 15.2 % (ref 11.5–14.5)
ERYTHROCYTE [DISTWIDTH] IN BLOOD BY AUTOMATED COUNT: 15.3 % (ref 11.5–14.5)
ERYTHROCYTE [DISTWIDTH] IN BLOOD BY AUTOMATED COUNT: 15.7 % (ref 11.5–14.5)
ERYTHROCYTE [DISTWIDTH] IN BLOOD BY AUTOMATED COUNT: 15.9 % (ref 11.5–14.5)
ERYTHROCYTE [DISTWIDTH] IN BLOOD BY AUTOMATED COUNT: 16.5 % (ref 11.5–14.5)
ERYTHROCYTE [DISTWIDTH] IN BLOOD BY AUTOMATED COUNT: 16.7 % (ref 11.5–14.5)
ERYTHROCYTE [DISTWIDTH] IN BLOOD BY AUTOMATED COUNT: 16.7 % (ref 11.5–14.5)
ERYTHROCYTE [DISTWIDTH] IN BLOOD BY AUTOMATED COUNT: 16.8 % (ref 11.5–14.5)
ERYTHROCYTE [DISTWIDTH] IN BLOOD BY AUTOMATED COUNT: 16.9 % (ref 11.5–14.5)
ERYTHROCYTE [DISTWIDTH] IN BLOOD BY AUTOMATED COUNT: 45.6 FL (ref 35–45)
ERYTHROCYTE [DISTWIDTH] IN BLOOD BY AUTOMATED COUNT: 46.8 FL (ref 35–45)
ERYTHROCYTE [DISTWIDTH] IN BLOOD BY AUTOMATED COUNT: 47 FL (ref 35–45)
ERYTHROCYTE [DISTWIDTH] IN BLOOD BY AUTOMATED COUNT: 48.4 FL (ref 35–45)
ERYTHROCYTE [DISTWIDTH] IN BLOOD BY AUTOMATED COUNT: 48.4 FL (ref 35–45)
ERYTHROCYTE [DISTWIDTH] IN BLOOD BY AUTOMATED COUNT: 48.6 FL (ref 35–45)
ERYTHROCYTE [DISTWIDTH] IN BLOOD BY AUTOMATED COUNT: 48.7 FL (ref 35–45)
ERYTHROCYTE [DISTWIDTH] IN BLOOD BY AUTOMATED COUNT: 49.2 FL (ref 35–45)
ERYTHROCYTE [DISTWIDTH] IN BLOOD BY AUTOMATED COUNT: 49.3 FL (ref 35–45)
ERYTHROCYTE [DISTWIDTH] IN BLOOD BY AUTOMATED COUNT: 49.6 FL (ref 35–45)
ERYTHROCYTE [DISTWIDTH] IN BLOOD BY AUTOMATED COUNT: 49.8 FL (ref 35–45)
ERYTHROCYTE [DISTWIDTH] IN BLOOD BY AUTOMATED COUNT: 49.9 FL (ref 35–45)
ERYTHROCYTE [DISTWIDTH] IN BLOOD BY AUTOMATED COUNT: 49.9 FL (ref 35–45)
ERYTHROCYTE [DISTWIDTH] IN BLOOD BY AUTOMATED COUNT: 50.3 FL (ref 35–45)
ERYTHROCYTE [DISTWIDTH] IN BLOOD BY AUTOMATED COUNT: 50.4 FL (ref 35–45)
ERYTHROCYTE [DISTWIDTH] IN BLOOD BY AUTOMATED COUNT: 50.5 FL (ref 35–45)
ERYTHROCYTE [DISTWIDTH] IN BLOOD BY AUTOMATED COUNT: 50.6 FL (ref 35–45)
ERYTHROCYTE [DISTWIDTH] IN BLOOD BY AUTOMATED COUNT: 51.5 FL (ref 35–45)
ERYTHROCYTE [DISTWIDTH] IN BLOOD BY AUTOMATED COUNT: 51.6 FL (ref 35–45)
ERYTHROCYTE [DISTWIDTH] IN BLOOD BY AUTOMATED COUNT: 52 FL (ref 35–45)
ERYTHROCYTE [DISTWIDTH] IN BLOOD BY AUTOMATED COUNT: 52.9 FL (ref 35–45)
ERYTHROCYTE [DISTWIDTH] IN BLOOD BY AUTOMATED COUNT: 53.3 FL (ref 35–45)
ERYTHROCYTE [DISTWIDTH] IN BLOOD BY AUTOMATED COUNT: 53.5 FL (ref 35–45)
ERYTHROCYTE [DISTWIDTH] IN BLOOD BY AUTOMATED COUNT: 53.7 FL (ref 35–45)
ERYTHROCYTE [DISTWIDTH] IN BLOOD BY AUTOMATED COUNT: 53.9 FL (ref 35–45)
ERYTHROCYTE [DISTWIDTH] IN BLOOD BY AUTOMATED COUNT: 54 FL (ref 35–45)
ERYTHROCYTE [DISTWIDTH] IN BLOOD BY AUTOMATED COUNT: 54.2 FL (ref 35–45)
ERYTHROCYTE [DISTWIDTH] IN BLOOD BY AUTOMATED COUNT: 54.4 FL (ref 35–45)
ERYTHROCYTE [DISTWIDTH] IN BLOOD BY AUTOMATED COUNT: 54.4 FL (ref 35–45)
ERYTHROCYTE [DISTWIDTH] IN BLOOD BY AUTOMATED COUNT: 54.9 FL (ref 35–45)
ERYTHROCYTE [DISTWIDTH] IN BLOOD BY AUTOMATED COUNT: 55.1 FL (ref 35–45)
ERYTHROCYTE [DISTWIDTH] IN BLOOD BY AUTOMATED COUNT: 56.4 FL (ref 35–45)
ESCHERICHIA COLI BY PCR: NOT DETECTED
ESCHERICHIA COLI FILM ARRAY: NOT DETECTED
FERRITIN: 497 NG/ML (ref 22–322)
FLU A ANTIGEN: NEGATIVE
FLU A ANTIGEN: NEGATIVE
FLU B ANTIGEN: NEGATIVE
FLU B ANTIGEN: NEGATIVE
FUNGUS IDENTIFIED: NORMAL
FUNGUS SMEAR: ABNORMAL
FUNGUS SMEAR: NORMAL
GFR SERPL CREATININE-BSD FRML MDRD: 33 ML/MIN/1.73M2
GFR SERPL CREATININE-BSD FRML MDRD: 35 ML/MIN/1.73M2
GFR SERPL CREATININE-BSD FRML MDRD: 37 ML/MIN/1.73M2
GFR SERPL CREATININE-BSD FRML MDRD: 40 ML/MIN/1.73M2
GFR SERPL CREATININE-BSD FRML MDRD: 40 ML/MIN/1.73M2
GFR SERPL CREATININE-BSD FRML MDRD: 43 ML/MIN/1.73M2
GFR SERPL CREATININE-BSD FRML MDRD: 46 ML/MIN/1.73M2
GFR SERPL CREATININE-BSD FRML MDRD: 50 ML/MIN/1.73M2
GFR SERPL CREATININE-BSD FRML MDRD: 54 ML/MIN/1.73M2
GFR SERPL CREATININE-BSD FRML MDRD: 54 ML/MIN/1.73M2
GFR SERPL CREATININE-BSD FRML MDRD: 60 ML/MIN/1.73M2
GFR SERPL CREATININE-BSD FRML MDRD: 66 ML/MIN/1.73M2
GFR SERPL CREATININE-BSD FRML MDRD: 74 ML/MIN/1.73M2
GFR SERPL CREATININE-BSD FRML MDRD: 83 ML/MIN/1.73M2
GLUCOSE BLD-MCNC: 101 MG/DL (ref 70–108)
GLUCOSE BLD-MCNC: 101 MG/DL (ref 70–108)
GLUCOSE BLD-MCNC: 102 MG/DL (ref 70–108)
GLUCOSE BLD-MCNC: 105 MG/DL (ref 70–108)
GLUCOSE BLD-MCNC: 106 MG/DL (ref 70–108)
GLUCOSE BLD-MCNC: 107 MG/DL (ref 70–108)
GLUCOSE BLD-MCNC: 107 MG/DL (ref 70–108)
GLUCOSE BLD-MCNC: 108 MG/DL (ref 70–108)
GLUCOSE BLD-MCNC: 108 MG/DL (ref 70–108)
GLUCOSE BLD-MCNC: 109 MG/DL (ref 70–108)
GLUCOSE BLD-MCNC: 111 MG/DL (ref 70–108)
GLUCOSE BLD-MCNC: 116 MG/DL (ref 70–108)
GLUCOSE BLD-MCNC: 117 MG/DL (ref 70–108)
GLUCOSE BLD-MCNC: 119 MG/DL (ref 70–108)
GLUCOSE BLD-MCNC: 120 MG/DL (ref 70–108)
GLUCOSE BLD-MCNC: 122 MG/DL (ref 70–108)
GLUCOSE BLD-MCNC: 123 MG/DL (ref 70–108)
GLUCOSE BLD-MCNC: 125 MG/DL (ref 70–108)
GLUCOSE BLD-MCNC: 125 MG/DL (ref 70–108)
GLUCOSE BLD-MCNC: 127 MG/DL (ref 70–108)
GLUCOSE BLD-MCNC: 127 MG/DL (ref 70–108)
GLUCOSE BLD-MCNC: 128 MG/DL (ref 70–108)
GLUCOSE BLD-MCNC: 133 MG/DL (ref 70–108)
GLUCOSE BLD-MCNC: 133 MG/DL (ref 70–108)
GLUCOSE BLD-MCNC: 135 MG/DL (ref 70–108)
GLUCOSE BLD-MCNC: 137 MG/DL (ref 70–108)
GLUCOSE BLD-MCNC: 137 MG/DL (ref 70–108)
GLUCOSE BLD-MCNC: 139 MG/DL (ref 70–108)
GLUCOSE BLD-MCNC: 143 MG/DL (ref 70–108)
GLUCOSE BLD-MCNC: 144 MG/DL (ref 70–108)
GLUCOSE BLD-MCNC: 145 MG/DL (ref 70–108)
GLUCOSE BLD-MCNC: 145 MG/DL (ref 70–108)
GLUCOSE BLD-MCNC: 146 MG/DL (ref 70–108)
GLUCOSE BLD-MCNC: 149 MG/DL (ref 70–108)
GLUCOSE BLD-MCNC: 151 MG/DL (ref 70–108)
GLUCOSE BLD-MCNC: 152 MG/DL (ref 70–108)
GLUCOSE BLD-MCNC: 153 MG/DL (ref 70–108)
GLUCOSE BLD-MCNC: 155 MG/DL (ref 70–108)
GLUCOSE BLD-MCNC: 157 MG/DL (ref 70–108)
GLUCOSE BLD-MCNC: 158 MG/DL (ref 70–108)
GLUCOSE BLD-MCNC: 158 MG/DL (ref 70–108)
GLUCOSE BLD-MCNC: 160 MG/DL (ref 70–108)
GLUCOSE BLD-MCNC: 160 MG/DL (ref 70–108)
GLUCOSE BLD-MCNC: 162 MG/DL (ref 70–108)
GLUCOSE BLD-MCNC: 164 MG/DL (ref 70–108)
GLUCOSE BLD-MCNC: 168 MG/DL (ref 70–108)
GLUCOSE BLD-MCNC: 169 MG/DL (ref 70–108)
GLUCOSE BLD-MCNC: 170 MG/DL (ref 70–108)
GLUCOSE BLD-MCNC: 171 MG/DL (ref 70–108)
GLUCOSE BLD-MCNC: 173 MG/DL (ref 70–108)
GLUCOSE BLD-MCNC: 176 MG/DL (ref 70–108)
GLUCOSE BLD-MCNC: 179 MG/DL (ref 70–108)
GLUCOSE BLD-MCNC: 180 MG/DL (ref 70–108)
GLUCOSE BLD-MCNC: 181 MG/DL (ref 70–108)
GLUCOSE BLD-MCNC: 191 MG/DL (ref 70–108)
GLUCOSE BLD-MCNC: 192 MG/DL (ref 70–108)
GLUCOSE BLD-MCNC: 193 MG/DL (ref 70–108)
GLUCOSE BLD-MCNC: 194 MG/DL (ref 70–108)
GLUCOSE BLD-MCNC: 204 MG/DL (ref 70–108)
GLUCOSE BLD-MCNC: 207 MG/DL (ref 70–108)
GLUCOSE BLD-MCNC: 77 MG/DL (ref 70–108)
GLUCOSE BLD-MCNC: 78 MG/DL (ref 70–108)
GLUCOSE BLD-MCNC: 78 MG/DL (ref 70–108)
GLUCOSE BLD-MCNC: 80 MG/DL (ref 70–108)
GLUCOSE BLD-MCNC: 81 MG/DL (ref 70–108)
GLUCOSE BLD-MCNC: 81 MG/DL (ref 70–108)
GLUCOSE BLD-MCNC: 83 MG/DL (ref 70–108)
GLUCOSE BLD-MCNC: 84 MG/DL (ref 70–108)
GLUCOSE BLD-MCNC: 84 MG/DL (ref 70–108)
GLUCOSE BLD-MCNC: 86 MG/DL (ref 70–108)
GLUCOSE BLD-MCNC: 87 MG/DL (ref 70–108)
GLUCOSE BLD-MCNC: 89 MG/DL (ref 70–108)
GLUCOSE BLD-MCNC: 89 MG/DL (ref 70–108)
GLUCOSE BLD-MCNC: 90 MG/DL (ref 70–108)
GLUCOSE BLD-MCNC: 91 MG/DL (ref 70–108)
GLUCOSE BLD-MCNC: 92 MG/DL (ref 70–108)
GLUCOSE BLD-MCNC: 94 MG/DL (ref 70–108)
GLUCOSE BLD-MCNC: 94 MG/DL (ref 70–108)
GLUCOSE BLD-MCNC: 95 MG/DL (ref 70–108)
GLUCOSE BLD-MCNC: 96 MG/DL (ref 70–108)
GLUCOSE BLD-MCNC: 97 MG/DL (ref 70–108)
GLUCOSE BLD-MCNC: 98 MG/DL (ref 70–108)
GLUCOSE BLD-MCNC: 98 MG/DL (ref 70–108)
GLUCOSE BLD-MCNC: 99 MG/DL (ref 70–108)
GLUCOSE BLD-MCNC: 99 MG/DL (ref 70–108)
GRAM STAIN RESULT: ABNORMAL
GRAM STAIN RESULT: NORMAL
HAEMOPHILUS INFLUENZA FILM ARRAY: NOT DETECTED
HAEMOPHILUS INFLUENZAE BY PCR: NOT DETECTED
HCO3: 20 MMOL/L (ref 23–28)
HCO3: 26 MMOL/L (ref 23–28)
HCO3: 26 MMOL/L (ref 23–28)
HCO3: 28 MMOL/L (ref 23–28)
HCT VFR BLD CALC: 29.2 % (ref 42–52)
HCT VFR BLD CALC: 29.4 % (ref 42–52)
HCT VFR BLD CALC: 29.9 % (ref 42–52)
HCT VFR BLD CALC: 30.1 % (ref 42–52)
HCT VFR BLD CALC: 30.2 % (ref 42–52)
HCT VFR BLD CALC: 30.6 % (ref 42–52)
HCT VFR BLD CALC: 31.8 % (ref 42–52)
HCT VFR BLD CALC: 32.1 % (ref 42–52)
HCT VFR BLD CALC: 32.6 % (ref 42–52)
HCT VFR BLD CALC: 33.1 % (ref 42–52)
HCT VFR BLD CALC: 34 % (ref 42–52)
HCT VFR BLD CALC: 34.3 % (ref 42–52)
HCT VFR BLD CALC: 34.9 % (ref 42–52)
HCT VFR BLD CALC: 35.1 % (ref 42–52)
HCT VFR BLD CALC: 35.3 % (ref 42–52)
HCT VFR BLD CALC: 35.4 % (ref 42–52)
HCT VFR BLD CALC: 35.7 % (ref 42–52)
HCT VFR BLD CALC: 35.9 % (ref 42–52)
HCT VFR BLD CALC: 36.1 % (ref 42–52)
HCT VFR BLD CALC: 36.2 % (ref 42–52)
HCT VFR BLD CALC: 36.4 % (ref 42–52)
HCT VFR BLD CALC: 36.4 % (ref 42–52)
HCT VFR BLD CALC: 36.8 % (ref 42–52)
HCT VFR BLD CALC: 37.1 % (ref 42–52)
HCT VFR BLD CALC: 37.3 % (ref 42–52)
HCT VFR BLD CALC: 37.9 % (ref 42–52)
HCT VFR BLD CALC: 38.4 % (ref 42–52)
HCT VFR BLD CALC: 39 % (ref 42–52)
HCT VFR BLD CALC: 39.1 % (ref 42–52)
HCT VFR BLD CALC: 39.4 % (ref 42–52)
HCT VFR BLD CALC: 39.8 % (ref 42–52)
HCT VFR BLD CALC: 40 % (ref 42–52)
HCT VFR BLD CALC: 40.2 % (ref 42–52)
HCT VFR BLD CALC: 41.1 % (ref 42–52)
HEMOGLOBIN: 10.1 GM/DL (ref 14–18)
HEMOGLOBIN: 10.3 GM/DL (ref 14–18)
HEMOGLOBIN: 10.4 GM/DL (ref 14–18)
HEMOGLOBIN: 10.4 GM/DL (ref 14–18)
HEMOGLOBIN: 10.6 GM/DL (ref 14–18)
HEMOGLOBIN: 10.6 GM/DL (ref 14–18)
HEMOGLOBIN: 10.7 GM/DL (ref 14–18)
HEMOGLOBIN: 10.8 GM/DL (ref 14–18)
HEMOGLOBIN: 10.8 GM/DL (ref 14–18)
HEMOGLOBIN: 10.9 GM/DL (ref 14–18)
HEMOGLOBIN: 10.9 GM/DL (ref 14–18)
HEMOGLOBIN: 11.2 GM/DL (ref 14–18)
HEMOGLOBIN: 11.3 GM/DL (ref 14–18)
HEMOGLOBIN: 11.4 GM/DL (ref 14–18)
HEMOGLOBIN: 11.6 GM/DL (ref 14–18)
HEMOGLOBIN: 11.7 GM/DL (ref 14–18)
HEMOGLOBIN: 11.8 GM/DL (ref 14–18)
HEMOGLOBIN: 11.9 GM/DL (ref 14–18)
HEMOGLOBIN: 12 GM/DL (ref 14–18)
HEMOGLOBIN: 12.1 GM/DL (ref 14–18)
HEMOGLOBIN: 12.2 GM/DL (ref 14–18)
HEMOGLOBIN: 12.2 GM/DL (ref 14–18)
HEMOGLOBIN: 12.3 GM/DL (ref 14–18)
HEMOGLOBIN: 12.3 GM/DL (ref 14–18)
HEMOGLOBIN: 13.2 GM/DL (ref 14–18)
HEMOGLOBIN: 8.8 GM/DL (ref 14–18)
HEMOGLOBIN: 9.1 GM/DL (ref 14–18)
HEMOGLOBIN: 9.5 GM/DL (ref 14–18)
HEMOGLOBIN: 9.8 GM/DL (ref 14–18)
HEMOGLOBIN: 9.9 GM/DL (ref 14–18)
IFIO2: 3
IFIO2: 40
IFIO2: 95
IFIO2: 96
IMMATURE GRANS (ABS): 0.06 THOU/MM3 (ref 0–0.07)
IMMATURE GRANS (ABS): 0.08 THOU/MM3 (ref 0–0.07)
IMMATURE GRANS (ABS): 0.1 THOU/MM3 (ref 0–0.07)
IMMATURE GRANS (ABS): 0.1 THOU/MM3 (ref 0–0.07)
IMMATURE GRANS (ABS): 0.11 THOU/MM3 (ref 0–0.07)
IMMATURE GRANS (ABS): 0.12 THOU/MM3 (ref 0–0.07)
IMMATURE GRANS (ABS): 0.13 THOU/MM3 (ref 0–0.07)
IMMATURE GRANS (ABS): 0.13 THOU/MM3 (ref 0–0.07)
IMMATURE GRANS (ABS): 0.14 THOU/MM3 (ref 0–0.07)
IMMATURE GRANS (ABS): 0.14 THOU/MM3 (ref 0–0.07)
IMMATURE GRANS (ABS): 0.15 THOU/MM3 (ref 0–0.07)
IMMATURE GRANS (ABS): 0.16 THOU/MM3 (ref 0–0.07)
IMMATURE GRANS (ABS): 0.17 THOU/MM3 (ref 0–0.07)
IMMATURE GRANS (ABS): 0.19 THOU/MM3 (ref 0–0.07)
IMMATURE GRANS (ABS): 0.21 THOU/MM3 (ref 0–0.07)
IMMATURE GRANS (ABS): 0.21 THOU/MM3 (ref 0–0.07)
IMMATURE GRANS (ABS): 0.22 THOU/MM3 (ref 0–0.07)
IMMATURE GRANS (ABS): 0.22 THOU/MM3 (ref 0–0.07)
IMMATURE GRANS (ABS): 0.28 THOU/MM3 (ref 0–0.07)
IMMATURE GRANS (ABS): 0.29 THOU/MM3 (ref 0–0.07)
IMMATURE GRANS (ABS): 0.34 THOU/MM3 (ref 0–0.07)
IMMATURE GRANS (ABS): 0.45 THOU/MM3 (ref 0–0.07)
IMMATURE GRANS (ABS): 0.64 THOU/MM3 (ref 0–0.07)
IMMATURE GRANS (ABS): 0.77 THOU/MM3 (ref 0–0.07)
IMMATURE GRANS (ABS): 0.78 THOU/MM3 (ref 0–0.07)
IMMATURE GRANS (ABS): 0.87 THOU/MM3 (ref 0–0.07)
IMMATURE GRANULOCYTES: 0.4 %
IMMATURE GRANULOCYTES: 0.5 %
IMMATURE GRANULOCYTES: 0.5 %
IMMATURE GRANULOCYTES: 0.6 %
IMMATURE GRANULOCYTES: 0.6 %
IMMATURE GRANULOCYTES: 0.8 %
IMMATURE GRANULOCYTES: 0.9 %
IMMATURE GRANULOCYTES: 1 %
IMMATURE GRANULOCYTES: 1.1 %
IMMATURE GRANULOCYTES: 1.2 %
IMMATURE GRANULOCYTES: 1.3 %
IMMATURE GRANULOCYTES: 1.4 %
IMMATURE GRANULOCYTES: 1.4 %
IMMATURE GRANULOCYTES: 1.5 %
IMMATURE GRANULOCYTES: 1.5 %
IMMATURE GRANULOCYTES: 1.7 %
IMMATURE GRANULOCYTES: 2 %
IMMATURE GRANULOCYTES: 2.2 %
IMMATURE GRANULOCYTES: 2.5 %
IMMATURE GRANULOCYTES: 3.4 %
IMMATURE GRANULOCYTES: 3.8 %
IMMATURE GRANULOCYTES: 4.2 %
IMMATURE GRANULOCYTES: 4.4 %
INFLUENZA A BY PCR: NOT DETECTED
INFLUENZA B BY PCR: NOT DETECTED
INR BLD: 1.02 (ref 0.85–1.13)
INR BLD: 1.41 (ref 0.85–1.13)
KLEBSIELLA AEROGENES BY PCR: NOT DETECTED
KLEBSIELLA OXYTOCA BY PCR: NOT DETECTED
KLEBSIELLA OXYTOCA FILM ARRAY: NOT DETECTED
KLEBSIELLA PNEUMONIAE FILM ARRAY: NOT DETECTED
KLEBSIELLA PNEUMONIAE GROUP BY PCR: NOT DETECTED
LACTIC ACID, SEPSIS: 1.6 MMOL/L (ref 0.5–1.9)
LACTIC ACID, SEPSIS: 2.5 MMOL/L (ref 0.5–1.9)
LACTIC ACID, SEPSIS: 3.9 MMOL/L (ref 0.5–1.9)
LACTIC ACID, SEPSIS: 4.3 MMOL/L (ref 0.5–1.9)
LACTIC ACID: 2.8 MMOL/L (ref 0.5–2)
LEGIONELLA PNEUMOPHILIA BY PCR: NOT DETECTED
LEUKEMIA/LYMPHOMA PHENOTYPING MISC: NORMAL
LISTERIA MONOCYTOGENES FILM ARRAY: NOT DETECTED
LYMPHOCYTES # BLD: 10.5 %
LYMPHOCYTES # BLD: 10.9 %
LYMPHOCYTES # BLD: 11.3 %
LYMPHOCYTES # BLD: 11.4 %
LYMPHOCYTES # BLD: 11.7 %
LYMPHOCYTES # BLD: 13.5 %
LYMPHOCYTES # BLD: 13.8 %
LYMPHOCYTES # BLD: 2.2 %
LYMPHOCYTES # BLD: 2.2 %
LYMPHOCYTES # BLD: 2.3 %
LYMPHOCYTES # BLD: 2.4 %
LYMPHOCYTES # BLD: 2.6 %
LYMPHOCYTES # BLD: 2.9 %
LYMPHOCYTES # BLD: 3 %
LYMPHOCYTES # BLD: 3 %
LYMPHOCYTES # BLD: 3.2 %
LYMPHOCYTES # BLD: 3.4 %
LYMPHOCYTES # BLD: 3.4 %
LYMPHOCYTES # BLD: 3.7 %
LYMPHOCYTES # BLD: 3.7 %
LYMPHOCYTES # BLD: 4 %
LYMPHOCYTES # BLD: 4.6 %
LYMPHOCYTES # BLD: 5.1 %
LYMPHOCYTES # BLD: 6.3 %
LYMPHOCYTES # BLD: 6.3 %
LYMPHOCYTES # BLD: 7.5 %
LYMPHOCYTES # BLD: 7.5 %
LYMPHOCYTES # BLD: 9.2 %
LYMPHOCYTES # BLD: 9.3 %
LYMPHOCYTES # BLD: 9.9 %
LYMPHOCYTES ABSOLUTE: 0.4 THOU/MM3 (ref 1–4.8)
LYMPHOCYTES ABSOLUTE: 0.5 THOU/MM3 (ref 1–4.8)
LYMPHOCYTES ABSOLUTE: 0.6 THOU/MM3 (ref 1–4.8)
LYMPHOCYTES ABSOLUTE: 0.7 THOU/MM3 (ref 1–4.8)
LYMPHOCYTES ABSOLUTE: 0.8 THOU/MM3 (ref 1–4.8)
LYMPHOCYTES ABSOLUTE: 0.9 THOU/MM3 (ref 1–4.8)
LYMPHOCYTES ABSOLUTE: 1 THOU/MM3 (ref 1–4.8)
LYMPHOCYTES ABSOLUTE: 1.1 THOU/MM3 (ref 1–4.8)
LYMPHOCYTES ABSOLUTE: 1.2 THOU/MM3 (ref 1–4.8)
LYMPHOCYTES ABSOLUTE: 1.3 THOU/MM3 (ref 1–4.8)
LYMPHOCYTES ABSOLUTE: 1.4 THOU/MM3 (ref 1–4.8)
LYMPHOCYTES ABSOLUTE: 1.4 THOU/MM3 (ref 1–4.8)
LYMPHOCYTES ABSOLUTE: 1.5 THOU/MM3 (ref 1–4.8)
LYMPHOCYTES ABSOLUTE: 1.7 THOU/MM3 (ref 1–4.8)
LYMPHOCYTES ABSOLUTE: 1.7 THOU/MM3 (ref 1–4.8)
LYMPHOCYTES ABSOLUTE: 2.1 THOU/MM3 (ref 1–4.8)
MACROPHAGE/MONOCYTE BAL: 5 % (ref 86–100)
MAGNESIUM: 1.6 MG/DL (ref 1.6–2.4)
MAGNESIUM: 1.7 MG/DL (ref 1.6–2.4)
MAGNESIUM: 1.8 MG/DL (ref 1.6–2.4)
MAGNESIUM: 1.8 MG/DL (ref 1.6–2.4)
MAGNESIUM: 1.9 MG/DL (ref 1.6–2.4)
MAGNESIUM: 1.9 MG/DL (ref 1.6–2.4)
MAGNESIUM: 2.1 MG/DL (ref 1.6–2.4)
MAGNESIUM: 2.4 MG/DL (ref 1.6–2.4)
MCH RBC QN AUTO: 27.4 PG (ref 26–33)
MCH RBC QN AUTO: 27.5 PG (ref 26–33)
MCH RBC QN AUTO: 27.6 PG (ref 26–33)
MCH RBC QN AUTO: 27.6 PG (ref 26–33)
MCH RBC QN AUTO: 27.8 PG (ref 26–33)
MCH RBC QN AUTO: 27.9 PG (ref 26–33)
MCH RBC QN AUTO: 28 PG (ref 26–33)
MCH RBC QN AUTO: 28.1 PG (ref 26–33)
MCH RBC QN AUTO: 28.4 PG (ref 26–33)
MCH RBC QN AUTO: 28.5 PG (ref 26–33)
MCH RBC QN AUTO: 28.7 PG (ref 26–33)
MCH RBC QN AUTO: 28.8 PG (ref 26–33)
MCH RBC QN AUTO: 28.8 PG (ref 26–33)
MCH RBC QN AUTO: 29 PG (ref 26–33)
MCH RBC QN AUTO: 29 PG (ref 26–33)
MCH RBC QN AUTO: 29.1 PG (ref 26–33)
MCH RBC QN AUTO: 29.4 PG (ref 26–33)
MCH RBC QN AUTO: 29.5 PG (ref 26–33)
MCH RBC QN AUTO: 30.3 PG (ref 26–33)
MCH RBC QN AUTO: 32.6 PG (ref 26–33)
MCH RBC QN AUTO: 33.2 PG (ref 26–33)
MCH RBC QN AUTO: 33.4 PG (ref 26–33)
MCH RBC QN AUTO: 33.6 PG (ref 26–33)
MCH RBC QN AUTO: 33.7 PG (ref 26–33)
MCH RBC QN AUTO: 33.7 PG (ref 26–33)
MCH RBC QN AUTO: 33.8 PG (ref 26–33)
MCH RBC QN AUTO: 33.9 PG (ref 26–33)
MCHC RBC AUTO-ENTMCNC: 29.7 GM/DL (ref 32.2–35.5)
MCHC RBC AUTO-ENTMCNC: 29.8 GM/DL (ref 32.2–35.5)
MCHC RBC AUTO-ENTMCNC: 29.9 GM/DL (ref 32.2–35.5)
MCHC RBC AUTO-ENTMCNC: 30.1 GM/DL (ref 32.2–35.5)
MCHC RBC AUTO-ENTMCNC: 30.2 GM/DL (ref 32.2–35.5)
MCHC RBC AUTO-ENTMCNC: 30.4 GM/DL (ref 32.2–35.5)
MCHC RBC AUTO-ENTMCNC: 30.4 GM/DL (ref 32.2–35.5)
MCHC RBC AUTO-ENTMCNC: 30.6 GM/DL (ref 32.2–35.5)
MCHC RBC AUTO-ENTMCNC: 30.7 GM/DL (ref 32.2–35.5)
MCHC RBC AUTO-ENTMCNC: 30.7 GM/DL (ref 32.2–35.5)
MCHC RBC AUTO-ENTMCNC: 30.8 GM/DL (ref 32.2–35.5)
MCHC RBC AUTO-ENTMCNC: 30.8 GM/DL (ref 32.2–35.5)
MCHC RBC AUTO-ENTMCNC: 30.9 GM/DL (ref 32.2–35.5)
MCHC RBC AUTO-ENTMCNC: 31 GM/DL (ref 32.2–35.5)
MCHC RBC AUTO-ENTMCNC: 31 GM/DL (ref 32.2–35.5)
MCHC RBC AUTO-ENTMCNC: 31.1 GM/DL (ref 32.2–35.5)
MCHC RBC AUTO-ENTMCNC: 31.1 GM/DL (ref 32.2–35.5)
MCHC RBC AUTO-ENTMCNC: 31.2 GM/DL (ref 32.2–35.5)
MCHC RBC AUTO-ENTMCNC: 31.3 GM/DL (ref 32.2–35.5)
MCHC RBC AUTO-ENTMCNC: 31.4 GM/DL (ref 32.2–35.5)
MCHC RBC AUTO-ENTMCNC: 31.5 GM/DL (ref 32.2–35.5)
MCHC RBC AUTO-ENTMCNC: 31.6 GM/DL (ref 32.2–35.5)
MCHC RBC AUTO-ENTMCNC: 32.5 GM/DL (ref 32.2–35.5)
MCHC RBC AUTO-ENTMCNC: 32.8 GM/DL (ref 32.2–35.5)
MCHC RBC AUTO-ENTMCNC: 32.8 GM/DL (ref 32.2–35.5)
MCHC RBC AUTO-ENTMCNC: 33 GM/DL (ref 32.2–35.5)
MCHC RBC AUTO-ENTMCNC: 34 GM/DL (ref 32.2–35.5)
MCHC RBC AUTO-ENTMCNC: 34 GM/DL (ref 32.2–35.5)
MCHC RBC AUTO-ENTMCNC: 34.2 GM/DL (ref 32.2–35.5)
MCHC RBC AUTO-ENTMCNC: 34.4 GM/DL (ref 32.2–35.5)
MCV RBC AUTO: 100.3 FL (ref 80–94)
MCV RBC AUTO: 100.6 FL (ref 80–94)
MCV RBC AUTO: 100.7 FL (ref 80–94)
MCV RBC AUTO: 102.6 FL (ref 80–94)
MCV RBC AUTO: 103.5 FL (ref 80–94)
MCV RBC AUTO: 88.6 FL (ref 80–94)
MCV RBC AUTO: 88.7 FL (ref 80–94)
MCV RBC AUTO: 88.7 FL (ref 80–94)
MCV RBC AUTO: 88.8 FL (ref 80–94)
MCV RBC AUTO: 89 FL (ref 80–94)
MCV RBC AUTO: 89.1 FL (ref 80–94)
MCV RBC AUTO: 89.2 FL (ref 80–94)
MCV RBC AUTO: 89.2 FL (ref 80–94)
MCV RBC AUTO: 89.7 FL (ref 80–94)
MCV RBC AUTO: 89.8 FL (ref 80–94)
MCV RBC AUTO: 90.3 FL (ref 80–94)
MCV RBC AUTO: 90.6 FL (ref 80–94)
MCV RBC AUTO: 90.9 FL (ref 80–94)
MCV RBC AUTO: 91.2 FL (ref 80–94)
MCV RBC AUTO: 91.5 FL (ref 80–94)
MCV RBC AUTO: 91.7 FL (ref 80–94)
MCV RBC AUTO: 92.6 FL (ref 80–94)
MCV RBC AUTO: 92.9 FL (ref 80–94)
MCV RBC AUTO: 93.2 FL (ref 80–94)
MCV RBC AUTO: 93.3 FL (ref 80–94)
MCV RBC AUTO: 94.7 FL (ref 80–94)
MCV RBC AUTO: 95.4 FL (ref 80–94)
MCV RBC AUTO: 95.9 FL (ref 80–94)
MCV RBC AUTO: 96.8 FL (ref 80–94)
MCV RBC AUTO: 97.7 FL (ref 80–94)
MCV RBC AUTO: 97.7 FL (ref 80–94)
MCV RBC AUTO: 98.6 FL (ref 80–94)
MCV RBC AUTO: 99.2 FL (ref 80–94)
MCV RBC AUTO: 99.4 FL (ref 80–94)
METAPNEUMOVIRUS BY PCR: NOT DETECTED
METHICILLIN RESISTANT FILM ARRAY: DETECTED
MONOCYTES # BLD: 1.8 %
MONOCYTES # BLD: 10.3 %
MONOCYTES # BLD: 2.2 %
MONOCYTES # BLD: 2.6 %
MONOCYTES # BLD: 2.9 %
MONOCYTES # BLD: 3.2 %
MONOCYTES # BLD: 3.9 %
MONOCYTES # BLD: 3.9 %
MONOCYTES # BLD: 4.2 %
MONOCYTES # BLD: 4.3 %
MONOCYTES # BLD: 4.4 %
MONOCYTES # BLD: 4.7 %
MONOCYTES # BLD: 4.8 %
MONOCYTES # BLD: 4.8 %
MONOCYTES # BLD: 5.5 %
MONOCYTES # BLD: 5.6 %
MONOCYTES # BLD: 5.8 %
MONOCYTES # BLD: 6.1 %
MONOCYTES # BLD: 6.7 %
MONOCYTES # BLD: 7.2 %
MONOCYTES # BLD: 7.3 %
MONOCYTES # BLD: 7.4 %
MONOCYTES # BLD: 7.5 %
MONOCYTES # BLD: 8 %
MONOCYTES # BLD: 8.1 %
MONOCYTES # BLD: 8.1 %
MONOCYTES # BLD: 8.3 %
MONOCYTES ABSOLUTE: 0.2 THOU/MM3 (ref 0.4–1.3)
MONOCYTES ABSOLUTE: 0.3 THOU/MM3 (ref 0.4–1.3)
MONOCYTES ABSOLUTE: 0.3 THOU/MM3 (ref 0.4–1.3)
MONOCYTES ABSOLUTE: 0.5 THOU/MM3 (ref 0.4–1.3)
MONOCYTES ABSOLUTE: 0.6 THOU/MM3 (ref 0.4–1.3)
MONOCYTES ABSOLUTE: 0.7 THOU/MM3 (ref 0.4–1.3)
MONOCYTES ABSOLUTE: 0.8 THOU/MM3 (ref 0.4–1.3)
MONOCYTES ABSOLUTE: 0.8 THOU/MM3 (ref 0.4–1.3)
MONOCYTES ABSOLUTE: 0.9 THOU/MM3 (ref 0.4–1.3)
MONOCYTES ABSOLUTE: 1 THOU/MM3 (ref 0.4–1.3)
MONOCYTES ABSOLUTE: 1.3 THOU/MM3 (ref 0.4–1.3)
MONOCYTES ABSOLUTE: 1.3 THOU/MM3 (ref 0.4–1.3)
MONOCYTES ABSOLUTE: 1.4 THOU/MM3 (ref 0.4–1.3)
MONOCYTES ABSOLUTE: 1.6 THOU/MM3 (ref 0.4–1.3)
MORAXELLA CATARRHALIS BY PCR: NOT DETECTED
MRSA SCREEN RT-PCR: NEGATIVE
MRSA SCREEN RT-PCR: NEGATIVE
MYCOPLASMA PNEUMONIAE BY PCR: NOT DETECTED
NEISSERIA MENIGITIDIS FILM ARRAY: NOT DETECTED
NON-SARS CORONAVIRUS: NOT DETECTED
NUCLEATED RED BLOOD CELLS: 0 /100 WBC
O2 SATURATION: 100 %
O2 SATURATION: 87 %
O2 SATURATION: 94 %
O2 SATURATION: 98 %
ORGANISM: ABNORMAL
OSMOLALITY CALCULATION: 277.2 MOSMOL/KG (ref 275–300)
OSMOLALITY CALCULATION: 283.9 MOSMOL/KG (ref 275–300)
OSMOLALITY CALCULATION: 286.3 MOSMOL/KG (ref 275–300)
OSMOLALITY CALCULATION: 291 MOSMOL/KG (ref 275–300)
PARAINFLUENZA VIRUS BY PCR: NOT DETECTED
PATHOLOGIST REVIEW: ABNORMAL
PATHOLOGIST REVIEW: ABNORMAL
PCO2: 29 MMHG (ref 35–45)
PCO2: 32 MMHG (ref 35–45)
PCO2: 35 MMHG (ref 35–45)
PCO2: 39 MMHG (ref 35–45)
PH BLOOD GAS: 7.44 (ref 7.35–7.45)
PH BLOOD GAS: 7.46 (ref 7.35–7.45)
PH BLOOD GAS: 7.51 (ref 7.35–7.45)
PH BLOOD GAS: 7.51 (ref 7.35–7.45)
PLATELET # BLD: 190 THOU/MM3 (ref 130–400)
PLATELET # BLD: 202 THOU/MM3 (ref 130–400)
PLATELET # BLD: 207 THOU/MM3 (ref 130–400)
PLATELET # BLD: 207 THOU/MM3 (ref 130–400)
PLATELET # BLD: 211 THOU/MM3 (ref 130–400)
PLATELET # BLD: 212 THOU/MM3 (ref 130–400)
PLATELET # BLD: 212 THOU/MM3 (ref 130–400)
PLATELET # BLD: 214 THOU/MM3 (ref 130–400)
PLATELET # BLD: 219 THOU/MM3 (ref 130–400)
PLATELET # BLD: 222 THOU/MM3 (ref 130–400)
PLATELET # BLD: 230 THOU/MM3 (ref 130–400)
PLATELET # BLD: 233 THOU/MM3 (ref 130–400)
PLATELET # BLD: 235 THOU/MM3 (ref 130–400)
PLATELET # BLD: 241 THOU/MM3 (ref 130–400)
PLATELET # BLD: 246 THOU/MM3 (ref 130–400)
PLATELET # BLD: 246 THOU/MM3 (ref 130–400)
PLATELET # BLD: 247 THOU/MM3 (ref 130–400)
PLATELET # BLD: 250 THOU/MM3 (ref 130–400)
PLATELET # BLD: 255 THOU/MM3 (ref 130–400)
PLATELET # BLD: 260 THOU/MM3 (ref 130–400)
PLATELET # BLD: 264 THOU/MM3 (ref 130–400)
PLATELET # BLD: 276 THOU/MM3 (ref 130–400)
PLATELET # BLD: 278 THOU/MM3 (ref 130–400)
PLATELET # BLD: 279 THOU/MM3 (ref 130–400)
PLATELET # BLD: 280 THOU/MM3 (ref 130–400)
PLATELET # BLD: 281 THOU/MM3 (ref 130–400)
PLATELET # BLD: 303 THOU/MM3 (ref 130–400)
PLATELET # BLD: 308 THOU/MM3 (ref 130–400)
PLATELET # BLD: 308 THOU/MM3 (ref 130–400)
PLATELET # BLD: 313 THOU/MM3 (ref 130–400)
PLATELET # BLD: 320 THOU/MM3 (ref 130–400)
PLATELET # BLD: 341 THOU/MM3 (ref 130–400)
PLATELET # BLD: 342 THOU/MM3 (ref 130–400)
PLATELET # BLD: 355 THOU/MM3 (ref 130–400)
PLATELET ESTIMATE: ADEQUATE
PLATELET ESTIMATE: ADEQUATE
PMV BLD AUTO: 10 FL (ref 9.4–12.4)
PMV BLD AUTO: 10.1 FL (ref 9.4–12.4)
PMV BLD AUTO: 10.2 FL (ref 9.4–12.4)
PMV BLD AUTO: 10.3 FL (ref 9.4–12.4)
PMV BLD AUTO: 10.4 FL (ref 9.4–12.4)
PMV BLD AUTO: 10.5 FL (ref 9.4–12.4)
PMV BLD AUTO: 10.6 FL (ref 9.4–12.4)
PMV BLD AUTO: 10.6 FL (ref 9.4–12.4)
PMV BLD AUTO: 10.7 FL (ref 9.4–12.4)
PMV BLD AUTO: 10.9 FL (ref 9.4–12.4)
PMV BLD AUTO: 11 FL (ref 9.4–12.4)
PMV BLD AUTO: 11.1 FL (ref 9.4–12.4)
PMV BLD AUTO: 9.7 FL (ref 9.4–12.4)
PMV BLD AUTO: 9.8 FL (ref 9.4–12.4)
PMV BLD AUTO: 9.9 FL (ref 9.4–12.4)
PO2: 191 MMHG (ref 71–104)
PO2: 49 MMHG (ref 71–104)
PO2: 62 MMHG (ref 71–104)
PO2: 99 MMHG (ref 71–104)
POTASSIUM REFLEX MAGNESIUM: 4.3 MEQ/L (ref 3.5–5.2)
POTASSIUM REFLEX MAGNESIUM: 5 MEQ/L (ref 3.5–5.2)
POTASSIUM REFLEX MAGNESIUM: 5 MEQ/L (ref 3.5–5.2)
POTASSIUM SERPL-SCNC: 4.2 MEQ/L (ref 3.5–5.2)
POTASSIUM SERPL-SCNC: 4.3 MEQ/L (ref 3.5–5.2)
POTASSIUM SERPL-SCNC: 4.4 MEQ/L (ref 3.5–5.2)
POTASSIUM SERPL-SCNC: 4.4 MEQ/L (ref 3.5–5.2)
POTASSIUM SERPL-SCNC: 4.5 MEQ/L (ref 3.5–5.2)
POTASSIUM SERPL-SCNC: 4.5 MEQ/L (ref 3.5–5.2)
POTASSIUM SERPL-SCNC: 4.6 MEQ/L (ref 3.5–5.2)
POTASSIUM SERPL-SCNC: 4.7 MEQ/L (ref 3.5–5.2)
POTASSIUM SERPL-SCNC: 4.8 MEQ/L (ref 3.5–5.2)
POTASSIUM SERPL-SCNC: 4.9 MEQ/L (ref 3.5–5.2)
POTASSIUM SERPL-SCNC: 5.1 MEQ/L (ref 3.5–5.2)
POTASSIUM SERPL-SCNC: 5.2 MEQ/L (ref 3.5–5.2)
POTASSIUM SERPL-SCNC: 5.2 MEQ/L (ref 3.5–5.2)
POTASSIUM SERPL-SCNC: 5.4 MEQ/L (ref 3.5–5.2)
POTASSIUM SERPL-SCNC: 5.7 MEQ/L (ref 3.5–5.2)
POTASSIUM SERPL-SCNC: 5.7 MEQ/L (ref 3.5–5.2)
POTASSIUM SERPL-SCNC: 5.8 MEQ/L (ref 3.5–5.2)
POTASSIUM SERPL-SCNC: 6 MEQ/L (ref 3.5–5.2)
PREALBUMIN: 28.2 MG/DL (ref 20–40)
PRO-BNP: 1931 PG/ML (ref 0–900)
PRO-BNP: 3131 PG/ML (ref 0–900)
PRO-BNP: 333.9 PG/ML (ref 0–900)
PRO-BNP: 3368 PG/ML (ref 0–900)
PRO-BNP: 5795 PG/ML (ref 0–900)
PRO-BNP: 715.3 PG/ML (ref 0–900)
PROCALCITONIN: 0.08 NG/ML (ref 0.01–0.09)
PROCALCITONIN: 0.21 NG/ML (ref 0.01–0.09)
PROTEUS FILM ARRAY: NOT DETECTED
PROTEUS SPECIES BY PCR: NOT DETECTED
PSEUDOMONAS AERUGINOSA BY PCR: NOT DETECTED
PSEUDOMONAS AERUGINOSA FILM ARRAY: NOT DETECTED
RBC # BLD: 2.9 MILL/MM3 (ref 4.7–6.1)
RBC # BLD: 3.01 MILL/MM3 (ref 4.7–6.1)
RBC # BLD: 3.01 MILL/MM3 (ref 4.7–6.1)
RBC # BLD: 3.08 MILL/MM3 (ref 4.7–6.1)
RBC # BLD: 3.09 MILL/MM3 (ref 4.7–6.1)
RBC # BLD: 3.19 MILL/MM3 (ref 4.7–6.1)
RBC # BLD: 3.2 MILL/MM3 (ref 4.7–6.1)
RBC # BLD: 3.28 MILL/MM3 (ref 4.7–6.1)
RBC # BLD: 3.4 MILL/MM3 (ref 4.7–6.1)
RBC # BLD: 3.45 MILL/MM3 (ref 4.7–6.1)
RBC # BLD: 3.61 MILL/MM3 (ref 4.7–6.1)
RBC # BLD: 3.62 MILL/MM3 (ref 4.7–6.1)
RBC # BLD: 3.65 MILL/MM3 (ref 4.7–6.1)
RBC # BLD: 3.66 MILL/MM3 (ref 4.7–6.1)
RBC # BLD: 3.69 MILL/MM3 (ref 4.7–6.1)
RBC # BLD: 3.75 MILL/MM3 (ref 4.7–6.1)
RBC # BLD: 3.79 MILL/MM3 (ref 4.7–6.1)
RBC # BLD: 3.87 MILL/MM3 (ref 4.7–6.1)
RBC # BLD: 3.92 MILL/MM3 (ref 4.7–6.1)
RBC # BLD: 3.97 MILL/MM3 (ref 4.7–6.1)
RBC # BLD: 3.99 MILL/MM3 (ref 4.7–6.1)
RBC # BLD: 4.06 MILL/MM3 (ref 4.7–6.1)
RBC # BLD: 4.1 MILL/MM3 (ref 4.7–6.1)
RBC # BLD: 4.13 MILL/MM3 (ref 4.7–6.1)
RBC # BLD: 4.15 MILL/MM3 (ref 4.7–6.1)
RBC # BLD: 4.21 MILL/MM3 (ref 4.7–6.1)
RBC # BLD: 4.21 MILL/MM3 (ref 4.7–6.1)
RBC # BLD: 4.24 MILL/MM3 (ref 4.7–6.1)
RBC # BLD: 4.25 MILL/MM3 (ref 4.7–6.1)
RBC # BLD: 4.32 MILL/MM3 (ref 4.7–6.1)
RBC # BLD: 4.34 MILL/MM3 (ref 4.7–6.1)
RBC # BLD: 4.39 MILL/MM3 (ref 4.7–6.1)
RBC # BLD: 4.47 MILL/MM3 (ref 4.7–6.1)
RBC # BLD: 4.49 MILL/MM3 (ref 4.7–6.1)
RBC BAL: 1257 /CUMM
REASON FOR REJECTION: NORMAL
REJECTED TEST: NORMAL
RESISTANT GENE CTX-M BY PCR: ABNORMAL
RESISTANT GENE IMP BY PCR: ABNORMAL
RESISTANT GENE KPC BY PCR: ABNORMAL
RESISTANT GENE MECA/C & MREJ BY PCR: ABNORMAL
RESISTANT GENE NDM BY PCR: ABNORMAL
RESISTANT GENE OXA-48-LIKE BY PCR: ABNORMAL
RESISTANT GENE VIM BY PCR: ABNORMAL
RESPIRATORY CULTURE: ABNORMAL
RESPIRATORY SYNCYTIAL VIRUS BY PCR: NOT DETECTED
RH FACTOR: NORMAL
RHEUMATOID FACTOR: 14 IU/ML (ref 0–13)
RHINOVIRUS ENTEROVIRUS PCR: DETECTED
SARS-COV-2, NAAT: NOT  DETECTED
SCAN OF BLOOD SMEAR: NORMAL
SCLERODERMA (SCL-70) AB: 0.8 U/ML
SEDIMENTATION RATE, ERYTHROCYTE: 60 MM/HR (ref 0–10)
SEG NEUTROPHILS: 75.1 %
SEG NEUTROPHILS: 76 %
SEG NEUTROPHILS: 77.6 %
SEG NEUTROPHILS: 78.8 %
SEG NEUTROPHILS: 79 %
SEG NEUTROPHILS: 79.3 %
SEG NEUTROPHILS: 79.3 %
SEG NEUTROPHILS: 80 %
SEG NEUTROPHILS: 80.1 %
SEG NEUTROPHILS: 81.3 %
SEG NEUTROPHILS: 81.4 %
SEG NEUTROPHILS: 85.4 %
SEG NEUTROPHILS: 86.7 %
SEG NEUTROPHILS: 86.9 %
SEG NEUTROPHILS: 87.7 %
SEG NEUTROPHILS: 88.4 %
SEG NEUTROPHILS: 88.4 %
SEG NEUTROPHILS: 88.5 %
SEG NEUTROPHILS: 89.2 %
SEG NEUTROPHILS: 89.5 %
SEG NEUTROPHILS: 90.5 %
SEG NEUTROPHILS: 92 %
SEG NEUTROPHILS: 92.1 %
SEG NEUTROPHILS: 92.4 %
SEG NEUTROPHILS: 92.5 %
SEG NEUTROPHILS: 92.6 %
SEG NEUTROPHILS: 92.9 %
SEG NEUTROPHILS: 93 %
SEG NEUTROPHILS: 94.2 %
SEG NEUTROPHILS: 94.3 %
SEGMENTED NEUTROPHILS ABSOLUTE COUNT: 10.3 THOU/MM3 (ref 1.8–7.7)
SEGMENTED NEUTROPHILS ABSOLUTE COUNT: 10.3 THOU/MM3 (ref 1.8–7.7)
SEGMENTED NEUTROPHILS ABSOLUTE COUNT: 10.7 THOU/MM3 (ref 1.8–7.7)
SEGMENTED NEUTROPHILS ABSOLUTE COUNT: 10.8 THOU/MM3 (ref 1.8–7.7)
SEGMENTED NEUTROPHILS ABSOLUTE COUNT: 11.2 THOU/MM3 (ref 1.8–7.7)
SEGMENTED NEUTROPHILS ABSOLUTE COUNT: 11.6 THOU/MM3 (ref 1.8–7.7)
SEGMENTED NEUTROPHILS ABSOLUTE COUNT: 12 THOU/MM3 (ref 1.8–7.7)
SEGMENTED NEUTROPHILS ABSOLUTE COUNT: 12.5 THOU/MM3 (ref 1.8–7.7)
SEGMENTED NEUTROPHILS ABSOLUTE COUNT: 12.6 THOU/MM3 (ref 1.8–7.7)
SEGMENTED NEUTROPHILS ABSOLUTE COUNT: 13 THOU/MM3 (ref 1.8–7.7)
SEGMENTED NEUTROPHILS ABSOLUTE COUNT: 13.3 THOU/MM3 (ref 1.8–7.7)
SEGMENTED NEUTROPHILS ABSOLUTE COUNT: 13.8 THOU/MM3 (ref 1.8–7.7)
SEGMENTED NEUTROPHILS ABSOLUTE COUNT: 15 THOU/MM3 (ref 1.8–7.7)
SEGMENTED NEUTROPHILS ABSOLUTE COUNT: 16.4 THOU/MM3 (ref 1.8–7.7)
SEGMENTED NEUTROPHILS ABSOLUTE COUNT: 16.4 THOU/MM3 (ref 1.8–7.7)
SEGMENTED NEUTROPHILS ABSOLUTE COUNT: 17.5 THOU/MM3 (ref 1.8–7.7)
SEGMENTED NEUTROPHILS ABSOLUTE COUNT: 17.8 THOU/MM3 (ref 1.8–7.7)
SEGMENTED NEUTROPHILS ABSOLUTE COUNT: 18.1 THOU/MM3 (ref 1.8–7.7)
SEGMENTED NEUTROPHILS ABSOLUTE COUNT: 18.6 THOU/MM3 (ref 1.8–7.7)
SEGMENTED NEUTROPHILS ABSOLUTE COUNT: 18.9 THOU/MM3 (ref 1.8–7.7)
SEGMENTED NEUTROPHILS ABSOLUTE COUNT: 21.6 THOU/MM3 (ref 1.8–7.7)
SEGMENTED NEUTROPHILS ABSOLUTE COUNT: 21.9 THOU/MM3 (ref 1.8–7.7)
SEGMENTED NEUTROPHILS ABSOLUTE COUNT: 22.5 THOU/MM3 (ref 1.8–7.7)
SEGMENTED NEUTROPHILS ABSOLUTE COUNT: 29.7 THOU/MM3 (ref 1.8–7.7)
SEGMENTED NEUTROPHILS ABSOLUTE COUNT: 6.9 THOU/MM3 (ref 1.8–7.7)
SEGMENTED NEUTROPHILS ABSOLUTE COUNT: 8.1 THOU/MM3 (ref 1.8–7.7)
SEGMENTED NEUTROPHILS ABSOLUTE COUNT: 8.1 THOU/MM3 (ref 1.8–7.7)
SEGMENTED NEUTROPHILS ABSOLUTE COUNT: 8.2 THOU/MM3 (ref 1.8–7.7)
SEGMENTED NEUTROPHILS ABSOLUTE COUNT: 8.7 THOU/MM3 (ref 1.8–7.7)
SEGMENTED NEUTROPHILS ABSOLUTE COUNT: 9.4 THOU/MM3 (ref 1.8–7.7)
SEGMENTED NEUTROPHILS, BAL: 64 % (ref 0–3)
SERRATIA MARCESCENS BY PCR: NOT DETECTED
SERRATIA MARCESCENS FILM ARRAY: NOT DETECTED
SODIUM BLD-SCNC: 131 MEQ/L (ref 135–145)
SODIUM BLD-SCNC: 132 MEQ/L (ref 135–145)
SODIUM BLD-SCNC: 132 MEQ/L (ref 135–145)
SODIUM BLD-SCNC: 133 MEQ/L (ref 135–145)
SODIUM BLD-SCNC: 134 MEQ/L (ref 135–145)
SODIUM BLD-SCNC: 135 MEQ/L (ref 135–145)
SODIUM BLD-SCNC: 136 MEQ/L (ref 135–145)
SODIUM BLD-SCNC: 137 MEQ/L (ref 135–145)
SODIUM BLD-SCNC: 137 MEQ/L (ref 135–145)
SODIUM BLD-SCNC: 138 MEQ/L (ref 135–145)
SODIUM BLD-SCNC: 139 MEQ/L (ref 135–145)
SODIUM BLD-SCNC: 139 MEQ/L (ref 135–145)
SODIUM BLD-SCNC: 140 MEQ/L (ref 135–145)
SOURCE OF BLOOD CULTURE: ABNORMAL
SOURCE, BLOOD GAS: ABNORMAL
SOURCE: ABNORMAL
SPECIMEN ACCEPTABILITY: ABNORMAL
STAPH AUREUS BY PCR: NOT DETECTED
STAPH AUREUS FILM ARRAY: NOT DETECTED
STAPHYLOCOCCUS FILM ARRAY: DETECTED
STREP AGALACTIAE BY PCR: NOT DETECTED
STREP AGALACTIAE FILM ARRAY: NOT DETECTED
STREP PNEUMONIAE BY PCR: NOT DETECTED
STREP PNEUMONIAE FILM ARRAY: NOT DETECTED
STREP PYOCGENES FILM ARRAY: NOT DETECTED
STREP PYOGENES BY PCR: NOT DETECTED
STREPTOCOCCUS FILM ARRAY: NOT DETECTED
TOTAL NUCLEATED CELLS BAL: 991 /CUMM
TOTAL PROTEIN: 5.9 G/DL (ref 6.1–8)
TOTAL PROTEIN: 6 G/DL (ref 6.1–8)
TOTAL PROTEIN: 6.1 G/DL (ref 6.1–8)
TOTAL PROTEIN: 6.2 G/DL (ref 6.1–8)
TOTAL PROTEIN: 6.3 G/DL (ref 6.1–8)
TOTAL PROTEIN: 6.5 G/DL (ref 6.1–8)
TOTAL PROTEIN: 7.3 G/DL (ref 6.1–8)
TOTAL VOLUME RECEIVED BAL: 35 ML
TOXIC GRANULATION: PRESENT
TOXIC GRANULATION: PRESENT
TROPONIN T: 0.01 NG/ML
TROPONIN T: 0.01 NG/ML
TROPONIN T: 0.02 NG/ML
TROPONIN T: < 0.01 NG/ML
TSH SERPL DL<=0.05 MIU/L-ACNC: 0.99 UIU/ML (ref 0.4–4.2)
TSH SERPL DL<=0.05 MIU/L-ACNC: 1.72 UIU/ML (ref 0.4–4.2)
TSH SERPL DL<=0.05 MIU/L-ACNC: 2.23 UIU/ML (ref 0.4–4.2)
VANCOMYCIN RESISTANT ENTEROCOCCUS: NEGATIVE
VANCOMYCIN RESISTANT ENTEROCOCCUS: NEGATIVE
VANCOMYCIN RESISTANT FILM ARRAY: ABNORMAL
VIRAL CULTURE,RAPID,RESPIRATOR: NORMAL
WBC # BLD: 10.3 THOU/MM3 (ref 4.8–10.8)
WBC # BLD: 10.7 THOU/MM3 (ref 4.8–10.8)
WBC # BLD: 10.9 THOU/MM3 (ref 4.8–10.8)
WBC # BLD: 11.2 THOU/MM3 (ref 4.8–10.8)
WBC # BLD: 11.9 THOU/MM3 (ref 4.8–10.8)
WBC # BLD: 12.5 THOU/MM3 (ref 4.8–10.8)
WBC # BLD: 12.5 THOU/MM3 (ref 4.8–10.8)
WBC # BLD: 12.6 THOU/MM3 (ref 4.8–10.8)
WBC # BLD: 12.9 THOU/MM3 (ref 4.8–10.8)
WBC # BLD: 12.9 THOU/MM3 (ref 4.8–10.8)
WBC # BLD: 13.1 THOU/MM3 (ref 4.8–10.8)
WBC # BLD: 13.8 THOU/MM3 (ref 4.8–10.8)
WBC # BLD: 14.2 THOU/MM3 (ref 4.8–10.8)
WBC # BLD: 14.5 THOU/MM3 (ref 4.8–10.8)
WBC # BLD: 14.9 THOU/MM3 (ref 4.8–10.8)
WBC # BLD: 15 THOU/MM3 (ref 4.8–10.8)
WBC # BLD: 15.7 THOU/MM3 (ref 4.8–10.8)
WBC # BLD: 18.5 THOU/MM3 (ref 4.8–10.8)
WBC # BLD: 18.6 THOU/MM3 (ref 4.8–10.8)
WBC # BLD: 18.7 THOU/MM3 (ref 4.8–10.8)
WBC # BLD: 19.2 THOU/MM3 (ref 4.8–10.8)
WBC # BLD: 20 THOU/MM3 (ref 4.8–10.8)
WBC # BLD: 20.1 THOU/MM3 (ref 4.8–10.8)
WBC # BLD: 20.5 THOU/MM3 (ref 4.8–10.8)
WBC # BLD: 20.6 THOU/MM3 (ref 4.8–10.8)
WBC # BLD: 21.8 THOU/MM3 (ref 4.8–10.8)
WBC # BLD: 23.3 THOU/MM3 (ref 4.8–10.8)
WBC # BLD: 23.6 THOU/MM3 (ref 4.8–10.8)
WBC # BLD: 24.6 THOU/MM3 (ref 4.8–10.8)
WBC # BLD: 26.4 THOU/MM3 (ref 4.8–10.8)
WBC # BLD: 32.1 THOU/MM3 (ref 4.8–10.8)
WBC # BLD: 8.5 THOU/MM3 (ref 4.8–10.8)

## 2022-01-01 PROCEDURE — 94761 N-INVAS EAR/PLS OXIMETRY MLT: CPT

## 2022-01-01 PROCEDURE — 2580000003 HC RX 258: Performed by: STUDENT IN AN ORGANIZED HEALTH CARE EDUCATION/TRAINING PROGRAM

## 2022-01-01 PROCEDURE — 93010 ELECTROCARDIOGRAM REPORT: CPT | Performed by: INTERNAL MEDICINE

## 2022-01-01 PROCEDURE — 99221 1ST HOSP IP/OBS SF/LOW 40: CPT | Performed by: NURSE PRACTITIONER

## 2022-01-01 PROCEDURE — 6360000002 HC RX W HCPCS: Performed by: FAMILY MEDICINE

## 2022-01-01 PROCEDURE — 2700000000 HC OXYGEN THERAPY PER DAY

## 2022-01-01 PROCEDURE — 82803 BLOOD GASES ANY COMBINATION: CPT

## 2022-01-01 PROCEDURE — 84443 ASSAY THYROID STIM HORMONE: CPT

## 2022-01-01 PROCEDURE — 84484 ASSAY OF TROPONIN QUANT: CPT

## 2022-01-01 PROCEDURE — 82948 REAGENT STRIP/BLOOD GLUCOSE: CPT

## 2022-01-01 PROCEDURE — 4040F PNEUMOC VAC/ADMIN/RCVD: CPT | Performed by: OTOLARYNGOLOGY

## 2022-01-01 PROCEDURE — G8427 DOCREV CUR MEDS BY ELIG CLIN: HCPCS | Performed by: FAMILY MEDICINE

## 2022-01-01 PROCEDURE — 2580000003 HC RX 258: Performed by: INTERNAL MEDICINE

## 2022-01-01 PROCEDURE — 6370000000 HC RX 637 (ALT 250 FOR IP): Performed by: INTERNAL MEDICINE

## 2022-01-01 PROCEDURE — 6360000002 HC RX W HCPCS: Performed by: PHYSICIAN ASSISTANT

## 2022-01-01 PROCEDURE — 86140 C-REACTIVE PROTEIN: CPT

## 2022-01-01 PROCEDURE — 6370000000 HC RX 637 (ALT 250 FOR IP): Performed by: NURSE PRACTITIONER

## 2022-01-01 PROCEDURE — 94640 AIRWAY INHALATION TREATMENT: CPT

## 2022-01-01 PROCEDURE — 6370000000 HC RX 637 (ALT 250 FOR IP): Performed by: STUDENT IN AN ORGANIZED HEALTH CARE EDUCATION/TRAINING PROGRAM

## 2022-01-01 PROCEDURE — 83735 ASSAY OF MAGNESIUM: CPT

## 2022-01-01 PROCEDURE — 2060000000 HC ICU INTERMEDIATE R&B

## 2022-01-01 PROCEDURE — 2000000000 HC ICU R&B

## 2022-01-01 PROCEDURE — 1200000003 HC TELEMETRY R&B

## 2022-01-01 PROCEDURE — APPSS30 APP SPLIT SHARED TIME 16-30 MINUTES: Performed by: PHYSICIAN ASSISTANT

## 2022-01-01 PROCEDURE — 99214 OFFICE O/P EST MOD 30 MIN: CPT | Performed by: INTERNAL MEDICINE

## 2022-01-01 PROCEDURE — 2580000003 HC RX 258: Performed by: EMERGENCY MEDICINE

## 2022-01-01 PROCEDURE — 76770 US EXAM ABDO BACK WALL COMP: CPT

## 2022-01-01 PROCEDURE — 6370000000 HC RX 637 (ALT 250 FOR IP): Performed by: FAMILY MEDICINE

## 2022-01-01 PROCEDURE — 97530 THERAPEUTIC ACTIVITIES: CPT

## 2022-01-01 PROCEDURE — 99283 EMERGENCY DEPT VISIT LOW MDM: CPT

## 2022-01-01 PROCEDURE — 87635 SARS-COV-2 COVID-19 AMP PRB: CPT

## 2022-01-01 PROCEDURE — 87205 SMEAR GRAM STAIN: CPT

## 2022-01-01 PROCEDURE — 80048 BASIC METABOLIC PNL TOTAL CA: CPT

## 2022-01-01 PROCEDURE — 83880 ASSAY OF NATRIURETIC PEPTIDE: CPT

## 2022-01-01 PROCEDURE — 71045 X-RAY EXAM CHEST 1 VIEW: CPT

## 2022-01-01 PROCEDURE — 6370000000 HC RX 637 (ALT 250 FOR IP): Performed by: PHYSICIAN ASSISTANT

## 2022-01-01 PROCEDURE — 1036F TOBACCO NON-USER: CPT | Performed by: INTERNAL MEDICINE

## 2022-01-01 PROCEDURE — 93010 ELECTROCARDIOGRAM REPORT: CPT | Performed by: NUCLEAR MEDICINE

## 2022-01-01 PROCEDURE — 85730 THROMBOPLASTIN TIME PARTIAL: CPT

## 2022-01-01 PROCEDURE — G8427 DOCREV CUR MEDS BY ELIG CLIN: HCPCS | Performed by: NURSE PRACTITIONER

## 2022-01-01 PROCEDURE — 1036F TOBACCO NON-USER: CPT | Performed by: OTOLARYNGOLOGY

## 2022-01-01 PROCEDURE — 6360000002 HC RX W HCPCS: Performed by: NURSE PRACTITIONER

## 2022-01-01 PROCEDURE — 94760 N-INVAS EAR/PLS OXIMETRY 1: CPT

## 2022-01-01 PROCEDURE — 6360000002 HC RX W HCPCS: Performed by: INTERNAL MEDICINE

## 2022-01-01 PROCEDURE — 86200 CCP ANTIBODY: CPT

## 2022-01-01 PROCEDURE — 2500000003 HC RX 250 WO HCPCS: Performed by: FAMILY MEDICINE

## 2022-01-01 PROCEDURE — 87255 GENET VIRUS ISOLATE HSV: CPT

## 2022-01-01 PROCEDURE — 3600000013 HC SURGERY LEVEL 3 ADDTL 15MIN: Performed by: THORACIC SURGERY (CARDIOTHORACIC VASCULAR SURGERY)

## 2022-01-01 PROCEDURE — 2500000003 HC RX 250 WO HCPCS: Performed by: REGISTERED NURSE

## 2022-01-01 PROCEDURE — 85025 COMPLETE CBC W/AUTO DIFF WBC: CPT

## 2022-01-01 PROCEDURE — 93005 ELECTROCARDIOGRAM TRACING: CPT | Performed by: INTERNAL MEDICINE

## 2022-01-01 PROCEDURE — 2500000003 HC RX 250 WO HCPCS: Performed by: INTERNAL MEDICINE

## 2022-01-01 PROCEDURE — 71046 X-RAY EXAM CHEST 2 VIEWS: CPT

## 2022-01-01 PROCEDURE — 99223 1ST HOSP IP/OBS HIGH 75: CPT | Performed by: INTERNAL MEDICINE

## 2022-01-01 PROCEDURE — 3700000000 HC ANESTHESIA ATTENDED CARE: Performed by: INTERNAL MEDICINE

## 2022-01-01 PROCEDURE — 1123F ACP DISCUSS/DSCN MKR DOCD: CPT | Performed by: FAMILY MEDICINE

## 2022-01-01 PROCEDURE — 6360000002 HC RX W HCPCS: Performed by: STUDENT IN AN ORGANIZED HEALTH CARE EDUCATION/TRAINING PROGRAM

## 2022-01-01 PROCEDURE — 0B9F8ZX DRAINAGE OF RIGHT LOWER LUNG LOBE, VIA NATURAL OR ARTIFICIAL OPENING ENDOSCOPIC, DIAGNOSTIC: ICD-10-PCS | Performed by: INTERNAL MEDICINE

## 2022-01-01 PROCEDURE — 86160 COMPLEMENT ANTIGEN: CPT

## 2022-01-01 PROCEDURE — G8420 CALC BMI NORM PARAMETERS: HCPCS | Performed by: INTERNAL MEDICINE

## 2022-01-01 PROCEDURE — 97110 THERAPEUTIC EXERCISES: CPT

## 2022-01-01 PROCEDURE — 87500 VANOMYCIN DNA AMP PROBE: CPT

## 2022-01-01 PROCEDURE — 93294 REM INTERROG EVL PM/LDLS PM: CPT | Performed by: INTERNAL MEDICINE

## 2022-01-01 PROCEDURE — 36415 COLL VENOUS BLD VENIPUNCTURE: CPT | Performed by: NURSE PRACTITIONER

## 2022-01-01 PROCEDURE — 96367 TX/PROPH/DG ADDL SEQ IV INF: CPT

## 2022-01-01 PROCEDURE — 05H633Z INSERTION OF INFUSION DEVICE INTO LEFT SUBCLAVIAN VEIN, PERCUTANEOUS APPROACH: ICD-10-PCS | Performed by: FAMILY MEDICINE

## 2022-01-01 PROCEDURE — 99232 SBSQ HOSP IP/OBS MODERATE 35: CPT | Performed by: INTERNAL MEDICINE

## 2022-01-01 PROCEDURE — 87116 MYCOBACTERIA CULTURE: CPT

## 2022-01-01 PROCEDURE — 6370000000 HC RX 637 (ALT 250 FOR IP)

## 2022-01-01 PROCEDURE — 36415 COLL VENOUS BLD VENIPUNCTURE: CPT

## 2022-01-01 PROCEDURE — G8427 DOCREV CUR MEDS BY ELIG CLIN: HCPCS | Performed by: OTOLARYNGOLOGY

## 2022-01-01 PROCEDURE — 88313 SPECIAL STAINS GROUP 2: CPT

## 2022-01-01 PROCEDURE — 6370000000 HC RX 637 (ALT 250 FOR IP): Performed by: THORACIC SURGERY (CARDIOTHORACIC VASCULAR SURGERY)

## 2022-01-01 PROCEDURE — 93005 ELECTROCARDIOGRAM TRACING: CPT | Performed by: FAMILY MEDICINE

## 2022-01-01 PROCEDURE — 3017F COLORECTAL CA SCREEN DOC REV: CPT | Performed by: INTERNAL MEDICINE

## 2022-01-01 PROCEDURE — 99222 1ST HOSP IP/OBS MODERATE 55: CPT | Performed by: NURSE PRACTITIONER

## 2022-01-01 PROCEDURE — 99232 SBSQ HOSP IP/OBS MODERATE 35: CPT | Performed by: PHYSICIAN ASSISTANT

## 2022-01-01 PROCEDURE — 84132 ASSAY OF SERUM POTASSIUM: CPT

## 2022-01-01 PROCEDURE — 86905 BLOOD TYPING RBC ANTIGENS: CPT

## 2022-01-01 PROCEDURE — 97162 PT EVAL MOD COMPLEX 30 MIN: CPT

## 2022-01-01 PROCEDURE — 87106 FUNGI IDENTIFICATION YEAST: CPT

## 2022-01-01 PROCEDURE — 93000 ELECTROCARDIOGRAM COMPLETE: CPT | Performed by: INTERNAL MEDICINE

## 2022-01-01 PROCEDURE — 83605 ASSAY OF LACTIC ACID: CPT

## 2022-01-01 PROCEDURE — 80053 COMPREHEN METABOLIC PANEL: CPT

## 2022-01-01 PROCEDURE — APPSS30 APP SPLIT SHARED TIME 16-30 MINUTES: Performed by: NURSE PRACTITIONER

## 2022-01-01 PROCEDURE — 86038 ANTINUCLEAR ANTIBODIES: CPT

## 2022-01-01 PROCEDURE — 80162 ASSAY OF DIGOXIN TOTAL: CPT

## 2022-01-01 PROCEDURE — 7100000000 HC PACU RECOVERY - FIRST 15 MIN: Performed by: THORACIC SURGERY (CARDIOTHORACIC VASCULAR SURGERY)

## 2022-01-01 PROCEDURE — 2580000003 HC RX 258: Performed by: FAMILY MEDICINE

## 2022-01-01 PROCEDURE — 99214 OFFICE O/P EST MOD 30 MIN: CPT | Performed by: FAMILY MEDICINE

## 2022-01-01 PROCEDURE — 6360000002 HC RX W HCPCS: Performed by: REGISTERED NURSE

## 2022-01-01 PROCEDURE — 2580000003 HC RX 258: Performed by: PHYSICIAN ASSISTANT

## 2022-01-01 PROCEDURE — 99213 OFFICE O/P EST LOW 20 MIN: CPT | Performed by: NURSE PRACTITIONER

## 2022-01-01 PROCEDURE — 2500000003 HC RX 250 WO HCPCS: Performed by: NURSE PRACTITIONER

## 2022-01-01 PROCEDURE — 94660 CPAP INITIATION&MGMT: CPT

## 2022-01-01 PROCEDURE — 84145 PROCALCITONIN (PCT): CPT

## 2022-01-01 PROCEDURE — 86430 RHEUMATOID FACTOR TEST QUAL: CPT

## 2022-01-01 PROCEDURE — 88185 FLOWCYTOMETRY/TC ADD-ON: CPT

## 2022-01-01 PROCEDURE — 93970 EXTREMITY STUDY: CPT

## 2022-01-01 PROCEDURE — 1111F DSCHRG MED/CURRENT MED MERGE: CPT | Performed by: INTERNAL MEDICINE

## 2022-01-01 PROCEDURE — 99233 SBSQ HOSP IP/OBS HIGH 50: CPT | Performed by: INTERNAL MEDICINE

## 2022-01-01 PROCEDURE — 87798 DETECT AGENT NOS DNA AMP: CPT

## 2022-01-01 PROCEDURE — 94669 MECHANICAL CHEST WALL OSCILL: CPT

## 2022-01-01 PROCEDURE — 94726 PLETHYSMOGRAPHY LUNG VOLUMES: CPT

## 2022-01-01 PROCEDURE — G8482 FLU IMMUNIZE ORDER/ADMIN: HCPCS | Performed by: NURSE PRACTITIONER

## 2022-01-01 PROCEDURE — 87486 CHLMYD PNEUM DNA AMP PROBE: CPT

## 2022-01-01 PROCEDURE — G8420 CALC BMI NORM PARAMETERS: HCPCS | Performed by: NURSE PRACTITIONER

## 2022-01-01 PROCEDURE — 99222 1ST HOSP IP/OBS MODERATE 55: CPT | Performed by: INTERNAL MEDICINE

## 2022-01-01 PROCEDURE — 96376 TX/PRO/DX INJ SAME DRUG ADON: CPT

## 2022-01-01 PROCEDURE — 87075 CULTR BACTERIA EXCEPT BLOOD: CPT

## 2022-01-01 PROCEDURE — 97535 SELF CARE MNGMENT TRAINING: CPT

## 2022-01-01 PROCEDURE — 6360000002 HC RX W HCPCS: Performed by: THORACIC SURGERY (CARDIOTHORACIC VASCULAR SURGERY)

## 2022-01-01 PROCEDURE — 6360000002 HC RX W HCPCS

## 2022-01-01 PROCEDURE — 82248 BILIRUBIN DIRECT: CPT

## 2022-01-01 PROCEDURE — 1111F DSCHRG MED/CURRENT MED MERGE: CPT | Performed by: OTOLARYNGOLOGY

## 2022-01-01 PROCEDURE — 82164 ANGIOTENSIN I ENZYME TEST: CPT

## 2022-01-01 PROCEDURE — 71250 CT THORAX DX C-: CPT

## 2022-01-01 PROCEDURE — 1123F ACP DISCUSS/DSCN MKR DOCD: CPT | Performed by: INTERNAL MEDICINE

## 2022-01-01 PROCEDURE — 96375 TX/PRO/DX INJ NEW DRUG ADDON: CPT

## 2022-01-01 PROCEDURE — 3017F COLORECTAL CA SCREEN DOC REV: CPT | Performed by: OTOLARYNGOLOGY

## 2022-01-01 PROCEDURE — 93005 ELECTROCARDIOGRAM TRACING: CPT | Performed by: PHYSICIAN ASSISTANT

## 2022-01-01 PROCEDURE — 93005 ELECTROCARDIOGRAM TRACING: CPT | Performed by: EMERGENCY MEDICINE

## 2022-01-01 PROCEDURE — C1729 CATH, DRAINAGE: HCPCS | Performed by: THORACIC SURGERY (CARDIOTHORACIC VASCULAR SURGERY)

## 2022-01-01 PROCEDURE — 85027 COMPLETE CBC AUTOMATED: CPT

## 2022-01-01 PROCEDURE — 97166 OT EVAL MOD COMPLEX 45 MIN: CPT

## 2022-01-01 PROCEDURE — 87040 BLOOD CULTURE FOR BACTERIA: CPT

## 2022-01-01 PROCEDURE — 7100000011 HC PHASE II RECOVERY - ADDTL 15 MIN: Performed by: INTERNAL MEDICINE

## 2022-01-01 PROCEDURE — 99285 EMERGENCY DEPT VISIT HI MDM: CPT

## 2022-01-01 PROCEDURE — 99214 OFFICE O/P EST MOD 30 MIN: CPT | Performed by: STUDENT IN AN ORGANIZED HEALTH CARE EDUCATION/TRAINING PROGRAM

## 2022-01-01 PROCEDURE — 93307 TTE W/O DOPPLER COMPLETE: CPT

## 2022-01-01 PROCEDURE — 96365 THER/PROPH/DIAG IV INF INIT: CPT

## 2022-01-01 PROCEDURE — 94010 BREATHING CAPACITY TEST: CPT

## 2022-01-01 PROCEDURE — 93000 ELECTROCARDIOGRAM COMPLETE: CPT | Performed by: STUDENT IN AN ORGANIZED HEALTH CARE EDUCATION/TRAINING PROGRAM

## 2022-01-01 PROCEDURE — 99291 CRITICAL CARE FIRST HOUR: CPT | Performed by: INTERNAL MEDICINE

## 2022-01-01 PROCEDURE — 2580000003 HC RX 258: Performed by: NURSE PRACTITIONER

## 2022-01-01 PROCEDURE — 2580000003 HC RX 258: Performed by: REGISTERED NURSE

## 2022-01-01 PROCEDURE — 87102 FUNGUS ISOLATION CULTURE: CPT

## 2022-01-01 PROCEDURE — 86901 BLOOD TYPING SEROLOGIC RH(D): CPT

## 2022-01-01 PROCEDURE — 88307 TISSUE EXAM BY PATHOLOGIST: CPT

## 2022-01-01 PROCEDURE — 7100000001 HC PACU RECOVERY - ADDTL 15 MIN: Performed by: THORACIC SURGERY (CARDIOTHORACIC VASCULAR SURGERY)

## 2022-01-01 PROCEDURE — 97116 GAIT TRAINING THERAPY: CPT

## 2022-01-01 PROCEDURE — 2500000003 HC RX 250 WO HCPCS: Performed by: STUDENT IN AN ORGANIZED HEALTH CARE EDUCATION/TRAINING PROGRAM

## 2022-01-01 PROCEDURE — G8420 CALC BMI NORM PARAMETERS: HCPCS | Performed by: STUDENT IN AN ORGANIZED HEALTH CARE EDUCATION/TRAINING PROGRAM

## 2022-01-01 PROCEDURE — 93005 ELECTROCARDIOGRAM TRACING: CPT | Performed by: NURSE PRACTITIONER

## 2022-01-01 PROCEDURE — 99223 1ST HOSP IP/OBS HIGH 75: CPT | Performed by: NUCLEAR MEDICINE

## 2022-01-01 PROCEDURE — 93005 ELECTROCARDIOGRAM TRACING: CPT | Performed by: STUDENT IN AN ORGANIZED HEALTH CARE EDUCATION/TRAINING PROGRAM

## 2022-01-01 PROCEDURE — 86235 NUCLEAR ANTIGEN ANTIBODY: CPT

## 2022-01-01 PROCEDURE — 99233 SBSQ HOSP IP/OBS HIGH 50: CPT | Performed by: SURGERY

## 2022-01-01 PROCEDURE — 87206 SMEAR FLUORESCENT/ACID STAI: CPT

## 2022-01-01 PROCEDURE — G8427 DOCREV CUR MEDS BY ELIG CLIN: HCPCS | Performed by: INTERNAL MEDICINE

## 2022-01-01 PROCEDURE — 2580000003 HC RX 258: Performed by: NUCLEAR MEDICINE

## 2022-01-01 PROCEDURE — 4040F PNEUMOC VAC/ADMIN/RCVD: CPT | Performed by: INTERNAL MEDICINE

## 2022-01-01 PROCEDURE — 93296 REM INTERROG EVL PM/IDS: CPT | Performed by: INTERNAL MEDICINE

## 2022-01-01 PROCEDURE — 7100000010 HC PHASE II RECOVERY - FIRST 15 MIN: Performed by: INTERNAL MEDICINE

## 2022-01-01 PROCEDURE — G8420 CALC BMI NORM PARAMETERS: HCPCS | Performed by: FAMILY MEDICINE

## 2022-01-01 PROCEDURE — 97167 OT EVAL HIGH COMPLEX 60 MIN: CPT

## 2022-01-01 PROCEDURE — 86900 BLOOD TYPING SEROLOGIC ABO: CPT

## 2022-01-01 PROCEDURE — 31624 DX BRONCHOSCOPE/LAVAGE: CPT | Performed by: INTERNAL MEDICINE

## 2022-01-01 PROCEDURE — 6360000002 HC RX W HCPCS: Performed by: EMERGENCY MEDICINE

## 2022-01-01 PROCEDURE — 87801 DETECT AGNT MULT DNA AMPLI: CPT

## 2022-01-01 PROCEDURE — 97161 PT EVAL LOW COMPLEX 20 MIN: CPT

## 2022-01-01 PROCEDURE — 2500000003 HC RX 250 WO HCPCS: Performed by: EMERGENCY MEDICINE

## 2022-01-01 PROCEDURE — 1123F ACP DISCUSS/DSCN MKR DOCD: CPT | Performed by: STUDENT IN AN ORGANIZED HEALTH CARE EDUCATION/TRAINING PROGRAM

## 2022-01-01 PROCEDURE — 3609027000 HC BRONCHOSCOPY: Performed by: INTERNAL MEDICINE

## 2022-01-01 PROCEDURE — 87070 CULTURE OTHR SPECIMN AEROBIC: CPT

## 2022-01-01 PROCEDURE — 93971 EXTREMITY STUDY: CPT

## 2022-01-01 PROCEDURE — 96366 THER/PROPH/DIAG IV INF ADDON: CPT

## 2022-01-01 PROCEDURE — 87541 LEGION PNEUMO DNA AMP PROB: CPT

## 2022-01-01 PROCEDURE — 30903 CONTROL OF NOSEBLEED: CPT | Performed by: NURSE PRACTITIONER

## 2022-01-01 PROCEDURE — 2709999900 HC NON-CHARGEABLE SUPPLY: Performed by: THORACIC SURGERY (CARDIOTHORACIC VASCULAR SURGERY)

## 2022-01-01 PROCEDURE — 85610 PROTHROMBIN TIME: CPT

## 2022-01-01 PROCEDURE — 3600000003 HC SURGERY LEVEL 3 BASE: Performed by: THORACIC SURGERY (CARDIOTHORACIC VASCULAR SURGERY)

## 2022-01-01 PROCEDURE — 99212 OFFICE O/P EST SF 10 MIN: CPT | Performed by: OTOLARYNGOLOGY

## 2022-01-01 PROCEDURE — 4040F PNEUMOC VAC/ADMIN/RCVD: CPT | Performed by: STUDENT IN AN ORGANIZED HEALTH CARE EDUCATION/TRAINING PROGRAM

## 2022-01-01 PROCEDURE — 85651 RBC SED RATE NONAUTOMATED: CPT

## 2022-01-01 PROCEDURE — 0BBD4ZX EXCISION OF RIGHT MIDDLE LUNG LOBE, PERCUTANEOUS ENDOSCOPIC APPROACH, DIAGNOSTIC: ICD-10-PCS | Performed by: INTERNAL MEDICINE

## 2022-01-01 PROCEDURE — 88305 TISSUE EXAM BY PATHOLOGIST: CPT

## 2022-01-01 PROCEDURE — 1123F ACP DISCUSS/DSCN MKR DOCD: CPT | Performed by: OTOLARYNGOLOGY

## 2022-01-01 PROCEDURE — 3700000001 HC ADD 15 MINUTES (ANESTHESIA): Performed by: INTERNAL MEDICINE

## 2022-01-01 PROCEDURE — 87804 INFLUENZA ASSAY W/OPTIC: CPT

## 2022-01-01 PROCEDURE — 6370000000 HC RX 637 (ALT 250 FOR IP): Performed by: EMERGENCY MEDICINE

## 2022-01-01 PROCEDURE — 3017F COLORECTAL CA SCREEN DOC REV: CPT | Performed by: FAMILY MEDICINE

## 2022-01-01 PROCEDURE — 3017F COLORECTAL CA SCREEN DOC REV: CPT | Performed by: STUDENT IN AN ORGANIZED HEALTH CARE EDUCATION/TRAINING PROGRAM

## 2022-01-01 PROCEDURE — 6360000002 HC RX W HCPCS: Performed by: ANESTHESIOLOGY

## 2022-01-01 PROCEDURE — 0BBD4ZZ EXCISION OF RIGHT MIDDLE LUNG LOBE, PERCUTANEOUS ENDOSCOPIC APPROACH: ICD-10-PCS | Performed by: INTERNAL MEDICINE

## 2022-01-01 PROCEDURE — 1111F DSCHRG MED/CURRENT MED MERGE: CPT | Performed by: STUDENT IN AN ORGANIZED HEALTH CARE EDUCATION/TRAINING PROGRAM

## 2022-01-01 PROCEDURE — 1036F TOBACCO NON-USER: CPT | Performed by: FAMILY MEDICINE

## 2022-01-01 PROCEDURE — 2500000003 HC RX 250 WO HCPCS

## 2022-01-01 PROCEDURE — 1123F ACP DISCUSS/DSCN MKR DOCD: CPT | Performed by: NURSE PRACTITIONER

## 2022-01-01 PROCEDURE — 82728 ASSAY OF FERRITIN: CPT

## 2022-01-01 PROCEDURE — 87641 MR-STAPH DNA AMP PROBE: CPT

## 2022-01-01 PROCEDURE — 88184 FLOWCYTOMETRY/ TC 1 MARKER: CPT

## 2022-01-01 PROCEDURE — 87631 RESP VIRUS 3-5 TARGETS: CPT

## 2022-01-01 PROCEDURE — 86850 RBC ANTIBODY SCREEN: CPT

## 2022-01-01 PROCEDURE — 3700000001 HC ADD 15 MINUTES (ANESTHESIA): Performed by: THORACIC SURGERY (CARDIOTHORACIC VASCULAR SURGERY)

## 2022-01-01 PROCEDURE — G8420 CALC BMI NORM PARAMETERS: HCPCS | Performed by: OTOLARYNGOLOGY

## 2022-01-01 PROCEDURE — 99222 1ST HOSP IP/OBS MODERATE 55: CPT | Performed by: THORACIC SURGERY (CARDIOTHORACIC VASCULAR SURGERY)

## 2022-01-01 PROCEDURE — 88108 CYTOPATH CONCENTRATE TECH: CPT

## 2022-01-01 PROCEDURE — 2720000010 HC SURG SUPPLY STERILE: Performed by: THORACIC SURGERY (CARDIOTHORACIC VASCULAR SURGERY)

## 2022-01-01 PROCEDURE — G8427 DOCREV CUR MEDS BY ELIG CLIN: HCPCS | Performed by: STUDENT IN AN ORGANIZED HEALTH CARE EDUCATION/TRAINING PROGRAM

## 2022-01-01 PROCEDURE — 32607 THORACOSCOPY W/BX INFILTRATE: CPT | Performed by: THORACIC SURGERY (CARDIOTHORACIC VASCULAR SURGERY)

## 2022-01-01 PROCEDURE — 88112 CYTOPATH CELL ENHANCE TECH: CPT

## 2022-01-01 PROCEDURE — 99232 SBSQ HOSP IP/OBS MODERATE 35: CPT | Performed by: NURSE PRACTITIONER

## 2022-01-01 PROCEDURE — 36600 WITHDRAWAL OF ARTERIAL BLOOD: CPT

## 2022-01-01 PROCEDURE — 4004F PT TOBACCO SCREEN RCVD TLK: CPT | Performed by: NURSE PRACTITIONER

## 2022-01-01 PROCEDURE — 3700000000 HC ANESTHESIA ATTENDED CARE: Performed by: THORACIC SURGERY (CARDIOTHORACIC VASCULAR SURGERY)

## 2022-01-01 PROCEDURE — 3017F COLORECTAL CA SCREEN DOC REV: CPT | Performed by: NURSE PRACTITIONER

## 2022-01-01 PROCEDURE — 87581 M.PNEUMON DNA AMP PROBE: CPT

## 2022-01-01 PROCEDURE — 4040F PNEUMOC VAC/ADMIN/RCVD: CPT | Performed by: NURSE PRACTITIONER

## 2022-01-01 PROCEDURE — 99213 OFFICE O/P EST LOW 20 MIN: CPT

## 2022-01-01 PROCEDURE — 1036F TOBACCO NON-USER: CPT | Performed by: STUDENT IN AN ORGANIZED HEALTH CARE EDUCATION/TRAINING PROGRAM

## 2022-01-01 PROCEDURE — 87147 CULTURE TYPE IMMUNOLOGIC: CPT

## 2022-01-01 PROCEDURE — 32607 THORACOSCOPY W/BX INFILTRATE: CPT | Performed by: PHYSICIAN ASSISTANT

## 2022-01-01 RX ORDER — ACETAMINOPHEN 650 MG/1
650 SUPPOSITORY RECTAL EVERY 6 HOURS PRN
Status: DISCONTINUED | OUTPATIENT
Start: 2022-01-01 | End: 2022-01-01 | Stop reason: HOSPADM

## 2022-01-01 RX ORDER — SODIUM CHLORIDE 0.9 % (FLUSH) 0.9 %
5-40 SYRINGE (ML) INJECTION EVERY 12 HOURS SCHEDULED
Status: DISCONTINUED | OUTPATIENT
Start: 2022-01-01 | End: 2022-01-01

## 2022-01-01 RX ORDER — CEFAZOLIN SODIUM 1 G/3ML
INJECTION, POWDER, FOR SOLUTION INTRAMUSCULAR; INTRAVENOUS PRN
Status: DISCONTINUED | OUTPATIENT
Start: 2022-01-01 | End: 2022-01-01 | Stop reason: SDUPTHER

## 2022-01-01 RX ORDER — FUROSEMIDE 10 MG/ML
40 INJECTION INTRAMUSCULAR; INTRAVENOUS 2 TIMES DAILY
Status: DISCONTINUED | OUTPATIENT
Start: 2022-01-01 | End: 2022-01-01

## 2022-01-01 RX ORDER — DEXTROSE MONOHYDRATE 50 MG/ML
100 INJECTION, SOLUTION INTRAVENOUS PRN
Status: DISCONTINUED | OUTPATIENT
Start: 2022-01-01 | End: 2022-01-01 | Stop reason: HOSPADM

## 2022-01-01 RX ORDER — SODIUM CHLORIDE 9 MG/ML
25 INJECTION, SOLUTION INTRAVENOUS PRN
Status: DISCONTINUED | OUTPATIENT
Start: 2022-01-01 | End: 2022-01-01 | Stop reason: HOSPADM

## 2022-01-01 RX ORDER — OXYMETAZOLINE HYDROCHLORIDE 0.05 G/100ML
2 SPRAY NASAL 3 TIMES DAILY
Qty: 1 EACH | Refills: 3
Start: 2022-01-01 | End: 2022-01-01

## 2022-01-01 RX ORDER — NYSTATIN 100000 U/G
CREAM TOPICAL 2 TIMES DAILY
Status: DISCONTINUED | OUTPATIENT
Start: 2022-01-01 | End: 2022-01-01 | Stop reason: HOSPADM

## 2022-01-01 RX ORDER — 0.9 % SODIUM CHLORIDE 0.9 %
250 INTRAVENOUS SOLUTION INTRAVENOUS ONCE
Status: COMPLETED | OUTPATIENT
Start: 2022-01-01 | End: 2022-01-01

## 2022-01-01 RX ORDER — LEVALBUTEROL INHALATION SOLUTION 1.25 MG/3ML
1.25 SOLUTION RESPIRATORY (INHALATION) 3 TIMES DAILY
Status: DISCONTINUED | OUTPATIENT
Start: 2022-01-01 | End: 2022-01-01

## 2022-01-01 RX ORDER — POTASSIUM CHLORIDE 7.45 MG/ML
10 INJECTION INTRAVENOUS PRN
Status: DISCONTINUED | OUTPATIENT
Start: 2022-01-01 | End: 2022-01-01 | Stop reason: HOSPADM

## 2022-01-01 RX ORDER — IPRATROPIUM BROMIDE AND ALBUTEROL SULFATE 2.5; .5 MG/3ML; MG/3ML
1 SOLUTION RESPIRATORY (INHALATION) 4 TIMES DAILY
Status: DISCONTINUED | OUTPATIENT
Start: 2022-01-01 | End: 2022-01-01

## 2022-01-01 RX ORDER — OXYMETAZOLINE HYDROCHLORIDE 0.05 G/100ML
2 SPRAY NASAL 2 TIMES DAILY
Status: ON HOLD | COMMUNITY
End: 2022-01-01

## 2022-01-01 RX ORDER — OXYMETAZOLINE HYDROCHLORIDE 0.05 G/100ML
2 SPRAY NASAL EVERY 4 HOURS PRN
Status: DISPENSED | OUTPATIENT
Start: 2022-01-01 | End: 2022-01-01

## 2022-01-01 RX ORDER — NYSTATIN 100000 U/G
CREAM TOPICAL 2 TIMES DAILY
Status: ON HOLD | COMMUNITY
End: 2022-01-01

## 2022-01-01 RX ORDER — POLYETHYLENE GLYCOL 3350 17 G/17G
17 POWDER, FOR SOLUTION ORAL DAILY PRN
Status: DISCONTINUED | OUTPATIENT
Start: 2022-01-01 | End: 2022-01-01 | Stop reason: HOSPADM

## 2022-01-01 RX ORDER — HYDROCODONE BITARTRATE AND ACETAMINOPHEN 5; 325 MG/1; MG/1
2 TABLET ORAL EVERY 4 HOURS PRN
Status: DISCONTINUED | OUTPATIENT
Start: 2022-01-01 | End: 2022-01-01 | Stop reason: HOSPADM

## 2022-01-01 RX ORDER — OXYMETAZOLINE HYDROCHLORIDE 0.05 G/100ML
2 SPRAY NASAL 2 TIMES DAILY
Status: DISCONTINUED | OUTPATIENT
Start: 2022-01-01 | End: 2022-01-01

## 2022-01-01 RX ORDER — METHYLPREDNISOLONE SODIUM SUCCINATE 125 MG/2ML
60 INJECTION, POWDER, LYOPHILIZED, FOR SOLUTION INTRAMUSCULAR; INTRAVENOUS EVERY 8 HOURS
Status: DISPENSED | OUTPATIENT
Start: 2022-01-01 | End: 2022-01-01

## 2022-01-01 RX ORDER — POTASSIUM CHLORIDE 20 MEQ/1
40 TABLET, EXTENDED RELEASE ORAL PRN
Status: DISCONTINUED | OUTPATIENT
Start: 2022-01-01 | End: 2022-01-01 | Stop reason: HOSPADM

## 2022-01-01 RX ORDER — METOPROLOL TARTRATE 5 MG/5ML
2.5 INJECTION INTRAVENOUS ONCE
Status: COMPLETED | OUTPATIENT
Start: 2022-01-01 | End: 2022-01-01

## 2022-01-01 RX ORDER — TRAZODONE HYDROCHLORIDE 50 MG/1
50 TABLET ORAL NIGHTLY
Qty: 30 TABLET | Refills: 0 | Status: SHIPPED | OUTPATIENT
Start: 2022-01-01

## 2022-01-01 RX ORDER — METOPROLOL TARTRATE 5 MG/5ML
5 INJECTION INTRAVENOUS EVERY 4 HOURS PRN
Qty: 15 ML | Refills: 0
Start: 2022-01-01

## 2022-01-01 RX ORDER — METOPROLOL SUCCINATE 100 MG/1
100 TABLET, EXTENDED RELEASE ORAL DAILY
Status: DISCONTINUED | OUTPATIENT
Start: 2022-01-01 | End: 2022-01-01 | Stop reason: HOSPADM

## 2022-01-01 RX ORDER — IPRATROPIUM BROMIDE AND ALBUTEROL SULFATE 2.5; .5 MG/3ML; MG/3ML
1 SOLUTION RESPIRATORY (INHALATION) 3 TIMES DAILY
Status: DISCONTINUED | OUTPATIENT
Start: 2022-01-01 | End: 2022-01-01

## 2022-01-01 RX ORDER — ALPRAZOLAM 0.5 MG/1
0.5 TABLET ORAL EVERY 12 HOURS PRN
Status: ON HOLD | COMMUNITY
End: 2022-01-01 | Stop reason: HOSPADM

## 2022-01-01 RX ORDER — MAGNESIUM SULFATE IN WATER 40 MG/ML
2000 INJECTION, SOLUTION INTRAVENOUS PRN
Status: DISCONTINUED | OUTPATIENT
Start: 2022-01-01 | End: 2022-01-01 | Stop reason: HOSPADM

## 2022-01-01 RX ORDER — SODIUM CHLORIDE 9 MG/ML
INJECTION, SOLUTION INTRAVENOUS CONTINUOUS
Status: DISCONTINUED | OUTPATIENT
Start: 2022-01-01 | End: 2022-01-01

## 2022-01-01 RX ORDER — PANTOPRAZOLE SODIUM 40 MG/1
40 TABLET, DELAYED RELEASE ORAL
Status: DISCONTINUED | OUTPATIENT
Start: 2022-01-01 | End: 2022-01-01 | Stop reason: HOSPADM

## 2022-01-01 RX ORDER — LORATADINE 10 MG/1
10 TABLET ORAL DAILY
COMMUNITY

## 2022-01-01 RX ORDER — FUROSEMIDE 20 MG/1
20 TABLET ORAL DAILY
Qty: 180 TABLET | Refills: 3 | Status: ON HOLD | OUTPATIENT
Start: 2022-01-01 | End: 2022-01-01 | Stop reason: HOSPADM

## 2022-01-01 RX ORDER — PROPOFOL 10 MG/ML
INJECTION, EMULSION INTRAVENOUS PRN
Status: DISCONTINUED | OUTPATIENT
Start: 2022-01-01 | End: 2022-01-01 | Stop reason: SDUPTHER

## 2022-01-01 RX ORDER — GUAIFENESIN/DEXTROMETHORPHAN 100-10MG/5
10 SYRUP ORAL EVERY 6 HOURS PRN
Status: DISCONTINUED | OUTPATIENT
Start: 2022-01-01 | End: 2022-01-01 | Stop reason: HOSPADM

## 2022-01-01 RX ORDER — SODIUM CHLORIDE 0.9 % (FLUSH) 0.9 %
5-40 SYRINGE (ML) INJECTION EVERY 12 HOURS SCHEDULED
Status: DISCONTINUED | OUTPATIENT
Start: 2022-01-01 | End: 2022-01-01 | Stop reason: HOSPADM

## 2022-01-01 RX ORDER — FUROSEMIDE 10 MG/ML
80 INJECTION INTRAMUSCULAR; INTRAVENOUS 2 TIMES DAILY
Status: DISCONTINUED | OUTPATIENT
Start: 2022-01-01 | End: 2022-01-01

## 2022-01-01 RX ORDER — SODIUM CHLORIDE 9 MG/ML
INJECTION, SOLUTION INTRAVENOUS PRN
Status: DISCONTINUED | OUTPATIENT
Start: 2022-01-01 | End: 2022-01-01 | Stop reason: SDUPTHER

## 2022-01-01 RX ORDER — ROPINIROLE 0.5 MG/1
0.5 TABLET, FILM COATED ORAL NIGHTLY
Status: DISCONTINUED | OUTPATIENT
Start: 2022-01-01 | End: 2022-01-01 | Stop reason: HOSPADM

## 2022-01-01 RX ORDER — PREDNISONE 20 MG/1
40 TABLET ORAL DAILY
Qty: 56 TABLET | Refills: 0 | Status: SHIPPED | OUTPATIENT
Start: 2022-01-01 | End: 2022-01-01

## 2022-01-01 RX ORDER — ATOVAQUONE 750 MG/5ML
1500 SUSPENSION ORAL DAILY
Status: DISCONTINUED | OUTPATIENT
Start: 2022-01-01 | End: 2022-01-01 | Stop reason: SDUPTHER

## 2022-01-01 RX ORDER — FUROSEMIDE 10 MG/ML
20 INJECTION INTRAMUSCULAR; INTRAVENOUS ONCE
Status: DISCONTINUED | OUTPATIENT
Start: 2022-01-01 | End: 2022-01-01

## 2022-01-01 RX ORDER — ALBUTEROL SULFATE 2.5 MG/3ML
2.5 SOLUTION RESPIRATORY (INHALATION) 2 TIMES DAILY
COMMUNITY

## 2022-01-01 RX ORDER — ATOVAQUONE 750 MG/5ML
750 SUSPENSION ORAL DAILY
Status: DISCONTINUED | OUTPATIENT
Start: 2022-01-01 | End: 2022-01-01

## 2022-01-01 RX ORDER — ONDANSETRON 2 MG/ML
4 INJECTION INTRAMUSCULAR; INTRAVENOUS EVERY 6 HOURS PRN
Status: DISCONTINUED | OUTPATIENT
Start: 2022-01-01 | End: 2022-01-01 | Stop reason: HOSPADM

## 2022-01-01 RX ORDER — ACETAMINOPHEN 325 MG/1
650 TABLET ORAL EVERY 4 HOURS PRN
Status: DISCONTINUED | OUTPATIENT
Start: 2022-01-01 | End: 2022-01-01 | Stop reason: SDUPTHER

## 2022-01-01 RX ORDER — PANTOPRAZOLE SODIUM 40 MG/1
40 TABLET, DELAYED RELEASE ORAL
Qty: 30 TABLET | Refills: 3 | Status: SHIPPED | OUTPATIENT
Start: 2022-01-01

## 2022-01-01 RX ORDER — METOPROLOL TARTRATE 5 MG/5ML
5 INJECTION INTRAVENOUS EVERY 4 HOURS
Status: DISCONTINUED | OUTPATIENT
Start: 2022-01-01 | End: 2022-01-01

## 2022-01-01 RX ORDER — PREDNISONE 10 MG/1
TABLET ORAL
Qty: 30 TABLET | Refills: 0 | Status: SHIPPED | OUTPATIENT
Start: 2022-01-01

## 2022-01-01 RX ORDER — ASPIRIN 81 MG/1
81 TABLET, CHEWABLE ORAL DAILY
Status: DISCONTINUED | OUTPATIENT
Start: 2022-01-01 | End: 2022-01-01 | Stop reason: HOSPADM

## 2022-01-01 RX ORDER — ONDANSETRON 2 MG/ML
4 INJECTION INTRAMUSCULAR; INTRAVENOUS EVERY 6 HOURS PRN
Status: DISCONTINUED | OUTPATIENT
Start: 2022-01-01 | End: 2022-01-01 | Stop reason: SDUPTHER

## 2022-01-01 RX ORDER — FUROSEMIDE 10 MG/ML
40 INJECTION INTRAMUSCULAR; INTRAVENOUS ONCE
Status: COMPLETED | OUTPATIENT
Start: 2022-01-01 | End: 2022-01-01

## 2022-01-01 RX ORDER — NYSTATIN 100000 U/G
CREAM TOPICAL
COMMUNITY
Start: 2022-01-01

## 2022-01-01 RX ORDER — FUROSEMIDE 40 MG/1
40 TABLET ORAL DAILY
Status: DISCONTINUED | OUTPATIENT
Start: 2022-01-01 | End: 2022-01-01

## 2022-01-01 RX ORDER — IPRATROPIUM BROMIDE AND ALBUTEROL SULFATE 2.5; .5 MG/3ML; MG/3ML
1 SOLUTION RESPIRATORY (INHALATION)
Status: DISCONTINUED | OUTPATIENT
Start: 2022-01-01 | End: 2022-01-01 | Stop reason: HOSPADM

## 2022-01-01 RX ORDER — ACETAMINOPHEN 325 MG/1
650 TABLET ORAL EVERY 4 HOURS PRN
Status: DISCONTINUED | OUTPATIENT
Start: 2022-01-01 | End: 2022-01-01 | Stop reason: HOSPADM

## 2022-01-01 RX ORDER — METOPROLOL TARTRATE 5 MG/5ML
2.5 INJECTION INTRAVENOUS ONCE
Status: DISCONTINUED | OUTPATIENT
Start: 2022-01-01 | End: 2022-01-01 | Stop reason: HOSPADM

## 2022-01-01 RX ORDER — METHYLPREDNISOLONE SODIUM SUCCINATE 40 MG/ML
40 INJECTION, POWDER, LYOPHILIZED, FOR SOLUTION INTRAMUSCULAR; INTRAVENOUS EVERY 8 HOURS
Status: DISCONTINUED | OUTPATIENT
Start: 2022-01-01 | End: 2022-01-01

## 2022-01-01 RX ORDER — PANTOPRAZOLE SODIUM 40 MG/1
40 TABLET, DELAYED RELEASE ORAL
Qty: 30 TABLET | Refills: 5 | Status: CANCELLED | OUTPATIENT
Start: 2022-01-01

## 2022-01-01 RX ORDER — DIGOXIN 125 MCG
125 TABLET ORAL DAILY
Qty: 30 TABLET | Refills: 3 | Status: SHIPPED | OUTPATIENT
Start: 2022-01-01

## 2022-01-01 RX ORDER — DIGOXIN 0.25 MG/ML
250 INJECTION INTRAMUSCULAR; INTRAVENOUS ONCE
Status: COMPLETED | OUTPATIENT
Start: 2022-01-01 | End: 2022-01-01

## 2022-01-01 RX ORDER — FUROSEMIDE 20 MG/1
TABLET ORAL
Qty: 30 TABLET | Refills: 1
Start: 2022-01-01 | End: 2022-01-01

## 2022-01-01 RX ORDER — GUAIFENESIN/DEXTROMETHORPHAN 100-10MG/5
10 SYRUP ORAL EVERY 6 HOURS PRN
Qty: 120 ML | COMMUNITY
Start: 2022-01-01 | End: 2022-01-01

## 2022-01-01 RX ORDER — CETIRIZINE HYDROCHLORIDE 10 MG/1
10 TABLET ORAL DAILY
Status: ON HOLD | COMMUNITY
End: 2022-01-01

## 2022-01-01 RX ORDER — OXYMETAZOLINE HYDROCHLORIDE 0.05 G/100ML
2 SPRAY NASAL 3 TIMES DAILY
Status: DISCONTINUED | OUTPATIENT
Start: 2022-01-01 | End: 2022-01-01 | Stop reason: HOSPADM

## 2022-01-01 RX ORDER — ALBUTEROL SULFATE 90 UG/1
2 AEROSOL, METERED RESPIRATORY (INHALATION) EVERY 6 HOURS PRN
Qty: 54 G | Refills: 3 | Status: ON HOLD | OUTPATIENT
Start: 2022-01-01 | End: 2022-01-01 | Stop reason: HOSPADM

## 2022-01-01 RX ORDER — FUROSEMIDE 10 MG/ML
20 INJECTION INTRAMUSCULAR; INTRAVENOUS 3 TIMES DAILY
Status: DISCONTINUED | OUTPATIENT
Start: 2022-01-01 | End: 2022-01-01

## 2022-01-01 RX ORDER — ONDANSETRON 4 MG/1
4 TABLET, ORALLY DISINTEGRATING ORAL EVERY 8 HOURS PRN
Status: DISCONTINUED | OUTPATIENT
Start: 2022-01-01 | End: 2022-01-01 | Stop reason: HOSPADM

## 2022-01-01 RX ORDER — ONDANSETRON 2 MG/ML
INJECTION INTRAMUSCULAR; INTRAVENOUS PRN
Status: DISCONTINUED | OUTPATIENT
Start: 2022-01-01 | End: 2022-01-01 | Stop reason: SDUPTHER

## 2022-01-01 RX ORDER — INSULIN LISPRO 100 [IU]/ML
0-6 INJECTION, SOLUTION INTRAVENOUS; SUBCUTANEOUS
Status: DISCONTINUED | OUTPATIENT
Start: 2022-01-01 | End: 2022-01-01 | Stop reason: HOSPADM

## 2022-01-01 RX ORDER — METOPROLOL TARTRATE 5 MG/5ML
5 INJECTION INTRAVENOUS ONCE
Status: COMPLETED | OUTPATIENT
Start: 2022-01-01 | End: 2022-01-01

## 2022-01-01 RX ORDER — FLUOXETINE HYDROCHLORIDE 20 MG/1
20 CAPSULE ORAL DAILY
Status: DISCONTINUED | OUTPATIENT
Start: 2022-01-01 | End: 2022-01-01 | Stop reason: HOSPADM

## 2022-01-01 RX ORDER — DILTIAZEM HYDROCHLORIDE 5 MG/ML
INJECTION INTRAVENOUS
Status: COMPLETED
Start: 2022-01-01 | End: 2022-01-01

## 2022-01-01 RX ORDER — ATORVASTATIN CALCIUM 40 MG/1
40 TABLET, FILM COATED ORAL NIGHTLY
Status: DISCONTINUED | OUTPATIENT
Start: 2022-01-01 | End: 2022-01-01 | Stop reason: HOSPADM

## 2022-01-01 RX ORDER — 0.9 % SODIUM CHLORIDE 0.9 %
30 INTRAVENOUS SOLUTION INTRAVENOUS ONCE
Status: COMPLETED | OUTPATIENT
Start: 2022-01-01 | End: 2022-01-01

## 2022-01-01 RX ORDER — KETAMINE HCL IN NACL, ISO-OSM 100MG/10ML
SYRINGE (ML) INJECTION PRN
Status: DISCONTINUED | OUTPATIENT
Start: 2022-01-01 | End: 2022-01-01 | Stop reason: SDUPTHER

## 2022-01-01 RX ORDER — IPRATROPIUM BROMIDE AND ALBUTEROL SULFATE 2.5; .5 MG/3ML; MG/3ML
1 SOLUTION RESPIRATORY (INHALATION) EVERY 4 HOURS PRN
Status: DISCONTINUED | OUTPATIENT
Start: 2022-01-01 | End: 2022-01-01 | Stop reason: SDUPTHER

## 2022-01-01 RX ORDER — PREDNISONE 20 MG/1
20 TABLET ORAL 2 TIMES DAILY
Qty: 10 TABLET | Refills: 0 | Status: SHIPPED | OUTPATIENT
Start: 2022-01-01 | End: 2022-01-01

## 2022-01-01 RX ORDER — PREDNISONE 20 MG/1
40 TABLET ORAL DAILY
Status: DISCONTINUED | OUTPATIENT
Start: 2022-01-01 | End: 2022-01-01

## 2022-01-01 RX ORDER — SODIUM POLYSTYRENE SULFONATE 15 G/60ML
30 SUSPENSION ORAL; RECTAL ONCE
Status: COMPLETED | OUTPATIENT
Start: 2022-01-01 | End: 2022-01-01

## 2022-01-01 RX ORDER — FLUOCINONIDE 0.5 MG/G
OINTMENT TOPICAL 2 TIMES DAILY
Qty: 60 G | Refills: 3 | Status: ON HOLD | OUTPATIENT
Start: 2022-01-01 | End: 2022-01-01

## 2022-01-01 RX ORDER — FENTANYL CITRATE 50 UG/ML
INJECTION, SOLUTION INTRAMUSCULAR; INTRAVENOUS
Status: COMPLETED
Start: 2022-01-01 | End: 2022-01-01

## 2022-01-01 RX ORDER — ALPRAZOLAM 0.5 MG/1
0.5 TABLET ORAL NIGHTLY PRN
COMMUNITY
Start: 2022-01-01 | End: 2022-01-01

## 2022-01-01 RX ORDER — DEXAMETHASONE SODIUM PHOSPHATE 10 MG/ML
INJECTION, EMULSION INTRAMUSCULAR; INTRAVENOUS PRN
Status: DISCONTINUED | OUTPATIENT
Start: 2022-01-01 | End: 2022-01-01 | Stop reason: SDUPTHER

## 2022-01-01 RX ORDER — FUROSEMIDE 20 MG/1
40 TABLET ORAL DAILY
Qty: 180 TABLET | Refills: 3
Start: 2022-01-01 | End: 2022-01-01 | Stop reason: SDUPTHER

## 2022-01-01 RX ORDER — HEPARIN SODIUM 5000 [USP'U]/ML
5000 INJECTION, SOLUTION INTRAVENOUS; SUBCUTANEOUS EVERY 8 HOURS SCHEDULED
Status: DISCONTINUED | OUTPATIENT
Start: 2022-01-01 | End: 2022-01-01

## 2022-01-01 RX ORDER — SODIUM CHLORIDE 0.9 % (FLUSH) 0.9 %
5-40 SYRINGE (ML) INJECTION PRN
Status: DISCONTINUED | OUTPATIENT
Start: 2022-01-01 | End: 2022-01-01 | Stop reason: HOSPADM

## 2022-01-01 RX ORDER — MIDAZOLAM HYDROCHLORIDE 1 MG/ML
INJECTION INTRAMUSCULAR; INTRAVENOUS PRN
Status: DISCONTINUED | OUTPATIENT
Start: 2022-01-01 | End: 2022-01-01 | Stop reason: SDUPTHER

## 2022-01-01 RX ORDER — METOPROLOL TARTRATE 5 MG/5ML
5 INJECTION INTRAVENOUS EVERY 6 HOURS
Status: DISCONTINUED | OUTPATIENT
Start: 2022-01-01 | End: 2022-01-01

## 2022-01-01 RX ORDER — HYDROCODONE BITARTRATE AND ACETAMINOPHEN 5; 325 MG/1; MG/1
1 TABLET ORAL EVERY 4 HOURS PRN
Qty: 1 TABLET | Refills: 0
Start: 2022-01-01 | End: 2022-01-01

## 2022-01-01 RX ORDER — ATOVAQUONE 750 MG/5ML
1500 SUSPENSION ORAL DAILY
Qty: 300 ML | Refills: 0 | Status: SHIPPED | OUTPATIENT
Start: 2022-01-01 | End: 2022-01-01

## 2022-01-01 RX ORDER — METHYLPREDNISOLONE SODIUM SUCCINATE 125 MG/2ML
125 INJECTION, POWDER, LYOPHILIZED, FOR SOLUTION INTRAMUSCULAR; INTRAVENOUS ONCE
Status: COMPLETED | OUTPATIENT
Start: 2022-01-01 | End: 2022-01-01

## 2022-01-01 RX ORDER — INSULIN LISPRO 100 [IU]/ML
0-3 INJECTION, SOLUTION INTRAVENOUS; SUBCUTANEOUS NIGHTLY
Status: DISCONTINUED | OUTPATIENT
Start: 2022-01-01 | End: 2022-01-01 | Stop reason: HOSPADM

## 2022-01-01 RX ORDER — METOPROLOL TARTRATE 5 MG/5ML
5 INJECTION INTRAVENOUS EVERY 4 HOURS PRN
Status: DISCONTINUED | OUTPATIENT
Start: 2022-01-01 | End: 2022-01-01 | Stop reason: HOSPADM

## 2022-01-01 RX ORDER — HYDROCODONE BITARTRATE AND ACETAMINOPHEN 5; 325 MG/1; MG/1
1 TABLET ORAL EVERY 4 HOURS PRN
Status: DISCONTINUED | OUTPATIENT
Start: 2022-01-01 | End: 2022-01-01 | Stop reason: HOSPADM

## 2022-01-01 RX ORDER — ACETAMINOPHEN 500 MG
500 TABLET ORAL EVERY 4 HOURS PRN
Status: DISCONTINUED | OUTPATIENT
Start: 2022-01-01 | End: 2022-01-01 | Stop reason: HOSPADM

## 2022-01-01 RX ORDER — AMIODARONE HYDROCHLORIDE 200 MG/1
200 TABLET ORAL DAILY
Status: DISCONTINUED | OUTPATIENT
Start: 2022-01-01 | End: 2022-01-01

## 2022-01-01 RX ORDER — IPRATROPIUM BROMIDE AND ALBUTEROL SULFATE 2.5; .5 MG/3ML; MG/3ML
1 SOLUTION RESPIRATORY (INHALATION) 2 TIMES DAILY
Status: DISCONTINUED | OUTPATIENT
Start: 2022-01-01 | End: 2022-01-01

## 2022-01-01 RX ORDER — FLUCONAZOLE 200 MG/1
200 TABLET ORAL DAILY
Status: DISCONTINUED | OUTPATIENT
Start: 2022-01-01 | End: 2022-01-01 | Stop reason: HOSPADM

## 2022-01-01 RX ORDER — METOPROLOL SUCCINATE 100 MG/1
100 TABLET, EXTENDED RELEASE ORAL DAILY
Qty: 30 TABLET | Refills: 3 | Status: SHIPPED | OUTPATIENT
Start: 2022-01-01

## 2022-01-01 RX ORDER — METOPROLOL SUCCINATE 100 MG/1
150 TABLET, EXTENDED RELEASE ORAL DAILY
Qty: 90 TABLET | Refills: 3 | Status: ON HOLD | OUTPATIENT
Start: 2022-01-01 | End: 2022-01-01 | Stop reason: HOSPADM

## 2022-01-01 RX ORDER — SODIUM CHLORIDE 0.9 % (FLUSH) 0.9 %
5-40 SYRINGE (ML) INJECTION PRN
Status: DISCONTINUED | OUTPATIENT
Start: 2022-01-01 | End: 2022-01-01

## 2022-01-01 RX ORDER — AZITHROMYCIN 250 MG/1
500 TABLET, FILM COATED ORAL DAILY
Status: COMPLETED | OUTPATIENT
Start: 2022-01-01 | End: 2022-01-01

## 2022-01-01 RX ORDER — DEXTROSE MONOHYDRATE 25 G/50ML
12.5 INJECTION, SOLUTION INTRAVENOUS PRN
Status: DISCONTINUED | OUTPATIENT
Start: 2022-01-01 | End: 2022-01-01 | Stop reason: HOSPADM

## 2022-01-01 RX ORDER — SACUBITRIL AND VALSARTAN 49; 51 MG/1; MG/1
0.5 TABLET, FILM COATED ORAL 2 TIMES DAILY
Status: ON HOLD | COMMUNITY
Start: 2021-01-01 | End: 2022-01-01 | Stop reason: HOSPADM

## 2022-01-01 RX ORDER — SODIUM CHLORIDE 9 MG/ML
INJECTION, SOLUTION INTRAVENOUS PRN
Status: DISCONTINUED | OUTPATIENT
Start: 2022-01-01 | End: 2022-01-01 | Stop reason: HOSPADM

## 2022-01-01 RX ORDER — SODIUM CHLORIDE 9 MG/ML
INJECTION, SOLUTION INTRAVENOUS CONTINUOUS PRN
Status: DISCONTINUED | OUTPATIENT
Start: 2022-01-01 | End: 2022-01-01 | Stop reason: SDUPTHER

## 2022-01-01 RX ORDER — ALPRAZOLAM 0.5 MG/1
0.5 TABLET ORAL 2 TIMES DAILY PRN
Qty: 60 TABLET | Refills: 0 | Status: SHIPPED | OUTPATIENT
Start: 2022-01-01 | End: 2022-01-01

## 2022-01-01 RX ORDER — IPRATROPIUM BROMIDE AND ALBUTEROL SULFATE 2.5; .5 MG/3ML; MG/3ML
1 SOLUTION RESPIRATORY (INHALATION) EVERY 4 HOURS
Status: DISCONTINUED | OUTPATIENT
Start: 2022-01-01 | End: 2022-01-01

## 2022-01-01 RX ORDER — DIGOXIN 0.25 MG/ML
500 INJECTION INTRAMUSCULAR; INTRAVENOUS ONCE
Status: COMPLETED | OUTPATIENT
Start: 2022-01-01 | End: 2022-01-01

## 2022-01-01 RX ORDER — DILTIAZEM HYDROCHLORIDE 5 MG/ML
10 INJECTION INTRAVENOUS ONCE
Status: COMPLETED | OUTPATIENT
Start: 2022-01-01 | End: 2022-01-01

## 2022-01-01 RX ORDER — FUROSEMIDE 10 MG/ML
20 INJECTION INTRAMUSCULAR; INTRAVENOUS ONCE
Status: COMPLETED | OUTPATIENT
Start: 2022-01-01 | End: 2022-01-01

## 2022-01-01 RX ORDER — FENTANYL CITRATE 50 UG/ML
50 INJECTION, SOLUTION INTRAMUSCULAR; INTRAVENOUS EVERY 5 MIN PRN
Status: COMPLETED | OUTPATIENT
Start: 2022-01-01 | End: 2022-01-01

## 2022-01-01 RX ORDER — ROPIVACAINE HYDROCHLORIDE 5 MG/ML
INJECTION, SOLUTION EPIDURAL; INFILTRATION; PERINEURAL PRN
Status: DISCONTINUED | OUTPATIENT
Start: 2022-01-01 | End: 2022-01-01 | Stop reason: ALTCHOICE

## 2022-01-01 RX ORDER — SODIUM CHLORIDE 0.9 % (FLUSH) 0.9 %
5-40 SYRINGE (ML) INJECTION PRN
Status: DISCONTINUED | OUTPATIENT
Start: 2022-01-01 | End: 2022-01-01 | Stop reason: SDUPTHER

## 2022-01-01 RX ORDER — SODIUM CHLORIDE 9 MG/ML
INJECTION, SOLUTION INTRAVENOUS PRN
Status: DISCONTINUED | OUTPATIENT
Start: 2022-01-01 | End: 2022-01-01

## 2022-01-01 RX ORDER — ALBUTEROL SULFATE 2.5 MG/3ML
15 SOLUTION RESPIRATORY (INHALATION) ONCE
Status: COMPLETED | OUTPATIENT
Start: 2022-01-01 | End: 2022-01-01

## 2022-01-01 RX ORDER — ALBUTEROL SULFATE 90 UG/1
2 AEROSOL, METERED RESPIRATORY (INHALATION) EVERY 6 HOURS PRN
Qty: 1 EACH | Refills: 5 | Status: SHIPPED | OUTPATIENT
Start: 2022-01-01 | End: 2022-01-01

## 2022-01-01 RX ORDER — NOREPINEPHRINE BIT/0.9 % NACL 16MG/250ML
1-100 INFUSION BOTTLE (ML) INTRAVENOUS CONTINUOUS
Status: DISCONTINUED | OUTPATIENT
Start: 2022-01-01 | End: 2022-01-01

## 2022-01-01 RX ORDER — FLUOXETINE 10 MG/1
10 CAPSULE ORAL DAILY
COMMUNITY

## 2022-01-01 RX ORDER — SODIUM CHLORIDE 9 MG/ML
INJECTION, SOLUTION INTRAVENOUS CONTINUOUS
Status: DISCONTINUED | OUTPATIENT
Start: 2022-01-01 | End: 2022-01-01 | Stop reason: HOSPADM

## 2022-01-01 RX ORDER — MAGNESIUM SULFATE 1 G/100ML
1000 INJECTION INTRAVENOUS ONCE
Status: COMPLETED | OUTPATIENT
Start: 2022-01-01 | End: 2022-01-01

## 2022-01-01 RX ORDER — FENTANYL CITRATE 50 UG/ML
25 INJECTION, SOLUTION INTRAMUSCULAR; INTRAVENOUS EVERY 5 MIN PRN
Status: DISCONTINUED | OUTPATIENT
Start: 2022-01-01 | End: 2022-01-01 | Stop reason: HOSPADM

## 2022-01-01 RX ORDER — ONDANSETRON 4 MG/1
4 TABLET, FILM COATED ORAL EVERY 8 HOURS PRN
Status: DISCONTINUED | OUTPATIENT
Start: 2022-01-01 | End: 2022-01-01 | Stop reason: HOSPADM

## 2022-01-01 RX ORDER — TRAZODONE HYDROCHLORIDE 50 MG/1
50 TABLET ORAL NIGHTLY
Status: DISCONTINUED | OUTPATIENT
Start: 2022-01-01 | End: 2022-01-01 | Stop reason: HOSPADM

## 2022-01-01 RX ORDER — IPRATROPIUM BROMIDE AND ALBUTEROL SULFATE 2.5; .5 MG/3ML; MG/3ML
1 SOLUTION RESPIRATORY (INHALATION) ONCE
Status: COMPLETED | OUTPATIENT
Start: 2022-01-01 | End: 2022-01-01

## 2022-01-01 RX ORDER — ROCURONIUM BROMIDE 10 MG/ML
INJECTION, SOLUTION INTRAVENOUS PRN
Status: DISCONTINUED | OUTPATIENT
Start: 2022-01-01 | End: 2022-01-01 | Stop reason: SDUPTHER

## 2022-01-01 RX ORDER — 0.9 % SODIUM CHLORIDE 0.9 %
30 INTRAVENOUS SOLUTION INTRAVENOUS ONCE
Status: DISCONTINUED | OUTPATIENT
Start: 2022-01-01 | End: 2022-01-01

## 2022-01-01 RX ORDER — MULTIVITAMIN WITH IRON
1 TABLET ORAL DAILY
Status: DISCONTINUED | OUTPATIENT
Start: 2022-01-01 | End: 2022-01-01 | Stop reason: HOSPADM

## 2022-01-01 RX ORDER — CETIRIZINE HYDROCHLORIDE 10 MG/1
10 TABLET ORAL DAILY
Status: DISCONTINUED | OUTPATIENT
Start: 2022-01-01 | End: 2022-01-01 | Stop reason: HOSPADM

## 2022-01-01 RX ORDER — DIGOXIN 125 MCG
125 TABLET ORAL DAILY
Status: DISCONTINUED | OUTPATIENT
Start: 2022-01-01 | End: 2022-01-01 | Stop reason: HOSPADM

## 2022-01-01 RX ORDER — SODIUM CHLORIDE 0.9 % (FLUSH) 0.9 %
5-40 SYRINGE (ML) INJECTION EVERY 12 HOURS SCHEDULED
Status: DISCONTINUED | OUTPATIENT
Start: 2022-01-01 | End: 2022-01-01 | Stop reason: SDUPTHER

## 2022-01-01 RX ORDER — PREDNISONE 20 MG/1
40 TABLET ORAL DAILY
Status: DISCONTINUED | OUTPATIENT
Start: 2022-01-01 | End: 2022-01-01 | Stop reason: HOSPADM

## 2022-01-01 RX ORDER — ACETAMINOPHEN 325 MG/1
650 TABLET ORAL EVERY 6 HOURS PRN
Status: DISCONTINUED | OUTPATIENT
Start: 2022-01-01 | End: 2022-01-01 | Stop reason: HOSPADM

## 2022-01-01 RX ORDER — ONDANSETRON 4 MG/1
4 TABLET, ORALLY DISINTEGRATING ORAL EVERY 8 HOURS PRN
Status: DISCONTINUED | OUTPATIENT
Start: 2022-01-01 | End: 2022-01-01 | Stop reason: SDUPTHER

## 2022-01-01 RX ORDER — NICOTINE POLACRILEX 4 MG
15 LOZENGE BUCCAL PRN
Status: DISCONTINUED | OUTPATIENT
Start: 2022-01-01 | End: 2022-01-01 | Stop reason: CLARIF

## 2022-01-01 RX ORDER — PHENYLEPHRINE HYDROCHLORIDE 10 MG/ML
INJECTION INTRAVENOUS PRN
Status: DISCONTINUED | OUTPATIENT
Start: 2022-01-01 | End: 2022-01-01 | Stop reason: SDUPTHER

## 2022-01-01 RX ORDER — METOPROLOL TARTRATE 5 MG/5ML
2.5 INJECTION INTRAVENOUS EVERY 6 HOURS
Status: DISCONTINUED | OUTPATIENT
Start: 2022-01-01 | End: 2022-01-01

## 2022-01-01 RX ORDER — 0.9 % SODIUM CHLORIDE 0.9 %
500 INTRAVENOUS SOLUTION INTRAVENOUS ONCE
Status: COMPLETED | OUTPATIENT
Start: 2022-01-01 | End: 2022-01-01

## 2022-01-01 RX ORDER — ALBUTEROL SULFATE 2.5 MG/3ML
2.5 SOLUTION RESPIRATORY (INHALATION) 2 TIMES DAILY
Status: DISCONTINUED | OUTPATIENT
Start: 2022-01-01 | End: 2022-01-01 | Stop reason: HOSPADM

## 2022-01-01 RX ORDER — 0.9 % SODIUM CHLORIDE 0.9 %
1000 INTRAVENOUS SOLUTION INTRAVENOUS ONCE
Status: DISCONTINUED | OUTPATIENT
Start: 2022-01-01 | End: 2022-01-01

## 2022-01-01 RX ORDER — FUROSEMIDE 20 MG/1
40 TABLET ORAL DAILY
Qty: 180 TABLET | Refills: 3 | Status: ON HOLD | OUTPATIENT
Start: 2022-01-01 | End: 2022-01-01 | Stop reason: SDUPTHER

## 2022-01-01 RX ORDER — FLUOXETINE 10 MG/1
10 CAPSULE ORAL DAILY
Status: DISCONTINUED | OUTPATIENT
Start: 2022-01-01 | End: 2022-01-01

## 2022-01-01 RX ORDER — ALPRAZOLAM 0.5 MG/1
0.5 TABLET ORAL NIGHTLY PRN
Status: DISCONTINUED | OUTPATIENT
Start: 2022-01-01 | End: 2022-01-01 | Stop reason: HOSPADM

## 2022-01-01 RX ORDER — MORPHINE SULFATE 2 MG/ML
2 INJECTION, SOLUTION INTRAMUSCULAR; INTRAVENOUS
Status: DISCONTINUED | OUTPATIENT
Start: 2022-01-01 | End: 2022-01-01 | Stop reason: HOSPADM

## 2022-01-01 RX ORDER — PREDNISONE 20 MG/1
30 TABLET ORAL DAILY
Status: ON HOLD | COMMUNITY
End: 2022-01-01 | Stop reason: HOSPADM

## 2022-01-01 RX ORDER — IPRATROPIUM BROMIDE AND ALBUTEROL SULFATE 2.5; .5 MG/3ML; MG/3ML
3 SOLUTION RESPIRATORY (INHALATION) 4 TIMES DAILY
Qty: 360 ML | Refills: 2 | Status: SHIPPED | OUTPATIENT
Start: 2022-01-01 | End: 2022-01-01

## 2022-01-01 RX ORDER — HYDRALAZINE HYDROCHLORIDE 20 MG/ML
10 INJECTION INTRAMUSCULAR; INTRAVENOUS
Status: DISCONTINUED | OUTPATIENT
Start: 2022-01-01 | End: 2022-01-01 | Stop reason: HOSPADM

## 2022-01-01 RX ORDER — AMOXICILLIN AND CLAVULANATE POTASSIUM 875; 125 MG/1; MG/1
1 TABLET, FILM COATED ORAL EVERY 12 HOURS SCHEDULED
Status: COMPLETED | OUTPATIENT
Start: 2022-01-01 | End: 2022-01-01

## 2022-01-01 RX ORDER — FUROSEMIDE 20 MG/1
20 TABLET ORAL DAILY
Status: DISCONTINUED | OUTPATIENT
Start: 2022-01-01 | End: 2022-01-01 | Stop reason: HOSPADM

## 2022-01-01 RX ORDER — ALPRAZOLAM 0.5 MG/1
0.5 TABLET ORAL NIGHTLY PRN
COMMUNITY
End: 2022-01-01 | Stop reason: SDUPTHER

## 2022-01-01 RX ORDER — ENOXAPARIN SODIUM 100 MG/ML
40 INJECTION SUBCUTANEOUS DAILY
Status: DISCONTINUED | OUTPATIENT
Start: 2022-01-01 | End: 2022-01-01

## 2022-01-01 RX ORDER — HYDROXYZINE HYDROCHLORIDE 10 MG/1
1 TABLET, FILM COATED ORAL NIGHTLY PRN
Status: ON HOLD | COMMUNITY
End: 2022-01-01 | Stop reason: HOSPADM

## 2022-01-01 RX ORDER — ATOVAQUONE 750 MG/5ML
1500 SUSPENSION ORAL DAILY
Status: DISCONTINUED | OUTPATIENT
Start: 2022-01-01 | End: 2022-01-01 | Stop reason: HOSPADM

## 2022-01-01 RX ORDER — ALPRAZOLAM 0.25 MG/1
0.25 TABLET ORAL 2 TIMES DAILY PRN
Status: DISCONTINUED | OUTPATIENT
Start: 2022-01-01 | End: 2022-01-01 | Stop reason: HOSPADM

## 2022-01-01 RX ORDER — FENTANYL CITRATE 50 UG/ML
INJECTION, SOLUTION INTRAMUSCULAR; INTRAVENOUS PRN
Status: DISCONTINUED | OUTPATIENT
Start: 2022-01-01 | End: 2022-01-01 | Stop reason: SDUPTHER

## 2022-01-01 RX ORDER — ATOVAQUONE 750 MG/5ML
1500 SUSPENSION ORAL DAILY
COMMUNITY

## 2022-01-01 RX ORDER — FUROSEMIDE 20 MG/1
20 TABLET ORAL EVERY OTHER DAY
Qty: 90 TABLET | Refills: 1
Start: 2022-01-01 | End: 2022-01-01 | Stop reason: SDUPTHER

## 2022-01-01 RX ORDER — ROPINIROLE 0.5 MG/1
0.5 TABLET, FILM COATED ORAL NIGHTLY
Qty: 90 TABLET | Refills: 3 | Status: SHIPPED | OUTPATIENT
Start: 2022-01-01

## 2022-01-01 RX ORDER — IPRATROPIUM BROMIDE AND ALBUTEROL SULFATE 2.5; .5 MG/3ML; MG/3ML
1 SOLUTION RESPIRATORY (INHALATION) EVERY 4 HOURS PRN
Status: DISCONTINUED | OUTPATIENT
Start: 2022-01-01 | End: 2022-01-01 | Stop reason: HOSPADM

## 2022-01-01 RX ORDER — FUROSEMIDE 20 MG/1
20 TABLET ORAL DAILY
COMMUNITY

## 2022-01-01 RX ORDER — METHYLPREDNISOLONE SODIUM SUCCINATE 125 MG/2ML
80 INJECTION, POWDER, LYOPHILIZED, FOR SOLUTION INTRAMUSCULAR; INTRAVENOUS ONCE
Status: COMPLETED | OUTPATIENT
Start: 2022-01-01 | End: 2022-01-01

## 2022-01-01 RX ORDER — SUCCINYLCHOLINE/SOD CL,ISO/PF 200MG/10ML
SYRINGE (ML) INTRAVENOUS PRN
Status: DISCONTINUED | OUTPATIENT
Start: 2022-01-01 | End: 2022-01-01 | Stop reason: SDUPTHER

## 2022-01-01 RX ORDER — ONDANSETRON 4 MG/1
4 TABLET, ORALLY DISINTEGRATING ORAL EVERY 8 HOURS PRN
Qty: 2 TABLET | Refills: 0
Start: 2022-01-01

## 2022-01-01 RX ORDER — LIDOCAINE HCL/PF 100 MG/5ML
SYRINGE (ML) INJECTION PRN
Status: DISCONTINUED | OUTPATIENT
Start: 2022-01-01 | End: 2022-01-01 | Stop reason: SDUPTHER

## 2022-01-01 RX ORDER — FUROSEMIDE 40 MG/1
40 TABLET ORAL DAILY
Status: DISCONTINUED | OUTPATIENT
Start: 2022-01-01 | End: 2022-01-01 | Stop reason: HOSPADM

## 2022-01-01 RX ORDER — IPRATROPIUM BROMIDE AND ALBUTEROL SULFATE 2.5; .5 MG/3ML; MG/3ML
1 SOLUTION RESPIRATORY (INHALATION) EVERY 4 HOURS PRN
COMMUNITY

## 2022-01-01 RX ORDER — IPRATROPIUM BROMIDE AND ALBUTEROL SULFATE 2.5; .5 MG/3ML; MG/3ML
3 SOLUTION RESPIRATORY (INHALATION) 4 TIMES DAILY
Qty: 360 ML | Refills: 2 | Status: CANCELLED | OUTPATIENT
Start: 2022-01-01

## 2022-01-01 RX ORDER — LABETALOL 20 MG/4 ML (5 MG/ML) INTRAVENOUS SYRINGE
10
Status: DISCONTINUED | OUTPATIENT
Start: 2022-01-01 | End: 2022-01-01 | Stop reason: HOSPADM

## 2022-01-01 RX ADMIN — ALBUTEROL SULFATE 2.5 MG: 2.5 SOLUTION RESPIRATORY (INHALATION) at 18:45

## 2022-01-01 RX ADMIN — SUGAMMADEX 200 MG: 100 INJECTION, SOLUTION INTRAVENOUS at 13:58

## 2022-01-01 RX ADMIN — FUROSEMIDE 20 MG: 20 TABLET ORAL at 09:44

## 2022-01-01 RX ADMIN — FUROSEMIDE 40 MG: 10 INJECTION, SOLUTION INTRAMUSCULAR; INTRAVENOUS at 23:58

## 2022-01-01 RX ADMIN — SODIUM CHLORIDE 2052 ML: 9 INJECTION, SOLUTION INTRAVENOUS at 12:21

## 2022-01-01 RX ADMIN — LEVALBUTEROL HYDROCHLORIDE 1.25 MG: 1.25 SOLUTION RESPIRATORY (INHALATION) at 09:13

## 2022-01-01 RX ADMIN — MUPIROCIN: 20 OINTMENT TOPICAL at 16:33

## 2022-01-01 RX ADMIN — NYSTATIN 500000 UNITS: 100000 SUSPENSION ORAL at 12:54

## 2022-01-01 RX ADMIN — DIGOXIN 125 MCG: 125 TABLET ORAL at 08:18

## 2022-01-01 RX ADMIN — METOPROLOL SUCCINATE 150 MG: 100 TABLET, FILM COATED, EXTENDED RELEASE ORAL at 03:46

## 2022-01-01 RX ADMIN — IPRATROPIUM BROMIDE AND ALBUTEROL SULFATE 1 AMPULE: .5; 3 SOLUTION RESPIRATORY (INHALATION) at 20:10

## 2022-01-01 RX ADMIN — RIVAROXABAN 20 MG: 20 TABLET, FILM COATED ORAL at 08:32

## 2022-01-01 RX ADMIN — SODIUM CHLORIDE, PRESERVATIVE FREE 10 ML: 5 INJECTION INTRAVENOUS at 08:50

## 2022-01-01 RX ADMIN — IPRATROPIUM BROMIDE AND ALBUTEROL SULFATE 1 AMPULE: .5; 3 SOLUTION RESPIRATORY (INHALATION) at 02:50

## 2022-01-01 RX ADMIN — PREDNISONE 40 MG: 20 TABLET ORAL at 08:33

## 2022-01-01 RX ADMIN — METOPROLOL TARTRATE 5 MG: 1 INJECTION, SOLUTION INTRAVENOUS at 01:11

## 2022-01-01 RX ADMIN — CEFTRIAXONE SODIUM 1000 MG: 1 INJECTION, POWDER, FOR SOLUTION INTRAMUSCULAR; INTRAVENOUS at 23:58

## 2022-01-01 RX ADMIN — ASPIRIN 81 MG CHEWABLE TABLET 81 MG: 81 TABLET CHEWABLE at 10:04

## 2022-01-01 RX ADMIN — IPRATROPIUM BROMIDE AND ALBUTEROL SULFATE 1 AMPULE: .5; 3 SOLUTION RESPIRATORY (INHALATION) at 07:58

## 2022-01-01 RX ADMIN — FUROSEMIDE 20 MG: 10 INJECTION, SOLUTION INTRAMUSCULAR; INTRAVENOUS at 13:04

## 2022-01-01 RX ADMIN — ROPINIROLE HYDROCHLORIDE 0.5 MG: 0.5 TABLET, FILM COATED ORAL at 20:56

## 2022-01-01 RX ADMIN — IPRATROPIUM BROMIDE AND ALBUTEROL SULFATE 1 AMPULE: .5; 3 SOLUTION RESPIRATORY (INHALATION) at 07:47

## 2022-01-01 RX ADMIN — SODIUM CHLORIDE, PRESERVATIVE FREE 10 ML: 5 INJECTION INTRAVENOUS at 07:49

## 2022-01-01 RX ADMIN — FLUCONAZOLE 200 MG: 200 TABLET ORAL at 08:17

## 2022-01-01 RX ADMIN — INSULIN LISPRO 2 UNITS: 100 INJECTION, SOLUTION INTRAVENOUS; SUBCUTANEOUS at 17:18

## 2022-01-01 RX ADMIN — METHYLPREDNISOLONE SODIUM SUCCINATE 80 MG: 125 INJECTION, POWDER, FOR SOLUTION INTRAMUSCULAR; INTRAVENOUS at 16:25

## 2022-01-01 RX ADMIN — DILTIAZEM HYDROCHLORIDE 30 MG: 30 TABLET, FILM COATED ORAL at 15:13

## 2022-01-01 RX ADMIN — TIOTROPIUM BROMIDE AND OLODATEROL 2 PUFF: 3.124; 2.736 SPRAY, METERED RESPIRATORY (INHALATION) at 09:38

## 2022-01-01 RX ADMIN — MUPIROCIN: 20 OINTMENT TOPICAL at 20:33

## 2022-01-01 RX ADMIN — SODIUM CHLORIDE: 9 INJECTION, SOLUTION INTRAVENOUS at 05:31

## 2022-01-01 RX ADMIN — ASPIRIN 81 MG CHEWABLE TABLET 81 MG: 81 TABLET CHEWABLE at 09:06

## 2022-01-01 RX ADMIN — METOPROLOL SUCCINATE 100 MG: 100 TABLET, FILM COATED, EXTENDED RELEASE ORAL at 09:36

## 2022-01-01 RX ADMIN — DILTIAZEM HYDROCHLORIDE 30 MG: 30 TABLET, FILM COATED ORAL at 14:14

## 2022-01-01 RX ADMIN — PANTOPRAZOLE SODIUM 40 MG: 40 TABLET, DELAYED RELEASE ORAL at 05:13

## 2022-01-01 RX ADMIN — LEVALBUTEROL HYDROCHLORIDE 1.25 MG: 1.25 SOLUTION RESPIRATORY (INHALATION) at 13:29

## 2022-01-01 RX ADMIN — PIPERACILLIN AND TAZOBACTAM 3375 MG: 3; .375 INJECTION, POWDER, LYOPHILIZED, FOR SOLUTION INTRAVENOUS at 20:07

## 2022-01-01 RX ADMIN — SODIUM CHLORIDE, PRESERVATIVE FREE 10 ML: 5 INJECTION INTRAVENOUS at 20:48

## 2022-01-01 RX ADMIN — TRAZODONE HYDROCHLORIDE 50 MG: 50 TABLET ORAL at 22:28

## 2022-01-01 RX ADMIN — HEPARIN SODIUM 5000 UNITS: 5000 INJECTION INTRAVENOUS; SUBCUTANEOUS at 06:16

## 2022-01-01 RX ADMIN — DEXTROSE MONOHYDRATE 250 ML: 100 INJECTION, SOLUTION INTRAVENOUS at 09:27

## 2022-01-01 RX ADMIN — CEFEPIME HYDROCHLORIDE 2000 MG: 2 INJECTION, POWDER, FOR SOLUTION INTRAVENOUS at 10:56

## 2022-01-01 RX ADMIN — ATORVASTATIN CALCIUM 40 MG: 40 TABLET, FILM COATED ORAL at 20:48

## 2022-01-01 RX ADMIN — TRAZODONE HYDROCHLORIDE 50 MG: 50 TABLET ORAL at 21:59

## 2022-01-01 RX ADMIN — CEFEPIME 2000 MG: 2 INJECTION, POWDER, FOR SOLUTION INTRAMUSCULAR; INTRAVENOUS at 06:14

## 2022-01-01 RX ADMIN — IPRATROPIUM BROMIDE AND ALBUTEROL SULFATE 1 AMPULE: .5; 3 SOLUTION RESPIRATORY (INHALATION) at 16:31

## 2022-01-01 RX ADMIN — FLUCONAZOLE 200 MG: 200 TABLET ORAL at 09:30

## 2022-01-01 RX ADMIN — PREDNISONE 40 MG: 20 TABLET ORAL at 07:51

## 2022-01-01 RX ADMIN — IPRATROPIUM BROMIDE AND ALBUTEROL SULFATE 1 AMPULE: .5; 3 SOLUTION RESPIRATORY (INHALATION) at 09:12

## 2022-01-01 RX ADMIN — NYSTATIN: 100000 CREAM TOPICAL at 09:01

## 2022-01-01 RX ADMIN — CEFEPIME 2000 MG: 2 INJECTION, POWDER, FOR SOLUTION INTRAMUSCULAR; INTRAVENOUS at 21:59

## 2022-01-01 RX ADMIN — ROPINIROLE HYDROCHLORIDE 0.5 MG: 0.5 TABLET, FILM COATED ORAL at 20:09

## 2022-01-01 RX ADMIN — ATOVAQUONE 1500 MG: 750 SUSPENSION ORAL at 09:39

## 2022-01-01 RX ADMIN — MUPIROCIN: 20 OINTMENT TOPICAL at 08:54

## 2022-01-01 RX ADMIN — AMOXICILLIN AND CLAVULANATE POTASSIUM 1 TABLET: 875; 125 TABLET, FILM COATED ORAL at 08:33

## 2022-01-01 RX ADMIN — DILTIAZEM HYDROCHLORIDE 30 MG: 30 TABLET, FILM COATED ORAL at 08:50

## 2022-01-01 RX ADMIN — FENTANYL CITRATE 50 MCG: 50 INJECTION, SOLUTION INTRAMUSCULAR; INTRAVENOUS at 14:20

## 2022-01-01 RX ADMIN — SODIUM CHLORIDE, PRESERVATIVE FREE 10 ML: 5 INJECTION INTRAVENOUS at 11:09

## 2022-01-01 RX ADMIN — DIGOXIN 125 MCG: 125 TABLET ORAL at 08:41

## 2022-01-01 RX ADMIN — FLUCONAZOLE 200 MG: 200 TABLET ORAL at 08:41

## 2022-01-01 RX ADMIN — INSULIN HUMAN 10 UNITS: 100 INJECTION, SOLUTION PARENTERAL at 09:27

## 2022-01-01 RX ADMIN — CEFEPIME 2000 MG: 2 INJECTION, POWDER, FOR SOLUTION INTRAMUSCULAR; INTRAVENOUS at 18:12

## 2022-01-01 RX ADMIN — Medication 5 MCG/MIN: at 13:20

## 2022-01-01 RX ADMIN — FUROSEMIDE 20 MG: 20 TABLET ORAL at 08:56

## 2022-01-01 RX ADMIN — FUROSEMIDE 20 MG: 20 TABLET ORAL at 09:33

## 2022-01-01 RX ADMIN — FUROSEMIDE 20 MG: 20 TABLET ORAL at 08:50

## 2022-01-01 RX ADMIN — HYDROCODONE BITARTRATE AND ACETAMINOPHEN 1 TABLET: 5; 325 TABLET ORAL at 10:07

## 2022-01-01 RX ADMIN — RIVAROXABAN 15 MG: 15 TABLET, FILM COATED ORAL at 18:05

## 2022-01-01 RX ADMIN — NYSTATIN: 100000 CREAM TOPICAL at 08:05

## 2022-01-01 RX ADMIN — ASPIRIN 81 MG 81 MG: 81 TABLET ORAL at 07:55

## 2022-01-01 RX ADMIN — NYSTATIN 500000 UNITS: 100000 SUSPENSION ORAL at 16:25

## 2022-01-01 RX ADMIN — SODIUM CHLORIDE, PRESERVATIVE FREE 10 ML: 5 INJECTION INTRAVENOUS at 08:33

## 2022-01-01 RX ADMIN — INSULIN LISPRO 1 UNITS: 100 INJECTION, SOLUTION INTRAVENOUS; SUBCUTANEOUS at 17:04

## 2022-01-01 RX ADMIN — IPRATROPIUM BROMIDE AND ALBUTEROL SULFATE 1 AMPULE: .5; 3 SOLUTION RESPIRATORY (INHALATION) at 13:47

## 2022-01-01 RX ADMIN — NYSTATIN 500000 UNITS: 100000 SUSPENSION ORAL at 08:42

## 2022-01-01 RX ADMIN — ATOVAQUONE 1500 MG: 750 SUSPENSION ORAL at 08:50

## 2022-01-01 RX ADMIN — DILTIAZEM HYDROCHLORIDE 30 MG: 30 TABLET, FILM COATED ORAL at 05:28

## 2022-01-01 RX ADMIN — TIOTROPIUM BROMIDE AND OLODATEROL 2 PUFF: 3.124; 2.736 SPRAY, METERED RESPIRATORY (INHALATION) at 08:50

## 2022-01-01 RX ADMIN — INSULIN LISPRO 1 UNITS: 100 INJECTION, SOLUTION INTRAVENOUS; SUBCUTANEOUS at 21:00

## 2022-01-01 RX ADMIN — NYSTATIN 500000 UNITS: 100000 SUSPENSION ORAL at 21:03

## 2022-01-01 RX ADMIN — PROPOFOL 5 MG: 10 INJECTION, EMULSION INTRAVENOUS at 16:18

## 2022-01-01 RX ADMIN — MUPIROCIN: 20 OINTMENT TOPICAL at 10:03

## 2022-01-01 RX ADMIN — FLUOXETINE HYDROCHLORIDE 10 MG: 10 CAPSULE ORAL at 08:17

## 2022-01-01 RX ADMIN — IPRATROPIUM BROMIDE AND ALBUTEROL SULFATE 1 AMPULE: .5; 3 SOLUTION RESPIRATORY (INHALATION) at 08:12

## 2022-01-01 RX ADMIN — Medication 400 MG: at 07:51

## 2022-01-01 RX ADMIN — ATORVASTATIN CALCIUM 40 MG: 40 TABLET, FILM COATED ORAL at 20:06

## 2022-01-01 RX ADMIN — ASPIRIN 81 MG 81 MG: 81 TABLET ORAL at 09:06

## 2022-01-01 RX ADMIN — MUPIROCIN: 20 OINTMENT TOPICAL at 14:31

## 2022-01-01 RX ADMIN — LEVALBUTEROL HYDROCHLORIDE 1.25 MG: 1.25 SOLUTION RESPIRATORY (INHALATION) at 22:22

## 2022-01-01 RX ADMIN — TRAZODONE HYDROCHLORIDE 50 MG: 50 TABLET ORAL at 20:58

## 2022-01-01 RX ADMIN — FUROSEMIDE 20 MG: 20 TABLET ORAL at 08:07

## 2022-01-01 RX ADMIN — ATOVAQUONE 1500 MG: 750 SUSPENSION ORAL at 09:30

## 2022-01-01 RX ADMIN — Medication 1 TABLET: at 09:06

## 2022-01-01 RX ADMIN — IPRATROPIUM BROMIDE AND ALBUTEROL SULFATE 1 AMPULE: .5; 3 SOLUTION RESPIRATORY (INHALATION) at 16:21

## 2022-01-01 RX ADMIN — IPRATROPIUM BROMIDE AND ALBUTEROL SULFATE 1 AMPULE: .5; 3 SOLUTION RESPIRATORY (INHALATION) at 09:24

## 2022-01-01 RX ADMIN — MUPIROCIN: 20 OINTMENT TOPICAL at 09:45

## 2022-01-01 RX ADMIN — CETIRIZINE HYDROCHLORIDE 10 MG: 10 TABLET, FILM COATED ORAL at 08:18

## 2022-01-01 RX ADMIN — DIGOXIN 125 MCG: 125 TABLET ORAL at 08:08

## 2022-01-01 RX ADMIN — TRAZODONE HYDROCHLORIDE 50 MG: 50 TABLET ORAL at 21:17

## 2022-01-01 RX ADMIN — ATORVASTATIN CALCIUM 40 MG: 40 TABLET, FILM COATED ORAL at 21:29

## 2022-01-01 RX ADMIN — NYSTATIN: 100000 CREAM TOPICAL at 09:30

## 2022-01-01 RX ADMIN — METOPROLOL SUCCINATE 100 MG: 100 TABLET, FILM COATED, EXTENDED RELEASE ORAL at 08:30

## 2022-01-01 RX ADMIN — IPRATROPIUM BROMIDE AND ALBUTEROL SULFATE 1 AMPULE: .5; 3 SOLUTION RESPIRATORY (INHALATION) at 21:43

## 2022-01-01 RX ADMIN — ALBUTEROL SULFATE 2.5 MG: 2.5 SOLUTION RESPIRATORY (INHALATION) at 17:49

## 2022-01-01 RX ADMIN — SODIUM CHLORIDE, PRESERVATIVE FREE 10 ML: 5 INJECTION INTRAVENOUS at 08:42

## 2022-01-01 RX ADMIN — MUPIROCIN: 20 OINTMENT TOPICAL at 13:55

## 2022-01-01 RX ADMIN — ATORVASTATIN CALCIUM 40 MG: 40 TABLET, FILM COATED ORAL at 21:34

## 2022-01-01 RX ADMIN — SACUBITRIL AND VALSARTAN 0.5 TABLET: 49; 51 TABLET, FILM COATED ORAL at 07:59

## 2022-01-01 RX ADMIN — FUROSEMIDE 20 MG: 10 INJECTION, SOLUTION INTRAMUSCULAR; INTRAVENOUS at 08:41

## 2022-01-01 RX ADMIN — IPRATROPIUM BROMIDE AND ALBUTEROL SULFATE 1 AMPULE: .5; 3 SOLUTION RESPIRATORY (INHALATION) at 21:59

## 2022-01-01 RX ADMIN — ASPIRIN 81 MG CHEWABLE TABLET 81 MG: 81 TABLET CHEWABLE at 09:12

## 2022-01-01 RX ADMIN — Medication 1 TABLET: at 08:07

## 2022-01-01 RX ADMIN — Medication 1 TABLET: at 09:30

## 2022-01-01 RX ADMIN — SODIUM CHLORIDE: 9 INJECTION, SOLUTION INTRAVENOUS at 12:10

## 2022-01-01 RX ADMIN — CEFEPIME 2000 MG: 2 INJECTION, POWDER, FOR SOLUTION INTRAMUSCULAR; INTRAVENOUS at 06:52

## 2022-01-01 RX ADMIN — AZITHROMYCIN DIHYDRATE 500 MG: 500 INJECTION, POWDER, LYOPHILIZED, FOR SOLUTION INTRAVENOUS at 14:14

## 2022-01-01 RX ADMIN — CEFEPIME 2000 MG: 2 INJECTION, POWDER, FOR SOLUTION INTRAMUSCULAR; INTRAVENOUS at 06:30

## 2022-01-01 RX ADMIN — DIGOXIN 250 MCG: 0.25 INJECTION INTRAMUSCULAR; INTRAVENOUS at 20:06

## 2022-01-01 RX ADMIN — FUROSEMIDE 20 MG: 20 TABLET ORAL at 08:41

## 2022-01-01 RX ADMIN — Medication 1 TABLET: at 08:56

## 2022-01-01 RX ADMIN — ATOVAQUONE 1500 MG: 750 SUSPENSION ORAL at 08:05

## 2022-01-01 RX ADMIN — DILTIAZEM HYDROCHLORIDE 30 MG: 30 TABLET, FILM COATED ORAL at 13:29

## 2022-01-01 RX ADMIN — CEFEPIME 2000 MG: 2 INJECTION, POWDER, FOR SOLUTION INTRAMUSCULAR; INTRAVENOUS at 19:02

## 2022-01-01 RX ADMIN — ALBUTEROL SULFATE 2.5 MG: 2.5 SOLUTION RESPIRATORY (INHALATION) at 07:45

## 2022-01-01 RX ADMIN — DILTIAZEM HYDROCHLORIDE 10 MG: 5 INJECTION INTRAVENOUS at 15:09

## 2022-01-01 RX ADMIN — ATOVAQUONE 1500 MG: 750 SUSPENSION ORAL at 09:47

## 2022-01-01 RX ADMIN — SODIUM CHLORIDE: 9 INJECTION, SOLUTION INTRAVENOUS at 16:12

## 2022-01-01 RX ADMIN — IPRATROPIUM BROMIDE AND ALBUTEROL SULFATE 1 AMPULE: .5; 3 SOLUTION RESPIRATORY (INHALATION) at 21:25

## 2022-01-01 RX ADMIN — ATORVASTATIN CALCIUM 40 MG: 40 TABLET, FILM COATED ORAL at 20:50

## 2022-01-01 RX ADMIN — OXYMETAZOLINE HYDROCHLORIDE 2 SPRAY: 0.05 SPRAY NASAL at 12:10

## 2022-01-01 RX ADMIN — Medication 10 MG: at 16:17

## 2022-01-01 RX ADMIN — ATORVASTATIN CALCIUM 40 MG: 40 TABLET, FILM COATED ORAL at 21:37

## 2022-01-01 RX ADMIN — METOPROLOL SUCCINATE 100 MG: 100 TABLET, FILM COATED, EXTENDED RELEASE ORAL at 10:04

## 2022-01-01 RX ADMIN — DILTIAZEM HYDROCHLORIDE 30 MG: 30 TABLET, FILM COATED ORAL at 23:01

## 2022-01-01 RX ADMIN — INSULIN LISPRO 1 UNITS: 100 INJECTION, SOLUTION INTRAVENOUS; SUBCUTANEOUS at 17:08

## 2022-01-01 RX ADMIN — ASPIRIN 81 MG 81 MG: 81 TABLET ORAL at 11:08

## 2022-01-01 RX ADMIN — ALPRAZOLAM 0.5 MG: 0.5 TABLET ORAL at 20:58

## 2022-01-01 RX ADMIN — PANTOPRAZOLE SODIUM 40 MG: 40 TABLET, DELAYED RELEASE ORAL at 06:41

## 2022-01-01 RX ADMIN — AMIODARONE HYDROCHLORIDE 1 MG/MIN: 1.8 INJECTION, SOLUTION INTRAVENOUS at 02:02

## 2022-01-01 RX ADMIN — FUROSEMIDE 40 MG: 40 TABLET ORAL at 07:51

## 2022-01-01 RX ADMIN — NYSTATIN: 100000 CREAM TOPICAL at 20:05

## 2022-01-01 RX ADMIN — METOPROLOL TARTRATE 2.5 MG: 5 INJECTION INTRAVENOUS at 14:53

## 2022-01-01 RX ADMIN — IPRATROPIUM BROMIDE AND ALBUTEROL SULFATE 1 AMPULE: .5; 3 SOLUTION RESPIRATORY (INHALATION) at 08:25

## 2022-01-01 RX ADMIN — MUPIROCIN: 20 OINTMENT TOPICAL at 09:00

## 2022-01-01 RX ADMIN — CEFEPIME 2000 MG: 2 INJECTION, POWDER, FOR SOLUTION INTRAMUSCULAR; INTRAVENOUS at 20:16

## 2022-01-01 RX ADMIN — FUROSEMIDE 40 MG: 40 TABLET ORAL at 07:49

## 2022-01-01 RX ADMIN — CEFEPIME 2000 MG: 2 INJECTION, POWDER, FOR SOLUTION INTRAMUSCULAR; INTRAVENOUS at 06:27

## 2022-01-01 RX ADMIN — TRAZODONE HYDROCHLORIDE 50 MG: 50 TABLET ORAL at 21:16

## 2022-01-01 RX ADMIN — IPRATROPIUM BROMIDE AND ALBUTEROL SULFATE 1 AMPULE: .5; 3 SOLUTION RESPIRATORY (INHALATION) at 16:41

## 2022-01-01 RX ADMIN — PREDNISONE 40 MG: 20 TABLET ORAL at 09:33

## 2022-01-01 RX ADMIN — IPRATROPIUM BROMIDE AND ALBUTEROL SULFATE 1 AMPULE: .5; 3 SOLUTION RESPIRATORY (INHALATION) at 12:03

## 2022-01-01 RX ADMIN — METOPROLOL SUCCINATE 150 MG: 100 TABLET, FILM COATED, EXTENDED RELEASE ORAL at 10:55

## 2022-01-01 RX ADMIN — TRAZODONE HYDROCHLORIDE 50 MG: 50 TABLET ORAL at 20:11

## 2022-01-01 RX ADMIN — FLUCONAZOLE IN SODIUM CHLORIDE 200 MG: 2 INJECTION, SOLUTION INTRAVENOUS at 13:28

## 2022-01-01 RX ADMIN — AMIODARONE HYDROCHLORIDE 0.5 MG/MIN: 1.8 INJECTION, SOLUTION INTRAVENOUS at 07:58

## 2022-01-01 RX ADMIN — SODIUM CHLORIDE, PRESERVATIVE FREE 10 ML: 5 INJECTION INTRAVENOUS at 21:29

## 2022-01-01 RX ADMIN — CETIRIZINE HYDROCHLORIDE 10 MG: 10 TABLET, FILM COATED ORAL at 08:07

## 2022-01-01 RX ADMIN — Medication 1 TABLET: at 11:08

## 2022-01-01 RX ADMIN — IPRATROPIUM BROMIDE AND ALBUTEROL SULFATE 1 AMPULE: .5; 3 SOLUTION RESPIRATORY (INHALATION) at 20:32

## 2022-01-01 RX ADMIN — ALPRAZOLAM 0.5 MG: 0.5 TABLET ORAL at 21:59

## 2022-01-01 RX ADMIN — PIPERACILLIN AND TAZOBACTAM 3375 MG: 3; .375 INJECTION, POWDER, LYOPHILIZED, FOR SOLUTION INTRAVENOUS at 03:15

## 2022-01-01 RX ADMIN — METOPROLOL SUCCINATE 150 MG: 100 TABLET, FILM COATED, EXTENDED RELEASE ORAL at 09:02

## 2022-01-01 RX ADMIN — CETIRIZINE HYDROCHLORIDE 10 MG: 10 TABLET, FILM COATED ORAL at 11:08

## 2022-01-01 RX ADMIN — OXYMETAZOLINE HYDROCHLORIDE 2 SPRAY: 0.05 SPRAY NASAL at 21:04

## 2022-01-01 RX ADMIN — ATORVASTATIN CALCIUM 40 MG: 40 TABLET, FILM COATED ORAL at 22:28

## 2022-01-01 RX ADMIN — AMOXICILLIN AND CLAVULANATE POTASSIUM 1 TABLET: 875; 125 TABLET, FILM COATED ORAL at 09:08

## 2022-01-01 RX ADMIN — ATORVASTATIN CALCIUM 40 MG: 40 TABLET, FILM COATED ORAL at 20:13

## 2022-01-01 RX ADMIN — RIVAROXABAN 20 MG: 20 TABLET, FILM COATED ORAL at 08:11

## 2022-01-01 RX ADMIN — ATORVASTATIN CALCIUM 40 MG: 40 TABLET, FILM COATED ORAL at 20:58

## 2022-01-01 RX ADMIN — NYSTATIN: 100000 CREAM TOPICAL at 08:12

## 2022-01-01 RX ADMIN — AMIODARONE HYDROCHLORIDE 150 MG: 1.5 INJECTION, SOLUTION INTRAVENOUS at 01:48

## 2022-01-01 RX ADMIN — ASPIRIN 81 MG 81 MG: 81 TABLET ORAL at 09:02

## 2022-01-01 RX ADMIN — RIVAROXABAN 15 MG: 15 TABLET, FILM COATED ORAL at 17:38

## 2022-01-01 RX ADMIN — CEFTRIAXONE SODIUM 1000 MG: 1 INJECTION, POWDER, FOR SOLUTION INTRAMUSCULAR; INTRAVENOUS at 11:12

## 2022-01-01 RX ADMIN — ATOVAQUONE 1500 MG: 750 SUSPENSION ORAL at 10:08

## 2022-01-01 RX ADMIN — METOPROLOL SUCCINATE 100 MG: 100 TABLET, FILM COATED, EXTENDED RELEASE ORAL at 08:15

## 2022-01-01 RX ADMIN — PREDNISONE 40 MG: 20 TABLET ORAL at 09:02

## 2022-01-01 RX ADMIN — METOPROLOL SUCCINATE 100 MG: 100 TABLET, FILM COATED, EXTENDED RELEASE ORAL at 09:30

## 2022-01-01 RX ADMIN — SODIUM CHLORIDE 250 ML: 9 INJECTION, SOLUTION INTRAVENOUS at 12:50

## 2022-01-01 RX ADMIN — SODIUM CHLORIDE, PRESERVATIVE FREE 10 ML: 5 INJECTION INTRAVENOUS at 21:28

## 2022-01-01 RX ADMIN — DILTIAZEM HYDROCHLORIDE 30 MG: 30 TABLET, FILM COATED ORAL at 06:37

## 2022-01-01 RX ADMIN — RIVAROXABAN 20 MG: 20 TABLET, FILM COATED ORAL at 07:52

## 2022-01-01 RX ADMIN — PIPERACILLIN AND TAZOBACTAM 3375 MG: 3; .375 INJECTION, POWDER, LYOPHILIZED, FOR SOLUTION INTRAVENOUS at 03:21

## 2022-01-01 RX ADMIN — Medication 1 TABLET: at 08:29

## 2022-01-01 RX ADMIN — DILTIAZEM HYDROCHLORIDE 30 MG: 30 TABLET, FILM COATED ORAL at 06:29

## 2022-01-01 RX ADMIN — SODIUM CHLORIDE, PRESERVATIVE FREE 10 ML: 5 INJECTION INTRAVENOUS at 08:27

## 2022-01-01 RX ADMIN — FUROSEMIDE 20 MG: 10 INJECTION, SOLUTION INTRAMUSCULAR; INTRAVENOUS at 20:11

## 2022-01-01 RX ADMIN — HYDROMORPHONE HYDROCHLORIDE 0.5 MG: 1 INJECTION, SOLUTION INTRAMUSCULAR; INTRAVENOUS; SUBCUTANEOUS at 14:30

## 2022-01-01 RX ADMIN — ATORVASTATIN CALCIUM 40 MG: 40 TABLET, FILM COATED ORAL at 20:10

## 2022-01-01 RX ADMIN — CETIRIZINE HYDROCHLORIDE 10 MG: 10 TABLET, FILM COATED ORAL at 08:55

## 2022-01-01 RX ADMIN — IPRATROPIUM BROMIDE AND ALBUTEROL SULFATE 1 AMPULE: .5; 3 SOLUTION RESPIRATORY (INHALATION) at 15:00

## 2022-01-01 RX ADMIN — NYSTATIN 500000 UNITS: 100000 SUSPENSION ORAL at 12:59

## 2022-01-01 RX ADMIN — LEVALBUTEROL HYDROCHLORIDE 1.25 MG: 1.25 SOLUTION RESPIRATORY (INHALATION) at 09:25

## 2022-01-01 RX ADMIN — DILTIAZEM HYDROCHLORIDE 30 MG: 30 TABLET, FILM COATED ORAL at 21:00

## 2022-01-01 RX ADMIN — ALBUTEROL SULFATE 2.5 MG: 2.5 SOLUTION RESPIRATORY (INHALATION) at 20:15

## 2022-01-01 RX ADMIN — TRAZODONE HYDROCHLORIDE 50 MG: 50 TABLET ORAL at 19:57

## 2022-01-01 RX ADMIN — DILTIAZEM HYDROCHLORIDE 30 MG: 30 TABLET, FILM COATED ORAL at 06:11

## 2022-01-01 RX ADMIN — SUGAMMADEX 200 MG: 100 INJECTION, SOLUTION INTRAVENOUS at 14:05

## 2022-01-01 RX ADMIN — PANTOPRAZOLE SODIUM 40 MG: 40 TABLET, DELAYED RELEASE ORAL at 06:04

## 2022-01-01 RX ADMIN — ASPIRIN 81 MG CHEWABLE TABLET 81 MG: 81 TABLET CHEWABLE at 07:59

## 2022-01-01 RX ADMIN — RIVAROXABAN 20 MG: 20 TABLET, FILM COATED ORAL at 07:51

## 2022-01-01 RX ADMIN — INSULIN LISPRO 1 UNITS: 100 INJECTION, SOLUTION INTRAVENOUS; SUBCUTANEOUS at 21:02

## 2022-01-01 RX ADMIN — ATORVASTATIN CALCIUM 40 MG: 40 TABLET, FILM COATED ORAL at 20:33

## 2022-01-01 RX ADMIN — Medication 1 TABLET: at 09:32

## 2022-01-01 RX ADMIN — DIGOXIN 125 MCG: 125 TABLET ORAL at 08:55

## 2022-01-01 RX ADMIN — FUROSEMIDE 20 MG: 20 TABLET ORAL at 09:02

## 2022-01-01 RX ADMIN — Medication 1 TABLET: at 10:04

## 2022-01-01 RX ADMIN — ASPIRIN 81 MG 81 MG: 81 TABLET ORAL at 08:12

## 2022-01-01 RX ADMIN — LEVALBUTEROL HYDROCHLORIDE 1.25 MG: 1.25 SOLUTION RESPIRATORY (INHALATION) at 13:42

## 2022-01-01 RX ADMIN — ALBUTEROL SULFATE 2.5 MG: 2.5 SOLUTION RESPIRATORY (INHALATION) at 09:11

## 2022-01-01 RX ADMIN — MUPIROCIN: 20 OINTMENT TOPICAL at 09:31

## 2022-01-01 RX ADMIN — ATOVAQUONE 1500 MG: 750 SUSPENSION ORAL at 08:33

## 2022-01-01 RX ADMIN — HYDROCODONE BITARTRATE AND ACETAMINOPHEN 1 TABLET: 5; 325 TABLET ORAL at 15:55

## 2022-01-01 RX ADMIN — PREDNISONE 40 MG: 20 TABLET ORAL at 08:41

## 2022-01-01 RX ADMIN — FUROSEMIDE 80 MG: 10 INJECTION, SOLUTION INTRAMUSCULAR; INTRAVENOUS at 17:16

## 2022-01-01 RX ADMIN — ASPIRIN 81 MG 81 MG: 81 TABLET ORAL at 10:07

## 2022-01-01 RX ADMIN — TIOTROPIUM BROMIDE AND OLODATEROL 2 PUFF: 3.124; 2.736 SPRAY, METERED RESPIRATORY (INHALATION) at 06:25

## 2022-01-01 RX ADMIN — ATORVASTATIN CALCIUM 40 MG: 40 TABLET, FILM COATED ORAL at 21:00

## 2022-01-01 RX ADMIN — DIGOXIN 500 MCG: 0.25 INJECTION INTRAMUSCULAR; INTRAVENOUS at 13:46

## 2022-01-01 RX ADMIN — SODIUM CHLORIDE, PRESERVATIVE FREE 10 ML: 5 INJECTION INTRAVENOUS at 08:53

## 2022-01-01 RX ADMIN — METOPROLOL SUCCINATE 150 MG: 100 TABLET, FILM COATED, EXTENDED RELEASE ORAL at 11:39

## 2022-01-01 RX ADMIN — ATORVASTATIN CALCIUM 40 MG: 40 TABLET, FILM COATED ORAL at 21:05

## 2022-01-01 RX ADMIN — LEVALBUTEROL HYDROCHLORIDE 1.25 MG: 1.25 SOLUTION RESPIRATORY (INHALATION) at 14:59

## 2022-01-01 RX ADMIN — PREDNISONE 40 MG: 20 TABLET ORAL at 08:15

## 2022-01-01 RX ADMIN — SODIUM CHLORIDE, PRESERVATIVE FREE 10 ML: 5 INJECTION INTRAVENOUS at 08:18

## 2022-01-01 RX ADMIN — Medication 400 MG: at 21:26

## 2022-01-01 RX ADMIN — DILTIAZEM HYDROCHLORIDE 30 MG: 30 TABLET, FILM COATED ORAL at 06:41

## 2022-01-01 RX ADMIN — NYSTATIN: 100000 CREAM TOPICAL at 09:29

## 2022-01-01 RX ADMIN — ALBUTEROL SULFATE 2.5 MG: 2.5 SOLUTION RESPIRATORY (INHALATION) at 16:56

## 2022-01-01 RX ADMIN — RIVAROXABAN 20 MG: 20 TABLET, FILM COATED ORAL at 09:08

## 2022-01-01 RX ADMIN — MUPIROCIN: 20 OINTMENT TOPICAL at 16:36

## 2022-01-01 RX ADMIN — MUPIROCIN: 20 OINTMENT TOPICAL at 14:01

## 2022-01-01 RX ADMIN — LEVALBUTEROL HYDROCHLORIDE 1.25 MG: 1.25 SOLUTION RESPIRATORY (INHALATION) at 20:47

## 2022-01-01 RX ADMIN — METOPROLOL TARTRATE 5 MG: 1 INJECTION, SOLUTION INTRAVENOUS at 09:45

## 2022-01-01 RX ADMIN — DILTIAZEM HYDROCHLORIDE 30 MG: 30 TABLET, FILM COATED ORAL at 21:34

## 2022-01-01 RX ADMIN — NYSTATIN: 100000 CREAM TOPICAL at 20:59

## 2022-01-01 RX ADMIN — PANTOPRAZOLE SODIUM 40 MG: 40 TABLET, DELAYED RELEASE ORAL at 05:38

## 2022-01-01 RX ADMIN — ASPIRIN 81 MG CHEWABLE TABLET 81 MG: 81 TABLET CHEWABLE at 08:41

## 2022-01-01 RX ADMIN — AMOXICILLIN AND CLAVULANATE POTASSIUM 1 TABLET: 875; 125 TABLET, FILM COATED ORAL at 11:34

## 2022-01-01 RX ADMIN — PREDNISONE 40 MG: 20 TABLET ORAL at 09:14

## 2022-01-01 RX ADMIN — SODIUM CHLORIDE 250 ML: 9 INJECTION, SOLUTION INTRAVENOUS at 04:45

## 2022-01-01 RX ADMIN — ATOVAQUONE 1500 MG: 750 SUSPENSION ORAL at 09:11

## 2022-01-01 RX ADMIN — DIGOXIN 125 MCG: 125 TABLET ORAL at 09:30

## 2022-01-01 RX ADMIN — ASPIRIN 81 MG CHEWABLE TABLET 81 MG: 81 TABLET CHEWABLE at 10:26

## 2022-01-01 RX ADMIN — Medication 10 MG: at 16:20

## 2022-01-01 RX ADMIN — IPRATROPIUM BROMIDE AND ALBUTEROL SULFATE 1 AMPULE: .5; 3 SOLUTION RESPIRATORY (INHALATION) at 00:23

## 2022-01-01 RX ADMIN — SODIUM CHLORIDE, PRESERVATIVE FREE 10 ML: 5 INJECTION INTRAVENOUS at 08:52

## 2022-01-01 RX ADMIN — ALBUTEROL SULFATE 2.5 MG: 2.5 SOLUTION RESPIRATORY (INHALATION) at 17:46

## 2022-01-01 RX ADMIN — INSULIN LISPRO 1 UNITS: 100 INJECTION, SOLUTION INTRAVENOUS; SUBCUTANEOUS at 21:29

## 2022-01-01 RX ADMIN — FLUCONAZOLE 200 MG: 200 TABLET ORAL at 09:02

## 2022-01-01 RX ADMIN — SODIUM CHLORIDE, PRESERVATIVE FREE 10 ML: 5 INJECTION INTRAVENOUS at 13:46

## 2022-01-01 RX ADMIN — SODIUM CHLORIDE, PRESERVATIVE FREE 10 ML: 5 INJECTION INTRAVENOUS at 16:34

## 2022-01-01 RX ADMIN — NYSTATIN 500000 UNITS: 100000 SUSPENSION ORAL at 08:15

## 2022-01-01 RX ADMIN — HYDROCODONE BITARTRATE AND ACETAMINOPHEN 1 TABLET: 5; 325 TABLET ORAL at 17:15

## 2022-01-01 RX ADMIN — DILTIAZEM HYDROCHLORIDE 10 MG: 5 INJECTION INTRAVENOUS at 16:30

## 2022-01-01 RX ADMIN — MUPIROCIN: 20 OINTMENT TOPICAL at 21:03

## 2022-01-01 RX ADMIN — Medication 1 TABLET: at 08:12

## 2022-01-01 RX ADMIN — NYSTATIN 500000 UNITS: 100000 SUSPENSION ORAL at 09:13

## 2022-01-01 RX ADMIN — MUPIROCIN: 20 OINTMENT TOPICAL at 08:20

## 2022-01-01 RX ADMIN — FLUOXETINE HYDROCHLORIDE 10 MG: 10 CAPSULE ORAL at 10:04

## 2022-01-01 RX ADMIN — PANTOPRAZOLE SODIUM 40 MG: 40 TABLET, DELAYED RELEASE ORAL at 05:27

## 2022-01-01 RX ADMIN — SACUBITRIL AND VALSARTAN 0.5 TABLET: 49; 51 TABLET, FILM COATED ORAL at 08:50

## 2022-01-01 RX ADMIN — SODIUM CHLORIDE, PRESERVATIVE FREE 10 ML: 5 INJECTION INTRAVENOUS at 08:09

## 2022-01-01 RX ADMIN — CEFEPIME 2000 MG: 2 INJECTION, POWDER, FOR SOLUTION INTRAMUSCULAR; INTRAVENOUS at 06:00

## 2022-01-01 RX ADMIN — RIVAROXABAN 20 MG: 20 TABLET, FILM COATED ORAL at 09:06

## 2022-01-01 RX ADMIN — SACUBITRIL AND VALSARTAN 0.5 TABLET: 49; 51 TABLET, FILM COATED ORAL at 21:00

## 2022-01-01 RX ADMIN — METHYLPREDNISOLONE SODIUM SUCCINATE 125 MG: 125 INJECTION, POWDER, FOR SOLUTION INTRAMUSCULAR; INTRAVENOUS at 13:14

## 2022-01-01 RX ADMIN — ENOXAPARIN SODIUM 70 MG: 100 INJECTION SUBCUTANEOUS at 00:15

## 2022-01-01 RX ADMIN — LEVALBUTEROL HYDROCHLORIDE 1.25 MG: 1.25 SOLUTION RESPIRATORY (INHALATION) at 20:33

## 2022-01-01 RX ADMIN — ATORVASTATIN CALCIUM 40 MG: 40 TABLET, FILM COATED ORAL at 20:11

## 2022-01-01 RX ADMIN — AZITHROMYCIN 500 MG: 250 TABLET, FILM COATED ORAL at 11:38

## 2022-01-01 RX ADMIN — HYDROCODONE BITARTRATE AND ACETAMINOPHEN 1 TABLET: 5; 325 TABLET ORAL at 02:21

## 2022-01-01 RX ADMIN — Medication 100 MG: at 12:19

## 2022-01-01 RX ADMIN — TRAZODONE HYDROCHLORIDE 50 MG: 50 TABLET ORAL at 20:17

## 2022-01-01 RX ADMIN — ATOVAQUONE 1500 MG: 750 SUSPENSION ORAL at 09:17

## 2022-01-01 RX ADMIN — ALPRAZOLAM 0.5 MG: 0.5 TABLET ORAL at 21:03

## 2022-01-01 RX ADMIN — SODIUM CHLORIDE, PRESERVATIVE FREE 20 ML: 5 INJECTION INTRAVENOUS at 09:00

## 2022-01-01 RX ADMIN — METHYLPREDNISOLONE SODIUM SUCCINATE 40 MG: 40 INJECTION, POWDER, FOR SOLUTION INTRAMUSCULAR; INTRAVENOUS at 23:58

## 2022-01-01 RX ADMIN — ATORVASTATIN CALCIUM 40 MG: 40 TABLET, FILM COATED ORAL at 20:46

## 2022-01-01 RX ADMIN — Medication 10 MG: at 16:23

## 2022-01-01 RX ADMIN — ASPIRIN 81 MG 81 MG: 81 TABLET ORAL at 09:34

## 2022-01-01 RX ADMIN — PANTOPRAZOLE SODIUM 40 MG: 40 TABLET, DELAYED RELEASE ORAL at 05:36

## 2022-01-01 RX ADMIN — ASPIRIN 81 MG 81 MG: 81 TABLET ORAL at 08:17

## 2022-01-01 RX ADMIN — PANTOPRAZOLE SODIUM 40 MG: 40 TABLET, DELAYED RELEASE ORAL at 06:11

## 2022-01-01 RX ADMIN — PREDNISONE 30 MG: 20 TABLET ORAL at 09:31

## 2022-01-01 RX ADMIN — Medication 1 TABLET: at 08:19

## 2022-01-01 RX ADMIN — Medication 1 TABLET: at 08:58

## 2022-01-01 RX ADMIN — METHYLPREDNISOLONE SODIUM SUCCINATE 60 MG: 125 INJECTION, POWDER, FOR SOLUTION INTRAMUSCULAR; INTRAVENOUS at 10:56

## 2022-01-01 RX ADMIN — TRAZODONE HYDROCHLORIDE 50 MG: 50 TABLET ORAL at 21:29

## 2022-01-01 RX ADMIN — NYSTATIN 500000 UNITS: 100000 SUSPENSION ORAL at 13:18

## 2022-01-01 RX ADMIN — TRAZODONE HYDROCHLORIDE 50 MG: 50 TABLET ORAL at 20:16

## 2022-01-01 RX ADMIN — METOPROLOL SUCCINATE 100 MG: 100 TABLET, EXTENDED RELEASE ORAL at 08:10

## 2022-01-01 RX ADMIN — SODIUM CHLORIDE, PRESERVATIVE FREE 5 ML: 5 INJECTION INTRAVENOUS at 08:32

## 2022-01-01 RX ADMIN — SODIUM CHLORIDE, PRESERVATIVE FREE 10 ML: 5 INJECTION INTRAVENOUS at 09:31

## 2022-01-01 RX ADMIN — RIVAROXABAN 15 MG: 15 TABLET, FILM COATED ORAL at 17:55

## 2022-01-01 RX ADMIN — PANTOPRAZOLE SODIUM 40 MG: 40 TABLET, DELAYED RELEASE ORAL at 06:24

## 2022-01-01 RX ADMIN — PROPOFOL 10 MG: 10 INJECTION, EMULSION INTRAVENOUS at 16:23

## 2022-01-01 RX ADMIN — DILTIAZEM HYDROCHLORIDE 30 MG: 30 TABLET, FILM COATED ORAL at 21:37

## 2022-01-01 RX ADMIN — IPRATROPIUM BROMIDE AND ALBUTEROL SULFATE 1 AMPULE: .5; 3 SOLUTION RESPIRATORY (INHALATION) at 11:47

## 2022-01-01 RX ADMIN — NYSTATIN: 100000 CREAM TOPICAL at 20:33

## 2022-01-01 RX ADMIN — CETIRIZINE HYDROCHLORIDE 10 MG: 10 TABLET, FILM COATED ORAL at 10:04

## 2022-01-01 RX ADMIN — NYSTATIN: 100000 CREAM TOPICAL at 08:31

## 2022-01-01 RX ADMIN — MUPIROCIN: 20 OINTMENT TOPICAL at 09:08

## 2022-01-01 RX ADMIN — PIPERACILLIN AND TAZOBACTAM 3375 MG: 3; .375 INJECTION, POWDER, LYOPHILIZED, FOR SOLUTION INTRAVENOUS at 03:19

## 2022-01-01 RX ADMIN — ONDANSETRON 4 MG: 2 INJECTION INTRAMUSCULAR; INTRAVENOUS at 14:00

## 2022-01-01 RX ADMIN — IPRATROPIUM BROMIDE AND ALBUTEROL SULFATE 1 AMPULE: .5; 3 SOLUTION RESPIRATORY (INHALATION) at 17:49

## 2022-01-01 RX ADMIN — PIPERACILLIN AND TAZOBACTAM 3375 MG: 3; .375 INJECTION, POWDER, LYOPHILIZED, FOR SOLUTION INTRAVENOUS at 10:24

## 2022-01-01 RX ADMIN — MUPIROCIN: 20 OINTMENT TOPICAL at 08:12

## 2022-01-01 RX ADMIN — CEFEPIME 2000 MG: 2 INJECTION, POWDER, FOR SOLUTION INTRAMUSCULAR; INTRAVENOUS at 05:27

## 2022-01-01 RX ADMIN — IPRATROPIUM BROMIDE AND ALBUTEROL SULFATE 1 AMPULE: .5; 3 SOLUTION RESPIRATORY (INHALATION) at 12:37

## 2022-01-01 RX ADMIN — ASPIRIN 81 MG 81 MG: 81 TABLET ORAL at 09:30

## 2022-01-01 RX ADMIN — PREDNISONE 40 MG: 20 TABLET ORAL at 08:07

## 2022-01-01 RX ADMIN — ATOVAQUONE 1500 MG: 750 SUSPENSION ORAL at 08:13

## 2022-01-01 RX ADMIN — PREDNISONE 40 MG: 20 TABLET ORAL at 09:06

## 2022-01-01 RX ADMIN — METOPROLOL SUCCINATE 100 MG: 100 TABLET, FILM COATED, EXTENDED RELEASE ORAL at 13:53

## 2022-01-01 RX ADMIN — INSULIN LISPRO 1 UNITS: 100 INJECTION, SOLUTION INTRAVENOUS; SUBCUTANEOUS at 21:32

## 2022-01-01 RX ADMIN — IPRATROPIUM BROMIDE AND ALBUTEROL SULFATE 1 AMPULE: .5; 3 SOLUTION RESPIRATORY (INHALATION) at 09:49

## 2022-01-01 RX ADMIN — ATORVASTATIN CALCIUM 40 MG: 40 TABLET, FILM COATED ORAL at 20:56

## 2022-01-01 RX ADMIN — DILTIAZEM HYDROCHLORIDE 30 MG: 30 TABLET, FILM COATED ORAL at 14:31

## 2022-01-01 RX ADMIN — FLUCONAZOLE 200 MG: 200 TABLET ORAL at 09:33

## 2022-01-01 RX ADMIN — PANTOPRAZOLE SODIUM 40 MG: 40 TABLET, DELAYED RELEASE ORAL at 10:04

## 2022-01-01 RX ADMIN — NYSTATIN 500000 UNITS: 100000 SUSPENSION ORAL at 09:33

## 2022-01-01 RX ADMIN — LEVALBUTEROL HYDROCHLORIDE 1.25 MG: 1.25 SOLUTION RESPIRATORY (INHALATION) at 09:28

## 2022-01-01 RX ADMIN — TRAZODONE HYDROCHLORIDE 50 MG: 50 TABLET ORAL at 20:03

## 2022-01-01 RX ADMIN — CETIRIZINE HYDROCHLORIDE 10 MG: 10 TABLET, FILM COATED ORAL at 08:59

## 2022-01-01 RX ADMIN — CETIRIZINE HYDROCHLORIDE 10 MG: 10 TABLET, FILM COATED ORAL at 09:43

## 2022-01-01 RX ADMIN — FUROSEMIDE 20 MG: 20 TABLET ORAL at 09:06

## 2022-01-01 RX ADMIN — PIPERACILLIN AND TAZOBACTAM 3375 MG: 3; .375 INJECTION, POWDER, LYOPHILIZED, FOR SOLUTION INTRAVENOUS at 13:53

## 2022-01-01 RX ADMIN — ATORVASTATIN CALCIUM 40 MG: 40 TABLET, FILM COATED ORAL at 21:15

## 2022-01-01 RX ADMIN — CETIRIZINE HYDROCHLORIDE 10 MG: 10 TABLET, FILM COATED ORAL at 08:29

## 2022-01-01 RX ADMIN — PANTOPRAZOLE SODIUM 40 MG: 40 TABLET, DELAYED RELEASE ORAL at 06:17

## 2022-01-01 RX ADMIN — SODIUM CHLORIDE, PRESERVATIVE FREE 10 ML: 5 INJECTION INTRAVENOUS at 20:58

## 2022-01-01 RX ADMIN — ALBUTEROL SULFATE 2.5 MG: 2.5 SOLUTION RESPIRATORY (INHALATION) at 07:37

## 2022-01-01 RX ADMIN — CETIRIZINE HYDROCHLORIDE 10 MG: 10 TABLET, FILM COATED ORAL at 08:50

## 2022-01-01 RX ADMIN — ATORVASTATIN CALCIUM 40 MG: 40 TABLET, FILM COATED ORAL at 21:31

## 2022-01-01 RX ADMIN — CETIRIZINE HYDROCHLORIDE 10 MG: 10 TABLET, FILM COATED ORAL at 09:32

## 2022-01-01 RX ADMIN — MUPIROCIN: 20 OINTMENT TOPICAL at 21:40

## 2022-01-01 RX ADMIN — FUROSEMIDE 20 MG: 20 TABLET ORAL at 09:30

## 2022-01-01 RX ADMIN — METOPROLOL SUCCINATE 100 MG: 100 TABLET, EXTENDED RELEASE ORAL at 09:08

## 2022-01-01 RX ADMIN — Medication 1 TABLET: at 08:32

## 2022-01-01 RX ADMIN — METOPROLOL SUCCINATE 100 MG: 100 TABLET, EXTENDED RELEASE ORAL at 07:51

## 2022-01-01 RX ADMIN — ATORVASTATIN CALCIUM 40 MG: 40 TABLET, FILM COATED ORAL at 21:19

## 2022-01-01 RX ADMIN — CETIRIZINE HYDROCHLORIDE 10 MG: 10 TABLET, FILM COATED ORAL at 08:12

## 2022-01-01 RX ADMIN — LEVALBUTEROL HYDROCHLORIDE 1.25 MG: 1.25 SOLUTION RESPIRATORY (INHALATION) at 14:03

## 2022-01-01 RX ADMIN — IPRATROPIUM BROMIDE AND ALBUTEROL SULFATE 1 AMPULE: .5; 3 SOLUTION RESPIRATORY (INHALATION) at 15:22

## 2022-01-01 RX ADMIN — DEXAMETHASONE SODIUM PHOSPHATE 10 MG: 10 INJECTION, EMULSION INTRAMUSCULAR; INTRAVENOUS at 12:19

## 2022-01-01 RX ADMIN — ALPRAZOLAM 0.5 MG: 0.5 TABLET ORAL at 21:48

## 2022-01-01 RX ADMIN — SODIUM CHLORIDE: 9 INJECTION, SOLUTION INTRAVENOUS at 12:35

## 2022-01-01 RX ADMIN — IPRATROPIUM BROMIDE AND ALBUTEROL SULFATE 1 AMPULE: .5; 3 SOLUTION RESPIRATORY (INHALATION) at 23:15

## 2022-01-01 RX ADMIN — HYDROCODONE BITARTRATE AND ACETAMINOPHEN 1 TABLET: 5; 325 TABLET ORAL at 21:48

## 2022-01-01 RX ADMIN — ALBUTEROL SULFATE 2.5 MG: 2.5 SOLUTION RESPIRATORY (INHALATION) at 15:52

## 2022-01-01 RX ADMIN — DIGOXIN 125 MCG: 125 TABLET ORAL at 08:20

## 2022-01-01 RX ADMIN — TRAZODONE HYDROCHLORIDE 50 MG: 50 TABLET ORAL at 21:03

## 2022-01-01 RX ADMIN — NYSTATIN: 100000 CREAM TOPICAL at 08:42

## 2022-01-01 RX ADMIN — ATOVAQUONE 1500 MG: 750 SUSPENSION ORAL at 08:25

## 2022-01-01 RX ADMIN — SODIUM CHLORIDE 250 ML: 9 INJECTION, SOLUTION INTRAVENOUS at 22:39

## 2022-01-01 RX ADMIN — NYSTATIN 500000 UNITS: 100000 SUSPENSION ORAL at 20:10

## 2022-01-01 RX ADMIN — CETIRIZINE HYDROCHLORIDE 10 MG: 10 TABLET, FILM COATED ORAL at 08:41

## 2022-01-01 RX ADMIN — PANTOPRAZOLE SODIUM 40 MG: 40 TABLET, DELAYED RELEASE ORAL at 06:08

## 2022-01-01 RX ADMIN — METOPROLOL SUCCINATE 100 MG: 100 TABLET, FILM COATED, EXTENDED RELEASE ORAL at 07:53

## 2022-01-01 RX ADMIN — DILTIAZEM HYDROCHLORIDE 30 MG: 30 TABLET, FILM COATED ORAL at 09:44

## 2022-01-01 RX ADMIN — LEVALBUTEROL HYDROCHLORIDE 1.25 MG: 1.25 SOLUTION RESPIRATORY (INHALATION) at 09:53

## 2022-01-01 RX ADMIN — ATOVAQUONE 1500 MG: 750 SUSPENSION ORAL at 08:28

## 2022-01-01 RX ADMIN — ATORVASTATIN CALCIUM 40 MG: 40 TABLET, FILM COATED ORAL at 21:04

## 2022-01-01 RX ADMIN — SODIUM CHLORIDE, PRESERVATIVE FREE 10 ML: 5 INJECTION INTRAVENOUS at 21:31

## 2022-01-01 RX ADMIN — PIPERACILLIN AND TAZOBACTAM 3375 MG: 3; .375 INJECTION, POWDER, LYOPHILIZED, FOR SOLUTION INTRAVENOUS at 18:41

## 2022-01-01 RX ADMIN — FLUOXETINE 20 MG: 20 CAPSULE ORAL at 08:12

## 2022-01-01 RX ADMIN — PANTOPRAZOLE SODIUM 40 MG: 40 TABLET, DELAYED RELEASE ORAL at 06:16

## 2022-01-01 RX ADMIN — METOPROLOL SUCCINATE 150 MG: 100 TABLET, FILM COATED, EXTENDED RELEASE ORAL at 08:50

## 2022-01-01 RX ADMIN — DILTIAZEM HYDROCHLORIDE 30 MG: 30 TABLET, FILM COATED ORAL at 06:16

## 2022-01-01 RX ADMIN — MUPIROCIN: 20 OINTMENT TOPICAL at 09:01

## 2022-01-01 RX ADMIN — PREDNISONE 30 MG: 20 TABLET ORAL at 08:17

## 2022-01-01 RX ADMIN — FLUCONAZOLE 200 MG: 200 TABLET ORAL at 08:56

## 2022-01-01 RX ADMIN — PREDNISONE 40 MG: 20 TABLET ORAL at 07:52

## 2022-01-01 RX ADMIN — PREDNISONE 30 MG: 20 TABLET ORAL at 08:12

## 2022-01-01 RX ADMIN — AMOXICILLIN AND CLAVULANATE POTASSIUM 1 TABLET: 875; 125 TABLET, FILM COATED ORAL at 20:13

## 2022-01-01 RX ADMIN — ALBUTEROL SULFATE 15 MG/HR: 2.5 SOLUTION RESPIRATORY (INHALATION) at 13:03

## 2022-01-01 RX ADMIN — IPRATROPIUM BROMIDE AND ALBUTEROL SULFATE 1 AMPULE: .5; 3 SOLUTION RESPIRATORY (INHALATION) at 16:49

## 2022-01-01 RX ADMIN — TRAZODONE HYDROCHLORIDE 50 MG: 50 TABLET ORAL at 21:04

## 2022-01-01 RX ADMIN — METOPROLOL SUCCINATE 100 MG: 100 TABLET, EXTENDED RELEASE ORAL at 07:49

## 2022-01-01 RX ADMIN — METOPROLOL SUCCINATE 150 MG: 100 TABLET, FILM COATED, EXTENDED RELEASE ORAL at 08:19

## 2022-01-01 RX ADMIN — NYSTATIN 500000 UNITS: 100000 SUSPENSION ORAL at 21:16

## 2022-01-01 RX ADMIN — HYDROCODONE BITARTRATE AND ACETAMINOPHEN 1 TABLET: 5; 325 TABLET ORAL at 09:31

## 2022-01-01 RX ADMIN — DIGOXIN 125 MCG: 125 TABLET ORAL at 08:36

## 2022-01-01 RX ADMIN — TIOTROPIUM BROMIDE AND OLODATEROL 2 PUFF: 3.124; 2.736 SPRAY, METERED RESPIRATORY (INHALATION) at 09:15

## 2022-01-01 RX ADMIN — DILTIAZEM HYDROCHLORIDE 30 MG: 30 TABLET, FILM COATED ORAL at 21:27

## 2022-01-01 RX ADMIN — ALBUTEROL SULFATE 2.5 MG: 2.5 SOLUTION RESPIRATORY (INHALATION) at 08:18

## 2022-01-01 RX ADMIN — SODIUM CHLORIDE, PRESERVATIVE FREE 10 ML: 5 INJECTION INTRAVENOUS at 21:27

## 2022-01-01 RX ADMIN — NYSTATIN: 100000 CREAM TOPICAL at 09:44

## 2022-01-01 RX ADMIN — METOPROLOL SUCCINATE 150 MG: 100 TABLET, FILM COATED, EXTENDED RELEASE ORAL at 09:06

## 2022-01-01 RX ADMIN — ALPRAZOLAM 0.5 MG: 0.5 TABLET ORAL at 21:30

## 2022-01-01 RX ADMIN — METHYLPREDNISOLONE SODIUM SUCCINATE 40 MG: 40 INJECTION, POWDER, FOR SOLUTION INTRAMUSCULAR; INTRAVENOUS at 08:06

## 2022-01-01 RX ADMIN — INSULIN LISPRO 1 UNITS: 100 INJECTION, SOLUTION INTRAVENOUS; SUBCUTANEOUS at 12:09

## 2022-01-01 RX ADMIN — SODIUM CHLORIDE, PRESERVATIVE FREE 10 ML: 5 INJECTION INTRAVENOUS at 21:22

## 2022-01-01 RX ADMIN — FUROSEMIDE 20 MG: 20 TABLET ORAL at 08:18

## 2022-01-01 RX ADMIN — LEVALBUTEROL HYDROCHLORIDE 1.25 MG: 1.25 SOLUTION RESPIRATORY (INHALATION) at 12:46

## 2022-01-01 RX ADMIN — IPRATROPIUM BROMIDE AND ALBUTEROL SULFATE 1 AMPULE: .5; 3 SOLUTION RESPIRATORY (INHALATION) at 11:59

## 2022-01-01 RX ADMIN — METOPROLOL SUCCINATE 100 MG: 100 TABLET, EXTENDED RELEASE ORAL at 07:59

## 2022-01-01 RX ADMIN — ATORVASTATIN CALCIUM 40 MG: 40 TABLET, FILM COATED ORAL at 21:17

## 2022-01-01 RX ADMIN — CEFEPIME 2000 MG: 2 INJECTION, POWDER, FOR SOLUTION INTRAMUSCULAR; INTRAVENOUS at 06:08

## 2022-01-01 RX ADMIN — DILTIAZEM HYDROCHLORIDE 30 MG: 30 TABLET, FILM COATED ORAL at 21:15

## 2022-01-01 RX ADMIN — SACUBITRIL AND VALSARTAN 0.5 TABLET: 49; 51 TABLET, FILM COATED ORAL at 08:12

## 2022-01-01 RX ADMIN — ATORVASTATIN CALCIUM 40 MG: 40 TABLET, FILM COATED ORAL at 20:09

## 2022-01-01 RX ADMIN — CETIRIZINE HYDROCHLORIDE 10 MG: 10 TABLET, FILM COATED ORAL at 08:33

## 2022-01-01 RX ADMIN — METOPROLOL SUCCINATE 100 MG: 100 TABLET, EXTENDED RELEASE ORAL at 08:50

## 2022-01-01 RX ADMIN — INSULIN LISPRO 1 UNITS: 100 INJECTION, SOLUTION INTRAVENOUS; SUBCUTANEOUS at 21:12

## 2022-01-01 RX ADMIN — DILTIAZEM HYDROCHLORIDE 2.5 MG/HR: 5 INJECTION INTRAVENOUS at 18:01

## 2022-01-01 RX ADMIN — AZITHROMYCIN DIHYDRATE 500 MG: 500 INJECTION, POWDER, LYOPHILIZED, FOR SOLUTION INTRAVENOUS at 16:25

## 2022-01-01 RX ADMIN — TRAZODONE HYDROCHLORIDE 50 MG: 50 TABLET ORAL at 20:46

## 2022-01-01 RX ADMIN — NYSTATIN 500000 UNITS: 100000 SUSPENSION ORAL at 17:33

## 2022-01-01 RX ADMIN — IPRATROPIUM BROMIDE AND ALBUTEROL SULFATE 1 AMPULE: .5; 3 SOLUTION RESPIRATORY (INHALATION) at 21:39

## 2022-01-01 RX ADMIN — Medication 400 MG: at 20:46

## 2022-01-01 RX ADMIN — FLUCONAZOLE IN SODIUM CHLORIDE 200 MG: 2 INJECTION, SOLUTION INTRAVENOUS at 12:47

## 2022-01-01 RX ADMIN — SACUBITRIL AND VALSARTAN 1 TABLET: 24; 26 TABLET, FILM COATED ORAL at 20:39

## 2022-01-01 RX ADMIN — MAGNESIUM SULFATE HEPTAHYDRATE 1000 MG: 1 INJECTION, SOLUTION INTRAVENOUS at 14:45

## 2022-01-01 RX ADMIN — MUPIROCIN: 20 OINTMENT TOPICAL at 21:36

## 2022-01-01 RX ADMIN — SODIUM CHLORIDE, PRESERVATIVE FREE 10 ML: 5 INJECTION INTRAVENOUS at 08:05

## 2022-01-01 RX ADMIN — LEVALBUTEROL HYDROCHLORIDE 1.25 MG: 1.25 SOLUTION RESPIRATORY (INHALATION) at 07:50

## 2022-01-01 RX ADMIN — METOPROLOL SUCCINATE 100 MG: 100 TABLET, FILM COATED, EXTENDED RELEASE ORAL at 19:56

## 2022-01-01 RX ADMIN — IPRATROPIUM BROMIDE AND ALBUTEROL SULFATE 1 AMPULE: .5; 3 SOLUTION RESPIRATORY (INHALATION) at 22:28

## 2022-01-01 RX ADMIN — MUPIROCIN: 20 OINTMENT TOPICAL at 20:58

## 2022-01-01 RX ADMIN — DILTIAZEM HYDROCHLORIDE 30 MG: 30 TABLET, FILM COATED ORAL at 05:34

## 2022-01-01 RX ADMIN — DILTIAZEM HYDROCHLORIDE 30 MG: 30 TABLET, FILM COATED ORAL at 16:10

## 2022-01-01 RX ADMIN — DIGOXIN 125 MCG: 125 TABLET ORAL at 09:05

## 2022-01-01 RX ADMIN — DILTIAZEM HYDROCHLORIDE 30 MG: 30 TABLET, FILM COATED ORAL at 06:03

## 2022-01-01 RX ADMIN — PANTOPRAZOLE SODIUM 40 MG: 40 TABLET, DELAYED RELEASE ORAL at 06:42

## 2022-01-01 RX ADMIN — AZITHROMYCIN DIHYDRATE 500 MG: 500 INJECTION, POWDER, LYOPHILIZED, FOR SOLUTION INTRAVENOUS at 15:45

## 2022-01-01 RX ADMIN — AZITHROMYCIN MONOHYDRATE 500 MG: 500 INJECTION, POWDER, LYOPHILIZED, FOR SOLUTION INTRAVENOUS at 15:24

## 2022-01-01 RX ADMIN — PREDNISONE 40 MG: 20 TABLET ORAL at 08:30

## 2022-01-01 RX ADMIN — FUROSEMIDE 20 MG: 10 INJECTION, SOLUTION INTRAMUSCULAR; INTRAVENOUS at 08:54

## 2022-01-01 RX ADMIN — METOPROLOL SUCCINATE 100 MG: 100 TABLET, FILM COATED, EXTENDED RELEASE ORAL at 09:33

## 2022-01-01 RX ADMIN — SODIUM CHLORIDE: 9 INJECTION, SOLUTION INTRAVENOUS at 21:46

## 2022-01-01 RX ADMIN — NYSTATIN: 100000 CREAM TOPICAL at 08:34

## 2022-01-01 RX ADMIN — METOPROLOL TARTRATE 5 MG: 1 INJECTION, SOLUTION INTRAVENOUS at 01:24

## 2022-01-01 RX ADMIN — PREDNISONE 40 MG: 20 TABLET ORAL at 07:49

## 2022-01-01 RX ADMIN — RIVAROXABAN 20 MG: 20 TABLET, FILM COATED ORAL at 08:00

## 2022-01-01 RX ADMIN — SODIUM CHLORIDE: 9 INJECTION, SOLUTION INTRAVENOUS at 03:35

## 2022-01-01 RX ADMIN — METOPROLOL TARTRATE 5 MG: 5 INJECTION, SOLUTION INTRAVENOUS at 14:41

## 2022-01-01 RX ADMIN — SACUBITRIL AND VALSARTAN 1 TABLET: 24; 26 TABLET, FILM COATED ORAL at 07:51

## 2022-01-01 RX ADMIN — MUPIROCIN: 20 OINTMENT TOPICAL at 08:06

## 2022-01-01 RX ADMIN — TRAZODONE HYDROCHLORIDE 50 MG: 50 TABLET ORAL at 20:39

## 2022-01-01 RX ADMIN — ENOXAPARIN SODIUM 70 MG: 100 INJECTION SUBCUTANEOUS at 16:34

## 2022-01-01 RX ADMIN — NYSTATIN: 100000 CREAM TOPICAL at 21:32

## 2022-01-01 RX ADMIN — FUROSEMIDE 20 MG: 10 INJECTION, SOLUTION INTRAMUSCULAR; INTRAVENOUS at 16:34

## 2022-01-01 RX ADMIN — DILTIAZEM HYDROCHLORIDE 30 MG: 30 TABLET, FILM COATED ORAL at 20:58

## 2022-01-01 RX ADMIN — PREDNISONE 40 MG: 20 TABLET ORAL at 09:08

## 2022-01-01 RX ADMIN — MUPIROCIN: 20 OINTMENT TOPICAL at 13:47

## 2022-01-01 RX ADMIN — TIOTROPIUM BROMIDE AND OLODATEROL 2 PUFF: 3.124; 2.736 SPRAY, METERED RESPIRATORY (INHALATION) at 08:40

## 2022-01-01 RX ADMIN — TRAZODONE HYDROCHLORIDE 50 MG: 50 TABLET ORAL at 20:32

## 2022-01-01 RX ADMIN — IPRATROPIUM BROMIDE AND ALBUTEROL SULFATE 1 AMPULE: .5; 3 SOLUTION RESPIRATORY (INHALATION) at 12:38

## 2022-01-01 RX ADMIN — FUROSEMIDE 20 MG: 20 TABLET ORAL at 08:19

## 2022-01-01 RX ADMIN — SODIUM CHLORIDE, PRESERVATIVE FREE 10 ML: 5 INJECTION INTRAVENOUS at 09:02

## 2022-01-01 RX ADMIN — NYSTATIN: 100000 CREAM TOPICAL at 21:39

## 2022-01-01 RX ADMIN — ATORVASTATIN CALCIUM 40 MG: 40 TABLET, FILM COATED ORAL at 21:03

## 2022-01-01 RX ADMIN — MUPIROCIN: 20 OINTMENT TOPICAL at 13:59

## 2022-01-01 RX ADMIN — HYDROMORPHONE HYDROCHLORIDE 0.5 MG: 1 INJECTION, SOLUTION INTRAMUSCULAR; INTRAVENOUS; SUBCUTANEOUS at 14:25

## 2022-01-01 RX ADMIN — TRAZODONE HYDROCHLORIDE 50 MG: 50 TABLET ORAL at 21:27

## 2022-01-01 RX ADMIN — SODIUM CHLORIDE 250 ML: 9 INJECTION, SOLUTION INTRAVENOUS at 08:58

## 2022-01-01 RX ADMIN — ASPIRIN 81 MG 81 MG: 81 TABLET ORAL at 08:06

## 2022-01-01 RX ADMIN — PANTOPRAZOLE SODIUM 40 MG: 40 TABLET, DELAYED RELEASE ORAL at 06:13

## 2022-01-01 RX ADMIN — MUPIROCIN: 20 OINTMENT TOPICAL at 22:28

## 2022-01-01 RX ADMIN — PANTOPRAZOLE SODIUM 40 MG: 40 TABLET, DELAYED RELEASE ORAL at 06:06

## 2022-01-01 RX ADMIN — SODIUM CHLORIDE, PRESERVATIVE FREE 10 ML: 5 INJECTION INTRAVENOUS at 20:33

## 2022-01-01 RX ADMIN — RIVAROXABAN 15 MG: 15 TABLET, FILM COATED ORAL at 17:20

## 2022-01-01 RX ADMIN — PIPERACILLIN AND TAZOBACTAM 3375 MG: 3; .375 INJECTION, POWDER, LYOPHILIZED, FOR SOLUTION INTRAVENOUS at 03:14

## 2022-01-01 RX ADMIN — PREDNISONE 40 MG: 20 TABLET ORAL at 09:43

## 2022-01-01 RX ADMIN — ACETAMINOPHEN 650 MG: 325 TABLET ORAL at 17:14

## 2022-01-01 RX ADMIN — HYDROCODONE BITARTRATE AND ACETAMINOPHEN 1 TABLET: 5; 325 TABLET ORAL at 03:48

## 2022-01-01 RX ADMIN — ATOVAQUONE 1500 MG: 750 SUSPENSION ORAL at 17:42

## 2022-01-01 RX ADMIN — IPRATROPIUM BROMIDE AND ALBUTEROL SULFATE 1 AMPULE: .5; 3 SOLUTION RESPIRATORY (INHALATION) at 08:55

## 2022-01-01 RX ADMIN — CEFTRIAXONE SODIUM 1000 MG: 1 INJECTION, POWDER, FOR SOLUTION INTRAMUSCULAR; INTRAVENOUS at 23:33

## 2022-01-01 RX ADMIN — Medication 2000 MG: at 10:21

## 2022-01-01 RX ADMIN — HEPARIN SODIUM 5000 UNITS: 5000 INJECTION INTRAVENOUS; SUBCUTANEOUS at 21:22

## 2022-01-01 RX ADMIN — DILTIAZEM HYDROCHLORIDE 30 MG: 30 TABLET, FILM COATED ORAL at 14:57

## 2022-01-01 RX ADMIN — AMOXICILLIN AND CLAVULANATE POTASSIUM 1 TABLET: 875; 125 TABLET, FILM COATED ORAL at 20:39

## 2022-01-01 RX ADMIN — FUROSEMIDE 20 MG: 20 TABLET ORAL at 08:30

## 2022-01-01 RX ADMIN — DILTIAZEM HYDROCHLORIDE 30 MG: 30 TABLET, FILM COATED ORAL at 21:03

## 2022-01-01 RX ADMIN — Medication 400 MG: at 20:50

## 2022-01-01 RX ADMIN — ENOXAPARIN SODIUM 70 MG: 100 INJECTION SUBCUTANEOUS at 13:04

## 2022-01-01 RX ADMIN — ASPIRIN 81 MG 81 MG: 81 TABLET ORAL at 09:44

## 2022-01-01 RX ADMIN — METOPROLOL SUCCINATE 100 MG: 100 TABLET, FILM COATED, EXTENDED RELEASE ORAL at 08:57

## 2022-01-01 RX ADMIN — NYSTATIN 500000 UNITS: 100000 SUSPENSION ORAL at 10:26

## 2022-01-01 RX ADMIN — SODIUM CHLORIDE, PRESERVATIVE FREE 10 ML: 5 INJECTION INTRAVENOUS at 21:48

## 2022-01-01 RX ADMIN — METOPROLOL SUCCINATE 100 MG: 100 TABLET, FILM COATED, EXTENDED RELEASE ORAL at 10:26

## 2022-01-01 RX ADMIN — PANTOPRAZOLE SODIUM 40 MG: 40 TABLET, DELAYED RELEASE ORAL at 06:29

## 2022-01-01 RX ADMIN — METOPROLOL SUCCINATE 100 MG: 100 TABLET, FILM COATED, EXTENDED RELEASE ORAL at 08:42

## 2022-01-01 RX ADMIN — ROCURONIUM BROMIDE 20 MG: 50 INJECTION, SOLUTION INTRAVENOUS at 13:11

## 2022-01-01 RX ADMIN — IPRATROPIUM BROMIDE AND ALBUTEROL SULFATE 1 AMPULE: .5; 3 SOLUTION RESPIRATORY (INHALATION) at 12:18

## 2022-01-01 RX ADMIN — ATORVASTATIN CALCIUM 40 MG: 40 TABLET, FILM COATED ORAL at 19:56

## 2022-01-01 RX ADMIN — LEVALBUTEROL HYDROCHLORIDE 1.25 MG: 1.25 SOLUTION RESPIRATORY (INHALATION) at 14:20

## 2022-01-01 RX ADMIN — ALBUTEROL SULFATE 2.5 MG: 2.5 SOLUTION RESPIRATORY (INHALATION) at 07:41

## 2022-01-01 RX ADMIN — ATOVAQUONE 1500 MG: 750 SUSPENSION ORAL at 09:34

## 2022-01-01 RX ADMIN — HEPARIN SODIUM 5000 UNITS: 5000 INJECTION INTRAVENOUS; SUBCUTANEOUS at 13:29

## 2022-01-01 RX ADMIN — CETIRIZINE HYDROCHLORIDE 10 MG: 10 TABLET, FILM COATED ORAL at 08:19

## 2022-01-01 RX ADMIN — DILTIAZEM HYDROCHLORIDE 30 MG: 30 TABLET, FILM COATED ORAL at 20:40

## 2022-01-01 RX ADMIN — NYSTATIN 500000 UNITS: 100000 SUSPENSION ORAL at 18:05

## 2022-01-01 RX ADMIN — PREDNISONE 30 MG: 20 TABLET ORAL at 08:56

## 2022-01-01 RX ADMIN — SODIUM CHLORIDE, PRESERVATIVE FREE 10 ML: 5 INJECTION INTRAVENOUS at 10:09

## 2022-01-01 RX ADMIN — PANTOPRAZOLE SODIUM 40 MG: 40 TABLET, DELAYED RELEASE ORAL at 11:13

## 2022-01-01 RX ADMIN — INSULIN LISPRO 1 UNITS: 100 INJECTION, SOLUTION INTRAVENOUS; SUBCUTANEOUS at 17:19

## 2022-01-01 RX ADMIN — ASPIRIN 81 MG 81 MG: 81 TABLET ORAL at 08:49

## 2022-01-01 RX ADMIN — ATOVAQUONE 1500 MG: 750 SUSPENSION ORAL at 09:43

## 2022-01-01 RX ADMIN — SODIUM ZIRCONIUM CYCLOSILICATE 5 G: 5 POWDER, FOR SUSPENSION ORAL at 12:25

## 2022-01-01 RX ADMIN — SODIUM CHLORIDE, PRESERVATIVE FREE 10 ML: 5 INJECTION INTRAVENOUS at 20:11

## 2022-01-01 RX ADMIN — CETIRIZINE HYDROCHLORIDE 10 MG: 10 TABLET, FILM COATED ORAL at 09:30

## 2022-01-01 RX ADMIN — DIGOXIN 125 MCG: 125 TABLET ORAL at 09:44

## 2022-01-01 RX ADMIN — LEVALBUTEROL HYDROCHLORIDE 1.25 MG: 1.25 SOLUTION RESPIRATORY (INHALATION) at 13:07

## 2022-01-01 RX ADMIN — NYSTATIN: 100000 CREAM TOPICAL at 21:36

## 2022-01-01 RX ADMIN — SODIUM CHLORIDE, PRESERVATIVE FREE 10 ML: 5 INJECTION INTRAVENOUS at 20:50

## 2022-01-01 RX ADMIN — ATOVAQUONE 1500 MG: 750 SUSPENSION ORAL at 07:49

## 2022-01-01 RX ADMIN — NYSTATIN: 100000 CREAM TOPICAL at 08:18

## 2022-01-01 RX ADMIN — ATORVASTATIN CALCIUM 40 MG: 40 TABLET, FILM COATED ORAL at 20:39

## 2022-01-01 RX ADMIN — Medication 400 MG: at 09:06

## 2022-01-01 RX ADMIN — SODIUM CHLORIDE: 9 INJECTION, SOLUTION INTRAVENOUS at 12:46

## 2022-01-01 RX ADMIN — ATOVAQUONE 1500 MG: 750 SUSPENSION ORAL at 12:30

## 2022-01-01 RX ADMIN — ALBUTEROL SULFATE 2.5 MG: 2.5 SOLUTION RESPIRATORY (INHALATION) at 16:12

## 2022-01-01 RX ADMIN — ATORVASTATIN CALCIUM 40 MG: 40 TABLET, FILM COATED ORAL at 20:16

## 2022-01-01 RX ADMIN — DILTIAZEM HYDROCHLORIDE 30 MG: 30 TABLET, FILM COATED ORAL at 08:06

## 2022-01-01 RX ADMIN — LEVALBUTEROL HYDROCHLORIDE 1.25 MG: 1.25 SOLUTION RESPIRATORY (INHALATION) at 18:29

## 2022-01-01 RX ADMIN — DIGOXIN 125 MCG: 125 TABLET ORAL at 09:02

## 2022-01-01 RX ADMIN — SACUBITRIL AND VALSARTAN 1 TABLET: 24; 26 TABLET, FILM COATED ORAL at 09:06

## 2022-01-01 RX ADMIN — ATORVASTATIN CALCIUM 40 MG: 40 TABLET, FILM COATED ORAL at 21:26

## 2022-01-01 RX ADMIN — ROPINIROLE HYDROCHLORIDE 0.5 MG: 0.5 TABLET, FILM COATED ORAL at 20:39

## 2022-01-01 RX ADMIN — ATORVASTATIN CALCIUM 40 MG: 40 TABLET, FILM COATED ORAL at 21:12

## 2022-01-01 RX ADMIN — TRAZODONE HYDROCHLORIDE 50 MG: 50 TABLET ORAL at 21:30

## 2022-01-01 RX ADMIN — FLUOXETINE HYDROCHLORIDE 10 MG: 10 CAPSULE ORAL at 07:53

## 2022-01-01 RX ADMIN — PIPERACILLIN AND TAZOBACTAM 3375 MG: 3; .375 INJECTION, POWDER, LYOPHILIZED, FOR SOLUTION INTRAVENOUS at 11:09

## 2022-01-01 RX ADMIN — PREDNISONE 40 MG: 20 TABLET ORAL at 11:08

## 2022-01-01 RX ADMIN — DIGOXIN 125 MCG: 125 TABLET ORAL at 08:50

## 2022-01-01 RX ADMIN — NYSTATIN: 100000 CREAM TOPICAL at 08:56

## 2022-01-01 RX ADMIN — FENTANYL CITRATE 50 MCG: 50 INJECTION, SOLUTION INTRAMUSCULAR; INTRAVENOUS at 13:08

## 2022-01-01 RX ADMIN — INSULIN LISPRO 1 UNITS: 100 INJECTION, SOLUTION INTRAVENOUS; SUBCUTANEOUS at 20:53

## 2022-01-01 RX ADMIN — Medication 5 MCG/MIN: at 14:15

## 2022-01-01 RX ADMIN — MUPIROCIN: 20 OINTMENT TOPICAL at 21:33

## 2022-01-01 RX ADMIN — PIPERACILLIN AND TAZOBACTAM 3375 MG: 3; .375 INJECTION, POWDER, LYOPHILIZED, FOR SOLUTION INTRAVENOUS at 20:16

## 2022-01-01 RX ADMIN — PIPERACILLIN AND TAZOBACTAM 3375 MG: 3; .375 INJECTION, POWDER, LYOPHILIZED, FOR SOLUTION INTRAVENOUS at 19:31

## 2022-01-01 RX ADMIN — CEFTRIAXONE SODIUM 1000 MG: 1 INJECTION, POWDER, FOR SOLUTION INTRAMUSCULAR; INTRAVENOUS at 23:17

## 2022-01-01 RX ADMIN — FLUCONAZOLE 200 MG: 200 TABLET ORAL at 10:04

## 2022-01-01 RX ADMIN — HEPARIN SODIUM 5000 UNITS: 5000 INJECTION INTRAVENOUS; SUBCUTANEOUS at 13:54

## 2022-01-01 RX ADMIN — IPRATROPIUM BROMIDE AND ALBUTEROL SULFATE 1 AMPULE: .5; 3 SOLUTION RESPIRATORY (INHALATION) at 20:47

## 2022-01-01 RX ADMIN — METOPROLOL SUCCINATE 100 MG: 100 TABLET, EXTENDED RELEASE ORAL at 08:33

## 2022-01-01 RX ADMIN — SODIUM CHLORIDE: 9 INJECTION, SOLUTION INTRAVENOUS at 17:35

## 2022-01-01 RX ADMIN — IPRATROPIUM BROMIDE AND ALBUTEROL SULFATE 1 AMPULE: .5; 3 SOLUTION RESPIRATORY (INHALATION) at 21:56

## 2022-01-01 RX ADMIN — MUPIROCIN: 20 OINTMENT TOPICAL at 08:42

## 2022-01-01 RX ADMIN — PREDNISONE 40 MG: 20 TABLET ORAL at 08:19

## 2022-01-01 RX ADMIN — IPRATROPIUM BROMIDE AND ALBUTEROL SULFATE 1 AMPULE: .5; 3 SOLUTION RESPIRATORY (INHALATION) at 08:32

## 2022-01-01 RX ADMIN — Medication 400 MG: at 20:09

## 2022-01-01 RX ADMIN — CEFTRIAXONE SODIUM 1000 MG: 1 INJECTION, POWDER, FOR SOLUTION INTRAMUSCULAR; INTRAVENOUS at 11:57

## 2022-01-01 RX ADMIN — ATOVAQUONE 1500 MG: 750 SUSPENSION ORAL at 08:15

## 2022-01-01 RX ADMIN — DIGOXIN 125 MCG: 125 TABLET ORAL at 09:32

## 2022-01-01 RX ADMIN — SACUBITRIL AND VALSARTAN 1 TABLET: 24; 26 TABLET, FILM COATED ORAL at 08:33

## 2022-01-01 RX ADMIN — ATORVASTATIN CALCIUM 40 MG: 40 TABLET, FILM COATED ORAL at 20:32

## 2022-01-01 RX ADMIN — TRAZODONE HYDROCHLORIDE 50 MG: 50 TABLET ORAL at 20:33

## 2022-01-01 RX ADMIN — NYSTATIN 500000 UNITS: 100000 SUSPENSION ORAL at 19:57

## 2022-01-01 RX ADMIN — CEFTRIAXONE SODIUM 1000 MG: 1 INJECTION, POWDER, FOR SOLUTION INTRAMUSCULAR; INTRAVENOUS at 23:49

## 2022-01-01 RX ADMIN — FLUCONAZOLE 200 MG: 200 TABLET ORAL at 09:18

## 2022-01-01 RX ADMIN — ROPINIROLE HYDROCHLORIDE 0.5 MG: 0.5 TABLET, FILM COATED ORAL at 21:05

## 2022-01-01 RX ADMIN — MUPIROCIN: 20 OINTMENT TOPICAL at 21:04

## 2022-01-01 RX ADMIN — ASPIRIN 81 MG CHEWABLE TABLET 81 MG: 81 TABLET CHEWABLE at 08:30

## 2022-01-01 RX ADMIN — IPRATROPIUM BROMIDE 1 MG: 0.5 SOLUTION RESPIRATORY (INHALATION) at 13:04

## 2022-01-01 RX ADMIN — DILTIAZEM HYDROCHLORIDE 30 MG: 30 TABLET, FILM COATED ORAL at 14:01

## 2022-01-01 RX ADMIN — CEFEPIME 2000 MG: 2 INJECTION, POWDER, FOR SOLUTION INTRAMUSCULAR; INTRAVENOUS at 18:03

## 2022-01-01 RX ADMIN — SODIUM CHLORIDE: 9 INJECTION, SOLUTION INTRAVENOUS at 16:36

## 2022-01-01 RX ADMIN — DILTIAZEM HYDROCHLORIDE 30 MG: 30 TABLET, FILM COATED ORAL at 13:49

## 2022-01-01 RX ADMIN — PANTOPRAZOLE SODIUM 40 MG: 40 TABLET, DELAYED RELEASE ORAL at 06:37

## 2022-01-01 RX ADMIN — LEVALBUTEROL HYDROCHLORIDE 1.25 MG: 1.25 SOLUTION RESPIRATORY (INHALATION) at 19:58

## 2022-01-01 RX ADMIN — INSULIN LISPRO 1 UNITS: 100 INJECTION, SOLUTION INTRAVENOUS; SUBCUTANEOUS at 12:30

## 2022-01-01 RX ADMIN — ATOVAQUONE 1500 MG: 750 SUSPENSION ORAL at 15:08

## 2022-01-01 RX ADMIN — ATORVASTATIN CALCIUM 40 MG: 40 TABLET, FILM COATED ORAL at 20:03

## 2022-01-01 RX ADMIN — NYSTATIN: 100000 CREAM TOPICAL at 22:28

## 2022-01-01 RX ADMIN — ALPRAZOLAM 0.5 MG: 0.5 TABLET ORAL at 21:16

## 2022-01-01 RX ADMIN — Medication 400 MG: at 20:39

## 2022-01-01 RX ADMIN — FUROSEMIDE 80 MG: 10 INJECTION, SOLUTION INTRAMUSCULAR; INTRAVENOUS at 09:08

## 2022-01-01 RX ADMIN — FUROSEMIDE 40 MG: 10 INJECTION, SOLUTION INTRAMUSCULAR; INTRAVENOUS at 08:14

## 2022-01-01 RX ADMIN — METOPROLOL TARTRATE 2.5 MG: 5 INJECTION INTRAVENOUS at 16:17

## 2022-01-01 RX ADMIN — METOPROLOL SUCCINATE 100 MG: 100 TABLET, EXTENDED RELEASE ORAL at 08:30

## 2022-01-01 RX ADMIN — INSULIN LISPRO 1 UNITS: 100 INJECTION, SOLUTION INTRAVENOUS; SUBCUTANEOUS at 17:40

## 2022-01-01 RX ADMIN — PIPERACILLIN AND TAZOBACTAM 3375 MG: 3; .375 INJECTION, POWDER, LYOPHILIZED, FOR SOLUTION INTRAVENOUS at 03:11

## 2022-01-01 RX ADMIN — HEPARIN SODIUM 5000 UNITS: 5000 INJECTION INTRAVENOUS; SUBCUTANEOUS at 21:19

## 2022-01-01 RX ADMIN — TIOTROPIUM BROMIDE AND OLODATEROL 2 PUFF: 3.124; 2.736 SPRAY, METERED RESPIRATORY (INHALATION) at 09:59

## 2022-01-01 RX ADMIN — INSULIN LISPRO 1 UNITS: 100 INJECTION, SOLUTION INTRAVENOUS; SUBCUTANEOUS at 21:05

## 2022-01-01 RX ADMIN — PREDNISONE 40 MG: 20 TABLET ORAL at 08:10

## 2022-01-01 RX ADMIN — METOPROLOL SUCCINATE 100 MG: 100 TABLET, FILM COATED, EXTENDED RELEASE ORAL at 08:17

## 2022-01-01 RX ADMIN — SODIUM CHLORIDE, PRESERVATIVE FREE 10 ML: 5 INJECTION INTRAVENOUS at 20:07

## 2022-01-01 RX ADMIN — SODIUM CHLORIDE, PRESERVATIVE FREE 10 ML: 5 INJECTION INTRAVENOUS at 20:04

## 2022-01-01 RX ADMIN — PREDNISONE 40 MG: 20 TABLET ORAL at 10:04

## 2022-01-01 RX ADMIN — IPRATROPIUM BROMIDE AND ALBUTEROL SULFATE 1 AMPULE: .5; 3 SOLUTION RESPIRATORY (INHALATION) at 16:13

## 2022-01-01 RX ADMIN — SODIUM CHLORIDE, PRESERVATIVE FREE 10 ML: 5 INJECTION INTRAVENOUS at 20:09

## 2022-01-01 RX ADMIN — MUPIROCIN: 20 OINTMENT TOPICAL at 09:14

## 2022-01-01 RX ADMIN — Medication 1 TABLET: at 08:18

## 2022-01-01 RX ADMIN — TRAZODONE HYDROCHLORIDE 50 MG: 50 TABLET ORAL at 20:50

## 2022-01-01 RX ADMIN — TRAZODONE HYDROCHLORIDE 50 MG: 50 TABLET ORAL at 20:48

## 2022-01-01 RX ADMIN — SACUBITRIL AND VALSARTAN 1 TABLET: 24; 26 TABLET, FILM COATED ORAL at 21:27

## 2022-01-01 RX ADMIN — PANTOPRAZOLE SODIUM 40 MG: 40 TABLET, DELAYED RELEASE ORAL at 07:16

## 2022-01-01 RX ADMIN — IPRATROPIUM BROMIDE AND ALBUTEROL SULFATE 1 AMPULE: .5; 3 SOLUTION RESPIRATORY (INHALATION) at 11:54

## 2022-01-01 RX ADMIN — ASPIRIN 81 MG CHEWABLE TABLET 81 MG: 81 TABLET CHEWABLE at 16:34

## 2022-01-01 RX ADMIN — CETIRIZINE HYDROCHLORIDE 10 MG: 10 TABLET, FILM COATED ORAL at 09:02

## 2022-01-01 RX ADMIN — RIVAROXABAN 20 MG: 20 TABLET, FILM COATED ORAL at 08:33

## 2022-01-01 RX ADMIN — SODIUM CHLORIDE, PRESERVATIVE FREE 10 ML: 5 INJECTION INTRAVENOUS at 08:28

## 2022-01-01 RX ADMIN — SODIUM CHLORIDE, PRESERVATIVE FREE 10 ML: 5 INJECTION INTRAVENOUS at 07:52

## 2022-01-01 RX ADMIN — INSULIN LISPRO 1 UNITS: 100 INJECTION, SOLUTION INTRAVENOUS; SUBCUTANEOUS at 20:42

## 2022-01-01 RX ADMIN — FUROSEMIDE 20 MG: 20 TABLET ORAL at 08:33

## 2022-01-01 RX ADMIN — PIPERACILLIN AND TAZOBACTAM 3375 MG: 3; .375 INJECTION, POWDER, LYOPHILIZED, FOR SOLUTION INTRAVENOUS at 18:53

## 2022-01-01 RX ADMIN — FUROSEMIDE 20 MG: 10 INJECTION, SOLUTION INTRAMUSCULAR; INTRAVENOUS at 14:26

## 2022-01-01 RX ADMIN — Medication 1 TABLET: at 08:50

## 2022-01-01 RX ADMIN — FUROSEMIDE 40 MG: 10 INJECTION, SOLUTION INTRAMUSCULAR; INTRAVENOUS at 17:50

## 2022-01-01 RX ADMIN — RIVAROXABAN 15 MG: 15 TABLET, FILM COATED ORAL at 16:10

## 2022-01-01 RX ADMIN — PANTOPRAZOLE SODIUM 40 MG: 40 TABLET, DELAYED RELEASE ORAL at 06:46

## 2022-01-01 RX ADMIN — PANTOPRAZOLE SODIUM 40 MG: 40 TABLET, DELAYED RELEASE ORAL at 05:28

## 2022-01-01 RX ADMIN — HEPARIN SODIUM 5000 UNITS: 5000 INJECTION INTRAVENOUS; SUBCUTANEOUS at 13:48

## 2022-01-01 RX ADMIN — DILTIAZEM HYDROCHLORIDE 30 MG: 30 TABLET, FILM COATED ORAL at 13:34

## 2022-01-01 RX ADMIN — IPRATROPIUM BROMIDE AND ALBUTEROL SULFATE 1 AMPULE: .5; 3 SOLUTION RESPIRATORY (INHALATION) at 06:14

## 2022-01-01 RX ADMIN — MUPIROCIN: 20 OINTMENT TOPICAL at 11:45

## 2022-01-01 RX ADMIN — SODIUM CHLORIDE, PRESERVATIVE FREE 10 ML: 5 INJECTION INTRAVENOUS at 21:05

## 2022-01-01 RX ADMIN — DILTIAZEM HYDROCHLORIDE 30 MG: 30 TABLET, FILM COATED ORAL at 06:21

## 2022-01-01 RX ADMIN — SODIUM CHLORIDE, PRESERVATIVE FREE 10 ML: 5 INJECTION INTRAVENOUS at 07:53

## 2022-01-01 RX ADMIN — AMOXICILLIN AND CLAVULANATE POTASSIUM 1 TABLET: 875; 125 TABLET, FILM COATED ORAL at 21:26

## 2022-01-01 RX ADMIN — MUPIROCIN: 20 OINTMENT TOPICAL at 13:28

## 2022-01-01 RX ADMIN — SODIUM CHLORIDE: 9 INJECTION, SOLUTION INTRAVENOUS at 20:04

## 2022-01-01 RX ADMIN — SODIUM CHLORIDE, PRESERVATIVE FREE 10 ML: 5 INJECTION INTRAVENOUS at 09:08

## 2022-01-01 RX ADMIN — TRAZODONE HYDROCHLORIDE 50 MG: 50 TABLET ORAL at 20:06

## 2022-01-01 RX ADMIN — MUPIROCIN: 20 OINTMENT TOPICAL at 20:05

## 2022-01-01 RX ADMIN — NYSTATIN 500000 UNITS: 100000 SUSPENSION ORAL at 21:12

## 2022-01-01 RX ADMIN — ATOVAQUONE 1500 MG: 750 SUSPENSION ORAL at 08:45

## 2022-01-01 RX ADMIN — DIGOXIN 125 MCG: 125 TABLET ORAL at 08:12

## 2022-01-01 RX ADMIN — METOPROLOL TARTRATE 5 MG: 1 INJECTION, SOLUTION INTRAVENOUS at 15:11

## 2022-01-01 RX ADMIN — RIVAROXABAN 15 MG: 15 TABLET, FILM COATED ORAL at 17:44

## 2022-01-01 RX ADMIN — ATOVAQUONE 1500 MG: 750 SUSPENSION ORAL at 11:41

## 2022-01-01 RX ADMIN — OXYMETAZOLINE HYDROCHLORIDE 2 SPRAY: 0.05 SPRAY NASAL at 09:15

## 2022-01-01 RX ADMIN — INSULIN LISPRO 1 UNITS: 100 INJECTION, SOLUTION INTRAVENOUS; SUBCUTANEOUS at 17:09

## 2022-01-01 RX ADMIN — PANTOPRAZOLE SODIUM 40 MG: 40 TABLET, DELAYED RELEASE ORAL at 05:34

## 2022-01-01 RX ADMIN — ASPIRIN 81 MG CHEWABLE TABLET 81 MG: 81 TABLET CHEWABLE at 07:52

## 2022-01-01 RX ADMIN — ASPIRIN 81 MG CHEWABLE TABLET 81 MG: 81 TABLET CHEWABLE at 09:08

## 2022-01-01 RX ADMIN — ROCURONIUM BROMIDE 30 MG: 50 INJECTION, SOLUTION INTRAVENOUS at 12:19

## 2022-01-01 RX ADMIN — Medication 400 MG: at 09:08

## 2022-01-01 RX ADMIN — INSULIN LISPRO 1 UNITS: 100 INJECTION, SOLUTION INTRAVENOUS; SUBCUTANEOUS at 21:36

## 2022-01-01 RX ADMIN — PANTOPRAZOLE SODIUM 40 MG: 40 TABLET, DELAYED RELEASE ORAL at 06:31

## 2022-01-01 RX ADMIN — ATOVAQUONE 1500 MG: 750 SUSPENSION ORAL at 08:53

## 2022-01-01 RX ADMIN — Medication 1 TABLET: at 09:02

## 2022-01-01 RX ADMIN — METOPROLOL SUCCINATE 150 MG: 100 TABLET, FILM COATED, EXTENDED RELEASE ORAL at 08:18

## 2022-01-01 RX ADMIN — FLUOXETINE HYDROCHLORIDE 10 MG: 10 CAPSULE ORAL at 08:57

## 2022-01-01 RX ADMIN — IPRATROPIUM BROMIDE AND ALBUTEROL SULFATE 1 AMPULE: .5; 3 SOLUTION RESPIRATORY (INHALATION) at 18:03

## 2022-01-01 RX ADMIN — PANTOPRAZOLE SODIUM 40 MG: 40 TABLET, DELAYED RELEASE ORAL at 06:22

## 2022-01-01 RX ADMIN — CETIRIZINE HYDROCHLORIDE 10 MG: 10 TABLET, FILM COATED ORAL at 07:53

## 2022-01-01 RX ADMIN — FENTANYL CITRATE 50 MCG: 50 INJECTION, SOLUTION INTRAMUSCULAR; INTRAVENOUS at 14:30

## 2022-01-01 RX ADMIN — NYSTATIN: 100000 CREAM TOPICAL at 08:20

## 2022-01-01 RX ADMIN — CEFEPIME 2000 MG: 2 INJECTION, POWDER, FOR SOLUTION INTRAMUSCULAR; INTRAVENOUS at 06:36

## 2022-01-01 RX ADMIN — SODIUM CHLORIDE, PRESERVATIVE FREE 10 ML: 5 INJECTION INTRAVENOUS at 20:32

## 2022-01-01 RX ADMIN — SODIUM CHLORIDE, PRESERVATIVE FREE 10 ML: 5 INJECTION INTRAVENOUS at 09:09

## 2022-01-01 RX ADMIN — FLUOXETINE HYDROCHLORIDE 10 MG: 10 CAPSULE ORAL at 08:29

## 2022-01-01 RX ADMIN — IPRATROPIUM BROMIDE AND ALBUTEROL SULFATE 1 AMPULE: .5; 3 SOLUTION RESPIRATORY (INHALATION) at 09:46

## 2022-01-01 RX ADMIN — IPRATROPIUM BROMIDE AND ALBUTEROL SULFATE 1 AMPULE: .5; 3 SOLUTION RESPIRATORY (INHALATION) at 17:53

## 2022-01-01 RX ADMIN — LEVALBUTEROL HYDROCHLORIDE 1.25 MG: 1.25 SOLUTION RESPIRATORY (INHALATION) at 20:48

## 2022-01-01 RX ADMIN — RIVAROXABAN 15 MG: 15 TABLET, FILM COATED ORAL at 17:33

## 2022-01-01 RX ADMIN — CETIRIZINE HYDROCHLORIDE 10 MG: 10 TABLET, FILM COATED ORAL at 09:06

## 2022-01-01 RX ADMIN — CEFAZOLIN 2000 MG: 1 INJECTION, POWDER, FOR SOLUTION INTRAMUSCULAR; INTRAVENOUS at 13:00

## 2022-01-01 RX ADMIN — MUPIROCIN: 20 OINTMENT TOPICAL at 08:18

## 2022-01-01 RX ADMIN — ASPIRIN 81 MG 81 MG: 81 TABLET ORAL at 08:56

## 2022-01-01 RX ADMIN — Medication 400 MG: at 08:33

## 2022-01-01 RX ADMIN — FUROSEMIDE 40 MG: 10 INJECTION, SOLUTION INTRAMUSCULAR; INTRAVENOUS at 11:06

## 2022-01-01 RX ADMIN — Medication 1 TABLET: at 09:43

## 2022-01-01 RX ADMIN — ASPIRIN 81 MG CHEWABLE TABLET 81 MG: 81 TABLET CHEWABLE at 07:51

## 2022-01-01 RX ADMIN — PANTOPRAZOLE SODIUM 40 MG: 40 TABLET, DELAYED RELEASE ORAL at 08:33

## 2022-01-01 RX ADMIN — ATOVAQUONE 1500 MG: 750 SUSPENSION ORAL at 08:59

## 2022-01-01 RX ADMIN — ASPIRIN 81 MG CHEWABLE TABLET 81 MG: 81 TABLET CHEWABLE at 08:54

## 2022-01-01 RX ADMIN — SODIUM CHLORIDE, PRESERVATIVE FREE 10 ML: 5 INJECTION INTRAVENOUS at 20:20

## 2022-01-01 RX ADMIN — PREDNISONE 40 MG: 20 TABLET ORAL at 15:39

## 2022-01-01 RX ADMIN — MUPIROCIN: 20 OINTMENT TOPICAL at 21:32

## 2022-01-01 RX ADMIN — FUROSEMIDE 20 MG: 20 TABLET ORAL at 08:12

## 2022-01-01 RX ADMIN — ROCURONIUM BROMIDE 20 MG: 50 INJECTION, SOLUTION INTRAVENOUS at 12:52

## 2022-01-01 RX ADMIN — FLUCONAZOLE 200 MG: 200 TABLET ORAL at 08:12

## 2022-01-01 RX ADMIN — IPRATROPIUM BROMIDE AND ALBUTEROL SULFATE 1 AMPULE: .5; 3 SOLUTION RESPIRATORY (INHALATION) at 16:28

## 2022-01-01 RX ADMIN — RIVAROXABAN 20 MG: 20 TABLET, FILM COATED ORAL at 08:50

## 2022-01-01 RX ADMIN — IPRATROPIUM BROMIDE AND ALBUTEROL SULFATE 1 AMPULE: .5; 3 SOLUTION RESPIRATORY (INHALATION) at 12:04

## 2022-01-01 RX ADMIN — MUPIROCIN: 20 OINTMENT TOPICAL at 13:49

## 2022-01-01 RX ADMIN — MUPIROCIN: 20 OINTMENT TOPICAL at 20:59

## 2022-01-01 RX ADMIN — NYSTATIN 500000 UNITS: 100000 SUSPENSION ORAL at 14:22

## 2022-01-01 RX ADMIN — ROPINIROLE HYDROCHLORIDE 0.5 MG: 0.5 TABLET, FILM COATED ORAL at 20:13

## 2022-01-01 RX ADMIN — IPRATROPIUM BROMIDE AND ALBUTEROL SULFATE 1 AMPULE: .5; 3 SOLUTION RESPIRATORY (INHALATION) at 20:31

## 2022-01-01 RX ADMIN — MIDAZOLAM 1 MG: 1 INJECTION INTRAMUSCULAR; INTRAVENOUS at 16:18

## 2022-01-01 RX ADMIN — FUROSEMIDE 40 MG: 40 TABLET ORAL at 11:13

## 2022-01-01 RX ADMIN — WATER 1000 MG: 1 INJECTION INTRAMUSCULAR; INTRAVENOUS; SUBCUTANEOUS at 15:16

## 2022-01-01 RX ADMIN — DIGOXIN 125 MCG: 125 TABLET ORAL at 07:52

## 2022-01-01 RX ADMIN — FLUOXETINE HYDROCHLORIDE 10 MG: 10 CAPSULE ORAL at 09:30

## 2022-01-01 RX ADMIN — INSULIN LISPRO 1 UNITS: 100 INJECTION, SOLUTION INTRAVENOUS; SUBCUTANEOUS at 11:44

## 2022-01-01 RX ADMIN — ROPINIROLE HYDROCHLORIDE 0.5 MG: 0.5 TABLET, FILM COATED ORAL at 20:33

## 2022-01-01 RX ADMIN — ASPIRIN 81 MG CHEWABLE TABLET 81 MG: 81 TABLET CHEWABLE at 07:49

## 2022-01-01 RX ADMIN — FUROSEMIDE 20 MG: 20 TABLET ORAL at 11:08

## 2022-01-01 RX ADMIN — ASPIRIN 81 MG CHEWABLE TABLET 81 MG: 81 TABLET CHEWABLE at 08:50

## 2022-01-01 RX ADMIN — HEPARIN SODIUM 5000 UNITS: 5000 INJECTION INTRAVENOUS; SUBCUTANEOUS at 14:02

## 2022-01-01 RX ADMIN — CEFEPIME 2000 MG: 2 INJECTION, POWDER, FOR SOLUTION INTRAMUSCULAR; INTRAVENOUS at 18:56

## 2022-01-01 RX ADMIN — LEVALBUTEROL HYDROCHLORIDE 1.25 MG: 1.25 SOLUTION RESPIRATORY (INHALATION) at 08:26

## 2022-01-01 RX ADMIN — DILTIAZEM HYDROCHLORIDE 30 MG: 30 TABLET, FILM COATED ORAL at 15:29

## 2022-01-01 RX ADMIN — MUPIROCIN: 20 OINTMENT TOPICAL at 13:25

## 2022-01-01 RX ADMIN — FLUOXETINE HYDROCHLORIDE 10 MG: 10 CAPSULE ORAL at 09:32

## 2022-01-01 RX ADMIN — FUROSEMIDE 40 MG: 10 INJECTION, SOLUTION INTRAMUSCULAR; INTRAVENOUS at 16:09

## 2022-01-01 RX ADMIN — ASPIRIN 81 MG 81 MG: 81 TABLET ORAL at 08:28

## 2022-01-01 RX ADMIN — PREDNISONE 40 MG: 20 TABLET ORAL at 08:32

## 2022-01-01 RX ADMIN — SACUBITRIL AND VALSARTAN 1 TABLET: 24; 26 TABLET, FILM COATED ORAL at 20:50

## 2022-01-01 RX ADMIN — INSULIN LISPRO 1 UNITS: 100 INJECTION, SOLUTION INTRAVENOUS; SUBCUTANEOUS at 11:28

## 2022-01-01 RX ADMIN — SODIUM CHLORIDE 2109 ML: 9 INJECTION, SOLUTION INTRAVENOUS at 13:30

## 2022-01-01 RX ADMIN — FLUCONAZOLE 200 MG: 200 TABLET ORAL at 08:29

## 2022-01-01 RX ADMIN — PANTOPRAZOLE SODIUM 40 MG: 40 TABLET, DELAYED RELEASE ORAL at 06:27

## 2022-01-01 RX ADMIN — ROPINIROLE HYDROCHLORIDE 0.5 MG: 0.5 TABLET, FILM COATED ORAL at 19:56

## 2022-01-01 RX ADMIN — TRAZODONE HYDROCHLORIDE 50 MG: 50 TABLET ORAL at 21:37

## 2022-01-01 RX ADMIN — SACUBITRIL AND VALSARTAN 1 TABLET: 24; 26 TABLET, FILM COATED ORAL at 20:46

## 2022-01-01 RX ADMIN — MUPIROCIN: 20 OINTMENT TOPICAL at 20:50

## 2022-01-01 RX ADMIN — ROPINIROLE HYDROCHLORIDE 0.5 MG: 0.5 TABLET, FILM COATED ORAL at 20:06

## 2022-01-01 RX ADMIN — NYSTATIN: 100000 CREAM TOPICAL at 20:06

## 2022-01-01 RX ADMIN — ALPRAZOLAM 0.5 MG: 0.5 TABLET ORAL at 20:06

## 2022-01-01 RX ADMIN — SODIUM POLYSTYRENE SULFONATE 30 G: 15 SUSPENSION ORAL; RECTAL at 07:49

## 2022-01-01 RX ADMIN — SACUBITRIL AND VALSARTAN 0.5 TABLET: 49; 51 TABLET, FILM COATED ORAL at 20:56

## 2022-01-01 RX ADMIN — NYSTATIN 500000 UNITS: 100000 SUSPENSION ORAL at 17:19

## 2022-01-01 RX ADMIN — ALBUTEROL SULFATE 2.5 MG: 2.5 SOLUTION RESPIRATORY (INHALATION) at 07:52

## 2022-01-01 RX ADMIN — ALPRAZOLAM 0.5 MG: 0.5 TABLET ORAL at 20:43

## 2022-01-01 RX ADMIN — IPRATROPIUM BROMIDE AND ALBUTEROL SULFATE 1 AMPULE: .5; 3 SOLUTION RESPIRATORY (INHALATION) at 08:20

## 2022-01-01 RX ADMIN — CEFEPIME 2000 MG: 2 INJECTION, POWDER, FOR SOLUTION INTRAMUSCULAR; INTRAVENOUS at 17:46

## 2022-01-01 RX ADMIN — MUPIROCIN: 20 OINTMENT TOPICAL at 15:12

## 2022-01-01 RX ADMIN — PIPERACILLIN AND TAZOBACTAM 3375 MG: 3; .375 INJECTION, POWDER, LYOPHILIZED, FOR SOLUTION INTRAVENOUS at 12:33

## 2022-01-01 RX ADMIN — MUPIROCIN: 20 OINTMENT TOPICAL at 08:35

## 2022-01-01 RX ADMIN — HYDROCODONE BITARTRATE AND ACETAMINOPHEN 1 TABLET: 5; 325 TABLET ORAL at 03:09

## 2022-01-01 RX ADMIN — IPRATROPIUM BROMIDE AND ALBUTEROL SULFATE 1 AMPULE: .5; 3 SOLUTION RESPIRATORY (INHALATION) at 05:39

## 2022-01-01 RX ADMIN — LEVALBUTEROL HYDROCHLORIDE 1.25 MG: 1.25 SOLUTION RESPIRATORY (INHALATION) at 20:07

## 2022-01-01 RX ADMIN — ROPINIROLE HYDROCHLORIDE 0.5 MG: 0.5 TABLET, FILM COATED ORAL at 21:00

## 2022-01-01 RX ADMIN — AMIODARONE HYDROCHLORIDE 200 MG: 200 TABLET ORAL at 08:50

## 2022-01-01 RX ADMIN — FUROSEMIDE 40 MG: 10 INJECTION, SOLUTION INTRAMUSCULAR; INTRAVENOUS at 08:52

## 2022-01-01 RX ADMIN — IPRATROPIUM BROMIDE AND ALBUTEROL SULFATE 1 AMPULE: .5; 3 SOLUTION RESPIRATORY (INHALATION) at 22:00

## 2022-01-01 RX ADMIN — NYSTATIN: 100000 CREAM TOPICAL at 10:03

## 2022-01-01 RX ADMIN — TRAZODONE HYDROCHLORIDE 50 MG: 50 TABLET ORAL at 20:10

## 2022-01-01 RX ADMIN — Medication 400 MG: at 20:13

## 2022-01-01 RX ADMIN — HEPARIN SODIUM 5000 UNITS: 5000 INJECTION INTRAVENOUS; SUBCUTANEOUS at 21:34

## 2022-01-01 RX ADMIN — INSULIN LISPRO 1 UNITS: 100 INJECTION, SOLUTION INTRAVENOUS; SUBCUTANEOUS at 16:49

## 2022-01-01 RX ADMIN — DILTIAZEM HYDROCHLORIDE 2.5 MG/HR: 5 INJECTION INTRAVENOUS at 16:34

## 2022-01-01 RX ADMIN — DIGOXIN 125 MCG: 125 TABLET ORAL at 11:07

## 2022-01-01 RX ADMIN — FENTANYL CITRATE 25 MCG: 50 INJECTION, SOLUTION INTRAMUSCULAR; INTRAVENOUS at 12:19

## 2022-01-01 RX ADMIN — FUROSEMIDE 20 MG: 10 INJECTION, SOLUTION INTRAMUSCULAR; INTRAVENOUS at 21:29

## 2022-01-01 RX ADMIN — FUROSEMIDE 40 MG: 40 TABLET ORAL at 08:33

## 2022-01-01 RX ADMIN — CEFTRIAXONE SODIUM 1000 MG: 1 INJECTION, POWDER, FOR SOLUTION INTRAMUSCULAR; INTRAVENOUS at 11:54

## 2022-01-01 RX ADMIN — IPRATROPIUM BROMIDE AND ALBUTEROL SULFATE 1 AMPULE: .5; 3 SOLUTION RESPIRATORY (INHALATION) at 17:06

## 2022-01-01 RX ADMIN — FUROSEMIDE 20 MG: 20 TABLET ORAL at 07:54

## 2022-01-01 RX ADMIN — NYSTATIN: 100000 CREAM TOPICAL at 09:09

## 2022-01-01 RX ADMIN — ALPRAZOLAM 0.5 MG: 0.5 TABLET ORAL at 21:55

## 2022-01-01 RX ADMIN — HYDROCODONE BITARTRATE AND ACETAMINOPHEN 1 TABLET: 5; 325 TABLET ORAL at 08:27

## 2022-01-01 RX ADMIN — CEFEPIME 2000 MG: 2 INJECTION, POWDER, FOR SOLUTION INTRAMUSCULAR; INTRAVENOUS at 18:29

## 2022-01-01 RX ADMIN — ASPIRIN 81 MG 81 MG: 81 TABLET ORAL at 08:27

## 2022-01-01 RX ADMIN — PHENYLEPHRINE HYDROCHLORIDE 200 MCG: 10 INJECTION INTRAVENOUS at 12:40

## 2022-01-01 RX ADMIN — PREDNISONE 40 MG: 20 TABLET ORAL at 09:30

## 2022-01-01 RX ADMIN — ROPINIROLE HYDROCHLORIDE 0.5 MG: 0.5 TABLET, FILM COATED ORAL at 22:28

## 2022-01-01 RX ADMIN — ALBUTEROL SULFATE 2.5 MG: 2.5 SOLUTION RESPIRATORY (INHALATION) at 09:47

## 2022-01-01 RX ADMIN — LEVALBUTEROL HYDROCHLORIDE 1.25 MG: 1.25 SOLUTION RESPIRATORY (INHALATION) at 09:18

## 2022-01-01 RX ADMIN — ATOVAQUONE 1500 MG: 750 SUSPENSION ORAL at 08:18

## 2022-01-01 RX ADMIN — ROCURONIUM BROMIDE 20 MG: 50 INJECTION, SOLUTION INTRAVENOUS at 12:30

## 2022-01-01 RX ADMIN — NYSTATIN 500000 UNITS: 100000 SUSPENSION ORAL at 17:37

## 2022-01-01 RX ADMIN — INSULIN LISPRO 1 UNITS: 100 INJECTION, SOLUTION INTRAVENOUS; SUBCUTANEOUS at 20:32

## 2022-01-01 RX ADMIN — IPRATROPIUM BROMIDE AND ALBUTEROL SULFATE 1 AMPULE: .5; 3 SOLUTION RESPIRATORY (INHALATION) at 21:16

## 2022-01-01 RX ADMIN — IPRATROPIUM BROMIDE AND ALBUTEROL SULFATE 1 AMPULE: .5; 3 SOLUTION RESPIRATORY (INHALATION) at 21:26

## 2022-01-01 RX ADMIN — METOPROLOL SUCCINATE 100 MG: 100 TABLET, EXTENDED RELEASE ORAL at 09:06

## 2022-01-01 RX ADMIN — METHYLPREDNISOLONE SODIUM SUCCINATE 80 MG: 125 INJECTION, POWDER, FOR SOLUTION INTRAMUSCULAR; INTRAVENOUS at 08:52

## 2022-01-01 RX ADMIN — PREDNISONE 40 MG: 20 TABLET ORAL at 08:28

## 2022-01-01 RX ADMIN — IPRATROPIUM BROMIDE AND ALBUTEROL SULFATE 1 AMPULE: .5; 3 SOLUTION RESPIRATORY (INHALATION) at 13:49

## 2022-01-01 RX ADMIN — SODIUM CHLORIDE, PRESERVATIVE FREE 10 ML: 5 INJECTION INTRAVENOUS at 20:53

## 2022-01-01 RX ADMIN — Medication 1 TABLET: at 07:52

## 2022-01-01 RX ADMIN — METOPROLOL SUCCINATE 150 MG: 100 TABLET, FILM COATED, EXTENDED RELEASE ORAL at 08:36

## 2022-01-01 RX ADMIN — PROPOFOL 10 MG: 10 INJECTION, EMULSION INTRAVENOUS at 16:21

## 2022-01-01 RX ADMIN — DIGOXIN 125 MCG: 125 TABLET ORAL at 08:30

## 2022-01-01 RX ADMIN — NYSTATIN 500000 UNITS: 100000 SUSPENSION ORAL at 10:04

## 2022-01-01 RX ADMIN — AZITHROMYCIN MONOHYDRATE 500 MG: 500 INJECTION, POWDER, LYOPHILIZED, FOR SOLUTION INTRAVENOUS at 11:46

## 2022-01-01 RX ADMIN — SODIUM CHLORIDE, PRESERVATIVE FREE 10 ML: 5 INJECTION INTRAVENOUS at 20:49

## 2022-01-01 RX ADMIN — PIPERACILLIN AND TAZOBACTAM 3375 MG: 3; .375 INJECTION, POWDER, LYOPHILIZED, FOR SOLUTION INTRAVENOUS at 03:02

## 2022-01-01 RX ADMIN — TRAZODONE HYDROCHLORIDE 50 MG: 50 TABLET ORAL at 20:59

## 2022-01-01 RX ADMIN — SODIUM CHLORIDE, PRESERVATIVE FREE 10 ML: 5 INJECTION INTRAVENOUS at 20:41

## 2022-01-01 RX ADMIN — HYDROCODONE BITARTRATE AND ACETAMINOPHEN 1 TABLET: 5; 325 TABLET ORAL at 19:18

## 2022-01-01 RX ADMIN — FUROSEMIDE 20 MG: 20 TABLET ORAL at 10:04

## 2022-01-01 RX ADMIN — HEPARIN SODIUM 5000 UNITS: 5000 INJECTION INTRAVENOUS; SUBCUTANEOUS at 05:51

## 2022-01-01 RX ADMIN — METHYLPREDNISOLONE SODIUM SUCCINATE 60 MG: 125 INJECTION, POWDER, FOR SOLUTION INTRAMUSCULAR; INTRAVENOUS at 03:37

## 2022-01-01 RX ADMIN — HYDROCODONE BITARTRATE AND ACETAMINOPHEN 1 TABLET: 5; 325 TABLET ORAL at 14:31

## 2022-01-01 RX ADMIN — PROPOFOL 150 MG: 10 INJECTION, EMULSION INTRAVENOUS at 12:19

## 2022-01-01 RX ADMIN — SACUBITRIL AND VALSARTAN 1 TABLET: 24; 26 TABLET, FILM COATED ORAL at 20:09

## 2022-01-01 RX ADMIN — SODIUM CHLORIDE, PRESERVATIVE FREE 10 ML: 5 INJECTION INTRAVENOUS at 19:02

## 2022-01-01 RX ADMIN — CEFTRIAXONE SODIUM 1000 MG: 1 INJECTION, POWDER, FOR SOLUTION INTRAMUSCULAR; INTRAVENOUS at 11:13

## 2022-01-01 RX ADMIN — PANTOPRAZOLE SODIUM 40 MG: 40 TABLET, DELAYED RELEASE ORAL at 08:27

## 2022-01-01 RX ADMIN — RIVAROXABAN 20 MG: 20 TABLET, FILM COATED ORAL at 07:49

## 2022-01-01 RX ADMIN — ATOVAQUONE 1500 MG: 750 SUSPENSION ORAL at 15:50

## 2022-01-01 RX ADMIN — LEVALBUTEROL HYDROCHLORIDE 1.25 MG: 1.25 SOLUTION RESPIRATORY (INHALATION) at 20:41

## 2022-01-01 RX ADMIN — ROPINIROLE HYDROCHLORIDE 0.5 MG: 0.5 TABLET, FILM COATED ORAL at 21:26

## 2022-01-01 RX ADMIN — IPRATROPIUM BROMIDE AND ALBUTEROL SULFATE 1 AMPULE: .5; 3 SOLUTION RESPIRATORY (INHALATION) at 09:48

## 2022-01-01 RX ADMIN — Medication 1 TABLET: at 08:40

## 2022-01-01 RX ADMIN — HYDROCODONE BITARTRATE AND ACETAMINOPHEN 1 TABLET: 5; 325 TABLET ORAL at 02:50

## 2022-01-01 RX ADMIN — ASPIRIN 81 MG CHEWABLE TABLET 81 MG: 81 TABLET CHEWABLE at 08:33

## 2022-01-01 RX ADMIN — ASPIRIN 81 MG CHEWABLE TABLET 81 MG: 81 TABLET CHEWABLE at 09:32

## 2022-01-01 RX ADMIN — ASPIRIN 81 MG CHEWABLE TABLET 81 MG: 81 TABLET CHEWABLE at 08:10

## 2022-01-01 RX ADMIN — SODIUM CHLORIDE, PRESERVATIVE FREE 5 ML: 5 INJECTION INTRAVENOUS at 08:11

## 2022-01-01 RX ADMIN — SODIUM CHLORIDE, PRESERVATIVE FREE 10 ML: 5 INJECTION INTRAVENOUS at 20:13

## 2022-01-01 RX ADMIN — DILTIAZEM HYDROCHLORIDE 30 MG: 30 TABLET, FILM COATED ORAL at 13:54

## 2022-01-01 RX ADMIN — SACUBITRIL AND VALSARTAN 0.5 TABLET: 49; 51 TABLET, FILM COATED ORAL at 03:21

## 2022-01-01 RX ADMIN — SODIUM CHLORIDE: 9 INJECTION, SOLUTION INTRAVENOUS at 14:50

## 2022-01-01 RX ADMIN — PIPERACILLIN AND TAZOBACTAM 3375 MG: 3; .375 INJECTION, POWDER, LYOPHILIZED, FOR SOLUTION INTRAVENOUS at 19:45

## 2022-01-01 RX ADMIN — PIPERACILLIN AND TAZOBACTAM 3375 MG: 3; .375 INJECTION, POWDER, LYOPHILIZED, FOR SOLUTION INTRAVENOUS at 11:31

## 2022-01-01 RX ADMIN — PREDNISONE 40 MG: 20 TABLET ORAL at 08:49

## 2022-01-01 RX ADMIN — SODIUM CHLORIDE, PRESERVATIVE FREE 10 ML: 5 INJECTION INTRAVENOUS at 21:00

## 2022-01-01 RX ADMIN — Medication 400 MG: at 07:49

## 2022-01-01 RX ADMIN — IPRATROPIUM BROMIDE AND ALBUTEROL SULFATE 1 AMPULE: .5; 3 SOLUTION RESPIRATORY (INHALATION) at 20:57

## 2022-01-01 RX ADMIN — FUROSEMIDE 40 MG: 40 TABLET ORAL at 09:06

## 2022-01-01 RX ADMIN — ALBUTEROL SULFATE 2.5 MG: 2.5 SOLUTION RESPIRATORY (INHALATION) at 09:43

## 2022-01-01 RX ADMIN — PANTOPRAZOLE SODIUM 40 MG: 40 TABLET, DELAYED RELEASE ORAL at 05:47

## 2022-01-01 RX ADMIN — DILTIAZEM HYDROCHLORIDE 30 MG: 30 TABLET, FILM COATED ORAL at 13:24

## 2022-01-01 RX ADMIN — PREDNISONE 40 MG: 20 TABLET ORAL at 10:05

## 2022-01-01 RX ADMIN — IPRATROPIUM BROMIDE AND ALBUTEROL SULFATE 1 AMPULE: .5; 3 SOLUTION RESPIRATORY (INHALATION) at 16:30

## 2022-01-01 RX ADMIN — SODIUM CHLORIDE, PRESERVATIVE FREE 10 ML: 5 INJECTION INTRAVENOUS at 21:47

## 2022-01-01 RX ADMIN — ASPIRIN 81 MG CHEWABLE TABLET 81 MG: 81 TABLET CHEWABLE at 08:15

## 2022-01-01 RX ADMIN — FLUCONAZOLE 200 MG: 200 TABLET ORAL at 07:53

## 2022-01-01 RX ADMIN — ATORVASTATIN CALCIUM 40 MG: 40 TABLET, FILM COATED ORAL at 21:28

## 2022-01-01 RX ADMIN — OXYMETAZOLINE HYDROCHLORIDE 2 SPRAY: 0.05 SPRAY NASAL at 10:27

## 2022-01-01 RX ADMIN — PIPERACILLIN AND TAZOBACTAM 3375 MG: 3; .375 INJECTION, POWDER, LYOPHILIZED, FOR SOLUTION INTRAVENOUS at 11:50

## 2022-01-01 RX ADMIN — AMIODARONE HYDROCHLORIDE 200 MG: 200 TABLET ORAL at 07:59

## 2022-01-01 RX ADMIN — SACUBITRIL AND VALSARTAN 1 TABLET: 24; 26 TABLET, FILM COATED ORAL at 07:49

## 2022-01-01 RX ADMIN — SODIUM CHLORIDE 500 ML: 9 INJECTION, SOLUTION INTRAVENOUS at 10:18

## 2022-01-01 RX ADMIN — IPRATROPIUM BROMIDE AND ALBUTEROL SULFATE 1 AMPULE: .5; 3 SOLUTION RESPIRATORY (INHALATION) at 13:03

## 2022-01-01 RX ADMIN — SODIUM CHLORIDE, PRESERVATIVE FREE 10 ML: 5 INJECTION INTRAVENOUS at 08:13

## 2022-01-01 RX ADMIN — ALBUTEROL SULFATE 2.5 MG: 2.5 SOLUTION RESPIRATORY (INHALATION) at 20:25

## 2022-01-01 RX ADMIN — HYDROCODONE BITARTRATE AND ACETAMINOPHEN 1 TABLET: 5; 325 TABLET ORAL at 21:35

## 2022-01-01 RX ADMIN — AZITHROMYCIN DIHYDRATE 500 MG: 500 INJECTION, POWDER, LYOPHILIZED, FOR SOLUTION INTRAVENOUS at 14:40

## 2022-01-01 RX ADMIN — ALPRAZOLAM 0.5 MG: 0.5 TABLET ORAL at 20:48

## 2022-01-01 RX ADMIN — INSULIN LISPRO 1 UNITS: 100 INJECTION, SOLUTION INTRAVENOUS; SUBCUTANEOUS at 12:32

## 2022-01-01 RX ADMIN — Medication 60 MG: at 12:19

## 2022-01-01 RX ADMIN — DILTIAZEM HYDROCHLORIDE 30 MG: 30 TABLET, FILM COATED ORAL at 06:04

## 2022-01-01 RX ADMIN — NYSTATIN 500000 UNITS: 100000 SUSPENSION ORAL at 15:00

## 2022-01-01 RX ADMIN — ALPRAZOLAM 0.5 MG: 0.5 TABLET ORAL at 23:55

## 2022-01-01 RX ADMIN — IPRATROPIUM BROMIDE AND ALBUTEROL SULFATE 1 AMPULE: .5; 3 SOLUTION RESPIRATORY (INHALATION) at 09:08

## 2022-01-01 SDOH — ECONOMIC STABILITY: INCOME INSECURITY: IN THE LAST 12 MONTHS, WAS THERE A TIME WHEN YOU WERE NOT ABLE TO PAY THE MORTGAGE OR RENT ON TIME?: NO

## 2022-01-01 SDOH — ECONOMIC STABILITY: FOOD INSECURITY: WITHIN THE PAST 12 MONTHS, THE FOOD YOU BOUGHT JUST DIDN'T LAST AND YOU DIDN'T HAVE MONEY TO GET MORE.: NEVER TRUE

## 2022-01-01 SDOH — HEALTH STABILITY: PHYSICAL HEALTH: ON AVERAGE, HOW MANY MINUTES DO YOU ENGAGE IN EXERCISE AT THIS LEVEL?: 0 MIN

## 2022-01-01 SDOH — ECONOMIC STABILITY: TRANSPORTATION INSECURITY
IN THE PAST 12 MONTHS, HAS THE LACK OF TRANSPORTATION KEPT YOU FROM MEDICAL APPOINTMENTS OR FROM GETTING MEDICATIONS?: NO

## 2022-01-01 SDOH — ECONOMIC STABILITY: FOOD INSECURITY: WITHIN THE PAST 12 MONTHS, YOU WORRIED THAT YOUR FOOD WOULD RUN OUT BEFORE YOU GOT MONEY TO BUY MORE.: NEVER TRUE

## 2022-01-01 SDOH — ECONOMIC STABILITY: TRANSPORTATION INSECURITY
IN THE PAST 12 MONTHS, HAS LACK OF TRANSPORTATION KEPT YOU FROM MEETINGS, WORK, OR FROM GETTING THINGS NEEDED FOR DAILY LIVING?: NO

## 2022-01-01 SDOH — HEALTH STABILITY: PHYSICAL HEALTH: ON AVERAGE, HOW MANY DAYS PER WEEK DO YOU ENGAGE IN MODERATE TO STRENUOUS EXERCISE (LIKE A BRISK WALK)?: 0 DAYS

## 2022-01-01 SDOH — ECONOMIC STABILITY: HOUSING INSECURITY
IN THE LAST 12 MONTHS, WAS THERE A TIME WHEN YOU DID NOT HAVE A STEADY PLACE TO SLEEP OR SLEPT IN A SHELTER (INCLUDING NOW)?: NO

## 2022-01-01 ASSESSMENT — ENCOUNTER SYMPTOMS
SHORTNESS OF BREATH: 1
SHORTNESS OF BREATH: 1
WHEEZING: 0
RHINORRHEA: 0
WHEEZING: 0
COUGH: 1
VOMITING: 0
COUGH: 1
DIARRHEA: 0
DIARRHEA: 0
ABDOMINAL PAIN: 0
ABDOMINAL DISTENTION: 0
SHORTNESS OF BREATH: 1
SORE THROAT: 0
NAUSEA: 0
CONSTIPATION: 0
WHEEZING: 1
EYE PAIN: 0
BACK PAIN: 0
ABDOMINAL DISTENTION: 0
SINUS PRESSURE: 1
CHEST TIGHTNESS: 0
NAUSEA: 0
DIARRHEA: 0
BACK PAIN: 0
ABDOMINAL DISTENTION: 0
CHEST TIGHTNESS: 0
NAUSEA: 0
CONSTIPATION: 0
CONSTIPATION: 0
NAUSEA: 0
NAUSEA: 0
BACK PAIN: 0
COUGH: 0
NAUSEA: 0
TROUBLE SWALLOWING: 0
ANAL BLEEDING: 0
BACK PAIN: 0
APNEA: 0
SINUS PRESSURE: 0
COUGH: 0
COLOR CHANGE: 0
SHORTNESS OF BREATH: 1
NAUSEA: 0
COUGH: 0
ABDOMINAL PAIN: 0
BLOOD IN STOOL: 0
WHEEZING: 1
ABDOMINAL DISTENTION: 0
DIARRHEA: 0
SORE THROAT: 0
ABDOMINAL DISTENTION: 0
APNEA: 0
SHORTNESS OF BREATH: 1
PHOTOPHOBIA: 0
BACK PAIN: 0
COUGH: 1
DIARRHEA: 0
SORE THROAT: 0
COUGH: 1
APNEA: 0
APNEA: 0
CHEST TIGHTNESS: 1
WHEEZING: 0
DYSPNEA ASSOCIATED WITH: EXERTION
VOMITING: 0
SINUS PAIN: 0
WHEEZING: 0
SINUS PRESSURE: 0
STRIDOR: 0
CONSTIPATION: 0
SORE THROAT: 0
SHORTNESS OF BREATH: 1
ABDOMINAL PAIN: 0
COUGH: 0
SINUS PAIN: 0
ABDOMINAL DISTENTION: 0
BACK PAIN: 0
SINUS PAIN: 1
ABDOMINAL DISTENTION: 0
NAUSEA: 0
CHOKING: 0
NAUSEA: 0
SINUS PAIN: 0
VOMITING: 0
SHORTNESS OF BREATH: 0
ABDOMINAL DISTENTION: 0
ABDOMINAL DISTENTION: 0
ABDOMINAL PAIN: 0
NAUSEA: 0
NAUSEA: 0
VOMITING: 0
CONSTIPATION: 0
SHORTNESS OF BREATH: 1
TROUBLE SWALLOWING: 0
VOMITING: 0
NAUSEA: 0
VOMITING: 0
COUGH: 1
BACK PAIN: 0
CHOKING: 0
ABDOMINAL DISTENTION: 0
SHORTNESS OF BREATH: 1
WHEEZING: 1
ABDOMINAL PAIN: 0
SHORTNESS OF BREATH: 0
SHORTNESS OF BREATH: 1
NAUSEA: 0
ABDOMINAL PAIN: 0
ABDOMINAL DISTENTION: 0
COUGH: 1
SINUS PRESSURE: 0
ABDOMINAL PAIN: 0
VOMITING: 0
SHORTNESS OF BREATH: 1
RHINORRHEA: 0
CONSTIPATION: 0
WHEEZING: 0
ABDOMINAL PAIN: 0
DIARRHEA: 0
CONSTIPATION: 0
COUGH: 1
NAUSEA: 0
BACK PAIN: 1
WHEEZING: 1
COUGH: 1
DIARRHEA: 0
STRIDOR: 0
BACK PAIN: 0
COUGH: 1
SHORTNESS OF BREATH: 1
EYE PAIN: 0
SHORTNESS OF BREATH: 1
COUGH: 0
ABDOMINAL DISTENTION: 0
EYE DISCHARGE: 0
DIARRHEA: 0
SHORTNESS OF BREATH: 1
ABDOMINAL PAIN: 0
NAUSEA: 0
DIARRHEA: 0
SORE THROAT: 0
DIARRHEA: 0
BACK PAIN: 0
VOMITING: 0
SHORTNESS OF BREATH: 0
SHORTNESS OF BREATH: 1
ABDOMINAL PAIN: 0
RHINORRHEA: 0
SHORTNESS OF BREATH: 1
CONSTIPATION: 0
ABDOMINAL DISTENTION: 0
DIARRHEA: 0
CHEST TIGHTNESS: 0
CHEST TIGHTNESS: 0
ABDOMINAL PAIN: 0
VOMITING: 0
EYE REDNESS: 0
CHEST TIGHTNESS: 0
WHEEZING: 0
VOMITING: 0
COUGH: 1
VOMITING: 0
EYE DISCHARGE: 0
DIARRHEA: 0
CHEST TIGHTNESS: 0
BACK PAIN: 0
EYE DISCHARGE: 0
VOMITING: 0
CHEST TIGHTNESS: 0
ABDOMINAL DISTENTION: 0
BACK PAIN: 0
ABDOMINAL PAIN: 0
PHOTOPHOBIA: 0
TROUBLE SWALLOWING: 0
VOMITING: 0
NAUSEA: 0
WHEEZING: 1
SHORTNESS OF BREATH: 1
COUGH: 1
BACK PAIN: 0
ABDOMINAL DISTENTION: 0
CHEST TIGHTNESS: 0
NAUSEA: 0
SORE THROAT: 0
WHEEZING: 0
CHOKING: 0
SHORTNESS OF BREATH: 0
EYE REDNESS: 0
WHEEZING: 0
CONSTIPATION: 0
DIARRHEA: 0
EYE ITCHING: 0
SHORTNESS OF BREATH: 1
CONSTIPATION: 0
EYE DISCHARGE: 0
SHORTNESS OF BREATH: 1
WHEEZING: 0
COUGH: 1
BACK PAIN: 0
CHOKING: 0
CONSTIPATION: 0

## 2022-01-01 ASSESSMENT — PULMONARY FUNCTION TESTS
PIF_VALUE: 34
PIF_VALUE: 36
PIF_VALUE: 34
PIF_VALUE: 35
PIF_VALUE: 0
PIF_VALUE: 36
PIF_VALUE: 35
PIF_VALUE: 1
PIF_VALUE: 25
PIF_VALUE: 36
PIF_VALUE: 1
PIF_VALUE: 34
PIF_VALUE: 34
PIF_VALUE: 3
PIF_VALUE: 1
PIF_VALUE: 37
PIF_VALUE: 30
PIF_VALUE: 6
PIF_VALUE: 4
PIF_VALUE: 36
PIF_VALUE: 1
PIF_VALUE: 6
PIF_VALUE: 1
PIF_VALUE: 22
PIF_VALUE: 35
PIF_VALUE: 34
PIF_VALUE: 1
PIF_VALUE: 36
PIF_VALUE: 31
PIF_VALUE: 36
PIF_VALUE: 37
PIF_VALUE: 36
PIF_VALUE: 34
PIF_VALUE: 89
PIF_VALUE: 30
PIF_VALUE: 35
PIF_VALUE: 88
PIF_VALUE: 34
PIF_VALUE: 1
PIF_VALUE: 1
PIF_VALUE: 36
PIF_VALUE: 36
PIF_VALUE: 92
PIF_VALUE: 34
PIF_VALUE: 25
PIF_VALUE: 36
PIF_VALUE: 32
PIF_VALUE: 35
PIF_VALUE: 0
PIF_VALUE: 20
PIF_VALUE: 35
PIF_VALUE: 35
PIF_VALUE: 0
PIF_VALUE: 82
PIF_VALUE: 22
PIF_VALUE: 21
PIF_VALUE: 22
PIF_VALUE: 35
PIF_VALUE: 36
PIF_VALUE: 34
PIF_VALUE: 80
PIF_VALUE: 35
PIF_VALUE: 36
PIF_VALUE: 35
PIF_VALUE: 1
PIF_VALUE: 88
PIF_VALUE: 34
PIF_VALUE: 34
PIF_VALUE: 30
PIF_VALUE: 36
PIF_VALUE: 33
PIF_VALUE: 79
PIF_VALUE: 37
PIF_VALUE: 30
PIF_VALUE: 24
PIF_VALUE: 1
PIF_VALUE: 34
PIF_VALUE: 1
PIF_VALUE: 36
PIF_VALUE: 35
PIF_VALUE: 36
PIF_VALUE: 88
PIF_VALUE: 1
PIF_VALUE: 1
PIF_VALUE: 37
PIF_VALUE: 33
PIF_VALUE: 32
PIF_VALUE: 34
PIF_VALUE: 35
PIF_VALUE: 36
PIF_VALUE: 1
PIF_VALUE: 30
PIF_VALUE: 35
PIF_VALUE: 0
PIF_VALUE: 34
PIF_VALUE: 36
PIF_VALUE: 2
PIF_VALUE: 34
PIF_VALUE: 91
PIF_VALUE: 26
PIF_VALUE: 3
PIF_VALUE: 36
PIF_VALUE: 28
PIF_VALUE: 36
PIF_VALUE: 35
PIF_VALUE: 35
PIF_VALUE: 10
PIF_VALUE: 36
PIF_VALUE: 91
PIF_VALUE: 1
PIF_VALUE: 36
PIF_VALUE: 36
PIF_VALUE: 37
PIF_VALUE: 2
PIF_VALUE: 34
PIF_VALUE: 1
PIF_VALUE: 2
PIF_VALUE: 36
PIF_VALUE: 34
PIF_VALUE: 35
PIF_VALUE: 36
PIF_VALUE: 88
PIF_VALUE: 36
PIF_VALUE: 36
PIF_VALUE: 37
PIF_VALUE: 80
PIF_VALUE: 34
PIF_VALUE: 31
PIF_VALUE: 22
PIF_VALUE: 1
PIF_VALUE: 35
PIF_VALUE: 8
PIF_VALUE: 36
PIF_VALUE: 36
PIF_VALUE: 31
PIF_VALUE: 89
PIF_VALUE: 37
PIF_VALUE: 36
PIF_VALUE: 36
PIF_VALUE: 92
PIF_VALUE: 24
PIF_VALUE: 21

## 2022-01-01 ASSESSMENT — PAIN DESCRIPTION - ORIENTATION
ORIENTATION: RIGHT
ORIENTATION: RIGHT;UPPER
ORIENTATION: RIGHT

## 2022-01-01 ASSESSMENT — PAIN - FUNCTIONAL ASSESSMENT
PAIN_FUNCTIONAL_ASSESSMENT: PREVENTS OR INTERFERES SOME ACTIVE ACTIVITIES AND ADLS
PAIN_FUNCTIONAL_ASSESSMENT: PREVENTS OR INTERFERES SOME ACTIVE ACTIVITIES AND ADLS
PAIN_FUNCTIONAL_ASSESSMENT: NONE - DENIES PAIN
PAIN_FUNCTIONAL_ASSESSMENT: PREVENTS OR INTERFERES SOME ACTIVE ACTIVITIES AND ADLS
PAIN_FUNCTIONAL_ASSESSMENT: ACTIVITIES ARE NOT PREVENTED
PAIN_FUNCTIONAL_ASSESSMENT: PREVENTS OR INTERFERES SOME ACTIVE ACTIVITIES AND ADLS
PAIN_FUNCTIONAL_ASSESSMENT: PREVENTS OR INTERFERES SOME ACTIVE ACTIVITIES AND ADLS

## 2022-01-01 ASSESSMENT — PAIN SCALES - GENERAL
PAINLEVEL_OUTOF10: 0
PAINLEVEL_OUTOF10: 2
PAINLEVEL_OUTOF10: 0
PAINLEVEL_OUTOF10: 0
PAINLEVEL_OUTOF10: 7
PAINLEVEL_OUTOF10: 0
PAINLEVEL_OUTOF10: 5
PAINLEVEL_OUTOF10: 0
PAINLEVEL_OUTOF10: 4
PAINLEVEL_OUTOF10: 0
PAINLEVEL_OUTOF10: 2
PAINLEVEL_OUTOF10: 0
PAINLEVEL_OUTOF10: 3
PAINLEVEL_OUTOF10: 0
PAINLEVEL_OUTOF10: 10
PAINLEVEL_OUTOF10: 0
PAINLEVEL_OUTOF10: 6
PAINLEVEL_OUTOF10: 0
PAINLEVEL_OUTOF10: 5
PAINLEVEL_OUTOF10: 0
PAINLEVEL_OUTOF10: 6
PAINLEVEL_OUTOF10: 0
PAINLEVEL_OUTOF10: 3
PAINLEVEL_OUTOF10: 0
PAINLEVEL_OUTOF10: 0
PAINLEVEL_OUTOF10: 7
PAINLEVEL_OUTOF10: 0
PAINLEVEL_OUTOF10: 7
PAINLEVEL_OUTOF10: 3
PAINLEVEL_OUTOF10: 0
PAINLEVEL_OUTOF10: 6
PAINLEVEL_OUTOF10: 0
PAINLEVEL_OUTOF10: 7
PAINLEVEL_OUTOF10: 0
PAINLEVEL_OUTOF10: 0
PAINLEVEL_OUTOF10: 5
PAINLEVEL_OUTOF10: 7
PAINLEVEL_OUTOF10: 0
PAINLEVEL_OUTOF10: 4
PAINLEVEL_OUTOF10: 4
PAINLEVEL_OUTOF10: 7
PAINLEVEL_OUTOF10: 0
PAINLEVEL_OUTOF10: 6
PAINLEVEL_OUTOF10: 0

## 2022-01-01 ASSESSMENT — PAIN SCALES - WONG BAKER

## 2022-01-01 ASSESSMENT — PAIN DESCRIPTION - PAIN TYPE
TYPE: SURGICAL PAIN
TYPE: CHRONIC PAIN
TYPE: SURGICAL PAIN

## 2022-01-01 ASSESSMENT — PAIN DESCRIPTION - DESCRIPTORS
DESCRIPTORS: SHARP
DESCRIPTORS: SHARP
DESCRIPTORS: ACHING
DESCRIPTORS: SHARP
DESCRIPTORS: SHARP
DESCRIPTORS: SHARP;STABBING
DESCRIPTORS: SHARP
DESCRIPTORS: SHARP
DESCRIPTORS: ACHING
DESCRIPTORS: SHARP
DESCRIPTORS: ACHING
DESCRIPTORS: SHARP
DESCRIPTORS: SHARP
DESCRIPTORS: ACHING
DESCRIPTORS: ACHING;SORE
DESCRIPTORS: SHARP

## 2022-01-01 ASSESSMENT — PAIN DESCRIPTION - LOCATION
LOCATION: CHEST;OTHER (COMMENT)
LOCATION: CHEST
LOCATION: INCISION
LOCATION: CHEST
LOCATION: CHEST
LOCATION: INCISION
LOCATION: CHEST
LOCATION: INCISION
LOCATION: CHEST
LOCATION: INCISION
LOCATION: CHEST
LOCATION: BACK
LOCATION: INCISION
LOCATION: INCISION

## 2022-01-01 ASSESSMENT — PAIN DESCRIPTION - ONSET
ONSET: ON-GOING

## 2022-01-01 ASSESSMENT — PAIN DESCRIPTION - FREQUENCY
FREQUENCY: INTERMITTENT
FREQUENCY: CONTINUOUS
FREQUENCY: INTERMITTENT
FREQUENCY: CONTINUOUS
FREQUENCY: CONTINUOUS

## 2022-01-01 ASSESSMENT — PATIENT HEALTH QUESTIONNAIRE - PHQ9
1. LITTLE INTEREST OR PLEASURE IN DOING THINGS: 1
SUM OF ALL RESPONSES TO PHQ QUESTIONS 1-9: 2
SUM OF ALL RESPONSES TO PHQ QUESTIONS 1-9: 2
SUM OF ALL RESPONSES TO PHQ9 QUESTIONS 1 & 2: 2
2. FEELING DOWN, DEPRESSED OR HOPELESS: 1
SUM OF ALL RESPONSES TO PHQ QUESTIONS 1-9: 2
SUM OF ALL RESPONSES TO PHQ QUESTIONS 1-9: 2

## 2022-01-01 ASSESSMENT — COPD QUESTIONNAIRES: CAT_SEVERITY: SEVERE

## 2022-01-01 ASSESSMENT — SOCIAL DETERMINANTS OF HEALTH (SDOH): HOW HARD IS IT FOR YOU TO PAY FOR THE VERY BASICS LIKE FOOD, HOUSING, MEDICAL CARE, AND HEATING?: NOT HARD AT ALL

## 2022-01-01 ASSESSMENT — LIFESTYLE VARIABLES
SMOKING_STATUS: 1
HOW OFTEN DO YOU HAVE A DRINK CONTAINING ALCOHOL: MONTHLY OR LESS
HOW OFTEN DO YOU HAVE A DRINK CONTAINING ALCOHOL: NEVER

## 2022-01-13 NOTE — TELEPHONE ENCOUNTER
Pt called req refill for  fluocinonide ointment. for cracked skin.     [facility]

## 2022-01-13 NOTE — TELEPHONE ENCOUNTER
Date of last visit:  12/14/2021  Date of next visit:  3/22/2022    Requested Prescriptions     Pending Prescriptions Disp Refills    fluocinonide (LIDEX) 0.05 % ointment       Sig: Apply topically as needed

## 2022-01-26 NOTE — PROGRESS NOTES
DR DUNN PT / KNOWN AFIB/ XARELTO/  ZAHRA LIGATION  AFIB BURDEN 7%    N[employer] DUAL PACEMAKER REMOTE   BATTERY 15 YRS REMAINING    ATRIAL IMPEDENCE 569  VENT IMPEDENCE 583  P WAVES 8.1  RV WAVES 8.4    ATRIAL THRESHOLD 0.7 @ 0.4  VENT THRESHOLD 1.3 @ 0.4    ATRIAL AMPLITUDE 2 @ 0.4  VENT AMPLITUDE 2.5 @ 0.4  DDDR     1/23/22 EPISODE OF AFIB WITH RVR FOR 3 MINUTES

## 2022-02-01 PROBLEM — I25.118 ATHEROSCLEROTIC HEART DISEASE OF NATIVE CORONARY ARTERY WITH OTHER FORMS OF ANGINA PECTORIS (HCC): Status: ACTIVE | Noted: 2021-01-09

## 2022-02-01 NOTE — PATIENT INSTRUCTIONS
You may receive a survey regarding the care you received during your visit. Your input is valuable to us. We encourage you to complete and return your survey. We hope you will choose us in the future for your healthcare needs. Continue:  · Continue current medications  · Daily weights and record  · Fluid restriction of 2 Liters per day  · Limit sodium in diet to around 8198-5957 mg/day  · Monitor BP  · Activity as tolerated     Call the Heart Failure Clinic for any of the following symptoms: [phone]   Weight gain of 2-3 pounds in 1 day or 5 pounds in 1 week   Increased shortness of breath   Shortness of breath while laying down   Cough   Chest pain   Swelling in feet, ankles or legs   Tenderness or bloating in the abdomen   Fatigue    Decreased appetite or nausea    Confusion       Stable, appears Euvolemic  Lab reviewed - stable  Repeat blood work Today  BP/HR stable   No med changes today  Continue diet/fluid adherence  Continue daily wts.   F/U w/ Cardiology  F/U in clinic in 9 months

## 2022-02-01 NOTE — TELEPHONE ENCOUNTER
Kidney function has again worsened, potential JANETH. Hold Entresto and Lasix for the next 5 days, drink increased water. Monitor for CHF symptoms/weight gain. Start Entresto again and resume lasix at 20mg every other day.  Stopping KCl all together Lab work in 4 weeks

## 2022-02-01 NOTE — PROGRESS NOTES
Heart Failure Clinic       Visit Date: 2/1/2022  Cardiologist:  Dr. Francia Huggins  Primary Care Physician: Dr. Thompson Corona MD    Melissa Dent is a 70 y.o. male who presents today for:  Chief Complaint   Patient presents with    Congestive Heart Failure       HPI:   Melissa Dent is a 70 y.o. male who presents to the office for a follow up patient visit in the heart failure clinic. Accompanied by self. TYPE HF: HFrEF(40-50% on 6/15/21) (25% on 1/5/21)  Grade 1 DD  Cause: ischemic cardiomyopathy  Device: Dual chamber pacemaker  HX: CAD, Ischemic cardiomyopathy, HLD, Afib s/p CABG, HTN, COPD, Smoker (40pack yr, has stopped since 1/2021). CABG w/ clip of left atrial appendage (1/2021)    Dry Wt:  160 (160 on 8/24/21) (153 on 2/1/22)    Hospitalization:    4/5/21: palpitations, Afib w/ RVR. Increased metoprolol      Concerns today: no concerns today  Activity: treadmill, bicycle in doors sometimes, ADLs performed w/o limitations. Diet: low sodium low fluid diet. Patient has:  Chest Pain: no  SOB: no  Orthopnea/PND: yes, has bed that elevates  CHACHO: no  Edema: no  Fatigue: no  Abdominal bloating: no  Cough: no  Appetite: good  Home weight: daily, 160s  Home blood pressure: 140s but has been checking prior to blood pressure.      Past Medical History:   Diagnosis Date    Asthma     Atrial fibrillation with RVR ([facility] [address] 04/05/2021    [employer] dual pacemaker  04/15/2021    Collagenous colitis 2021    per scope 2021    Colon polyps 2021    dr Fernando Junior    COPD, mild ([address]     Eczema of hand     HTN (hypertension)     Hyperlipidemia     Smoker      Past Surgical History:   Procedure Laterality Date    COLONOSCOPY  06/10/2021    theresa ccolon polyps and collagenous colitis    CORONARY ARTERY BYPASS GRAFT  01/12/2021    cabg x 3 with lima and atrial appendage clip  dr Chelsea Meehan GRAFT N/A 01/12/2021    CABG  X 3 WITH DEJAH, Atrial Appendage Clip performed by Jenelle Tee MD at [facility] OR    HERNIA REPAIR      NASAL SINUS SURGERY  06/2008    polyps    OTHER SURGICAL HISTORY  DEC 7TH 2012 DR GET [facility]     IGS ENDOSCOPIC BI LAT MAXILLARY, ETHMOID, SPHENOID, FRONTAL SINUSOTOMY WITH SEPTOPLASTY AND TURBINOPLASTIES    SKIN CANCER EXCISION  09/2014    Left side of Forehead     TOOTH EXTRACTION  02/2017     Family History   Problem Relation Age of Onset    Heart Disease Mother     Heart Disease Father         cabg    Diabetes Brother     Heart Disease Brother      Social History     Tobacco Use    Smoking status: Current Every Day Smoker     Packs/day: 1.00     Years: 40.00     Pack years: 40.00     Types: Cigarettes    Smokeless tobacco: Never Used   Substance Use Topics    Alcohol use: Yes     Alcohol/week: 0.0 standard drinks     Comment: very little     Current Outpatient Medications   Medication Sig Dispense Refill    ALPRAZolam (XANAX) 0.5 MG tablet Take 1 tablet by mouth nightly as needed for Sleep or Anxiety for up to 55 days.  30 tablet 1    umeclidinium-vilanterol (ANORO ELLIPTA) 62.5-25 MCG/INH AEPB inhaler Inhale 1 puff into the lungs daily 1 each 5    atorvastatin (LIPITOR) 40 MG tablet Take 1 tablet by mouth nightly 90 tablet 3    metoprolol succinate (TOPROL XL) 100 MG extended release tablet Take 1 tablet by mouth daily 90 tablet 3    furosemide (LASIX) 20 MG tablet 20 mg daily 90 tablet 3    rOPINIRole (REQUIP) 0.5 MG tablet TAKE 1 TABLET BY MOUTH EVERY EVENING 90 tablet 1    amiodarone (CORDARONE) 200 MG tablet Take 1 tablet by mouth daily 90 tablet 3    rivaroxaban (XARELTO) 20 MG TABS tablet Take 1 tablet by mouth daily (with breakfast) 90 tablet 3    potassium chloride (KLOR-CON M) 10 MEQ extended release tablet Take 1 tablet by mouth daily 180 tablet 3    aspirin 81 MG chewable tablet Take 1 tablet by mouth daily 30 tablet 3    albuterol sulfate  (90 Base) MCG/ACT inhaler Inhale 2 puffs into the lungs every 6 hours as needed for Wheezing 1 Inhaler 4    loratadine (CLARITIN) 10 MG tablet Take 10 mg by mouth daily       Multiple Vitamins-Minerals (CENTRUM SILVER PO) Take 1 tablet by mouth daily       ENTRESTO 49-51 MG per tablet Take 0.5 tablets by mouth 2 times daily      fluocinonide (LIDEX) 0.05 % ointment Apply topically 2 times daily 60 g 3     No current facility-administered medications for this visit. Allergies   Allergen Reactions    Penicillins Rash    Sulfa Antibiotics Rash       SUBJECTIVE:   Review of Systems   Constitutional: Negative for activity change, appetite change, diaphoresis and fatigue. Respiratory: Negative for cough and shortness of breath. Cardiovascular: Negative for chest pain, palpitations and leg swelling. Gastrointestinal: Negative for abdominal distention, nausea and vomiting. Neurological: Negative for weakness, light-headedness and headaches. Hematological: Negative for adenopathy. Psychiatric/Behavioral: Negative for sleep disturbance. OBJECTIVE:   Today's Vitals:  /60   Pulse 54   Ht 5' 8\" (1.727 m)   Wt 153 lb 12.8 oz (69.8 kg)   SpO2 100%   BMI 23.39 kg/m²     Physical Exam  Vitals reviewed. Constitutional:       General: He is not in acute distress. Appearance: Normal appearance. He is well-developed. He is not diaphoretic. HENT:      Head: Normocephalic and atraumatic. Eyes:      Conjunctiva/sclera: Conjunctivae normal.   Cardiovascular:      Rate and Rhythm: Normal rate and regular rhythm. Heart sounds: Normal heart sounds. No murmur heard. Pulmonary:      Effort: Pulmonary effort is normal. No respiratory distress. Breath sounds: Normal breath sounds. No wheezing or rales. Abdominal:      General: Bowel sounds are normal. There is no distension. Palpations: Abdomen is soft. Tenderness: There is no abdominal tenderness. Musculoskeletal:         General: Normal range of motion.       Cervical back: Normal range of motion and discussion regarding  revascularization.     Findings and plan of care was discussed with the patient. Questions  were answered.           Dolly Hayward MD     D: 01/08/2021 9:52:45       T: 01/08/2021 10:46:26     [email or web address]  Job#: [id number]     Doc#: [id number]      Results reviewed:  BNP: No results found for: BNP  CBC:   Lab Results   Component Value Date    WBC 8.2 04/06/2021    RBC 4.39 04/06/2021    RBC 4.84 11/07/2011    HGB 13.9 04/06/2021    HCT 42.7 04/06/2021     04/06/2021     CMP:    Lab Results   Component Value Date     09/14/2021    K 4.8 09/14/2021    K 4.4 04/06/2021     09/14/2021    CO2 21 09/14/2021    BUN 21 09/14/2021    CREATININE 1.4 09/14/2021    LABGLOM 50 09/14/2021    GLUCOSE 86 09/14/2021    GLUCOSE 94 11/07/2011    CALCIUM 9.3 09/14/2021     Hepatic Function Panel:    Lab Results   Component Value Date    ALKPHOS 60 04/05/2021    ALT 16 04/05/2021    AST 21 04/05/2021    PROT 7.6 04/05/2021    BILITOT 0.2 04/05/2021    BILIDIR <0.2 01/11/2021    LABALBU 4.0 04/05/2021    LABALBU 4.3 11/07/2011     Magnesium:    Lab Results   Component Value Date    MG 2.0 09/07/2021     PT/INR:    Lab Results   Component Value Date    INR 1.25 04/06/2021     Lipids:    Lab Results   Component Value Date    TRIG 96 01/07/2021    HDL 36 01/07/2021    LDLCALC 71 01/07/2021    LDLDIRECT 79.82 01/11/2021       ASSESSMENT AND PLAN:   The patient's condition/symptoms are Stable: No clinical evidence of fluid overload today. Continue current medical regimen without changes at present time. Diagnosis Orders   1. Chronic systolic congestive heart failure, NYHA class 1 (HCC)  Basic Metabolic Panel    Magnesium    Brain Natriuretic Peptide   2. Paroxysmal atrial fibrillation (HCC)     3. Atherosclerotic heart disease of native coronary artery with other forms of angina pectoris ([address]     4.  Long term current use of amiodarone  TSH with Reflex     Continue:  GDMT:   ACE/ARB/DEANNAI Anne-Marie Haynes 24/26 BID   BB - Metoprolol 100 daily   Diuretic - Lasix 20 mg daily   AA - none  SGLT2 -  none  Vasodilator - none  Other - amiodarone 400 BID, ASA 81, Potassium 10 daily, Xarelto 20 daily    Tolerating above noted HF meds, no ill side effects noted. Will continue to monitor kidney function and electrolytes. Will optimize as tolerated. Pt is compliant w/ medications. Total visit time of 20 minutes has been spent with patient on education of symptoms, management, medication, and plan of care; as well as review of chart: labs, ECHO, radiology reports, etc.   I personally spent more then 50% of the appt time face to face with the patient. Daily weights  Fluid restriction of 2 Liters per day  Limit sodium in diet to around 5012-7535 mg/day  Monitor BP  Activity as tolerated     Stable, appears Euvolemic  Lab reviewed - stable  Repeat blood work Today  BP/HR stable   No med changes today  Continue diet/fluid adherence  Continue daily wts. F/U w/ Cardiology  F/U in clinic in 9 months      Patient was instructed to call the [facility] for any changes in symptoms as noted in AVS.      Return in about 9 months (around 11/1/2022). or sooner if needed     Patient given educational materials - see patient instructions. We discussed the importance of weighing oneself and recording daily. We also discussed the importance of a low sodium diet, higher sodium foods to avoid and better low sodium food options. Patient verbalizes understanding of plan of care using teach back method, and is agreeable to the treatment plan.        Electronically signed by ARCADIO Whaley CNP on 2/1/2022 at 1:14 PM

## 2022-02-15 NOTE — ED PROVIDER NOTES
[address] [facility] Encounter      [facility]       Chief Complaint   Patient presents with    Chest Congestion    Epistaxis       Nurses Notes reviewed and I agree except as noted in the HPI. HISTORY OF PRESENT ILLNESS   Madison Kohli is a 70 y.o. male who presents for evaluation of cough and epistaxis. Onset of cough between 1 and 2 weeks ago, unchanged. Cough waxing, waning. No fever, wheezing. History of COPD. Denies chest pain. Also complains of epistaxis, intermittent. No current bleeding. Patient on anticoagulation. REVIEW OF SYSTEMS     Review of Systems   Constitutional: Negative for chills, diaphoresis, fatigue and fever. HENT: Positive for nosebleeds. Negative for congestion, ear pain, rhinorrhea, sore throat and trouble swallowing. Eyes: Negative for discharge and redness. Respiratory: Positive for cough, chest tightness and wheezing. Negative for shortness of breath. Cardiovascular: Negative for chest pain. Gastrointestinal: Negative for diarrhea, nausea and vomiting. Genitourinary: Negative for decreased urine volume. Musculoskeletal: Negative for neck pain and neck stiffness. Skin: Negative for rash. Neurological: Negative for headaches. Hematological: Negative for adenopathy. Psychiatric/Behavioral: Negative for sleep disturbance. PAST MEDICAL HISTORY         Diagnosis Date    Asthma     Atrial fibrillation with RVR ([address] 04/05/2021    [employer] dual pacemaker  04/15/2021    Collagenous colitis 2021    per scope 2021    Colon polyps 2021    dr Priscilla Zamora    COPD, mild ([address]     Eczema of hand     HTN (hypertension)     Hyperlipidemia     Smoker        SURGICAL HISTORY     Patient  has a past surgical history that includes Nasal sinus surgery (06/2008); Colonoscopy (06/10/2021); other surgical history (DEC 7TH 2012 DR Michelle Malik [facility] ); hernia repair; Skin cancer excision (09/2014);  Tooth Extraction (02/2017); Coronary artery bypass graft (01/12/2021); Coronary artery bypass graft (N/A, 01/12/2021); and Cardiac surgery. CURRENT MEDICATIONS       Discharge Medication List as of 2/15/2022  4:24 PM      CONTINUE these medications which have NOT CHANGED    Details   HYDROXYZINE HCL PO Take by mouthHistorical Med      ENTRESTO 49-51 MG per tablet Take 0.5 tablets by mouth 2 times daily, DAWHistorical Med      furosemide (LASIX) 20 MG tablet Take 1 tablet by mouth every other day, Disp-90 tablet, R-1**Patient requests 90 days supply**NO PRINT      fluocinonide (LIDEX) 0.05 % ointment Apply topically 2 times daily, Topical, 2 TIMES DAILY Starting Fri 1/14/2022, Disp-60 g, R-3, Normal      umeclidinium-vilanterol (ANORO ELLIPTA) 62.5-25 MCG/INH AEPB inhaler Inhale 1 puff into the lungs daily, Disp-1 each, R-5Normal      atorvastatin (LIPITOR) 40 MG tablet Take 1 tablet by mouth nightly, Disp-90 tablet, R-3Normal      metoprolol succinate (TOPROL XL) 100 MG extended release tablet Take 1 tablet by mouth daily, Disp-90 tablet, R-3Normal      rOPINIRole (REQUIP) 0.5 MG tablet TAKE 1 TABLET BY MOUTH EVERY EVENING, Disp-90 tablet, R-1Normal      amiodarone (CORDARONE) 200 MG tablet Take 1 tablet by mouth daily, Disp-90 tablet, R-3Normal      rivaroxaban (XARELTO) 20 MG TABS tablet Take 1 tablet by mouth daily (with breakfast), Disp-90 tablet, R-3Normal      aspirin 81 MG chewable tablet Take 1 tablet by mouth daily, Disp-30 tablet, R-3Normal      albuterol sulfate  (90 Base) MCG/ACT inhaler Inhale 2 puffs into the lungs every 6 hours as needed for Wheezing, Disp-1 Inhaler, R-4NO PRINT      loratadine (CLARITIN) 10 MG tablet Take 10 mg by mouth daily Historical Med      Multiple Vitamins-Minerals (CENTRUM SILVER PO) Take 1 tablet by mouth daily Historical Med             ALLERGIES     Patient is is allergic to penicillins and sulfa antibiotics.     FAMILY HISTORY     Patient'sfamily history includes Diabetes in his supraclavicular adenopathy. Musculoskeletal:      Cervical back: Normal range of motion and neck supple. Right lower le+ Edema present. Left lower le+ Edema present. Lymphadenopathy:      Head:      Right side of head: No submental, submandibular, tonsillar, preauricular, posterior auricular or occipital adenopathy. Left side of head: No submental, submandibular, tonsillar, preauricular, posterior auricular or occipital adenopathy. Cervical: No cervical adenopathy. Upper Body:      Right upper body: No supraclavicular adenopathy. Left upper body: No supraclavicular adenopathy. Skin:     General: Skin is warm and dry. Coloration: Skin is not pale. Findings: No rash. Comments: Skin intact, warm and dry to touch, no rashes noted on exposed surfaces. Neurological:      Mental Status: He is alert and oriented to person, place, and time. He is not disoriented. Psychiatric:         Mood and Affect: Mood normal.         Behavior: Behavior is cooperative. DIAGNOSTIC RESULTS   Labs: No results found for this visit on 02/15/22. IMAGING:  No orders to display     URGENT CARE COURSE:     Vitals:    02/15/22 1605   BP: (!) 147/95   Pulse: 57   Resp: 16   Temp: 97.8 °F (36.6 °C)   TempSrc: Temporal   SpO2: 96%       Medications - No data to display  PROCEDURES:  None  FINALIMPRESSION      1. Chronic obstructive pulmonary disease, unspecified COPD type ([facility] [address]    2. Epistaxis        DISPOSITION/PLAN   DISPOSITION Decision To Discharge 02/15/2022 04:23:55 PM  No active bleeding. There is dried blood to the left nare. Slight expiratory wheeze right upper lobe, consistent with COPD exacerbation. Medication as prescribed, take with food. Recommended saline nasal spray over-the-counter for epistaxis. Avoid picking nose. Humidifier at night. If symptoms worsen return or go to ER.   PATIENT REFERRED TO:  Yolie Hardy MD  [address] [place] [address] [place] [address]  [phone]      Follow-up with PCP as needed. Medication as prescribed. Saline nasal spray over-the-counter. Avoid picking nose. If symptoms worsen go to ER.     DISCHARGE MEDICATIONS:  Discharge Medication List as of 2/15/2022  4:24 PM      START taking these medications    Details   predniSONE (DELTASONE) 20 MG tablet Take 1 tablet by mouth 2 times daily for 5 days, Disp-10 tablet, R-0Normal           Discharge Medication List as of 2/15/2022  4:24 PM          ARCADIO Armijo CNP, APRN - CNP  02/15/22 1621

## 2022-02-15 NOTE — ED NOTES
To [facility] with complaints of chest congestion. Started about 3 weeks ago. Called pcp and was told to come here.  Also complains of intermittent nose bleeds     Syeda Martinez RN  02/15/22 1749

## 2022-03-07 PROBLEM — J18.9 PNEUMONIA: Status: ACTIVE | Noted: 2022-01-01

## 2022-03-07 NOTE — ED NOTES
Ambulated patient in the hallway and his pulse ox went anywhere from 92% to 96%. He denied being short of breath.      Donley Kocher, LPN  67/10/20 3447

## 2022-03-07 NOTE — PROGRESS NOTES
See history and physical       IMPRESSION     bilateral pneumonia     copd with exacerbation     chf diastolic  acute  on chronic       ashd   Stable     par atrial fib    Hx  Of pacemaker     PLan        Admit  With  Monitor and iv antibiotics and aerosols      did get a dose  Of solumedrol  And start back  Inhalers      follow troponin

## 2022-03-07 NOTE — ED NOTES
Pt presents to the ER for shortness of breath for the past 3 days. Pt states that he has been congested for the past few weeks and now he is short of breath. Pt's wife states that he is taking 3 antihistamine's and she does not think that is correct.  Pt has a heart history and his wife states that the past few weeks he has been confused when he wakes up and that his o2 was 85% when he woke up this am.     Andrea Morelos  03/07/22 0900

## 2022-03-07 NOTE — ED PROVIDER NOTES
[facility] EMERGENCY DEPT      CHIEF COMPLAINT       Chief Complaint   Patient presents with    Shortness of Breath       Nurses Notes reviewed and I agree except as noted in the HPI. HISTORY OF PRESENT ILLNESS    Kevan Lan is a 70 y.o. male who presents for shortness of breath. Patient has been sick for 2 weeks with nasal congestion, productive cough, and dyspnea. Patient thought he had COVID. Wife is concerned as patient has been taking Claritin and was also put on hydroxyzine 10 mg nightly as needed. She thought he was on a third antihistamine but could not find the pill. Wife also reports patient has been taking 40 mg of Lasix instead of his prescribed 20. She reports he is confused in the mornings and has had a low pulse ox noted at 85% this morning. Patient reports weakness in the evenings. Patient denies fever, chills, chest pain, nausea, vomiting, change in appetite, urinary symptoms, or other complaints. Patient is a smoker. REVIEW OF SYSTEMS     Review of Systems   Constitutional: Negative for activity change, appetite change, chills, fatigue and fever. HENT: Positive for congestion. Negative for ear pain, rhinorrhea, sinus pressure and sore throat. Eyes: Negative for discharge and itching. Respiratory: Positive for cough and shortness of breath. Cardiovascular: Negative for chest pain. Gastrointestinal: Negative for abdominal pain, diarrhea, nausea and vomiting. Endocrine: Negative for polyuria. Genitourinary: Negative for decreased urine volume, dysuria and frequency. Musculoskeletal: Negative for gait problem and myalgias. Skin: Negative for rash. Neurological: Positive for weakness. Negative for dizziness, light-headedness and headaches. Hematological: Negative for adenopathy. Psychiatric/Behavioral: Negative for confusion and sleep disturbance.         PAST MEDICAL HISTORY    has a past medical history of Asthma, Atrial fibrillation with RVR ([facility] [address] [employer] dual pacemaker , Collagenous colitis, Colon polyps, COPD, mild ([address] Eczema of hand, HTN (hypertension), Hyperlipidemia, and Smoker. SURGICAL HISTORY      has a past surgical history that includes Nasal sinus surgery (2008); Colonoscopy (06/10/2021); other surgical history (DEC 7TH 2012 DR Armando Horvath [facility] ); hernia repair; Skin cancer excision (2014); Tooth Extraction (2017); Coronary artery bypass graft (2021); Coronary artery bypass graft (N/A, 2021); and Cardiac surgery. CURRENT MEDICATIONS       Previous Medications    ALBUTEROL SULFATE  (90 BASE) MCG/ACT INHALER    Inhale 2 puffs into the lungs every 6 hours as needed for Wheezing    AMIODARONE (CORDARONE) 200 MG TABLET    Take 1 tablet by mouth daily    ASPIRIN 81 MG CHEWABLE TABLET    Take 1 tablet by mouth daily    ATORVASTATIN (LIPITOR) 40 MG TABLET    Take 1 tablet by mouth nightly    ENTRESTO 49-51 MG PER TABLET    Take 0.5 tablets by mouth 2 times daily    FLUOCINONIDE (LIDEX) 0.05 % OINTMENT    Apply topically 2 times daily    FUROSEMIDE (LASIX) 20 MG TABLET    Take 2 tablets by mouth daily    HYDROXYZINE HCL PO    Take by mouth    LORATADINE (CLARITIN) 10 MG TABLET    Take 10 mg by mouth daily     METOPROLOL SUCCINATE (TOPROL XL) 100 MG EXTENDED RELEASE TABLET    Take 1 tablet by mouth daily    MULTIPLE VITAMINS-MINERALS (CENTRUM SILVER PO)    Take 1 tablet by mouth daily     RIVAROXABAN (XARELTO) 20 MG TABS TABLET    Take 1 tablet by mouth daily (with breakfast)    ROPINIROLE (REQUIP) 0.5 MG TABLET    TAKE 1 TABLET BY MOUTH EVERY EVENING    UMECLIDINIUM-VILANTEROL (ANORO ELLIPTA) 62.5-25 MCG/INH AEPB INHALER    Inhale 1 puff into the lungs daily       ALLERGIES     is allergic to penicillins and sulfa antibiotics. FAMILY HISTORY     He indicated that his mother is . He indicated that his father is . He indicated that his sister is alive. He indicated that his brother is alive. family history includes Diabetes in his brother; Heart Disease in his brother, father, and mother. SOCIAL HISTORY    reports that he has been smoking cigarettes. He has a 40.00 pack-year smoking history. He has never used smokeless tobacco. He reports current alcohol use. He reports that he does not use drugs. PHYSICAL EXAM     INITIAL VITALS:  height is 5' 8\" (1.727 m) and weight is 148 lb (67.1 kg). His oral temperature is 98.4 °F (36.9 °C). His blood pressure is 141/58 (abnormal) and his pulse is 60. His respiration is 16 and oxygen saturation is 94%. Physical Exam  Constitutional:       Appearance: Normal appearance. He is well-developed. He is not ill-appearing or diaphoretic. HENT:      Head: Normocephalic and atraumatic. Right Ear: Hearing normal.      Left Ear: Hearing normal.      Nose: Nose normal. No rhinorrhea. Mouth/Throat:      Lips: Pink. Mouth: Mucous membranes are moist.      Pharynx: Oropharynx is clear. Eyes:      General: Lids are normal. No scleral icterus. Extraocular Movements: Extraocular movements intact. Conjunctiva/sclera: Conjunctivae normal.      Pupils: Pupils are equal, round, and reactive to light. Neck:      Trachea: Trachea normal.   Cardiovascular:      Rate and Rhythm: Normal rate and regular rhythm. Heart sounds: Normal heart sounds. No murmur heard. Pulmonary:      Effort: Pulmonary effort is normal.      Breath sounds: Normal air entry. Examination of the right-lower field reveals wheezing. Examination of the left-lower field reveals wheezing. Wheezing present. No decreased breath sounds or rhonchi. Abdominal:      General: There is no distension. Palpations: Abdomen is soft. Tenderness: There is no abdominal tenderness. Musculoskeletal:      Cervical back: Normal range of motion and neck supple. No rigidity. Comments:  Well perfused; movement normal as observed; no signs of DVT   Lymphadenopathy:      Cervical: No cervical adenopathy. Skin:     General: Skin is warm and dry. Findings: No rash. Neurological:      General: No focal deficit present. Mental Status: He is alert. Sensory: Sensation is intact. Motor: Motor function is intact. Gait: Gait is intact. Psychiatric:         Mood and Affect: Mood normal.         Speech: Speech normal.         Behavior: Behavior is cooperative. DIFFERENTIAL DIAGNOSIS:   Including but not limited to: Pneumonia, Covid, influenza, hypoxia, COPD exacerbation, ACS    DIAGNOSTIC RESULTS     EKG: All [facility]'s [facility] who either signs or Co-signs this chart in the absence of a cardiologist.  Ventricular Rate BPM 60 P    Atrial Rate BPM 60 P    P-R Interval ms 148 P    QRS Duration ms 110 P    Q-T Interval ms 432 P    QTc Calculation (Bazett) ms 432 P    P Axis degrees 88 P    R Axis degrees 87 P    T Axis degrees 75 P         Narrative & Impression    Normal sinus rhythm  Possible Left atrial enlargement  Incomplete right bundle branch block  Borderline ECG  When compared with ECG of 14-SEP-2021 15:03,  Sinus rhythm has replaced Electronic atrial pacemaker  Incomplete right bundle branch block has replaced Right bundle branch block               RADIOLOGY: non-plain film images(s) such as CT,Ultrasound and MRI are read by the radiologist.  Plain radiographic images are visualized and preliminarily interpreted by the emergency physician unless otherwise stated below. XR CHEST (2 VW)   Final Result   1. Bilateral lower lobe consolidative opacities are seen, right greater than left. Findings relate to pneumonia in the right clinical setting. **This report has been created using voice recognition software. It may contain minor errors which are inherent in voice recognition technology. **      Final report electronically signed by Dr Nestor Washington on 3/7/2022 9:45 AM          LABS:   Labs Reviewed   CBC WITH AUTO DIFFERENTIAL - Abnormal; Notable for the following components:       Result Value    WBC 12.9 (*)     RBC 3.92 (*)     Hemoglobin 13.2 (*)     Hematocrit 40.2 (*)     .6 (*)     MCH 33.7 (*)     RDW-SD 49.8 (*)     Segs Absolute 10.3 (*)     All other components within normal limits   COMPREHENSIVE METABOLIC PANEL - Abnormal; Notable for the following components:    CREATININE 1.5 (*)     BUN 30 (*)     All other components within normal limits   BRAIN NATRIURETIC PEPTIDE - Abnormal; Notable for the following components:    Pro-BNP 1931.0 (*)     All other components within normal limits   GLOMERULAR FILTRATION RATE, ESTIMATED - Abnormal; Notable for the following components:    Est, Glom Filt Rate 46 (*)     All other components within normal limits   COVID-19, RAPID   RAPID INFLUENZA A/B ANTIGENS   TROPONIN   PROCALCITONIN   ANION GAP   OSMOLALITY       EMERGENCY DEPARTMENT COURSE:   Vitals:    Vitals:    03/07/22 0951 03/07/22 1108 03/07/22 1113 03/07/22 1135   BP:  (!) 141/58     Pulse:  60     Resp:  16     Temp:       TempSrc:       SpO2: 96% 93% 91% 94%   Weight:       Height:           Patient was seen in the emergency department during the global pandemic, when there was surge capacity and regional healthcare crisis. MDM:  The patient was seen by me in the emergency room for shortness of breath. Physical exam revealed a pleasant 51-year-old gentleman with mildly diminished breath sounds throughout and sparse wheezing. Patient was nontoxic-appearing and in no respiratory distress. Vital signs reviewed and noted stable. Old records were reviewed. Appropriate laboratory and imaging studies were ordered and reviewed upon completion. Pertinent findings: Bilateral lower lobe pneumonia; WBC 12.9, creatinine 1.5, BNP 1931    Interventions: Rocephin, saline    Reexamination: Patient remained stable with good vital signs.     Results were discussed with the patient and wife as well as desire for admission and they were amenable. Dr. Rajat Dawson  was consulted and graciously accepted admission. The patient was admitted to the hospital in fair condition. CRITICAL CARE:   During my workup of this patient, it did become clear to me the critical nature of this patient's illness which did require my constant attention, and a critical care time 30 minutes was given. CONSULTS:  1130, 1200: Dr. Paula Mccarty (internal medicine)    PROCEDURES:  None    FINAL IMPRESSION      1. Dyspnea and respiratory abnormalities    2. Pneumonia of both lower lobes due to infectious organism    3. COPD exacerbation ([address]    4. Medication taken at higher dose than recommended          DISPOSITION/PLAN     1. Dyspnea and respiratory abnormalities    2. Pneumonia of both lower lobes due to infectious organism    3. COPD exacerbation ([address]    4.  Medication taken at higher dose than recommended    Admission      (Please note that portions of this note were completed with a voice recognition program.  Efforts were made to edit the dictations but occasionally words are mis-transcribed.)    Frannie Hussein PA-C 03/07/22 12:04 PM    SARA Hill PA-C  03/07/22 6229

## 2022-03-07 NOTE — ED NOTES
Pt resting in bed. Wife at bedside no concerns voiced. Call light within reach.  Will monitor      Valerie Toledo RN  03/07/22 4631

## 2022-03-07 NOTE — ED NOTES
ED to inpatient nurses report    Chief Complaint   Patient presents with    Shortness of Breath      Present to ED from home  LOC: alert and orientated to name, place, date  Vital signs   Vitals:    03/07/22 1108 03/07/22 1113 03/07/22 1135 03/07/22 1240   BP: (!) 141/58   138/68   Pulse: 60   55   Resp: 16   16   Temp:       TempSrc:       SpO2: 93% 91% 94% 96%   Weight:       Height:          Oxygen Baseline 92    Current needs required 2l nc Bipap/Cpap No  LDAs:   Peripheral IV 03/07/22 Right Antecubital (Active)   Site Assessment Clean;Dry; Intact 03/07/22 1241   Line Status Blood return noted; Flushed;Normal saline locked;Sluggish blood return 03/07/22 0920   Dressing Status Clean;Dry; Intact 03/07/22 1241     Mobility: Requires assistance * 1  Pending ED orders: NONE  Present condition: stable, alert    Electronically signed by Gabriella Loera RN on 3/7/2022 at 1:44 PM     Gabriella Loera RN  03/07/22 4818

## 2022-03-07 NOTE — ED NOTES
Resting in bed. Voiced no concerns. Wife at bedside.  Will monitor      Sariah Mena RN  03/07/22 8406

## 2022-03-07 NOTE — PROGRESS NOTES
Pt admitted to  [phone] in a wheelchair. Complaints: Shortness of breath  IV none infusing. Vital signs obtained. Assessment and data collection initiated. Two nurse skin assessment performed by Ibis Cherry RN and Andrés Hall. Oriented to room. Policies and procedures for [facility] explained. Ibis Cherry RN discussed hourly rounding with patient addressing 5 P's. Fall prevention and safety brochure discussed with patient. Bed alarm on. Call light in reach.

## 2022-03-07 NOTE — ED NOTES
Patient transported to [address] [place] [address]. Floor contacted before transport. Spoke with Mari Xie.      Dina Hanson  03/07/22 3064

## 2022-03-08 NOTE — CONSULTS
[facility] [address] [place] [address]  Cardiology Consult      Patient:  Yas Doyle  YOB: 1950    MRN: [id number]   Acct: [de-identified]     Primary Care Physician: Rossy Lepe MD    REASON FOR CONSULT:    Afib RVR     CHIEF COMPLAINT:    SOB    HISTORY OF PRESENT ILLNESS:    Yas Doyle is a pleasant 70year old male patient with past medical history that includes:   Past Medical History:   Diagnosis Date    Asthma     Atrial fibrillation with RVR ([address] 04/05/2021    [employer] dual pacemaker  04/15/2021    Collagenous colitis 2021    per scope 2021    Colon polyps 2021    dr Gwendolyn Resendiz    COPD, mild ([address]     Eczema of hand     HTN (hypertension)     Hyperlipidemia     Smoker    The patient was admitted to the hospital on 3/7/2022 after he presented to [facility] ER with two weeks history of shortness of breath, dyspnea on exertion, nasal congestion, productive cough. Patient denies chest pain, syncope. The patient was given Rocephin in ER for pneumonia. Troponin <0.01, <0.01, <0.01. ProBNP 1,931. CXR revealed bilateral lower lung consolidations. Cr was 1.5, down to 1.4 today. He has h/o CKD, Cr baseline 1.4-1.8 ?. WBC count is elevated, 13,800 today. Patient has h/o ischemic cardiomyopathy, CAD, 3 vessel CABG, ZAHRA clipping in 01/2021. She is s/p pacemaker placement for tachy-ericka syndrome. His EF was as low as 25% in the past, improved. Echocardiogram on 06/2021 revealed an EF of 45-50%. Patient is followed at CHF clinic, lasix was recently reduced to 20 mg po daily.    All labs, EKG's, diagnostic testing and images as well as cardiac cath, stress testing   were reviewed during this encounter    CARDIAC TESTING  Echo:   ECHO: Results for orders placed during the hospital encounter of 06/15/21    Echo 2D w doppler w color complete    Narrative  Transthoracic Echocardiography Report (TTE)    Demographics    Patient Name    Morelia Patiño Gender               Male    MR # [id number]    Race                     Ethnicity    Account #       [de-identified]    Room Number    Accession       [id number]   Date of Study        06/15/2021  Number    Date of Birth   1950   Referring Physician  Whit Sanchez    Age             79 year(s)   Marylin Lama  T    Interpreting         Whit Jensen MD  Physician    Procedure    Type of Study    TTE procedure:ECHOCARDIOGRAM COMPLETE 2D W DOPPLER W COLOR. Procedure Date  Date: 06/15/2021 Start: 08:43 AM    Study Location: Echo Lab  Technical Quality: Adequate visualization    Indications:Cardiomyopathy. Additional Medical History:CABG, bradycardia, smoker, coronary artery  disease, hyperlipidemia, asthma, hypertension, COPD, atrial fibrillation,  congestive heart failure, [employer] pacemaker    Patient Status: Routine    Height: 68 inches Weight: 160 pounds BSA: 1.86 m^2 BMI: 24.33 kg/m^2    BP: 122/60 mmHg    Allergies  - See Epic.    - Penicillins.    - Sulfa drugs. Conclusions    Summary  Normal left ventricular size and systolic function. There were no regional wall motion abnormalities. Wall thickness was within normal limits. Ejection fraction was estimated at 45-50%. Doppler parameters were consistent with abnormal left ventricular  relaxation (grade 1 diastolic dysfunction). IVC size is within normal limits with normal respiratory phasic changes. Signature    ----------------------------------------------------------------  Electronically signed by Whit Jensen MD (Interpreting  physician) on 06/15/2021 at 04:26 PM  ----------------------------------------------------------------    Findings    Mitral Valve  The mitral valve structure was normal with normal leaflet separation. DOPPLER: The transmitral velocity was within the normal range with no  evidence for mitral stenosis. There was no evidence of mitral  regurgitation.     Aortic Valve  The aortic valve was trileaflet with normal thickness and cuspal  separation. DOPPLER: Transaortic velocity was within the normal range with  no evidence of aortic stenosis. There was no evidence of aortic  regurgitation. Tricuspid Valve  The tricuspid valve structure was normal with normal leaflet separation. DOPPLER: There was no evidence of tricuspid stenosis. There was no  evidence of tricuspid regurgitation. Pulmonic Valve  The pulmonic valve leaflets exhibited normal thickness, no calcification,  and normal cuspal separation. DOPPLER: The transpulmonic velocity was  within the normal range with trace regurgitation. Left Atrium  Left atrial size was normal.    Left Ventricle  Normal left ventricular size and systolic function. There were no regional wall motion abnormalities. Wall thickness was within normal limits. Ejection fraction was estimated at 45-50%. Doppler parameters were consistent with abnormal left ventricular  relaxation (grade 1 diastolic dysfunction). Right Atrium  Right atrial size was normal.    Right Ventricle  The right ventricular size was normal with normal systolic function and  wall thickness. Pericardial Effusion  The pericardium was normal in appearance with no evidence of a pericardial  effusion. Pleural Effusion  No evidence of pleural effusion. Aorta / Great Vessels  IVC size is within normal limits with normal respiratory phasic changes.     M-Mode/2D Measurements & Calculations    LV Diastolic   LV Systolic Dimension:    AV Cusp Separation: 1.7 cmLA  Dimension: 4.7 3.6 cm                    Dimension: 3.2 cmAO Root  cm             LV Volume Diastolic: 74.6 Dimension: 3 cmLA Area: 19.4 cm^2  LV FS:23.4 %   ml  LV PW          LV Volume Systolic: 83.6  Diastolic: 0.8 ml  cm             LV EDV/LV EDV Index: 02.2 RV Diastolic Dimension: 2.5 cm  Septum         ml/35 m^2LV ESV/LV ESV  Diastolic: 1   Index: 83.9 ml/19 m^2     LA/Aorta: 1.07  cm             EF Calculated: 45.9 %  LA volume/Index: 59.6 ml /32m^2  LV Length: 6.8 cm    LV Area  Diastolic: 21  cm^2  LV Area  Systolic: 05.3  cm^2    Doppler Measurements & Calculations    MV Peak E-Wave: 58.3 cm/s  AV Peak Velocity: 117  LVOT Peak Velocity: 100  MV Peak A-Wave: 61.7 cm/s  cm/s                   cm/s  MV E/A Ratio: 0.94         AV Peak Gradient: 5.48 LVOT Peak Gradient: 4  MV Peak Gradient: 1.36     mmHg                   mmHg  mmHg  TV Peak E-Wave: 39.4  MV Deceleration Time: 310                         cm/s  msec                                              TV Peak A-Wave: 34.7  MV P1/2t: 91 msec                                 cm/s  MVA by PHT:2.42 cm^2  TV Peak Gradient: 0.62  MV E' Septal Velocity: 5.8 AV DVI (Vmax):0.85     mmHg  cm/s  MV A' Septal Velocity: 7.9                        PV Peak Velocity: 72  cm/s                                              cm/s  MV E' Lateral Velocity:                           PV Peak Gradient: 2.07  9.2 cm/s                                          mmHg  MV A' Lateral Velocity:  7.3 cm/s  E/E' septal: 10.05                                MT ED Velocity: 113 cm/s  E/E' lateral: 6.34    [email or web address] [id number]       CATH/STRESS:      LVEDP 20  IMPRESSION:   1.  Severe ischemic cardiomyopathy. 2.  Multivessel coronary artery disease as discussed above including  severely stenotic lesion of distal left main coronary artery, 70% to 80%  in severity.     RECOMMENDATIONS:  Continue inpatient care.  Continue to monitor the  patient on telemetry.  Continue aspirin.  Continue on the statin.  We  would recommend increasing the dose to 40 mg p.o. daily.  Monitor volume  status.  Consult Cardiovascular Surgery to assess for possible coronary  artery bypass graft surgery.  Heart team discussion regarding  revascularization.     Findings and plan of care was discussed with the patient.  Questions  were answered.     Past Medical History:    Past Medical History:   Diagnosis Date    Asthma     Atrial fibrillation with RVR [address] [address] 04/05/2021    [employer] dual pacemaker  04/15/2021    Collagenous colitis 2021    per scope 2021    Colon polyps 2021    dr Joetta Lundborg    COPD, mild ([facility] [address]     Eczema of hand     HTN (hypertension)     Hyperlipidemia     Smoker        Past Surgical History:    Past Surgical History:   Procedure Laterality Date    CARDIAC SURGERY      COLONOSCOPY  06/10/2021    theresa ccolon polyps and collagenous colitis    CORONARY ARTERY BYPASS GRAFT  01/12/2021    cabg x 3 with lima and atrial appendage clip  dr Villafana Marker GRAFT N/A 01/12/2021    CABG  X 3 WITH DEJAH, Atrial Appendage Clip performed by iNck Llanes MD at [address] [place] [address] Road  06/2008    polyps    OTHER SURGICAL HISTORY  DEC 7TH 2012 DR VANAN [address] [place]     IGS ENDOSCOPIC BI LAT MAXILLARY, ETHMOID, SPHENOID, FRONTAL SINUSOTOMY WITH SEPTOPLASTY AND TURBINOPLASTIES    SKIN CANCER EXCISION  09/2014    Left side of Forehead     TOOTH EXTRACTION  02/2017       Medications Prior to Admission:    Medications Prior to Admission: cetirizine (ZYRTEC) 10 MG tablet, Take 10 mg by mouth daily  triamcinolone (KENALOG) 0.1 % ointment, Apply topically Daily Apply topically once daily  ALPRAZolam (XANAX) 0.5 MG tablet, Take 0.5 mg by mouth nightly as needed for Sleep.  furosemide (LASIX) 20 MG tablet, Take 2 tablets by mouth daily  hydrOXYzine (ATARAX) 10 MG tablet, Take 1 tablet by mouth nightly as needed   ENTRESTO 49-51 MG per tablet, Take 0.5 tablets by mouth 2 times daily  umeclidinium-vilanterol (ANORO ELLIPTA) 62.5-25 MCG/INH AEPB inhaler, Inhale 1 puff into the lungs daily  atorvastatin (LIPITOR) 40 MG tablet, Take 1 tablet by mouth nightly  metoprolol succinate (TOPROL XL) 100 MG extended release tablet, Take 1 tablet by mouth daily  amiodarone (CORDARONE) 200 MG tablet, Take 1 tablet by mouth daily  rivaroxaban (XARELTO) 20 MG TABS tablet, Take 1 tablet by mouth daily (with breakfast)  aspirin 81 MG chewable tablet, Take 1 tablet by mouth daily  albuterol sulfate  (90 Base) MCG/ACT inhaler, Inhale 2 puffs into the lungs every 6 hours as needed for Wheezing  Multiple Vitamins-Minerals (CENTRUM SILVER PO), Take 1 tablet by mouth daily   [DISCONTINUED] fluocinonide (LIDEX) 0.05 % ointment, Apply topically 2 times daily  [DISCONTINUED] rOPINIRole (REQUIP) 0.5 MG tablet, TAKE 1 TABLET BY MOUTH EVERY EVENING    Allergies:    Penicillins and Sulfa antibiotics    Social History:    reports that he has been smoking cigarettes. He has a 40.00 pack-year smoking history. He has never used smokeless tobacco. He reports current alcohol use. He reports that he does not use drugs. Family History:   family history includes Diabetes in his brother; Heart Disease in his brother, father, and mother. REVIEW OF SYSTEMS:  Constitutional: negative for anorexia, chills and fevers,weight change  Skin: negative for new skin rash per patient  HEENT: negative for head trauma or new visual changes  Respiratory: positive for cough  Cardiovascular: negative for  orthopnea, palpitations and syncope. Gastrointestinal: negative for abdominal pain,nausea , vomiting, constipation, diarrhea.   Hematologic/lymphatic: negative for bruising,prolonged bleeding,blood clots  Musculoskeletal:negative for muscle weakness, myalgias,wasting  Neurological: negative for coordination problems, dizziness, gait problems and vertigo  Behavioral/Psych:negative for mood/sleep disturbance      PHYSICAL EXAM:   Vitals:  Patient Vitals for the past 24 hrs:   BP Temp Temp src Pulse Resp SpO2 Weight   03/08/22 1532 -- -- -- -- -- 91 % --   03/08/22 1527 (!) 87/54 -- Oral 63 16 93 % --   03/08/22 1347 -- -- -- -- 16 93 % --   03/08/22 1153 (!) 102/59 97.5 °F (36.4 °C) Oral 61 18 90 % --   03/08/22 0917 -- -- -- -- 16 91 % --   03/08/22 0908 -- -- -- -- 16 (!) 89 % --   03/08/22 0754 (!) 108/58 97.5 °F (36.4 °C) Oral 62 16 93 % --   03/08/22 0614 -- -- -- -- -- (!) 88 % --   03/08/22 0316 (!) 91/57 98.1 °F (36.7 °C) Oral 59 16 96 % 159 lb 6.3 oz (72.3 kg)   03/08/22 0015 91/62 97.3 °F (36.3 °C) Oral 130 18 94 % --   03/07/22 2315 (!) 90/48 -- -- -- -- -- --   03/07/22 2314 102/60 -- -- -- -- -- --   03/07/22 2205 (!) 77/63 -- -- 122 -- -- --   03/07/22 2200 (!) 79/49 -- -- -- -- -- --   03/07/22 2038 (!) 92/51 98.2 °F (36.8 °C) Oral 61 18 95 % --   03/07/22 1751 -- -- -- -- 18 -- --   03/07/22 1630 (!) 153/62 98.6 °F (37 °C) Oral 70 18 96 % --       Last 3 weights: Wt Readings from Last 3 Encounters:   03/08/22 159 lb 6.3 oz (72.3 kg)   02/01/22 153 lb 12.8 oz (69.8 kg)   12/14/21 159 lb 4 oz (72.2 kg)     24 hour intake/output:    Intake/Output Summary (Last 24 hours) at 3/8/2022 1534  Last data filed at 3/8/2022 1415  Gross per 24 hour   Intake 1874.86 ml   Output 675 ml   Net 1199.86 ml     BMI:Body mass index is 24.24 kg/m². General Appearance: alert and oriented to person, place and time, well developed and well- nourished, in no acute distress  Skin: warm and dry, no rash or erythema  Eyes: pupils equal, round, and reactive to light, extraocular eye movements intact, conjunctivae normal  Neck: supple and non-tender without mass, no thyromegaly or thyroid nodules, no cervical lymphadenopathy  Pulmonary/Chest: clear to auscultation bilaterally- no wheezes, rales or rhonchi, normal air movement, no respiratory distress  Cardiovascular: normal rate, regular rhythm, normal S1 and S2, systolic murmur. No rubs, clicks, or gallops, distal pulses intact, no carotid bruits, Negative JVD  Radial Pulses: intact 2+  Abdomen: soft, non-tender, non-distended, normal bowel sounds, no masses or organomegaly  Extremities: no cyanosis, clubbing .  trace Edema  Musculoskeletal: normal range of motion, no joint swelling, deformity or tenderness      RADIOLOGY   XR CHEST (2 VW)    Result Date: 3/7/2022  PROCEDURE: XR CHEST (2 VW) CLINICAL INFORMATION: Shortness of breath. COMPARISON: Chest radiograph 4/7/2021 TECHNIQUE: PA and lateral views of the chest performed. FINDINGS: Hazy opacities are seen in the lower lobes bilaterally right greater than left. Cardiac conduction device is seen with 2 leads. The leads are intact. Left atrial appendage clip is seen. Hyperinflation of the lungs with flattening of the hemidiaphragms that can relate to chronic lung disease or COPD Cardiac silhouette is not enlarged. Prior CABG. No pleural effusion. No pneumothorax. No acute bony abnormality. The bones are  demineralized. Mild degenerative changes of the thoracic spine. Median sternotomy has been performed. Calcified granuloma seen in the right lower lobe. 1. Bilateral lower lobe consolidative opacities are seen, right greater than left. Findings relate to pneumonia in the right clinical setting. **This report has been created using voice recognition software. It may contain minor errors which are inherent in voice recognition technology. ** Final report electronically signed by Dr Carlyle Cota on 3/7/2022 9:45 AM      LABS:  Recent Labs     03/07/22  1829 03/07/22  2343 03/08/22  0725   TROPONINT < 0.010 < 0.010 < 0.010     CBC:   Lab Results   Component Value Date    WBC 13.8 03/08/2022    RBC 3.45 03/08/2022    RBC 4.84 11/07/2011    HGB 11.7 03/08/2022    HCT 35.7 03/08/2022    .5 03/08/2022    MCH 33.9 03/08/2022    MCHC 32.8 03/08/2022    RDW 13.3 06/07/2018     03/08/2022    MPV 10.2 03/08/2022     BMP:    Lab Results   Component Value Date     03/08/2022    K 4.3 03/08/2022     03/08/2022    CO2 18 03/08/2022    BUN 30 03/08/2022    LABALBU 2.7 03/08/2022    LABALBU 4.3 11/07/2011    CREATININE 1.4 03/08/2022    CALCIUM 8.4 03/08/2022    LABGLOM 50 03/08/2022    GLUCOSE 143 03/08/2022    GLUCOSE 94 11/07/2011 Hepatic Function Panel:    Lab Results   Component Value Date    ALKPHOS 50 03/08/2022    ALT 13 03/08/2022    AST 25 03/08/2022    PROT 6.3 03/08/2022    BILITOT 0.4 03/08/2022    BILIDIR <0.2 01/11/2021    LABALBU 2.7 03/08/2022    LABALBU 4.3 11/07/2011     Magnesium:    Lab Results   Component Value Date    MG 1.7 02/01/2022     Warfarin PT/INR:  No components found for: PTPATWAR, PTINRWAR  HgBA1c:    Lab Results   Component Value Date    LABA1C 5.6 01/11/2021     FLP:    Lab Results   Component Value Date    TRIG 96 01/07/2021    HDL 36 01/07/2021    LDLCALC 71 01/07/2021    LDLDIRECT 79.82 01/11/2021     TSH:    Lab Results   Component Value Date    TSH 0.994 03/07/2022     BNP: No results found for: BNP      ASSESSMENT:  1. S/p atrial fibrillation with RVR  2. Paroxysmal atrial fibrillation   3. Acute on chronic HFmrEF (improved EF)  4. H/o tachycardia-bradycardia syndrome  5. S/p pacemaker placement   6. CAD  7. Ischemic CMP  8. S/p 3 vessel CABG, ZAHRA clipping 01/2021  9. COPD/Asthma   10. PNA  11. HTN  12. Dyslipidemia    RECOMMENDATIONS:   Hypoxia, SOB is multifactorial    Patient is on Abx for PNA, undergoing Rx for COPD per Dr Joaquín Pardo He seems to have component of CHF exacerbation    Troponin <0.01, <0.01, <0.01   ProBNP 1,931   CXR revealed bilateral lower lung consolidations   Cr was 1.5, down to 1.4 today. He has h/o CKD, Cr baseline 1.4-1.8 ?  Agree with IV lasix   Daily BMP, daily weight   I/O   Telemetry   EKG yesterday revealed atrial fibrillation with RVR, HR was 128, IVCD noted, PVCs. Patient was given Amiodarone   He converted to NSR. EKG today is c/w NSR   Cont PO Amiodarone, Metoprolol   Stop IV amiodarone    On AZT, will need to monitor QTc interval, check daily EKG   Try to avoid additional QT prolonging meds    Cont Xarelto    ASA   Lipitor     Above findings and plan of care were discussed with patient, questions were answered, agreeable to plan.      Thank you for allowing me to participate in the care of this patient. Please let me know if I can be of any further assistance.       Samantha Christine MD, Etelvina Donohue   3:34 PM  3/8/2022

## 2022-03-08 NOTE — FLOWSHEET NOTE
03/08/22 0105   Treatment Team Notification   Reason for Communication Medication concern  (Afib RVR)   Team Member Name [id number] Team Role Physician Assistant   Method of Communication Secure Message   Response See orders   Notification Time 0105     1:05 AM: Patient was transferred from [facility] for Afib RVR. Patient has history of Afib RVR and a pacemaker. Patient is currently in Afib RVR, sustaining HR in 120-130s. EKG confirmed. Most recent BP was 80/58 (67) . Patient received a 250ml bolus earlier for low BPs. Dr. Jacque Webster wanted cardiology input on whether patient should be placed on a amiadorone drip or cardiazem due to softer BPs. Response at 1:11 AM: Hes been on po amio so as long as xarelto wasnt interrupted, could try amio drip for less BP effect. Hold po amio. 1:13 AM: Do you want me to throw in the order for an amio drip or check with Dr. Jacque Webster first? If so, do you want the amio bolus as well? Response at 1:17 AM: Actually no bolus with BP    1:18 AM  okay, do you want parameters for BP? Response at 1:20 AM: If BP gets any worse after starting, may need to stop. He may just need CV.     1:41 AM: Looks like patient just self converted to NSR. If he jumps up again to afib RVR, are you okay with me starting the drip if BP is stable and HR is consistently elevated? Response 1:41 AM: Yes.

## 2022-03-08 NOTE — PROCEDURES
12 lead EKG completed. Results handed to [facility] AUGIE.  Render Needle CET

## 2022-03-08 NOTE — FLOWSHEET NOTE
03/08/22 0031   Treatment Team Notification   Reason for Communication Abnormal vitals  (Afib RVR)   Team Member Name Dr. Mary Mark   Treatment Team Role Attending Provider   Method of Communication Secure Message   Response Other (Comment)  (see flowsheet)   Notification Time 0031     12:31 AM: Patient just transferred to us from [address] [place] [address]. EKG read Afib RVR, and tele is showing that as well. Patient is sustaining a 120-130 HR. Most recent BP was 91/62 (71). I wasn't sure if you wanted anything to help with rate control because I know his pressures were an issue on 6K. Please advise. Thanks! Response at 12:51 AM: Consult cardiology Dr Yakelin Roberts and see if suggest amiadorone drip versus cardiazem. Discussed with resource nurse     12:53 AM: Do you want a stat consult then? 6K nurse just routine consulted. Do you want the cardiology on call person contacted, or Dr. Yakelin Roberts specifically? Response 1:00 AM: Consult stat and on call cardiologist     2:33 AM: Patient self converted to NSR around 1:40am. Pressures are still soft. Cardio recommended an amio drip with no bolus, but patient self converted before the drip was started. Patient is now sinus ericka, HR at 58, BP was 84/49 (61), and he is slightly lethargic in there. Do you want another bolus to increase patient's BP? Response at 4:19 AM: May try another fluid bolus of 250 ml of 0.9 normal saline     4:24 AM: Just to clarify, go ahead and order a 250 ml bolus, or did you want to see if pressures held up?     Response at 4:26 AM: If Bp still less 100 give the bolus

## 2022-03-08 NOTE — PROGRESS NOTES
Patients wife called for a update on pt. Maame KING informed the pt's wife that he was transferred to  around midnight. Maame KING then transferred the pts wife to the [employer].

## 2022-03-08 NOTE — FLOWSHEET NOTE
03/07/22 1015   Treatment Team Notification   Reason for Communication Evaluate   Team Member Name Dr. Jenise Maxwell   Treatment Team Role Attending Provider   Method of Communication Secure Message   Response See orders     Notified Dr. Jenise Maxwell due to pts bp 79/49 map 64. HR 120s. See MAR for orders.

## 2022-03-08 NOTE — PLAN OF CARE
Problem: Discharge Planning:  Goal: Participates in care planning  Description: Participates in care planning  3/8/2022 1358 by Cheryl Marmolejo RN  Outcome: Ongoing  Note: Patient from home with spouse. Patient plans to return home with spouse at discharge. Problem: Gas Exchange - Impaired:  Goal: Levels of oxygenation will improve  Description: Levels of oxygenation will improve  Outcome: Ongoing  Note: O2 saturation remains greater than 90% on 3L nasal cannula. Patient does not wear any oxygen at home. Lung sounds are clear and diminished. Denies any SOB with exertion or rest.     Problem: Tissue Perfusion - Cardiopulmonary, Altered:  Goal: Absence of angina  Description: Absence of angina  Outcome: Ongoing  Note: Denies any chest pain at this time. Goal: Hemodynamic stability will improve  Description: Hemodynamic stability will improve  Outcome: Ongoing  Note:   Vitals:    03/08/22 0908 03/08/22 0917 03/08/22 1153 03/08/22 1347   BP:   (!) 102/59    Pulse:   61    Resp: 16 16 18 16   Temp:   97.5 °F (36.4 °C)    TempSrc:   Oral    SpO2: (!) 89% 91% 90% 93%   Weight:       Height:       VSS. Vital signs obtained every 4 hours. Continuous cardiac monitor remains in place. NSR      Problem: Falls - Risk of:  Goal: Will remain free from falls  Description: Will remain free from falls  Outcome: Ongoing  Note: No falls noted this shift. Fall risk assessment completed. Hourly rounding performed. Bed locked in lowest position, bed alarm on, call light and personal items within reach, and fall sign posted. Patient ambulates to the bathroom with staff assistance nearby. Problem: Pain:  Goal: Pain level will decrease  Description: Pain level will decrease  Outcome: Ongoing  Note: Pain Assessment: 0-10  Pain Level: 0   Patient's Stated Pain Goal: No pain   Is pain goal met at this time?   Yes     Problem: Skin Integrity:  Goal: Will show no infection signs and symptoms  Description: Will show no infection signs and symptoms  Outcome: Ongoing  Note: No new skin lesions noted this shift. Patient encouraged to reposition every two hours. Skin assessments completed and ongoing. Patient ambulates independently. Care plan reviewed with patient and spouse. Patient and spouse verbalize understanding of the plan of care and contribute to goal setting.     Electronically signed by Aric Mason RN on 3/8/2022 at 2:01 PM

## 2022-03-08 NOTE — PROGRESS NOTES
Pt admitted to 4K20 via 6K bed. Complaints: Afib RVR. IV normal saline infusing into the antecubital right, condition patent and no redness. IV site free of s/s of infection or infiltration. Vital signs obtained. Assessment and data collection initiated. Two nurse skin assessment performed by Ruby New and Cristhian Emmanuel RN. Oriented to room. Policies and procedures for  explained. All questions answered with no further questions at this time. Fall prevention and safety brochure discussed with patient. Bed alarm on. Call light in reach.

## 2022-03-08 NOTE — PROGRESS NOTES
Patient's wife called this morning for an update on patient status. Questions answered and updates given. New patient room number given to wife as well. Wife stated she will be up this morning.

## 2022-03-08 NOTE — H&P
[address] [place] [address]                              HISTORY AND PHYSICAL    PATIENT NAME: Denise Gil                       :        1950  MED REC NO:   [id number]                           ROOM:       0020  ACCOUNT NO:   [de-identified]                           ADMIT DATE: 2022  PROVIDER:     Chrissy Herrmann M.D.    [address]:  Shortness of breath and cough. HISTORY:  This is a 59-year-old male, status post coronary artery bypass  grafting with a history of cardiomyopathy with pacemaker that had  improvement, now presents with increased productive cough, dyspnea, and  some persistent shortness of breath being present. The patient has been  having symptoms for the past couple weeks of increasing cough,  congestion symptoms being present. Most recently, he has been followed  by the congestive heart failure clinic and he states his Lasix was  decreased from 40 down to 20. There is some confusion of the meds. Pulse oximetry done today was down around 85%. He has not been eating. No significant fever however. He has had some intermittent cough being  present with more shortness of breath with various activities being  present. He has _some prolong expiration as_well as_some wheeze present in addition. He denies any  chest pressure, heaviness, or tightness, but increasing cough and at  times, more fatigue being present. Because of the above, he presented  to the emergency room. Once in the emergency room, it was noted on  chest x-ray that he had bilateral pneumonia being present. His heart  rate was noted to be stable. There was some intermittent wheeze and the  patient was given some aerosols with improvement. His BNP was slightly  elevated. He was given a dose of Solu-Medrol in addition.   He is now  being admitted with underlying pneumonia with COPD with exacerbation and  probably some early acute diastolic congestive heart failure as his BNP  was noted to be elevated in addition. He is now being admitted because  of the above symptoms at this time. PAST MEDICAL HISTORY:  Notes:  MEDICATIONS:  At the time of admission note that he has been on Lasix 20  mg a day; at one point, he had been on 40 mg. Xanax 0.5 mg at night,  Entresto 40/51 half tablet twice a day, Anoro inhaler one puff twice a  day, Lipitor 40 mg a day, Toprol- mg a day, amiodarone 200 mg  daily, Xarelto 20 mg a day, aspirin 81 mg a day, albuterol inhaler,  multivitamin, Claritin. ALLERGIES:  HIS ALLERGIES ARE TO PENICILLIN AND SULFA, BUT HE IS ABLE TO  TAKE CEPHALOSPORINS. SURGERIES:  Note he had a colonoscopy in 2021. Original coronary  artery bypass grafting in 2021 in which he had a coronary artery  bypass graft x3. He had a hernia repair, nasal surgery. He had some  significant endoscopic sinus surgery in , left-sided forehead skin  cancer in , some multiple teeth extractions in . HOSPITALIZATIONS:  For the above. Most recent was because of coronary  artery bypass grafting at that time. ILLNESSES:  Underlying history of atrial fibrillation with rapid  ventricular rate which he did have sick sinus syndrome requiring  pacemaker. He has had a remote history of some collagenous colitis,  COPD, hypertension, hyperlipidemia. He has been a smoker and most  recently had increased smoking. He uses, states, about a half pack a  day usually. SOCIAL HISTORY:  As stated, he is a smoker and had stopped, gone back to  half pack per day. Before at one point in time, was smoking one to two  packs per day for several years. Minimal alcohol being present. He is  . Still is employed, working as a farmer. FAMILY HISTORY:  Notes mother, father both  of underlying heart  disease as well as one brother.     REVIEW OF SYSTEMS:  CONSTITUTIONAL:  Notes that he has had increased amount of fatigue,  tiredness being present at this time. EYES:  Without any blurred vision. EARS, NOSE, AND THROAT:  Some congestion. CARDIAC:  Denies chest pressure, palpitations, or any type of pressure  or tightness being present. PULMONARY:  Increasing cough, shortness of breath being present. GI:  No nausea or vomiting present. Stools regular. No significant  diarrhea. RENAL:  Voiding adequate at this point in time. No burning. No  frequency or urgency present. MUSCULOSKELETAL:  Generalized muscle aches, pains present at this time. SKIN:  Without lesion. NEUROLOGIC:  No numbness, weakness, dizziness, or lightheadedness  otherwise present. HEMATOLOGIC:  No abnormal bruising or lesions present at this time in  addition. PHYSICAL EXAMINATION:  Notes:  VITAL SIGNS:  Temperature is 98.6, pulse 70, respiratory rate 18, blood  pressure 153/62, pulse oximetry 96% on some low-dose O2. GENERAL:  Notes an elderly male, somewhat fatigued. No distress at this  time. HEENT:  Head:  Atraumatic, normocephalic. Eyes:  PERRL with normal  sclerae. Ears:  Normal TMs. Nose:  Clear with slight congestion. Throat: Without exudate. NECK:  Supple without adenopathy or increased thyroid. No JVD. No  carotid bruits. LUNGS:  On inspiration, little bit of prolongation. He does have some  rales in the right base; lower one-third were on the left. There are no  rales. Expiration:  Slight wheeze present, prolongation. CARDIAC:  Regular rate and rhythm. Normal S1, S2.  Grade 1/6 systolic  murmur present. Peripheral pulse 2+. No peripheral edema. ABDOMEN:  Soft. Positive bowel sounds. Nontender. No  hepatosplenomegaly. No other masses noted. EXTREMITIES:  Full range of motion. No CVA tenderness. NEURO:  Alert and oriented x3. Gross motor and sensory being present at  this time. LABORATORY DATA:  Note initial troponin level less than 0.010. BNP  slight elevation at 1931.   Sodium 139, potassium 4.8, chloride 104, CO2  24, BUN 30, creatinine 1.5. Procalcitonin was only 0.08. Liver panel  was normal.  White count, slight elevation, 12,900. Hemoglobin 13.2,  hematocrit of 40.2. MCV slight elevation, 102. Platelet count 840,812.  80 segs, 7 lymphs, 10 monos, 1 eosinophil. Flu swabs and COVID swab  were noted to be negative. DIAGNOSTIC DATA:  Chest x-ray noted bilateral lower lobe infiltrates,  right being greater than left. EKG noted atrial fibrillation with rapid  ventricular response initially with PVCs, underlying conduction delay. No evidence of acute T-wave changes being present. Rhythm later on  became more of a sinus rhythm with some regular beats present that were  paced rhythms. IMPRESSION:  1. Bilateral lower lobe pneumonia. 2.  COPD with exacerbation. 3.  Atrial fibrillation with rapid ventricular rate. 4.  Acute-on-chronic diastolic congestive heart failure. 5.  Hypertension. 6.  Tobacco addiction. 7.  Atherosclerotic heart disease. PLAN:  At this time, we will place on telemetry. He needs aerosols;  long, single dose of steroid. We will decide if he needs further  steroids. Use of DuoNeb aerosols, oxygen as needed. Keep on telemetry  at this point in time; appears to be stable from a cardiac standpoint. We will give dose of Lasix. Already given a dose of Solu-Medrol. We  will observe at this time to see his overall response as we place on  telemetry. Serial EKGs, isoenzymes. Continue antibiotics and aerosols  at this point. The patient was seen on 03/07/2022 at 1800 hours. The  patient will be appropriately monitored at this time.         Judge Dacia M.D.    D: 03/08/2022 0:11:28       T: 03/08/2022 0:17:51     [email or web address]  Job#: [id number]     Doc#: [id number]    CC:

## 2022-03-08 NOTE — PROGRESS NOTES
Pharmacy Medication History Note      List of current medications patient is taking is complete. Source of information: patient, patient brought meds in from home, surescripts    Changes made to medication list:  Medications removed (include reason, ex. therapy complete or physician discontinued):  · Loratadine 10 mg take one tablet daily - alternate therapy    Medications added/doses adjusted:  · Hydroxyzine PO adjusted to hydroxyzine 10 mg take one tablet nightly as needed  · Cetirizine 10 mg take one tablet daily  · Triamcinolone 0.1% ointment apply topically once daily    Other notes (ex. Recent course of antibiotics, Coumadin dosing):    · Denies use of other OTC or herbal medications.       Allergies reviewed: PCN and sulfa - rash      Electronically signed by Shayna Hernández on 3/8/2022 at 2:56 PM

## 2022-03-08 NOTE — FLOWSHEET NOTE
03/07/22 2215   Treatment Team Notification   Reason for Communication Evaluate   Team Member Name Dr. Panfilo Villalta   Treatment Team Role Attending Provider   Method of Communication Secure Message   Response See orders     Notified due to pts bp 79/49 map 61. HR in the 120's. See new orders.

## 2022-03-08 NOTE — PROGRESS NOTES
1-  Notified Dr. Claudetta Book due to pts bp 79/49 map of 64. Hr 120's. In the ED today pts bp has been elevated at 140-150's, Hr was in 60-70's. Dr ordered 250 fluid bolus as well as continuous fluids at 75 ml/hr. Kim 40 supervisor rounded, informed her about pt situation. Patient laid supine with feet elevated, bolus infusing. Systolic bp still 35-80'W. Resource nurse informed. 2255- EKG ordered. Afib RVR. 65- Notified Dr. Claudetta Book bolus complete, systolic bp still 18-69U and pt is in Afib RVR. [address] [facility] came to see patient, bp 90/48.     2232- Mary RN spoke with Dr. Claudetta Book over phone. Dr. Claudetta Book ordered transfer to [facility] and consult cardiology, pt sees Dr. Nikolas Alexander. [address] Dr. Nikolas Alexander, transferred order to [address] [place] [address] room 20. Called report to [employer].    0000- patient transferred to [address] [facility] room 20 by this RN and Waleska Farr RN. Asked patient if he would like his wife to be notified of his transfer, pt stated, \"No it's too late. I will call her in the morning. \"

## 2022-03-08 NOTE — PROGRESS NOTES
Spoke with Dr. Plascencia Level this morning. Verbal orders given and passed on to dayshift RN.      Orders included the following:  Hold morning entrestro   Give 1/2 of morning lopressor (50mg)  Hold morning PO amio  1x dose of 80mg solu-medrol

## 2022-03-09 NOTE — CONSULTS
[facility]      Patient - Kvng Rodriguez   MRN -  [id number]   [facility]   - 1950      Date of Admission -  3/7/2022  8:50 AM  Date of evaluation -  3/9/2022  Room - 1K--A   Hospital Day - 2  Consulting - Frantz Esquivel MD Primary Care Physician - Frantz Esquivel MD     Problem List      Active Hospital Problems    Diagnosis Date Noted    Pneumonia [J18.9] 2022     Reason for Consult    Pneumonia and hypoxia with CHF  HPI   History Obtained From: Patient and electronic medical record. Kvng Rodriguez is a 70 y.o. male PMH asthma, atrial fibrillation with RVR, sick sinus syndrome s/p dual pacemaker, COPD, essential hypertension, HLD, and who is currently a 1/2 PPD smoker for the last 20-25 years who is coming in with complaints of shortness of breath. The patient has been sick with 2 weeks of nasal congestion, productive cough, and dyspnea, and thought he had COVID. The patient is chronically on claritin and hydroxizine. He was also previously being followed in the heart failure clinic where his Lasix dose was lowered from 40 mg to 20 mg. He denies any fever, chills, chest pain, nausea vomiting, but was noted to be confused on room air and had 85% pulse ox. Pulmonology was consulted because of the patient's increasing oxygen demands and new onset pneumonia with component of CHF. The patient at the time of my assessment did not report any discomfort and did not endorse any complaints. The patient has never officially had any formal PFTs done in the past before. Before this current admission, he had never been on any form of home oxygen. The patient was previously smoking 2 PPD but had been slowly cutting down to 1/2 PPD. The patient was chronically on amiodarone for his atrial fibrillation.  Of note the patient on  was noted to be hypotensive and in atrial fibrillation and was given fluids, started on an amiodarone drip, and transferred to stepdown. Of note the patient's oxygen requirements have also been increasing to the point where he was requiring Hi Ziggy, and his blood pressures have been decreasing due to the need for diuresis.          PMHx   Past Medical History      Diagnosis Date    Asthma     Atrial fibrillation with RVR ([address] 04/05/2021    [employer] dual pacemaker  04/15/2021    Collagenous colitis 2021    per scope 2021    Colon polyps 2021    dr Sonda Dakin    COPD, mild ([address]     Eczema of hand     HTN (hypertension)     Hyperlipidemia     Smoker       Past Surgical History        Procedure Laterality Date    CARDIAC SURGERY      COLONOSCOPY  06/10/2021    theresa ccolon polyps and collagenous colitis    CORONARY ARTERY BYPASS GRAFT  01/12/2021    cabg x 3 with lima and atrial appendage clip  dr Ashby Mort GRAFT N/A 01/12/2021    CABG  X 3 WITH DEJAH, Atrial Appendage Clip performed by Armani Betancur MD at [address] [place] [address] Road  06/2008    polyps    OTHER SURGICAL HISTORY  DEC 7TH 2012 DR GET WADE [facility] [place]     IGS ENDOSCOPIC BI LAT MAXILLARY, ETHMOID, SPHENOID, FRONTAL SINUSOTOMY WITH SEPTOPLASTY AND TURBINOPLASTIES    SKIN CANCER EXCISION  09/2014    Left side of Forehead     TOOTH EXTRACTION  02/2017     Meds    Current Medications    furosemide  80 mg IntraVENous BID    methylPREDNISolone  40 mg IntraVENous Q8H    sodium chloride flush  5-40 mL IntraVENous 2 times per day    amiodarone  200 mg Oral Daily    aspirin  81 mg Oral Daily    atorvastatin  40 mg Oral Nightly    sacubitril-valsartan  0.5 tablet Oral BID    metoprolol succinate  100 mg Oral Daily    rivaroxaban  20 mg Oral Daily with breakfast    rOPINIRole  0.5 mg Oral Nightly    ipratropium-albuterol  1 ampule Inhalation Q4H    cefTRIAXone (ROCEPHIN) IV  1,000 mg IntraVENous Q12H    azithromycin  500 mg IntraVENous Q24H     sodium chloride flush, sodium chloride, acetaminophen, ondansetron **OR** ondansetron, ondansetron **OR** ondansetron, polyethylene glycol, acetaminophen **OR** acetaminophen, potassium chloride **OR** potassium alternative oral replacement **OR** potassium chloride, magnesium sulfate, ALPRAZolam  IV Drips/Infusions   amiodarone 0.5 mg/min (03/09/22 8978)    sodium chloride      sodium chloride 20 mL/hr at 03/08/22 7400     Home Medications  Medications Prior to Admission: cetirizine (ZYRTEC) 10 MG tablet, Take 10 mg by mouth daily  triamcinolone (KENALOG) 0.1 % ointment, Apply topically Daily Apply topically once daily  ALPRAZolam (XANAX) 0.5 MG tablet, Take 0.5 mg by mouth nightly as needed for Sleep.  furosemide (LASIX) 20 MG tablet, Take 2 tablets by mouth daily  hydrOXYzine (ATARAX) 10 MG tablet, Take 1 tablet by mouth nightly as needed   ENTRESTO 49-51 MG per tablet, Take 0.5 tablets by mouth 2 times daily  umeclidinium-vilanterol (ANORO ELLIPTA) 62.5-25 MCG/INH AEPB inhaler, Inhale 1 puff into the lungs daily  atorvastatin (LIPITOR) 40 MG tablet, Take 1 tablet by mouth nightly  metoprolol succinate (TOPROL XL) 100 MG extended release tablet, Take 1 tablet by mouth daily  amiodarone (CORDARONE) 200 MG tablet, Take 1 tablet by mouth daily  rivaroxaban (XARELTO) 20 MG TABS tablet, Take 1 tablet by mouth daily (with breakfast)  aspirin 81 MG chewable tablet, Take 1 tablet by mouth daily  albuterol sulfate  (90 Base) MCG/ACT inhaler, Inhale 2 puffs into the lungs every 6 hours as needed for Wheezing  Multiple Vitamins-Minerals (CENTRUM SILVER PO), Take 1 tablet by mouth daily   [DISCONTINUED] fluocinonide (LIDEX) 0.05 % ointment, Apply topically 2 times daily  [DISCONTINUED] rOPINIRole (REQUIP) 0.5 MG tablet, TAKE 1 TABLET BY MOUTH EVERY EVENING  Diet    ADULT DIET;  Regular; Low Fat/Low Chol/High Fiber/ALICIA  Allergies    Penicillins and Sulfa antibiotics  Social History     Social History     Socioeconomic History    Marital status:      Spouse name: Not on file    Number of children: Not on file    Years of education: Not on file    Highest education level: Not on file   Occupational History    Not on file   Tobacco Use    Smoking status: Current Every Day Smoker     Packs/day: 1.00     Years: 40.00     Pack years: 40.00     Types: Cigarettes    Smokeless tobacco: Never Used   Substance and Sexual Activity    Alcohol use: Yes     Alcohol/week: 0.0 standard drinks     Comment: very little    Drug use: No    Sexual activity: Not on file   Other Topics Concern    Not on file   Social History Narrative    Not on file     Social Determinants of Health     Financial Resource Strain:     Difficulty of Paying Living Expenses: Not on file   Food Insecurity:     Worried About Running Out of Food in the Last Year: Not on file    Danielle of Food in the Last Year: Not on file   Transportation Needs:     Lack of Transportation (Medical): Not on file    Lack of Transportation (Non-Medical):  Not on file   Physical Activity:     Days of Exercise per Week: Not on file    Minutes of Exercise per Session: Not on file   Stress:     Feeling of Stress : Not on file   Social Connections:     Frequency of Communication with Friends and Family: Not on file    Frequency of Social Gatherings with Friends and Family: Not on file    Attends [employer] Services: Not on file    Active Member of [address] [place] [address] or Organizations: Not on file    Attends Club or Organization Meetings: Not on file    Marital Status: Not on file   Intimate Partner Violence:     Fear of Current or Ex-Partner: Not on file    Emotionally Abused: Not on file    Physically Abused: Not on file    Sexually Abused: Not on file   Housing Stability:     Unable to Pay for Housing in the Last Year: Not on file    Number of [place] in the Last Year: Not on file    Unstable Housing in the Last Year: Not on file     Family History          Problem Relation Age of Onset    Heart Disease Mother     Heart Disease Father         cabg    Diabetes Brother     Heart Disease Brother      Sleep History    Never diagnosed with sleep apnea in the past.  Occupational history   Occupation:  He is current working: Yes  Type of profession: [employer]                       History of tobacco smoking:Yes  Amount of tobacco smokin/2 PPD  Years of tobacco smokin years                                    Quit smoking: No.              Quit year: None  Current smoker: Yes. Amount of current tobacco smokin/2 PPD for 25 years; was previously 2 PPD  . History of recreational or IV drug use in the past:NO     History of exposure to coal mines/coal dust: NO  History of exposure to foundry dust/welding: NO  History of exposure to quarry/silica/sandblasting: NO  History of exposure to asbestos/working with breaks/ships: NO  History of exposure to farm dust: NO  History of recent travel to long distances: NO  History of exposure to birds, pigeons, or chickens in the past:NO  Pet animals at home:No  Dogs: 0  Cats: 0    History of pulmonary embolism in the past: No            History of DVT in the past:No        [x] Right lower extremity   [] Left lower extremity. [place] of systems   Negative as per HPI    Vitals     height is 5' 8\" (1.727 m) and weight is 159 lb 6.3 oz (72.3 kg). His oral temperature is 97.6 °F (36.4 °C). His blood pressure is 122/59 (abnormal) and his pulse is 63. His respiration is 19 and oxygen saturation is 91%. Body mass index is 24.24 kg/m². SUPPLEMENTAL O2: O2 Flow Rate (L/min): 6 L/min     I/O        Intake/Output Summary (Last 24 hours) at 3/9/2022 0957  Last data filed at 3/9/2022 3989  Gross per 24 hour   Intake 3156.76 ml   Output 1900 ml   Net 1256.76 ml     I/O last 3 completed shifts: In: 3871.6 [P.O.:1390;  I.V.:1562.7; IV Piggyback:918.9]  Out: 2100 [Urine:2100]   Patient Vitals for the past 96 hrs (Last 3 readings):   Weight   22 0316 159 lb 6.3 oz (72.3 kg) 03/07/22 0855 148 lb (67.1 kg)       Exam   Nursing note and vitals reviewed. Constitutional: alert and oriented; sitting in bed on 6 L of oxygen  HENT: normocephalic and atraumatic  Neck: supple with no adenopathy  Cardiovascular: irregularly irregular; normal S1 and S2  Pulmonary/Chest: decreased breath sounds noted bilaterally with bilateral crackles  Abdominal: no distention  Musculoskeletal: ROM intact  Extremities: mild 1+ pitting edema  Lymphadenopathy:  none  Neurological: CN II-XII intact  Skin: no erythema or swelling    Labs  - Old records and notes have been reviewed in [facility]   ABG  Lab Results   Component Value Date    PH 7.30 01/12/2021    PO2 80 01/12/2021    PCO2 49 01/12/2021    HCO3 24 01/12/2021    O2SAT 94 01/12/2021     Lab Results   Component Value Date    IFIO2 40 01/12/2021    MODE SIMV 01/12/2021    SETTIDVOL 550 01/12/2021    SETPEEP 5.0 01/12/2021     CBC  Recent Labs     03/07/22  0915 03/08/22  0725 03/09/22  0430   WBC 12.9* 13.8* 23.6*   RBC 3.92* 3.45* 3.20*   HGB 13.2* 11.7* 10.8*   HCT 40.2* 35.7* 31.8*   .6* 103.5* 99.4*   MCH 33.7* 33.9* 33.8*   MCHC 32.8 32.8 34.0    190 235   MPV 9.9 10.2 10.3      BMP  Recent Labs     03/07/22  0915 03/08/22  0725 03/09/22  0430    134* 131*   K 4.8 4.3 4.2    103 99   CO2 24 18* 19*   BUN 30* 30* 38*   CREATININE 1.5* 1.4* 1.6*   GLUCOSE 101 143* 145*   CALCIUM 9.2 8.4* 8.3*     LFT  Recent Labs     03/07/22  0915 03/08/22  0725   AST 27 25   ALT 14 13   BILITOT 0.6 0.4   ALKPHOS 57 50     TROP  Lab Results   Component Value Date    TROPONINT < 0.010 03/08/2022    TROPONINT < 0.010 03/07/2022    TROPONINT < 0.010 03/07/2022     BNP  No results for input(s): BNP in the last 72 hours. Lactic Acid  No results for input(s): LACTA in the last 72 hours. INR  No results for input(s): INR, PROTIME in the last 72 hours. PTT  No results for input(s): APTT in the last 72 hours.   Glucose  No results for input(s): POCGLU in the last 72 hours. UA No results for input(s): SPECGRAV, PHUR, COLORU, CLARITYU, MUCUS, PROTEINU, BLOODU, RBCUA, WBCUA, BACTERIA, NITRU, GLUCOSEU, BILIRUBINUR, UROBILINOGEN, KETUA, LABCAST, LABCASTTY, AMORPHOS in the last 72 hours. Invalid input(s): CRYSTALS. PFTs   No recent PFTs on record  Sleep studies   Has never had a sleep study    Cultures    No blood cultures to date    EKG   Normal sinus rhythm  Possible Left atrial enlargement  Borderline ECG  Echocardiogram   EF 55-60%    Radiology    CXR  Bilateral airspace disease, which could be due to pneumonia or pulmonary    edema. CT Scans  (See actual reports for details)    Assessment   1. Acute on chronic diastolic CHF exacerbation currently on Hi Ziggy  2. Bilateral community acquired pneumonia  3. COPD  4. ASHD s/p bypass  5. Acute hypoxic respiratory failure likely secondary to COPD, CHF, and pneumonia  6. Essential hypertension  7. Sick sinus syndrome s/p pacemaker placement  8. History of asthma  9. 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  Atrial fibrillation on Xarelto  Plan   -At this time I feel the patient's acute hypoxic respiratory failure is caused by a number of factors including the patient's COPD, CHF, and pneumonia  -Agree with the need for diuresis IV lasix 80 mg BID  -However I feel it is important to monitor the patient's blood pressures and creatinine closely  -Recommend that the patient get off amiodarone and on a different rhythm control agent as this may be contributing to the patient's pulmonary symptoms as well; contacted cardiology about this  -Discontinue methylprednisolone and go on prednisone 40 mg daily  -If the patient's symptoms don't improve from diuresis will need to get a high resolution CT scan in order to evaluate the patient's pulmonary toxicity  -Given hypotension and need for diuresis and current Hi Ziggy settings, important to keep ICU in the loop about the patient as he may need pressors (last BP 97/50)  -Prognosis overall guarded  -Continue antibiotics including azithromycin and ceftriaxone but monitor for concerns of septic shock      \"Thank you for asking us to see this patient\"    Questions and concerns addressed.     Electronically signed by   Luc Alexander DO on 3/9/2022 at 9:57 AM

## 2022-03-09 NOTE — PROGRESS NOTES
Progress Note  Date:3/8/2022       [id number]  Patient Thad Bolivar     YOB: 1950     Age:71 y.o. Subjective    Subjective:  Symptoms:  Stable. He reports shortness of breath and cough. No diarrhea. Diet:  Poor intake. No nausea or vomiting. Activity level: Impaired due to weakness. Pain:  He reports no pain.        Current Facility-Administered Medications   Medication Dose Route Frequency Provider Last Rate Last Admin    methylPREDNISolone sodium (SOLU-MEDROL) injection 40 mg  40 mg IntraVENous Q8H Rigoberto Vivas MD   40 mg at 03/08/22 2358    sodium chloride flush 0.9 % injection 5-40 mL  5-40 mL IntraVENous 2 times per day Rigoberto Vivas MD   10 mL at 03/08/22 2053    sodium chloride flush 0.9 % injection 5-40 mL  5-40 mL IntraVENous PRN Rigoberto Vivas MD        0.9 % sodium chloride infusion  25 mL IntraVENous PRN Rigoberto Vivas MD        acetaminophen (TYLENOL) tablet 650 mg  650 mg Oral Q4H PRN Rigoberto Vivas MD        ondansetron (ZOFRAN-ODT) disintegrating tablet 4 mg  4 mg Oral Q8H PRN Rigoberto Vivas MD        Or    ondansetron [facility]) injection 4 mg  4 mg IntraVENous Q6H PRN Rigoberto Vivas MD        amiodarone (CORDARONE) tablet 200 mg  200 mg Oral Daily Rigoberto Vivas MD   200 mg at 03/08/22 0850    aspirin chewable tablet 81 mg  81 mg Oral Daily Rigoberto Vivas MD   81 mg at 03/08/22 0850    atorvastatin (LIPITOR) tablet 40 mg  40 mg Oral Nightly Rigoberto Vivas MD   40 mg at 03/08/22 2056    sacubitril-valsartan (ENTRESTO) 49-51 MG per tablet 0.5 tablet  0.5 tablet Oral BID Rigoberto Vivas MD   0.5 tablet at 03/08/22 2056    metoprolol succinate (TOPROL XL) extended release tablet 100 mg  100 mg Oral Daily Rigoberto Vivas MD   100 mg at 03/08/22 0850    rivaroxaban (XARELTO) tablet 20 mg  20 mg Oral Daily with breakfast Rigoberto Vivas MD   20 mg at 03/08/22 0850    rOPINIRole (REQUIP) tablet 0.5 mg  0.5 mg Oral Nightly Yolie Overall, MD   0.5 mg at 03/08/22 2056    ipratropium-albuterol (DUONEB) nebulizer solution 1 ampule  1 ampule Inhalation Q4H Yolie Overall, MD   1 ampule at 03/08/22 2159    cefTRIAXone (ROCEPHIN) 1000 mg IVPB in 50 mL D5W minibag  1,000 mg IntraVENous Q12H Yolie Overall, MD   Stopped at 03/08/22 2355    azithromycin (ZITHROMAX) 500 mg in D5W 250ml addavial  500 mg IntraVENous Q24H Yolie Overall, MD   Stopped at 03/08/22 1529    ondansetron (ZOFRAN-ODT) disintegrating tablet 4 mg  4 mg Oral Q8H PRN Yolie Overall, MD        Or    ondansetron [facility]) injection 4 mg  4 mg IntraVENous Q6H PRN Yolie Overall, MD        polyethylene glycol (GLYCOLAX) packet 17 g  17 g Oral Daily PRN Yolie Overall, MD        acetaminophen (TYLENOL) tablet 650 mg  650 mg Oral Q6H PRN Yolie Overall, MD        Or    acetaminophen (TYLENOL) suppository 650 mg  650 mg Rectal Q6H PRN Yolie Overall, MD        0.9 % sodium chloride infusion   IntraVENous Continuous Yolie Overall, MD 20 mL/hr at 03/08/22 2356 Rate Change at 03/08/22 2356    potassium chloride (KLOR-CON M) extended release tablet 40 mEq  40 mEq Oral PRN Yolie Overall, MD        Or    potassium bicarb-citric acid (EFFER-K) effervescent tablet 40 mEq  40 mEq Oral PRN Yolie Overall, MD        Or    potassium chloride 10 mEq/100 mL IVPB (Peripheral Line)  10 mEq IntraVENous PRN Yolie Overall, MD        magnesium sulfate 2000 mg in 50 mL IVPB premix  2,000 mg IntraVENous PRN Yolie Overall, MD        furosemide (LASIX) injection 40 mg  40 mg IntraVENous BID Yolie Overall, MD   40 mg at 03/08/22 1750    ALPRAZolam (XANAX) tablet 0.25 mg  0.25 mg Oral BID PRN Yolie Overall, MD          Review of Systems   Constitutional: Positive for fatigue. HENT: Positive for postnasal drip and sinus pain.     Respiratory: Positive for cough, shortness of breath and wheezing. Cardiovascular: Positive for palpitations. Gastrointestinal: Negative for abdominal distention, diarrhea, nausea and vomiting. Genitourinary: Negative for difficulty urinating and dysuria. Musculoskeletal: Negative for arthralgias and joint swelling. Skin: Negative for rash. Neurological: Negative for dizziness and seizures. Psychiatric/Behavioral: Negative for agitation and behavioral problems. Objective         Vitals Last 24 Hours:  TEMPERATURE:  Temp  Av.8 °F (36.6 °C)  Min: 97.5 °F (36.4 °C)  Max: 98.1 °F (36.7 °C)  RESPIRATIONS RANGE: Resp  Av.9  Min: 16  Max: 20  PULSE OXIMETRY RANGE: SpO2  Av.7 %  Min: 82 %  Max: 96 %  PULSE RANGE: Pulse  Av.6  Min: 59  Max: 118  BLOOD PRESSURE RANGE: Systolic (63KCA), NBT:566 , Min:87 , TDT:567   ; Diastolic (57QCZ), FOC:54, Min:49, Max:64    I/O (24Hr): Intake/Output Summary (Last 24 hours) at 3/9/2022 0034  Last data filed at 3/8/2022 2053  Gross per 24 hour   Intake 3654.64 ml   Output 1400 ml   Net 2254.64 ml     Objective:  General Appearance:  Comfortable. Vital signs: (most recent): Blood pressure 110/64, pulse 118, temperature 97.9 °F (36.6 °C), temperature source Oral, resp. rate 20, height 5' 8\" (1.727 m), weight 159 lb 6.3 oz (72.3 kg), SpO2 91 %. Vital signs are normal.  (Pulse sinus in 70's as was atrial fib and bp 100 /60). Output: Producing urine and producing stool. HEENT: Normal HEENT exam.    Lungs:  Normal effort and normal respiratory rate. There are wheezes and rhonchi. (Some rale in right base)  Heart: Normal rate. Regular rhythm. S1 normal and S2 normal.  No murmur. (No jvd and no peripheral edema)  Abdomen: Abdomen is soft and non-distended. Bowel sounds are normal.     Extremities: Normal range of motion. There is no dependent edema. Neurological: Patient is alert and oriented to person, place and time. Patient has normal reflexes and normal muscle tone. Skin:  Warm and dry. Labs/Imaging/Diagnostics    Labs:  CBC:  Recent Labs     03/07/22  0915 03/08/22  0725   WBC 12.9* 13.8*   RBC 3.92* 3.45*   HGB 13.2* 11.7*   HCT 40.2* 35.7*   .6* 103.5*    190     CHEMISTRIES:  Recent Labs     03/07/22  0915 03/08/22 0725    134*   K 4.8 4.3    103   CO2 24 18*   BUN 30* 30*   CREATININE 1.5* 1.4*   GLUCOSE 101 143*     PT/INR:No results for input(s): PROTIME, INR in the last 72 hours. APTT:No results for input(s): APTT in the last 72 hours. LIVER PROFILE:  Recent Labs     03/07/22  0915 03/08/22 0725   AST 27 25   ALT 14 13   BILITOT 0.6 0.4   ALKPHOS 57 50       Imaging Last 24 Hours:  XR CHEST (2 VW)    Result Date: 3/7/2022  PROCEDURE: XR CHEST (2 VW) CLINICAL INFORMATION: Shortness of breath. COMPARISON: Chest radiograph 4/7/2021 TECHNIQUE: PA and lateral views of the chest performed. FINDINGS: Hazy opacities are seen in the lower lobes bilaterally right greater than left. Cardiac conduction device is seen with 2 leads. The leads are intact. Left atrial appendage clip is seen. Hyperinflation of the lungs with flattening of the hemidiaphragms that can relate to chronic lung disease or COPD Cardiac silhouette is not enlarged. Prior CABG. No pleural effusion. No pneumothorax. No acute bony abnormality. The bones are  demineralized. Mild degenerative changes of the thoracic spine. Median sternotomy has been performed. Calcified granuloma seen in the right lower lobe. 1. Bilateral lower lobe consolidative opacities are seen, right greater than left. Findings relate to pneumonia in the right clinical setting. **This report has been created using voice recognition software. It may contain minor errors which are inherent in voice recognition technology. ** Final report electronically signed by Dr Darling Rankin on 3/7/2022 9:45 AM    Assessment//Plan           Hospital Problems           Last Modified POA    * (Principal) Pneumonia 3/7/2022 Yes Assessment:    Condition: In stable condition. Unchanged. (Atrial fib with rvr and spontaneously converted as caused transient hypotension    chf diastolic noted and with lasix iv     pneumonia bilateral with effusions      copd with some exacerbation and with wheeze probable more copd than the chf    ashd post bypass     respiratory with hypoxia due to pneumonia and copd and chf    htn stable      pacer  Noted     ). Plan:   Out of bed and up to chair. Continue respiratory treatments. Consults: cardiology. Advance diet as tolerated. Administer medications as ordered. (Give diuretic still but gentle with low bp      continue aerosols and iv antibiotics    On amiadarone     cardiology to see).        Electronically signed by Kyara Solomon MD on 3/9/22 at 12:34 AM EST

## 2022-03-09 NOTE — PLAN OF CARE
Problem: Discharge Planning:  Goal: Participates in care planning  Description: Participates in care planning  Outcome: Ongoing  Goal: Discharged to appropriate level of care  Description: Discharged to appropriate level of care  Outcome: Ongoing     Problem: Gas Exchange - Impaired:  Goal: Levels of oxygenation will improve  Description: Levels of oxygenation will improve  Outcome: Ongoing     Problem: Tissue Perfusion - Cardiopulmonary, Altered:  Goal: Absence of angina  Description: Absence of angina  Outcome: Ongoing  Goal: Hemodynamic stability will improve  Description: Hemodynamic stability will improve  Outcome: Ongoing     Problem: Falls - Risk of:  Goal: Will remain free from falls  Description: Will remain free from falls  Outcome: Ongoing  Note: Patient free from falls this shift. Bed alarm in place. Call light and patient belongings within reach. Fall precautions and nonskid socks in place. Goal: Absence of physical injury  Description: Absence of physical injury  Outcome: Ongoing     Problem: Pain:  Goal: Pain level will decrease  Description: Pain level will decrease  Outcome: Ongoing  Note: Patient states pain of \"0/10\" throughout the shift. Pain meds available as needed. Will continue to monitor. Goal: Control of acute pain  Description: Control of acute pain  Outcome: Ongoing  Goal: Control of chronic pain  Description: Control of chronic pain  Outcome: Ongoing     Problem: Skin Integrity:  Goal: Will show no infection signs and symptoms  Description: Will show no infection signs and symptoms  Outcome: Ongoing  Note: Patient free from any signs of infection. No new skin breakdown this shift. Patient encouraged and educated on the benefits of turning every 2 hours.     Goal: Absence of new skin breakdown  Description: Absence of new skin breakdown  Outcome: Ongoing     Problem: Breathing Pattern - Ineffective:  Goal: Ability to achieve and maintain a regular respiratory rate will improve  Description: Ability to achieve and maintain a regular respiratory rate will improve  Outcome: Ongoing   Care plan reviewed with patient. Patient verbalizes understanding of the plan of care and contributes to goal setting.

## 2022-03-09 NOTE — PROGRESS NOTES
Cardiology Progress Note      Patient:  Rigoberto Flannery  YOB: 1950  MRN: [id number]   Acct: [de-identified]  [facility] Date:  3/7/2022  Primary Cardiologist: Gilberto Serrato MD    Note per dr Amaya Labs:    Afib RVR      CHIEF COMPLAINT:    SOB     HISTORY OF PRESENT ILLNESS:    Rigoberto Flannery is a pleasant 70year old male patient with past medical history that includes:   Past Medical History        Past Medical History:   Diagnosis Date    Asthma      Atrial fibrillation with RVR ([facility] [address] 04/05/2021    [employer] dual pacemaker  04/15/2021    Collagenous colitis 2021     per scope 2021    Colon polyps 2021     dr Juliette Barahona    COPD, mild ([facility] [address]      Eczema of hand      HTN (hypertension)      Hyperlipidemia      Smoker        The patient was admitted to the hospital on 3/7/2022 after he presented to [facility] ER with two weeks history of shortness of breath, dyspnea on exertion, nasal congestion, productive cough. Patient denies chest pain, syncope. The patient was given Rocephin in ER for pneumonia. Troponin <0.01, <0.01, <0.01. ProBNP 1,931. CXR revealed bilateral lower lung consolidations. Cr was 1.5, down to 1.4 today. He has h/o CKD, Cr baseline 1.4-1.8 ?. WBC count is elevated, 13,800 today. Patient has h/o ischemic cardiomyopathy, CAD, 3 vessel CABG, ZAHRA clipping in 01/2021. She is s/p pacemaker placement for tachy-ericka syndrome. His EF was as low as 25% in the past, improved. Echocardiogram on 06/2021 revealed an EF of 45-50%. Patient is followed at CHF clinic, lasix was recently reduced to 20 mg po daily.  \"    Subjective (Events in last 24 hours):   Pt had afib rvr with lower bp last night and started on amio bolus and gtt by dr Terri Ortega  Pt also with increase O2 needs, now on 6 l/min O2  Overall pt still with sob but states he feels better with O2  Denies chest pain     Net I/o +1.7 L    Objective:   BP (!) 122/59   Pulse 63   Temp 97.6 °F (36.4 °C) (Oral)   Resp 19   Ht 5' 8\" (1.727 m)   Wt 159 lb 6.3 oz (72.3 kg)   SpO2 91%   BMI 24.24 kg/m²        TELEMETRY: afib 111    Physical Exam:  General Appearance: alert and oriented to person, place and time, in no acute distress  Cardiovascular: irregularly irregular  Pulmonary/Chest: bibasilar crackles  Abdomen: soft, non-tender, non-distended, normal bowel sounds, no masses Extremities: no cyanosis, clubbing or edema, pulse   Skin: warm and dry, no rash or erythema  Head: normocephalic and atraumatic  Eyes: pupils equal, round, and reactive to light  Neck: supple and non-tender without mass, no thyromegaly   Musculoskeletal: normal range of motion, no joint swelling, deformity or tenderness  Neurological: alert, oriented, normal speech, no focal findings or movement disorder noted    Medications:    furosemide  80 mg IntraVENous BID    methylPREDNISolone  40 mg IntraVENous Q8H    sodium chloride flush  5-40 mL IntraVENous 2 times per day    amiodarone  200 mg Oral Daily    aspirin  81 mg Oral Daily    atorvastatin  40 mg Oral Nightly    sacubitril-valsartan  0.5 tablet Oral BID    metoprolol succinate  100 mg Oral Daily    rivaroxaban  20 mg Oral Daily with breakfast    rOPINIRole  0.5 mg Oral Nightly    ipratropium-albuterol  1 ampule Inhalation Q4H    cefTRIAXone (ROCEPHIN) IV  1,000 mg IntraVENous Q12H    azithromycin  500 mg IntraVENous Q24H      amiodarone 0.5 mg/min (03/09/22 0758)    sodium chloride      sodium chloride 20 mL/hr at 03/08/22 2356     sodium chloride flush, 5-40 mL, PRN  sodium chloride, 25 mL, PRN  acetaminophen, 650 mg, Q4H PRN  ondansetron, 4 mg, Q8H PRN   Or  ondansetron, 4 mg, Q6H PRN  ondansetron, 4 mg, Q8H PRN   Or  ondansetron, 4 mg, Q6H PRN  polyethylene glycol, 17 g, Daily PRN  acetaminophen, 650 mg, Q6H PRN   Or  acetaminophen, 650 mg, Q6H PRN  potassium chloride, 40 mEq, PRN   Or  potassium alternative oral replacement, 40 mEq, PRN   Or  potassium chloride, 10 mEq, PRN  magnesium sulfate, 2,000 mg, PRN  ALPRAZolam, 0.25 mg, BID PRN        Lab Data:    Cardiac Enzymes:  No results for input(s): CKTOTAL, CKMB, CKMBINDEX, TROPONINI in the last 72 hours.     CBC:   Lab Results   Component Value Date    WBC 23.6 03/09/2022    RBC 3.20 03/09/2022    RBC 4.84 11/07/2011    HGB 10.8 03/09/2022    HCT 31.8 03/09/2022     03/09/2022       CMP:    Lab Results   Component Value Date     03/09/2022    K 4.2 03/09/2022    K 4.3 03/08/2022    CL 99 03/09/2022    CO2 19 03/09/2022    BUN 38 03/09/2022    CREATININE 1.6 03/09/2022    LABGLOM 43 03/09/2022    GLUCOSE 145 03/09/2022    GLUCOSE 94 11/07/2011    CALCIUM 8.3 03/09/2022       Hepatic Function Panel:    Lab Results   Component Value Date    ALKPHOS 50 03/08/2022    ALT 13 03/08/2022    AST 25 03/08/2022    PROT 6.3 03/08/2022    BILITOT 0.4 03/08/2022    BILIDIR <0.2 01/11/2021    LABALBU 2.7 03/08/2022    LABALBU 4.3 11/07/2011       Magnesium:    Lab Results   Component Value Date    MG 1.7 02/01/2022       PT/INR:    Lab Results   Component Value Date    INR 1.25 04/06/2021       HgBA1c:    Lab Results   Component Value Date    LABA1C 5.6 01/11/2021       FLP:    Lab Results   Component Value Date    TRIG 96 01/07/2021    HDL 36 01/07/2021    LDLCALC 71 01/07/2021    LDLDIRECT 79.82 01/11/2021       TSH:    Lab Results   Component Value Date    TSH 0.994 03/07/2022         Assessment:    Acute hypoxic resp failure  PNA - ATB per attending  Acute on chronic HFmrEF (improved EF)   Ef 45-50 with grade 1 DDfx per TTE 6/15/21  afib rvr  PAF   On xarelto prior to admission  Hx tachy-ericka syndrome - s/p PPM  CAD - hx CABG x3, ZAHRA clipping 1/2021  ICMP  HTN  Dyslipidemia  COPD/asthma  Tobacco abuse    Plan:    Daily I/o and weights  2 liter fluid restriction and 2gm sodium diet  Keep mag >2 and K >4  Cont diuresis  Daily BMP  Daily ekg to monitor qtc - on amio and zithromax  Cont asa/statin/BB/amio/entresto  TTE pending     Electronically signed by Sallie Phoenix, PA-C on 3/9/2022 at 10:18 AM

## 2022-03-09 NOTE — CARE COORDINATION
3/9/22, 1:10 PM EST    DISCHARGE ON GOING EVALUATION    [facility] Forward day: 2  Location: -20/020-A Reason for admit: Pneumonia [J18.9]  Dyspnea and respiratory abnormalities [R06.00, R06.89]  COPD exacerbation ([facility] [address] [J44.1]  Medication taken at higher dose than recommended [Z79.899]  Pneumonia of both lower lobes due to infectious organism [J18.9]     Barriers to Discharge: COPD/Pneumonia/A-fib/Hypotension. From 6K. 3/9 Imaging: possible Pneumonia/Pulmonary Edema. Elevated WBC, Creatinine 1.6; monitor.  Oxygen 6L, Amiodarone gtt, IVF, IV AB, IV Diuresing, IV Steroids continued    PCP: Jorge L Wang MD  Readmission Risk Score: 18.6 ( )%  Patient Goals/Plan/Treatment Preferences: plans home w spouse Rubia Lamar; monitor oxygen needs

## 2022-03-09 NOTE — PLAN OF CARE
Pt given HHN with Duoneb . BS pre and post.diminished with expiratory wheezes in RUL/RML No complications. Strong NPC. SPO2 91% on 6 L NC . Will continue to monitor pt for any further respiratory care needs.

## 2022-03-10 NOTE — PROGRESS NOTES
Progress Note  Date:3/9/2022       [facility] [address] [facility]:[id number]  Patient Sadaf President     YOB: 1950     Age:71 y.o. Subjective    Subjective:  Symptoms:  Worsening. He reports shortness of breath, cough and weakness. No chest pain or diarrhea. (More  Hypoxia  On 6 liters nc). Diet:  Adequate intake. No nausea or vomiting. Activity level: Impaired due to weakness. Pain:  He reports no pain.        Current Facility-Administered Medications   Medication Dose Route Frequency Provider Last Rate Last Admin    furosemide (LASIX) injection 80 mg  80 mg IntraVENous BID Esthela Conti MD   80 mg at 03/09/22 1716    [START ON 3/10/2022] predniSONE (DELTASONE) tablet 40 mg  40 mg Oral Daily Petar Ruiz DO        sodium chloride flush 0.9 % injection 5-40 mL  5-40 mL IntraVENous 2 times per day Esthela Conti MD   10 mL at 03/09/22 0805    sodium chloride flush 0.9 % injection 5-40 mL  5-40 mL IntraVENous PRN Esthela Conti MD        0.9 % sodium chloride infusion  25 mL IntraVENous PRN Esthela Conti MD        acetaminophen (TYLENOL) tablet 650 mg  650 mg Oral Q4H PRN Esthela Conti MD        ondansetron (ZOFRAN-ODT) disintegrating tablet 4 mg  4 mg Oral Q8H PRN Esthela Conti MD        Or    ondansetron [facility]) injection 4 mg  4 mg IntraVENous Q6H PRN Esthela Conti MD        aspirin chewable tablet 81 mg  81 mg Oral Daily Esthela Conti MD   81 mg at 03/09/22 0759    atorvastatin (LIPITOR) tablet 40 mg  40 mg Oral Nightly Esthela Conti MD   40 mg at 03/08/22 2056    sacubitril-valsartan (ENTRESTO) 49-51 MG per tablet 0.5 tablet  0.5 tablet Oral BID Esthela Conti MD   0.5 tablet at 03/09/22 0759    metoprolol succinate (TOPROL XL) extended release tablet 100 mg  100 mg Oral Daily Esthela Conti MD   100 mg at 03/09/22 0759    rivaroxaban (XARELTO) tablet 20 mg  20 mg Oral Daily with breakfast Esthela Conti MD   20 mg at palpitations. Gastrointestinal: Negative for abdominal distention, abdominal pain, constipation, diarrhea, nausea and vomiting. Genitourinary: Negative for difficulty urinating. Musculoskeletal: Negative for arthralgias. Neurological: Positive for weakness. Negative for dizziness and tremors. Psychiatric/Behavioral: Negative for agitation. Objective         Vitals Last 24 Hours:  TEMPERATURE:  Temp  Av.8 °F (36.6 °C)  Min: 97.6 °F (36.4 °C)  Max: 98.1 °F (36.7 °C)  RESPIRATIONS RANGE: Resp  Av.3  Min: 16  Max: 20  PULSE OXIMETRY RANGE: SpO2  Av.9 %  Min: 77 %  Max: 99 %  PULSE RANGE: Pulse  Av.8  Min: 60  Max: 118  BLOOD PRESSURE RANGE: Systolic (17OWS), JWE:804 , Min:97 , BON:584   ; Diastolic (53FMQ), FLORINDA:41, Min:49, Max:64    I/O (24Hr): Intake/Output Summary (Last 24 hours) at 3/9/2022 1926  Last data filed at 3/9/2022 1718  Gross per 24 hour   Intake 2685.57 ml   Output 1475 ml   Net 1210.57 ml     Objective:  General Appearance:  Comfortable. Vital signs: (most recent): Blood pressure (!) 97/50, pulse 60, temperature 98.1 °F (36.7 °C), temperature source Oral, resp. rate 18, height 5' 8\" (1.727 m), weight 159 lb 6.3 oz (72.3 kg), SpO2 93 %. Vital signs are normal.  (Better as in normal sinus). HEENT: Normal HEENT exam.    Lungs:  Normal effort and normal respiratory rate. There are rales, wheezes and rhonchi.  (  Rales  More  In right base with some rhonci and some wheeze)  Heart: Normal rate. S1 normal and S2 normal.  Positive for murmur (1 to 2/ kaiser). ( No  jvd and no leg edema)  Chest: No chest wall tenderness. Abdomen: Abdomen is soft and non-distended. Bowel sounds are normal.   There is no abdominal tenderness. Extremities: Normal range of motion. There is no dependent edema. Neurological: Patient is alert and oriented to person, place and time. Patient has normal reflexes, normal muscle tone and normal coordination. Skin:  Warm and dry. Labs/Imaging/Diagnostics    Labs:  CBC:  Recent Labs     03/07/22 0915 03/08/22  0725 03/09/22  0430   WBC 12.9* 13.8* 23.6*   RBC 3.92* 3.45* 3.20*   HGB 13.2* 11.7* 10.8*   HCT 40.2* 35.7* 31.8*   .6* 103.5* 99.4*    190 235     CHEMISTRIES:  Recent Labs     03/07/22 0915 03/08/22  0725 03/09/22  0430    134* 131*   K 4.8 4.3 4.2    103 99   CO2 24 18* 19*   BUN 30* 30* 38*   CREATININE 1.5* 1.4* 1.6*   GLUCOSE 101 143* 145*     PT/INR:No results for input(s): PROTIME, INR in the last 72 hours. APTT:No results for input(s): APTT in the last 72 hours. LIVER PROFILE:  Recent Labs     03/07/22 0915 03/08/22  0725   AST 27 25   ALT 14 13   BILITOT 0.6 0.4   ALKPHOS 57 50       Imaging Last 24 Hours:  ECHO Limited    Result Date: 3/9/2022  Transthoracic Echocardiography Report (TTE)  Demographics   Patient Name    Tk Burris Gender                Male   MR #            [id number]    Race                                                  Ethnicity   Account #       [de-identified]    Room Number           0020   Accession       [id number]   Date of Study         03/09/2022  Number   Date of Birth   1950   Referring Physician   Beryn Reed MD   Age             70 year(s)   Sonographer           Jhonny Xiong, HELIO, RVT                                Interpreting          Aleksey Abraham MD                               Physician  Procedure Type of Study   TTE procedure:ECHOCARDIOGRAM LIMITED. Procedure Date Date: 03/09/2022 Start: 11:35 AM Study Location: Bedside Technical Quality: Adequate visualization Indications:Shortness of breath. Additional Medical History:Smoker, Ischemic cardiomyopathy, Hypertension, Hyperlipidemia, COPD, Atrial fibrillation, PAcemaker, Congetsive heart failrue, Coronary artery disease, CABG x 3.  Patient Status: Routine Height: 68 inches Weight: 159 pounds BSA: 1.85 m^2 BMI: 24.18 kg/m^2 BP: 122/59 mmHg Allergies   - See Epic.   - Penicillins.   - Sulfa drugs. Conclusions   Summary  Left ventricle size is normal.  Normal left ventricular wall thickness. There were no regional wall motion abnormalities. Ejection fraction is visually estimated in the range of 55% to 60%. Abnormal (paradoxical) motion consistent with post-operative status. IVC size is within normal limits with normal respiratory phasic changes. Signature   ----------------------------------------------------------------  Electronically signed by Charlene Govea MD (Interpreting  physician) on 03/09/2022 at 04:56 PM  ----------------------------------------------------------------   Findings   Mitral Valve  Structurally normal mitral valve. Aortic Valve  Structurally normal aortic valve. Tricuspid Valve  Tricuspid valve is structurally normal.   Pulmonic Valve  Pulmonic valve is structurally normal.   Left Atrium  Normal size left atrium. Left Ventricle  Left ventricle size is normal.  Normal left ventricular wall thickness. There were no regional wall motion abnormalities. Ejection fraction is visually estimated in the range of 55% to 60%. Abnormal (paradoxical) motion consistent with post-operative status. Right Atrium  The right atrium is of normal size. Right Ventricle  Normal right ventricular size and function. Pericardial Effusion  No evidence of any pericardial effusion. Pleural Effusion  No evidence of pleural effusion. Aorta / Great Vessels  -Aortic root dimension within normal limits.  -The Pulmonary artery is within normal limits. -IVC size is within normal limits with normal respiratory phasic changes.   M-Mode/2D Measurements & Calculations   LV Diastolic   LV Systolic Dimension:    AV Cusp Separation: 2.2 cmLA  Dimension: 4.7 3.3 cm                    Dimension: 3.1 cmAO Root  cm             LV Volume Diastolic: 367  Dimension: 3.6 cmLA Area: 14.9  LV FS:29.8 %   ml                        cm^2  LV PW          LV Volume Systolic: 71.1  Diastolic: 0.9 ml  cm LV EDV/LV EDV Index: 102  Septum         ml/55 m^2LV ESV/LV ESV    RV Diastolic Dimension: 2.8 cm  Diastolic: 1   Index: 56.6 ml/24 m^2  cm             EF Calculated: 56.8 %     LA/Aorta: 0.86                                            LA volume/Index: 40.1 ml /22m^2  [email or web address] [id number]    XR CHEST PORTABLE    Result Date: 3/9/2022  Chest X-ray, 1 View COMPARISON: CR,SR - XR CHEST STANDARD (2 VW) - 04/07/2021 06:27 AM EDT FINDINGS: Diffuse bilateral airspace disease, greater on the right than the left. Calcified right lower lung granuloma. No pleural effusion. No pneumothorax. No cardiomegaly. No acute fracture. Status post median sternotomy. Right-sided pacemaker in place obscuring a portion of the study. Bilateral airspace disease, which could be due to pneumonia or pulmonary edema. This document has been electronically signed by: Angel Camacho MD on 03/09/2022 03:32 AM    Assessment//Plan           Hospital Problems           Last Modified POA    * (Principal) Pneumonia 3/7/2022 Yes        Assessment:    Condition: In stable condition. Worsening.   (  Respiratory failure  With hypoxia and now  Worse  With several factors     Pneumonia  Bilateral noted and cxr slight better and no effusions     chf acute diastolic probable due par atrial fib  With rvr     copd  With exacerbation and need  Of aerosols and steroids      pacemaker noted       ashd and troponin all wnl     htn at times low and hold  Of the entresto     lipids  Stable     tobacco addiction as before  Admission up to 1/2 ppd and not using inhalers     gerd  Stable     ). Plan:   Continue respiratory treatments (increase  of  oxygen). Consults: cardiology and pulmonology. Diet Plan:  cardiac. Chest x-ray and echocardiogram.  Administer medications as ordered.    (  Limited  Echo  To  Look at lv function     cxr slight better      bnp  Now  Higher      increase  Lasix      feel wheeze not all chf  But copd and pneumonia and increase aerosols and continue steroids  An on antibiotics    Pulmonary to see and ask cardio to relook      follow  Labs ).        Electronically signed by Jennie Montez MD on 3/9/22 at 7:26 PM EST

## 2022-03-10 NOTE — PLAN OF CARE
Problem: Discharge Planning:  Goal: Participates in care planning  Description: Participates in care planning  Outcome: Ongoing  Goal: Discharged to appropriate level of care  Description: Discharged to appropriate level of care  Outcome: Ongoing     Problem: Gas Exchange - Impaired:  Goal: Levels of oxygenation will improve  Description: Levels of oxygenation will improve  Outcome: Ongoing  Note: Pt on high flow nasal cannula. Weaning requirements as tolerated. Will continue to monitor. Problem: Tissue Perfusion - Cardiopulmonary, Altered:  Goal: Absence of angina  Description: Absence of angina  Outcome: Ongoing  Goal: Hemodynamic stability will improve  Description: Hemodynamic stability will improve  Outcome: Ongoing     Problem: Falls - Risk of:  Goal: Will remain free from falls  Description: Will remain free from falls  Outcome: Ongoing  Note: Patient free from falls this shift. Bed alarm in place. Call light and patient belongings within reach. Fall precautions and nonskid socks in place. Goal: Absence of physical injury  Description: Absence of physical injury  Outcome: Ongoing     Problem: Pain:  Goal: Pain level will decrease  Description: Pain level will decrease  Outcome: Ongoing  Goal: Control of acute pain  Description: Control of acute pain  Outcome: Ongoing  Goal: Control of chronic pain  Description: Control of chronic pain  Outcome: Ongoing     Problem: Skin Integrity:  Goal: Will show no infection signs and symptoms  Description: Will show no infection signs and symptoms  Outcome: Ongoing  Note: Patient free from any signs of infection. No new skin breakdown this shift. Patient encouraged and educated on the benefits of turning every 2 hours.     Goal: Absence of new skin breakdown  Description: Absence of new skin breakdown  Outcome: Ongoing     Problem: Breathing Pattern - Ineffective:  Goal: Ability to achieve and maintain a regular respiratory rate will improve  Description: Ability to achieve and maintain a regular respiratory rate will improve  Outcome: Ongoing   Care plan reviewed with patient. Patient verbalize understanding of the plan of care and contribute to goal setting.

## 2022-03-10 NOTE — PLAN OF CARE
Problem: Discharge Planning:  Goal: Participates in care planning  Description: Participates in care planning  3/10/2022 1140 by Fátima Hernandez RN  Outcome: Ongoing  Note: At this time patient is not appropriate for discharge. Possible d/c tomorrow once meds have been adjusted and patient has been weaned from [address].  3/10/2022 0158 by Aury Modi RN  Outcome: Ongoing  Goal: Discharged to appropriate level of care  Description: Discharged to appropriate level of care  3/10/2022 1140 by Fátima Hernandez RN  Outcome: Ongoing  3/10/2022 0158 by Aury Modi RN  Outcome: Ongoing     Problem: Gas Exchange - Impaired:  Goal: Levels of oxygenation will improve  Description: Levels of oxygenation will improve  3/10/2022 1140 by Fátima Hernandez RN  Outcome: Ongoing  Note: Patient is on continuous monitoring. Trying to wean HFNC as tolerated. 3/10/2022 0158 by Aury Modi RN  Outcome: Ongoing  Note: Pt on high flow nasal cannula. Weaning requirements as tolerated. Will continue to monitor. Problem: Tissue Perfusion - Cardiopulmonary, Altered:  Goal: Absence of angina  Description: Absence of angina  3/10/2022 1140 by Fátima Hernandez RN  Outcome: Ongoing  Note: No signs of CP at this time. Will continue to monitor. 3/10/2022 0158 by Aury Modi RN  Outcome: Ongoing  Goal: Hemodynamic stability will improve  Description: Hemodynamic stability will improve  3/10/2022 1140 by Fátima Hernandez RN  Outcome: Ongoing  3/10/2022 0158 by Aury Modi RN  Outcome: Ongoing     Problem: Falls - Risk of:  Goal: Will remain free from falls  Description: Will remain free from falls  3/10/2022 1140 by Fátima Hernandez RN  Outcome: Ongoing  Note: Patient is free from falls at this time. Will continue to monitor. 3/10/2022 0158 by Aury Modi RN  Outcome: Ongoing  Note: Patient free from falls this shift. Bed alarm in place. Call light and patient belongings within reach. Fall precautions and nonskid socks in place.     Goal: Absence of physical injury  Description: Absence of physical injury  3/10/2022 1140 by Brock Pritchard RN  Outcome: Ongoing  3/10/2022 0158 by Alexa Blackburn RN  Outcome: Ongoing     Problem: Pain:  Goal: Pain level will decrease  Description: Pain level will decrease  3/10/2022 1140 by Brock Pritchard RN  Outcome: Ongoing  Note: Patient denies any pain at this time. Will continue to monitor. 3/10/2022 0158 by Alexa Blackburn RN  Outcome: Ongoing  Goal: Control of acute pain  Description: Control of acute pain  3/10/2022 1140 by Brock Pritchard RN  Outcome: Ongoing  3/10/2022 0158 by Alexa Blackubrn RN  Outcome: Ongoing  Goal: Control of chronic pain  Description: Control of chronic pain  3/10/2022 1140 by Brock Pritchard RN  Outcome: Ongoing  3/10/2022 0158 by Alexa Blackburn RN  Outcome: Ongoing     Problem: Skin Integrity:  Goal: Will show no infection signs and symptoms  Description: Will show no infection signs and symptoms  3/10/2022 1140 by Brock Pritchard RN  Outcome: Ongoing  Note: Patient is able to turn self. Pillows for support have been given. Will continue to monitor for signs of skin breakdown. 3/10/2022 0158 by Alexa Blackburn RN  Outcome: Ongoing  Note: Patient free from any signs of infection. No new skin breakdown this shift. Patient encouraged and educated on the benefits of turning every 2 hours. Goal: Absence of new skin breakdown  Description: Absence of new skin breakdown  3/10/2022 1140 by Brock Pritchard RN  Outcome: Ongoing  3/10/2022 0158 by Alexa Blackburn RN  Outcome: Ongoing     Problem: Breathing Pattern - Ineffective:  Goal: Ability to achieve and maintain a regular respiratory rate will improve  Description: Ability to achieve and maintain a regular respiratory rate will improve  3/10/2022 1140 by Brock Pritchard RN  Outcome: Ongoing  Note: Patient respiratory rate is within normal limits. Will continue to monitor.   3/10/2022 0158 by Alexa Blackburn RN  Outcome: Ongoing

## 2022-03-10 NOTE — FLOWSHEET NOTE
[facility] PRO[facility]        Patient: Danis Rodriguez  Room #: 3J-77/039-C            YOB: 1956  Age: 72 y.o. Gender: male                  Admit Date & Time: 3/2/2022  4:23 PM     Assessment:  Victoria Rdz, is a oates  who joked about needing a hair cut. He is a 72year old male who is in bed on 4k and has oxygen. He discussed his pneumonia and his COPD. He also outlines how he came to be at the hospital with his wife's help. He said he wants to through away his cigarettes and quit for good. He is calm and approachable.      Interventions:  He is Bahai. Tears came to his eyes when he discovered that his wife brought him a bible to read and has marked areas where he should read. I asked him where he is reading from and he stated the [address] [place] [address] Avenue. I also recommended to his [address]. He welcomed prayer for his continued healing.      Outcomes:  Encouraged.       Plan:     1.Care Plan:  Continue spiritual and emotional care for patient and family.   Including prayers.      Electronically signed by Abilio Green, on 3/9/2022 at 6:54 PM.  [facility]  [phone]

## 2022-03-10 NOTE — PROGRESS NOTES
Cardiology Progress Note      Patient:  Lisette Kim  YOB: 1950  MRN: [id number]   Acct: [de-identified]  [facility] Date:  3/7/2022  Primary Cardiologist: Yeyo Ramos MD    Note per dr Ke Martin:    Afib RVR      CHIEF COMPLAINT:    SOB     HISTORY OF PRESENT ILLNESS:    Lisette Kim is a pleasant 70year old male patient with past medical history that includes:   Past Medical History        Past Medical History:   Diagnosis Date    Asthma      Atrial fibrillation with RVR ([facility] [address] 04/05/2021    [employer] dual pacemaker  04/15/2021    Collagenous colitis 2021     per scope 2021    Colon polyps 2021     dr Sergio Wolff    COPD, mild ([address]      Eczema of hand      HTN (hypertension)      Hyperlipidemia      Smoker        The patient was admitted to the hospital on 3/7/2022 after he presented to [facility] with two weeks history of shortness of breath, dyspnea on exertion, nasal congestion, productive cough. Patient denies chest pain, syncope. The patient was given Rocephin in ER for pneumonia. Troponin <0.01, <0.01, <0.01. ProBNP 1,931. CXR revealed bilateral lower lung consolidations. Cr was 1.5, down to 1.4 today. He has h/o CKD, Cr baseline 1.4-1.8 ?. WBC count is elevated, 13,800 today. Patient has h/o ischemic cardiomyopathy, CAD, 3 vessel CABG, ZAHRA clipping in 01/2021. She is s/p pacemaker placement for tachy-ericka syndrome. His EF was as low as 25% in the past, improved. Echocardiogram on 06/2021 revealed an EF of 45-50%. Patient is followed at CHF clinic, lasix was recently reduced to 20 mg po daily. \"    Subjective (Events in last 24 hours): Iv lasix increased yesterday to 80 iv bid, had total 1725 urine out, bun/cr increased from 38/1.6 to 53/2.0  Pt remains on hi flow O2  amio stopped yesterday due to concern for amio pulmonary toxicity  Pt awake and alert. NAD. Sob with any exertion.   Denies chest pain    Net I/o +1.6 L    Objective:   /60   Pulse 57   Temp 95.7 °F (35.4 °C) (Oral)   Resp 18   Ht 5' 8\" (1.727 m)   Wt 153 lb 11.2 oz (69.7 kg)   SpO2 94%   BMI 23.37 kg/m²        TELEMETRY: nsr 60s    Physical Exam:  General Appearance: alert and oriented to person, place and time, in no acute distress  Cardiovascular: reg rate and rhythm  Pulmonary/Chest: bibasilar crackles, some rhonchi  Abdomen: soft, non-tender, non-distended, normal bowel sounds, no masses   Extremities: no cyanosis, clubbing or edema, pulse   Skin: warm and dry, no rash or erythema  Head: normocephalic and atraumatic  Eyes: pupils equal, round, and reactive to light  Neck: supple and non-tender without mass, no thyromegaly   Musculoskeletal: normal range of motion, no joint swelling, deformity or tenderness  Neurological: alert, oriented, normal speech, no focal findings or movement disorder noted    Medications:    furosemide  40 mg IntraVENous BID    predniSONE  40 mg Oral Daily    sodium chloride flush  5-40 mL IntraVENous 2 times per day    aspirin  81 mg Oral Daily    atorvastatin  40 mg Oral Nightly    sacubitril-valsartan  0.5 tablet Oral BID    metoprolol succinate  100 mg Oral Daily    rivaroxaban  20 mg Oral Daily with breakfast    rOPINIRole  0.5 mg Oral Nightly    ipratropium-albuterol  1 ampule Inhalation Q4H    cefTRIAXone (ROCEPHIN) IV  1,000 mg IntraVENous Q12H    azithromycin  500 mg IntraVENous Q24H      sodium chloride      sodium chloride 20 mL/hr at 03/08/22 2356     sodium chloride flush, 5-40 mL, PRN  sodium chloride, 25 mL, PRN  acetaminophen, 650 mg, Q4H PRN  ondansetron, 4 mg, Q8H PRN   Or  ondansetron, 4 mg, Q6H PRN  ondansetron, 4 mg, Q8H PRN   Or  ondansetron, 4 mg, Q6H PRN  polyethylene glycol, 17 g, Daily PRN  acetaminophen, 650 mg, Q6H PRN   Or  acetaminophen, 650 mg, Q6H PRN  potassium chloride, 40 mEq, PRN   Or  potassium alternative oral replacement, 40 mEq, PRN   Or  potassium chloride, 10 mEq, PRN  magnesium sulfate, 2,000 mg, PRN  ALPRAZolam, 0.25 mg, BID PRN        Lab Data:  TTE 3/9/22  Summary   Left ventricle size is normal.   Normal left ventricular wall thickness. There were no regional wall motion abnormalities. Ejection fraction is visually estimated in the range of 55% to 60%. Abnormal (paradoxical) motion consistent with post-operative status. IVC size is within normal limits with normal respiratory phasic changes. Signature      ----------------------------------------------------------------   Electronically signed by Luis Varela MD (Interpreting   physician) on 03/09/2022 at 04:56 PM    Cardiac Enzymes:  No results for input(s): CKTOTAL, CKMB, CKMBINDEX, TROPONINI in the last 72 hours.     CBC:   Lab Results   Component Value Date    WBC 20.6 03/10/2022    RBC 3.08 03/10/2022    RBC 4.84 11/07/2011    HGB 10.3 03/10/2022    HCT 30.1 03/10/2022     03/10/2022       CMP:    Lab Results   Component Value Date     03/10/2022    K 4.4 03/10/2022    K 4.3 03/08/2022     03/10/2022    CO2 21 03/10/2022    BUN 53 03/10/2022    CREATININE 2.0 03/10/2022    LABGLOM 33 03/10/2022    GLUCOSE 111 03/10/2022    GLUCOSE 94 11/07/2011    CALCIUM 8.3 03/10/2022       Hepatic Function Panel:    Lab Results   Component Value Date    ALKPHOS 50 03/08/2022    ALT 13 03/08/2022    AST 25 03/08/2022    PROT 6.3 03/08/2022    BILITOT 0.4 03/08/2022    BILIDIR <0.2 01/11/2021    LABALBU 2.7 03/08/2022    LABALBU 4.3 11/07/2011       Magnesium:    Lab Results   Component Value Date    MG 1.7 02/01/2022       PT/INR:    Lab Results   Component Value Date    INR 1.25 04/06/2021       HgBA1c:    Lab Results   Component Value Date    LABA1C 5.6 01/11/2021       FLP:    Lab Results   Component Value Date    TRIG 96 01/07/2021    HDL 36 01/07/2021    LDLCALC 71 01/07/2021    LDLDIRECT 79.82 01/11/2021       TSH:    Lab Results   Component Value Date    TSH 0.994 03/07/2022         Assessment:    Acute hypoxic resp failure  ?amio induced pulmonary toxicity - pulm following  PNA - ATB per attending  Acute on chronic HFmrEF (improved EF) - improved   Ef 45-50 with grade 1 DDfx per TTE 6/15/21   Ef 55-60 per TTE 3/9/22  afib rvr - currently NSR   amio stopped due to possible amio pulm toxicity   PAF   On xarelto prior to admission  Hx tachy-ericka syndrome - s/p PPM  CAD - hx CABG x3, ZAHRA clipping 1/2021  ICMP  HTN  Dyslipidemia  COPD/asthma  Tobacco abuse  JANETH    Plan:    Daily I/o and weights  2 liter fluid restriction and 2gm sodium diet  Keep mag >2 and K >4  Hold lasix if ok with attending  Daily BMP  Cont asa/statin/BB  Hold entresto for now - restart once creatinine back to baseline  If afib rvr recurrent - consider flecainide - discussed with dr Perri Rodriguez  Will follow prn  Call with any questions/conerns     Electronically signed by Giorgio Dooley PA-C on 3/10/2022 at 9:13 AM

## 2022-03-10 NOTE — PROGRESS NOTES
Physician Progress Note      Anthony Carlson  [facility] #:                  [id number]  :                       1950  ADMIT DATE:       3/7/2022 8:50 AM  DISCH DATE:  RESPONDING  PROVIDER #:        Gladys Roca MD          QUERY TEXT:    Pt admitted with pneumonia and noted to have hypoxia. If possible, please   document in the progress notes and discharge summary if you are evaluating   and/or treating any of the following: The medical record reflects the following:  Risk Factors: pneumonia  Clinical Indicators: 82% on 2 LPM, 77% on 7 LPM, placed on 10 with SpO2   increased to 86% and weaned to 6 LPM with SpO2 91%  Treatment: Labs, imaging, supplemental oxygen  Options provided:  -- Acute respiratory failure with hypoxia  -- Acute respiratory failure with hypercapnia  -- Acute respiratory failure with hypoxia and hypercapnia  -- Chronic respiratory failure with hypoxia  -- Chronic respiratory failure with hypercapnia  -- Chronic respiratory failure with hypoxia and hypercapnia  -- Acute on chronic respiratory failure with hypoxia  -- Acute on chronic respiratory failure with hypercapnia  -- Acute on chronic respiratory failure with hypoxia and hypercapnia  -- Other - I will add my own diagnosis  -- Disagree - Not applicable / Not valid  -- Disagree - Clinically unable to determine / Unknown  -- Refer to Clinical Documentation Reviewer    PROVIDER RESPONSE TEXT:    This patient is in acute respiratory failure with hypoxia. Query created by: Katherine Maynard on 3/9/2022 9:22 AM      QUERY TEXT:    Pt admitted with pneumonia. If possible, please document in the progress notes   and discharge summary if you are evaluating and/or treating any of the   following:    Note: CAP and HCAP indicate where the pneumonia was acquired, not a specific   type.     The medical record reflects the following:  Risk Factors: pneumonia  Clinical Indicators: PCT 0.08, WBC 12.9, productive cough, dyspnea; CXR:   Bilateral lower lobe consolidative opacities are seen, right greater than   left. Findings relate to pneumonia in the right clinical setting. Treatment: Labs, imaging, Zithromax, Rocephin  Options provided:  -- Gram negative pneumonia  -- Gram positive pneumonia  -- MRSA pneumonia  -- MSSA pneumonia  -- Bacterial pneumonia  -- Viral pneumonia  -- Aspiration pneumonia  -- Hypostatic pneumonia  -- Other - I will add my own diagnosis  -- Disagree - Not applicable / Not valid  -- Disagree - Clinically unable to determine / Unknown  -- Refer to Clinical Documentation Reviewer    PROVIDER RESPONSE TEXT:    This patient has bacterial pneumonia.     Query created by: Shyam Cowan on 3/9/2022 9:29 AM      Electronically signed by:  Marquis Dejesus MD 3/10/2022 9:58 AM

## 2022-03-10 NOTE — PROGRESS NOTES
[facility]      Patient - Trey Sales   MRN -  [id number]   [facility]   - 1950      Date of Admission -  3/7/2022  8:50 AM  Date of evaluation -  3/10/2022  Room - --A   Hospital Day - 3  Consulting - Yue Emmanuel MD Primary Care Physician - Yue Emmanuel MD     Problem List      Active Hospital Problems    Diagnosis Date Noted    Dyspnea and respiratory abnormalities [R06.00, R06.89]     Pneumonia [J18.9] 2022     Reason for Consult    Pneumonia and hypoxia with CHF  HPI   History Obtained From: Patient and electronic medical record. Trey Sales is a 70 y.o. male PMH asthma, atrial fibrillation with RVR, sick sinus syndrome s/p dual pacemaker, COPD, essential hypertension, HLD, and who is currently a 1/2 PPD smoker for the last 20-25 years who is coming in with complaints of shortness of breath. The patient has been sick with 2 weeks of nasal congestion, productive cough, and dyspnea, and thought he had COVID. The patient is chronically on claritin and hydroxizine. He was also previously being followed in the heart failure clinic where his Lasix dose was lowered from 40 mg to 20 mg. He denies any fever, chills, chest pain, nausea vomiting, but was noted to be confused on room air and had 85% pulse ox. Pulmonology was consulted because of the patient's increasing oxygen demands and new onset pneumonia with component of CHF. The patient at the time of my assessment did not report any discomfort and did not endorse any complaints. The patient has never officially had any formal PFTs done in the past before. Before this current admission, he had never been on any form of home oxygen. The patient was previously smoking 2 PPD but had been slowly cutting down to 1/2 PPD. The patient was chronically on amiodarone for his atrial fibrillation.  Of note the patient on  was noted to be hypotensive and in atrial fibrillation and was given fluids, started on an amiodarone drip, and transferred to stepdown. Of note the patient's oxygen requirements have also been increasing to the point where he was requiring Hi Ziggy, and his blood pressures have been decreasing due to the need for diuresis.      24 Hours  -Patient is in no distress right now  -Resting in bed  -Still on Hi Ziggy        PMHx   Past Medical History      Diagnosis Date    Asthma     Atrial fibrillation with RVR ([address] 04/05/2021    [employer] dual pacemaker  04/15/2021    Collagenous colitis 2021    per scope 2021    Colon polyps 2021    dr Christine Rice    COPD, mild ([address]     Eczema of hand     HTN (hypertension)     Hyperlipidemia     Smoker       Past Surgical History        Procedure Laterality Date    CARDIAC SURGERY      COLONOSCOPY  06/10/2021    theresa ccolon polyps and collagenous colitis    CORONARY ARTERY BYPASS GRAFT  01/12/2021    cabg x 3 with lima and atrial appendage clip  dr Alexander Ghmg GRAFT N/A 01/12/2021    CABG  X 3 WITH DEJAH, Atrial Appendage Clip performed by Mark Denton MD at [address] [place] [address]  06/2008    polyps    OTHER SURGICAL HISTORY  DEC 7TH 2012 DR GET WADE [facility] [place]     IGS ENDOSCOPIC BI LAT MAXILLARY, ETHMOID, SPHENOID, FRONTAL SINUSOTOMY WITH SEPTOPLASTY AND TURBINOPLASTIES    SKIN CANCER EXCISION  09/2014    Left side of Forehead     TOOTH EXTRACTION  02/2017     Meds    Current Medications    furosemide  40 mg IntraVENous BID    predniSONE  40 mg Oral Daily    sodium chloride flush  5-40 mL IntraVENous 2 times per day    aspirin  81 mg Oral Daily    atorvastatin  40 mg Oral Nightly    [Held by provider] sacubitril-valsartan  0.5 tablet Oral BID    metoprolol succinate  100 mg Oral Daily    rivaroxaban  20 mg Oral Daily with breakfast    rOPINIRole  0.5 mg Oral Nightly    ipratropium-albuterol  1 ampule Inhalation Q4H    cefTRIAXone (ROCEPHIN) IV 1,000 mg IntraVENous Q12H    azithromycin  500 mg IntraVENous Q24H     sodium chloride flush, sodium chloride, acetaminophen, ondansetron **OR** ondansetron, ondansetron **OR** ondansetron, polyethylene glycol, acetaminophen **OR** acetaminophen, potassium chloride **OR** potassium alternative oral replacement **OR** potassium chloride, magnesium sulfate, ALPRAZolam  IV Drips/Infusions   sodium chloride      sodium chloride 20 mL/hr at 03/08/22 0386     Home Medications  Medications Prior to Admission: cetirizine (ZYRTEC) 10 MG tablet, Take 10 mg by mouth daily  triamcinolone (KENALOG) 0.1 % ointment, Apply topically Daily Apply topically once daily  ALPRAZolam (XANAX) 0.5 MG tablet, Take 0.5 mg by mouth nightly as needed for Sleep.  furosemide (LASIX) 20 MG tablet, Take 2 tablets by mouth daily  hydrOXYzine (ATARAX) 10 MG tablet, Take 1 tablet by mouth nightly as needed   ENTRESTO 49-51 MG per tablet, Take 0.5 tablets by mouth 2 times daily  umeclidinium-vilanterol (ANORO ELLIPTA) 62.5-25 MCG/INH AEPB inhaler, Inhale 1 puff into the lungs daily  atorvastatin (LIPITOR) 40 MG tablet, Take 1 tablet by mouth nightly  metoprolol succinate (TOPROL XL) 100 MG extended release tablet, Take 1 tablet by mouth daily  amiodarone (CORDARONE) 200 MG tablet, Take 1 tablet by mouth daily  rivaroxaban (XARELTO) 20 MG TABS tablet, Take 1 tablet by mouth daily (with breakfast)  aspirin 81 MG chewable tablet, Take 1 tablet by mouth daily  albuterol sulfate  (90 Base) MCG/ACT inhaler, Inhale 2 puffs into the lungs every 6 hours as needed for Wheezing  Multiple Vitamins-Minerals (CENTRUM SILVER PO), Take 1 tablet by mouth daily   [DISCONTINUED] fluocinonide (LIDEX) 0.05 % ointment, Apply topically 2 times daily  [DISCONTINUED] rOPINIRole (REQUIP) 0.5 MG tablet, TAKE 1 TABLET BY MOUTH EVERY EVENING  Diet    ADULT DIET;  Regular; Low Fat/Low Chol/High Fiber/ALICIA  Allergies    Penicillins and Sulfa antibiotics  Social History Social History     Socioeconomic History    Marital status:      Spouse name: Not on file    Number of children: Not on file    Years of education: Not on file    Highest education level: Not on file   Occupational History    Not on file   Tobacco Use    Smoking status: Current Every Day Smoker     Packs/day: 1.00     Years: 40.00     Pack years: 40.00     Types: Cigarettes    Smokeless tobacco: Never Used   Substance and Sexual Activity    Alcohol use: Yes     Alcohol/week: 0.0 standard drinks     Comment: very little    Drug use: No    Sexual activity: Not on file   Other Topics Concern    Not on file   Social History Narrative    Not on file     Social Determinants of Health     Financial Resource Strain:     Difficulty of Paying Living Expenses: Not on file   Food Insecurity:     Worried About Running Out of Food in the Last Year: Not on file    Danielle of Food in the Last Year: Not on file   Transportation Needs:     Lack of Transportation (Medical): Not on file    Lack of Transportation (Non-Medical):  Not on file   Physical Activity:     Days of Exercise per Week: Not on file    Minutes of Exercise per Session: Not on file   Stress:     Feeling of Stress : Not on file   Social Connections:     Frequency of Communication with Friends and Family: Not on file    Frequency of Social Gatherings with Friends and Family: Not on file    Attends Nondenominational Services: Not on file    Active Member of [address] [place] [address] or Organizations: Not on file    Attends Club or Organization Meetings: Not on file    Marital Status: Not on file   Intimate Partner Violence:     Fear of Current or Ex-Partner: Not on file    Emotionally Abused: Not on file    Physically Abused: Not on file    Sexually Abused: Not on file   Housing Stability:     Unable to Pay for Housing in the Last Year: Not on file    Number of [place] in the Last Year: Not on file    Unstable Housing in the Last Year: Not on file Family History          Problem Relation Age of Onset    Heart Disease Mother     Heart Disease Father         cabg    Diabetes Brother     Heart Disease Brother      Sleep History    Never diagnosed with sleep apnea in the past.  Occupational history   Occupation:  He is current working: Yes  Type of profession: [employer]                       History of tobacco smoking:Yes  Amount of tobacco smokin/2 PPD  Years of tobacco smokin years                                    Quit smoking: No.              Quit year: None  Current smoker: Yes. Amount of current tobacco smokin/2 PPD for 25 years; was previously 2 PPD  . History of recreational or IV drug use in the past:NO     History of exposure to coal mines/coal dust: NO  History of exposure to foundry dust/welding: NO  History of exposure to quarry/silica/sandblasting: NO  History of exposure to asbestos/working with breaks/ships: NO  History of exposure to farm dust: NO  History of recent travel to long distances: NO  History of exposure to birds, pigeons, or chickens in the past:NO  Pet animals at home:No  Dogs: 0  Cats: 0    History of pulmonary embolism in the past: No            History of DVT in the past:No        [x] Right lower extremity   [] Left lower extremity. [place] of systems   Negative as per HPI    Vitals     height is 5' 8\" (1.727 m) and weight is 153 lb 11.2 oz (69.7 kg). His oral temperature is 95.7 °F (35.4 °C). His blood pressure is 128/60 and his pulse is 57. His respiration is 18 and oxygen saturation is 94%. Body mass index is 23.37 kg/m². SUPPLEMENTAL O2: O2 Flow Rate (L/min): 50 L/min     I/O        Intake/Output Summary (Last 24 hours) at 3/10/2022 1006  Last data filed at 3/10/2022 3187  Gross per 24 hour   Intake 2044.78 ml   Output 2175 ml   Net -130.22 ml     I/O last 3 completed shifts: In: 4051.5 [P.O.:1300; I.V.:1887.7;  IV Piggyback:863.8]  Out: 8451 [Urine:2875]   Patient Vitals for the past 96 hrs (Last 3 readings):   Weight   03/10/22 0330 153 lb 11.2 oz (69.7 kg)   03/08/22 0316 159 lb 6.3 oz (72.3 kg)   03/07/22 0855 148 lb (67.1 kg)       Exam   Nursing note and vitals reviewed. Constitutional: alert and oriented; sitting in bed on 6 L of oxygen  HENT: normocephalic and atraumatic  Neck: supple with no adenopathy  Cardiovascular: irregularly irregular; normal S1 and S2  Pulmonary/Chest: decreased breath sounds noted bilaterally with bilateral crackles  Abdominal: no distention  Musculoskeletal: ROM intact  Extremities: mild 1+ pitting edema  Lymphadenopathy:  none  Neurological: CN II-XII intact  Skin: no erythema or swelling    Labs  - Old records and notes have been reviewed in [facility]   ABG  Lab Results   Component Value Date    PH 7.46 03/09/2022    PO2 49 03/09/2022    PCO2 29 03/09/2022    HCO3 20 03/09/2022    O2SAT 87 03/09/2022     Lab Results   Component Value Date    IFIO2 40 03/09/2022    MODE SIMV 01/12/2021    SETTIDVOL 550 01/12/2021    SETPEEP 5.0 01/12/2021     CBC  Recent Labs     03/08/22  0725 03/09/22  0430 03/10/22  0439   WBC 13.8* 23.6* 20.6*   RBC 3.45* 3.20* 3.08*   HGB 11.7* 10.8* 10.3*   HCT 35.7* 31.8* 30.1*   .5* 99.4* 97.7*   MCH 33.9* 33.8* 33.4*   MCHC 32.8 34.0 34.2    235 255   MPV 10.2 10.3 10.2      BMP  Recent Labs     03/08/22  0725 03/09/22  0430 03/10/22  0439   * 131* 133*   K 4.3 4.2 4.4    99 100   CO2 18* 19* 21*   BUN 30* 38* 53*   CREATININE 1.4* 1.6* 2.0*   GLUCOSE 143* 145* 111*   MG  --   --  1.6   CALCIUM 8.4* 8.3* 8.3*     LFT  Recent Labs     03/08/22  0725   AST 25   ALT 13   BILITOT 0.4   ALKPHOS 50     TROP  Lab Results   Component Value Date    TROPONINT < 0.010 03/08/2022    TROPONINT < 0.010 03/07/2022    TROPONINT < 0.010 03/07/2022     BNP  No results for input(s): BNP in the last 72 hours. Lactic Acid  No results for input(s): LACTA in the last 72 hours.   INR  No results for input(s): INR, PROTIME in the last 72 hours. PTT  No results for input(s): APTT in the last 72 hours. Glucose  No results for input(s): POCGLU in the last 72 hours. UA No results for input(s): SPECGRAV, PHUR, COLORU, CLARITYU, MUCUS, PROTEINU, BLOODU, RBCUA, WBCUA, BACTERIA, NITRU, GLUCOSEU, BILIRUBINUR, UROBILINOGEN, KETUA, LABCAST, LABCASTTY, AMORPHOS in the last 72 hours. Invalid input(s): CRYSTALS. PFTs   No recent PFTs on record  Sleep studies   Has never had a sleep study    Cultures    No blood cultures to date    EKG   Normal sinus rhythm  Possible Left atrial enlargement  Borderline ECG  Echocardiogram   EF 55-60%    Radiology    CXR  Bilateral airspace disease, which could be due to pneumonia or pulmonary    edema. CT Scans  (See actual reports for details)    Assessment   1. Acute on chronic diastolic CHF exacerbation currently on Hi Ziggy  2. Bilateral community acquired pneumonia  3. COPD  4. ASHD s/p bypass  5. Acute hypoxic respiratory failure likely secondary to COPD, CHF, and pneumonia  6. Essential hypertension  7. Sick sinus syndrome s/p pacemaker placement  8. History of asthma  9. 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  Atrial fibrillation on Xarelto  Plan   -At this time I feel the patient's acute hypoxic respiratory failure is caused by a number of factors including the patient's COPD, CHF, and pneumonia  -Hold diuretics given soft blood pressures  -Recommend that the patient get off amiodarone and on a different rhythm control agent as this may be contributing to the patient's pulmonary symptoms as well; contacted cardiology about this  -Discontinue methylprednisolone and go on prednisone 40 mg daily  -Portable chest x-ray today  -Will get high resolution CT scan to further evaluate pulmonary disease  -Continue antibiotics including azithromycin and ceftriaxone   -Pulmonology will continue to follow  -Will wean the patient off Hi Ziggy and trial 6 L pending imaging results      \"Thank you for asking us to see this patient\"    Questions and concerns addressed.     Electronically signed by   Adela Brower DO on 3/10/2022 at 10:06 AM

## 2022-03-10 NOTE — CARE COORDINATION
3/10/22, 1:55 PM EST    DISCHARGE ON GOING EVALUATION    Ida Jihan day: 3  Location: [facility]20/020-A Reason for admit: Pneumonia [J18.9]  Dyspnea and respiratory abnormalities [R06.00, R06.89]  COPD exacerbation ([facility] [address] [J44.1]  Medication taken at higher dose than recommended [Z79.899]  Pneumonia of both lower lobes due to infectious organism [J18.9]     Barriers to Discharge: COPD/Pneumonia/A-fib/CHF. From 6K. WBC 20.6; monitor.  HF 40% FIO2/45L, IV AB continued    PCP: Nilda Mckay MD  Readmission Risk Score: 20.9 ( )%  Patient Goals/Plan/Treatment Preferences: plans home w spouse Manju Galeana; monitor oxygen needs

## 2022-03-11 NOTE — PROGRESS NOTES
[facility]      Patient - Adan Banegas   MRN -  [id number]   [facility]   - 1950      Date of Admission -  3/7/2022  8:50 AM  Date of evaluation -  3/11/2022  Room - 1K--A   Hospital Day - [address] [facility] MD [facility] Primary Care Physician - Malena Burciaga MD     Problem List      Active Hospital Problems    Diagnosis Date Noted    Dyspnea and respiratory abnormalities [R06.00, R06.89]     Pneumonia [J18.9] 2022     Reason for Consult    Pneumonia and hypoxia with CHF  HPI   History Obtained From: Patient and electronic medical record. Adan Banegas is a 70 y.o. male PMH asthma, atrial fibrillation with RVR, sick sinus syndrome s/p dual pacemaker, COPD, essential hypertension, HLD, and who is currently a 1/2 PPD smoker for the last 20-25 years who is coming in with complaints of shortness of breath. The patient has been sick with 2 weeks of nasal congestion, productive cough, and dyspnea, and thought he had COVID. The patient is chronically on claritin and hydroxizine. He was also previously being followed in the heart failure clinic where his Lasix dose was lowered from 40 mg to 20 mg. He denies any fever, chills, chest pain, nausea vomiting, but was noted to be confused on room air and had 85% pulse ox. Pulmonology was consulted because of the patient's increasing oxygen demands and new onset pneumonia with component of CHF. The patient at the time of my assessment did not report any discomfort and did not endorse any complaints. The patient has never officially had any formal PFTs done in the past before. Before this current admission, he had never been on any form of home oxygen. The patient was previously smoking 2 PPD but had been slowly cutting down to 1/2 PPD. The patient was chronically on amiodarone for his atrial fibrillation.  Of note the patient on  was noted to be hypotensive and in atrial fibrillation and was given fluids, started on an amiodarone drip, and transferred to stepdown. Of note the patient's oxygen requirements have also been increasing to the point where he was requiring Hi Ziggy, and his blood pressures have been decreasing due to the need for diuresis.      Past 24 Hours  -HFNC continues 50% 45LPM- 96%  -SOB with exertion   -Failed trial on O2 6LPM yesterday   -Afebrile     -All systems reviewed     PMHx   Past Medical History      Diagnosis Date    Asthma     Atrial fibrillation with RVR ([address] 04/05/2021    [employer] dual pacemaker  04/15/2021    Collagenous colitis 2021    per scope 2021    Colon polyps 2021    dr Ketty Delgadillo    COPD, mild ([address]     Eczema of hand     HTN (hypertension)     Hyperlipidemia     Smoker       Past Surgical History        Procedure Laterality Date    CARDIAC SURGERY      COLONOSCOPY  06/10/2021    theresa ccolon polyps and collagenous colitis    CORONARY ARTERY BYPASS GRAFT  01/12/2021    cabg x 3 with lima and atrial appendage clip  dr Hill Simple GRAFT N/A 01/12/2021    CABG  X 3 WITH DEJAH, Atrial Appendage Clip performed by Aziza Gallo MD at [address] [place] [address]  06/2008    polyps    OTHER SURGICAL HISTORY  DEC 7TH 2012 DR GET DAVISAS [place]     IGS ENDOSCOPIC BI LAT MAXILLARY, ETHMOID, SPHENOID, FRONTAL SINUSOTOMY WITH SEPTOPLASTY AND TURBINOPLASTIES    SKIN CANCER EXCISION  09/2014    Left side of Forehead     TOOTH EXTRACTION  02/2017     Meds    Current Medications    [Held by provider] furosemide  40 mg IntraVENous BID    predniSONE  40 mg Oral Daily    sodium chloride flush  5-40 mL IntraVENous 2 times per day    aspirin  81 mg Oral Daily    atorvastatin  40 mg Oral Nightly    [Held by provider] sacubitril-valsartan  0.5 tablet Oral BID    metoprolol succinate  100 mg Oral Daily    rivaroxaban  20 mg Oral Daily with breakfast    rOPINIRole  0.5 mg Oral Nightly    ipratropium-albuterol  1 ampule Inhalation Q4H    cefTRIAXone (ROCEPHIN) IV  1,000 mg IntraVENous Q12H    azithromycin  500 mg IntraVENous Q24H     sodium chloride flush, sodium chloride, acetaminophen, ondansetron **OR** ondansetron, ondansetron **OR** ondansetron, polyethylene glycol, acetaminophen **OR** acetaminophen, potassium chloride **OR** potassium alternative oral replacement **OR** potassium chloride, magnesium sulfate, ALPRAZolam  IV Drips/Infusions   sodium chloride      sodium chloride 20 mL/hr at 03/08/22 2356     Home Medications  Medications Prior to Admission: cetirizine (ZYRTEC) 10 MG tablet, Take 10 mg by mouth daily  triamcinolone (KENALOG) 0.1 % ointment, Apply topically Daily Apply topically once daily  ALPRAZolam (XANAX) 0.5 MG tablet, Take 0.5 mg by mouth nightly as needed for Sleep.  furosemide (LASIX) 20 MG tablet, Take 2 tablets by mouth daily  hydrOXYzine (ATARAX) 10 MG tablet, Take 1 tablet by mouth nightly as needed   ENTRESTO 49-51 MG per tablet, Take 0.5 tablets by mouth 2 times daily  umeclidinium-vilanterol (ANORO ELLIPTA) 62.5-25 MCG/INH AEPB inhaler, Inhale 1 puff into the lungs daily  atorvastatin (LIPITOR) 40 MG tablet, Take 1 tablet by mouth nightly  metoprolol succinate (TOPROL XL) 100 MG extended release tablet, Take 1 tablet by mouth daily  amiodarone (CORDARONE) 200 MG tablet, Take 1 tablet by mouth daily  rivaroxaban (XARELTO) 20 MG TABS tablet, Take 1 tablet by mouth daily (with breakfast)  aspirin 81 MG chewable tablet, Take 1 tablet by mouth daily  albuterol sulfate  (90 Base) MCG/ACT inhaler, Inhale 2 puffs into the lungs every 6 hours as needed for Wheezing  Multiple Vitamins-Minerals (CENTRUM SILVER PO), Take 1 tablet by mouth daily   [DISCONTINUED] fluocinonide (LIDEX) 0.05 % ointment, Apply topically 2 times daily  [DISCONTINUED] rOPINIRole (REQUIP) 0.5 MG tablet, TAKE 1 TABLET BY MOUTH EVERY EVENING  Diet    ADULT DIET;  Regular; Low Fat/Low Chol/High Fiber/ALICIA  Allergies    Penicillins and Sulfa antibiotics  Social History     Social History     Socioeconomic History    Marital status:      Spouse name: Not on file    Number of children: Not on file    Years of education: Not on file    Highest education level: Not on file   Occupational History    Not on file   Tobacco Use    Smoking status: Current Every Day Smoker     Packs/day: 1.00     Years: 40.00     Pack years: 40.00     Types: Cigarettes    Smokeless tobacco: Never Used   Substance and Sexual Activity    Alcohol use: Yes     Alcohol/week: 0.0 standard drinks     Comment: very little    Drug use: No    Sexual activity: Not on file   Other Topics Concern    Not on file   Social History Narrative    Not on file     Social Determinants of Health     Financial Resource Strain:     Difficulty of Paying Living Expenses: Not on file   Food Insecurity:     Worried About Running Out of Food in the Last Year: Not on file    Danielle of Food in the Last Year: Not on file   Transportation Needs:     Lack of Transportation (Medical): Not on file    Lack of Transportation (Non-Medical):  Not on file   Physical Activity:     Days of Exercise per Week: Not on file    Minutes of Exercise per Session: Not on file   Stress:     Feeling of Stress : Not on file   Social Connections:     Frequency of Communication with Friends and Family: Not on file    Frequency of Social Gatherings with Friends and Family: Not on file    Attends Jain Services: Not on file    Active Member of Clubs or Organizations: Not on file    Attends Club or Organization Meetings: Not on file    Marital Status: Not on file   Intimate Partner Violence:     Fear of Current or Ex-Partner: Not on file    Emotionally Abused: Not on file    Physically Abused: Not on file    Sexually Abused: Not on file   Housing Stability:     Unable to Pay for Housing in the Last Year: Not on file    Number of [facility] in the Last Year: Not on file    Unstable Housing in the Last Year: Not on file     Family History          Problem Relation Age of Onset    Heart Disease Mother     Heart Disease Father         cabg    Diabetes Brother     Heart Disease Brother      Sleep History    Never diagnosed with sleep apnea in the past.  Occupational history   Occupation:  He is current working: Yes  Type of profession: [employer]                       History of tobacco smoking:Yes  Amount of tobacco smokin/2 PPD  Years of tobacco smokin years                                    Quit smoking: No.              Quit year: None  Current smoker: Yes. Amount of current tobacco smokin/2 PPD for 25 years; was previously 2 PPD  . History of recreational or IV drug use in the past:NO     History of exposure to coal mines/coal dust: NO  History of exposure to foundry dust/welding: NO  History of exposure to quarry/silica/sandblasting: NO  History of exposure to asbestos/working with breaks/ships: NO  History of exposure to farm dust: NO  History of recent travel to long distances: NO  History of exposure to birds, pigeons, or chickens in the past:NO  Pet animals at home:No  Dogs: 0  Cats: 0    History of pulmonary embolism in the past: No            History of DVT in the past:No        [x] Right lower extremity   [] Left lower extremity. Vitals     height is 5' 8\" (1.727 m) and weight is 151 lb 3.8 oz (68.6 kg). His oral temperature is 98.5 °F (36.9 °C). His blood pressure is 119/59 (abnormal) and his pulse is 60. His respiration is 19 and oxygen saturation is 96%. Body mass index is 23 kg/m². SUPPLEMENTAL O2: O2 Flow Rate (L/min): 45 L/min     I/O        Intake/Output Summary (Last 24 hours) at 3/11/2022 0949  Last data filed at 3/11/2022 0811  Gross per 24 hour   Intake 1310 ml   Output 1700 ml   Net -390 ml     I/O last 3 completed shifts:   In: 2676 [P.O.:2110; I.V.:462.3; IV Piggyback:103.6]  Out: 3100 [ICACR:0215]   Patient Vitals for the past 96 hrs (Last 3 readings):   Weight   03/11/22 0407 151 lb 3.8 oz (68.6 kg)   03/10/22 0330 153 lb 11.2 oz (69.7 kg)   03/08/22 0316 159 lb 6.3 oz (72.3 kg)       Exam   Nursing note and vitals reviewed. Constitutional: alert and oriented; sitting in bed on HFNC   HENT: normocephalic and atraumatic  Neck: supple with no adenopathy  Cardiovascular: irregularly irregular; normal S1 and S2  Pulmonary/Chest: decreased breath sounds noted bilaterally with bilateral crackles  Abdominal: no distention  Musculoskeletal: ROM intact  Extremities: mild 1+ pitting edema  Lymphadenopathy:  none  Neurological: CN II-XII intact  Skin: no erythema or swelling    Labs  - Old records and notes have been reviewed in [facility]   ABG  Lab Results   Component Value Date    PH 7.46 03/09/2022    PO2 49 03/09/2022    PCO2 29 03/09/2022    HCO3 20 03/09/2022    O2SAT 87 03/09/2022     Lab Results   Component Value Date    IFIO2 40 03/09/2022    MODE SIMV 01/12/2021    SETTIDVOL 550 01/12/2021    SETPEEP 5.0 01/12/2021     CBC  Recent Labs     03/09/22  0430 03/10/22  0439 03/11/22  0434   WBC 23.6* 20.6* 14.5*   RBC 3.20* 3.08* 3.01*   HGB 10.8* 10.3* 10.1*   HCT 31.8* 30.1* 29.4*   MCV 99.4* 97.7* 97.7*   MCH 33.8* 33.4* 33.6*   MCHC 34.0 34.2 34.4    255 281   MPV 10.3 10.2 10.2      BMP  Recent Labs     03/09/22  0430 03/10/22  0439 03/11/22  0434 03/11/22  0743   * 133* 133*  --    K 4.2 4.4 4.2  --    CL 99 100 101  --    CO2 19* 21* 21*  --    BUN 38* 53* 53*  --    CREATININE 1.6* 2.0* 1.8*  --    GLUCOSE 145* 111* 99  --    MG  --  1.6  --  1.8   CALCIUM 8.3* 8.3* 8.2*  --      LFT  No results for input(s): AST, ALT, ALB, BILITOT, ALKPHOS, LIPASE in the last 72 hours. Invalid input(s):   AMYLASE  TROP  Lab Results   Component Value Date    TROPONINT < 0.010 03/08/2022    TROPONINT < 0.010 03/07/2022    TROPONINT < 0.010 03/07/2022     BNP  No results for input(s): BNP in the last 72 hours. Lactic Acid  No results for input(s): LACTA in the last 72 hours. INR  No results for input(s): INR, PROTIME in the last 72 hours. PTT  No results for input(s): APTT in the last 72 hours. Glucose  No results for input(s): POCGLU in the last 72 hours. UA No results for input(s): SPECGRAV, PHUR, COLORU, CLARITYU, MUCUS, PROTEINU, BLOODU, RBCUA, WBCUA, BACTERIA, NITRU, GLUCOSEU, BILIRUBINUR, UROBILINOGEN, KETUA, LABCAST, LABCASTTY, AMORPHOS in the last 72 hours. Invalid input(s): CRYSTALS. PFTs   No recent PFTs on record  Sleep studies   Has never had a sleep study    Cultures    Covid (-)  Rapid Influenza A/B (-)    EKG   Normal sinus rhythm  Possible Left atrial enlargement  Borderline ECG  Echocardiogram   EF 55-60%    Radiology    CXR  Bilateral airspace disease, which could be due to pneumonia or pulmonary    edema. CT Scans  03/10/2022   CT chest without contrast.   1. Near diffuse bilateral reticular interstitial thickening throughout the lungs with relative sparing of the lung apices. Findings are new from prior exam and likely reflect sequela of an inflammatory or infectious process, but can be followed up to exclude developing interstitial fibrosis. 2. Mild central bronchiectasis. Stable background of emphysema. (See actual reports for details)    Assessment   1. Acute on chronic diastolic CHF exacerbation currently on Hi Ziggy  2. Bilateral community acquired pneumonia  3. COPD  4. ASHD s/p bypass  5. Acute hypoxic respiratory failure likely secondary to COPD, CHF, and pneumonia  6. Essential hypertension  7. Sick sinus syndrome s/p pacemaker placement  8. History of asthma  9. 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 Atrial fibrillation on Xarelto  12.  Full Code   Plan   -Diuretics on HOLD   -Recommend that the patient get off amiodarone and on a different rhythm control agent as this may be contributing to the patient's pulmonary symptoms as well;

## 2022-03-11 NOTE — PLAN OF CARE
Problem: Discharge Planning:  Goal: Participates in care planning  Description: Participates in care planning  Outcome: Ongoing  Note: Patient is not appropriate for d/c at this time. Will continue to monitor and evaluate patient. Goal: Discharged to appropriate level of care  Description: Discharged to appropriate level of care  Outcome: Ongoing     Problem: Gas Exchange - Impaired:  Goal: Levels of oxygenation will improve  Description: Levels of oxygenation will improve  Outcome: Ongoing  Note: Attempted to wean patient off of HFNC but did not tolerate. Will continue monitor and attempt to wean HFNC. Problem: Tissue Perfusion - Cardiopulmonary, Altered:  Goal: Absence of angina  Description: Absence of angina  Outcome: Ongoing  Note: Patient is free from any chest pain. Patient is on continuous monitoring at this time. Will continue to monitor. Goal: Hemodynamic stability will improve  Description: Hemodynamic stability will improve  Outcome: Ongoing     Problem: Falls - Risk of:  Goal: Will remain free from falls  Description: Will remain free from falls  Outcome: Ongoing  Note: Patient is free from any falls at this time. Will continue to monitor. Goal: Absence of physical injury  Description: Absence of physical injury  Outcome: Ongoing  Note: Patient is free from any physical injury at this time. Will continue to monitor. Problem: Pain:  Goal: Pain level will decrease  Description: Pain level will decrease  Outcome: Ongoing  Note: Patient denies any pain at this time. Will continue to monitor. Goal: Control of acute pain  Description: Control of acute pain  Outcome: Ongoing  Goal: Control of chronic pain  Description: Control of chronic pain  Outcome: Ongoing     Problem: Skin Integrity:  Goal: Will show no infection signs and symptoms  Description: Will show no infection signs and symptoms  Outcome: Ongoing  Note: Patient has a few areas that are at risk for breakdown.   Foam dressing has been applied and pillows for support. Will continue to monitor. Goal: Absence of new skin breakdown  Description: Absence of new skin breakdown  Outcome: Ongoing     Problem: Breathing Pattern - Ineffective:  Goal: Ability to achieve and maintain a regular respiratory rate will improve  Description: Ability to achieve and maintain a regular respiratory rate will improve  Outcome: Ongoing  Note: Patient is on continuous monitoring and HFNC. Patients RR is within normal limits. Will continue to monitor.

## 2022-03-11 NOTE — PROGRESS NOTES
tablet 0.5 mg  0.5 mg Oral Nightly Jorge L Wang MD   0.5 mg at 03/10/22 2105    ipratropium-albuterol (DUONEB) nebulizer solution 1 ampule  1 ampule Inhalation Q4H Jorge L Wang MD   1 ampule at 03/10/22 2031    cefTRIAXone (ROCEPHIN) 1000 mg IVPB in 50 mL D5W minibag  1,000 mg IntraVENous Q12H Jorge L Wang  mL/hr at 03/10/22 2358 1,000 mg at 03/10/22 2358    azithromycin (ZITHROMAX) 500 mg in D5W 250ml addavial  500 mg IntraVENous Q24H Jorge L Wang MD   Stopped at 03/10/22 1544    ondansetron (ZOFRAN-ODT) disintegrating tablet 4 mg  4 mg Oral Q8H PRN Jorge L Wang MD        Or    ondansetron [facility]) injection 4 mg  4 mg IntraVENous Q6H PRN Jorge L Wang MD        polyethylene glycol (GLYCOLAX) packet 17 g  17 g Oral Daily PRN Jorge L Wang MD        acetaminophen (TYLENOL) tablet 650 mg  650 mg Oral Q6H PRN Jorge L Wang MD        Or    acetaminophen (TYLENOL) suppository 650 mg  650 mg Rectal Q6H PRN Jorge L Wang MD        0.9 % sodium chloride infusion   IntraVENous Continuous Jorge L Wang MD 20 mL/hr at 03/08/22 2356 Rate Change at 03/08/22 2356    potassium chloride (KLOR-CON M) extended release tablet 40 mEq  40 mEq Oral PRN Jorge L Wang MD        Or    potassium bicarb-citric acid (EFFER-K) effervescent tablet 40 mEq  40 mEq Oral PRN Jorge L Wang MD        Or    potassium chloride 10 mEq/100 mL IVPB (Peripheral Line)  10 mEq IntraVENous PRN Jorge L Wang MD        magnesium sulfate 2000 mg in 50 mL IVPB premix  2,000 mg IntraVENous PRN Jorge L Wang MD        ALPRAZolam Sh[place] Nones) tablet 0.25 mg  0.25 mg Oral BID PRN Jorge L Wang MD          Review of Systems   Constitutional: Negative for activity change and appetite change. HENT: Negative for congestion. Respiratory: Positive for cough and wheezing.           High flow oxygen   Gastrointestinal: Negative for abdominal distention, constipation, diarrhea, nausea and vomiting. Genitourinary: Negative for difficulty urinating. Musculoskeletal: Negative for arthralgias. Skin: Negative for rash. Neurological: Positive for weakness. Negative for dizziness, light-headedness and headaches. Psychiatric/Behavioral: Negative for agitation. Objective         Vitals Last 24 Hours:  TEMPERATURE:  Temp  Av.3 °F (36.3 °C)  Min: 95.7 °F (35.4 °C)  Max: 97.9 °F (36.6 °C)  RESPIRATIONS RANGE: Resp  Av  Min: 18  Max: 20  PULSE OXIMETRY RANGE: SpO2  Av.7 %  Min: 92 %  Max: 99 %  PULSE RANGE: Pulse  Av.7  Min: 55  Max: 68  BLOOD PRESSURE RANGE: Systolic (44FKJ), SHZ:449 , Min:96 , OPY:293   ; Diastolic (08HVT), SBN:21, Min:53, Max:60    I/O (24Hr): Intake/Output Summary (Last 24 hours) at 3/11/2022 0029  Last data filed at 3/10/2022 2316  Gross per 24 hour   Intake 1624.97 ml   Output 1900 ml   Net -275.03 ml     Objective:  General Appearance:  Comfortable. Vital signs: (most recent): Blood pressure (!) 102/58, pulse 68, temperature 97.9 °F (36.6 °C), temperature source Oral, resp. rate 20, height 5' 8\" (1.727 m), weight 153 lb 11.2 oz (69.7 kg), SpO2 92 %. Vital signs are normal.    Output: Producing urine and producing stool. HEENT: Normal HEENT exam.    Lungs:  Normal effort. There are rales and wheezes. (Prolong inspiration and expiration with rales more in right base with expiration with wheeze    High flow oxygen nasal)  Heart: Normal rate. Regular rhythm. S1 normal and S2 normal.  Positive for murmur (1 to 2/6 kaiser). Abdomen: Abdomen is soft and non-distended. Bowel sounds are normal.   There is no abdominal tenderness. Extremities: Normal range of motion. Pulses: Distal pulses are intact. Neurological: Patient is alert and oriented to person, place and time. Patient has normal reflexes. Skin:  Warm and dry.       Labs/Imaging/Diagnostics    Labs:  CBC:  Recent Labs     22  0746 ventricular wall thickness. There were no regional wall motion abnormalities. Ejection fraction is visually estimated in the range of 55% to 60%. Abnormal (paradoxical) motion consistent with post-operative status. IVC size is within normal limits with normal respiratory phasic changes. Signature   ----------------------------------------------------------------  Electronically signed by Lucie Odom MD (Interpreting  physician) on 03/09/2022 at 04:56 PM  ----------------------------------------------------------------   Findings   Mitral Valve  Structurally normal mitral valve. Aortic Valve  Structurally normal aortic valve. Tricuspid Valve  Tricuspid valve is structurally normal.   Pulmonic Valve  Pulmonic valve is structurally normal.   Left Atrium  Normal size left atrium. Left Ventricle  Left ventricle size is normal.  Normal left ventricular wall thickness. There were no regional wall motion abnormalities. Ejection fraction is visually estimated in the range of 55% to 60%. Abnormal (paradoxical) motion consistent with post-operative status. Right Atrium  The right atrium is of normal size. Right Ventricle  Normal right ventricular size and function. Pericardial Effusion  No evidence of any pericardial effusion. Pleural Effusion  No evidence of pleural effusion. Aorta / Great Vessels  -Aortic root dimension within normal limits.  -The Pulmonary artery is within normal limits. -IVC size is within normal limits with normal respiratory phasic changes.   M-Mode/2D Measurements & Calculations   LV Diastolic   LV Systolic Dimension:    AV Cusp Separation: 2.2 cmLA  Dimension: 4.7 3.3 cm                    Dimension: 3.1 cmAO Root  cm             LV Volume Diastolic: 428  Dimension: 3.6 cmLA Area: 14.9  LV FS:29.8 %   ml                        cm^2  LV PW          LV Volume Systolic: 68.6  Diastolic: 0.9 ml  cm             LV EDV/LV EDV Index: 102  Septum         ml/55 m^2LV ESV/LV ESV    RV Diastolic Dimension: 2.8 cm  Diastolic: 1   Index: 48.9 ml/24 m^2  cm             EF Calculated: 56.8 %     LA/Aorta: 0.86                                            LA volume/Index: 40.1 ml /22m^2  [email or web address] [id number]    XR CHEST PORTABLE    Result Date: 3/10/2022  PROCEDURE: XR CHEST PORTABLE CLINICAL INFORMATION: Shortness of breath. COMPARISON: Chest x-ray 3/9/2022. TECHNIQUE: AP portable chest radiograph performed. FINDINGS: Lines/tubes: Right chest permanent pacemaker is stable. Heart/mediastinum: The heart size and mediastinal contours are stable. Median sternotomy wires and left atrial are stable. Lungs: Diffuse interstitial opacities have improved. A calcified right lower lobe granuloma. No pleural effusion or pneumothorax is identified. Bones: The visualized skeletal structures appear intact. Mild improvement in the diffuse prominent bilateral interstitial and alveolar opacities with improved aeration noted in both upper and lower lobes. There continue to be coarsening of the interstitial markings and prominent diffuse interstitial opacities bilaterally. Continued follow-up to ensure resolution is advised. **This report has been created using voice recognition software. It may contain minor errors which are inherent in voice recognition technology. ** Final report electronically signed by Dr Thelma Wilson on 3/10/2022 3:53 PM    XR CHEST PORTABLE    Result Date: 3/9/2022  Chest X-ray, 1 View COMPARISON: CR,SR - XR CHEST STANDARD (2 VW) - 04/07/2021 06:27 AM EDT FINDINGS: Diffuse bilateral airspace disease, greater on the right than the left. Calcified right lower lung granuloma. No pleural effusion. No pneumothorax. No cardiomegaly. No acute fracture. Status post median sternotomy. Right-sided pacemaker in place obscuring a portion of the study.      Bilateral airspace disease, which could be due to pneumonia or pulmonary edema. This document has been electronically signed by: Geoff Wagner MD on 03/09/2022 03:32 AM    CT CHEST HIGH RESOLUTION    Result Date: 3/10/2022  CT chest without contrast. Comparison: CT,[facility] - CT CHEST WO CONTRAST - 01/09/2021 01:12 PM EST Findings: The central pulmonary artery is not dilated. Scattered plaque is seen in the thoracic aorta without aneurysm or intramural hematoma. The heart is not enlarged. There is no pericardial effusion. Right AICD leads in the right atrium and right ventricle. Coronary artery stent is seen. There are no enlarged mediastinal or hilar lymph nodes. The thyroid is unremarkable. Lungs are emphysematous. Near diffuse bilateral reticular interstitial thickening extending to the pleura. Mild bilateral central bronchiectasis. No subpleural honeycombing. Relative sparing of the lung apices and superior segments of the lower lungs. Calcified granuloma in the right lower lung. There are no worrisome pulmonary masses or nodules. Hypodense cysts in the liver. No acute fractures. 1. Near diffuse bilateral reticular interstitial thickening throughout the lungs with relative sparing of the lung apices. Findings are new from prior exam and likely reflect sequela of an inflammatory or infectious process, but can be followed up to exclude developing interstitial fibrosis. 2. Mild central bronchiectasis. Stable background of emphysema. This document has been electronically signed by: Sharon Ryan MD on 03/10/2022 08:30 PM All CTs at this facility use dose modulation techniques and iterative reconstructions, and/or weight-based dosing when appropriate to reduce radiation to a low as reasonably achievable. Assessment//Plan           Hospital Problems           Last Modified POA    * (Principal) Pneumonia 3/7/2022 Yes    Dyspnea and respiratory abnormalities 3/9/2022 Yes        Assessment:    Condition: In stable condition. Unchanged.    (Acute respiratory failure with hypoxia and high flow oxygen    Pneumonia  Noted in both bases     copd with acute exacerbation     chf diastolic acute and improved with diuresis now over diuresed    ashd stable     htn stable      pacer and par atrial fib stable      ). Plan:   Out of bed and up to chair. Continue respiratory treatments (wean oxygen). Consults: cardiology and pulmonology. Regular diet. Administer medications as ordered. (  Will hold lasix      continue aerosols sn antibiotics     improving but need to hold diuretics as bun too elevated as over diuresis     clinically appears better and try to wean off ).        Electronically signed by Thompson Corona MD on 3/11/22 at 12:29 AM EST

## 2022-03-11 NOTE — PROGRESS NOTES
Attempt to wean patient on 6 lpm at rest was unsuccessful. Pt was placed back to HFNC at 45 lpm and 50%. Pt later had a nose bleed and was placed on a 12 lpm venturi mask at 50% FiO2. Will continue to wean as tolerated.

## 2022-03-11 NOTE — CARE COORDINATION
3/11/22, 11:36 AM EST    DISCHARGE ON GOING EVALUATION    Elyssa Solorio day: 4  Location: 4K-20/020-A Reason for admit: Pneumonia [J18.9]  Dyspnea and respiratory abnormalities [R06.00, R06.89]  COPD exacerbation ([address] [J44.1]  Medication taken at higher dose than recommended [Z79.899]  Pneumonia of both lower lobes due to infectious organism [J18.9]     Barriers to Discharge: COPD/Pneumonia/A-fib/CHF. From 6K.   3/10 Imaging: Mild Central Bronchiectasis.     Elevated WBC, Creatinine 1.8; monitor. HF 50% FIO2/45L continued.  Urine Output = 1700 ml/24h; monitor    PCP: Reuben Mayfield MD  Readmission Risk Score: 19.7 ( )%  Patient Goals/Plan/Treatment Preferences: plans home w spouse Delia Grey when medically cleared; monitor oxygen needs; client [facility] shower seat, nebulizer[facility]; messaged Attending who will complete DME orders at discharge; collaborated w Melvi Toure, [address] [facility] Liaison

## 2022-03-12 NOTE — PROGRESS NOTES
Progress Note  Date:3/12/2022       [facility]:4C-16/500-D  Patient Hanna Dawn     YOB: 1950     Age:71 y.o. Subjective    Subjective:  Symptoms:  Stable. He reports shortness of breath. No chest pain or diarrhea. Diet:  Adequate intake. No nausea or vomiting.     Activity level: Returning to normal.       Current Facility-Administered Medications   Medication Dose Route Frequency Provider Last Rate Last Admin    furosemide (LASIX) tablet 40 mg  40 mg Oral Daily Terry Tierney MD   40 mg at 03/12/22 1113    sacubitril-valsartan (ENTRESTO) 24-26 MG per tablet 1 tablet  1 tablet Oral BID Terry Tierney MD        pantoprazole (PROTONIX) tablet 40 mg  40 mg Oral QAM AC Terry Tierney MD   40 mg at 03/12/22 1113    traZODone (DESYREL) tablet 50 mg  50 mg Oral Nightly Terry Tierney MD        amoxicillin-clavulanate (AUGMENTIN) 875-125 MG per tablet 1 tablet  1 tablet Oral 2 times per day ARCADIO Blankenship - CNP   1 tablet at 03/12/22 0908    oxymetazoline (AFRIN) 0.05 % nasal spray 2 spray  2 spray Each Nostril Q4H PRN Garry Haynes MD   2 spray at 03/11/22 1210    magnesium oxide (MAG-OX) tablet 400 mg  400 mg Oral BID Criss Gonsalez PA-C   400 mg at 03/12/22 0908    predniSONE (DELTASONE) tablet 40 mg  40 mg Oral Daily Petar Ruiz DO   40 mg at 03/12/22 0908    sodium chloride flush 0.9 % injection 5-40 mL  5-40 mL IntraVENous 2 times per day Terry Tierney MD   10 mL at 03/12/22 0908    sodium chloride flush 0.9 % injection 5-40 mL  5-40 mL IntraVENous PRN Terry Tierney MD        0.9 % sodium chloride infusion  25 mL IntraVENous PRN Terry Tierney MD        acetaminophen (TYLENOL) tablet 650 mg  650 mg Oral Q4H PRN Terry Tierney MD        ondansetron (ZOFRAN-ODT) disintegrating tablet 4 mg  4 mg Oral Q8H PRN Terry Tierney MD        Or    [facility]) [facility] 4 mg  4 mg IntraVENous Q6H PRN Gerri Zurita MD [place]        aspirin chewable tablet 81 mg  81 mg Oral Daily Jorge L Wang MD   81 mg at 03/12/22 0908    atorvastatin (LIPITOR) tablet 40 mg  40 mg Oral Nightly Jorge L Wang MD   40 mg at 03/11/22 2013    metoprolol succinate (TOPROL XL) extended release tablet 100 mg  100 mg Oral Daily Jorge L Wang MD   100 mg at 03/12/22 0908    rivaroxaban (XARELTO) tablet 20 mg  20 mg Oral Daily with breakfast Jorge L Wang MD   20 mg at 03/12/22 0908    rOPINIRole (REQUIP) tablet 0.5 mg  0.5 mg Oral Nightly Jorge L Wang MD   0.5 mg at 03/11/22 2013    ipratropium-albuterol (DUONEB) nebulizer solution 1 ampule  1 ampule Inhalation Q4H Jorge L Wang MD   1 ampule at 03/12/22 1631    ondansetron (ZOFRAN-ODT) disintegrating tablet 4 mg  4 mg Oral Q8H PRN Jorge L Wang MD        Or    ondansetron [facility]) injection 4 mg  4 mg IntraVENous Q6H PRN Jorge L Wang MD        polyethylene glycol (GLYCOLAX) packet 17 g  17 g Oral Daily PRN Jorge L Wang MD        acetaminophen (TYLENOL) tablet 650 mg  650 mg Oral Q6H PRN Jorge L Wang MD        Or    acetaminophen (TYLENOL) suppository 650 mg  650 mg Rectal Q6H PRN Jorge L Wang MD        0.9 % sodium chloride infusion   IntraVENous Continuous Jorge L Wang MD 20 mL/hr at 03/08/22 2356 Rate Change at 03/08/22 2356    potassium chloride (KLOR-CON M) extended release tablet 40 mEq  40 mEq Oral PRN Jorge L Wang MD        Or    potassium bicarb-citric acid (EFFER-K) effervescent tablet 40 mEq  40 mEq Oral PRN Jorge L Wang MD        Or    potassium chloride 10 mEq/100 mL IVPB (Peripheral Line)  10 mEq IntraVENous PRN Jorge L Wang MD        magnesium sulfate 2000 mg in 50 mL IVPB premix  2,000 mg IntraVENous PRN Jorge L Wang MD        ALPRAZolam Shade Nones) tablet 0.25 mg  0.25 mg Oral BID PRN Jorge L Wang MD          Review of Systems   Constitutional: Positive for fatigue. Negative for activity change. HENT: Positive for congestion. Respiratory: Positive for shortness of breath. Negative for apnea. With activity has dyspnea   Cardiovascular: Negative for chest pain and palpitations. Gastrointestinal: Negative for abdominal distention, diarrhea, nausea and vomiting. Genitourinary: Negative for difficulty urinating. Musculoskeletal: Negative for arthralgias and back pain. Neurological: Negative for dizziness. Psychiatric/Behavioral: Negative for behavioral problems. Objective         Vitals Last 24 Hours:  TEMPERATURE:  Temp  Av.2 °F (36.8 °C)  Min: 97.6 °F (36.4 °C)  Max: 98.9 °F (37.2 °C)  RESPIRATIONS RANGE: Resp  Av  Min: 18  Max: 22  PULSE OXIMETRY RANGE: SpO2  Av.9 %  Min: 85 %  Max: 100 %  PULSE RANGE: Pulse  Av.2  Min: 58  Max: 67  BLOOD PRESSURE RANGE: Systolic (38ENU), GCS:370 , Min:101 , DDO:372   ; Diastolic (98XUU), IVT:98, Min:56, Max:73    I/O (24Hr): Intake/Output Summary (Last 24 hours) at 3/12/2022 1752  Last data filed at 3/12/2022 1646  Gross per 24 hour   Intake 920 ml   Output 2150 ml   Net -1230 ml     Objective:  General Appearance:  Comfortable. Vital signs: (most recent): Blood pressure 101/60, pulse 58, temperature 98.3 °F (36.8 °C), temperature source Oral, resp. rate 20, height 5' 8\" (1.727 m), weight 150 lb 2.1 oz (68.1 kg), SpO2 96 %. Vital signs are normal.    Output: Producing urine and producing stool. HEENT: Normal HEENT exam.    Lungs:  Normal effort. There are decreased breath sounds and wheezes. ( Wheeze inspiration and expiration  But  Good air  Exchange )  Heart: Normal rate. Regular rhythm. S1 normal.  No murmur. Abdomen: Abdomen is soft and non-distended. Bowel sounds are normal.   There is no abdominal tenderness. Extremities: Normal range of motion. There is no dependent edema or local swelling. Pulses: Distal pulses are intact.     Neurological: Patient is alert and oriented to person, place and time. Patient has normal reflexes. Skin:  Warm and dry. Labs/Imaging/Diagnostics    Labs:  CBC:  Recent Labs     03/10/22  0439 03/11/22  0434   WBC 20.6* 14.5*   RBC 3.08* 3.01*   HGB 10.3* 10.1*   HCT 30.1* 29.4*   MCV 97.7* 97.7*    281     CHEMISTRIES:  Recent Labs     03/10/22  0439 03/11/22  0434 03/11/22  0743 03/12/22  0340   * 133*  --  134*   K 4.4 4.2  --  4.6    101  --  101   CO2 21* 21*  --  24   BUN 53* 53*  --  42*   CREATININE 2.0* 1.8*  --  1.4*   GLUCOSE 111* 99  --  94   MG 1.6  --  1.8  --      PT/INR:No results for input(s): PROTIME, INR in the last 72 hours. APTT:No results for input(s): APTT in the last 72 hours. LIVER PROFILE:No results for input(s): AST, ALT, BILIDIR, BILITOT, ALKPHOS in the last 72 hours. Imaging Last 24 Hours:  CT CHEST HIGH RESOLUTION    Result Date: 3/10/2022  CT chest without contrast. Comparison: CT,[facility] - CT CHEST WO CONTRAST - 01/09/2021 01:12 PM EST Findings: The central pulmonary artery is not dilated. Scattered plaque is seen in the thoracic aorta without aneurysm or intramural hematoma. The heart is not enlarged. There is no pericardial effusion. Right AICD leads in the right atrium and right ventricle. Coronary artery stent is seen. There are no enlarged mediastinal or hilar lymph nodes. The thyroid is unremarkable. Lungs are emphysematous. Near diffuse bilateral reticular interstitial thickening extending to the pleura. Mild bilateral central bronchiectasis. No subpleural honeycombing. Relative sparing of the lung apices and superior segments of the lower lungs. Calcified granuloma in the right lower lung. There are no worrisome pulmonary masses or nodules. Hypodense cysts in the liver. No acute fractures. 1. Near diffuse bilateral reticular interstitial thickening throughout the lungs with relative sparing of the lung apices.  Findings are new from prior exam and likely reflect sequela of an inflammatory or infectious process, but can be followed up to exclude developing interstitial fibrosis. 2. Mild central bronchiectasis. Stable background of emphysema. This document has been electronically signed by: Zach Baumann MD on 03/10/2022 08:30 PM All CTs at this facility use dose modulation techniques and iterative reconstructions, and/or weight-based dosing when appropriate to reduce radiation to a low as reasonably achievable. Assessment//Plan           Hospital Problems           Last Modified POA    * (Principal) Pneumonia 3/7/2022 Yes    Dyspnea and respiratory abnormalities 3/9/2022 Yes        Assessment:    Condition: In stable condition. Improving. (Chf  More  Stable  And this  Is  Better      renal function improved and correcting as over  Diuresed      pneumonia now appears better      copd with exacerbation  Noted       respiratory failure  With hypoxia and to need weaned      htn stable       ashd  Stable ). Plan:   Out of bed and up to chair. Wean off oxygen. Consults: occupational therapy, physical therapy, cardiology and pulmonology. Advance diet as tolerated. Administer medications as ordered. (  Start  To  Wean  Oxygen      lasix  Start back po andwith entresto      meds  To oral      continue  Respiratory  Treatments      overall better).        Electronically signed by Kyara Solomon MD on 3/12/22 at 5:52 PM EST

## 2022-03-12 NOTE — PROGRESS NOTES
Progress Note  Date:3/11/2022       FJPN:[id number]  Patient Ben Yin     YOB: 1950     Age:71 y.o. Subjective    Subjective:  Symptoms:  Stable. He reports shortness of breath, cough and weakness. No chest pain or diarrhea. Diet:  Adequate intake. Activity level: Impaired due to weakness. Pain:  He reports no pain.        Current Facility-Administered Medications   Medication Dose Route Frequency Provider Last Rate Last Admin    amoxicillin-clavulanate (AUGMENTIN) 875-125 MG per tablet 1 tablet  1 tablet Oral 2 times per day ARCADIO Freitas - CNP   1 tablet at 03/11/22 1134    oxymetazoline (AFRIN) 0.05 % nasal spray 2 spray  2 spray Each Nostril Q4H PRN Randy Lemos MD   2 spray at 03/11/22 1210    magnesium oxide (MAG-OX) tablet 400 mg  400 mg Oral BID George Abernathy PA-C        [Held by provider] furosemide (LASIX) injection 40 mg  40 mg IntraVENous BID Pedro Hein MD   40 mg at 03/10/22 0814    predniSONE (DELTASONE) tablet 40 mg  40 mg Oral Daily Petar Ruiz DO   40 mg at 03/11/22 0830    sodium chloride flush 0.9 % injection 5-40 mL  5-40 mL IntraVENous 2 times per day Pedro Hein MD   5 mL at 03/11/22 0204    sodium chloride flush 0.9 % injection 5-40 mL  5-40 mL IntraVENous PRN Pedro Hein MD        0.9 % sodium chloride infusion  25 mL IntraVENous PRN Pedro Hein MD        acetaminophen (TYLENOL) tablet 650 mg  650 mg Oral Q4H PRN Pedro Hein MD        ondansetron (ZOFRAN-ODT) disintegrating tablet 4 mg  4 mg Oral Q8H PRN Pedro Hein MD        Or    [facility]ron [facility]) injection 4 mg  4 mg IntraVENous Q6H PRN Pedro Hein MD        aspirin chewable tablet 81 mg  81 mg Oral Daily Pedro Hein MD   81 mg at 03/11/22 0830    atorvastatin (LIPITOR) tablet 40 mg  40 mg Oral Nightly Pedro Hein MD   40 mg at 03/10/22 2105    [Held by provider] sacubitril-valsartan (ENTRESTO) 49-51 MG per tablet 0.5 tablet  0.5 tablet Oral BID Rocío Sawyer MD   0.5 tablet at 03/10/22 7159    metoprolol succinate (TOPROL XL) extended release tablet 100 mg  100 mg Oral Daily Rocío Sawyer MD   100 mg at 03/11/22 0830    rivaroxaban (XARELTO) tablet 20 mg  20 mg Oral Daily with breakfast Rocío Sawyer MD   20 mg at 03/11/22 2826    rOPINIRole (REQUIP) tablet 0.5 mg  0.5 mg Oral Nightly Rocío Sawyer MD   0.5 mg at 03/10/22 2105    ipratropium-albuterol (DUONEB) nebulizer solution 1 ampule  1 ampule Inhalation Q4H Rocío Sawyer MD   1 ampule at 03/11/22 1628    ondansetron (ZOFRAN-ODT) disintegrating tablet 4 mg  4 mg Oral Q8H PRN Rocío Sawyer MD        Or    ondansetron [facility]) injection 4 mg  4 mg IntraVENous Q6H PRN Rocío Sawyer MD        polyethylene glycol (GLYCOLAX) packet 17 g  17 g Oral Daily PRN Rocío Sawyer MD        acetaminophen (TYLENOL) tablet 650 mg  650 mg Oral Q6H PRN Rocío Sawyer MD        Or    acetaminophen (TYLENOL) suppository 650 mg  650 mg Rectal Q6H PRN Rocío Sawyer MD        0.9 % sodium chloride infusion   IntraVENous Continuous Rocío Sawyer MD 20 mL/hr at 03/08/22 2356 Rate Change at 03/08/22 2356    potassium chloride (KLOR-CON M) extended release tablet 40 mEq  40 mEq Oral PRN Rocío Sawyer MD        Or    potassium bicarb-citric acid (EFFER-K) effervescent tablet 40 mEq  40 mEq Oral PRN Rocío Sawyer MD        Or    potassium chloride 10 mEq/100 mL IVPB (Peripheral Line)  10 mEq IntraVENous PRN Rocío Sawyer MD        magnesium sulfate 2000 mg in 50 mL IVPB premix  2,000 mg IntraVENous PRN Rocío Sawyer MD        ALPRAZolam Dalbert Avon) tablet 0.25 mg  0.25 mg Oral BID PRN Rocío Sawyer MD          Review of Systems   HENT: Negative for congestion and dental problem. Respiratory: Positive for cough, shortness of breath and wheezing.     Cardiovascular: Negative for chest pain and palpitations. Gastrointestinal: Negative for abdominal distention, abdominal pain, constipation and diarrhea. Genitourinary: Negative for difficulty urinating. Musculoskeletal: Negative for arthralgias and back pain. Neurological: Positive for weakness. Negative for dizziness, seizures and numbness. Psychiatric/Behavioral: Negative for agitation. Objective         Vitals Last 24 Hours:  TEMPERATURE:  Temp  Av.9 °F (36.6 °C)  Min: 97.5 °F (36.4 °C)  Max: 98.5 °F (36.9 °C)  RESPIRATIONS RANGE: Resp  Av.3  Min: 18  Max: 20  PULSE OXIMETRY RANGE: SpO2  Av.9 %  Min: 90 %  Max: 97 %  PULSE RANGE: Pulse  Av.5  Min: 57  Max: 68  BLOOD PRESSURE RANGE: Systolic (65DDD), BOC:971 , Min:102 , BN   ; Diastolic (16JOE), RCN:19, Min:53, Max:85    I/O (24Hr): Intake/Output Summary (Last 24 hours) at 3/11/2022 1959  Last data filed at 3/11/2022 1704  Gross per 24 hour   Intake 1200 ml   Output 1150 ml   Net 50 ml     Objective:  General Appearance:  Comfortable. Vital signs: (most recent): Blood pressure 134/63, pulse 57, temperature 97.5 °F (36.4 °C), temperature source Axillary, resp. rate 20, height 5' 8\" (1.727 m), weight 151 lb 3.8 oz (68.6 kg), SpO2 93 %. Vital signs are normal.    Output: Producing urine and producing stool. HEENT: Normal HEENT exam.    Lungs:  Normal effort and normal respiratory rate. There are rales (some  in bases) and wheezes. (Inspiratory and expiratory wheeze noted  With prolong expiration and overall better)  Heart: Normal rate. Regular rhythm. S1 normal and S2 normal.  No murmur. Abdomen: Abdomen is soft and non-distended. Hyperactive bowel sounds. There is no abdominal tenderness. Extremities: Normal range of motion. Neurological: Patient is alert and oriented to person, place and time. Patient has normal reflexes.       Labs/Imaging/Diagnostics    Labs:  CBC:  Recent Labs     22  0430 03/10/22  0439 03/11/22  0434   WBC 23.6* 20.6* 14.5*   RBC 3.20* 3.08* 3.01*   HGB 10.8* 10.3* 10.1*   HCT 31.8* 30.1* 29.4*   MCV 99.4* 97.7* 97.7*    255 281     CHEMISTRIES:  Recent Labs     03/09/22  0430 03/10/22  0439 03/11/22  0434 03/11/22  0743   * 133* 133*  --    K 4.2 4.4 4.2  --    CL 99 100 101  --    CO2 19* 21* 21*  --    BUN 38* 53* 53*  --    CREATININE 1.6* 2.0* 1.8*  --    GLUCOSE 145* 111* 99  --    MG  --  1.6  --  1.8     PT/INR:No results for input(s): PROTIME, INR in the last 72 hours. APTT:No results for input(s): APTT in the last 72 hours. LIVER PROFILE:No results for input(s): AST, ALT, BILIDIR, BILITOT, ALKPHOS in the last 72 hours. Imaging Last 24 Hours:  XR CHEST PORTABLE    Result Date: 3/10/2022  PROCEDURE: XR CHEST PORTABLE CLINICAL INFORMATION: Shortness of breath. COMPARISON: Chest x-ray 3/9/2022. TECHNIQUE: AP portable chest radiograph performed. FINDINGS: Lines/tubes: Right chest permanent pacemaker is stable. Heart/mediastinum: The heart size and mediastinal contours are stable. Median sternotomy wires and left atrial are stable. Lungs: Diffuse interstitial opacities have improved. A calcified right lower lobe granuloma. No pleural effusion or pneumothorax is identified. Bones: The visualized skeletal structures appear intact. Mild improvement in the diffuse prominent bilateral interstitial and alveolar opacities with improved aeration noted in both upper and lower lobes. There continue to be coarsening of the interstitial markings and prominent diffuse interstitial opacities bilaterally. Continued follow-up to ensure resolution is advised. **This report has been created using voice recognition software. It may contain minor errors which are inherent in voice recognition technology. ** Final report electronically signed by Dr Colby Pina on 3/10/2022 3:53 PM    CT CHEST HIGH RESOLUTION    Result Date: 3/10/2022  CT chest without contrast. Comparison: CT,[facility] - CT stable     ashd stable      gerd stable). Plan:   Out of bed and up to chair. Wean off oxygen and continue respiratory treatments. Consults: cardiology and pulmonology. Advance diet as tolerated. Administer medications as ordered. (  Try to wean off oxygen     overall slight better and will take another several days     will most likely need home  Oxygen      contiue hold diuretic as numberd better ).        Electronically signed by Kyara Solomon MD on 3/11/22 at 7:59 PM EST

## 2022-03-12 NOTE — PROGRESS NOTES
RT instructed pt that he is not allowed to switch between mask without asking either the RN or RT. Rt found pt with HFNC canula in nose, but not powered on.  He explained that he took off the venteri mask because he wanted to go back on to the HFNC

## 2022-03-12 NOTE — PLAN OF CARE
Problem: Discharge Planning:  Goal: Participates in care planning  Description: Participates in care planning  Outcome: Ongoing  Note: Updated patient and spouse on discharge barriers and expected treatment- verbalized understanding and agreement. Problem: Discharge Planning:  Goal: Discharged to appropriate level of care  Description: Discharged to appropriate level of care  Outcome: Ongoing     Problem: Gas Exchange - Impaired:  Goal: Levels of oxygenation will improve  Description: Levels of oxygenation will improve  Outcome: Ongoing  Note: Continuous pulse ox on patient. Weaning from HF to NC. Problem: Tissue Perfusion - Cardiopulmonary, Altered:  Goal: Absence of angina  Description: Absence of angina  Outcome: Ongoing     Problem: Tissue Perfusion - Cardiopulmonary, Altered:  Goal: Hemodynamic stability will improve  Description: Hemodynamic stability will improve  Outcome: Ongoing  Note: Monitoring VS and telemetry. Problem: Falls - Risk of:  Goal: Will remain free from falls  Description: Will remain free from falls  Outcome: Ongoing  Note: Bedside commode used while pt on HF, Call light within reach,  bed in lowest position. Problem: Falls - Risk of:  Goal: Absence of physical injury  Description: Absence of physical injury  Outcome: Ongoing     Problem: Pain:  Goal: Pain level will decrease  Description: Pain level will decrease  Outcome: Ongoing  Note: Pain evaluated hourly and pain medications offered as needed. Non-pharmacologic methods of pain relief used: Television, distraction, therapeutic communication when appropriate.      Problem: Pain:  Goal: Control of acute pain  Description: Control of acute pain  Outcome: Ongoing     Problem: Pain:  Goal: Control of chronic pain  Description: Control of chronic pain  Outcome: Ongoing     Problem: Skin Integrity:  Goal: Will show no infection signs and symptoms  Description: Will show no infection signs and symptoms  Outcome: Ongoing  Note: Temperature monitored     Problem: Skin Integrity:  Goal: Absence of new skin breakdown  Description: Absence of new skin breakdown  Outcome: Ongoing  Note: Patient educated on importance of active range of motion and alternating pressure on bony prominences.      Problem: Breathing Pattern - Ineffective:  Goal: Ability to achieve and maintain a regular respiratory rate will improve  Description: Ability to achieve and maintain a regular respiratory rate will improve  Outcome: Ongoing

## 2022-03-12 NOTE — PROGRESS NOTES
[facility]      Patient - Ralph Bryan   MRN -  [id number]   [facility]   - 1950      Date of Admission -  3/7/2022  8:50 AM  Date of evaluation -  3/12/2022  Room - 1K--A   Hospital Day - [address] Michelle MD Primary Care Physician - Kadeem Mcleod MD     Problem List      Active Hospital Problems    Diagnosis Date Noted    Dyspnea and respiratory abnormalities [R06.00, R06.89]     Pneumonia [J18.9] 2022     Reason for Consult    Pneumonia and hypoxia with CHF  HPI   History Obtained From: Patient and electronic medical record. Ralph Bryan is a 70 y.o. male PMH asthma, atrial fibrillation with RVR, sick sinus syndrome s/p dual pacemaker, COPD, essential hypertension, HLD, and who is currently a 1/2 PPD smoker for the last 20-25 years who is coming in with complaints of shortness of breath. The patient has been sick with 2 weeks of nasal congestion, productive cough, and dyspnea, and thought he had COVID. The patient is chronically on claritin and hydroxizine. He was also previously being followed in the heart failure clinic where his Lasix dose was lowered from 40 mg to 20 mg. He denies any fever, chills, chest pain, nausea vomiting, but was noted to be confused on room air and had 85% pulse ox. Pulmonology was consulted because of the patient's increasing oxygen demands and new onset pneumonia with component of CHF. The patient at the time of my assessment did not report any discomfort and did not endorse any complaints. The patient has never officially had any formal PFTs done in the past before. Before this current admission, he had never been on any form of home oxygen. The patient was previously smoking 2 PPD but had been slowly cutting down to 1/2 PPD. The patient was chronically on amiodarone for his atrial fibrillation.  Of note the patient on  was noted to be hypotensive and in atrial fibrillation and was given fluids, started on an amiodarone drip, and transferred to stepdown. Of note the patient's oxygen requirements have also been increasing to the point where he was requiring Hi Ziggy, and his blood pressures have been decreasing due to the need for diuresis.      Past 24 Hours    -Nose bleed stopped, reports productive cough now  -No PAF  -HFNC continues 60% 45LPM- 96%  -Looks good comfortable, talkative  -Afebrile  -UO 1.6L    -All systems reviewed     PMHx   Past Medical History      Diagnosis Date    Asthma     Atrial fibrillation with RVR ([facility] [address] 04/05/2021    [employer] dual pacemaker  04/15/2021    Collagenous colitis 2021    per scope 2021    Colon polyps 2021    dr Sherryle Gowers    COPD, mild ([facility] [address]     Eczema of hand     HTN (hypertension)     Hyperlipidemia     Smoker       Past Surgical History        Procedure Laterality Date    CARDIAC SURGERY      COLONOSCOPY  06/10/2021    theresa ccolon polyps and collagenous colitis    CORONARY ARTERY BYPASS GRAFT  01/12/2021    cabg x 3 with lima and atrial appendage clip  dr Desi Noguera GRAFT N/A 01/12/2021    CABG  X 3 WITH DEJAH, Atrial Appendage Clip performed by Patricia Schaefer MD at [address] [place] [address] Road  06/2008    polyps    OTHER SURGICAL HISTORY  DEC 7TH 2012 DR [address]NAN [address] [place]     IGS ENDOSCOPIC BI LAT MAXILLARY, ETHMOID, SPHENOID, FRONTAL SINUSOTOMY WITH SEPTOPLASTY AND TURBINOPLASTIES    SKIN CANCER EXCISION  09/2014    Left side of Forehead     TOOTH EXTRACTION  02/2017     Meds    Current Medications    furosemide  40 mg Oral Daily    sacubitril-valsartan  1 tablet Oral BID    amoxicillin-clavulanate  1 tablet Oral 2 times per day    magnesium oxide  400 mg Oral BID    predniSONE  40 mg Oral Daily    sodium chloride flush  5-40 mL IntraVENous 2 times per day    aspirin  81 mg Oral Daily    atorvastatin  40 mg Oral Nightly    metoprolol succinate  100 mg Oral Daily    rivaroxaban  20 mg Oral Daily with breakfast    rOPINIRole  0.5 mg Oral Nightly    ipratropium-albuterol  1 ampule Inhalation Q4H     oxymetazoline, sodium chloride flush, sodium chloride, acetaminophen, ondansetron **OR** ondansetron, ondansetron **OR** ondansetron, polyethylene glycol, acetaminophen **OR** acetaminophen, potassium chloride **OR** potassium alternative oral replacement **OR** potassium chloride, magnesium sulfate, ALPRAZolam  IV Drips/Infusions   sodium chloride      sodium chloride 20 mL/hr at 03/08/22 9616     Home Medications  Medications Prior to Admission: cetirizine (ZYRTEC) 10 MG tablet, Take 10 mg by mouth daily  triamcinolone (KENALOG) 0.1 % ointment, Apply topically Daily Apply topically once daily  ALPRAZolam (XANAX) 0.5 MG tablet, Take 0.5 mg by mouth nightly as needed for Sleep.  furosemide (LASIX) 20 MG tablet, Take 2 tablets by mouth daily  hydrOXYzine (ATARAX) 10 MG tablet, Take 1 tablet by mouth nightly as needed   ENTRESTO 49-51 MG per tablet, Take 0.5 tablets by mouth 2 times daily  umeclidinium-vilanterol (ANORO ELLIPTA) 62.5-25 MCG/INH AEPB inhaler, Inhale 1 puff into the lungs daily  atorvastatin (LIPITOR) 40 MG tablet, Take 1 tablet by mouth nightly  metoprolol succinate (TOPROL XL) 100 MG extended release tablet, Take 1 tablet by mouth daily  amiodarone (CORDARONE) 200 MG tablet, Take 1 tablet by mouth daily  rivaroxaban (XARELTO) 20 MG TABS tablet, Take 1 tablet by mouth daily (with breakfast)  aspirin 81 MG chewable tablet, Take 1 tablet by mouth daily  albuterol sulfate  (90 Base) MCG/ACT inhaler, Inhale 2 puffs into the lungs every 6 hours as needed for Wheezing  Multiple Vitamins-Minerals (CENTRUM SILVER PO), Take 1 tablet by mouth daily   [DISCONTINUED] fluocinonide (LIDEX) 0.05 % ointment, Apply topically 2 times daily  [DISCONTINUED] rOPINIRole (REQUIP) 0.5 MG tablet, TAKE 1 TABLET BY MOUTH EVERY EVENING  Diet    ADULT DIET;  Regular; Low Fat/Low Chol/High Fiber/ALICIA  Allergies    Penicillins and Sulfa antibiotics  Social History     Social History     Socioeconomic History    Marital status:      Spouse name: Not on file    Number of children: Not on file    Years of education: Not on file    Highest education level: Not on file   Occupational History    Not on file   Tobacco Use    Smoking status: Current Every Day Smoker     Packs/day: 1.00     Years: 40.00     Pack years: 40.00     Types: Cigarettes    Smokeless tobacco: Never Used   Substance and Sexual Activity    Alcohol use: Yes     Alcohol/week: 0.0 standard drinks     Comment: very little    Drug use: No    Sexual activity: Not on file   Other Topics Concern    Not on file   Social History Narrative    Not on file     Social Determinants of Health     Financial Resource Strain:     Difficulty of Paying Living Expenses: Not on file   Food Insecurity:     Worried About Running Out of Food in the Last Year: Not on file    Danielle of Food in the Last Year: Not on file   Transportation Needs:     Lack of Transportation (Medical): Not on file    Lack of Transportation (Non-Medical):  Not on file   Physical Activity:     Days of Exercise per Week: Not on file    Minutes of Exercise per Session: Not on file   Stress:     Feeling of Stress : Not on file   Social Connections:     Frequency of Communication with Friends and Family: Not on file    Frequency of Social Gatherings with Friends and Family: Not on file    Attends Anglican Services: Not on file    Active Member of Clubs or Organizations: Not on file    Attends Club or Organization Meetings: Not on file    Marital Status: Not on file   Intimate Partner Violence:     Fear of Current or Ex-Partner: Not on file    Emotionally Abused: Not on file    Physically Abused: Not on file    Sexually Abused: Not on file   Housing Stability:     Unable to Pay for Housing in the Last Year: Not on file    [facility] of [facility] in the Last Year: Not on file    Unstable Housing in the Last Year: Not on file     Family History          Problem Relation Age of Onset    Heart Disease Mother     Heart Disease Father         cabg    Diabetes Brother     Heart Disease Brother      Sleep History    Never diagnosed with sleep apnea in the past.  Occupational history   Occupation:  He is current working: Yes  Type of profession: [employer]                       History of tobacco smoking:Yes  Amount of tobacco smokin/2 PPD  Years of tobacco smokin years                                    Quit smoking: No.              Quit year: None  Current smoker: Yes. Amount of current tobacco smokin/2 PPD for 25 years; was previously 2 PPD  . History of recreational or IV drug use in the past:NO     History of exposure to coal mines/coal dust: NO  History of exposure to foundry dust/welding: NO  History of exposure to quarry/silica/sandblasting: NO  History of exposure to asbestos/working with breaks/ships: NO  History of exposure to farm dust: NO  History of recent travel to long distances: NO  History of exposure to birds, pigeons, or chickens in the past:NO  Pet animals at home:No  Dogs: 0  Cats: 0    History of pulmonary embolism in the past: No            History of DVT in the past:No        [x] Right lower extremity   [] Left lower extremity. Vitals     height is 5' 8\" (1.727 m) and weight is 150 lb 2.1 oz (68.1 kg). His oral temperature is 97.6 °F (36.4 °C). His blood pressure is 141/73 (abnormal) and his pulse is 67. His respiration is 18 and oxygen saturation is 94%. Body mass index is 22.83 kg/m². SUPPLEMENTAL O2: O2 Flow Rate (L/min): 45 L/min     I/O        Intake/Output Summary (Last 24 hours) at 3/12/2022 1022  Last data filed at 3/12/2022 0747  Gross per 24 hour   Intake 970 ml   Output 1300 ml   Net -330 ml     I/O last 3 completed shifts:   In: [address] [P.O.:1750]  Out: 2200 [Urine:2200]   Patient Vitals for the past 96 hrs (Last 3 readings):   Weight   03/12/22 0545 150 lb 2.1 oz (68.1 kg)   03/11/22 0407 151 lb 3.8 oz (68.6 kg)   03/10/22 0330 153 lb 11.2 oz (69.7 kg)       Exam   Nursing note and vitals reviewed. Constitutional: alert and oriented; sitting in bed on HFNC   HENT: normocephalic and atraumatic  Neck: supple with no adenopathy  Cardiovascular: irregularly irregular; normal S1 and S2  Pulmonary/Chest: decreased breath sounds noted bilaterally with bilateral crackles  Abdominal: no distention  Musculoskeletal: ROM intact  Extremities: mild 1+ pitting edema  Lymphadenopathy:  none  Neurological: CN II-XII intact  Skin: no erythema or swelling    Labs  - Old records and notes have been reviewed in [facility]   ABG  Lab Results   Component Value Date    PH 7.46 03/09/2022    PO2 49 03/09/2022    PCO2 29 03/09/2022    HCO3 20 03/09/2022    O2SAT 87 03/09/2022     Lab Results   Component Value Date    IFIO2 40 03/09/2022    MODE SIMV 01/12/2021    SETTIDVOL 550 01/12/2021    SETPEEP 5.0 01/12/2021     CBC  Recent Labs     03/10/22  0439 03/11/22  0434   WBC 20.6* 14.5*   RBC 3.08* 3.01*   HGB 10.3* 10.1*   HCT 30.1* 29.4*   MCV 97.7* 97.7*   MCH 33.4* 33.6*   MCHC 34.2 34.4    281   MPV 10.2 10.2      BMP  Recent Labs     03/10/22  0439 03/11/22  0434 03/11/22  0743 03/12/22  0340   * 133*  --  134*   K 4.4 4.2  --  4.6    101  --  101   CO2 21* 21*  --  24   BUN 53* 53*  --  42*   CREATININE 2.0* 1.8*  --  1.4*   GLUCOSE 111* 99  --  94   MG 1.6  --  1.8  --    CALCIUM 8.3* 8.2*  --  8.6     LFT  No results for input(s): AST, ALT, ALB, BILITOT, ALKPHOS, LIPASE in the last 72 hours. Invalid input(s): AMYLASE  TROP  Lab Results   Component Value Date    TROPONINT < 0.010 03/08/2022    TROPONINT < 0.010 03/07/2022    TROPONINT < 0.010 03/07/2022     BNP  No results for input(s): BNP in the last 72 hours. Lactic Acid  No results for input(s): LACTA in the last 72 hours.   INR  No results for input(s): INR, PROTIME in the last 72 hours. PTT  No results for input(s): APTT in the last 72 hours. Glucose  No results for input(s): POCGLU in the last 72 hours. UA No results for input(s): SPECGRAV, PHUR, COLORU, CLARITYU, MUCUS, PROTEINU, BLOODU, RBCUA, WBCUA, BACTERIA, NITRU, GLUCOSEU, BILIRUBINUR, UROBILINOGEN, KETUA, LABCAST, LABCASTTY, AMORPHOS in the last 72 hours. Invalid input(s): CRYSTALS. PFTs   No recent PFTs on record  Sleep studies   Has never had a sleep study    Cultures    Covid (-)  Rapid Influenza A/B (-)    EKG   Normal sinus rhythm  Possible Left atrial enlargement  Borderline ECG  Echocardiogram   EF 55-60%    Radiology    CXR  Bilateral airspace disease, which could be due to pneumonia or pulmonary    edema. CT Scans  03/10/2022   CT chest without contrast.   1. Near diffuse bilateral reticular interstitial thickening throughout the lungs with relative sparing of the lung apices. Findings are new from prior exam and likely reflect sequela of an inflammatory or infectious process, but can be followed up to exclude developing interstitial fibrosis. 2. Mild central bronchiectasis. Stable background of emphysema. (See actual reports for details)    Assessment   1. 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 Acute hypoxemic respiratory failure due to above  3. Tobacco abuse  4. PAF/CAD s/p CABG, Chronic systolic CHF  Plan     -Prednisone 40 mg/d x 2-6 mos with slow taper  -HFNC; wean for SpO2 92%  -Continue bronchodilators  -Do not resume Amiodarone    Plan of care discussed with patient, Dr Roge Linares  And primary RN  Meds and orders reviewed   Questions and concerns addressed.     Electronically signed by   Renetta Venegas MD on 3/12/2022 at 10:22 AM

## 2022-03-13 NOTE — PROGRESS NOTES
Progress Note  Date:3/13/2022       PSXK:[id number]  Patient Hanna Dawn     YOB: 1950     Age:71 y.o. Subjective    Subjective:  Symptoms:  Stable. No shortness of breath, chest pain or diarrhea. Diet:  Adequate intake. No nausea. Activity level: Returning to normal.    Pain:  He reports no pain.        Current Facility-Administered Medications   Medication Dose Route Frequency Provider Last Rate Last Admin    furosemide (LASIX) tablet 40 mg  40 mg Oral Daily Terry Tierney MD   40 mg at 03/13/22 0833    sacubitril-valsartan (ENTRESTO) 24-26 MG per tablet 1 tablet  1 tablet Oral BID Terry Tierney MD   1 tablet at 03/13/22 3318    pantoprazole (PROTONIX) tablet 40 mg  40 mg Oral QAM AC Terry Tierney MD   40 mg at 03/13/22 4331    traZODone (DESYREL) tablet 50 mg  50 mg Oral Nightly Terry Tierney MD   50 mg at 03/12/22 2127    amoxicillin-clavulanate (AUGMENTIN) 875-125 MG per tablet 1 tablet  1 tablet Oral 2 times per day ARACDIO Blankenship - CNP   1 tablet at 03/13/22 9838    oxymetazoline (AFRIN) 0.05 % nasal spray 2 spray  2 spray Each Nostril Q4H PRN Garry Haynes MD   2 spray at 03/11/22 1210    magnesium oxide (MAG-OX) tablet 400 mg  400 mg Oral BID Criss Gonsalez PA-C   400 mg at 03/13/22 7479    predniSONE (DELTASONE) tablet 40 mg  40 mg Oral Daily Petar Ruiz DO   40 mg at 03/13/22 8621    sodium chloride flush 0.9 % injection 5-40 mL  5-40 mL IntraVENous 2 times per day Terry Tierney MD   10 mL at 03/13/22 1096    sodium chloride flush 0.9 % injection 5-40 mL  5-40 mL IntraVENous PRN Terry Tierney MD        0.9 % sodium chloride infusion  25 mL IntraVENous PRN Terry Tierney MD        acetaminophen (TYLENOL) tablet 650 mg  650 mg Oral Q4H PRN Terry Tierney MD        ondansetron (ZOFRAN-ODT) disintegrating tablet 4 mg  4 mg Oral Q8H PRN Terry Tierney MD        Or    ondansetron [facility]) injection 4 mg  4 mg IntraVENous Q6H PRN Rigoberto Vivas MD        aspirin chewable tablet 81 mg  81 mg Oral Daily Rigoberto Vivas MD   81 mg at 03/13/22 2724    atorvastatin (LIPITOR) tablet 40 mg  40 mg Oral Nightly Rigoberto Vivas MD   40 mg at 03/12/22 2126    metoprolol succinate (TOPROL XL) extended release tablet 100 mg  100 mg Oral Daily Rigoberto Vivas MD   100 mg at 03/13/22 6720    rivaroxaban (XARELTO) tablet 20 mg  20 mg Oral Daily with breakfast Rigoberto Vivas MD   20 mg at 03/13/22 1098    rOPINIRole (REQUIP) tablet 0.5 mg  0.5 mg Oral Nightly Rigoberto Vivas MD   0.5 mg at 03/12/22 2126    ipratropium-albuterol (DUONEB) nebulizer solution 1 ampule  1 ampule Inhalation Q4H Rigoberto Vivas MD   1 ampule at 03/13/22 0855    ondansetron (ZOFRAN-ODT) disintegrating tablet 4 mg  4 mg Oral Q8H PRN Rigoberto Vivas MD        Or    ondansetron [facility]) injection 4 mg  4 mg IntraVENous Q6H PRN Rigoberto Vivas MD        polyethylene glycol (GLYCOLAX) packet 17 g  17 g Oral Daily PRN Rigoberto Vivas MD        acetaminophen (TYLENOL) tablet 650 mg  650 mg Oral Q6H PRN Rigoberto Vivas MD        Or    acetaminophen (TYLENOL) suppository 650 mg  650 mg Rectal Q6H PRN Rigoberto Vivas MD        0.9 % sodium chloride infusion   IntraVENous Continuous Rigoberto Vivas MD 20 mL/hr at 03/08/22 2356 Rate Change at 03/08/22 2356    potassium chloride (KLOR-CON M) extended release tablet 40 mEq  40 mEq Oral PRN Rigoberto Vivas MD        Or    potassium bicarb-citric acid (EFFER-K) effervescent tablet 40 mEq  40 mEq Oral PRN Rigoberto Vivas MD        Or    potassium chloride 10 mEq/100 mL IVPB (Peripheral Line)  10 mEq IntraVENous PRN Rigoberto Vivas MD        magnesium sulfate 2000 mg in 50 mL IVPB premix  2,000 mg IntraVENous PRN MD Tripp Fagan) tablet 0.25 mg  0.25 mg Oral BID PRN Rigoberto Vivas MD Review of Systems   Constitutional: Positive for activity change. Negative for fever. Feels better   HENT: Positive for congestion (less). Respiratory: Negative for chest tightness, shortness of breath and wheezing. Less cough and less dyspnea and cough   Cardiovascular: Negative for chest pain. Gastrointestinal: Negative for abdominal distention, diarrhea and nausea. Genitourinary: Negative for difficulty urinating, frequency and scrotal swelling. Musculoskeletal: Negative for arthralgias and back pain. Skin: Negative for rash. Neurological: Negative for dizziness, seizures and light-headedness. Hematological: Negative for adenopathy. Objective         Vitals Last 24 Hours:  TEMPERATURE:  Temp  Av.1 °F (36.7 °C)  Min: 97.7 °F (36.5 °C)  Max: 98.4 °F (36.9 °C)  RESPIRATIONS RANGE: Resp  Av  Min: 16  Max: 21  PULSE OXIMETRY RANGE: SpO2  Av.5 %  Min: 91 %  Max: 100 %  PULSE RANGE: Pulse  Av.8  Min: 58  Max: 63  BLOOD PRESSURE RANGE: Systolic (22DTQ), ZYU:795 , Min:101 , AFW:324   ; Diastolic (42SXT), BRO:71, Min:58, Max:69    I/O (24Hr): Intake/Output Summary (Last 24 hours) at 3/13/2022 1117  Last data filed at 3/13/2022 1114  Gross per 24 hour   Intake 450 ml   Output 5375 ml   Net -4925 ml     Objective:  General Appearance:  Comfortable. Vital signs: (most recent): Blood pressure 128/69, pulse 58, temperature 98.2 °F (36.8 °C), temperature source Oral, resp. rate 21, height 5' 8\" (1.727 m), weight 151 lb 3.2 oz (68.6 kg), SpO2 97 %. Vital signs are normal.  No fever. Output: Producing urine and producing stool. HEENT: Normal HEENT exam.  (  High flow nasal oxygen)    Lungs:  Normal effort and normal respiratory rate. He is not in respiratory distress. There are rales. No decreased breath sounds. (  No  Wheeze  Or  Rhonchi and minimal rales in the  right base only)  Heart: Normal rate. Regular rhythm. S1 normal and S2 normal.  No murmur. Abdomen: Abdomen is soft and non-distended. Bowel sounds are normal.   There is no abdominal tenderness. Extremities: Normal range of motion. There is no dependent edema. Neurological: Patient is alert and oriented to person, place and time. Labs/Imaging/Diagnostics    Labs:  CBC:  Recent Labs     03/11/22  0434   WBC 14.5*   RBC 3.01*   HGB 10.1*   HCT 29.4*   MCV 97.7*        CHEMISTRIES:  Recent Labs     03/11/22  0434 03/11/22  0743 03/12/22  0340 03/13/22  0443   *  --  134* 134*   K 4.2  --  4.6 4.4     --  101 101   CO2 21*  --  24 24   BUN 53*  --  42* 42*   CREATININE 1.8*  --  1.4* 1.5*   GLUCOSE 99  --  94 107   MG  --  1.8  --   --      PT/INR:No results for input(s): PROTIME, INR in the last 72 hours. APTT:No results for input(s): APTT in the last 72 hours. LIVER PROFILE:No results for input(s): AST, ALT, BILIDIR, BILITOT, ALKPHOS in the last 72 hours. Imaging Last 24 Hours:  No results found. Assessment//Plan           Hospital Problems           Last Modified POA    * (Principal) Pneumonia 3/7/2022 Yes    Dyspnea and respiratory abnormalities 3/9/2022 Yes        Assessment:    Condition: In stable condition. Improving.   (  Copd  With exacerbation and lungs  Finally clear  And  good  Air exchange and few rales  In the right base    Pneumonia  Bilateral better       acute respiratory failure   With hypoxia and should continue  Weaning off to low oxygen     chf diastolic and cardiomyopathy and over all stable  And better      par  Atrial fib now stable as normal sinus and normal rate    htn stable      ashd  Stable     ). Plan:   Out of bed and up to chair. Wean off oxygen (  get  to nasal canula). Consults: pulmonology and cardiology. Advance diet as tolerated. Administer medications as ordered. (  Stable and wean  Done  Oxygen     hope to nasal canula and  Home  Tomorrow     kidney function  Looks  Better ).        Electronically signed by Zully Crow MD [place] on 3/13/22 at 11:17 AM EDT

## 2022-03-13 NOTE — PROGRESS NOTES
Patient ambulated in room to bathroom from chair and returned to bed on 6L NC. Tolerated well and Sp02 sustained at 88%. Patient complained of mild SOB otherwise felt \"good\".

## 2022-03-13 NOTE — PLAN OF CARE
Problem: Discharge Planning:  Goal: Participates in care planning  Description: Participates in care planning  Outcome: Ongoing  Note: Patient updated on barriers to discharge and current plan. Updated on all medications and treatments and their indications. No questions at this time. Problem: Discharge Planning:  Goal: Discharged to appropriate level of care  Description: Discharged to appropriate level of care  Outcome: Ongoing     Problem: Gas Exchange - Impaired:  Goal: Levels of oxygenation will improve  Description: Levels of oxygenation will improve  Outcome: Ongoing  Note: Weaning supplemental 02 needs down as Sp02 maintains 92%     Problem: Tissue Perfusion - Cardiopulmonary, Altered:  Goal: Absence of angina  Description: Absence of angina  Outcome: Ongoing     Problem: Tissue Perfusion - Cardiopulmonary, Altered:  Goal: Hemodynamic stability will improve  Description: Hemodynamic stability will improve  Outcome: Ongoing  Note: Vital signs closely monitored. Problem: Falls - Risk of:  Goal: Will remain free from falls  Description: Will remain free from falls  Outcome: Ongoing  Note: Call light and bedside table within reach. Walker and gait belt used during transfers. Problem: Falls - Risk of:  Goal: Absence of physical injury  Description: Absence of physical injury  Outcome: Ongoing     Problem: Pain:  Goal: Pain level will decrease  Description: Pain level will decrease  Outcome: Ongoing  Note: Pain levels assessed hourly and interventions done when appropriate. Problem: Pain:  Goal: Control of acute pain  Description: Control of acute pain  Outcome: Ongoing     Problem: Pain:  Goal: Control of chronic pain  Description: Control of chronic pain  Outcome: Ongoing     Problem: Skin Integrity:  Goal: Will show no infection signs and symptoms  Description: Will show no infection signs and symptoms  Outcome: Ongoing  Note: Temperature and HR monitored closely.      Problem: Skin

## 2022-03-13 NOTE — PROGRESS NOTES
[facility]      Patient - Melissa Dent   MRN -  [id number]   [facility]   - 1950      Date of Admission -  3/7/2022  8:50 AM  Date of evaluation -  3/13/2022  Room - --A   Hospital Day - [facility] Physician - Thompson Corona MD     Problem List      Active Hospital Problems    Diagnosis Date Noted    Dyspnea and respiratory abnormalities [R06.00, R06.89]     Pneumonia [J18.9] 2022     Reason for Consult    Pneumonia and hypoxia with CHF  HPI   History Obtained From: Patient and electronic medical record. Melissa Dent is a 70 y.o. male PMH asthma, atrial fibrillation with RVR, sick sinus syndrome s/p dual pacemaker, COPD, essential hypertension, HLD, and who is currently a 1/2 PPD smoker for the last 20-25 years who is coming in with complaints of shortness of breath. The patient has been sick with 2 weeks of nasal congestion, productive cough, and dyspnea, and thought he had COVID. The patient is chronically on claritin and hydroxizine. He was also previously being followed in the heart failure clinic where his Lasix dose was lowered from 40 mg to 20 mg. He denies any fever, chills, chest pain, nausea vomiting, but was noted to be confused on room air and had 85% pulse ox. Pulmonology was consulted because of the patient's increasing oxygen demands and new onset pneumonia with component of CHF. The patient at the time of my assessment did not report any discomfort and did not endorse any complaints. The patient has never officially had any formal PFTs done in the past before. Before this current admission, he had never been on any form of home oxygen. The patient was previously smoking 2 PPD but had been slowly cutting down to 1/2 PPD. The patient was chronically on amiodarone for his atrial fibrillation.  Of note the patient on  was noted to be hypotensive and in atrial metoprolol succinate  100 mg Oral Daily    rivaroxaban  20 mg Oral Daily with breakfast    rOPINIRole  0.5 mg Oral Nightly    ipratropium-albuterol  1 ampule Inhalation Q4H     oxymetazoline, sodium chloride flush, sodium chloride, acetaminophen, ondansetron **OR** ondansetron, ondansetron **OR** ondansetron, polyethylene glycol, acetaminophen **OR** acetaminophen, potassium chloride **OR** potassium alternative oral replacement **OR** potassium chloride, magnesium sulfate, ALPRAZolam  IV Drips/Infusions   sodium chloride      sodium chloride 20 mL/hr at 03/08/22 2304     Home Medications  Medications Prior to Admission: cetirizine (ZYRTEC) 10 MG tablet, Take 10 mg by mouth daily  triamcinolone (KENALOG) 0.1 % ointment, Apply topically Daily Apply topically once daily  ALPRAZolam (XANAX) 0.5 MG tablet, Take 0.5 mg by mouth nightly as needed for Sleep.  furosemide (LASIX) 20 MG tablet, Take 2 tablets by mouth daily  hydrOXYzine (ATARAX) 10 MG tablet, Take 1 tablet by mouth nightly as needed   ENTRESTO 49-51 MG per tablet, Take 0.5 tablets by mouth 2 times daily  umeclidinium-vilanterol (ANORO ELLIPTA) 62.5-25 MCG/INH AEPB inhaler, Inhale 1 puff into the lungs daily  atorvastatin (LIPITOR) 40 MG tablet, Take 1 tablet by mouth nightly  metoprolol succinate (TOPROL XL) 100 MG extended release tablet, Take 1 tablet by mouth daily  amiodarone (CORDARONE) 200 MG tablet, Take 1 tablet by mouth daily  rivaroxaban (XARELTO) 20 MG TABS tablet, Take 1 tablet by mouth daily (with breakfast)  aspirin 81 MG chewable tablet, Take 1 tablet by mouth daily  albuterol sulfate  (90 Base) MCG/ACT inhaler, Inhale 2 puffs into the lungs every 6 hours as needed for Wheezing  Multiple Vitamins-Minerals (CENTRUM SILVER PO), Take 1 tablet by mouth daily   [DISCONTINUED] fluocinonide (LIDEX) 0.05 % ointment, Apply topically 2 times daily  [DISCONTINUED] rOPINIRole (REQUIP) 0.5 MG tablet, TAKE 1 TABLET BY MOUTH EVERY EVENING  Diet ADULT DIET; Regular; Low Fat/Low Chol/High Fiber/ALICIA  Allergies    Penicillins and Sulfa antibiotics  Social History     Social History     Socioeconomic History    Marital status:      Spouse name: Not on file    Number of children: Not on file    Years of education: Not on file    Highest education level: Not on file   Occupational History    Not on file   Tobacco Use    Smoking status: Current Every Day Smoker     Packs/day: 1.00     Years: 40.00     Pack years: 40.00     Types: Cigarettes    Smokeless tobacco: Never Used   Substance and Sexual Activity    Alcohol use: Yes     Alcohol/week: 0.0 standard drinks     Comment: very little    Drug use: No    Sexual activity: Not on file   Other Topics Concern    Not on file   Social History Narrative    Not on file     Social Determinants of Health     Financial Resource Strain:     Difficulty of Paying Living Expenses: Not on file   Food Insecurity:     Worried About Running Out of Food in the Last Year: Not on file    Danielle of Food in the Last Year: Not on file   Transportation Needs:     Lack of Transportation (Medical): Not on file    Lack of Transportation (Non-Medical):  Not on file   Physical Activity:     Days of Exercise per Week: Not on file    Minutes of Exercise per Session: Not on file   Stress:     Feeling of Stress : Not on file   Social Connections:     Frequency of Communication with Friends and Family: Not on file    Frequency of Social Gatherings with Friends and Family: Not on file    Attends Hindu Services: Not on file    Active Member of Clubs or Organizations: Not on file    Attends Club or Organization Meetings: Not on file    Marital Status: Not on file   Intimate Partner Violence:     Fear of Current or Ex-Partner: Not on file    Emotionally Abused: Not on file    Physically Abused: Not on file    Sexually Abused: Not on file   Housing Stability:     Unable to Pay for Housing in the Last Year: Not on file    Number of Places Lived in the Last Year: Not on file    Unstable Housing in the Last Year: Not on file     Family History          Problem Relation Age of Onset    Heart Disease Mother     Heart Disease Father         cabg    Diabetes Brother     Heart Disease Brother      Sleep History    Never diagnosed with sleep apnea in the past.  Occupational history   Occupation:  He is current working: Yes  Type of profession: [employer]                       History of tobacco smoking:Yes  Amount of tobacco smokin/2 PPD  Years of tobacco smokin years                                    Quit smoking: No.              Quit year: None  Current smoker: Yes. Amount of current tobacco smokin/2 PPD for 25 years; was previously 2 PPD  . History of recreational or IV drug use in the past:NO     History of exposure to coal mines/coal dust: NO  History of exposure to foundry dust/welding: NO  History of exposure to quarry/silica/sandblasting: NO  History of exposure to asbestos/working with breaks/ships: NO  History of exposure to farm dust: NO  History of recent travel to long distances: NO  History of exposure to birds, pigeons, or chickens in the past:NO  Pet animals at home:No  Dogs: 0  Cats: 0    History of pulmonary embolism in the past: No            History of DVT in the past:No        [x] Right lower extremity   [] Left lower extremity. Vitals     height is 5' 8\" (1.727 m) and weight is 151 lb 3.2 oz (68.6 kg). His oral temperature is 98.4 °F (36.9 °C). His blood pressure is 127/63 and his pulse is 63. His respiration is 20 and oxygen saturation is 94%. Body mass index is 22.99 kg/m². SUPPLEMENTAL O2: O2 Flow Rate (L/min): 45 L/min     I/O        Intake/Output Summary (Last 24 hours) at 3/13/2022 1105  Last data filed at 3/13/2022 0820  Gross per 24 hour   Intake 460 ml   Output 5475 ml   Net -5015 ml     I/O last 3 completed shifts:   In: 5943 [P.O.:1360; I.V.:10]  Out: [address]   Patient Vitals for the past 96 hrs (Last 3 readings):   Weight   03/13/22 0600 151 lb 3.2 oz (68.6 kg)   03/12/22 0545 150 lb 2.1 oz (68.1 kg)   03/11/22 0407 151 lb 3.8 oz (68.6 kg)       Exam   Nursing note and vitals reviewed. Constitutional: alert and oriented; sitting in bed on HFNC   HENT: normocephalic and atraumatic  Neck: supple with no adenopathy  Cardiovascular: irregularly irregular; normal S1 and S2  Pulmonary/Chest: decreased breath sounds noted bilaterally with bilateral crackles  Abdominal: no distention  Musculoskeletal: ROM intact  Extremities: mild 1+ pitting edema  Lymphadenopathy:  none  Neurological: CN II-XII intact  Skin: no erythema or swelling    Labs  - Old records and notes have been reviewed in [facility]   ABG  Lab Results   Component Value Date    PH 7.46 03/09/2022    PO2 49 03/09/2022    PCO2 29 03/09/2022    HCO3 20 03/09/2022    O2SAT 87 03/09/2022     Lab Results   Component Value Date    IFIO2 40 03/09/2022    MODE SIMV 01/12/2021    SETTIDVOL 550 01/12/2021    SETPEEP 5.0 01/12/2021     CBC  Recent Labs     03/11/22  0434   WBC 14.5*   RBC 3.01*   HGB 10.1*   HCT 29.4*   MCV 97.7*   MCH 33.6*   MCHC 34.4      MPV 10.2      BMP  Recent Labs     03/11/22  0434 03/11/22  0743 03/12/22  0340 03/13/22  0443   *  --  134* 134*   K 4.2  --  4.6 4.4     --  101 101   CO2 21*  --  24 24   BUN 53*  --  42* 42*   CREATININE 1.8*  --  1.4* 1.5*   GLUCOSE 99  --  94 107   MG  --  1.8  --   --    CALCIUM 8.2*  --  8.6 8.4*     LFT  No results for input(s): AST, ALT, ALB, BILITOT, ALKPHOS, LIPASE in the last 72 hours. Invalid input(s): AMYLASE  TROP  Lab Results   Component Value Date    TROPONINT < 0.010 03/08/2022    TROPONINT < 0.010 03/07/2022    TROPONINT < 0.010 03/07/2022     BNP  No results for input(s): BNP in the last 72 hours. Lactic Acid  No results for input(s): LACTA in the last 72 hours.   INR  No results for input(s): INR, PROTIME in the last 72 hours. PTT  No results for input(s): APTT in the last 72 hours. Glucose  No results for input(s): POCGLU in the last 72 hours. UA No results for input(s): SPECGRAV, PHUR, COLORU, CLARITYU, MUCUS, PROTEINU, BLOODU, RBCUA, WBCUA, BACTERIA, NITRU, GLUCOSEU, BILIRUBINUR, UROBILINOGEN, KETUA, LABCAST, LABCASTTY, AMORPHOS in the last 72 hours. Invalid input(s): CRYSTALS. PFTs   No recent PFTs on record  Sleep studies   Has never had a sleep study    Cultures    Covid (-)  Rapid Influenza A/B (-)    EKG   Normal sinus rhythm  Possible Left atrial enlargement  Borderline ECG  Echocardiogram   EF 55-60%    Radiology    CXR  Bilateral airspace disease, which could be due to pneumonia or pulmonary    edema. CT Scans  03/10/2022   CT chest without contrast.   1. Near diffuse bilateral reticular interstitial thickening throughout the lungs with relative sparing of the lung apices. Findings are new from prior exam and likely reflect sequela of an inflammatory or infectious process, but can be followed up to exclude developing interstitial fibrosis. 2. Mild central bronchiectasis. Stable background of emphysema. (See actual reports for details)    Assessment   1. 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 Acute hypoxemic respiratory failure due to above  3. Tobacco abuse  4. PAF/CAD s/p CABG, Chronic systolic CHF  5. Productive cough  6. Excellent UO  Plan   -Wean supplemental oxygen--try NC today  -CXR PA/L  -Prednisone 40 mg/d x 2-6 mos with slow taper  -HFNC; wean for SpO2 92%  -Continue bronchodilators  -Do not resume Amiodarone    Plan of care discussed with patient, Dr Mario Castellon  And primary RN  Meds and orders reviewed   Questions and concerns addressed.     Electronically signed by   Sudha Lopez MD on 3/13/2022 at 11:05 AM

## 2022-03-14 NOTE — PROGRESS NOTES
[facility]      Patient - Elieser Cullen   MRN -  [id number]   [facility]   - 1950      Date of Admission -  3/7/2022  8:50 AM  Date of evaluation -  3/14/2022  Room - 1K--A   Hospital Day - Buck Herrmann MD Primary Care Physician - Jaclyn Hayden MD     Problem List      Active Hospital Problems    Diagnosis Date Noted    Dyspnea and respiratory abnormalities [R06.00, R06.89]     Pneumonia [J18.9] 2022     Reason for Consult    Respiratory failure on HFNC  HPI   History Obtained From: Patient and electronic medical record. Elieser Cullen is a 70 y.o. male PMH asthma, atrial fibrillation with RVR, sick sinus syndrome s/p dual pacemaker, COPD, essential hypertension, HLD, and who is currently a 1/2 PPD smoker for the last 20-25 years who is coming in with complaints of shortness of breath. The patient has been sick with 2 weeks of nasal congestion, productive cough, and dyspnea, and thought he had COVID. The patient is chronically on claritin and hydroxizine. He was also previously being followed in the heart failure clinic where his Lasix dose was lowered from 40 mg to 20 mg. He denies any fever, chills, chest pain, nausea vomiting, but was noted to be confused on room air and had 85% pulse ox. Pulmonology was consulted because of the patient's increasing oxygen demands and new onset pneumonia with component of CHF. The patient at the time of my assessment did not report any discomfort and did not endorse any complaints. The patient has never officially had any formal PFTs done in the past before. Before this current admission, he had never been on any form of home oxygen. The patient was previously smoking 2 PPD but had been slowly cutting down to 1/2 PPD. The patient was chronically on amiodarone for his atrial fibrillation.  Of note the patient on  was noted to be hypotensive and in atrial fibrillation and was given fluids, started on an amiodarone drip, and transferred to stepdown. Of note the patient's oxygen requirements have also been increasing to the point where he was requiring Hi Ziggy, and his blood pressures have been decreasing due to the need for diuresis.        Past 24 hrs   -On 3 LPM via NC  -reports SOB improving  -mild hemoptysis streaks of blood in AM sputum  -having intermittent epistaxis  All other systems reviewed    PMHx   Past Medical History      Diagnosis Date    Asthma     Atrial fibrillation with RVR ([address] 04/05/2021    [employer] dual pacemaker  04/15/2021    Collagenous colitis 2021    per scope 2021    Colon polyps 2021    dr Jazmine Cuello    COPD, mild ([address]     Eczema of hand     HTN (hypertension)     Hyperlipidemia     Smoker       Past Surgical History        Procedure Laterality Date    CARDIAC SURGERY      COLONOSCOPY  06/10/2021    theresa ccolon polyps and collagenous colitis    CORONARY ARTERY BYPASS GRAFT  01/12/2021    cabg x 3 with lima and atrial appendage clip  dr Chinmay Lopes GRAFT N/A 01/12/2021    CABG  X 3 WITH DEJAH, Atrial Appendage Clip performed by Ladarius Rodriguez MD at [address] [place] [address] Road  06/2008    polyps    OTHER SURGICAL HISTORY  DEC 7TH 2012 DR GET DAVISAS [place]     IGS ENDOSCOPIC BI LAT MAXILLARY, ETHMOID, SPHENOID, FRONTAL SINUSOTOMY WITH SEPTOPLASTY AND TURBINOPLASTIES    SKIN CANCER EXCISION  09/2014    Left side of Forehead     TOOTH EXTRACTION  02/2017     Meds    Current Medications    furosemide  40 mg Oral Daily    sacubitril-valsartan  1 tablet Oral BID    pantoprazole  40 mg Oral QAM AC    traZODone  50 mg Oral Nightly    magnesium oxide  400 mg Oral BID    predniSONE  40 mg Oral Daily    sodium chloride flush  5-40 mL IntraVENous 2 times per day    aspirin  81 mg Oral Daily    atorvastatin  40 mg Oral Nightly    metoprolol succinate  100 mg Oral Daily    rivaroxaban  20 mg Oral Daily with breakfast    rOPINIRole  0.5 mg Oral Nightly    ipratropium-albuterol  1 ampule Inhalation Q4H     sodium chloride flush, sodium chloride, acetaminophen, ondansetron **OR** ondansetron, ondansetron **OR** ondansetron, polyethylene glycol, acetaminophen **OR** acetaminophen, potassium chloride **OR** potassium alternative oral replacement **OR** potassium chloride, magnesium sulfate, ALPRAZolam  IV Drips/Infusions   sodium chloride      sodium chloride 20 mL/hr at 03/08/22 7246     Home Medications  Medications Prior to Admission: cetirizine (ZYRTEC) 10 MG tablet, Take 10 mg by mouth daily  triamcinolone (KENALOG) 0.1 % ointment, Apply topically Daily Apply topically once daily  ALPRAZolam (XANAX) 0.5 MG tablet, Take 0.5 mg by mouth nightly as needed for Sleep.  furosemide (LASIX) 20 MG tablet, Take 2 tablets by mouth daily  hydrOXYzine (ATARAX) 10 MG tablet, Take 1 tablet by mouth nightly as needed   ENTRESTO 49-51 MG per tablet, Take 0.5 tablets by mouth 2 times daily  umeclidinium-vilanterol (ANORO ELLIPTA) 62.5-25 MCG/INH AEPB inhaler, Inhale 1 puff into the lungs daily  atorvastatin (LIPITOR) 40 MG tablet, Take 1 tablet by mouth nightly  metoprolol succinate (TOPROL XL) 100 MG extended release tablet, Take 1 tablet by mouth daily  amiodarone (CORDARONE) 200 MG tablet, Take 1 tablet by mouth daily  rivaroxaban (XARELTO) 20 MG TABS tablet, Take 1 tablet by mouth daily (with breakfast)  aspirin 81 MG chewable tablet, Take 1 tablet by mouth daily  albuterol sulfate  (90 Base) MCG/ACT inhaler, Inhale 2 puffs into the lungs every 6 hours as needed for Wheezing  Multiple Vitamins-Minerals (CENTRUM SILVER PO), Take 1 tablet by mouth daily   [DISCONTINUED] fluocinonide (LIDEX) 0.05 % ointment, Apply topically 2 times daily  [DISCONTINUED] rOPINIRole (REQUIP) 0.5 MG tablet, TAKE 1 TABLET BY MOUTH EVERY EVENING  Diet    ADULT DIET;  Regular; Low Fat/Low Chol/High Fiber/ALICIA  Allergies Penicillins and Sulfa antibiotics  Social History     Social History     Socioeconomic History    Marital status:      Spouse name: Not on file    Number of children: Not on file    Years of education: Not on file    Highest education level: Not on file   Occupational History    Not on file   Tobacco Use    Smoking status: Current Every Day Smoker     Packs/day: 1.00     Years: 40.00     Pack years: 40.00     Types: Cigarettes    Smokeless tobacco: Never Used   Substance and Sexual Activity    Alcohol use: Yes     Alcohol/week: 0.0 standard drinks     Comment: very little    Drug use: No    Sexual activity: Not on file   Other Topics Concern    Not on file   Social History Narrative    Not on file     Social Determinants of Health     Financial Resource Strain:     Difficulty of Paying Living Expenses: Not on file   Food Insecurity:     Worried About Running Out of Food in the Last Year: Not on file    Danielle of Food in the Last Year: Not on file   Transportation Needs:     Lack of Transportation (Medical): Not on file    Lack of Transportation (Non-Medical):  Not on file   Physical Activity:     Days of Exercise per Week: Not on file    Minutes of Exercise per Session: Not on file   Stress:     Feeling of Stress : Not on file   Social Connections:     Frequency of Communication with Friends and Family: Not on file    Frequency of Social Gatherings with Friends and Family: Not on file    Attends Mandaen Services: Not on file    Active Member of Clubs or Organizations: Not on file    Attends Club or Organization Meetings: Not on file    Marital Status: Not on file   Intimate Partner Violence:     Fear of Current or Ex-Partner: Not on file    Emotionally Abused: Not on file    Physically Abused: Not on file    Sexually Abused: Not on file   Housing Stability:     Unable to Pay for Housing in the Last Year: Not on file    Number of [place] in the Last Year: Not on file  Unstable Housing in the Last Year: Not on file     Family History          Problem Relation Age of Onset    Heart Disease Mother     Heart Disease Father         cabg    Diabetes Brother     Heart Disease Brother      Sleep History    Never diagnosed with sleep apnea in the past.  Occupational history   Occupation:  He is current working: Yes  Type of profession: [employer]                       History of tobacco smoking:Yes  Amount of tobacco smokin/2 PPD  Years of tobacco smokin years                                    Quit smoking: No.              Quit year: None  Current smoker: Yes. Amount of current tobacco smokin/2 PPD for 25 years; was previously 2 PPD  . History of recreational or IV drug use in the past:NO     History of exposure to coal mines/coal dust: NO  History of exposure to foundry dust/welding: NO  History of exposure to quarry/silica/sandblasting: NO  History of exposure to asbestos/working with breaks/ships: NO  History of exposure to farm dust: NO  History of recent travel to long distances: NO  History of exposure to birds, pigeons, or chickens in the past:NO  Pet animals at home:No  Dogs: 0  Cats: 0    History of pulmonary embolism in the past: No            History of DVT in the past:No        [x] Right lower extremity   [] Left lower extremity. Vitals     height is 5' 8\" (1.727 m) and weight is 151 lb 3.2 oz (68.6 kg). His oral temperature is 97.6 °F (36.4 °C). His blood pressure is 171/74 (abnormal) and his pulse is 65. His respiration is 18 and oxygen saturation is 98%. Body mass index is 22.99 kg/m². SUPPLEMENTAL O2: O2 Flow Rate (L/min): 4 L/min     I/O        Intake/Output Summary (Last 24 hours) at 3/14/2022 1105  Last data filed at 3/14/2022 0930  Gross per 24 hour   Intake 690 ml   Output 3180 ml   Net -2490 ml     I/O last 3 completed shifts:   In: 56 [P.O.:890]  [facility] 6130 [facility]   Patient Vitals for the past 96 hrs (Last 3 readings):   Weight   03/13/22 0600 151 lb 3.2 oz (68.6 kg)   03/12/22 0545 150 lb 2.1 oz (68.1 kg)   03/11/22 0407 151 lb 3.8 oz (68.6 kg)       Exam   Physical Exam   Constitutional: No distress on O2 per NC. Patient appears moderately built and  moderately nourished. Head: Normocephalic and atraumatic. Mouth/Throat: Oropharynx is clear and moist.  No oral thrush. Eyes: Conjunctivae are normal. Pupils are equal, round. No scleral icterus. Neck: Neck supple. No tracheal deviation present. Cardiovascular: S1 and S2 irregularly irregular. No peripheral edema  Pulmonary/Chest: Normal effort with bilateral air entry, bilateral rales. No stridor. No respiratory distress. Patient exhibits no tenderness. Abdominal: Soft. Bowel sounds audible. No distension or tenderness to palp. Musculoskeletal: Moves all extremities  Neurological: Patient is alert and follows simple commands     Labs  - Old records and notes have been reviewed in [facility]   ABG  Lab Results   Component Value Date    PH 7.46 03/09/2022    PO2 49 03/09/2022    PCO2 29 03/09/2022    HCO3 20 03/09/2022    O2SAT 87 03/09/2022     Lab Results   Component Value Date    IFIO2 40 03/09/2022    MODE SIMV 01/12/2021    SETTIDVOL 550 01/12/2021    SETPEEP 5.0 01/12/2021     CBC  No results for input(s): WBC, RBC, HGB, HCT, MCV, MCH, MCHC, RDW, PLT, MPV in the last 72 hours. BMP  Recent Labs     03/12/22  0340 03/13/22  0443 03/14/22  0452   * 134* 136   K 4.6 4.4 4.9    101 100   CO2 24 24 27   BUN 42* 42* 39*   CREATININE 1.4* 1.5* 1.3*   GLUCOSE 94 107 91   CALCIUM 8.6 8.4* 8.6     LFT  No results for input(s): AST, ALT, ALB, BILITOT, ALKPHOS, LIPASE in the last 72 hours. Invalid input(s): AMYLASE  TROP  Lab Results   Component Value Date    TROPONINT < 0.010 03/08/2022    TROPONINT < 0.010 03/07/2022    TROPONINT < 0.010 03/07/2022     BNP  No results for input(s): BNP in the last 72 hours.   Lactic Acid  No results for input(s): LACTA in the last 72 hours. INR  No results for input(s): INR, PROTIME in the last 72 hours. PTT  No results for input(s): APTT in the last 72 hours. Glucose  No results for input(s): POCGLU in the last 72 hours. UA No results for input(s): SPECGRAV, PHUR, COLORU, CLARITYU, MUCUS, PROTEINU, BLOODU, RBCUA, WBCUA, BACTERIA, NITRU, GLUCOSEU, BILIRUBINUR, UROBILINOGEN, KETUA, LABCAST, LABCASTTY, AMORPHOS in the last 72 hours. Invalid input(s): CRYSTALS. PFTs   No recent PFTs on record  Sleep studies   Has never had a sleep study    Cultures    Covid (-)  Rapid Influenza A/B (-)    EKG   Normal sinus rhythm  Possible Left atrial enlargement  Borderline ECG  Echocardiogram   EF 55-60%    Radiology    CXR    XR CHEST (2 VW)   3/13/2022   FINDINGS:  The heart size is normal. There is evidence of prior median sternotomy and CABG. There is a right-sided pacemaker present. There is a calcified granuloma at the lateral right lung base. Diffuse bilateral interstitial opacities are present throughout the lungs predominantly at the mid and lower lung zones. This appears similar when compared to prior examination and may represent sequela from pneumonia and/or edema. Underlying fibrotic scarring is also not excluded. Impression:  1. Diffuse bilateral interstitial opacities are present throughout the lungs predominantly at the mid and lower lung zones. This appears similar when compared to prior examination and may represent sequela from pneumonia and/or edema. Underlying fibrotic  scarring is also not excluded. Overall aeration of the lungs appears similar when compared to prior chest radiograph from 2 days prior. CT Scans  03/10/2022   CT chest without contrast.   1. Near diffuse bilateral reticular interstitial thickening throughout the lungs with relative sparing of the lung apices.  Findings are new from prior exam and likely reflect sequela of an inflammatory or infectious process, but can be followed up to exclude developing interstitial fibrosis. 2. Mild central bronchiectasis. Stable background of emphysema. 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   -Acute hypoxemic respiratory failure due to above, CHF, and suspected COPD  -Hx Tobacco abuse-COPD unknown severity no PFT on file  -PAF/CAD s/p CABG, Chronic systolic CHF    Plan   -Monitor SpO2 wean supplemental O2 to maintain SpO2 >90%  -Prednisone 40 mg/d x 2-6 mos with slow taper  -Monitor reported epistaxis/hemoptysis  -Duonebs Q4 hrs WA  -Do not resume Amiodarone  -Home O2 eval prior to discharge   -Will follow-up in Pulmonary clinic in 1 month to monitor response to prednisone obtain PFT and CT chest in 3 months after discharge    Meds and orders reviewed   Questions and concerns addressed. Electronically signed by   ARCADIO Best - CNP on 3/14/2022 at 11:05 AM     Vitals:    03/14/22 0949 03/14/22 1113 03/14/22 1254 03/14/22 1546   BP:  (!) 121/56  (!) 114/56   Pulse:  65  61   Resp:  18  18   Temp:  97.4 °F (36.3 °C)  97.7 °F (36.5 °C)   TempSrc:  Oral  Oral   SpO2: 98% 92% 97% 95%   Weight:       Height:           Looks great, much improved  SpO2 90% at rest on 2 lpm  3 lpm during ambulation  Looking forward to go home tomorrow  DC on prednisone 40 mg/d  f/u [facility] 4 weeks with CXR/PFTs    Patient seen and examined independently by me. Above discussed and I agree with  CNP note Also see my additional comments. Labs, cultures, and radiographs when available were reviewed. Changes were made in the orders as necessary. I discussed patient concerns with Maria Fernanda HICKEY and instructions were given. Respiratory care issues addressed. Please see our orders for the updated patient care plan.     Electronically signed by     Pau Grossman MD on 3/14/2022 at 5:07 PM

## 2022-03-14 NOTE — PLAN OF CARE
Problem: Discharge Planning:  Goal: Participates in care planning  Description: Participates in care planning  3/14/2022 0949 by Luis Mcdaniels RN  Outcome: Ongoing  Note: Patient from home with spouse. Patient plans to return home with spouse at discharge. Possible discharge today 3/15/2022. Goal: Discharged to appropriate level of care  Description: Discharged to appropriate level of care  3/14/2022 0949 by Luis Mcdaniels RN  Outcome: Ongoing     Problem: Gas Exchange - Impaired:  Goal: Levels of oxygenation will improve  Description: Levels of oxygenation will improve  3/14/2022 0949 by Luis Mcdaniels RN  Outcome: Ongoing  Note: O2 remains greater than 90% on 4L. Will attempt to wean to keep O2 greater than 90%. Lung sounds are clear and diminished. Denies any SOB at this time. Problem: Tissue Perfusion - Cardiopulmonary, Altered:  Goal: Absence of angina  Description: Absence of angina  3/14/2022 0949 by Luis Mcdaniels RN  Outcome: Ongoing  Note: Denies any chest pain. Goal: Hemodynamic stability will improve  Description: Hemodynamic stability will improve  3/14/2022 0949 by Luis Mcdaniels RN  Outcome: Ongoing  Note: VSS. Vital signs monitored every 4 hours. Continuous cardiac monitor remains in place. Problem: Falls - Risk of:  Goal: Will remain free from falls  Description: Will remain free from falls  3/14/2022 0949 by Luis Mcdaniels RN  Outcome: Ongoing  Note: No falls noted this shift. Fall risk assessment completed. Hourly rounding performed. Bed locked in lowest position, bed alarm on, call light and personal items within reach, and fall sign posted. Patient ambulates to the bathroom with staff assistance nearby.      Goal: Absence of physical injury  Description: Absence of physical injury  3/14/2022 0949 by Luis Mcdaniels RN  Outcome: Ongoing     Problem: Pain:  Goal: Pain level will decrease  Description: Pain level will decrease  3/14/2022 0949 by Luis Mcdaniels RN  Outcome: Ongoing  Note: Pain Assessment: 0-10  Pain Level: 0   Patient's Stated Pain Goal: No pain   Is pain goal met at this time? Yes     Goal: Control of acute pain  Description: Control of acute pain  3/14/2022 0949 by Negin Faustin RN  Outcome: Ongoing  Goal: Control of chronic pain  Description: Control of chronic pain  3/14/2022 0949 by Negin Faustin RN  Outcome: Ongoing     Problem: Skin Integrity:  Goal: Will show no infection signs and symptoms  Description: Will show no infection signs and symptoms  3/14/2022 0949 by Negin Faustin RN  Outcome: Ongoing  Note: No new skin lesions noted this shift. Patient encouraged to reposition every two hours. Skin assessments completed and ongoing. Patient repositions self while in bed. Goal: Absence of new skin breakdown  Description: Absence of new skin breakdown  3/14/2022 0949 by Negin Faustin RN  Outcome: Ongoing    Care plan reviewed with patient. Patient verbalize understanding of the plan of care and contribute to goal setting.      Electronically signed by Negin Faustin RN on 3/14/2022 at 9:59 AM

## 2022-03-14 NOTE — PLAN OF CARE
Problem: Discharge Planning:  Goal: Participates in care planning  Description: Participates in care planning  3/13/2022 2333 by Beth Mccord RN  Outcome: Met This Shift  Problem: Discharge Planning:  Goal: Discharged to appropriate level of care  Description: Discharged to appropriate level of care  3/13/2022 2333 by Beth Mccord RN  Outcome: Met This Shift  Problem: Gas Exchange - Impaired:  Goal: Levels of oxygenation will improve  Description: Levels of oxygenation will improve  3/13/2022 2333 by Beth Mccord RN  Outcome: Met This Shift  Problem: Tissue Perfusion - Cardiopulmonary, Altered:  Goal: Absence of angina  Description: Absence of angina  3/13/2022 2333 by Beth Mccord RN  Outcome: Met This Shift  Problem: Falls - Risk of:  Goal: Will remain free from falls  Description: Will remain free from falls  3/13/2022 2333 by Beth Mccord RN  Outcome: Met This Shift  Problem: Skin Integrity:  Goal: Will show no infection signs and symptoms  Description: Will show no infection signs and symptoms  3/13/2022 2333 by Beth Mccord RN  Outcome: Met This Shift  3/13/2022 1042 by Ngozi Molina RN  Outcome: Ongoing

## 2022-03-14 NOTE — PLAN OF CARE
Problem: Respiratory  Goal: Lung sounds clear or within normal limits for patient  Outcome: Ongoing  Breath sounds clear diminished  Titrating fio2 and will do home o2 eval today

## 2022-03-15 NOTE — PROGRESS NOTES
RT Evaluation for Home Oxygen    Resting (Room Air):  SpO2:  79  HR:  70    Resting (On O2):  SpO2:   91-93  HR:  73  O2 Device:  nasal cannula  O2 Flow Rate (L/min):  3  FIO2 (%):  32  Comments:      During Walk (On O2):  SpO2:  85  HR:  94  O2 Device:  nasal cannula  O2 Flow Rate (L/min):  8 lpm  Walk/Assistance Device:  none  Rate of Dyspnea:  mild  Symptoms:    Comments:  Pt was taken back to his room at this point to recover at rest. After 5 minutes SPO2 slowly increased above 90% and O2 was wean back to 3 lpm.    After Walk:  SpO2:  93  HR:  88  O2 Device:    O2 Flow Rate (L/min):  3  FIO2 (%):  32%        Electronically signed by Mercedes Choi RCP on 3/15/2022 at 12:55 PM

## 2022-03-15 NOTE — PROGRESS NOTES
Progress Note  Date:3/14/2022      [facility]:[id number]  Patient Modesto Moreno     YOB: 1950     Age:71 y.o. Subjective    Subjective:  Symptoms:  Stable. He reports shortness of breath. No cough, chest pain, weakness or diarrhea. Diet:  Adequate intake. No nausea or vomiting. Activity level: Impaired due to weakness. Pain:  He reports no pain.        Current Facility-Administered Medications   Medication Dose Route Frequency Provider Last Rate Last Admin    ipratropium-albuterol (DUONEB) nebulizer solution 1 ampule  1 ampule Inhalation Q4H WA Dwaynemaris Arizmendileann Haley, ARCADIO - CNP   1 ampule at 03/14/22 2143    furosemide (LASIX) tablet 40 mg  40 mg Oral Daily Jennie Montez MD   40 mg at 03/14/22 0751    sacubitril-valsartan (ENTRESTO) 24-26 MG per tablet 1 tablet  1 tablet Oral BID Jennie Montez MD   1 tablet at 03/14/22 2009    pantoprazole (PROTONIX) tablet 40 mg  40 mg Oral QAM AC Jennie Montez MD   40 mg at 03/14/22 0547    traZODone (DESYREL) tablet 50 mg  50 mg Oral Nightly Jennie Montez MD   50 mg at 03/13/22 2039    magnesium oxide (MAG-OX) tablet 400 mg  400 mg Oral BID Chanda Torrez PA-C   400 mg at 03/14/22 2009    predniSONE (DELTASONE) tablet 40 mg  40 mg Oral Daily Petar Sara, DO   40 mg at 03/14/22 0751    sodium chloride flush 0.9 % injection 5-40 mL  5-40 mL IntraVENous 2 times per day Jennie Montez MD   10 mL at 03/14/22 2009    sodium chloride flush 0.9 % injection 5-40 mL  5-40 mL IntraVENous PRN Jennie Montez MD        0.9 % sodium chloride infusion  25 mL IntraVENous PRN Jennie Montez MD        acetaminophen (TYLENOL) tablet 650 mg  650 mg Oral Q4H PRN Jennie Montez MD        aspirin chewable tablet 81 mg  81 mg Oral Daily Jennie Montez MD   81 mg at 03/14/22 0752    atorvastatin (LIPITOR) tablet 40 mg  40 mg Oral Nightly Jennie Montez MD   40 mg at 03/14/22 2009    metoprolol succinate (TOPROL XL) extended release tablet 100 mg  100 mg Oral Daily Casper Doyle MD   100 mg at 03/14/22 0751    rivaroxaban (XARELTO) tablet 20 mg  20 mg Oral Daily with breakfast Casper Doyle MD   20 mg at 03/14/22 0714    rOPINIRole (REQUIP) tablet 0.5 mg  0.5 mg Oral Nightly Casper Doyle MD   0.5 mg at 03/14/22 2009    ondansetron (ZOFRAN-ODT) disintegrating tablet 4 mg  4 mg Oral Q8H PRN Casper Doyle MD        Or    on[facility]ansetron [facility] injection 4 mg  4 mg IntraVENous Q6H PRN Casper Doyle MD        polyethylene glycol (GLYCOLAX) packet 17 g  17 g Oral Daily PRN Casper Doyle MD        acetaminophen (TYLENOL) tablet 650 mg  650 mg Oral Q6H PRN Casper Doyle MD        Or    acetaminophen (TYLENOL) suppository 650 mg  650 mg Rectal Q6H PRN Casper Doyle MD        0.9 % sodium chloride infusion   IntraVENous Continuous Casper Doyle MD 20 mL/hr at 03/08/22 2356 Rate Change at 03/08/22 2356    potassium chloride (KLOR-CON M) extended release tablet 40 mEq  40 mEq Oral PRN Casper Doyle MD        Or    potassium bicarb-citric acid (EFFER-K) effervescent tablet 40 mEq  40 mEq Oral PRN Casper Doyle MD        Or    potassium chloride 10 mEq/100 mL IVPB (Peripheral Line)  10 mEq IntraVENous PRN Casper Doyle MD        magnesium sulfate 2000 mg in 50 mL IVPB premix  2,000 mg IntraVENous PRN Casper Doyle MD        ALPRAZolam Gayl Gas) tablet 0.25 mg  0.25 mg Oral BID PRN Casper Doyle MD          Review of Systems   Constitutional: Positive for fatigue. Negative for activity change and fever. HENT: Negative for congestion. Respiratory: Positive for shortness of breath. Negative for cough and wheezing. Cardiovascular: Negative for chest pain and palpitations. Gastrointestinal: Negative for abdominal distention, diarrhea, nausea and vomiting. Genitourinary: Negative for difficulty urinating and frequency. Musculoskeletal: Negative for arthralgias and back pain. Neurological: Negative for dizziness, tremors, syncope, weakness, light-headedness and headaches. Objective         Vitals Last 24 Hours:  TEMPERATURE:  Temp  Av.7 °F (36.5 °C)  Min: 97.4 °F (36.3 °C)  Max: 98 °F (36.7 °C)  RESPIRATIONS RANGE: Resp  Av.3  Min: 18  Max: 20  PULSE OXIMETRY RANGE: SpO2  Av.9 %  Min: 80 %  Max: 98 %  PULSE RANGE: Pulse  Av  Min: 61  Max: 73  BLOOD PRESSURE RANGE: Systolic (71UEI), FWH:180 , Min:111 , OCE:694   ; Diastolic (85FHD), XHF:70, Min:56, Max:75    I/O (24Hr): Intake/Output Summary (Last 24 hours) at 3/15/2022 0512  Last data filed at 3/15/2022 0418  Gross per 24 hour   Intake 810 ml   Output 3025 ml   Net -2215 ml     Objective:  General Appearance:  Comfortable. Vital signs: (most recent): Blood pressure 113/75, pulse 71, temperature 98 °F (36.7 °C), temperature source Oral, resp. rate 20, height 5' 8\" (1.727 m), weight 151 lb 3.2 oz (68.6 kg), SpO2 (!) 80 %. Vital signs are normal.  No fever. Output: Producing urine and producing stool. HEENT: Normal HEENT exam.    Lungs:  Normal effort and normal respiratory rate. Breath sounds clear to auscultation. (Much improved as air exchange better and no wheeze with better inspiration and slight prolongation     rales right base minimal and  none left base)  Heart: Normal rate. Regular rhythm. S1 normal and S2 normal.  No murmur. Abdomen: Abdomen is soft and non-distended. Bowel sounds are normal.   There is no abdominal tenderness. Neurological: Patient is alert and oriented to person, place and time. Normal strength. Patient has normal reflexes and normal muscle tone. Skin:  Warm. Labs/Imaging/Diagnostics    Labs:  CBC:No results for input(s): WBC, RBC, HGB, HCT, MCV, RDW, PLT in the last 72 hours.   CHEMISTRIES:  Recent Labs     22  0443 22  0452   * 136   K 4.4 4.9    100   CO2 24 27   BUN 42* 39*   CREATININE 1.5* 1.3*   GLUCOSE 107 91     PT/INR:No results for input(s): PROTIME, INR in the last 72 hours. APTT:No results for input(s): APTT in the last 72 hours. LIVER PROFILE:No results for input(s): AST, ALT, BILIDIR, BILITOT, ALKPHOS in the last 72 hours. Imaging Last 24 Hours:  XR CHEST (2 VW)    Result Date: 3/13/2022  PROCEDURE: XR CHEST (2 VW) CLINICAL INFORMATION: Pneumonitis COMPARISON: 3/10/2022 TECHNIQUE:  AP and lateral chest x-ray  FINDINGS: The heart size is normal. There is evidence of prior median sternotomy and CABG. There is a right-sided pacemaker present. There is a calcified granuloma at the lateral right lung base. Diffuse bilateral interstitial opacities are present throughout the lungs predominantly at the mid and lower lung zones. This appears similar when compared to prior examination and may represent sequela from pneumonia and/or edema. Underlying fibrotic scarring is also not excluded. 1. Diffuse bilateral interstitial opacities are present throughout the lungs predominantly at the mid and lower lung zones. This appears similar when compared to prior examination and may represent sequela from pneumonia and/or edema. Underlying fibrotic  scarring is also not excluded. Overall aeration of the lungs appears similar when compared to prior chest radiograph from 2 days prior. **This report has been created using voice recognition software. It may contain minor errors which are inherent in voice recognition technology. ** Final report electronically signed by Dr. Amy Hardwick on 3/13/2022 11:45 AM    Assessment//Plan           Hospital Problems           Last Modified POA    * (Principal) Pneumonia 3/7/2022 Yes    Dyspnea and respiratory abnormalities 3/9/2022 Yes        Assessment:    Condition: In stable condition. Improving.    (Copd with exacerbation and much improved as breathing better and with productive cough now as mobilization of secretions     acute respiratory failure with hypoxia as now weaning as off high flow     pneumonia and now better      chf acute diastolic now better     hx of cardiomyopathy  Limited echo EF still 55%     par atrial fib  Now stable and feel due to hypoxia    ? Some amiodarone effects in addition     ashd post cabg stable     htn stable     Lipids  On meds ). Plan:   Encourage ambulation and per physical therapy. Wean off oxygen. Consults: gastroenterology and cardiology. Advance diet as tolerated. Administer medications as ordered. (Off amiodarone     weaning off O2 but will need to go home on and wean slowly     much better      resumed entresto and diuretic and follow kidney function     ambulate today and wean down  More     Plan home in am).        Electronically signed by Rossy Lepe MD on 3/15/22 at 5:12 AM EDT

## 2022-03-15 NOTE — PROGRESS NOTES
Judy Trinidad will require the following home care treatments or therapies: nursing  And  Home  O2. Home care will be necessary because of copd and  Physical  deconditioning. The patient is in agreement to receiving home care.

## 2022-03-15 NOTE — ACP (ADVANCE CARE PLANNING)
Advance Care Planning     Advance Care Planning Inpatient Note  [facility] Care Department    Today's Date: 3/15/2022  Unit: [facility] ICU STEPDOWN TELEMETRY 4K      Assessment:  Pt was alone at the time of the visit. He was dealing with pneumonia coupled with some heart issues and lungs problem. He said that he had open heart surgery sometime ago (3 bypasses). His medication affected his lungs (side effects). He would like to be in oxygen and CPR done for if they are not helping him, then it should be stopped. He will be going home today and hopes to be well. Prayer was appreciated.           Electronically signed by Poncho Correa, [address] on 3/15/2022 at 3:30 PM

## 2022-03-15 NOTE — PROGRESS NOTES
1238: RN informed Dr. Peyton Horowitz that patient failed Home O2 evaluation with requiring 8L and only had O2 saturation at 84%. 1242: RN was informed to notify Pulmonology. 1253: RN called Richie Sykes NP in regards to home O2 evaluation. Office staff will page Richie Sykes NP.    4160: RN received a phone call from Richie Sykes NP in regards to patient will be needing to stay in hospital d/t home O2 evaluation for a couple days.

## 2022-03-15 NOTE — PROGRESS NOTES
[facility]      Patient - Sukhdeep Kelsey   MRN -  [id number]   [facility]   - 1950      Date of Admission -  3/7/2022  8:50 AM  Date of evaluation -  3/15/2022  Room - 1K--A   Hospital Day - 4101  [address] [facility] MD [facility] Primary Care Physician - Elise Arteaga MD     Problem List      Active Hospital Problems    Diagnosis Date Noted    Dyspnea and respiratory abnormalities [R06.00, R06.89]     Pneumonia [J18.9] 2022     Reason for Consult    Respiratory failure prev on HFNC  HPI   History Obtained From: Patient and electronic medical record. Sukhdeep Kelsey is a 70 y.o. male PMH asthma, atrial fibrillation with RVR, sick sinus syndrome s/p dual pacemaker, COPD, essential hypertension, HLD, and who is currently a 1/2 PPD smoker for the last 20-25 years who is coming in with complaints of shortness of breath. The patient has been sick with 2 weeks of nasal congestion, productive cough, and dyspnea, and thought he had COVID. The patient is chronically on claritin and hydroxizine. He was also previously being followed in the heart failure clinic where his Lasix dose was lowered from 40 mg to 20 mg. He denies any fever, chills, chest pain, nausea vomiting, but was noted to be confused on room air and had 85% pulse ox. Pulmonology was consulted because of the patient's increasing oxygen demands and new onset pneumonia with component of CHF. The patient at the time of my assessment did not report any discomfort and did not endorse any complaints. The patient has never officially had any formal PFTs done in the past before. Before this current admission, he had never been on any form of home oxygen. The patient was previously smoking 2 PPD but had been slowly cutting down to 1/2 PPD. The patient was chronically on amiodarone for his atrial fibrillation.  Of note the patient on  was noted to be hypotensive and in atrial Oral Daily    rivaroxaban  20 mg Oral Daily with breakfast    rOPINIRole  0.5 mg Oral Nightly     sodium chloride flush, sodium chloride, acetaminophen, ondansetron **OR** ondansetron, polyethylene glycol, acetaminophen **OR** acetaminophen, potassium chloride **OR** potassium alternative oral replacement **OR** potassium chloride, magnesium sulfate, ALPRAZolam  IV Drips/Infusions   sodium chloride      sodium chloride 20 mL/hr at 03/08/22 5902     Home Medications  Medications Prior to Admission: cetirizine (ZYRTEC) 10 MG tablet, Take 10 mg by mouth daily  triamcinolone (KENALOG) 0.1 % ointment, Apply topically Daily Apply topically once daily  ALPRAZolam (XANAX) 0.5 MG tablet, Take 0.5 mg by mouth nightly as needed for Sleep.  furosemide (LASIX) 20 MG tablet, Take 2 tablets by mouth daily  hydrOXYzine (ATARAX) 10 MG tablet, Take 1 tablet by mouth nightly as needed   ENTRESTO 49-51 MG per tablet, Take 0.5 tablets by mouth 2 times daily  umeclidinium-vilanterol (ANORO ELLIPTA) 62.5-25 MCG/INH AEPB inhaler, Inhale 1 puff into the lungs daily  atorvastatin (LIPITOR) 40 MG tablet, Take 1 tablet by mouth nightly  metoprolol succinate (TOPROL XL) 100 MG extended release tablet, Take 1 tablet by mouth daily  amiodarone (CORDARONE) 200 MG tablet, Take 1 tablet by mouth daily  rivaroxaban (XARELTO) 20 MG TABS tablet, Take 1 tablet by mouth daily (with breakfast)  aspirin 81 MG chewable tablet, Take 1 tablet by mouth daily  albuterol sulfate  (90 Base) MCG/ACT inhaler, Inhale 2 puffs into the lungs every 6 hours as needed for Wheezing  Multiple Vitamins-Minerals (CENTRUM SILVER PO), Take 1 tablet by mouth daily   [DISCONTINUED] fluocinonide (LIDEX) 0.05 % ointment, Apply topically 2 times daily  [DISCONTINUED] rOPINIRole (REQUIP) 0.5 MG tablet, TAKE 1 TABLET BY MOUTH EVERY EVENING  Diet    ADULT DIET;  Regular; Low Fat/Low Chol/High Fiber/ALICIA  Allergies    Penicillins and Sulfa antibiotics  Social History Social History     Socioeconomic History    Marital status:      Spouse name: Not on file    Number of children: Not on file    Years of education: Not on file    Highest education level: Not on file   Occupational History    Not on file   Tobacco Use    Smoking status: Current Every Day Smoker     Packs/day: 1.00     Years: 40.00     Pack years: 40.00     Types: Cigarettes    Smokeless tobacco: Never Used   Substance and Sexual Activity    Alcohol use: Yes     Alcohol/week: 0.0 standard drinks     Comment: very little    Drug use: No    Sexual activity: Not on file   Other Topics Concern    Not on file   Social History Narrative    Not on file     Social Determinants of Health     Financial Resource Strain:     Difficulty of Paying Living Expenses: Not on file   Food Insecurity:     Worried About Running Out of Food in the Last Year: Not on file    Danielle of Food in the Last Year: Not on file   Transportation Needs:     Lack of Transportation (Medical): Not on file    Lack of Transportation (Non-Medical):  Not on file   Physical Activity:     Days of Exercise per Week: Not on file    Minutes of Exercise per Session: Not on file   Stress:     Feeling of Stress : Not on file   Social Connections:     Frequency of Communication with Friends and Family: Not on file    Frequency of Social Gatherings with Friends and Family: Not on file    Attends Orthodox Services: Not on file    Active Member of [address] [place] [address] or Organizations: Not on file    Attends Club or Organization Meetings: Not on file    Marital Status: Not on file   Intimate Partner Violence:     Fear of Current or Ex-Partner: Not on file    Emotionally Abused: Not on file    Physically Abused: Not on file    Sexually Abused: Not on file   Housing Stability:     Unable to Pay for Housing in the Last Year: Not on file    Number of [place] in the Last Year: Not on file    Unstable Housing in the Last Year: Not on file Family History          Problem Relation Age of Onset    Heart Disease Mother     Heart Disease Father         cabg    Diabetes Brother     Heart Disease Brother      Sleep History    Never diagnosed with sleep apnea in the past.  Occupational history   Occupation:  He is current working: Yes  Type of profession: [employer]                       History of tobacco smoking:Yes  Amount of tobacco smokin/2 PPD  Years of tobacco smokin years                                    Quit smoking: No.              Quit year: None  Current smoker: Yes. Amount of current tobacco smokin/2 PPD for 25 years; was previously 2 PPD  . History of recreational or IV drug use in the past:NO     History of exposure to coal mines/coal dust: NO  History of exposure to foundry dust/welding: NO  History of exposure to quarry/silica/sandblasting: NO  History of exposure to asbestos/working with breaks/ships: NO  History of exposure to farm dust: NO  History of recent travel to long distances: NO  History of exposure to birds, pigeons, or chickens in the past:NO  Pet animals at home:No  Dogs: 0  Cats: 0    History of pulmonary embolism in the past: No            History of DVT in the past:No        [x] Right lower extremity   [] Left lower extremity. Vitals     height is 5' 8\" (1.727 m) and weight is 151 lb 3.2 oz (68.6 kg). His oral temperature is 97.6 °F (36.4 °C). His blood pressure is 118/75 and his pulse is 81. His respiration is 18 and oxygen saturation is 92%. Body mass index is 22.99 kg/m². SUPPLEMENTAL O2: O2 Flow Rate (L/min): 3 L/min     I/O        Intake/Output Summary (Last 24 hours) at 3/15/2022 1051  Last data filed at 3/15/2022 0906  Gross per 24 hour   Intake 810 ml   Output 1950 ml   Net -1140 ml     I/O last 3 completed shifts: In: 80 [P.O.:800;  I.V.:10]  Out: 4250 [Urine:4250]   Patient Vitals for the past 96 hrs (Last 3 readings):   Weight   22 0600 151 lb 3.2 oz (68.6 kg)   03/12/22 0545 150 lb 2.1 oz (68.1 kg)       Exam   Physical Exam   Constitutional: No distress on O2 per NC. Patient appears moderately built and  moderately nourished. Head: Normocephalic and atraumatic. Mouth/Throat: Oropharynx is clear and moist.  No oral thrush. Eyes: Conjunctivae are normal. Pupils are equal, round. No scleral icterus. Neck: Neck supple. No tracheal deviation present. Cardiovascular: S1 and S2 with no murmur. No peripheral edema  Pulmonary/Chest: Normal effort with bilateral air entry, clear breath sounds. No stridor. No respiratory distress. Patient exhibits no tenderness. Abdominal: Soft. Bowel sounds audible. No distension or tenderness to palp. Musculoskeletal: Moves all extremities  Neurological: Patient is alert and follows simple commands     Labs  - Old records and notes have been reviewed in [facility]   ABG  Lab Results   Component Value Date    PH 7.46 03/09/2022    PO2 49 03/09/2022    PCO2 29 03/09/2022    HCO3 20 03/09/2022    O2SAT 87 03/09/2022     Lab Results   Component Value Date    IFIO2 40 03/09/2022    MODE SIMV 01/12/2021    SETTIDVOL 550 01/12/2021    SETPEEP 5.0 01/12/2021     CBC  No results for input(s): WBC, RBC, HGB, HCT, MCV, MCH, MCHC, RDW, PLT, MPV in the last 72 hours. BMP  Recent Labs     03/13/22  0443 03/14/22  0452 03/15/22  0359   * 136 134*   K 4.4 4.9 4.7    100 98   CO2 24 27 24   BUN 42* 39* 42*   CREATININE 1.5* 1.3* 1.6*   GLUCOSE 107 91 94   CALCIUM 8.4* 8.6 8.4*     LFT  No results for input(s): AST, ALT, ALB, BILITOT, ALKPHOS, LIPASE in the last 72 hours. Invalid input(s): AMYLASE  TROP  Lab Results   Component Value Date    TROPONINT < 0.010 03/08/2022    TROPONINT < 0.010 03/07/2022    TROPONINT < 0.010 03/07/2022     BNP  No results for input(s): BNP in the last 72 hours. Lactic Acid  No results for input(s): LACTA in the last 72 hours.   INR  No results for input(s): INR, PROTIME in the last 72 hours.  PTT  No results for input(s): APTT in the last 72 hours. Glucose  No results for input(s): POCGLU in the last 72 hours. UA No results for input(s): SPECGRAV, PHUR, COLORU, CLARITYU, MUCUS, PROTEINU, BLOODU, RBCUA, WBCUA, BACTERIA, NITRU, GLUCOSEU, BILIRUBINUR, UROBILINOGEN, KETUA, LABCAST, LABCASTTY, AMORPHOS in the last 72 hours. Invalid input(s): CRYSTALS. PFTs   No recent PFTs on record  Sleep studies   Has never had a sleep study    Cultures    Covid (-)  Rapid Influenza A/B (-)    EKG   Normal sinus rhythm  Possible Left atrial enlargement  Borderline ECG  Echocardiogram   EF 55-60%    Radiology    CXR    XR CHEST (2 VW)   3/13/2022   FINDINGS:  The heart size is normal. There is evidence of prior median sternotomy and CABG. There is a right-sided pacemaker present. There is a calcified granuloma at the lateral right lung base. Diffuse bilateral interstitial opacities are present throughout the lungs predominantly at the mid and lower lung zones. This appears similar when compared to prior examination and may represent sequela from pneumonia and/or edema. Underlying fibrotic scarring is also not excluded. Impression:  1. Diffuse bilateral interstitial opacities are present throughout the lungs predominantly at the mid and lower lung zones. This appears similar when compared to prior examination and may represent sequela from pneumonia and/or edema. Underlying fibrotic  scarring is also not excluded. Overall aeration of the lungs appears similar when compared to prior chest radiograph from 2 days prior. CT Scans  03/10/2022   CT chest without contrast.   1. Near diffuse bilateral reticular interstitial thickening throughout the lungs with relative sparing of the lung apices. Findings are new from prior exam and likely reflect sequela of an inflammatory or infectious process, but can be followed up to exclude developing interstitial fibrosis. 2. Mild central bronchiectasis.  Stable background of emphysema. 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   -Acute hypoxemic respiratory failure due to above, CHF, and suspected COPD  -Hx Tobacco abuse-COPD unknown severity no PFT on file  -PAF/CAD s/p CABG, Chronic systolic CHF    Plan   -Monitor SpO2 wean supplemental O2 to maintain SpO2 >90%  -Prednisone 40 mg/d x 2-6 mos with slow taper  -Monitor reported epistaxis/hemoptysis  -Start on atovaquone 1.5 gm Daily for PCP prophylaxis  -Duonebs Q4 hrs WA  -Do not resume Amiodarone  -Home O2 eval prior to discharge, completed today unable to maintain >88% on 8 LPM will continue diuresis and attempt again tomorrow   -Will follow-up in Pulmonary clinic in 1 month to monitor response to prednisone obtain PFT and CXR chest prior to appintment    Meds and orders reviewed   Questions and concerns addressed. Electronically signed by   ARCADIO Mccullough - CNP on 3/15/2022 at 10:51 AM     Vitals:    03/15/22 1147 03/15/22 1154 03/15/22 1155 03/15/22 1645   BP:    128/62   Pulse:    60   Resp:    18   Temp:    97.6 °F (36.4 °C)   TempSrc:    Oral   SpO2: 98% 96% 95% 94%   Weight:       Height:           Better, happy to go home  On 3 lpm at rest and 8 lpm with activity  Afebrile, denies chest pain. Recheck oxygen requirements tomorrow, may need concentrator with 10 lpm flow capacity  F/U in pulm clinic in 4 weeks  Prednisone/Atorvaquone     Patient seen and examined independently by me. Above discussed and I agree with  CNP note Also see my additional comments. Labs, cultures, and radiographs when available were reviewed. Changes were made in the orders as necessary. I discussed patient concerns with Diony's R.NRoshni and instructions were given. Respiratory care issues addressed. Please see our orders for the updated patient care plan.     Electronically signed by     Mercedes Horne MD on 3/15/2022 at 5:48 PM

## 2022-03-15 NOTE — PLAN OF CARE
Problem: Discharge Planning:  Goal: Participates in care planning  Description: Participates in care planning  Outcome: Ongoing  Note: Patient from home with spouse. Patient plans to return home with spouse at discharge. No discharge plans at this time d/t failed Home O2 evaluation. Goal: Discharged to appropriate level of care  Description: Discharged to appropriate level of care     Problem: Gas Exchange - Impaired:  Goal: Levels of oxygenation will improve  Description: Levels of oxygenation will improve  Outcome: Ongoing  Note: O2 remains greater than 90% on 3L. Will attempt to wean to keep O2 greater than 90%. Lung sounds are clear and diminished. Denies any SOB at this time. Failed Home O2 evaluation d/t requiring 8L with O2 saturation at 85%. Problem: Tissue Perfusion - Cardiopulmonary, Altered:  Goal: Absence of angina  Description: Absence of angina  Outcome: Ongoing  Note: Denies any chest pain. Goal: Hemodynamic stability will improve  Description: Hemodynamic stability will improve  Outcome: Ongoing  Note: VSS. Vital signs monitored every 4 hours. Continuous cardiac monitor remains in place. Vitals:    03/15/22 1141 03/15/22 1147 03/15/22 1154 03/15/22 1155   BP: (!) 134/56      Pulse: 93      Resp: 17      Temp: 98.4 °F (36.9 °C)      TempSrc: Oral      SpO2: 94% 98% 96% 95%   Weight:       Height:         Problem: Falls - Risk of:  Goal: Will remain free from falls  Description: Will remain free from falls  Outcome: Ongoing  Note: No falls noted this shift. Fall risk assessment completed. Hourly rounding performed. Bed locked in lowest position, bed alarm on, call light and personal items within reach, and fall sign posted. Patient ambulates to the bathroom with staff assistance nearby.      Goal: Absence of physical injury  Description: Absence of physical injury  Outcome: Ongoing     Problem: Pain:  Goal: Pain level will decrease  Description: Pain level will decrease  Outcome: Ongoing  Note: Pain Assessment: 0-10  Pain Level: 0   Patient's Stated Pain Goal: No pain   Is pain goal met at this time? Yes     Goal: Control of acute pain  Description: Control of acute pain  Outcome: Ongoing  Goal: Control of chronic pain  Description: Control of chronic pain  Outcome: Ongoing     Problem: Skin Integrity:  Goal: Will show no infection signs and symptoms  Description: Will show no infection signs and symptoms  Outcome: Ongoing  Note: No new skin lesions noted this shift. Patient encouraged to reposition every two hours. Skin assessments completed and ongoing. Patient repositions self while in bed. Goal: Absence of new skin breakdown  Description: Absence of new skin breakdown  Outcome: Ongoing    Care plan reviewed with patient. Patient verbalize understanding of the plan of care and contribute to goal setting.      Electronically signed by Ashley Scott RN on 3/15/2022 at 1:29 PM

## 2022-03-15 NOTE — CARE COORDINATION
3/15/22, 2:58 PM EDT    DISCHARGE ON GOING EVALUATION    eHnny Lucero day: 8  Location: -20/020-A Reason for admit: Pneumonia [J18.9]  Dyspnea and respiratory abnormalities [R06.00, R06.89]  COPD exacerbation ([address] [J44.1]  Medication taken at higher dose than recommended [Z79.899]  Pneumonia of both lower lobes due to infectious organism [J18.9]     Barriers to Discharge: COPD/Pneumonia/A-fib/CHF. From 6K.   Oxygen 3L; home oxygen eval 3L at rest, 8L w activity for 85% oxygen saturation.  Pulmonary plans diurese one more day and home oxygen eval in am     PCP: Musa Shaw MD  Readmission Risk Score: 11.4 ( )%  Patient Goals/Plan/Treatment Preferences:   plans home w spouse Hakeem Exeland when medically cleared; monitor oxygen needs; plans new nebulizer, [facility] after choices [facility]; collaborated w Jeremie Moncada, [address] [facility] Liaison

## 2022-03-15 NOTE — PLAN OF CARE
Problem: Discharge Planning:  Goal: Participates in care planning  Description: Participates in care planning  Outcome: Ongoing  Note: Preparing for discharge today. Needs shower chair for home. Needs home O2 eval today as well. Goal: Discharged to appropriate level of care  Description: Discharged to appropriate level of care  Outcome: Ongoing  Note: Patient planning to go home today. Problem: Gas Exchange - Impaired:  Goal: Levels of oxygenation will improve  Description: Levels of oxygenation will improve  Outcome: Ongoing  Note: Off hi chanel and now able to maintain O2 sats with nasal cannula O2 at 3lpm.  Continues to use Incentive Spirometer- attains 2000ml and Acapella. Continued to encourage CADB as well. Problem: Tissue Perfusion - Cardiopulmonary, Altered:  Goal: Absence of angina  Description: Absence of angina  Outcome: Met This Shift  Note: Denies any pain tonight  Goal: Hemodynamic stability will improve  Description: Hemodynamic stability will improve  Outcome: Met This Shift  Note: Vital signs WNL tonight     Problem: Falls - Risk of:  Goal: Will remain free from falls  Description: Will remain free from falls  Outcome: Met This Shift  Note: Bed locked & in low position, call light in reach, side-rails up x2, bed/chair alarm utilized, non-slip socks on when ambulating, reminded patient to use call light to call for assistance. Goal: Absence of physical injury  Description: Absence of physical injury  Outcome: Met This Shift  Note: No apparent physical injury occurred tonight     Problem: Pain:  Goal: Pain level will decrease  Description: Pain level will decrease  Outcome: Met This Shift  Note: Ongoing assessment & interventions provided throughout shift. Reminded patient to report any pain, pressure, or shortness of breath to the nurse. Pain medications provided per physician's orders.  Patient denies any pain tonight  Goal: Control of acute pain  Description: Control of acute pain  Outcome: Met This Shift  Goal: Control of chronic pain  Description: Control of chronic pain  Outcome: Met This Shift     Problem: Skin Integrity:  Goal: Will show no infection signs and symptoms  Description: Will show no infection signs and symptoms  Outcome: Ongoing  Note: Ongoing assessment & interventions provided throughout shift. Skin assessments provided. Encouraging/assisting patient to turn as needed. Did verify patient does turn self  Goal: Absence of new skin breakdown  Description: Absence of new skin breakdown  Outcome: Ongoing  Note: Buttocks & heels are reddened, but blanchable. Did verify patient does turn while sleeping and sleeps on his side. Problem: Breathing Pattern - Ineffective:  Goal: Ability to achieve and maintain a regular respiratory rate will improve  Description: Ability to achieve and maintain a regular respiratory rate will improve  Note: No dyspnea tonight and continues to improve.   Able to continue to maintain O2 sats with nasal cannula at 3lpm.

## 2022-03-16 NOTE — PROGRESS NOTES
Type and Reason for Visit:    Initial (LOS)     Nutrition Recommendations/Plan:   Continue current diet. Encouraged po, good nutrition; compliance with low sodium diet  Discussed small, frequent meals. Nutrition Assessment:      Pt. Seen for LOS - eating \"everything\"; consuming % of meals. Denies any trouble tolerating diet. Denies any weight loss. States follows low sodium diet pta. PMH: CHF, COPD, CAD, colitis  3/15: BUN 42, Cr 1.6  Rx includes: Lasix, Deltasone     Malnutrition Assessment:  No Malnutrition      Wounds:    None     Current Nutrition Therapies:    ADULT DIET; Regular; Low Fat/Low Chol/High Fiber/ALICIA     Anthropometric Measures:  · Height:    5' 8\" (172.7 cm)  · Current Body Weight:   152 lb 4 oz (69.1 kg) (3/16 no edema)  · Admission Body Weight:    159 lb 6.3 oz (72.3 kg) (3/8 no edema)  · Usual Body Weight:      (per pt. ~150#; states varies 147-154#; per EMR: 3/23/21: 155# 12.8 oz, 2/1/22: 153# 12.8 oz)  · Ideal Body Weight:     154 lbs   · BMI:   23.2  · BMI Categories:    Normal Weight (BMI 22.0 to 24.9) age over 72     Nutrition Diagnosis:   · No nutrition diagnosis at this time         Nutrition Interventions:   Food and/or Nutrient Delivery:  Continue current diet. Nutrition Education/Counseling:  3/3/16 Encouraged po, good nutrition; compliance with low sodium diet. Encouraged small, frequent meals if intake declines w/ COPD  Coordination of Nutrition Care:  Continue to monitor while inpatient. Nutrition Monitoring and Evaluation:   Will monitor nutritional needs during LOS. Discharge Planning:     Too soon to determine     Addison Schaumann, RD, LD:    Contact Number: [phone]

## 2022-03-16 NOTE — PROGRESS NOTES
[facility]      Patient - Yas Doyle   MRN -  [id number]   [facility]   - 1950      Date of Admission -  3/7/2022  8:50 AM  Date of evaluation -  3/16/2022  Room - 1K--A   Hospital Day - [address] Lisa Herrmann MD Primary Care Physician - Rossy Lepe MD     Problem List      Active Hospital Problems    Diagnosis Date Noted    Dyspnea and respiratory abnormalities [R06.00, R06.89]     Pneumonia [J18.9] 2022     Reason for Consult    Respiratory failure previously on HFNC  HPI   History Obtained From: Patient and electronic medical record. Yas Doyle is a 70 y.o. male PMH asthma, atrial fibrillation with RVR, sick sinus syndrome s/p dual pacemaker, COPD, essential hypertension, HLD, and who is currently a 1/2 PPD smoker for the last 20-25 years who is coming in with complaints of shortness of breath. The patient has been sick with 2 weeks of nasal congestion, productive cough, and dyspnea, and thought he had COVID. The patient is chronically on claritin and hydroxizine. He was also previously being followed in the heart failure clinic where his Lasix dose was lowered from 40 mg to 20 mg. He denies any fever, chills, chest pain, nausea vomiting, but was noted to be confused on room air and had 85% pulse ox. Pulmonology was consulted because of the patient's increasing oxygen demands and new onset pneumonia with component of CHF. The patient at the time of my assessment did not report any discomfort and did not endorse any complaints. The patient has never officially had any formal PFTs done in the past before. Before this current admission, he had never been on any form of home oxygen. The patient was previously smoking 2 PPD but had been slowly cutting down to 1/2 PPD. The patient was chronically on amiodarone for his atrial fibrillation.  Of note the patient on  was noted to be hypotensive and in atrial fibrillation and was given fluids, started on an amiodarone drip, and transferred to stepdown. Of note the patient's oxygen requirements have also been increasing to the point where he was requiring Hi Ziggy, and his blood pressures have been decreasing due to the need for diuresis.        Past 24 hrs   -On 6 LPM via NC  -Reports SOB slowly improving since admission  -decreasing amount of blood streaked sputum   -Failed home O2 eval yesterday   All other systems reviewed    PMHx   Past Medical History      Diagnosis Date    Asthma     Atrial fibrillation with RVR ([address] 04/05/2021    [employer] dual pacemaker  04/15/2021    Collagenous colitis 2021    per scope 2021    Colon polyps 2021    dr Rossy Danielson    COPD, mild ([address]     Eczema of hand     HTN (hypertension)     Hyperlipidemia     Smoker       Past Surgical History        Procedure Laterality Date    CARDIAC SURGERY      COLONOSCOPY  06/10/2021    theresa ccolon polyps and collagenous colitis    CORONARY ARTERY BYPASS GRAFT  01/12/2021    cabg x 3 with lima and atrial appendage clip  dr Smita Gan GRAFT N/A 01/12/2021    CABG  X 3 WITH DEJAH, Atrial Appendage Clip performed by Marge Hardwick MD at [address] [place] [address] Road  06/2008    polyps    OTHER SURGICAL HISTORY  DEC 7TH 2012 DR [address]N [address] [place]     IGS ENDOSCOPIC BI LAT MAXILLARY, ETHMOID, SPHENOID, FRONTAL SINUSOTOMY WITH SEPTOPLASTY AND TURBINOPLASTIES    SKIN CANCER EXCISION  09/2014    Left side of Forehead     TOOTH EXTRACTION  02/2017     Meds    Current Medications    atovaquone  1,500 mg Oral Daily    ipratropium-albuterol  1 ampule Inhalation Q4H WA    furosemide  40 mg Oral Daily    sacubitril-valsartan  1 tablet Oral BID    pantoprazole  40 mg Oral QAM AC    traZODone  50 mg Oral Nightly    magnesium oxide  400 mg Oral BID    predniSONE  40 mg Oral Daily    sodium chloride flush  5-40 mL IntraVENous 2 times per day    Fat/Low Chol/High Fiber/ALICIA  Allergies    Penicillins and Sulfa antibiotics  Social History     Social History     Socioeconomic History    Marital status:      Spouse name: Not on file    Number of children: Not on file    Years of education: Not on file    Highest education level: Not on file   Occupational History    Not on file   Tobacco Use    Smoking status: Current Every Day Smoker     Packs/day: 1.00     Years: 40.00     Pack years: 40.00     Types: Cigarettes    Smokeless tobacco: Never Used   Substance and Sexual Activity    Alcohol use: Yes     Alcohol/week: 0.0 standard drinks     Comment: very little    Drug use: No    Sexual activity: Not on file   Other Topics Concern    Not on file   Social History Narrative    Not on file     Social Determinants of Health     Financial Resource Strain:     Difficulty of Paying Living Expenses: Not on file   Food Insecurity:     Worried About Running Out of Food in the Last Year: Not on file    Danielle of Food in the Last Year: Not on file   Transportation Needs:     Lack of Transportation (Medical): Not on file    Lack of Transportation (Non-Medical):  Not on file   Physical Activity:     Days of Exercise per Week: Not on file    Minutes of Exercise per Session: Not on file   Stress:     Feeling of Stress : Not on file   Social Connections:     Frequency of Communication with Friends and Family: Not on file    Frequency of Social Gatherings with Friends and Family: Not on file    Attends Mandaen Services: Not on file    Active Member of Clubs or Organizations: Not on file    Attends Club or Organization Meetings: Not on file    Marital Status: Not on file   Intimate Partner Violence:     Fear of Current or Ex-Partner: Not on file    Emotionally Abused: Not on file    Physically Abused: Not on file    Sexually Abused: Not on file   Housing Stability:     Unable to Pay for Housing in the Last Year: Not on file    Number of Places Lived in the Last Year: Not on file    Unstable Housing in the Last Year: Not on file     Family History          Problem Relation Age of Onset    Heart Disease Mother     Heart Disease Father         cabg    Diabetes Brother     Heart Disease Brother      Sleep History    Never diagnosed with sleep apnea in the past.  Occupational history   Occupation:  He is current working: Yes  Type of profession: [employer]                       History of tobacco smoking:Yes  Amount of tobacco smokin/2 PPD  Years of tobacco smokin years                                    Quit smoking: No.              Quit year: None  Current smoker: Yes. Amount of current tobacco smokin/2 PPD for 25 years; was previously 2 PPD  . History of recreational or IV drug use in the past:NO     History of exposure to coal mines/coal dust: NO  History of exposure to foundry dust/welding: NO  History of exposure to quarry/silica/sandblasting: NO  History of exposure to asbestos/working with breaks/ships: NO  History of exposure to farm dust: NO  History of recent travel to long distances: NO  History of exposure to birds, pigeons, or chickens in the past:NO  Pet animals at home:No  Dogs: 0  Cats: 0    History of pulmonary embolism in the past: No            History of DVT in the past:No        [x] Right lower extremity   [] Left lower extremity. Vitals     height is 5' 8\" (1.727 m) and weight is 152 lb 4 oz (69.1 kg). His oral temperature is 97.8 °F (36.6 °C). His blood pressure is 103/57 (abnormal) and his pulse is 73. His respiration is 18 and oxygen saturation is 98%. Body mass index is 23.15 kg/m². SUPPLEMENTAL O2: O2 Flow Rate (L/min): 3 L/min     I/O        Intake/Output Summary (Last 24 hours) at 3/16/2022 1058  Last data filed at 3/16/2022 0756  Gross per 24 hour   Intake 490 ml   Output 1500 ml   Net -1010 ml     I/O last 3 completed shifts: In: 80 [P.O.:920;  I.V.:10]  Out: 1650 [Urine:1650] Patient Vitals for the past 96 hrs (Last 3 readings):   Weight   03/16/22 0502 152 lb 4 oz (69.1 kg)   03/13/22 0600 151 lb 3.2 oz (68.6 kg)       Exam   Physical Exam   Constitutional: No distress on O2 per NC. Patient appears moderately built and  moderately nourished. Head: Normocephalic and atraumatic. Mouth/Throat: Oropharynx is clear and moist.  No oral thrush. Eyes: Conjunctivae are normal. Pupils are equal, round. No scleral icterus. Neck: Neck supple. No tracheal deviation present. Cardiovascular: S1 and S2 with no murmur. No peripheral edema  Pulmonary/Chest: Normal effort with bilateral air entry, mild pleural rub at base, faint bibasilar rales. No stridor. No respiratory distress. Patient exhibits no tenderness. Abdominal: Soft. Bowel sounds audible. No distension or tenderness to palp. Musculoskeletal: Moves all extremities  Neurological: Patient is alert and follows simple commands       Labs  - Old records and notes have been reviewed in [facility]   ABG  Lab Results   Component Value Date    PH 7.46 03/09/2022    PO2 49 03/09/2022    PCO2 29 03/09/2022    HCO3 20 03/09/2022    O2SAT 87 03/09/2022     Lab Results   Component Value Date    IFIO2 40 03/09/2022    MODE SIMV 01/12/2021    SETTIDVOL 550 01/12/2021    SETPEEP 5.0 01/12/2021     CBC  No results for input(s): WBC, RBC, HGB, HCT, MCV, MCH, MCHC, RDW, PLT, MPV in the last 72 hours. BMP  Recent Labs     03/14/22  0452 03/15/22  0359    134*   K 4.9 4.7    98   CO2 27 24   BUN 39* 42*   CREATININE 1.3* 1.6*   GLUCOSE 91 94   CALCIUM 8.6 8.4*     LFT  No results for input(s): AST, ALT, ALB, BILITOT, ALKPHOS, LIPASE in the last 72 hours. Invalid input(s): AMYLASE  TROP  Lab Results   Component Value Date    TROPONINT < 0.010 03/08/2022    TROPONINT < 0.010 03/07/2022    TROPONINT < 0.010 03/07/2022     BNP  No results for input(s): BNP in the last 72 hours.   Lactic Acid  No results for input(s): LACTA in the last 72 hours.  INR  No results for input(s): INR, PROTIME in the last 72 hours. PTT  No results for input(s): APTT in the last 72 hours. Glucose  No results for input(s): POCGLU in the last 72 hours. UA No results for input(s): SPECGRAV, PHUR, COLORU, CLARITYU, MUCUS, PROTEINU, BLOODU, RBCUA, WBCUA, BACTERIA, NITRU, GLUCOSEU, BILIRUBINUR, UROBILINOGEN, KETUA, LABCAST, LABCASTTY, AMORPHOS in the last 72 hours. Invalid input(s): CRYSTALS. PFTs   No recent PFTs on record  Sleep studies   Has never had a sleep study    Cultures    Covid (-)  Rapid Influenza A/B (-)    EKG   Normal sinus rhythm  Possible Left atrial enlargement  Borderline ECG  Echocardiogram   EF 55-60%    Radiology    CXR    XR CHEST (2 VW)   3/13/2022   FINDINGS:  The heart size is normal. There is evidence of prior median sternotomy and CABG. There is a right-sided pacemaker present. There is a calcified granuloma at the lateral right lung base. Diffuse bilateral interstitial opacities are present throughout the lungs predominantly at the mid and lower lung zones. This appears similar when compared to prior examination and may represent sequela from pneumonia and/or edema. Underlying fibrotic scarring is also not excluded. Impression:  1. Diffuse bilateral interstitial opacities are present throughout the lungs predominantly at the mid and lower lung zones. This appears similar when compared to prior examination and may represent sequela from pneumonia and/or edema. Underlying fibrotic  scarring is also not excluded. Overall aeration of the lungs appears similar when compared to prior chest radiograph from 2 days prior. CT Scans  03/10/2022   CT chest without contrast.   1. Near diffuse bilateral reticular interstitial thickening throughout the lungs with relative sparing of the lung apices.  Findings are new from prior exam and likely reflect sequela of an inflammatory or infectious process, but can be followed up to exclude developing interstitial fibrosis. 2. Mild central bronchiectasis. Stable background of emphysema. 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   -Acute hypoxemic respiratory failure due to above, CHF, and suspected COPD  -Hx Tobacco abuse-COPD unknown severity no PFT on file  -PAF/CAD s/p CABG, Chronic systolic CHF    Plan   -Monitor SpO2 wean supplemental O2 to maintain SpO2 >90%  -Prednisone 40 mg/d will maintain until follow-up in pulmonary clinic  -Monitor reported epistaxis/hemoptysis  -Start on atovaquone 1.5 gm Daily for PCP prophylaxis  -Duonebs Q4 hrs WA  -Do not resume Amiodarone  -Home O2 eval prior to discharge, completed yesterday unable to maintain >88% on 8 LPM will continue diuresis and attempt again today  -Will follow-up in Pulmonary clinic in 1 month to monitor response to prednisone obtain PFT and CXR chest prior to appintment    Meds and orders reviewed   Questions and concerns addressed.     Electronically signed by   ARCADIO Hudson - CNP on 3/16/2022 at 10:58 AM     Addendum   Home O2 eval completed by Shamir Del Castillo RCP    RT Evaluation for Home Oxygen     Resting (Room Air):  SpO2:  85  HR:  63  Resting (On O2):  SpO2:   97  HR:  78  O2 Device:  nasal cannula  O2 Flow Rate (L/min):  3  FIO2 (%):  32  Comments:    During Walk (On O2):  SpO2:   90-94  HR:  77  O2 Device:  nasal cannula  O2 Flow Rate (L/min):  3  Walk/Assistance Device:  none  Rate of Dyspnea:  mild  Symptoms:    Comments:    After Walk:  SpO2:  94  HR:  78  O2 Device:  NC  O2 Flow Rate (L/min):  3  FIO2 (%):  32  Rate of Dyspnea:  mild  Symptoms:    Comments:    Does the Patient Qualify for Home O2:  yes  Liter Flow at Rest:  3  Liter Flow on Exertion:  3  Does the Patient Need Portable Oxygen Tanks:  yes   Electronically signed by Maday Martinez RCP on 3/16/2022 at 1:44 PM    Order placed for supplemental O2 3 LPM ATC    Patient was evaluated today for the diagnosis of CHF. I entered a DME order for home oxygen because the diagnosis and testing requires the patient to have supplemental oxygen. Condition will improve or be benefited by oxygen use. The patient is  able to perform good mobility in a home setting and therefore does require the use of a portable oxygen system. The need for this equipment was discussed with the patient and he understands and is in agreement. Electronically signed by ARCADIO Jones CNP on 3/16/2022 at 2:32 PM    Vitals:    03/16/22 0810 03/16/22 0946 03/16/22 1114 03/16/22 1517   BP:   98/62 (!) 101/58   Pulse:   62 59   Resp:   19 19   Temp:   98.5 °F (36.9 °C) 97.4 °F (36.3 °C)   TempSrc:   Oral Oral   SpO2: 100% 98% 99% 96%   Weight:       Height:         Afebrile  On 3 lpm NC, comfortable  Home oxygen eval completed  Continue Prednisone/Atovaquone   F/U in pulm clinic in 4 weeks  Pulm will s/o    Patient seen and examined independently by me. Above discussed and I agree with  CNP note Also see my additional comments. Labs, cultures, and radiographs when available were reviewed. Changes were made in the orders as necessary. I discussed patient concerns with Maria Fernanda HICKEY and instructions were given. Respiratory care issues addressed. Please see our orders for the updated patient care plan.     Electronically signed by     Jayden Ellington MD on 3/16/2022 at 5:09 PM

## 2022-03-16 NOTE — PROGRESS NOTES
Progress Note  Date:3/16/2022       [facility]:[id number]  Patient Leonel Braswell     YOB: 1950     Age:71 y.o. Subjective    Subjective:  Symptoms:  Stable. He reports shortness of breath and cough. Diet:  Adequate intake. Activity level: Impaired due to weakness. Pain:  Pain is well controlled.        Current Facility-Administered Medications   Medication Dose Route Frequency Provider Last Rate Last Admin    atovaquone (MEPRON) suspension 1,500 mg  1,500 mg Oral Daily ARCADIO Arita CNP        ipratropium-albuterol (DUONEB) nebulizer solution 1 ampule  1 ampule Inhalation Q4H WA ARCADIO Arita CNP   1 ampule at 03/15/22 2057    furosemide (LASIX) tablet 40 mg  40 mg Oral Daily Maria R Mcfarlane MD   40 mg at 03/15/22 0906    sacubitril-valsartan (ENTRESTO) 24-26 MG per tablet 1 tablet  1 tablet Oral BID Maria R Mcfarlane MD   1 tablet at 03/15/22 2050    pantoprazole (PROTONIX) tablet 40 mg  40 mg Oral QAM AC Maria R Mcfarlane MD   40 mg at 03/15/22 0716    traZODone (DESYREL) tablet 50 mg  50 mg Oral Nightly Maria R Mcfarlane MD   50 mg at 03/15/22 2050    magnesium oxide (MAG-OX) tablet 400 mg  400 mg Oral BID Speedy Grajeda PA-C   400 mg at 03/15/22 2050    predniSONE (DELTASONE) tablet 40 mg  40 mg Oral Daily Petar Ruiz DO   40 mg at 03/15/22 5458    sodium chloride flush 0.9 % injection 5-40 mL  5-40 mL IntraVENous 2 times per day Maria R Mcfarlane MD   10 mL at 03/15/22 2050    sodium chloride flush 0.9 % injection 5-40 mL  5-40 mL IntraVENous PRN Maria R Mcfarlane MD        0.9 % sodium chloride infusion  25 mL IntraVENous PRN Maria R Mcfarlane MD        acetaminophen (TYLENOL) tablet 650 mg  650 mg Oral Q4H PRN Maria R Mcfarlane MD        aspirin chewable tablet 81 mg  81 mg Oral Daily Maria R Mcfarlaen MD   81 mg at 03/15/22 0906    atorvastatin (LIPITOR) tablet 40 mg  40 mg Oral Nightly Maria R Mcfarlane MD   40 mg at 03/15/22 2050    metoprolol succinate (TOPROL XL) extended release tablet 100 mg  100 mg Oral Daily Rocío Sawyer MD   100 mg at 03/15/22 8095    rivaroxaban (XARELTO) tablet 20 mg  20 mg Oral Daily with breakfast Rocío Sawyer MD   20 mg at 03/15/22 0218    rOPINIRole (REQUIP) tablet 0.5 mg  0.5 mg Oral Nightly Rocío Sawyer MD   0.5 mg at 03/14/22 2009    ondansetron (ZOFRAN-ODT) disintegrating tablet 4 mg  4 mg Oral Q8H PRN Rocío Sawyer MD        Or    [facility]dans[facility]ron [facility] injection 4 mg  4 mg IntraVENous Q6H PRN Rocío Sawyer MD        polyethylene glycol (GLYCOLAX) packet 17 g  17 g Oral Daily PRN Rocío Sawyer MD        acetaminophen (TYLENOL) tablet 650 mg  650 mg Oral Q6H PRN Rocío Sawyer MD        Or    acetaminophen (TYLENOL) suppository 650 mg  650 mg Rectal Q6H PRN Rocío Sawyer MD        0.9 % sodium chloride infusion   IntraVENous Continuous Rocío Sawyer MD 20 mL/hr at 03/08/22 2356 Rate Change at 03/08/22 2356    potassium chloride (KLOR-CON M) extended release tablet 40 mEq  40 mEq Oral PRN Rocío Sawyer MD        Or    potassium bicarb-citric acid (EFFER-K) effervescent tablet 40 mEq  40 mEq Oral PRN Rocío Sawyer MD        Or    potassium chloride 10 mEq/100 mL IVPB (Peripheral Line)  10 mEq IntraVENous PRN Rocío Sawyer MD        magnesium sulfate 2000 mg in 50 mL IVPB premix  2,000 mg IntraVENous PRN Rocío Sawyer MD        ALPRAZolam Dalbert Royal) tablet 0.25 mg  0.25 mg Oral BID PRN Rocío Sawyer MD          Review of Systems   Constitutional: Negative for activity change. HENT: Positive for congestion and sinus pressure. Negative for dental problem and sore throat. Eyes: Negative for discharge. Respiratory: Positive for cough, shortness of breath and wheezing. Negative for choking and chest tightness. Cardiovascular: Negative for palpitations.    Gastrointestinal: Negative for abdominal distention, anal bleeding and constipation. Genitourinary: Negative for difficulty urinating. Musculoskeletal: Negative for arthralgias and back pain. Objective         Vitals Last 24 Hours:  TEMPERATURE:  Temp  Av.8 °F (36.6 °C)  Min: 97.4 °F (36.3 °C)  Max: 98.4 °F (36.9 °C)  RESPIRATIONS RANGE: Resp  Av.7  Min: 17  Max: 20  PULSE OXIMETRY RANGE: SpO2  Av.5 %  Min: 80 %  Max: 100 %  PULSE RANGE: Pulse  Av.8  Min: 59  Max: 93  BLOOD PRESSURE RANGE: Systolic (87LSS), OWQ:915 , Min:113 , BEC:144   ; Diastolic (37ISL), XYH:39, Min:50, Max:75    I/O (24Hr): Intake/Output Summary (Last 24 hours) at 3/16/2022 0005  Last data filed at 3/15/2022 1851  Gross per 24 hour   Intake 730 ml   Output 775 ml   Net -45 ml     Objective:  General Appearance:  Comfortable and uncomfortable. Vital signs: (most recent): Blood pressure 114/67, pulse 61, temperature 97.4 °F (36.3 °C), temperature source Oral, resp. rate 20, height 5' 8\" (1.727 m), weight 151 lb 3.2 oz (68.6 kg), SpO2 93 %. HEENT: Normal HEENT exam.    Lungs:  Normal effort. There are decreased breath sounds and wheezes (mild on expiration). Heart: Normal rate. Regular rhythm. S1 normal and S2 normal.  No murmur. Abdomen: Abdomen is soft and non-distended. Bowel sounds are normal.   There is no abdominal tenderness. There is no epigastric area tenderness. Extremities: Normal range of motion. There is no dependent edema. Neurological: Patient is alert and oriented to person, place and time. Pupils:  Pupils are equal, round, and reactive to light. Labs/Imaging/Diagnostics    Labs:  CBC:No results for input(s): WBC, RBC, HGB, HCT, MCV, RDW, PLT in the last 72 hours.   CHEMISTRIES:  Recent Labs     22  0443 22  0452 03/15/22  0359   * 136 134*   K 4.4 4.9 4.7    100 98   CO2 24 27 24   BUN 42* 39* 42*   CREATININE 1.5* 1.3* 1.6*   GLUCOSE 107 91 94     PT/INR:No results for input(s): PROTIME, INR in the last 72 hours. APTT:No results for input(s): APTT in the last 72 hours. LIVER PROFILE:No results for input(s): AST, ALT, BILIDIR, BILITOT, ALKPHOS in the last 72 hours. Imaging Last 24 Hours:  No results found. Assessment//Plan           Hospital Problems           Last Modified POA    * (Principal) Pneumonia 3/7/2022 Yes    Dyspnea and respiratory abnormalities 3/9/2022 Yes        Assessment:    Condition: In stable condition. Improving. 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     Plan:   Encourage ambulation and out of bed and up to chair. Continue respiratory treatments and wean off oxygen. Consults: cardiology, general surgery, pulmonology and speech therapy. Advance diet as tolerated. Administer medications as ordered. (Wean off O2 and hope at  4L per nc    Continue antibiotics     needs home O2 at home and portable oxygen     home therapy needed       try to discharge later today with oxygen   ).        Electronically signed by Elise Arteaga MD on 3/16/22 at 12:05 AM EDT

## 2022-03-16 NOTE — PROGRESS NOTES
[facility] PHYSICAL THERAPY  EVALUATION  [facility] ICU STEPDOWN TELEMETRY 4K - 4K-20/020-A    Time In: 1117  Time Out: 1138  Timed Code Treatment Minutes: 10 Minutes  Minutes: 21          Date: 3/16/2022  Patient Name: Iva Portillo,  Gender:  male        MRN: [id number]  : 1950  (70 y.o.)      Referring Practitioner: Rocío Sawyer MD  Diagnosis: pneumonia  Additional Pertinent Hx: Per EMR \"This is a 68-year-old male, status post coronary artery bypassgrafting with a history of cardiomyopathy with pacemaker that hadimprovement, now presents with increased productive cough, dyspnea, andsome persistent shortness of breath being present. The patient has beenhaving symptoms for the past couple weeks of increasing cough,congestion symptoms being present. Most recently, he has been followedby the congestive heart failure clinic and he states his Lasix wasdecreased from 40 down to 20. There is some confusion of the meds. Pulse oximetry done today was down around 85%. He has not been eating. No significant fever however. He has had some intermittent cough beingpresent with more shortness of breath with various activities beingpresent. He has _some prolong expiration as_well as_some wheeze present in addition. He denies anychest pressure, heaviness, or tightness, but increasing cough and attimes, more fatigue being present. Because of the above, he presentedto the emergency room. Once in the emergency room, it was noted onchest x-ray that he had bilateral pneumonia being present. His heartrate was noted to be stable. There was some intermittent wheeze and thepatient was given some aerosols with improvement. \"     Restrictions/Precautions:  Restrictions/Precautions: General Precautions  Position Activity Restriction  Other position/activity restrictions: pacemaker    Subjective:  Chart Reviewed: Yes  Patient assessed for rehabilitation services?: Yes  Family / Caregiver Present: No  Subjective: OK to see pt per nursing. Pt in bed when PT arrived, pleasant and agreeable to PT session, willing to amb.     General:  Overall Orientation Status: Within Normal Limits  Follows Commands: Within Functional Limits    Vision: Impaired  Vision Exceptions: Wears glasses for reading    Hearing: Within functional limits         Pain: denies    Vitals: Oxygen: 3 L of O2, increased difficulty to obtain reading with pulse ox    Social/Functional History:    Lives With: Spouse  Type of Home: Apartment  Home Layout: One level  Home Access: Stairs to enter without rails  Entrance Stairs - Number of Steps: 1 JACQUELINE  Home Equipment:  (no DME of AE)     Bathroom Shower/Tub: Tub/Shower unit,Walk-in shower  Bathroom Equipment: Shower chair       ADL Assistance: Independent  Homemaking Assistance: Independent  Ambulation Assistance: Independent  Transfer Assistance: Independent    Active : Yes  Occupation: Full time employment  Type of occupation: oates  Additional Comments: Pt reports being IND with all ADL and IADL tasks with no AD required    OBJECTIVE:  Range of Motion:  Bilateral Lower Extremity: WNL    Strength:  Bilateral Lower Extremity: WNL    Balance:  Static Sitting Balance:  Independent  Dynamic Sitting Balance: Independent  Static Standing Balance: Supervision  Dynamic Standing Balance: Supervision, Stand By Assistance    Bed Mobility:  Not Tested    Transfers:  Sit to Stand: Supervision, Stand By Assistance, X 1, with verbal cues  Stand to [address] [place] [address], Stand By Assistance, X 1, with verbal cues  No AD required for mobility  Ambulation:  Stand By Assistance, X 1, with cues for safety, with verbal cues   Distance: 250 feet  Surface: Level Tile  Device:No Device  Gait Deviations:  Slow Nelly, Decreased Step Length Bilaterally and Decreased Gait Speed  Cues for safety, no LOB noted    Functional Outcome Measures: Completed  AM-PAC Inpatient Mobility Raw Score : 22  AM-PAC Inpatient T-Scale Score : 53.28    ASSESSMENT:  Activity Tolerance:  Patient tolerance of  treatment: good. Treatment Initiated: Treatment and education initiated within context of evaluation. Evaluation time included review of current medical information, gathering information related to past medical, social and functional history, completion of standardized testing, formal and informal observation of tasks, assessment of data and development of plan of care and goals. Treatment time included skilled education and facilitation of tasks to increase safety and independence with functional mobility for improved independence and quality of life. Assessment: Body structures, Functions, Activity limitations: Decreased endurance,Decreased balance  Assessment: Pt cont to require skilled PT services to increase IND with functional tasks with no use of an AD for support to return home safely and progress towards PLOF. Prognosis: Excellent    REQUIRES PT FOLLOW UP: Yes    Discharge Recommendations:  Discharge Recommendations: Continue to assess pending progress,Home with assist PRN    Patient Education:  PT Education: General Safety,Gait Training,PT Role,Plan of Care,Functional Mobility Training,Equipment  Patient Education: d/c planning    Equipment Recommendations:  Equipment Needed: No    Plan:  Times per week: 3-5x GM  Current Treatment Recommendations: Strengthening,Neuromuscular Re-education,Home Exercise Program,Safety Education & Training,Balance Training,Endurance Training,Patient/Caregiver Education & Training,Functional Mobility Training,Equipment Evaluation, Education, & procurement,Transfer Training,Gait Training,Stair training    Goals:  Patient goals : return home  Short term goals  Time Frame for Short term goals: by discharge  Short term goal 1: Pt will amb with LRAD for >200 feet with IND to return home safely. Short term goal 2: Pt will demo IND with transfers with LRAD for support to return home IND.   Short term goal 3: Pt will demo IND with stair negotiation for 1 JACQUELINE the home to return home safely. Long term goals  Time Frame for Long term goals : NA due to short ELOS    Following session, patient left in safe position with all fall risk precautions in place. Pt in bedside chair following session, all needs and call light in reach, chair alarm on.

## 2022-03-16 NOTE — PROGRESS NOTES
Lisette Kim was evaluated today and a DME order was entered for a nebulizer compressor in order to administer duoneb due to the diagnosis of copd. The need for this equipment and treatment was discussed with the patient and he understands and is in agreement.

## 2022-03-16 NOTE — PLAN OF CARE
Problem: Respiratory  Goal: Lung sounds clear or within normal limits for patient  Outcome: Ongoing     Continue scheduled breathing treatments to improve aeration. Patient encouraged to use incentive spirometer and acapella 10x each per hour to promote lung recruitment.

## 2022-03-16 NOTE — PROGRESS NOTES
RT Evaluation for Home Oxygen    Resting (Room Air):  SpO2:  85  HR:  63    Resting (On O2):  SpO2:   97  HR:  78  O2 Device:  nasal cannula  O2 Flow Rate (L/min):  3  FIO2 (%):  32    Comments:    During Walk (On O2):  SpO2:   90-94  HR:  77  O2 Device:  nasal cannula  O2 Flow Rate (L/min):  3  Walk/Assistance Device:  none  Rate of Dyspnea:  mild  Symptoms:    Comments:      After Walk:  SpO2:  94  HR:  78  O2 Device:  NC  O2 Flow Rate (L/min):  3  FIO2 (%):  32  Rate of Dyspnea:  mild  Symptoms:    Comments:    Does the Patient Qualify for Home O2:  yes  Liter Flow at Rest:  3  Liter Flow on Exertion:  3  Does the Patient Need Portable Oxygen Tanks:  yes         Electronically signed by Kriss Gonzalez RCP on 3/16/2022 at 1:44 PM

## 2022-03-16 NOTE — PLAN OF CARE
Problem: Discharge Planning:  Goal: Participates in care planning  Description: Participates in care planning  Outcome: Ongoing     Problem: Discharge Planning:  Goal: Discharged to appropriate level of care  Description: Discharged to appropriate level of care  Outcome: Ongoing  Note: Patient is from home and plans to return home at discharge. Problem: Gas Exchange - Impaired:  Goal: Levels of oxygenation will improve  Description: Levels of oxygenation will improve  Outcome: Ongoing  Note: Patient is on 3L nasal cannula and oxygen saturations remain above 90%. Problem: Tissue Perfusion - Cardiopulmonary, Altered:  Goal: Absence of angina  Description: Absence of angina  Outcome: Ongoing  Note: Patient did not have any complaints of angina during this shift. Problem: Tissue Perfusion - Cardiopulmonary, Altered:  Goal: Hemodynamic stability will improve  Description: Hemodynamic stability will improve  Outcome: Ongoing     Problem: Falls - Risk of:  Goal: Will remain free from falls  Description: Will remain free from falls  Outcome: Ongoing  Note: Patient remained free from falls this shift. Used call light appropriately. Wore nonskid socks when ambulating with assistance. Bed alarm on. Call light in reach. Problem: Falls - Risk of:  Goal: Absence of physical injury  Description: Absence of physical injury  Outcome: Ongoing     Problem: Pain:  Goal: Pain level will decrease  Description: Pain level will decrease  Outcome: Ongoing  Note: Ongoing assessment & interventions provided throughout shift. Reminded patient to report any pain, pressure, or shortness of breath to the nurse. Patient denied having pain during this shift.         Problem: Pain:  Goal: Control of acute pain  Description: Control of acute pain  Outcome: Ongoing     Problem: Pain:  Goal: Control of chronic pain  Description: Control of chronic pain  Outcome: Ongoing     Problem: Skin Integrity:  Goal: Will show no infection signs and symptoms  Description: Will show no infection signs and symptoms  Outcome: Ongoing     Problem: Skin Integrity:  Goal: Absence of new skin breakdown  Description: Absence of new skin breakdown  Outcome: Ongoing  Note: Ongoing assessment & interventions provided throughout shift. Skin assessments provided. Encouraging/assisting patient to turn as needed. Problem: Breathing Pattern - Ineffective:  Goal: Ability to achieve and maintain a regular respiratory rate will improve  Description: Ability to achieve and maintain a regular respiratory rate will improve  Outcome: Ongoing     Problem: Respiratory  Goal: Lung sounds clear or within normal limits for patient  Outcome: Ongoing      Patient participated in plan of care and contributed to goal setting.

## 2022-03-16 NOTE — CARE COORDINATION
3/16/22, 3:13 PM EDT  Plans home w spouse Manju Galeana; [place] [facility] [place] called referral to [facility]ia[facility], prefers [facility] home oxygen 3L at rest, 3L w activity; collaborated w Lawrence Ryan, [address] [facility] Liaison    Patient was evaluated today for the diagnosis of COPD. I entered a DME order for home oxygen because the diagnosis and testing requires the patient to have supplemental oxygen. Condition will improve or be benefited by oxygen use. The patient is  able to perform good mobility in a home setting and therefore does require the use of a portable oxygen system. The need for this equipment was discussed with the patient and he understands and is in agreement. Judy Trinidad was evaluated today and a DME order was entered for a nebulizer compressor in order to administer Albuterol due to the diagnosis of COPD. The need for this equipment and treatment was discussed with the patient and he understands and is in agreement. Patient goals/plan/ treatment preferences discussed by  and . Patient goals/plan/ treatment preferences reviewed with patient/ family. Patient/ family verbalize understanding of discharge plan and are in agreement with goal/plan/treatment preferences. Understanding was demonstrated using the teach back method. AVS provided by RN at time of discharge, which includes all necessary medical information pertaining to the patients current course of illness, treatment, post-discharge goals of care, and treatment preferences.         IMM Letter  IMM Letter given to Patient/Family/Significant other/Guardian/POA/by[de-identified] Ramon   IMM Letter date given[de-identified] 03/14/22  IMM Letter time given[de-identified] 026 1030

## 2022-03-17 NOTE — DISCHARGE INSTR - COC
Continuity of Care Form    Patient Name: Donna Sanchez   :  1950  MRN:  [id number]    Admit date:  3/7/2022  Discharge date:  ***    Code Status Order: Full Code   Advance Directives:      Admitting Physician:  Stefany George MD  PCP: Stefany George MD    Discharging Nurse: [facility] Unit/Room#: 4K-20/020-A  Discharging Unit Phone Number: ***    Emergency Contact:   Extended Emergency Contact Information  Primary Emergency Contact: Priti Canales  Address: [address] Phone: [phone]  Relation: Spouse  Secondary Emergency Contact: [address] Phone: [phone]  Relation: Child    Past Surgical History:  Past Surgical History:   Procedure Laterality Date    CARDIAC SURGERY      COLONOSCOPY  06/10/2021    theresa ccolon polyps and collagenous colitis    CORONARY ARTERY BYPASS GRAFT  2021    cabg x 3 with lima and atrial appendage clip  dr Shaylee Beard N/A 2021    CABG  X 3 WITH DEJAH, Atrial Appendage Clip performed by Nora Rivers MD at [address] [place] [address]  2008    polyps    OTHER SURGICAL HISTORY  DEC 7TH 2012 DR GET WADE [facility]     IGS ENDOSCOPIC BI LAT MAXILLARY, ETHMOID, SPHENOID, FRONTAL SINUSOTOMY WITH SEPTOPLASTY AND TURBINOPLASTIES    SKIN CANCER EXCISION  2014    Left side of Forehead     TOOTH EXTRACTION  2017       Immunization History:   Immunization History   Administered Date(s) Administered    COVID-19, [place], Primary or Immunocompromised, PF, 100mcg/0.5mL 2021, 2021    Influenza 10/23/2012    Influenza Vaccine, unspecified formulation 2018    Influenza Virus Vaccine 2013, 2014, 2017    Influenza Whole 2015    Influenza, MDCK Quadv, with preserv IM (Flucelvax 2 yrs and older) 2021    Influenza, Quadv, 6 mo and older, IM (Fluzone, Flulaval) 10/16/2018    Influenza, Nelly Tao, IM, (6 mo and older Fluzone, Flulaval, Fluarix and 3 yrs and older Afluria) 12/29/2020    Pneumococcal Conjugate 13-valent (Nikolai Grain) 08/30/2016    Pneumococcal Polysaccharide ([id number]) 08/29/2017    Tetanus 04/01/2000       Active Problems:  Patient Active Problem List   Diagnosis Code    Hyperlipidemia E78.5    Asthma J45.909    Smoker F17.200    Allergic rhinitis due to other allergen J30.89    Collagenous colitis K52.831    Lactose intolerance E73.9    HTN (hypertension) I10    COPD, mild (HCC) J44.9    Atrial fibrillation (HCC) I48.91    Atherosclerotic heart disease of native coronary artery with other forms of angina pectoris (HCC) I25.118    S/P CABG x 3 Z95.1    Encounter for cardioversion procedure Z01.89    S/P left atrial appendage ligation Z98.890    Ischemic cardiomyopathy I25.5    Chronic bronchitis ([address] J42    CHF (congestive heart failure), NYHA class II, chronic, systolic (HCC) L73.96    Tachycardia-bradycardia ([address] I49.5    [employer] dual pacemaker  Z95.0    Pneumonia J18.9    Dyspnea and respiratory abnormalities R06.00, R06.89       Isolation/Infection:   Isolation            No Isolation          Patient Infection Status       Infection Onset Added Last Indicated Last Indicated By Review Planned Expiration Resolved Resolved By    None active    Resolved    COVID-19 (Rule Out) 03/07/22 03/07/22 03/07/22 COVID-19, Rapid (Ordered)   03/07/22 Rule-Out Test Resulted    C-diff Rule Out 05/09/21 05/10/21 05/09/21 C. difficile toxin Molecular (Ordered)   05/12/21 Rule-Out Test Resulted    COVID-19 (Rule Out) 01/11/21 01/11/21 01/11/21 COVID-19 (Ordered)   01/11/21 Rule-Out Test Resulted            Nurse Assessment:  Last Vital Signs: /66   Pulse 61   Temp 98.1 °F (36.7 °C) (Oral)   Resp 17   Ht 5' 8\" (1.727 m)   Wt 148 lb 9.6 oz (67.4 kg)   SpO2 95%   BMI 22.59 kg/m²     Last documented pain score (0-10 scale): Pain Level: 0  Last Weight:   Wt Readings from Last 1 Encounters:   22 148 lb 9.6 oz (67.4 kg)     Mental Status:  {IP PT MENTAL STATUS:52390}    IV Access:  { NOELLE IV ACCESS:492165810}    Nursing Mobility/ADLs:  Walking   {CHP DME WMRK:494998196}  Transfer  {CHP DME QONR:281133692}  Bathing  {CHP DME AHJN:673633635}  Dressing  {CHP DME LDLO:045500512}  Toileting  {CHP DME HXYL:251421061}  Feeding  {CHP DME NWEB:540854581}  Med Admin  {CHP DME WFMX:127906424}  Med Delivery   { NOELLE MED Delivery:352059246}    Wound Care Documentation and Therapy:        Elimination:  Continence: Bowel: {YES / XJ:89971}  Bladder: {YES / FD:11334}  Urinary Catheter: {Urinary Catheter:021322035}   Colostomy/Ileostomy/Ileal Conduit: {YES / VO:33805}       Date of Last BM: ***    Intake/Output Summary (Last 24 hours) at 3/17/2022 0731  Last data filed at 3/17/2022 0640  Gross per 24 hour   Intake 1540 ml   Output 2600 ml   Net -1060 ml     I/O last 3 completed shifts: In: 4560 [P.O.:1530;  I.V.:10]  Out: 2950 [Urine:2950]    Safety Concerns:     [address] Safety Concerns:576865047}    Impairments/Disabilities:      [address] Impairments/Disabilities:212747414}    Nutrition Therapy:  Current Nutrition Therapy:   [address] Diet List:854958844}    Routes of Feeding: {P DME Other Feedings:794954922}  Liquids: {Slp liquid thickness:64365}  Daily Fluid Restriction: {CHP DME Yes amt example:773455204}  Last Modified Barium Swallow with Video (Video Swallowing Test): {Done Not Done ZBQT:412380225}    Treatments at the Time of Hospital Discharge:   Respiratory Treatments: ***  Oxygen Therapy:  {Therapy; copd oxygen:97183}  Ventilator:    { CC Vent RNWX:481339254}    Rehab Therapies: {THERAPEUTIC INTERVENTION:3385598906}  Weight Bearing Status/Restrictions: [address]  Weight Bearin}  Other Medical Equipment (for information only, NOT a DME order):  {EQUIPMENT:702844138}  Other Treatments: ***    Patient's personal belongings (please select all that are sent with patient):  {[facility] DME Belongings:896433491}    RN SIGNATURE:  {Esignature:719993987}    CASE MANAGEMENT/SOCIAL WORK SECTION    Inpatient Status Date: ***    Readmission Risk Assessment Score:  Readmission Risk              Risk of Unplanned Readmission:  22           Discharging to Facility/ Agency   Name:   Address:  Phone:  Fax:    Dialysis Facility (if applicable)   Name:  Address:  Dialysis Schedule:  Phone:  Fax:    / signature: {Esignature:097754771}    PHYSICIAN SECTION    Prognosis: {Prognosis:0192632443}    Condition at Discharge: [address] [place] [address] Patient Condition:850484248}    Rehab Potential (if transferring to Rehab): {Prognosis:3702827698}    Recommended Labs or Other Treatments After Discharge: ***    Physician Certification: I certify the above information and transfer of Tiffany Fitch  is necessary for the continuing treatment of the diagnosis listed and that he requires {Admit to Appropriate Level of Care:21738} for {GREATER/LESS:665239513} 30 days.      Update Admission H&P: {CHP DME Changes in HHWYV:547085736}    PHYSICIAN SIGNATURE:  {Esignature:245930224}

## 2022-03-17 NOTE — PROGRESS NOTES
CLINICAL PHARMACY: DISCHARGE MED RECONCILIATION/REVIEW    [facility] Select Patient?: Yes  Total # of Interventions Recommended: 0   -   Total # Interventions Accepted: 0  Intervention Severity:   - Level 1 Intervention Present?: No   - Level 2 #: 0   - Level 3 #: 0   Time Spent (min): 15    Carine Chappell PharmD  3/17/2022 10:44 AM

## 2022-03-17 NOTE — CARE COORDINATION
3/17/22, 9:05 AM EDT    Patient goals/plan/ treatment preferences discussed by  and . Patient goals/plan/ treatment preferences reviewed with patient/ family. Patient/ family verbalize understanding of discharge plan and are in agreement with goal/plan/treatment preferences. Understanding was demonstrated using the teach back method. AVS provided by RN at time of discharge, which includes all necessary medical information pertaining to the patients current course of illness, treatment, post-discharge goals of care, and treatment preferences.

## 2022-03-17 NOTE — PROGRESS NOTES
Progress Note  Date:3/16/2022       PADMAJA:[id number]  Patient Sadaf Johnson     YOB: 1950     Age:71 y.o. Subjective    Subjective:  Symptoms:  Stable. He reports shortness of breath and cough. No chest pain or diarrhea. Diet:  Adequate intake. No nausea or vomiting. Activity level: Returning to normal.    Pain:  He reports no pain.        Current Facility-Administered Medications   Medication Dose Route Frequency Provider Last Rate Last Admin    atovaquone (MEPRON) suspension 1,500 mg  1,500 mg Oral Daily Zeina SinkARCADIO Estes CNP   1,500 mg at 03/16/22 1230    ipratropium-albuterol (DUONEB) nebulizer solution 1 ampule  1 ampule Inhalation Q4H WA Zeina SinkARCADIO Estes CNP   1 ampule at 03/16/22 2156    furosemide (LASIX) tablet 40 mg  40 mg Oral Daily Esthela Conti MD   40 mg at 03/16/22 0751    sacubitril-valsartan (ENTRESTO) 24-26 MG per tablet 1 tablet  1 tablet Oral BID Esthela Conti MD   1 tablet at 03/16/22 2046    pantoprazole (PROTONIX) tablet 40 mg  40 mg Oral QAM AC Esthela Conti MD   40 mg at 03/16/22 4901    traZODone (DESYREL) tablet 50 mg  50 mg Oral Nightly Esthela Conti MD   50 mg at 03/16/22 2046    magnesium oxide (MAG-OX) tablet 400 mg  400 mg Oral BID Lizy Zhao PA-C   400 mg at 03/16/22 2046    predniSONE (DELTASONE) tablet 40 mg  40 mg Oral Daily Petar Ruiz DO   40 mg at 03/16/22 0751    sodium chloride flush 0.9 % injection 5-40 mL  5-40 mL IntraVENous 2 times per day Esthela Conti MD   10 mL at 03/16/22 2048    sodium chloride flush 0.9 % injection 5-40 mL  5-40 mL IntraVENous PRN Esthela Conti MD        0.9 % sodium chloride infusion  25 mL IntraVENous PRN Esthela Conti MD        acetaminophen (TYLENOL) tablet 650 mg  650 mg Oral Q4H PRN Esthela Conti MD        aspirin chewable tablet 81 mg  81 mg Oral Daily Esthela Conti MD   81 mg at 03/16/22 0751    atorvastatin (LIPITOR) tablet 40 mg  40 mg Oral Nightly Rossy Lepe MD   40 mg at 03/16/22 2046    metoprolol succinate (TOPROL XL) extended release tablet 100 mg  100 mg Oral Daily Rossy Lepe MD   100 mg at 03/16/22 0751    rivaroxaban (XARELTO) tablet 20 mg  20 mg Oral Daily with breakfast Rossy Lepe MD   20 mg at 03/16/22 0751    rOPINIRole (REQUIP) tablet 0.5 mg  0.5 mg Oral Nightly Rossy Lepe MD   0.5 mg at 03/14/22 2009    ondansetron (ZOFRAN-ODT) disintegrating tablet 4 mg  4 mg Oral Q8H PRN Rossy Lepe MD        Or    ondansetron [facility]) injection 4 mg  4 mg IntraVENous Q6H PRN Rossy Lepe MD        polyethylene glycol (GLYCOLAX) packet 17 g  17 g Oral Daily PRN Rossy Lepe MD        acetaminophen (TYLENOL) tablet 650 mg  650 mg Oral Q6H PRN Rossy Lepe MD        Or    acetaminophen (TYLENOL) suppository 650 mg  650 mg Rectal Q6H PRN Rossy Lepe MD        0.9 % sodium chloride infusion   IntraVENous Continuous Rossy Lepe MD 20 mL/hr at 03/08/22 2356 Rate Change at 03/08/22 2356    potassium chloride (KLOR-CON M) extended release tablet 40 mEq  40 mEq Oral PRN Rossy Lepe MD        Or    potassium bicarb-citric acid (EFFER-K) effervescent tablet 40 mEq  40 mEq Oral PRN Rossy Lepe MD        Or    potassium chloride 10 mEq/100 mL IVPB (Peripheral Line)  10 mEq IntraVENous PRN Rossy Lepe MD        magnesium sulfate 2000 mg in 50 mL IVPB premix  2,000 mg IntraVENous PRN Rossy Lepe MD        ALPRAZolam Vivi Blocker) tablet 0.25 mg  0.25 mg Oral BID PRN Rossy Leep MD          Review of Systems   Constitutional: Negative for activity change and fever. HENT: Positive for nosebleeds (with oxygen mild). Negative for congestion, sinus pressure and sinus pain. Respiratory: Positive for cough and shortness of breath. Negative for apnea and chest tightness.            Overall better   Cardiovascular: hours. CHEMISTRIES:  Recent Labs     03/14/22  0452 03/15/22  0359    134*   K 4.9 4.7    98   CO2 27 24   BUN 39* 42*   CREATININE 1.3* 1.6*   GLUCOSE 91 94     PT/INR:No results for input(s): PROTIME, INR in the last 72 hours. APTT:No results for input(s): APTT in the last 72 hours. LIVER PROFILE:No results for input(s): AST, ALT, BILIDIR, BILITOT, ALKPHOS in the last 72 hours. Imaging Last 24 Hours:  No results found. Assessment//Plan           Hospital Problems           Last Modified POA    * (Principal) Pneumonia 3/7/2022 Yes    Dyspnea and respiratory abnormalities 3/9/2022 Yes        Assessment:    Condition: In stable condition. Improving.   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 Plan:   Out of bed and up to chair. Wean off oxygen. Consults: cardiology and pulmonology. Regular diet. Administer medications as ordered.    (  Overall better      slight wheeze and needs aerosol this am as been over 6 hours as sleeping     recheck with respiratory on oxygen  And see if able to go home as yesterday Home O2 eval as 8 liters of nc oxygen and need at less than 6 and this am 4 to 5 L per nc     pulmonary feels will take time and prolong course of prednisone for one month due to amiodarone toxicity causing pulmonary interstitial disease      no atrial fib but no further any cardiomyopathy      home with aerosols as able to afford  as anoro over 400 dollars for him plus inadequate deep breaths with inhaler     Respiratory to see again on oxygen needs and if lless 6 L per nc home today).        Electronically signed by Reuben Mayfield MD on 3/17/22 at 4:41 AM EDT

## 2022-03-17 NOTE — PROGRESS NOTES
Discharge teaching and instructions for diagnosis/procedure of bilateral pneumonia completed with patient using teachback method. AVS reviewed. Printed prescriptions given to patient. Patient voiced understanding regarding prescriptions, follow up appointments, and care of self at home. Discharged in a wheelchair to  home with support per family     Per Dr Temo Narayanan 20 mg lasix daily- RN updated MD with BMP results, okay to go home, and Dr Temo Narayanan called in xanax prescription for patient.

## 2022-03-17 NOTE — PLAN OF CARE
Problem: Respiratory  Goal: Lung sounds clear or within normal limits for patient  Outcome: Ongoing  Note:  Patient lung sounds are considered normal for their current lung condition. No signs of distress noted.  Current treatment regimen appropriate

## 2022-03-17 NOTE — TELEPHONE ENCOUNTER
Date of last visit:  12/14/2021  Date of next visit:  3/22/2022    Requested Prescriptions     Pending Prescriptions Disp Refills    albuterol sulfate  (90 Base) MCG/ACT inhaler [Pharmacy Med Name: ALBUTEROL HFA INH(200 PUFFS)18GM] 54 g      Sig: INHALE 2 PUFFS INTO THE LUNGS EVERY 6 HOURS AS NEEDED FOR WHEEZING

## 2022-03-17 NOTE — TELEPHONE ENCOUNTER
Narcisa Moon called stating that pt just got released from [facility]. He is needing RX for the following medications. She is asking for these to be called in ASAP as patient is out of some of these and is needing to take now. Especially his Breathing Treatment.      rivaroxaban (XARELTO) tablet 20 mg QD  albuterol sulfate  (90 Base) MCG/ACT inhaler Inhale 2 puffs into the lungs every 6 hours as needed for Wheezing   pantoprazole (PROTONIX) 40 MG tablet Take 1 tablet by mouth every morning   ipratropium-albuterol (DUONEB) 0.5-2.5 (3) MG/3ML SOLN nebulizer solution Inhale 3 mLs into the lungs 4 times daily   umeclidinium-vilanterol (ANORO ELLIPTA) 62.5-25 MCG/INH AEPB inhaler Inhale 1 puff into the lungs daily     Send to [facility] on [address]

## 2022-03-17 NOTE — TELEPHONE ENCOUNTER
Date of last visit:  12/14/2021  Date of next visit:  3/22/2022    Requested Prescriptions     Signed Prescriptions Disp Refills    rivaroxaban (XARELTO) 20 MG TABS tablet 90 tablet 1     Sig: Take 1 tablet by mouth daily (with breakfast)     Authorizing Provider: Gilbert Umanzor     Ordering User: KIRILL WAITE    albuterol sulfate  (90 Base) MCG/ACT inhaler 1 each 5     Sig: Inhale 2 puffs into the lungs every 6 hours as needed for Wheezing     Authorizing Provider: Gilbert Umanzor     Ordering User: Salena WAITE [facility] [address] were sent today already. Patient told Dr Farida [place] he was not going to get the Anoro as it is over $400.

## 2022-03-18 NOTE — CARE COORDINATION
Care Transitions Outreach Attempt    Call within 2 business days of discharge: Yes   Attempted to reach patient for transitions of care follow up. Unable to reach patient. Patient: Olimpia Nunes Patient : 1950 MRN: [id number]    Last Discharge [address] [address]       Complaint Diagnosis Description Type Department Provider    3/7/22 Shortness of Breath Dyspnea and respiratory abnormalities . .. ED to Hosp-Admission (Discharged) (ADMITTED) PAULA 4K Maria R Mcfarlane MD        24 Hour Transition of Care Call:    1st Attempt to contact patient for Initial 24 Hour Transition from hospital to home Call:   Patient not reached. No answer - Unable to leave message. Rang and rang then hung up. Will continue to follow. Called  [facility], spoke to [place] - she states they will see patient on  per Patient request.     Jono Armstrong, ERICKN    [phone]  [facility] Coordinator          Was this an external facility discharge?  No Discharge Facility: [facility]    Noted following upcoming appointments from discharge chart review:   [address] [place] [address] follow up appointment(s):   Future Appointments   Date Time Provider Ramo Graham   3/22/2022  1:20 PM Maria R Mcfarlane MD [facility]   2022  7:00 AM STR PULMONARY FUNCTION ROOM 1 [facility] PFT [address]   2022 11:00 AM Saad Pandya PA-C [facility] [address]   2022 10:40 AM Raúl Dodson MD [address] [place] [address]   2022  1:30 PM ARCADIO Cruz CNP [facility] [address]   2022  1:00 PM SCHEDULE, CHIP PACER NURSE [facility] PACER [address]   2022  2:00 PM ARCADIO Benitez CNP [facility] [address]     Non-[facility] follow up appointment(s): 3/22/2022 PCP

## 2022-03-18 NOTE — PROGRESS NOTES
mouth daily 90 tablet 3    aspirin 81 MG chewable tablet Take 1 tablet by mouth daily 30 tablet 3    Multiple Vitamins-Minerals (CENTRUM SILVER PO) Take 1 tablet by mouth daily       rivaroxaban (XARELTO) 20 MG TABS tablet Take 1 tablet by mouth daily (with breakfast) 90 tablet 1    albuterol sulfate  (90 Base) MCG/ACT inhaler Inhale 2 puffs into the lungs every 6 hours as needed for Wheezing 1 each 5      Review of Systems   Constitutional: Positive for activity change and fatigue. Negative for fever. HENT: Negative for hearing loss. Respiratory: Positive for cough, shortness of breath and wheezing (minimal). Gastrointestinal: Negative for abdominal distention, abdominal pain, constipation, nausea and vomiting. Genitourinary: Negative for difficulty urinating, enuresis and genital sores. Musculoskeletal: Negative for arthralgias, back pain and neck pain. Neurological: Negative for dizziness, seizures, facial asymmetry and light-headedness. Psychiatric/Behavioral: Negative for agitation and behavioral problems. Objective         Vitals Last 24 Hours:  TEMPERATURE:  Temp  Av.1 °F (36.7 °C)  Min: 98.1 °F (36.7 °C)  Max: 98.1 °F (36.7 °C)  RESPIRATIONS RANGE: Resp  Av  Min: 16  Max: 18  PULSE OXIMETRY RANGE: SpO2  Av %  Min: 92 %  Max: 94 %  PULSE RANGE: Pulse  Av  Min: 63  Max: 63  BLOOD PRESSURE RANGE: Systolic (12ZTN), HSR:658 , Min:127 , CZA:173   ; Diastolic (50SCX), UVX:48, Min:74, Max:74    I/O (24Hr): Intake/Output Summary (Last 24 hours) at 3/18/2022 0501  Last data filed at 3/17/2022 0838  Gross per 24 hour   Intake 360 ml   Output 350 ml   Net 10 ml     Objective:  General Appearance:  Comfortable. Vital signs: (most recent): Blood pressure 127/74, pulse 63, temperature 98.1 °F (36.7 °C), temperature source Oral, resp. rate 16, height 5' 8\" (1.727 m), weight 148 lb 9.6 oz (67.4 kg), SpO2 94 %. Vital signs are normal.  No fever.     HEENT: Normal HEENT exam.  (Nasal O2 noted )    Lungs:  Normal effort and normal respiratory rate. Breath sounds clear to auscultation. There are rales and wheezes. (Overall good air exchange  With faint expiratory wheeze and few rales right base and decreased in lower right base)  Heart: Normal rate. Regular rhythm. S1 normal and S2 normal.  No murmur. Abdomen: Abdomen is soft and non-distended. Bowel sounds are normal.   There is no abdominal tenderness. There is no epigastric area tenderness. Extremities: Normal range of motion. There is no dependent edema. Neurological: Patient is alert and oriented to person, place and time. Patient has normal reflexes. Skin:  Warm and dry. Labs/Imaging/Diagnostics    Labs:  CBC:No results for input(s): WBC, RBC, HGB, HCT, MCV, RDW, PLT in the last 72 hours. CHEMISTRIES:  Recent Labs     03/17/22  1001      K 4.6   CL 98   CO2 25   BUN 44*   CREATININE 1.7*   GLUCOSE 127*     PT/INR:No results for input(s): PROTIME, INR in the last 72 hours. APTT:No results for input(s): APTT in the last 72 hours. LIVER PROFILE:No results for input(s): AST, ALT, BILIDIR, BILITOT, ALKPHOS in the last 72 hours. Imaging Last 24 Hours:  No results found. Assessment//Plan           Hospital Problems           Last Modified POA    * (Principal) Pneumonia 3/7/2022 Yes    Dyspnea and respiratory abnormalities 3/9/2022 Yes        Assessment:    Condition: In stable condition. Improving.    (Acute respiratory failure with hypoxia and was above 6 liters and now a 4 liters and even to 3 so okay home as will increase one liter with activity    Amiodarone toxicity and interstitial lung disease      copdd with exacerbation better      pneumonia bilateral and better         And mor right sided       acute diastolic chf and now better      cardiomyopathy ischemic by hx and better      par atrial fib stable and occurred with the hypoxia'     pacermaker stable      htn stable      gerd stable anxiety and low dose xanax ). Plan:   Discharge home. Encourage ambulation. Continue respiratory treatments ( home with oxygen and O2). Consults: pulmonology and cardiology. Advance diet as tolerated. Administer medications as ordered. ( Now home  With home O2 and steroids for one month   use of duonebs as not able to afford the [place]  No more smoking      atrial fib stable      doing better as weaned oxygen down     on steroids one month     home on antibiotic     see in one week     review of the chart and the medications and discharge summary and need of home health and home O2 and nebulizer as spent 45 minutes on discharge  ).        Electronically signed by Esthela Conti MD on 3/18/22 at 5:01 AM EDT

## 2022-03-18 NOTE — TELEPHONE ENCOUNTER
D/T recent hospital admission, will keep November appointment but please move add additional appointment in June.

## 2022-03-21 NOTE — TELEPHONE ENCOUNTER
Danni López, [address] ([phone]) called said that pt sounds \"really junky\", wheezes throughout, kind of short of breath. Not enough to go back to the hospital.  Vital sounds stable. She is wondering if you could order a BMP and CBC.

## 2022-03-21 NOTE — TELEPHONE ENCOUNTER
Olivia Coy called asking if we had samples of Anoro because she cannot afford it. Asked her if she checked with Dr Elmer Sykes as well and she got upset and hung up.

## 2022-03-21 NOTE — TELEPHONE ENCOUNTER
Mian Pal with [facility] called and Pt was D/C home after having pneumonia and she saw him yesterday. Pt is needed refill on his Anoro. Looks like PCP gave in the past.  He sees you on 4/20. Needs sent to [facility] on [address], [address].   Thank you

## 2022-03-21 NOTE — TELEPHONE ENCOUNTER
Pt. Called stated that Anoro is to expensive $300/month asking if there is anything cheaper. Please advise, I can send message to Anthony Lam to help with costs as well.

## 2022-03-21 NOTE — TELEPHONE ENCOUNTER
Per verbal order from Dr Luna Pritchard: needs to take Duoneb every 4 hours around the clock, already on PDN, lab to drawn CBC and BMP. [place] with [address] informed. She stated they will draw the labs, and pull them out of Epic. He requested the refill of Anoro. She stated they know it is expensive and spouse will look into the  Discount.      Date of last visit:  12/14/2021  Date of next visit:  3/22/2022    Requested Prescriptions     Signed Prescriptions Disp Refills    umeclidinium-vilanterol (ANORO ELLIPTA) 62.5-25 MCG/INH AEPB inhaler 1 each 5     Sig: Inhale 1 puff into the lungs daily     Authorizing Provider: Kerrie Mullins     Ordering User: RAVINDRA, [address]

## 2022-03-22 NOTE — PROGRESS NOTES
Post-Discharge Transitional Care Follow Up      Mervin Rodriguez   YOB: 1950    Date of Office Visit:  3/22/2022  Date of Hospital Admission: 3/7/22  Date of Hospital Discharge: 3/17/22  Readmission Risk Score (high >=14%. Medium >=10%):Readmission Risk Score: 13.4 ( )      Care management risk score Rising risk (score 2-5) and Complex Care (Scores >=6): 4     Non face to face  following discharge, date last encounter closed (first attempt may have been earlier): 3/21/2022 10:03 AM     Call initiated 2 business days of discharge: Yes        IMPRESION       Diagnosis Orders   1. CHF (congestive heart failure), NYHA class II, chronic, systolic (HCC)  Basic Metabolic Panel    CBC with Auto Differential   2. Paroxysmal atrial fibrillation (HCC)     3. Moderate COPD (chronic obstructive pulmonary disease) (HCC)  XR CHEST STANDARD (2 VW)   4. Coronary artery disease involving native coronary artery of native heart without angina pectoris     5. Primary hypertension     6. Pure hypercholesterolemia     7. Pacemaker     8. Pneumonia of both lower lobes due to infectious organism  Basic Metabolic Panel    CBC with Auto Differential    XR CHEST STANDARD (2 VW)         PLAN    Current Outpatient Medications   Medication Sig Dispense Refill    umeclidinium-vilanterol (ANORO ELLIPTA) 62.5-25 MCG/INH AEPB inhaler Inhale 1 puff into the lungs daily 1 each 5    albuterol sulfate  (90 Base) MCG/ACT inhaler INHALE 2 PUFFS INTO THE LUNGS EVERY 6 HOURS AS NEEDED FOR WHEEZING 54 g 3    furosemide (LASIX) 20 MG tablet Take 1 tablet by mouth daily 180 tablet 3    ALPRAZolam (XANAX) 0.5 MG tablet Take 1 tablet by mouth 2 times daily as needed for Sleep for up to 30 days.  60 tablet 0    rivaroxaban (XARELTO) 20 MG TABS tablet Take 1 tablet by mouth daily (with breakfast) 90 tablet 1    predniSONE (DELTASONE) 20 MG tablet Take 2 tablets by mouth daily for 28 days 56 tablet 0    atovaquone (MEPRON) 750 MG/5ML suspension Take 10 mLs by mouth daily 300 mL 0    traZODone (DESYREL) 50 MG tablet Take 1 tablet by mouth nightly 30 tablet 0    rOPINIRole (REQUIP) 0.5 MG tablet Take 1 tablet by mouth nightly 90 tablet 3    sacubitril-valsartan (ENTRESTO) 24-26 MG per tablet Take 1 tablet by mouth 2 times daily 60 tablet 1    pantoprazole (PROTONIX) 40 MG tablet Take 1 tablet by mouth every morning (before breakfast) 30 tablet 3    cetirizine (ZYRTEC) 10 MG tablet Take 10 mg by mouth daily      triamcinolone (KENALOG) 0.1 % ointment Apply topically Daily Apply topically once daily      atorvastatin (LIPITOR) 40 MG tablet Take 1 tablet by mouth nightly 90 tablet 3    metoprolol succinate (TOPROL XL) 100 MG extended release tablet Take 1 tablet by mouth daily 90 tablet 3    aspirin 81 MG chewable tablet Take 1 tablet by mouth daily 30 tablet 3    Multiple Vitamins-Minerals (CENTRUM SILVER PO) Take 1 tablet by mouth daily        No current facility-administered medications for this visit. Orders Placed This Encounter   Procedures    XR CHEST STANDARD (2 VW)     Standing Status:   Future     Standing Expiration Date:   3/22/2023     Order Specific Question:   Reason for exam:     Answer:   pneumonia    Basic Metabolic Panel     Standing Status:   Future     Standing Expiration Date:   3/22/2023    CBC with Auto Differential     Standing Status:   Future     Standing Expiration Date:   3/22/2023            better and  K+  Up as  Problems  With  Draw and  Feel lysis  Of  Blood     needs  cxr       o2  At  3  l and  Increase  One   Liter    With  Activity   Subjective:   HPI       Dyspnea   With  Activity    Overall  Stable              Cough  less and  Non  Productive  And   Better     Inpatient course: Discharge summary reviewed- see chart.     Interval history/Current status:   Stable         Patient Active Problem List   Diagnosis    Hyperlipidemia    Asthma    Smoker    Allergic rhinitis due to other allergen    Collagenous colitis    Lactose intolerance    HTN (hypertension)    COPD, mild (HCC)    Atrial fibrillation (HCC)    Atherosclerotic heart disease of native coronary artery with other forms of angina pectoris ([address]    S/P CABG x 3    Encounter for cardioversion procedure    S/P left atrial appendage ligation    Ischemic cardiomyopathy    Chronic bronchitis (HCC)    CHF (congestive heart failure), NYHA class II, chronic, systolic (HCC)    Tachycardia-bradycardia ([address]    [employer] dual pacemaker     Pneumonia    Dyspnea and respiratory abnormalities       Medications listed as ordered at the time of discharge from hospital  [unfilled]     Medications marked \"taking\" at this time  Outpatient Medications Marked as Taking for the 3/22/22 encounter (Office Visit) with Rocco Bello MD   Medication Sig Dispense Refill    umeclidinium-vilanterol (ANORO ELLIPTA) 62.5-25 MCG/INH AEPB inhaler Inhale 1 puff into the lungs daily 1 each 5    albuterol sulfate  (90 Base) MCG/ACT inhaler INHALE 2 PUFFS INTO THE LUNGS EVERY 6 HOURS AS NEEDED FOR WHEEZING 54 g 3    furosemide (LASIX) 20 MG tablet Take 1 tablet by mouth daily 180 tablet 3    ALPRAZolam (XANAX) 0.5 MG tablet Take 1 tablet by mouth 2 times daily as needed for Sleep for up to 30 days.  60 tablet 0    rivaroxaban (XARELTO) 20 MG TABS tablet Take 1 tablet by mouth daily (with breakfast) 90 tablet 1    predniSONE (DELTASONE) 20 MG tablet Take 2 tablets by mouth daily for 28 days 56 tablet 0    atovaquone (MEPRON) 750 MG/5ML suspension Take 10 mLs by mouth daily 300 mL 0    traZODone (DESYREL) 50 MG tablet Take 1 tablet by mouth nightly 30 tablet 0    rOPINIRole (REQUIP) 0.5 MG tablet Take 1 tablet by mouth nightly 90 tablet 3    sacubitril-valsartan (ENTRESTO) 24-26 MG per tablet Take 1 tablet by mouth 2 times daily 60 tablet 1    pantoprazole (PROTONIX) 40 MG tablet Take 1 tablet by mouth every morning (before breakfast) 30 tablet 3    cetirizine (ZYRTEC) 10 MG tablet Take 10 mg by mouth daily      triamcinolone (KENALOG) 0.1 % ointment Apply topically Daily Apply topically once daily      atorvastatin (LIPITOR) 40 MG tablet Take 1 tablet by mouth nightly 90 tablet 3    metoprolol succinate (TOPROL XL) 100 MG extended release tablet Take 1 tablet by mouth daily 90 tablet 3    aspirin 81 MG chewable tablet Take 1 tablet by mouth daily 30 tablet 3    Multiple Vitamins-Minerals (CENTRUM SILVER PO) Take 1 tablet by mouth daily           Medications patient taking as of now reconciled against medications ordered at time of hospital discharge: Yes    Review of Systems   Constitutional: Negative for fatigue and fever. HENT: Negative for congestion, ear pain, postnasal drip, sore throat and trouble swallowing. Eyes: Negative for pain. Respiratory: Positive for cough and shortness of breath. Negative for chest tightness. Overall better    Cardiovascular: Negative for chest pain, palpitations and leg swelling. Gastrointestinal: Negative for abdominal pain, blood in stool, constipation and nausea. Genitourinary: Negative for difficulty urinating, frequency and urgency. Musculoskeletal: Negative for arthralgias, back pain, joint swelling and neck stiffness. Skin: Negative for rash. Neurological: Negative for dizziness, weakness and headaches. Hematological: Negative for adenopathy. Does not bruise/bleed easily. Psychiatric/Behavioral: Negative for behavioral problems, dysphoric mood and sleep disturbance. Objective:    BP (!) 96/54 (Site: Right Upper Arm, Position: Sitting, Cuff Size: Medium Adult)   Pulse 64   Temp 96.4 °F (35.8 °C) (Infrared)   Resp 16   Ht 5' 8\" (1.727 m)   Wt 150 lb 6 oz (68.2 kg)   BMI 22.86 kg/m²   Physical Exam  Vitals and nursing note reviewed. Constitutional:       Appearance: He is well-developed. HENT:      Head: Normocephalic and atraumatic.       Right Ear: External ear normal.      Left Ear: External ear normal.      Nose: Nose normal.   Eyes:      Conjunctiva/sclera: Conjunctivae normal.      Pupils: Pupils are equal, round, and reactive to light. Comments: Fundi nl   Neck:      Thyroid: No thyromegaly. Cardiovascular:      Rate and Rhythm: Normal rate and regular rhythm. Heart sounds: Normal heart sounds. Pulmonary:      Effort: Pulmonary effort is normal.      Breath sounds: Wheezing present. No rales. Comments: insp and  Exp  Noted    Abdominal:      General: Bowel sounds are normal.      Palpations: Abdomen is soft. There is no mass. Tenderness: There is no abdominal tenderness. Musculoskeletal:         General: Normal range of motion. Cervical back: Normal range of motion and neck supple. Right lower leg: No edema. Left lower leg: No edema. Lymphadenopathy:      Cervical: No cervical adenopathy. Skin:     General: Skin is warm and dry. Findings: No rash. Neurological:      Mental Status: He is alert and oriented to person, place, and time. Cranial Nerves: No cranial nerve deficit. Deep Tendon Reflexes: Reflexes are normal and symmetric. An electronic signature was used to authenticate this note.   --Emory Driver MD

## 2022-03-23 NOTE — CARE COORDINATION
Dr. Farida Trinidad, I have enrolled [facility] into Care Coordination program to provide support and education to help manage chronic conditions, prevent exacerbations and reduce hospitalizations. Please approve Plan of Care using smart phrase . Ccplanofcare. Thank you!

## 2022-03-23 NOTE — TELEPHONE ENCOUNTER
Marcie Juarez and they are working with Pt and it appears that they have helped him get a lower cost.  She will contact me if there are any issues or I need to do anything else for this Pt.

## 2022-03-23 NOTE — TELEPHONE ENCOUNTER
Thank you for the reminder. I will just send a message to Roel Owens to check fluid status when he sees the patient.

## 2022-03-24 NOTE — TELEPHONE ENCOUNTER
----- Message from Ines Bedoya MD sent at 3/24/2022  8:55 AM EDT -----  Repeat  lab  ordered and the  potassium up as home health much trouble  drawing lab

## 2022-03-24 NOTE — TELEPHONE ENCOUNTER
Spouse informed by Phone. She stated the home health nurse had a lot of difficulty drawing the blood. She did not think she got enough blood to do the BMP.

## 2022-03-25 PROBLEM — J96.21 ACUTE AND CHRONIC RESPIRATORY FAILURE WITH HYPOXIA (HCC): Status: ACTIVE | Noted: 2022-01-01

## 2022-03-25 NOTE — ED NOTES
Lab at bedside for draw. Pt continues restful on cart- RR easy and non labored.       Vladislav Dale, RN  03/25/22 [id number]

## 2022-03-25 NOTE — ED PROVIDER NOTES
[address] ENCOUNTER      PATIENT NAME: Frannie Chu  MRN: [id number]  : 1950  ANDERSON: 3/25/2022  PROVIDER: Jesika Gonzales MD      CHIEF COMPLAINT       Chief Complaint   Patient presents with    Shortness of Breath       Patient is seen and evaluated in a timely fashion. Nurses Notes are reviewed and I agree except as noted in the HPI. HISTORY OF PRESENT ILLNESS    Frannie Chu is a 70 y.o. male who presents to Emergency Department with Shortness of Breath     History of COPD on home O2 3L/min NC, CAD with pror CABG in , prior heavy smoker and recent right lung pneumonia discharge from [facility] 3/17/2022 due to CAP with oral Atovaquone. He could not breathe, he became tachycardic since this morning. His life had to increase his oxygen use to 5 L/min NC. He reports no fever or chills. No chest pain. Shortness of breath is worse on exertion. No nausea or vomiting. No abdominal pain. This HPI was provided by patient and wife.      REVIEW OF SYSTEMS   Ten-point review of systems is negative except those documented in above HPI including constitutional, HEENT, respiratory, cardiovascular, gastrointestinal, genitourinary, musculoskeletal, skin, neurological, hematological and behavioral.      PAST MEDICAL HISTORY     Past Medical History:   Diagnosis Date    Asthma     Atrial fibrillation with RVR ([address] 2021    [employer] dual pacemaker  04/15/2021    Collagenous colitis     per scope     Colon polyps     dr Mamadou Morales    COPD, mild ([address]     Eczema of hand     HTN (hypertension)     Hyperlipidemia     Smoker        SURGICAL HISTORY       Past Surgical History:   Procedure Laterality Date    CARDIAC SURGERY      COLONOSCOPY  06/10/2021    theresa ccolon polyps and collagenous colitis    CORONARY ARTERY BYPASS GRAFT  2021    cabg x 3 with lima and atrial appendage clip  dr Loraine Finn N/A 01/12/2021    CABG  X 3 WITH DEJAH, Atrial Appendage Clip performed by Dc Felipe MD at [facility] [address]  06/2008    polyps    OTHER SURGICAL HISTORY  DEC 7TH 2012 DR [address]NAN [address] [place]     IGS ENDOSCOPIC BI LAT MAXILLARY, ETHMOID, SPHENOID, FRONTAL SINUSOTOMY WITH SEPTOPLASTY AND TURBINOPLASTIES    SKIN CANCER EXCISION  09/2014    Left side of Forehead     TOOTH EXTRACTION  02/2017       CURRENT MEDICATIONS       Previous Medications    ALBUTEROL SULFATE  (90 BASE) MCG/ACT INHALER    INHALE 2 PUFFS INTO THE LUNGS EVERY 6 HOURS AS NEEDED FOR WHEEZING    ALPRAZOLAM (XANAX) 0.5 MG TABLET    Take 1 tablet by mouth 2 times daily as needed for Sleep for up to 30 days.     ASPIRIN 81 MG CHEWABLE TABLET    Take 1 tablet by mouth daily    ATORVASTATIN (LIPITOR) 40 MG TABLET    Take 1 tablet by mouth nightly    ATOVAQUONE (MEPRON) 750 MG/5ML SUSPENSION    Take 10 mLs by mouth daily    CETIRIZINE (ZYRTEC) 10 MG TABLET    Take 10 mg by mouth daily    FUROSEMIDE (LASIX) 20 MG TABLET    Take 1 tablet by mouth daily    METOPROLOL SUCCINATE (TOPROL XL) 100 MG EXTENDED RELEASE TABLET    Take 1 tablet by mouth daily    MULTIPLE VITAMINS-MINERALS (CENTRUM SILVER PO)    Take 1 tablet by mouth daily     PANTOPRAZOLE (PROTONIX) 40 MG TABLET    Take 1 tablet by mouth every morning (before breakfast)    PREDNISONE (DELTASONE) 20 MG TABLET    Take 2 tablets by mouth daily for 28 days    RIVAROXABAN (XARELTO) 20 MG TABS TABLET    Take 1 tablet by mouth daily (with breakfast)    ROPINIROLE (REQUIP) 0.5 MG TABLET    Take 1 tablet by mouth nightly    SACUBITRIL-VALSARTAN (ENTRESTO) 24-26 MG PER TABLET    Take 1 tablet by mouth 2 times daily    TRAZODONE (DESYREL) 50 MG TABLET    Take 1 tablet by mouth nightly    TRIAMCINOLONE (KENALOG) 0.1 % OINTMENT    Apply topically Daily Apply topically once daily    UMECLIDINIUM-VILANTEROL (ANORO ELLIPTA) 62.5-25 MCG/INH AEPB INHALER    Inhale 1 puff into the lungs daily       ALLERGIES     Penicillins and Sulfa antibiotics    FAMILY HISTORY     He indicated that his mother is . He indicated that his father is . He indicated that his sister is alive. He indicated that his brother is alive. family history includes Diabetes in his brother; Heart Disease in his brother, father, and mother. SOCIAL HISTORY      reports that he quit smoking about 2 weeks ago. His smoking use included cigarettes. He has a 40.00 pack-year smoking history. He has never used smokeless tobacco. He reports current alcohol use. He reports that he does not use drugs. PHYSICAL EXAM      height is 5' 8\" (1.727 m) and weight is 150 lb (68 kg). His oral temperature is 98.1 °F (36.7 °C). His blood pressure is 92/50 (abnormal) and his pulse is 114. His respiration is 20 and oxygen saturation is 98%. Physical Exam  Vitals and nursing note reviewed. Constitutional:       Appearance: He is well-developed. He is not diaphoretic. HENT:      Head: Normocephalic and atraumatic. Nose: Nose normal.   Eyes:      General: No scleral icterus. Right eye: No discharge. Left eye: No discharge. Conjunctiva/sclera: Conjunctivae normal.      Pupils: Pupils are equal, round, and reactive to light. Neck:      Vascular: No JVD. Trachea: No tracheal deviation. Cardiovascular:      Rate and Rhythm: Tachycardia present. Rhythm irregular. Heart sounds: Normal heart sounds. No murmur heard. No friction rub. No gallop. Pulmonary:      Effort: Pulmonary effort is normal. No respiratory distress. Breath sounds: No stridor. Examination of the right-middle field reveals decreased breath sounds, rhonchi and rales. Examination of the left-middle field reveals rhonchi. Examination of the right-lower field reveals decreased breath sounds, wheezing, rhonchi and rales. Examination of the left-lower field reveals wheezing and rhonchi.  Decreased breath sounds, wheezing, rhonchi and rales present. Chest:      Chest wall: No tenderness. Abdominal:      General: Bowel sounds are normal. There is no distension. Palpations: Abdomen is soft. There is no mass. Tenderness: There is no abdominal tenderness. There is no guarding or rebound. Hernia: No hernia is present. Musculoskeletal:         General: No tenderness or deformity. Cervical back: Normal range of motion and neck supple. Lymphadenopathy:      Cervical: No cervical adenopathy. Skin:     General: Skin is warm and dry. Capillary Refill: Capillary refill takes less than 2 seconds. Coloration: Skin is not pale. Findings: No erythema or rash. Neurological:      Mental Status: He is alert and oriented to person, place, and time. Cranial Nerves: No cranial nerve deficit. Sensory: No sensory deficit. Motor: No abnormal muscle tone. Coordination: Coordination normal.      Deep Tendon Reflexes: Reflexes normal.   Psychiatric:         Behavior: Behavior normal.         Thought Content: Thought content normal.         Judgment: Judgment normal.       ANCILLARY TEST RESULTS   EKG:    Interpreted by me  Sinus tachycardia, first-degree block, ventricular rate 138 bpm, AK interval 1 to 56 ms, QRS duration 136 ms,  ms, no ST elevation or acute T wave    LAB RESULTS:  Results for orders placed or performed during the hospital encounter of 03/25/22   Rapid influenza A/B antigens    Specimen: Nasopharyngeal   Result Value Ref Range    Flu A Antigen Negative NEGATIVE    Flu B Antigen Negative NEGATIVE   COVID-19, Rapid   Result Value Ref Range    SARS-CoV-2, NAAT NOT  DETECTED NOT DETECTED   CBC with Auto Differential   Result Value Ref Range    WBC 32.1 (HH) 4.8 - 10.8 thou/mm3    RBC 3.62 (L) 4.70 - 6.10 mill/mm3    Hemoglobin 12.2 (L) 14.0 - 18.0 gm/dl    Hematocrit 35.9 (L) 42.0 - 52.0 %    MCV 99.2 (H) 80.0 - 94.0 fL    MCH 33.7 (H) 26.0 - 33.0 pg    MCHC 34.0 32.2 - 35.5 gm/dl    RDW-CV 13.5 11.5 - 14.5 %    RDW-SD 48.4 (H) 35.0 - 45.0 fL    Platelets 619 449 - 760 thou/mm3    MPV 10.0 9.4 - 12.4 fL    Differential Type see below     Platelet Estimate ADEQUATE Adequate   Comprehensive Metabolic Panel   Result Value Ref Range    Glucose 80 70 - 108 mg/dL    CREATININE 1.9 (H) 0.4 - 1.2 mg/dL    BUN 51 (H) 7 - 22 mg/dL    Sodium 132 (L) 135 - 145 meq/L    Potassium 4.8 3.5 - 5.2 meq/L    Chloride 95 (L) 98 - 111 meq/L    CO2 28 23 - 33 meq/L    Calcium 8.9 8.5 - 10.5 mg/dL    AST 23 5 - 40 U/L    Alkaline Phosphatase 65 38 - 126 U/L    Total Protein 6.5 6.1 - 8.0 g/dL    Albumin 3.1 (L) 3.5 - 5.1 g/dL    Total Bilirubin 0.7 0.3 - 1.2 mg/dL    ALT 24 11 - 66 U/L   Troponin   Result Value Ref Range    Troponin T 0.012 (A) ng/ml   Brain Natriuretic Peptide   Result Value Ref Range    Pro-BNP 5795.0 (H) 0.0 - 900.0 pg/mL   APTT   Result Value Ref Range    aPTT 25.3 22.0 - 38.0 seconds   Protime-INR   Result Value Ref Range    INR 1.41 (H) 0.85 - 1.13   Procalcitonin   Result Value Ref Range    Procalcitonin 0.21 (H) 0.01 - 0.09 ng/mL   Lactate, Sepsis   Result Value Ref Range    Lactic Acid, Sepsis 2.5 (H) 0.5 - 1.9 mmol/L   Blood Gas, Arterial   Result Value Ref Range    pH, Blood Gas 7.51 (H) 7.35 - 7.45    PCO2 32 (L) 35 - 45 mmhg    PO2 62 (L) 71 - 104 mmhg    HCO3 26 23 - 28 mmol/l    Base Excess (Calculated) 2.9 (H) -2.5 - 2.5 mmol/l    O2 Sat 94 %    IFIO2 3     DEVICE Cannula     Yuri Test N/A     Source: R Brach     COLLECTED BY: [id number]    Magnesium   Result Value Ref Range    Magnesium 1.8 1.6 - 2.4 mg/dL   TSH with Reflex   Result Value Ref Range    TSH 2.230 0.400 - 4.200 uIU/mL   Anion Gap   Result Value Ref Range    Anion Gap 9.0 8.0 - 16.0 meq/L   Glomerular Filtration Rate, Estimated   Result Value Ref Range    Est, Glom Filt Rate 35 (A) ml/min/1.73m2   Osmolality   Result Value Ref Range    Osmolality Calc 277.2 275.0 - 300.0 mOsmol/kg   Scan of Blood Smear Result Value Ref Range    SCAN OF BLOOD SMEAR see below    EKG Emergency   Result Value Ref Range    Ventricular Rate 138 BPM    Atrial Rate 138 BPM    P-R Interval 256 ms    QRS Duration 136 ms    Q-T Interval 392 ms    QTc Calculation (Bazett) 593 ms    R Axis 84 degrees    T Axis 45 degrees       RADIOLOGY REPORTS  XR CHEST PORTABLE   Final Result   1. No pneumothorax. 2. Large bilateral consolidations relating to multilobar pneumonia. Pulmonary edema could also have this appearance. **This report has been created using voice recognition software. It may contain minor errors which are inherent in voice recognition technology. **      Final report electronically signed by Dr Leslye Wolf on 3/25/2022 12:52 PM      CT CHEST WO CONTRAST   Final Result      1. Diffuse groundglass and nodular infiltrates throughout the lungs bilaterally. 2. Emphysematous changes of the lungs. **This report has been created using voice recognition software. It may contain minor errors which are inherent in voice recognition technology. **      Final report electronically signed by Dr Mark Fagan on 3/25/2022 11:23 AM          [facility] [address] ([facility])     Differential Diagnosis: Include but not limited to: Unstable angina, angina, anxiety, PE, atypical chest pain, aortic dissection, pneumonia, GERD, costochondritis, pleurisy, chest wall pain, dyspnea, CHF, COPD, bronchitis, biliary disease. Initial Plans:   Large bore IV  Gentle normal saline bolus (no 30 ml/kg NS bolus given concerns of CHF exacerbation)  Labs including blood cultures, procalcitonin, and the lactic acid  Empirical broad-spectrum antibiotics  ABG  Plan to admit    Summary:    BP trending down, normal saline bolus 30 ml/kg given.     Labs are reviewed, multiple abnormalities suggesting acute on chronic respiratory hypoxemic failure, JANETH on CKD, possible sepsis, right lower lobe pneumonia with consolidation in the bilateral multifocal infiltrates. Given that patient does not respond to fluid bolus with BP trending down still, Levophed drip is started and left subclavian CVC was inserted. Discussed with internal medicine and ICU, admitted to ICU. I will defer rate control to ICU given that calcium channel blocker and beta-blocker would further affect patient's blood pressure, and I am reluctant to start amiodarone drip for this patient given his extensive underlying lung fibrosis and COPD. ED Medications  Medications   0.9 % sodium chloride bolus (500 mLs IntraVENous New Bag 3/25/22 1018)   azithromycin (ZITHROMAX) 500 mg in D5W 250ml addavial (has no administration in time range)   ipratropium-albuterol (DUONEB) nebulizer solution 1 ampule (has no administration in time range)   methylPREDNISolone sodium (SOLU-MEDROL) injection 125 mg (has no administration in time range)   0.9 % sodium chloride bolus (has no administration in time range)   cefepime (MAXIPIME) 2000 mg IVPB minibag (2,000 mg IntraVENous New Bag 3/25/22 1056)       Vital signs in ED  Vitals:    03/25/22 1147 03/25/22 1207 03/25/22 1217 03/25/22 1225   BP: (!) 83/47  87/60 (!) 92/50   Pulse: 125  128 114   Resp: 18  18 20   Temp:       TempSrc:       SpO2: 96% 97% 97% 98%   Weight:       Height:           CRITICAL CARE   CRITICAL CARE: There was a high probability of clinically significant/life threatening deterioration in this patient's condition which required my urgent intervention. Total critical care time was 30 minutes. This excludes any time for separately reportable procedures. CONSULTS   Dr. Lilli Gu ICU. PROCEDURES   Central Line Placement Procedure Note    Indication: centrally administered medications    Consent: The patient provided verbal consent for this procedure. Procedure: The patient was positioned appropriately and the skin over the left subclavian vein was prepped with Chloraprep.  Local anesthesia was obtained by infiltration using 1% Lidocaine without epinephrine. A large bore needle was used to identify the vein. A guide wire was then inserted into the vein through the needle. A triple lumen catheter was then inserted into the vessel over the guide wire using the Seldinger technique. All ports showed good, free flowing blood return and were flushed with saline solution. The catheter was then securely fastened to the skin with sutures and covered with a sterile dressing. A post procedure X-ray was ordered and showed no evidence of pneumothorax. The patient tolerated the procedure well. Complications: None          FINAL IMPRESSION AND DISPOSITION      1. Septic shock ([address]    2. Acute on chronic respiratory failure with hypoxemia (HCC)    3. Pneumonia of both lungs due to infectious organism, unspecified part of lung    4. Acute kidney injury superimposed on CKD ([facility]    5. SIRS (systemic inflammatory response syndrome) (HCC)    6. Leukocytosis, unspecified type    7. Lactic acidosis    8. Troponin level elevated    9. Acute on chronic congestive heart failure, unspecified heart failure type ([facility]        Admitted    PATIENT REFERRED TO:  No follow-up provider specified.     DISCHARGE MEDICATIONS:  New Prescriptions    No medications on file       (Please note that portions of this note were completed with a voice recognition program.  Efforts were made to edit the dictations but occasionally words aremis-transcribed.)    MD Sergey Landis MD  03/25/22 3026       Sergey Ding MD  03/25/22 1725

## 2022-03-25 NOTE — ED TRIAGE NOTES
Pt to rm 22 per EMS for increased SOB and tachycardia this morning. Wife states that she noticed that his HR was in the 140s this morning and he seemed more SOB than normal so she bumped his home O2 from 3L to 5L. Pt was given a duoneb en route and on arrival appears more comfortable. Pt placed back on home dose O2 at [facility], instructed to concentrate on breathing. Pt currently appears restful- wife states he appears at his baseline to her. Recent dx pneumonia- on steroids and abx. EKG and assessment complete.

## 2022-03-25 NOTE — TELEPHONE ENCOUNTER
Lab orders faxed to [facility] to drawn on 4-4-2022. MOM to return call to office however I see patient is currently in the ER.

## 2022-03-25 NOTE — PROGRESS NOTES
Patient arrived to unit from ED via monitored cart. Patient transferred to ICU bed and placed on continuous ICU bedside monitor. Patient admitted for Lactic acidosis [E87.2]  SIRS (systemic inflammatory response syndrome) ([facility]) [R65.10]  Troponin level elevated [R77.8]  Atrial fibrillation with RVR ([facility]) [I48.91]  Acute and chronic respiratory failure with hypoxia ([facility]) [J96.21]  Septic shock ([facility]) [A41.9, R65.21]  Acute on chronic respiratory failure with hypoxemia ([facility]) [J96.21]  Pneumonia of both lungs due to infectious organism, unspecified part of lung [J18.9]  Acute kidney injury superimposed on CKD ([facility] [N17.9, N18.9]  Leukocytosis, unspecified type [D72.829]  Acute on chronic congestive heart failure, unspecified heart failure type ([facility] [I50.9]. Vitals obtained. See flowsheets. Patient's IV access includes see flowsheet. Current infusions and rates of infusion include see MAR. Assessment completed by CHRISTINE Aguirre. Two nurse skin assessment completed by Carla and Jim Cueva RN's. See flowsheets for assessment details. Policies and procedures of ICU able to be explained to patient at this time. Family member(s)/representative(s) present at time of admission include none. Patient rights explained to family member(s)/representatives and patient, as able. Patient/patient's family member(s)/representative(s) Declined to have physician notified of their admission. All questions posed by patient's family member(s)/representative(s) and patient answered at this time.

## 2022-03-25 NOTE — PLAN OF CARE
Problem: Discharge Planning:  Goal: Participates in care planning  Description: Participates in care planning  Outcome: Ongoing  Goal: Discharged to appropriate level of care  Description: Discharged to appropriate level of care  Outcome: Ongoing     Problem: Anxiety/Stress:  Goal: Level of anxiety will decrease  Description: Level of anxiety will decrease  Outcome: Ongoing     Problem: Cardiac Output - Decreased:  Goal: Hemodynamic stability will improve  Description: Hemodynamic stability will improve  Outcome: Ongoing     Problem: Fluid Volume - Imbalance:  Goal: Absence of imbalanced fluid volume signs and symptoms  Description: Absence of imbalanced fluid volume signs and symptoms  Outcome: Ongoing     Problem: Gas Exchange - Impaired:  Goal: Levels of oxygenation will improve  Description: Levels of oxygenation will improve  Outcome: Ongoing     Problem: Pain:  Goal: Pain level will decrease  Description: Pain level will decrease  Outcome: Ongoing  Goal: Recognizes and communicates pain  Description: Recognizes and communicates pain  Outcome: Ongoing  Goal: Control of acute pain  Description: Control of acute pain  Outcome: Ongoing  Goal: Control of chronic pain  Description: Control of chronic pain  Outcome: Ongoing     Problem: Sleep Pattern Disturbance:  Goal: Appears well-rested  Description: Appears well-rested  Outcome: Ongoing     Problem: Tissue Perfusion, Altered:  Goal: Circulatory function within specified parameters  Description: Circulatory function within specified parameters  Outcome: Ongoing     Problem: Tissue Perfusion - Cardiopulmonary, Altered:  Goal: Absence of angina  Description: Absence of angina  Outcome: Ongoing  Goal: Hemodynamic stability will improve  Description: Hemodynamic stability will improve  Outcome: Ongoing     Problem: Non-Violent Restraints  Goal: Removal from restraints as soon as assessed to be safe  Outcome: Ongoing  Goal: No harm/injury to patient while restraints in use  Outcome: Ongoing  Goal: Patient's dignity will be maintained  Outcome: Ongoing

## 2022-03-25 NOTE — TELEPHONE ENCOUNTER
Hakeem Flaherty called regarding this message, stated that BHARGAVI Huynh is in ER right now, they put in a [address] and he is going to ICU. Advised her to disregard this message because he will probably have these labs drawn while in the hospital.  She also is wanting BHARGAVI Huynh to be transferred to [place] or [place] since he is not getting any better.   Advised her to let the nurses know at the hospital.

## 2022-03-25 NOTE — ED NOTES
Dr Meredith Gamino at bedside for central line placement. Pt and family verbalized understanding and give verbal consent.       Merari Cruz RN  03/25/22 5926

## 2022-03-25 NOTE — ED NOTES
Pt and family updated on POC/admit status. Pt continues restful on cart, RR easy and non labored. Waiting CT results.      Corbin Schuster RN  03/25/22 1760

## 2022-03-25 NOTE — H&P
Patient:  Elma Leblanc    Unit/Bed:4D-14/014-A  MRN: [id number]   PCP: Izabel Denson MD  Date of Admission: 3/25/2022    Assessment and Plan(All pulmonary edema, renal failure, PE, and respiratory failure diagnoses are acute in nature unless otherwise specified): Shock: Initially consider a septic shock given previous history of CAP, leukocytosis, mildly elevated pro-Calin, received IV fluids, wide spectrum antibiotics, currently on pressor support. Switch antibiotics to Zosyn, consider shock is getting worse due to A. fib with RVR after controlling right we will reassess necessity of as a pressure support. Last lactic acid within goals. Tachyarrhythmia A. fib RVR: Previous medical history of A. fib, chronically on amiodarone, suspended due to pulmonary fibrosis, initial EKG with A. fib RVR, mildly hypotensive, order Cardizem dosage, rate control achieved, new EKG shows a flutter, pacemaker not pacing. Order pacemaker interrogation. Consult to cardiology  COPD exacerbation: Past medical history of heavy smoker, shortness of breath since 2 weeks ago, initially considered has community-acquired pneumonia, received antibiotics, persist with symptoms, probably exacerbated due to tachyarrhythmia. Community-acquired pneumonia partially treated: Diagnosed 20 days ago, patient persist with shortness of breath, no fever, no chills, no sputum. Chest x-ray with infiltrates CT head scan with no consolidation, leukocytosis of 35,000, lactic acid elevated ending down. We will continue antibiotics. Sick sinus syndrome: Requiring pacemaker placement, currently in A. fib with RVR, rate control with Cardizem, start Cardizem drip  Acute on chronic diastolic CHF exacerbation; physical examination with JVD, bilateral rales however chest x-ray with no pulmonary edema, currently on Lasix which we will continue.   Amiodarone reduce pulmonary toxicity: Per clinical record  Hypertension per clinical record      CC: Shock  HPI:   Patient admitted to ICU due to septic shock  Patient complaining of any days of shortness of breath, exertional, associated with fatigue, no chest pain, no palpitations, no fever no chills, patient refers had a diagnosis of community-acquired pneumonia 20 years ago, reason for coming is persistence of symptoms, in the ED was found with tachycardia, EKG with A. fib with RVR, leukocytosis, mildly increased procalcitonin, hyperlipidemia, patient with low blood pressures, consider initially has septic shock, antibiotics were started, due to necessity of vasopressor patient consulted to ICU. Review of Systems   Constitutional: Positive for appetite change and fatigue. Negative for chills, diaphoresis and fever. HENT: Negative. Eyes: Negative for photophobia and visual disturbance. Respiratory: Positive for shortness of breath. Negative for cough, choking, chest tightness, wheezing and stridor. Cardiovascular: Negative for chest pain and leg swelling. Gastrointestinal: Negative for abdominal pain, constipation, nausea and vomiting. Genitourinary: Negative. Musculoskeletal: Negative for arthralgias, back pain, gait problem, joint swelling, myalgias, neck pain and neck stiffness. Neurological: Negative for dizziness, tremors, seizures, syncope, facial asymmetry, speech difficulty, weakness, light-headedness, numbness and headaches. Hematological: Negative. Psychiatric/Behavioral: Negative. PMH:  Per HPI  Heavy smoker, hypertension, COPD, A. fib, CABG, left atrial appendage ligation, CHF preserved ejection fraction 50%, sick sinus syndrome/tachybradycardia syndrome, dual pacemaker R Fady Fuentes 1.   SHX:    CABG, dual pacemaker    Allergies:   Per Chart Review: Penicillins  Medications:     norepinephrine 5 mcg/min (03/25/22 1431)    dilTIAZem (CARDIZEM) 125 mg in dextrose 5% 125 mL infusion 2.5 mg/hr (03/25/22 1634)      furosemide  20 mg IntraVENous TID    tiotropium-olodaterol  2 puff Inhalation Daily    aspirin  81 mg Oral Daily    enoxaparin  1 mg/kg SubCUTAneous Q24H       Vital Signs:   T:98.1   P: 101  RR: 20   B/P: 74/59  FiO2: 30  O2 Sat:100  I/O: Intake  No data  GCS: 15    Body mass index is 23.57 kg/m². Benedetta Ou Physical Exam:    General: white male, acutely ill  HEENT:  normocephalic and atraumatic. No scleral icterus. PERR  Neck: supple. No Thyromegaly. JVD  Lungs: Rhonchi bibasilar, rales bilateral.  .  No retractions  Cardiac: RRR.  JVD. Abdomen: soft. Nontender. Extremities:  No clubbing, cyanosis, or edema x 4. Vasculature: capillary refill < 3 seconds. Palpable dorsalis pedis pulses. Skin:  warm and dry. Psych:  Alert and oriented x3. Affect appropriate  Lymph:  No supraclavicular adenopathy. Neurologic:  No focal deficit. No seizures. Data: (All radiographs, tracings, PFTs, and imaging are personally viewed and interpreted unless otherwise noted). 3/25/22 sodium 132, potassium 4.8, chloride 95, BUN 51, creatinine 1.9, magnesium 1.8, glucose 80  Lactic acid 1.6 pro-Calin 0.21  Albumin 3.1, ALT 24, AST 23  Troponin 0.0 12-----0.0 11  3/25/2022 WBC 32,000 hemoglobin 12 hematocrit 35 platelets 353  ABGs metabolic alkalosis  Telemetry monitor shows A. fib RVR. A flutter. Electronically signed by Hawk Paige MD.             Electronically signed by Shelly Romo MD on 3/25/2022 at [facility] PM                    Patient seen by me including key components of medical care. Case discussed with resident physician. Patient will not receive amiodarone due to pulmonary toxicity. Pressors as required. Rate control with cardizem. Italicized font, if present,  represents changes to the note made by me. CC time 35 minutes. Time was discontiguous. Time does not include procedure. Time does include my direct assessment of the patient and coordination of care.   Time represents more than 50% of the time involved with patient care by the [address] [place] [address] team.  Electronically signed by Bruno Ceron MD.

## 2022-03-26 PROBLEM — B37.0 THRUSH: Status: ACTIVE | Noted: 2022-01-01

## 2022-03-26 NOTE — PROGRESS NOTES
Patient:  Seng Sandhu    Unit/Bed:4D-14/014-A  MRN: [id number]   PCP: Josr Rodríguez MD  Date of Admission: 3/25/2022    Assessment and Plan(All pulmonary edema, renal failure, PE, and respiratory failure diagnoses are acute in nature unless otherwise specified):          1. Shock resolved: Initially consider a septic shock given previous history of CAP, leukocytosis, mildly elevated pro-Calin, received IV fluids, wide spectrum antibiotics, currently on pressor support. Switch antibiotics to Zosyn, consider shock is getting worse due to A. fib with RVR after controlling right we will reassess necessity of vasopressure support. Last lactic acid within goals. After rate slightly uncontrolled patient not requiring vasopressor support. 2. Tachyarrhythmia A. fib RVR controlled: Previous medical history of A. fib, chronically on amiodarone, suspended due to pulmonary fibrosis, initial EKG with A. fib RVR, mildly hypotensive, order Cardizem dosage, rate control achieved, new EKG shows a flutter, pacemaker not pacing. Order pacemaker interrogation. Normal sinus rhythm after Cardizem drip. 3. COPD exacerbation: Past medical history of heavy smoker, shortness of breath since 2 weeks ago, initially considered has community-acquired pneumonia, received antibiotics, persist with symptoms, probably exacerbated due to tachyarrhythmia. 4. Community-acquired pneumonia partially treated: Diagnosed 20 days ago, patient persist with shortness of breath, no fever, no chills, no sputum. Chest x-ray with infiltrates CT head scan with no consolidation, leukocytosis of 35,000, lactic acid elevated ending down. Currently on Zosyn 3/25/2022, follow-up cultures and acute inflammatory reactants. 5. Sick sinus syndrome: Requiring pacemaker placement, currently in A. fib with RVR, rate control with Cardizem drip  6.  Acute on chronic diastolic CHF exacerbation; physical examination with JVD, bilateral rales however chest x-ray with no pulmonary edema, currently on Lasix which we will continue. 7. Amiodarone reduce pulmonary toxicity: Per clinical record  8. Hypertension per clinical record  9. CC: Shock  HPI:   Patient admitted to ICU due to septic shock  Patient complaining of any days of shortness of breath, exertional, associated with fatigue, no chest pain, no palpitations, no fever no chills, patient refers had a diagnosis of community-acquired pneumonia 20 years ago, reason for coming is persistence of symptoms, in the ED was found with tachycardia, EKG with A. fib with RVR, leukocytosis, mildly increased procalcitonin, hyperlipidemia, patient with low blood pressures, consider initially has septic shock, antibiotics were started, due to necessity of vasopressor patient consulted to ICU. Last 12 hours assessment  Bedside assessment, patient denies shortness of breath, no chest pain, no palpitation, no headache, no nausea or vomiting. No respiratory poor, no ventilatory support, no sedation. Telemetry shows normal sinus rhythm. Laboratory work pending, currently no SIRS, placed EKG with sinus bradycardia, pacemaker spikes are present. Decision to transfer further management    Review of Systems   Constitutional: Negative for appetite change, chills, diaphoresis, fatigue and fever. HENT: Negative. Eyes: Negative for photophobia and visual disturbance. Respiratory: Negative for cough, choking, chest tightness, shortness of breath, wheezing and stridor. Cardiovascular: Negative for chest pain and leg swelling. Gastrointestinal: Negative for abdominal pain, constipation, nausea and vomiting. Genitourinary: Negative. Musculoskeletal: Negative for arthralgias, back pain, gait problem, joint swelling, myalgias, neck pain and neck stiffness. Neurological: Negative for dizziness, tremors, seizures, syncope, facial asymmetry, speech difficulty, weakness, light-headedness, numbness and headaches.    Hematological: Negative. Psychiatric/Behavioral: Negative. PMH:  Per HPI  Heavy smoker, hypertension, COPD, A. fib, CABG, left atrial appendage ligation, CHF preserved ejection fraction 50%, sick sinus syndrome/tachybradycardia syndrome, dual pacemaker Jose. SHX:    CABG, dual pacemaker    Allergies:   Per Chart Review: Penicillins  Medications:     norepinephrine Stopped (03/26/22 0323)    dilTIAZem (CARDIZEM) 125 mg in dextrose 5% 125 mL infusion Stopped (03/26/22 0220)      furosemide  20 mg IntraVENous TID    tiotropium-olodaterol  2 puff Inhalation Daily    aspirin  81 mg Oral Daily    enoxaparin  1 mg/kg SubCUTAneous Q24H       Vital Signs:   T:98.3   P: 63  RR: 16   B/P: 143/66  FiO2: 30  O2 Sat:100  I/O: 427cc / 1550 cc T-1550 cc   GCS: 15    Body mass index is 23.57 kg/m². [facility] Physical Exam:    General: white male, adequate appearance  HEENT:  normocephalic and atraumatic. No scleral icterus. PERR  Neck: supple. No Thyromegaly. Lungs: Rhonchi bibasilar, rales bilateral.  .  No retractions  Cardiac: RRR. No JVD  Abdomen: soft. Nontender. Extremities:  No clubbing, cyanosis, or edema x 4. Vasculature: capillary refill < 3 seconds. Palpable dorsalis pedis pulses. Skin:  warm and dry. Psych:  Alert and oriented x3. Affect appropriate  Lymph:  No supraclavicular adenopathy. Neurologic:  No focal deficit. No seizures. Data: (All radiographs, tracings, PFTs, and imaging are personally viewed and interpreted unless otherwise noted).     3/25/22 sodium 132, potassium 4.8, chloride 95, BUN 51, creatinine 1.9, magnesium 1.8, glucose 80   Lactic acid 1.6 pro-Calin 0.21   Albumin 3.1, ALT 24, AST 23   Troponin 0.0 12-----0.0 11   3/25/2022 WBC 32,000 hemoglobin 12 hematocrit 35 platelets 549   Telemetry monitor shows sinus rhythm    Electronically signed by Pau Barreto MD.             Electronically signed by Kaylynn Sousa MD on 3/26/2022 at 8:51 AM          I have independently performed an evaluation on Ariel Cornell . I have reviewed the above documentation completed by the APPRN, Carine Servin. Italicized font, if present, represents changes to the note made by me. Time spent with patient 25minutes. Time could have been discontiguous. Time does not include procedures. Time does include my direct assessment of the patient and coordination of care.   Time represents more than 50% of the time involved with patient care by the [address] [place] [address] team.           Electronically signed by Deloris Alcazar MD on 3/26/2022 at 11:38 AM

## 2022-03-26 NOTE — FLOWSHEET NOTE
03/26/22 1002   Encounter Summary   Services provided to: Patient and family together   Referral/Consult From: Rounding   Continue Visiting Yes  (3/26)   Complexity of Encounter Moderate   Length of Encounter 15 minutes   Routine   Type Initial   Assessment Approachable   Intervention Nurtured hope   Assessment: In my encounter with the 70 yr old patient the pts family was supportively present. While rounding the unit 4D/ICU. I provided spiritual care to patient and their family through conversation, I also came to assess their spiritual needs present. The pt was admitted due to acute and chronic respiratory failure. Interventions:  I provided, prayer, emotional support and words of comfort.  provided a listening presence and encouraged pt to share their beliefs and how they support him during their hospitalization. Outcomes: The patient was encouraged and didnt share any further spiritual needs at this time. The pt remains optimistic and hopeful. The pt shared that they were appreciative for the support. Plan:  Chaplains will follow-up at a later time for assessment of any spiritual care needs present. The pt will be transferred to .

## 2022-03-26 NOTE — PROGRESS NOTES
0937- extensive bedside conversation took place between pt, myself, Dr. Shakir Carver, patient's brother, and wife. All questions answered. Concerns addressed about patient discharge. 1000- CVC order to remove obtained by Dr. Shakir Carver. L subclavian CVC removed with issue. Patient tolerated well. Unable to remove from [facility] charting as this has already been removed in chart by other staff member. 1053- pt transferred to room 13 from  with transport staff in stable condition, vss. Wife is at bedside and aware of transfer. Voiced understanding of the plan for the patient. She agreed that patient is no longer a candidate for ICU level care.

## 2022-03-26 NOTE — PROGRESS NOTES
Patient: Danya Carrillo  Unit/Bed: 9N-20/520-D  YOB: 1950  MRN: [id number] Acct: [de-identified]   Admitting Diagnosis: Lactic acidosis [E87.2]  SIRS (systemic inflammatory response syndrome) (HCC) [R65.10]  Troponin level elevated [R77.8]  Atrial fibrillation with RVR ([address] [I48.91]  Acute and chronic respiratory failure with hypoxia ([address] [J96.21]  Septic shock ([address] [A41.9, R65.21]  Acute on chronic respiratory failure with hypoxemia (HCC) [J96.21]  Pneumonia of both lungs due to infectious organism, unspecified part of lung [J18.9]  Acute kidney injury superimposed on CKD ([address] [N17.9, N18.9]  Leukocytosis, unspecified type [D72.829]  Acute on chronic congestive heart failure, unspecified heart failure type ([address] [I50.9]  Admit Date:  3/25/2022  Hospital Day: 1    Assessment:     Principal Problem:    Acute and chronic respiratory failure with hypoxia ([address]  Resolved Problems:    * No resolved hospital problems. *      Plan:     I will consult cardiology for his recent history of a fib with RVR and acute on chronic CHF. I will consult pulmonary for his history of COPD and pneumonia  Use nystatin for the thrush        Subjective:     Patient has complaints of non productive cough. He states he generally does not have CP and his symptom with a fib is more SOB. He denies any GI upset. He states some anterior nasal bleeding from the nasal canula . Julian Ra    Medication side effects: none    Scheduled Meds:   piperacillin-tazobactam  3,375 mg IntraVENous Q8H    enoxaparin  1 mg/kg SubCUTAneous Q12H    metoprolol succinate  100 mg Oral Daily    [START ON 3/27/2022] pantoprazole  40 mg Oral QAM AC    traZODone  50 mg Oral Nightly    rOPINIRole  0.5 mg Oral Nightly    atorvastatin  40 mg Oral Nightly    furosemide  20 mg IntraVENous TID    tiotropium-olodaterol  2 puff Inhalation Daily    aspirin  81 mg Oral Daily     Continuous Infusions:  PRN Meds:acetaminophen, ondansetron    Review of Systems  Pertinent items are noted in HPI. Objective:     Patient Vitals for the past 8 hrs:   BP Temp Temp src Pulse Resp SpO2   03/26/22 1352 (!) 129/59 98.1 °F (36.7 °C) Oral 79 16 90 %   03/26/22 1104 (!) 149/63 97.5 °F (36.4 °C) Oral 74 22 94 %   03/26/22 1050 -- -- -- -- -- 94 %   03/26/22 1049 -- -- -- -- -- 100 %   03/26/22 1048 -- -- -- -- -- 94 %   03/26/22 1047 -- -- -- -- -- 98 %   03/26/22 1045 (!) 134/58 -- -- 73 17 --   03/26/22 1030 121/60 -- -- 76 21 --   03/26/22 1015 (!) 145/69 -- -- 79 18 --   03/26/22 1000 -- -- -- 88 24 --   03/26/22 0945 (!) 132/59 -- -- 77 19 --   03/26/22 0930 (!) 143/69 -- -- 82 16 --   03/26/22 0915 138/69 -- -- 68 19 100 %   03/26/22 0900 137/76 -- -- 66 16 99 %   03/26/22 0845 136/71 -- -- 64 17 100 %   03/26/22 0830 (!) 143/66 -- -- 63 16 100 %   03/26/22 0815 131/67 -- -- 62 16 100 %   03/26/22 0800 129/61 97.9 °F (36.6 °C) Oral 66 16 99 %   03/26/22 0745 (!) 145/70 -- -- 62 15 100 %   03/26/22 0730 139/68 -- -- 60 13 100 %     I/O last 3 completed shifts: In: 443.5 [P.O.:100; I.V.:93.5; IV Piggyback:250]  Out: 1800 [Urine:1800]  I/O this shift:   In: 12 [P.O.:450]  Out: 750 [Urine:750]    BP (!) 129/59   Pulse 79   Temp 98.1 °F (36.7 °C) (Oral)   Resp 16   Ht 5' 8\" (1.727 m)   Wt 154 lb 15.7 oz (70.3 kg)   SpO2 90%   BMI 23.57 kg/m²     General appearance: alert, appears stated age and cooperative  Head: Normocephalic, without obvious abnormality, atraumatic  Throat: abnormal findings: thrush  Lungs: clear to auscultation bilaterally  Chest wall: no tenderness  Heart: regular rate and rhythm, S1, S2 normal, no murmur, click, rub or gallop  Abdomen: soft, non-tender; bowel sounds normal; no masses,  no organomegaly  Extremities: extremities normal, atraumatic, no cyanosis or edema  Skin: Skin color, texture, turgor normal. No rashes or lesions  Neurologic: weak    ECG:   Not on tele yet      Electronically signed by Storm Councilman, MD on 3/26/2022 at 3:27 PM

## 2022-03-27 NOTE — PROGRESS NOTES
Pharmacy Renal Adjustment    Sarahy Machado is a 70 y.o. male. Pharmacy renally adjust the following medications per P&T approved policy: Xarelto    Height:   Ht Readings from Last 1 Encounters:   03/25/22 5' 8\" (1.727 m)     Weight:  Wt Readings from Last 1 Encounters:   03/25/22 154 lb 15.7 oz (70.3 kg)     Recent Labs     03/26/22  0930 03/27/22  0544   BUN 47* 52*   CREATININE 1.7* 1.8*     Estimated Creatinine Clearance: 36 mL/min (A) (based on SCr of 1.8 mg/dL (H)).     Plan:   Decrease rivaroxaban from 20 mg daily to 15 mg daily for atrial fibrillation    Maricarmen Neville PharmD, BCPS  3/27/2022  11:38 AM

## 2022-03-27 NOTE — CONSULTS
[facility]    Patient - Hal Leal   MRN -  [id number]   [facility] # - [de-identified]   - 1950      Date of Admission -  3/25/2022  9:04 AM  Date of evaluation -  3/27/2022  Room - -13013-A   Hospital Day - 2  Consulting - Fernandez Helms MD Primary Care Physician - Fernandez Helms MD   Chief Complaint     Active Hospital Problem List      Active Hospital Problems    Diagnosis Date Noted    Chronic bronchitis [facility]) [J42]      Priority: High    Atherosclerotic heart disease of native coronary artery with other forms of angina pectoris ([facility] [address] [I25.118] 2021     Priority: High    Thrush [B37.0] 2022    Acute and chronic respiratory failure with hypoxia (HCC) [J96.21] 2022    Pneumonia [J18.9] 2022    Atrial fibrillation ([facility] [address] [I48.91] 2021    HTN (hypertension) [I10]      HPI   Hal Leal is a 70 y.o. male  With past medical history of Asthma/COPD, Afib, CAD s/p CABG, s/p pacemaker, admitted to ICU due to septic shock secondary to RLL HCAP, and acute hypoxic respiratory failure due to ILD. Patient complaining of any days of shortness of breath, exertional, associated with fatigue, no chest pain, no palpitations, no fever no chills, patient refers had a diagnosis of community-acquired pneumonia 20 years ago, reason for coming is persistence of symptoms, in the ED was found with tachycardia, EKG with A. fib with RVR, leukocytosis, mildly increased procalcitonin, hyperlipidemia, patient with low blood pressures, consider initially has septic shock, antibiotics were started, due to necessity of vasopressor. CXR and CT chest showed diffuse interstitial infiltrates in background of COPD. New RLL consolidation.    He was discharged from the hospital 5 days ago on Home O2 2 lpm.   He quit smoking 1 months ago  Past Medical History         Diagnosis Date    Asthma     Atrial fibrillation with RVR ([address] 2021   [facility] [employer] dual pacemaker  04/15/2021    Collagenous colitis     per scope     Colon polyps     dr Tosin Isabel    COPD, mild ([address]     Eczema of hand     HTN (hypertension)     Hyperlipidemia     Smoker       Past Surgical History           Procedure Laterality Date    CARDIAC SURGERY      COLONOSCOPY  06/10/2021    theresa ccolon polyps and collagenous colitis    CORONARY ARTERY BYPASS GRAFT  2021    cabg x 3 with lima and atrial appendage clip  dr Maryann Gandara GRAFT N/A 2021    CABG  X 3 WITH DEJAH, Atrial Appendage Clip performed by Thanh Webster MD at [address] [place] [address] Road  2008    polyps    OTHER SURGICAL HISTORY  DEC 7TH 2012 DR GET [place]     IGS ENDOSCOPIC BI LAT MAXILLARY, ETHMOID, SPHENOID, FRONTAL SINUSOTOMY WITH SEPTOPLASTY AND TURBINOPLASTIES    SKIN CANCER EXCISION  2014    Left side of Forehead     TOOTH EXTRACTION  2017     Diet    ADULT DIET; Regular; Low Sodium (2 gm)  Allergies    Penicillins and Sulfa antibiotics  Social History     Social History     Socioeconomic History    Marital status:      Spouse name: Not on file    Number of children: Not on file    Years of education: Not on file    Highest education level: Not on file   Occupational History    Not on file   Tobacco Use    Smoking status: Former Smoker     Packs/day: 1.00     Years: 40.00     Pack years: 40.00     Types: Cigarettes     Quit date: 3/7/2022     Years since quittin.0    Smokeless tobacco: Never Used   Substance and Sexual Activity    Alcohol use:  Yes     Alcohol/week: 0.0 standard drinks     Comment: very little    Drug use: No    Sexual activity: Not on file   Other Topics Concern    Not on file   Social History Narrative    No barriers with medication affordability now that he has met deductible    Active with [facility]    Has O2 and supplies needed    No barriers with transportation     Social Determinants of Health     Financial Resource Strain: Low Risk     Difficulty of Paying Living Expenses: Not hard at all   Food Insecurity: No Food Insecurity    Worried About Running Out of Food in the Last Year: Never true    Danielle of Food in the Last Year: Never true   Transportation Needs: No Transportation Needs    Lack of Transportation (Medical): No    Lack of Transportation (Non-Medical): No   Physical Activity: Inactive    Days of Exercise per Week: 0 days    Minutes of Exercise per Session: 0 min   Stress:     Feeling of Stress : Not on file   Social Connections:     Frequency of Communication with Friends and Family: Not on file    Frequency of Social Gatherings with Friends and Family: Not on file    Attends Congregational Services: Not on file    Active Member of Clubs or Organizations: Not on file    Attends Club or Organization Meetings: Not on file    Marital Status: Not on file   Intimate Partner Violence:     Fear of Current or Ex-Partner: Not on file    Emotionally Abused: Not on file    Physically Abused: Not on file    Sexually Abused: Not on file   Housing Stability: Unknown    Unable to Pay for Housing in the Last Year: No    Number of [place] in the Last Year: Not on file    Unstable Housing in the Last Year: No     Family History          Problem Relation Age of Onset    Heart Disease Mother     Heart Disease Father         cabg    Diabetes Brother     Heart Disease Brother      Sleep History    No history   ROS    General/Constitutional: No recent loss of weight or appetite changes. No fever or chills. HENT: Negative. Eyes: Negative. Upper respiratory tract: No nasal stuffiness or post nasal drip. Lower respiratory tract/ lungs: SOB and cough   Cardiovascular: No palpitations, chest pain or edema. Gastrointestinal: No nausea or vomiting. Neurological: No focal neurological weakness. Extremities: No tenderness.   Musculoskeletal: no complaints  Genitourinary: No complaints. Hematological: Negative. Denies easy buising  Skin: No itching. Meds    Current Medications    rivaroxaban  15 mg Oral Daily    piperacillin-tazobactam  3,375 mg IntraVENous Q8H    metoprolol succinate  100 mg Oral Daily    pantoprazole  40 mg Oral QAM AC    traZODone  50 mg Oral Nightly    rOPINIRole  0.5 mg Oral Nightly    atorvastatin  40 mg Oral Nightly    nystatin  5 mL Oral 4x Daily    tiotropium-olodaterol  2 puff Inhalation Daily    aspirin  81 mg Oral Daily     acetaminophen, ondansetron  IV Drips/Infusions    Vitals    Vitals    height is 5' 8\" (1.727 m) and weight is 154 lb 15.7 oz (70.3 kg). His oral temperature is 98.1 °F (36.7 °C). His blood pressure is 90/65 and his pulse is 88. His respiration is 18 and oxygen saturation is 94%. O2 Flow Rate (L/min): 3 L/min  I/O    Intake/Output Summary (Last 24 hours) at 3/27/2022 1147  Last data filed at 3/27/2022 1113  Gross per 24 hour   Intake 490 ml   Output 3525 ml   Net -3035 ml     Patient Vitals for the past 96 hrs (Last 3 readings):   Weight   03/25/22 1415 154 lb 15.7 oz (70.3 kg)   03/25/22 0905 150 lb (68 kg)     Exam   Constitutional: Patient appears moderately built and moderately nourished. Head: Normocephalic and atraumatic. Mouth/Throat: Oropharynx is clear and moist.  No oral thrush. Eyes: Conjunctivae are normal. Pupils are equal, round, and reactive to light. No scleral icterus. Neck: Neck supple. No JVD or tracheal deviation present. Cardiovascular: Regular rate, regular rhythm, S1 and S2 with no murmur. No peripheral edema  Pulmonary/Chest: Diminished breath sounds bilateral, RLL crackles   Abdominal: Soft. Bowel sounds audible. No distension or tenderness to palp  Musculoskeletal: Moves all extremities  Lymphadenopathy:  No cervical adenopathy. Neurological: Patient is alert and oriented to person, place, and time. Skin: Skin is warm and dry.   Labs   ABG  Lab Results   Component Value Date    PH 7.51 03/25/2022    PO2 62 03/25/2022    PCO2 32 03/25/2022    HCO3 26 03/25/2022    O2SAT 94 03/25/2022     Lab Results   Component Value Date    IFIO2 3 03/25/2022    MODE SIMV 01/12/2021    SETTIDVOL 550 01/12/2021    SETPEEP 5.0 01/12/2021     CBC  Recent Labs     03/25/22  1002 03/26/22  0930 03/26/22  1536   WBC 32.1* 19.2* 23.3*   RBC 3.62* 3.01* 3.19*   HGB 12.2* 9.8* 10.6*   HCT 35.9* 30.2* 32.1*   MCV 99.2* 100.3* 100.6*   MCH 33.7* 32.6 33.2*   MCHC 34.0 32.5 33.0    202 207   MPV 10.0 10.5 10.6      BMP  Recent Labs     03/25/22  1002 03/26/22  0930 03/27/22  0544   * 132* 135   K 4.8 4.3 4.2   CL 95* 95* 96*   CO2 28 28 29   BUN 51* 47* 52*   CREATININE 1.9* 1.7* 1.8*   GLUCOSE 80 144* 90   MG 1.8 1.9  --    CALCIUM 8.9 8.2* 8.2*     LFT  Recent Labs     03/25/22  1002 03/26/22  0930   AST 23 18   ALT 24 17   BILITOT 0.7 0.7   ALKPHOS 65 57     TROP  Lab Results   Component Value Date    TROPONINT < 0.010 03/26/2022    TROPONINT 0.011 03/25/2022    TROPONINT 0.012 03/25/2022     BNP  Lab Results   Component Value Date    PROBNP 5795.0 03/25/2022    PROBNP 715.3 03/15/2022    PROBNP 3131.0 03/09/2022     D-Dimer  No results found for: DDIMER  Lactic Acid  No results for input(s): LACTA in the last 72 hours. INR  Recent Labs     03/25/22  1002   INR 1.41*     PTT  Recent Labs     03/25/22  1002   APTT 25.3     Glucose  No results for input(s): POCGLU in the last 72 hours. UA No results for input(s): SPECGRAV, PHUR, COLORU, CLARITYU, MUCUS, PROTEINU, BLOODU, RBCUA, WBCUA, BACTERIA, NITRU, GLUCOSEU, BILIRUBINUR, UROBILINOGEN, KETUA, LABCAST, LABCASTTY, AMORPHOS in the last 72 hours. Invalid input(s): CRYSTALS. PFTs   No results   Echo     Summary   Left ventricle size is normal.   Normal left ventricular wall thickness. There were no regional wall motion abnormalities. Ejection fraction is visually estimated in the range of 55% to 60%.    Abnormal (paradoxical) motion consistent with post-operative status. IVC size is within normal limits with normal respiratory phasic changes. Cultures    Procalcitonin  Lab Results   Component Value Date    PROCAL 0.21 03/25/2022    PROCAL 0.08 03/07/2022     Radiology    CXR              CT Scans    03/25/2022          03/10/2022      Assessment   Acute on chronic hypoxic respiratory failure  Acute Interstitial pneumonia Amiodarone Toxicity vs RBILD vs OP since 03/7/2022 (No evidence ILD back in 2021)  Superimposed RLL HCAP  COPD  Quit smoking 1 month ago  CAD s/p CABG   Afib   Recommendations     Continue O2 by nasal canula to keep saturation above 92%  Antibiotics for HCAP  Patent will need long course of steroids 0.5 mg/kg with slow taper over 3-6 months for Amiodarone vs OP   Will consider bronchoscopy with BAL if worsened symptoms     Thank you for the consult and allowing us to participate in the care of your patient. Case discussed with nurse and patient/family. Questions and concerns addressed. Meds and Orders reviewed.     Electronically signed by     Taiwo Carpenter MD on 3/27/2022 at 11:47 AM

## 2022-03-27 NOTE — CONSULTS
[address] [place] [address]                                  CONSULTATION    PATIENT NAME: Hardeep Quiroz                       :        1950  MED REC NO:   [id number]                           ROOM:       0013  ACCOUNT NO:   [de-identified]                           ADMIT DATE: 2022  PROVIDER:     ZACKERY Martinez [place]:  2022    REQUESTING PROVIDER:  Edwin Crawford MD    REASON FOR CONSULTATION:  Shortness of breath and atrial fibrillation. HISTORY OF PRESENT ILLNESS:  This is a pleasant 66-year-old gentleman  who is a patient of Dr. Jaelyn Tenorio, with history of coronary artery disease  and bypass surgery a year half ago as well as history of paroxysmal  atrial fibrillation and pacemaker. Comes in with worsening shortness of  breath, shortness of breath on exertion that came more at rest, has also  underlying significant COPD. Workup so far revealing possible bilateral  pneumonia with pretty much bilateral infiltrates, went to AFib with  rapid response and we were consulted to assist in the management. The  patient has chronic shortness of breath, which gotten worse lately. Denies any active chest pain. He does have a pacemaker that has been  followed periodically. The patient has been receiving diuresis. REVIEW OF SYSTEMS:  No obvious fever or chills. No hematuria or  dysuria. No abdominal pain, nausea, vomiting, or diarrhea. No obvious  active psych problems or suicidal ideation. No skin rashes. No  dizziness, lightheadedness, or loss of conscious. Chronic shortness of  breath as above. No bleeding problem. No HEENT-related problems. PAST MEDICAL HISTORY:  1. Coronary artery disease, bypass surgery. 2.  Hypertension. 3.  AFib. 4.  Hyperlipidemia. 5.  Pacemaker. ALLERGIES:  PENICILLIN AND SULFA.     CURRENT MEDICATIONS:  Included aspirin 81 a day, Lipitor 40 a day,  Lovenox 70 twice a day, metoprolol 100 a day, and antibiotics in  addition to Lasix 20 three times a day. SOCIAL HISTORY:  History of smoking. No alcohol or drug abuse reported. FAMILY HISTORY:  Significant for coronary artery disease. PHYSICAL EXAMINATION:  VITAL SIGNS:  Physical exam showed a blood pressure of 100/60 and heart  rate of 510. GENERAL APPEARANCE:  Pleasant gentleman in no obvious acute distress,  seems to be somehow short of breath. EYES AND EARS:  No discharge. NECK:  Moderate JVD. LUNGS:  Decreased air entry. Respiratory wheezes. HEART:  Normal S1 and S2. Systolic murmur grade 2/6. ABDOMEN:  Soft and nontender. Positive bowel sounds. No organomegaly. EXTREMITIES:  No significant edema. NEUROLOGIC:  Grossly intact. Awake and alert. No focal deficits. PSYCH:  No evidence of active psychosis. SKIN:  No rashes. LABORATORY DATA:  Labs showed sodium of 135, potassium 4.2, BUN 52, and  1.8. White count 23.3, hemoglobin 10.6, hematocrit 32.1, and platelets  802. EKG shows intermittent AFib. IMPRESSION:  This is a gentleman who comes in with most likely pulmonary  rather than cardiac related shortness of breath. His echocardiogram  lately shows a normal ejection fraction. His BUN and creatinine  indicate some degree of hypovolemia. The patient's CT of the chest  indicates most likely infectious etiology. I would be very worried  about COVID-19 in this gentleman. His white count supports the most  likely infectious etiology. AFib is most likely acting up because of  his underlying illness. PLAN:  My recommendation as follows,  1. Discontinue IV diuretics and do a gentle hydration. 2.  Continue antibiotics. 3.  Repeat COVID-19 test.  4.  Continue Toprol for rate control. 5.  Further management will follow accordingly. My team will be  following tomorrow. I will be off service starting tomorrow morning. Thank you for allowing me to participate in his care.         Pranav Kerns Michael Alvarado M.D.    D: 03/27/2022 9:34:10       T: 03/27/2022 9:36:30     [email or web address]  Job#: [id number]     Doc#: [id number]    CC:

## 2022-03-27 NOTE — PROGRESS NOTES
Patient: [facility]  Unit/Bed: 9W-63/013-W  YOB: 1950  MRN: [id number] Acct: [de-identified]   Admitting Diagnosis: Lactic acidosis [E87.2]  SIRS (systemic inflammatory response syndrome) (HCC) [R65.10]  Troponin level elevated [R77.8]  Atrial fibrillation with RVR ([address] [I48.91]  Acute and chronic respiratory failure with hypoxia ([address] [J96.21]  Septic shock ([address] [A41.9, R65.21]  Acute on chronic respiratory failure with hypoxemia (HCC) [J96.21]  Pneumonia of both lungs due to infectious organism, unspecified part of lung [J18.9]  Acute kidney injury superimposed on CKD ([address] [N17.9, N18.9]  Leukocytosis, unspecified type [D72.829]  Acute on chronic congestive heart failure, unspecified heart failure type ([address] [I50.9]  Admit Date:  3/25/2022  Hospital Day: 2    Assessment:     Principal Problem:    Acute and chronic respiratory failure with hypoxia ([address]  Active Problems:    Atherosclerotic heart disease of native coronary artery with other forms of angina pectoris (HCC)    Chronic bronchitis (HCC)    HTN (hypertension)    Atrial fibrillation ([address]    Pneumonia    Thrush  Resolved Problems:    * No resolved hospital problems. *      Plan:     Cardiology following  Pulmonology to see  Nose bleed stopped  Change lovenox back to the xarelto        Subjective:     Patient has complaints of weakness and some SOB. .   Medication side effects: none    Scheduled Meds:   piperacillin-tazobactam  3,375 mg IntraVENous Q8H    enoxaparin  1 mg/kg SubCUTAneous Q12H    metoprolol succinate  100 mg Oral Daily    pantoprazole  40 mg Oral QAM AC    traZODone  50 mg Oral Nightly    rOPINIRole  0.5 mg Oral Nightly    atorvastatin  40 mg Oral Nightly    nystatin  5 mL Oral 4x Daily    tiotropium-olodaterol  2 puff Inhalation Daily    aspirin  81 mg Oral Daily     Continuous Infusions:  PRN Meds:acetaminophen, ondansetron    Review of Systems  Pertinent items are noted in HPI.     Objective:     Patient Vitals for the past 8 hrs:   BP Temp Temp src Pulse Resp SpO2   03/27/22 1100 90/65 98.1 °F (36.7 °C) Oral 88 18 94 %   03/27/22 0815 95/65 97.4 °F (36.3 °C) Oral 92 20 94 %   03/27/22 0315 (!) 102/59 97.9 °F (36.6 °C) Oral 94 16 98 %     I/O last 3 completed shifts: In: 1383.5 [P.O.:1040;  I.V.:93.5; IV Piggyback:250]  Out: 2338 [Urine:4550]  I/O this shift:  In: -   Out: 550 [Urine:550]    BP 90/65   Pulse 88   Temp 98.1 °F (36.7 °C) (Oral)   Resp 18   Ht 5' 8\" (1.727 m)   Wt 154 lb 15.7 oz (70.3 kg)   SpO2 94%   BMI 23.57 kg/m²     General appearance: alert, appears stated age and cooperative  Head: Normocephalic, without obvious abnormality, atraumatic  Lungs: clear to auscultation bilaterally  Chest wall: no tenderness  Heart: irregularly irregular rhythm  Abdomen: soft, non-tender; bowel sounds normal; no masses,  no organomegaly  Extremities: extremities normal, atraumatic, no cyanosis or edema  Skin: Skin color, texture, turgor normal. No rashes or lesions  Neurologic: weak    Electronically signed by Yue Gonzales MD on 3/27/2022 at 11:11 AM

## 2022-03-27 NOTE — PLAN OF CARE
Problem: Discharge Planning:  Goal: Participates in care planning  Description: Participates in care planning  Outcome: Ongoing  Note: No discharge needs voiced from patient at this time. Patient expected to be discharged home with wife. Problem: Cardiac Output - Decreased:  Goal: Hemodynamic stability will improve  Description: Hemodynamic stability will improve  Outcome: Ongoing  Note: Blood pressures in 90s, lasix discontinued     Problem: Gas Exchange - Impaired:  Goal: Levels of oxygenation will improve  Description: Levels of oxygenation will improve  Outcome: Ongoing  Note: Pt on 3 liters oxygen per nasal cannula and sats in low 90s. Pt gets short of breath with exertion     Problem: Pain:  Goal: Pain level will decrease  Description: Pain level will decrease  Outcome: Met This Shift  Note: Pt denies pain this shift     Problem: Falls - Risk of:  Goal: Will remain free from falls  Description: Will remain free from falls  Outcome: Met This Shift  Note: No falls this shift. Safety interventions maintained. Care plan reviewed with patient. Patient  verbalize understanding of the plan of care and contribute to goal setting.

## 2022-03-28 NOTE — PROGRESS NOTES
Cardiology Progress Note      Patient:  Denis Velasquez  YOB: 1950  MRN: [id number]   Acct: [de-identified]  Admit Date:  3/25/2022  Primary Cardiologist: Dr. Angel Vanessa  Seen by Dr. Arlen Joel    Per prior cardiology consult note-  REASON FOR CONSULTATION:  Shortness of breath and atrial fibrillation.     HISTORY OF PRESENT ILLNESS:  This is a pleasant 70-year-old gentleman  who is a patient of Dr. Angel Vanessa, with history of coronary artery disease  and bypass surgery a year half ago as well as history of paroxysmal  atrial fibrillation and pacemaker. Comes in with worsening shortness of  breath, shortness of breath on exertion that came more at rest, has also  underlying significant COPD. Workup so far revealing possible bilateral  pneumonia with pretty much bilateral infiltrates, went to AFib with  rapid response and we were consulted to assist in the management. The  patient has chronic shortness of breath, which gotten worse lately. Denies any active chest pain. He does have a pacemaker that has been  followed periodically. The patient has been receiving diuresis.       Subjective (Events in last 24 hours):     Pt in bed   No chest pain   Still SOB but improved per pt     Tele Paced   BP stable     Treated for PNA/ ILD    Objective:   /68   Pulse 66   Temp 98.2 °F (36.8 °C) (Oral)   Resp 18   Ht 5' 8\" (1.727 m)   Wt 154 lb 15.7 oz (70.3 kg)   SpO2 95%   BMI 23.57 kg/m²        TELEMETRY: paced    Physical Exam:  General Appearance: alert and oriented to person, place and time, in no acute distress  Cardiovascular: normal rate, regular rhythm, normal S1 and S2, no murmurs, rubs, clicks, or gallops, distal pulses intact,   Pulmonary/Chest: clear to auscultation bilaterally- no wheezes, rales or rhonchi, normal air movement, no respiratory distress  Abdomen: soft, non-tender, non-distended, normal bowel sounds, no masses Extremities: no cyanosis, clubbing or edema, pulses present    Skin: warm and dry, no rash or erythema  Musculoskeletal: normal range of motion, no joint swelling, deformity or tenderness  Neurological: alert, oriented, normal speech, no focal findings or movement disorder noted    Medications:    rivaroxaban  15 mg Oral Daily    piperacillin-tazobactam  3,375 mg IntraVENous Q8H    metoprolol succinate  100 mg Oral Daily    pantoprazole  40 mg Oral QAM AC    traZODone  50 mg Oral Nightly    rOPINIRole  0.5 mg Oral Nightly    atorvastatin  40 mg Oral Nightly    nystatin  5 mL Oral 4x Daily    tiotropium-olodaterol  2 puff Inhalation Daily    aspirin  81 mg Oral Daily       acetaminophen, 500 mg, Q4H PRN  ondansetron, 4 mg, Q8H PRN        Diagnostics:    Echo:    Electronically signed by Hannah Colon MD (Interpreting   physician) on 03/09/2022 at 04:56 PM   ----------------------------------------------------------------      Findings      Mitral Valve   Structurally normal mitral valve. Aortic Valve   Structurally normal aortic valve. Tricuspid Valve   Tricuspid valve is structurally normal.      Pulmonic Valve   Pulmonic valve is structurally normal.      Left Atrium   Normal size left atrium. Left Ventricle   Left ventricle size is normal.   Normal left ventricular wall thickness. There were no regional wall motion abnormalities. Ejection fraction is visually estimated in the range of 55% to 60%. Abnormal (paradoxical) motion consistent with post-operative status. Right Atrium   The right atrium is of normal size. Right Ventricle   Normal right ventricular size and function. Pericardial Effusion   No evidence of any pericardial effusion. Pleural Effusion   No evidence of pleural effusion. Aorta / Great Vessels   -Aortic root dimension within normal limits.   -The Pulmonary artery is within normal limits. -IVC size is within normal limits with normal respiratory phasic changes.              Lab Data:    Cardiac Enzymes:  No results for input(s): CKTOTAL, CKMB, CKMBINDEX, TROPONINI in the last 72 hours.     CBC:   Lab Results   Component Value Date    WBC 23.3 03/26/2022    RBC 3.19 03/26/2022    RBC 4.84 11/07/2011    HGB 10.6 03/26/2022    HCT 32.1 03/26/2022     03/26/2022       CMP:    Lab Results   Component Value Date     03/27/2022    K 4.2 03/27/2022    K 4.3 03/08/2022    CL 96 03/27/2022    CO2 29 03/27/2022    BUN 52 03/27/2022    CREATININE 1.8 03/27/2022    LABGLOM 37 03/27/2022    GLUCOSE 90 03/27/2022    GLUCOSE 94 11/07/2011    CALCIUM 8.2 03/27/2022       Hepatic Function Panel:    Lab Results   Component Value Date    ALKPHOS 57 03/26/2022    ALT 17 03/26/2022    AST 18 03/26/2022    PROT 5.9 03/26/2022    BILITOT 0.7 03/26/2022    BILIDIR <0.2 03/26/2022    LABALBU 2.8 03/26/2022    LABALBU 4.3 11/07/2011       Magnesium:    Lab Results   Component Value Date    MG 1.9 03/26/2022       PT/INR:    Lab Results   Component Value Date    INR 1.41 03/25/2022       HgBA1c:    Lab Results   Component Value Date    LABA1C 5.6 01/11/2021       FLP:    Lab Results   Component Value Date    TRIG 96 01/07/2021    HDL 36 01/07/2021    LDLCALC 71 01/07/2021    LDLDIRECT 79.82 01/11/2021       TSH:    Lab Results   Component Value Date    TSH 2.230 03/25/2022         Assessment:    Acute on chronic SOB   PNA  ILD    AFB RVR - currently SR / paced    If we keep having RVR then ask EP to see for AAD recs instead of amio     PAFB 2021- amiodarone (5/2021) stop 3/7/2022by Pulm for possible toxicity   - on xarelto     s/p CABG x 3 (LIMA-LAD, SVG-RCA, SVG-OM) 1/12/2021  ZAHRA clip  ICM- EF 35-40%  s/p tachy-ericka syndrome D-PM    Hx CVA      Plan:  · Continue NOAC   · Pt to follow with cardio / EP   · Cardiology signing off - call for questions or concerns        CHF guidelines:  BB: yes  ACE / ARB/ entresto: off entresto - dehydration  Diuretics:off at present DT dehydration   Aldactone: never on      Electronically signed by Catalina Fay Mirella Reed - CNP on 3/28/2022 at 1:35 PM

## 2022-03-28 NOTE — PROGRESS NOTES
[facility]    Patient - Constance Lozano   MRN -  [id number]   [facility] # - [de-identified]   - 1950      Date of Admission -  3/25/2022  9:04 AM  Date of evaluation -  3/28/2022  Room - -13/013-A   Hospital Day - [address] MD [place] Primary Care Physician - Sylvia Rosas MD   Chief Complaint   SOB  Active Hospital Problem List      Active Hospital Problems    Diagnosis Date Noted    Chronic bronchitis [facility]) [J42]      Priority: High    Atherosclerotic heart disease of native coronary artery with other forms of angina pectoris ([facility] [address] [I25.118] 2021     Priority: High    Thrush [B37.0] 2022    Acute and chronic respiratory failure with hypoxia (HCC) [J96.21] 2022    Pneumonia [J18.9] 2022    Atrial fibrillation ([facility] [address] [I48.91] 2021    HTN (hypertension) [I10]      HPI   Constance Lozano is a 70 y.o. male  With past medical history of Asthma/COPD, Afib, CAD s/p CABG, s/p pacemaker, admitted to ICU due to septic shock secondary to RLL HCAP, and acute hypoxic respiratory failure due to ILD. Patient complaining of any days of shortness of breath, exertional, associated with fatigue, no chest pain, no palpitations, no fever no chills, patient refers had a diagnosis of community-acquired pneumonia 20 years ago, reason for coming is persistence of symptoms, in the ED was found with tachycardia, EKG with A. fib with RVR, leukocytosis, mildly increased procalcitonin, hyperlipidemia, patient with low blood pressures, consider initially has septic shock, antibiotics were started, due to necessity of vasopressor. CXR and CT chest showed diffuse interstitial infiltrates in background of COPD. New RLL consolidation.    He was discharged from the hospital 5 days ago on Home O2 2 lpm.   He quit smoking 1 months ago    Past 24 Hours   -Currently on O2 3LPM- 95%  -Afebrile   -WBC 23.3 (19.2)  -No pulmonary events overnight    -All systems reviewed. Past Medical History         Diagnosis Date    Asthma     Atrial fibrillation with RVR ([facility] [address] 2021    [employer] dual pacemaker  04/15/2021    Collagenous colitis     per scope     Colon polyps     dr Michelle Puga    COPD, mild ([address]     Eczema of hand     HTN (hypertension)     Hyperlipidemia     Smoker       Past Surgical History           Procedure Laterality Date    CARDIAC SURGERY      COLONOSCOPY  06/10/2021    theresa ccolon polyps and collagenous colitis    CORONARY ARTERY BYPASS GRAFT  2021    cabg x 3 with lima and atrial appendage clip  dr Aric Wills GRAFT N/A 2021    CABG  X 3 WITH DEJAH, Atrial Appendage Clip performed by Willam Cosby MD at [address] [place] [address] Road  2008    polyps    OTHER SURGICAL HISTORY  DEC 7TH 2012 DR VANAN [address] [facility]     IGS ENDOSCOPIC BI LAT MAXILLARY, ETHMOID, SPHENOID, FRONTAL SINUSOTOMY WITH SEPTOPLASTY AND TURBINOPLASTIES    SKIN CANCER EXCISION  2014    Left side of Forehead     TOOTH EXTRACTION  2017     Diet    ADULT DIET; Regular; Low Sodium (2 gm)  Allergies    Penicillins and Sulfa antibiotics  Social History     Social History     Socioeconomic History    Marital status:      Spouse name: Not on file    Number of children: Not on file    Years of education: Not on file    Highest education level: Not on file   Occupational History    Not on file   Tobacco Use    Smoking status: Former Smoker     Packs/day: 1.00     Years: 40.00     Pack years: 40.00     Types: Cigarettes     Quit date: 3/7/2022     Years since quittin.0    Smokeless tobacco: Never Used   Substance and Sexual Activity    Alcohol use:  Yes     Alcohol/week: 0.0 standard drinks     Comment: very little    Drug use: No    Sexual activity: Not on file   Other Topics Concern    Not on file   Social History Narrative    No barriers with medication affordability now that he has met deductible    Active with [facility]    Has O2 and supplies needed    No barriers with transportation     Social Determinants of Health     Financial Resource Strain: Low Risk     Difficulty of Paying Living Expenses: Not hard at all   Food Insecurity: No Food Insecurity    Worried About Running Out of Food in the Last Year: Never true    Danielle of Food in the Last Year: Never true   Transportation Needs: No Transportation Needs    Lack of Transportation (Medical): No    Lack of Transportation (Non-Medical):  No   Physical Activity: Inactive    Days of Exercise per Week: 0 days    Minutes of Exercise per Session: 0 min   Stress:     Feeling of Stress : Not on file   Social Connections:     Frequency of Communication with Friends and Family: Not on file    Frequency of Social Gatherings with Friends and Family: Not on file    Attends Restorationism Services: Not on file    Active Member of Clubs or Organizations: Not on file    Attends Club or Organization Meetings: Not on file    Marital Status: Not on file   Intimate Partner Violence:     Fear of Current or Ex-Partner: Not on file    Emotionally Abused: Not on file    Physically Abused: Not on file    Sexually Abused: Not on file   Housing Stability: Unknown    Unable to Pay for Housing in the Last Year: No    Number of Places Lived in the Last Year: Not on file    Unstable Housing in the Last Year: No     Family History          Problem Relation Age of Onset    Heart Disease Mother     Heart Disease Father         cabg    Diabetes Brother     Heart Disease Brother      Sleep History    No history     Meds    Current Medications    predniSONE  40 mg Oral Daily    atovaquone  1,500 mg Oral Daily    rivaroxaban  15 mg Oral Daily    piperacillin-tazobactam  3,375 mg IntraVENous Q8H    metoprolol succinate  100 mg Oral Daily    pantoprazole  40 mg Oral QAM AC    traZODone  50 mg Oral Nightly    rOPINIRole  0.5 mg Oral Nightly    atorvastatin  40 mg Oral Nightly    nystatin  5 mL Oral 4x Daily    tiotropium-olodaterol  2 puff Inhalation Daily    aspirin  81 mg Oral Daily     acetaminophen, ondansetron  IV Drips/Infusions    Vitals    Vitals    height is 5' 8\" (1.727 m) and weight is 154 lb 15.7 oz (70.3 kg). His oral temperature is 98.2 °F (36.8 °C). His blood pressure is 132/68 and his pulse is 66. His respiration is 18 and oxygen saturation is 95%. O2 Flow Rate (L/min): 3 L/min  I/O    Intake/Output Summary (Last 24 hours) at 3/28/2022 1428  Last data filed at 3/28/2022 1336  Gross per 24 hour   Intake 1772 ml   Output 1285 ml   Net 487 ml     Patient Vitals for the past 96 hrs (Last 3 readings):   Weight   03/25/22 1415 154 lb 15.7 oz (70.3 kg)   03/25/22 0905 150 lb (68 kg)     Exam   Constitutional: Patient appears moderately built and moderately nourished. Currently stable on O2 3LPM  Mouth/Throat: Oropharynx is clear and moist.  No oral thrush. Eyes: Conjunctivae are normal. Pupils are equal, round, and reactive to light. No scleral icterus. Neck: Neck supple. No JVD or tracheal deviation present. Cardiovascular: Paced Afib, S1 and S2 with no murmur. No peripheral edema  Pulmonary/Chest: Diminished breath sounds bilateral, RLL crackles   Abdominal: Soft. Bowel sounds audible. No distension or tenderness to palp  Musculoskeletal: Moves all extremities; no cyanosis or clubbing   Lymphadenopathy:  No cervical adenopathy. Neurological: Patient is alert and oriented to person, place, and time. Skin: Skin is warm and dry.   Labs   ABG  Lab Results   Component Value Date    PH 7.51 03/25/2022    PO2 62 03/25/2022    PCO2 32 03/25/2022    HCO3 26 03/25/2022    O2SAT 94 03/25/2022     Lab Results   Component Value Date    IFIO2 3 03/25/2022    MODE SIMV 01/12/2021    SETTIDVOL 550 01/12/2021    SETPEEP 5.0 01/12/2021     CBC  Recent Labs     03/26/22  0930 03/26/22  1536   WBC 19.2* 23.3*   RBC 3.01* 3.19*   HGB 9.8* 10.6* HCT 30.2* 32.1*   .3* 100.6*   MCH 32.6 33.2*   MCHC 32.5 33.0    207   MPV 10.5 10.6      BMP  Recent Labs     03/26/22  0930 03/27/22  0544   * 135   K 4.3 4.2   CL 95* 96*   CO2 28 29   BUN 47* 52*   CREATININE 1.7* 1.8*   GLUCOSE 144* 90   MG 1.9  --    CALCIUM 8.2* 8.2*     LFT  Recent Labs     03/26/22  0930   AST 18   ALT 17   BILITOT 0.7   ALKPHOS 57     TROP  Lab Results   Component Value Date    TROPONINT < 0.010 03/26/2022    TROPONINT 0.011 03/25/2022    TROPONINT 0.012 03/25/2022     BNP  Lab Results   Component Value Date    PROBNP 5795.0 03/25/2022    PROBNP 715.3 03/15/2022    PROBNP 3131.0 03/09/2022     D-Dimer  No results found for: DDIMER  Lactic Acid  No results for input(s): LACTA in the last 72 hours. INR  No results for input(s): INR, PROTIME in the last 72 hours. PTT  No results for input(s): APTT in the last 72 hours. Glucose  No results for input(s): POCGLU in the last 72 hours. UA No results for input(s): SPECGRAV, PHUR, COLORU, CLARITYU, MUCUS, PROTEINU, BLOODU, RBCUA, WBCUA, BACTERIA, NITRU, GLUCOSEU, BILIRUBINUR, UROBILINOGEN, KETUA, LABCAST, LABCASTTY, AMORPHOS in the last 72 hours. Invalid input(s): CRYSTALS. PFTs   No results   Echo    3/7/22  ECHO   Summary   Left ventricle size is normal.   Normal left ventricular wall thickness. There were no regional wall motion abnormalities. Ejection fraction is visually estimated in the range of 55% to 60%. Abnormal (paradoxical) motion consistent with post-operative status. IVC size is within normal limits with normal respiratory phasic changes.   Cultures    Procalcitonin  Lab Results   Component Value Date    PROCAL 0.21 03/25/2022    PROCAL 0.08 03/07/2022     Radiology    CXR  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 since 03/7/2022 (No evidence ILD back in 2021)  Superimposed RLL HCAP  COPD  Quit smoking 1 month ago  CAD s/p CABG   Afib   Full Code   Recommendations   Continue O2 by nasal canula to keep saturation above 92%  Antibiotics for HCAP  Patent will need long course of steroids 0.5 mg/kg with slow taper over 3-6 months for Amiodarone vs OP-   Will start on Prednisone 40 mg PO daily was DC home on this last admission   Start on atovaquone 1.5 gm Daily for PCP prophylaxis  Will consider bronchoscopy with BAL if worsened symptoms   Stiolto 2.5-2.5mcg 2 puff daily   VTE prophylaxis: Xarelto   Possible ECF at DC     -Will follow-up in Pulmonary clinic in 1 month to monitor response to prednisone obtain PFT and CXR chest prior to appintment 4/20/22 Dr. Rina Moran at 10:40 AM  -PFT 4/6/2022    Case discussed with nurse and patient/family. Questions and concerns addressed. Meds and Orders reviewed. Electronically signed by     ARCADIO Ham - CNP on 3/28/2022 at 2:28 PM    Vitals:    03/28/22 2743 03/28/22 0830 03/28/22 0959 03/28/22 1512   BP: (!) 98/58 132/68  133/73   Pulse: 80 66  54   Resp: 18 18     Temp: 97.9 °F (36.6 °C) 98.2 °F (36.8 °C)  98 °F (36.7 °C)   TempSrc: Oral Oral  Oral   SpO2: 98% 93% 95% 95%   Weight:       Height:         Stable, on NC, no conversational dyspnea  Paced beats HR 80 bpm  Prednisone and Mepron resumed  Continue antibiotics    Patient seen and examined independently by me. Above discussed and I agree with  CNP note Also see my additional comments. Labs, cultures, and radiographs when available were reviewed. Changes were made in the orders as necessary. I discussed patient concerns with Maria Fernanda HICKEY and instructions were given. Respiratory care issues addressed. Please see our orders for the updated patient care plan.     Electronically signed by     Michael Reynoso MD on 3/28/2022 at 6:16 PM

## 2022-03-28 NOTE — CARE COORDINATION
DISCHARGE/PLANNING EVALUATION  3/28/22, 3:24 PM EDT    Reason for Referral: Current with [facility]  Mental Status: Answered questions appropraitely  Decision Making: Makes own decisions, also wanted wife contacted. Family/Social/Home Environment: Spoke with patient and he stated that he resides at home with his spouse in a one floor home with 2 steps inside. He stated that he has a tub/shower with a seat. Current Services including food security, transportation and housekeeping:  Self  Current Equipment: shower seat  Payment Source: Medicare  Concerns or Barriers to Discharge: NA  Post acute provider list with quality measures, geographic area and applicable managed care information provided. Questions regarding selection process answered: Declined list at this time. Teach Back Method used with Alyssia Bobo regarding care plan and options for discharge. Patient verbalize understanding of the plan of care and contribute to goal setting. Patient goals, treatment preferences and discharge plan:  Patient stated that he wants to discuss with wife. Attempted to call wife but answer or able to leave a message. Updated patient and he stated that she will be in to visit tomorrow and will discuss. Spoke with Moira Sunshine at [address] [place] [address] and she stated that patient for nursing.        Electronically signed by MARIANA Osborne on 3/28/2022 at 3:24 PM

## 2022-03-28 NOTE — PROGRESS NOTES
Ina Jacques 60  INPATIENT OCCUPATIONAL THERAPY  [facility] ONC MED 5K  EVALUATION    Time:   Time In: 9932  Time Out: 1433  Timed Code Treatment Minutes: 35 Minutes  Minutes: 44          Date: 3/28/2022  Patient Name: Roxanna Mendoza,   Gender: male      MRN: [id number]  : 1950  (70 y.o.)  Referring Practitioner: Prosper Dial MD  Diagnosis: Acute and chronic respiratory failure and hypoxia  Additional Pertinent Hx: Pt is a 70 y.o. male with past medical history of Asthma/COPD, Afib, CAD s/p CABG, s/p pacemaker, admitted to ICU due to septic shock secondary to RLL HCAP, and acute hypoxic respiratory failure due to ILD. Patient complaining of increased shortness of breath, exertional, associated with fatigue, no chest pain, no palpitations, no fever no chills, patient refers had a diagnosis of community-acquired pneumonia 20 years ago, reason for coming is persistence of symptoms, in the ED was found with tachycardia, EKG with A. fib with RVR, leukocytosis, mildly increased procalcitonin, hyperlipidemia, patient with low blood pressures, consider initially has septic shock, antibiotics were started, due to necessity of vasopressor. Workup so far revealing possible bilateralpneumonia with pretty much bilateral infiltrates, went to AFib withrapid response. CXR and CT chest showed diffuse interstitial infiltrates in background of COPD. New RLL consolidation. He was discharged from the hospital 5 days ago on Home O2 2 lpm.    Restrictions/Precautions:  Restrictions/Precautions: General Precautions,Fall Risk    Subjective  Chart Reviewed: Oval Dusky and Physical  Patient assessed for rehabilitation services?: Yes  Family / Caregiver Present: No    Subjective: OK to see Pt per RN. Pt resting in bed upon arrival, agreeable to OT session. Increased time active listening at end of session, as Pt became emotional about cirsumstances at end of session.  Time spent discussing discharge recommendations. RN aware of difficulty with monitoring O2    Pain:  Pain Assessment  Patient Currently in Pain: Denies    Vitals: Oxygen: Pt on 3L upon arrival, 93% SpO2. Dropped to 88-89% with bed mobility with increasing SOB. Pt demo'd good technique of PLB to get O2 >90% with increase to 4L. Unable to get good reading with 3 different pulse oximiters, RN aware. Titrated back down to 3L at end of session. Increased SOB throughout session with max cues for PLB. Social/Functional History:  Lives With: Spouse  Type of Home: Apartment  Home Layout: One level  Home Access: Level entry  Home Equipment: Rolling walker   Bathroom Shower/Tub: Tub/Shower unit  Bathroom Toilet: Standard  Bathroom Equipment: Shower chair  Bathroom Accessibility: Accessible    Receives Help From: Family  ADL Assistance: Independent  Homemaking Assistance: Independent  Ambulation Assistance: Independent  Transfer Assistance: Independent    Active : Yes     Additional Comments: Pt reports being independent with all ADLs and IADLs compared to PTA. Pt has AD at home, but doesnt typically use for ambulation. Pt has 2L O2 at home. Per Pt, wife also needs increased assistance at home. VISION:WFL    HEARING:  WFL    COGNITION: WFL    RANGE OF MOTION:  Bilateral Upper Extremity:  WFL    STRENGTH:  Bilateral Upper Extremity:  WFL        ADL:   Toileting: Mod I with increased time for completion with urinal in bed. Pt returned self to bed due to drop in O2 seated EOB to complete. BALANCE:  Sitting Balance:  Stand By Assistance, with increased time for completion. ~13 minutes seated EOB   Standing Balance: Contact Guard Assistance. BED MOBILITY:  Supine to Sit: Stand By Assistance, with increased time for completion    Scooting: Stand By Assistance, with increased time for completion      TRANSFERS:  Sit to Stand:  [address], with increased time for completion, cues for hand placement.     Stand to Sit: Sloan Stallings Assistance, with increased time for completion, cues for hand placement. FUNCTIONAL MOBILITY:  Assistive Device: Rolling Walker  Assist Level:  Contact Guard Assistance. Distance: EOB to recliner       Activity Tolerance:  Patient tolerance of  treatment: fair. Assessment:  Assessment: Pt requires increased assistance with ADLs and IADLs compared to baseline. Pt would benefit from skilled OT to maximize safety and independence in desired environment. Performance deficits / Impairments: Decreased functional mobility ,Decreased ADL status,Decreased endurance,Decreased high-level IADLs,Decreased safe awareness  Prognosis: Fair  REQUIRES OT FOLLOW UP: Yes  Decision Making: Medium Complexity    Treatment Initiated: Treatment and education initiated within context of evaluation. Evaluation time included review of current medical information, gathering information related to past medical, social and functional history, completion of standardized testing, formal and informal observation of tasks, assessment of data and development of plan of care and goals. Treatment time included skilled education and facilitation of tasks to increase safety and independence with ADL's for improved functional independence and quality of life. Discharge Recommendations:  Subacute/Skilled Nursing Facility    Patient Education:  OT Education: OT Role,Plan of Care,Transfer Training,Energy Conservation (functional mobility)    Equipment Recommendations:  Equipment Needed: No (continue to monitor for need)    Plan:  Times per week: 5x  Times per day: Daily  Current Treatment Recommendations: Strengthening,Patient/Caregiver Education & Training,Equipment Evaluation, Education, & procurement,Functional Mobility Training,Endurance Training,Safety Education & Training,Self-Care / ADL (transfer training). See long-term goal time frame for expected duration of plan of care.   If no long-term goals established, a short length of stay is anticipated. Goals:  Patient goals : return home  Short term goals  Time Frame for Short term goals: by discharge  Short term goal 1: Pt will increase activity tolerance to/from recliner progressing to bathroom with SBA while maintaining O2 greater than 90% to increase independence with functional mobility  Short term goal 2: Pt will complete functional t/fs with SBA in prep for ADLs  Short term goal 3: Pt will tolerate standing 3 minutes SBA with 1-3 v/c for PLB in prep for grooming tasks  Short term goal 4: Pt will complete LB dressing and bathing tasks with Min A         Following session, patient left in safe position with all fall risk precautions in place.

## 2022-03-28 NOTE — PLAN OF CARE
Problem: Discharge Planning:  Goal: Discharged to appropriate level of care  Description: Discharged to appropriate level of care  Outcome: Ongoing   Pt from home with wife. SW consult place to help with any d/c needs. Problem: Cardiac Output - Decreased:  Goal: Hemodynamic stability will improve  Description: Hemodynamic stability will improve  Outcome: Ongoing  Pt VVS, SOB with exertion. Absence of chest pain. Will continue to monitor and assess. Problem: Gas Exchange - Impaired:  Goal: Levels of oxygenation will improve  Description: Levels of oxygenation will improve  Outcome: Ongoing  Patients oxygen saturation 94 % on 3 L02 per Nasal Canula. No shortness of breath noted. Lung sounds diminished t/o, able C&DB as ordered. Problem: Pain:  Goal: Control of acute pain  Description: Control of acute pain  Outcome: Ongoing  Pain Assessment: 0-10  Pain Level: 0   Patient's Stated Pain Goal: No pain   Is pain goal met at this time? Yes     Problem: Sleep Pattern Disturbance:  Goal: Appears well-rested  Description: Appears well-rested  Outcome: Ongoing   Pt taking prescribed medications to help with sleep. Problem: Falls - Risk of:  Goal: Will remain free from falls  Description: Will remain free from falls  Outcome: Ongoing   All fall precautions in place. Bed in low position, alarm activated and appropriate use of call light. Care plan reviewed with patient. Patient verbalizes understanding of the plan of care and contributes to goal setting.

## 2022-03-28 NOTE — CARE COORDINATION
03/28/22 1123   Readmission Assessment   Number of Days since last admission? 8-30 days   Previous Disposition Home with Home Health   Who is being Interviewed Patient   What was the patient's/caregiver's perception as to why they think they needed to return back to the hospital?   (increased shortness of breath.)   Did you visit your Primary Care Physician after you left the hospital, before you returned this time? Yes   Did you see a specialist, such as Cardiac, Pulmonary, Orthopedic Physician, etc. after you left the hospital? No   Who advised the patient to return to the hospital? Self-referral   Does the patient report anything that got in the way of taking their medications? No   In our efforts to provide the best possible care to you and others like you, can you think of anything that we could have done to help you after you left the hospital the first time, so that you might not have needed to return so soon?  Arrange for more help when leaving the hospital;Other (Comment)  (Patient feels he was discharged too soon.)

## 2022-03-28 NOTE — CARE COORDINATION
3/28/22, 10:54 AM EDT  DISCHARGE PLANNING EVALUATION:    Porfirio Montez       Admitted: 3/25/2022/ 0904   Hospital day: 3   Location: -13/013-A Reason for admit: Lactic acidosis [E87.2]  SIRS (systemic inflammatory response syndrome) (HCC) [R65.10]  Troponin level elevated [R77.8]  Atrial fibrillation with RVR (HCC) [I48.91]  Acute and chronic respiratory failure with hypoxia (HCC) [J96.21]  Septic shock (HCC) [A41.9, R65.21]  Acute on chronic respiratory failure with hypoxemia (HCC) [J96.21]  Pneumonia of both lungs due to infectious organism, unspecified part of lung [J18.9]  Acute kidney injury superimposed on CKD ([facility] [N17.9, N18.9]  Leukocytosis, unspecified type [D72.829]  Acute on chronic congestive heart failure, unspecified heart failure type ([facility] [I50.9]   PMH:  has a past medical history of Asthma, Atrial fibrillation with RVR ([facility] [employer] dual pacemaker , Collagenous colitis, Colon polyps, COPD, mild ([address] Eczema of hand, HTN (hypertension), Hyperlipidemia, and Smoker. Procedure: N/A  Barriers to Discharge:  Patient presents with shortness of breath, appetite change and fatigue. Patient was admitted to ICU and transferred to [facility]. Cardiology and Pulmonology following, Zosyn, Xarelto, prn Tylenol and Zofran, low sodium diet, telemetry. PCP: Kayleen Hernández MD  Readmission Risk Score: 25.6 ( )%    Patient Goals/Plan/Treatment Preferences: Met with Brandon Painting. He resides at home with his wife. Brandon Painting verifies his insurance and PCP. He is able to afford his medications and has the DME needed at home. He recently received home oxygen and a nebulizer from [facility] D[facility]. He is current with [facility] for skilled nursing only per chart. Brandon Painting states he plans to return home at discharge and resume services through [facility]. He is open to a SNF for rehab if recommended. Perfect serve message sent to Dr. Shipman Head requesting therapy evaluations.    Transportation/Food Security/Housekeeping Addressed:  No issues identified.

## 2022-03-29 NOTE — CARE COORDINATION
3/29/22, 2:32 PM EDT    DISCHARGE PLANNING EVALUATION    Spoke with patient re: discharge plans. He states that he will be returning home. He is not interested in going to a SNF. He states that he has home health already for nursing only. He would like to have therapy at home. He is interested in home delivered meals and other community resources which SW agreed to provide.

## 2022-03-29 NOTE — PROGRESS NOTES
Progress Note  Date:3/28/2022       Room:[address] [place] [address]  Patient Name:Diony Rodriguez     YOB: 1950     Age:71 y.o. Subjective    Subjective:  Symptoms:  Stable. He reports shortness of breath and cough. No chest pain or diarrhea. Diet:  Adequate intake. No nausea. Activity level: Impaired due to weakness. Pain:  He reports no pain.        Current Facility-Administered Medications   Medication Dose Route Frequency Provider Last Rate Last Admin    predniSONE (DELTASONE) tablet 40 mg  40 mg Oral Daily Windell Neat, APRN - CNP   40 mg at 03/28/22 1539    atovaquone (MEPRON) suspension 1,500 mg  1,500 mg Oral Daily Windell Neat, APRN - CNP   1,500 mg at 03/28/22 1550    rivaroxaban (XARELTO) tablet 15 mg  15 mg Oral Daily Herman Basurto MD   15 mg at 03/28/22 1755    piperacillin-tazobactam (ZOSYN) 3,375 mg in dextrose 5 % 50 mL IVPB extended infusion (mini-bag)  3,375 mg IntraVENous Q8H Bean Morin MD 12.5 mL/hr at 03/29/22 0321 3,375 mg at 03/29/22 0321    metoprolol succinate (TOPROL XL) extended release tablet 100 mg  100 mg Oral Daily Garcia Reynolds, DO   100 mg at 03/28/22 1026    pantoprazole (PROTONIX) tablet 40 mg  40 mg Oral QAM AC Herman Basurto MD   40 mg at 03/28/22 0646    traZODone (DESYREL) tablet 50 mg  50 mg Oral Nightly Herman Basurto MD   50 mg at 03/28/22 2104    rOPINIRole (REQUIP) tablet 0.5 mg  0.5 mg Oral Nightly Herman Basurto MD   0.5 mg at 03/27/22 1956    atorvastatin (LIPITOR) tablet 40 mg  40 mg Oral Nightly Herman Basurto MD   40 mg at 03/28/22 2104    acetaminophen (TYLENOL) tablet 500 mg  500 mg Oral Q4H PRN Herman Basurto MD        [facility]dans[facility]ron [facility] tablet 4 mg  4 mg Oral Q8H PRN Herman Basurto MD        nystatin (MYCOSTATIN) 617400 UNIT/ML suspension 500,000 Units  5 mL Oral 4x Daily Herman Basurto MD   500,000 Units at 03/28/22 1500    tiotropium-olodaterol (STIOLTO) 2.5-2.5 MCG/ACT inhaler 2 puff  2 puff Inhalation Daily Bud Estrada MD   2 puff at 22 6454    aspirin chewable tablet 81 mg  81 mg Oral Daily Bud Estrada MD   81 mg at 22 1026      Review of Systems   Constitutional: Positive for fatigue. Negative for activity change. HENT: Negative for congestion. Respiratory: Positive for cough and shortness of breath. On 3 liters oxygen and dyspnea with activity   Cardiovascular: Negative for chest pain and palpitations. Gastrointestinal: Negative for abdominal distention, constipation, diarrhea and nausea. Genitourinary: Negative for difficulty urinating, dysuria, flank pain and hematuria. Musculoskeletal: Negative for arthralgias, back pain and gait problem. Psychiatric/Behavioral: The patient is nervous/anxious. Objective         Vitals Last 24 Hours:  TEMPERATURE:  Temp  Av.9 °F (36.6 °C)  Min: 97.4 °F (36.3 °C)  Max: 98.2 °F (36.8 °C)  RESPIRATIONS RANGE: Resp  Av  Min: 18  Max: 18  PULSE OXIMETRY RANGE: SpO2  Av.8 %  Min: 93 %  Max: 96 %  PULSE RANGE: Pulse  Av.5  Min: 54  Max: 66  BLOOD PRESSURE RANGE: Systolic (23ICF), HLF:067 , Min:107 , NCM:878   ; Diastolic (45IEA), WVO:93, Min:51, Max:73    I/O (24Hr): Intake/Output Summary (Last 24 hours) at 3/29/2022 0504  Last data filed at 3/29/2022 0323  Gross per 24 hour   Intake 1550 ml   Output 1135 ml   Net 415 ml     Objective:  General Appearance:  Comfortable. Vital signs: (most recent): Blood pressure (!) 107/51, pulse 55, temperature 97.4 °F (36.3 °C), temperature source Oral, resp. rate 18, height 5' 8\" (1.727 m), weight 154 lb 15.7 oz (70.3 kg), SpO2 96 %. Vital signs are normal.    Output: Producing urine and producing stool. HEENT: Normal HEENT exam.    Lungs:  Normal effort. There are decreased breath sounds and wheezes. (Slight wheeze but overall better and few opening rales)  Heart: Normal rate. Regular rhythm.   S1 normal and S2 normal.  No murmur. Abdomen: Abdomen is soft and non-distended. Bowel sounds are normal.   There is no abdominal tenderness. Neurological: Patient is alert and oriented to person, place and time. Patient has normal reflexes and normal muscle tone. Skin:  Warm and dry. Labs/Imaging/Diagnostics    Labs:  CBC:  Recent Labs     03/26/22  0930 03/26/22  1536   WBC 19.2* 23.3*   RBC 3.01* 3.19*   HGB 9.8* 10.6*   HCT 30.2* 32.1*   .3* 100.6*    207     CHEMISTRIES:  Recent Labs     03/26/22  0930 03/27/22  0544   * 135   K 4.3 4.2   CL 95* 96*   CO2 28 29   BUN 47* 52*   CREATININE 1.7* 1.8*   GLUCOSE 144* 90   MG 1.9  --      PT/INR:No results for input(s): PROTIME, INR in the last 72 hours. APTT:No results for input(s): APTT in the last 72 hours. LIVER PROFILE:  Recent Labs     03/26/22  0930   AST 18   ALT 17   BILIDIR <0.2   BILITOT 0.7   ALKPHOS 57       Imaging Last 24 Hours:  No results found. Assessment//Plan           Hospital Problems           Last Modified POA    * (Principal) Acute and chronic respiratory failure with hypoxia ([address] 3/25/2022 Yes    Atherosclerotic heart disease of native coronary artery with other forms of angina pectoris ([address] 3/26/2022 Yes    Chronic bronchitis ([address] 3/26/2022 Yes    HTN (hypertension) 3/26/2022 Yes    Atrial fibrillation ([address] 3/26/2022 Yes    Pneumonia 3/26/2022 Yes    Thrush 3/26/2022 Yes        Assessment:    Condition: In stable condition. Improving. (Interstitial lung disease with pneumonia  Right lower lobe    Acute on chronic respiratory failure  With oxygen on 3 L oxygen Nasal canula    amidarone toxicity noted      copd noted     chf diastolic stable      ashd noted      tachy-ericka syndrome     hx of collagenous colitis    Physical deconditioning     atrial fib stable with beta blocker with Rvr and with pacer     ). Plan:   Out of bed and up to chair. Consults: , physical therapy and occupational therapy.   Advance diet as tolerated. Administer medications as ordered. (  Pt and ot      stable and better      continue  With therapies     may need nursing home due to overall care and weakness).        Electronically signed by Darcy Asif MD on 3/29/22 at 5:04 AM EDT

## 2022-03-29 NOTE — PROGRESS NOTES
[address] [facility] 5K  Occupational Therapy  Daily Note  Time:   Time In: 0840  Time Out: 5938  Timed Code Treatment Minutes: 30 Minutes  Minutes: 30          Date: 3/29/2022  Patient Name: Danya Carrillo,   Gender: male      Room: [facility]13/013-A  MRN: [id number]  : 1950  (70 y.o.)  Referring Practitioner: Ginna Fuentes MD  Diagnosis: Acute and chronic respiratory failure and hypoxia  Additional Pertinent Hx: Pt is a 70 y.o. male with past medical history of Asthma/COPD, Afib, CAD s/p CABG, s/p pacemaker, admitted to ICU due to septic shock secondary to RLL HCAP, and acute hypoxic respiratory failure due to ILD. Patient complaining of increased shortness of breath, exertional, associated with fatigue, no chest pain, no palpitations, no fever no chills, patient refers had a diagnosis of community-acquired pneumonia 20 years ago, reason for coming is persistence of symptoms, in the ED was found with tachycardia, EKG with A. fib with RVR, leukocytosis, mildly increased procalcitonin, hyperlipidemia, patient with low blood pressures, consider initially has septic shock, antibiotics were started, due to necessity of vasopressor. Workup so far revealing possible bilateralpneumonia with pretty much bilateral infiltrates, went to AFib withrapid response. CXR and CT chest showed diffuse interstitial infiltrates in background of COPD. New RLL consolidation. He was discharged from the hospital 5 days ago on Home O2 2 lpm.    Restrictions/Precautions:  Restrictions/Precautions: General Precautions,Fall Risk     SUBJECTIVE: Pleasant. Pt agreed to get up to the chair. He was no C/O pain. PAIN: Denies. Vitals: Oxygen: Pt was using 3L of O2. He shows 95% O2 saturation while at rest    COGNITION: WFL    ADL:   pt pulled up his underwear while in bed without difficulty. BALANCE:  Sitting Balance:  Supervision. scooting forward to the edge of bed  Standing Balance: Stand By Assistance. preparing to take steps    BED MOBILITY:  Rolling to Left: Supervision, with head of bed raised, with rail    Supine to Sit: Supervision, with rail    Scooting: Supervision, with rail      TRANSFERS:  Sit to Stand:  Stand By Assistance. from the edge of bed  Stand to Sit: Stand By Assistance. to the recliner chair    FUNCTIONAL MOBILITY:  Assistive Device: Rolling Walker  Assist Level:  Stand By Assistance. Distance: 4 ft from bed to chair  Pt had no difficulty with balance but had SOB. ADDITIONAL ACTIVITIES:  Energy conservation techniques were discussed and a handout was provided. How he can monitor his own fatigue levels when doing routine tasks was discussed. Pt indicated that he needs ~5 minutes to rest after making a trip to the bathroom at this time. Relaxed breathing techniques were also discussed. Discussed setting daily goals to make regular progress to return to workplace. ASSESSMENT:     Activity Tolerance:  Patient tolerance of  treatment: fair. Pt was SOB with sitting at the edge of bed.         Discharge Recommendations: Home with Home Health OT  Equipment Recommendations: Equipment Needed: No (continue to monitor for need)  Plan: Times per week: 5x  Times per day: Daily  Current Treatment Recommendations: Strengthening,Patient/Caregiver Education & Training,Equipment Evaluation, Education, & procurement,Functional Mobility Training,Endurance Training,Safety Education & Training,Self-Care / ADL (transfer training)    Patient Education  Patient Education: Energy Conservation    Goals  Short term goals  Time Frame for Short term goals: by discharge  Short term goal 1: Pt will increase activity tolerance to/from recliner progressing to bathroom with SBA while maintaining O2 greater than 90% to increase independence with functional mobility  Short term goal 2: Pt will complete functional t/fs with SBA in prep for ADLs  Short term goal 3: Pt will tolerate standing 3 minutes SBA with 1-3 v/c for PLB in prep for grooming tasks  Short term goal 4: Pt will complete LB dressing and bathing tasks with Min A    Following session, patient left in safe position with all fall risk precautions in place.

## 2022-03-29 NOTE — CONSULTS
Physical Medicine & Rehabilitation   Consult Note      Admitting Physician: Baltazar Baltazar MD    Primary Care Provider: Baltazar Baltazar MD     Reason for Consult:  Septic shock. Pneumonia. Rehab needs    History of Present Illness:  Holli Alberto is a 70 y.o. male admitted to [facility] on 3/25/2022. Patient  has a past medical history of Asthma, Atrial fibrillation with RVR ([address] [employer] dual pacemaker , Collagenous colitis, Colon polyps, COPD, mild ([address] Eczema of hand, HTN (hypertension), Hyperlipidemia, and Smoker. Patient presented to [facility] ER with SOB, tachypnea, fatigue and recent discharge on 3/17. With previous admission patient was diagnosed with amiodarone toxicity and interstitial lung disease with bilateral pneumonia. Pacemaker interrogated and WNL. Pulmonary and cardiology followed. Atrial fibrillation with RVR noted during stay. Patient was discharge home on home O2. At discharge patient patient was independent to supervision with balance and transfers, and ' with ambulation, no device. Upon readmission patient was admitted to ICU with septic shock, started on Zosyn. Patient was able to be transferred to telemetry flow on 3/26/22. Pulmonary and cardiology consulted. Cardiology signed off. Pulmonary planning ongoing management of chronic lung diseases. Started on prednisone and atovaquone. 3/27/22 patient experienced epistaxis,, which resolved. Patient was restarted on xarelto and lovenox stopped. Patient is seen today, sitting up in chair. Patient worked with OT today and noted fatigue with walking short distance with therapy. Patient states he then worked with PT and walked around the unit without a assistive device. Patient states the medications they are giving him are really helping and ne notices improvements every day.  Discussed the ongoing need for therapy, not likely in need of aggressive therapy on IPR as he is walking further than household distances without a device. Discussed home with HH. Patient states wife not in great health either and wonders about going to SNF short term. 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 He would like to discuss further with his wife. Current Rehabilitation Assessments:  PT:  Await eval - patient states he walked around the nursing unit without assistive device. OT:    ADL:   pt pulled up his underwear while in bed without difficulty. BALANCE:  Sitting Balance:  Supervision. scooting forward to the edge of bed  Standing Balance: Stand By Assistance. preparing to take steps  BED MOBILITY:  Rolling to Left: Supervision, with head of bed raised, with rail    Supine to Sit: Supervision, with rail    Scooting: Supervision, with rail    TRANSFERS:  Sit to Stand:  Stand By Assistance. from the edge of bed  Stand to Sit: Stand By Assistance. to the recliner chair  FUNCTIONAL MOBILITY:  Assistive Device: Rolling Walker  Assist Level:  Stand By Assistance. Distance: 4 ft from bed to chair  Pt had no difficulty with balance but had SOB. ADDITIONAL ACTIVITIES:  Energy conservation techniques were discussed and a handout was provided. How he can monitor his own fatigue levels when doing routine tasks was discussed. Pt indicated that he needs ~5 minutes to rest after making a trip to the bathroom at this time. Relaxed breathing techniques were also discussed. Discussed setting daily goals to make regular progress to return to workplace.       Past Medical History:        Diagnosis Date    Asthma     Atrial fibrillation with RVR ([address] 04/05/2021    [employer] dual pacemaker  04/15/2021    Collagenous colitis 2021    per scope 2021    Colon polyps 2021    dr Martha Solis    COPD, mild ([address]     Eczema of hand     HTN (hypertension)     Hyperlipidemia     Smoker        Past Surgical History:        Procedure Laterality Date    CARDIAC SURGERY      COLONOSCOPY  06/10/2021    theresa ccolon polyps and collagenous colitis    CORONARY ARTERY BYPASS GRAFT  01/12/2021    cabg x 3 with lima and atrial appendage clip  dr Navarro Men GRAFT N/A 01/12/2021    CABG  X 3 WITH DEJAH, Atrial Appendage Clip performed by Chandra Paul MD at [address] [place] [address] Road  06/2008    polyps    OTHER SURGICAL HISTORY  DEC 7TH 2012 DR GET [place]     IGS ENDOSCOPIC BI LAT MAXILLARY, ETHMOID, SPHENOID, FRONTAL SINUSOTOMY WITH SEPTOPLASTY AND TURBINOPLASTIES    SKIN CANCER EXCISION  09/2014    Left side of Forehead     TOOTH EXTRACTION  02/2017       Allergies:     Allergies   Allergen Reactions    Penicillins Rash    Sulfa Antibiotics Rash        Current Medications:   Current Facility-Administered Medications: predniSONE (DELTASONE) tablet 40 mg, 40 mg, Oral, Daily  atovaquone (MEPRON) suspension 1,500 mg, 1,500 mg, Oral, Daily  rivaroxaban (XARELTO) tablet 15 mg, 15 mg, Oral, Daily  piperacillin-tazobactam (ZOSYN) 3,375 mg in dextrose 5 % 50 mL IVPB extended infusion (mini-bag), 3,375 mg, IntraVENous, Q8H  metoprolol succinate (TOPROL XL) extended release tablet 100 mg, 100 mg, Oral, Daily  pantoprazole (PROTONIX) tablet 40 mg, 40 mg, Oral, QAM AC  traZODone (DESYREL) tablet 50 mg, 50 mg, Oral, Nightly  rOPINIRole (REQUIP) tablet 0.5 mg, 0.5 mg, Oral, Nightly  atorvastatin (LIPITOR) tablet 40 mg, 40 mg, Oral, Nightly  acetaminophen (TYLENOL) tablet 500 mg, 500 mg, Oral, Q4H PRN  ondansetron (ZOFRAN) tablet 4 mg, 4 mg, Oral, Q8H PRN  nystatin (MYCOSTATIN) 130148 UNIT/ML suspension 500,000 Units, 5 mL, Oral, 4x Daily  tiotropium-olodaterol (STIOLTO) 2.5-2.5 MCG/ACT inhaler 2 puff, 2 puff, Inhalation, Daily  aspirin chewable tablet 81 mg, 81 mg, Oral, Daily    Social History:  Social History     Socioeconomic History    Marital status:      Spouse name: Not on file    Number of children: Not on file    Years of education: Not on file    Highest education level: Not on file   Occupational History    Not on file   Tobacco Use    Smoking status: Former Smoker     Packs/day: 1.00     Years: 40.00     Pack years: 40.00     Types: Cigarettes     Quit date: 3/7/2022     Years since quittin.0    Smokeless tobacco: Never Used   Substance and Sexual Activity    Alcohol use: Yes     Alcohol/week: 0.0 standard drinks     Comment: very little    Drug use: No    Sexual activity: Not on file   Other Topics Concern    Not on file   Social History Narrative    No barriers with medication affordability now that he has met deductible    Active with [facility]    Has O2 and supplies needed    No barriers with transportation     Social Determinants of Health     Financial Resource Strain: Low Risk     Difficulty of Paying Living Expenses: Not hard at all   Food Insecurity: No Food Insecurity    Worried About Running Out of Food in the Last Year: Never true    Danielle of Food in the Last Year: Never true   Transportation Needs: No Transportation Needs    Lack of Transportation (Medical): No    Lack of Transportation (Non-Medical):  No   Physical Activity: Inactive    Days of Exercise per Week: 0 days    Minutes of Exercise per Session: 0 min   Stress:     Feeling of Stress : Not on file   Social Connections:     Frequency of Communication with Friends and Family: Not on file    Frequency of Social Gatherings with Friends and Family: Not on file    Attends Pentecostal Services: Not on file    Active Member of Clubs or Organizations: Not on file    Attends Club or Organization Meetings: Not on file    Marital Status: Not on file   Intimate Partner Violence:     Fear of Current or Ex-Partner: Not on file    Emotionally Abused: Not on file    Physically Abused: Not on file    Sexually Abused: Not on file   Housing Stability: Unknown    Unable to Pay for Housing in the Last Year: No    Number of [place] in the Last Year: Not on file    Unstable Housing in the Last Year: No     Lives With: Spouse  Type of Home: Apartment  Home Layout: One level  Home Access: Level entry  Home Equipment: Rolling walker   Bathroom Shower/Tub: Tub/Shower unit  Bathroom Toilet: Standard  Bathroom Equipment: Shower chair  Bathroom Accessibility: [id number] Help From: Family  ADL Assistance: [address]: [address] [place] [address] Place: Independent  Transfer Assistance: Independent     Active : Yes  Additional Comments: Pt reports being independent with all ADLs and IADLs compared to PTA. Pt has AD at home, but doesnt typically use for ambulation. Pt has 2L O2 at home. Per Pt, wife also needs increased assistance at home. Family History:       Problem Relation Age of Onset    Heart Disease Mother     Heart Disease Father         cabg    Diabetes Brother     Heart Disease Brother        Review of Systems:  CONSTITUTIONAL:  positive for  fatigue  EYES:  negative  HEENT:  negative  RESPIRATORY:  positive for  dyspnea  CARDIOVASCULAR:  PAF  GASTROINTESTINAL:  negative  GENITOURINARY:  negative  SKIN:  negative  HEMATOLOGIC/LYMPHATIC:  negative  MUSCULOSKELETAL:  negative  NEUROLOGICAL:  positive for gait problems  BEHAVIOR/PSYCH:  negative  System review otherwise negative    Physical Exam:  /74   Pulse 62   Temp 98 °F (36.7 °C) (Oral)   Resp 22   Ht 5' 8\" (1.727 m)   Wt 154 lb 15.7 oz (70.3 kg)   SpO2 90%   BMI 23.57 kg/m²   awake  Orientation:   person, place, time  Mood: within normal limits  Affect: calm and spontaneous  General appearance: Patient is well nourished, well developed, well groomed and in no acute distress, appearing stated age.  On O2 NC without signs of dyspnea with rest    Memory:   normal,   Attention/Concentration: normal  Language:  normal    Cranial Nerves:  cranial nerves appear intact  ROM:  normal  Motor Exam:  Motor exam is symmetrical 5- out of 5 all extremities bilaterally  Tone:  normal  Muscle bulk: within normal limits    Heart: normal rate, regular rhythm, normal S1, S2, no murmurs, rubs, clicks or gallops  Lungs: clear to auscultation without wheezes or rales  Abdomen: soft, non-tender, non-distended, normal bowel sounds, no masses or organomegaly    Skin: warm and dry, no rash or erythema  Peripheral vascular: Pulses: Normal upper and lower extremity pulses; Edema: no      Diagnostics:  No results found for this or any previous visit (from the past 24 hour(s)). Impression:  · Septic shock  · Acute on chronic hypoxic respiratory failure  · CHF, diastolic dysfunction. · COPD  · Acute Interstitial pneumonia Amiodarone Toxicity vs RBILD   · Superimposed RLL HCAP  · CAD s/p CABG 1/2021  · Atrial fibrillation  · Hx CVA  · Pacemaker status      Recommendations:  · Continue OT, await PT eval  · Pulmonary: prednisone 40mg daily, atovaquone 1.5 gm daily, consider bronch if worsens. · Patient was discharge on 3/17/22 and doing very well with therapy. Returned with SOB and AFIB. Today patient states he walked around the nursing unit without an assistive device. Discussed with patient about not in need of aggressive therapy. Discussed home with Erasmo hernadez. Patient with concerns about wife at home and her taking care of him. He may look into SNF for short stay prior to going home. He would like to discuss further with his wife  · Will sign off     It was my pleasure to evaluate Robert Neville today. Please call with questions.     Mason Payne, APRN - CNP

## 2022-03-29 NOTE — PROGRESS NOTES
[facility]    Patient - Lawanda Alfredo   MRN -  [id number]   [facility]de-identified[facility]   - 1950      Date of Admission -  3/25/2022  9:04 AM  Date of evaluation -  3/29/2022  Room - -13013-A   Hospital Day - [address] MD Primary Care Physician - Artie Calabrese MD   Chief Complaint   SOB  Active Hospital Problem List      Active Hospital Problems    Diagnosis Date Noted    Chronic bronchitis [facility]) [J42]      Priority: High    Atherosclerotic heart disease of native coronary artery with other forms of angina pectoris ([facility] [I25.118] 2021     Priority: High    Thrush [B37.0] 2022    Acute and chronic respiratory failure with hypoxia (HCC) [J96.21] 2022    Pneumonia [J18.9] 2022    Atrial fibrillation ([facility] [I48.91] 2021    HTN (hypertension) [I10]      HPI   Lawanda Alfredo is a 70 y.o. male  With past medical history of Asthma/COPD, Afib, CAD s/p CABG, s/p pacemaker, admitted to ICU due to septic shock secondary to RLL HCAP, and acute hypoxic respiratory failure due to ILD. Patient complaining of any days of shortness of breath, exertional, associated with fatigue, no chest pain, no palpitations, no fever no chills, patient refers had a diagnosis of community-acquired pneumonia 20 years ago, reason for coming is persistence of symptoms, in the ED was found with tachycardia, EKG with A. fib with RVR, leukocytosis, mildly increased procalcitonin, hyperlipidemia, patient with low blood pressures, consider initially has septic shock, antibiotics were started, due to necessity of vasopressor. CXR and CT chest showed diffuse interstitial infiltrates in background of COPD. New RLL consolidation.    He was discharged from the hospital 5 days ago on Home O2 2 lpm.   He quit smoking 1 months ago    Past 24 Hours   -Currently on O2 3LPM- 90%  -SOB with exertion only none at rest   -Rehab eval today   -Afebrile -No pulmonary events overnight    -All systems reviewed. Past Medical History         Diagnosis Date    Asthma     Atrial fibrillation with RVR ([facility] 2021    [employer] dual pacemaker  04/15/2021    Collagenous colitis     per scope     Colon polyps     dr Alvarez Mackey    COPD, mild ([address]     Eczema of hand     HTN (hypertension)     Hyperlipidemia     Smoker       Past Surgical History           Procedure Laterality Date    CARDIAC SURGERY      COLONOSCOPY  06/10/2021    theresa ccolon polyps and collagenous colitis    CORONARY ARTERY BYPASS GRAFT  2021    cabg x 3 with lima and atrial appendage clip  dr Nathen Bee GRAFT N/A 2021    CABG  X 3 WITH DEJAH, Atrial Appendage Clip performed by Alber De Guzman MD at [address] [place] [address] Road  2008    polyps    OTHER SURGICAL HISTORY  DEC 7TH 2012 DR [address] [place]     IGS ENDOSCOPIC BI LAT MAXILLARY, ETHMOID, SPHENOID, FRONTAL SINUSOTOMY WITH SEPTOPLASTY AND TURBINOPLASTIES    SKIN CANCER EXCISION  2014    Left side of Forehead     TOOTH EXTRACTION  2017     Diet    ADULT DIET; Regular; Low Sodium (2 gm)  Allergies    Penicillins and Sulfa antibiotics  Social History     Social History     Socioeconomic History    Marital status:      Spouse name: Not on file    Number of children: Not on file    Years of education: Not on file    Highest education level: Not on file   Occupational History    Not on file   Tobacco Use    Smoking status: Former Smoker     Packs/day: 1.00     Years: 40.00     Pack years: 40.00     Types: Cigarettes     Quit date: 3/7/2022     Years since quittin.0    Smokeless tobacco: Never Used   Substance and Sexual Activity    Alcohol use:  Yes     Alcohol/week: 0.0 standard drinks     Comment: very little    Drug use: No    Sexual activity: Not on file   Other Topics Concern    Not on file   Social History Narrative    No barriers with medication affordability now that he has met deductible    Active with [facility]    Has O2 and supplies needed    No barriers with transportation     Social Determinants of Health     Financial Resource Strain: Low Risk     Difficulty of Paying Living Expenses: Not hard at all   Food Insecurity: No Food Insecurity    Worried About Running Out of Food in the Last Year: Never true    Danielle of Food in the Last Year: Never true   Transportation Needs: No Transportation Needs    Lack of Transportation (Medical): No    Lack of Transportation (Non-Medical):  No   Physical Activity: Inactive    Days of Exercise per Week: 0 days    Minutes of Exercise per Session: 0 min   Stress:     Feeling of Stress : Not on file   Social Connections:     Frequency of Communication with Friends and Family: Not on file    Frequency of Social Gatherings with Friends and Family: Not on file    Attends Mosque Services: Not on file    Active Member of Clubs or Organizations: Not on file    Attends Club or Organization Meetings: Not on file    Marital Status: Not on file   Intimate Partner Violence:     Fear of Current or Ex-Partner: Not on file    Emotionally Abused: Not on file    Physically Abused: Not on file    Sexually Abused: Not on file   Housing Stability: Unknown    Unable to Pay for Housing in the Last Year: No    Number of Places Lived in the Last Year: Not on file    Unstable Housing in the Last Year: No     Family History          Problem Relation Age of Onset    Heart Disease Mother     Heart Disease Father         cabg    Diabetes Brother     Heart Disease Brother      Sleep History    No history     Meds    Current Medications    predniSONE  40 mg Oral Daily    atovaquone  1,500 mg Oral Daily    rivaroxaban  15 mg Oral Daily    piperacillin-tazobactam  3,375 mg IntraVENous Q8H    metoprolol succinate  100 mg Oral Daily    pantoprazole  40 mg Oral QAM AC    traZODone  50 mg Oral Nightly    rOPINIRole  0.5 mg Oral Nightly    atorvastatin  40 mg Oral Nightly    nystatin  5 mL Oral 4x Daily    tiotropium-olodaterol  2 puff Inhalation Daily    aspirin  81 mg Oral Daily     acetaminophen, ondansetron  IV Drips/Infusions    Vitals    Vitals    height is 5' 8\" (1.727 m) and weight is 154 lb 15.7 oz (70.3 kg). His oral temperature is 98 °F (36.7 °C). His blood pressure is 114/74 and his pulse is 62. His respiration is 22 and oxygen saturation is 90%. O2 Flow Rate (L/min): 3 L/min  I/O    Intake/Output Summary (Last 24 hours) at 3/29/2022 1340  Last data filed at 3/29/2022 0323  Gross per 24 hour   Intake --   Output 575 ml   Net -575 ml     Patient Vitals for the past 96 hrs (Last 3 readings):   Weight   03/25/22 1415 154 lb 15.7 oz (70.3 kg)     Exam   Constitutional: Patient appears moderately built and moderately nourished. Currently stable on O2 3LPM  Mouth/Throat: Oropharynx is clear and moist.  No oral thrush. Eyes: Conjunctivae are normal. Pupils are equal, round, and reactive to light. No scleral icterus. Neck: Neck supple. No JVD or tracheal deviation present. Cardiovascular: Paced, S1 and S2 with no murmur. No peripheral edema  Pulmonary/Chest: Diminished breath sounds bilaterally  Abdominal: Soft. Bowel sounds audible. No distension or tenderness to palp  Musculoskeletal: Moves all extremities; no cyanosis or clubbing   Lymphadenopathy:  No cervical adenopathy. Neurological: Patient is alert and oriented to person, place, and time. Skin: Skin is warm and dry.   Labs   ABG  Lab Results   Component Value Date    PH 7.51 03/25/2022    PO2 62 03/25/2022    PCO2 32 03/25/2022    HCO3 26 03/25/2022    O2SAT 94 03/25/2022     Lab Results   Component Value Date    IFIO2 3 03/25/2022    MODE SIMV 01/12/2021    SETTIDVOL 550 01/12/2021    SETPEEP 5.0 01/12/2021     CBC  Recent Labs     03/26/22  1536   WBC 23.3*   RBC 3.19*   HGB 10.6*   HCT 32.1*   .6*   MCH 33.2*   MCHC 33.0      MPV 10.6      BMP  Recent Labs     03/27/22  0544      K 4.2   CL 96*   CO2 29   BUN 52*   CREATININE 1.8*   GLUCOSE 90   CALCIUM 8.2*     LFT  No results for input(s): AST, ALT, ALB, BILITOT, ALKPHOS, LIPASE in the last 72 hours. Invalid input(s): AMYLASE  TROP  Lab Results   Component Value Date    TROPONINT < 0.010 03/26/2022    TROPONINT 0.011 03/25/2022    TROPONINT 0.012 03/25/2022     BNP  Lab Results   Component Value Date    PROBNP 5795.0 03/25/2022    PROBNP 715.3 03/15/2022    PROBNP 3131.0 03/09/2022     D-Dimer  No results found for: DDIMER  Lactic Acid  No results for input(s): LACTA in the last 72 hours. INR  No results for input(s): INR, PROTIME in the last 72 hours. PTT  No results for input(s): APTT in the last 72 hours. Glucose  No results for input(s): POCGLU in the last 72 hours. UA No results for input(s): SPECGRAV, PHUR, COLORU, CLARITYU, MUCUS, PROTEINU, BLOODU, RBCUA, WBCUA, BACTERIA, NITRU, GLUCOSEU, BILIRUBINUR, UROBILINOGEN, KETUA, LABCAST, LABCASTTY, AMORPHOS in the last 72 hours. Invalid input(s): CRYSTALS. PFTs   No results   Echo    3/7/22  ECHO   Summary   Left ventricle size is normal.   Normal left ventricular wall thickness. There were no regional wall motion abnormalities. Ejection fraction is visually estimated in the range of 55% to 60%. Abnormal (paradoxical) motion consistent with post-operative status. IVC size is within normal limits with normal respiratory phasic changes.   Cultures    Procalcitonin  Lab Results   Component Value Date    PROCAL 0.21 03/25/2022    PROCAL 0.08 03/07/2022   VRE (-)  Flu A/B (-)  BC (-)  Covid (-)  Radiology    CXR  3/29/22  CXR 1V- pending      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 since 03/7/2022 (No evidence ILD back in 2021)  Superimposed RLL HCAP  COPD  Quit smoking 1 month ago  CAD s/p CABG   Afib   Full Code   Recommendations   Continue O2 by nasal canula to keep saturation above 92%  Antibiotics for HCAP  Patent will need long course of steroids 0.5 mg/kg with slow taper over 3-6 months for Amiodarone vs OP-   Will start on Prednisone 40 mg PO daily was DC home on this last admission   Start on atovaquone 1.5 gm Daily for PCP prophylaxis  Will consider bronchoscopy with BAL if worsened symptoms   Stiolto 2.5-2.5mcg 2 puff daily   VTE prophylaxis: Xarelto   Possible ECF at [address] O2 evaluation prior to DC if returning home  CXR portable - pending   UO 815mL/24  Rehab evaluation pending   IS ordered discussed and encouraged use     -Will follow-up in Pulmonary clinic in 1 month to monitor response to prednisone obtain PFT and CXR chest prior to appintment 4/20/22 Dr. Rufus Talavera at 10:40 AM  -PFT 4/6/2022    Case discussed with nurse and patient/family. Questions and concerns addressed. Meds and Orders reviewed. Electronically signed by     ARCADIO Lin CNP on 3/29/2022 at 1:40 PM    Vitals:    03/29/22 0315 03/29/22 0317 03/29/22 0956 03/29/22 1543   BP: (!) 107/51  114/74 107/63   Pulse: 55  62 65   Resp: 18  22 18   Temp: 97.4 °F (36.3 °C)  98 °F (36.7 °C) 97.7 °F (36.5 °C)   TempSrc: Oral  Oral Oral   SpO2: 96% 94% 90% 100%   Weight:       Height:         Feels better, less SOB  Remains in NSR  Afebrile  On 3 lpm NC  Chest bilateral rales  Wishes to go home [facility] that to [facility]  Continue POC    Patient seen and examined independently by me. Above discussed and I agree with  CNP note Also see my additional comments. Labs, cultures, and radiographs when available were reviewed. Changes were made in the orders as necessary. I discussed patient concerns with Maria Fernanda HICKEY and instructions were given. Respiratory care issues addressed. Please see our orders for the updated patient care plan.     Electronically signed by     Hunter Wilson

## 2022-03-29 NOTE — PROGRESS NOTES
[facility]  INPATIENT PHYSICAL THERAPY  EVALUATION  [facility] ONC MED 5K - 5K-13/013-A    Time In: 1020  Time Out: 9205  Timed Code Treatment Minutes: 23 Minutes  Minutes: 32          Date: 3/29/2022  Patient Name: Igor Chance,  Gender:  male        MRN: [id number]  : 1950  (70 y.o.)      Referring Practitioner: Felix Haywood MD  Diagnosis: Lactic acidosis  Additional Pertinent Hx: Pt is a 70 y.o. male with past medical history of Asthma/COPD, Afib, CAD s/p CABG, s/p pacemaker, admitted to ICU due to septic shock secondary to RLL HCAP, and acute hypoxic respiratory failure due to ILD. Patient complaining of increased shortness of breath, exertional, associated with fatigue, no chest pain, no palpitations, no fever no chills, patient refers had a diagnosis of community-acquired pneumonia 20 years ago, reason for coming is persistence of symptoms, in the ED was found with tachycardia, EKG with A. fib with RVR, leukocytosis, mildly increased procalcitonin, hyperlipidemia, patient with low blood pressures, consider initially has septic shock, antibiotics were started, due to necessity of vasopressor. Workup so far revealing possible bilateralpneumonia with pretty much bilateral infiltrates, went to AFib withrapid response. CXR and CT chest showed diffuse interstitial infiltrates in background of COPD. New RLL consolidation. He was discharged from the hospital 5 days ago on Home O2 2 lpm.     Restrictions/Precautions:  Restrictions/Precautions: General Precautions,Fall Risk    Subjective:  Chart Reviewed: Yes  Patient assessed for rehabilitation services?: Yes  Family / Caregiver Present: No  Subjective: RN approved session.  Pt pleasant and agreeable to therapy    General:  Overall Orientation Status: Within Functional Limits  Follows Commands: Within Functional Limits    Vision: Within Functional Limits    Hearing: Within functional limits         Pain: 0/10: denies pain     Vitals: Oxygen: 3L NC initially, did incr to 4L with activity, pt remaining in 90's,  pt slow desat to high 70's so O2 turned up to 6L, pt required ~10 min to recover to >90% and back to 3L     Social/Functional History:    Lives With: Spouse  Type of Home: Apartment  Home Layout: One level  Home Access: Level entry  Home Equipment: Rolling walker     Bathroom Shower/Tub: Tub/Shower unit  Bathroom Toilet: Standard  Bathroom Equipment: Shower chair  Bathroom Accessibility: Accessible    Receives Help From: Family  ADL Assistance: Independent  Homemaking Assistance: Independent  Ambulation Assistance: Independent  Transfer Assistance: Independent    Active : Yes  Occupation: Full time employment  Type of occupation: [employer]  Additional Comments: Pt reports being independent with all ADLs and IADLs compared to PTA. Pt has AD at home, but doesnt typically use for ambulation. Pt has 2L O2 at home. Per Pt, wife also needs increased assistance at home. OBJECTIVE:  Range of Motion:  Bilateral Lower Extremity: WFL    Strength:  Bilateral Lower Extremity: WFL    Balance:  Static Sitting Balance:  Stand By Assistance  Static Standing Balance: Stand By Assistance    Bed Mobility:  Not Tested    Transfers:  Sit to Stand: Stand By Assistance  Stand to Sit:Stand By Assistance    Ambulation:  Stand By Assistance  Distance: 200'   Surface: Level Tile  Device:No Device  Gait Deviations:  Decreased Gait Speed and several standing rest breaks     Exercise:  Patient was guided in 1 set(s) 10 reps of exercise to both lower extremities. Seated marches, Seated hamstring curls, Seated heel/toe raises and Long arc quads. Exercises were completed for increased independence with functional mobility.     Functional Outcome Measures: Completed  -PAC Inpatient Mobility without Stair Climbing Raw Score : 15  AM-PAC Inpatient without Stair Climbing T-Scale Score : 43.03    ASSESSMENT:  Activity Tolerance:  Patient tolerance of  treatment: good. Pt does desaturate with activity and requires extended rest breaks to recover. Verbal cues for pursed lip breathing and education regarding activity pacing. Treatment Initiated: Treatment and education initiated within context of evaluation. Evaluation time included review of current medical information, gathering information related to past medical, social and functional history, completion of standardized testing, formal and informal observation of tasks, assessment of data and development of plan of care and goals. Treatment time included skilled education and facilitation of tasks to increase safety and independence with functional mobility for improved independence and quality of life. Assessment: Body structures, Functions, Activity limitations: Decreased functional mobility ,Decreased strength,Decreased endurance,Decreased posture  Assessment: Pt demonstrates a decrease in baseline by way of bed mobility, transfers and ambulation secondary to decreased activity tolerance, strength, fatigue, and balance deficits. Pt will benefit from skilled PT services throughout admission and beyond hospital discharge for improvements in functional mobility and in order to decrease fall risk and return pt to PLOF.    Prognosis: Good    REQUIRES PT FOLLOW UP: Yes    Discharge Recommendations:  Discharge Recommendations: Home with Home health PT    Patient Education:  PT Education: [facility],Functional Mobility Training    Equipment Recommendations:  Equipment Needed: No    Plan:  Times per week: 3-5x GM  Times per day: Daily  Current Treatment Recommendations: Strengthening,Gait Training,Functional Mobility Training,Endurance Training    Goals:  Patient goals : none stated  Short term goals  Time Frame for Short term goals: by discharge  Short term goal 1: bed mobility with HOB flat, no rails, mod I for increased functional ind  Short term goal 2: sit<>stand from various surfaces with LRD mod I for safe transfers  Short term goal 3: ambulate 200' with LRD mod I and PO2 >90% for safe household distances  Long term goals  Time Frame for Long term goals : NA d/t short ELOS    Following session, patient left in safe position with all fall risk precautions in place.     Vane Patrick PT, DPT

## 2022-03-30 NOTE — PROGRESS NOTES
Progress Note  Date:3/30/2022       Room:[facility]/013-  Patient Name:Diony Vallejo     YOB: 1950     Age:71 y.o. Subjective    Subjective:  Symptoms:  Stable. He reports shortness of breath and cough. No chest pain. Diet:  Adequate intake. Activity level: Impaired due to weakness. Pain:  He reports no pain.        Current Facility-Administered Medications   Medication Dose Route Frequency Provider Last Rate Last Admin    predniSONE (DELTASONE) tablet 40 mg  40 mg Oral Daily ARCADIO Ham - CNP   40 mg at 03/30/22 0933    atovaquone (MEPRON) suspension 1,500 mg  1,500 mg Oral Daily ARCADIO Ham - CNP   1,500 mg at 03/30/22 6193    rivaroxaban (XARELTO) tablet 15 mg  15 mg Oral Daily Jamal Chow MD   15 mg at 03/30/22 1720    piperacillin-tazobactam (ZOSYN) 3,375 mg in dextrose 5 % 50 mL IVPB extended infusion (mini-bag)  3,375 mg IntraVENous Q8H Adrian Hoffman MD   Stopped at 03/30/22 1646    metoprolol succinate (TOPROL XL) extended release tablet 100 mg  100 mg Oral Daily Jamir Thomason DO   100 mg at 03/30/22 0936    pantoprazole (PROTONIX) tablet 40 mg  40 mg Oral QAM AC Jamal Chow MD   40 mg at 03/30/22 0536    traZODone (DESYREL) tablet 50 mg  50 mg Oral Nightly Jamal Chow MD   50 mg at 03/29/22 2117    rOPINIRole (REQUIP) tablet 0.5 mg  0.5 mg Oral Nightly Jamal Chow MD   0.5 mg at 03/27/22 1956    atorvastatin (LIPITOR) tablet 40 mg  40 mg Oral Nightly Jamal Cohw MD   40 mg at 03/29/22 2112    acetaminophen (TYLENOL) tablet 500 mg  500 mg Oral Q4H PRN Jamal Chow MD        ondansetron [facility]) tablet 4 mg  4 mg Oral Q8H PRN Jamal Chow MD        nystatin (MYCOSTATIN) 993577 UNIT/ML suspension 500,000 Units  5 mL Oral 4x Daily Jamal Chow MD   500,000 Units at 03/30/22 1719    tiotropium-olodaterol (STIOLTO) 2.5-2.5 MCG/ACT inhaler 2 puff  2 puff Inhalation Daily Verlan Render, MD   2 puff at 22 0938    aspirin chewable tablet 81 mg  81 mg Oral Daily Araceli Dacosta MD   81 mg at 22 0932      Review of Systems   Constitutional: Negative for activity change and appetite change. HENT: Negative for congestion, ear discharge, postnasal drip and sinus pain. Respiratory: Positive for cough and shortness of breath. Negative for choking and wheezing. On oxygen and stable  With dyspnea  On exertion better   Cardiovascular: Negative for chest pain and palpitations. Gastrointestinal: Negative for abdominal distention, abdominal pain and constipation. Genitourinary: Negative for difficulty urinating and flank pain. Musculoskeletal: Negative for arthralgias and back pain. Neurological: Negative for dizziness, seizures, light-headedness and headaches. Hematological: Negative for adenopathy. Psychiatric/Behavioral: Negative for agitation. Objective         Vitals Last 24 Hours:  TEMPERATURE:  Temp  Av.7 °F (36.5 °C)  Min: 97.2 °F (36.2 °C)  Max: 98.2 °F (36.8 °C)  RESPIRATIONS RANGE: Resp  Av.3  Min: 18  Max: 20  PULSE OXIMETRY RANGE: SpO2  Av.1 %  Min: 85 %  Max: 96 %  PULSE RANGE: Pulse  Av.7  Min: 57  Max: 72  BLOOD PRESSURE RANGE: Systolic (11WKN), ELW:644 , Min:109 , HJU:103   ; Diastolic (30FML), VDR:97, Min:65, Max:79    I/O (24Hr): Intake/Output Summary (Last 24 hours) at 3/30/2022 1822  Last data filed at 3/30/2022 1330  Gross per 24 hour   Intake 640 ml   Output 700 ml   Net -60 ml     Objective:  General Appearance:  Comfortable. Vital signs: (most recent): Blood pressure 109/69, pulse 61, temperature 98 °F (36.7 °C), temperature source Oral, resp. rate 18, height 5' 8\" (1.727 m), weight 154 lb 15.7 oz (70.3 kg), SpO2 92 %. Vital signs are normal.    Output: Producing urine and producing stool. HEENT: Normal HEENT exam.    Lungs:  Normal effort and normal respiratory rate.   There are rales (right base  on opening) and decreased breath sounds. (Prong expiration and  with no wheeze)  Heart: Normal rate. Regular rhythm. S1 normal and S2 normal.  No murmur. Abdomen: Abdomen is soft and non-distended. Bowel sounds are normal.   There is no abdominal tenderness. There is no mass. Extremities: There is no deformity. Neurological: Patient is alert and oriented to person, place and time. Patient has normal reflexes and normal muscle tone. Skin:  Warm. Labs/Imaging/Diagnostics    Labs:  CBC:No results for input(s): WBC, RBC, HGB, HCT, MCV, RDW, PLT in the last 72 hours. CHEMISTRIES:  Recent Labs     03/30/22  0807      K 4.2   CL 99   CO2 24   BUN 41*   CREATININE 1.6*   GLUCOSE 89     PT/INR:No results for input(s): PROTIME, INR in the last 72 hours. APTT:No results for input(s): APTT in the last 72 hours. LIVER PROFILE:No results for input(s): AST, ALT, BILIDIR, BILITOT, ALKPHOS in the last 72 hours. Imaging Last 24 Hours:  XR CHEST PORTABLE    Result Date: 3/29/2022  PROCEDURE: XR CHEST PORTABLE CLINICAL INFORMATION: Hypoxia. COMPARISON: Chest x-ray 3/25/2022. TECHNIQUE: AP portable chest radiograph performed. FINDINGS: Lines/tubes: A right chest permanent pacemaker is stable. The left atrial appendage clip is stable. The left subclavian catheter has been removed. Heart/mediastinum: The heart size is normal. The mediastinal contours are unchanged. Lungs: Diffuse predominantly lower lobe multifocal airspace opacities are stable. No pleural effusion or pneumothorax is observed. Right lower lobe calcified granuloma is stable. Bones: The visualized skeletal structures appear intact. Diffuse predominantly the lateral lower lobe multifocal airspace opacities are unchanged. **This report has been created using voice recognition software. It may contain minor errors which are inherent in voice recognition technology. ** Final report electronically signed by Dr Dk Espinosa on 3/29/2022 3:53 PM    Assessment//Plan           Hospital Problems           Last Modified POA    * (Principal) Acute and chronic respiratory failure with hypoxia ([address] 3/25/2022 Yes    Atherosclerotic heart disease of native coronary artery with other forms of angina pectoris ([address] 3/26/2022 Yes    Chronic bronchitis ([address] 3/26/2022 Yes    HTN (hypertension) 3/26/2022 Yes    Atrial fibrillation ([address] 3/26/2022 Yes    Pneumonia 3/26/2022 Yes    Thrush 3/26/2022 Yes        Assessment:    Condition: In stable condition. Improving.   (  Right  Side  Pneumonia  Now  Better  With interstitial lung  Disease  Noted        Copd    Improving       Acute  On chronic  Respiratory  Failure  With  Hypoxia and stable  With  Oxygen and now  Ambulating   With  oxygen   physical  Deconditioning   Noted        chronic  Diastolic  chf  stable       Mild  Renal  insuff  recheck   Kidney  Function     ashd  Stable     Post  Pacer  for  Tachy(-Ludwig syndrome         ). Plan:   Encourage ambulation. Consults: cardiology, pulmonology and . Advance diet as tolerated. Administer medications as ordered. (  decision if  Home  With therapies   Or  To  Nursing  Home       ambulating and overall better  But  Still weak     ?   To  Home  With therapy  Or  Nursing  Home     needs  little  More time with weakness and will decide  If  Nursing home  Or  Home  With therapies          Plan  For  Discharge  In am                                 ).       Electronically signed by Ras Machado MD on 3/30/22 at 6:22 PM EDT

## 2022-03-30 NOTE — CARE COORDINATION
3/30/22, 8:27 AM EDT    DISCHARGE PLANNING EVALUATION    Advised by Dr Claudell Dash that patient is now wanting ECF. Dr Claudell Dash has suggested [facility]. KARL spoke with patient and he confirms plan for ECF at discharge. He shared that he and spouse have been arguing and she does not want him to come home. SW advised him that placement would more than likely be short term due to his current progress with therapy. He stated his understanding and would still like to proceed with placement. He stated that he does not have a preference as to facilities and was agreeable to referral to [facility]. Call to [facility]. Referral information provided-KARL advised her that patient could possible be discharged today and requested call back as soon as possible. Note: KARL provided patient with community resource information for future reference. 10:37 AM Patient requested to speak with KARL again. He stated that he and his spouse have talked and she is now willing to take care of him at home. He no longer wants to go to a nursing home and plans home-he is agreeable with home health follow up for nursing and therapy and would like to use [facility].     11:59 AM Perfect serve message sent to Dr. Claudell Dash re: patient plans.

## 2022-03-30 NOTE — PROGRESS NOTES
Pt complained of soreness on top of left ear. There is a wound on top of left ear from o2 tubing. oxyear placed on nasal cannula and order placed for wound/ostomy to look at.

## 2022-03-30 NOTE — PROGRESS NOTES
Progress Note  Date:3/29/2022       Room:[address] [place] [address]  Patient Ramesh Mccauley     YOB: 1950     Age:71 y.o. Subjective    Subjective:  Symptoms:  Stable. He reports shortness of breath and cough. No chest pain, weakness or diarrhea. Diet:  Adequate intake. Activity level: Impaired due to weakness. Pain:  He reports no pain.        Current Facility-Administered Medications   Medication Dose Route Frequency Provider Last Rate Last Admin    predniSONE (DELTASONE) tablet 40 mg  40 mg Oral Daily Pablo Decree, APRN - CNP   40 mg at 03/29/22 1004    atovaquone (MEPRON) suspension 1,500 mg  1,500 mg Oral Daily Pablo Decree, APRN - CNP   1,500 mg at 03/29/22 1508    rivaroxaban (XARELTO) tablet 15 mg  15 mg Oral Daily German Prabhakar MD   15 mg at 03/29/22 1805    piperacillin-tazobactam (ZOSYN) 3,375 mg in dextrose 5 % 50 mL IVPB extended infusion (mini-bag)  3,375 mg IntraVENous Q8H Brandin Abbott MD   Stopped at 03/29/22 2308    metoprolol succinate (TOPROL XL) extended release tablet 100 mg  100 mg Oral Daily Jeffrey Whitehead DO   100 mg at 03/29/22 1004    pantoprazole (PROTONIX) tablet 40 mg  40 mg Oral QAM AC German Prabhakar MD   40 mg at 03/29/22 1004    traZODone (DESYREL) tablet 50 mg  50 mg Oral Nightly German Prabhakar MD   50 mg at 03/29/22 2117    rOPINIRole (REQUIP) tablet 0.5 mg  0.5 mg Oral Nightly German Prabhakar MD   0.5 mg at 03/27/22 1956    atorvastatin (LIPITOR) tablet 40 mg  40 mg Oral Nightly German Prabhakar MD   40 mg at 03/29/22 2112    acetaminophen (TYLENOL) tablet 500 mg  500 mg Oral Q4H PRN German Prabhakar MD        ondansetron [facility]) tablet 4 mg  4 mg Oral Q8H PRN German Prabhakar MD        nystatin (MYCOSTATIN) 271545 UNIT/ML suspension 500,000 Units  5 mL Oral 4x Daily German Prabhakar MD   500,000 Units at 03/29/22 2112    tiotropium-olodaterol (STIOLTO) 2.5-2.5 MCG/ACT inhaler 2 puff  2 puff Inhalation Daily Antoine De La Rosa MD   2 puff at 22 7302    aspirin chewable tablet 81 mg  81 mg Oral Daily Antoine De La Rosa MD   81 mg at 22 1004      Review of Systems   Constitutional: Negative for activity change, fatigue and fever. HENT: Negative for congestion, sinus pressure and sinus pain. Respiratory: Positive for cough and shortness of breath. Negative for wheezing. Cardiovascular: Negative for chest pain. Gastrointestinal: Negative for abdominal distention, abdominal pain, constipation and diarrhea. Genitourinary: Negative for difficulty urinating. Musculoskeletal: Negative for arthralgias and gait problem. Neurological: Negative for dizziness, tremors, weakness and numbness. Objective         Vitals Last 24 Hours:  TEMPERATURE:  Temp  Av.7 °F (36.5 °C)  Min: 97.3 °F (36.3 °C)  Max: 98.2 °F (36.8 °C)  RESPIRATIONS RANGE: Resp  Av.8  Min: 18  Max: 22  PULSE OXIMETRY RANGE: SpO2  Av.2 %  Min: 90 %  Max: 100 %  PULSE RANGE: Pulse  Av  Min: 55  Max: 66  BLOOD PRESSURE RANGE: Systolic (62SPL), JUT:997 , Min:107 , EQC:164   ; Diastolic (48JUS), SUC:37, Min:51, Max:74    I/O (24Hr): Intake/Output Summary (Last 24 hours) at 3/30/2022 0012  Last data filed at 3/29/2022 2306  Gross per 24 hour   Intake --   Output 500 ml   Net -500 ml     Objective:  General Appearance:  Comfortable. Vital signs: (most recent): Blood pressure 128/68, pulse 66, temperature 97.3 °F (36.3 °C), temperature source Oral, resp. rate 18, height 5' 8\" (1.727 m), weight 154 lb 15.7 oz (70.3 kg), SpO2 93 %. Vital signs are normal.  No fever. Output: Producing urine and producing stool. HEENT: Normal HEENT exam.    Lungs:  Normal effort and normal respiratory rate. There are rales. (Prolong expiration and with no wheeze and few rales opening in the right base)  Heart: Normal rate. Regular rhythm. S1 normal and S2 normal.  No murmur. Abdomen: Abdomen is soft and non-distended. Bowel sounds are normal.   There is no abdominal tenderness. Extremities: Normal range of motion. Pulses: Distal pulses are intact. Neurological: Patient is alert and oriented to person, place and time. Patient has normal reflexes. Labs/Imaging/Diagnostics    Labs:  CBC:No results for input(s): WBC, RBC, HGB, HCT, MCV, RDW, PLT in the last 72 hours. CHEMISTRIES:  Recent Labs     03/27/22  0544      K 4.2   CL 96*   CO2 29   BUN 52*   CREATININE 1.8*   GLUCOSE 90     PT/INR:No results for input(s): PROTIME, INR in the last 72 hours. APTT:No results for input(s): APTT in the last 72 hours. LIVER PROFILE:No results for input(s): AST, ALT, BILIDIR, BILITOT, ALKPHOS in the last 72 hours. Imaging Last 24 Hours:  XR CHEST PORTABLE    Result Date: 3/29/2022  PROCEDURE: XR CHEST PORTABLE CLINICAL INFORMATION: Hypoxia. COMPARISON: Chest x-ray 3/25/2022. TECHNIQUE: AP portable chest radiograph performed. FINDINGS: Lines/tubes: A right chest permanent pacemaker is stable. The left atrial appendage clip is stable. The left subclavian catheter has been removed. Heart/mediastinum: The heart size is normal. The mediastinal contours are unchanged. Lungs: Diffuse predominantly lower lobe multifocal airspace opacities are stable. No pleural effusion or pneumothorax is observed. Right lower lobe calcified granuloma is stable. Bones: The visualized skeletal structures appear intact. Diffuse predominantly the lateral lower lobe multifocal airspace opacities are unchanged. **This report has been created using voice recognition software. It may contain minor errors which are inherent in voice recognition technology. ** Final report electronically signed by Dr Devorah Balderrama on 3/29/2022 3:53 PM    Assessment//Plan           Hospital Problems           Last Modified POA    * (Principal) Acute and chronic respiratory failure with hypoxia ([address] 3/25/2022 Yes    Atherosclerotic heart disease of native coronary artery with other forms of angina pectoris ([address] 3/26/2022 Yes    Chronic bronchitis ([address] 3/26/2022 Yes    HTN (hypertension) 3/26/2022 Yes    Atrial fibrillation ([address] 3/26/2022 Yes    Pneumonia 3/26/2022 Yes    Thrush 3/26/2022 Yes        Assessment:    Condition: In stable condition. Improving. (Pneumonia right lower lobe noted     copd with exacerbation noted     Interstitial lung disease from amiadarone toxicity and underlying disease     Acute on chronic  respiratory failure with hypoxia     ashd stable      par atrial fibrillation stable     htn stable     pacemaker     Chronic diastolic chf  Stable     physical deconditioning          ). Plan:   Per physical therapy. Continue respiratory treatments. Consults: cardiology, physical therapy and occupational therapy (PM and r). Advance diet as tolerated. Administer medications as ordered. (Overall much  Improved      requiring  Oxygen     deconditioned and  Needs time      ask Physical Medicine and Rehab to see and with pt and ot ).        Electronically signed by Ras Machado MD on 3/30/22 at 12:12 AM EDT

## 2022-03-30 NOTE — PROGRESS NOTES
[address] [employer] [address]  Occupational Therapy  Daily Note  Time:   Time In: 1100  Time Out: 1124  Timed Code Treatment Minutes: 24 Minutes  Minutes: 24          Date: 3/30/2022  Patient Name: Balwinder Benito,   Gender: male      Room: [facility]/013-A  MRN: [id number]  : 1950  (70 y.o.)  Referring Practitioner: Brent Lima MD  Diagnosis: Acute and chronic respiratory failure and hypoxia  Additional Pertinent Hx: Pt is a 70 y.o. male with past medical history of Asthma/COPD, Afib, CAD s/p CABG, s/p pacemaker, admitted to ICU due to septic shock secondary to RLL HCAP, and acute hypoxic respiratory failure due to ILD. Patient complaining of increased shortness of breath, exertional, associated with fatigue, no chest pain, no palpitations, no fever no chills, patient refers had a diagnosis of community-acquired pneumonia 20 years ago, reason for coming is persistence of symptoms, in the ED was found with tachycardia, EKG with A. fib with RVR, leukocytosis, mildly increased procalcitonin, hyperlipidemia, patient with low blood pressures, consider initially has septic shock, antibiotics were started, due to necessity of vasopressor. Workup so far revealing possible bilateralpneumonia with pretty much bilateral infiltrates, went to AFib withrapid response. CXR and CT chest showed diffuse interstitial infiltrates in background of COPD. New RLL consolidation. He was discharged from the hospital 5 days ago on Home O2 2 lpm.    Restrictions/Precautions:  Restrictions/Precautions: General Precautions,Fall Risk     SUBJECTIVE: Pt seated upright in bed upon arrival, agreeable to OT session. PAIN: No c/o. Vitals:  Oxygen: Patient on 4L O2 via Nasal Canula  upon arrival to room. Patient O2 sats at 90% upon EOB sitting- increased to 6L in prep for mobility. Upon activity patient dropping O2 at mid 70's. Pt requiring lengthy rest break(s) to recover to 90%.    Heart Rate: WNL    COGNITION: Decreased Insight and Decreased Safety Awareness    ADL:   No ADL's completed this session. Pt declined. BALANCE:  Sitting Balance:  Supervision. EOB  Standing Balance: Stand By Assistance. x1 minute in prep for mobility    BED MOBILITY:  Supine to Sit: Supervision    Scooting: Supervision EOB    TRANSFERS:  Sit to Stand:  Stand By Assistance, cues for hand placement. Stand to Sit: Stand By Assistance, cues for hand placement  Comment: to/from various surfaces    FUNCTIONAL MOBILITY:  Assistive Device: Rolling Walker  Assist Level:  Stand By Assistance. Distance:   Completed functional mobility household distance within unit hallway at fair pace, no LOB noted. Pt requires max cues for walker safety to keep within AUNG reaching destination- seated rest break after trial of mobility, mod fatigue noted- max cues for deep breathing tech. ADDITIONAL ACTIVITIES:  Patient identified a personal goal to increase UB strength and improve overall endurance so they can complete their toilet & shower transfers; skilled edu on UE strengthening. Completed BUE exercises x10 reps x1 sets using min resistance band in all joints/planes to increase strength and endurance required for ADLs. Pt required min rest break between each exercise and min v/c for proper technique. ASSESSMENT:     Activity Tolerance:  Patient tolerance of  treatment: fair. Discharge Recommendations: Continue to assess pending progress, Subacute/skilled nursing facility and vs home health.   Equipment Recommendations: Equipment Needed: No (continue to monitor for need)  Plan: Times per week: 5x  Times per day: Daily  Current Treatment Recommendations: Strengthening,Patient/Caregiver Education & Training,Equipment Evaluation, Education, & procurement,Functional Mobility Training,Endurance Training,Safety Education & Training,Self-Care / ADL (transfer training)    Patient Education  Patient Education: Energy Conservation, Home Exercise Program, Assistive Device Safety and Safety with transfers and mobility. Goals  Short term goals  Time Frame for Short term goals: by discharge  Short term goal 1: Pt will increase activity tolerance to/from recliner progressing to bathroom with SBA while maintaining O2 greater than 90% to increase independence with functional mobility  Short term goal 2: Pt will complete functional t/fs with SBA in prep for ADLs  Short term goal 3: Pt will tolerate standing 3 minutes SBA with 1-3 v/c for PLB in prep for grooming tasks  Short term goal 4: Pt will complete LB dressing and bathing tasks with Min A    Following session, patient left in safe position with all fall risk precautions in place.

## 2022-03-30 NOTE — PROGRESS NOTES
Patient given incentive Spirometer. Educated on proper use. Patient is able to demonstrate proper use.

## 2022-03-30 NOTE — PROGRESS NOTES
[facility]    Patient - Corine Amaya   MRN -  [id number]   [facility] # - [de-identified]   - 1950      Date of Admission -  3/25/2022  9:04 AM  Date of evaluation -  3/30/2022  Room - -013-A   Hospital Day - [address] MD Primary Care Physician - Ruthann Theodore MD   Chief Complaint   SOB  Active Hospital Problem List      Active Hospital Problems    Diagnosis Date Noted    Chronic bronchitis [facility]) [J42]      Priority: High    Atherosclerotic heart disease of native coronary artery with other forms of angina pectoris ([facility] [address] [I25.118] 2021     Priority: High    Thrush [B37.0] 2022    Acute and chronic respiratory failure with hypoxia (HCC) [J96.21] 2022    Pneumonia [J18.9] 2022    Atrial fibrillation ([facility] [address] [I48.91] 2021    HTN (hypertension) [I10]      HPI   Corine Amaya is a 70 y.o. male  With past medical history of Asthma/COPD, Afib, CAD s/p CABG, s/p pacemaker, admitted to ICU due to septic shock secondary to RLL HCAP, and acute hypoxic respiratory failure due to ILD. Patient complaining of any days of shortness of breath, exertional, associated with fatigue, no chest pain, no palpitations, no fever no chills, patient refers had a diagnosis of community-acquired pneumonia 20 years ago, reason for coming is persistence of symptoms, in the ED was found with tachycardia, EKG with A. fib with RVR, leukocytosis, mildly increased procalcitonin, hyperlipidemia, patient with low blood pressures, consider initially has septic shock, antibiotics were started, due to necessity of vasopressor. CXR and CT chest showed diffuse interstitial infiltrates in background of COPD. New RLL consolidation.    He was discharged from the hospital 5 days ago on Home O2 2 lpm.   He quit smoking 1 months ago    Past 24 Hours   -Currently on O2 3LPM  -Plan is home now   -SOB improved   -Afebrile   -No pulmonary events overnight    -All systems reviewed. Past Medical History         Diagnosis Date    Asthma     Atrial fibrillation with RVR ([address] 2021    [employer] dual pacemaker  04/15/2021    Collagenous colitis     per scope     Colon polyps     dr Rodrigo Silva    COPD, mild ([address]     Eczema of hand     HTN (hypertension)     Hyperlipidemia     Smoker       Past Surgical History           Procedure Laterality Date    CARDIAC SURGERY      COLONOSCOPY  06/10/2021    theresa ccolon polyps and collagenous colitis    CORONARY ARTERY BYPASS GRAFT  2021    cabg x 3 with lima and atrial appendage clip  dr Puckett Na GRAFT N/A 2021    CABG  X 3 WITH DEJAH, Atrial Appendage Clip performed by Rashi Bae MD at [address] [place] [address] Road  2008    polyps    OTHER SURGICAL HISTORY  DEC 7TH 2012 DR [address]NAN [address] [place]     IGS ENDOSCOPIC BI LAT MAXILLARY, ETHMOID, SPHENOID, FRONTAL SINUSOTOMY WITH SEPTOPLASTY AND TURBINOPLASTIES    SKIN CANCER EXCISION  2014    Left side of Forehead     TOOTH EXTRACTION  2017     Diet    ADULT DIET; Regular; Low Sodium (2 gm)  Allergies    Penicillins and Sulfa antibiotics  Social History     Social History     Socioeconomic History    Marital status:      Spouse name: Not on file    Number of children: Not on file    Years of education: Not on file    Highest education level: Not on file   Occupational History    Not on file   Tobacco Use    Smoking status: Former Smoker     Packs/day: 1.00     Years: 40.00     Pack years: 40.00     Types: Cigarettes     Quit date: 3/7/2022     Years since quittin.0    Smokeless tobacco: Never Used   Substance and Sexual Activity    Alcohol use:  Yes     Alcohol/week: 0.0 standard drinks     Comment: very little    Drug use: No    Sexual activity: Not on file   Other Topics Concern    Not on file   Social History Narrative    No barriers with medication affordability now that he has met deductible    Active with [address]    Has O2 and supplies needed    No barriers with transportation     Social Determinants of Health     Financial Resource Strain: Low Risk     Difficulty of Paying Living Expenses: Not hard at all   Food Insecurity: No Food Insecurity    Worried About Running Out of Food in the Last Year: Never true    Danielle of Food in the Last Year: Never true   Transportation Needs: No Transportation Needs    Lack of Transportation (Medical): No    Lack of Transportation (Non-Medical):  No   Physical Activity: Inactive    Days of Exercise per Week: 0 days    Minutes of Exercise per Session: 0 min   Stress:     Feeling of Stress : Not on file   Social Connections:     Frequency of Communication with Friends and Family: Not on file    Frequency of Social Gatherings with Friends and Family: Not on file    Attends Spiritism Services: Not on file    Active Member of Clubs or Organizations: Not on file    Attends Club or Organization Meetings: Not on file    Marital Status: Not on file   Intimate Partner Violence:     Fear of Current or Ex-Partner: Not on file    Emotionally Abused: Not on file    Physically Abused: Not on file    Sexually Abused: Not on file   Housing Stability: Unknown    Unable to Pay for Housing in the Last Year: No    Number of Places Lived in the Last Year: Not on file    Unstable Housing in the Last Year: No     Family History          Problem Relation Age of Onset    Heart Disease Mother     Heart Disease Father         cabg    Diabetes Brother     Heart Disease Brother      Sleep History    No history     Meds    Current Medications    predniSONE  40 mg Oral Daily    atovaquone  1,500 mg Oral Daily    rivaroxaban  15 mg Oral Daily    piperacillin-tazobactam  3,375 mg IntraVENous Q8H    metoprolol succinate  100 mg Oral Daily    pantoprazole  40 mg Oral QAM AC    traZODone  50 mg Oral Nightly    rOPINIRole  0.5 mg Oral Nightly    atorvastatin  40 mg Oral Nightly    nystatin  5 mL Oral 4x Daily    tiotropium-olodaterol  2 puff Inhalation Daily    aspirin  81 mg Oral Daily     acetaminophen, ondansetron  IV Drips/Infusions    Vitals    Vitals    height is 5' 8\" (1.727 m) and weight is 154 lb 15.7 oz (70.3 kg). His oral temperature is 98 °F (36.7 °C). His blood pressure is 128/65 and his pulse is 62. His respiration is 18 and oxygen saturation is 96%. O2 Flow Rate (L/min): 4 L/min (weaned to 3)  I/O    Intake/Output Summary (Last 24 hours) at 3/30/2022 1413  Last data filed at 3/30/2022 0315  Gross per 24 hour   Intake --   Output 700 ml   Net -700 ml     No data found. Exam   Constitutional: Patient appears moderately built and moderately nourished. Currently stable on O2 3LPM. OOB to chair  Mouth/Throat: Oropharynx is clear and moist.  No oral thrush. Eyes: Conjunctivae are normal. Pupils are equal, round, and reactive to light. No scleral icterus. Neck: Neck supple. No JVD or tracheal deviation present. Cardiovascular: Paced, S1 and S2 with no murmur. No peripheral edema  Pulmonary/Chest: Diminished breath sounds bilaterally  Abdominal: Soft. Bowel sounds audible. No distension or tenderness to palp  Musculoskeletal: Moves all extremities; no cyanosis or clubbing   Lymphadenopathy:  No cervical adenopathy. Neurological: Patient is alert and oriented to person, place, and time. Skin: Skin is warm and dry. Labs   ABG  Lab Results   Component Value Date    PH 7.51 03/25/2022    PO2 62 03/25/2022    PCO2 32 03/25/2022    HCO3 26 03/25/2022    O2SAT 94 03/25/2022     Lab Results   Component Value Date    IFIO2 3 03/25/2022    MODE SIMV 01/12/2021    SETTIDVOL 550 01/12/2021    SETPEEP 5.0 01/12/2021     CBC  No results for input(s): WBC, RBC, HGB, HCT, MCV, MCH, MCHC, RDW, PLT, MPV in the last 72 hours.    BMP  Recent Labs     03/30/22  0807      K 4.2   CL 99   CO2 24   BUN 41*   CREATININE 1.6* GLUCOSE 89   CALCIUM 8.4*     LFT  No results for input(s): AST, ALT, ALB, BILITOT, ALKPHOS, LIPASE in the last 72 hours. Invalid input(s): AMYLASE  TROP  Lab Results   Component Value Date    TROPONINT < 0.010 03/26/2022    TROPONINT 0.011 03/25/2022    TROPONINT 0.012 03/25/2022     BNP  Lab Results   Component Value Date    PROBNP 5795.0 03/25/2022    PROBNP 715.3 03/15/2022    PROBNP 3131.0 03/09/2022     D-Dimer  No results found for: DDIMER  Lactic Acid  No results for input(s): LACTA in the last 72 hours. INR  No results for input(s): INR, PROTIME in the last 72 hours. PTT  No results for input(s): APTT in the last 72 hours. Glucose  No results for input(s): POCGLU in the last 72 hours. UA No results for input(s): SPECGRAV, PHUR, COLORU, CLARITYU, MUCUS, PROTEINU, BLOODU, RBCUA, WBCUA, BACTERIA, NITRU, GLUCOSEU, BILIRUBINUR, UROBILINOGEN, KETUA, LABCAST, LABCASTTY, AMORPHOS in the last 72 hours. Invalid input(s): CRYSTALS. PFTs   No results   Echo    3/7/22  ECHO   Summary   Left ventricle size is normal.   Normal left ventricular wall thickness. There were no regional wall motion abnormalities. Ejection fraction is visually estimated in the range of 55% to 60%. Abnormal (paradoxical) motion consistent with post-operative status. IVC size is within normal limits with normal respiratory phasic changes. Cultures    Procalcitonin  Lab Results   Component Value Date    PROCAL 0.21 03/25/2022    PROCAL 0.08 03/07/2022   VRE (-)  Flu A/B (-)  BC (-)  Covid (-)  Radiology    CXR  3/29/22  CXR 1V  Diffuse predominantly the lateral lower lobe multifocal airspace opacities are unchanged.          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 since 03/7/2022 (No evidence ILD back in 2021)  Superimposed RLL HCAP  COPD  Quit smoking 1 month ago  CAD s/p CABG   Afib   Full Code   Recommendations   Continue O2 by nasal canula to keep saturation above 92%  Antibiotics for HCAP  Patent will need long course of steroids 0.5 mg/kg with slow taper over 3-6 months for Amiodarone vs OP-   Will start on Prednisone 40 mg PO daily was DC home on this last admission   Start on atovaquone 1.5 gm Daily for PCP prophylaxis  Will consider bronchoscopy with BAL if worsened symptoms   Stiolto 2.5-2.5mcg 2 puff daily   VTE prophylaxis: Xarelto   Possible ECF at DC   UO 700mL/24  Home O2 evaluation prior to DC if returning home  IS ordered discussed and encouraged use   Stable from pulmonary standpoint     -Will follow-up in Pulmonary clinic in 1 month to monitor response to prednisone obtain PFT and CXR chest prior to appintment 4/20/22 Dr. Parminder Day at 10:40 AM  -PFT 4/6/2022    Case discussed with nurse and patient/family. Questions and concerns addressed. Meds and Orders reviewed. Electronically signed by     ARCADIO Gordillo - CNP on 3/30/2022 at 2:13 PM    Vitals:    03/30/22 1154 03/30/22 1715 03/30/22 1718 03/30/22 1945   BP: 128/65 109/69  (!) 115/57   Pulse: 62 61  55   Resp: 18 18  18   Temp:  98 °F (36.7 °C)  98.4 °F (36.9 °C)   TempSrc:  Oral  Oral   SpO2: 96% (!) 85% 92% 94%   Weight:       Height:         Stable, oxygen requirements baseline  Wishes to go home, declines ECF  Anxious, high risk for readmission  Continue Prednisone 40 mg/d  F/U pulm clinic as scheduled    Patient seen and examined independently by me. Above discussed and I agree with  CNP note Also see my additional comments. Labs, cultures, and radiographs when available were reviewed. Changes were made in the orders as necessary. I discussed patient concerns with Maria Fernanda HICKEY and instructions were given. Respiratory care issues addressed. Please see our orders for the updated patient care plan.     Electronically signed by     Seema Bowden MD on 3/30/2022 at 9:52 PM

## 2022-03-31 NOTE — CARE COORDINATION
3/31/22, 2:05 PM EDT    DISCHARGE ON GOING EVALUATION    Percy Augustin day: 6  Location: -13/013-A Reason for admit: Lactic acidosis [E87.2]  SIRS (systemic inflammatory response syndrome) (HCC) [R65.10]  Troponin level elevated [R77.8]  Atrial fibrillation with RVR (HCC) [I48.91]  Acute and chronic respiratory failure with hypoxia (HCC) [J96.21]  Septic shock (HCC) [A41.9, R65.21]  Acute on chronic respiratory failure with hypoxemia (HCC) [J96.21]  Pneumonia of both lungs due to infectious organism, unspecified part of lung [J18.9]  Acute kidney injury superimposed on CKD ([facility] [N17.9, N18.9]  Leukocytosis, unspecified type [D72.829]  Acute on chronic congestive heart failure, unspecified heart failure type ([facility] [address] [I50.9]   Procedure: n/a  Barriers to Discharge: New ent consult for nosebleed. Sats dropped to 82% earlier this morning, required venturi mask. No on 5L NC.   PCP: Artie Calabrese MD  Readmission Risk Score: 20.9 ( )%  Patient Goals/Plan/Treatment Preferences: Wife presented to [facility], she and patient would now like [facility] [place] [facility].  SW updated and made referral .

## 2022-03-31 NOTE — CARE COORDINATION
3/31/22, 11:32 AM EDT    DISCHARGE PLANNING EVALUATION    Spoke with patient at his request-answered general questions re: home delivered meals, POA for financial and home health. Patient is still planning home when discharged with [facility] for nursing and therapy. Will need home health order for therapy.

## 2022-03-31 NOTE — CARE COORDINATION
3/31/22, 3:41 PM EDT    DISCHARGE PLANNING EVALUATION    Patient and spouse are now requesting [facility]. Referral called to facility. Received call back from [facility] are able to accept. Anticipate that patient will be discharged tomorrow. Patient and RN Boy Lewis updated-patient states that his spouse can transport to facility. Patient will need a COVID test-CHRISTINE Lewis updated.

## 2022-03-31 NOTE — PROGRESS NOTES
In patients room with physical therapy, patient spo2 difficult to read, patient appears slightly short of breath, Tele mon reading 82% on 4 liters with decent pleath. Patient transitioned to NRB at 10 lpm with immediate improvement. Respiratory therapist at bedside. Patient shared that he has been getting frequent nose bleeds following his previous admission when he was on [address] [employer] [facility]. Patient also shared he had tissue in left nare for bleed, which is believed to hamper oxygenation. Tissue removed and placed back on NC at 6 lpm for recovery. Patient nose bleed continued, increased difficulty maintaining canula placement. Patient transitioned to venturi mask 40%, nare re packed, patient tolerating mask well.

## 2022-03-31 NOTE — PROGRESS NOTES
[facility]    Patient - Holli Alberto   MRN -  [id number]   [facility] # - [de-identified]   - 1950      Date of Admission -  3/25/2022  9:04 AM  Date of evaluation -  3/31/2022  Room - -13/013-A   Hospital Day - [facility] MD [facility] Primary Care Physician - Baltazar Baltazar MD   Chief Complaint   SOB  Active Hospital Problem List      Active Hospital Problems    Diagnosis Date Noted    Chronic bronchitis [facility]) [J42]      Priority: High    Atherosclerotic heart disease of native coronary artery with other forms of angina pectoris ([facility] [address] [I25.118] 2021     Priority: High    Thrush [B37.0] 2022    Acute and chronic respiratory failure with hypoxia (HCC) [J96.21] 2022    Pneumonia [J18.9] 2022    Atrial fibrillation ([facility] [I48.91] 2021    HTN (hypertension) [I10]      HPI   Holli Alberto is a 70 y.o. male  With past medical history of Asthma/COPD, Afib, CAD s/p CABG, s/p pacemaker, admitted to ICU due to septic shock secondary to RLL HCAP, and acute hypoxic respiratory failure due to ILD. Patient complaining of any days of shortness of breath, exertional, associated with fatigue, no chest pain, no palpitations, no fever no chills, patient refers had a diagnosis of community-acquired pneumonia 20 years ago, reason for coming is persistence of symptoms, in the ED was found with tachycardia, EKG with A. fib with RVR, leukocytosis, mildly increased procalcitonin, hyperlipidemia, patient with low blood pressures, consider initially has septic shock, antibiotics were started, due to necessity of vasopressor. CXR and CT chest showed diffuse interstitial infiltrates in background of COPD. New RLL consolidation.    He was discharged from the hospital 5 days ago on Home O2 2 lpm.   He quit smoking 1 months ago    Past 24 Hours   -hypoxia today with therapy on 4LPM dropped to 78%- needed NRB at 10LPM recovered quickly; now stable on O2 5LPM  -SOB only with exertion   -Afebrile   -No pulmonary events overnight    -All systems reviewed. Past Medical History         Diagnosis Date    Asthma     Atrial fibrillation with RVR ([address] 2021    [employer] dual pacemaker  04/15/2021    Collagenous colitis     per scope     Colon polyps     dr Deny Knowles    COPD, mild ([address]     Eczema of hand     HTN (hypertension)     Hyperlipidemia     Smoker       Past Surgical History           Procedure Laterality Date    CARDIAC SURGERY      COLONOSCOPY  06/10/2021    theresa ccolon polyps and collagenous colitis    CORONARY ARTERY BYPASS GRAFT  2021    cabg x 3 with lima and atrial appendage clip  dr George Luke GRAFT N/A 2021    CABG  X 3 WITH DEJAH, Atrial Appendage Clip performed by Frantz Cherry MD at [address] [place] [address] Road  2008    polyps    OTHER SURGICAL HISTORY  DEC 7TH 2012 DR [address]NAN [address] [place]     IGS ENDOSCOPIC BI LAT MAXILLARY, ETHMOID, SPHENOID, FRONTAL SINUSOTOMY WITH SEPTOPLASTY AND TURBINOPLASTIES    SKIN CANCER EXCISION  2014    Left side of Forehead     TOOTH EXTRACTION  2017     Diet    ADULT DIET; Regular; Low Sodium (2 gm)  Allergies    Penicillins and Sulfa antibiotics  Social History     Social History     Socioeconomic History    Marital status:      Spouse name: Not on file    Number of children: Not on file    Years of education: Not on file    Highest education level: Not on file   Occupational History    Not on file   Tobacco Use    Smoking status: Former Smoker     Packs/day: 1.00     Years: 40.00     Pack years: 40.00     Types: Cigarettes     Quit date: 3/7/2022     Years since quittin.0    Smokeless tobacco: Never Used   Substance and Sexual Activity    Alcohol use:  Yes     Alcohol/week: 0.0 standard drinks     Comment: very little    Drug use: No    Sexual activity: Not on file   Other Topics Concern    Not on file   Social History Narrative    No barriers with medication affordability now that he has met deductible    Active with [facility]    Has O2 and supplies needed    No barriers with transportation     Social Determinants of Health     Financial Resource Strain: Low Risk     Difficulty of Paying Living Expenses: Not hard at all   Food Insecurity: No Food Insecurity    Worried About Running Out of Food in the Last Year: Never true    Danielle of Food in the Last Year: Never true   Transportation Needs: No Transportation Needs    Lack of Transportation (Medical): No    Lack of Transportation (Non-Medical):  No   Physical Activity: Inactive    Days of Exercise per Week: 0 days    Minutes of Exercise per Session: 0 min   Stress:     Feeling of Stress : Not on file   Social Connections:     Frequency of Communication with Friends and Family: Not on file    Frequency of Social Gatherings with Friends and Family: Not on file    Attends Confucianism Services: Not on file    Active Member of Clubs or Organizations: Not on file    Attends Club or Organization Meetings: Not on file    Marital Status: Not on file   Intimate Partner Violence:     Fear of Current or Ex-Partner: Not on file    Emotionally Abused: Not on file    Physically Abused: Not on file    Sexually Abused: Not on file   Housing Stability: Unknown    Unable to Pay for Housing in the Last Year: No    Number of Places Lived in the Last Year: Not on file    Unstable Housing in the Last Year: No     Family History          Problem Relation Age of Onset    Heart Disease Mother     Heart Disease Father         cabg    Diabetes Brother     Heart Disease Brother      Sleep History    No history     Meds    Current Medications    oxymetazoline  2 spray Each Nostril BID    predniSONE  40 mg Oral Daily    atovaquone  1,500 mg Oral Daily    rivaroxaban  15 mg Oral Daily    piperacillin-tazobactam  3,375 mg IntraVENous Q8H    metoprolol succinate  100 mg Oral Daily    pantoprazole  40 mg Oral QAM AC    traZODone  50 mg Oral Nightly    rOPINIRole  0.5 mg Oral Nightly    atorvastatin  40 mg Oral Nightly    nystatin  5 mL Oral 4x Daily    tiotropium-olodaterol  2 puff Inhalation Daily    aspirin  81 mg Oral Daily     acetaminophen, ondansetron  IV Drips/Infusions    Vitals    Vitals    height is 5' 8\" (1.727 m) and weight is 154 lb 15.7 oz (70.3 kg). His oral temperature is 98 °F (36.7 °C). His blood pressure is 120/64 and his pulse is 58. His respiration is 18 and oxygen saturation is 99%. O2 Flow Rate (L/min): 5 L/min  I/O    Intake/Output Summary (Last 24 hours) at 3/31/2022 1317  Last data filed at 3/31/2022 0458  Gross per 24 hour   Intake 220 ml   Output 1000 ml   Net -780 ml     No data found. Exam   Constitutional: Patient appears moderately built and moderately nourished. Currently stable on O2 5LPM. OOB to chair  Mouth/Throat: Oropharynx is clear and moist.  No oral thrush. Eyes: Conjunctivae are normal. Pupils are equal, round, and reactive to light. No scleral icterus. Neck: Neck supple. No JVD or tracheal deviation present. Cardiovascular: Paced, S1 and S2 with no murmur. No peripheral edema  Pulmonary/Chest: Diminished breath sounds bilaterally  Abdominal: Soft. Bowel sounds audible. No distension or tenderness to palp  Musculoskeletal: Moves all extremities; no cyanosis or clubbing   Lymphadenopathy:  No cervical adenopathy. Neurological: Patient is alert and oriented to person, place, and time. Skin: Skin is warm and dry.   Labs   ABG  Lab Results   Component Value Date    PH 7.51 03/25/2022    PO2 62 03/25/2022    PCO2 32 03/25/2022    HCO3 26 03/25/2022    O2SAT 94 03/25/2022     Lab Results   Component Value Date    IFIO2 3 03/25/2022    MODE SIMV 01/12/2021    SETTIDVOL 550 01/12/2021    SETPEEP 5.0 01/12/2021     CBC  No results for input(s): WBC, RBC, HGB, HCT, MCV, MCH, MCHC, RDW, PLT, MPV in the last 72 hours. BMP  Recent Labs     03/30/22  0807      K 4.2   CL 99   CO2 24   BUN 41*   CREATININE 1.6*   GLUCOSE 89   CALCIUM 8.4*     LFT  No results for input(s): AST, ALT, ALB, BILITOT, ALKPHOS, LIPASE in the last 72 hours. Invalid input(s): AMYLASE  TROP  Lab Results   Component Value Date    TROPONINT < 0.010 03/26/2022    TROPONINT 0.011 03/25/2022    TROPONINT 0.012 03/25/2022     BNP  Lab Results   Component Value Date    PROBNP 5795.0 03/25/2022    PROBNP 715.3 03/15/2022    PROBNP 3131.0 03/09/2022     D-Dimer  No results found for: DDIMER  Lactic Acid  No results for input(s): LACTA in the last 72 hours. INR  No results for input(s): INR, PROTIME in the last 72 hours. PTT  No results for input(s): APTT in the last 72 hours. Glucose  No results for input(s): POCGLU in the last 72 hours. UA No results for input(s): SPECGRAV, PHUR, COLORU, CLARITYU, MUCUS, PROTEINU, BLOODU, RBCUA, WBCUA, BACTERIA, NITRU, GLUCOSEU, BILIRUBINUR, UROBILINOGEN, KETUA, LABCAST, LABCASTTY, AMORPHOS in the last 72 hours. Invalid input(s): CRYSTALS. PFTs   No results   Echo    3/7/22  ECHO   Summary   Left ventricle size is normal.   Normal left ventricular wall thickness. There were no regional wall motion abnormalities. Ejection fraction is visually estimated in the range of 55% to 60%. Abnormal (paradoxical) motion consistent with post-operative status. IVC size is within normal limits with normal respiratory phasic changes. Cultures    Procalcitonin  Lab Results   Component Value Date    PROCAL 0.21 03/25/2022    PROCAL 0.08 03/07/2022   VRE (-)  Flu A/B (-)  BC (-)  Covid (-)  Radiology    CXR  3/31/22  CXR 2-view- pending     3/29/22  CXR 1V  Diffuse predominantly the lateral lower lobe multifocal airspace opacities are unchanged.          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 since 03/7/2022 (No evidence ILD back in 2021)  Superimposed RLL HCAP  COPD  Quit smoking 1 month ago  CAD s/p CABG   Afib   Full Code   Recommendations   Continue O2 by nasal canula to keep saturation above 92%  Antibiotics for HCAP  Patent will need long course of steroids 0.5 mg/kg with slow taper over 3-6 months for Amiodarone vs OP-   Prednisone 40 mg PO daily was DC home on this last admission   Atovaquone 1.5 gm Daily for PCP prophylaxis  Will consider bronchoscopy with BAL if worsened symptoms   Stiolto 2.5-2.5mcg 2 puff daily   VTE prophylaxis: Xarelto   Possible ECF at DC   UO 1L/24  Home O2 evaluation prior to DC if returning home  IS ordered discussed and encouraged use   CXR 2-view today for significant hypoxia today with therapy     -Will follow-up in Pulmonary clinic in 1 month to monitor response to prednisone obtain PFT and CXR chest prior to appintment 4/20/22 Dr. Parminder Day at 10:40 AM  -PFT 4/6/2022    Case discussed with nurse and patient/family. Questions and concerns addressed. Meds and Orders reviewed. Electronically signed by     ARCADIO Gordillo CNP on 3/31/2022 at 1:17 PM    Vitals:    03/30/22 1945 03/31/22 0000 03/31/22 0400 03/31/22 0923   BP: (!) 115/57 (!) 109/58 125/72 120/64   Pulse: 55 55 61 58   Resp: 18 18 18 18   Temp: 98.4 °F (36.9 °C) 98.2 °F (36.8 °C) 98 °F (36.7 °C) 98 °F (36.7 °C)   TempSrc: Oral Oral Oral Oral   SpO2: 94% 98% 99%    Weight:       Height:         No changes but oxygen requirements not improving  Afebrile  Chest bilateral rales  Nos planning to go to ECF  CXR:    Impression   Stable multifocal airspace opacities most pronounced in the lower lobes.             Amiodarone pulmonary toxicity  Continue Prednisone, wean supplemental oxygen as tolerated  PJP prophylaxis    Patient seen and examined independently by me. Above discussed and I agree with  CNP note Also see my additional comments.  Labs, cultures, and radiographs when available were reviewed. Changes were made in the orders as necessary. I discussed patient concerns with Maria Fernanda HICKEY and instructions were given. Respiratory care issues addressed. Please see our orders for the updated patient care plan.     Electronically signed by     Adriana Abbasi MD on 3/31/2022 at 5:00 PM

## 2022-03-31 NOTE — CONSULTS
Department of Otolaryngology  Consult Note    Reason for Consult:  Nosebleeds  Requesting Physician:  Dr Amanda Shin    CHIEF COMPLAINT:  Nosebleed    History Obtained From:  patient, spouse, electronic medical record    HISTORY OF PRESENT ILLNESS:                The patient is a 70 y.o. male who was admitted to [facility] 3/25/2022 for septic shock secondary to community acquired pneumonia and atrial fibrillation with RVR. Patient reports prior recent admission beginning of March for CHF and shortness of breath neessitation HFNC. He reports frequent left-sided nosebleeds since that time. He has required supplemental oxygen per nasal cannula as well. Patient admits that he has scab/crust in left side of nose that he picks out and provokes bleeding, then he will pack himself with tissue. This occurs daily to several times per day and eventually slows. He is on ASA and Elquis. Patient has multiple co-morbidities including atrial fib with RVR, pacemaker, COPD, HYTN, hyperlipidemia, asthma, smoking history.      Past Medical History:        Diagnosis Date    Asthma     Atrial fibrillation with RVR ([address] 04/05/2021    [employer] dual pacemaker  04/15/2021    Collagenous colitis 2021    per scope 2021    Colon polyps 2021    dr Rodrigo Silva    COPD, mild ([address]     Eczema of hand     HTN (hypertension)     Hyperlipidemia     Smoker        Past Surgical History:        Procedure Laterality Date    CARDIAC SURGERY      COLONOSCOPY  06/10/2021    theresa ccolon polyps and collagenous colitis    CORONARY ARTERY BYPASS GRAFT  01/12/2021    cabg x 3 with lima and atrial appendage clip  dr Puckett Na GRAFT N/A 01/12/2021    CABG  X 3 WITH DEJAH, Atrial Appendage Clip performed by Rashi Bae MD at [address] [place] [address] Road  06/2008    polyps    OTHER SURGICAL HISTORY  DEC 7TH 2012 DR [address]N [address] [place]     IGS ENDOSCOPIC BI LAT MAXILLARY, ETHMOID, SPHENOID, FRONTAL SINUSOTOMY WITH SEPTOPLASTY AND TURBINOPLASTIES    SKIN CANCER EXCISION  09/2014    Left side of Forehead     TOOTH EXTRACTION  02/2017       Current Medications:   Current Facility-Administered Medications: mupirocin (BACTROBAN) 2 % ointment, , Topical, BID  oxymetazoline (AFRIN) 0.05 % nasal spray 2 spray, 2 spray, Each Nostril, TID  predniSONE (DELTASONE) tablet 40 mg, 40 mg, Oral, Daily  atovaquone (MEPRON) suspension 1,500 mg, 1,500 mg, Oral, Daily  rivaroxaban (XARELTO) tablet 15 mg, 15 mg, Oral, Daily  piperacillin-tazobactam (ZOSYN) 3,375 mg in dextrose 5 % 50 mL IVPB extended infusion (mini-bag), 3,375 mg, IntraVENous, Q8H  metoprolol succinate (TOPROL XL) extended release tablet 100 mg, 100 mg, Oral, Daily  pantoprazole (PROTONIX) tablet 40 mg, 40 mg, Oral, QAM AC  traZODone (DESYREL) tablet 50 mg, 50 mg, Oral, Nightly  rOPINIRole (REQUIP) tablet 0.5 mg, 0.5 mg, Oral, Nightly  atorvastatin (LIPITOR) tablet 40 mg, 40 mg, Oral, Nightly  acetaminophen (TYLENOL) tablet 500 mg, 500 mg, Oral, Q4H PRN  ondansetron (ZOFRAN) tablet 4 mg, 4 mg, Oral, Q8H PRN  nystatin (MYCOSTATIN) 440920 UNIT/ML suspension 500,000 Units, 5 mL, Oral, 4x Daily  tiotropium-olodaterol (STIOLTO) 2.5-2.5 MCG/ACT inhaler 2 puff, 2 puff, Inhalation, Daily  aspirin chewable tablet 81 mg, 81 mg, Oral, Daily    Allergies:  Review of patient's allergies indicates no known allergies. Social History:    TOBACCO:   reports that he quit smoking about 3 weeks ago. His smoking use included cigarettes. He has a 40.00 pack-year smoking history. He has never used smokeless tobacco.  ETOH:   reports current alcohol use. DRUGS:   reports no history of drug use.     Family History:       Problem Relation Age of Onset    Heart Disease Mother     Heart Disease Father         cabg    Diabetes Brother     Heart Disease Brother        REVIEW OF SYSTEMS:    A complete multi-organ review of systems was performed using a new patient questionnaire, and reviewed by me. ENT:  negative except as noted in HPI  CONSTITUTIONAL:  negative except as noted in HPI  EYES:  negative except as noted in HPI  RESPIRATORY:  negative except as noted in HPI  CARDIOVASCULAR:  negative except as noted in HPI  GASTROINTESTINAL:  negative except as noted in HPI  GENITOURINARY:  negative except as noted in HPI  MUSCULOSKELETAL:  negative except as noted in HPI  SKIN:  negative except as noted in HPI  ENDOCRINE/METABOLIC: negative except as noted in HPI  HEMATOLOGIC/LYMPHATIC:  negative except as noted in HPI  ALLERGY/IMMUN: negative except as noted in HPI  NEUROLOGICAL:  negative except as noted in HPI  BEHAVIOR/PSYCH:  negative except as noted in HPI    PHYSICAL EXAM:    VITALS:  /64   Pulse 57   Temp 98.2 °F (36.8 °C) (Oral)   Resp 16   Ht 5' 8\" (1.727 m)   Wt 154 lb 15.7 oz (70.3 kg)   SpO2 94%   BMI 23.57 kg/m²     Alert, oriented and cooperative older adult male who is chronically ill-appearing. Sitting up in bedside chair. Wife in room. Patient with tissue stuffed in left nare and evidence of recent bleeding. Large clot behind uvula in posterior nasopharynx. After discussion with patient, partially removed transorally with bayonet forceps. Tissue removed from left nare. Large clot removed with suction and bayonet forceps. Abrasion and oozing from right anterior septum with evidence of digital trauma, patient confirms. Cotton pledget with topical lidocaine and oxymetazoline placed, then removed after appropriate amount of time. Bleeder cauterized using silver nitrate stick. Excess silver nitrate removed with moist cotton tip applicator. Given anticoagulation status, supplemental oxygen use and patient's admitted digital trauma, a Nasopore pack was trimmed lenghtwise, soaked in oxymetazoline and placed in left anterior nasal cavity to provide protection, pressure and decongestant to area. No further bleeding. Patient tolerated well. DATA:    CBC with Differential:    Lab Results   Component Value Date    WBC 23.3 03/26/2022    RBC 3.19 03/26/2022    RBC 4.84 11/07/2011    HGB 10.6 03/26/2022    HCT 32.1 03/26/2022     03/26/2022    .6 03/26/2022    MCH 33.2 03/26/2022    MCHC 33.0 03/26/2022    RDW 13.3 06/07/2018    NRBC 0 03/26/2022    NRBC 0 11/07/2011    SEGSPCT 92.6 03/26/2022    MONOPCT 4.3 03/26/2022    MONOSABS 1.0 03/26/2022    LYMPHSABS 0.6 03/26/2022    EOSABS 0.0 03/26/2022    BASOSABS 0.0 03/26/2022    DIFFTYPE see below 03/25/2022       IMPRESSION/RECOMMENDATIONS:      Frequent nosebleeds; Chronic anticoagulation; Supplemental oxygen use  Left anterior septal abrasions with bleeding addressed with silver nitrate and dissolvable pack soaked in oxymetazoline. No further bleeding. Orders for oxymetazoline and mupirocin use placed. Patient was instructed on how to apply pressure if bleeding recurs and when to seek medical treatment after discharge. Prongs on nasal cannula were trimmed. Patient okay for discharge from ENT standpoint. Patient will follow up in ENT office in 2 weeks. Management of patient's care was collaborated with Dr Aide Lombardi today.       Electronically signed by ARCADIO Newman CNP on 3/31/2022 at 4:42 PM

## 2022-04-01 NOTE — PROGRESS NOTES
[facility]  Occupational Therapy  Daily Note  Time:   Time In: 0498  Time Out: 1152  Timed Code Treatment Minutes: 18 Minutes  Minutes: 18          Date: 2022  Patient Name: Bhavya Tubbs,   Gender: male      Room: [facility]13/013-A  MRN: [id number]  : 1950  (70 y.o.)  Referring Practitioner: Erica Larry MD  Diagnosis: Acute and chronic respiratory failure and hypoxia  Additional Pertinent Hx: Pt is a 70 y.o. male with past medical history of Asthma/COPD, Afib, CAD s/p CABG, s/p pacemaker, admitted to ICU due to septic shock secondary to RLL HCAP, and acute hypoxic respiratory failure due to ILD. Patient complaining of increased shortness of breath, exertional, associated with fatigue, no chest pain, no palpitations, no fever no chills, patient refers had a diagnosis of community-acquired pneumonia 20 years ago, reason for coming is persistence of symptoms, in the ED was found with tachycardia, EKG with A. fib with RVR, leukocytosis, mildly increased procalcitonin, hyperlipidemia, patient with low blood pressures, consider initially has septic shock, antibiotics were started, due to necessity of vasopressor. Workup so far revealing possible bilateralpneumonia with pretty much bilateral infiltrates, went to AFib withrapid response. CXR and CT chest showed diffuse interstitial infiltrates in background of COPD. New RLL consolidation. He was discharged from the hospital 5 days ago on Home O2 2 lpm.    Restrictions/Precautions:  Restrictions/Precautions: General Precautions,Fall Risk     SUBJECTIVE: RN reporting Pt discharging today but OK to see. RN reporting increase to 6L with mobility. Pt agreeable to OT, wanting to get up and sit in chair. Pt requiring extended seated rest breaks to recover to 90%. Time spent at end of session offering encouragement and active listening as Pt reports feeling \"discouraged\" about circumstances.     PAIN: None reported    Vitals: Oxygen: Pt on 6L O2 upon arrival 87-88% laying in bed increased to 92% with PLB, briefly dropped to 86% EOB and increased to 96% after 4 minutes seated EOB. Ambulated to chair and dropped to 75-77% once seated, increased to 93% after 4 minutes    COGNITION: WFL    ADL:   No ADL's completed this session. Audie Perez BALANCE:  Sitting Balance:  Supervision, with increased time for completion. EOB for 4-5 minutes in prep for mobility and to brign O2 levels back up  Standing Balance: Stand By Assistance. BED MOBILITY:  Supine to Sit: Supervision    Scooting: Supervision      TRANSFERS:  Sit to Stand:  Stand By Assistance, with increased time for completion. with RW  Stand to Sit: Stand By Assistance, with increased time for completion, cues for hand placement. FUNCTIONAL MOBILITY:  Assistive Device: Rolling Walker  Assist Level:  Stand By Assistance. Distance: EOB to recliner    Pt requiring min cues for RW safety and keeping RW within AUNG when ambulating. Min fatigue noted with cueing for PLB. ASSESSMENT:     Activity Tolerance:  Patient tolerance of  treatment: fair.        Discharge Recommendations: Subacute/skilled nursing facility  Equipment Recommendations: Equipment Needed: No (continue to monitor for need)  Plan: Times per week: 5x  Times per day: Daily  Current Treatment Recommendations: Strengthening,Patient/Caregiver Education & Training,Equipment Evaluation, Education, & procurement,Functional Mobility Training,Endurance Training,Safety Education & Training,Self-Care / ADL (transfer training)    Patient Education  Patient Education: Energy Conservation, Reviewed Prior Education and functional mobility, transfer training    Goals  Short term goals  Time Frame for Short term goals: by discharge  Short term goal 1: Pt will increase activity tolerance to/from recliner progressing to bathroom with SBA while maintaining O2 greater than 90% to increase independence with functional mobility  Short term goal 2: Pt will complete functional t/fs with SBA in prep for ADLs  Short term goal 3: Pt will tolerate standing 3 minutes SBA with 1-3 v/c for PLB in prep for grooming tasks  Short term goal 4: Pt will complete LB dressing and bathing tasks with Min A    Following session, patient left in safe position with all fall risk precautions in place.

## 2022-04-01 NOTE — CARE COORDINATION
4/1/22, 11:39 AM EDT    DISCHARGE PLANNING EVALUATION    Spoke with patient earlier-he is aware of plan to discharge today. He states that his spouse will be transporting him to facility sometime around 2:30-3:00 pm. He has reminded her to bring his portable 02 tank. Call to [facility] with update. She advised that spouse will be in prior to coming to [facility] to do the admission paperwork. 11:41 AM NOELLE and COVID (-) results faxed to facility. Number provided to [phone] for report. Patient will be skilled under his medicare benefit. No other needs. 4/1/22, 11:49 AM EDT    Patient goals/plan/ treatment preferences discussed by  and . Patient goals/plan/ treatment preferences reviewed with patient/ family. Patient/ family verbalize understanding of discharge plan and are in agreement with goal/plan/treatment preferences. Understanding was demonstrated using the teach back method. AVS provided by RN at time of discharge, which includes all necessary medical information pertaining to the patients current course of illness, treatment, post-discharge goals of care, and treatment preferences. Services After Discharge  Services At/After Discharge: Skilled [address] [facility])   IMM Letter  IMM Letter given to Patient/Family/Significant other/Guardian/POA/by[de-identified] Copy delivered to patient by mgr. Veronica  IMM Letter date given[de-identified] 03/30/22  IMM Letter time given[de-identified] 3638

## 2022-04-01 NOTE — PROGRESS NOTES
Patient discharged at this time. All IV's removed. Discharge instructions, medication changes and follow up appointments explained at this time. All questions answered at this time. AVS given to patient and paperwork signed with this RN. All patient belongings returned. Patient discharged to [facility] via wife. Chart broken down and placed in yellow bin.

## 2022-04-01 NOTE — DISCHARGE INSTR - COC
Alaina Rodarte, 6 mo and older, IM (Fluzone, Flulaval) 10/16/2018    Influenza, Quadv, IM, (6 mo and older Fluzone, Flulaval, Fluarix and 3 yrs and older Afluria) 12/29/2020    Pneumococcal Conjugate 13-valent (Awkwvdq01) 08/30/2016    Pneumococcal Polysaccharide (Yaenmewqg45) 08/29/2017    Tetanus 04/01/2000       Active Problems:  Patient Active Problem List   Diagnosis Code    Hyperlipidemia E78.5    Asthma J45.909    Smoker F17.200    Allergic rhinitis due to other allergen J30.89    Collagenous colitis K52.831    Lactose intolerance E73.9    HTN (hypertension) I10    COPD, mild (HCC) J44.9    Atrial fibrillation (HCC) I48.91    Atherosclerotic heart disease of native coronary artery with other forms of angina pectoris (HCC) I25.118    S/P CABG x 3 Z95.1    Encounter for cardioversion procedure Z01.89    S/P left atrial appendage ligation Z98.890    Ischemic cardiomyopathy I25.5    Chronic bronchitis (HCC) J42    CHF (congestive heart failure), NYHA class II, chronic, systolic (HCC) G82.09    Tachycardia-bradycardia ([address] I49.5    [employer] dual pacemaker  Z95.0    Pneumonia J18.9    Dyspnea and respiratory abnormalities R06.00, R06.89    Acute and chronic respiratory failure with hypoxia ([address] J96.21    Thrush B37.0    Nosebleed R04.0       Isolation/Infection:   Isolation            No Isolation          Patient Infection Status       Infection Onset Added Last Indicated Last Indicated By Review Planned Expiration Resolved Resolved By    None active    Resolved    COVID-19 (Rule Out) 03/27/22 03/27/22 03/27/22 COVID-19, Rapid (Ordered)   03/27/22 Rule-Out Test Resulted    COVID-19 (Rule Out) 03/25/22 03/25/22 03/25/22 COVID-19, Rapid (Ordered)   03/25/22 Rule-Out Test Resulted    COVID-19 (Rule Out) 03/07/22 03/07/22 03/07/22 COVID-19, Rapid (Ordered)   03/07/22 Rule-Out Test Resulted    C-diff Rule Out 05/09/21 05/10/21 05/09/21 C. difficile toxin Molecular (Ordered)   05/12/21 Rule-Out Test Resulted COVID-19 (Rule Out) 01/11/21 01/11/21 01/11/21 COVID-19 (Ordered)   01/11/21 Rule-Out Test Resulted            Nurse Assessment:  Last Vital Signs: /61   Pulse 56   Temp 98 °F (36.7 °C) (Oral)   Resp 18   Ht 5' 8\" (1.727 m)   Wt 154 lb 15.7 oz (70.3 kg)   SpO2 91%   BMI 23.57 kg/m²     Last documented pain score (0-10 scale): Pain Level: 0  Last Weight:   Wt Readings from Last 1 Encounters:   03/25/22 154 lb 15.7 oz (70.3 kg)     Mental Status:  oriented and alert    IV Access:  - None    Nursing Mobility/ADLs:  Walking   Independent  Transfer  Independent  Bathing  Independent  Dressing  Independent  Toileting  Independent  Feeding  Independent  Med Admin  Independent  Med Delivery   whole    Wound Care Documentation and Therapy:        Elimination:  Continence: Bowel: Yes  Bladder: Yes  Urinary Catheter: None   Colostomy/Ileostomy/Ileal Conduit: No       Date of Last BM: 3/31    Intake/Output Summary (Last 24 hours) at 4/1/2022 0715  Last data filed at 4/1/2022 0625  Gross per 24 hour   Intake 690 ml   Output 1175 ml   Net -485 ml     I/O last 3 completed shifts: In: 690 [P.O.:640; IV Piggyback:50]  Out: 2175 [Urine:2175]    Safety Concerns:     None    Impairments/Disabilities:      None    Nutrition Therapy:  Current Nutrition Therapy:   - Oral Diet:  General    Routes of Feeding: Oral  Liquids: No Restrictions  Daily Fluid Restriction: no  Last Modified Barium Swallow with Video (Video Swallowing Test): not done    Treatments at the Time of Hospital Discharge:   Respiratory Treatments: Stiolto inhaler   Oxygen Therapy:  is on oxygen at 3 L/min per nasal cannula.  6L when walking   Ventilator:    - No ventilator support    Rehab Therapies: Physical Therapy and Occupational Therapy  Weight Bearing Status/Restrictions: No weight bearing restrictions  Other Medical Equipment (for information only, NOT a DME order):  walker  Other Treatments: n/a    Patient's personal belongings (please select all that are sent with patient):  Glasses    RN SIGNATURE:  Electronically signed by Merline Hawking, RN on 4/1/22 at 10:27 AM EDT    CASE MANAGEMENT/SOCIAL WORK SECTION    Inpatient Status Date: 3/25/22    Readmission Risk Assessment Score:  Readmission Risk              Risk of Unplanned Readmission:  31           Discharging to Facility/ Agency   Name: [facility]  Address: [address] [place] [address] [address]  Phone: [phone]  Fax: [phone]    Dialysis Facility (if applicable)   Name:  Address:  Dialysis Schedule:  Phone:  Fax:    / signature: Electronically signed by MARIANA Davenport on 4/1/22 at 7:16 AM EDT    PHYSICIAN SECTION    Prognosis: Good    Condition at Discharge: Stable    Rehab Potential (if transferring to Rehab): Good    Recommended Labs or Other Treatments After Discharge: on  4-5-22 cbc with  diff and  bmp    Physician Certification: I certify the above information and transfer of Arnel Davis  is necessary for the continuing treatment of the diagnosis listed and that he requires [facility] for greater 30 days.      Update Admission H&P: No change in H&P    PHYSICIAN SIGNATURE:  Electronically signed by Di Chilel MD on 4/1/22 at 8:23 AM EDT

## 2022-04-01 NOTE — PROGRESS NOTES
Progress Note  Date:3/31/2022       Room:[address] [place] [id number]  Patient Bernard Beebe     YOB: 1950     Age:71 y.o. Subjective    Subjective:  Symptoms:  Stable. He reports shortness of breath, cough and weakness. No chest pain or diarrhea. Diet:  Adequate intake. No nausea or vomiting. Activity level: Impaired due to weakness. Pain:  He reports no pain.        Current Facility-Administered Medications   Medication Dose Route Frequency Provider Last Rate Last Admin    mupirocin (BACTROBAN) 2 % ointment   Topical BID Litzy Downs APRN - CNP   Given at 03/31/22 2103    oxymetazoline (AFRIN) 0.05 % nasal spray 2 spray  2 spray Each Nostril TID Crystal RAD Downs APRN - CNP   2 spray at 03/31/22 2104    predniSONE (DELTASONE) tablet 40 mg  40 mg Oral Daily Toyin Embs, APRN - CNP   40 mg at 03/31/22 0815    atovaquone (MEPRON) suspension 1,500 mg  1,500 mg Oral Daily Toyin Embs, APRN - CNP   1,500 mg at 03/31/22 0815    rivaroxaban (XARELTO) tablet 15 mg  15 mg Oral Daily Radha Lee MD   15 mg at 03/31/22 1733    piperacillin-tazobactam (ZOSYN) 3,375 mg in dextrose 5 % 50 mL IVPB extended infusion (mini-bag)  3,375 mg IntraVENous Q8H Dionna Kim MD 12.5 mL/hr at 03/31/22 1931 3,375 mg at 03/31/22 1931    metoprolol succinate (TOPROL XL) extended release tablet 100 mg  100 mg Oral Daily Lilliana Fung DO   100 mg at 03/31/22 0815    pantoprazole (PROTONIX) tablet 40 mg  40 mg Oral QAM AC Radha Lee MD   40 mg at 03/31/22 0641    traZODone (DESYREL) tablet 50 mg  50 mg Oral Nightly Radha Lee MD   50 mg at 03/31/22 2103    rOPINIRole (REQUIP) tablet 0.5 mg  0.5 mg Oral Nightly Radha Lee MD   0.5 mg at 03/27/22 1956    atorvastatin (LIPITOR) tablet 40 mg  40 mg Oral Nightly Radha Lee MD   40 mg at 03/31/22 2103    acetaminophen (TYLENOL) tablet 500 mg  500 mg Oral Q4H PRN Radha Lee MD        ondansetron [facility]) tablet 4 mg  4 mg Oral Q8H PRN Yue Gonzales MD        nystatin (MYCOSTATIN) 049310 UNIT/ML suspension 500,000 Units  5 mL Oral 4x Daily Yue Gonzales MD   500,000 Units at 22 2103    tiotropium-olodaterol (STIOLTO) 2.5-2.5 MCG/ACT inhaler 2 puff  2 puff Inhalation Daily Oscar Lovell MD   2 puff at 22 0850    aspirin chewable tablet 81 mg  81 mg Oral Daily Oscar Lovell MD   81 mg at 22 0815      Review of Systems   Constitutional: Positive for fatigue. Negative for activity change and fever. HENT: Positive for congestion. Respiratory: Positive for cough and shortness of breath. Negative for apnea and wheezing. Cardiovascular: Negative for chest pain, palpitations and leg swelling. Gastrointestinal: Negative for abdominal distention, abdominal pain, diarrhea, nausea and vomiting. Genitourinary: Negative for difficulty urinating. Musculoskeletal: Negative for arthralgias, back pain and gait problem. Neurological: Positive for weakness. Negative for dizziness and seizures. Objective         Vitals Last 24 Hours:  TEMPERATURE:  Temp  Av °F (36.7 °C)  Min: 97.5 °F (36.4 °C)  Max: 98.2 °F (36.8 °C)  RESPIRATIONS RANGE: Resp  Av.2  Min: 16  Max: 18  PULSE OXIMETRY RANGE: SpO2  Av.5 %  Min: 94 %  Max: 99 %  PULSE RANGE: Pulse  Av.6  Min: 55  Max: 61  BLOOD PRESSURE RANGE: Systolic (23ATC), IWZ:494 , Min:109 , YVF:492   ; Diastolic (92YGT), STV:01, Min:58, Max:72    I/O (24Hr): Intake/Output Summary (Last 24 hours) at 3/31/2022 2340  Last data filed at 3/31/2022 2330  Gross per 24 hour   Intake 640 ml   Output 1275 ml   Net -635 ml     Objective:  General Appearance:  Comfortable. Vital signs: (most recent): Blood pressure 134/64, pulse 57, temperature 98.2 °F (36.8 °C), temperature source Oral, resp. rate 16, height 5' 8\" (1.727 m), weight 154 lb 15.7 oz (70.3 kg), SpO2 94 %. Vital signs are normal.  No fever. Output: Producing urine and producing stool. HEENT: Normal HEENT exam.    Lungs:  Normal effort and normal respiratory rate. There are rales (in right base only) and decreased breath sounds. Heart: Normal rate. Regular rhythm. S1 normal and S2 normal.  No murmur. Abdomen: Abdomen is soft and non-distended. Bowel sounds are normal.   There is no abdominal tenderness. Extremities: Normal range of motion. Neurological: Patient is alert and oriented to person, place and time. Normal strength. Patient has normal reflexes. Labs/Imaging/Diagnostics    Labs:  CBC:No results for input(s): WBC, RBC, HGB, HCT, MCV, RDW, PLT in the last 72 hours. CHEMISTRIES:  Recent Labs     03/30/22  0807      K 4.2   CL 99   CO2 24   BUN 41*   CREATININE 1.6*   GLUCOSE 89     PT/INR:No results for input(s): PROTIME, INR in the last 72 hours. APTT:No results for input(s): APTT in the last 72 hours. LIVER PROFILE:No results for input(s): AST, ALT, BILIDIR, BILITOT, ALKPHOS in the last 72 hours. Imaging Last 24 Hours:  XR CHEST (2 VW)    Result Date: 3/31/2022  PROCEDURE: XR CHEST (2 VW) CLINICAL INFORMATION: Hypoxia. COMPARISON: Chest x-ray 3/29/2022. TECHNIQUE: PA and lateral views of the chest performed. FINDINGS: Lines/tubes: Right chest permanent pacemaker is stable. Left atrial appendage clip is unchanged. Median sternotomy wires are stable. Heart/mediastinum: The heart size and mediastinal contours are unchanged. Lungs: Patchy multifocal airspace opacities most pronounced in the lower lung zones are stable. A calcified right lower lobe granuloma is unchanged. No pleural effusion or pneumothorax is observed. Bones: Diffuse osteopenia is present. The visualized skeletal structures appear intact. Stable multifocal airspace opacities most pronounced in the lower lobes. **This report has been created using voice recognition software.   It may contain minor errors which are inherent in voice recognition technology. ** Final report electronically signed by Dr Princess Davis on 3/31/2022 2:56 PM    Assessment//Plan           Hospital Problems           Last Modified POA    * (Principal) Acute and chronic respiratory failure with hypoxia ([address] 3/25/2022 Yes    Atherosclerotic heart disease of native coronary artery with other forms of angina pectoris ([address] 3/26/2022 Yes    Chronic bronchitis ([address] 3/26/2022 Yes    HTN (hypertension) 3/26/2022 Yes    Atrial fibrillation ([address] 3/26/2022 Yes    Pneumonia 3/26/2022 Yes    Thrush 3/26/2022 Yes    Nosebleed 3/31/2022 Yes        Assessment:    Condition: In stable condition. Improving. 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 Plan:   Encourage ambulation and per physical therapy. Continue respiratory treatments. Consults: physical therapy, occupational therapy and pulmonology. Advance diet as tolerated. Administer medications as ordered. ( doing well and now  Thinking  Home      continue present therapies     start lasix only 2  Times a week    Check with services as ok for home).        Electronically signed by Ginna Fuentes MD on 3/31/22 at 11:40 PM EDT

## 2022-04-01 NOTE — PROGRESS NOTES
[facility]    Patient - Destiney Burgess   MRN -  [id number]   [facility] # - [de-identified]   - 1950      Date of Admission -  3/25/2022  9:04 AM  Date of evaluation -  2022  Room - --A   Hospital Day - Buck Herrmann MD Primary Care Physician - Dex Piper MD   Chief Complaint   SOB  Active Hospital Problem List      Active Hospital Problems    Diagnosis Date Noted    Chronic bronchitis [facility]) [J42]      Priority: High    Atherosclerotic heart disease of native coronary artery with other forms of angina pectoris ([facility] [I25.118] 2021     Priority: High    Nosebleed [R04.0]     Thrush [B37.0] 2022    Acute and chronic respiratory failure with hypoxia ([facility] [J96.21] 2022    Pneumonia [J18.9] 2022    Atrial fibrillation ([facility] [I48.91] 2021    HTN (hypertension) [I10]      HPI   Destiney Burgess is a 70 y.o. male  With past medical history of Asthma/COPD, Afib, CAD s/p CABG, s/p pacemaker, admitted to ICU due to septic shock secondary to RLL HCAP, and acute hypoxic respiratory failure due to ILD. Patient complaining of any days of shortness of breath, exertional, associated with fatigue, no chest pain, no palpitations, no fever no chills, patient refers had a diagnosis of community-acquired pneumonia 20 years ago, reason for coming is persistence of symptoms, in the ED was found with tachycardia, EKG with A. fib with RVR, leukocytosis, mildly increased procalcitonin, hyperlipidemia, patient with low blood pressures, consider initially has septic shock, antibiotics were started, due to necessity of vasopressor. CXR and CT chest showed diffuse interstitial infiltrates in background of COPD. New RLL consolidation.    He was discharged from the hospital 5 days ago on Home O2 2 lpm.   He quit smoking 1 months ago    Past 24 Hours   -DC today to [address] on O2 6LPM  -No pulmonary events overnight    -All systems reviewed. Past Medical History         Diagnosis Date    Asthma     Atrial fibrillation with RVR ([facility] 2021    [employer] dual pacemaker  04/15/2021    Collagenous colitis     per scope     Colon polyps     dr Annalisa Huber    COPD, mild ([address]     Eczema of hand     HTN (hypertension)     Hyperlipidemia     Smoker       Past Surgical History           Procedure Laterality Date    CARDIAC SURGERY      COLONOSCOPY  06/10/2021    theresa ccolon polyps and collagenous colitis    CORONARY ARTERY BYPASS GRAFT  2021    cabg x 3 with lima and atrial appendage clip  dr Shaan Barragan GRAFT N/A 2021    CABG  X 3 WITH DEJAH, Atrial Appendage Clip performed by Ishmael Washington MD at [address] [place] [address] Road  2008    polyps    OTHER SURGICAL HISTORY  DEC 7TH 2012 DR [address]NAN [address] [place]     IGS ENDOSCOPIC BI LAT MAXILLARY, ETHMOID, SPHENOID, FRONTAL SINUSOTOMY WITH SEPTOPLASTY AND TURBINOPLASTIES    SKIN CANCER EXCISION  2014    Left side of Forehead     TOOTH EXTRACTION  2017     Diet    ADULT DIET; Regular; Low Sodium (2 gm)  Allergies    Penicillins and Sulfa antibiotics  Social History     Social History     Socioeconomic History    Marital status:      Spouse name: Not on file    Number of children: Not on file    Years of education: Not on file    Highest education level: Not on file   Occupational History    Not on file   Tobacco Use    Smoking status: Former Smoker     Packs/day: 1.00     Years: 40.00     Pack years: 40.00     Types: Cigarettes     Quit date: 3/7/2022     Years since quittin.0    Smokeless tobacco: Never Used   Substance and Sexual Activity    Alcohol use:  Yes     Alcohol/week: 0.0 standard drinks     Comment: very little    Drug use: No    Sexual activity: Not on file   Other Topics Concern    Not on file   Social History Narrative    No barriers with medication affordability now that he has met deductible    Active with [facility]    Has O2 and supplies needed    No barriers with transportation     Social Determinants of Health     Financial Resource Strain: Low Risk     Difficulty of Paying Living Expenses: Not hard at all   Food Insecurity: No Food Insecurity    Worried About Running Out of Food in the Last Year: Never true    Danielle of Food in the Last Year: Never true   Transportation Needs: No Transportation Needs    Lack of Transportation (Medical): No    Lack of Transportation (Non-Medical):  No   Physical Activity: Inactive    Days of Exercise per Week: 0 days    Minutes of Exercise per Session: 0 min   Stress:     Feeling of Stress : Not on file   Social Connections:     Frequency of Communication with Friends and Family: Not on file    Frequency of Social Gatherings with Friends and Family: Not on file    Attends [employer] Services: Not on file    Active Member of Clubs or Organizations: Not on file    Attends Club or Organization Meetings: Not on file    Marital Status: Not on file   Intimate Partner Violence:     Fear of Current or Ex-Partner: Not on file    Emotionally Abused: Not on file    Physically Abused: Not on file    Sexually Abused: Not on file   Housing Stability: Unknown    Unable to Pay for Housing in the Last Year: No    Number of Places Lived in the Last Year: Not on file    Unstable Housing in the Last Year: No     Family History          Problem Relation Age of Onset    Heart Disease Mother     Heart Disease Father         cabg    Diabetes Brother     Heart Disease Brother      Sleep History    No history     Meds    Current Medications    mupirocin   Topical BID    oxymetazoline  2 spray Each Nostril TID    predniSONE  40 mg Oral Daily    atovaquone  1,500 mg Oral Daily    rivaroxaban  15 mg Oral Daily    piperacillin-tazobactam  3,375 mg IntraVENous Q8H    metoprolol succinate  100 mg Oral Daily    pantoprazole  40 mg Oral QAM AC    traZODone  50 mg Oral Nightly    rOPINIRole  0.5 mg Oral Nightly    atorvastatin  40 mg Oral Nightly    nystatin  5 mL Oral 4x Daily    tiotropium-olodaterol  2 puff Inhalation Daily    aspirin  81 mg Oral Daily     acetaminophen, ondansetron  IV Drips/Infusions    Vitals    Vitals    height is 5' 8\" (1.727 m) and weight is 154 lb 15.7 oz (70.3 kg). His oral temperature is 98 °F (36.7 °C). His blood pressure is 138/65 and his pulse is 59. His respiration is 16 and oxygen saturation is 90%. O2 Flow Rate (L/min): 6 L/min  I/O    Intake/Output Summary (Last 24 hours) at 4/1/2022 1151  Last data filed at 4/1/2022 0625  Gross per 24 hour   Intake 270 ml   Output 1175 ml   Net -905 ml     No data found. Exam   Constitutional: Patient appears moderately built and moderately nourished. Currently stable on O2 6LPM. OOB to chair  Mouth/Throat: Oropharynx is clear and moist.  No oral thrush. Eyes: Conjunctivae are normal. Pupils are equal, round, and reactive to light. No scleral icterus. Neck: Neck supple. No JVD or tracheal deviation present. Cardiovascular: Paced, S1 and S2 with no murmur. No peripheral edema  Pulmonary/Chest: Diminished breath sounds bilaterally  Abdominal: Soft. Bowel sounds audible. No distension or tenderness to palp  Musculoskeletal: Moves all extremities; no cyanosis or clubbing   Lymphadenopathy:  No cervical adenopathy. Neurological: Patient is alert and oriented to person, place, and time. Skin: Skin is warm and dry. Labs   ABG  Lab Results   Component Value Date    PH 7.51 03/25/2022    PO2 62 03/25/2022    PCO2 32 03/25/2022    HCO3 26 03/25/2022    O2SAT 94 03/25/2022     Lab Results   Component Value Date    IFIO2 3 03/25/2022    MODE SIMV 01/12/2021    SETTIDVOL 550 01/12/2021    SETPEEP 5.0 01/12/2021     CBC  No results for input(s): WBC, RBC, HGB, HCT, MCV, MCH, MCHC, RDW, PLT, MPV in the last 72 hours.    BMP  Recent Labs 03/30/22  0807      K 4.2   CL 99   CO2 24   BUN 41*   CREATININE 1.6*   GLUCOSE 89   CALCIUM 8.4*     LFT  No results for input(s): AST, ALT, ALB, BILITOT, ALKPHOS, LIPASE in the last 72 hours. Invalid input(s): AMYLASE  TROP  Lab Results   Component Value Date    TROPONINT < 0.010 03/26/2022    TROPONINT 0.011 03/25/2022    TROPONINT 0.012 03/25/2022     BNP  Lab Results   Component Value Date    PROBNP 5795.0 03/25/2022    PROBNP 715.3 03/15/2022    PROBNP 3131.0 03/09/2022     D-Dimer  No results found for: DDIMER  Lactic Acid  No results for input(s): LACTA in the last 72 hours. INR  No results for input(s): INR, PROTIME in the last 72 hours. PTT  No results for input(s): APTT in the last 72 hours. Glucose  No results for input(s): POCGLU in the last 72 hours. UA No results for input(s): SPECGRAV, PHUR, COLORU, CLARITYU, MUCUS, PROTEINU, BLOODU, RBCUA, WBCUA, BACTERIA, NITRU, GLUCOSEU, BILIRUBINUR, UROBILINOGEN, KETUA, LABCAST, LABCASTTY, AMORPHOS in the last 72 hours. Invalid input(s): CRYSTALS. PFTs   No results   Echo    3/7/22  ECHO   Summary   Left ventricle size is normal.   Normal left ventricular wall thickness. There were no regional wall motion abnormalities. Ejection fraction is visually estimated in the range of 55% to 60%. Abnormal (paradoxical) motion consistent with post-operative status. IVC size is within normal limits with normal respiratory phasic changes. Cultures    Procalcitonin  Lab Results   Component Value Date    PROCAL 0.21 03/25/2022    PROCAL 0.08 03/07/2022   VRE (-)  Flu A/B (-)  BC (-)  Covid (-)  Radiology    CXR  3/31/22  CXR 2-view  Stable multifocal airspace opacities most pronounced in the lower lobes. 3/29/22  CXR 1V  Diffuse predominantly the lateral lower lobe multifocal airspace opacities are unchanged.          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 since 03/7/2022 (No evidence ILD back in 2021)  Superimposed RLL HCAP  COPD  Quit smoking 1 month ago  CAD s/p CABG   Afib   Full Code   Recommendations   Continue O2 by nasal canula to keep saturation above 92%  Antibiotics for HCAP  Patent will need long course of steroids 0.5 mg/kg with slow taper over 3-6 months for Amiodarone vs OP-   Prednisone 40 mg PO daily was DC home on this last admission   Atovaquone 1.5 gm Daily for PCP prophylaxis  Stiolto 2.5-2.5mcg 2 puff daily   VTE prophylaxis: Xarelto   Possible ECF at DC   UO 1L/24  Home O2 evaluation prior to DC if returning home or when patient leaves the ECF  IS ordered discussed and encouraged use   Plan is DC today to ECF    -Will follow-up in Pulmonary clinic in 1 month to monitor response to prednisone obtain PFT and CXR chest prior to appintment 4/20/22 Dr. Raysa Daniel at 10:40 AM  -PFT 4/6/2022    Case discussed with nurse and patient/family. Questions and concerns addressed. Meds and Orders reviewed.     Electronically signed by     ARCADIO Espinal CNP on 4/1/2022 at 11:51 AM

## 2022-04-01 NOTE — PROGRESS NOTES
Blue packet handed to daughter. Educated to bring to [facility] to give to them.

## 2022-04-01 NOTE — PLAN OF CARE
Problem: Discharge Planning:  Goal: Participates in care planning  Description: Participates in care planning  Outcome: Met This Shift  Pt plans to return home with spouse at discharge when medically appropriate. Problem: Anxiety/Stress:  Goal: Level of anxiety will decrease  Description: Level of anxiety will decrease  Outcome: Met This Shift  Patient calm, cooperative. Denies anxiety. Problem: Gas Exchange - Impaired:  Goal: Levels of oxygenation will improve  Description: Levels of oxygenation will improve  Outcome: Met This Shift   Patient on 3.5L O2 via NC; SPO2 >90%. This is baseline for patient. Problem: Pain:  Goal: Control of acute pain  Description: Control of acute pain  Outcome: Met This Shift  Patient denies pain this shift. Pain assessment ongoing. Problem: Falls - Risk of:  Goal: Will remain free from falls  Description: Will remain free from falls  Outcome: Met This Shift  Patient remains free from falls this shift. Fall risk assessment complete. Fall sign posted. Non skid socks on. Bed in lowest position, 2/4 siderails up. Bed alarm on. Call light and all possessions within reach. Patient at risk due to deconditioning. Care plan reviewed with patient. Patient verbalized understanding of the plan of care and contribute to goal setting.

## 2022-04-01 NOTE — PROGRESS NOTES
Report called to [facility]. Spoke with Ann Machado. All questions answered and no further questions at this time.

## 2022-04-04 NOTE — CARE COORDINATION
[place] AMBULATORY ENCOUNTER(APSO):    [facility]  [address] [address]  [phone]    ASSESSMENT & PLAN:  1. Acute pain of both shoulders      Return in about 6 weeks (around 7/20/2020).    10 pound lifting restriction above shoulder height, no restriction below shoulder height.  Recheck six weeks.  Discussed chiropractic and PT and MRI, patient try chiropractor for now her choice.  NSAID OTC with food prn pain. Shoulder brochure given.    The patient indicates understanding of these issues and agrees with the plan.       CHIEF COMPLAINT:   Chief Complaint   Patient presents with   • Shoulder Pain     Left shoulder pain since Saturday June 6th.   • Shoulder Pain     Had right shoulder pain over weekend, but that pain has decreased.             HISTORY OF PRESENT ILLNESS:    She is complaining of/requesting evaluation for bilateral shoulder pain.  She had prior SLAP tear left.  Saturday morning picked up a dozen eggs from refrigerator and it hurt, was shaking a spray can on Saturday and screamed from pain right shoulder.  The pain Saturday was 9/10, no specific injury.  Today right shoulder cracks and pops which is chronic but no pain today.  Left shoulder chronically sore 2/10 annoying that just started recently also.  Had SLAP tear on right but never had that one repaired.      PAST HISTORIES:  I have reviewed the past medical history,  social history, medications and allergies listed in the medical record as obtained by my nursing staff and support staff and agree with their documentation.  ALLERGIES:   Allergen Reactions   • Erythromycin RASH and NAUSEA   • Percocet [Oxycodone-Acetaminophen] VOMITING   • Macrolides And Ketolides RASH     Current Outpatient Medications   Medication Sig Dispense Refill   • lisdexamfetamine (VYVANSE) 70 MG capsule Take 1 capsule by mouth every morning. 30 capsule 0   • busPIRone (BUSPAR) 10 MG tablet TAKE  [facility] has been admitted to [facility] for skilled nursing and rehab. [place],  Could you please follow up and notify me upon discharge.   Thank you 1 TABLET BY MOUTH TWICE A DAY 60 tablet 3   • escitalopram (LEXAPRO) 20 MG tablet Take 1 tablet by mouth daily. 90 tablet 0   • Ascorbic Acid (VITAMIN C) 500 MG CHEW Chew 1,000 mg by mouth daily.       No current facility-administered medications for this visit.      Past Medical History:   Diagnosis Date   • Allergy     pamper diapers- redness , rash and itching    • Attention deficit disorder    • Concussion without loss of consciousness 05/22/2018    work accident    • Depression    • Fracture 05/22/2018    closed nasal fracture - work accident      Past Surgical History:   Procedure Laterality Date   • Hysterectomy  01/22/2008    unilateral salpingo-oophorectomy   • Shldr arthroscop,part acromioplas Left 11/29/2011   • Sinus surgery  2001   • Tubal ligation  11/14/2003   • Vaginal delivery      X2             PHYSICAL EXAM:    Vitals:    06/08/20 1036   BP: 124/70   Pulse: 84   Resp: 16   Weight: 76.9 kg   Height: 5' 7\" (1.702 m)     GENERAL:  Pleasant, Cooperative in no distress.  Color is good.    RIGHT shoulder demonstrates no warmth or effusion.  Tenderness is noted no pain  Spurling's maneuver at the neck does not reproduce pain.  Crossover test is neg  The patient can actively Abduct to 110   Circumduction shows crepitation    Neer's Impingement test is neg  Internal rotation is painless      External rotation is painless  Empty Can Test is neg   Speed's Test negative   Infraspinatus strength test is neg  Lift Off Test is neg  Internal Lag test is neg    External Lag test is neg        LEFT shoulder demonstrates no warmth or effusion.  Tenderness is noted no  Spurling's maneuver at the neck does not reproduce pain.  Crossover test is neg  The patient can actively Abduct to 120   Circumduction shows normal    Neer's Impingement test is neg  Internal rotation is neg      External rotation is neg  Empty Can Test is neg   Speed's Test neg   Infraspinatus strength test is neg  Lift Off Test is neg  Internal Lag  test is neg    External Lag test is neg

## 2022-04-06 NOTE — PATIENT INSTRUCTIONS
Increase toprol to 150 mg daily. F/u with EP as scheduled. You may receive a survey regarding the care you received during your visit. Your input is valuable to us. We encourage you to complete and return your survey. We hope you will choose us in the future for your healthcare needs.

## 2022-04-06 NOTE — PROGRESS NOTES
Prescreening performed prior to testing. The following symptoms may indicate COVID-19 infection:        One of the following criteria:   Temperature taken, patient temperature was 98.0 F. Fever greater 100.0 F -no  New onset cough -  no  New onset shortness of breath -no  New onset difficulty breathing -no        And/or   Two or more of the following criteria:  New onset muscle aches -no  New onset headache -no  New onset sore throat -no  New onset loss of smell/taste -no  New onset diarrhea -no    Patient's screening was negative. PFT will be performed.

## 2022-04-07 NOTE — PROGRESS NOTES
[facility] PROFESSIONAL SERVICES  [facility]   [address] [facility]   [address] [address]   Dept: [phone]   Dept Fax: [phone]: [phone]      Chief Complaint   Patient presents with    Follow-Up from Hospital     afib w RVR      Cardiologist:  Dr. Maggi Magaña   71 yo male presents for hfu. Had PNA/ILD, afib rvr converted to nsr, s/p CABG x 3, ZAHRA clip 01/2021, ICDMP 35-40%, tachy-ericka s/p dual PPM, hx of CVA, HTN, COPD, former smoker. SOB. Denies CP, dizziness, swelling. Oxygen gets worse with walking. Overall, has been feeling about the same, at [facility] for rehab. Monitor his vitals frequently. D/w patient about HR today, being in afib. General:   No fever, no chills, no weight loss, + fatigue  Pulmonary:    + dyspnea, no wheezing  Cardiac:    Denies recent chest pain   GI:     No nausea or vomiting, no abdominal pain  Neuro:      No dizziness or light headedness  Musculoskeletal:  No recent active issues  Extremities:   No edema      Past Medical History:   Diagnosis Date    Asthma     Atrial fibrillation with RVR ([address] 04/05/2021    [place] [employer] dual pacemaker  04/15/2021    Collagenous colitis 2021    per scope 2021    Colon polyps 2021    dr Adarsh Lamar    COPD, mild ([address]     Eczema of hand     HTN (hypertension)     Hyperlipidemia     Smoker        Allergies   Allergen Reactions    Penicillins Rash    Sulfa Antibiotics Rash       Current Outpatient Medications   Medication Sig Dispense Refill    tiotropium-olodaterol (STIOLTO) 2.5-2.5 MCG/ACT AERS Inhale 2 puffs into the lungs daily 1 each 1    rivaroxaban (XARELTO) 15 MG TABS tablet Take 1 tablet by mouth daily 42 tablet 1    mupirocin (BACTROBAN) 2 % ointment Apply topically 3 times daily.  30 g 0    nystatin (MYCOSTATIN) 112882 UNIT/ML suspension Take 5 mLs by mouth 4 times daily for 7 days 60 mL 0    furosemide (LASIX) 20 MG tablet One  Tab every Sunday and  wednesday  only 30 tablet 1    albuterol sulfate  (90 Base) MCG/ACT inhaler INHALE 2 PUFFS INTO THE LUNGS EVERY 6 HOURS AS NEEDED FOR WHEEZING 54 g 3    ALPRAZolam (XANAX) 0.5 MG tablet Take 1 tablet by mouth 2 times daily as needed for Sleep for up to 30 days. 60 tablet 0    predniSONE (DELTASONE) 20 MG tablet Take 2 tablets by mouth daily for 28 days 56 tablet 0    atovaquone (MEPRON) 750 MG/5ML suspension Take 10 mLs by mouth daily 300 mL 0    traZODone (DESYREL) 50 MG tablet Take 1 tablet by mouth nightly 30 tablet 0    rOPINIRole (REQUIP) 0.5 MG tablet Take 1 tablet by mouth nightly 90 tablet 3    pantoprazole (PROTONIX) 40 MG tablet Take 1 tablet by mouth every morning (before breakfast) 30 tablet 3    cetirizine (ZYRTEC) 10 MG tablet Take 10 mg by mouth daily      atorvastatin (LIPITOR) 40 MG tablet Take 1 tablet by mouth nightly 90 tablet 3    metoprolol succinate (TOPROL XL) 100 MG extended release tablet Take 1 tablet by mouth daily 90 tablet 3    aspirin 81 MG chewable tablet Take 1 tablet by mouth daily 30 tablet 3    Multiple Vitamins-Minerals (CENTRUM SILVER PO) Take 1 tablet by mouth daily        No current facility-administered medications for this visit. Social History     Socioeconomic History    Marital status:      Spouse name: None    Number of children: None    Years of education: None    Highest education level: None   Occupational History    None   Tobacco Use    Smoking status: Former Smoker     Packs/day: 1.00     Years: 40.00     Pack years: 40.00     Types: Cigarettes     Quit date: 3/7/2022     Years since quittin.0    Smokeless tobacco: Never Used   Substance and Sexual Activity    Alcohol use:  Yes     Alcohol/week: 0.0 standard drinks     Comment: very little    Drug use: No    Sexual activity: None   Other Topics Concern    None   Social History Narrative    No barriers with medication affordability now that he has met deductible    Active with [facility]    Has O2 and supplies needed    No barriers with transportation     Social Determinants of Health     Financial Resource Strain: Low Risk     Difficulty of Paying Living Expenses: Not hard at all   Food Insecurity: No Food Insecurity    Worried About Running Out of Food in the Last Year: Never true    Danielle of Food in the Last Year: Never true   Transportation Needs: No Transportation Needs    Lack of Transportation (Medical): No    Lack of Transportation (Non-Medical): No   Physical Activity: Inactive    Days of Exercise per Week: 0 days    Minutes of Exercise per Session: 0 min   Stress:     Feeling of Stress : Not on file   Social Connections:     Frequency of Communication with Friends and Family: Not on file    Frequency of Social Gatherings with Friends and Family: Not on file    Attends Advent Services: Not on file    Active Member of Clubs or Organizations: Not on file    Attends Club or Organization Meetings: Not on file    Marital Status: Not on file   Intimate Partner Violence:     Fear of Current or Ex-Partner: Not on file    Emotionally Abused: Not on file    Physically Abused: Not on file    Sexually Abused: Not on file   Housing Stability: Unknown    Unable to Pay for Housing in the Last Year: No    Number of [place] in the Last Year: Not on file    Unstable Housing in the Last Year: No       Family History   Problem Relation Age of Onset    Heart Disease Mother     Heart Disease Father         cabg    Diabetes Brother     Heart Disease Brother        Blood pressure 116/67, pulse 147, height 5' 9\" (1.753 m), weight 152 lb 6.4 oz (69.1 kg), SpO2 (!) 82 %.     General:   Well developed, well nourished  Lungs:   Clear to auscultation, no rales  Heart:    Irregular RR, fast, Normal S1 S2, No murmur, rubs, or gallops  Abdomen:   Soft, non tender, no organomegalies, positive bowel sounds  Extremities:   No edema, no cyanosis, good peripheral pulses  Neurological:   Awake, alert, oriented. No obvious focal deficits  Musculoskeletal:  No obvious deformities  o2 supplementaion  EKG: reviewed with Dr Emily Zambrano, afib rvr with fusion complexes. Diagnosis Orders   1. Paroxysmal atrial fibrillation (HCC)     2. Tachycardia-bradycardia ([address]     3. [place] [employer] dual pacemaker      4. Ischemic cardiomyopathy     5. Atherosclerotic heart disease of native coronary artery with other forms of angina pectoris ([address]         No orders of the defined types were placed in this encounter. Assessment/Plan:   PAF/tachy-ericka s/p Dual PPM-  Back in afib rvr with pac/fusion complexes. Patient's amiodarone was stopped by pulmonary for lung toxicity. Currently only on toprol. Will increase to 150 mg daily if tolerated by BP. continiue xarelto. ICDMP, EF 35-40%-  CAD- no cp, SOB is at baseline, o2 does drop with exertion, rebounds back to normal with rest. Continue with rest of the management including asa, bb, statin. Schedule f/u with Dr kR Venegas (known to provider) for afib rvr recommendations. (cannot take amio d/t lung concerns)     Disposition:   F/u with Jody as scheduled.    Continue Dr Felix Haywood current treatment plan  Follow up with Dr Brunilda Jo as scheduled or sooner if needed

## 2022-04-13 NOTE — PROGRESS NOTES
[address] ([place]) [address] [facility]  Dept: [phone]  Cardiac Electrophysiology: Consultation Note  Patient's demographics:  Date:   4/13/2022  Patient name:              Jody Mean  YOB: 1950  Sex:    male   MRN:   [id number]    Primary Care Physician:  Veronica Corral MD    Referring Physician:  Aleksandr Koch MD    Reason for Consultation:  Atrial fibrillation management. Clinical Summary:  71/M was diagnosed to have proximal atrial fibrillation in January 2021 presented with chest fluttering and shortness of breath. He underwent DEJAH guided electrocardioversion and was started on amiodarone and metoprolol. Subsequently he developed bradycardia requiring dual-chamber pacemaker implantation. He was admitted to [facility] on 3/25/2022 with worsening shortness of breath and palpitations. He was noted to have community-acquired pneumonia and was septic. He was treated with a course of antibiotics with improvement. He was noted to have rapid atrial fibrillation and spontaneously converted to sinus rhythm. Amiodarone was discontinued with concerns of side effects. He was discharged home on home oxygen and is here to discuss management options for his atrial fibrillation. The patient continues to have exertional shortness of breath. He is currently on oxygen. Complains of fatigue and lack of energy and denies orthopnea, lower extremity swelling palpitations syncope. Medical Hx: PeAF DCCV 1/2021, typical atrial flutter, ZAHRA Atria Clip ( 1/2021), SSS-sinus pauses s/p DC-PM (BS, 4/2021), CAD/CABG x3 (1/2021), ICM-resolved (LVEF 55-60%), mild COPD and chronic smoking. Review of systems:  Constitutional: Negative for chills and fever  HENT: Negative for congestion, sinus pressure, sneezing and sore throat. Eyes: Negative for pain, discharge, redness and itching.    Respiratory: Negative for apnea, cough  Gastrointestinal: Negative for blood in stool, constipation, diarrhea   Endocrine: Negative for cold intolerance, heat intolerance, polydipsia. Genitourinary: Negative for dysuria, enuresis, flank pain and hematuria. Musculoskeletal: Negative for arthralgias, joint swelling and neck pain. Neurological: Negative for numbness and headaches. Psychiatric/Behavioral: Negative for agitation, confusion, decreased concentration and dysphoric mood. Past Medical History[de-identified]  Past Medical History:   Diagnosis Date    Asthma     Atrial fibrillation with RVR ([address] 04/05/2021    [place] [employer] dual pacemaker  04/15/2021    Collagenous colitis 2021    per scope 2021    Colon polyps 2021    dr Hieu Lynne    COPD, mild ([address]     Eczema of hand     HTN (hypertension)     Hyperlipidemia     Smoker        Past Surgical History:  Past Surgical History:   Procedure Laterality Date    CARDIAC SURGERY      COLONOSCOPY  06/10/2021    theersa ccolon polyps and collagenous colitis    CORONARY ARTERY BYPASS GRAFT  01/12/2021    cabg x 3 with [place] and atrial appendage clip  dr Shakila Corral GRAFT N/A 01/12/2021    CABG  X 3 WITH DEJAH, Atrial Appendage Clip performed by Yoly Sterling MD at [address] [place] [address] Road  06/2008    polyps    OTHER SURGICAL HISTORY  DEC 7TH 2012 DR [address] [facility] [place]     IGS ENDOSCOPIC BI LAT MAXILLARY, ETHMOID, SPHENOID, FRONTAL SINUSOTOMY WITH SEPTOPLASTY AND TURBINOPLASTIES    SKIN CANCER EXCISION  09/2014    Left side of Forehead     TOOTH EXTRACTION  02/2017       Family History:  Family History   Problem Relation Age of Onset    Heart Disease Mother     Heart Disease Father         cabg    Diabetes Brother     Heart Disease Brother         Social History:   with 2 grown up children. Quit smoking in 7/20/2022. Lives with his wife. 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   Social History Socioeconomic History    Marital status:      Spouse name: Not on file    Number of children: Not on file    Years of education: Not on file    Highest education level: Not on file   Occupational History    Not on file   Tobacco Use    Smoking status: Former Smoker     Packs/day: 1.00     Years: 40.00     Pack years: 40.00     Types: Cigarettes     Quit date: 3/7/2022     Years since quittin.1    Smokeless tobacco: Never Used   Substance and Sexual Activity    Alcohol use: Yes     Alcohol/week: 0.0 standard drinks     Comment: very little    Drug use: No    Sexual activity: Not on file   Other Topics Concern    Not on file   Social History Narrative    No barriers with medication affordability now that he has met deductible    Active with [facility]    Has O2 and supplies needed    No barriers with transportation     Social Determinants of Health     Financial Resource Strain: Low Risk     Difficulty of Paying Living Expenses: Not hard at all   Food Insecurity: No Food Insecurity    Worried About Running Out of Food in the Last Year: Never true    Danielle of Food in the Last Year: Never true   Transportation Needs: No Transportation Needs    Lack of Transportation (Medical): No    Lack of Transportation (Non-Medical):  No   Physical Activity: Inactive    Days of Exercise per Week: 0 days    Minutes of Exercise per Session: 0 min   Stress:     Feeling of Stress : Not on file   Social Connections:     Frequency of Communication with Friends and Family: Not on file    Frequency of Social Gatherings with Friends and Family: Not on file    Attends Gnosticist Services: Not on file    Active Member of Clubs or Organizations: Not on file    Attends Club or Organization Meetings: Not on file    Marital Status: Not on file   Intimate Partner Violence:     Fear of Current or Ex-Partner: Not on file    Emotionally Abused: Not on file    Physically Abused: Not on file    Sexually Abused: Not on file   Housing Stability: Unknown    Unable to Pay for Housing in the Last Year: No    Number of Places Lived in the Last Year: Not on file    Unstable Housing in the Last Year: No        Allergies: Allergies   Allergen Reactions    Penicillins Rash    Sulfa Antibiotics Rash        Medications:  Current Outpatient Medications   Medication Sig Dispense Refill    metoprolol succinate (TOPROL XL) 100 MG extended release tablet Take 1.5 tablets by mouth daily 90 tablet 3    tiotropium-olodaterol (STIOLTO) 2.5-2.5 MCG/ACT AERS Inhale 2 puffs into the lungs daily 1 each 1    rivaroxaban (XARELTO) 15 MG TABS tablet Take 1 tablet by mouth daily 42 tablet 1    mupirocin (BACTROBAN) 2 % ointment Apply topically 3 times daily. 30 g 0    albuterol sulfate  (90 Base) MCG/ACT inhaler INHALE 2 PUFFS INTO THE LUNGS EVERY 6 HOURS AS NEEDED FOR WHEEZING 54 g 3    ALPRAZolam (XANAX) 0.5 MG tablet Take 1 tablet by mouth 2 times daily as needed for Sleep for up to 30 days. 60 tablet 0    predniSONE (DELTASONE) 20 MG tablet Take 2 tablets by mouth daily for 28 days 56 tablet 0    atovaquone (MEPRON) 750 MG/5ML suspension Take 10 mLs by mouth daily 300 mL 0    traZODone (DESYREL) 50 MG tablet Take 1 tablet by mouth nightly 30 tablet 0    rOPINIRole (REQUIP) 0.5 MG tablet Take 1 tablet by mouth nightly 90 tablet 3    pantoprazole (PROTONIX) 40 MG tablet Take 1 tablet by mouth every morning (before breakfast) 30 tablet 3    cetirizine (ZYRTEC) 10 MG tablet Take 10 mg by mouth daily      atorvastatin (LIPITOR) 40 MG tablet Take 1 tablet by mouth nightly 90 tablet 3    aspirin 81 MG chewable tablet Take 1 tablet by mouth daily 30 tablet 3    Multiple Vitamins-Minerals (CENTRUM SILVER PO) Take 1 tablet by mouth daily        No current facility-administered medications for this visit.        Physical Examination:  Vitals:    04/13/22 1149   BP: 125/72   Pulse: 54   SpO2: 98%     Body mass index is 23.42 kg/m².    GENERAL: Alert and oriented. No distress. EYES: No pallor or icterus. ENT: No cyanosis. No thyromegaly or cervical LAP. On O2 via nasal cannula. VESSELS: No jugular venous distension or carotid bruits. HEART: Normal S1/S2. No murmur, rub or gallop. LUNGS: Clear to auscultation. CHEST WALL: No erosions, discharge or swelling at device site (right side implant)  ABDOMEN: Soft and non-tender. EXTREMITIES: No lower extremity edema. Feet are warm. NEUROLOGICAL: Grossly normal.     Laboratory And Diagnostic Data  I have personally reviewed and interpreted the results of the following diagnostic testing    Lab Results   Component Value Date    WBC 23.3 (H) 03/26/2022    HGB 10.6 (L) 03/26/2022    HCT 32.1 (L) 03/26/2022     03/26/2022    CHOL 126 01/07/2021    TRIG 96 01/07/2021    HDL 36 01/07/2021    LDLDIRECT 79.82 01/11/2021    ALT 17 03/26/2022    AST 18 03/26/2022     03/30/2022    K 4.2 03/30/2022    CL 99 03/30/2022    CREATININE 1.6 (H) 03/30/2022    BUN 41 (H) 03/30/2022    CO2 24 03/30/2022    TSH 2.230 03/25/2022    INR 1.41 (H) 03/25/2022    LABA1C 5.6 01/11/2021      Echocardiogram LVEF 55-60%, LVWT 0.9 cm, LAD/JOMAR 40. No significant valvular abnormalities.  ECG 3/27/2022: Typical atrial flutter RVR, RBBB. 12/29/2020 and 4/7/2022: AF with RVR, RBBB    Device interrogation. Impression:  · Symptomatic persistent atrial fibrillation and typical atrial flutter. [id number] score 4, s/p ZAHRA Atria-Clip. On Rivaroxaban. · SSS-sinus pauses s/p DC-PM. Normal functions. · CAD/CABG x3.   · ICM-resolved (LVEF 55-60%). · Mild COPD and chronic smoking. · Recent pneumonia on O2. Assessment And Recommendations: The patient has symptomatic atrial flutter and fibrillation. He has failed amiodarone therapy. Amiodarone was discontinued during hospitalization with concerns of possible pulmonary toxicity. He is still on oxygen at this time.   I discussed the management options for the management of his atrial arrhythmias including initiation of a different event therapy versus catheter ablation. The patient does not want any intervention at this time. His atrial fibrillation burden since his hospital admission has been around 7%. He wants to wait to recover from his pneumonia. However, he wants to do a left heart before making a final decision. He will also need a DEJAH to confirm his left atrial appendage exclusion and possible discontinuation of antiarrhythmic therapy. Follow-up in 3 months. Thank you for allowing me to participate in the care of your patient. Please call me if you have any questions. **This report has been created using voice recognition software. It may contain minor errors which are inherent in voice recognition technology. **       Terri Singh MD, MRCP, [facility] on 4/13/2022 at 12:15 PM

## 2022-04-13 NOTE — PATIENT INSTRUCTIONS
Nosebleeds in Children: Care Instructions  Your Care Instructions     Nosebleeds are common, especially with colds or allergies. Many things can cause a nosebleed. Some nosebleeds stop on their own with pressure, others need packing, and some get cauterized (sealed). If your child has gauze or other packing materials in his or her nose, you will need to follow up with the doctor to have the packing removed. Your child may need more treatment if he or she gets nosebleeds a lot. The doctor has checked your child carefully, but problems can develop later. If you notice any problems or new symptoms, get medical treatment right away. Follow-up care is a key part of your child's treatment and safety. Be sure to make and go to all appointments, and call your doctor if your child is having problems. It's also a good idea to know your child's test results and keep a list of the medicines your child takes. How can you care for your child at home? 1. If your child gets another nosebleed:  ? Have your child sit up and tilt his or her head slightly forward to keep blood from going down the throat. ? Use your thumb and index finger to pinch the nose shut for 10 minutes. Use a clock. Do not check to see if the bleeding has stopped before the 10 minutes are up. If the bleeding has not stopped, pinch the nose shut for another 10 minutes. ? When the bleeding has stopped, tell your child not to pick, rub, or blow his or her nose for 12 hours to keep it from bleeding again. 2. If the doctor prescribed antibiotics for your child, give them as directed. Do not stop using them just because your child feels better. Your child needs to take the full course of antibiotics. To prevent nosebleeds  · Teach your child not to blow his or her nose too hard. · Make sure that your child avoids lifting or straining after a nosebleed. · Use saline drops or spray in the nose throughout the day to moisturize the nose.   · You can also apply thin layer of a Vaseline or saline- or water-based nasal gel. An example is NasoGel or AYRgel. Put it on the septum, the middle part which divides the nostrils. This will prevent dryness that can cause nosebleeds. · Use a humidifier to add moisture to your child's bedroom. Follow the directions for cleaning the machine. · Talk to your doctor about stopping any other medicines your child is taking. Some medicines may make your child more likely to get a nosebleed. · Do not give cold medicines or nasal sprays without first talking to your doctor. They can make your child's nose dry. Most bleeds are from [address] area (the [place]) on the septum. When should you call for help? Call 911 anytime you think your child may need emergency care. For example, call if:    · Your child passes out (loses consciousness). Call your doctor now or seek immediate medical care if:    · Your child gets another nosebleed and it is still bleeding after pressure has been applied 3 times for 10 minutes each time (30 minutes total). · There is a lot of blood running down the back of your child's throat even after pinching the nose and tilting the head forward. · Your child has a fever. · Your child has sinus pain. Watch closely for changes in your child's health, and be sure to contact your doctor if:    · Your child gets frequent nosebleeds, even if they stop. · Your child does not get better as expected. Where can you learn more? Go to [email or web address].Daptiv. org and sign in to your Public [employer] account. Enter F742 in the EventKloud box to learn more about \"Nosebleeds in Children: Care Instructions. \"     If you do not have an account, please click on the \"Sign Up Now\" link. Current as of: July 1, 2021               Content Version: 13.1  © 9383-1941 [employer]. Care instructions adapted under license by [facility] ([facility]).  If you have questions

## 2022-04-13 NOTE — CARE COORDINATION
I spoke with Cezar Bonner at [facility] who confirmed the patient remains at the facility. A meeting was held today re: the patient with no d/c date or plan set. Patient remains active with therapy at this time. I will no longer follow the patient and notify ACM.

## 2022-04-13 NOTE — PROGRESS NOTES
[facility]    HPI   It was a pleasure to see Carmen Clemons, a 70 y.o. male, who returns today for packing removal. 2 weeks ago he had bleeding from his left nostril. We placed absorbable packing at the bedside. No bleeding since. He is on nasal canula and blood thinners. The review of systems, past medical, past surgical, social, and family history were updated in the medical chart and reviewed today. No significant changes were noted. Objective     Vitals:    04/13/22 0917   BP: 124/64   Pulse: 74   Resp: 14   Temp: 97.2 °F (36.2 °C)   SpO2: 91%       PHYSICAL EXAM FINDINGS:  GENERAL: Awake, NAD, Non-toxic appearing. Patient is alert and oriented toperson, place and time. No respiratory distress. No nasal voice, no hoarseness. Not obviously hearing impaired. NEUROLOGICAL: GCS 15, Cranial nerves II-VI, VIII-XII grossly intact,  Facial nerve (VII) w/ House-Brackman Grade 1 of 6 bilaterally, No evidence of nystagmus or gross asymmetry    PSYCHIATRIC: Mood and Affect appropriate. HEAD: NC/AT  SKIN: No overtly ulcerated, cellulitic, or indurated lesions of the head or neck. EARS: Pinna well-formed  NOSE: Dorsum w/o scar or deformity  ORAL CAVITY: No mucosal masses/lesions appreciated,    OROPHARYNX: No mucosal masses or lesions appreciated. NECK: Soft, supple. No crepitus or masses appreciated,  Trachea midline. CHEST: Breathing is unlabored without retraction    Procedure: I removed some crusting from the left nostril. There was some mild bleeding that I controlled with Afrin and pressure. Data: The relevant prior clinic notes were reviewed today, including the referring physicians notes. Imaging: None          Assessment      Diagnosis Orders   1. Anterior epistaxis             Plan   Carmen Clemons is a 70 y.o. male with: epistaxis    Recurrent epistaxis  -I discussed the etiology of the problem with the patient.   We discussed the mainstay of therapy, which is improved nasal hygiene. I recommended nasal saline sprays at least 3 times a day, with nasal irrigations anytime nasal crusting develops. I will also have him apply Vaseline to the septum nightly for the next 2 weeks. We also discussed an O2 humidifier, and personal steamer, which will be particularly useful during the wintertime. We discussed management regimens of epistaxis, including holding pressure for 15 minutes without a break, and the sparing use of Afrin. Thank you for involving me in this patient's care. Please do not hesitate to call with any questions or concerns. Sincerely,    Don Roy M.D. Otolaryngology-Head and Neck Surgery    **This report has been created using voice recognition software. It may contain minor errors which are inherent in voice recognition technology. **

## 2022-04-14 NOTE — CARE COORDINATION
Ortiz Hahn remains in SNF, no discharge plans at this time. Will discharge from [facility] follow up at this time.

## 2022-04-20 NOTE — PROGRESS NOTES
Neck Circumference -   15.75  Mallampati - 1    Lung Nodule Screening     [] Qualifies    [x] Does not qualify   [] Declined    [] Completed

## 2022-04-20 NOTE — PROGRESS NOTES
[facility]    Patient - Shar Ramirez      - 1950      Date of evaluation -  2022   Primary Care Physician - Marily Zabala MD   Chief Complaint   ILD    HPI   Shar Ramirez is a 70 y.o. male          Abraham Monsivais comes for f/u from hospital admission. 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, currently on 40 mg prednisone/day and Amiodarone discontinued  On 4L at rest and 6L with activity  36 PY smoker, quit 2 mos  Ago  Still weak and SOB but believes he is improving, able to go 13 minutes in stationary bike, participating in therapies at the [facility]  [facility]    PMH: asthma,Afib, COPD, HTN, HLP, Colon polyps      Hospital notesith past medical history of Asthma/COPD, Afib, CAD s/p CABG, s/p pacemaker, admitted to ICU due to septic shock secondary to RLL HCAP, and acute hypoxic respiratory failure due to ILD. Patient complaining of any days of shortness of breath, exertional, associated with fatigue, no chest pain, no palpitations, no fever no chills, patient refers had a diagnosis of community-acquired pneumonia 20 years ago, reason for coming is persistence of symptoms, in the ED was found with tachycardia, EKG with A. fib with RVR, leukocytosis, mildly increased procalcitonin, hyperlipidemia, patient with low blood pressures, consider initially has septic shock, antibiotics were started, due to necessity of vasopressor. CXR and CT chest showed diffuse interstitial infiltrates in background of COPD. New RLL consolidation.          -All systems reviewed.    Past Medical History         Diagnosis Date    Asthma     Atrial fibrillation with RVR ([address] 2021    [employer] dual pacemaker  04/15/2021    Collagenous colitis     per scope     Colon polyps     dr Prem Solis    COPD, mild ([address]     Eczema of hand     HTN (hypertension)     Hyperlipidemia     Smoker       Past Surgical History bilateral rales  Abdominal: Soft. Bowel sounds audible. No distension or tenderness to palp  Musculoskeletal: Moves all extremities; no cyanosis or clubbing   Lymphadenopathy:  No cervical adenopathy. Neurological: Patient is alert and oriented to person, place, and time. Skin: Skin is warm and dry. Labs       PFTs     Echo    3/7/22  ECHO   Summary   Left ventricle size is normal.   Normal left ventricular wall thickness. There were no regional wall motion abnormalities. Ejection fraction is visually estimated in the range of 55% to 60%. Abnormal (paradoxical) motion consistent with post-operative status. IVC size is within normal limits with normal respiratory phasic changes. Cultures    Procalcitonin  Lab Results   Component Value Date    PROCAL 0.21 03/25/2022    PROCAL 0.08 03/07/2022   VRE (-)  Flu A/B (-)  BC (-)  Covid (-)  Radiology    CXR  3/31/22  CXR 2-view- pending     3/29/22  CXR 1V  Diffuse predominantly the lateral lower lobe multifocal airspace opacities are unchanged.          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 since 03/7/2022 (No evidence ILD back in 2021)  Interstitial infiltrate, thickening interlobular septa  CAD s/p CABG   Afib   Full Code   Recommendations     Orders Placed This Encounter   Procedures    CT CHEST HIGH RESOLUTION     Please do prone views     Standing Status:   Future     Standing Expiration Date:   4/20/2023    MAGALI Screen With Reflex     Standing Status:   Future     Standing Expiration Date:   7/08/0582    Cyclic Citrul Peptide Antibody, IgG     Standing Status:   Future     Standing Expiration Date:   4/20/2023    Anti-Ani 1 Antibody, IgG     Standing Status:   Future     Standing Expiration Date:   4/20/2023    Angiotensin Converting Enzyme     Standing Status:   Future     Standing Expiration Date: 4/20/2023    C-Reactive Protein     Standing Status:   Future     Standing Expiration Date:   4/20/2023    Full PFT Study With Bronchodilator     If an ABG is needed along with this PFT procedure, please place the appropriate lab order     Standing Status:   Future     Standing Expiration Date:   4/20/2023      Wean Prednisone to 30 then 20 mg/d and stay there      Electronically signed by     Seema Bowden MD on 4/20/2022 at 11:08 AM

## 2022-04-21 NOTE — DISCHARGE SUMMARY
[address] [place] [address]                               DISCHARGE SUMMARY    PATIENT NAME: Caryle Sicks                       :        1950  MED REC NO:   [id number]                           ROOM:       0020  ACCOUNT NO:   [de-identified]                           ADMIT DATE: 2022  PROVIDER:     ZACKERY Perry Covert: 2022    HISTORY:  This is a 80-year-old male status post coronary artery bypass  surgery with grafting and history of cardiomyopathy with pacemaker who  had been improved, now presents with increased productive cough, dyspnea  and shortness of breath being present. The patient has been having  symptoms over the past several weeks. Most recently he has been  followed by the congestive heart failure and states his Lasix was  decreased from 40 mg down to 20. There had been some confusion on his  medications. Today, apparently his pulse ox was down 85%. He had not  been eating. No significant fever. He has had increased amount of  cough present along with increased wheeze, both on inspiration and  expiration being noted and he sensed some chest tightness with a  wheezing being present. Because of the above, he presented to the  emergency room where he is noted he had bilateral pneumonia being  present. His heart rate was stable. The patient was given some  intermittent aerosols with improvement and his BNP was also slightly  elevated. The patient is now being admitted because of COPD with  exacerbation, probable underlying early acute diastolic congestive heart  failure as his BNP was markedly elevated in addition at this time. PAST MEDICAL HISTORY:  MEDICATIONS AT TIME OF ADMISSION:  Include Lasix 20 mg a day, had been  on 40 mg; Xanax 0.5 mg at night; Entresto 40/51, he is only taking half  tablet twice a day;  Anoro inhaler one puff twice a day that he has not  been using; Lipitor 40 mg a day; Toprol- mg a day; amiodarone 200  mg daily; Xarelto 20 mg a day; aspirin 81 mg daily; albuterol inhaler  that he has been using; multivitamin, Claritin. ALLERGIES:  PENICILLIN AND SULFA. HE IS ABLE TO TAKE CEPHALOSPORINS. PAST SURGICAL HISTORY:  Colonoscopy in 06/2021. Original coronary  artery bypass surgery in 01/2021, which he had coronary artery bypass  graft x3. He has had hernia repair, nasal surgery. He had significant  sinus surgery in 2012. He has had multiple teeth extractions in 2017. HOSPITALIZATIONS:  Have been for above. ILLNESSES:  Include atrial fibrillation with rapid ventricular rate when  he has had sick sinus syndrome requiring pacemaker. He does have a  history collagenous colitis, COPD, hypertension, hyperlipidemia, tobacco  addiction, underlying COPD component being present. PHYSICAL EXAMINATION:  VITAL SIGNS:  Notes temperature is 98.6, pulse 70, respiratory rate 18,  blood pressure 153/62, pulse ox 96% on low dose O2. HEENT:  Noted to be within normal limits except for some congestion. NECK:  Supple without adenopathy, increased thyroid. No JVD. LUNGS:  On inspiration is prolonged. He does have rales in the right  base. Lower one-third in the left base expiration wheeze and  prolongation being present. CARDIAC:  Regular rate and rhythm. Normal S1, S2.  Grade 1/6 systolic  murmur. Peripheral pulse 2+. No peripheral edema. ABDOMEN:  Soft. Positive bowel sounds, nontender. No masses. EXTREMITIES:  Full range of motion. No CVA tenderness. NEURO:  Alert and oriented x3. Gross motor and sensory being present. HOSPITAL COURSE:  The patient was admitted as stated. Chest x-ray,  bilateral lower lobe infiltrates being present, right greater the left. EKG did note some atrial fibrillation with rapid ventricular rate with  some PVCs which is actually new and is intermittent. He has no evidence  of T-wave changes being present.   As stated, rhythm actually later on  became normal sinus and when we examined him, he was in a normal sinus  rhythm. He is on Xarelto. His sodium was 139, potassium 4.8, chloride  104, CO2 of 24, BUN 30, creatinine 1.5. Procalcitonin was only 0.08. Liver panel normal.  White count, slight elevation 5900. Hemoglobin  stable 13.2, hematocrit 40.2, platelet count 763,221. Flu swabs and  COVID swabs were noted be negative. His BNP was elevated 1931. Because  of the above, the patient was admitted, kept on telemetry, DuoNeb  aerosols started along with oxygen and he was given an extra dose of  Lasix because of the slight elevation of the BNP. He had serial  isoenzymes and he was started on appropriate antibiotics at this time. We did at time because of questionable appearance of atrial fibrillation  being present that was paroxysmal and slight elevation in BNP had  Cardiology see the patient. Dr. Caryle Bears saw the patient on 03/08/2022. At this point, it was felt that he did have paroxysmal atrial  fibrillation with probable some underlying acute-on-chronic congestive  heart failure, diastolic and continue with use of the Lasix  intermittently. Repeat troponin levels were noted be normal.  The  patient was continued on amiodarone and metoprolol. Apparently, he had  jumped into more atrial fibrillation, but his blood pressure was low. It was thus decided to have him on IV amiodarone and then this was  stopped and switched over to oral and continued use of Xarelto. The  patient was felt to be stable. Consultation was obtained with  Pulmonology. It was felt that he had acute hypoxic respiratory failure  secondary to the cardiogenic pulmonary edema, paroxysmal atrial fib. There were some concerns about amiodarone pulmonary toxicity and since  the patient was stable, it was felt that this could be held at the time.   It was felt the patient did prior have COPD with exacerbation with  hypoxemia being present in addition. Amiodarone was stopped and the  patient then was followed. He continued with adjustments of the meds  for his atrial fibrillation and had a dose of Solu-Medrol as stated. He  was seen by Dr. Mika Nixon actually on day of admission on 03/07/2022 and  by 03/08/2022, he was still showing some improvement but had problems as  stated over the evening. He was more stable in the morning of  03/08/2022. The above consultations as stated were done. By 03/09/2022, the patient  was somewhat better with continued diuresis. It was felt the patient  should have a repeat echocardiogram to look at overall cardiac function  in addition. The amiodarone was held and the patient continued on some  steroids in addition. His actually started on oral steroids. The  echocardiogram limited on 03/09/2022 noted there is still 50% to 60%  ejection fraction being present with no wall abnormalities being  present. Right ventricle is normal size with no evidence of any  pulmonary hypertension being present. Chest x-ray done on 03/10/2022  noted some improvement in his interstitial changes being present. Thus,  the patient was showing some improvement, but was still quite short of  breath. He did run some lower blood pressures, so adjustments of  diuresis was done. It was felt that the patient probably did have some  interstitial pneumonitis caused by amiodarone pulmonary toxicity. Thus,  he would need to be on continued fairly high doses of oral prednisone  for the next 2 to 6 months' time. His white count did become elevated  with use of the IV steroids initially. It was felt the patient overall  continue with hypoxia being present was combination of secondary to  COPD, CHF, pneumonia and probably due to the interstitial pulmonary lung  disease related to the amiodarone. The patient continued on Zithromax  and ceftriaxone.   He seemed to tolerate the above, but we had further  adjustments concerning his MEDICATIONS:  Include Lasix 20 mg once a day, Xanax 0.5 mg one  to two tablets every 12 hours as needed, prednisone 20 mg two tablets  daily for the next month. He is on Mepron 750 mg/5 mL, 10 mL daily as  an antibiotic. Trazodone 50 mg at night, Requip 0.5 mg at night. Entresto, he will be dropped down to 24/26 one tablet twice a day. Protonix 40 mg once a day, arrangements for DuoNeb aerosols four times a  day, Zyrtec 10 mg a day. He does have Anoro that he will use daily. Lipitor 40 mg a day, Toprol- mg a day, aspirin 81 mg a day,  Xarelto 20 mg a day, albuterol inhaler two puffs every 6 hours as  needed. DISCHARGE INSTRUCTIONS:  Followup post discharge Dr. Saint Cantor in 1 week  with Pulmonary in 1 month and I will schedule the next month or two with  Cardiology in addition at this time.         Rowena Barbosa M.D.    D: 04/20/2022 19:37:33       T: 04/20/2022 19:43:45     [id number]  Job#: [id number]     Doc#: [id number]    CC:

## 2022-04-24 PROBLEM — R06.02 SOB (SHORTNESS OF BREATH): Status: ACTIVE | Noted: 2022-01-01

## 2022-04-24 NOTE — H&P
HISTORY AND PHYSICAL             Date: 4/24/2022        Patient Name: Corbin Rodriguez     YOB: 1950      Age:  70 y.o. 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 Patient has been in a skilled nursing facility after his last admission last month in the hospital for pneumonia. Patient noticed his oxygen saturation to be less and hence was transferred here he has been placed on BiPAP with improvement in his saturation. He was also noted to be in A. fib with RVR. He denies any chest pain or palpitations. His breathing is much better since his been on the BiPAP. Chest x-ray noted. Cardiac enzymes are minimally elevated. White count is also elevated. He did complain of chills before he presented to the ER. His temperature was 99.6 on admission.     Past Medical History     Past Medical History:   Diagnosis Date    Asthma     Atrial fibrillation with RVR ([address] 04/05/2021    [employer] dual pacemaker  04/15/2021    Collagenous colitis 2021    per scope 2021    Colon polyps 2021    dr Martha Solis    COPD, mild ([address]     Eczema of hand     HTN (hypertension)     Hyperlipidemia     Smoker         Past Surgical History     Past Surgical History:   Procedure Laterality Date    CARDIAC SURGERY      COLONOSCOPY  06/10/2021    theresa ccolon polyps and collagenous colitis    CORONARY ARTERY BYPASS GRAFT  01/12/2021    cabg x 3 with lima and atrial appendage clip  dr Palak Thompson GRAFT N/A 01/12/2021    CABG  X 3 WITH DEJAH, Atrial Appendage Clip performed by Dasai Lala MD at [facility]  06/2008    polyps    OTHER SURGICAL HISTORY  DEC 7TH 2012 DR VANAN [address] [facility] [place]     IGS ENDOSCOPIC BI LAT MAXILLARY, ETHMOID, SPHENOID, FRONTAL SINUSOTOMY WITH SEPTOPLASTY AND TURBINOPLASTIES    SKIN CANCER EXCISION  09/2014    Left side of Forehead     TOOTH EXTRACTION  02/2017        Medications Prior to Admission     Prior to Admission medications    Medication Sig Start Date End Date Taking? Authorizing Provider   tuberculin (APLISOL) 5 UNIT/0.1ML injection Inject 5 Units into the skin    Historical Provider, MD   atovaquone (MEPRON) 750 MG/5ML suspension Take 750 mg by mouth daily    Historical Provider, MD   predniSONE (DELTASONE) 20 MG tablet Take 20 mg by mouth daily    Historical Provider, MD   ALPRAZolam (XANAX) 0.5 MG tablet Take 0.5 mg by mouth nightly as needed for Sleep. Historical Provider, MD   nystatin (MYCOSTATIN) 010999 UNIT/GM cream Apply topically 2 times daily Apply topically 2 times daily. Historical Provider, MD   oxymetazoline (AFRIN) 0.05 % nasal spray 2 sprays by Nasal route 2 times daily    Historical Provider, MD   furosemide (LASIX) 20 MG tablet Take 20 mg by mouth daily Sun, Wed    Historical Provider, MD   loratadine (CLARITIN) 10 MG tablet Take 10 mg by mouth daily    Historical Provider, MD   metoprolol succinate (TOPROL XL) 100 MG extended release tablet Take 1.5 tablets by mouth daily 4/7/22   Matheus Schulte, PATIMMY   tiotropium-olodaterol (STIOLTO) 2.5-2.5 MCG/ACT AERS Inhale 2 puffs into the lungs daily 4/1/22   Rhonda Pool MD   rivaroxaban (XARELTO) 15 MG TABS tablet Take 1 tablet by mouth daily 4/1/22   Rhonda Pool MD   mupirocin (BACTROBAN) 2 % ointment Apply topically 3 times daily.  4/1/22   Rhonda Pool MD   albuterol sulfate  (90 Base) MCG/ACT inhaler INHALE 2 PUFFS INTO THE LUNGS EVERY 6 HOURS AS NEEDED FOR WHEEZING 3/18/22   Rhonda Pool MD   traZODone (DESYREL) 50 MG tablet Take 1 tablet by mouth nightly 3/16/22   Rhonda Pool MD   rOPINIRole (REQUIP) 0.5 MG tablet Take 1 tablet by mouth nightly 3/16/22   Rhonda Pool MD   pantoprazole (PROTONIX) 40 MG tablet Take 1 tablet by mouth every morning (before breakfast) 3/17/22   Rhonda Pool MD   cetirizine (ZYRTEC) 10 MG tablet Take 10 mg by mouth daily    Historical Provider, MD   atorvastatin (LIPITOR) 40 MG tablet Take 1 tablet by mouth nightly 8/24/21   ARCADIO Shah CNP   aspirin 81 MG chewable tablet Take 1 tablet by mouth daily 1/20/21   Catherine Longoria PA-C   Multiple Vitamins-Minerals (CENTRUM SILVER PO) Take 1 tablet by mouth daily     Historical Provider, MD        Allergies   Penicillins and Sulfa antibiotics    Social History     Social History     Tobacco History     Smoking Status  Former Smoker Quit date  3/7/2022 Smoking Frequency  1 pack/day for 40 years (40 pk yrs) Smoking Tobacco Type  Cigarettes    Smokeless Tobacco Use  Never Used          Alcohol History     Alcohol Use Status  Yes Amount  0.0 standard drinks of alcohol/wk Comment  very little          Drug Use     Drug Use Status  No          Sexual Activity     Sexually Active  Not Asked                Family History     Family History   Problem Relation Age of Onset    Heart Disease Mother     Heart Disease Father         cabg    Diabetes Brother     Heart Disease Brother        Review of Systems   General ROS: Positive for generalized weakness using a cane to ambulate at the nursing home  Ophthalmic ROS: No blurred vision or decreased vision  ENT ROS: No ringing in the ears or decreased hearing or discharge  Respiratory ROS: Positive for shortness of breath with minimal exertion using oxygen at home  Cardiovascular ROS: No chest pain palpitations) his heart rate was in the 110 range  Gastrointestinal ROS: No abdominal pain no nausea vomiting or diarrhea  Musculoskeletal ROS:  generalized weakness but no muscle aches  Neurological ROS: No numbness tingling or dizziness  Dermatological ROS: No skin rash    Physical Exam   /81   Pulse 103   Temp 98.9 °F (37.2 °C) (Axillary)   Resp 19   Ht 5' 8\" (1.727 m)   Wt 155 lb (70.3 kg)   SpO2 96%   BMI 23.57 kg/m²     CONSTITUTIONAL: Patient is awake on BiPAP and states breathing is much improved  EYES: Pupils react with no icterus  NECK: Supple with no bruit no JVD  LUNGS: Air exchange is diminished bilaterally with dry rales heard on both legs bilaterally  CARDIOVASCULAR: S1-S2 heard irregular and tachycardic  ABDOMEN: Soft bowel sounds heard no guarding no tenderness  NEUROLOGIC: Alert and oriented to person place and time able to move all extremities  SKIN: No skin rash warm and dry  Extremities:no ankle edema    Labs      Recent Results (from the past 24 hour(s))   EKG 12 Lead    Collection Time: 04/24/22 12:33 PM   Result Value Ref Range    Ventricular Rate 79 BPM    Atrial Rate 79 BPM    P-R Interval 160 ms    QRS Duration 82 ms    Q-T Interval 366 ms    QTc Calculation (Bazett) 419 ms    P Axis 62 degrees    R Axis 49 degrees    T Axis 62 degrees   Brain Natriuretic Peptide    Collection Time: 04/24/22 12:40 PM   Result Value Ref Range    Pro-BNP 3368.0 (H) 0.0 - 900.0 pg/mL   CBC with Auto Differential    Collection Time: 04/24/22 12:40 PM   Result Value Ref Range    WBC 26.4 (H) 4.8 - 10.8 thou/mm3    RBC 3.69 (L) 4.70 - 6.10 mill/mm3    Hemoglobin 10.9 (L) 14.0 - 18.0 gm/dl    Hematocrit 36.4 (L) 42.0 - 52.0 %    MCV 98.6 (H) 80.0 - 94.0 fL    MCH 29.5 26.0 - 33.0 pg    MCHC 29.9 (L) 32.2 - 35.5 gm/dl    RDW-CV 14.7 (H) 11.5 - 14.5 %    RDW-SD 53.3 (H) 35.0 - 45.0 fL    Platelets 148 636 - 129 thou/mm3    MPV 9.8 9.4 - 12.4 fL    Seg Neutrophils 85.4 %    Lymphocytes 6.3 %    Monocytes 6.1 %    Eosinophils 0.3 % Basophils 0.2 %    Immature Granulocytes 1.7 %    Segs Absolute 22.5 (H) 1.8 - 7.7 thou/mm3    Lymphocytes Absolute 1.7 1.0 - 4.8 thou/mm3    Monocytes Absolute 1.6 (H) 0.4 - 1.3 thou/mm3    Eosinophils Absolute 0.1 0.0 - 0.4 thou/mm3    Basophils Absolute 0.1 0.0 - 0.1 thou/mm3    Immature Grans (Abs) 0.45 (H) 0.00 - 0.07 thou/mm3    nRBC 0 /100 wbc    Toxic Granulation Present Absent   Lactate, Sepsis    Collection Time: 04/24/22 12:40 PM   Result Value Ref Range    Lactic Acid, Sepsis 4.3 (H) 0.5 - 1.9 mmol/L   Scan of Blood Smear    Collection Time: 04/24/22 12:40 PM   Result Value Ref Range    SCAN OF BLOOD SMEAR see below    SPECIMEN REJECTION    Collection Time: 04/24/22  1:13 PM   Result Value Ref Range    Rejected Test BMP,TROPT     Reason for Rejection see below    Comprehensive Metabolic Panel w/ Reflex to MG    Collection Time: 04/24/22  1:21 PM   Result Value Ref Range    Glucose 99 70 - 108 mg/dL    CREATININE 1.6 (H) 0.4 - 1.2 mg/dL    BUN 45 (H) 7 - 22 mg/dL    Sodium 140 135 - 145 meq/L    Potassium reflex Magnesium 5.0 3.5 - 5.2 meq/L    Chloride 103 98 - 111 meq/L    CO2 20 (L) 23 - 33 meq/L    Calcium 8.5 8.5 - 10.5 mg/dL    AST 31 5 - 40 U/L    Alkaline Phosphatase 67 38 - 126 U/L    Total Protein 6.1 6.1 - 8.0 g/dL    Albumin 3.2 (L) 3.5 - 5.1 g/dL    Total Bilirubin 0.5 0.3 - 1.2 mg/dL    ALT 29 11 - 66 U/L   Troponin    Collection Time: 04/24/22  1:21 PM   Result Value Ref Range    Troponin T 0.020 (A) ng/ml   Anion Gap    Collection Time: 04/24/22  1:21 PM   Result Value Ref Range    Anion Gap 17.0 (H) 8.0 - 16.0 meq/L   Glomerular Filtration Rate, Estimated    Collection Time: 04/24/22  1:21 PM   Result Value Ref Range    Est, Glom Filt Rate 43 (A) ml/min/1.73m2   Osmolality    Collection Time: 04/24/22  1:21 PM   Result Value Ref Range    Osmolality Calc 291.0 275.0 - 300.0 mOsmol/kg   Blood gas, arterial    Collection Time: 04/24/22  1:29 PM   Result Value Ref Range    pH, Blood Gas 7.44 7.35 - 7.45    PCO2 39 35 - 45 mmhg    PO2 191 (H) 71 - 104 mmhg    HCO3 26 23 - 28 mmol/l    Base Excess (Calculated) 2.0 -2.5 - 2.5 mmol/l    O2 Sat 100 %    IFIO2 95     DEVICE BiPAP     Yuri Test N/A     Source: R Radial     COLLECTED BY: [id number]    Lactate, Sepsis    Collection Time: 04/24/22  2:49 PM   Result Value Ref Range    Lactic Acid, Sepsis 3.9 (H) 0.5 - 1.9 mmol/L   COVID-19, Rapid    Collection Time: 04/24/22  5:45 PM    Specimen: Nasopharyngeal Swab   Result Value Ref Range    SARS-CoV-2, NAAT NOT  DETECTED NOT DETECTED        Imaging/Diagnostics Last 24 Hours   XR CHEST PORTABLE    Result Date: 4/24/2022  PROCEDURE: XR CHEST PORTABLE CLINICAL INFORMATION: sob. COMPARISON: 3/31/2022. TECHNIQUE: AP upright view of the chest. FINDINGS: There are stable median sternotomy wires and mediastinal surgical clips and atrial appendage clip. There is stable right cardiac pacer generator with 2 leads. There is marked interstitial prominence at the bilateral lung bases, increased compared to prior exam. The cardiac silhouette is normal in appearance. Visualized osseous structures appear grossly intact. Worsening acute on chronic interstitial disease at the lung bases. **This report has been created using voice recognition software. It may contain minor errors which are inherent in voice recognition technology. ** Final report electronically signed by Dr. Samira Ruelas MD on 4/24/2022 1:13 PM      Assessment    Acute on chronic hypoxic respiratory failure  A. 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 Empiric antibiotics with cefepime at this time. Patient has been on chronic steroids and with his kidney disease will consider vancomycin if white count not improving or patient has fever.   Pulmonary consult  Monitor his cardiac enzymes  Continue to wean Cardizem drip  Consult his cardiologist  Resume his home dose of beta-blockers and Xarelto  Continue with his atovaquone PCP prophylaxis while on steroids  Continue IV hydration follow-up with lactic acid  Follow-up with labs  Continue PT OT        Consultations Ordered:  IP CONSULT TO PULMONOLOGY  IP CONSULT TO CARDIOLOGY    Electronically signed by Leonardo Cardona MD on 4/24/22 at 7:06 PM EDT

## 2022-04-24 NOTE — ED NOTES
Dr. Luzma Rasmussen at bedside for assessment. Respiratory at bedside for BIPAP placement.      Antionette Blackwood, CHRISTINE  04/24/22 [id number]

## 2022-04-24 NOTE — ED NOTES
Pt to noted to be in Afib at this time, HR noted to be 140-150bpm. Dr. Tanner Briceno notified.      Antionette Blackwood RN  04/24/22 [id number]

## 2022-04-24 NOTE — ED NOTES
Pt resting in bed with wife at bedside. PT medicated per [facility] and updated on plan of care.      Antionette Blackwood RN  04/24/22 [id number]

## 2022-04-24 NOTE — ED NOTES
ED to inpatient nurses report    Chief Complaint   Patient presents with    Shortness of Breath      Present to ED from nursing home  LOC: alert and orientated to name, place, date  Vital signs   Vitals:    04/24/22 1525 04/24/22 1634 04/24/22 1710 04/24/22 1747   BP: 127/82 (!) 101/55     Pulse: 141 132  110   Resp: 20 18 21 20   Temp:       TempSrc:       SpO2: 100% 100%  99%   Weight:       Height:          Oxygen Baseline room air    Current needs required BIPAP   LDAs:   Peripheral IV 03/25/22 Left Forearm (Active)       Peripheral IV 04/24/22 Left Antecubital (Active)   Site Assessment Clean, dry & intact 04/24/22 1251       Peripheral IV 04/24/22 Left Forearm (Active)   Site Assessment Clean, dry & intact 04/24/22 1251     Mobility: Requires assistance * 1  Pending ED orders: none  Present condition: stable      Electronically signed by Celeste Blackwood RN on 4/24/2022 at 5:48 PM     Antionette Blackwood RN  04/24/22 [id number]

## 2022-04-24 NOTE — ED TRIAGE NOTES
Presents to ER from [address] via [facility] with complaints of low oxygen level. EMS states they were called for a pulse ox of 65. Upon their arrival spO2 33%. Pt placed on NRB in route. Upon arrival pt remains on NRB. Pt received albulterol treatments and 125mg solumedrol.

## 2022-04-24 NOTE — ED NOTES
Pt resting in bed on BIPAP with eyes closed, medicated per MAR. Family at bedside. Call light in reach.      Antionette Martinez) DAMON Blackwood RN  04/24/22 [id number]

## 2022-04-24 NOTE — ED PROVIDER NOTES
[place] ENCOUNTER          Pt Name: Michelle Varela  MRN: [id number]  [place] 1950  Date of evaluation: 4/24/2022  Treating Resident Physician: Nadia Cardona MD  Supervising Physician: Meg Monsivais, [address] [place] [address]       Chief Complaint   Patient presents with    Shortness of Breath     History obtained from the patient. HISTORY OF PRESENT ILLNESS    HPI  Michelle Varela is a 70 y.o. male with past medical history of COPD and recent diagnosis of pneumonia who presents to the emergency department for evaluation of shortness of breath. Patient noted with pulse ox of 33% at nursing home and on arrival to the ED on nonrebreather with 66%. Patient denies chest pain or syncope. The patient has no other acute complaints at this time. REVIEW OF SYSTEMS   Review of Systems   Constitutional: Positive for fatigue. Negative for fever. HENT: Negative for congestion, ear pain, rhinorrhea and sore throat. Eyes: Negative for pain and discharge. Respiratory: Positive for cough and shortness of breath. Negative for wheezing. Cardiovascular: Negative for chest pain, palpitations and leg swelling. Gastrointestinal: Negative for abdominal distention, abdominal pain, diarrhea, nausea and vomiting. Genitourinary: Negative for difficulty urinating, flank pain and frequency. Musculoskeletal: Negative for arthralgias. Neurological: Negative for dizziness, tremors, syncope, weakness and numbness.          PAST MEDICAL AND SURGICAL HISTORY     Past Medical History:   Diagnosis Date    Asthma     Atrial fibrillation with RVR ([address] 04/05/2021    [employer] dual pacemaker  04/15/2021    Collagenous colitis 2021    per scope 2021    Colon polyps 2021    dr Milvia Conti    COPD, mild ([address]     Eczema of hand     HTN (hypertension)     Hyperlipidemia     Smoker      Past Surgical History:   Procedure Laterality Date    CARDIAC SURGERY      COLONOSCOPY  06/10/2021    theresa ccolon polyps and collagenous colitis    CORONARY ARTERY BYPASS GRAFT  01/12/2021    cabg x 3 with lima and atrial appendage clip  dr Laura Fletcher GRAFT N/A 01/12/2021    CABG  X 3 WITH DEJAH, Atrial Appendage Clip performed by Ольга Mcclellan MD at [facility] NASAL SINUS SURGERY  06/2008    polyps    OTHER SURGICAL HISTORY  DEC 7TH 2012 DR [address]NAN [address] [facility]     IGS ENDOSCOPIC BI LAT MAXILLARY, ETHMOID, SPHENOID, FRONTAL SINUSOTOMY WITH SEPTOPLASTY AND TURBINOPLASTIES    SKIN CANCER EXCISION  09/2014    Left side of Forehead     TOOTH EXTRACTION  02/2017         MEDICATIONS     Current Facility-Administered Medications:     [COMPLETED] dilTIAZem injection 10 mg, 10 mg, IntraVENous, Once, 10 mg at 04/24/22 1630 **FOLLOWED BY** dilTIAZem 125 mg in dextrose 5 % 125 mL infusion, 2.5-15 mg/hr, IntraVENous, Continuous, Jose Fung MD, Last Rate: 5 mL/hr at 04/24/22 1919, 5 mg/hr at 04/24/22 1919    ALPRAZolam (XANAX) tablet 0.5 mg, 0.5 mg, Oral, Nightly PRN, Gust Collet, MD    aspirin chewable tablet 81 mg, 81 mg, Oral, Daily, Gust Collet, MD    atorvastatin (LIPITOR) tablet 40 mg, 40 mg, Oral, Nightly, Gust Collet, MD    atovaquone (MEPRON) suspension 750 mg, 750 mg, Oral, Daily, Gust Collet, MD    cetirizine (ZYRTEC) tablet 10 mg, 10 mg, Oral, Daily, Gust Collet, MD    metoprolol succinate (TOPROL XL) extended release tablet 150 mg, 150 mg, Oral, Daily, Gust Collet, MD  Mindy Curl  [START ON 4/25/2022] multivitamin 1 tablet, 1 tablet, Oral, Daily, Gust Collet, MD    mupirocin (BACTROBAN) 2 % ointment, , Topical, TID, Gust Collet, MD    nystatin (MYCOSTATIN) cream, , Topical, BID, Gust Collet, MD    [START ON 4/25/2022] pantoprazole (PROTONIX) tablet 40 mg, 40 mg, Oral, QAM AC, Gust Collet, MD    rivaroxaban (XARELTO) tablet 15 mg, 15 mg, Oral, Daily, Danya Yates MD    rOPINIRole (REQUIP) tablet 0.5 mg, 0.5 mg, Oral, Nightly, Danya Yates MD    traZODone (DESYREL) tablet 50 mg, 50 mg, Oral, Nightly, Danya Yates MD    methylPREDNISolone sodium (SOLU-MEDROL) injection 60 mg, 60 mg, IntraVENous, Q8H, Danya Yates MD    ipratropium-albuterol (DUONEB) nebulizer solution 1 ampule, 1 ampule, Inhalation, 4x daily, Danya Yates MD    ipratropium-albuterol (DUONEB) nebulizer solution 1 ampule, 1 ampule, Inhalation, Q4H PRN, Danya Yates MD    sodium chloride flush 0.9 % injection 5-40 mL, 5-40 mL, IntraVENous, 2 times per day, Bharath Barnett MD    sodium chloride flush 0.9 % injection 5-40 mL, 5-40 mL, IntraVENous, PRN, Bharath Barnett MD    0.9 % sodium chloride infusion, , IntraVENous, PRN, Bharath Barnett MD    ondansetron (ZOFRAN-ODT) disintegrating tablet 4 mg, 4 mg, Oral, Q8H PRN **OR** ondansetron (ZOFRAN) injection 4 mg, 4 mg, IntraVENous, Q6H PRN, Bharath Barnett MD    acetaminophen (TYLENOL) tablet 650 mg, 650 mg, Oral, Q4H PRN, Danya Yates MD    0.9 % sodium chloride infusion, , IntraVENous, Continuous, Danya Yates MD    [START ON 2022] ceFEPIme (MAXIPIME) 2,000 mg in sterile water 20 mL IV syringe, 2,000 mg, IntraVENous, Q12H, Danya Yates MD      SOCIAL HISTORY     Social History     Social History Narrative    No barriers with medication affordability now that he has met deductible    Active with [facility]    Has O2 and supplies needed    No barriers with transportation     Social History     Tobacco Use    Smoking status: Former Smoker     Packs/day: 1.00     Years: 40.00     Pack years: 40.00     Types: Cigarettes     Quit date: 3/7/2022     Years since quittin.1    Smokeless tobacco: Never Used   Substance Use Topics    Alcohol use: Not Currently     Alcohol/week: 0.0 standard drinks    Drug use: No         ALLERGIES     Allergies Allergen Reactions    Penicillins Rash    Sulfa Antibiotics Rash         FAMILY HISTORY     Family History   Problem Relation Age of Onset    Heart Disease Mother     Heart Disease Father         cabg    Diabetes Brother     Heart Disease Brother          PREVIOUS RECORDS   Previous records reviewed today    PHYSICAL EXAM     ED Triage Vitals   BP Temp Temp Source Pulse Resp SpO2 Height Weight   04/24/22 1241 04/24/22 1241 04/24/22 1241 04/24/22 1241 04/24/22 1241 04/24/22 1240 04/24/22 1241 04/24/22 1241   (!) 151/68 99.6 °F (37.6 °C) Axillary 78 24 (!) 79 % 5' 8\" (1.727 m) 155 lb (70.3 kg)     Initial vital signs and nursing assessment reviewed and abnormal from hypertension and hypoxia . Body mass index is 23.57 kg/m². Pulsoximetry is normal per my interpretation. Additional Vital Signs:  Vitals:    04/24/22 1901   BP: 120/81   Pulse: 103   Resp: 19   Temp: 98.9 °F (37.2 °C)   SpO2: 96%       Physical Exam  Constitutional:       Appearance: Normal appearance. HENT:      Head: Normocephalic. Right Ear: External ear normal.      Left Ear: External ear normal.      Nose: Nose normal.      Mouth/Throat:      Mouth: Mucous membranes are moist.      Pharynx: Oropharynx is clear. Eyes:      Conjunctiva/sclera: Conjunctivae normal.      Pupils: Pupils are equal, round, and reactive to light. Cardiovascular:      Rate and Rhythm: Normal rate and regular rhythm. Pulses: Normal pulses. Heart sounds: Normal heart sounds. Pulmonary:      Effort: Pulmonary effort is normal.      Breath sounds: Examination of the right-upper field reveals wheezing. Examination of the left-upper field reveals wheezing. Examination of the right-middle field reveals wheezing. Examination of the left-middle field reveals wheezing. Examination of the right-lower field reveals decreased breath sounds. Examination of the left-lower field reveals decreased breath sounds. Decreased breath sounds and wheezing present. range)   cetirizine (ZYRTEC) tablet 10 mg (has no administration in time range)   metoprolol succinate (TOPROL XL) extended release tablet 150 mg (has no administration in time range)   multivitamin 1 tablet (has no administration in time range)   mupirocin (BACTROBAN) 2 % ointment (has no administration in time range)   nystatin (MYCOSTATIN) cream (has no administration in time range)   pantoprazole (PROTONIX) tablet 40 mg (has no administration in time range)   rivaroxaban (XARELTO) tablet 15 mg (has no administration in time range)   rOPINIRole (REQUIP) tablet 0.5 mg (has no administration in time range)   traZODone (DESYREL) tablet 50 mg (has no administration in time range)   methylPREDNISolone sodium (SOLU-MEDROL) injection 60 mg (has no administration in time range)   ipratropium-albuterol (DUONEB) nebulizer solution 1 ampule (has no administration in time range)   ipratropium-albuterol (DUONEB) nebulizer solution 1 ampule (has no administration in time range)   sodium chloride flush 0.9 % injection 5-40 mL (has no administration in time range)   sodium chloride flush 0.9 % injection 5-40 mL (has no administration in time range)   0.9 % sodium chloride infusion (has no administration in time range)   ondansetron (ZOFRAN-ODT) disintegrating tablet 4 mg (has no administration in time range)     Or   ondansetron (ZOFRAN) injection 4 mg (has no administration in time range)   acetaminophen (TYLENOL) tablet 650 mg (has no administration in time range)   0.9 % sodium chloride infusion (has no administration in time range)   ceFEPIme (MAXIPIME) 2,000 mg in sterile water 20 mL IV syringe (has no administration in time range)   albuterol (PROVENTIL) nebulizer solution (15 mg/hr Nebulization Given 4/24/22 1303)   ipratropium (ATROVENT) 0.02 % nebulizer solution 1 mg (1 mg Nebulization Given 4/24/22 1304)   0.9 % sodium chloride IV bolus 2,109 mL (0 mLs IntraVENous Stopped 4/24/22 1525)   metoprolol (LOPRESSOR) injection 5 mg (5 mg IntraVENous Given 4/24/22 1441)   cefTRIAXone (ROCEPHIN) 1,000 mg in sterile water 10 mL IV syringe (1,000 mg IntraVENous Given 4/24/22 1516)   azithromycin (ZITHROMAX) 500 mg in D5W 250ml addavial (0 mg IntraVENous Stopped 4/24/22 1747)   metoprolol (LOPRESSOR) injection 5 mg (5 mg IntraVENous Given 4/24/22 1511)         ED COURSE        Was placed on BiPAP and is maintaining a pulse ox of 100%. Patient will be admitted to hospital for further evaluation and treatment. MEDICATION CHANGES     Current Discharge Medication List            FINAL DISPOSITION     Final diagnoses:   Septicemia ([facility] [address]   Pneumonia due to infectious organism, unspecified laterality, unspecified part of lung   Elevated troponin     Condition: condition: good  Dispo: admit to telemetry      This transcription was electronically signed. Parts of this transcriptions may have been dictated by use of voice recognition software and electronically transcribed, and parts may have been transcribed with the assistance of an ED scribe. The transcription may contain errors not detected in proofreading. Please refer to my supervising physician's documentation if my documentation differs.     Electronically Signed: Julito Viera MD, 04/24/22, 7:53 PM        Julito Viera MD  Resident  04/24/22 2000       Julito Viera MD  Resident  04/24/22 2001

## 2022-04-25 NOTE — CARE COORDINATION
04/25/22 1438   Readmission Assessment   Number of Days since last admission? 8-30 days   Previous Disposition SNF   Who is being Interviewed Caregiver;Patient   What was the patient's/caregiver's perception as to why they think they needed to return back to the hospital?   (oxygen levels dropped to 30/s, a-fib)   Did you visit your Primary Care Physician after you left the hospital, before you returned this time? Yes  (at SNF)   Who advised the patient to return to the hospital? Physician   Does the patient report anything that got in the way of taking their medications? No   In our efforts to provide the best possible care to you and others like you, can you think of anything that we could have done to help you after you left the hospital the first time, so that you might not have needed to return so soon?  Other (Comment)  (more lung workup)

## 2022-04-25 NOTE — PLAN OF CARE
Problem: Respiratory - Adult  Goal: Clear lung sounds  4/25/2022 1024 by Albino Shaw RCP  Outcome: Progressing   Crackles in both upper lungs  Pt on 6 lpm nc  Pt tolerating well -bipap in room if pt needs to wear

## 2022-04-25 NOTE — PLAN OF CARE
Problem: Respiratory - Adult  Goal: Clear lung sounds  Outcome: Progressing   Will continue to breathing tx regimen and monitor needs for NIV.  Pt is resting now on a 3 l NC

## 2022-04-25 NOTE — PROGRESS NOTES
IM Progress Note  Dr. Linden Singer  4/25/2022 9:18 AM      Patient name Elma Leblanc  [id number]  PCP: Izabel Denson MD  Admit Date: 4/24/2022  Acct No. [de-identified]    Subjective: Interval History:   Pt drops his sat while off BIPAP during eating   He does feel better though  No cp  No fever   Informed of 1/2 + blood cult     Diet: ADULT DIET; Regular; Low Sodium (2 gm)    I/O last 3 completed shifts: In: 200 [P.O.:200]  Out: 800 [Urine:800]  No intake/output data recorded. Admission weight: 155 lb (70.3 kg) as of 4/24/2022 12:37 PM  Wt Readings from Last 3 Encounters:   04/24/22 164 lb 0.4 oz (74.4 kg)   04/20/22 155 lb 12.8 oz (70.7 kg)   04/13/22 154 lb (69.9 kg)     Body mass index is 24.94 kg/m².     ROS   CVS;  no cp or palpitation  Resp: +SOB+ cough  Neuro:  No numbness or weakness or dizziness  Abd: no nausea or vomiting or abd pain      Medications:   Scheduled Meds:   aspirin  81 mg Oral Daily    atorvastatin  40 mg Oral Nightly    atovaquone  750 mg Oral Daily    cetirizine  10 mg Oral Daily    metoprolol succinate  150 mg Oral Daily    multivitamin  1 tablet Oral Daily    mupirocin   Topical TID    nystatin   Topical BID    pantoprazole  40 mg Oral QAM AC    rivaroxaban  15 mg Oral Daily    rOPINIRole  0.5 mg Oral Nightly    traZODone  50 mg Oral Nightly    methylPREDNISolone  60 mg IntraVENous Q8H    ipratropium-albuterol  1 ampule Inhalation 4x daily    sodium chloride flush  5-40 mL IntraVENous 2 times per day    cefepime  2,000 mg IntraVENous Q12H     Continuous Infusions:   dilTIAZem Stopped (04/25/22 0109)    sodium chloride      sodium chloride 100 mL/hr at 04/25/22 0531       Labs :     CBC:   Recent Labs     04/24/22  1240 04/25/22  0427   WBC 26.4* 12.9*   HGB 10.9* 8.8*    308     BMP:    Recent Labs     04/24/22  1321 04/25/22  0427    136   K 5.0 5.1    103   CO2 20* 21*   BUN 45* 40*   CREATININE 1.6* 1.3*   GLUCOSE 99 181*     Hepatic: Recent Labs     04/24/22  1321   AST 31   ALT 29   BILITOT 0.5   ALKPHOS 67     Troponin: No results for input(s): TROPONINI in the last 72 hours. BNP: No results for input(s): BNP in the last 72 hours. Lipids: No results for input(s): CHOL, HDL in the last 72 hours. Invalid input(s): LDLCALCU  INR: No results for input(s): INR in the last 72 hours.     Radiology    Objective:   Vitals: BP (!) 103/54   Pulse 55   Temp 98 °F (36.7 °C) (Oral)   Resp 14   Ht 5' 8\" (1.727 m)   Wt 164 lb 0.4 oz (74.4 kg)   SpO2 (!) 85%   BMI 24.94 kg/m²   HEENT: Head:pupils react  Neck: supple  Lungs: rales both base, with decreased air entry  Heart: regular rate and rhythm   Abdomen: soft BS heard NG NT  Extremities: warm  No edema  Neurologic:  Alert, oriented X3    Impression:   :   Acute on chronic hypoxic respiratory failure  A. 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  Coronary disease status post coronary bypass graft x3 LIMA to LAD SVG to RCA and SVG to obtuse marginal  Cardiomyopathy with improved ejection fraction last echo was 54 to 60% this year  Status post pacemaker for tachybradycardia syndrome  COPD  Significant deconditioning  Leukocytosis  Chronic kidney disease stage III  Lactic acidosis  Chronic anemia  Hypertension  Hyperlipidemia  Gram + cult     Plan:    Cont BIPAP and informed plans are for bronch per pulm  Cardizem drip is off   Cont B Blockers  anticoag held for possible procedure  Cont bronchodilators  ID consult  As pt still afebrile WBC improved will hold off on Vanc for now  Decrease IVF  F/u on labs         Leonardo Cardona MD, MD

## 2022-04-25 NOTE — PROGRESS NOTES
Initial Evaluation          Patient:   Althea Langley  YOB: 1950  Age:  70 y.o. Room:  [facility]20/020-  MRN:  [id number]   Acct: [de-identified]    Date of Admission:  4/24/2022 12:37 PM  Date of Service:  4/25/2022  Completed By:  Gin Dooley RN                 Reason for Palliative Care Evaluation:-             [] Code Status Discussion              [x] Goals of Care              [] Pain/Symptom Management               [] Emotional Support              [] Other:                   Current Issues:-  []  Pain  []  Fatigue  []  Nausea  []  Anxiety  []  Depression  [x]  Shortness of Breath-short of breath at rest, becomes hypoxic after speaking. Pt initially on nasal cannula for discussion but over the course of 10-15 of conversation, pt required to be placed back on BiPAP. []  Constipation  []  Appetite  [x]  Other: Pt and wife state frustration that no \"quick\" answers are being found. Advance Directives:-pt would like to complete in a few more days, after he is feeling better  [] [facility] DNR Form  [] Living Will  [x] Medical POA             Current Code Status:-  [x] Full Resuscitation  [] DNR-Comfort Care-Arrest  [] DNR-Comfort Care       [] Limited Resuscitation             [] No CPR            [] No shock            [] No ET intubation/reintubation            [] No resuscitative medications            [] Other limitation:              Palliative Performance Status:          [] 60%  Ambulation reduced; Significant disease;Can't do hobbies/housework; intake normal or reduced; occasional assist; LOC full/confusion        [x] 50%  Mainly sit/lie; Extensive disease; Can't do any work; Considerable assist; intake normal or reduced; LOC full/confusion        [] 40%  Mainly in bed; Extensive disease; Mainly assist; intake normal or reduced; LOC full/confusion         [] 30%  Bed Bound; Extensive disease;  Total care; intake reduced; LOC full/confusion        [] 20%  Bed Bound; Extensive disease; Total care; intake minimal; Drowsy/coma        [] 10%  Bed Bound; Extensive disease; Total care; Mouth care only; Drowsy/coma        [] 0  Death        Goals of care evaluation:-        The patient goals of care are to provide comfort care/supportive services/palliation & relieve suffering:  Goals of care discussed with:  [] Patient independently  [x] Patient and Family  [] Family or Healthcare DPOA independently  [] Unable to discuss with patient, family/DPOA not present         Family/Patient Discussion:  Writer in to see pt. Pt is awake and alert, oriented to all spheres. Pt is mildly short of breath at rest.  Pt's wife, Emerson Quinn is here. Palliative care introduced, pt and Emerson Quinn agreeable to palliative care discussion. Condition update reviewed, hilda explained pt condition due to pt gets too short of breath with talking. Emerson Quinn is able to accurately explain current plan of care. Emerson Quinn states that the doctors have talked about doing a bronchoscopy but are holding off because they are concerned that pt will require intubation and MIV as a result of the procedure. We discussed what intubation and MIV looks like, we reviewed \"best case and worst case scenario\" if pt were to ever require MIV. We discussed that pt may very well have bronch and \"do fine\" and move forward with his care, or pt could need a vent just overnight till sedation meds are out of system. We also discussed worse case if pt needed vent and unable to be weaned within several weeks to a month or longer, or even never be able to be successfully weaned. Writer explained that pt more at risk of requiring long term vent due to current poor condition of his lungs. We discussed that family needs not \"fear\" if a bronch is needed but they need to discuss what pt would want if he were to go on vent but not able to be weaned.  We discussed that there are times a trach is needed to allow pt to live, we discussed that a trach doesn't always mean pt would never come off vent. Pt would need to decide what quality of life he would be able \"live with\" if he were vented. We discussed that pt wouldn't be able to be home if on vent due to the complexity of his medical needs but he has a very good chance of his \"brain\" still working and able to be interactive with family. We discussed that this issue needs to be discussed,regardless of whether pt has bronch or not, in case pt were to have resp failure at some other point in time and would require intubation and MIV so that Alejandro Benoit would know what he wanted because she would likely be the one making decisions for pt. Writer also explained resuscitation efforts for cardiac arrest and possible complications of that. Time spent answering questions. Pt and Alejandro Benoit state understanding of issues discussed and no further questions. Pt requests to continue with Full code status, he states understanding that he would need to alert his physician if he were to decide he wanted any care limitations. Pt and Alejandrotadeo Benoit express appreciation for discussion, emotional support given. Secure text sent to Davide Jimenez CNP to update her, as she is covering for Dr Sherrine Baumgarten.            Plan/Follow-Up:  Palliative care will continue to follow as needed, floor to notify PRN for urgent needs.        Electronically signed by Mariela Nair RN on 4/25/2022 at 4:28 PM           Palliative Care Office: [phone]

## 2022-04-25 NOTE — PROGRESS NOTES
Pt admitted to 4K20 from ED. Complaints: pneumonia/ SOB. IV diltiazem infusing into the forearm left, condition patent and no redness at a rate of 2.5 mls/ hour. IV site free of s/s of infection or infiltration. Vital signs obtained. Assessment and data collection initiated. Two nurse skin assessment performed by Ranae Scheuermann and Gisselle KING. Oriented to room. Policies and procedures for 4K explained. All questions answered with no further questions at this time. Fall prevention and safety brochure discussed with patient. Bed alarm on. Call light in reach.

## 2022-04-25 NOTE — CONSULTS
[facility] [address] [place] [address]  Cardiology Consult      Patient:  Jody Nuno  YOB: 1950    MRN: [id number]   Acct: [de-identified]     Primary Care Physician: Veronica Corral MD    REASON FOR CONSULT:   Atrial fibrillation with RVR    CHIEF COMPLAINT:    SOB    HISTORY OF PRESENT ILLNESS:    Jody Nuno is a pleasant 70year old male patient with past medical history that includes:   Past Medical History:   Diagnosis Date    Asthma     Atrial fibrillation with RVR ([address] 04/05/2021    [employer] dual pacemaker  04/15/2021    Collagenous colitis 2021    per scope 2021    Colon polyps 2021    dr David Bishop    COPD, mild ([address]     Eczema of hand     HTN (hypertension)     Hyperlipidemia     Smoker    Patient had 3 V CABG in 2021. He states was admitted for pneumonia in 03/2022. The patient was admitted to the hospital after he presented with worsening SOB and ANDERSON. He states that walking from one room to another at his house makes him tired and SOB. Patient also reports orthopnea. He denies chest pain, palpitations. Has mild leg swelling, gained 5 Ib in the past few weeks. He reports that he was in Rehab and thinks that his salt intake is not being limited. He has been on home O2 since 03/2022 (on 3 Liters). Patient reports that his Amiodarone was previously stopped due to suspected lung toxicity. He stopped smoking last year after CABG. He has known h/o COPD. He feels better when he uses the inhalers. He was recently seen by EP. Dr Cony Pichardo has suggested PVI ablation, however, patient wanted to wait until he recovers from his pneumonia. Echocardiogram on 3/9/2022 revealed an EF of 55-60%. CXR revealed worsening acute on chronic interstitial disease at the lung bases. Pulmonary medicine was consulted. Troponin <0.01, <0.01. Pro-BNP 3,368. On telemetry, SR is now noted.      All labs, EKG's, diagnostic testing and images as well as cardiac cath, stress testing   were reviewed during this encounter    CARDIAC TESTING  Echo:   ECHO: Results for orders placed during the hospital encounter of 06/15/21    Echo 2D w doppler w color complete    Narrative  Transthoracic Echocardiography Report (TTE)    Demographics    Patient Name    Melgoza Post Gender               Male    MR #            [id number]    Race                     Ethnicity    Account #       [de-identified]    Room Number    Accession       [id number]   Date of Study        06/15/2021  Number    Date of Birth   1950   Referring Physician  Lawrence Bertrand    Age             79 year(s)   Denise Guillen,  T    Interpreting         Lawrence Fung MD  Physician    Procedure    Type of Study    TTE procedure:ECHOCARDIOGRAM COMPLETE 2D W DOPPLER W COLOR. Procedure Date  Date: 06/15/2021 Start: 08:43 AM    Study Location: Echo Lab  Technical Quality: Adequate visualization    Indications:Cardiomyopathy. Additional Medical History:CABG, bradycardia, smoker, coronary artery  disease, hyperlipidemia, asthma, hypertension, COPD, atrial fibrillation,  congestive heart failure, [employer] pacemaker    Patient Status: Routine    Height: 68 inches Weight: 160 pounds BSA: 1.86 m^2 BMI: 24.33 kg/m^2    BP: 122/60 mmHg    Allergies  - See Epic.    - Penicillins.    - Sulfa drugs. Conclusions    Summary  Normal left ventricular size and systolic function. There were no regional wall motion abnormalities. Wall thickness was within normal limits. Ejection fraction was estimated at 45-50%. Doppler parameters were consistent with abnormal left ventricular  relaxation (grade 1 diastolic dysfunction). IVC size is within normal limits with normal respiratory phasic changes.     Signature    ----------------------------------------------------------------  Electronically signed by Lawrence Fung MD (Interpreting  physician) on 06/15/2021 at 04:26 PM  ----------------------------------------------------------------    Findings    Mitral Valve  The mitral valve structure was normal with normal leaflet separation. DOPPLER: The transmitral velocity was within the normal range with no  evidence for mitral stenosis. There was no evidence of mitral  regurgitation. Aortic Valve  The aortic valve was trileaflet with normal thickness and cuspal  separation. DOPPLER: Transaortic velocity was within the normal range with  no evidence of aortic stenosis. There was no evidence of aortic  regurgitation. Tricuspid Valve  The tricuspid valve structure was normal with normal leaflet separation. DOPPLER: There was no evidence of tricuspid stenosis. There was no  evidence of tricuspid regurgitation. Pulmonic Valve  The pulmonic valve leaflets exhibited normal thickness, no calcification,  and normal cuspal separation. DOPPLER: The transpulmonic velocity was  within the normal range with trace regurgitation. Left Atrium  Left atrial size was normal.    Left Ventricle  Normal left ventricular size and systolic function. There were no regional wall motion abnormalities. Wall thickness was within normal limits. Ejection fraction was estimated at 45-50%. Doppler parameters were consistent with abnormal left ventricular  relaxation (grade 1 diastolic dysfunction). Right Atrium  Right atrial size was normal.    Right Ventricle  The right ventricular size was normal with normal systolic function and  wall thickness. Pericardial Effusion  The pericardium was normal in appearance with no evidence of a pericardial  effusion. Pleural Effusion  No evidence of pleural effusion. Aorta / Great Vessels  IVC size is within normal limits with normal respiratory phasic changes.     M-Mode/2D Measurements & Calculations    LV Diastolic   LV Systolic Dimension:    AV Cusp Separation: 1.7 cmLA  Dimension: 4.7 3.6 cm                    Dimension: 3.2 cmAO Root  cm LV Volume Diastolic: 36.9 Dimension: 3 cmLA Area: 19.4 cm^2  LV FS:23.4 %   ml  LV PW          LV Volume Systolic: 66.9  Diastolic: 0.8 ml  cm             LV EDV/LV EDV Index: 07.9 RV Diastolic Dimension: 2.5 cm  Septum         ml/35 m^2LV ESV/LV ESV  Diastolic: 1   Index: 78.8 ml/19 m^2     LA/Aorta: 1.07  cm             EF Calculated: 45.9 %  LA volume/Index: 59.6 ml /32m^2  LV Length: 6.8 cm    LV Area  Diastolic: 21  cm^2  LV Area  Systolic: 39.8  cm^2    Doppler Measurements & Calculations    MV Peak E-Wave: 58.3 cm/s  AV Peak Velocity: 117  LVOT Peak Velocity: 100  MV Peak A-Wave: 61.7 cm/s  cm/s                   cm/s  MV E/A Ratio: 0.94         AV Peak Gradient: 5.48 LVOT Peak Gradient: 4  MV Peak Gradient: 1.36     mmHg                   mmHg  mmHg  TV Peak E-Wave: 39.4  MV Deceleration Time: 310                         cm/s  msec                                              TV Peak A-Wave: 34.7  MV P1/2t: 91 msec                                 cm/s  MVA by PHT:2.42 cm^2  TV Peak Gradient: 0.62  MV E' Septal Velocity: 5.8 AV DVI (Vmax):0.85     mmHg  cm/s  MV A' Septal Velocity: 7.9                        PV Peak Velocity: 72  cm/s                                              cm/s  MV E' Lateral Velocity:                           PV Peak Gradient: 2.07  9.2 cm/s                                          mmHg  MV A' Lateral Velocity:  7.3 cm/s  E/E' septal: 10.05                                MA ED Velocity: 113 cm/s  E/E' lateral: 6.34    [id number]    Past Medical History:    Past Medical History:   Diagnosis Date    Asthma     Atrial fibrillation with RVR ([address] 04/05/2021    [employer] dual pacemaker  04/15/2021    Collagenous colitis 2021    per scope 2021    Colon polyps 2021    dr Randee Zimmerman    [facility], mild [address]     Eczema of hand     HTN (hypertension)     daily Sun, Wed  loratadine (CLARITIN) 10 MG tablet, Take 10 mg by mouth daily  metoprolol succinate (TOPROL XL) 100 MG extended release tablet, Take 1.5 tablets by mouth daily  tiotropium-olodaterol (STIOLTO) 2.5-2.5 MCG/ACT AERS, Inhale 2 puffs into the lungs daily  rivaroxaban (XARELTO) 15 MG TABS tablet, Take 1 tablet by mouth daily  mupirocin (BACTROBAN) 2 % ointment, Apply topically 3 times daily. albuterol sulfate  (90 Base) MCG/ACT inhaler, INHALE 2 PUFFS INTO THE LUNGS EVERY 6 HOURS AS NEEDED FOR WHEEZING  traZODone (DESYREL) 50 MG tablet, Take 1 tablet by mouth nightly  rOPINIRole (REQUIP) 0.5 MG tablet, Take 1 tablet by mouth nightly  pantoprazole (PROTONIX) 40 MG tablet, Take 1 tablet by mouth every morning (before breakfast)  [DISCONTINUED] cetirizine (ZYRTEC) 10 MG tablet, Take 10 mg by mouth daily  atorvastatin (LIPITOR) 40 MG tablet, Take 1 tablet by mouth nightly  aspirin 81 MG chewable tablet, Take 1 tablet by mouth daily  Multiple Vitamins-Minerals (CENTRUM SILVER PO), Take 1 tablet by mouth daily     Allergies:    Penicillins and Sulfa antibiotics    Social History:    reports that he quit smoking about 7 weeks ago. His smoking use included cigarettes. He has a 40.00 pack-year smoking history. He has never used smokeless tobacco. He reports previous alcohol use. He reports that he does not use drugs. Family History:   family history includes Diabetes in his brother; Heart Disease in his brother, father, and mother. REVIEW OF SYSTEMS:  Constitutional: negative for anorexia, chills and fevers,weight change  Skin: negative for new skin rash per patient  HEENT: negative for head trauma or new visual changes  Respiratory: negative for hemoptysis  Cardiovascular: negative for  orthopnea, palpitations and syncope. Gastrointestinal: negative for abdominal pain,nausea , vomiting, constipation, diarrhea.   Hematologic/lymphatic: negative for bruising,prolonged bleeding,blood clots  Musculoskeletal:negative for muscle weakness, myalgias,wasting  Neurological: negative for coordination problems, dizziness, gait problems and vertigo  Behavioral/Psych:negative for mood/sleep disturbance      PHYSICAL EXAM:   Vitals:  Patient Vitals for the past 24 hrs:   BP Temp Temp src Pulse Resp SpO2 Height Weight   04/25/22 1147 -- -- -- -- -- 94 % -- --   04/25/22 1055 135/65 -- -- 64 -- -- -- --   04/25/22 0820 -- -- -- -- -- (!) 85 % -- --   04/25/22 0532 -- -- -- -- -- 96 % -- --   04/25/22 0433 -- -- -- -- -- 91 % -- --   04/25/22 0333 (!) 103/54 98 °F (36.7 °C) Oral 55 14 99 % -- --   04/25/22 0300 122/68 -- -- -- -- -- -- --   04/25/22 0230 100/64 -- -- -- -- -- -- --   04/25/22 0200 103/66 -- -- 97 -- -- -- --   04/25/22 0130 99/60 -- -- -- -- -- -- --   04/25/22 0100 (!) 94/54 -- -- -- -- -- -- --   04/25/22 0030 97/61 -- -- -- -- -- -- --   04/24/22 2358 -- -- -- -- -- 98 % -- --   04/24/22 2349 (!) 100/57 97.9 °F (36.6 °C) Oral 97 15 100 % -- --   04/24/22 2330 (!) 88/59 -- -- -- -- -- -- --   04/24/22 2300 (!) 102/59 -- -- -- -- -- -- --   04/24/22 2230 122/76 -- -- 120 -- -- -- --   04/24/22 2200 114/69 -- -- -- -- -- -- --   04/24/22 2130 113/71 -- -- -- -- -- -- --   04/24/22 2100 114/81 -- -- -- -- -- -- --   04/24/22 2030 97/67 -- -- -- -- -- -- --   04/24/22 2000 111/69 -- -- -- -- -- -- --   04/24/22 1952 98/71 97.7 °F (36.5 °C) Axillary 123 19 97 % -- 164 lb 0.4 oz (74.4 kg)   04/24/22 1901 120/81 98.9 °F (37.2 °C) Axillary 103 19 96 % -- --   04/24/22 1801 92/72 -- -- 126 18 99 % -- --   04/24/22 1747 -- -- -- 110 20 99 % -- --   04/24/22 1710 -- -- -- -- 21 -- -- --   04/24/22 1634 (!) 101/55 -- -- 132 18 100 % -- --   04/24/22 1525 127/82 -- -- 141 20 100 % -- --   04/24/22 1441 112/68 -- -- 134 15 100 % -- --   04/24/22 1358 -- -- -- -- 18 -- -- --   04/24/22 1340 -- -- -- -- -- 100 % -- --   04/24/22 1303 -- -- -- -- 20 100 % -- --   04/24/22 1257 -- -- -- -- 26 -- -- -- 04/24/22 1254 107/64 -- -- 76 24 (!) 89 % -- --   04/24/22 1247 133/64 -- -- 77 25 (!) 85 % -- --   04/24/22 1241 (!) 151/68 99.6 °F (37.6 °C) Axillary 78 24 (!) 86 % 5' 8\" (1.727 m) 155 lb (70.3 kg)   04/24/22 1240 -- -- -- -- -- (!) 79 % -- --       Last 3 weights: Wt Readings from Last 3 Encounters:   04/24/22 164 lb 0.4 oz (74.4 kg)   04/20/22 155 lb 12.8 oz (70.7 kg)   04/13/22 154 lb (69.9 kg)     24 hour intake/output:    Intake/Output Summary (Last 24 hours) at 4/25/2022 1204  Last data filed at 4/25/2022 0430  Gross per 24 hour   Intake 200 ml   Output 800 ml   Net -600 ml     BMI:Body mass index is 24.94 kg/m². General Appearance: alert and oriented to person, place and time, well developed and well- nourished, in no acute distress  Skin: warm and dry, no rash or erythema  Eyes: pupils equal, round, and reactive to light, extraocular eye movements intact, conjunctivae normal  Neck: supple and non-tender without mass, no thyromegaly or thyroid nodules, no cervical lymphadenopathy  Pulmonary/Chest: bilateral basilar crackles noted   Cardiovascular: normal rate, regular rhythm, normal S1 and S2, systolic murmur. No rubs, clicks, or gallops, distal pulses intact, no carotid bruits, Negative JVD  Radial Pulses: intact 2+  Abdomen: soft, non-tender, non-distended, normal bowel sounds, no masses or organomegaly  Extremities: no cyanosis, clubbing . trace Edema  Musculoskeletal: normal range of motion, no joint swelling, deformity or tenderness      RADIOLOGY   XR CHEST PORTABLE    Result Date: 4/24/2022  PROCEDURE: XR CHEST PORTABLE CLINICAL INFORMATION: sob. COMPARISON: 3/31/2022. TECHNIQUE: AP upright view of the chest. FINDINGS: There are stable median sternotomy wires and mediastinal surgical clips and atrial appendage clip. There is stable right cardiac pacer generator with 2 leads.  There is marked interstitial prominence at the bilateral lung bases, increased compared to prior exam. The cardiac silhouette is normal in appearance. Visualized osseous structures appear grossly intact. Worsening acute on chronic interstitial disease at the lung bases. **This report has been created using voice recognition software. It may contain minor errors which are inherent in voice recognition technology. ** Final report electronically signed by Dr. Ketty Florian MD on 4/24/2022 1:13 PM      LABS:  Recent Labs     04/24/22  1321 04/24/22  1919 04/25/22  0427   TROPONINT 0.020* < 0.010 < 0.010     CBC:   Lab Results   Component Value Date    WBC 12.9 04/25/2022    RBC 2.90 04/25/2022    RBC 4.84 11/07/2011    HGB 8.8 04/25/2022    HCT 29.2 04/25/2022    .7 04/25/2022    MCH 30.3 04/25/2022    MCHC 30.1 04/25/2022    RDW 13.3 06/07/2018     04/25/2022    MPV 10.9 04/25/2022     BMP:    Lab Results   Component Value Date     04/25/2022    K 5.1 04/25/2022    K 5.0 04/24/2022     04/25/2022    CO2 21 04/25/2022    BUN 40 04/25/2022    LABALBU 3.2 04/24/2022    LABALBU 4.3 11/07/2011    CREATININE 1.3 04/25/2022    CALCIUM 7.7 04/25/2022    LABGLOM 54 04/25/2022    GLUCOSE 181 04/25/2022    GLUCOSE 94 11/07/2011     Hepatic Function Panel:    Lab Results   Component Value Date    ALKPHOS 67 04/24/2022    ALT 29 04/24/2022    AST 31 04/24/2022    PROT 6.1 04/24/2022    BILITOT 0.5 04/24/2022    BILIDIR <0.2 03/26/2022    LABALBU 3.2 04/24/2022    LABALBU 4.3 11/07/2011     Magnesium:    Lab Results   Component Value Date    MG 1.9 03/26/2022     Warfarin PT/INR:  No components found for: PTPATWAR, PTINRWAR  HgBA1c:    Lab Results   Component Value Date    LABA1C 5.6 01/11/2021     FLP:    Lab Results   Component Value Date    TRIG 96 01/07/2021    HDL 36 01/07/2021    LDLCALC 71 01/07/2021    LDLDIRECT 79.82 01/11/2021     TSH:    Lab Results   Component Value Date    TSH 2.230 03/25/2022     BNP: No results found for: BNP      ASSESSMENT:  1. Paroxysmal atrial fibrillation/Flutter  2.  S/p AF RVR  3. Chadsvasc score 4  4. Acute on chronic HFpEF  5. SOB, ANDERSON   6. CAD  7. S/p 3 vessel CABG, ZAHRA [employer]-Clip (2021)  8. Sick sinus syndrome, s/p pacemaker placement   9. COPD  8. 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 On home O2   12. HTN    RECOMMENDATIONS:   Cardiology was consulted for AF RVR   On telemetry, SR is now noted   Cardizem gtt was stopped    Has h/o Amiodarone lung toxicity, ILD    Blood pressure is borderline, SBP 90s-110s   Cont Toprol    Add Digoxin    Has h/o CKD   Check Digoxin level one week post discharge    Follow up with EP, Dr Ajay Ferguson, to assess for possible PVI ablation   Continue on Xarelto    Has h/o CABG   Denies chest pains. Troponin <0.01, <0.01   Echocardiogram on 3/9/2022 revealed an EF of 55-60%   Cont ASA, Lipitor    Has mild volume overload. Has mild leg swelling, gained 5 Ib in the past few weeks   Pro-BNP 3,368   Stop IVF   Lasix 20 mg IV*1 dose today, then 20 mg po daily   Limit Na intake   Closely monitor I/O, daily weight, daily BMP. Repeat IV Lasix as needed    Keep M>2, K>4    Above findings and plan of care were discussed with patient and his wife, questions were answered, agreeable to plan. Thank you for allowing me to participate in the care of this patient. Please let me know if I can be of any further assistance.       Ric Teran MD, Gertrude Heath   12:04 PM  4/25/2022

## 2022-04-25 NOTE — CONSULTS
[facility]    Patient - Jody Nuno   MRN -  [id number]   Acct # - [de-identified]   - 1950      Date of Admission -  2022 12:37 PM  Date of evaluation -  2022  Room - [facility] [address], [place] Primary Care Physician - Veronica Corral MD     Problem List      Active Hospital Problems    Diagnosis Date Noted    SOB (shortness of breath) [R06.02] 2022     Priority: Medium    Pneumonia [J18.9] 2022     Reason for Consult    Respiratory failure  HPI   Jody Nuno is a 70 y.o. male admitted for worsening respiratory status, clinical diagnosis of APT on steroids but not improving, got up to the restroom at the [facility] and became more SOB with sats dropping, brought to ED and started on   PAP with improvement, went to PAF and started on Diltiazem, currently on NSR and Spo2 in the 80s on 5 LPM sats fluctuate if he talks. CAD s/p CABG, ECHO EF 55 to 60% abnormal (paradoxical)motion, Afib/flutter, atrial clip, SSS s/p dual chamber PPM, HTN, HLP. 36 PY smoker stopped 2021  CXR as before reveals bilateral interstitial pattern  No fever, no sputum production, SARS (-), Blood molecular pattern (+),staph film BCx (+) GPC in clusters.   PMHx   Past Medical History      Diagnosis Date    Asthma     Atrial fibrillation with RVR ([address] 2021    [employer] dual pacemaker  04/15/2021    Collagenous colitis     per scope     Colon polyps     dr David Bishop    COPD, mild ([address]     Eczema of hand     HTN (hypertension)     Hyperlipidemia     Smoker       Past Surgical History        Procedure Laterality Date    CARDIAC SURGERY      COLONOSCOPY  06/10/2021    theresa ccolon polyps and collagenous colitis    CORONARY ARTERY BYPASS GRAFT  2021    cabg x 3 with lima and atrial appendage clip  dr Glen Castaneda GRAFT N/A 2021    CABG  X 3 WITH DEJAH, Atrial Appendage Clip performed by Minda Urbina MD at [address] [place] [address] Road  06/2008    polyps    OTHER SURGICAL HISTORY  DEC 7TH 2012 DR GET WADE [facility] [place]     IGS ENDOSCOPIC BI LAT MAXILLARY, ETHMOID, SPHENOID, FRONTAL SINUSOTOMY WITH SEPTOPLASTY AND TURBINOPLASTIES    SKIN CANCER EXCISION  09/2014    Left side of Forehead     TOOTH EXTRACTION  02/2017     Meds    Current Medications    aspirin  81 mg Oral Daily    atorvastatin  40 mg Oral Nightly    atovaquone  750 mg Oral Daily    cetirizine  10 mg Oral Daily    metoprolol succinate  150 mg Oral Daily    multivitamin  1 tablet Oral Daily    mupirocin   Topical TID    nystatin   Topical BID    pantoprazole  40 mg Oral QAM AC    rivaroxaban  15 mg Oral Daily    rOPINIRole  0.5 mg Oral Nightly    traZODone  50 mg Oral Nightly    methylPREDNISolone  60 mg IntraVENous Q8H    ipratropium-albuterol  1 ampule Inhalation 4x daily    sodium chloride flush  5-40 mL IntraVENous 2 times per day    cefepime  2,000 mg IntraVENous Q12H     ALPRAZolam, ipratropium-albuterol, sodium chloride flush, sodium chloride, ondansetron **OR** ondansetron, acetaminophen  IV Drips/Infusions   dilTIAZem Stopped (04/25/22 0109)    sodium chloride      sodium chloride 100 mL/hr at 04/25/22 0531     Home Medications  Medications Prior to Admission: tuberculin (APLISOL) 5 UNIT/0.1ML injection, Inject 5 Units into the skin  atovaquone (MEPRON) 750 MG/5ML suspension, Take 750 mg by mouth daily  predniSONE (DELTASONE) 20 MG tablet, Take 20 mg by mouth daily  ALPRAZolam (XANAX) 0.5 MG tablet, Take 0.5 mg by mouth nightly as needed for Sleep.  nystatin (MYCOSTATIN) 736841 UNIT/GM cream, Apply topically 2 times daily Apply topically 2 times daily.   oxymetazoline (AFRIN) 0.05 % nasal spray, 2 sprays by Nasal route 2 times daily  furosemide (LASIX) 20 MG tablet, Take 20 mg by mouth daily Sun, Wed  loratadine (CLARITIN) 10 MG tablet, Take 10 mg by mouth daily  metoprolol succinate (TOPROL XL) 100 MG extended release tablet, Take 1.5 tablets by mouth daily  tiotropium-olodaterol (STIOLTO) 2.5-2.5 MCG/ACT AERS, Inhale 2 puffs into the lungs daily  rivaroxaban (XARELTO) 15 MG TABS tablet, Take 1 tablet by mouth daily  mupirocin (BACTROBAN) 2 % ointment, Apply topically 3 times daily. albuterol sulfate  (90 Base) MCG/ACT inhaler, INHALE 2 PUFFS INTO THE LUNGS EVERY 6 HOURS AS NEEDED FOR WHEEZING  traZODone (DESYREL) 50 MG tablet, Take 1 tablet by mouth nightly  rOPINIRole (REQUIP) 0.5 MG tablet, Take 1 tablet by mouth nightly  pantoprazole (PROTONIX) 40 MG tablet, Take 1 tablet by mouth every morning (before breakfast)  cetirizine (ZYRTEC) 10 MG tablet, Take 10 mg by mouth daily  atorvastatin (LIPITOR) 40 MG tablet, Take 1 tablet by mouth nightly  aspirin 81 MG chewable tablet, Take 1 tablet by mouth daily  Multiple Vitamins-Minerals (CENTRUM SILVER PO), Take 1 tablet by mouth daily   Diet    ADULT DIET; Regular;  Low Sodium (2 gm)  Allergies    Penicillins and Sulfa antibiotics  Social History     Social History     Socioeconomic History    Marital status:      Spouse name: Not on file    Number of children: Not on file    Years of education: Not on file    Highest education level: Not on file   Occupational History    Not on file   Tobacco Use    Smoking status: Former Smoker     Packs/day: 1.00     Years: 40.00     Pack years: 40.00     Types: Cigarettes     Quit date: 3/7/2022     Years since quittin.1    Smokeless tobacco: Never Used   Substance and Sexual Activity    Alcohol use: Not Currently     Alcohol/week: 0.0 standard drinks    Drug use: No    Sexual activity: Not on file   Other Topics Concern    Not on file   Social History Narrative    No barriers with medication affordability now that he has met deductible    Active with [facility]    Has O2 and supplies needed    No barriers with transportation     Social Determinants of Health     Financial Resource Strain: Low Risk     Difficulty of Paying Living Expenses: Not hard at all   Food Insecurity: No Food Insecurity    Worried About Running Out of Food in the Last Year: Never true    Danielle of Food in the Last Year: Never true   Transportation Needs: No Transportation Needs    Lack of Transportation (Medical): No    Lack of Transportation (Non-Medical):  No   Physical Activity: Inactive    Days of Exercise per Week: 0 days    Minutes of Exercise per Session: 0 min   Stress:     Feeling of Stress : Not on file   Social Connections:     Frequency of Communication with Friends and Family: Not on file    Frequency of Social Gatherings with Friends and Family: Not on file    Attends Quaker Services: Not on file    Active Member of Clubs or Organizations: Not on file    Attends Club or Organization Meetings: Not on file    Marital Status: Not on file   Intimate Partner Violence:     Fear of Current or Ex-Partner: Not on file    Emotionally Abused: Not on file    Physically Abused: Not on file    Sexually Abused: Not on file   Housing Stability: Unknown    Unable to Pay for Housing in the Last Year: No    Number of [place] in the Last Year: Not on file    Unstable Housing in the Last Year: No     Family History          Problem Relation Age of Onset    Heart Disease Mother     Heart Disease Father         cabg    Diabetes Brother     Heart Disease Brother        ROS - 11 systems   General weakness  HEENT Denies any diplopia, tinnitus or vertigo  Resp dyspnea with minimal activities and at rest, minimal non productive cough  Cardiac Palpitations  GI Denies any melena, hematochezia, hematemesis or pyrosis   Denies any frequency, urgency, hesitancy or incontinence  Heme Denies bruising or bleeding easily  Endocrine Denies any history of diabetes or thyroid disease  Neuro Denies any focal motor or sensory deficits  Psychiatric Denies anxiety, depression, suicidal ideation  Skin Denies 103 04/25/2022    CO2 21 04/25/2022    BUN 40 04/25/2022    CREATININE 1.3 04/25/2022    GLUCOSE 181 04/25/2022    GLUCOSE 94 11/07/2011    CALCIUM 7.7 04/25/2022    MG 1.9 03/26/2022     LFTS  Lab Results   Component Value Date    ALKPHOS 67 04/24/2022    ALT 29 04/24/2022    AST 31 04/24/2022    PROT 6.1 04/24/2022    BILITOT 0.5 04/24/2022    BILIDIR <0.2 03/26/2022    LABALBU 3.2 04/24/2022    LABALBU 4.3 11/07/2011     ABG   Lab Results   Component Value Date    PH 7.44 04/24/2022    PCO2 39 04/24/2022    PO2 191 04/24/2022    HCO3 26 04/24/2022    O2SAT 100 04/24/2022     @  Lab Results   Component Value Date    APTT 25.3 03/25/2022     INR   Lab Results   Component Value Date    INR 1.41 (H) 03/25/2022    INR 1.25 (H) 04/06/2021    INR 1.13 01/11/2021       EKG        PFTs     Cultures    (+) blood culture GPC in clusters  Radiology    CXR    FINDINGS:   There are stable median sternotomy wires and mediastinal surgical clips and atrial appendage clip. There is stable right cardiac pacer generator with 2 leads. There is marked interstitial prominence at the bilateral lung bases, increased compared to    prior exam. The cardiac silhouette is normal in appearance. Visualized osseous structures appear grossly intact.           Impression   Worsening acute on chronic interstitial disease at the lung bases.                   **This report has been created using voice recognition software. It may contain minor errors which are inherent in voice recognition technology. **       Final report electronically signed by Dr. 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 Questions and concerns addressed.     Electronically signed by       Abdoulaye Hughes MD on 4/25/2022 at 9:15 AM

## 2022-04-25 NOTE — PROGRESS NOTES
[address] [facility] [address] THERAPY MISSED TREATMENT NOTE  [facility] ICU STEPDOWN TELEMETRY 4K  4K-20/020-A      Date: 2022  Patient Name: Kristin Healy        CSN: [id number]   : 1950  (70 y.o.)  Gender: male                REASON FOR MISSED TREATMENT: Hold treatment per nursing request.  Pt having respiratory issues and going in/out of A-fib per RN report.  Will check back tomorrow

## 2022-04-25 NOTE — PROGRESS NOTES
Pharmacy Medication History Note      List of current medications patient is taking is complete. Source of information: [facility]    Changes made to medication list:  Medications removed (include reason, ex. therapy complete or physician discontinued):  · Cetirizine 10 mg (discontinued at [facility] on 4/7/22)  · Nystatin (discontinued at [facility] 4/8/22)  · Oxymetazoline (discontinued at [facility] 4/4/22)  · Tuberculin (nursing order --- not given within last month)    Medications added/doses adjusted:  · Albuterol nebulizer BID (per MAR)  · Duoneb q4h as needed (per MAR)  · Nielmed Sinus rinse TID (per MAR)  · Atovaquone adjusted from 5 ml to 10 ml daily (per [facility])  · Alprazolam 0.5 mg nightly adjusted to q12h prn (per [facility])  · Prednisone 20 mg daily adjusted to 30 mg daily (per [facility], see notes below)  · Fluoxetine 10 mg daily added (per [facility], see notes below)    Other notes (ex. Recent course of antibiotics, Coumadin dosing):  · Per MAR, fluoxetine 10 mg was started on 4/24/22 for five days (4/24-4/28) and was then to be increased to 20 mg daily thereafter. · Per [facility], patient was on prednisone 40 mg daily from 4/2-4/20 and then decreased to 30 mg daily. 30 mg noted to stop on 4/27/22 but no additional titration dose noted thereafter on [facility] documents. Diagnosis for medication on MAR reads COPD. · Denies use of other OTC or herbal medications.       Allergies reviewed      Electronically signed by Kamran Zuniga on 4/25/2022 at 10:27 AM

## 2022-04-25 NOTE — CONSULTS
CONSULTATION NOTE :ID       Patient - Filipe Jacinto,  Age - 70 y.o.    - 1950      Room Number - 4K-20/020-A   MRN -  [id number]   Acct # - [de-identified]  Date of Admission -  2022 12:37 PM  Patient's PCP: Alexandra Cueto MD     Requesting Physician: Laura Mccauley MD    REASON FOR CONSULTATION   Positive blood culture. CHIEF COMPLAINT   Shortness of breath. HISTORY OF PRESENT ILLNESS       This is a very pleasant 70 y.o. male who was admitted to the hospital with a chief complaints of worsening shortness of breath. He was recently discharged from the hospital after he presented with similar complaints he was treated for pneumonia. 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 He had been on amiodarone for over a year after he had coronary artery disease and had a bypass surgery he had also long history of smoking. He was found to have a positive blood culture 1 out of 2 which is positive for gram-positive cocci in cluster. He denies any fever or chills he had acute kidney injury. He had history of sinus disease in the past he has a pacemaker had history of sinus surgery he had history of collagenous colitis. And exam of hand. He reports of cold hands change in color when exposed to cold.     PAST MEDICAL  HISTORY       Past Medical History:   Diagnosis Date    Asthma     Atrial fibrillation with RVR ([address] 2021    [employer] dual pacemaker  04/15/2021    Collagenous colitis     per scope     Colon polyps     dr Xavier Samuel    COPD, mild ([address]     Eczema of hand     HTN (hypertension)     Hyperlipidemia     Smoker        PAST SURGICAL HISTORY     Past Surgical History:   Procedure Laterality Date    CARDIAC SURGERY      COLONOSCOPY  06/10/2021    theresa ccolon polyps and collagenous colitis    CORONARY ARTERY BYPASS GRAFT  2021    cabg x 3 with lima and atrial appendage clip dr Danay Mullen N/A 01/12/2021    CABG  X 3 WITH DEJAH, Atrial Appendage Clip performed by Thanh Webster MD at [facility] [address] SINUS SURGERY  06/2008    polyps    OTHER SURGICAL HISTORY  DEC 7TH 2012 DR [address] [place]     IGS ENDOSCOPIC BI LAT MAXILLARY, ETHMOID, SPHENOID, FRONTAL SINUSOTOMY WITH SEPTOPLASTY AND TURBINOPLASTIES    SKIN CANCER EXCISION  09/2014    Left side of Forehead     TOOTH EXTRACTION  02/2017         MEDICATIONS:       Scheduled Meds:   aspirin  81 mg Oral Daily    atorvastatin  40 mg Oral Nightly    atovaquone  750 mg Oral Daily    cetirizine  10 mg Oral Daily    metoprolol succinate  150 mg Oral Daily    multivitamin  1 tablet Oral Daily    mupirocin   Topical TID    nystatin   Topical BID    pantoprazole  40 mg Oral QAM AC    rivaroxaban  15 mg Oral Daily    rOPINIRole  0.5 mg Oral Nightly    traZODone  50 mg Oral Nightly    methylPREDNISolone  60 mg IntraVENous Q8H    ipratropium-albuterol  1 ampule Inhalation 4x daily    sodium chloride flush  5-40 mL IntraVENous 2 times per day    cefepime  2,000 mg IntraVENous Q12H     Continuous Infusions:   dilTIAZem Stopped (04/25/22 0109)    sodium chloride      sodium chloride 75 mL/hr at 04/25/22 1009     PRN Meds:ALPRAZolam, ipratropium-albuterol, sodium chloride flush, sodium chloride, ondansetron **OR** ondansetron, acetaminophen  Allergies:   ALLERGIES:    Penicillins and Sulfa antibiotics        SOCIAL HISTORY:     TOBACCO:   reports that he quit smoking about 7 weeks ago. His smoking use included cigarettes. He has a 40.00 pack-year smoking history. He has never used smokeless tobacco.     ETOH:   reports previous alcohol use.   Patient currently lives with family       FAMILY HISTORY:         Problem Relation Age of Onset    Heart Disease Mother     Heart Disease Father         cabg    Diabetes Brother     Heart Disease Brother        REVIEW OF SYSTEMS: Constitutional: no fever, no night sweats, + fatigue, no weight loss. Head: no head ache , no head injury, no migranes. Eye: no eye discharge, blurring of vision, no double vision,no eye pain. Ears: no hearing difficulty, no tinnitus  Mouth/throat: no ulceration, dental caries , dysphagia, no hoarseness and voice change  Respiratory: + Cough no chest pain, + shortness of breath,no wheezing  CVS: no palpitation, no chest pain,   GI: no abdominal pain, no nausea , no vomiting, no constipation,no diarrhea. JOSE: no dysuria, frequency and urgency, no hematuria, no kidney stones  Musculoskeletal: no joint pain, swelling , stiffness,  Endocrine: no polyuria, polydipsia, no cold or heat intolerance  Hematology: no anemia, no easy brusing or bleeding, no hx of clotting disorder  Dermatology: Cold and pale hands change color when it is cold,  Neurological:no headaches,no dizziness, no seizure, no numbness. Psychiatry: no depression, no anxiety,no panic attacks, no suicide ideation    PHYSICAL EXAM:     /65   Pulse 64   Temp 98 °F (36.7 °C) (Oral)   Resp 14   Ht 5' 8\" (1.727 m)   Wt 164 lb 0.4 oz (74.4 kg)   SpO2 (!) 85%   BMI 24.94 kg/m²   General apperance:  Awake, alert, chronically sick looking, on nasal oxygen in mild distress  HEENT: Pale conjunctiva, unicteric sclera, dry oral mucosa. Chest: Bilateral air entry, diminished breath sounds with crackles at the lung bases  Cardiovascular:  RRR ,S1S2, no murmur or gallop. Abdomen:  Soft, non tender to palpation. Extremities: The fingers were pale and cold  Skin:  Warm and dry.   CNS: Awake and oriented        LABS:     CBC:   Recent Labs     04/24/22  1240 04/25/22  0427   WBC 26.4* 12.9*   HGB 10.9* 8.8*    308     BMP:    Recent Labs     04/24/22  1321 04/25/22  0427    136   K 5.0 5.1    103   CO2 20* 21*   BUN 45* 40*   CREATININE 1.6* 1.3*   GLUCOSE 99 181*     Calcium:  Recent Labs     04/25/22 0427   CALCIUM 7.7*   Hepatic: Recent Labs     04/24/22  1321   ALKPHOS 67   ALT 29   AST 31   PROT 6.1   BILITOT 0.5   LABALBU 3.2*     Amylase and Lipase:  Recent Labs     04/24/22 1919   LACTA 2.8*     Lactic Acid:   Recent Labs     04/24/22 1919   LACTA 2.8*     Micro:   Lab Results   Component Value Date    BC No growth-preliminary  04/24/2022       Problem list of patient      Patient Active Problem List   Diagnosis Code    Hyperlipidemia E78.5    Asthma J45.909    Smoker F17.200    Allergic rhinitis due to other allergen J30.89    Collagenous colitis K52.831    Lactose intolerance E73.9    HTN (hypertension) I10    COPD, mild ([address] J44.9    Atrial fibrillation ([address] I48.91    Atherosclerotic heart disease of native coronary artery with other forms of angina pectoris ([address] I25.118    S/P CABG x 3 Z95.1    Encounter for cardioversion procedure Z01.89    S/P left atrial appendage ligation Z98.890    Ischemic cardiomyopathy I25.5    Chronic bronchitis ([address] J42    CHF (congestive heart failure), NYHA class II, chronic, systolic (HCC) I45.21    Tachycardia-bradycardia (HCC) I49.5    [employer] dual pacemaker  Z95.0    Pneumonia J18.9    Dyspnea and respiratory abnormalities R06.00, R06.89    Acute and chronic respiratory failure with hypoxia (HCC) J96.21    Thrush B37.0    Nosebleed R04.0    SOB (shortness of breath) R06.02           Impression and Recommendation:   Shortness of breath related to interstitial lung disease  History of amiodarone toxicity contributing to lung disease  COPD related to chronic smoking  Positive blood culture 1 out of 2 likely contaminant we will continue to monitor patient  Coronary artery disease/CHF  Raynaud's diseaseMay need work-up for crest syndrome  We will continue current treatment we will hold further vancomycin due to his underlying renal disease  Thank you Trent Cazares MD for allowing me to participate in this patient's care.     Shawna Mae MD, MD,FACP 4/25/2022 11:40 AM

## 2022-04-25 NOTE — CARE COORDINATION
4/25/22, 4:31 PM EDT    DISCHARGE PLANNING EVALUATION      Spoke with Adilia Spears at [facility], patient was skilled at [facility]. They will take back at discharge.

## 2022-04-25 NOTE — CONSULTS
[facility]    Patient - Tanya Dobbs   MRN -  [id number]   [facility]   - 1950      Date of Admission -  2022 12:37 PM  Date of evaluation -  2022  Room - [address] [place] [address] Street, MD Primary Care Physician - Bruno Hwang MD     Problem List      Active Hospital Problems    Diagnosis Date Noted    SOB (shortness of breath) [R06.02] 2022     Priority: Medium    Pneumonia [J18.9] 2022     Reason for Consult    Respiratory failure  HPI   Tanya Dobbs is a 70 y.o. male admitted for worsening respiratory status, clinical diagnosis of APT on steroids but not improving, got up to the restroom at the [facility] and became more SOB with sats dropping, brought to ED and started on   PAP with improvement, went to PAF and started on Diltiazem, currently on NSR and Spo2 in the 80s on 5 LPM sats fluctuate if he talks. CAD s/p CABG, ECHO EF 55 to 60% abnormal (paradoxical)motion, Afib/flutter, atrial clip, SSS s/p dual chamber PPM, HTN, HLP. 36 PY smoker stopped 2021  CXR as before reveals bilateral interstitial pattern  No fever, no sputum production, SARS (-), Blood molecular pattern (+),staph film BCx (+) GPC in clusters.   PMHx   Past Medical History      Diagnosis Date    Asthma     Atrial fibrillation with RVR ([address] 2021    [employer] dual pacemaker  04/15/2021    Collagenous colitis     per scope     Colon polyps     dr Husam Hoff    COPD, mild ([address]     Eczema of hand     HTN (hypertension)     Hyperlipidemia     Smoker       Past Surgical History        Procedure Laterality Date    CARDIAC SURGERY      COLONOSCOPY  06/10/2021    theresa ccolon polyps and collagenous colitis    CORONARY ARTERY BYPASS GRAFT  2021    cabg x 3 with lima and atrial appendage clip  dr Renae Muta GRAFT N/A 2021    CABG  X 3 WITH DEJAH, Atrial Appendage Clip performed by Tommy Stout MD at [address] [place] [address] Road  06/2008    polyps    OTHER SURGICAL HISTORY  DEC 7TH 2012 DR GET WADE [facility] [place]     IGS ENDOSCOPIC BI LAT MAXILLARY, ETHMOID, SPHENOID, FRONTAL SINUSOTOMY WITH SEPTOPLASTY AND TURBINOPLASTIES    SKIN CANCER EXCISION  09/2014    Left side of Forehead     TOOTH EXTRACTION  02/2017     Meds    Current Medications    magnesium sulfate  1,000 mg IntraVENous Once    [START ON 4/26/2022] furosemide  20 mg Oral Daily    digoxin  250 mcg IntraVENous Once    [START ON 4/26/2022] digoxin  125 mcg Oral Daily    aspirin  81 mg Oral Daily    atorvastatin  40 mg Oral Nightly    atovaquone  750 mg Oral Daily    cetirizine  10 mg Oral Daily    metoprolol succinate  150 mg Oral Daily    multivitamin  1 tablet Oral Daily    mupirocin   Topical TID    nystatin   Topical BID    pantoprazole  40 mg Oral QAM AC    [Held by provider] rivaroxaban  15 mg Oral Daily    rOPINIRole  0.5 mg Oral Nightly    traZODone  50 mg Oral Nightly    methylPREDNISolone  60 mg IntraVENous Q8H    ipratropium-albuterol  1 ampule Inhalation 4x daily    sodium chloride flush  5-40 mL IntraVENous 2 times per day    cefepime  2,000 mg IntraVENous Q12H     ALPRAZolam, ipratropium-albuterol, sodium chloride flush, sodium chloride, ondansetron **OR** ondansetron, acetaminophen  IV Drips/Infusions   sodium chloride       Home Medications  Medications Prior to Admission: albuterol (PROVENTIL) (2.5 MG/3ML) 0.083% nebulizer solution, Take 2.5 mg by nebulization 2 times daily  ipratropium-albuterol (DUONEB) 0.5-2.5 (3) MG/3ML SOLN nebulizer solution, Inhale 1 vial into the lungs every 4 hours as needed for Shortness of Breath  Sodium Chloride-Sodium Bicarb 2300-700 MG PACK, 1 spray by Nasal route in the morning, at noon, and at bedtime  FLUoxetine (PROZAC) 10 MG capsule, Take 10 mg by mouth daily (10 mg daily x 5 days - 4/24-4/28, then increase to 20 mg daily)  atovaquone (MEPRON) 750 MG/5ML suspension, Take 1,500 mg by mouth daily (10 mL daily)  predniSONE (DELTASONE) 20 MG tablet, Take 30 mg by mouth daily   ALPRAZolam (XANAX) 0.5 MG tablet, Take 0.5 mg by mouth every 12 hours as needed for Sleep. [DISCONTINUED] tuberculin (APLISOL) 5 UNIT/0.1ML injection, Inject 5 Units into the skin  [DISCONTINUED] nystatin (MYCOSTATIN) 597235 UNIT/GM cream, Apply topically 2 times daily Apply topically 2 times daily. [DISCONTINUED] oxymetazoline (AFRIN) 0.05 % nasal spray, 2 sprays by Nasal route 2 times daily  furosemide (LASIX) 20 MG tablet, Take 20 mg by mouth daily Sun, Wed  loratadine (CLARITIN) 10 MG tablet, Take 10 mg by mouth daily  metoprolol succinate (TOPROL XL) 100 MG extended release tablet, Take 1.5 tablets by mouth daily  tiotropium-olodaterol (STIOLTO) 2.5-2.5 MCG/ACT AERS, Inhale 2 puffs into the lungs daily  rivaroxaban (XARELTO) 15 MG TABS tablet, Take 1 tablet by mouth daily  mupirocin (BACTROBAN) 2 % ointment, Apply topically 3 times daily. albuterol sulfate  (90 Base) MCG/ACT inhaler, INHALE 2 PUFFS INTO THE LUNGS EVERY 6 HOURS AS NEEDED FOR WHEEZING  traZODone (DESYREL) 50 MG tablet, Take 1 tablet by mouth nightly  rOPINIRole (REQUIP) 0.5 MG tablet, Take 1 tablet by mouth nightly  pantoprazole (PROTONIX) 40 MG tablet, Take 1 tablet by mouth every morning (before breakfast)  [DISCONTINUED] cetirizine (ZYRTEC) 10 MG tablet, Take 10 mg by mouth daily  atorvastatin (LIPITOR) 40 MG tablet, Take 1 tablet by mouth nightly  aspirin 81 MG chewable tablet, Take 1 tablet by mouth daily  Multiple Vitamins-Minerals (CENTRUM SILVER PO), Take 1 tablet by mouth daily   Diet    ADULT DIET; Regular;  Low Sodium (2 gm)  Allergies    Penicillins and Sulfa antibiotics  Social History     Social History     Socioeconomic History    Marital status:      Spouse name: Not on file    Number of children: Not on file    Years of education: Not on file    Highest education level: Not on file   Occupational History    Not on file   Tobacco Use    Smoking status: Former Smoker     Packs/day: 1.00     Years: 40.00     Pack years: 40.00     Types: Cigarettes     Quit date: 3/7/2022     Years since quittin.1    Smokeless tobacco: Never Used   Substance and Sexual Activity    Alcohol use: Not Currently     Alcohol/week: 0.0 standard drinks    Drug use: No    Sexual activity: Not on file   Other Topics Concern    Not on file   Social History Narrative    No barriers with medication affordability now that he has met deductible    Active with [facility]    Has O2 and supplies needed    No barriers with transportation     Social Determinants of Health     Financial Resource Strain: Low Risk     Difficulty of Paying Living Expenses: Not hard at all   Food Insecurity: No Food Insecurity    Worried About Running Out of Food in the Last Year: Never true    Danielle of Food in the Last Year: Never true   Transportation Needs: No Transportation Needs    Lack of Transportation (Medical): No    Lack of Transportation (Non-Medical):  No   Physical Activity: Inactive    Days of Exercise per Week: 0 days    Minutes of Exercise per Session: 0 min   Stress:     Feeling of Stress : Not on file   Social Connections:     Frequency of Communication with Friends and Family: Not on file    Frequency of Social Gatherings with Friends and Family: Not on file    Attends Anglican Services: Not on file    Active Member of Clubs or Organizations: Not on file    Attends Club or Organization Meetings: Not on file    Marital Status: Not on file   Intimate Partner Violence:     Fear of Current or Ex-Partner: Not on file    Emotionally Abused: Not on file    Physically Abused: Not on file    Sexually Abused: Not on file   Housing Stability: Unknown    Unable to Pay for Housing in the Last Year: No    Number of [place] in the Last Year: Not on file    Unstable Housing in the Last Year: No     Family History          Problem Relation Age of Onset    Heart Disease Mother     Heart Disease Father         cabg    Diabetes Brother     Heart Disease Brother        ROS - 11 systems   General weakness  HEENT Denies any diplopia, tinnitus or vertigo  Resp dyspnea with minimal activities and at rest, minimal non productive cough  Cardiac Palpitations  GI Denies any melena, hematochezia, hematemesis or pyrosis   Denies any frequency, urgency, hesitancy or incontinence  Heme Denies bruising or bleeding easily  Endocrine Denies any history of diabetes or thyroid disease  Neuro Denies any focal motor or sensory deficits  Psychiatric Denies anxiety, depression, suicidal ideation  Skin Denies rashes, itching, open sores    Vitals     height is 5' 8\" (1.727 m) and weight is 164 lb 0.4 oz (74.4 kg). His axillary temperature is 97.6 °F (36.4 °C). His blood pressure is 156/90 (abnormal) and his pulse is 80. His respiration is 18 and oxygen saturation is 94%. Body mass index is 24.94 kg/m². I/O        Intake/Output Summary (Last 24 hours) at 4/25/2022 1505  Last data filed at 4/25/2022 0430  Gross per 24 hour   Intake 200 ml   Output 800 ml   Net -600 ml     I/O last 3 completed shifts: In: 200 [P.O.:200]  Out: 800 [Urine:800]   Patient Vitals for the past 96 hrs (Last 3 readings):   Weight   04/24/22 1952 164 lb 0.4 oz (74.4 kg)   04/24/22 1241 155 lb (70.3 kg)     Exam   General Appearance  Awake, alert, looks chronically ill and tired, dyspneic  HEENT - Normal, Head is normocephalic, atraumatic. EOMI, PERRLA, pale conjunctiva  Neck - Supple, symmetrical, trachea midline and Soft, trachea midline and straight  Lungs -bilateral breath sounds, bibasilar  rales  Cardiovascular - RRR in sinus now  Abdomen - Soft, nontender, nondistended, no masses or organomegaly  Neurologic - CN II-XII are grossly intact.  There are no focal motor or sensory deficits  Skin - No bruising or bleeding  Extremities - No cyanosis, clubbing or edema    Labs  - Old records and notes have been reviewed in [facility]   CBC     Lab Results   Component Value Date    WBC 12.9 04/25/2022    RBC 2.90 04/25/2022    RBC 4.84 11/07/2011    HGB 8.8 04/25/2022    HCT 29.2 04/25/2022     04/25/2022    .7 04/25/2022    MCH 30.3 04/25/2022    MCHC 30.1 04/25/2022    RDW 13.3 06/07/2018    NRBC 0 04/25/2022    NRBC 0 11/07/2011    SEGSPCT 93.0 04/25/2022    MONOPCT 2.6 04/25/2022    MONOSABS 0.3 04/25/2022    LYMPHSABS 0.4 04/25/2022    EOSABS 0.0 04/25/2022    BASOSABS 0.0 04/25/2022    DIFFTYPE see below 03/25/2022     BMP   Lab Results   Component Value Date     04/25/2022    K 5.1 04/25/2022    K 5.0 04/24/2022     04/25/2022    CO2 21 04/25/2022    BUN 40 04/25/2022    CREATININE 1.3 04/25/2022    GLUCOSE 181 04/25/2022    GLUCOSE 94 11/07/2011    CALCIUM 7.7 04/25/2022    MG 1.9 03/26/2022     LFTS  Lab Results   Component Value Date    ALKPHOS 67 04/24/2022    ALT 29 04/24/2022    AST 31 04/24/2022    PROT 6.1 04/24/2022    BILITOT 0.5 04/24/2022    BILIDIR <0.2 03/26/2022    LABALBU 3.2 04/24/2022    LABALBU 4.3 11/07/2011     ABG   Lab Results   Component Value Date    PH 7.44 04/24/2022    PCO2 39 04/24/2022    PO2 191 04/24/2022    HCO3 26 04/24/2022    O2SAT 100 04/24/2022     @  Lab Results   Component Value Date    APTT 25.3 03/25/2022     INR   Lab Results   Component Value Date    INR 1.41 (H) 03/25/2022    INR 1.25 (H) 04/06/2021    INR 1.13 01/11/2021       EKG        PFTs     Cultures    (+) blood culture GPC in clusters  Radiology    CXR    FINDINGS:   There are stable median sternotomy wires and mediastinal surgical clips and atrial appendage clip. There is stable right cardiac pacer generator with 2 leads. There is marked interstitial prominence at the bilateral lung bases, increased compared to    prior exam. The cardiac silhouette is normal in appearance.  Visualized osseous structures appear grossly intact.           Impression   Worsening acute on chronic interstitial disease at the lung bases.                   **This report has been created using voice recognition software. It may contain minor errors which are inherent in voice recognition technology. **       Final report electronically signed by Dr. 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 Questions and concerns addressed.     Electronically signed by       Alfreda Amaral MD on 4/25/2022 at 3:05 PM

## 2022-04-25 NOTE — CARE COORDINATION
04/25/22 1441   Service Assessment   Patient Orientation Alert and [address] is: Legal Next of Kin   PCP Verified by CM Yes   Can patient return to prior living arrangement Yes   Ability to make needs known: Good   Discharge Planning   Type of Residence [address] [place] [address] Prior To Admission [address] [facility] Discharge   Transition of Care Consult (CM Consult) SNF   [facility]   Condition of Participation: Discharge Planning   The Plan for Transition of Care is related to the following treatment goals: Pneumonia treatment     4/25/22, 4:27 PM EDT  DISCHARGE PLANNING EVALUATION:    Rickey De La Fuente       Admitted: 4/24/2022/ [place] day: 1   Location: [facility]20/020 Reason for admit: Pneumonia [J18.9]  SOB (shortness of breath) [R06.02]  Septicemia ([address] [A41.9]  Elevated troponin [R77.8]  Pneumonia due to infectious organism, unspecified laterality, unspecified part of lung [J18.9]   PMH:  has a past medical history of Asthma, Atrial fibrillation with RVR ([address] [employer] dual pacemaker , Collagenous colitis, Colon polyps, COPD, mild ([address] Eczema of hand, HTN (hypertension), Hyperlipidemia, and Smoker. Barriers to Discharge:  Pneumonia treated w IV AB, IVF, 40% FIO2 BIPAP v Oxygen 4L  PCP: Ray Gonsalez MD  Readmission Risk Score: 27.4 ( )%    Patient Goals/Plan/Treatment Preferences: plans return [address] (oxygen tubing kinked there and saturations in 30/s per family); therapy following  Transportation/Food Security/Housekeeping Addressed:  No issues identified.

## 2022-04-25 NOTE — PROGRESS NOTES
Pt came in for a copd exacerbation. Pt is off NIV at this time. Respirations are 16 BPM. On a 3l NC at this time with a breathing nebulizer in hand.  Pt is resting well and state he feels much better at this time

## 2022-04-26 NOTE — ANESTHESIA PRE PROCEDURE
Department of Anesthesiology  Preprocedure Note       Name:  Althea Langley   Age:  70 y.o.  :  1950                                          MRN:  [id number]         Date:  2022      Surgeon: Isela Mays):  Michelle Mckeon MD    Procedure: Procedure(s):  BRONCHOSCOPY WITH BAL    Medications prior to admission:   Prior to Admission medications    Medication Sig Start Date End Date Taking? Authorizing Provider   albuterol (PROVENTIL) (2.5 MG/3ML) 0.083% nebulizer solution Take 2.5 mg by nebulization 2 times daily   Yes Historical Provider, MD   ipratropium-albuterol (DUONEB) 0.5-2.5 (3) MG/3ML SOLN nebulizer solution Inhale 1 vial into the lungs every 4 hours as needed for Shortness of Breath   Yes Historical Provider, MD   Sodium Chloride-Sodium Bicarb 2300-700 MG PACK 1 spray by Nasal route in the morning, at noon, and at bedtime   Yes Historical Provider, MD   FLUoxetine (PROZAC) 10 MG capsule Take 10 mg by mouth daily (10 mg daily x 5 days - -, then increase to 20 mg daily)   Yes Historical Provider, MD   atovaquone (MEPRON) 750 MG/5ML suspension Take 1,500 mg by mouth daily (10 mL daily)    Historical Provider, MD   predniSONE (DELTASONE) 20 MG tablet Take 30 mg by mouth daily     Historical Provider, MD   ALPRAZolam (XANAX) 0.5 MG tablet Take 0.5 mg by mouth every 12 hours as needed for Sleep.      Historical Provider, MD   furosemide (LASIX) 20 MG tablet Take 20 mg by mouth daily Pedro Caceres    Historical Provider, MD   loratadine (CLARITIN) 10 MG tablet Take 10 mg by mouth daily    Historical Provider, MD   metoprolol succinate (TOPROL XL) 100 MG extended release tablet Take 1.5 tablets by mouth daily 22   Fiona Haywood PA-C   tiotropium-olodaterol (STIOLTO) 2.5-2.5 MCG/ACT AERS Inhale 2 puffs into the lungs daily 22   Sharri Shetty MD   rivaroxaban (XARELTO) 15 MG TABS tablet Take 1 tablet by mouth daily 22   Sharri Shetty MD   mupirocin (BACTROBAN) 2 % ointment Apply topically 3 times daily.  4/1/22   Artie Calabrese MD   albuterol sulfate  (90 Base) MCG/ACT inhaler INHALE 2 PUFFS INTO THE LUNGS EVERY 6 HOURS AS NEEDED FOR WHEEZING 3/18/22   Artie Calabrese MD   traZODone (DESYREL) 50 MG tablet Take 1 tablet by mouth nightly 3/16/22   Artie Calabrese MD   rOPINIRole (REQUIP) 0.5 MG tablet Take 1 tablet by mouth nightly 3/16/22   Artie Calabrese MD   pantoprazole (PROTONIX) 40 MG tablet Take 1 tablet by mouth every morning (before breakfast) 3/17/22   Artie Calabrese MD   atorvastatin (LIPITOR) 40 MG tablet Take 1 tablet by mouth nightly 8/24/21   ARCADIO Maya - CNP   aspirin 81 MG chewable tablet Take 1 tablet by mouth daily 1/20/21   Hope Pulliam PA-C   Multiple Vitamins-Minerals (CENTRUM SILVER PO) Take 1 tablet by mouth daily     Historical Provider, MD       Current medications:    Current Facility-Administered Medications   Medication Dose Route Frequency Provider Last Rate Last Admin    predniSONE (DELTASONE) tablet 40 mg  40 mg Oral Daily Elsa Gee MD   40 mg at 04/26/22 1005    atovaquone (MEPRON) suspension 1,500 mg  1,500 mg Oral Daily Reid Lieberman MD        0.9 % sodium chloride infusion   IntraVENous Continuous Elsa Gee MD        furosemide (LASIX) tablet 20 mg  20 mg Oral Daily Odessa Raines MD   20 mg at 04/26/22 0818    digoxin (LANOXIN) tablet 125 mcg  125 mcg Oral Daily Odessa Raines MD   125 mcg at 04/26/22 0818    ALPRAZolam (XANAX) tablet 0.5 mg  0.5 mg Oral Nightly PRN Reid Lieberman MD   0.5 mg at 04/25/22 2006    aspirin chewable tablet 81 mg  81 mg Oral Daily Reid Lieberman MD   81 mg at 04/25/22 0902    atorvastatin (LIPITOR) tablet 40 mg  40 mg Oral Nightly Reid Lieberman MD   40 mg at 04/25/22 2006    cetirizine (ZYRTEC) tablet 10 mg  10 mg Oral Daily Reid Lieberman MD   10 mg at 04/26/22 0818    metoprolol succinate (TOPROL XL) extended release tablet 150 mg  150 mg Oral Daily Antwan Lawson MD   150 mg at 04/26/22 0818    multivitamin 1 tablet  1 tablet Oral Daily Antwan Lawson MD   1 tablet at 04/26/22 0818    mupirocin (BACTROBAN) 2 % ointment   Topical TID Antwan Lawson MD   Given at 04/26/22 0818    nystatin (MYCOSTATIN) cream   Topical BID Antwan Lawson MD   Given at 04/26/22 0818    pantoprazole (PROTONIX) tablet 40 mg  40 mg Oral QAM AC Antwan Lawson MD   40 mg at 04/26/22 0513    [Held by provider] rivaroxaban (XARELTO) tablet 15 mg  15 mg Oral Daily Antwan Lawson MD        rOPINIRole (REQUIP) tablet 0.5 mg  0.5 mg Oral Nightly Antwan Lawson MD   0.5 mg at 04/24/22 2228    traZODone (DESYREL) tablet 50 mg  50 mg Oral Nightly Antwan Lawson MD   50 mg at 04/25/22 2006    ipratropium-albuterol (DUONEB) nebulizer solution 1 ampule  1 ampule Inhalation 4x daily Antwan Lawson MD   1 ampule at 04/26/22 1237    ipratropium-albuterol (DUONEB) nebulizer solution 1 ampule  1 ampule Inhalation Q4H PRN Antwan Lawson MD        sodium chloride flush 0.9 % injection 5-40 mL  5-40 mL IntraVENous 2 times per day Papito Dunlap MD   10 mL at 04/26/22 0818    sodium chloride flush 0.9 % injection 5-40 mL  5-40 mL IntraVENous PRN Papito Dunlap MD        0.9 % sodium chloride infusion   IntraVENous PRN Papito Dunlap MD        ondansetron (ZOFRAN-ODT) disintegrating tablet 4 mg  4 mg Oral Q8H PRN Papito Dunlap MD        Or    ondansetron [facility]) injection 4 mg  4 mg IntraVENous Q6H PRN Papito Dunlap MD        acetaminophen (TYLENOL) tablet 650 mg  650 mg Oral Q4H PRN Antwan Lawson MD        ceFEPIme (MAXIPIME) 2,000 mg in sterile water 20 mL IV syringe  2,000 mg IntraVENous Q12H Antwan Lawson MD   2,000 mg at 04/26/22 7019       Allergies:     Allergies   Allergen Reactions    Penicillins Rash    Sulfa Antibiotics Rash Problem List:    Patient Active Problem List   Diagnosis Code    Hyperlipidemia E78.5    Asthma J45.909    Smoker F17.200    Allergic rhinitis due to other allergen J30.89    Collagenous colitis K52.831    Lactose intolerance E73.9    HTN (hypertension) I10    COPD, mild ([address] J44.9    Atrial fibrillation (HCC) I48.91    Atherosclerotic heart disease of native coronary artery with other forms of angina pectoris ([address] I25.118    S/P CABG x 3 Z95.1    Encounter for cardioversion procedure Z01.89    S/P left atrial appendage ligation Z98.890    Ischemic cardiomyopathy I25.5    Chronic bronchitis ([address] J42    CHF (congestive heart failure), NYHA class II, chronic, systolic (HCC) Q25.78    Tachycardia-bradycardia ([address] I49.5    [employer] dual pacemaker  Z95.0    Pneumonia J18.9    Dyspnea and respiratory abnormalities R06.00, R06.89    Acute and chronic respiratory failure with hypoxia ([address] J96.21    Thrush B37.0    Nosebleed R04.0    SOB (shortness of breath) R06.02       Past Medical History:        Diagnosis Date    Asthma     Atrial fibrillation with RVR ([address] 04/05/2021    [employer] dual pacemaker  04/15/2021    Collagenous colitis 2021    per scope 2021    Colon polyps 2021    dr Maday Schaeffer    COPD, mild ([address]     Eczema of hand     HTN (hypertension)     Hyperlipidemia     Smoker        Past Surgical History:        Procedure Laterality Date    CARDIAC SURGERY      COLONOSCOPY  06/10/2021    theresa ccolon polyps and collagenous colitis    CORONARY ARTERY BYPASS GRAFT  01/12/2021    cabg x 3 with lima and atrial appendage clip  dr Vega Pear GRAFT N/A 01/12/2021    CABG  X 3 WITH DEJAH, Atrial Appendage Clip performed by James Tao MD at [address] [place] [address] Road  06/2008    polyps    OTHER SURGICAL HISTORY  DEC 7TH 2012 DR [address] [place]     IGS ENDOSCOPIC BI LAT MAXILLARY, ETHMOID, SPHENOID, FRONTAL SINUSOTOMY WITH SEPTOPLASTY AND TURBINOPLASTIES    SKIN CANCER EXCISION  2014    Left side of Forehead     TOOTH EXTRACTION  2017       Social History:    Social History     Tobacco Use    Smoking status: Former Smoker     Packs/day: 1.00     Years: 40.00     Pack years: 40.00     Types: Cigarettes     Quit date: 3/7/2022     Years since quittin.1    Smokeless tobacco: Never Used   Substance Use Topics    Alcohol use: Not Currently     Alcohol/week: 0.0 standard drinks                                Counseling given: Not Answered      Vital Signs (Current):   Vitals:    22 1100 22 1126 22 1312 22 1505   BP: (!) 147/72  (!) 157/72 (!) 162/76   Pulse: 62  64 64   Resp:  11   Temp: 97.7 °F (36.5 °C)  98.1 °F (36.7 °C)    TempSrc: Oral  Axillary    SpO2: 90%  95% 100%   Weight:       Height:                                                  BP Readings from Last 3 Encounters:   22 (!) 162/76   22 130/62   22 125/72       NPO Status: Time of last liquid consumption: 0800                        Time of last solid consumption: 1700                        Date of last liquid consumption: 22                        Date of last solid food consumption: 22    BMI:   Wt Readings from Last 3 Encounters:   22 156 lb 12 oz (71.1 kg)   22 155 lb 12.8 oz (70.7 kg)   22 154 lb (69.9 kg)     Body mass index is 23.83 kg/m².     CBC:   Lab Results   Component Value Date    WBC 20.5 2022    RBC 3.09 2022    RBC 4.84 2011    HGB 9.1 2022    HCT 29.9 2022    MCV 96.8 2022    RDW 13.3 2018     2022       CMP:   Lab Results   Component Value Date     2022    K 4.9 2022    K 5.0 2022     2022    CO2 23 2022    BUN 44 2022    CREATININE 1.5 2022    LABGLOM 46 2022    GLUCOSE 137 2022    GLUCOSE 94 2011    PROT 6.1 2022 CALCIUM 8.0 04/26/2022    BILITOT 0.5 04/24/2022    ALKPHOS 67 04/24/2022    AST 31 04/24/2022    ALT 29 04/24/2022       POC Tests: No results for input(s): POCGLU, POCNA, POCK, POCCL, POCBUN, POCHEMO, POCHCT in the last 72 hours. Coags:   Lab Results   Component Value Date    INR 1.41 03/25/2022    APTT 25.3 03/25/2022       HCG (If Applicable): No results found for: PREGTESTUR, PREGSERUM, HCG, HCGQUANT     ABGs: No results found for: PHART, PO2ART, MBU1FTQ, YUG4UOY, BEART, Y5VRRHFP     Type & Screen (If Applicable):  Lab Results   Component Value Date    LABRH POS 04/06/2021       Drug/Infectious Status (If Applicable):  No results found for: HIV, HEPCAB    COVID-19 Screening (If Applicable):   Lab Results   Component Value Date    COVID19 NOT  DETECTED 04/24/2022           Anesthesia Evaluation  Patient summary reviewed and Nursing notes reviewed no history of anesthetic complications:   Airway: Mallampati: II        Dental:          Pulmonary: breath sounds clear to auscultation  (+) COPD: severe,  shortness of breath: chronic,  asthma:                            Cardiovascular:  Exercise tolerance: poor (<4 METS),   (+) hypertension:, angina:, pacemaker:, CAD:, CABG/stent:, CHF:,         Rhythm: regular  Rate: abnormal                    Neuro/Psych:               GI/Hepatic/Renal:   (+) renal disease: kidney stones,           Endo/Other:                     Abdominal:       Abdomen: soft. Vascular: Other Findings:             Anesthesia Plan      general     ASA 4       Induction: intravenous. Anesthetic plan and risks discussed with patient. Plan discussed with CRNA.                   [address] [place] [address]   4/26/2022

## 2022-04-26 NOTE — PROGRESS NOTES
[facility]    Patient - Destiney Burgess   MRN -  [id number]   [facility]   - 1950      Date of Admission -  2022 12:37 PM  Date of evaluation -  2022  Room - [address] [facility] [address] Day - 2  Consulting - Abbie Vera MD Primary Care Physician - Dex Piper MD     Problem List      Active Hospital Problems    Diagnosis Date Noted    SOB (shortness of breath) [R06.02] 2022     Priority: Medium    Pneumonia [J18.9] 2022     Reason for Consult    Respiratory failure  HPI   Destiney Burgess is a 70 y.o. male admitted for worsening respiratory status, clinical diagnosis of APT on steroids but not improving, got up to the restroom at the [facility] and became more SOB with sats dropping, brought to ED and started on   PAP with improvement, went to PAF and started on Diltiazem, currently on NSR and Spo2 in the 80s on 5 LPM sats fluctuate if he talks. CAD s/p CABG, ECHO EF 55 to 60% abnormal (paradoxical)motion, Afib/flutter, atrial clip, SSS s/p dual chamber PPM, HTN, HLP. 36 PY smoker stopped 2021  CXR as before reveals bilateral interstitial pattern  No fever, no sputum production, SARS (-), Blood molecular pattern (+),staph film BCx (+) GPC in clusters.       Past 24 Hours   Bronchoscopy this afternoon   PAP use overnight  Stable on O2 6LPM with intermittent PAP use  Afebrile  Positive for SOB and cough     -All systems reviewed   PMHx   Past Medical History      Diagnosis Date    Asthma     Atrial fibrillation with RVR ([address] 2021    [employer] dual pacemaker  04/15/2021    Collagenous colitis     per scope     Colon polyps     dr Maday Schaeffer    COPD, mild (HCC)     Eczema of hand     HTN (hypertension)     Hyperlipidemia     Smoker       Past Surgical History        Procedure Laterality Date    CARDIAC SURGERY      COLONOSCOPY  06/10/2021    theresa ccolon polyps and collagenous colitis    CORONARY ARTERY BYPASS GRAFT  01/12/2021    cabg x 3 with lima and atrial appendage clip  dr Lexi Hess GRAFT N/A 01/12/2021    CABG  X 3 WITH DEJAH, Atrial Appendage Clip performed by Al Reed MD at [facility] NASAL SINUS SURGERY  06/2008    polyps    OTHER SURGICAL HISTORY  DEC 7TH 2012 DR [facility]N [address] [place]     IGS ENDOSCOPIC BI LAT MAXILLARY, ETHMOID, SPHENOID, FRONTAL SINUSOTOMY WITH SEPTOPLASTY AND TURBINOPLASTIES    SKIN CANCER EXCISION  09/2014    Left side of Forehead     TOOTH EXTRACTION  02/2017     Meds    Current Medications    predniSONE  40 mg Oral Daily    atovaquone  1,500 mg Oral Daily    furosemide  20 mg Oral Daily    digoxin  125 mcg Oral Daily    aspirin  81 mg Oral Daily    atorvastatin  40 mg Oral Nightly    cetirizine  10 mg Oral Daily    metoprolol succinate  150 mg Oral Daily    multivitamin  1 tablet Oral Daily    mupirocin   Topical TID    nystatin   Topical BID    pantoprazole  40 mg Oral QAM AC    [Held by provider] rivaroxaban  15 mg Oral Daily    rOPINIRole  0.5 mg Oral Nightly    traZODone  50 mg Oral Nightly    ipratropium-albuterol  1 ampule Inhalation 4x daily    sodium chloride flush  5-40 mL IntraVENous 2 times per day    cefepime  2,000 mg IntraVENous Q12H     ALPRAZolam, ipratropium-albuterol, sodium chloride flush, sodium chloride, ondansetron **OR** ondansetron, acetaminophen  IV Drips/Infusions   sodium chloride       Home Medications  Medications Prior to Admission: albuterol (PROVENTIL) (2.5 MG/3ML) 0.083% nebulizer solution, Take 2.5 mg by nebulization 2 times daily  ipratropium-albuterol (DUONEB) 0.5-2.5 (3) MG/3ML SOLN nebulizer solution, Inhale 1 vial into the lungs every 4 hours as needed for Shortness of Breath  Sodium Chloride-Sodium Bicarb 2300-700 MG PACK, 1 spray by Nasal route in the morning, at noon, and at bedtime  FLUoxetine (PROZAC) 10 MG capsule, Take 10 mg by mouth daily (10 mg daily x 5 days - 4/24-4/28, then increase to 20 mg daily)  atovaquone (MEPRON) 750 MG/5ML suspension, Take 1,500 mg by mouth daily (10 mL daily)  predniSONE (DELTASONE) 20 MG tablet, Take 30 mg by mouth daily   ALPRAZolam (XANAX) 0.5 MG tablet, Take 0.5 mg by mouth every 12 hours as needed for Sleep. [DISCONTINUED] tuberculin (APLISOL) 5 UNIT/0.1ML injection, Inject 5 Units into the skin  [DISCONTINUED] nystatin (MYCOSTATIN) 211016 UNIT/GM cream, Apply topically 2 times daily Apply topically 2 times daily. [DISCONTINUED] oxymetazoline (AFRIN) 0.05 % nasal spray, 2 sprays by Nasal route 2 times daily  furosemide (LASIX) 20 MG tablet, Take 20 mg by mouth daily Sun, Wed  loratadine (CLARITIN) 10 MG tablet, Take 10 mg by mouth daily  metoprolol succinate (TOPROL XL) 100 MG extended release tablet, Take 1.5 tablets by mouth daily  tiotropium-olodaterol (STIOLTO) 2.5-2.5 MCG/ACT AERS, Inhale 2 puffs into the lungs daily  rivaroxaban (XARELTO) 15 MG TABS tablet, Take 1 tablet by mouth daily  mupirocin (BACTROBAN) 2 % ointment, Apply topically 3 times daily.   albuterol sulfate  (90 Base) MCG/ACT inhaler, INHALE 2 PUFFS INTO THE LUNGS EVERY 6 HOURS AS NEEDED FOR WHEEZING  traZODone (DESYREL) 50 MG tablet, Take 1 tablet by mouth nightly  rOPINIRole (REQUIP) 0.5 MG tablet, Take 1 tablet by mouth nightly  pantoprazole (PROTONIX) 40 MG tablet, Take 1 tablet by mouth every morning (before breakfast)  [DISCONTINUED] cetirizine (ZYRTEC) 10 MG tablet, Take 10 mg by mouth daily  atorvastatin (LIPITOR) 40 MG tablet, Take 1 tablet by mouth nightly  aspirin 81 MG chewable tablet, Take 1 tablet by mouth daily  Multiple Vitamins-Minerals (CENTRUM SILVER PO), Take 1 tablet by mouth daily   Diet    Diet NPO  Allergies    Penicillins and Sulfa antibiotics  Social History     Social History     Socioeconomic History    Marital status:      Spouse name: Not on file    Number of children: Not on file    Years of education: Not on file   Jose Oneal Highest education level: Not on file   Occupational History    Not on file   Tobacco Use    Smoking status: Former Smoker     Packs/day: 1.00     Years: 40.00     Pack years: 40.00     Types: Cigarettes     Quit date: 3/7/2022     Years since quittin.1    Smokeless tobacco: Never Used   Substance and Sexual Activity    Alcohol use: Not Currently     Alcohol/week: 0.0 standard drinks    Drug use: No    Sexual activity: Not on file   Other Topics Concern    Not on file   Social History Narrative    No barriers with medication affordability now that he has met deductible    Active with [facility]    Has O2 and supplies needed    No barriers with transportation     Social Determinants of Health     Financial Resource Strain: Low Risk     Difficulty of Paying Living Expenses: Not hard at all   Food Insecurity: No Food Insecurity    Worried About Running Out of Food in the Last Year: Never true    Danielle of Food in the Last Year: Never true   Transportation Needs: No Transportation Needs    Lack of Transportation (Medical): No    Lack of Transportation (Non-Medical):  No   Physical Activity: Inactive    Days of Exercise per Week: 0 days    Minutes of Exercise per Session: 0 min   Stress:     Feeling of Stress : Not on file   Social Connections:     Frequency of Communication with Friends and Family: Not on file    Frequency of Social Gatherings with Friends and Family: Not on file    Attends Congregational Services: Not on file    Active Member of Clubs or Organizations: Not on file    Attends Club or Organization Meetings: Not on file    Marital Status: Not on file   Intimate Partner Violence:     Fear of Current or Ex-Partner: Not on file    Emotionally Abused: Not on file    Physically Abused: Not on file    Sexually Abused: Not on file   Housing Stability: Unknown    Unable to Pay for Housing in the Last Year: No    Number of [place] in the Last Year: Not on file    Unstable Housing in the Last Year: No     Family History          Problem Relation Age of Onset    Heart Disease Mother     Heart Disease Father         cabg    Diabetes Brother     Heart Disease Brother        Vitals     height is 5' 8\" (1.727 m) and weight is 156 lb 12 oz (71.1 kg). His axillary temperature is 98.1 °F (36.7 °C). His blood pressure is 157/72 (abnormal) and his pulse is 64. His respiration is 18 and oxygen saturation is 95%. Body mass index is 23.83 kg/m². I/O        Intake/Output Summary (Last 24 hours) at 4/26/2022 1447  Last data filed at 4/26/2022 1400  Gross per 24 hour   Intake 300 ml   Output 3065 ml   Net -2765 ml     I/O last 3 completed shifts: In: 500 [P.O.:500]  Out: 2300 [Urine:2300]   Patient Vitals for the past 96 hrs (Last 3 readings):   Weight   04/26/22 0308 156 lb 12 oz (71.1 kg)   04/24/22 1952 164 lb 0.4 oz (74.4 kg)   04/24/22 1241 155 lb (70.3 kg)     Exam   General Appearance  Awake, alert, looks chronically ill and tired, dyspneic. Stable on PAP  HEENT - Normal, Head is normocephalic, atraumatic. EOMI, PERRLA, pale conjunctiva  Neck - Supple, symmetrical, trachea midline and Soft, trachea midline and straight  Lungs -bilateral breath sounds, bibasilar  rales  Cardiovascular - RRR in sinus now. Edema R arm   Abdomen - Soft, nontender, nondistended, no masses or organomegaly  Neurologic - CN II-XII are grossly intact.  There are no focal motor or sensory deficits  Skin - No bruising or bleeding  Extremities - No cyanosis, clubbing or edema    Labs  - Old records and notes have been reviewed in [facility]   CBC     Lab Results   Component Value Date    WBC 20.5 04/26/2022    RBC 3.09 04/26/2022    RBC 4.84 11/07/2011    HGB 9.1 04/26/2022    HCT 29.9 04/26/2022     04/26/2022    MCV 96.8 04/26/2022    MCH 29.4 04/26/2022    MCHC 30.4 04/26/2022    RDW 13.3 06/07/2018    NRBC 0 04/26/2022    NRBC 0 11/07/2011    SEGSPCT 92.0 04/26/2022    MONOPCT 4.8 04/26/2022    MONOSABS 1.0 04/26/2022 LYMPHSABS 0.5 04/26/2022    EOSABS 0.0 04/26/2022    BASOSABS 0.0 04/26/2022    DIFFTYPE see below 03/25/2022     BMP   Lab Results   Component Value Date     04/26/2022    K 4.9 04/26/2022    K 5.0 04/24/2022     04/26/2022    CO2 23 04/26/2022    BUN 44 04/26/2022    CREATININE 1.5 04/26/2022    GLUCOSE 137 04/26/2022    GLUCOSE 94 11/07/2011    CALCIUM 8.0 04/26/2022    MG 2.1 04/26/2022     LFTS  Lab Results   Component Value Date    ALKPHOS 67 04/24/2022    ALT 29 04/24/2022    AST 31 04/24/2022    PROT 6.1 04/24/2022    BILITOT 0.5 04/24/2022    BILIDIR <0.2 03/26/2022    LABALBU 3.2 04/24/2022    LABALBU 4.3 11/07/2011     ABG   Lab Results   Component Value Date    PH 7.44 04/24/2022    PCO2 39 04/24/2022    PO2 191 04/24/2022    HCO3 26 04/24/2022    O2SAT 100 04/24/2022     @  Lab Results   Component Value Date    APTT 25.3 03/25/2022     INR   Lab Results   Component Value Date    INR 1.41 (H) 03/25/2022    INR 1.25 (H) 04/06/2021    INR 1.13 01/11/2021       EKG        PFTs     Cultures    (+) blood culture GPC in clusters    4/26/22  Bronchoscopy- will follow cultures/cytology   Radiology    CXR  4/24/22    FINDINGS:   There are stable median sternotomy wires and mediastinal surgical clips and atrial appendage clip. There is stable right cardiac pacer generator with 2 leads. There is marked interstitial prominence at the bilateral lung bases, increased compared to    prior exam. The cardiac silhouette is normal in appearance. Visualized osseous structures appear grossly intact.           Impression   Worsening acute on chronic interstitial disease at the lung bases.                   **This report has been created using voice recognition software. It may contain minor errors which are inherent in voice recognition technology. **       Final report electronically signed by Dr. Kamille Ames MD on 4/24/2022 1:13 PM       CT Scans  (See actual reports for details)    4/26/22   R

## 2022-04-26 NOTE — PROGRESS NOTES
IM Progress Note  Dr. Armstead Meigs  4/26/2022 10:53 AM      Patient name Constance Lozano  [id number]  PCP: Sylvia Rosas MD  Admit Date: 4/24/2022  Acct No. [de-identified]    Subjective: Interval History:   Pt lying in bed  alex for bronch this afternoon  Noted edema Rt arm  No pain     Diet: Diet NPO    I/O last 3 completed shifts: In: 500 [P.O.:500]  Out: 2300 [Urine:2300]  I/O this shift:  In: -   Out: 610 [Urine:610]        Admission weight: 155 lb (70.3 kg) as of 4/24/2022 12:37 PM  Wt Readings from Last 3 Encounters:   04/26/22 156 lb 12 oz (71.1 kg)   04/20/22 155 lb 12.8 oz (70.7 kg)   04/13/22 154 lb (69.9 kg)     Body mass index is 23.83 kg/m².     ROS   CVS;  no cp or palpitation  Resp: +SOB+ cough  Neuro:  No numbness or weakness or dizziness  Abd: no nausea or vomiting or abd pain      Medications:   Scheduled Meds:   predniSONE  40 mg Oral Daily    furosemide  20 mg Oral Daily    digoxin  125 mcg Oral Daily    atovaquone  1,500 mg Oral Daily    aspirin  81 mg Oral Daily    atorvastatin  40 mg Oral Nightly    cetirizine  10 mg Oral Daily    metoprolol succinate  150 mg Oral Daily    multivitamin  1 tablet Oral Daily    mupirocin   Topical TID    nystatin   Topical BID    pantoprazole  40 mg Oral QAM AC    [Held by provider] rivaroxaban  15 mg Oral Daily    rOPINIRole  0.5 mg Oral Nightly    traZODone  50 mg Oral Nightly    ipratropium-albuterol  1 ampule Inhalation 4x daily    sodium chloride flush  5-40 mL IntraVENous 2 times per day    cefepime  2,000 mg IntraVENous Q12H     Continuous Infusions:   sodium chloride         Labs :     CBC:   Recent Labs     04/24/22  1240 04/25/22  0427 04/26/22  0426   WBC 26.4* 12.9* 20.5*   HGB 10.9* 8.8* 9.1*    308 247     BMP:    Recent Labs     04/24/22  1321 04/25/22  0427 04/26/22  0426    136 135   K 5.0 5.1 4.9    103 103   CO2 20* 21* 23   BUN 45* 40* 44*   CREATININE 1.6* 1.3* 1.5*   GLUCOSE 99 181* 137*     Hepatic: Recent Labs     04/24/22  1321   AST 31   ALT 29   BILITOT 0.5   ALKPHOS 67     Troponin: No results for input(s): TROPONINI in the last 72 hours. BNP: No results for input(s): BNP in the last 72 hours. Lipids: No results for input(s): CHOL, HDL in the last 72 hours. Invalid input(s): LDLCALCU  INR: No results for input(s): INR in the last 72 hours.     Radiology    Objective:   Vitals: BP (!) 141/74   Pulse 63   Temp 97.8 °F (36.6 °C) (Oral)   Resp 19   Ht 5' 8\" (1.727 m)   Wt 156 lb 12 oz (71.1 kg)   SpO2 91%   BMI 23.83 kg/m²   HEENT: Head:pupils react  Neck: supple  Lungs: diminished air entry bilat  Heart: regular rate and rhythm   Abdomen: soft BS heard NG NT  Extremities: warm  No edema  Neurologic:  Alert, oriented X3    Impression:   :   Acute on chronic hypoxic respiratory failure  A. 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

## 2022-04-26 NOTE — PROGRESS NOTES
Progress note: Infectious diseases    Patient - Sarahy Machado,  Age - 70 y.o.    - 1950      Room Number - [facility]/NONE   MRN -  [id number]   Acct # - [de-identified]  Date of Admission -  2022 12:37 PM    SUBJECTIVE:   Discussed with pulmonary. OBJECTIVE   VITALS    height is 5' 8\" (1.727 m) and weight is 156 lb 12 oz (71.1 kg). His axillary temperature is 98.1 °F (36.7 °C). His blood pressure is 170/83 (abnormal) and his pulse is 67. His respiration is 16 and oxygen saturation is 95%. Wt Readings from Last 3 Encounters:   22 156 lb 12 oz (71.1 kg)   22 155 lb 12.8 oz (70.7 kg)   22 154 lb (69.9 kg)       I/O (24 Hours)    Intake/Output Summary (Last 24 hours) at 2022 1728  Last data filed at 2022 1638  Gross per 24 hour   Intake 500 ml   Output 3065 ml   Net -2565 ml       General Appearance  Awake, alert, oriented,chronically sick looking. HEENT - normocephalic, atraumatic, pink conjunctiva,  anicteric sclera  Neck - Supple, no mass  Lungs -  Bilateral   air entry, crackles at the lung bases. on BIPAP  Cardiovascular - Heart sounds are normal.    Abdomen - soft, not distended, nontender,   Neurologic -oriented.   Skin - No bruising or bleeding  Extremities - + edema, cold fingers and pale    MEDICATIONS:      predniSONE  40 mg Oral Daily    atovaquone  1,500 mg Oral Daily    furosemide  20 mg Oral Daily    digoxin  125 mcg Oral Daily    aspirin  81 mg Oral Daily    atorvastatin  40 mg Oral Nightly    cetirizine  10 mg Oral Daily    metoprolol succinate  150 mg Oral Daily    multivitamin  1 tablet Oral Daily    mupirocin   Topical TID    nystatin   Topical BID    pantoprazole  40 mg Oral QAM AC    [Held by provider] rivaroxaban  15 mg Oral Daily    rOPINIRole  0.5 mg Oral Nightly    traZODone  50 mg Oral Nightly    ipratropium-albuterol  1 ampule Inhalation 4x daily    cefepime  2,000 mg IntraVENous Q12H      sodium chloride       ALPRAZolam, ipratropium-albuterol, ondansetron **OR** ondansetron, acetaminophen      LABS:     CBC:   Recent Labs     04/24/22  1240 04/25/22 0427 04/26/22 0426   WBC 26.4* 12.9* 20.5*   HGB 10.9* 8.8* 9.1*    308 247     BMP:    Recent Labs     04/24/22  1321 04/25/22  0427 04/26/22 0426    136 135   K 5.0 5.1 4.9    103 103   CO2 20* 21* 23   BUN 45* 40* 44*   CREATININE 1.6* 1.3* 1.5*   GLUCOSE 99 181* 137*     Calcium:  Recent Labs     04/26/22 0426   CALCIUM 8.0*     Ionized Calcium:No results for input(s): IONCA in the last 72 hours. Magnesium:  Recent Labs     04/26/22 0426   MG 2.1     Phosphorus:No results for input(s): PHOS in the last 72 hours. BNP:No results for input(s): BNP in the last 72 hours. Glucose:No results for input(s): POCGLU in the last 72 hours. HgbA1C: No results for input(s): LABA1C in the last 72 hours. INR: No results for input(s): INR in the last 72 hours. Hepatic:   Recent Labs     04/24/22  1321   ALKPHOS 67   ALT 29   AST 31   PROT 6.1   BILITOT 0.5   LABALBU 3.2*     Amylase and Lipase:  Recent Labs     04/24/22  1919   LACTA 2.8*     Lactic Acid:   Recent Labs     04/24/22 1919   LACTA 2.8*     Troponin: No results for input(s): CKTOTAL, CKMB, TROPONINI in the last 72 hours. BNP: No results for input(s): BNP in the last 72 hours. CULTURES:   UA: No results for input(s): SPECGRAV, PHUR, COLORU, CLARITYU, MUCUS, PROTEINU, BLOODU, RBCUA, WBCUA, BACTERIA, NITRU, GLUCOSEU, BILIRUBINUR, UROBILINOGEN, KETUA, LABCAST, LABCASTTY, AMORPHOS in the last 72 hours.     Invalid input(s): CRYSTALS  Micro:   Lab Results   Component Value Date    BC No growth-preliminary  04/24/2022         IMAGING:         Problem list of patient:     Patient Active Problem List   Diagnosis Code    Hyperlipidemia E78.5    Asthma J45.909    Smoker F17.200    Allergic rhinitis due to other allergen J30.89    Collagenous colitis K52.831    Lactose intolerance E73.9    HTN (hypertension) I10    COPD, mild ([facility]) J44.9    Atrial fibrillation ([facility]) I48.91    Atherosclerotic heart disease of native coronary artery with other forms of angina pectoris ([facility]) I25.118    S/P CABG x 3 Z95.1    Encounter for cardioversion procedure Z01.89    S/P left atrial appendage ligation Z98.890    Ischemic cardiomyopathy I25.5    Chronic bronchitis ([facility]) J42    CHF (congestive heart failure), NYHA class II, chronic, systolic ([facility]) S56.06    Tachycardia-bradycardia ([facility]) I49.5    [employer] dual pacemaker  Z95.0    Pneumonia J18.9    Dyspnea and respiratory abnormalities R06.00, R06.89    Acute and chronic respiratory failure with hypoxia ([facility]) J96.21    Thrush B37.0    Nosebleed R04.0    SOB (shortness of breath) R06.02         ASSESSMENT/PLAN   Shortness of breath due to lung fibrosis  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   Discussed with pulmonary      Larna Galeazzi, MD, MD, FACP 4/26/2022 5:28 PM

## 2022-04-26 NOTE — PLAN OF CARE
Problem: Respiratory - Adult  Goal: Clear lung sounds  4/25/2022 2250 by Olimpia Saldivar RCP  Outcome: Progressing  Note: Patient receiving 4 x daily DuoNeb nebulizer treatments to improve aeration. Patient also has BiPAP at bedside that he will wear overnight. Will continue with therapies as ordered.

## 2022-04-26 NOTE — PROGRESS NOTES
Patient in endo for bronchoscopy. Scope  used. Pictures taken. BAL completed. Specimens labeled and sent to lab. No biopsies taken. 1jjars labeled and sent to lab. . Procedure completed. Patient tolerated well.   Taken to endo recovery room

## 2022-04-26 NOTE — DISCHARGE INSTR - COC
Continuity of Care Form    Patient Name: Filomena Tapia   :  1950  MRN:  [id number]    Admit date:  2022  Discharge date:  ***    Code Status Order: Full Code   Advance Directives:        Admitting Physician:  Yvonne Hampton MD  PCP: Abelardo Andrade MD    Discharging Nurse: Laura High RN  [address] Unit/Room#: 4K-20/020-A  Discharging Unit Phone Number: [phone]    Emergency Contact:   Extended Emergency Contact Information  Primary Emergency Contact: Adryan Prior  Address: [address] [place] [address], [address] [place] [address] Delos Friend of [address] [place] [address] Phone: [phone]  Mobile Phone: [phone]  Relation: Spouse  Secondary Emergency Contact: Isra Goetz  Home Phone: [phone]  Relation: Child    Past Surgical History:  Past Surgical History:   Procedure Laterality Date    CARDIAC SURGERY      COLONOSCOPY  06/10/2021    theresa ccolon polyps and collagenous colitis    CORONARY ARTERY BYPASS GRAFT  2021    cabg x 3 with lima and atrial appendage clip  dr Young Blake N/A 2021    CABG  X 3 WITH DEJAH, Atrial Appendage Clip performed by Abigail Stein MD at [address] [place] [address]  2008    polyps    OTHER SURGICAL HISTORY  DEC 7TH 2012 DR GET WADE [facility] [place]     IGS ENDOSCOPIC BI LAT MAXILLARY, ETHMOID, SPHENOID, FRONTAL SINUSOTOMY WITH SEPTOPLASTY AND TURBINOPLASTIES    SKIN CANCER EXCISION  2014    Left side of Forehead     TOOTH EXTRACTION  2017       Immunization History:   Immunization History   Administered Date(s) Administered    COVID-19, Nadege Centeno, Primary or Immunocompromised, PF, 100mcg/0.5mL 2021, 2021    Influenza 10/23/2012    Influenza Vaccine, unspecified formulation 2018    Influenza Virus Vaccine 2013, 2014, 2017    Influenza Whole 2015    Influenza, MDCK Quadv, with preserv IM (Flucelvax 2 yrs and older) 2021    Influenza, Quadv, 6 mo Resulted    C-diff Rule Out 05/09/21 05/10/21 05/09/21 C. difficile toxin Molecular (Ordered)   05/12/21 Rule-Out Test Resulted    COVID-19 (Rule Out) 01/11/21 01/11/21 01/11/21 COVID-19 (Ordered)   01/11/21 Rule-Out Test Resulted            Nurse Assessment:  Last Vital Signs: BP (!) 141/74   Pulse 63   Temp 97.8 °F (36.6 °C) (Oral)   Resp 19   Ht 5' 8\" (1.727 m)   Wt 156 lb 12 oz (71.1 kg)   SpO2 91%   BMI 23.83 kg/m²     Last documented pain score (0-10 scale): Pain Level: 0  Last Weight:   Wt Readings from Last 1 Encounters:   04/26/22 156 lb 12 oz (71.1 kg)     Mental Status:  oriented, alert, coherent, logical, thought processes intact, and able to concentrate and follow conversation    IV Access:  - Peripheral IV - site  L Cephalic, insertion date: ***    Nursing Mobility/ADLs:  Walking   Assisted  Transfer  Assisted  Bathing  Assisted  Dressing  Assisted  Toileting  Assisted  Feeding  Independent  Med [facility]  Assisted  Med Delivery   whole    Wound Care Documentation and Therapy:  Wound 04/24/22 Buttocks Left (Active)   Wound Etiology Pressure Stage  2 04/25/22 1945   Dressing Status Other (Comment) 04/24/22 1952   Wound Cleansed Soap and water 04/24/22 1952   Dressing/Treatment Protective barrier 04/25/22 1945   Wound Assessment Pink/red 04/25/22 1945   Drainage Amount None 04/25/22 1945   Odor None 04/25/22 1945   Alis-wound Assessment Blanchable erythema 04/25/22 1945   Number of days: 1        Elimination:  Continence: Bowel: Yes  Bladder: Yes  Urinary Catheter: None   Colostomy/Ileostomy/Ileal Conduit: No       Date of Last BM: 5/13/2022      Intake/Output Summary (Last 24 hours) at 4/26/2022 0835  Last data filed at 4/26/2022 0546  Gross per 24 hour   Intake 300 ml   Output 1500 ml   Net -1200 ml     I/O last 3 completed shifts:   In: 500 [P.O.:500]  Out: 2300 [Urine:2300]    Safety Concerns:     None    Impairments/Disabilities:      None    Nutrition Therapy:  Current Nutrition Therapy:   -

## 2022-04-26 NOTE — PROGRESS NOTES
Ina Jacques 60  PHYSICAL THERAPY MISSED TREATMENT NOTE  [facility] ICU STEPDOWN TELEMETRY 4K    Date: 2022  Patient Name: Frannie Chu        MRN: [id number]   : 1950  (75 y.o.)  Gender: male                REASON FOR MISSED TREATMENT:  Pt is scheduled for a bronchoscopy later today. RN approved session if he can keep his O2 sats >88% as he is maxed on nasal cannula. Pt had drops in O2 sats to low 80's with simple conversation to possibly initiate session. Will hold and try again tomorrow. Paul Grover.  Idalmis Woodard, [facility] Clin[facility] Goodman 8

## 2022-04-26 NOTE — ANESTHESIA POSTPROCEDURE EVALUATION
Department of Anesthesiology  Postprocedure Note    Patient: Elma Leblanc  MRN: [id number]  YOB: 1950  Date of evaluation: 4/26/2022  Time:  5:06 PM     Procedure Summary     Date: 04/26/22 Room / Location: [address] [place] [address] / [address] [place] [address]    Anesthesia Start: 6096 Anesthesia Stop:     Procedure: BRONCHOSCOPY WITH BAL (N/A ) Diagnosis: (PNUEMONIA)    Surgeons: Sheryle Sill, MD Responsible Provider: Samia Pina MD    Anesthesia Type: general ASA Status: 4          Anesthesia Type: No value filed. Nessa Phase I: Nessa Score: 7    Nessa Phase II: Nessa Score: 9    Last vitals: Reviewed and per EMR flowsheets.        Anesthesia Post Evaluation    Patient location during evaluation: bedside  Patient participation: complete - patient participated  Level of consciousness: awake, awake and alert and responsive to verbal stimuli  Pain score: 0  Airway patency: patent  Nausea & Vomiting: no vomiting and no nausea  Complications: no  Cardiovascular status: blood pressure returned to baseline  Respiratory status: see note: NRB 12L Oxygen saturation 95%  Hydration status: stable  Comments: Pt still on NRB at 13L: respiratory attempted to give a treatment but had to abort because pt desaturated too much during treatment: pt has bipap in his room on 4K: pt to return there bipap is waiting at bedside: pt will be placed on bipap and given treatment once returned to room: Dr. Meghan Gutierrez at bedside also agrees that this course of action is appropriate: Informed Dr. Lakhwinder Molina of pt situation and plan for the pt    Currently pt is alert and oriented: pt is awake and participated in postop conversation

## 2022-04-26 NOTE — PLAN OF CARE
Problem: Discharge Planning  Goal: Discharge to home or other facility with appropriate resources  4/26/2022 0833 by MARIANA Coppola  Outcome: Progressing    SW consult received and completed. See SW note.

## 2022-04-26 NOTE — CARE COORDINATION
Collaborative Discharge Planning    Tanya Dobbs  :  1950  MRN:  [id number]    ADMIT DATE:  2022      White Board Notes /Social Work Whiteboard Notes  /Social Work Whiteboard: ; SW - Return to [employer]. No precert. COVID swab. Therapy following, oxygen 3-4L    Discharge Milestones and Delays: Clinical status    Pneumonia treated w IV AB, IVF, 50% FIO2 BIPAP versus Oxygen 6L continued     Bronchoscopy; await cultures/cytology  Creatinine 1.5, elevated WBC; monitor.  NPO.    SIGNED:  Jett Tarango RN   2022, 3:58 PM

## 2022-04-26 NOTE — PROCEDURES
Bronchoscopy Procedure Note    Date of Operation: 4/26/2022    Pre-op Diagnosis: Interstitial lung disease    Post-op Diagnosis: Same    Surgeon: Lesli Avalos MD    Anesthesia: General endotracheal anesthesia    Operation: Flexible fiberoptic bronchoscopy, diagnostic BAL from RLL    Estimated Blood Loss: None    Complications: None inmediate    Indications and History:  The patient is a 70 y.o. male with acte interstitial pneumonia, thought to be related to amiodarone toxicity. The risks, benefits, complications, treatment options and expected outcomes were discussed with the patient. The possibilities of reaction to medication, pulmonary aspiration, perforation of a viscus, bleeding, failure to diagnose a condition and creating a complication requiring transfusion or operation were discussed with the patient who freely signed the consent. Description of Procedure: The patient was taken to endoscopy suite, identified as [facility] and the procedure verified as Flexible Fiberoptic Bronchoscopy. A Time Out was held and the above information confirmed. Sedated with Midazolam, Ketamine and Propofol vy anesthesia by anesthesia services. The cords were mobile. The scope was then passed into the trachea. Lidocaine 2% 3 ml was used topically on the will. Careful inspection of the tracheal lumen was accomplished. The scope was sequentially passed into the left main and then left upper and lower bronchi and segmental bronchi. The scope was then withdrawn and advanced into the right main bronchus and then into the RUL, RML, and RLL bronchi and segmental bronchi.     BAL was obtained from  mls of saline were instilled a 30 ml sample was returned light pink in color with floaters, sent to the lab        Endobronchial findings:   Trachea: Normal mucosa  Will: Normal mucosa  Right main bronchus: Normal mucosa, mucus  Right upper lobe bronchus: Normal mucosa, mucus  Right Middle lobe bronchus: Normal mucosa, mucus  Right Lower lobe bronchus: erythematous mucosa  Left main bronchus: Normal mucosa  Left upper lobe bronchus: Normal mucosa  Left lower lobe bronchus: Normal mucosa    The Patient was taken to the Endoscopy Recovery area in satisfactory condition. Recommendation:    1. F/U on culture results  2. F/U on cytology results    Attestation: I performed the procedure.     Carmine Sharp MD

## 2022-04-26 NOTE — PROGRESS NOTES
[facility] THERAPY MISSED TREATMENT NOTE  [facility] ICU STEPDOWN TELEMETRY 4K  4K-20/020-A      Date: 2022  Patient Name: Hal Leal        CSN: [id number]   : 1950  (70 y.o.)  Gender: male                REASON FOR MISSED TREATMENT: OT attempted at this time. RN ok'd session and pt scheduled for bronch later this date. On arrival while pt resting in bed, Sp02 fluctuating from 86-88% on 6L O2 and RN stating hoping to keep off the bipap today. Not appropriate for activity at this time due to oxygenation and based on previous admission desaturates quickly with minimal activity.  Will check back as able

## 2022-04-26 NOTE — PLAN OF CARE
Problem: Discharge Planning  Goal: Discharge to home or other facility with appropriate resources  Outcome: Progressing  Flowsheets (Taken 4/24/2022 1952 by Fazal Alva, RN)  Discharge to home or other facility with appropriate resources: Identify barriers to discharge with patient and caregiver     Problem: Safety - Adult  Goal: Free from fall injury  Outcome: Progressing  Flowsheets (Taken 4/26/2022 0213)  Free From Fall Injury: Instruct family/caregiver on patient safety     Problem: ABCDS Injury Assessment  Goal: Absence of physical injury  Outcome: Progressing  Note: Patient absent of physical injury this shift. Problem: Respiratory - Adult  Goal: Clear lung sounds  4/26/2022 0213 by Amena Worthy RN  Outcome: Progressing  4/25/2022 2250 by Mickie Rivas RCP  Outcome: Progressing  Note: Patient receiving 4 x daily DuoNeb nebulizer treatments to improve aeration. Patient also has BiPAP at bedside that he will wear overnight. Will continue with therapies as ordered. Problem: Chronic Conditions and Co-morbidities  Goal: Patient's chronic conditions and co-morbidity symptoms are monitored and maintained or improved  Outcome: Progressing  Flowsheets (Taken 4/24/2022 1952 by Fazal Alva RN)  Care Plan - Patient's Chronic Conditions and Co-Morbidity Symptoms are Monitored and Maintained or Improved: Monitor and assess patient's chronic conditions and comorbid symptoms for stability, deterioration, or improvement    Care plan reviewed with patient. Patient verbalize understanding of the plan of care and contribute to goal setting.

## 2022-04-26 NOTE — RT PROTOCOL NOTE
RT Inhaler-Nebulizer Bronchodilator Protocol Note    There is a bronchodilator order in the chart from a provider indicating to follow the RT Bronchodilator Protocol and there is an Initiate RT Inhaler-Nebulizer Bronchodilator Protocol order as well (see protocol at bottom of note). CXR Findings:  XR CHEST PORTABLE    Result Date: 4/24/2022  Worsening acute on chronic interstitial disease at the lung bases. **This report has been created using voice recognition software. It may contain minor errors which are inherent in voice recognition technology. ** Final report electronically signed by Dr. Ketty Florian MD on 4/24/2022 1:13 PM      The findings from the last RT Protocol Assessment were as follows:   History Pulmonary Disease: Chronic pulmonary disease  Respiratory Pattern: Mild dyspnea at rest, irregular pattern, or RR 21-25 bpm  Breath Sounds: Slightly diminished and/or crackles  Cough: Strong, spontaneous, non-productive  Indication for Bronchodilator Therapy: Decreased or absent breath sounds  Bronchodilator Assessment Score: 8 Pt scored for txs TID but pt states he takes QID at home. Aerosolized bronchodilator medication orders have been revised according to the RT Inhaler-Nebulizer Bronchodilator Protocol below. Respiratory Therapist to perform RT Therapy Protocol Assessment initially then follow the protocol. Repeat RT Therapy Protocol Assessment PRN for score 0-3 or on second treatment, BID, and PRN for scores above 3. No Indications - adjust the frequency to every 6 hours PRN wheezing or bronchospasm, if no treatments needed after 48 hours then discontinue using Per Protocol order mode. If indication present, adjust the RT bronchodilator orders based on the Bronchodilator Assessment Score as indicated below.   Use Inhaler orders unless patient has one or more of the following: on home nebulizer, not able to hold breath for 10 seconds, is not alert and oriented, cannot activate and use MDI correctly, or respiratory rate 25 breaths per minute or more, then use the equivalent nebulizer order(s) with same Frequency and PRN reasons based on the score. If a patient is on this medication at home then do not decrease Frequency below that used at home. 0-3 - enter or revise RT bronchodilator order(s) to equivalent RT Bronchodilator order with Frequency of every 4 hours PRN for wheezing or increased work of breathing using Per Protocol order mode. 4-6 - enter or revise RT Bronchodilator order(s) to two equivalent RT bronchodilator orders with one order with BID Frequency and one order with Frequency of every 4 hours PRN wheezing or increased work of breathing using Per Protocol order mode. 7-10 - enter or revise RT Bronchodilator order(s) to two equivalent RT bronchodilator orders with one order with TID Frequency and one order with Frequency of every 4 hours PRN wheezing or increased work of breathing using Per Protocol order mode. 11-13 - enter or revise RT Bronchodilator order(s) to one equivalent RT bronchodilator order with QID Frequency and an Albuterol order with Frequency of every 4 hours PRN wheezing or increased work of breathing using Per Protocol order mode. Greater than 13 - enter or revise RT Bronchodilator order(s) to one equivalent RT bronchodilator order with every 4 hours Frequency and an Albuterol order with Frequency of every 2 hours PRN wheezing or increased work of breathing using Per Protocol order mode. RT to enter RT Home Evaluation for COPD & MDI Assessment order using Per Protocol order mode.     Electronically signed by Aaron Altman RCP on 4/26/2022 at 8:37 AM

## 2022-04-26 NOTE — PROGRESS NOTES
Roel Velasco admitted to [facility] for bronchoscopy with DR. Rizo. Consent form signed and verified.

## 2022-04-26 NOTE — PLAN OF CARE
Problem: Discharge Planning  Goal: Discharge to home or other facility with appropriate resources  4/26/2022 0956 by Paradise Everett RN  Outcome: Progressing  Flowsheets (Taken 4/26/2022 5978)  Discharge to home or other facility with appropriate resources: Identify barriers to discharge with patient and caregiver     Problem: Safety - Adult  Goal: Free from fall injury  4/26/2022 0956 by Paradise Everett RN  Outcome: Progressing  Flowsheets (Taken 4/26/2022 0956)  Free From Fall Injury: Instruct family/caregiver on patient safety     Problem: ABCDS Injury Assessment  Goal: Absence of physical injury  4/26/2022 0956 by Paradise Everett RN  Outcome: Progressing  Note: Pt kept free of harm, pillow support utilized     Problem: Respiratory - Adult  Goal: Clear lung sounds  4/26/2022 0956 by Paradise Everett RN  Outcome: Progressing  Note: Lung sounds assessed, deep breathing encouraged, HOB 30 degrees or higher     Problem: Chronic Conditions and Co-morbidities  Goal: Patient's chronic conditions and co-morbidity symptoms are monitored and maintained or improved  4/26/2022 0956 by Paradise Everett RN  Outcome: Progressing  Flowsheets (Taken 4/26/2022 0956)  Care Plan - Patient's Chronic Conditions and Co-Morbidity Symptoms are Monitored and Maintained or Improved:   Monitor and assess patient's chronic conditions and comorbid symptoms for stability, deterioration, or improvement   Collaborate with multidisciplinary team to address chronic and comorbid conditions and prevent exacerbation or deterioration

## 2022-04-26 NOTE — PROGRESS NOTES
Pt in phase 2 alert awake nonrebreather mask on sat 93-94 %    Breathing treatment given . Report called to floor .

## 2022-04-27 NOTE — PROGRESS NOTES
Progress note: Infectious diseases    Patient - Corrinne Gens,  Age - 70 y.o.    - 1950      Room Number - 3N-51/027-K   MRN -  [id number]   Acct # - [de-identified]  Date of Admission -  2022 12:37 PM    SUBJECTIVE:   Respiratory panel   +ve for rhinovirus  OBJECTIVE   VITALS    height is 5' 8\" (1.727 m) and weight is 156 lb 12 oz (71.1 kg). His axillary temperature is 99 °F (37.2 °C). His blood pressure is 129/60 and his pulse is 69. His respiration is 24 and oxygen saturation is 96%. Wt Readings from Last 3 Encounters:   22 156 lb 12 oz (71.1 kg)   22 155 lb 12.8 oz (70.7 kg)   22 154 lb (69.9 kg)       I/O (24 Hours)    Intake/Output Summary (Last 24 hours) at 2022 1553  Last data filed at 2022 1521  Gross per 24 hour   Intake 440 ml   Output 2250 ml   Net -1810 ml       General Appearance  Awake, alert, oriented,chronically sick looking. on high flow oxygen  HEENT - normocephalic, atraumatic, pale conjunctiva,  anicteric sclera  Neck - Supple, no mass  Lungs -  crackles at the lung bases. Cardiovascular - Heart sounds are normal.    Abdomen - soft, not distended, nontender,   Neurologic -oriented.   Skin - No bruising or bleeding  Extremities - + edema,      MEDICATIONS:      sodium chloride flush  5-40 mL IntraVENous 2 times per day    ipratropium-albuterol  1 ampule Inhalation BID    dilTIAZem  30 mg Oral 2 times per day    furosemide  40 mg IntraVENous Once    predniSONE  40 mg Oral Daily    atovaquone  1,500 mg Oral Daily    furosemide  20 mg Oral Daily    digoxin  125 mcg Oral Daily    aspirin  81 mg Oral Daily    atorvastatin  40 mg Oral Nightly    cetirizine  10 mg Oral Daily    metoprolol succinate  150 mg Oral Daily    multivitamin  1 tablet Oral Daily    mupirocin   Topical TID    nystatin   Topical BID    pantoprazole  40 mg Oral QAM AC    rivaroxaban 15 mg Oral Daily    rOPINIRole  0.5 mg Oral Nightly    traZODone  50 mg Oral Nightly    cefepime  2,000 mg IntraVENous Q12H      sodium chloride       sodium chloride flush, sodium chloride, ALPRAZolam, ipratropium-albuterol, ondansetron **OR** ondansetron, acetaminophen      LABS:     CBC:   Recent Labs     04/25/22 0427 04/26/22  0426 04/27/22  0530   WBC 12.9* 20.5* 18.6*   HGB 8.8* 9.1* 9.9*    247 233     BMP:    Recent Labs     04/25/22  0427 04/26/22  0426 04/27/22  0530    135 138   K 5.1 4.9 4.8    103 103   CO2 21* 23 24   BUN 40* 44* 41*   CREATININE 1.3* 1.5* 1.2   GLUCOSE 181* 137* 90     Calcium:  Recent Labs     04/27/22  0530   CALCIUM 8.1*     Ionized Calcium:No results for input(s): IONCA in the last 72 hours. Magnesium:  Recent Labs     04/26/22 0426   MG 2.1     Phosphorus:No results for input(s): PHOS in the last 72 hours. BNP:No results for input(s): BNP in the last 72 hours. Glucose:  Recent Labs     04/26/22 2201   POCGLU 176*    Amylase and Lipase:  Recent Labs     04/24/22 1919   LACTA 2.8*     Lactic Acid:   Recent Labs     04/24/22 1919   LACTA 2.8*        CULTURES:   UA: No results for input(s): SPECGRAV, PHUR, COLORU, CLARITYU, MUCUS, PROTEINU, BLOODU, RBCUA, WBCUA, BACTERIA, NITRU, GLUCOSEU, BILIRUBINUR, UROBILINOGEN, KETUA, LABCAST, LABCASTTY, AMORPHOS in the last 72 hours.     Invalid input(s): CRYSTALS  Micro:   Lab Results   Component Value Date    BC No growth-preliminary  04/24/2022        Problem list of patient:     Patient Active Problem List   Diagnosis Code    Hyperlipidemia E78.5    Asthma J45.909    Smoker F17.200    Allergic rhinitis due to other allergen J30.89    Collagenous colitis K52.831    Lactose intolerance E73.9    HTN (hypertension) I10    COPD, mild ([address] J44.9    Atrial fibrillation ([facility] I48.91    Atherosclerotic heart disease of native coronary artery with other forms of angina pectoris ([facility]) I25.118    S/P CABG x 3 Z95.1    Encounter for cardioversion procedure Z01.89    S/P left atrial appendage ligation Z98.890    Ischemic cardiomyopathy I25.5    Chronic bronchitis (HCC) J42    CHF (congestive heart failure), NYHA class II, chronic, systolic (HCC) F89.46    Tachycardia-bradycardia (HCC) I49.5    [employer] dual pacemaker  Z95.0    Pneumonia J18.9    Dyspnea and respiratory abnormalities R06.00, R06.89    Acute and chronic respiratory failure with hypoxia (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   Francisco Alberts MD, MD, FACP 4/27/2022 3:53 PM

## 2022-04-27 NOTE — PLAN OF CARE
Problem: Safety - Adult  Goal: Free from fall injury  Outcome: Progressing  Flowsheets (Taken 4/27/2022 0420)  Free From Fall Injury: Instruct family/caregiver on patient safety  Note: Patient remains free from injury and uses the call light appropriately. Problem: Respiratory - Adult  Goal: Adequate oxygenation  4/27/2022 0420 by Kayla Rice RN  Outcome: Progressing  Note: Patient is currently on heated high flow maintaining O2 saturations above 90%     Problem: Respiratory - Adult  Goal: Clear lung sounds  4/27/2022 0420 by Kayla Rice RN  Outcome: Progressing    Problem: Pain  Goal: Verbalizes/displays adequate comfort level or baseline comfort level  Outcome: Progressing  Flowsheets (Taken 4/27/2022 0420)  Verbalizes/displays adequate comfort level or baseline comfort level:   Encourage patient to monitor pain and request assistance   Assess pain using appropriate pain scale  Note: Patient denies pain this shift.

## 2022-04-27 NOTE — PLAN OF CARE
Problem: Respiratory - Adult  Goal: Clear lung sounds  4/27/2022 0308 by Tayo Barbosa RCP  Outcome: Progressing     Problem: Respiratory - Adult  Goal: Adequate oxygenation  Outcome: Progressing     Continue breathing tx's to improve aeration. Patient placed on HFNC to maintain SpO2 > 90. Will wean as tolerated by patient. BiPAP at bedside.

## 2022-04-27 NOTE — RT PROTOCOL NOTE
RT Inhaler-Nebulizer Bronchodilator Protocol Note    There is a bronchodilator order in the chart from a provider indicating to follow the RT Bronchodilator Protocol and there is an Initiate RT Inhaler-Nebulizer Bronchodilator Protocol order as well (see protocol at bottom of note). CXR Findings:  XR CHEST PORTABLE    Result Date: 4/27/2022  1. Normal heart size. Permanent dual-chamber pacemaker. Metallic sternotomy sutures and vascular clips from prior surgery. An atrial appendage clip is present. 2. 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 Moderately severe pneumonia/pulmonary edema involving both lungs relatively diffusely. Overall appearance of chest slightly improved from prior. . **This report has been created using voice recognition software. It may contain minor errors which are inherent in voice recognition technology. ** Final report electronically signed by Dr. Chaya Armstrong on 4/27/2022 11:00 AM      The findings from the last RT Protocol Assessment were as follows:   History Pulmonary Disease: Chronic pulmonary disease  Respiratory Pattern: Mild dyspnea at rest, irregular pattern, or RR 21-25 bpm  Breath Sounds: Slightly diminished and/or crackles  Cough: Strong, spontaneous, non-productive  Indication for Bronchodilator Therapy: Decreased or absent breath sounds  Bronchodilator Assessment Score: 8    Aerosolized bronchodilator medication orders have been revised according to the RT Inhaler-Nebulizer Bronchodilator Protocol below. Respiratory Therapist to perform RT Therapy Protocol Assessment initially then follow the protocol. Repeat RT Therapy Protocol Assessment PRN for score 0-3 or on second treatment, BID, and PRN for scores above 3. No Indications - adjust the frequency to every 6 hours PRN wheezing or bronchospasm, if no treatments needed after 48 hours then discontinue using Per Protocol order mode.      If indication present, adjust the RT bronchodilator orders based on the

## 2022-04-27 NOTE — PROGRESS NOTES
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 Patient has been in a skilled nursing facility after his last admission last month in the hospital for pneumonia. Patient noticed his oxygen saturation to be less and hence was transferred here he has been placed on BiPAP with improvement in his saturation. He was also noted to be in A. fib with RVR. He denies any chest pain or palpitations. His breathing is much better since his been on the BiPAP. Chest x-ray noted. Cardiac enzymes are minimally elevated. White count is also elevated. He did complain of chills before he presented to the ER. His temperature was 99.6 on admission     Restrictions/Precautions:  Restrictions/Precautions: General Precautions,Fall Risk  Position Activity Restriction  Other position/activity restrictions: monitor O2 sats    Subjective:  Chart Reviewed: Yes  Patient assessed for rehabilitation services?: Yes  Subjective: RN approved session and reported to watch for toleration and stop if O2 sats drop too much. Pt resting in bed and agrees to try what he can.     General:      Hearing: Within functional limits       Pain: not reported Vitals: Oxygen: Pt on HFNC at 60 L and 80% FiO2. Pt had drops in O2 sat as low as 77% and required nearly 5-6 min to recover to 88% after returning to supine. Social/Functional History:    Type of Home: Facility ([facility])          OBJECTIVE:  Range of Motion:  Bilateral Lower Extremity: WFL    Strength:  Bilateral Lower Extremity: grossly 4-/5    Balance:  Static Sitting Balance:  Supervision  Dynamic Sitting Balance: Supervision, Stand By Assistance    Bed Mobility:  Supine to Sit: Stand By Assistance, with head of bed raised, with rail, with increased time for completion  Sit to Supine: Stand By Assistance, with head of bed raised, with rail     Transfers:  Not Tested    Ambulation:  Not tested    Exercise:  Patient was guided in 1 set(s) 5 reps of exercise to both lower extremities. Ankle pumps. Exercises were completed for increased independence with functional mobility. Functional Outcome Measures: Completed  AM-PAC Inpatient Mobility without Stair Climbing Raw Score : 9  AM-PAC Inpatient without Stair Climbing T-Scale Score : 32.44    ASSESSMENT:  Activity Tolerance:  Patient tolerance of  treatment: fair. Minus due to drops in O2 sats and lengthy time to recover      Treatment Initiated: No treatment initiated. Mobility beyond edge of bed and exercises other than ankle pumps not completed due to pt having significant drop in O2 and inability to recover without returning to supine and extended time. Assessment: Body Structures, Functions, Activity Limitations Requiring Skilled Therapeutic Intervention: Decreased functional mobility ,Decreased strength,Decreased tolerance to work activity,Decreased endurance  Assessment: Pt is a 71 yo male that is having increased difficulty with respiratory status. Pt is motivated to try activity, but had significant drop in O2 saturations while on HFNC.  Pt would you benefit from continued skilled PT to address strengthening, endurance building, and functional mobility once assessed. Therapy Prognosis: Fair    Requires PT Follow-Up: Yes    Discharge Recommendations:  Discharge Recommendations: Continue to assess pending progress,Subacute/Skilled Nursing Joellen Kayser would benefit from continued therapy after discharge    Patient Education:   . Patient Education  Education Given To: Patient  Education Provided: Role of Therapy,Plan of Care,Home Exercise Program  Education Method: Verbal,Demonstration  Barriers to Learning: None  Education Outcome: Verbalized understanding,Demonstrated understanding      Equipment Recommendations:  Equipment Needed: No    Plan:  Current Treatment Recommendations: Strengthening,Endurance training,Therapeutic activities,Home exercise program,Safety education & training,Patient/Caregiver education & training  Plan:  (3-5x GM)    Goals:  Patient goals : get stronger  Short Term Goals  Time Frame for Short term goals: at discharge  Short term goal 1: Pt to be Supervision for supine <> sit to get in/out of bed  Short term goal 2: Pt to tolerate sitting edge of bed >5 min with Supervision and maintain O2 sat >88%  Short term goal 3: Pt to tolerate assessment of transfers and ambulation by LPT with O2 sat >88%  Long Term Goals  Time Frame for Long term goals : not set due to short ELOS    Following session, patient left in safe position with all fall risk precautions in place. Mario Gallegos.  Mikey Tenorio, [place] [address]

## 2022-04-27 NOTE — CARE COORDINATION
Collaborative Discharge Planning    Frannie Chu  :  1950  MRN:  [id number]    ADMIT DATE:  2022      Discharge Planning Discharge Planning  Type of Residence: [facility])  Living Arrangements: Other (Comment)  Support Systems: Spouse/Significant Other  Current Services Prior To Admission: [facility] 58 Medications: No  Type of Home Care Services: None  Patient expects to be discharged to[de-identified] LTAC  Follow Up Appointment: Best Day/Time : Monday AM  One/Two Story Residence: One story  History of falls?: No  White Board Notes /Social Work Whiteboard Notes  /Social Work Whiteboard: ; SW - Return to [facility]. No precert. COVID swab. Monitor new BIPAP at SNF v LTACH (QL eval pending) needs for high flow oxygen v BIPAP weaning; will ask physician    Discharge Milestones and Delays: Clinical status    Pneumonia/ILF treated w IV AB, 80% [id number] continued      Bronchoscopy; await cultures/cytology  elevated WBC; monitor. NPO.     A-fib RVR today    SIGNED:  Dg Sarkar RN   2022, 1:04 PM

## 2022-04-27 NOTE — PROGRESS NOTES
Follow Up / Progress Note        Patient:   Igor Chance  YOB: 1950  Age:  70 y.o. Room:  [address] [place] [address]  MRN:  [id number]              Plan/Follow-Up:  Writer to unit, pt currently having issues with at fib RVR. Writer did not see today, please notify if urgent needs arise.        Electronically signed by Mariela Nair RN on 4/27/2022 at 11:54 AM             Palliative Care Office: [phone]

## 2022-04-27 NOTE — PROGRESS NOTES
[facility]    Patient - Hanna Franklin   MRN -  [id number]   [facility]   - 1950      Date of Admission -  2022 12:37 PM  Date of evaluation -  2022  Brenda - R Jose June MD Primary Care Physician - Darcy Asif MD     Problem List      Active Hospital Problems    Diagnosis Date Noted    SOB (shortness of breath) [R06.02] 2022     Priority: Medium    Pneumonia [J18.9] 2022     Reason for Consult    Respiratory failure  HPI   Hanna Franklin is a 70 y.o. male admitted for worsening respiratory status, clinical diagnosis of APT on steroids but not improving, got up to the restroom at the [facility] and became more SOB with sats dropping, brought to ED and started on   PAP with improvement, went to PAF and started on Diltiazem, currently on NSR and Spo2 in the 80s on 5 LPM sats fluctuate if he talks. CAD s/p CABG, ECHO EF 55 to 60% abnormal (paradoxical)motion, Afib/flutter, atrial clip, SSS s/p dual chamber PPM, HTN, HLP. 36 PY smoker stopped 2021  CXR as before reveals bilateral interstitial pattern  No fever, no sputum production, SARS (-), Blood molecular pattern (+),staph film BCx (+) GPC in clusters.       Past 24 Hours   Up to BM today desat down to low 80s and went into Afib RVR  On BiPAP currently 70%  SOB observed  S/p Bronchoscopy   UO 2.4L/hr     -All systems reviewed   PMHx   Past Medical History      Diagnosis Date    Asthma     Atrial fibrillation with RVR ([facility] [address] 2021    [employer] dual pacemaker  04/15/2021    Collagenous colitis     per scope     Colon polyps     dr Mari Amaya    COPD, mild (HCC)     Eczema of hand     HTN (hypertension)     Hyperlipidemia     Smoker       Past Surgical History        Procedure Laterality Date    CARDIAC SURGERY      COLONOSCOPY  06/10/2021    theresa ccolon polyps and collagenous colitis    CORONARY ARTERY BYPASS GRAFT  01/12/2021    cabg x 3 with lima and atrial appendage clip  dr Jose L Carpio GRAFT N/A 01/12/2021    CABG  X 3 WITH DEJAH, Atrial Appendage Clip performed by Rena Davidson MD at [facility] NASAL SINUS SURGERY  06/2008    polyps    OTHER SURGICAL HISTORY  DEC 7TH 2012 DR VANAN [facility] [place]     IGS ENDOSCOPIC BI LAT MAXILLARY, ETHMOID, SPHENOID, FRONTAL SINUSOTOMY WITH SEPTOPLASTY AND TURBINOPLASTIES    SKIN CANCER EXCISION  09/2014    Left side of Forehead     TOOTH EXTRACTION  02/2017     Meds    Current Medications    predniSONE  40 mg Oral Daily    atovaquone  1,500 mg Oral Daily    furosemide  20 mg Oral Daily    digoxin  125 mcg Oral Daily    aspirin  81 mg Oral Daily    atorvastatin  40 mg Oral Nightly    cetirizine  10 mg Oral Daily    metoprolol succinate  150 mg Oral Daily    multivitamin  1 tablet Oral Daily    mupirocin   Topical TID    nystatin   Topical BID    pantoprazole  40 mg Oral QAM AC    [Held by provider] rivaroxaban  15 mg Oral Daily    rOPINIRole  0.5 mg Oral Nightly    traZODone  50 mg Oral Nightly    ipratropium-albuterol  1 ampule Inhalation 4x daily    cefepime  2,000 mg IntraVENous Q12H     ALPRAZolam, ipratropium-albuterol, ondansetron **OR** ondansetron, acetaminophen  IV Drips/Infusions   sodium chloride 100 mL/hr at 04/27/22 0335     Home Medications  Medications Prior to Admission: albuterol (PROVENTIL) (2.5 MG/3ML) 0.083% nebulizer solution, Take 2.5 mg by nebulization 2 times daily  ipratropium-albuterol (DUONEB) 0.5-2.5 (3) MG/3ML SOLN nebulizer solution, Inhale 1 vial into the lungs every 4 hours as needed for Shortness of Breath  Sodium Chloride-Sodium Bicarb 2300-700 MG PACK, 1 spray by Nasal route in the morning, at noon, and at bedtime  FLUoxetine (PROZAC) 10 MG capsule, Take 10 mg by mouth daily (10 mg daily x 5 days - 4/24-4/28, then increase to 20 mg daily)  atovaquone (MEPRON) 750 MG/5ML suspension, Take 1,500 mg by mouth daily (10 mL daily)  predniSONE (DELTASONE) 20 MG tablet, Take 30 mg by mouth daily   ALPRAZolam (XANAX) 0.5 MG tablet, Take 0.5 mg by mouth every 12 hours as needed for Sleep. [DISCONTINUED] tuberculin (APLISOL) 5 UNIT/0.1ML injection, Inject 5 Units into the skin  [DISCONTINUED] nystatin (MYCOSTATIN) 972362 UNIT/GM cream, Apply topically 2 times daily Apply topically 2 times daily. [DISCONTINUED] oxymetazoline (AFRIN) 0.05 % nasal spray, 2 sprays by Nasal route 2 times daily  furosemide (LASIX) 20 MG tablet, Take 20 mg by mouth daily Sun, Wed  loratadine (CLARITIN) 10 MG tablet, Take 10 mg by mouth daily  metoprolol succinate (TOPROL XL) 100 MG extended release tablet, Take 1.5 tablets by mouth daily  tiotropium-olodaterol (STIOLTO) 2.5-2.5 MCG/ACT AERS, Inhale 2 puffs into the lungs daily  rivaroxaban (XARELTO) 15 MG TABS tablet, Take 1 tablet by mouth daily  mupirocin (BACTROBAN) 2 % ointment, Apply topically 3 times daily. albuterol sulfate  (90 Base) MCG/ACT inhaler, INHALE 2 PUFFS INTO THE LUNGS EVERY 6 HOURS AS NEEDED FOR WHEEZING  traZODone (DESYREL) 50 MG tablet, Take 1 tablet by mouth nightly  rOPINIRole (REQUIP) 0.5 MG tablet, Take 1 tablet by mouth nightly  pantoprazole (PROTONIX) 40 MG tablet, Take 1 tablet by mouth every morning (before breakfast)  [DISCONTINUED] cetirizine (ZYRTEC) 10 MG tablet, Take 10 mg by mouth daily  atorvastatin (LIPITOR) 40 MG tablet, Take 1 tablet by mouth nightly  aspirin 81 MG chewable tablet, Take 1 tablet by mouth daily  Multiple Vitamins-Minerals (CENTRUM SILVER PO), Take 1 tablet by mouth daily   Diet    ADULT DIET; Regular;  Low Sodium (2 gm)  Allergies    Penicillins and Sulfa antibiotics  Social History     Social History     Socioeconomic History    Marital status:      Spouse name: Not on file    Number of children: Not on file    Years of education: Not on file    Highest education level: Not on file Occupational History    Not on file   Tobacco Use    Smoking status: Former Smoker     Packs/day: 1.00     Years: 40.00     Pack years: 40.00     Types: Cigarettes     Quit date: 3/7/2022     Years since quittin.1    Smokeless tobacco: Never Used   Substance and Sexual Activity    Alcohol use: Not Currently     Alcohol/week: 0.0 standard drinks    Drug use: No    Sexual activity: Not on file   Other Topics Concern    Not on file   Social History Narrative    No barriers with medication affordability now that he has met deductible    [facility] with [facility]    Has O2 and supplies needed    No barriers with transportation     Social Determinants of Health     Financial Resource Strain: Low Risk     Difficulty of Paying Living Expenses: Not hard at all   Food Insecurity: No Food Insecurity    Worried About Running Out of Food in the Last Year: Never true    Danielle of Food in the Last Year: Never true   Transportation Needs: No Transportation Needs    Lack of Transportation (Medical): No    Lack of Transportation (Non-Medical):  No   Physical Activity: Inactive    Days of Exercise per Week: 0 days    Minutes of Exercise per Session: 0 min   Stress:     Feeling of Stress : Not on file   Social Connections:     Frequency of Communication with Friends and Family: Not on file    Frequency of Social Gatherings with Friends and Family: Not on file    Attends Mosque Services: Not on file    Active Member of Clubs or Organizations: Not on file    Attends Club or Organization Meetings: Not on file    Marital Status: Not on file   Intimate Partner Violence:     Fear of Current or Ex-Partner: Not on file    Emotionally Abused: Not on file    Physically Abused: Not on file    Sexually Abused: Not on file   Housing Stability: Unknown    Unable to Pay for Housing in the Last Year: No    Number of [place] in the Last Year: Not on file    Unstable Housing in the Last Year: No     Family History EOSABS 0.0 04/27/2022    BASOSABS 0.0 04/27/2022    DIFFTYPE see below 03/25/2022     BMP   Lab Results   Component Value Date     04/27/2022    K 4.8 04/27/2022    K 5.0 04/24/2022     04/27/2022    CO2 24 04/27/2022    BUN 41 04/27/2022    CREATININE 1.2 04/27/2022    GLUCOSE 90 04/27/2022    GLUCOSE 94 11/07/2011    CALCIUM 8.1 04/27/2022    MG 2.1 04/26/2022     LFTS  Lab Results   Component Value Date    ALKPHOS 67 04/24/2022    ALT 29 04/24/2022    AST 31 04/24/2022    PROT 6.1 04/24/2022    BILITOT 0.5 04/24/2022    BILIDIR <0.2 03/26/2022    LABALBU 3.2 04/24/2022    LABALBU 4.3 11/07/2011     ABG   Lab Results   Component Value Date    PH 7.44 04/24/2022    PCO2 39 04/24/2022    PO2 191 04/24/2022    HCO3 26 04/24/2022    O2SAT 100 04/24/2022     @  Lab Results   Component Value Date    APTT 25.3 03/25/2022     INR   Lab Results   Component Value Date    INR 1.41 (H) 03/25/2022    INR 1.25 (H) 04/06/2021    INR 1.13 01/11/2021       EKG        PFTs     Cultures    (+) blood culture GPC in clusters    4/26/22  Bronchoscopy- will follow cultures/cytology   Endobronchial findings:   Trachea: Normal mucosa  Diana: Normal mucosa  Right main bronchus: Normal mucosa, mucus  Right upper lobe bronchus: Normal mucosa, mucus  Right Middle lobe bronchus: Normal mucosa, mucus  Right Lower lobe bronchus: erythematous mucosa  Left main bronchus: Normal mucosa  Left upper lobe bronchus: Normal mucosa  Left lower lobe bronchus: Normal mucosa     Rhinovirus Enterovirus PCR Detected Abnormal        Radiology    CXR  4/24/22    FINDINGS:   There are stable median sternotomy wires and mediastinal surgical clips and atrial appendage clip. There is stable right cardiac pacer generator with 2 leads. There is marked interstitial prominence at the bilateral lung bases, increased compared to    prior exam. The cardiac silhouette is normal in appearance.  Visualized osseous structures appear grossly intact.         Impression   Worsening acute on chronic interstitial disease at the lung bases.                   **This report has been created using voice recognition software. It may contain minor errors which are inherent in voice recognition technology. **       Final report electronically signed by Dr. 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 since 03/7/2022 (No evidence ILD back in 2021)  Interstitial infiltrate, thickening interlobular septa  CAD s/p CABG   Afib   Full Code   Recommendations   Adjust supplemental oxygen to maintain SpO2 > 88%   BiPAP 18/6   Prednisone 40 mg PO daily   Continue Atovaquone   Duoneb BID  ID on for ATB management   Will follow bronchoscopy cultures/cytologies- called pathology cultures/cytology show HELD status they are looking into this   MAGALI reflex- Anit-RNP- pending still   VTE prophylaxis: SCDs  Lopressor IV to be given- patient may need back on Cardizem gtt  CXR portable today  IVF DC  Good UO     Case discussed with nurse and patient/family. Questions and concerns addressed. Meds and orders reviewed     Electronically signed by       ARCADIO Espinal - CNP on 4/27/2022 at 9:38 AM    Vitals:    04/27/22 1654 04/27/22 1723 04/27/22 1753 04/27/22 2000   BP: 139/78   111/72   Pulse: 55   116   Resp: 18 22 22   Temp: 97.6 °F (36.4 °C)   98.1 °F (36.7 °C)   TempSrc: Oral   Oral   SpO2: 96% 95% 94% 95%   Weight:       Height:         No changes, dyspneic with minimal activities  Quick to desaturate  Short period of Afib now back to NSR  Rhinovirus (+) in BAL  Cytology pending  Continue POC. Patient seen and examined independently by me. Above discussed and I agree with  CNP note Also see my additional comments. Labs, cultures, and radiographs when available were reviewed. Changes were made in the orders as necessary. I discussed patient concerns with Maria Fernanda HICKEY and instructions were given. Respiratory care issues addressed. Please see our orders for the updated patient care plan.     Electronically signed by     Florina Basurto MD on 4/27/2022 at 9:41 PM

## 2022-04-27 NOTE — FLOWSHEET NOTE
[facility]      Patient: Balwinder Benito  Room #: 2A-19/325-D            YOB: 1950  Age: 70 y.o. Gender: male            Admit Date & Time: 4/24/2022 12:37 PM    Assessment:  Claudine Valdez is in bed on 4k. He is a 70year old male and has oxygen canula in his nose. He talked about his heart and his lungs and how pneumonia brought him back here after his long stay in March. He also shared his story of having very low oxygen at the [facility] where he was for rehab. He is pleased and thankful to be alive and came to tears as he shared this story. Intervetion:  Active listening, prayer is offered and accepted. Outcomes:  Encouraged and thankful    Plan:    1. Follow up . Care Plan:  Continue spiritual and emotional care for patient and family. Including prayers.    Electronically signed by Manav Hernandez, on 4/27/2022 at 5:36 PM.  3 [facility]  [phone]

## 2022-04-27 NOTE — PROGRESS NOTES
IM Progress Note  Dr. Veda Bowers  4/27/2022 2:21 PM      Patient name Russ Hernandez  [id number]  PCP: Rhonda Pool MD  Admit Date: 4/24/2022  Acct No. [de-identified]    Subjective: Interval History:   Now on high flow  Had another short run of A fib converted later with IV lopressor  No cp      Diet: Diet NPO    I/O last 3 completed shifts: In: 740 [P.O.:540; I.V.:200]  Out: 3915 [BFODY:6311]  I/O this shift:  In: -   Out: 200 [Urine:200]        Admission weight: 155 lb (70.3 kg) as of 4/24/2022 12:37 PM  Wt Readings from Last 3 Encounters:   04/26/22 156 lb 12 oz (71.1 kg)   04/20/22 155 lb 12.8 oz (70.7 kg)   04/13/22 154 lb (69.9 kg)     Body mass index is 23.83 kg/m².     ROS   CVS;  no cp or palpitation  Resp: +SOB+ cough  Neuro:  No numbness or weakness or dizziness  Abd: no nausea or vomiting or abd pain      Medications:   Scheduled Meds:   sodium chloride flush  5-40 mL IntraVENous 2 times per day    ipratropium-albuterol  1 ampule Inhalation BID    dilTIAZem  30 mg Oral 2 times per day    predniSONE  40 mg Oral Daily    atovaquone  1,500 mg Oral Daily    furosemide  20 mg Oral Daily    digoxin  125 mcg Oral Daily    aspirin  81 mg Oral Daily    atorvastatin  40 mg Oral Nightly    cetirizine  10 mg Oral Daily    metoprolol succinate  150 mg Oral Daily    multivitamin  1 tablet Oral Daily    mupirocin   Topical TID    nystatin   Topical BID    pantoprazole  40 mg Oral QAM AC    [Held by provider] rivaroxaban  15 mg Oral Daily    rOPINIRole  0.5 mg Oral Nightly    traZODone  50 mg Oral Nightly    cefepime  2,000 mg IntraVENous Q12H     Continuous Infusions:   sodium chloride         Labs :     CBC:   Recent Labs     04/25/22 0427 04/26/22 0426 04/27/22  0530   WBC 12.9* 20.5* 18.6*   HGB 8.8* 9.1* 9.9*    247 233     BMP:    Recent Labs     04/25/22 0427 04/26/22  0426 04/27/22  0530    135 138   K 5.1 4.9 4.8    103 103   CO2 21* 23 24   BUN 40* 44* 41* CREATININE 1.3* 1.5* 1.2   GLUCOSE 181* 137* 90     Hepatic:   No results for input(s): AST, ALT, ALB, BILITOT, ALKPHOS in the last 72 hours. Troponin: No results for input(s): TROPONINI in the last 72 hours. BNP: No results for input(s): BNP in the last 72 hours. Lipids: No results for input(s): CHOL, HDL in the last 72 hours. Invalid input(s): LDLCALCU  INR: No results for input(s): INR in the last 72 hours.     Radiology    Objective:   Vitals: /60   Pulse 69   Temp 99 °F (37.2 °C) (Axillary)   Resp 24   Ht 5' 8\" (1.727 m)   Wt 156 lb 12 oz (71.1 kg)   SpO2 96%   BMI 23.83 kg/m²   HEENT: Head:pupils react  Neck: supple  Lungs: diminished air entry bilat  Heart: regular rate and rhythm now  Abdomen: soft BS heard NG NT  Extremities: warm  No edema  Neurologic:  Alert, oriented X3    Impression:   :   Acute on chronic hypoxic respiratory failure s/p bronch  Par A. 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

## 2022-04-27 NOTE — PROGRESS NOTES
[address] [facility] [employer] THERAPY MISSED TREATMENT NOTE  [facility] ICU STEPDOWN TELEMETRY 4K  4K-020-A      Date: 2022  Patient Name: Russ Hernandez        CSN: [id number]   : 1950  (70 y.o.)  Gender: male                REASON FOR MISSED TREATMENT: Hold Treatment per Nursing. Pt on HFNC and positioned on bed pan, with O2 stats decreasing to low 80's, resulting in being placed back on BIPAP. Additional, pt tachycardic while supine in bed, with heart rate ranging in the 130's-150's. OT will hold this date and will try back when appropriate.        Ketty Steven, OTR/L OTD

## 2022-04-28 NOTE — PROGRESS NOTES
[facility]    Patient - Igor Chance   MRN -  [id number]   [facility] - [de-identified]   - 1950      Date of Admission -  2022 12:37 PM  Date of evaluation -  2022  Room - [address] [facility] [address] Day - 4  Consulting - Dominique Nolen MD Primary Care Physician - Felix Haywood MD     Problem List      Active Hospital Problems    Diagnosis Date Noted    SOB (shortness of breath) [R06.02] 2022     Priority: Medium    Pneumonia [J18.9] 2022     Reason for Consult    Respiratory failure  HPI   Igor Chance is a 70 y.o. male admitted for worsening respiratory status, clinical diagnosis of APT on steroids but not improving, got up to the restroom at the [facility] and became more SOB with sats dropping, brought to ED and started on   PAP with improvement, went to PAF and started on Diltiazem, currently on NSR and Spo2 in the 80s on 5 LPM sats fluctuate if he talks. CAD s/p CABG, ECHO EF 55 to 60% abnormal (paradoxical)motion, Afib/flutter, atrial clip, SSS s/p dual chamber PPM, HTN, HLP. 36 PY smoker stopped 2021  CXR as before reveals bilateral interstitial pattern  No fever, no sputum production, SARS (-), Blood molecular pattern (+),staph film BCx (+) GPC in clusters.     Past 24 Hours   Currently on HFNC 55% 59LPM- 98%  Afebrile   UO 2.7L/24 hr  SOB observed with conversation   CTS consulted patient requesting eval for possible biopsy procedure    -All systems reviewed   PMHx   Past Medical History      Diagnosis Date    Asthma     Atrial fibrillation with RVR ([address] 2021    [employer] dual pacemaker  04/15/2021    Collagenous colitis     per scope     Colon polyps     dr Randee Zimmerman    COPD, mild ([address]     Eczema of hand     HTN (hypertension)     Hyperlipidemia     Smoker       Past Surgical History        Procedure Laterality Date    BRONCHOSCOPY N/A 2022    BRONCHOSCOPY WITH BAL performed by Ambrocio Smith Taylor Ortiz MD at [address] COLONOSCOPY  06/10/2021    theresa ccolon polyps and collagenous colitis    CORONARY ARTERY BYPASS GRAFT  01/12/2021    cabg x 3 with lima and atrial appendage clip  dr Kishor Cisneros GRAFT N/A 01/12/2021    CABG  X 3 WITH DEJAH, Atrial Appendage Clip performed by Lisy Briceno MD at [facility] NASAL SINUS SURGERY  06/2008    polyps    OTHER SURGICAL HISTORY  DEC 7TH 2012 DR GET DAVISAS [place]     IGS ENDOSCOPIC BI LAT MAXILLARY, ETHMOID, SPHENOID, FRONTAL SINUSOTOMY WITH SEPTOPLASTY AND TURBINOPLASTIES    SKIN CANCER EXCISION  09/2014    Left side of Forehead     TOOTH EXTRACTION  02/2017     Meds    Current Medications    levalbuterol  1.25 mg Nebulization TID    sodium chloride flush  5-40 mL IntraVENous 2 times per day    dilTIAZem  30 mg Oral 2 times per day    predniSONE  40 mg Oral Daily    atovaquone  1,500 mg Oral Daily    furosemide  20 mg Oral Daily    digoxin  125 mcg Oral Daily    aspirin  81 mg Oral Daily    atorvastatin  40 mg Oral Nightly    cetirizine  10 mg Oral Daily    metoprolol succinate  150 mg Oral Daily    multivitamin  1 tablet Oral Daily    mupirocin   Topical TID    nystatin   Topical BID    pantoprazole  40 mg Oral QAM AC    rivaroxaban  15 mg Oral Daily    rOPINIRole  0.5 mg Oral Nightly    traZODone  50 mg Oral Nightly    cefepime  2,000 mg IntraVENous Q12H     sodium chloride flush, sodium chloride, metoprolol, ALPRAZolam, ipratropium-albuterol, ondansetron **OR** ondansetron, acetaminophen  IV Drips/Infusions   sodium chloride       Home Medications  Medications Prior to Admission: albuterol (PROVENTIL) (2.5 MG/3ML) 0.083% nebulizer solution, Take 2.5 mg by nebulization 2 times daily  ipratropium-albuterol (DUONEB) 0.5-2.5 (3) MG/3ML SOLN nebulizer solution, Inhale 1 vial into the lungs every 4 hours as needed for Shortness of Breath  Sodium Chloride-Sodium Bicarb 2300-700 MG PACK, 1 spray by Nasal route in the morning, at noon, and at bedtime  FLUoxetine (PROZAC) 10 MG capsule, Take 10 mg by mouth daily (10 mg daily x 5 days - 4/24-4/28, then increase to 20 mg daily)  atovaquone (MEPRON) 750 MG/5ML suspension, Take 1,500 mg by mouth daily (10 mL daily)  predniSONE (DELTASONE) 20 MG tablet, Take 30 mg by mouth daily   ALPRAZolam (XANAX) 0.5 MG tablet, Take 0.5 mg by mouth every 12 hours as needed for Sleep. [DISCONTINUED] tuberculin (APLISOL) 5 UNIT/0.1ML injection, Inject 5 Units into the skin  [DISCONTINUED] nystatin (MYCOSTATIN) 713950 UNIT/GM cream, Apply topically 2 times daily Apply topically 2 times daily. [DISCONTINUED] oxymetazoline (AFRIN) 0.05 % nasal spray, 2 sprays by Nasal route 2 times daily  furosemide (LASIX) 20 MG tablet, Take 20 mg by mouth daily Sun, Wed  loratadine (CLARITIN) 10 MG tablet, Take 10 mg by mouth daily  metoprolol succinate (TOPROL XL) 100 MG extended release tablet, Take 1.5 tablets by mouth daily  tiotropium-olodaterol (STIOLTO) 2.5-2.5 MCG/ACT AERS, Inhale 2 puffs into the lungs daily  rivaroxaban (XARELTO) 15 MG TABS tablet, Take 1 tablet by mouth daily  mupirocin (BACTROBAN) 2 % ointment, Apply topically 3 times daily. albuterol sulfate  (90 Base) MCG/ACT inhaler, INHALE 2 PUFFS INTO THE LUNGS EVERY 6 HOURS AS NEEDED FOR WHEEZING  traZODone (DESYREL) 50 MG tablet, Take 1 tablet by mouth nightly  rOPINIRole (REQUIP) 0.5 MG tablet, Take 1 tablet by mouth nightly  pantoprazole (PROTONIX) 40 MG tablet, Take 1 tablet by mouth every morning (before breakfast)  [DISCONTINUED] cetirizine (ZYRTEC) 10 MG tablet, Take 10 mg by mouth daily  atorvastatin (LIPITOR) 40 MG tablet, Take 1 tablet by mouth nightly  aspirin 81 MG chewable tablet, Take 1 tablet by mouth daily  Multiple Vitamins-Minerals (CENTRUM SILVER PO), Take 1 tablet by mouth daily   Diet    ADULT DIET; Regular;  Low Sodium (2 gm)  Allergies    Penicillins and Sulfa antibiotics  Social History     Social History     Socioeconomic History    Marital status:      Spouse name: Not on file    Number of children: Not on file    Years of education: Not on file    Highest education level: Not on file   Occupational History    Not on file   Tobacco Use    Smoking status: Former Smoker     Packs/day: 1.00     Years: 40.00     Pack years: 40.00     Types: Cigarettes     Quit date: 3/7/2022     Years since quittin.1    Smokeless tobacco: Never Used   Substance and Sexual Activity    Alcohol use: Not Currently     Alcohol/week: 0.0 standard drinks    Drug use: No    Sexual activity: Not on file   Other Topics Concern    Not on file   Social History Narrative    No barriers with medication affordability now that he has met deductible    Active with [facility]    Has O2 and supplies needed    No barriers with transportation     Social Determinants of Health     Financial Resource Strain: Low Risk     Difficulty of Paying Living Expenses: Not hard at all   Food Insecurity: No Food Insecurity    Worried About [employer] in the Last Year: Never true    [address] in the Last Year: Never true   Transportation Needs: No Transportation Needs    Lack of Transportation (Medical): No    Lack of Transportation (Non-Medical):  No   Physical Activity: Inactive    Days of Exercise per Week: 0 days    Minutes of Exercise per Session: 0 min   Stress:     Feeling of Stress : Not on file   Social Connections:     Frequency of Communication with Friends and Family: Not on file    Frequency of Social Gatherings with Friends and Family: Not on file    Attends Hoahaoism Services: Not on file    Active Member of Clubs or Organizations: Not on file    Attends Club or Organization Meetings: Not on file    Marital Status: Not on file   Intimate Partner Violence:     Fear of Current or Ex-Partner: Not on file    Emotionally Abused: Not on file    Physically Abused: Not on file    Sexually Abused: Not on file   Housing Stability: Unknown    Unable to Pay for Housing in the Last Year: No    Number of Places Lived in the Last Year: Not on file    Unstable Housing in the Last Year: No     Family History          Problem Relation Age of Onset    Heart Disease Mother     Heart Disease Father         cabg    Diabetes Brother     Heart Disease Brother        Vitals     height is 5' 8\" (1.727 m) and weight is 156 lb 12 oz (71.1 kg). His oral temperature is 97.4 °F (36.3 °C). His blood pressure is 118/66 and his pulse is 60. His respiration is 22 and oxygen saturation is 98%. Body mass index is 23.83 kg/m². I/O        Intake/Output Summary (Last 24 hours) at 4/28/2022 1250  Last data filed at 4/28/2022 1212  Gross per 24 hour   Intake 110 ml   Output 2200 ml   Net -2090 ml     I/O last 3 completed shifts: In: 240 [P.O.:240]  Out: 3550 [Urine:3550]   Patient Vitals for the past 96 hrs (Last 3 readings):   Weight   04/26/22 0308 156 lb 12 oz (71.1 kg)   04/24/22 1952 164 lb 0.4 oz (74.4 kg)     Exam   General Appearance  Awake, alert, looks chronically ill and tired, dyspneic. SOB on HFNC  HEENT - Normal, Head is normocephalic, atraumatic. EOMI, PERRLA, pale conjunctiva  Neck - Supple, symmetrical, trachea midline and Soft, trachea midline and straight  Lungs -bilateral breath sounds, bibasilar  rales  Cardiovascular - NSR. Edema R arm   Abdomen - Soft, nontender, nondistended, no masses or organomegaly  Neurologic - CN II-XII are grossly intact.  There are no focal motor or sensory deficits  Skin - No bruising or bleeding  Extremities - No cyanosis, clubbing or edema    Labs  - Old records and notes have been reviewed in [facility]   CBC     Lab Results   Component Value Date    WBC 14.9 04/28/2022    RBC 3.65 04/28/2022    RBC 4.84 11/07/2011    HGB 10.6 04/28/2022    HCT 34.0 04/28/2022     04/28/2022    MCV 93.2 04/28/2022    MCH 29.0 04/28/2022    MCHC 31.2 04/28/2022    RDW 13.3 06/07/2018 NRBC 0 04/28/2022    NRBC 0 11/07/2011    SEGSPCT 89.5 04/28/2022    MONOPCT 5.8 04/28/2022    MONOSABS 0.9 04/28/2022    LYMPHSABS 0.6 04/28/2022    EOSABS 0.0 04/28/2022    BASOSABS 0.0 04/28/2022    DIFFTYPE see below 03/25/2022     BMP   Lab Results   Component Value Date     04/27/2022    K 4.8 04/27/2022    K 5.0 04/24/2022     04/27/2022    CO2 24 04/27/2022    BUN 41 04/27/2022    CREATININE 1.2 04/27/2022    GLUCOSE 90 04/27/2022    GLUCOSE 94 11/07/2011    CALCIUM 8.1 04/27/2022    MG 2.1 04/26/2022     LFTS  Lab Results   Component Value Date    ALKPHOS 67 04/24/2022    ALT 29 04/24/2022    AST 31 04/24/2022    PROT 6.1 04/24/2022    BILITOT 0.5 04/24/2022    BILIDIR <0.2 03/26/2022    LABALBU 3.2 04/24/2022    LABALBU 4.3 11/07/2011     ABG   Lab Results   Component Value Date    PH 7.44 04/24/2022    PCO2 39 04/24/2022    PO2 191 04/24/2022    HCO3 26 04/24/2022    O2SAT 100 04/24/2022     @  Lab Results   Component Value Date    APTT 25.3 03/25/2022     INR   Lab Results   Component Value Date    INR 1.41 (H) 03/25/2022    INR 1.25 (H) 04/06/2021    INR 1.13 01/11/2021     Angio Convert Enzyme 8 - 52 U/L 21          EKG        PFTs     Cultures    (+) blood culture GPC in clusters    4/26/22  Bronchoscopy- will follow cultures/cytology   Endobronchial findings:   Trachea: Normal mucosa  Diana: Normal mucosa  Right main bronchus: Normal mucosa, mucus  Right upper lobe bronchus: Normal mucosa, mucus  Right Middle lobe bronchus: Normal mucosa, mucus  Right Lower lobe bronchus: erythematous mucosa  Left main bronchus: Normal mucosa  Left upper lobe bronchus: Normal mucosa  Left lower lobe bronchus: Normal mucosa     Rhinovirus Enterovirus PCR Detected Abnormal      AFB in process  Cytology in process  Culture in process  Flow cytometry in process     Radiology    CXR  4/27/2022   XR CHEST PORTABLE   1. Normal heart size. Permanent dual-chamber pacemaker.  Metallic sternotomy sutures and vascular clips from prior surgery. An atrial appendage clip is present. 2. 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 Moderately severe pneumonia/pulmonary edema involving both lungs relatively diffusely. Overall appearance of chest slightly improved from prior. .       4/24/22    FINDINGS:   There are stable median sternotomy wires and mediastinal surgical clips and atrial appendage clip. There is stable right cardiac pacer generator with 2 leads. There is marked interstitial prominence at the bilateral lung bases, increased compared to    prior exam. The cardiac silhouette is normal in appearance. Visualized osseous structures appear grossly intact.           Impression   Worsening acute on chronic interstitial disease at the lung bases.                   **This report has been created using voice recognition software. It may contain minor errors which are inherent in voice recognition technology. **       Final report electronically signed by Dr. 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 since 03/7/2022 (No evidence ILD back in 2021)  Interstitial infiltrate, thickening interlobular septa  CAD s/p CABG   Afib   Full Code   Recommendations   Adjust supplemental oxygen to maintain SpO2 > 88%   BiPAP 18/6   Prednisone 40 mg PO daily   Continue Atovaquone   Xopenex TID  ID on for ATB management   Will follow bronchoscopy cultures/cytologies  MAGALI reflex- Anit-RNP- pending still   ACE level 21  VTE prophylaxis: SCDs  Lopressor IV to be given- patient may need back on Cardizem gtt  IVF DC  Good UO   CTS to see for questions from family about possible open lung biopsy for diagnosis of ILD   Discussed possibility of transplant candidate in the future and being worked up at [facility] facility patient/wife prefer [facility]     Case discussed with nurse and patient/family. Questions and concerns addressed. Meds and orders reviewed     Electronically signed by   ARCADIO Valderrama CNP on 4/28/2022 at 12:50 PM    Stable  On HFNC 50%, good sats  Seen by CTS, VATS biopsy next week  Continue POC  Discussed with Elena Barnes and family. Patient seen and examined independently by me. Above discussed and I agree with  CNP note Also see my additional comments. Labs, cultures, and radiographs when available were reviewed. Changes were made in the orders as necessary. I discussed patient concerns with Maria Fernanda HICKEY and instructions were given. Respiratory care issues addressed. Please see our orders for the updated patient care plan.     Electronically signed by     Meena Yanes MD on 4/28/2022 at 5:21 PM

## 2022-04-28 NOTE — PROGRESS NOTES
[facility] ICU STEPDOWN TELEMETRY 4K  EVALUATION    Time:   Time In: 1034  Time Out: 1109  Timed Code Treatment Minutes: 23 Minutes  Minutes: 35          Date: 2022  Patient Name: Denis Velasquez,   Gender: male      MRN: [id number]  : 1950  (70 y.o.)  Referring Practitioner: Paulo Orozco MD  Diagnosis: pneumonia  Additional Pertinent Hx: Per H&P:presented with increasing shortness of breath from the nursing home. Patient has been in a skilled nursing facility after his last admission last month in the hospital for pneumonia. Patient noticed his oxygen saturation to be less and hence was transferred here he has been placed on BiPAP with improvement in his saturation. He was also noted to be in A. fib with RVR. Restrictions/Precautions:  Restrictions/Precautions: General Precautions,Fall Risk  Position Activity Restriction  Other position/activity restrictions: monitor O2 sats    Subjective  Chart Reviewed: Yes,Orders,Progress Notes,History and Physical,Previous Admission  Patient assessed for rehabilitation services?: Yes  Family / Caregiver Present: Yes (wife)    Subjective: Pt seated in bed upon arrival, agreeable to OT session. Pt reporting feeling better than yesterday although states continues to be in/out of afib. Comments: RN ok'd session, present to titrate HFNC towards end of session. Pain: 0/10:      Vitals: Blood Pressure: 117/76  Oxygen: 94% on HFNC at 60LPM and 76% Fio2; Sp02 Decreased to 83% at lowest while seated at EOB, primarily ranging from 85-87% while seated at EOB with RN then present and titrated up to 84% Fio2. After standing, Sp02 decreased to 77% with RN then titrating up to 100% Fio2. Once returned to supine after standing, Sp02 recovered to >90% within ~1-2 minutes, RN then titrating back down.    Heart Rate: 65 bpm on arrival, remained stable throughout    Social/Functional History:  Type of Home: Facility ([facility])  Home Equipment: Oxygen,Walker, rolling           ADL Assistance: Needs assistance  Ambulation Assistance: Needs assistance  Transfer Assistance: Needs assistance          Additional Comments: Pt reported continued to require O2 at [facility] since was last discharged from [facility]. Pt reported was able to walk with RW although would desaturate quickly. VISION:WFL    HEARING:  WFL    COGNITION: WFL    RANGE OF MOTION:  Bilateral Upper Extremity:  WFL    STRENGTH:  Bilateral Upper Extremity:  Impaired - grossly deconditioned    ADL:   No ADL's completed this session. Geovanna Houser BALANCE:  Sitting Balance:  Supervision. at EOB X16 minutes. Mod-max verbal and visual cues for pursed lip breathing in addition to upright posture. Increased time to attempt getting Sp02 back up to 90% without success  Standing Balance: Contact Guard Assistance. standing for ~30 seconds. While standing, chux replaced and hercules sheet pulled down. BED MOBILITY:  Supine to Sit: Supervision    Sit to Supine: Supervision    Scooting: Supervision      TRANSFERS:  Sit to Stand:  Contact Guard Assistance. Stand to Sit: Contact Guard Assistance. FUNCTIONAL MOBILITY:  Assistive Device: None  Assist Level:  Contact Guard Assistance. Distance: steps towards [facility]    Activity Tolerance:  Patient tolerance of  treatment: fair. Limited by decreased Sp02 with limited activity. Assessment:  Assessment: Pt presents requiring increased assistance for ADLs, transfers, and functional mobility compared to PLOF. Pt will continue to benefit from OT services to improve independence with these tasks, in addition to overall strength/endurance to facilitate return to PLOF.      Performance deficits / Impairments: Decreased functional mobility ,Decreased ADL status,Decreased strength,Decreased endurance,Decreased balance  Prognosis: Fair  REQUIRES OT FOLLOW-UP: Yes  Decision Making: High Complexity    Treatment Initiated: Treatment and education initiated within context of evaluation. Evaluation time included review of current medical information, gathering information related to past medical, social and functional history, completion of standardized testing, formal and informal observation of tasks, assessment of data and development of plan of care and goals. Treatment time included skilled education and facilitation of tasks to increase safety and independence with ADL's for improved functional independence and quality of life. Discharge Recommendations:  Subacute/Skilled Nursing Facility,Continue to assess pending progress    Patient Education:     Patient Education  Education Given To: Patient,Family  Education Provided: Role of Therapy,Plan of Care,Energy Conservation  Education Method: Demonstration,Verbal  Barriers to Learning: None  Education Outcome: Verbalized understanding,Demonstrated understanding    Equipment Recommendations:  Equipment Needed: No  Other: defer to next level of care    Plan:  Times per Week: 3-5x  Current Treatment Recommendations: Strengthening,Balance training,Functional mobility training,Endurance training,Patient/Caregiver education & training,Self-Care / ADL. See long-term goal time frame for expected duration of plan of care. If no long-term goals established, a short length of stay is anticipated. Goals:     Short Term Goals  Time Frame for Short term goals: by discharge  Short Term Goal 1: Pt will increase activity tolerance for functional mobility to/from [facility] or chair with SBA and Sp02 remaining >=88% in prep for ADL completion. Short Term Goal 2: Pt will complete BADL tasks with minimal assistance, incorporating ECT prn, to increase independence and ease with self care tasks. Short Term Goal 3: Pt will tolerate standing >1 minute with SBA and Sp02 >=88% in prep for toileting tasks. Following session, patient left in safe position with all fall risk precautions in place.

## 2022-04-28 NOTE — PROGRESS NOTES
Comprehensive Nutrition Assessment    Type and Reason for Visit:  Initial,Positive Nutrition Screen (stage 2 wound)    Nutrition Recommendations/Plan:   1. Continue current diet as per MD  2. ONS daily (strawberry ensure)     Malnutrition Assessment:  Malnutrition Status:  No malnutrition (04/28/22 1421)    Context:  Acute Illness     Findings of the 6 clinical characteristics of malnutrition:  Energy Intake:  No significant decrease in energy intake  Weight Loss:  No significant weight loss     Body Fat Loss:  No significant body fat loss     Muscle Mass Loss:  No significant muscle mass loss    Fluid Accumulation:  Mild Extremities   Strength:  Not Performed    Nutrition Assessment:    Pt. nutritionally compromised AEB on HFNC. At risk for further nutrition compromise r/t admitted with pneumonia, bronch completed, and underlying medical condition (former smoker, A-fib, pacemaker, collagenous colitis, COPD, HTN, HLD). Nutrition Related Findings:      Wound Type: Stage II (buttock)     Pt. Report/Treatments/Miscellaneous: Patient seen and has lunch tray at bedside. Patient ate 100% of lunch and says \"I ate it all, I hate to waste\". Patient says that he would like an ONS once/day. Patient denies weight loss  GI Status: +BM documented 4/27/22  Pertinent Labs: CRP: 8.15, POC: 176  Pertinent Meds: lasix, MVI, Solu-Medrol    Current Nutrition Intake & Therapies:    Average Meal Intake: %     ADULT DIET; Regular; Low Sodium (2 gm)    Anthropometric Measures:  Height: 5' 8\" (172.7 cm)  Ideal Body Weight (IBW): 154 lbs (70 kg)    Admission Body Weight: 164 lb (74.4 kg) (4/24/22, standing scale)  Current Body Weight: 156 lb (70.8 kg) (4/26/22, bedscale, non-pitting BLE and LUE and +1 RUE edema),   IBW. Weight Source: Bed Scale  Current BMI (kg/m2): 23.7  Usual Body Weight: 154 lb (69.9 kg) (EHR, 3/25/22, bedscale)  % Weight Change (Calculated): 1.3  BMI Categories: Normal Weight (BMI 18.5-24. 9)    Estimated Daily Nutrient Needs:  Energy Requirements Based On: Kcal/kg  Weight Used for Energy Requirements: Current (71kg)  Energy (kcal/day): 5935-0809 (25-30 kcals/kg)  Weight Used for Protein Requirements: Current (71kg)  Protein (g/day): 85+ (1.2+ grams/kg)    Nutrition Diagnosis:   · Increased nutrient needs related to catabolic illness,impaired respiratory function as evidenced by  (COPD, on High Flow)    Nutrition Interventions:   Food and/or Nutrient Delivery: Continue Current Diet,Start Oral Nutrition Supplement  Nutrition Education/Counseling: No recommendation at this time  Coordination of Nutrition Care: Continue to monitor while inpatient    Goals:  Previous Goal Met: Progressing toward Goal(s)  Goals: Meet at least 75% of estimated needs    Nutrition Monitoring and Evaluation:   Behavioral-Environmental Outcomes: None Identified  Food/Nutrient Intake Outcomes: Diet Advancement/Tolerance,Food and Nutrient Intake,Supplement Intake  Physical Signs/Symptoms Outcomes: Biochemical Data,GI Status,Fluid Status or Edema,Meal Time Behavior,Nutrition Focused Physical Findings,Skin,Weight    Discharge Planning:     Too soon to determine     Ben Recinos RD, LD  Contact: [phone]

## 2022-04-28 NOTE — PLAN OF CARE
Problem: Nutrition Deficit:  Goal: Optimize nutritional status  Outcome: Progressing   Nutrition Problem #1: Increased nutrient needs  Intervention: Food and/or Nutrient Delivery: Continue Current Diet,Start Oral Nutrition Supplement

## 2022-04-28 NOTE — PLAN OF CARE
Problem: Respiratory - Adult  Goal: Clear lung sounds  Outcome: Progressing     Problem: Respiratory - Adult  Goal: Adequate oxygenation  Outcome: Progressing   Patient on HFNC and txs. to help resp. status

## 2022-04-28 NOTE — FLOWSHEET NOTE
Advance Care Planning     Advance Care Planning Inpatient Note  [facility] Department    Today's Date: 4/28/2022  Unit: [facility] ICU STEPDOWN TELEMETRY 4K    Received request from rounding and patient. Upon review of chart and communication with care team, patient's decision making abilities are not in question. . Patient and Spouse was/were present in the room during visit. Goals of ACP Conversation:  Discuss advance care planning documents  Facilitate a discussion related to patient's goals of care as they align with the patient's values and beliefs. Health Care Decision Makers:       Primary Decision Maker: Aissatou Pacheco - Spouse - [phone]    Secondary Decision Maker: Ariel English - Brother/Sister - [phone]    Summary:  Verified Documents  Completed New Documents  Updated Healthcare Decision Maker    Advance Care Planning Documents (Patient Wishes):  Healthcare Power of /Advance Directive Appointment of Health Care Agent  Living Will/Advance Directive     Assessment:  Florence Truong is a 70year old male who wanted to change his [address] [place] [address] Avenue and not have his daughter be involved in his decision making. He also wanted to do a Living will. Both parts were completed today, signed and faxed to medical records. Interventions:  Assisted in the completion of documents according to patient's wishes at this time  Encouraged ongoing ACP conversation with future decision makers and loved ones    Care Preferences Communicated:   Ventilation:   If the patient, in their present state of health, suddenly became very ill and unable to breathe on their own,     the patient would NOT desire the use of a ventilator (breathing machine). If their health worsens and it becomes clear that the change of recovery is unlikely,     the patient would NOT desire the use of a ventilator (breathing machine). Outcomes/Plan:  New advance directive completed.     Electronically signed by Jermaine Ruiz, [email or web address] on 4/28/2022 at 1:23 PM

## 2022-04-28 NOTE — PROGRESS NOTES
Progress note: Infectious diseases    Patient - Gilberto Carlson,  Age - 70 y.o.    - 1950      Room Number - 6K-30/670-T   MRN -  [id number]   Acct # - [de-identified]  Date of Admission -  2022 12:37 PM    SUBJECTIVE:   No new issues. OBJECTIVE   VITALS    height is 5' 8\" (1.727 m) and weight is 156 lb 12 oz (71.1 kg). His oral temperature is 97.7 °F (36.5 °C). His blood pressure is 101/76 and his pulse is 66. His respiration is 22 and oxygen saturation is 90%. Wt Readings from Last 3 Encounters:   22 156 lb 12 oz (71.1 kg)   22 155 lb 12.8 oz (70.7 kg)   22 154 lb (69.9 kg)       I/O (24 Hours)    Intake/Output Summary (Last 24 hours) at 2022 1626  Last data filed at 2022 1545  Gross per 24 hour   Intake 590 ml   Output 1900 ml   Net -1310 ml       General Appearance  Awake, alert, oriented,chronically sick looking. on high flow oxygen  HEENT - normocephalic, atraumatic, pale conjunctiva,  anicteric sclera  Neck - Supple, no mass  Lungs -  crackles at the lung bases. Cardiovascular - Heart sounds are normal.    Abdomen - soft, not distended, nontender,   Neurologic -oriented. Skin - No bruising or bleeding  Extremities - + edema,    Open wound around 1cm stage 2 over the left gluteal wound.   MEDICATIONS:      levalbuterol  1.25 mg Nebulization TID    sodium chloride flush  5-40 mL IntraVENous 2 times per day    dilTIAZem  30 mg Oral 2 times per day    predniSONE  40 mg Oral Daily    atovaquone  1,500 mg Oral Daily    furosemide  20 mg Oral Daily    digoxin  125 mcg Oral Daily    aspirin  81 mg Oral Daily    atorvastatin  40 mg Oral Nightly    cetirizine  10 mg Oral Daily    metoprolol succinate  150 mg Oral Daily    multivitamin  1 tablet Oral Daily    mupirocin   Topical TID    nystatin   Topical BID    pantoprazole  40 mg Oral QAM AC    rivaroxaban  15 mg Oral Daily    rOPINIRole  0.5 mg Oral Nightly    traZODone  50 mg Oral Nightly    cefepime  2,000 mg IntraVENous Q12H      sodium chloride       sodium chloride flush, sodium chloride, metoprolol, ALPRAZolam, ipratropium-albuterol, ondansetron **OR** ondansetron, acetaminophen      LABS:     CBC:   Recent Labs     04/26/22  0426 04/27/22  0530 04/28/22  0500   WBC 20.5* 18.6* 14.9*   HGB 9.1* 9.9* 10.6*    233 246     BMP:    Recent Labs     04/26/22  0426 04/27/22  0530    138   K 4.9 4.8    103   CO2 23 24   BUN 44* 41*   CREATININE 1.5* 1.2   GLUCOSE 137* 90     Calcium:  Recent Labs     04/27/22  0530   CALCIUM 8.1*     Ionized Calcium:No results for input(s): IONCA in the last 72 hours. Magnesium:  Recent Labs     04/26/22  0426   MG 2.1       CULTURES:   UA: No results for input(s): SPECGRAV, PHUR, COLORU, CLARITYU, MUCUS, PROTEINU, BLOODU, RBCUA, WBCUA, BACTERIA, NITRU, GLUCOSEU, BILIRUBINUR, UROBILINOGEN, KETUA, LABCAST, LABCASTTY, AMORPHOS in the last 72 hours.     Invalid input(s): CRYSTALS  Micro:   Lab Results   Component Value Date    BC No growth-preliminary  04/24/2022        Problem list of patient:     Patient Active Problem List   Diagnosis Code    Hyperlipidemia E78.5    Asthma J45.909    Smoker F17.200    Allergic rhinitis due to other allergen J30.89    Collagenous colitis K52.831    Lactose intolerance E73.9    HTN (hypertension) I10    COPD, mild ([address] J44.9    Atrial fibrillation ([address] I48.91    Atherosclerotic heart disease of native coronary artery with other forms of angina pectoris ([address] I25.118    S/P CABG x 3 Z95.1    Encounter for cardioversion procedure Z01.89    S/P left atrial appendage ligation Z98.890    Ischemic cardiomyopathy I25.5    Chronic bronchitis ([address] J42    CHF (congestive heart failure), NYHA class II, chronic, systolic (HCC) B64.71    Tachycardia-bradycardia ([address] I49.5    [employer] dual pacemaker  Z95.0    Pneumonia J18.9  Dyspnea and respiratory abnormalities R06.00, R06.89    Acute and chronic respiratory failure with hypoxia (HCC) J96.21    Thrush B37.0    Nosebleed R04.0    SOB (shortness of breath) R06.02         ASSESSMENT/PLAN   Shortness of breath due to lung fibrosis  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   Hugo Turcios MD, MD, FACP 4/28/2022 4:26 PM

## 2022-04-28 NOTE — PROGRESS NOTES
IM Progress Note  Dr. Jun Kirk  4/28/2022 9:41 AM      Patient name Shar Ramirez  [id number]  PCP: Marily Zabala MD  Admit Date: 4/24/2022  Acct No. [de-identified]    Subjective: Interval History:   Pt on 60L  On high flow O2  Feels slightly better  In and out of A fib      Diet: ADULT DIET; Regular; Low Sodium (2 gm)    I/O last 3 completed shifts: In: 240 [P.O.:240]  Out: 9189 [Urine:3550]  I/O this shift:  In: 110 [P.O.:100; I.V.:10]  Out: 200 [Urine:200]        Admission weight: 155 lb (70.3 kg) as of 4/24/2022 12:37 PM  Wt Readings from Last 3 Encounters:   04/26/22 156 lb 12 oz (71.1 kg)   04/20/22 155 lb 12.8 oz (70.7 kg)   04/13/22 154 lb (69.9 kg)     Body mass index is 23.83 kg/m².     ROS   CVS;  no cp or palpitation  Resp: +SOB+ cough  Neuro:  No numbness or weakness or dizziness  Abd: no nausea or vomiting or abd pain      Medications:   Scheduled Meds:   furosemide  40 mg IntraVENous Once    sodium chloride flush  5-40 mL IntraVENous 2 times per day    dilTIAZem  30 mg Oral 2 times per day    ipratropium-albuterol  1 ampule Inhalation TID    predniSONE  40 mg Oral Daily    atovaquone  1,500 mg Oral Daily    furosemide  20 mg Oral Daily    digoxin  125 mcg Oral Daily    aspirin  81 mg Oral Daily    atorvastatin  40 mg Oral Nightly    cetirizine  10 mg Oral Daily    metoprolol succinate  150 mg Oral Daily    multivitamin  1 tablet Oral Daily    mupirocin   Topical TID    nystatin   Topical BID    pantoprazole  40 mg Oral QAM AC    rivaroxaban  15 mg Oral Daily    rOPINIRole  0.5 mg Oral Nightly    traZODone  50 mg Oral Nightly    cefepime  2,000 mg IntraVENous Q12H     Continuous Infusions:   sodium chloride         Labs :     CBC:   Recent Labs     04/26/22  0426 04/27/22  0530 04/28/22  0500   WBC 20.5* 18.6* 14.9*   HGB 9.1* 9.9* 10.6*    233 246     BMP:    Recent Labs     04/26/22  0426 04/27/22  0530    138   K 4.9 4.8    103   CO2 23 24   BUN 44* 41*   CREATININE 1.5* 1.2   GLUCOSE 137* 90     Hepatic:   No results for input(s): AST, ALT, ALB, BILITOT, ALKPHOS in the last 72 hours. Troponin: No results for input(s): TROPONINI in the last 72 hours. BNP: No results for input(s): BNP in the last 72 hours. Lipids: No results for input(s): CHOL, HDL in the last 72 hours. Invalid input(s): LDLCALCU  INR: No results for input(s): INR in the last 72 hours.     Radiology    Objective:   Vitals: /76   Pulse 136   Temp 97.3 °F (36.3 °C) (Oral)   Resp 23   Ht 5' 8\" (1.727 m)   Wt 156 lb 12 oz (71.1 kg)   SpO2 90%   BMI 23.83 kg/m²   HEENT: Head:pupils react  Neck: supple  Lungs: diminished air entry bilat few rales on both base  Heart: irregular rate and rhythm tachycardic  Abdomen: soft BS heard NG NT  Extremities: warm  No edema  Neurologic:  Alert, oriented X3    Impression:   :   Acute on chronic hypoxic respiratory failure s/p bronch  Par A. 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  Coronary disease status post coronary bypass graft x3 LIMA to LAD SVG to RCA and SVG to obtuse marginal  Cardiomyopathy with improved ejection fraction last echo was 54 to 60% this year  Status post pacemaker for tachybradycardia syndrome  COPD  Significant deconditioning  Leukocytosis  Chronic kidney disease stage III  Lactic acidosis  Chronic anemia  Hypertension  Hyperlipidemia  Gram + cult coag neg and also + for rhinovirus    Plan:    Cont supportive care with steroids bronchodilators and wean High flow  Await final BAL results   D/w pt about course of his condition may depend on his BAL  Also informed about vent support if he declines   At this time agreeable to vent but does not want to stay on it for ever  Also await cardio input for ins a fib  cardizem added yesterday  Cont B Blockers and anticoag  informed may  Need LTAC and agreeable  Await cardio consult and plan LTAC tomorrow if improving            Giorgio Bay MD, MD

## 2022-04-28 NOTE — CARE COORDINATION
Collaborative Discharge Planning    Seng Sandhu  :  1950  MRN:  [id number]    ADMIT DATE:  2022      Discharge Plan Katie     [facility]; plans new [facility] for HF Oxygen weaning v return SNF w likely new BIPAP; collaborated w Attending, Catrina Bahena, [address] perfect Liaison    Discharge Milestones and Delays: Waiting on specialist consultation    Pneumonia/ILF      Bronchoscopy; await cultures/cytology  elevated WBC; monitor.  NPO.     CXR: Pneumonia.     CTS consulted for OL Biopsy for possible ILD diagnosis    HF 50% FIO2/60L, IV Diuresing, IV AB continued    SIGNED:  Pete Nelson RN   2022, 3:09 PM

## 2022-04-28 NOTE — CARE COORDINATION
4/28/22, 12:38 PM EDT  DISCHARGE PLANNING EVALUATION    SW was notified that patient and spouse wanted to talk to [address] spoke with patient and spouse about discharge planning. Spouse asked questions about [facility]a. She reported that she would like to speak with the liaison from [employer]. She reported that they are going to wait CTS recommendations and will even discuss transfer out. No further questions at this time.

## 2022-04-28 NOTE — PLAN OF CARE
Problem: Discharge Planning  Goal: Discharge to home or other facility with appropriate resources  Outcome: Progressing  Flowsheets (Taken 4/28/2022 1455)  Discharge to home or other facility with appropriate resources:   Identify barriers to discharge with patient and caregiver   Identify discharge learning needs (meds, wound care, etc)   Arrange for needed discharge resources and transportation as appropriate     Problem: Safety - Adult  Goal: Free from fall injury  Outcome: Progressing  Flowsheets (Taken 4/28/2022 1455)  Free From Fall Injury: Instruct family/caregiver on patient safety     Problem: ABCDS Injury Assessment  Goal: Absence of physical injury  Outcome: Progressing     Problem: Respiratory - Adult  Goal: Clear lung sounds  4/28/2022 1459 by Crow Gaines RN  Outcome: Progressing  4/28/2022 0816 by Linda Jones RCP  Outcome: Progressing  Goal: Adequate oxygenation  4/28/2022 1459 by Crow Gaines RN  Outcome: Progressing  4/28/2022 0816 by Linda Jones RCP  Outcome: Progressing     Problem: Chronic Conditions and Co-morbidities  Goal: Patient's chronic conditions and co-morbidity symptoms are monitored and maintained or improved  Outcome: Progressing  Flowsheets (Taken 4/28/2022 1455)  Care Plan - Patient's Chronic Conditions and Co-Morbidity Symptoms are Monitored and Maintained or Improved:   Monitor and assess patient's chronic conditions and comorbid symptoms for stability, deterioration, or improvement   Collaborate with multidisciplinary team to address chronic and comorbid conditions and prevent exacerbation or deterioration   Update acute care plan with appropriate goals if chronic or comorbid symptoms are exacerbated and prevent overall improvement and discharge     Problem: Pain  Goal: Verbalizes/displays adequate comfort level or baseline comfort level  Outcome: Progressing  Flowsheets (Taken 4/28/2022 1459)  Verbalizes/displays adequate comfort level or baseline comfort level: Encourage patient to monitor pain and request assistance   Assess pain using appropriate pain scale   Administer analgesics based on type and severity of pain and evaluate response   Implement non-pharmacological measures as appropriate and evaluate response   Pain Assessment: None - Denies Pain  Pain Level: 0   Patient's Stated Pain Goal: 0 - No pain   Is pain goal met at this time? Yes         Problem: Nutrition Deficit:  Goal: Optimize nutritional status  4/28/2022 1459 by Olya Appiah RN  Outcome: Progressing    Care plan reviewed with patient. Patient verbalize understanding of the plan of care and contribute to goal setting.

## 2022-04-28 NOTE — CONSULTS
CT/CV Surgery Consult Note    2022 3:42 PM    Reason for Consult: Surgical opinion for lung biopsy    CC: Shortness of breath, and bilateral lung infiltrate. HPI:    Mr. Joselito Paredes  is a 70year old male with a PMH including hypertension, dyslipidemia, COPD, bilateral pulmonary infiltrate, coronary artery disease, paroxysmal atrial fibrillation, status post permanent cardiac pacemaker insertion, and status post CABG x3 and left atrial appendage closure with Atricure clip on 2021. I was asked to evaluate the patient for lung biopsy. He is well-known to me from CABG in . He has been on amiodarone for atrial fibrillation. He underwent a bronchoscopy yesterday, which revealed erythematous mucosa in the right lower lobe bronchus. There was no intrabronchial lesion. Telemetry shows normal sinus rhythm at 60's. Vital Signs: /66   Pulse 60   Temp 97.4 °F (36.3 °C) (Oral)   Resp 22   Ht 5' 8\" (1.727 m)   Wt 156 lb 12 oz (71.1 kg)   SpO2 95%   BMI 23.83 kg/m²    Temp (24hrs), Av.7 °F (36.5 °C), Min:97.3 °F (36.3 °C), Max:98.2 °F (36.8 °C)      PULSE OXIMETRY RANGE: SpO2  Av.4 %  Min: 90 %  Max: 99 %    SUPPLEMENTAL O2: O2 Flow Rate (L/min): 60 L/min     Labs:   CBC:   Recent Labs     22  0530 22  0500   WBC 20.5* 18.6* 14.9*   HGB 9.1* 9.9* 10.6*   HCT 29.9* 32.6* 34.0*   MCV 96.8* 95.9* 93.2    233 246     BMP:   Recent Labs     22  04222  2201 22  0530     --  138   K 4.9  --  4.8     --  103   CO2 23  --  24   BUN 44*  --  41*   CREATININE 1.5*  --  1.2   MG 2.1  --   --    POCGLU  --  176*  --      Last HgA1C:   Lab Results   Component Value Date    LABA1C 5.6 2021       Imaging:  CXR: I have reviewed the CXR image. Chest CT: I have reviewed the CT image, which shows bilateral pulmonary infiltrate. .       Intake/Output Summary (Last 24 hours) at 2022 1542  Last data filed at 2022 1212  Gross per 24 hour   Intake 110 ml   Output 2000 ml   Net -1890 ml       Scheduled Meds:    levalbuterol  1.25 mg Nebulization TID    sodium chloride flush  5-40 mL IntraVENous 2 times per day    dilTIAZem  30 mg Oral 2 times per day    predniSONE  40 mg Oral Daily    atovaquone  1,500 mg Oral Daily    furosemide  20 mg Oral Daily    digoxin  125 mcg Oral Daily    aspirin  81 mg Oral Daily    atorvastatin  40 mg Oral Nightly    cetirizine  10 mg Oral Daily    metoprolol succinate  150 mg Oral Daily    multivitamin  1 tablet Oral Daily    mupirocin   Topical TID    nystatin   Topical BID    pantoprazole  40 mg Oral QAM AC    rivaroxaban  15 mg Oral Daily    rOPINIRole  0.5 mg Oral Nightly    traZODone  50 mg Oral Nightly    cefepime  2,000 mg IntraVENous Q12H         PastMedical History:  Becky Peoples  has a past medical history of Asthma, Atrial fibrillation with RVR ([address] [employer] dual pacemaker , Collagenous colitis, Colon polyps, COPD, mild ([address] Eczema of hand, HTN (hypertension), Hyperlipidemia, and Smoker. Past Surgical History:  The patient  has a past surgical history that includes Nasal sinus surgery (06/2008); Colonoscopy (06/10/2021); other surgical history (DEC 7TH 2012 DR Yen Degroot [facility] ); hernia repair; Skin cancer excision (09/2014); Tooth Extraction (02/2017); Coronary artery bypass graft (01/12/2021); Coronary artery bypass graft (N/A, 01/12/2021); Cardiac surgery; and bronchoscopy (N/A, 4/26/2022). Allergies: The patient is allergic to penicillins and sulfa antibiotics. Family History: This patient's family history includes Diabetes in his brother; Heart Disease in his brother, father, and mother. Social History:  Becky Peoples  reports that he quit smoking about 7 weeks ago. His smoking use included cigarettes. He has a 40.00 pack-year smoking history. He has never used smokeless tobacco. He reports previous alcohol use.  He reports that he does not use drugs.    ROS:  Constitutional: Negative for activity change, chills, fatigue, fever and unexpected weight change. HENT: Negative for congestion, facial swelling, sore throat, and changes in voice. Eyes: Negative for photophobia, redness, itching and visual disturbance. Respiratory: COPD from heavy smoking. Cardiovascular: Status post CABG and left atrial appendage closure. Status post pacemaker insertion. Gastrointestinal: Negative for abdominal distention, constipation, nausea and vomiting. Endocrine: Negative for cold intolerance, heat intolerance, polyphagia and polyuria. Musculoskeletal: Negative for arthralgias, gait problem, and myalgias. Skin: Negative for color change, rash and wound. Allergic/Immunologic: Negative for food allergies and immunocompromised state. Neurological: Negative for dizziness, tremors, speech difficulty, weakness, numbness and headaches. Hematological: Negative for adenopathy. Does not bruise/bleed easily. Psychiatric/Behavioral: Negative for agitation, confusion, and dysphoric mood. Physical Exam:   General appearance: On high flow supplementary oxygen. HEENT:  Normal cephalic, atraumatic without obvious deformity. Conjunctivae/corneas clear. Neck: Supple, with full range of motion. No jugular venous distention. Trachea midline. Respiratory:  Decreased breathing sound bilaterally. Cardiovascular:  Regular rate and rhythm with normal S1/S2 without murmurs, rubs or gallops. Abdomen: Soft, non-tender, non-distended with normal bowel sounds. Musculoskeletal:  No clubbing, cyanosis or edema bilaterally. Full range of motion without deformity. Skin: Skin color, texture, turgor normal.  No rashes or lesions. Neurologic:  Neurovascularly intact without any focal sensory/motor deficits. Psychiatric:  Alert and oriented, thought content appropriate, normal insight.   Capillary Refill: Brisk,< 3 seconds   Peripheral Pulses: +2 palpable, equal bilaterally Problem List:  Patient Active Problem List   Diagnosis    Hyperlipidemia    Asthma    Smoker    Allergic rhinitis due to other allergen    Collagenous colitis    Lactose intolerance    HTN (hypertension)    COPD, mild (HCC)    Atrial fibrillation (HCC)    Atherosclerotic heart disease of native coronary artery with other forms of angina pectoris ([address]    S/P CABG x 3    Encounter for cardioversion procedure    S/P left atrial appendage ligation    Ischemic cardiomyopathy    Chronic bronchitis (HCC)    CHF (congestive heart failure), NYHA class II, chronic, systolic (HCC)    Tachycardia-bradycardia (HCC)    [employer] dual pacemaker     Pneumonia    Dyspnea and respiratory abnormalities    Acute and chronic respiratory failure with hypoxia (HCC)    Thrush    Nosebleed    SOB (shortness of breath)       Assessment: Bilateral pulmonary infiltrates. On normal sinus rhythm. Status post CABG with atrial appendage closure. Currently on Xarelto. Plan: 4/28/22  1. Waiting for bronchoscopic lavage and culture results. 2. Left atrial appendage has been closed. I would recommend discontinuation of Xarelto unless indicated for other reason. 3. Right VATS and lung biopsy next week. Issues discussed in detail with the patient, who understands and has no further questions. Time spent with patient: 80 minutes, of which more than 50% was spent counseling/coordinating the patient's care.     Ishmael Washington MD

## 2022-04-28 NOTE — PROGRESS NOTES
Cardiology Progress Note      Patient:  Seng Sandhu  YOB: 1950  MRN: [id number]   Acct: [de-identified]  Admit Date:  4/24/2022  Primary Cardiologist: Maricarmen Griffith MD    Note per dr Tanya Jensen:   Atrial fibrillation with RVR     CHIEF COMPLAINT:    SOB     HISTORY OF PRESENT ILLNESS:    Seng Sandhu is a pleasant 70year old male patient with past medical history that includes:   Past Medical History        Past Medical History:   Diagnosis Date    Asthma      Atrial fibrillation with RVR ([address] 04/05/2021    [employer] dual pacemaker  04/15/2021    Collagenous colitis 2021     per scope 2021    Colon polyps 2021     dr Hieu Lynne    COPD, mild ([address]      Eczema of hand      HTN (hypertension)      Hyperlipidemia      Smoker        Patient had 3 V CABG in 2021. He states was admitted for pneumonia in 03/2022. The patient was admitted to the hospital after he presented with worsening SOB and ANDERSON. He states that walking from one room to another at his house makes him tired and SOB. Patient also reports orthopnea. He denies chest pain, palpitations. Has mild leg swelling, gained 5 Ib in the past few weeks. He reports that he was in Rehab and thinks that his salt intake is not being limited. He has been on home O2 since 03/2022 (on 3 Liters). Patient reports that his Amiodarone was previously stopped due to suspected lung toxicity. He stopped smoking last year after CABG. He has known h/o COPD. He feels better when he uses the inhalers. He was recently seen by EP. Dr Syeda Coy has suggested PVI ablation, however, patient wanted to wait until he recovers from his pneumonia. Echocardiogram on 3/9/2022 revealed an EF of 55-60%. CXR revealed worsening acute on chronic interstitial disease at the lung bases. Pulmonary medicine was consulted. Troponin <0.01, <0.01. Pro-BNP 3,368. On telemetry, SR is now noted.    \"    Subjective (Events in last 24 hours): pt developed afib/aflutter rvr today, and since self-cardioverted back to nsr hr 60    Pt awake and alert. NAD.  No cp, now chronic sob, on high flow O2  No edema    Inaccurate I/o    Objective:   /66   Pulse 60   Temp 97.4 °F (36.3 °C) (Oral)   Resp 22   Ht 5' 8\" (1.727 m)   Wt 156 lb 12 oz (71.1 kg)   SpO2 95%   BMI 23.83 kg/m²        TELEMETRY: nsr 60    Physical Exam:  General Appearance: alert and oriented to person, place and time, in no acute distress  Cardiovascular: normal rate, regular rhythm, normal S1 and S2, no murmurs, rubs, clicks, or gallops, distal pulses intact, no carotid bruits, no JVD  Pulmonary/Chest: bibasilar crackles  Abdomen: soft, non-tender, non-distended, normal bowel sounds, no masses Extremities: no cyanosis, clubbing or edema, pulse   Skin: warm and dry, no rash or erythema  Head: normocephalic and atraumatic  Eyes: pupils equal, round  Neck: supple and non-tender without mass, no thyromegaly   Neurological: alert, oriented, normal speech, no focal findings or movement disorder noted    Medications:    levalbuterol  1.25 mg Nebulization TID    sodium chloride flush  5-40 mL IntraVENous 2 times per day    dilTIAZem  30 mg Oral 2 times per day    predniSONE  40 mg Oral Daily    atovaquone  1,500 mg Oral Daily    furosemide  20 mg Oral Daily    digoxin  125 mcg Oral Daily    aspirin  81 mg Oral Daily    atorvastatin  40 mg Oral Nightly    cetirizine  10 mg Oral Daily    metoprolol succinate  150 mg Oral Daily    multivitamin  1 tablet Oral Daily    mupirocin   Topical TID    nystatin   Topical BID    pantoprazole  40 mg Oral QAM AC    rivaroxaban  15 mg Oral Daily    rOPINIRole  0.5 mg Oral Nightly    traZODone  50 mg Oral Nightly    cefepime  2,000 mg IntraVENous Q12H      sodium chloride       sodium chloride flush, 5-40 mL, PRN  sodium chloride, , PRN  metoprolol, 5 mg, Q4H PRN  ALPRAZolam, 0.5 mg, Nightly PRN  ipratropium-albuterol, 1 ampule, Q4H PRN  ondansetron, 4 mg, Q8H PRN Or  ondansetron, 4 mg, Q6H PRN  acetaminophen, 650 mg, Q4H PRN        Lab Data:    Cardiac Enzymes:  No results for input(s): CKTOTAL, CKMB, CKMBINDEX, TROPONINI in the last 72 hours.     CBC:   Lab Results   Component Value Date    WBC 14.9 04/28/2022    RBC 3.65 04/28/2022    RBC 4.84 11/07/2011    HGB 10.6 04/28/2022    HCT 34.0 04/28/2022     04/28/2022       CMP:    Lab Results   Component Value Date     04/27/2022    K 4.8 04/27/2022    K 5.0 04/24/2022     04/27/2022    CO2 24 04/27/2022    BUN 41 04/27/2022    CREATININE 1.2 04/27/2022    LABGLOM 60 04/27/2022    GLUCOSE 90 04/27/2022    GLUCOSE 94 11/07/2011    CALCIUM 8.1 04/27/2022       Hepatic Function Panel:    Lab Results   Component Value Date    ALKPHOS 67 04/24/2022    ALT 29 04/24/2022    AST 31 04/24/2022    PROT 6.1 04/24/2022    BILITOT 0.5 04/24/2022    BILIDIR <0.2 03/26/2022    LABALBU 3.2 04/24/2022    LABALBU 4.3 11/07/2011       Magnesium:    Lab Results   Component Value Date    MG 2.1 04/26/2022       PT/INR:    Lab Results   Component Value Date    INR 1.41 03/25/2022       HgBA1c:    Lab Results   Component Value Date    LABA1C 5.6 01/11/2021       FLP:    Lab Results   Component Value Date    TRIG 96 01/07/2021    HDL 36 01/07/2021    LDLCALC 71 01/07/2021    LDLDIRECT 79.82 01/11/2021       TSH:    Lab Results   Component Value Date    TSH 2.230 03/25/2022         Assessment:    Paroxysmal afib/aflutter - currently NSR   S/p afib/aflutter rvr   rvr episodes likely driven from his hypoxia/pulm condition   chadsvasc = 4   Follows dr Stoner Man as outpt for possible PVI ablation   On xarelto  Acute on chronic HFpEF - improved  CAD - hx CABG and ZAHRA clip 2021  Hx SSS s/p PPM  COPD  ILD with suspected amio lung toxicity - on home O2  HTN  CKD      Plan:    Daily I/o and weights  2 liter fluid restriction and 2gm sodium diet  Keep mag >2 and K >4, replace prn, check both today  Normal tsh 3/25/22  Cont asa/statin/BB/cdz/dig  Diuresis prn  F/up dr Iesha Ramirez as outpatient  Will see prn     Electronically signed by Viridiana Milligan PA-C on 4/28/2022 at 2:23 PM

## 2022-04-29 NOTE — PLAN OF CARE
Problem: Discharge Planning  Goal: Discharge to home or other facility with appropriate resources  Outcome: Progressing  Flowsheets (Taken 4/29/2022 1504)  Discharge to home or other facility with appropriate resources:   Identify barriers to discharge with patient and caregiver   Arrange for needed discharge resources and transportation as appropriate   Identify discharge learning needs (meds, wound care, etc)   Refer to discharge planning if patient needs post-hospital services based on physician order or complex needs related to functional status, cognitive ability or social support system     Problem: Safety - Adult  Goal: Free from fall injury  Outcome: Progressing  Flowsheets (Taken 4/29/2022 1504)  Free From Fall Injury: Instruct family/caregiver on patient safety     Problem: ABCDS Injury Assessment  Goal: Absence of physical injury  Outcome: Progressing     Problem: Respiratory - Adult  Goal: Clear lung sounds  Outcome: Progressing  Goal: Adequate oxygenation  4/29/2022 1504 by Alicia Hernandez RN  Outcome: Progressing  4/29/2022 1424 by Javier Maddox RCP  Outcome: Progressing     Problem: Chronic Conditions and Co-morbidities  Goal: Patient's chronic conditions and co-morbidity symptoms are monitored and maintained or improved  Outcome: Progressing  Flowsheets (Taken 4/29/2022 1504)  Care Plan - Patient's Chronic Conditions and Co-Morbidity Symptoms are Monitored and Maintained or Improved:   Monitor and assess patient's chronic conditions and comorbid symptoms for stability, deterioration, or improvement   Collaborate with multidisciplinary team to address chronic and comorbid conditions and prevent exacerbation or deterioration   Update acute care plan with appropriate goals if chronic or comorbid symptoms are exacerbated and prevent overall improvement and discharge     Problem: Pain  Goal: Verbalizes/displays adequate comfort level or baseline comfort level  Outcome: Progressing  Flowsheets (Taken 4/29/2022 1502)  Verbalizes/displays adequate comfort level or baseline comfort level:   Encourage patient to monitor pain and request assistance   Assess pain using appropriate pain scale   Administer analgesics based on type and severity of pain and evaluate response   Implement non-pharmacological measures as appropriate and evaluate response   Pain Assessment: None - Denies Pain  Pain Level: 0   Patient's Stated Pain Goal: 0 - No pain   Is pain goal met at this time? Yes         Problem: Nutrition Deficit:  Goal: Optimize nutritional status  Outcome: Progressing   Care plan reviewed with patient. Patient  verbalize understanding of the plan of care and contribute to goal setting.

## 2022-04-29 NOTE — SIGNIFICANT EVENT
Brief Note:  Evaluated the patient for need for intubation. Patient denied fever, chills, chest pain, palpitations. Affirmed very slight shortness of breath. Affirms dry cough. Patient's work of breathing was mildly increased semis accessory muscles. However, respiratory rate was in the appropriate range and the patient was not in significant subjective distress. Patient was extremely talkative and this seemed to contribute to signs of mild respiratory distress. Patient talked without stopping for approximately 5 minutes at which point oxygen dropped to 88%. Oxygenation quickly improved with rest and increasing the inhaled FiO2. Crackles, \"tightness,\" and slight wheezing heard. One-time dose of DuoNebs ordered. Will monitor closely but do not anticipate any immediate need for intubation.       Rocio Wilcox MD, MPH, [facility]

## 2022-04-29 NOTE — CARE COORDINATION
Collaborative Discharge Planning    Rickey De La Fuente  :  1950  MRN:  [id number]    ADMIT DATE:  2022      Discharge Planning Discharge Planning  Type of Residence: [place] [facility])  Living Arrangements: Other (Comment)  Support Systems: Spouse/Significant Other  Current Services Prior To Admission: [facility] 58 Medications: No  Type of Home Care Services: None  Patient expects to be discharged to[de-identified] LTAC  Follow Up Appointment: Best Day/Time : Monday AM  One/Two Story Residence: One story  History of falls?: No  White Board Notes /Social Work Whiteboard Notes  /Social Work Whiteboard: ; SW - Return to [employer] [facility] for HF Oxygen weaning. No precert. COVID swab. Procedure      Bronchoscopy; await cultures/cytology  elevated WBC; monitor. NPO.      CXR: Pneumonia. Discharge Plan From [facility]; plans new [facility] for HF Oxygen weaning v return SNF w likely new BIPAP  Discharge Milestones and Delays: Clinical status: Planning right VATS and lung biopsy on Thursday. Anticoag placed on hold. Remains on high flow: 60%, 60L.          SIGNED:  Angel Walker RN   2022, 1:06 PM

## 2022-04-29 NOTE — PLAN OF CARE
Problem: Respiratory - Adult  Goal: Adequate oxygenation  Outcome: Progressing     TID Xopenex tx's. SpO2 92% on HHFNC 60L 60%.

## 2022-04-29 NOTE — PROGRESS NOTES
Progress note: Infectious diseases    Patient - Frannie Chu,  Age - 70 y.o.    - 1950      Room Number - 0R-83/528-U   MRN -  [id number]   Acct # - [de-identified]  Date of Admission -  2022 12:37 PM    SUBJECTIVE:   No new issues. OBJECTIVE   VITALS    height is 5' 8\" (1.727 m) and weight is 156 lb 12 oz (71.1 kg). His oral temperature is 97.3 °F (36.3 °C). His blood pressure is 113/85 and his pulse is 67. His respiration is 20 and oxygen saturation is 93%. Wt Readings from Last 3 Encounters:   22 156 lb 12 oz (71.1 kg)   22 155 lb 12.8 oz (70.7 kg)   22 154 lb (69.9 kg)       I/O (24 Hours)    Intake/Output Summary (Last 24 hours) at 2022 1422  Last data filed at 2022 1328  Gross per 24 hour   Intake 900 ml   Output 1200 ml   Net -300 ml       General Appearance  Awake, alert, oriented,chronically sick looking. on high flow oxygen  HEENT - normocephalic, atraumatic, pale conjunctiva,  anicteric sclera  Neck - Supple, no mass. Lungs -  crackles at the lung bases. Diminished breath sound. Cardiovascular - Heart sounds are normal.    Abdomen - soft, not distended, nontender,   Neurologic -oriented.   Skin - No bruising or bleeding  Extremities - + edema,    Open gluteal wound  MEDICATIONS:      levalbuterol  1.25 mg Nebulization TID    sodium chloride flush  5-40 mL IntraVENous 2 times per day    dilTIAZem  30 mg Oral 2 times per day    predniSONE  40 mg Oral Daily    atovaquone  1,500 mg Oral Daily    furosemide  20 mg Oral Daily    digoxin  125 mcg Oral Daily    aspirin  81 mg Oral Daily    atorvastatin  40 mg Oral Nightly    cetirizine  10 mg Oral Daily    metoprolol succinate  150 mg Oral Daily    multivitamin  1 tablet Oral Daily    mupirocin   Topical TID    nystatin   Topical BID    pantoprazole  40 mg Oral QAM AC    [Held by provider] rivaroxaban  15 mg Oral Daily    rOPINIRole  0.5 mg Oral Nightly    traZODone  50 mg Oral Nightly    cefepime  2,000 mg IntraVENous Q12H      sodium chloride       sodium chloride flush, sodium chloride, metoprolol, ALPRAZolam, ipratropium-albuterol, ondansetron **OR** ondansetron, acetaminophen      LABS:     CBC:   Recent Labs     04/27/22  0530 04/28/22  0500 04/29/22  0346   WBC 18.6* 14.9* 12.5*   HGB 9.9* 10.6* 9.5*    246 230     BMP:    Recent Labs     04/27/22  0530 04/28/22  1546 04/29/22  0346    135 134*   K 4.8 4.5 4.3    95* 95*   CO2 24 28 30   BUN 41* 50* 50*   CREATININE 1.2 1.4* 1.4*   GLUCOSE 90 194* 95     Calcium:  Recent Labs     04/29/22  0346   CALCIUM 8.2*     Ionized Calcium:No results for input(s): IONCA in the last 72 hours. Magnesium:  Recent Labs     04/28/22  1546   MG 1.9       CULTURES:   UA: No results for input(s): SPECGRAV, PHUR, COLORU, CLARITYU, MUCUS, PROTEINU, BLOODU, RBCUA, WBCUA, BACTERIA, NITRU, GLUCOSEU, BILIRUBINUR, UROBILINOGEN, KETUA, LABCAST, LABCASTTY, AMORPHOS in the last 72 hours.     Invalid input(s): CRYSTALS  Micro:   Lab Results   Component Value Date    BC No growth-preliminary  04/24/2022        Problem list of patient:     Patient Active Problem List   Diagnosis Code    Hyperlipidemia E78.5    Asthma J45.909    Smoker F17.200    Allergic rhinitis due to other allergen J30.89    Collagenous colitis K52.831    Lactose intolerance E73.9    HTN (hypertension) I10    COPD, mild ([facility] J44.9    Atrial fibrillation ([facility] I48.91    Atherosclerotic heart disease of native coronary artery with other forms of angina pectoris ([facility] I25.118    S/P CABG x 3 Z95.1    Encounter for cardioversion procedure Z01.89    S/P left atrial appendage ligation Z98.890    Ischemic cardiomyopathy I25.5    Chronic bronchitis ([facility] J42    CHF (congestive heart failure), NYHA class II, chronic, systolic ([facility] I68.67    Tachycardia-bradycardia ([facility] I49.5   [employer] dual pacemaker  Z95.0    Pneumonia J18.9    Dyspnea and respiratory abnormalities R06.00, R06.89    Acute and chronic respiratory failure with hypoxia (HCC) J96.21    Thrush B37.0    Nosebleed R04.0    SOB (shortness of breath) R06.02         ASSESSMENT/PLAN   Shortness of breath due to lung fibrosis  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   Mason Bianchi MD, MD, FACP 4/29/2022 2:22 PM

## 2022-04-29 NOTE — PROGRESS NOTES
[facility]    Patient - Jonny Ramos   MRN -  [id number]   Acct # - [de-identified]   - 1950      Date of Admission -  2022 12:37 PM  Date of evaluation -  2022  Room - MD Sarah Primary Care Physician - Mayra Salgado MD     Problem List      Active Hospital Problems    Diagnosis Date Noted    SOB (shortness of breath) [R06.02] 2022     Priority: Medium    Pneumonia [J18.9] 2022     Reason for Consult    Respiratory failure  HPI   Jonny Ramos is a 70 y.o. male admitted for worsening respiratory status, clinical diagnosis of APT on steroids but not improving, got up to the restroom at the [facility] and became more SOB with sats dropping, brought to ED and started on   PAP with improvement, went to PAF and started on Diltiazem, currently on NSR and Spo2 in the 80s on 5 LPM sats fluctuate if he talks. CAD s/p CABG, ECHO EF 55 to 60% abnormal (paradoxical)motion, Afib/flutter, atrial clip, SSS s/p dual chamber PPM, HTN, HLP. 36 PY smoker stopped 2021  CXR as before reveals bilateral interstitial pattern  No fever, no sputum production, SARS (-), Blood molecular pattern (+),staph film BCx (+) GPC in clusters.     Past 24 Hours   Currently on HFNC 60% 60LPM- 93%- BiPAP used overnight   CTS seen plan VATS next week   Afebrile   UO 1.5L/24 hr  SOB observed with conversation   Cytology (-) malignant cells pleural fluid     -All systems reviewed   PMHx   Past Medical History      Diagnosis Date    Asthma     Atrial fibrillation with RVR ([address] 2021    [employer] dual pacemaker  04/15/2021    Collagenous colitis     per scope     Colon polyps     dr Franco Honeycutt    COPD, mild (HCC)     Eczema of hand     HTN (hypertension)     Hyperlipidemia     Smoker       Past Surgical History        Procedure Laterality Date    BRONCHOSCOPY N/A 2022    BRONCHOSCOPY WITH BAL performed by Parminder Mabry MD at [facility] [place]      COLONOSCOPY  06/10/2021    theresa ccolon polyps and collagenous colitis    CORONARY ARTERY BYPASS GRAFT  01/12/2021    cabg x 3 with lima and atrial appendage clip  dr Sy Purpura GRAFT N/A 01/12/2021    CABG  X 3 WITH DEJAH, Atrial Appendage Clip performed by Andrez Jeffers MD at [facility] NASAL SINUS SURGERY  06/2008    polyps    OTHER SURGICAL HISTORY  DEC 7TH 2012 DR GET WADE RITAS [place]     IGS ENDOSCOPIC BI LAT MAXILLARY, ETHMOID, SPHENOID, FRONTAL SINUSOTOMY WITH SEPTOPLASTY AND TURBINOPLASTIES    SKIN CANCER EXCISION  09/2014    Left side of Forehead     TOOTH EXTRACTION  02/2017     Meds    Current Medications    levalbuterol  1.25 mg Nebulization TID    sodium chloride flush  5-40 mL IntraVENous 2 times per day    dilTIAZem  30 mg Oral 2 times per day    predniSONE  40 mg Oral Daily    atovaquone  1,500 mg Oral Daily    furosemide  20 mg Oral Daily    digoxin  125 mcg Oral Daily    aspirin  81 mg Oral Daily    atorvastatin  40 mg Oral Nightly    cetirizine  10 mg Oral Daily    metoprolol succinate  150 mg Oral Daily    multivitamin  1 tablet Oral Daily    mupirocin   Topical TID    nystatin   Topical BID    pantoprazole  40 mg Oral QAM AC    rivaroxaban  15 mg Oral Daily    rOPINIRole  0.5 mg Oral Nightly    traZODone  50 mg Oral Nightly    cefepime  2,000 mg IntraVENous Q12H     sodium chloride flush, sodium chloride, metoprolol, ALPRAZolam, ipratropium-albuterol, ondansetron **OR** ondansetron, acetaminophen  IV Drips/Infusions   sodium chloride       Home Medications  Medications Prior to Admission: albuterol (PROVENTIL) (2.5 MG/3ML) 0.083% nebulizer solution, Take 2.5 mg by nebulization 2 times daily  ipratropium-albuterol (DUONEB) 0.5-2.5 (3) MG/3ML SOLN nebulizer solution, Inhale 1 vial into the lungs every 4 hours as needed for Shortness of Breath  Sodium Chloride-Sodium Bicarb 2300-700 MG PACK, 1 spray by Nasal route in the morning, at noon, and at bedtime  FLUoxetine (PROZAC) 10 MG capsule, Take 10 mg by mouth daily (10 mg daily x 5 days - 4/24-4/28, then increase to 20 mg daily)  atovaquone (MEPRON) 750 MG/5ML suspension, Take 1,500 mg by mouth daily (10 mL daily)  predniSONE (DELTASONE) 20 MG tablet, Take 30 mg by mouth daily   ALPRAZolam (XANAX) 0.5 MG tablet, Take 0.5 mg by mouth every 12 hours as needed for Sleep. [DISCONTINUED] tuberculin (APLISOL) 5 UNIT/0.1ML injection, Inject 5 Units into the skin  [DISCONTINUED] nystatin (MYCOSTATIN) 362244 UNIT/GM cream, Apply topically 2 times daily Apply topically 2 times daily. [DISCONTINUED] oxymetazoline (AFRIN) 0.05 % nasal spray, 2 sprays by Nasal route 2 times daily  furosemide (LASIX) 20 MG tablet, Take 20 mg by mouth daily Sun, Wed  loratadine (CLARITIN) 10 MG tablet, Take 10 mg by mouth daily  metoprolol succinate (TOPROL XL) 100 MG extended release tablet, Take 1.5 tablets by mouth daily  tiotropium-olodaterol (STIOLTO) 2.5-2.5 MCG/ACT AERS, Inhale 2 puffs into the lungs daily  rivaroxaban (XARELTO) 15 MG TABS tablet, Take 1 tablet by mouth daily  mupirocin (BACTROBAN) 2 % ointment, Apply topically 3 times daily. albuterol sulfate  (90 Base) MCG/ACT inhaler, INHALE 2 PUFFS INTO THE LUNGS EVERY 6 HOURS AS NEEDED FOR WHEEZING  traZODone (DESYREL) 50 MG tablet, Take 1 tablet by mouth nightly  rOPINIRole (REQUIP) 0.5 MG tablet, Take 1 tablet by mouth nightly  pantoprazole (PROTONIX) 40 MG tablet, Take 1 tablet by mouth every morning (before breakfast)  [DISCONTINUED] cetirizine (ZYRTEC) 10 MG tablet, Take 10 mg by mouth daily  atorvastatin (LIPITOR) 40 MG tablet, Take 1 tablet by mouth nightly  aspirin 81 MG chewable tablet, Take 1 tablet by mouth daily  Multiple Vitamins-Minerals (CENTRUM SILVER PO), Take 1 tablet by mouth daily   Diet    ADULT DIET; Regular;  Low Sodium (2 gm)  Allergies Penicillins and Sulfa antibiotics  Social History     Social History     Socioeconomic History    Marital status:      Spouse name: Not on file    Number of children: Not on file    Years of education: Not on file    Highest education level: Not on file   Occupational History    Not on file   Tobacco Use    Smoking status: Former Smoker     Packs/day: 1.00     Years: 40.00     Pack years: 40.00     Types: Cigarettes     Quit date: 3/7/2022     Years since quittin.1    Smokeless tobacco: Never Used   Substance and Sexual Activity    Alcohol use: Not Currently     Alcohol/week: 0.0 standard drinks    Drug use: No    Sexual activity: Not on file   Other Topics Concern    Not on file   Social History Narrative    No barriers with medication affordability now that he has met deductible    Active with [facility]    Has O2 and supplies needed    No barriers with transportation     Social Determinants of Health     Financial Resource Strain: Low Risk     Difficulty of Paying Living Expenses: Not hard at all   Food Insecurity: No Food Insecurity    Worried About [address] in the Last Year: Never true    [address] in the Last Year: Never true   Transportation Needs: No Transportation Needs    Lack of Transportation (Medical): No    Lack of Transportation (Non-Medical):  No   Physical Activity: Inactive    Days of Exercise per Week: 0 days    Minutes of Exercise per Session: 0 min   Stress:     Feeling of Stress : Not on file   Social Connections:     Frequency of Communication with Friends and Family: Not on file    Frequency of Social Gatherings with Friends and Family: Not on file    Attends [employer] Services: Not on file    Active Member of Clubs or Organizations: Not on file    Attends Club or Organization Meetings: Not on file    Marital Status: Not on file   Intimate Partner Violence:     Fear of Current or Ex-Partner: Not on file    Emotionally Abused: Not on file   [facility] RDW 13.3 06/07/2018    NRBC 0 04/29/2022    NRBC 0 11/07/2011    SEGSPCT 86.7 04/29/2022    MONOPCT 5.6 04/29/2022    MONOSABS 0.7 04/29/2022    LYMPHSABS 0.8 04/29/2022    EOSABS 0.0 04/29/2022    BASOSABS 0.0 04/29/2022    DIFFTYPE see below 03/25/2022     BMP   Lab Results   Component Value Date     04/29/2022    K 4.3 04/29/2022    K 5.0 04/24/2022    CL 95 04/29/2022    CO2 30 04/29/2022    BUN 50 04/29/2022    CREATININE 1.4 04/29/2022    GLUCOSE 95 04/29/2022    GLUCOSE 94 11/07/2011    CALCIUM 8.2 04/29/2022    MG 1.9 04/28/2022     LFTS  Lab Results   Component Value Date    ALKPHOS 67 04/24/2022    ALT 29 04/24/2022    AST 31 04/24/2022    PROT 6.1 04/24/2022    BILITOT 0.5 04/24/2022    BILIDIR <0.2 03/26/2022    LABALBU 3.2 04/24/2022    LABALBU 4.3 11/07/2011     ABG   Lab Results   Component Value Date    PH 7.44 04/24/2022    PCO2 39 04/24/2022    PO2 191 04/24/2022    HCO3 26 04/24/2022    O2SAT 100 04/24/2022     @  Lab Results   Component Value Date    APTT 25.3 03/25/2022     INR   Lab Results   Component Value Date    INR 1.41 (H) 03/25/2022    INR 1.25 (H) 04/06/2021    INR 1.13 01/11/2021     Angio Convert Enzyme 8 - 52 U/L 21          EKG        PFTs     Cultures    (+) blood culture GPC in clusters    4/26/22  Bronchoscopy- will follow cultures/cytology   Endobronchial findings:   Trachea: Normal mucosa  Diana: Normal mucosa  Right main bronchus: Normal mucosa, mucus  Right upper lobe bronchus: Normal mucosa, mucus  Right Middle lobe bronchus: Normal mucosa, mucus  Right Lower lobe bronchus: erythematous mucosa  Left main bronchus: Normal mucosa  Left upper lobe bronchus: Normal mucosa  Left lower lobe bronchus: Normal mucosa     Rhinovirus Enterovirus PCR Detected Abnormal      AFB in process  Cytology: FINAL RESULTS:   No malignant cells seen.      Specimen consists of benign appearing squamous      cells, alveolar macrophages, and debris.   Culture, Respiratory [[id number]] (Abnormal) Collected: 04/27/22 1200   Order Status: Completed Specimen: Lobe, Right Lower from BAL- Bronch. Lavage Updated: 04/29/22 0807    Gram Stain Result No segmented neutrophils observed. Rare epithelial cells observed. Rare budding yeast and pseudohyphae observed. Organism Candida albicans Abnormal        Flow cytometry in process     Radiology    CXR  4/27/2022   XR CHEST PORTABLE   1. Normal heart size. Permanent dual-chamber pacemaker. Metallic sternotomy sutures and vascular clips from prior surgery. An atrial appendage clip is present. 2. 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 Moderately severe pneumonia/pulmonary edema involving both lungs relatively diffusely. Overall appearance of chest slightly improved from prior. .       4/24/22    FINDINGS:   There are stable median sternotomy wires and mediastinal surgical clips and atrial appendage clip. There is stable right cardiac pacer generator with 2 leads. There is marked interstitial prominence at the bilateral lung bases, increased compared to    prior exam. The cardiac silhouette is normal in appearance. Visualized osseous structures appear grossly intact.           Impression   Worsening acute on chronic interstitial disease at the lung bases.                   **This report has been created using voice recognition software. It may contain minor errors which are inherent in voice recognition technology. **       Final report electronically signed by Dr. 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 since 03/7/2022 (No evidence ILD back in 2021)  Interstitial infiltrate, thickening interlobular septa  CAD s/p CABG   Afib   Full Code   Recommendations   Adjust supplemental oxygen to maintain SpO2 > 88%   BiPAP 18/6   Prednisone 40 mg PO daily   Continue Atovaquone   Xopenex TID  ID on for ATB management   Will follow bronchoscopy cultures/cytologies  MAGALI reflex- Anit-RNP- pending still   ACE level 21  IVF DC  Good UO   CTS planning VATS next week  [facility] [place] checking with Cardiology to see if Heparin gtt needed - needs held 48 hours prior to procedure     Case discussed with nurse and patient/family. Questions and concerns addressed. Meds and orders reviewed     Electronically signed by   ARCADIO Sanabria - CNP on 4/29/2022 at 12:14 PM    Vitals:    04/29/22 1526   BP: 118/79   Pulse: 61   Resp: 22   Temp: 97.9 °F (36.6 °C)   SpO2: 98%     Bipap to sleep/HFNC 60% during th day  Stable respiratory status  Afebrile  BAL candida, rhinovius  Fat stains pending. VATS biopsy if stable next week  Continue PT/OT and nutritional support. Patient seen and examined independently by me. Above discussed and I agree with  CNP note Also see my additional comments. Labs, cultures, and radiographs when available were reviewed. Changes were made in the orders as necessary. I discussed patient concerns with Maria Fernanda HICKEY and instructions were given. Respiratory care issues addressed. Please see our orders for the updated patient care plan.     Electronically signed by     Charity Whitaker MD on 4/29/2022 at 4:00 PM

## 2022-04-29 NOTE — PROGRESS NOTES
CT/CV Surgery Progress Note    2022 8:13 AM  Surgeon:  Dr. Tia Alvarez     Subjective: Mr. Varun Pino is resting comfortably in bed, alert, and in no acute distress. Pt has hx of hypertension, dyslipidemia, COPD, bilateral pulmonary infiltrate, coronary artery disease, paroxysmal atrial fibrillation, status post permanent cardiac pacemaker insertion, and status post CABG x3 and left atrial appendage closure with Atricure clip on 2021. He underwent a bronchoscopy 22, which revealed erythematous mucosa in the right lower lobe bronchus. There was no intrabronchial lesion. I/O last 3 completed shifts:   In: 0 [P.O.:580; I.V.:10]  Out: 2550 [Urine:2550]    Vital Signs: /73   Pulse 55   Temp 97.4 °F (36.3 °C) (Axillary)   Resp 14   Ht 5' 8\" (1.727 m)   Wt 156 lb 12 oz (71.1 kg)   SpO2 100%   BMI 23.83 kg/m²    Temp (24hrs), Av.6 °F (36.4 °C), Min:97.3 °F (36.3 °C), Max:97.9 °F (36.6 °C)    Labs:   CBC: Recent Labs     22  0530 22  0500 22  0346   WBC 18.6* 14.9* 12.5*   HGB 9.9* 10.6* 9.5*   HCT 32.6* 34.0* 30.6*   MCV 95.9* 93.2 93.3    246 230     BMP:   Recent Labs     22  2201 22  0530 22  1546 22  0346   NA  --  138 135 134*   K  --  4.8 4.5 4.3   CL  --  103 95* 95*   CO2  --   30   BUN  --  41* 50* 50*   CREATININE  --  1.2 1.4* 1.4*   MG  --   --  1.9  --    POCGLU 176*  --   --   --      Imaging:  CXR and CT Chest reviewed       Intake/Output Summary (Last 24 hours) at 2022 0813  Last data filed at 2022 3455  Gross per 24 hour   Intake 590 ml   Output 1550 ml   Net -960 ml     Scheduled Meds:    levalbuterol  1.25 mg Nebulization TID    sodium chloride flush  5-40 mL IntraVENous 2 times per day    dilTIAZem  30 mg Oral 2 times per day    predniSONE  40 mg Oral Daily    atovaquone  1,500 mg Oral Daily    furosemide  20 mg Oral Daily    digoxin  125 mcg Oral Daily    aspirin  81 mg Oral Daily    atorvastatin  40 mg Oral Nightly    cetirizine  10 mg Oral Daily    metoprolol succinate  150 mg Oral Daily    multivitamin  1 tablet Oral Daily    mupirocin   Topical TID    nystatin   Topical BID    pantoprazole  40 mg Oral QAM AC    rivaroxaban  15 mg Oral Daily    rOPINIRole  0.5 mg Oral Nightly    traZODone  50 mg Oral Nightly    cefepime  2,000 mg IntraVENous Q12H     ROS: All neg unless specifically mentioned in subjective section. Exam:  General Appearance: alert  Cardiovascular: normal rate, regular rhythm, normal S1 and S2, no murmurs, rubs, clicks, or gallops  Pulmonary/Chest: decreased BS bilaterally  Neurological: alert, oriented, normal speech, no focal findings or movement disorder noted    Assessment:   Patient Active Problem List   Diagnosis    Hyperlipidemia    Asthma    Smoker    Allergic rhinitis due to other allergen    Collagenous colitis    Lactose intolerance    HTN (hypertension)    COPD, mild (HCC)    Atrial fibrillation ([address]    Atherosclerotic heart disease of native coronary artery with other forms of angina pectoris ([address]    S/P CABG x 3    Encounter for cardioversion procedure    S/P left atrial appendage ligation    Ischemic cardiomyopathy    Chronic bronchitis (HCC)    CHF (congestive heart failure), NYHA class II, chronic, systolic (HCC)    Tachycardia-bradycardia (HCC)    [employer] dual pacemaker     Pneumonia    Dyspnea and respiratory abnormalities    Acute and chronic respiratory failure with hypoxia (HCC)    Thrush    Nosebleed    SOB (shortness of breath)     Plan:   1. Right VATS and lung biopsy next week. Xarelto will need to be held at least 48 hours prior to procedure.     The plan of care was discussed in detail with Dr. Jaciel Norton     Electronically signed by Natalie Sanchez PA-C on 4/29/2022 at 8:13 AM

## 2022-04-29 NOTE — PROGRESS NOTES
[facility] with cardiology messaged about starting heparin due to Xarelto being held. Cardiology said for attending to manage. Dr. Sanz Ke and updated.

## 2022-04-29 NOTE — PROGRESS NOTES
IM Progress Note  Dr. Lexi Ellington  4/29/2022 9:37 AM      Patient name Celestino Brown  [id number]  PCP: Agatha Colorado MD  Admit Date: 4/24/2022  Acct No. [de-identified]    Subjective: Interval History:   Was on BIPAP during the night and high flow during the day   Now on 60% and 60L  Breathing not worse  Informed [facility] recommending stopping anticoag      Diet: ADULT DIET; Regular; Low Sodium (2 gm)    I/O last 3 completed shifts: In: 0 [P.O.:580; I.V.:10]  Out: 2550 [Urine:2550]  No intake/output data recorded. Admission weight: 155 lb (70.3 kg) as of 4/24/2022 12:37 PM  Wt Readings from Last 3 Encounters:   04/26/22 156 lb 12 oz (71.1 kg)   04/20/22 155 lb 12.8 oz (70.7 kg)   04/13/22 154 lb (69.9 kg)     Body mass index is 23.83 kg/m².     ROS   [facility];  no cp or palpitation  Resp: +SOB+ cough  Neuro:  No numbness or weakness or dizziness  Abd: no nausea or vomiting or abd pain      Medications:   Scheduled Meds:   levalbuterol  1.25 mg Nebulization TID    sodium chloride flush  5-40 mL IntraVENous 2 times per day    dilTIAZem  30 mg Oral 2 times per day    predniSONE  40 mg Oral Daily    atovaquone  1,500 mg Oral Daily    furosemide  20 mg Oral Daily    digoxin  125 mcg Oral Daily    aspirin  81 mg Oral Daily    atorvastatin  40 mg Oral Nightly    cetirizine  10 mg Oral Daily    metoprolol succinate  150 mg Oral Daily    multivitamin  1 tablet Oral Daily    mupirocin   Topical TID    nystatin   Topical BID    pantoprazole  40 mg Oral QAM AC    rivaroxaban  15 mg Oral Daily    rOPINIRole  0.5 mg Oral Nightly    traZODone  50 mg Oral Nightly    cefepime  2,000 mg IntraVENous Q12H     Continuous Infusions:   sodium chloride         Labs :     CBC:   Recent Labs     04/27/22  0530 04/28/22  0500 04/29/22  0346   WBC 18.6* 14.9* 12.5*   HGB 9.9* 10.6* 9.5*    246 230     BMP:    Recent Labs     04/27/22  0530 04/28/22  1546 04/29/22  0346    135 134*   K 4.8 4.5 4.3    95* 95*   CO2 24 28 30   BUN 41* 50* 50*   CREATININE 1.2 1.4* 1.4*   GLUCOSE 90 194* 95     Hepatic:   No results for input(s): AST, ALT, ALB, BILITOT, ALKPHOS in the last 72 hours. Troponin: No results for input(s): TROPONINI in the last 72 hours. BNP: No results for input(s): BNP in the last 72 hours. Lipids: No results for input(s): CHOL, HDL in the last 72 hours. Invalid input(s): LDLCALCU  INR: No results for input(s): INR in the last 72 hours.     Radiology    Objective:   Vitals: /73   Pulse 55   Temp 97.4 °F (36.3 °C) (Axillary)   Resp 14   Ht 5' 8\" (1.727 m)   Wt 156 lb 12 oz (71.1 kg)   SpO2 100%   BMI 23.83 kg/m²   HEENT: Head:pupils react  Neck: supple  Lungs: diminished air entry bilat  Heart: regular rate and rhythm not  tachycardic  Abdomen: soft BS heard NG NT  Extremities: warm  No edema  Neurologic:  Alert, oriented X3    Impression:   :   Acute on chronic hypoxic respiratory failure s/p bronch  Par A. 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  Coronary disease status post coronary bypass graft x3 LIMA to LAD SVG to RCA and SVG to obtuse marginal  Cardiomyopathy with improved ejection fraction last echo was 54 to 60% this year  Status post pacemaker for tachybradycardia syndrome  COPD  Significant deconditioning  Leukocytosis  Chronic kidney disease stage III  Lactic acidosis  Chronic anemia  Hypertension  Hyperlipidemia  Gram + cult coag neg and also + for rhinovirus  Pressure ulcer -POA    Plan:    Wean high flow  Plans are for lung biopsy next week  Cont steroids bronchodilators  PT OT  Cont AV blocking agents  Will stop xarelto for now and check with cardio if pt will require IV heparin     Gage Clark MD, MD

## 2022-04-29 NOTE — PROGRESS NOTES
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 Patient has been in a skilled nursing facility after his last admission last month in the hospital for pneumonia. Patient noticed his oxygen saturation to be less and hence was transferred here he has been placed on BiPAP with improvement in his saturation. He was also noted to be in A. fib with RVR. He denies any chest pain or palpitations. His breathing is much better since his been on the BiPAP. Chest x-ray noted. Cardiac enzymes are minimally elevated. White count is also elevated. He did complain of chills before he presented to the ER. His temperature was 99.6 on admission     Prior Level of Function:  Type of Home: Facility [facility])  Home Equipment: Oxygen,Walker, rolling        ADL Assistance: Needs assistance  Ambulation Assistance: Needs assistance  Transfer Assistance: Needs assistance  Additional Comments: Pt reported continued to require O2 at [facility] since was last discharged from [facility]. Pt reported was able to walk with RW although would desaturate quickly.     Restrictions/Precautions:  Restrictions/Precautions: General Precautions,Fall Risk  Position Activity Restriction  Other position/activity restrictions: monitor O2 sats      SUBJECTIVE: RN approved session as pt able to maintain saturations. Pt getting Resp Treatment upon arrival and agreed to PT. PAIN: not reported      Vitals: Oxygen: on HFNC 60L/60% FiO2; dropped to 80% about 1 min after sitting up to EOB and required return to supine and increase of O2 per RN temporarily to recover to 90%    OBJECTIVE:  Bed Mobility:  Supine to Sit: Contact Guard Assistance, Minimal Assistance, with head of bed raised, with rail, with verbal cues , with increased time for completion  Sit to Supine: Contact Guard Assistance     Transfers:  Not Tested    Ambulation:  Not Tested    Balance:  Static Sitting Balance:  Stand By Assistance  Dynamic Sitting Balance: Stand By Assistance    Exercise:  Patient was guided in 1 set(s) 10 reps of exercise to both lower extremities. Ankle pumps, Glut sets, Quad sets, Heelslides, Hip abduction/adduction and Long arc quads. Exercises were completed for increased independence with functional mobility. Supine exercises completed during respiratory treatment with sats staying 90-94%. Functional Outcome Measures: Not completed       ASSESSMENT:  Assessment: Patient progressing toward established goals. Activity Tolerance:  Patient tolerance of  treatment: fair.        Equipment Recommendations:Equipment Needed: No  Discharge Recommendations: Continue to assess pending progress  Plan: Current Treatment Recommendations: Strengthening,Endurance training,Therapeutic activities,Home exercise program,Safety education & training,Patient/Caregiver education & training  Plan:  (3-5x GM)    Patient Education  Patient Education: Plan of Care, Home Exercise Program    Goals:  Patient goals : get stronger  Short Term Goals  Time Frame for Short term goals: at discharge  Short term goal 1: Pt to be Supervision for supine <> sit to get in/out of bed  Short term goal 2: Pt to tolerate sitting edge of bed >5 min with Supervision and maintain O2 sat >88%  Short term goal 3: Pt to tolerate assessment of transfers and ambulation by LPT with O2 sat >88%  Long Term Goals  Time Frame for Long term goals : not set due to short ELOS    Following session, patient left in safe position with all fall risk precautions in place. Priya Barlow.  Errol Leo, [place] [employer]

## 2022-04-30 NOTE — PLAN OF CARE
Problem: Discharge Planning  Goal: Discharge to home or other facility with appropriate resources  4/30/2022 1005 by Kraig Kingston RN  Outcome: Progressing  Flowsheets (Taken 4/30/2022 1005)  Discharge to home or other facility with appropriate resources:   Identify barriers to discharge with patient and caregiver   Refer to discharge planning if patient needs post-hospital services based on physician order or complex needs related to functional status, cognitive ability or social support system     Problem: ABCDS Injury Assessment  Goal: Absence of physical injury  4/30/2022 1005 by Kraig Kingston RN  Outcome: Progressing  Note: Standard safety measures in place      Problem: Respiratory - Adult  Goal: Clear lung sounds  4/30/2022 1005 by Kraig Kingston RN  Outcome: Progressing     Problem: Respiratory - Adult  Goal: Adequate oxygenation  4/30/2022 1005 by Kraig Kingston RN  Outcome: Progressing  Note: Titrating HiFlo settings    Care plan reviewed with patient. Patient verbalize understanding of the plan of care and contribute to goal setting.

## 2022-04-30 NOTE — PLAN OF CARE
Problem: Respiratory - Adult  Goal: Adequate oxygenation  4/30/2022 1500 by Javier Maddox RCP  Outcome: Progressing     TID Xopenex tx's. SpO2 92% on HHFNC 60L 60%.

## 2022-04-30 NOTE — PLAN OF CARE
Problem: Respiratory - Adult  Goal: Clear lung sounds  4/29/2022 2010 by Bonnie Syed RCP  Outcome: Progressing    Patient lung sounds are considered normal for their current lung condition. No signs of distress noted. Current treatment regimen appropriate       Problem: Respiratory - Adult  Goal: Adequate oxygenation  4/29/2022 2010 by Bonnie Syed RCP  Outcome: Progressing   Patient currently requiring HFNC 60L 55% FiO2 to maintain SPO2 >90%. Will continue weaning as patient tolerates. Patient wears BiPAP HS.

## 2022-04-30 NOTE — PLAN OF CARE
Problem: Discharge Planning  Goal: Discharge to home or other facility with appropriate resources  4/30/2022 0152 by Dillon Talley RN  Outcome: Progressing  4/29/2022 1504 by Debbi Sanchez RN  Outcome: Progressing  Flowsheets (Taken 4/29/2022 1504)  Discharge to home or other facility with appropriate resources:   Identify barriers to discharge with patient and caregiver   Arrange for needed discharge resources and transportation as appropriate   Identify discharge learning needs (meds, wound care, etc)   Refer to discharge planning if patient needs post-hospital services based on physician order or complex needs related to functional status, cognitive ability or social support system     Problem: Safety - Adult  Goal: Free from fall injury  4/30/2022 0152 by Dillon Talley RN  Outcome: Progressing  Note: Bed alarm on, pt calls out for assistance  4/29/2022 1504 by Debbi Sanchez RN  Outcome: Progressing  Flowsheets (Taken 4/29/2022 1504)  Free From Fall Injury: Instruct family/caregiver on patient safety     Problem: ABCDS Injury Assessment  Goal: Absence of physical injury  4/30/2022 0152 by Dillon Talley RN  Outcome: Progressing  4/29/2022 1504 by Debbi Sanchez RN  Outcome: Progressing     Problem: Respiratory - Adult  Goal: Clear lung sounds  4/30/2022 0152 by Dillon Talley RN  Outcome: Progressing  Note: Rhonchi in the bases, pt on HHF and antibiotics  4/29/2022 2010 by Pratibha Knowles RCP  Outcome: Progressing  4/29/2022 1504 by Debbi Sanchez RN  Outcome: Progressing  Goal: Adequate oxygenation  4/30/2022 0152 by Dillon Talley RN  Outcome: Progressing  Note: Pt.  On HHF 55% 60L sats 98%  4/29/2022 2010 by Pratibha Knowles RCP  Outcome: Progressing  4/29/2022 1504 by Debbi Sanchez RN  Outcome: Progressing  4/29/2022 1424 by Pranav Rosas RCP  Outcome: Progressing     Problem: Chronic Conditions and Co-morbidities  Goal: Patient's chronic conditions and co-morbidity symptoms are monitored and maintained or understanding of the plan of care and contribute to goal setting.

## 2022-04-30 NOTE — PROGRESS NOTES
INTERNAL MEDICINE Progress Note  4/30/2022 10:31 AM     IM coverage for Dr Flores Dials:   [facility] Date: 4/24/2022  PCP: Agatha Colorado MD  Interval History:   Chart reviewed, seen    SOB, on HFo2  No cough, no CP  Telemetry: a flutter / a fib    Objective:   Vitals: BP (!) 121/94   Pulse 127   Temp 97.8 °F (36.6 °C) (Oral)   Resp 21   Ht 5' 8\" (1.727 m)   Wt 156 lb 12 oz (71.1 kg)   SpO2 91%   BMI 23.83 kg/m²   General appearance: alert and cooperative with exam  HEENT: Head: atraumatic  Neck: no adenopathy, no carotid bruit and no JVD  Lungs: diminished breath sounds bilaterally  Heart: irregularly irregular rhythm and S1, S2 normal  Abdomen: soft, non-tender; bowel sounds normal; no masses,  no organomegaly  Extremities: no edema, redness or tenderness in the calves or thighs  Neurologic: Mental status: Alert, oriented, thought content appropriate      Medications:   Scheduled Meds:   levalbuterol  1.25 mg Nebulization TID    sodium chloride flush  5-40 mL IntraVENous 2 times per day    dilTIAZem  30 mg Oral 2 times per day    predniSONE  40 mg Oral Daily    atovaquone  1,500 mg Oral Daily    furosemide  20 mg Oral Daily    digoxin  125 mcg Oral Daily    aspirin  81 mg Oral Daily    atorvastatin  40 mg Oral Nightly    cetirizine  10 mg Oral Daily    metoprolol succinate  150 mg Oral Daily    multivitamin  1 tablet Oral Daily    mupirocin   Topical TID    nystatin   Topical BID    pantoprazole  40 mg Oral QAM AC    [Held by provider] rivaroxaban  15 mg Oral Daily    rOPINIRole  0.5 mg Oral Nightly    traZODone  50 mg Oral Nightly    cefepime  2,000 mg IntraVENous Q12H     Continuous Infusions:   sodium chloride         Lab Results:   CBC:   Recent Labs     04/28/22  0500 04/29/22  0346 04/30/22  0405   WBC 14.9* 12.5* 12.6*   HGB 10.6* 9.5* 10.4*    230 250     BMP:    Recent Labs     04/28/22  1546 04/29/22  0346    134*   K 4.5 4.3   CL 95* 95*   CO2 28 30 BUN 50* 50*   CREATININE 1.4* 1.4*   GLUCOSE 194* 95     Magnesium:    Lab Results   Component Value Date    MG 1.9 04/28/2022     HgBA1c:    Lab Results   Component Value Date    LABA1C 5.6 01/11/2021     TSH:    Lab Results   Component Value Date    TSH 2.230 03/25/2022     FERRITIN:    Lab Results   Component Value Date    FERRITIN 497 03/25/2022     EKG          Assessment and Plan:   Acute on chronic hypoxic respiratory failure s/p bronch  A flutter / A. 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     Pam Franklin MD, MD

## 2022-05-01 NOTE — PROGRESS NOTES
[facility]    Patient - Hanna Franklin   MRN -  [id number]   [id number]   - 1950      Date of Admission -  2022 12:37 PM  Date of evaluation -  2022  Room - [address] [facility] [address] Day - MD Dolores Primary Care Physician - Darcy Asif MD     Problem List      Active Hospital Problems    Diagnosis Date Noted    SOB (shortness of breath) [R06.02] 2022     Priority: Medium    Pneumonia [J18.9] 2022     Reason for Consult    Acute hypoxic respiratory failure  HPI   Hanna Franklin is a 70 y.o. male admitted for worsening respiratory status, clinical diagnosis of APT on steroids but not improving, got up to the restroom at the [facility] and became more SOB with sats dropping, brought to ED and started on   PAP with improvement, went to PAF and started on Diltiazem, currently on NSR and Spo2 in the 80s on 5 LPM sats fluctuate if he talks. CAD s/p CABG, ECHO EF 55 to 60% abnormal (paradoxical)motion, Afib/flutter, atrial clip, SSS s/p dual chamber PPM, HTN, HLP. 36 PY smoker stopped 2021  CXR as before reveals bilateral interstitial pattern  No fever, no sputum production, SARS (-), Blood molecular pattern (+),staph film BCx (+) GPC in clusters.   Past 24 Hours     Remained on High flow nasal canula 60 lpm, 60% FiO2  Still with severe SOB on minimal exertion     -All systems reviewed   PMHx   Past Medical History      Diagnosis Date    Asthma     Atrial fibrillation with RVR ([address] 2021    [employer] dual pacemaker  04/15/2021    Collagenous colitis     per scope     Colon polyps     dr Mari Amaya    COPD, mild ([address]     Eczema of hand     HTN (hypertension)     Hyperlipidemia     Smoker       Past Surgical History        Procedure Laterality Date    BRONCHOSCOPY N/A 2022    BRONCHOSCOPY WITH BAL performed by Ti Beltran MD at [address] [facility] COLONOSCOPY  06/10/2021    theresa ccolon polyps and collagenous colitis    CORONARY ARTERY BYPASS GRAFT  01/12/2021    cabg x 3 with lima and atrial appendage clip  dr Latha Wheeler GRAFT N/A 01/12/2021    CABG  X 3 WITH DEJAH, Atrial Appendage Clip performed by Kristen Meyer MD at [facility] NASAL SINUS SURGERY  06/2008    polyps    OTHER SURGICAL HISTORY  DEC 7TH 2012 DR [address]NAN [address]     IGS ENDOSCOPIC BI LAT MAXILLARY, ETHMOID, SPHENOID, FRONTAL SINUSOTOMY WITH SEPTOPLASTY AND TURBINOPLASTIES    SKIN CANCER EXCISION  09/2014    Left side of Forehead     TOOTH EXTRACTION  02/2017     Meds    Current Medications    heparin (porcine)  5,000 Units SubCUTAneous 3 times per day    dilTIAZem  30 mg Oral 3 times per day    insulin lispro  0-6 Units SubCUTAneous TID WC    insulin lispro  0-3 Units SubCUTAneous Nightly    levalbuterol  1.25 mg Nebulization TID    sodium chloride flush  5-40 mL IntraVENous 2 times per day    predniSONE  40 mg Oral Daily    atovaquone  1,500 mg Oral Daily    furosemide  20 mg Oral Daily    digoxin  125 mcg Oral Daily    aspirin  81 mg Oral Daily    atorvastatin  40 mg Oral Nightly    cetirizine  10 mg Oral Daily    metoprolol succinate  150 mg Oral Daily    multivitamin  1 tablet Oral Daily    mupirocin   Topical TID    nystatin   Topical BID    pantoprazole  40 mg Oral QAM AC    rOPINIRole  0.5 mg Oral Nightly    traZODone  50 mg Oral Nightly    cefepime  2,000 mg IntraVENous Q12H     dextrose, glucagon (rDNA), dextrose, glucose, sodium chloride flush, sodium chloride, metoprolol, ALPRAZolam, ipratropium-albuterol, ondansetron **OR** ondansetron, acetaminophen  IV Drips/Infusions   dextrose      sodium chloride       Home Medications  Medications Prior to Admission: albuterol (PROVENTIL) (2.5 MG/3ML) 0.083% nebulizer solution, Take 2.5 mg by nebulization 2 times daily  ipratropium-albuterol (DUONEB) 0.5-2.5 (3) MG/3ML SOLN nebulizer solution, Inhale 1 vial into the lungs every 4 hours as needed for Shortness of Breath  Sodium Chloride-Sodium Bicarb 2300-700 MG PACK, 1 spray by Nasal route in the morning, at noon, and at bedtime  FLUoxetine (PROZAC) 10 MG capsule, Take 10 mg by mouth daily (10 mg daily x 5 days - 4/24-4/28, then increase to 20 mg daily)  atovaquone (MEPRON) 750 MG/5ML suspension, Take 1,500 mg by mouth daily (10 mL daily)  predniSONE (DELTASONE) 20 MG tablet, Take 30 mg by mouth daily   ALPRAZolam (XANAX) 0.5 MG tablet, Take 0.5 mg by mouth every 12 hours as needed for Sleep. [DISCONTINUED] tuberculin (APLISOL) 5 UNIT/0.1ML injection, Inject 5 Units into the skin  [DISCONTINUED] nystatin (MYCOSTATIN) 173034 UNIT/GM cream, Apply topically 2 times daily Apply topically 2 times daily. [DISCONTINUED] oxymetazoline (AFRIN) 0.05 % nasal spray, 2 sprays by Nasal route 2 times daily  furosemide (LASIX) 20 MG tablet, Take 20 mg by mouth daily Sun, Wed  loratadine (CLARITIN) 10 MG tablet, Take 10 mg by mouth daily  metoprolol succinate (TOPROL XL) 100 MG extended release tablet, Take 1.5 tablets by mouth daily  tiotropium-olodaterol (STIOLTO) 2.5-2.5 MCG/ACT AERS, Inhale 2 puffs into the lungs daily  rivaroxaban (XARELTO) 15 MG TABS tablet, Take 1 tablet by mouth daily  mupirocin (BACTROBAN) 2 % ointment, Apply topically 3 times daily.   albuterol sulfate  (90 Base) MCG/ACT inhaler, INHALE 2 PUFFS INTO THE LUNGS EVERY 6 HOURS AS NEEDED FOR WHEEZING  traZODone (DESYREL) 50 MG tablet, Take 1 tablet by mouth nightly  rOPINIRole (REQUIP) 0.5 MG tablet, Take 1 tablet by mouth nightly  pantoprazole (PROTONIX) 40 MG tablet, Take 1 tablet by mouth every morning (before breakfast)  [DISCONTINUED] cetirizine (ZYRTEC) 10 MG tablet, Take 10 mg by mouth daily  atorvastatin (LIPITOR) 40 MG tablet, Take 1 tablet by mouth nightly  aspirin 81 MG chewable tablet, Take 1 tablet by mouth daily  Multiple Vitamins-Minerals (CENTRUM SILVER PO), Take 1 tablet by mouth daily   Diet    ADULT DIET; Regular; Low Sodium (2 gm)  Allergies    Penicillins and Sulfa antibiotics  Social History     Social History     Socioeconomic History    Marital status:      Spouse name: Not on file    Number of children: Not on file    Years of education: Not on file    Highest education level: Not on file   Occupational History    Not on file   Tobacco Use    Smoking status: Former Smoker     Packs/day: 1.00     Years: 40.00     Pack years: 40.00     Types: Cigarettes     Quit date: 3/7/2022     Years since quittin.1    Smokeless tobacco: Never Used   Substance and Sexual Activity    Alcohol use: Not Currently     Alcohol/week: 0.0 standard drinks    Drug use: No    Sexual activity: Not on file   Other Topics Concern    Not on file   Social History Narrative    No barriers with medication affordability now that he has met deductible    Active with [facility]    Has O2 and supplies needed    No barriers with transportation     Social Determinants of Health     Financial Resource Strain: Low Risk     Difficulty of Paying Living Expenses: Not hard at all   Food Insecurity: No Food Insecurity    Worried About [address] in the Last Year: Never true    [facility] in the Last Year: Never true   Transportation Needs: No Transportation Needs    Lack of Transportation (Medical): No    Lack of Transportation (Non-Medical):  No   Physical Activity: Inactive    Days of Exercise per Week: 0 days    Minutes of Exercise per Session: 0 min   Stress:     Feeling of Stress : Not on file   Social Connections:     Frequency of Communication with Friends and Family: Not on file    Frequency of Social Gatherings with Friends and Family: Not on file    Attends Mosque Services: Not on file    Active Member of Clubs or Organizations: Not on file    Attends Club or Organization Meetings: Not on file    Marital Status: Not on file   Intimate Partner Violence:     Fear of Current or Ex-Partner: Not on file    Emotionally Abused: Not on file    Physically Abused: Not on file    Sexually Abused: Not on file   Housing Stability: Unknown    Unable to Pay for Housing in the Last Year: No    Number of Places Lived in the Last Year: Not on file    Unstable Housing in the Last Year: No     Family History          Problem Relation Age of Onset    Heart Disease Mother     Heart Disease Father         cabg    Diabetes Brother     Heart Disease Brother        Vitals     height is 5' 8\" (1.727 m) and weight is 156 lb 12 oz (71.1 kg). His oral temperature is 97.6 °F (36.4 °C). His blood pressure is 116/86 and his pulse is 89. His respiration is 18 and oxygen saturation is 91%. Body mass index is 23.83 kg/m². I/O        Intake/Output Summary (Last 24 hours) at 5/1/2022 1038  Last data filed at 5/1/2022 0923  Gross per 24 hour   Intake 400 ml   Output 1900 ml   Net -1500 ml     I/O last 3 completed shifts: In: 400 [P.O.:400]  Out: 2050 [Urine:2050]   No data found. Exam   General Appearance  Awake, alert, looks chronically ill and tired, dyspneic. SOB on HFNC  HEENT - Normal, Head is normocephalic, atraumatic. EOMI, PERRLA, pale conjunctiva  Neck - Supple, symmetrical, trachea midline and Soft, trachea midline and straight  Lungs -diminished bilateral breath sounds, bibasilar  rales  Cardiovascular - NSR. Edema R arm   Abdomen - Soft, nontender, nondistended, no masses or organomegaly  Neurologic - CN II-XII are grossly intact.  There are no focal motor or sensory deficits  Skin - No bruising or bleeding  Extremities - No cyanosis, clubbing or edema    Labs  - Old records and notes have been reviewed in [facility]   CBC     Lab Results   Component Value Date    WBC 14.2 05/01/2022    RBC 3.79 05/01/2022    RBC 4.84 11/07/2011    HGB 10.9 05/01/2022    HCT 35.1 05/01/2022     05/01/2022    MCV 92.6 05/01/2022    MCH 28.8 05/01/2022    MCHC 31.1 05/01/2022    RDW 13.3 06/07/2018    NRBC 0 04/30/2022    NRBC 0 11/07/2011    SEGSPCT 89.2 04/30/2022    MONOPCT 4.7 04/30/2022    MONOSABS 0.6 04/30/2022    LYMPHSABS 0.6 04/30/2022    EOSABS 0.0 04/30/2022    BASOSABS 0.0 04/30/2022    DIFFTYPE see below 03/25/2022     BMP   Lab Results   Component Value Date     05/01/2022    K 4.9 05/01/2022    K 5.0 04/24/2022    CL 98 05/01/2022    CO2 29 05/01/2022    BUN 45 05/01/2022    CREATININE 1.5 05/01/2022    GLUCOSE 95 05/01/2022    GLUCOSE 94 11/07/2011    CALCIUM 8.4 05/01/2022    MG 1.9 04/28/2022     LFTS  Lab Results   Component Value Date    ALKPHOS 67 04/24/2022    ALT 29 04/24/2022    AST 31 04/24/2022    PROT 6.1 04/24/2022    BILITOT 0.5 04/24/2022    BILIDIR <0.2 03/26/2022    LABALBU 3.2 04/24/2022    LABALBU 4.3 11/07/2011     ABG   Lab Results   Component Value Date    PH 7.44 04/24/2022    PCO2 39 04/24/2022    PO2 191 04/24/2022    HCO3 26 04/24/2022    O2SAT 100 04/24/2022     @  Lab Results   Component Value Date    APTT 25.3 03/25/2022     INR   Lab Results   Component Value Date    INR 1.41 (H) 03/25/2022    INR 1.25 (H) 04/06/2021    INR 1.13 01/11/2021     Angio Convert Enzyme 8 - 52 U/L 21        PFTs     Cultures    (+) blood culture GPC in clusters    4/26/22  Bronchoscopy- will follow cultures/cytology   Endobronchial findings:   Trachea: Normal mucosa  Diana: Normal mucosa  Right main bronchus: Normal mucosa, mucus  Right upper lobe bronchus: Normal mucosa, mucus  Right Middle lobe bronchus: Normal mucosa, mucus  Right Lower lobe bronchus: erythematous mucosa  Left main bronchus: Normal mucosa  Left upper lobe bronchus: Normal mucosa  Left lower lobe bronchus: Normal mucosa     Rhinovirus Enterovirus PCR Detected Abnormal      AFB in process  Cytology: FINAL RESULTS:   No malignant cells seen.      Specimen consists of benign appearing squamous      cells, alveolar macrophages, and debris.   Culture, Respiratory [[id number]] (Abnormal) Collected: 04/27/22 1200   Order Status: Completed Specimen: Lobe, Right Lower from BAL- Bronch. Lavage Updated: 04/29/22 0807    Gram Stain Result No segmented neutrophils observed. Rare epithelial cells observed. Rare budding yeast and pseudohyphae observed. Organism Candida albicans Abnormal        Flow cytometry in process     Radiology        CXR     03/07/2022 --->  04/29/2022            CT chest     01/09/2021 ---> 03/15/2022            Assessment   Acute on chronic hypoxic respiratory failure  Rapidly progressing Acute Interstitial pneumonia (AIP) DDX Amiodarone Toxicity vs RBILD vs OP since 03/7/2022 (No evidence ILD back in 2021)  Interstitial infiltrate, thickening interlobular septa  CAD s/p CABG   Afib   Full Code   Recommendations   Adjust supplemental oxygen to maintain SpO2 > 88%   BiPAP 18/6 HS and prn  Continue high dose steroids  Continue Atovaquone   CTS planning VATS next week  [facility] on hold    Case discussed with nurse and patient/family. Questions and concerns addressed.   Meds and orders reviewed     Electronically signed by   Elmer Harris MD on 5/1/2022 at 10:38 AM

## 2022-05-01 NOTE — PLAN OF CARE
Problem: Respiratory - Adult  Goal: Clear lung sounds  4/30/2022 2052 by Pranay Lucero, JOURDAN  Outcome: Progressing   Patient receiving XOPENEN nebulizer treatments TID to improve aeration. Will continue with therapies as ordered. Problem: Respiratory - Adult  Goal: Adequate oxygenation  4/30/2022 2052 by Pranay Lucero, JOURDAN  Outcome: Progressing   Patient current requiring HFNC to maintain SPO2 ?90%. He's currently on 60L and 45% FiO2. Will continue weaning as patient tolerates. BiPAP at bedside if needed.

## 2022-05-01 NOTE — PROGRESS NOTES
INTERNAL MEDICINE Progress Note  5/1/2022 11:36 AM     IM coverage for Dr Krissy Weinstein:   Admit Date: 4/24/2022  PCP: Ginna Fuentes MD  Interval History:     SOB, on HFo2  No cough, no CP    Objective:   Vitals: /77   Pulse 93   Temp 97.8 °F (36.6 °C) (Oral)   Resp 27   Ht 5' 8\" (1.727 m)   Wt 156 lb 12 oz (71.1 kg)   SpO2 93%   BMI 23.83 kg/m²   General appearance: alert and cooperative with exam  HEENT:  atraumatic  Neck:  no JVD  Lungs: diminished breath sounds bilaterally  Heart: irregularly irregular rhythm and S1, S2 normal  Abdomen: soft, non-tender; bowel sounds normal; no masses,  no organomegaly  Extremities: no edema,  Neurologic:  Alert, oriented, thought content appropriate      Medications:   Scheduled Meds:   heparin (porcine)  5,000 Units SubCUTAneous 3 times per day    dilTIAZem  30 mg Oral 3 times per day    insulin lispro  0-6 Units SubCUTAneous TID WC    insulin lispro  0-3 Units SubCUTAneous Nightly    levalbuterol  1.25 mg Nebulization TID    sodium chloride flush  5-40 mL IntraVENous 2 times per day    predniSONE  40 mg Oral Daily    atovaquone  1,500 mg Oral Daily    furosemide  20 mg Oral Daily    digoxin  125 mcg Oral Daily    aspirin  81 mg Oral Daily    atorvastatin  40 mg Oral Nightly    cetirizine  10 mg Oral Daily    metoprolol succinate  150 mg Oral Daily    multivitamin  1 tablet Oral Daily    mupirocin   Topical TID    nystatin   Topical BID    pantoprazole  40 mg Oral QAM AC    rOPINIRole  0.5 mg Oral Nightly    traZODone  50 mg Oral Nightly    cefepime  2,000 mg IntraVENous Q12H     Continuous Infusions:   dextrose      sodium chloride         Lab Results:   CBC:   Recent Labs     04/29/22  0346 04/30/22  0405 05/01/22  0359   WBC 12.5* 12.6* 14.2*   HGB 9.5* 10.4* 10.9*    250 276     BMP:    Recent Labs     04/28/22  1546 04/29/22  0346 05/01/22  0359    134* 136   K 4.5 4.3 4.9   CL 95* 95* 98   CO2 28 30 29 BUN 50* 50* 45*   CREATININE 1.4* 1.4* 1.5*   GLUCOSE 194* 95 95     Magnesium:    Lab Results   Component Value Date    MG 1.9 04/28/2022     HgBA1c:    Lab Results   Component Value Date    LABA1C 5.6 01/11/2021     TSH:    Lab Results   Component Value Date    TSH 2.230 03/25/2022     FERRITIN:    Lab Results   Component Value Date    FERRITIN 497 03/25/2022     EKG    04/30/22 1258    Specimen Type: Lobe, Right Lower    Specimen Source: BAL- Bronch. Lavage     Gram Stain Result No segmented neutrophils observed. Rare epithelial cells observed. Rare budding yeast and pseudohyphae observed.      Organism Candida albicans Abnormal     Respiratory Culture moderate growth            Assessment and Plan:   -Acute on chronic hypoxic respiratory failure s/p bronch  -A flutter / A. 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      Maeve Bliss MD, MD

## 2022-05-02 NOTE — PROGRESS NOTES
CT/CV Surgery Progress Note    2022 8:11 AM  Surgeon:  Dr. Madeline Joseph     Subjective: Mr. Arnold Burrell is resting comfortably in bed, alert, and in no acute distress. Pt has hx of hypertension, dyslipidemia, COPD, bilateral pulmonary infiltrate, coronary artery disease, paroxysmal atrial fibrillation, status post permanent cardiac pacemaker insertion, and status post CABG x3 and left atrial appendage closure with Atricure clip on 2021. He underwent a bronchoscopy 22, which revealed erythematous mucosa in the right lower lobe bronchus. There was no intrabronchial lesion. Resp culture RLL Bronch lavage 22: Candida albicans    I/O last 3 completed shifts: In: 250 [P.O.:240; I.V.:10]  Out: 2650 [Urine:2650]    Vital Signs: BP (!) 141/68   Pulse 55   Temp 97.7 °F (36.5 °C) (Oral)   Resp 16   Ht 5' 8\" (1.727 m)   Wt 156 lb 12 oz (71.1 kg)   SpO2 99%   BMI 23.83 kg/m²    Temp (24hrs), Av.8 °F (36.6 °C), Min:97.6 °F (36.4 °C), Max:97.9 °F (36.6 °C)    Labs:   CBC:   Recent Labs     22  0405 22  0359 22  0456   WBC 12.6* 14.2* 13.1*   HGB 10.4* 10.9* 10.4*   HCT 33.1* 35.1* 34.9*   MCV 91.7 92.6 95.4*    276 246     BMP:   Recent Labs     22  0359 22  0735 228 22  0456     --   --  134*   K 4.9  --   --  4.5   CL 98  --   --  97*   CO2 29  --   --  28   BUN 45*  --   --  49*   CREATININE 1.5*  --   --  1.4*   POCGLU  --    < > 207*  --     < > = values in this interval not displayed.      Imaging:  CXR and CT Chest reviewed     Intake/Output Summary (Last 24 hours) at 2022 0811  Last data filed at 2022 0402  Gross per 24 hour   Intake 250 ml   Output 2150 ml   Net -1900 ml     Scheduled Meds:    fluconazole  200 mg IntraVENous Q24H    heparin (porcine)  5,000 Units SubCUTAneous 3 times per day    dilTIAZem  30 mg Oral 3 times per day    insulin lispro  0-6 Units SubCUTAneous TID WC    insulin lispro  0-3 Units SubCUTAneous Nightly  levalbuterol  1.25 mg Nebulization TID    sodium chloride flush  5-40 mL IntraVENous 2 times per day    predniSONE  40 mg Oral Daily    atovaquone  1,500 mg Oral Daily    furosemide  20 mg Oral Daily    digoxin  125 mcg Oral Daily    aspirin  81 mg Oral Daily    atorvastatin  40 mg Oral Nightly    cetirizine  10 mg Oral Daily    metoprolol succinate  150 mg Oral Daily    multivitamin  1 tablet Oral Daily    mupirocin   Topical TID    nystatin   Topical BID    pantoprazole  40 mg Oral QAM AC    rOPINIRole  0.5 mg Oral Nightly    traZODone  50 mg Oral Nightly    cefepime  2,000 mg IntraVENous Q12H     ROS: All neg unless specifically mentioned in subjective section. Exam:  General Appearance: alert  Cardiovascular: normal rate, regular rhythm, normal S1 and S2, no murmurs, rubs, clicks, or gallops  Pulmonary/Chest: decreased BS bilaterally  Neurological: alert, oriented, normal speech, no focal findings or movement disorder noted    Assessment:   Patient Active Problem List   Diagnosis    Hyperlipidemia    Asthma    Smoker    Allergic rhinitis due to other allergen    Collagenous colitis    Lactose intolerance    HTN (hypertension)    COPD, mild (HCC)    Atrial fibrillation ([address]    Atherosclerotic heart disease of native coronary artery with other forms of angina pectoris ([address]    S/P CABG x 3    Encounter for cardioversion procedure    S/P left atrial appendage ligation    Ischemic cardiomyopathy    Chronic bronchitis (HCC)    CHF (congestive heart failure), NYHA class II, chronic, systolic (HCC)    Tachycardia-bradycardia (HCC)    [employer] dual pacemaker     Pneumonia    Dyspnea and respiratory abnormalities    Acute and chronic respiratory failure with hypoxia (HCC)    Thrush    Nosebleed    SOB (shortness of breath)     Plan:   1. Right VATS and lung biopsy planned for Thursday currently. Will repeat CXR today.   Resp culture Candida Albicans and on IV Diflucan. Xarelto on hold and covered with Heparin Injection 5,000 units q 8 hours.       The plan of care was discussed in detail with Dr. Rodriguez Memory     Electronically signed by Denisse Venegas PA-C on 5/2/2022 at 8:11 AM

## 2022-05-02 NOTE — RT PROTOCOL NOTE
RT Inhaler-Nebulizer Bronchodilator Protocol Note    There is a bronchodilator order in the chart from a provider indicating to follow the RT Bronchodilator Protocol and there is an Initiate RT Inhaler-Nebulizer Bronchodilator Protocol order as well (see protocol at bottom of note). CXR Findings:  No results found. The findings from the last RT Protocol Assessment were as follows:   History Pulmonary Disease: Chronic pulmonary disease  Respiratory Pattern: Mild dyspnea at rest, irregular pattern, or RR 21-25 bpm  Breath Sounds: Slightly diminished and/or crackles  Cough: Strong, spontaneous, non-productive  Indication for Bronchodilator Therapy: Decreased or absent breath sounds  Bronchodilator Assessment Score: 8    Aerosolized bronchodilator medication orders have been revised according to the RT Inhaler-Nebulizer Bronchodilator Protocol below. Respiratory Therapist to perform RT Therapy Protocol Assessment initially then follow the protocol. Repeat RT Therapy Protocol Assessment PRN for score 0-3 or on second treatment, BID, and PRN for scores above 3. No Indications - adjust the frequency to every 6 hours PRN wheezing or bronchospasm, if no treatments needed after 48 hours then discontinue using Per Protocol order mode. If indication present, adjust the RT bronchodilator orders based on the Bronchodilator Assessment Score as indicated below. Use Inhaler orders unless patient has one or more of the following: on home nebulizer, not able to hold breath for 10 seconds, is not alert and oriented, cannot activate and use MDI correctly, or respiratory rate 25 breaths per minute or more, then use the equivalent nebulizer order(s) with same Frequency and PRN reasons based on the score. If a patient is on this medication at home then do not decrease Frequency below that used at home.     0-3 - enter or revise RT bronchodilator order(s) to equivalent RT Bronchodilator order with Frequency of every 4 hours PRN for wheezing or increased work of breathing using Per Protocol order mode. 4-6 - enter or revise RT Bronchodilator order(s) to two equivalent RT bronchodilator orders with one order with BID Frequency and one order with Frequency of every 4 hours PRN wheezing or increased work of breathing using Per Protocol order mode. 7-10 - enter or revise RT Bronchodilator order(s) to two equivalent RT bronchodilator orders with one order with TID Frequency and one order with Frequency of every 4 hours PRN wheezing or increased work of breathing using Per Protocol order mode. 11-13 - enter or revise RT Bronchodilator order(s) to one equivalent RT bronchodilator order with QID Frequency and an Albuterol order with Frequency of every 4 hours PRN wheezing or increased work of breathing using Per Protocol order mode. Greater than 13 - enter or revise RT Bronchodilator order(s) to one equivalent RT bronchodilator order with every 4 hours Frequency and an Albuterol order with Frequency of every 2 hours PRN wheezing or increased work of breathing using Per Protocol order mode. RT to enter RT Home Evaluation for COPD & MDI Assessment order using Per Protocol order mode.     Electronically signed by Elizabeth Long RCP on 5/2/2022 at 7:54 AM

## 2022-05-02 NOTE — PROGRESS NOTES
Progress note: Infectious diseases    Patient - Jonny Ramos,  Age - 70 y.o.    - 1950      Room Number - 1N-93/067-V   MRN -  [id number]   Acct # - [de-identified]  Date of Admission -  2022 12:37 PM    SUBJECTIVE:   He is still on high flow oxygen. desaturates easily    OBJECTIVE   VITALS    height is 5' 8\" (1.727 m) and weight is 156 lb 12 oz (71.1 kg). His oral temperature is 97.6 °F (36.4 °C). His blood pressure is 134/71 and his pulse is 59. His respiration is 23 and oxygen saturation is 90%. Wt Readings from Last 3 Encounters:   22 156 lb 12 oz (71.1 kg)   22 155 lb 12.8 oz (70.7 kg)   22 154 lb (69.9 kg)       I/O (24 Hours)    Intake/Output Summary (Last 24 hours) at 2022 1014  Last data filed at 2022 6031  Gross per 24 hour   Intake 500 ml   Output 1950 ml   Net -1450 ml       General Appearance  Awake, alert, oriented,chronically sick looking. on high flow oxygen  HEENT - normocephalic, atraumatic, pale conjunctiva,  anicteric sclera  Neck - Supple, no mass. Lungs -  crackles at the lung bases. Diminished breath sound. Cardiovascular - Heart sounds are normal.    Abdomen - soft, not distended, nontender,   Neurologic -oriented.   Skin - No bruising or bleeding  Extremities - + edema,    Open gluteal wound  MEDICATIONS:      fluconazole  200 mg IntraVENous Q24H    heparin (porcine)  5,000 Units SubCUTAneous 3 times per day    dilTIAZem  30 mg Oral 3 times per day    insulin lispro  0-6 Units SubCUTAneous TID     insulin lispro  0-3 Units SubCUTAneous Nightly    levalbuterol  1.25 mg Nebulization TID    sodium chloride flush  5-40 mL IntraVENous 2 times per day    predniSONE  40 mg Oral Daily    atovaquone  1,500 mg Oral Daily    furosemide  20 mg Oral Daily    digoxin  125 mcg Oral Daily    aspirin  81 mg Oral Daily    atorvastatin  40 mg Oral Nightly    cetirizine  10 mg Oral Daily    metoprolol succinate  150 mg Oral Daily    multivitamin  1 tablet Oral Daily    mupirocin   Topical TID    nystatin   Topical BID    pantoprazole  40 mg Oral QAM AC    rOPINIRole  0.5 mg Oral Nightly    traZODone  50 mg Oral Nightly    cefepime  2,000 mg IntraVENous Q12H      dextrose      sodium chloride       dextrose, glucagon (rDNA), dextrose, glucose, sodium chloride flush, sodium chloride, metoprolol, ALPRAZolam, ipratropium-albuterol, ondansetron **OR** ondansetron, acetaminophen      LABS:     CBC:   Recent Labs     04/30/22  0405 05/01/22  0359 05/02/22  0456   WBC 12.6* 14.2* 13.1*   HGB 10.4* 10.9* 10.4*    276 246     BMP:    Recent Labs     05/01/22  0359 05/02/22 0456    134*   K 4.9 4.5   CL 98 97*   CO2 29 28   BUN 45* 49*   CREATININE 1.5* 1.4*   GLUCOSE 95 98     Calcium:  Recent Labs     05/02/22 0456   CALCIUM 8.4*     Ionized Calcium:No results for input(s): IONCA in the last 72 hours. Magnesium:  No results for input(s): MG in the last 72 hours. CULTURES:   UA: No results for input(s): SPECGRAV, PHUR, COLORU, CLARITYU, MUCUS, PROTEINU, BLOODU, RBCUA, WBCUA, BACTERIA, NITRU, GLUCOSEU, BILIRUBINUR, UROBILINOGEN, KETUA, LABCAST, LABCASTTY, AMORPHOS in the last 72 hours.     Invalid input(s): CRYSTALS  Micro:   Lab Results   Component Value Date    BC No growth-preliminary No growth  04/24/2022        Problem list of patient:     Patient Active Problem List   Diagnosis Code    Hyperlipidemia E78.5    Asthma J45.909    Smoker F17.200    Allergic rhinitis due to other allergen J30.89    Collagenous colitis K52.831    Lactose intolerance E73.9    HTN (hypertension) I10    COPD, mild ([address] J44.9    Atrial fibrillation ([address] I48.91    Atherosclerotic heart disease of native coronary artery with other forms of angina pectoris ([address] I25.118    S/P CABG x 3 Z95.1    Encounter for cardioversion procedure Z01.89    S/P left atrial

## 2022-05-02 NOTE — PROGRESS NOTES
DR DUNN PT / KNOWN AFIB/ XARELTO  AFIB BURDEN 19%  EPISODES OF AFIB WITH RVR     PT IS ALEENA ADMITTED TO [facility] DUAL PACEMAKER REMOTE  BATTERY 15 YRS REMAINING    P WAVES 2.9  RV WAVES 18.7  ATRIAL IMPEDENCE 479  VENT IMPEDENCE 599  VENT THRESHOLD 1 @ 0.4    VENT AMPLITUDE 2.5 @ 0.4      DDDR   A PACED 23%  V PACED 8%

## 2022-05-02 NOTE — PROGRESS NOTES
IM Progress Note  Dr. Yao Luna  5/2/2022 10:27 AM      Patient name Filomena Tapia  [id number]  PCP: Abelardo Andrade MD  Admit Date: 4/24/2022  Acct No. [de-identified]    Subjective: Interval History:   Now on 505 and 40 L on high flow  Does de sat very quickly   No cp   HR better but does become bradycardic        Diet: ADULT DIET; Regular; Low Sodium (2 gm)    I/O last 3 completed shifts: In: 250 [P.O.:240; I.V.:10]  Out: 2650 [Urine:2650]  I/O this shift:  In: 610 [P.O.:610]  Out: 200 [Urine:200]        Admission weight: 155 lb (70.3 kg) as of 4/24/2022 12:37 PM  Wt Readings from Last 3 Encounters:   04/26/22 156 lb 12 oz (71.1 kg)   04/20/22 155 lb 12.8 oz (70.7 kg)   04/13/22 154 lb (69.9 kg)     Body mass index is 23.83 kg/m².     ROS   CVS;  no cp or palpitation  Resp: +SOB+ cough  Neuro:  No numbness or weakness or dizziness  Abd: no nausea or vomiting or abd pain      Medications:   Scheduled Meds:   fluconazole  200 mg IntraVENous Q24H    heparin (porcine)  5,000 Units SubCUTAneous 3 times per day    dilTIAZem  30 mg Oral 3 times per day    insulin lispro  0-6 Units SubCUTAneous TID WC    insulin lispro  0-3 Units SubCUTAneous Nightly    levalbuterol  1.25 mg Nebulization TID    sodium chloride flush  5-40 mL IntraVENous 2 times per day    predniSONE  40 mg Oral Daily    atovaquone  1,500 mg Oral Daily    furosemide  20 mg Oral Daily    digoxin  125 mcg Oral Daily    aspirin  81 mg Oral Daily    atorvastatin  40 mg Oral Nightly    cetirizine  10 mg Oral Daily    metoprolol succinate  150 mg Oral Daily    multivitamin  1 tablet Oral Daily    mupirocin   Topical TID    nystatin   Topical BID    pantoprazole  40 mg Oral QAM AC    rOPINIRole  0.5 mg Oral Nightly    traZODone  50 mg Oral Nightly    cefepime  2,000 mg IntraVENous Q12H     Continuous Infusions:   dextrose      sodium chloride         Labs :     CBC:   Recent Labs     04/30/22  0405 05/01/22  0359 05/02/22  0456   WBC 12.6* 14.2* 13.1*   HGB 10.4* 10.9* 10.4*    276 246     BMP:    Recent Labs     05/01/22  0359 05/02/22  0456    134*   K 4.9 4.5   CL 98 97*   CO2 29 28   BUN 45* 49*   CREATININE 1.5* 1.4*   GLUCOSE 95 98     Hepatic:   Recent Labs     05/02/22  0456   AST 20   ALT 20   BILITOT 0.4   ALKPHOS 101     Troponin: No results for input(s): TROPONINI in the last 72 hours. BNP: No results for input(s): BNP in the last 72 hours. Lipids: No results for input(s): CHOL, HDL in the last 72 hours. Invalid input(s): LDLCALCU  INR: No results for input(s): INR in the last 72 hours.     Radiology    Objective:   Vitals: /71   Pulse 59   Temp 97.6 °F (36.4 °C) (Oral)   Resp 23   Ht 5' 8\" (1.727 m)   Wt 156 lb 12 oz (71.1 kg)   SpO2 90%   BMI 23.83 kg/m²   HEENT: Head:pupils react  Neck: supple  Lungs: diminished air entry bilat with few rales  Heart: regular rate and rhythm not  tachycardic  Abdomen: soft BS heard NG NT  Extremities: warm  No edema  Neurologic:  Alert, oriented X3    Impression:   :   Acute on chronic hypoxic respiratory failure s/p bronch  Par A. 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  Coronary disease status post coronary bypass graft x3 LIMA to LAD SVG to RCA and SVG to obtuse marginal  Cardiomyopathy with improved ejection fraction last echo was 54 to 60% this year  Status post pacemaker for tachybradycardia syndrome  COPD  Significant deconditioning  Leukocytosis  Chronic kidney disease stage III  Lactic acidosis  Chronic anemia  Hypertension  Hyperlipidemia  Gram + cult coag neg and also + for rhinovirus  Pressure ulcer -POA    Plan:    Cont bronchodilators and wean O2  Complete antimicrobial per ID  Plans are for VATS on Thursday  Wound care addressing his pressure ulcer  PT MENDEZ Stanley MD, MD

## 2022-05-02 NOTE — PLAN OF CARE
Problem: Respiratory - Adult  Goal: Clear lung sounds  Outcome: Progressing  Note: Coont aerosol to improve aeration     Problem: Respiratory - Adult  Goal: Adequate oxygenation  Outcome: Progressing  Note: Cont to wean HFNC as marilou

## 2022-05-02 NOTE — CARE COORDINATION
Collaborative Discharge Planning    Corbin Rodriguez  :  1950  MRN:  [id number]    ADMIT DATE:  2022      Discharge Planning Discharge Planning  Type of Residence: [facility]  Living Arrangements: Other (Comment)  Support Systems: Spouse/Significant Other  Current Services Prior To Admission: [place]. [facility] [address] Medications: No  Type of Home Care Services: None  Patient expects to be discharged to[de-identified] LTAC  Follow Up Appointment: Best Day/Time : Monday AM  One/Two Story Residence: One story  History of falls?: No  White Board Notes /Social Work Whiteboard Notes  /Social Work Whiteboard: ; SW - Return to [address] [place] for HF Oxygen weaning. No precert. COVID swab. Procedure      Bronchoscopy; await cultures/cytology  elevated WBC; monitor. NPO.      CXR: Pneumonia. Discharge Plan [place] [id number] [address]    Discharge Milestones and Delays: Clinical status: Await vats and lung biopsy on Thursday. Remains on high flow: 50%, 50L. IVF. IV cefepime. IV diflucan. Prednisone.          SIGNED:  Rose Garza RN   2022, 1:54 PM

## 2022-05-02 NOTE — PLAN OF CARE
Problem: Respiratory - Adult  Goal: Clear lung sounds  Outcome: Progressing  Note: Txs to help improve lung aeration.     Patient is currently on Heated High Flow 60L / 40%

## 2022-05-02 NOTE — PROGRESS NOTES
[facility]    Patient - Celestino Brown   MRN -  [id number]   Acct # - [de-identified]   - 1950      Date of Admission -  2022 12:37 PM  Date of evaluation -  2022  Room - [address] [facility] [address] Day - 8  Consulting - Jo-Ann Hill MD Primary Care Physician - Agatha Colorado MD     Problem List      Active Hospital Problems    Diagnosis Date Noted    SOB (shortness of breath) [R06.02] 2022     Priority: Medium    Pneumonia [J18.9] 2022     Reason for Consult    Acute hypoxic respiratory failure  HPI   Celestino Brown is a 70 y.o. male admitted for worsening respiratory status, clinical diagnosis of APT on steroids but not improving, got up to the restroom at the [facility] and became more SOB with sats dropping, brought to ED and started on   PAP with improvement, went to PAF and started on Diltiazem, currently on NSR and Spo2 in the 80s on 5 LPM sats fluctuate if he talks. CAD s/p CABG, ECHO EF 55 to 60% abnormal (paradoxical)motion, Afib/flutter, atrial clip, SSS s/p dual chamber PPM, HTN, HLP. 36 PY smoker stopped 2021  CXR as before reveals bilateral interstitial pattern  No fever, no sputum production, SARS (-), Blood molecular pattern (+),staph film BCx (+) GPC in clusters.   Past 24 Hours   -HFNC continues 40% 50LPM- 96%  -SOB with minimal exertion   -Afebrile  -No CP  -some episodes of bradycardia     -All systems reviewed   PMHx   Past Medical History      Diagnosis Date    Asthma     Atrial fibrillation with RVR ([facility] [address] 2021    [employer] dual pacemaker  04/15/2021    Collagenous colitis     per scope     Colon polyps     dr Kunal Dhillon    COPD, mild (HCC)     Eczema of hand     HTN (hypertension)     Hyperlipidemia     Smoker       Past Surgical History        Procedure Laterality Date    BRONCHOSCOPY N/A 2022    [facility]      COLONOSCOPY  06/10/2021    theresa ccolon polyps and collagenous colitis    CORONARY ARTERY BYPASS GRAFT  01/12/2021    cabg x 3 with lima and atrial appendage clip  dr Aric Wills GRAFT N/A 01/12/2021    CABG  X 3 WITH DEJAH, Atrial Appendage Clip performed by Willam Cosby MD at [facility] NASAL SINUS SURGERY  06/2008    polyps    OTHER SURGICAL HISTORY  DEC 7TH 2012 DR GET [place]     IGS ENDOSCOPIC BI LAT MAXILLARY, ETHMOID, SPHENOID, FRONTAL SINUSOTOMY WITH SEPTOPLASTY AND TURBINOPLASTIES    SKIN CANCER EXCISION  09/2014    Left side of Forehead     TOOTH EXTRACTION  02/2017     Meds    Current Medications    fluconazole  200 mg IntraVENous Q24H    heparin (porcine)  5,000 Units SubCUTAneous 3 times per day    dilTIAZem  30 mg Oral 3 times per day    insulin lispro  0-6 Units SubCUTAneous TID WC    insulin lispro  0-3 Units SubCUTAneous Nightly    levalbuterol  1.25 mg Nebulization TID    sodium chloride flush  5-40 mL IntraVENous 2 times per day    predniSONE  40 mg Oral Daily    atovaquone  1,500 mg Oral Daily    furosemide  20 mg Oral Daily    digoxin  125 mcg Oral Daily    aspirin  81 mg Oral Daily    atorvastatin  40 mg Oral Nightly    cetirizine  10 mg Oral Daily    metoprolol succinate  150 mg Oral Daily    multivitamin  1 tablet Oral Daily    mupirocin   Topical TID    nystatin   Topical BID    pantoprazole  40 mg Oral QAM AC    rOPINIRole  0.5 mg Oral Nightly    traZODone  50 mg Oral Nightly    cefepime  2,000 mg IntraVENous Q12H     dextrose, glucagon (rDNA), dextrose, glucose, sodium chloride flush, sodium chloride, metoprolol, ALPRAZolam, ipratropium-albuterol, ondansetron **OR** ondansetron, acetaminophen  IV Drips/Infusions   dextrose      sodium chloride Stopped (05/02/22 1247)     Home Medications  Medications Prior to Admission: albuterol (PROVENTIL) (2.5 MG/3ML) 0.083% nebulizer solution, Take 2.5 mg by nebulization 2 times daily  ipratropium-albuterol (DUONEB) 0.5-2.5 (3) MG/3ML SOLN nebulizer solution, Inhale 1 vial into the lungs every 4 hours as needed for Shortness of Breath  Sodium Chloride-Sodium Bicarb 2300-700 MG PACK, 1 spray by Nasal route in the morning, at noon, and at bedtime  FLUoxetine (PROZAC) 10 MG capsule, Take 10 mg by mouth daily (10 mg daily x 5 days - 4/24-4/28, then increase to 20 mg daily)  atovaquone (MEPRON) 750 MG/5ML suspension, Take 1,500 mg by mouth daily (10 mL daily)  predniSONE (DELTASONE) 20 MG tablet, Take 30 mg by mouth daily   ALPRAZolam (XANAX) 0.5 MG tablet, Take 0.5 mg by mouth every 12 hours as needed for Sleep. [DISCONTINUED] tuberculin (APLISOL) 5 UNIT/0.1ML injection, Inject 5 Units into the skin  [DISCONTINUED] nystatin (MYCOSTATIN) 252631 UNIT/GM cream, Apply topically 2 times daily Apply topically 2 times daily. [DISCONTINUED] oxymetazoline (AFRIN) 0.05 % nasal spray, 2 sprays by Nasal route 2 times daily  furosemide (LASIX) 20 MG tablet, Take 20 mg by mouth daily Sun, Wed  loratadine (CLARITIN) 10 MG tablet, Take 10 mg by mouth daily  metoprolol succinate (TOPROL XL) 100 MG extended release tablet, Take 1.5 tablets by mouth daily  tiotropium-olodaterol (STIOLTO) 2.5-2.5 MCG/ACT AERS, Inhale 2 puffs into the lungs daily  rivaroxaban (XARELTO) 15 MG TABS tablet, Take 1 tablet by mouth daily  mupirocin (BACTROBAN) 2 % ointment, Apply topically 3 times daily.   albuterol sulfate  (90 Base) MCG/ACT inhaler, INHALE 2 PUFFS INTO THE LUNGS EVERY 6 HOURS AS NEEDED FOR WHEEZING  traZODone (DESYREL) 50 MG tablet, Take 1 tablet by mouth nightly  rOPINIRole (REQUIP) 0.5 MG tablet, Take 1 tablet by mouth nightly  pantoprazole (PROTONIX) 40 MG tablet, Take 1 tablet by mouth every morning (before breakfast)  [DISCONTINUED] cetirizine (ZYRTEC) 10 MG tablet, Take 10 mg by mouth daily  atorvastatin (LIPITOR) 40 MG tablet, Take 1 tablet by mouth nightly  aspirin 81 MG chewable tablet, Take 1 tablet by mouth daily  Multiple Vitamins-Minerals (CENTRUM SILVER PO), Take 1 tablet by mouth daily   Diet    ADULT DIET; Regular; Low Sodium (2 gm)  Allergies    Penicillins and Sulfa antibiotics  Social History     Social History     Socioeconomic History    Marital status:      Spouse name: Not on file    Number of children: Not on file    Years of education: Not on file    Highest education level: Not on file   Occupational History    Not on file   Tobacco Use    Smoking status: Former Smoker     Packs/day: 1.00     Years: 40.00     Pack years: 40.00     Types: Cigarettes     Quit date: 3/7/2022     Years since quittin.1    Smokeless tobacco: Never Used   Substance and Sexual Activity    Alcohol use: Not Currently     Alcohol/week: 0.0 standard drinks    Drug use: No    Sexual activity: Not on file   Other Topics Concern    Not on file   Social History Narrative    No barriers with medication affordability now that he has met deductible    Active with [facility]    Has O2 and supplies needed    No barriers with transportation     Social Determinants of Health     Financial Resource Strain: Low Risk     Difficulty of Paying Living Expenses: Not hard at all   Food Insecurity: No Food Insecurity    Worried About [facility] in the Last Year: Never true    [facility] in the Last Year: Never true   Transportation Needs: No Transportation Needs    Lack of Transportation (Medical): No    Lack of Transportation (Non-Medical):  No   Physical Activity: Inactive    Days of Exercise per Week: 0 days    Minutes of Exercise per Session: 0 min   Stress:     Feeling of Stress : Not on file   Social Connections:     Frequency of Communication with Friends and Family: Not on file    Frequency of Social Gatherings with Friends and Family: Not on file    Attends Anabaptist Services: Not on file    Active Member of Clubs or Organizations: Not on file    Attends Club or Organization Meetings: Not on file    Marital Status: Not on file   Intimate Partner Violence:     Fear of Current or Ex-Partner: Not on file    Emotionally Abused: Not on file    Physically Abused: Not on file    Sexually Abused: Not on file   Housing Stability: Unknown    Unable to Pay for Housing in the Last Year: No    Number of Places Lived in the Last Year: Not on file    Unstable Housing in the Last Year: No     Family History          Problem Relation Age of Onset    Heart Disease Mother     Heart Disease Father         cabg    Diabetes Brother     Heart Disease Brother        Vitals     height is 5' 8\" (1.727 m) and weight is 156 lb 12 oz (71.1 kg). His oral temperature is 97.4 °F (36.3 °C). His blood pressure is 122/67 and his pulse is 64. His respiration is 22 and oxygen saturation is 96%. Body mass index is 23.83 kg/m². I/O        Intake/Output Summary (Last 24 hours) at 5/2/2022 1358  Last data filed at 5/2/2022 1339  Gross per 24 hour   Intake 1155.56 ml   Output 1800 ml   Net -644.44 ml     I/O last 3 completed shifts: In: 250 [P.O.:240; I.V.:10]  Out: 2650 [Urine:2650]   No data found. Exam   General Appearance  Awake, alert, looks chronically ill and tired, dyspneic. SOB on HFNC  HEENT - Normal, Head is normocephalic, atraumatic. EOMI, PERRLA, pale conjunctiva  Neck - Supple, symmetrical, trachea midline and Soft, trachea midline and straight  Lungs -diminished bilateral breath sounds, bibasilar  rales  Cardiovascular - NSR. Edema R arm   Abdomen - Soft, nontender, nondistended, no masses or organomegaly  Neurologic - CN II-XII are grossly intact.  There are no focal motor or sensory deficits  Skin - No bruising or bleeding  Extremities - No cyanosis, clubbing or edema    Labs  - Old records and notes have been reviewed in [facility]   CBC     Lab Results   Component Value Date    WBC 13.1 05/02/2022    RBC 3.66 05/02/2022    RBC 4.84 11/07/2011    HGB 10.4 05/02/2022    HCT 34.9 05/02/2022     05/02/2022    MCV 95.4 05/02/2022    MCH 28.4 05/02/2022    MCHC 29.8 05/02/2022    RDW 13.3 06/07/2018    NRBC 0 04/30/2022    NRBC 0 11/07/2011    SEGSPCT 89.2 04/30/2022    MONOPCT 4.7 04/30/2022    MONOSABS 0.6 04/30/2022    LYMPHSABS 0.6 04/30/2022    EOSABS 0.0 04/30/2022    BASOSABS 0.0 04/30/2022    DIFFTYPE see below 03/25/2022     BMP   Lab Results   Component Value Date     05/02/2022    K 4.5 05/02/2022    K 5.0 04/24/2022    CL 97 05/02/2022    CO2 28 05/02/2022    BUN 49 05/02/2022    CREATININE 1.4 05/02/2022    GLUCOSE 98 05/02/2022    GLUCOSE 94 11/07/2011    CALCIUM 8.4 05/02/2022    MG 1.9 04/28/2022     LFTS  Lab Results   Component Value Date    ALKPHOS 101 05/02/2022    ALT 20 05/02/2022    AST 20 05/02/2022    PROT 6.0 05/02/2022    BILITOT 0.4 05/02/2022    BILIDIR <0.2 03/26/2022    LABALBU 2.7 05/02/2022    LABALBU 4.3 11/07/2011     ABG   Lab Results   Component Value Date    PH 7.44 04/24/2022    PCO2 39 04/24/2022    PO2 191 04/24/2022    HCO3 26 04/24/2022    O2SAT 100 04/24/2022     @  Lab Results   Component Value Date    APTT 25.3 03/25/2022     INR   Lab Results   Component Value Date    INR 1.41 (H) 03/25/2022    INR 1.25 (H) 04/06/2021    INR 1.13 01/11/2021     Angio Convert Enzyme 8 - 52 U/L 21        PFTs     Cultures    (+) blood culture GPC in clusters    4/26/22  Bronchoscopy- will follow cultures/cytology   Endobronchial findings:   Trachea: Normal mucosa  Diana: Normal mucosa  Right main bronchus: Normal mucosa, mucus  Right upper lobe bronchus: Normal mucosa, mucus  Right Middle lobe bronchus: Normal mucosa, mucus  Right Lower lobe bronchus: erythematous mucosa  Left main bronchus: Normal mucosa  Left upper lobe bronchus: Normal mucosa  Left lower lobe bronchus: Normal mucosa     Rhinovirus Enterovirus PCR Detected Abnormal      AFB (-)  Virus cultures (-)  Cytology: FINAL RESULTS:   No malignant cells seen.      Specimen consists of benign appearing squamous      cells, alveolar macrophages, and debris. Flow cytometry: IMPRESSION:   1. Low viability specimen showing a predominance of granulocytes   without immunophenotypic aberrancy. 2. Rare phenotypically unremarkable T-cells with a normal CD4:CD8   ratio of 2:4. No abnormal B cell, plasma cell, T cell, or NK cell population   identified. See comment. Radiology    CXR  5/2/22  XR CHEST PORTABLE   Improved aeration and decreased interstitial opacities compared to prior exam.          03/07/2022 --->  04/29/2022            CT chest     01/09/2021 ---> 03/15/2022            Assessment   Acute on chronic hypoxic respiratory failure  Rapidly progressing Acute Interstitial pneumonia (AIP) DDX Amiodarone Toxicity vs RBILD vs OP since 03/7/2022 (No evidence ILD back in 2021)  Interstitial infiltrate, thickening interlobular septa  CAD s/p CABG   Afib   Full Code   Recommendations   Adjust supplemental oxygen to maintain SpO2 > 88%   BiPAP 18/6 HS and prn  Continue steroids  Continue Atovaquone   CTS planning VATS next week Thursday 5/5/22  [address] [place] [address] on hold    Case discussed with nurse and patient/family. Questions and concerns addressed. Meds and orders reviewed     Electronically signed by   Calton Ahumada, APRN - CNP on 5/2/2022 at 1:58 PM    Vitals:    05/02/22 1556   BP:    Pulse:    Resp:    Temp:    SpO2: 98%     BAL with macrophages with positive staining for lipid accumulation with supports the diagnosis of APT. Continue steroids  VATS bx Thursday      Cytology:    FINAL RESULTS:   Right lower lobe of lung, bronchial washings, cytospin preparation:     Alveolar macrophages with positive staining for lipid accumulation.     Please see microscopic examination. Patient seen and examined independently by me. Above discussed and I agree with  CNP note Also see my additional comments. Labs, cultures, and radiographs when available were reviewed. Changes were made in the orders as necessary. I discussed patient concerns with Maria Fernanda HICKEY and instructions were given. Respiratory care issues addressed. Please see our orders for the updated patient care plan.     Electronically signed by     Emmett Gordon MD on 5/2/2022 at 4:47 PM

## 2022-05-03 NOTE — PROGRESS NOTES
IM Progress Note  Dr. Rahul Parker 11:59 AM      Patient name Frederic Garcias  [id number]  PCP: Beto Theodore MD  Admit Date: 4/24/2022  Acct No. [de-identified]    Subjective: Interval History:   Up in chair  On high flow O2  D/w wife at bedside          Diet: ADULT DIET; Regular; Low Sodium (2 gm)  ADULT ORAL NUTRITION SUPPLEMENT; Lunch, Breakfast, Dinner; Other Oral Supplement; strawberry ensure plus B,L,D    I/O last 3 completed shifts: In: 1305.6 [P.O.:1120; I.V.:10.2; IV Piggyback:175.4]  Out: 2090 [Urine:2090]  I/O this shift:  In: -   Out: 200 [Urine:200]        Admission weight: 155 lb (70.3 kg) as of 4/24/2022 12:37 PM  Wt Readings from Last 3 Encounters:   05/03/22 149 lb 0.5 oz (67.6 kg)   04/20/22 155 lb 12.8 oz (70.7 kg)   04/13/22 154 lb (69.9 kg)     Body mass index is 22.66 kg/m².     ROS   CVS;  no cp or palpitation  Resp: +SOB+ cough  Neuro:  No numbness or weakness or dizziness  Abd: no nausea or vomiting or abd pain      Medications:   Scheduled Meds:   fluconazole  200 mg Oral Daily    heparin (porcine)  5,000 Units SubCUTAneous 3 times per day    dilTIAZem  30 mg Oral 3 times per day    insulin lispro  0-6 Units SubCUTAneous TID WC    insulin lispro  0-3 Units SubCUTAneous Nightly    levalbuterol  1.25 mg Nebulization TID    sodium chloride flush  5-40 mL IntraVENous 2 times per day    predniSONE  40 mg Oral Daily    atovaquone  1,500 mg Oral Daily    furosemide  20 mg Oral Daily    digoxin  125 mcg Oral Daily    aspirin  81 mg Oral Daily    atorvastatin  40 mg Oral Nightly    cetirizine  10 mg Oral Daily    metoprolol succinate  150 mg Oral Daily    multivitamin  1 tablet Oral Daily    mupirocin   Topical TID    nystatin   Topical BID    pantoprazole  40 mg Oral QAM AC    rOPINIRole  0.5 mg Oral Nightly    traZODone  50 mg Oral Nightly     Continuous Infusions:   dextrose      sodium chloride Stopped (05/02/22 1247)       Labs :     CBC:   Recent Labs 05/01/22  0359 05/02/22 0456   WBC 14.2* 13.1*   HGB 10.9* 10.4*    246     BMP:    Recent Labs     05/01/22  0359 05/02/22 0456    134*   K 4.9 4.5   CL 98 97*   CO2 29 28   BUN 45* 49*   CREATININE 1.5* 1.4*   GLUCOSE 95 98     Hepatic:   Recent Labs     05/02/22 0456   AST 20   ALT 20   BILITOT 0.4   ALKPHOS 101     Troponin: No results for input(s): TROPONINI in the last 72 hours. BNP: No results for input(s): BNP in the last 72 hours. Lipids: No results for input(s): CHOL, HDL in the last 72 hours. Invalid input(s): LDLCALCU  INR: No results for input(s): INR in the last 72 hours.     Radiology    Objective:   Vitals: BP (!) 153/77   Pulse 55   Temp 97.4 °F (36.3 °C) (Oral)   Resp 20   Ht 5' 8\" (1.727 m)   Wt 149 lb 0.5 oz (67.6 kg)   SpO2 96%   BMI 22.66 kg/m²   HEENT: Head:pupils react  Neck: supple  Lungs: diminished air entry bilat   Heart: regular  rhythm  Abdomen: soft BS heard NG NT  Extremities: warm  No edema  Neurologic:  Alert, oriented X3    Impression:   :   Acute on chronic hypoxic respiratory failure s/p bronch  Par A. 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  Coronary disease status post coronary bypass graft x3 LIMA to LAD SVG to RCA and SVG to obtuse marginal  Cardiomyopathy with improved ejection fraction last echo was 54 to 60% this year  Status post pacemaker for tachybradycardia syndrome  COPD  Significant deconditioning  Leukocytosis  Chronic kidney disease stage III  Lactic acidosis  Chronic anemia  Hypertension  Hyperlipidemia  Gram + cult coag neg and also + for rhinovirus  Pressure ulcer -POA    Plan:    Plans are for VATS on Thursday  Wound care addressing his pressure ulcer  Complete antimicrobial per ID  Pt with ongoing chronic resp issues, Tissue biopsy will help confirm with his diagnosis and prognosis    Reid Lieberman MD, MD

## 2022-05-03 NOTE — PROGRESS NOTES
CT/CV Surgery Progress Note    5/3/2022 7:57 AM  Surgeon:  Dr. Liza Easley     Subjective: Mr. Radha Asher is resting comfortably in bed, alert, and in no acute distress. Pt has hx of hypertension, dyslipidemia, COPD, bilateral pulmonary infiltrate, coronary artery disease, paroxysmal atrial fibrillation, status post permanent cardiac pacemaker insertion, and status post CABG x3 and left atrial appendage closure with Atricure clip on 2021. He underwent a bronchoscopy 22, which revealed erythematous mucosa in the right lower lobe bronchus. There was no intrabronchial lesion. Resp culture RLL Bronch lavage 22: Candida albicans- on Diflucan    I/O last 3 completed shifts: In: 1305.6 [P.O.:1120; I.V.:10.2; IV Piggyback:175.4]  Out:  [Urine:]    Vital Signs: /77   Pulse 55   Temp 97.5 °F (36.4 °C) (Axillary)   Resp 17   Ht 5' 8\" (1.727 m)   Wt 149 lb 0.5 oz (67.6 kg)   SpO2 97%   BMI 22.66 kg/m²    Temp (24hrs), Av.7 °F (36.5 °C), Min:97.4 °F (36.3 °C), Max:98.4 °F (36.9 °C)    Labs:   CBC:   Recent Labs     22  03522  0456   WBC 14.2* 13.1*   HGB 10.9* 10.4*   HCT 35.1* 34.9*   MCV 92.6 95.4*    246     BMP:   Recent Labs     22  03522  0735 22  0456 22  0824 22  2103     --  134*  --   --    K 4.9  --  4.5  --   --    CL 98  --  97*  --   --    CO2 29  --  28  --   --    BUN 45*  --  49*  --   --    CREATININE 1.5*  --  1.4*  --   --    POCGLU  --    < >  --    < > 192*    < > = values in this interval not displayed.      Imaging:  CXR: 5/3/22  Pending    Intake/Output Summary (Last 24 hours) at 5/3/2022 0756  Last data filed at [facility] 60  Gross per 24 hour   Intake 1295.56 ml   Output 1590 ml   Net -294.44 ml     Scheduled Meds:    fluconazole  200 mg Oral Daily    heparin (porcine)  5,000 Units SubCUTAneous 3 times per day    dilTIAZem  30 mg Oral 3 times per day    insulin lispro  0-6 Units SubCUTAneous TID WC    insulin lispro  0-3 Units SubCUTAneous Nightly    levalbuterol  1.25 mg Nebulization TID    sodium chloride flush  5-40 mL IntraVENous 2 times per day    predniSONE  40 mg Oral Daily    atovaquone  1,500 mg Oral Daily    furosemide  20 mg Oral Daily    digoxin  125 mcg Oral Daily    aspirin  81 mg Oral Daily    atorvastatin  40 mg Oral Nightly    cetirizine  10 mg Oral Daily    metoprolol succinate  150 mg Oral Daily    multivitamin  1 tablet Oral Daily    mupirocin   Topical TID    nystatin   Topical BID    pantoprazole  40 mg Oral QAM AC    rOPINIRole  0.5 mg Oral Nightly    traZODone  50 mg Oral Nightly     ROS: All neg unless specifically mentioned in subjective section. Exam:  General Appearance: alert  Cardiovascular: normal rate, regular rhythm, normal S1 and S2, no murmurs, rubs, clicks, or gallops  Pulmonary/Chest: decreased BS bilaterally  Neurological: alert, oriented, normal speech, no focal findings or movement disorder noted    Assessment:   Patient Active Problem List   Diagnosis    Hyperlipidemia    Asthma    Smoker    Allergic rhinitis due to other allergen    Collagenous colitis    Lactose intolerance    HTN (hypertension)    COPD, mild (HCC)    Atrial fibrillation ([address]    Atherosclerotic heart disease of native coronary artery with other forms of angina pectoris ([address]    S/P CABG x 3    Encounter for cardioversion procedure    S/P left atrial appendage ligation    Ischemic cardiomyopathy    Chronic bronchitis (HCC)    CHF (congestive heart failure), NYHA class II, chronic, systolic (HCC)    Tachycardia-bradycardia (HCC)    [employer] dual pacemaker     Pneumonia    Dyspnea and respiratory abnormalities    Acute and chronic respiratory failure with hypoxia (HCC)    Thrush    Nosebleed    SOB (shortness of breath)     Plan:   1. Right VATS and lung biopsy planned for Thursday currently. Will repeat CXR today.   Resp culture Candida Albicans and on IV Diflucan. Xarelto on hold and covered with Heparin Injection 5,000 units q 8 hours.       The plan of care was discussed in detail with Dr. Barbara Fuchs     Electronically signed by Ash Justice PA-C on 5/3/2022 at 7:57 AM

## 2022-05-03 NOTE — PROGRESS NOTES
Comprehensive Nutrition Assessment    Type and Reason for Visit:  Reassess    Nutrition Recommendations/Plan:   1. Continue current diet. 2. Send Ensure Plus TID per pt request. He had not been receiving any this admit. 3. Consider MVI as tolerated. Malnutrition Assessment:  Malnutrition Status: At risk for malnutrition (Comment) (05/03/22 3531)    Context:  Acute Illness     Findings of the 6 clinical characteristics of malnutrition:  Energy Intake:  No significant decrease in energy intake  Weight Loss:   (3.9% wt loss per usual wt per pt)     Body Fat Loss:  No significant body fat loss     Muscle Mass Loss:  No significant muscle mass loss    Fluid Accumulation:  Mild Extremities   Strength:  Not Performed       Nutrition Assessment:    Pt. nutritionally improving as evidenced by % however wt is down ~9lbs from usual dry wt per pt recall, had not been receivng ONS, notice VATS & lung Biopsy planned for (5/5), remains on HFNC. At risk for further nutrition compromise r/t admitted with pneumonia, bronch completed, and underlying medical condition (former smoker, A-fib, pacemaker, collagenous colitis, COPD, HTN, HLD    Nutrition Related Findings:    Pt. Report/Treatments/Miscellaneous: Pt seen, on HFNC. Doesn't cook or add salt to foods at home, mentions has had low sodium diet education & declines diet review. He denies difficulty chewing/swallowing foods. Appetite is fair. GI Status: Pt mentions he had a BM today  Pertinent Labs: (5/2) Na 134, BUN 49, Creatinine 1.4, Ca Serum 8.4, Hemoglobin 10.4  Pertinent Meds: Lasix, Humalog, MVI, Deltasone, Zofran    Wound Type:  (stage II buttocks, DTI ear left)       Current Nutrition Intake & Therapies:    Average Meal Intake: % (however has been losing wt ( 6-9lbs wt loss from usual wt, on high flow, VATS & lung Bx planned for 5/5))       ADULT DIET; Regular; Low Sodium (2 gm)  ADULT ORAL NUTRITION SUPPLEMENT; Lunch, Breakfast, Dinner;  Other Oral Supplement; strawberry ensure plus B,L,D    Anthropometric Measures:  Height: 5' 8\" (172.7 cm)  Ideal Body Weight (IBW): 154 lbs (70 kg)    Admission Body Weight: 164 lb (74.4 kg) (4/24/22, standing scale)  Current Body Weight: 149 lb 0.5 oz (67.6 kg) ((5/3) edema N/A),   IBW. Weight Source: Bed Scale  Current BMI (kg/m2): 22.7  Usual Body Weight: 154 lb (69.9 kg) (EHR, 3/25/22, bedscale, per pt ~158#)  % Weight Change (Calculated): 1.3                    BMI Categories: Normal Weight (BMI 18.5-24. 9)    Estimated Daily Nutrient Needs:  Energy Requirements Based On: Kcal/kg  Weight Used for Energy Requirements: Current (71kg)  Energy (kcal/day): 0078-1940 (25-30 kcals/kg)  Weight Used for Protein Requirements: Current (71kg)  Protein (g/day): 85+ (1.2+ grams/kg)       Nutrition Diagnosis:   · Increased nutrient needs related to catabolic illness,impaired respiratory function as evidenced by  (COPD, on High Flow)      Nutrition Interventions:   Food and/or Nutrient Delivery: Continue Current Diet,Start Oral Nutrition Supplement  Nutrition Education/Counseling: Education declined  Coordination of Nutrition Care: Continue to monitor while inpatient       Goals:  Previous Goal Met: Progressing toward Goal(s)  Goals: Meet at least 75% of estimated needs       Nutrition Monitoring and Evaluation:   Behavioral-Environmental Outcomes: None Identified  Food/Nutrient Intake Outcomes: Diet Advancement/Tolerance,Food and Nutrient Intake,Supplement Intake  Physical Signs/Symptoms Outcomes: Biochemical Data,GI Status,Fluid Status or Edema,Meal Time Behavior,Nutrition Focused Physical Findings,Skin,Weight    Discharge Planning:     Too soon to determine     Danuta Mason RD, LD  Contact: [phone]

## 2022-05-03 NOTE — CARE COORDINATION
Collaborative Discharge Planning    Jody Mean  :  1950  MRN:  [id number]    ADMIT DATE:  2022      Discharge Planning Discharge Planning  Type of Residence: [place] [facility])  Living Arrangements: Other (Comment)  Support Systems: Spouse/Significant Other  Current Services Prior To Admission: [facility] 58 Medications: No  Meds-to-Beds: Does the patient want to have any new prescriptions delivered to bedside prior to discharge?: No  Type of Home Care Services: None  Patient expects to be discharged to[de-identified] LTAC  Follow Up Appointment: Best Day/Time : Monday AM  One/Two Story Residence: One story  History of falls?: No  White Board Notes /Social Work Whiteboard Notes  /Social Work Whiteboard: 5/3; [address] [place] [address] for HF Oxygen weaning. No precert. Does not want to return to [facility]. Procedure    Bronchoscopy; await cultures/cytology  elevated WBC; monitor. NPO.      CXR: Pneumonia. Discharge Plan Return to [facility] vs [place]. Discharge Milestones and Delays: Clinical status: Remains on high flow. Planning VATs on Thursday. IV diflucan for + resp cultures: Candida albicans.          SIGNED:  Elida Santana RN   5/3/2022, 2:46 PM

## 2022-05-03 NOTE — PLAN OF CARE
Problem: Discharge Planning  Goal: Discharge to home or other facility with appropriate resources  5/3/2022 1209 by Jose Lindsay RN  Outcome: Progressing  Flowsheets (Taken 5/3/2022 1209)  Discharge to home or other facility with appropriate resources:   Identify barriers to discharge with patient and caregiver   Identify discharge learning needs (meds, wound care, etc)   Refer to discharge planning if patient needs post-hospital services based on physician order or complex needs related to functional status, cognitive ability or social support system   Arrange for needed discharge resources and transportation as appropriate     Problem: Safety - Adult  Goal: Free from fall injury  5/3/2022 1209 by Jose Lindsay RN  Outcome: Progressing  Flowsheets (Taken 5/3/2022 1209)  Free From Fall Injury: Instruct family/caregiver on patient safety     Problem: ABCDS Injury Assessment  Goal: Absence of physical injury  Outcome: Progressing  Note: No injury this shift     Problem: Respiratory - Adult  Goal: Clear lung sounds  5/3/2022 1209 by Jose Lindsay RN  Outcome: Progressing  Note: Lungs dim     Problem: Respiratory - Adult  Goal: Adequate oxygenation  5/3/2022 1209 by Jose Lindsay RN  Outcome: Progressing  Note: Continues on HeatedHigh Flow Nasal Cannula     Problem: Chronic Conditions and Co-morbidities  Goal: Patient's chronic conditions and co-morbidity symptoms are monitored and maintained or improved  5/3/2022 1209 by Jose Lindsay RN  Outcome: Progressing  Flowsheets (Taken 5/3/2022 1209)  Care Plan - Patient's Chronic Conditions and Co-Morbidity Symptoms are Monitored and Maintained or Improved:   Monitor and assess patient's chronic conditions and comorbid symptoms for stability, deterioration, or improvement   Collaborate with multidisciplinary team to address chronic and comorbid conditions and prevent exacerbation or deterioration   Update acute care plan with appropriate goals if chronic or comorbid symptoms are exacerbated and prevent overall improvement and discharge     Problem: Nutrition Deficit:  Goal: Optimize nutritional status  5/3/2022 1209 by Miranda Garcia RN  Outcome: Progressing  Note: Diet encouraged. Ensure supplements ordered     Problem: Skin/Tissue Integrity  Goal: Absence of new skin breakdown  Description: 1. Monitor for areas of redness and/or skin breakdown  2. Assess vascular access sites hourly  3. Every 4-6 hours minimum:  Change oxygen saturation probe site  4. Every 4-6 hours:  If on nasal continuous positive airway pressure, respiratory therapy assess nares and determine need for appliance change or resting period. 5/3/2022 1209 by Miranda Garcia RN  Outcome: Progressing  Note: No new skin breakdown noted     Problem: Pain  Goal: Verbalizes/displays adequate comfort level or baseline comfort level  Outcome: Adequate for Discharge  Flowsheets (Taken 5/2/2022 7822 by Hany Du RN)  Verbalizes/displays adequate comfort level or baseline comfort level:   Encourage patient to monitor pain and request assistance   Assess pain using appropriate pain scale   Administer analgesics based on type and severity of pain and evaluate response    Care plan reviewed with patient and family. Patient and family verbalize understanding of the plan of care and contribute to goal setting.

## 2022-05-03 NOTE — PLAN OF CARE
Pt remains on Heated Highflow at this time 40L 45% - pain controlled with PRN medications as stated by patient. Fall protocols remain in place at this time. Will continue to monitor.      Problem: Discharge Planning  Goal: Discharge to home or other facility with appropriate resources  5/3/2022 0322 by Glynn Swain RN  Outcome: Progressing  5/2/2022 1556 by Karan Solomon RN  Outcome: Progressing  Flowsheets (Taken 5/2/2022 1556)  Discharge to home or other facility with appropriate resources:   Identify barriers to discharge with patient and caregiver   Identify discharge learning needs (meds, wound care, etc)   Refer to discharge planning if patient needs post-hospital services based on physician order or complex needs related to functional status, cognitive ability or social support system     Problem: Safety - Adult  Goal: Free from fall injury  5/3/2022 0322 by Glynn Swain RN  Outcome: Progressing  5/2/2022 1556 by Karan Solomon RN  Outcome: Progressing  Flowsheets (Taken 5/2/2022 1556)  Free From Fall Injury:   Instruct family/caregiver on patient safety   Based on caregiver fall risk screen, instruct family/caregiver to ask for assistance with transferring infant if caregiver noted to have fall risk factors     Problem: ABCDS Injury Assessment  Goal: Absence of physical injury  5/2/2022 1556 by Karan Solomon RN  Outcome: Progressing  Note: Hourly rounding, bed and chair alarm in use, 1 assist, BLE weakness, SOB on exertion     Problem: Respiratory - Adult  Goal: Clear lung sounds  5/3/2022 0322 by Glynn Swain RN  Outcome: Progressing  5/2/2022 2055 by Mihir Blair RCP  Outcome: Progressing  5/2/2022 1556 by Karan Solomon RN  Outcome: Progressing  Note: Continuous pulse ox, abnormal lung sounds, SOB on exertion, breathing tx, wean down oxygen support, monitor chest x ray and vitals   Goal: Adequate oxygenation  5/2/2022 2055 by Mihir Blair RCP  Outcome: Progressing  5/2/2022 1556 by Sorin Ramey RN  Outcome: Progressing     Problem: Chronic Conditions and Co-morbidities  Goal: Patient's chronic conditions and co-morbidity symptoms are monitored and maintained or improved  5/3/2022 0322 by Mason Guillen RN  Outcome: Progressing  5/2/2022 1556 by Sorin Ramey RN  Outcome: Progressing  Flowsheets (Taken 5/2/2022 1556)  Care Plan - Patient's Chronic Conditions and Co-Morbidity Symptoms are Monitored and Maintained or Improved:   Monitor and assess patient's chronic conditions and comorbid symptoms for stability, deterioration, or improvement   Collaborate with multidisciplinary team to address chronic and comorbid conditions and prevent exacerbation or deterioration   Update acute care plan with appropriate goals if chronic or comorbid symptoms are exacerbated and prevent overall improvement and discharge     Problem: Pain  Goal: Verbalizes/displays adequate comfort level or baseline comfort level  5/2/2022 1556 by Sorin Ramey RN  Outcome: Progressing  Flowsheets (Taken 5/2/2022 1556)  Verbalizes/displays adequate comfort level or baseline comfort level:   Encourage patient to monitor pain and request assistance   Assess pain using appropriate pain scale   Implement non-pharmacological measures as appropriate and evaluate response     Problem: Nutrition Deficit:  Goal: Optimize nutritional status  5/3/2022 0322 by Mason Guillen RN  Outcome: Progressing  5/2/2022 1556 by Sorin Ramey RN  Outcome: Progressing  Note: Strict I&Os, offer foods he likes      Problem: Skin/Tissue Integrity  Goal: Absence of new skin breakdown  Description: 1. Monitor for areas of redness and/or skin breakdown  2. Assess vascular access sites hourly  3. Every 4-6 hours minimum:  Change oxygen saturation probe site  4. Every 4-6 hours:  If on nasal continuous positive airway pressure, respiratory therapy assess nares and determine need for appliance change or resting period.   5/3/2022 0322 by [facility] RN  Outcome: Progressing  5/2/2022 1556 by Mindy Booth, RN  Outcome: Progressing  Note: Wound care per shift, q2hr turns, stage 2 buttocks, BLE weakness, incontinent of urine at times, encourage protein for wound healing

## 2022-05-03 NOTE — PROGRESS NOTES
Dr Madeline Joseph discussed VATS procedure in room with patient and wife and patient wants to go have it done. Patient states that he just wants to get better and can't live like this.

## 2022-05-03 NOTE — PROGRESS NOTES
Progress note: Infectious diseases    Patient - Celestino Brown,  Age - 70 y.o.    - 1950      Room Number - 4W-76/636-U   MRN -  [id number]   Acct # - [de-identified]  Date of Admission -  2022 12:37 PM    SUBJECTIVE:   No new issues. OBJECTIVE   VITALS    height is 5' 8\" (1.727 m) and weight is 149 lb 0.5 oz (67.6 kg). His oral temperature is 97.4 °F (36.3 °C). His blood pressure is 153/77 (abnormal) and his pulse is 55. His respiration is 20 and oxygen saturation is 96%. Wt Readings from Last 3 Encounters:   22 149 lb 0.5 oz (67.6 kg)   22 155 lb 12.8 oz (70.7 kg)   22 154 lb (69.9 kg)       I/O (24 Hours)    Intake/Output Summary (Last 24 hours) at 5/3/2022 1136  Last data filed at 5/3/2022 0929  Gross per 24 hour   Intake 685.56 ml   Output 1590 ml   Net -904.44 ml       General Appearance  Awake, alert, oriented, on high flow oxygen. HEENT - normocephalic, atraumatic, pale conjunctiva,  anicteric sclera  Neck - Supple, no mass. Lungs -   Diminished breath sound,   Cardiovascular - Heart sounds are normal.    Abdomen - soft, not distended, nontender,   Neurologic -oriented.   Skin - No bruising or bleeding  Extremities - + edema,    Open gluteal wound  MEDICATIONS:      fluconazole  200 mg Oral Daily    heparin (porcine)  5,000 Units SubCUTAneous 3 times per day    dilTIAZem  30 mg Oral 3 times per day    insulin lispro  0-6 Units SubCUTAneous TID     insulin lispro  0-3 Units SubCUTAneous Nightly    levalbuterol  1.25 mg Nebulization TID    sodium chloride flush  5-40 mL IntraVENous 2 times per day    predniSONE  40 mg Oral Daily    atovaquone  1,500 mg Oral Daily    furosemide  20 mg Oral Daily    digoxin  125 mcg Oral Daily    aspirin  81 mg Oral Daily    atorvastatin  40 mg Oral Nightly    cetirizine  10 mg Oral Daily    metoprolol succinate  150 mg Oral Daily    multivitamin  1 tablet Oral Daily    mupirocin   Topical TID    nystatin   Topical BID    pantoprazole  40 mg Oral QAM AC    rOPINIRole  0.5 mg Oral Nightly    traZODone  50 mg Oral Nightly      dextrose      sodium chloride Stopped (05/02/22 1247)     dextrose, glucagon (rDNA), dextrose, glucose, sodium chloride flush, sodium chloride, metoprolol, ALPRAZolam, ipratropium-albuterol, ondansetron **OR** ondansetron, acetaminophen      LABS:     CBC:   Recent Labs     05/01/22  0359 05/02/22  0456   WBC 14.2* 13.1*   HGB 10.9* 10.4*    246     BMP:    Recent Labs     05/01/22  0359 05/02/22  0456    134*   K 4.9 4.5   CL 98 97*   CO2 29 28   BUN 45* 49*   CREATININE 1.5* 1.4*   GLUCOSE 95 98     Calcium:  Recent Labs     05/02/22  0456   CALCIUM 8.4*         Problem list of patient:     Patient Active Problem List   Diagnosis Code    Hyperlipidemia E78.5    Asthma J45.909    Smoker F17.200    Allergic rhinitis due to other allergen J30.89    Collagenous colitis K52.831    Lactose intolerance E73.9    HTN (hypertension) I10    COPD, mild ([address] J44.9    Atrial fibrillation (HCC) I48.91    Atherosclerotic heart disease of native coronary artery with other forms of angina pectoris (HCC) I25.118    S/P CABG x 3 Z95.1    Encounter for cardioversion procedure Z01.89    S/P left atrial appendage ligation Z98.890    Ischemic cardiomyopathy I25.5    Chronic bronchitis (HCC) J42    CHF (congestive heart failure), NYHA class II, chronic, systolic (HCC) X78.17    Tachycardia-bradycardia (HCC) I49.5    [employer] dual pacemaker  Z95.0    Pneumonia J18.9    Dyspnea and respiratory abnormalities R06.00, R06.89    Acute and chronic respiratory failure with hypoxia ([facility]) J96.21    Thrush B37.0    Nosebleed R04.0    SOB (shortness of breath) R06.02         ASSESSMENT/PLAN   Shortness of breath due to lung fibrosis:  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

## 2022-05-03 NOTE — PROGRESS NOTES
[address] 60  PHYSICAL THERAPY MISSED TREATMENT NOTE  [facility] ICU STEPDOWN TELEMETRY 4K    Date: 5/3/2022  Patient Name: Jonny Ramos        MRN: [id number]   : 1950  (70 y.o.)  Gender: male   Referring Practitioner: Antwan Lawson MD  Diagnosis: Pneumonia         REASON FOR MISSED TREATMENT:  Missed Treat. Pt with pending R UE doppler. PT to check back for treatment next available date as pt medically appropriate following results.      Sophy Gaines PT, DPT

## 2022-05-03 NOTE — PROGRESS NOTES
[facility]    Patient - Althea Langley   MRN -  [id number]   [facility]   - 1950      Date of Admission -  2022 12:37 PM  Date of evaluation -  5/3/2022  [address] - [address], [place] Primary Care Physician - Sharri Shetty MD     Problem List      Active Hospital Problems    Diagnosis Date Noted    SOB (shortness of breath) [R06.02] 2022     Priority: Medium    Pneumonia [J18.9] 2022     Reason for Consult    Acute hypoxic respiratory failure  HPI   Althea Langley is a 70 y.o. male admitted for worsening respiratory status, clinical diagnosis of APT on steroids but not improving, got up to the restroom at the [facility] and became more SOB with sats dropping, brought to ED and started on   PAP with improvement, went to PAF and started on Diltiazem, currently on NSR and Spo2 in the 80s on 5 LPM sats fluctuate if he talks. CAD s/p CABG, ECHO EF 55 to 60% abnormal (paradoxical)motion, Afib/flutter, atrial clip, SSS s/p dual chamber PPM, HTN, HLP. 36 PY smoker stopped 2021  CXR as before reveals bilateral interstitial pattern  No fever, no sputum production, SARS (-), Blood molecular pattern (+),staph film BCx (+) GPC in clusters.   Past 24 Hours   -HFNC continues 50% 40LPM- 91%  -SOB with minimal exertion   -No acute events overnight     -All systems reviewed   PMHx   Past Medical History      Diagnosis Date    Asthma     Atrial fibrillation with RVR ([address] 2021    [employer] dual pacemaker  04/15/2021    Collagenous colitis     per scope     Colon polyps     dr Dilan Sahrma    COPD, mild (HCC)     Eczema of hand     HTN (hypertension)     Hyperlipidemia     Smoker       Past Surgical History        Procedure Laterality Date    BRONCHOSCOPY N/A 2022    BRONCHOSCOPY WITH BAL performed by Ahmet Patiño MD at [address] [facility] COLONOSCOPY  06/10/2021    theresa ccolon polyps and collagenous colitis    CORONARY ARTERY BYPASS GRAFT  01/12/2021    cabg x 3 with lima and atrial appendage clip  dr Vinicio Dixon GRAFT N/A 01/12/2021    CABG  X 3 WITH DEJAH, Atrial Appendage Clip performed by Moe Andrews MD at [facility] NASAL SINUS SURGERY  06/2008    polyps    OTHER SURGICAL HISTORY  DEC 7TH 2012 DR GET [place]     IGS ENDOSCOPIC BI LAT MAXILLARY, ETHMOID, SPHENOID, FRONTAL SINUSOTOMY WITH SEPTOPLASTY AND TURBINOPLASTIES    SKIN CANCER EXCISION  09/2014    Left side of Forehead     TOOTH EXTRACTION  02/2017     Meds    Current Medications    fluconazole  200 mg Oral Daily    heparin (porcine)  5,000 Units SubCUTAneous 3 times per day    dilTIAZem  30 mg Oral 3 times per day    insulin lispro  0-6 Units SubCUTAneous TID WC    insulin lispro  0-3 Units SubCUTAneous Nightly    levalbuterol  1.25 mg Nebulization TID    sodium chloride flush  5-40 mL IntraVENous 2 times per day    predniSONE  40 mg Oral Daily    atovaquone  1,500 mg Oral Daily    furosemide  20 mg Oral Daily    digoxin  125 mcg Oral Daily    aspirin  81 mg Oral Daily    atorvastatin  40 mg Oral Nightly    cetirizine  10 mg Oral Daily    metoprolol succinate  150 mg Oral Daily    multivitamin  1 tablet Oral Daily    mupirocin   Topical TID    nystatin   Topical BID    pantoprazole  40 mg Oral QAM AC    rOPINIRole  0.5 mg Oral Nightly    traZODone  50 mg Oral Nightly     dextrose, glucagon (rDNA), dextrose, glucose, sodium chloride flush, sodium chloride, metoprolol, ALPRAZolam, ipratropium-albuterol, ondansetron **OR** ondansetron, acetaminophen  IV Drips/Infusions   dextrose      sodium chloride Stopped (05/02/22 1247)     Home Medications  Medications Prior to Admission: albuterol (PROVENTIL) (2.5 MG/3ML) 0.083% nebulizer solution, Take 2.5 mg by nebulization 2 times daily  ipratropium-albuterol (DUONEB) 0.5-2.5 (3) MG/3ML SOLN nebulizer solution, Inhale 1 vial into the lungs every 4 hours as needed for Shortness of Breath  Sodium Chloride-Sodium Bicarb 2300-700 MG PACK, 1 spray by Nasal route in the morning, at noon, and at bedtime  FLUoxetine (PROZAC) 10 MG capsule, Take 10 mg by mouth daily (10 mg daily x 5 days - 4/24-4/28, then increase to 20 mg daily)  atovaquone (MEPRON) 750 MG/5ML suspension, Take 1,500 mg by mouth daily (10 mL daily)  predniSONE (DELTASONE) 20 MG tablet, Take 30 mg by mouth daily   ALPRAZolam (XANAX) 0.5 MG tablet, Take 0.5 mg by mouth every 12 hours as needed for Sleep. [DISCONTINUED] tuberculin (APLISOL) 5 UNIT/0.1ML injection, Inject 5 Units into the skin  [DISCONTINUED] nystatin (MYCOSTATIN) 782627 UNIT/GM cream, Apply topically 2 times daily Apply topically 2 times daily. [DISCONTINUED] oxymetazoline (AFRIN) 0.05 % nasal spray, 2 sprays by Nasal route 2 times daily  furosemide (LASIX) 20 MG tablet, Take 20 mg by mouth daily Sun, Wed  loratadine (CLARITIN) 10 MG tablet, Take 10 mg by mouth daily  metoprolol succinate (TOPROL XL) 100 MG extended release tablet, Take 1.5 tablets by mouth daily  tiotropium-olodaterol (STIOLTO) 2.5-2.5 MCG/ACT AERS, Inhale 2 puffs into the lungs daily  rivaroxaban (XARELTO) 15 MG TABS tablet, Take 1 tablet by mouth daily  mupirocin (BACTROBAN) 2 % ointment, Apply topically 3 times daily.   albuterol sulfate  (90 Base) MCG/ACT inhaler, INHALE 2 PUFFS INTO THE LUNGS EVERY 6 HOURS AS NEEDED FOR WHEEZING  traZODone (DESYREL) 50 MG tablet, Take 1 tablet by mouth nightly  rOPINIRole (REQUIP) 0.5 MG tablet, Take 1 tablet by mouth nightly  pantoprazole (PROTONIX) 40 MG tablet, Take 1 tablet by mouth every morning (before breakfast)  [DISCONTINUED] cetirizine (ZYRTEC) 10 MG tablet, Take 10 mg by mouth daily  atorvastatin (LIPITOR) 40 MG tablet, Take 1 tablet by mouth nightly  aspirin 81 MG chewable tablet, Take 1 tablet by mouth daily  Multiple Vitamins-Minerals (CENTRUM SILVER PO), Take 1 tablet by mouth daily   Diet    ADULT DIET; Regular; Low Sodium (2 gm)  ADULT ORAL NUTRITION SUPPLEMENT; Lunch; Other Oral Supplement; strawberry ensure plus at lunch daily  Allergies    Penicillins and Sulfa antibiotics  Social History     Social History     Socioeconomic History    Marital status:      Spouse name: Not on file    Number of children: Not on file    Years of education: Not on file    Highest education level: Not on file   Occupational History    Not on file   Tobacco Use    Smoking status: Former Smoker     Packs/day: 1.00     Years: 40.00     Pack years: 40.00     Types: Cigarettes     Quit date: 3/7/2022     Years since quittin.1    Smokeless tobacco: Never Used   Substance and Sexual Activity    Alcohol use: Not Currently     Alcohol/week: 0.0 standard drinks    Drug use: No    Sexual activity: Not on file   Other Topics Concern    Not on file   Social History Narrative    No barriers with medication affordability now that he has met deductible    Active with hospitals - [facility]    Has O2 and supplies needed    No barriers with transportation     Social Determinants of Health     Financial Resource Strain: Low Risk     Difficulty of Paying Living Expenses: Not hard at all   Food Insecurity: No Food Insecurity    Worried About [address] in the Last Year: Never true    [facility] in the Last Year: Never true   Transportation Needs: No Transportation Needs    Lack of Transportation (Medical): No    Lack of Transportation (Non-Medical):  No   Physical Activity: Inactive    Days of Exercise per Week: 0 days    Minutes of Exercise per Session: 0 min   Stress:     Feeling of Stress : Not on file   Social Connections:     Frequency of Communication with Friends and Family: Not on file    Frequency of Social Gatherings with Friends and Family: Not on file    Attends Orthodoxy Services: Not on file   [employer]s or Organizations: Not on file    Attends Club or Organization Meetings: Not on file    Marital Status: Not on file   Intimate Partner Violence:     Fear of Current or Ex-Partner: Not on file    Emotionally Abused: Not on file    Physically Abused: Not on file    Sexually Abused: Not on file   Housing Stability: Unknown    Unable to Pay for Housing in the Last Year: No    Number of [place] in the Last Year: Not on file    Unstable Housing in the Last Year: No     Family History          Problem Relation Age of Onset    Heart Disease Mother     Heart Disease Father         cabg    Diabetes Brother     Heart Disease Brother        Vitals     height is 5' 8\" (1.727 m) and weight is 149 lb 0.5 oz (67.6 kg). His axillary temperature is 97.6 °F (36.4 °C). His blood pressure is 139/72 and his pulse is 57. His respiration is 24 and oxygen saturation is 91%. Body mass index is 22.66 kg/m². I/O        Intake/Output Summary (Last 24 hours) at 5/3/2022 1032  Last data filed at 5/3/2022 0929  Gross per 24 hour   Intake 685.56 ml   Output 1590 ml   Net -904.44 ml     I/O last 3 completed shifts: In: 1305.6 [P.O.:1120; I.V.:10.2; IV Piggyback:175.4]  Out: 2090 [Urine:2090]   Patient Vitals for the past 96 hrs (Last 3 readings):   Weight   05/03/22 0555 149 lb 0.5 oz (67.6 kg)     Exam   General Appearance  Awake, alert, looks chronically ill and tired, dyspneic. SOB on HFNC  HEENT - Normal, Head is normocephalic, atraumatic. EOMI, PERRLA, pale conjunctiva  Neck - Supple, symmetrical, trachea midline and Soft, trachea midline and straight  Lungs -diminished bilateral breath sounds, bibasilar  rales  Cardiovascular - NSR. Edema R arm   Abdomen - Soft, nontender, nondistended, no masses or organomegaly  Neurologic - CN II-XII are grossly intact.  There are no focal motor or sensory deficits  Skin - No bruising or bleeding  Extremities - No cyanosis, clubbing or edema    Labs  - Old records and notes have been reviewed in [facility] RUTH[facility]   CBC     Lab Results   Component Value Date    WBC 13.1 05/02/2022    RBC 3.66 05/02/2022    RBC 4.84 11/07/2011    HGB 10.4 05/02/2022    HCT 34.9 05/02/2022     05/02/2022    MCV 95.4 05/02/2022    MCH 28.4 05/02/2022    MCHC 29.8 05/02/2022    RDW 13.3 06/07/2018    NRBC 0 04/30/2022    NRBC 0 11/07/2011    SEGSPCT 89.2 04/30/2022    MONOPCT 4.7 04/30/2022    MONOSABS 0.6 04/30/2022    LYMPHSABS 0.6 04/30/2022    EOSABS 0.0 04/30/2022    BASOSABS 0.0 04/30/2022    DIFFTYPE see below 03/25/2022     BMP   Lab Results   Component Value Date     05/02/2022    K 4.5 05/02/2022    K 5.0 04/24/2022    CL 97 05/02/2022    CO2 28 05/02/2022    BUN 49 05/02/2022    CREATININE 1.4 05/02/2022    GLUCOSE 98 05/02/2022    GLUCOSE 94 11/07/2011    CALCIUM 8.4 05/02/2022    MG 1.9 04/28/2022     LFTS  Lab Results   Component Value Date    ALKPHOS 101 05/02/2022    ALT 20 05/02/2022    AST 20 05/02/2022    PROT 6.0 05/02/2022    BILITOT 0.4 05/02/2022    BILIDIR <0.2 03/26/2022    LABALBU 2.7 05/02/2022    LABALBU 4.3 11/07/2011     ABG   Lab Results   Component Value Date    PH 7.44 04/24/2022    PCO2 39 04/24/2022    PO2 191 04/24/2022    HCO3 26 04/24/2022    O2SAT 100 04/24/2022     @  Lab Results   Component Value Date    APTT 25.3 03/25/2022     INR   Lab Results   Component Value Date    INR 1.41 (H) 03/25/2022    INR 1.25 (H) 04/06/2021    INR 1.13 01/11/2021     Angio Convert Enzyme 8 - 52 U/L 21        PFTs     Cultures    (+) blood culture GPC in clusters    4/26/22  Bronchoscopy- will follow cultures/cytology   Endobronchial findings:   Trachea: Normal mucosa  Diana: Normal mucosa  Right main bronchus: Normal mucosa, mucus  Right upper lobe bronchus: Normal mucosa, mucus  Right Middle lobe bronchus: Normal mucosa, mucus  Right Lower lobe bronchus: erythematous mucosa  Left main bronchus: Normal mucosa  Left upper lobe bronchus: Normal mucosa  Left lower lobe bronchus: Normal mucosa     Rhinovirus Enterovirus PCR Detected Abnormal      AFB (-)  Virus cultures (-)  Cytology: FINAL RESULTS:   No malignant cells seen.      Specimen consists of benign appearing squamous      cells, alveolar macrophages, and debris. Flow cytometry: IMPRESSION:   1. Low viability specimen showing a predominance of granulocytes   without immunophenotypic aberrancy. 2. Rare phenotypically unremarkable T-cells with a normal CD4:CD8   ratio of 2:4. No abnormal B cell, plasma cell, T cell, or NK cell population   identified. See comment. Cytology:     FINAL RESULTS:   Right lower lobe of lung, bronchial washings, cytospin preparation:     Alveolar macrophages with positive staining for lipid accumulation.     Please see microscopic examination. Radiology    CXR  5/3/2022   XR CHEST PORTABLE   Stable chest.       5/2/22  XR CHEST PORTABLE   Improved aeration and decreased interstitial opacities compared to prior exam.          03/07/2022 --->  04/29/2022            CT chest     01/09/2021 ---> 03/15/2022            Assessment   Acute on chronic hypoxic respiratory failure  Rapidly progressing Acute Interstitial pneumonia (AIP) DDX Amiodarone Toxicity vs RBILD vs OP since 03/7/2022 (No evidence ILD back in 2021)  Interstitial infiltrate, thickening interlobular septa  CAD s/p CABG   Afib   Full Code   Plan   Adjust supplemental oxygen to maintain SpO2 > 88%   BiPAP 18/6 HS and prn  Continue steroids  Continue Atovaquone   VATS bx Thursday   [address] on hold  Remains on HF wean down as tolerated     Case discussed with nurse and patient/family. Questions and concerns addressed.   Meds and orders reviewed     Electronically signed by   ARCADIO Amanda CNP on 5/3/2022 at 10:32 AM    Vitals:    05/03/22 2100 05/03/22 2130 05/03/22 2200 05/03/22 2222   BP: (!) 155/61      Pulse: 55      Resp: 26   24   Temp: 97.3 °F (36.3 °C)      TempSrc: Oral      SpO2: 94% (!) 88% 98% 96%   Weight:       Height: No changes  On HFNC  On Diflucan for candida  (+) foam macrophages in BAL supportive of APT but non specific. VATS bx Thursday. Patient seen and examined independently by me. Above discussed and I agree with  CNP note Also see my additional comments. Labs, cultures, and radiographs when available were reviewed. Changes were made in the orders as necessary. I discussed patient concerns with Maria Fernanda HICKEY and instructions were given. Respiratory care issues addressed. Please see our orders for the updated patient care plan.     Electronically signed by     Ahmet Patiño MD on 5/3/2022 at 11:34 PM

## 2022-05-03 NOTE — PLAN OF CARE
Problem: Respiratory - Adult  Goal: Clear lung sounds  5/2/2022 2055 by Shakila Logan RCP  Outcome: Progressing     Problem: Respiratory - Adult  Goal: Adequate oxygenation  5/2/2022 2055 by Shakila Logan RCP  Outcome: Progressing

## 2022-05-03 NOTE — PROGRESS NOTES
Doppler of right arm was ordered by Dr Dany Sauceda. Wife was questioning why it was ordered when it had been completed on 4-26-22 and was negative and that the swelling is actually getting less. Dr Dany Sauceda notified and order cancelled.

## 2022-05-03 NOTE — PROGRESS NOTES
[facility] ICU STEPDOWN TELEMETRY 4K  Occupational Therapy  Daily Note  Time:   Time In: 4563  Time Out: 1024  Timed Code Treatment Minutes: 39 Minutes  Minutes: 45          Date: 5/3/2022  Patient Name: Frannie Chu,   Gender: male      Room: [facility]020-A  MRN: [id number]  : 1950  (70 y.o.)  Referring Practitioner: Pily Escalante MD  Diagnosis: pneumonia  Additional Pertinent Hx: Per H&P:presented with increasing shortness of breath from the nursing home. Patient has been in a skilled nursing facility after his last admission last month in the hospital for pneumonia. Patient noticed his oxygen saturation to be less and hence was transferred here he has been placed on BiPAP with improvement in his saturation. He was also noted to be in A. fib with RVR. Restrictions/Precautions:  Restrictions/Precautions: General Precautions,Fall Risk  Position Activity Restriction  Other position/activity restrictions: monitor O2 sats     SUBJECTIVE: Pt supine in bed with HOB raised. Nurse approved Tx session. Pt friendly and agreeable to Tx. PAIN: Denied pain, reported at times, difficulty breathing. Vitals: Patient on 40L O2 via HFNC 50% FiO2  upon arrival to room, SpO2 90/94%. Upon activity t/f'ing from supine to sit EOB, SpO2 decreased to below 74/76%. Nursing notified. Pt demo'd labored breathing and SOB. Educated on purse breathing. Nurse increased pt to 50L 100% FiO2. Increased time to increase SpO2 to above 90%. Pt t/f to [facility], decreasing to 84%SpO2 during activity, increased time to increase SpO2 to above 90% on 50L O2 FiO2 100%. Pt t/f to from [facility] to chair, SpO2 decreasing to 88% SpO2, increased time to increase to above 90%. COGNITION: Slow Processing, Decreased Insight and Demo'd [place] and required information to be repeated     ADL:   Bathing: with set-up. for sponge bath to chest, face, back (max A), perineal area, thighs  Upper Extremity Dressing: Maximum Assistance. don to clean gown  Toileting: Maximum Assistance. perineal care and clothing managem. Toilet Transfer: Contact Guard Assistance. with 0 AE. BALANCE:  Sitting Balance:  Stand By Assistance. demo'd 0 LOB and good/fair posture alighment \  Standing Balance: Contact Guard Assistance. demo'd 0 LOB for short distance from EOB to [facility] to chair    BED MOBILITY:  Supine to Sit: Contact Guard Assistance  with HOB raised    TRANSFERS:  Sit to Stand:  Contact Guard Assistance. with no use of AE    FUNCTIONAL MOBILITY:  Assistive Device: not used   Assist Level:  Contact Guard Assistance. Distance: From EOB to [facility] to recliner      ADDITIONAL ACTIVITIES:  Nurse notified when Tx was over to adjust HFNC and notified pt had a BM.     ASSESSMENT:     Activity Tolerance:  Patient tolerance of  treatment: poor. Demo'd SOB and labored breathing and increased time to stabilize sats. Discharge Recommendations: Continue to assess pending progress  Equipment Recommendations: Equipment Needed: No  Other: defer to next level of care  Plan: Times per Week: 3-5x  Current Treatment Recommendations: Strengthening,Balance training,Functional mobility training,Endurance training,Patient/Caregiver education & training,Self-Care / ADL    Patient Education  Patient Education: Role of OT, Plan of Care, ADL's, Energy Conservation and Importance of Increasing Activity    Goals  Short Term Goals  Time Frame for Short term goals: by discharge  Short Term Goal 1: Pt will increase activity tolerance for functional mobility to/from [facility] or chair with SBA and Sp02 remaining >=88% in prep for ADL completion. Short Term Goal 2: Pt will complete BADL tasks with minimal assistance, incorporating ECT prn, to increase independence and ease with self care tasks. Short Term Goal 3: Pt will tolerate standing >1 minute with SBA and Sp02 >=88% in prep for toileting tasks.     Following session, patient left in safe position with all fall risk precautions in place.

## 2022-05-03 NOTE — PROGRESS NOTES
Follow Up / Progress Note        Patient:   Althea Langley  YOB: 1950  Age:  70 y.o. Room:  [facility]20/020-A  MRN:  [id number]            Family/Patient Discussion:  Call received from pt's wife,Cassidy, requesting writer follow up with pt and her, to discuss plan of care for pt. Writer to unit, pt sitting up in bedside chair, awake and alert, oriented to all spheres. O2 is in per HFNC, pt states that he's feeling better but still gets very short of breath with any activity. Pt's wife,Cassidy is here. Condition update reviewed, Neeta Florence states that they are very \"worried\" for pt to have VATS done and the risks associated with it. We discussed that pt's current condition is not in line with his quality of life, that a VATS may allow pt to move forward with recovery. We also discussed that if he doesn't do well, they need to talk about care limitations that pt would want. Neeta Florence is very concerned that pt will need to go on vent and then not be able to come off. We discussed that he may also \"do fine\" and there's no way to guarantee and outcome. Supportive discussion followed, concluded with pt and Neeta Florence stating no further questions or needs. Emotional support given. Plan/Follow-Up: Continue with current plan of care. Palliative care will continue to follow.        Electronically signed by Gin Dooley RN on 5/3/2022 at 11:36 AM             Palliative Care Office: [phone]

## 2022-05-03 NOTE — PLAN OF CARE
Problem: Respiratory - Adult  Goal: Clear lung sounds  5/3/2022 0917 by Lillie Ferreira RCP  Outcome: Progressing   Continue with Xopenex    Problem: Respiratory - Adult  Goal: Adequate oxygenation  5/3/2022 0917 by Lillie Ferreira RCP  Outcome: Progressing   Patient remains on HFNC, will wean as able

## 2022-05-04 NOTE — PROGRESS NOTES
[facility]    Patient - Rickey De La Fuente   MRN -  [id number]   Acct # - [de-identified]   - 1950      Date of Admission -  2022 12:37 PM  Date of evaluation -  2022  [address] - [address] Verenice Gauthier MD Primary Care Physician - Ray Gonsalez MD     Problem List      Active Hospital Problems    Diagnosis Date Noted    SOB (shortness of breath) [R06.02] 2022     Priority: Medium    Pneumonia [J18.9] 2022     Reason for Consult    Acute hypoxic respiratory failure  HPI   Rickey De La Fuente is a 70 y.o. male admitted for worsening respiratory status, clinical diagnosis of APT on steroids but not improving, got up to the restroom at the [facility] and became more SOB with sats dropping, brought to ED and started on   PAP with improvement, went to PAF and started on Diltiazem, currently on NSR and Spo2 in the 80s on 5 LPM sats fluctuate if he talks. CAD s/p CABG, ECHO EF 55 to 60% abnormal (paradoxical)motion, Afib/flutter, atrial clip, SSS s/p dual chamber PPM, HTN, HLP. 36 PY smoker stopped 2021  CXR as before reveals bilateral interstitial pattern  No fever, no sputum production, SARS (-), Blood molecular pattern (+),staph film BCx (+) GPC in clusters.   Past 24 Hours   -HFNC continues 40% 40LPM- 94%  -SOB with slight improvement   -Afebrile   -Sinus tachycardia - IVF bolus   -No acute events overnight     -All systems reviewed   PMHx   Past Medical History      Diagnosis Date    Asthma     Atrial fibrillation with RVR ([facility] [address] 2021    [employer] dual pacemaker  04/15/2021    Collagenous colitis     per scope     Colon polyps     dr Noemi Lares    COPD, mild (HCC)     Eczema of hand     HTN (hypertension)     Hyperlipidemia     Smoker       Past Surgical History        Procedure Laterality Date    BRONCHOSCOPY N/A 2022    BRONCHOSCOPY WITH BAL performed by Suresh Herndon MD at [facility]    CARDIAC SURGERY      COLONOSCOPY  06/10/2021    theresa ccolon polyps and collagenous colitis    CORONARY ARTERY BYPASS GRAFT  01/12/2021    cabg x 3 with lima and atrial appendage clip  dr Samson Gordon GRAFT N/A 01/12/2021    CABG  X 3 WITH DEJAH, Atrial Appendage Clip performed by Hodan Huber MD at [address] NASAL SINUS SURGERY  06/2008    polyps    OTHER SURGICAL HISTORY  DEC 7TH 2012 DR GET DAY [place]     IGS ENDOSCOPIC BI LAT MAXILLARY, ETHMOID, SPHENOID, FRONTAL SINUSOTOMY WITH SEPTOPLASTY AND TURBINOPLASTIES    SKIN CANCER EXCISION  09/2014    Left side of Forehead     TOOTH EXTRACTION  02/2017     Meds    Current Medications    metoprolol  2.5 mg IntraVENous Once    fluconazole  200 mg Oral Daily    dilTIAZem  30 mg Oral 3 times per day    insulin lispro  0-6 Units SubCUTAneous TID WC    insulin lispro  0-3 Units SubCUTAneous Nightly    levalbuterol  1.25 mg Nebulization TID    sodium chloride flush  5-40 mL IntraVENous 2 times per day    predniSONE  40 mg Oral Daily    atovaquone  1,500 mg Oral Daily    furosemide  20 mg Oral Daily    digoxin  125 mcg Oral Daily    aspirin  81 mg Oral Daily    atorvastatin  40 mg Oral Nightly    cetirizine  10 mg Oral Daily    metoprolol succinate  150 mg Oral Daily    multivitamin  1 tablet Oral Daily    mupirocin   Topical TID    nystatin   Topical BID    pantoprazole  40 mg Oral QAM AC    rOPINIRole  0.5 mg Oral Nightly    traZODone  50 mg Oral Nightly     dextrose, glucagon (rDNA), dextrose, glucose, sodium chloride flush, sodium chloride, metoprolol, ALPRAZolam, ipratropium-albuterol, ondansetron **OR** ondansetron, acetaminophen  IV Drips/Infusions   dextrose      sodium chloride Stopped (05/02/22 1247)     Home Medications  Medications Prior to Admission: albuterol (PROVENTIL) (2.5 MG/3ML) 0.083% nebulizer solution, Take 2.5 mg by nebulization 2 times daily  ipratropium-albuterol (DUONEB) 0.5-2.5 (3) MG/3ML SOLN nebulizer solution, Inhale 1 vial into the lungs every 4 hours as needed for Shortness of Breath  Sodium Chloride-Sodium Bicarb 2300-700 MG PACK, 1 spray by Nasal route in the morning, at noon, and at bedtime  FLUoxetine (PROZAC) 10 MG capsule, Take 10 mg by mouth daily (10 mg daily x 5 days - 4/24-4/28, then increase to 20 mg daily)  atovaquone (MEPRON) 750 MG/5ML suspension, Take 1,500 mg by mouth daily (10 mL daily)  predniSONE (DELTASONE) 20 MG tablet, Take 30 mg by mouth daily   ALPRAZolam (XANAX) 0.5 MG tablet, Take 0.5 mg by mouth every 12 hours as needed for Sleep. [DISCONTINUED] tuberculin (APLISOL) 5 UNIT/0.1ML injection, Inject 5 Units into the skin  [DISCONTINUED] nystatin (MYCOSTATIN) 868457 UNIT/GM cream, Apply topically 2 times daily Apply topically 2 times daily. [DISCONTINUED] oxymetazoline (AFRIN) 0.05 % nasal spray, 2 sprays by Nasal route 2 times daily  furosemide (LASIX) 20 MG tablet, Take 20 mg by mouth daily Sun, Wed  loratadine (CLARITIN) 10 MG tablet, Take 10 mg by mouth daily  metoprolol succinate (TOPROL XL) 100 MG extended release tablet, Take 1.5 tablets by mouth daily  tiotropium-olodaterol (STIOLTO) 2.5-2.5 MCG/ACT AERS, Inhale 2 puffs into the lungs daily  rivaroxaban (XARELTO) 15 MG TABS tablet, Take 1 tablet by mouth daily  mupirocin (BACTROBAN) 2 % ointment, Apply topically 3 times daily.   albuterol sulfate  (90 Base) MCG/ACT inhaler, INHALE 2 PUFFS INTO THE LUNGS EVERY 6 HOURS AS NEEDED FOR WHEEZING  traZODone (DESYREL) 50 MG tablet, Take 1 tablet by mouth nightly  rOPINIRole (REQUIP) 0.5 MG tablet, Take 1 tablet by mouth nightly  pantoprazole (PROTONIX) 40 MG tablet, Take 1 tablet by mouth every morning (before breakfast)  [DISCONTINUED] cetirizine (ZYRTEC) 10 MG tablet, Take 10 mg by mouth daily  atorvastatin (LIPITOR) 40 MG tablet, Take 1 tablet by mouth nightly  aspirin 81 MG chewable tablet, Take 1 tablet by mouth daily  Multiple Vitamins-Minerals (CENTRUM SILVER PO), Take 1 tablet by mouth daily   Diet    ADULT DIET; Regular; Low Sodium (2 gm)  ADULT ORAL NUTRITION SUPPLEMENT; Lunch, Breakfast, Dinner; Other Oral Supplement; strawberry ensure plus B,L,D  Allergies    Penicillins and Sulfa antibiotics  Social History     Social History     Socioeconomic History    Marital status:      Spouse name: Not on file    Number of children: Not on file    Years of education: Not on file    Highest education level: Not on file   Occupational History    Not on file   Tobacco Use    Smoking status: Former Smoker     Packs/day: 1.00     Years: 40.00     Pack years: 40.00     Types: Cigarettes     Quit date: 3/7/2022     Years since quittin.1    Smokeless tobacco: Never Used   Substance and Sexual Activity    Alcohol use: Not Currently     Alcohol/week: 0.0 standard drinks    Drug use: No    Sexual activity: Not on file   Other Topics Concern    Not on file   Social History Narrative    No barriers with medication affordability now that he has met deductible    Active with [facility]    Has O2 and supplies needed    No barriers with transportation     Social Determinants of Health     Financial Resource Strain: Low Risk     Difficulty of Paying Living Expenses: Not hard at all   Food Insecurity: No Food Insecurity    Worried About [address] in the Last Year: Never true    [address] in the Last Year: Never true   Transportation Needs: No Transportation Needs    Lack of Transportation (Medical): No    Lack of Transportation (Non-Medical):  No   Physical Activity: Inactive    Days of Exercise per Week: 0 days    Minutes of Exercise per Session: 0 min   Stress:     Feeling of Stress : Not on file   Social Connections:     Frequency of Communication with Friends and Family: Not on file    Frequency of Social Gatherings with Friends and Family: Not on file    Attends Holiness Services: Not on file   [address] [facility] [employer] or Organizations: Not on file    Attends Club or Organization Meetings: Not on file    Marital Status: Not on file   Intimate Partner Violence:     Fear of Current or Ex-Partner: Not on file    Emotionally Abused: Not on file    Physically Abused: Not on file    Sexually Abused: Not on file   Housing Stability: Unknown    Unable to Pay for Housing in the Last Year: No    Number of Places Lived in the Last Year: Not on file    Unstable Housing in the Last Year: No     Family History          Problem Relation Age of Onset    Heart Disease Mother     Heart Disease Father         cabg    Diabetes Brother     Heart Disease Brother        Vitals     height is 5' 8\" (1.727 m) and weight is 149 lb 0.5 oz (67.6 kg). His oral temperature is 98.1 °F (36.7 °C). His blood pressure is 127/65 and his pulse is 55. His respiration is 23 and oxygen saturation is 94%. Body mass index is 22.66 kg/m². I/O        Intake/Output Summary (Last 24 hours) at 5/4/2022 1026  Last data filed at [address]  Gross per 24 hour   Intake 1400 ml   Output 1200 ml   Net 200 ml     I/O last 3 completed shifts: In: 7232 [P.O.:1090]  Out: 2090 [Urine:2090]   Patient Vitals for the past 96 hrs (Last 3 readings):   Weight   05/03/22 0555 149 lb 0.5 oz (67.6 kg)     Exam   General Appearance  Awake, alert, looks chronically ill and tired, dyspneic. SOB on HFNC  HEENT - Normal, Head is normocephalic, atraumatic. EOMI, PERRLA, pale conjunctiva  Neck - Supple, symmetrical, trachea midline and Soft, trachea midline and straight  Lungs -diminished bilateral breath sounds, bibasilar  rales  Cardiovascular - NSR. Edema R arm   Abdomen - Soft, nontender, nondistended, no masses or organomegaly  Neurologic - CN II-XII are grossly intact.  There are no focal motor or sensory deficits  Skin - No bruising or bleeding  Extremities - No cyanosis, clubbing or edema    Labs  - Old records and notes have been reviewed in [facility]   CBC     Lab Results   Component Value Date    WBC 18.7 05/04/2022    RBC 3.75 05/04/2022    RBC 4.84 11/07/2011    HGB 10.7 05/04/2022    HCT 34.3 05/04/2022     05/04/2022    MCV 91.5 05/04/2022    MCH 28.5 05/04/2022    MCHC 31.2 05/04/2022    RDW 13.3 06/07/2018    NRBC 0 05/04/2022    NRBC 0 11/07/2011    SEGSPCT 80.0 05/04/2022    MONOPCT 4.8 05/04/2022    MONOSABS 0.9 05/04/2022    LYMPHSABS 2.1 05/04/2022    EOSABS 0.0 05/04/2022    BASOSABS 0.1 05/04/2022    DIFFTYPE see below 03/25/2022     BMP   Lab Results   Component Value Date     05/02/2022    K 4.5 05/02/2022    K 5.0 04/24/2022    CL 97 05/02/2022    CO2 28 05/02/2022    BUN 49 05/02/2022    CREATININE 1.4 05/02/2022    GLUCOSE 98 05/02/2022    GLUCOSE 94 11/07/2011    CALCIUM 8.4 05/02/2022    MG 1.9 04/28/2022     LFTS  Lab Results   Component Value Date    ALKPHOS 101 05/02/2022    ALT 20 05/02/2022    AST 20 05/02/2022    PROT 6.0 05/02/2022    BILITOT 0.4 05/02/2022    BILIDIR <0.2 03/26/2022    LABALBU 2.7 05/02/2022    LABALBU 4.3 11/07/2011     ABG   Lab Results   Component Value Date    PH 7.44 04/24/2022    PCO2 39 04/24/2022    PO2 191 04/24/2022    HCO3 26 04/24/2022    O2SAT 100 04/24/2022     @  Lab Results   Component Value Date    APTT 25.3 03/25/2022     INR   Lab Results   Component Value Date    INR 1.41 (H) 03/25/2022    INR 1.25 (H) 04/06/2021    INR 1.13 01/11/2021     Angio Convert Enzyme 8 - 52 U/L 21        PFTs     Cultures    (+) blood culture GPC in clusters    4/26/22  Bronchoscopy- will follow cultures/cytology   Endobronchial findings:   Trachea: Normal mucosa  Diana: Normal mucosa  Right main bronchus: Normal mucosa, mucus  Right upper lobe bronchus: Normal mucosa, mucus  Right Middle lobe bronchus: Normal mucosa, mucus  Right Lower lobe bronchus: erythematous mucosa  Left main bronchus: Normal mucosa  Left upper lobe bronchus: Normal mucosa  Left lower lobe bronchus: Normal mucosa     Rhinovirus Enterovirus PCR Detected Abnormal      AFB (-)  Virus cultures (-)  Cytology: FINAL RESULTS:   No malignant cells seen.      Specimen consists of benign appearing squamous      cells, alveolar macrophages, and debris. Flow cytometry: IMPRESSION:   1. Low viability specimen showing a predominance of granulocytes   without immunophenotypic aberrancy. 2. Rare phenotypically unremarkable T-cells with a normal CD4:CD8   ratio of 2:4. No abnormal B cell, plasma cell, T cell, or NK cell population   identified. See comment. Cytology:     FINAL RESULTS:   Right lower lobe of lung, bronchial washings, cytospin preparation:     Alveolar macrophages with positive staining for lipid accumulation.     Please see microscopic examination. Radiology    CXR  05/04/2022   Chest X-ray, 1 View   Similar bilateral airspace disease. 5/3/2022   XR CHEST PORTABLE   Stable chest.       5/2/22  XR CHEST PORTABLE   Improved aeration and decreased interstitial opacities compared to prior exam.          03/07/2022 --->  04/29/2022            CT chest     01/09/2021 ---> 03/15/2022            Assessment   Acute on chronic hypoxic respiratory failure  Rapidly progressing Acute Interstitial pneumonia (AIP) DDX Amiodarone Toxicity vs RBILD vs OP since 03/7/2022 (No evidence ILD back in 2021)  Interstitial infiltrate, thickening interlobular septa  CAD s/p CABG   Afib/Aflutter  Full Code   Plan   Adjust supplemental oxygen to maintain SpO2 > 88%   BiPAP 18/6 HS and prn  Continue steroids  Continue Atovaquone   VATS bx tomorrow 5/5 at 10:00 AM  [facility] on hold  Remains on HF able to wean down slightly - wean as able   Aflutter today per EKG now ST- IVF bolus given per primary     Case discussed with nurse and patient/family. Questions and concerns addressed.   Meds and orders reviewed     Electronically signed by   ARCADIO Freeman CNP on 5/4/2022 at 10:26 AM    Vitals:    05/04/22 1730 05/04/22 1746 05/04/22 1959 05/04/22 2137   BP: 114/62   (!) 123/99   Pulse: 55   60   Resp: 20 18 17 17   Temp:    97.6 °F (36.4 °C)   TempSrc:    Oral   SpO2: 95% 98% 95% 96%   Weight:       Height:         Episodes of sustained Aflutter ratted with conduction blockers. Soft BP given fluid bolus  Overall condition not improving, prednisone not helping, will wean slowly  Hoping to do VATS biopsy tomorrow to help clarify diagnosis and guide therapy, overall condition is poor. Patient seen and examined independently by me. Above discussed and I agree with  CNP note Also see my additional comments. Labs, cultures, and radiographs when available were reviewed. Changes were made in the orders as necessary. I discussed patient concerns with Maria Fernanda HICKEY and instructions were given. Respiratory care issues addressed. Please see our orders for the updated patient care plan.     Electronically signed by     Tashi Jean MD on 5/4/2022 at 10:04 PM

## 2022-05-04 NOTE — PLAN OF CARE
Problem: Discharge Planning  Goal: Discharge to home or other facility with appropriate resources  Outcome: Progressing  Flowsheets (Taken 5/3/2022 2100)  Discharge to home or other facility with appropriate resources:   Identify barriers to discharge with patient and caregiver   Identify discharge learning needs (meds, wound care, etc)     Problem: Safety - Adult  Goal: Free from fall injury  Outcome: Progressing     Problem: ABCDS Injury Assessment  Goal: Absence of physical injury  Outcome: Progressing     Problem: Respiratory - Adult  Goal: Clear lung sounds  Outcome: Progressing  Goal: Adequate oxygenation  Outcome: Progressing     Problem: Chronic Conditions and Co-morbidities  Goal: Patient's chronic conditions and co-morbidity symptoms are monitored and maintained or improved  Outcome: Progressing  Flowsheets (Taken 5/3/2022 2100)  Care Plan - Patient's Chronic Conditions and Co-Morbidity Symptoms are Monitored and Maintained or Improved:   Monitor and assess patient's chronic conditions and comorbid symptoms for stability, deterioration, or improvement   Collaborate with multidisciplinary team to address chronic and comorbid conditions and prevent exacerbation or deterioration   Update acute care plan with appropriate goals if chronic or comorbid symptoms are exacerbated and prevent overall improvement and discharge     Problem: Pain  Goal: Verbalizes/displays adequate comfort level or baseline comfort level  Outcome: Progressing     Problem: Nutrition Deficit:  Goal: Optimize nutritional status  Outcome: Progressing     Problem: Skin/Tissue Integrity  Goal: Absence of new skin breakdown  Description: 1. Monitor for areas of redness and/or skin breakdown  2. Assess vascular access sites hourly  3. Every 4-6 hours minimum:  Change oxygen saturation probe site  4.   Every 4-6 hours:  If on nasal continuous positive airway pressure, respiratory therapy assess nares and determine need for appliance change or resting period.   Outcome: Progressing

## 2022-05-04 NOTE — PLAN OF CARE
Problem: Respiratory - Adult  Goal: Clear lung sounds  5/4/2022 0921 by Renaldo Agrawal RCP  Outcome: Progressing  Note: Cont aerosol to improve aeration  5/4/2022 0552 by Maira Cuellar RN  Outcome: Progressing  Goal: Adequate oxygenation  5/4/2022 0921 by Renaldo Agrawal RCP  Outcome: Progressing  Note: Wean hfnc as marilou  5/4/2022 0552 by Maira Cuellar RN  Outcome: Progressing

## 2022-05-04 NOTE — PROGRESS NOTES
Progress note: Infectious diseases    Patient - Jonny Ramos,  Age - 70 y.o.    - 1950      Room Number - 1X-04/076-C   MRN -  [id number]   Acct # - [de-identified]  Date of Admission -  2022 12:37 PM    SUBJECTIVE:   No new issues. he is scheduled for surgery    OBJECTIVE   VITALS    height is 5' 8\" (1.727 m) and weight is 149 lb 0.5 oz (67.6 kg). His oral temperature is 97.5 °F (36.4 °C). His blood pressure is 96/73 and his pulse is 123. His respiration is 19 and oxygen saturation is 96%. Wt Readings from Last 3 Encounters:   22 149 lb 0.5 oz (67.6 kg)   22 155 lb 12.8 oz (70.7 kg)   22 154 lb (69.9 kg)       I/O (24 Hours)    Intake/Output Summary (Last 24 hours) at 2022 1507  Last data filed at 2022 1455  Gross per 24 hour   Intake 1737.73 ml   Output 2200 ml   Net -462.27 ml       General Appearance  Awake, alert, oriented, on high flow oxygen. HEENT - normocephalic, atraumatic, pale conjunctiva,  anicteric sclera  Neck - Supple, no mass. Lungs -   Diminished breath sound,   Cardiovascular - Heart sounds are normal.    Abdomen - soft, not distended, nontender,   Neurologic -oriented.   Skin - No bruising or bleeding  Extremities - + edema,    Open gluteal wound  MEDICATIONS:      metoprolol  2.5 mg IntraVENous Once    sodium chloride flush  5-40 mL IntraVENous 2 times per day    [START ON 2022] ceFAZolin (ANCEF) IVPB  2,000 mg IntraVENous On Call to OR    metoprolol  2.5 mg IntraVENous Once    [START ON 2022] metoprolol succinate  100 mg Oral Daily    fluconazole  200 mg Oral Daily    dilTIAZem  30 mg Oral 3 times per day    insulin lispro  0-6 Units SubCUTAneous TID     insulin lispro  0-3 Units SubCUTAneous Nightly    levalbuterol  1.25 mg Nebulization TID    predniSONE  40 mg Oral Daily    atovaquone  1,500 mg Oral Daily    furosemide  20 mg Oral Daily    digoxin  125 mcg Oral Daily    aspirin  81 mg Oral Daily    atorvastatin  40 mg Oral Nightly    cetirizine  10 mg Oral Daily    multivitamin  1 tablet Oral Daily    mupirocin   Topical TID    nystatin   Topical BID    pantoprazole  40 mg Oral QAM AC    rOPINIRole  0.5 mg Oral Nightly    traZODone  50 mg Oral Nightly      sodium chloride      dextrose       sodium chloride flush, sodium chloride, dextrose, glucagon (rDNA), dextrose, glucose, metoprolol, ALPRAZolam, ipratropium-albuterol, ondansetron **OR** ondansetron, acetaminophen      LABS:     CBC:   Recent Labs     05/02/22  0456 05/04/22  0515 05/04/22  1224   WBC 13.1* 18.7* 24.6*   HGB 10.4* 10.7* 11.4*    313 342     BMP:    Recent Labs     05/02/22  0456 05/04/22  1224   * 133*   K 4.5 5.0   CL 97* 95*   CO2 28 26   BUN 49* 54*   CREATININE 1.4* 1.6*   GLUCOSE 98 151*     Calcium:  Recent Labs     05/04/22  1224   CALCIUM 8.6         Problem list of patient:     Patient Active Problem List   Diagnosis Code    Hyperlipidemia E78.5    Asthma J45.909    Smoker F17.200    Allergic rhinitis due to other allergen J30.89    Collagenous colitis K52.831    Lactose intolerance E73.9    HTN (hypertension) I10    COPD, mild (HCC) J44.9    Atrial fibrillation (HCC) I48.91    Atherosclerotic heart disease of native coronary artery with other forms of angina pectoris (HCC) I25.118    S/P CABG x 3 Z95.1    Encounter for cardioversion procedure Z01.89    S/P left atrial appendage ligation Z98.890    Ischemic cardiomyopathy I25.5    Chronic bronchitis (HCC) J42    CHF (congestive heart failure), NYHA class II, chronic, systolic (HCC) W79.25    Tachycardia-bradycardia (HCC) I49.5    [employer] dual pacemaker  Z95.0    Pneumonia J18.9    Dyspnea and respiratory abnormalities R06.00, R06.89    Acute and chronic respiratory failure with hypoxia (HCC) J96.21    Thrush B37.0    Nosebleed R04.0    SOB (shortness of breath) R06.02         ASSESSMENT/PLAN   Shortness of breath due to lung fibrosis:  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 He is aware of the risk including need for intubation.       Corinn Sever, MD, MD, FACP 5/4/2022 3:07 PM

## 2022-05-04 NOTE — PROGRESS NOTES
[facility] [address]  PHYSICAL THERAPY MISSED TREATMENT NOTE  [facility] ICU STEPDOWN TELEMETRY 4K    Date: 2022  Patient Name: Destiney Burgess        MRN: [id number]   : 1950  (70 y.o.)  Gender: male   Referring Practitioner: Abbie Vera MD  Diagnosis: Pneumonia         REASON FOR MISSED TREATMENT:  Hold treatment per nursing request.  Pt with elevated HR and low BP per RN. Requesting to hold PT treatment this date.     Phil Moscoso PT, DPT

## 2022-05-04 NOTE — CARE COORDINATION
Collaborative Discharge Planning    Celestino Brown  :  1950  MRN:  [id number]    ADMIT DATE:  2022      Discharge Planning Discharge Planning  Type of Residence: [place] [facility])  Living Arrangements: Other (Comment)  Support Systems: Spouse/Significant Other  Current Services Prior To Admission: [facility] Benav[facility] [address] Medications: No  Meds-to-Beds: Does the patient want to have any new prescriptions delivered to bedside prior to discharge?: No  Type of Home Care Services: None  Patient expects to be discharged to[de-identified] LTAC  Follow Up Appointment: Best Day/Time : Monday AM  One/Two Story Residence: One story  History of falls?: No  White Board Notes /Social Work Whiteboard Notes  /Social Work Whiteboard: ; [address] [place] [address] for HF Oxygen weaning. No precert. Does not want to return to [facility]. Procedure    Bronchoscopy; await cultures/cytology  elevated WBC; monitor. NPO.      CXR: Pneumonia.       Discharge Plan Return to [facility] vs [place]. Discharge Milestones and Delays: Clinical status: Remains on high flow. Planning VATs on Thursday. IV diflucan for + resp cultures: Candida albicans. [place] continues to follow.        SIGNED:  Shabnam Navarro RN   2022, 12:07 PM

## 2022-05-04 NOTE — PLAN OF CARE
Problem: Discharge Planning  Goal: Discharge to home or other facility with appropriate resources  5/4/2022 1057 by Makenna Cosby RN  Outcome: Progressing  Flowsheets (Taken 5/4/2022 0845)  Discharge to home or other facility with appropriate resources:   Identify barriers to discharge with patient and caregiver   Arrange for needed discharge resources and transportation as appropriate   Identify discharge learning needs (meds, wound care, etc)   Refer to discharge planning if patient needs post-hospital services based on physician order or complex needs related to functional status, cognitive ability or social support system     Problem: Safety - Adult  Goal: Free from fall injury  5/4/2022 1057 by Makenna Cosby RN  Outcome: Progressing  Flowsheets (Taken 5/4/2022 1015)  Free From Fall Injury:   Instruct family/caregiver on patient safety   Based on caregiver fall risk screen, instruct family/caregiver to ask for assistance with transferring infant if caregiver noted to have fall risk factors     Problem: ABCDS Injury Assessment  Goal: Absence of physical injury  5/4/2022 1057 by Makenna Cosby RN  Outcome: Progressing     Problem: Respiratory - Adult  Goal: Clear lung sounds  5/4/2022 1057 by Makenna Cosby RN  Outcome: Progressing     Problem: Respiratory - Adult  Goal: Adequate oxygenation  5/4/2022 1057 by Makenna Cosby RN  Outcome: Progressing     Problem: Respiratory - Adult  Goal: Achieves optimal ventilation and oxygenation  Outcome: Progressing  Flowsheets (Taken 5/4/2022 1057)  Achieves optimal ventilation and oxygenation:   Assess for changes in respiratory status   Position to facilitate oxygenation and minimize respiratory effort   Respiratory therapy support as indicated   Assess for changes in mentation and behavior   Oxygen supplementation based on oxygen saturation or arterial blood gases   Assess and instruct to report shortness of breath or any respiratory difficulty     Problem: Neurosensory - Adult  Goal: Achieves stable or improved neurological status  Outcome: Progressing  Flowsheets (Taken 5/4/2022 1057)  Achieves stable or improved neurological status:   Assess for and report changes in neurological status   Maintain blood pressure and fluid volume within ordered parameters to optimize cerebral perfusion and minimize risk of hemorrhage   Monitor temperature, glucose, and sodium.  Initiate appropriate interventions as ordered     Problem: Cardiovascular - Adult  Goal: Maintains optimal cardiac output and hemodynamic stability  Outcome: Progressing  Flowsheets (Taken 5/4/2022 1057)  Maintains optimal cardiac output and hemodynamic stability:   Monitor blood pressure and heart rate   Monitor urine output and notify Licensed Independent Practitioner for values outside of normal range   Assess for signs of decreased cardiac output     Problem: Skin/Tissue Integrity - Adult  Goal: Incisions, wounds, or drain sites healing without S/S of infection  Outcome: Progressing  Flowsheets (Taken 5/4/2022 1057)  Incisions, Wounds, or Drain Sites Healing Without Sign and Symptoms of Infection:   ADMISSION and DAILY: Assess and document risk factors for pressure ulcer development   TWICE DAILY: Assess and document skin integrity   TWICE DAILY: Assess and document dressing/incision, wound bed, drain sites and surrounding tissue   Implement wound care per orders   Initiate pressure ulcer prevention bundle as indicated     Problem: Gastrointestinal - Adult  Goal: Minimal or absence of nausea and vomiting  Outcome: Progressing  Flowsheets (Taken 5/4/2022 1057)  Minimal or absence of nausea and vomiting:   Provide nonpharmacologic comfort measures as appropriate   Administer ordered antiemetic medications as needed   Advance diet as tolerated, if ordered     Problem: Gastrointestinal - Adult  Goal: Maintains or returns to baseline bowel function  Outcome: Progressing  Flowsheets (Taken 5/4/2022 1057)  Maintains or returns to baseline bowel function:   Assess bowel function   Encourage mobilization and activity   Administer ordered medications as needed     Problem: Gastrointestinal - Adult  Goal: Maintains adequate nutritional intake  Outcome: Progressing     Problem: Genitourinary - Adult  Goal: Absence of urinary retention  Outcome: Progressing  Flowsheets (Taken 5/4/2022 1057)  Absence of urinary retention:   Assess patients ability to void and empty bladder   Monitor intake/output and perform bladder scan as needed     Problem: Metabolic/Fluid and Electrolytes - Adult  Goal: Electrolytes maintained within normal limits  Outcome: Progressing  Flowsheets (Taken 5/4/2022 1057)  Electrolytes maintained within normal limits:   Monitor labs and assess patient for signs and symptoms of electrolyte imbalances   Administer electrolyte replacement as ordered   Monitor response to electrolyte replacements, including repeat lab results as appropriate     Problem: Metabolic/Fluid and Electrolytes - Adult  Goal: Glucose maintained within prescribed range  Outcome: Progressing  Flowsheets (Taken 5/4/2022 1057)  Glucose maintained within prescribed range:   Monitor blood glucose as ordered   Administer ordered medications to maintain glucose within target range   Assess for signs and symptoms of hyperglycemia and hypoglycemia   Assess barriers to adequate nutritional intake and initiate nutrition consult as needed    Care plan reviewed with patient and family. Patient and family verbalize understanding of the plan of care and contribute to goal setting.

## 2022-05-04 NOTE — PROGRESS NOTES
CT/CV Surgery Progress Note    2022 12:04 PM  Surgeon:  Dr. Rodriguez Memory     Subjective: Mr. Trice Villalta is resting comfortably in bed, alert, and in no acute distress. Pt has hx of hypertension, dyslipidemia, COPD, bilateral pulmonary infiltrate, coronary artery disease, paroxysmal atrial fibrillation, status post permanent cardiac pacemaker insertion, and status post CABG x3 and left atrial appendage closure with Atricure clip on 2021. He underwent a bronchoscopy 22, which revealed erythematous mucosa in the right lower lobe bronchus. There was no intrabronchial lesion. Resp culture RLL Bronch lavage 22: Candida albicans- on Diflucan    I/O last 3 completed shifts: In: 1090 [P.O.:1090]  Out:  [Urine:]    Vital Signs: BP (S) (!) 90/54   Pulse 125   Temp 97.5 °F (36.4 °C) (Oral)   Resp 23   Ht 5' 8\" (1.727 m)   Wt 149 lb 0.5 oz (67.6 kg)   SpO2 92%   BMI 22.66 kg/m²    Temp (24hrs), Av.7 °F (36.5 °C), Min:97.3 °F (36.3 °C), Max:98.1 °F (36.7 °C)    Labs:   CBC:   Recent Labs     22  0456 22  0515   WBC 13.1* 18.7*   HGB 10.4* 10.7*   HCT 34.9* 34.3*   MCV 95.4* 91.5    313     BMP:   Recent Labs     22  0456 22  0824 22  0810   *  --   --    K 4.5  --   --    CL 97*  --   --    CO2 28  --   --    BUN 49*  --   --    CREATININE 1.4*  --   --    POCGLU  --    < > 101    < > = values in this interval not displayed. Imaging:  CT Chest: 3/25/22        CXR: 22  FINDINGS:   Calcified right lower lung granuloma. Similar diffuse bilateral airspace    disease with relative sparing of the lung apices. No pleural effusion. No pneumothorax. No cardiomegaly. Atrial appendage exclusion device in place. No acute fracture. Status post median sternotomy.    Right-sided pacemaker in place obscuring a portion of the study.           Impression   Similar bilateral airspace disease.           Intake/Output Summary (Last 24 hours) at 2022 [address] [facility] [address] filed at [address]  Gross per 24 hour   Intake 1400 ml   Output 1550 ml   Net -150 ml     Scheduled Meds:    metoprolol  2.5 mg IntraVENous Once    fluconazole  200 mg Oral Daily    dilTIAZem  30 mg Oral 3 times per day    insulin lispro  0-6 Units SubCUTAneous TID WC    insulin lispro  0-3 Units SubCUTAneous Nightly    levalbuterol  1.25 mg Nebulization TID    sodium chloride flush  5-40 mL IntraVENous 2 times per day    predniSONE  40 mg Oral Daily    atovaquone  1,500 mg Oral Daily    furosemide  20 mg Oral Daily    digoxin  125 mcg Oral Daily    aspirin  81 mg Oral Daily    atorvastatin  40 mg Oral Nightly    cetirizine  10 mg Oral Daily    metoprolol succinate  150 mg Oral Daily    multivitamin  1 tablet Oral Daily    mupirocin   Topical TID    nystatin   Topical BID    pantoprazole  40 mg Oral QAM AC    rOPINIRole  0.5 mg Oral Nightly    traZODone  50 mg Oral Nightly     ROS: All neg unless specifically mentioned in subjective section.      Exam:  General Appearance: alert  Cardiovascular: normal rate, regular rhythm, normal S1 and S2, no murmurs, rubs, clicks, or gallops  Pulmonary/Chest: decreased BS bilaterally  Neurological: alert, oriented, normal speech, no focal findings or movement disorder noted    Assessment:   Patient Active Problem List   Diagnosis    Hyperlipidemia    Asthma    Smoker    Allergic rhinitis due to other allergen    Collagenous colitis    Lactose intolerance    HTN (hypertension)    COPD, mild (HCC)    Atrial fibrillation (HCC)    Atherosclerotic heart disease of native coronary artery with other forms of angina pectoris ([address]    S/P CABG x 3    Encounter for cardioversion procedure    S/P left atrial appendage ligation    Ischemic cardiomyopathy    Chronic bronchitis (HCC)    CHF (congestive heart failure), NYHA class II, chronic, systolic ([address]    Tachycardia-bradycardia ([address]    [employer] dual pacemaker    [facility] Pneumonia    Dyspnea and respiratory abnormalities    Acute and chronic respiratory failure with hypoxia (HCC)    Thrush    Nosebleed    SOB (shortness of breath)     Plan:   1. Right VATS and lung biopsy planned for tomorrow morning. Orders placed for surgery today.      The plan of care was discussed in detail with Dr. Keturah Wright     Electronically signed by Regino Aase, PA-C on 5/4/2022 at 12:04 PM

## 2022-05-04 NOTE — PROGRESS NOTES
Brief Note;    Called by RN at 1420 hours. Patient with 's. Had also had episode during night. BP low; 250 NS bolus per primary service; minimal improvement post.     Writer to unit. Atrial flutter with variable conduction per tele with overall ventricular rate 120/min. Tele reviewed; had similar episode ~ 0100 hours. EMR reviewed; treated with Lopressor 2.5 mg IV at that time. Converted to SB; rate has been SB 55 - 60/min. EMR review notes last 2 doses of po Cardizem were held for HR as noted. Toprol  mg continued and received as scheduled. Labs stable; K 5.0. To bedside. Patient resting in bed comfortably. Denies chest pain or increased dyspnea from his baseline. No lightheadedness or dizziness. Cardizem 30 mg po given. Lopressor 2.5 mg IV x 1. Lanoxin level ordered; Lanoxin since 4/26 - no level noted. Decrease Toprol XL to 100mg daily from 150 mg. Continue Cardizem 30 mg Q8h. If remains stable transition to Cardizem CD. Electrolytes and Mag in AM.  Keep K > 4.0  Keep Mag > 2.0. Please call with questions or concerns.   Jody Granados, APRN - CNP

## 2022-05-04 NOTE — PROCEDURES
12 lead EKG completed. Results handed to [facility] RAMAN KING.  Danny Ruby CET

## 2022-05-04 NOTE — PROGRESS NOTES
IM Progress Note  Dr. Herman Armijo 1:31 PM      Patient name Jody Nuno  [id number]  PCP: Veronica Corral MD  Admit Date: 4/24/2022  Acct No. [de-identified]    Subjective: Interval History:   Lying in bed  On high flow at 40% and 40 L  Was in tears about having his procedure tomorrow and having to be SOB this long  offered anti depressent but does not want it at this time  No cp or palpitation HR elevated and BP low   Receiving fluid bolus        Diet: ADULT DIET; Regular; Low Sodium (2 gm)  ADULT ORAL NUTRITION SUPPLEMENT; Lunch, Breakfast, Dinner; Other Oral Supplement; strawberry ensure plus B,L,D  Diet NPO Exceptions are: Sips of Water with Meds    I/O last 3 completed shifts: In: 0965 [P.O.:1090]  Out: 2090 [Urine:2090]  I/O this shift:  In: 610 [P.O.:600; I.V.:10]  Out: 600 [Urine:600]        Admission weight: 155 lb (70.3 kg) as of 4/24/2022 12:37 PM  Wt Readings from Last 3 Encounters:   05/03/22 149 lb 0.5 oz (67.6 kg)   04/20/22 155 lb 12.8 oz (70.7 kg)   04/13/22 154 lb (69.9 kg)     Body mass index is 22.66 kg/m².     ROS   CVS;  no cp or palpitation  Resp: +SOB+ cough  Neuro:  No numbness or weakness or dizziness  Abd: no nausea or vomiting or abd pain      Medications:   Scheduled Meds:   metoprolol  2.5 mg IntraVENous Once    sodium chloride flush  5-40 mL IntraVENous 2 times per day    [START ON 5/5/2022] ceFAZolin (ANCEF) IVPB  2,000 mg IntraVENous On Call to OR    sodium chloride  250 mL IntraVENous Once    fluconazole  200 mg Oral Daily    dilTIAZem  30 mg Oral 3 times per day    insulin lispro  0-6 Units SubCUTAneous TID     insulin lispro  0-3 Units SubCUTAneous Nightly    levalbuterol  1.25 mg Nebulization TID    predniSONE  40 mg Oral Daily    atovaquone  1,500 mg Oral Daily    furosemide  20 mg Oral Daily    digoxin  125 mcg Oral Daily    aspirin  81 mg Oral Daily    atorvastatin  40 mg Oral Nightly    cetirizine  10 mg Oral Daily    metoprolol succinate 150 mg Oral Daily    multivitamin  1 tablet Oral Daily    mupirocin   Topical TID    nystatin   Topical BID    pantoprazole  40 mg Oral QAM AC    rOPINIRole  0.5 mg Oral Nightly    traZODone  50 mg Oral Nightly     Continuous Infusions:   sodium chloride      dextrose         Labs :     CBC:   Recent Labs     05/02/22  0456 05/04/22  0515 05/04/22  1224   WBC 13.1* 18.7* 24.6*   HGB 10.4* 10.7* 11.4*    313 342     BMP:    Recent Labs     05/02/22  0456 05/04/22  1224   * 133*   K 4.5 5.0   CL 97* 95*   CO2 28 26   BUN 49* 54*   CREATININE 1.4* 1.6*   GLUCOSE 98 151*     Hepatic:   Recent Labs     05/02/22  0456   AST 20   ALT 20   BILITOT 0.4   ALKPHOS 101     Troponin: No results for input(s): TROPONINI in the last 72 hours. BNP: No results for input(s): BNP in the last 72 hours. Lipids: No results for input(s): CHOL, HDL in the last 72 hours.     Invalid input(s): LDLCALCU  INR:   Recent Labs     05/04/22  1224   INR 1.02       Radiology    Objective:   Vitals: BP (S) (!) 90/54   Pulse 125   Temp 97.5 °F (36.4 °C) (Oral)   Resp 23   Ht 5' 8\" (1.727 m)   Wt 149 lb 0.5 oz (67.6 kg)   SpO2 92%   BMI 22.66 kg/m²   HEENT: Head:pupils react  Neck: supple  Lungs: diminished air entry bilat   Heart: regular  Rhythm and tachycardic  Abdomen: soft BS heard NG NT  Extremities: warm  No edema  Neurologic:  Alert, oriented X3    Impression:   :   Acute on chronic hypoxic respiratory failure s/p bronch  Par A. 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

## 2022-05-04 NOTE — PROGRESS NOTES
[address] THERAPY MISSED TREATMENT NOTE  [facility] ICU STEPDOWN TELEMETRY 4K  [id number]      Date: 2022  Patient Name: Robert Neville        CSN: [id number]   : 1950  (70 y.o.)  Gender: male   Referring Practitioner: Manasa Adkins MD  Diagnosis: pneumonia         REASON FOR MISSED TREATMENT: Hold Treatment per Nursing. RN initially ok'd session, although then pt noted to have HR in 120s while resting in bed (had HR in 50s earlier this AM).  Will hold and check back as able

## 2022-05-05 NOTE — DISCHARGE SUMMARY
[address] [place] [address]                               DISCHARGE SUMMARY    PATIENT NAME: Kierra Sandoval                       :        1950  MED REC NO:   [id number]                           ROOM:       [id number]  ACCOUNT NO:   [de-identified]                           ADMIT DATE: 2022  PROVIDER:     Jessika Herrmann M.D.           Shireen Gibbons: 2022    HISTORY:  This is a 66-year-old male who presented to the emergency room  because of increasing amounts of shortness of breath and increased fever  being present. The patient has a history of COPD and usually is on 3  liters of O2. Original bypass surgery was in 2021 and recently was  discharged on 2022 due to community-acquired anemia. The patient  presented because of increasing difficulty with breathing. He had  increased his oxygen up to 5 liters of nasal cannula and still had a  significant shortness of breath being present. Consequently, he  presented to the emergency room because of the shortness of breath. PAST MEDICAL HISTORY:  Notes. MEDICATIONS:  At the time of admission included albuterol inhaler, but  he had home aerosols that he was using. He was on Anoro; trazodone;  Entresto, which he was taking 24-26 one tablet twice a day; Requip at  nighttime; Xarelto 20 mg daily; prednisone he was taking 20 mg two  tablets twice a day for 28 days; Protonix 40 mg once a day;  multivitamin; Toprol- mg once a day; Lasix 20 mg daily; Mepron 750  mg/5 mL, 10 mL daily for prophylaxis; atorvastatin 40 mg once a day;  aspirin 81 mg a day; albuterol inhaler as needed; Xanax 0.5 mg a day  also. ALLERGIES:  TO PENICILLIN AND SULFA. PAST SURGICAL HISTORY:  He had bypass surgery dated back . He had  purported bronchoscopy in the remote past.  He had nasal surgery in  . He had colonoscopy in .     ILLNESSES:  Underlying atrial fib with rapid ventricular rate requiring  pacemaker. He has had collagenous colitis, COPD, hypertension,  atherosclerotic heart disease, hyperlipidemia, still is smoking some at  this time. PHYSICAL EXAMINATION:  VITAL SIGNS:  Notes his temp was 98.1, blood pressure 92/50, pulse 114,  respiratory rate 20, O2 sat 98%. HEENT:  Within normal limits. NECK: Supple without adenopathy. Increased thyroid. LUNGS:  He has got decreased breath sounds, rales and rhonchi  bilaterally with wheezing noted. Decreased breath sounds in the bases  in addition. CARDIAC:  Tachy, regularly irregular rhythm. Normal S1 and S2 without  murmur, gallop, or rub. ABDOMEN:  Soft. Positive bowel sounds. Nontender. No masses. EXTREMITIES:  Full range of motion. No significant tenderness. NEUROLOGIC:  Alert and oriented x3, gross motor and sensory being  present. LABORATORY/DIAGNOSTIC DATA:  At the time of admission, his fluid swabs  were negative and his COVID antibody was noted to be negative. EKG,  sinus tachycardia with first degree block with a heart rate of 138. His  white count was markedly elevated at 32,000 with hemoglobin 12.1,  hematocrit 35.9, platelet count [phone] with BUN of 51, creatinine of  1.9, glucose 80. Sodium 132, potassium 4.8, chloride 95, CO2 is 28. Blood gas had a pH of 7.51, pCO2 of 32, pO2 of 62 with O2 sat 94%. Chest x-ray noted large bilateral consolidations, multilobular pneumonia  being present. CT of the chest noted diffuse ground-glass nodular  infiltrates in both lungs and alone, emphysema being present. HOSPITAL COURSE:  Notes the patient was given in the ER fluid bolus  being present, started on broad spectrum antibiotics. Because of the  significant respiratory symptoms, the patient also required Levophed for  blood pressure. He was initially admitted to the ICU and Dr. Kimberly Norris  called intensivist, did see the patient's consultation.   The patient did  require pressure support, was switched to Zosyn. Atrial fib rate was  rapid, was started on Cardizem dosage and may need to have appropriate  cardioversion. Clearly COPD with exacerbation, community-acquired  pneumonia with leukocytosis of 35,000 and lactic acid also being  elevated. Cardizem drip started. The patient had some Lasix in  addition, concerned as noted already was the amiodarone toxicity being  present, so the patient was continued on the high-dose steroids in  addition. The patient did gradually show some steady improvement and  remained in the ICU. By 03/27/2022, the patient was improved. Dr. Piotr Corral did see the patient in consultation. It was felt there was no  evidence of surgical cause being noted. The patient however stabilized  and by 03/26/2022, the patient was transferred out to the ICU stepdown. The patient did have underlying atrial fib with rapid ventricular rate  with acute-on-chronic congestive heart failure. Pulmonary was consulted  along with Cardiology. Dr. Marlena Ni did see the patient in consultation  and felt that the patient should have IV diuretics stopped. General  hydration present. Toprol for rate control as the patient have been on  amiodarone obviously this will not be used. The patient did turn into a  normal sinus rhythm. Pulmonology saw the patient in consultation and  their feeling was that the patient did have acute-on-chronic hypoxic  respiratory failure, known interstitial pneumonia with amiodarone  toxicity being present and continued use of oxygen. He increased  continuous use of steroids. Consideration for bronchoscopy would be  made in addition. The patient actually showed steady improved, was able  to be weaning down on the oxygen, continued high dose steroids. Dr. Edgar Cantu felt that this was mandatory and was felt that he should be on 40  mg of prednisone at least for one month because of the toxicity.    Attempts initially to increase down the oxygen, but he required more  oxygen eventually. By 03/31/2022, the patient was stable. However, the  patient did have increased bloody nose being present secondary to the  oxygen which was humidified. ENT did take the patient with evaluation  left anterior septal abrasions and use of silver nitrate and a  dissolvable nasal packing was used to stop the bleeding, this worked  well. Cautery was done. As stated the patient finally on 04/01/2022  was noted to be stable. Heart rate was noted to be stable. The patient  did require home O2. There is no further bleeding present, was  eventually decided the patient should go to the nursing home for further  care and because of weakness being present in addition. Chest x-ray  showed the pneumonia overall was improving. Thus, the patient was  discharged to the nursing home on 04/01/2022. FINAL DIAGNOSES:  1. Right lobe pneumonia. 2.  Underlying sepsis secondary to pneumonia. 3.  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 4.  COPD with exacerbation. 5.  Acute-on-chronic respiratory failure with hypoxia. 6.  Hypertension. 7.  Atherosclerotic heart disease. 8.  Pacemaker. 9.  Nasal bleed secondary to the O2 usage, which resolved. 10.  Underlying anxiety. 11.  Atrial fibrillation with rapid rate control. CONDITION ON DISCHARGE:  Stable. DIET:  AHA diet. DISCHARGE MEDICATIONS:  Include Stiolto two puffs once a day, Xarelto 15  mg a day, Bactroban ointment as needed, Lasix 20 mg twice a week,  albuterol inhaler along with aerosol, Xanax 0.5 mg one tablet every 8  hours, prednisone 20 mg two tablets for another month, Mepron will be  continued, Desyrel 50 mg at night, Requip 0.5 mg at night, Protonix 40  mg at night, Zyrtec 10 mg a day, Lipitor 40 mg a day, Toprol- mg a  day, aspirin 81 mg a day, and multivitamin. The patient will also be  discharged on further dose of antibiotics. ACTIVITY:  As tolerated.     DISPOSITION:  Because of the weakness, the patient will go to [facility] for further PT and OT. Labs approximately next week. Follow up in addition to the Pulmonary in  next three weeks because of amiodarone toxicity. The patient was  discharged in stable condition.         Omer Pope M.D.    D: 05/04/2022 19:48:50       T: 05/05/2022 6:00:56     [email or web address]  Job#: [id number]     Doc#: [id number]    CC:   Alena Soulier

## 2022-05-05 NOTE — PROGRESS NOTES
1417 Awake and oriented on arrival to PACU , pt moans and c/o pain # 10 pain   1420 medicated with Fentanyl 50 mcg IV  1425 no change medicated with Fentanyl 50 mcg IV and dilauidd 0.5 mg IV  1430 pain unchanged , medicated with Dilaudid 0.5 mg IV , wife to bedside to see pt , given an update , pt states she is going to go home and will back later tonight  1445 starting to rest on and off Dr Jessica Yancey to bedsideand given update, O2 turned down to 10 L non re breather  1500 resting resp easy   1530 pt resting resp easy   1545 Dr Griselda Canton to bedside updated on pt , orders given for post op pain meds on the floor  1600 resting resp easy   1630 Pt awakens easily to name , states pain tolerable

## 2022-05-05 NOTE — H&P
in place. No acute fracture. Status post median sternotomy. Right-sided pacemaker in place obscuring a portion of the study.           Impression   Similar bilateral airspace disease.       This document has been electronically signed by: Ruthie Rider MD on    05/04/2022 05:06 AM       Chest CT: Dr. Nano Banegas has reviewed the CT image, which shows bilateral pulmonary infiltrate. .       Intake/Output Summary (Last 24 hours) at 5/5/2022 0843  Last data filed at 5/5/2022 0530  Gross per 24 hour   Intake 1797.73 ml   Output 2700 ml   Net -902.27 ml       Scheduled Meds:    metoprolol  2.5 mg IntraVENous Once    sodium chloride flush  5-40 mL IntraVENous 2 times per day    ceFAZolin (ANCEF) IVPB  2,000 mg IntraVENous On Call to OR    metoprolol  2.5 mg IntraVENous Once    metoprolol succinate  100 mg Oral Daily    fluconazole  200 mg Oral Daily    dilTIAZem  30 mg Oral 3 times per day    insulin lispro  0-6 Units SubCUTAneous TID WC    insulin lispro  0-3 Units SubCUTAneous Nightly    levalbuterol  1.25 mg Nebulization TID    predniSONE  40 mg Oral Daily    atovaquone  1,500 mg Oral Daily    furosemide  20 mg Oral Daily    digoxin  125 mcg Oral Daily    aspirin  81 mg Oral Daily    atorvastatin  40 mg Oral Nightly    cetirizine  10 mg Oral Daily    multivitamin  1 tablet Oral Daily    mupirocin   Topical TID    nystatin   Topical BID    pantoprazole  40 mg Oral QAM AC    rOPINIRole  0.5 mg Oral Nightly    traZODone  50 mg Oral Nightly         PastMedical History:  Jose Manuel Vasquez  has a past medical history of Asthma, Atrial fibrillation with RVR ([address] [employer] dual pacemaker , Collagenous colitis, Colon polyps, COPD, mild ([address] Eczema of hand, HTN (hypertension), Hyperlipidemia, and Smoker. Past Surgical History:  The patient  has a past surgical history that includes Nasal sinus surgery (06/2008);  Colonoscopy (06/10/2021); other surgical history (DEC 7TH 2012 DR Stefania Kelly [facility] ); hernia repair; Skin cancer excision (09/2014); Tooth Extraction (02/2017); Coronary artery bypass graft (01/12/2021); Coronary artery bypass graft (N/A, 01/12/2021); Cardiac surgery; and bronchoscopy (N/A, 4/26/2022). Allergies: The patient is allergic to penicillins and sulfa antibiotics. Family History: This patient's family history includes Diabetes in his brother; Heart Disease in his brother, father, and mother. Social History:  Eunice Dowell  reports that he quit smoking about 8 weeks ago. His smoking use included cigarettes. He has a 40.00 pack-year smoking history. He has never used smokeless tobacco. He reports previous alcohol use. He reports that he does not use drugs. ROS:  Constitutional: Negative for activity change, chills, fatigue, fever and unexpected weight change. HENT: Negative for congestion, facial swelling, sore throat, and changes in voice. Eyes: Negative for photophobia, redness, itching and visual disturbance. Respiratory: COPD from heavy smoking. Cardiovascular: Status post CABG and left atrial appendage closure. Status post pacemaker insertion. Gastrointestinal: Negative for abdominal distention, constipation, nausea and vomiting. Endocrine: Negative for cold intolerance, heat intolerance, polyphagia and polyuria. Musculoskeletal: Negative for arthralgias, gait problem, and myalgias. Skin: Negative for color change, rash and wound. Allergic/Immunologic: Negative for food allergies and immunocompromised state. Neurological: Negative for dizziness, tremors, speech difficulty, weakness, numbness and headaches. Hematological: Negative for adenopathy. Does not bruise/bleed easily. Psychiatric/Behavioral: Negative for agitation, confusion, and dysphoric mood. Physical Exam:   General appearance: On high flow supplementary oxygen. HEENT:  Normal cephalic, atraumatic without obvious deformity. Conjunctivae/corneas clear. Neck: Supple, with full range of motion.  No jugular venous distention. Trachea midline. Respiratory:  Decreased breathing sound bilaterally. Cardiovascular:  Regular rate and rhythm with normal S1/S2 without murmurs, rubs or gallops. Abdomen: Soft, non-tender, non-distended with normal bowel sounds. Musculoskeletal:  No clubbing, cyanosis or edema bilaterally. Skin: Skin color, texture, turgor normal.  No rashes or lesions. Neurologic:  Neurovascularly intact without any focal sensory/motor deficits. Psychiatric:  Alert and oriented, thought content appropriate, normal insight. Problem List:  Patient Active Problem List   Diagnosis    Hyperlipidemia    Asthma    Smoker    Allergic rhinitis due to other allergen    Collagenous colitis    Lactose intolerance    HTN (hypertension)    COPD, mild (HCC)    Atrial fibrillation (HCC)    Atherosclerotic heart disease of native coronary artery with other forms of angina pectoris ([address]    S/P CABG x 3    Encounter for cardioversion procedure    S/P left atrial appendage ligation    Ischemic cardiomyopathy    Chronic bronchitis (HCC)    CHF (congestive heart failure), NYHA class II, chronic, systolic (HCC)    Tachycardia-bradycardia (HCC)    [employer] dual pacemaker     Pneumonia    Dyspnea and respiratory abnormalities    Acute and chronic respiratory failure with hypoxia (HCC)    Thrush    Nosebleed    SOB (shortness of breath)       Assessment: Bilateral pulmonary infiltrates. Plan: 5/5/22  1. Dr. Lisha Wallis plans for Right VATS and lung biopsy later today.           Leelee Watkins PA-C

## 2022-05-05 NOTE — ANESTHESIA PRE PROCEDURE
Department of Anesthesiology  Preprocedure Note       Name:  Constance Lozano   Age:  70 y.o.  :  1950                                          MRN:  [id number]         Date:  2022      Surgeon: Franco Salinas):  Darylene Falls, MD    Procedure: Procedure(s):  RIGHT VATS WITH LUNG BIOPSY    Medications prior to admission:   Prior to Admission medications    Medication Sig Start Date End Date Taking? Authorizing Provider   albuterol (PROVENTIL) (2.5 MG/3ML) 0.083% nebulizer solution Take 2.5 mg by nebulization 2 times daily   Yes Historical Provider, MD   ipratropium-albuterol (DUONEB) 0.5-2.5 (3) MG/3ML SOLN nebulizer solution Inhale 1 vial into the lungs every 4 hours as needed for Shortness of Breath   Yes Historical Provider, MD   Sodium Chloride-Sodium Bicarb 2300-700 MG PACK 1 spray by Nasal route in the morning, at noon, and at bedtime   Yes Historical Provider, MD   FLUoxetine (PROZAC) 10 MG capsule Take 10 mg by mouth daily (10 mg daily x 5 days - -, then increase to 20 mg daily)   Yes Historical Provider, MD   atovaquone (MEPRON) 750 MG/5ML suspension Take 1,500 mg by mouth daily (10 mL daily)    Historical Provider, MD   predniSONE (DELTASONE) 20 MG tablet Take 30 mg by mouth daily     Historical Provider, MD   ALPRAZolam (XANAX) 0.5 MG tablet Take 0.5 mg by mouth every 12 hours as needed for Sleep.      Historical Provider, MD   furosemide (LASIX) 20 MG tablet Take 20 mg by mouth daily Pedro Caceres    Historical Provider, MD   loratadine (CLARITIN) 10 MG tablet Take 10 mg by mouth daily    Historical Provider, MD   metoprolol succinate (TOPROL XL) 100 MG extended release tablet Take 1.5 tablets by mouth daily 22   Og Zamora PA-C   tiotropium-olodaterol (STIOLTO) 2.5-2.5 MCG/ACT AERS Inhale 2 puffs into the lungs daily 22   Sylvia Rosas MD   rivaroxaban (XARELTO) 15 MG TABS tablet Take 1 tablet by mouth daily 22   Sylvia Rosas MD   mupirocin (BACTROBAN) 2 % ointment Apply topically 3 times daily.  4/1/22   Kayleen Hernández MD   albuterol sulfate  (90 Base) MCG/ACT inhaler INHALE 2 PUFFS INTO THE LUNGS EVERY 6 HOURS AS NEEDED FOR WHEEZING 3/18/22   Kayleen Hernández MD   traZODone (DESYREL) 50 MG tablet Take 1 tablet by mouth nightly 3/16/22   Kayleen Hernández MD   rOPINIRole (REQUIP) 0.5 MG tablet Take 1 tablet by mouth nightly 3/16/22   Kayleen Hernández MD   pantoprazole (PROTONIX) 40 MG tablet Take 1 tablet by mouth every morning (before breakfast) 3/17/22   Kayleen Hernández MD   atorvastatin (LIPITOR) 40 MG tablet Take 1 tablet by mouth nightly 8/24/21   ARCADIO Johnson CNP   aspirin 81 MG chewable tablet Take 1 tablet by mouth daily 1/20/21   Tremaine Tabares PA-C   Multiple Vitamins-Minerals (CENTRUM SILVER PO) Take 1 tablet by mouth daily     Historical Provider, MD       Current medications:    Current Facility-Administered Medications   Medication Dose Route Frequency Provider Last Rate Last Admin    metoprolol (LOPRESSOR) injection 2.5 mg  2.5 mg IntraVENous Once Jessie King MD        sodium chloride flush 0.9 % injection 5-40 mL  5-40 mL IntraVENous 2 times per day Dony Brown PA-C   10 mL at 05/05/22 0842    sodium chloride flush 0.9 % injection 5-40 mL  5-40 mL IntraVENous PRN Dony Brown PA-C        0.9 % sodium chloride infusion   IntraVENous PRN Dony Brown PA-C        metoprolol (LOPRESSOR) injection 2.5 mg  2.5 mg IntraVENous Once Jessie King MD        metoprolol succinate (TOPROL XL) extended release tablet 100 mg  100 mg Oral Daily ARCADIO Cruz CNP   100 mg at 05/05/22 0842    fluconazole (DIFLUCAN) tablet 200 mg  200 mg Oral Daily Crista White MD   200 mg at 05/05/22 0841    dilTIAZem (CARDIZEM) tablet 30 mg  30 mg Oral 3 times per day ARCADIO Cruz CNP   30 mg at 05/05/22 0534    insulin lispro (HUMALOG) injection vial 0-6 Units  0-6 Units SubCUTAneous TID WC Sena Gamino MD   1 Units at 05/04/22 1740    insulin lispro (HUMALOG) injection vial 0-3 Units  0-3 Units SubCUTAneous Nightly Sena Gamino MD   1 Units at 05/04/22 2136    dextrose 50 % IV solution  12.5 g IntraVENous PRN Sena Gamino MD        glucagon (rDNA) injection 1 mg  1 mg IntraMUSCular PRN Sena Gamino MD        dextrose 5 % solution  100 mL/hr IntraVENous PRN Sena Gamino MD        glucose chewable tablet 16 g  4 tablet Oral PRN Sena Gamino MD        levalbuterol (XOPENEX) nebulizer solution 1.25 mg  1.25 mg Nebulization TID ARCADIO Whitman - CNP   1.25 mg at 05/05/22 0953    metoprolol (LOPRESSOR) injection 5 mg  5 mg IntraVENous Q4H PRN Dede Manzano MD   5 mg at 05/04/22 0111    predniSONE (DELTASONE) tablet 40 mg  40 mg Oral Daily Samia Cortez MD   40 mg at 05/05/22 0841    atovaquone (MEPRON) suspension 1,500 mg  1,500 mg Oral Daily Dede Manzano MD   1,500 mg at 05/05/22 0850    furosemide (LASIX) tablet 20 mg  20 mg Oral Daily Tiffanie Ponce MD   20 mg at 05/05/22 0841    digoxin (LANOXIN) tablet 125 mcg  125 mcg Oral Daily Dede Manzano MD   125 mcg at 05/05/22 0841    ALPRAZolam (XANAX) tablet 0.5 mg  0.5 mg Oral Nightly PRN Dede Manzano MD   0.5 mg at 05/02/22 2130    aspirin chewable tablet 81 mg  81 mg Oral Daily Dede Manzano MD   81 mg at 05/04/22 0902    atorvastatin (LIPITOR) tablet 40 mg  40 mg Oral Nightly Dede Manzano MD   40 mg at 05/04/22 2137    cetirizine (ZYRTEC) tablet 10 mg  10 mg Oral Daily Dede Manzano MD   10 mg at 05/05/22 0841    multivitamin 1 tablet  1 tablet Oral Daily Dede Manzano MD   1 tablet at 05/05/22 0840    mupirocin (BACTROBAN) 2 % ointment   Topical TID Dede Manzano MD   Given at 05/05/22 0842    nystatin (MYCOSTATIN) cream   Topical BID Dede Manzano MD   Given at 05/05/22 0842    pantoprazole (PROTONIX) tablet 40 mg  40 mg Oral QAM AC Manasa Adkins MD   40 mg at 05/05/22 0534    rOPINIRole (REQUIP) tablet 0.5 mg  0.5 mg Oral Nightly Manasa Adkins MD   0.5 mg at 04/26/22 2006    traZODone (DESYREL) tablet 50 mg  50 mg Oral Nightly Manasa Adkins MD   50 mg at 05/04/22 2137    ipratropium-albuterol (DUONEB) nebulizer solution 1 ampule  1 ampule Inhalation Q4H PRN Manasa Adkins MD        ondansetron (ZOFRAN-ODT) disintegrating tablet 4 mg  4 mg Oral Q8H PRN Jo-Ann Spencer MD        Or    ondansetron [facility]) injection 4 mg  4 mg IntraVENous Q6H PRN Jo-Ann Spencer MD        acetaminophen (TYLENOL) tablet 650 mg  650 mg Oral Q4H PRN Manasa Adkins MD           Allergies:     Allergies   Allergen Reactions    Penicillins Rash    Sulfa Antibiotics Rash       Problem List:    Patient Active Problem List   Diagnosis Code    Hyperlipidemia E78.5    Asthma J45.909    Smoker F17.200    Allergic rhinitis due to other allergen J30.89    Collagenous colitis K52.831    Lactose intolerance E73.9    HTN (hypertension) I10    COPD, mild (HCC) J44.9    Atrial fibrillation (HCC) I48.91    Atherosclerotic heart disease of native coronary artery with other forms of angina pectoris ([facility] I25.118    S/P CABG x 3 Z95.1    Encounter for cardioversion procedure Z01.89    S/P left atrial appendage ligation Z98.890    Ischemic cardiomyopathy I25.5    Chronic bronchitis ([facility] J42    CHF (congestive heart failure), NYHA class II, chronic, systolic (HCC) W36.82    Tachycardia-bradycardia (HCC) I49.5    [employer] dual pacemaker  Z95.0    Pneumonia J18.9    Dyspnea and respiratory abnormalities R06.00, R06.89    Acute and chronic respiratory failure with hypoxia (HCC) J96.21    Thrush B37.0    Nosebleed R04.0    SOB (shortness of breath) R06.02       Past Medical History:        Diagnosis Date    Asthma     Atrial fibrillation with RVR ([facility] 04/05/2021    [employer] dual pacemaker 04/15/2021    Collagenous colitis     per scope     Colon polyps     dr Annalisa Huber    COPD, mild ([address]     Eczema of hand     HTN (hypertension)     Hyperlipidemia     Smoker        Past Surgical History:        Procedure Laterality Date    BRONCHOSCOPY N/A 2022    BRONCHOSCOPY WITH BAL performed by Clare Long MD at [address] COLONOSCOPY  06/10/2021    theresa ccolon polyps and collagenous colitis    CORONARY ARTERY BYPASS GRAFT  2021    cabg x 3 with lima and atrial appendage clip  dr Garduno Layman GRAFT N/A 2021    CABG  X 3 WITH DEJAH, Atrial Appendage Clip performed by Ishmael Washington MD at [address] [place] [address] Road  2008    polyps    OTHER SURGICAL HISTORY  DEC 7TH 2012 DR VANAN [facility] [place]     IGS ENDOSCOPIC BI LAT MAXILLARY, ETHMOID, SPHENOID, FRONTAL SINUSOTOMY WITH SEPTOPLASTY AND TURBINOPLASTIES    SKIN CANCER EXCISION  2014    Left side of Forehead     TOOTH EXTRACTION  2017       Social History:    Social History     Tobacco Use    Smoking status: Former Smoker     Packs/day: 1.00     Years: 40.00     Pack years: 40.00     Types: Cigarettes     Quit date: 3/7/2022     Years since quittin.1    Smokeless tobacco: Never Used   Substance Use Topics    Alcohol use: Not Currently     Alcohol/week: 0.0 standard drinks                                Counseling given: Not Answered      Vital Signs (Current):   Vitals:    22 0458 22 0530 22 0838 22 0953   BP:  (!) 106/58 134/65    Pulse:  55 55    Resp:  18 22    Temp:   98.3 °F (36.8 °C)    TempSrc:   Oral    SpO2: 99%  95% 96%   Weight:       Height:                                                  BP Readings from Last 3 Encounters:   22 134/65   22 (!) 209/85   22 130/62       NPO Status: Time of last liquid consumption: 0800                        Time of last solid consumption: 1700                        Date of last liquid consumption: 04/26/22                        Date of last solid food consumption: 04/25/22    BMI:   Wt Readings from Last 3 Encounters:   05/05/22 149 lb 11.1 oz (67.9 kg)   04/20/22 155 lb 12.8 oz (70.7 kg)   04/13/22 154 lb (69.9 kg)     Body mass index is 22.76 kg/m². CBC:   Lab Results   Component Value Date    WBC 24.6 05/04/2022    RBC 3.97 05/04/2022    RBC 4.84 11/07/2011    HGB 11.4 05/04/2022    HCT 36.1 05/04/2022    MCV 90.9 05/04/2022    RDW 13.3 06/07/2018     05/04/2022       CMP:   Lab Results   Component Value Date     05/05/2022    K 4.9 05/05/2022    K 5.0 05/04/2022    CL 98 05/05/2022    CO2 30 05/05/2022    BUN 51 05/05/2022    CREATININE 1.5 05/05/2022    LABGLOM 46 05/05/2022    GLUCOSE 97 05/05/2022    GLUCOSE 94 11/07/2011    PROT 6.0 05/02/2022    CALCIUM 8.3 05/05/2022    BILITOT 0.4 05/02/2022    ALKPHOS 101 05/02/2022    AST 20 05/02/2022    ALT 20 05/02/2022       POC Tests:   Recent Labs     05/05/22  0748   POCGLU 90       Coags:   Lab Results   Component Value Date    INR 1.02 05/04/2022    APTT 22.6 05/04/2022       HCG (If Applicable): No results found for: PREGTESTUR, PREGSERUM, HCG, HCGQUANT     ABGs: No results found for: PHART, PO2ART, UYJ0OBR, CWO8PRR, BEART, Y2NUSBYO     Type & Screen (If Applicable):  Lab Results   Component Value Date    LABRH POS 05/04/2022       Drug/Infectious Status (If Applicable):  No results found for: HIV, HEPCAB    COVID-19 Screening (If Applicable):   Lab Results   Component Value Date    COVID19 NOT  DETECTED 04/24/2022           Anesthesia Evaluation  Patient summary reviewed  Airway: Mallampati: II  TM distance: >3 FB   Neck ROM: full  Mouth opening: > = 3 FB Dental:          Pulmonary:   (+) pneumonia:  COPD:  shortness of breath:  asthma: current smoker          Patient did not smoke on day of surgery.                  Cardiovascular:    (+) hypertension:, pacemaker:, CAD:, CABG/stent:, CHF:,       ECG reviewed                        Neuro/Psych:               GI/Hepatic/Renal:             Endo/Other:                     Abdominal:             Vascular: Other Findings:             Anesthesia Plan      general     ASA 4     (Possible postop ventilation)  Induction: intravenous. MIPS: Postoperative opioids intended, Prophylactic antiemetics administered and One-lung ventilation. Anesthetic plan and risks discussed with patient and spouse. Plan discussed with GERALD. Colby Hughes.  [address] [facility]   5/5/2022

## 2022-05-05 NOTE — PLAN OF CARE
Problem: Respiratory - Adult  Goal: Clear lung sounds  5/4/2022 2012 by Dea Gowers, RCP  Outcome: Progressing     Problem: Respiratory - Adult  Goal: Adequate oxygenation  5/4/2022 2012 by Dea Gowers, RCP  Outcome: Progressing     Problem: Respiratory - Adult  Goal: Achieves optimal ventilation and oxygenation  5/4/2022 2012 by Dea Gowers, RCP  Outcome: Progressing   Patient currently still on the HFNC to achieve optimal oxygenation and receiving breathing treatments.

## 2022-05-05 NOTE — ANESTHESIA POSTPROCEDURE EVALUATION
Department of Anesthesiology  Postprocedure Note    Patient: Jonny Ramos  MRN: [id number]  YOB: 1950  Date of evaluation: 5/5/2022  Time:  3:07 PM     Procedure Summary     Date: 05/05/22 Room / Location: [address]    Anesthesia Start: 9229 Anesthesia Stop: 2987    Procedure: RIGHT VATS WITH LUNG BIOPSY WITH RIGHT MIDDLE LOBE LUNG WEDGE RESECTION (Right ) Diagnosis: (LUNG INFILTRATES)    Surgeons: Hodan Huber MD Responsible Provider: Jose Greenfield DO    Anesthesia Type: general ASA Status: 4          Anesthesia Type: general    Nessa Phase I: Nessa Score: 9    Nessa Phase II: Nessa Score: 9    Last vitals: Reviewed and per EMR flowsheets.        Anesthesia Post Evaluation    Patient location during evaluation: bedside  Patient participation: complete - patient participated  Level of consciousness: awake  Airway patency: patent  Nausea & Vomiting: no vomiting and no nausea  Cardiovascular status: hemodynamically stable  Respiratory status: acceptable  Hydration status: stable

## 2022-05-05 NOTE — RT PROTOCOL NOTE
RT Inhaler-Nebulizer Bronchodilator Protocol Note    There is a bronchodilator order in the chart from a provider indicating to follow the RT Bronchodilator Protocol and there is an Initiate RT Inhaler-Nebulizer Bronchodilator Protocol order as well (see protocol at bottom of note). CXR Findings:  XR CHEST PORTABLE    Result Date: 5/4/2022  Similar bilateral airspace disease. This document has been electronically signed by: Aimee Stout MD on 05/04/2022 05:06 AM      The findings from the last RT Protocol Assessment were as follows:   History Pulmonary Disease: Chronic pulmonary disease  Respiratory Pattern: Mild dyspnea at rest, irregular pattern, or RR 21-25 bpm  Breath Sounds: Slightly diminished and/or crackles  Cough: Strong, spontaneous, non-productive  Indication for Bronchodilator Therapy: Decreased or absent breath sounds  Bronchodilator Assessment Score: 8    Aerosolized bronchodilator medication orders have been revised according to the RT Inhaler-Nebulizer Bronchodilator Protocol below. Respiratory Therapist to perform RT Therapy Protocol Assessment initially then follow the protocol. Repeat RT Therapy Protocol Assessment PRN for score 0-3 or on second treatment, BID, and PRN for scores above 3. No Indications - adjust the frequency to every 6 hours PRN wheezing or bronchospasm, if no treatments needed after 48 hours then discontinue using Per Protocol order mode. If indication present, adjust the RT bronchodilator orders based on the Bronchodilator Assessment Score as indicated below. Use Inhaler orders unless patient has one or more of the following: on home nebulizer, not able to hold breath for 10 seconds, is not alert and oriented, cannot activate and use MDI correctly, or respiratory rate 25 breaths per minute or more, then use the equivalent nebulizer order(s) with same Frequency and PRN reasons based on the score.   If a patient is on this medication at home then do not decrease Frequency below that used at home. 0-3 - enter or revise RT bronchodilator order(s) to equivalent RT Bronchodilator order with Frequency of every 4 hours PRN for wheezing or increased work of breathing using Per Protocol order mode. 4-6 - enter or revise RT Bronchodilator order(s) to two equivalent RT bronchodilator orders with one order with BID Frequency and one order with Frequency of every 4 hours PRN wheezing or increased work of breathing using Per Protocol order mode. 7-10 - enter or revise RT Bronchodilator order(s) to two equivalent RT bronchodilator orders with one order with TID Frequency and one order with Frequency of every 4 hours PRN wheezing or increased work of breathing using Per Protocol order mode. 11-13 - enter or revise RT Bronchodilator order(s) to one equivalent RT bronchodilator order with QID Frequency and an Albuterol order with Frequency of every 4 hours PRN wheezing or increased work of breathing using Per Protocol order mode. Greater than 13 - enter or revise RT Bronchodilator order(s) to one equivalent RT bronchodilator order with every 4 hours Frequency and an Albuterol order with Frequency of every 2 hours PRN wheezing or increased work of breathing using Per Protocol order mode. RT to enter RT Home Evaluation for COPD & MDI Assessment order using Per Protocol order mode.     Electronically signed by Sudarshan Ozuna RCP on 5/5/2022 at 10:00 AM

## 2022-05-05 NOTE — PROGRESS NOTES
Pt arrived in room 09 in ICU ~1650. On nonrebreather mask at 10L. Patient repositioned for comfort. Call light within reach. Bed in lowest position. Bed alarm on.

## 2022-05-05 NOTE — PROGRESS NOTES
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 Patient has been in a skilled nursing facility after his last admission last month in the hospital for pneumonia. Patient noticed his oxygen saturation to be less and hence was transferred here he has been placed on BiPAP with improvement in his saturation. He was also noted to be in A. fib with RVR. He denies any chest pain or palpitations. His breathing is much better since his been on the BiPAP. Chest x-ray noted. Cardiac enzymes are minimally elevated. White count is also elevated. He did complain of chills before he presented to the ER. His temperature was 99.6 on admission     Prior Level of Function:  Type of Home: Facility [facility])  Home Equipment: Oxygen,Walker, rolling        ADL Assistance: Needs assistance  Ambulation Assistance: Needs assistance  Transfer Assistance: Needs assistance  Additional Comments: Pt reported continued to require O2 at [facility] since was last discharged from [facility]. Pt reported was able to walk with RW although would desaturate quickly.     Restrictions/Precautions:  Restrictions/Precautions: General Precautions,Fall Risk  Position Activity Restriction  Other position/activity restrictions: monitor O2 sats     SUBJECTIVE: RN approved session, states he is planned for sx at 10 today. Pt agreeable for PT treatment, supine exercises only because he wants to save his energy for surgery later today. Pt's wife present and supportive. PAIN: 0/10: denies    Vitals: Blood Pressure: 133/75 baseline, 127/56 during session  Oxygen: 94% baseline, drop to 88-89% with exercise x2 times, able to recover quickly with pursed lip breathing, 91% end of session  Heart Rate: 55 bpm baseline - kept [facility]  respiratory rate: 19 baseline- kept WF    Pt on HFNC at 30 L/min and 38% FiO2     OBJECTIVE:  Bed Mobility:  Not Tested    Transfers:  Not Tested   Pt politely declined    Ambulation:  Not Tested  Pt politely declined    Balance:  not able to be assessed this session     Exercise:  Patient was guided in 1 set(s) 12 reps of exercise to both lower extremities. Ankle pumps x20, Glut sets, Quad sets, Heelslides, Short arc quads, Acapella x10 reps, and Hip abduction/adduction. Exercises were completed for increased independence with functional mobility. *Cues and demo provided for appropriate completion of exercises. Cues to maintain within a tolerable range provided. Cues for pursed lip breathing during exercises and for prolonged rest breaks between each    Functional Outcome Measures: Completed  AM-PAC Inpatient Mobility without Stair Climbing Raw Score : 9  AM-PAC Inpatient without Stair Climbing T-Scale Score : 32.44    ASSESSMENT:  Assessment: Patient progressing toward established goals. Activity Tolerance:  Patient tolerance of  treatment: fair. Pt with good tolerance of supine exercises this date, declined transfers and ambulation secondary to his sx today, wanting to save his energy. Pt did have two drops in SpO2 to 88%, able to recover in a timely manner with pursed lip breathing.  Much time this session for pursed lip breathing and rest breaks. Equipment Recommendations:Equipment Needed: No  Discharge Recommendations: Continue to assess pending progress and Patient would benefit from continued PT at discharge  Plan: Current Treatment Recommendations: Strengthening,Endurance training,Therapeutic activities,Home exercise program,Safety education & training,Patient/Caregiver education & training  Plan:  (3-5x GM)    Patient Education  Patient Education: Plan of Care, Verbal Exercise Instruction, Activity Pacing, Pursed Lip Breathing    Goals:  Patient goals : get stronger  Short Term Goals  Time Frame for Short term goals: at discharge  Short term goal 1: Pt to be Supervision for supine <> sit to get in/out of bed  Short term goal 2: Pt to tolerate sitting edge of bed with dynamic task completion >5 min with Supervision and maintain O2 sat >88%  Short term goal 3: PT to assess transfers and ambulation as able  Long Term Goals  Time Frame for Long term goals : not set due to short ELOS    Following session, patient left in safe position with all fall risk precautions in place.

## 2022-05-05 NOTE — CARE COORDINATION
Collaborative Discharge Planning    Sarahy Machado  :  1950  MRN:  [id number]    ADMIT DATE:  2022      Discharge Planning Discharge Planning  Type of Residence: [place] [facility])  Living Arrangements: Other (Comment)  Support Systems: Spouse/Significant Other  Current Services Prior To Admission: Sandra Stanley Medications: No  Meds-to-Beds: Does the patient want to have any new prescriptions delivered to bedside prior to discharge?: No  Type of Home Care Services: None  Patient expects to be discharged to[de-identified] LTAC  Follow Up Appointment: Best Day/Time : Monday AM  One/Two Story Residence: One story  History of falls?: No  White Board Notes /Social Work Whiteboard Notes  /Social Work Whiteboard: ; [address] [place] [address] for HF Oxygen weaning. No precert. Does not want to return to [facility]. Procedure    Bronchoscopy; await cultures/cytology  elevated WBC; monitor. NPO.      CXR: Pneumonia.      RIGHT VATS WITH LUNG BIOPSY WITH RIGHT MIDDLE LOBE LUNG WEDGE RESECTION    Discharge Plan: [place] following, does not wish to return to [facility]. Discharge Milestones and Delays: Clinical status: procedure today. Post-op care.          SIGNED:  Sade Patricio RN   2022, 2:49 PM

## 2022-05-05 NOTE — CONSULTS
Patient:  Hanna Franklin    Unit/Bed:4D-09/009-A  MRN: [id number]   PCP: Darcy Asif MD  Date of Admission: 4/24/2022    Assessment and Plan(All pulmonary edema, renal failure, PE, and respiratory failure diagnoses are acute in nature unless otherwise specified):        1. Acute on chronic hypoxic respiratory failure: see #2. Chronically on 4L NC at rest and 6L NC with activity. Remains on heated HF NC with stable requirements; on 30 lpm and weaned to 45% FiO2. Wean as able to maintain SpO2 >90%. 2. Rapidly progressing bilateral interstitial infiltrates: Comparison of prior radiographic films shows no interstitial process as recently as February 2021. CXR's are now continuing to reveal bilateral interstitial infiltrates since March 2022. Concerns of possible amiodarone toxicity. RBILD also considered given heavy smoking history. OP considered. MAGALI negative. No reported dysphagia history. No alcohol use for the past year. Follows with Dr. Vaibhav Doyle as outpatient. Remains off amiodarone since earlier this year. On outpatient prednisone therapy per pulmonology, as well as PCP prophylaxis with Atovaquone. Prednisone increased to 40 mg daily per Pulmonology on admission. Will continue. On fluconizole per primary for positive growth of candida albicans from BAL. S/p empiric cefepime course. Viral culture of BAL (4/27) NGTD. Negative for COVID-19. No bacterial growth on BAL culture (4/27). ID following. S/p VATS today for lung tissue biopsy. RML wedge resection done as well. Lung tissue cultures were sent and pending at this time. Resume DVT prophylaxis at discretion of CT surgery. SCDs. Monitor right-sided chest tube. Maintain -20 mmHg suction. Repeat CXR in AM. Post-op BMP, CBC. 3. COPD/Asthma, history of: No wheezing on exam. Continue bronchodilators. Pulmonology following. 4. Paroxysmal atrial fibrillation/flutter: On amio in the past but stopped in the past by pulmonology for concerns of pulmonary toxicity.  On digoxin, cardizem and metoprolol. Currently in NSR. S/p ZAHRA closure with Atriclip (01/2021). On Xarelto as outpatient. Was seen by EP as outpatient on 4/13/22, with plans for follow-up [facility] in 3 months for evaluation of ZAHRA and possible discontinuation of [address]. Patient also considering ablation in the future. CT surgery recommended d/c of xarelto given ZAHRA. Presently off xarelto per primary. Cardiology also following. 5. SSS: s/p dual pacer placement in 2021.  6. Ischemic cardiomyopathy: With improvement of EF to 55-60% (3/9/22). Continue BB. Continue lasix 20 mg daily. Strict I/O. Daily weights. Euvolemic on exam. Cardiology following. 7. CAD s/p CABG x3 (01/2021): Denies chest pain. Cardiology and CT surgery following. Aspirin 81 mg daily. Statin. BB.   8. HTN: continue cardizem, metoprolol. 9. HLD, history of:  Continue lipitor. 10. GERD, history of: continue protonix. CC:  Dyspnea, hypoxia. HPI: Neil Kenny is a 69 y/o male former-smoker with PMH of CAD s/p CABG x3 (01/2021), ischemic cardiomyopathy, COPD, asthma, atrial fibrillation s/p left atrial appendage clip (01/2021), sick sinus syndrome s/p dual pacemaker placement (4/2021), HTN, HLD, colon polyps. Patient presented to [facility] ED on 4/24/22 from nursing home for complaints of increasing dyspnea. He was recently hospitalized last month with pneumonia. He did endorse recent chills. He was hypoxic on presentation with evidence of mild volume overload. He was placed on BiPAP initially. He had a noted leukocytosis, but was afebrile. CXR showed persistent and worsening acute on chronic interstitial disease at the lung bases. He was also noted to be in Afib with RVR. He was started on cardizem infusion with improvement. He was admitted under the care of Dr. Armstead Meigs. He received empiric antibiotics. Pulmonology was consulted. He received bronchodilators. His chronic prednisone was increased from 30 mg to 40 mg daily.  He was later weaned to high flow nasal cannula. Infectious disease was consulted. He was maintained on cefepime empirically. Cardiology was consulted for Afib. Digoxin was added. He was diuresed with lasix. On 4/26/22 he underwent bronchoscopy. He had mucus production of the RUL, RML, RLL and right main bronchus. The RLL bronchus mucosa was erythematous. BAL was sent. This returned positive for candida albicans. Diflucan was started. Fluid analysis was also notable for macrophages and positive staining for lipid accumulation. CT surgery was consulted 4/28/22 per patient/family request for consideration of open lung biopsy for confirmation of diagnosis of ILD. CT surgery Dr. WADE [facility] did evaluate, with plans for VATS and lung biopsy. He also recommended discontinuation of xarelto as well, given s/p ZAHRA closure. Patient underwent VATS with lung biopsy with RML wedge resection on 5/5/22 with Dr. WADE [facility]. EBL was < 5 mL. There were no reported complications. Lung tissue biopsy and culture was obtained from RML. Patient presented to ICU post-op for close monitoring. He remains hemodynamically stable, and oxygenating well on stable high flow nasal cannula requirements. For further details, please see assessment and plan. ROS: Review of Systems   Constitutional: Negative for chills, diaphoresis and fever. HENT: Negative for congestion, sore throat and trouble swallowing. Eyes: Negative for redness and visual disturbance. Respiratory: Positive for shortness of breath (with exertion). Negative for cough, chest tightness and wheezing. Cardiovascular: Negative for chest pain and leg swelling. Gastrointestinal: Negative for abdominal distention, abdominal pain and nausea. Genitourinary: Negative for difficulty urinating and dysuria. Musculoskeletal: Negative for back pain and neck pain. Skin: Negative for color change and pallor. Neurological: Negative for dizziness, weakness, light-headedness and headaches. Psychiatric/Behavioral: Negative for agitation and confusion. The patient is not nervous/anxious. PMH:  Per HPI  SHX:  Former-smoker. Quit 3/7/22. 40 pack-year history. Denies alcohol or substance use. FHX: heart disease, diabetes. Allergies: penicillins (low severity; rash). Sulfa antibiotics (low severity; rash). Medications:     dextrose        metoprolol  2.5 mg IntraVENous Once    metoprolol  2.5 mg IntraVENous Once    metoprolol succinate  100 mg Oral Daily    fluconazole  200 mg Oral Daily    dilTIAZem  30 mg Oral 3 times per day    insulin lispro  0-6 Units SubCUTAneous TID WC    insulin lispro  0-3 Units SubCUTAneous Nightly    levalbuterol  1.25 mg Nebulization TID    predniSONE  40 mg Oral Daily    atovaquone  1,500 mg Oral Daily    furosemide  20 mg Oral Daily    digoxin  125 mcg Oral Daily    aspirin  81 mg Oral Daily    atorvastatin  40 mg Oral Nightly    cetirizine  10 mg Oral Daily    multivitamin  1 tablet Oral Daily    mupirocin   Topical TID    nystatin   Topical BID    pantoprazole  40 mg Oral QAM AC    rOPINIRole  0.5 mg Oral Nightly    traZODone  50 mg Oral Nightly       Vital Signs:   T: 98.2: P: 55 RR: 16 B/P: 147/70: FiO2: 45%: O2 Sat:100%: I/O: net -900 mL last 24h/ net -12.9L since admission GCS: 15  Body mass index is 22.76 kg/m². [place] [facility]:   Acute on chronically ill adult male. Appears stated age. HEENT:  normocephalic and atraumatic. No scleral icterus. PERR  Neck: supple. No Thyromegaly. Lungs: slight rales to auscultation. Unlabored. No retractions. Right-sided chest tube secured, with maintained -20 suction, no air leak. Cardiac: RRR. S1/S2. No JVD. Abdomen: soft. Nontender. Extremities:  No clubbing, cyanosis, or edema x 4. Vasculature: capillary refill < 3 seconds. Palpable dorsalis pedis pulses. Skin:  warm and dry. Psych:  Alert and oriented x3. Affect appropriate  Lymph:  No supraclavicular adenopathy.   Neurologic:  No focal deficit. No seizures. Data: (All radiographs, tracings, PFTs, and imaging are personally viewed and interpreted unless otherwise noted).  Telemetry: 100% paced rhythm. No ectopy.  ECHO 3/9/22 report: Left ventricle size is normal. Normal left ventricular wall thickness. There were no regional wall motion abnormalities. Ejection fraction is visually estimated in the range of 55% to 60%. Abnormal (paradoxical) motion consistent with post-operative status.  Na 136 K 4.9 Cl 98 CO2 30 BUN 51 Cr 1.5 Gluc 97 Ca 8.3   Repeat labs are pending.  Lung tissue anaerobic/aerobic culture 5/5/22: in process   Lung tissue fungal culture 5/5/22: in process   AFB stain 5/5/22: in process   Fungal culture BAL 4/27/22: candida albicans   Viral culture BAL 4/27/22: NGTD.  Respiratory culture BAL 4/27/22: candida albicans.  Molecular pneumonia panel 4/26/22: rhinovirus enterovirus.  COVID-19 screening 4/24: negative.  CXR 5/5/22 report: Status post lung biopsy and VATS procedure. No evidence of pneumothorax. Right-sided chest tube in place. Status post coronary bypass surgery, placement of atrial appendage closure device and pacemaker. Abnormal density throughout both lung fields. No effusions noted. Case discussed with Dr. Kelechi Swain.     Electronically signed by ARCADIO Fuentes-NOAH

## 2022-05-05 NOTE — OP NOTE
Operative Note      Patient: Rosangela Ureña  YOB: 1950  MRN: [id number]    Date of Procedure: 5/5/2022    Pre-Op Diagnosis: Bilateral LUNG INFILTRATES    Post-Op Diagnosis: Same as preop. Procedure(s):  RIGHT VATS WITH LUNG BIOPSY WITH RIGHT MIDDLE LOBE LUNG WEDGE RESECTION    Surgeon(s):  Ishmael Washington MD    Assistant:   First Assistant: Laura Molina RN  Physician Assistant: Ad Carrillo PA-C   Because of the complexity of the procedure, a physician assistants were required to assist the surgeon. Mr. Yuri Velasco held the thoracoscopic camera throughout the procedure and closed the incisions. Anesthesia: General    Estimated Blood Loss (mL): Minimal, less than 5 mL. Complications: None. Specimens:   ID Type Source Tests Collected by Time Destination   1 : RIGHT MIDDLE LOBE CULTURE  Tissue Lung CULTURE, FUNGUS, AFB STAIN, CULTURE, ANAEROBIC AND AEROBIC Ishmael Washington MD 5/5/2022 1144    A : RIGHT MIDDLE LOBE  Tissue Lung SURGICAL PATHOLOGY Ishmael Washington MD 5/5/2022 1331        Implants: None. Drains:   Chest Tube Right 1 (Active)       Findings: No adhesion in the pleural space. Bilateral pulmonary infiltrates on preoperative chest x-ray and CT scan. Detailed Description of Procedure: The patient was brought to the operating room and was placed in a supine position. After a routine preparation and general endotracheal anesthesia with a double-lumen endotracheal tube, he was placed on left lateral decubitus position with the right chest up. The right chest was painted and draped in a sterile fashion. A timeout was called and all necessary measures were taken. A small incision was made at the seventh intercostal space and midaxillary line. It was carried down to the deeper plane. The right lung was deflated. The right pleural space was entered. A thoracoscopic camera was inserted through the port. Intrapleural space was examined.      There was no adhesion between the parietal and visceral pleura. There is no obvious abnormal findings on visual examination. Two more small incisions were made for a thoracoscopic surgery. One was at the fifth intercostal space and anterior axillary line, the other one was at the sixth intercostal space and posterior axillary line. The lateral edge of the right middle lobe was held up with a thoracoscopic grasper. And a wedge resection was performed using reinforced medium height stapling devices. The specimen was retrieved. There was no bleeding or air leak from the staple line of the lung. One 28 Fr. chest tube was inserted. Ropivacaine solution was injected into the each interspace. The incisions were closed in layers with absorbable suture. Skin was closed with absorbable sutures in a subcuticular fashion. Patient tolerated the procedure well. He was transferred to the recovery room in  stable condition.     Electronically signed by Jayjay Jean Baptiste MD on 5/5/2022 at 2:19 PM

## 2022-05-05 NOTE — PLAN OF CARE
Problem: Discharge Planning  Goal: Discharge to home or other facility with appropriate resources  Outcome: Progressing  Note: Patient on iv fluids and condition management.  Anticipates to discharge to a facility     Problem: Safety - Adult  Goal: Free from fall injury  Outcome: Progressing  Note: Call light within reach, bed alarm on, non skid socks when ambulating no falls     Problem: Pain  Goal: Verbalizes/displays adequate comfort level or baseline comfort level  Outcome: Progressing  Flowsheets (Taken 5/4/2022 2137)  Verbalizes/displays adequate comfort level or baseline comfort level:   Encourage patient to monitor pain and request assistance   Assess pain using appropriate pain scale   Administer analgesics based on type and severity of pain and evaluate response   Consider cultural and social influences on pain and pain management   Implement non-pharmacological measures as appropriate and evaluate response   Notify Licensed Independent Practitioner if interventions unsuccessful or patient reports new pain  Note: Pain assessed, patient's pain is well-controlled

## 2022-05-06 PROBLEM — E43 SEVERE MALNUTRITION (HCC): Status: ACTIVE | Noted: 2022-01-01

## 2022-05-06 NOTE — PROGRESS NOTES
IM Progress Note  Dr. Oshea Neighbours  5/6/2022 12:42 PM      Patient name Rosangela Ureña  [id number]  PCP: Lorenza Marti MD  Admit Date: 4/24/2022  Acct No. [de-identified]    Subjective: Interval History:   Pt sitting up in chair  Has Chest tube on Rt side  On High flow at 40% and30L  Received meds for hyperkalemia    Diet: ADULT DIET; Regular; 3 carb choices (45 gm/meal); Low Sodium (2 gm)  ADULT ORAL NUTRITION SUPPLEMENT; Breakfast, Lunch, Dinner; Standard High Calorie/High Protein Oral Supplement    I/O last 3 completed shifts: In: 7585 [P.O.:450; I.V.:1900]  Out: 2952 [Urine:2875; Chest Tube:77]  No intake/output data recorded. Admission weight: 155 lb (70.3 kg) as of 4/24/2022 12:37 PM  Wt Readings from Last 3 Encounters:   05/06/22 139 lb 15.9 oz (63.5 kg)   04/20/22 155 lb 12.8 oz (70.7 kg)   04/13/22 154 lb (69.9 kg)     Body mass index is 21.29 kg/m².     ROS   CVS;  no cp or palpitation  Resp: +SOB+ cough  Neuro:  No numbness or weakness or dizziness  Abd: no nausea or vomiting or abd pain      Medications:   Scheduled Meds:   albuterol  2.5 mg Nebulization BID    FLUoxetine  10 mg Oral Daily    sodium chloride flush  5-40 mL IntraVENous 2 times per day    metoprolol  2.5 mg IntraVENous Once    metoprolol  2.5 mg IntraVENous Once    metoprolol succinate  100 mg Oral Daily    fluconazole  200 mg Oral Daily    dilTIAZem  30 mg Oral 3 times per day    insulin lispro  0-6 Units SubCUTAneous TID WC    insulin lispro  0-3 Units SubCUTAneous Nightly    predniSONE  40 mg Oral Daily    atovaquone  1,500 mg Oral Daily    furosemide  20 mg Oral Daily    digoxin  125 mcg Oral Daily    aspirin  81 mg Oral Daily    atorvastatin  40 mg Oral Nightly    cetirizine  10 mg Oral Daily    multivitamin  1 tablet Oral Daily    mupirocin   Topical TID    nystatin   Topical BID    pantoprazole  40 mg Oral QAM AC    rOPINIRole  0.5 mg Oral Nightly    traZODone  50 mg Oral Nightly     Continuous Infusions:   sodium chloride      sodium chloride 50 mL/hr at 05/05/22 2146    dextrose         Labs :     CBC:   Recent Labs     05/04/22  0515 05/04/22  1224 05/06/22  0355   WBC 18.7* 24.6* 21.8*   HGB 10.7* 11.4* 12.2*    342 355     BMP:    Recent Labs     05/04/22  1224 05/05/22  0510 05/06/22  0355 05/06/22  0617   * 136 134*  --    K 5.0 4.9 6.0* 5.7*   CL 95* 98 96*  --    CO2 26 30 23  --    BUN 54* 51* 53*  --    CREATININE 1.6* 1.5* 1.5*  --    GLUCOSE 151* 97 120*  --      Hepatic:   No results for input(s): AST, ALT, ALB, BILITOT, ALKPHOS in the last 72 hours. Troponin: No results for input(s): TROPONINI in the last 72 hours. BNP: No results for input(s): BNP in the last 72 hours. Lipids: No results for input(s): CHOL, HDL in the last 72 hours.     Invalid input(s): LDLCALCU  INR:   Recent Labs     05/04/22  1224   INR 1.02       Radiology    Objective:   Vitals: BP (!) 147/65   Pulse 55   Temp 96.8 °F (36 °C)   Resp 14   Ht 5' 8\" (1.727 m)   Wt 139 lb 15.9 oz (63.5 kg)   SpO2 99%   BMI 21.29 kg/m²   HEENT: Head:pupils react  Neck: supple  Lungs: diminished air entry bilat CT on Rt  Heart: regular  Rhythm and tachycardic  Abdomen: soft BS heard NG NT  Extremities: warm  No edema  Neurologic:  Alert, oriented X3    Impression:   :   Acute on chronic hypoxic respiratory failure s/p bronch s/p VATS with lung biopsy and Rt middle lobe wedge resection done 5/5/22  Par A. 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  Coronary disease status post coronary bypass graft x3 LIMA to LAD SVG to RCA and SVG to obtuse marginal  Cardiomyopathy with improved ejection fraction last echo was 55 to 60% this year  Status post pacemaker for tachybradycardia syndrome  COPD  Significant deconditioning  Leukocytosis  Chronic kidney disease stage III  Lactic acidosis  Chronic anemia  Hypertension  Hyperlipidemia  Gram + cult coag neg and also + for rhinovirus  Pressure ulcer -POA  Hyperkalemia    Plan:    Cont to wean High flow  Cont incentive spirometry and bronchodilators  Cont to monitor HR while on AV blocking agents  PT OT  F/u on K    Bridgett Carlson MD, MD

## 2022-05-06 NOTE — PROGRESS NOTES
[address] [address]  STR ICU 4D  Occupational Therapy  Reassessment Note  Time:   Time In: 1110  Time Out: 1155  Timed Code Treatment Minutes: 45 Minutes  Minutes: 45          Date: 2022  Patient Name: Michelle Varela,   Gender: male      Room: [facility]009-A  MRN: [id number]  : 1950  (70 y.o.)  Referring Practitioner: Bridgett Carlson MD  Diagnosis: pneumonia  Additional Pertinent Hx: Per H&P:presented with increasing shortness of breath from the nursing home. Patient has been in a skilled nursing facility after his last admission last month in the hospital for pneumonia. Patient noticed his oxygen saturation to be less and hence was transferred here he has been placed on BiPAP with improvement in his saturation. He was also noted to be in A. fib with RVR. Pt s/pRIGHT VATS WITH LUNG BIOPSY WITH RIGHT MIDDLE LOBE LUNG WEDGE RESECTION on 22. Restrictions/Precautions:  Restrictions/Precautions: General Precautions,Fall Risk  Position Activity Restriction  Other position/activity restrictions: monitor O2 sats, CT      SUBJECTIVE: Pt reclined in bedside chair upon arrival, agreeable to OT session. Pt emotional/tearful at times stating \"I'm so glad I didn't need to be put on the ventilator. \"     PAIN: Pt c/o pain at CT site although does not quantify. Pt received pain meds ~1 hour prior. Vitals: Blood Pressure: 146/75  Oxygen: 99% on HFNC at 30 LPM and 43% Fio2; Sp02 decreased into mid 80s with standing, with pt able to recover to >90% within ~2 minutes of seated rest break. Heart Rate: 55 bpm, remained stable    COGNITION: WFL    ADL:   No ADL's completed this session. .-pt reported all completed earlier this date with nursing. BALANCE:  Sitting Balance:  Independent. Standing Balance: Stand By Assistance. BED MOBILITY:  Not Tested    TRANSFERS:  Sit to Stand:  Stand By Assistance. Stand to Sit: Stand By Assistance.       FUNCTIONAL MOBILITY:  Assistive Device: None  Assist Level:  Stand By Assistance. Distance: few steps forward/backward from chair  Minimal cues for pursed lip breathing. ADDITIONAL ACTIVITIES:  Pt completed BUE AROM exercises while seated in chair. Pt completed 1 set X 10 repetitions in all planes with short rest breaks in between variations. Minimal cues for pursed lip breathing throughout. Exercises completed to increase overall strength/endurance needed for ADLs and transfers. ASSESSMENT:     Activity Tolerance:  Patient tolerance of  treatment: good. Goals/frequency remain appropriate s/p VATS. Discharge Recommendations: [facility]; will benefit from continued therapy at discharge  Equipment Recommendations: Equipment Needed: No  Other: defer to next level of care  Plan: Times per Week: 3-5x  Current Treatment Recommendations: Strengthening,Balance training,Functional mobility training,Endurance training,Patient/Caregiver education & training,Self-Care / ADL    Patient Education  Patient Education: Plan of Care, Energy Conservation, Home Exercise Program and Importance of Increasing Activity    Goals  Short Term Goals  Time Frame for Short term goals: by discharge  Short Term Goal 1: Pt will increase activity tolerance for functional mobility to/from [facility] or chair with SBA and Sp02 remaining >=88% in prep for ADL completion. Short Term Goal 2: Pt will complete BADL tasks with minimal assistance, incorporating ECT prn, to increase independence and ease with self care tasks. Short Term Goal 3: Pt will tolerate standing >1 minute with SBA and Sp02 >=88% in prep for toileting tasks. Following session, patient left in safe position with all fall risk precautions in place.

## 2022-05-06 NOTE — PLAN OF CARE
Problem: Nutrition Deficit:  Goal: Optimize nutritional status  5/6/2022 1232 by Aria Gan RD, LD  Outcome: Progressing   Nutrition Problem #1: Severe malnutrition,In context of acute illness or injury  Intervention: Food and/or Nutrient Delivery: Continue Current Diet,Continue Oral Nutrition Supplement

## 2022-05-06 NOTE — FLOWSHEET NOTE
80- Notified Dr. Rashel Richards of morning potassium level of 6.0, stated he will review and address. Monitoring.

## 2022-05-06 NOTE — PROGRESS NOTES
Message sent to pharmacy in regards to mepron susp antiprotozoal pt supplied medication due to unable to locate in pt's room/med cabinet. Pending response. Called charge RN on 4K where pt was yesterday previous to coming to ICU post op monitoring s/p VATS. Charge RN stated she would look for it. Pending call back response. Electronically signed by Alma Fowler RN on 5/6/2022 at 8:43 AM      4K charge found pt medication & tubed it to ICU.  Electronically signed by Alma Fowler RN on 5/6/2022 at 9:34 AM

## 2022-05-06 NOTE — CONSULTS
CRITICAL CARE PROGRESS NOTE      Patient:  Michelle Varela    Unit/Bed:4D-09/009-A  YOB: 1950  MRN: [id number]   PCP: Nola Chowdhury MD  Date of Admission: 4/24/2022  Chief Complaint:-Shortness of breath    Assessment and Plan:      Acute on chronic hypoxic respiratory failure  Secondary to bilateral interstitial infiltrates  Chronically on 4 L nasal cannula at rest and 6 L with activity  Currently requiring high flow nasal cannula 10 L at 35 FiO2 saturating at 95%  Wean oxygen requirements as tolerated to maintain SPO2 greater than 90%  Patient currently doing well and in no respiratory distress. Patient stable from a critical care standpoint and can be transferred out of the ICU. 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 Ashley Munoz, outpatient, has been off of amiodarone for several months  Outpatient prednisone and increased to 40 mg on admission  CXR: Bilateral interstitial infiltrates  VATS biopsy 5/5: Alveolar macrophages with positive staining for lipid accumulation consistent with amiodarone toxicity interstitial lung disease. Patient currently on SCDs for DVT prophylaxis  Repeat chest x-ray in a.m.   Monitor oxygen requirements    COPD  Continue bronchodilators  Pulmonology following  Monitor oxygen requirements and vitals    Paroxysmal atrial fibrillation/flutter  Chronically treated with amiodarone however stopped by pulmonology with concerns of toxicity  Status post left atrial appendage closure with atrial clip 01/2021  Follows EP outpatient on 4/13/2022 with plans for follow-up DEJ[facility] for evaluation of left atrial appendage  Continue home digoxin, Cardizem and metoprolol  Patient normal sinus rhythm on monitor  Discontinue oral anticoagulant Xarelto per CT recommendations and primary service    Sick sinus syndrome  Status post dual pacer placement in 2001  CTs surgery and cefepime empirically. Cardiology was consulted for Afib. Digoxin was added. He was diuresed with lasix. On 4/26/22 he underwent bronchoscopy. He had mucus production of the RUL, RML, RLL and right main bronchus. The RLL bronchus mucosa was erythematous. BAL was sent. This returned positive for candida albicans. Diflucan was started. Fluid analysis was also notable for macrophages and positive staining for lipid accumulation. CT surgery was consulted 4/28/22 per patient/family request for consideration of open lung biopsy for confirmation of diagnosis of ILD. CT surgery Dr. Worley Sessions did evaluate, with plans for VATS and lung biopsy. He also recommended discontinuation of xarelto as well, given s/p ZAHRA closure. Patient underwent VATS with lung biopsy with RML wedge resection on 5/5/22 with Dr. Worley Sessions. EBL was < 5 mL. There were no reported complications. Lung tissue biopsy and culture was obtained from RML. Patient presented to ICU post-op for close monitoring. He remains hemodynamically stable, and oxygenating well on stable high flow nasal cannula requirements. \"    Subjective:  Patient seen and evaluated at bedside. Patient resting comfortably in chair eating breakfast.  Patient on high flow nasal cannula in no apparent distress. Patient has some chest discomfort at site of chest tube, otherwise denies chest pain. Has some mild shortness of breath. Patient denies fevers chills, nausea, vomiting, diarrhea. Patient with restrictive lung disease secondary to amiodarone toxicity. Patient with hyperkalemia, dextrose, insulin and Lokelma x1 started. Patient stable from a critical care standpoint and can be transferred out of the ICU.     Past Medical History:   Diagnosis Date    Asthma     Atrial fibrillation with RVR ([address] 04/05/2021    [employer] dual pacemaker  04/15/2021    Collagenous colitis 2021    per scope 2021    Colon polyps 2021    dr Jia Hernández    COPD, mild ([address]     Eczema of hand HTN (hypertension)     Hyperlipidemia     Smoker      Family History   Problem Relation Age of Onset    Heart Disease Mother     Heart Disease Father         cabg    Diabetes Brother     Heart Disease Brother      Social History     Tobacco Use    Smoking status: Former Smoker     Packs/day: 1.00     Years: 40.00     Pack years: 40.00     Types: Cigarettes     Quit date: 3/7/2022     Years since quittin.1    Smokeless tobacco: Never Used   Substance Use Topics    Alcohol use: Not Currently     Alcohol/week: 0.0 standard drinks    Drug use: No     ROS   Constitutional: Negative for chills, diaphoresis and fever. HENT: Negative for congestion and sore throat. Eyes: Negative for redness and visual disturbance. Respiratory: Positive for shortness of breath. Negative for cough, chest tightness, wheezing   Cardiovascular: Negative for palpitations and leg swelling. Gastrointestinal: Negative for abdominal distention, abdominal pain and nausea. Genitourinary: Negative for difficulty urinating and dysuria. Musculoskeletal: Negative for back pain and neck pain. Skin: Negative for color change and pallor. Neurological: Negative for dizziness and light-headedness. Psychiatric/Behavioral: Negative for agitation and confusion.  The patient is not nervous/anxious      Scheduled Meds:   albuterol  2.5 mg Nebulization BID    FLUoxetine  10 mg Oral Daily    dextrose bolus  250 mL IntraVENous Once    insulin regular  10 Units IntraVENous Once    sodium zirconium cyclosilicate  5 g Oral Once    sodium chloride flush  5-40 mL IntraVENous 2 times per day    metoprolol  2.5 mg IntraVENous Once    metoprolol  2.5 mg IntraVENous Once    metoprolol succinate  100 mg Oral Daily    fluconazole  200 mg Oral Daily    dilTIAZem  30 mg Oral 3 times per day    insulin lispro  0-6 Units SubCUTAneous TID WC    insulin lispro  0-3 Units SubCUTAneous Nightly    predniSONE  40 mg Oral Daily    atovaquone  1,500 mg Oral Daily furosemide  20 mg Oral Daily    digoxin  125 mcg Oral Daily    aspirin  81 mg Oral Daily    atorvastatin  40 mg Oral Nightly    cetirizine  10 mg Oral Daily    multivitamin  1 tablet Oral Daily    mupirocin   Topical TID    nystatin   Topical BID    pantoprazole  40 mg Oral QAM AC    rOPINIRole  0.5 mg Oral Nightly    traZODone  50 mg Oral Nightly     Continuous Infusions:   sodium chloride      sodium chloride 50 mL/hr at 05/05/22 2146    dextrose         PHYSICAL EXAMINATION:  T: 96.8 P: 55. RR: 14. B/P: 131/73  FiO2: 35. O2 Sat: 95.  I/O:  +123 daily net  Body mass index is 21.29 kg/m². GCS:   15      General:   Acute on chronically ill-appearing 77-year-old male resting comfortably in chair  HEENT:  normocephalic and atraumatic. No scleral icterus. PERR  Neck: supple. No Thyromegaly. Lungs: Rhonchi appreciated bilaterally on auscultation. Diffusely diminished breath sounds. right-sided chest tube, no air leak appreciated. Cardiac: RRR. No JVD. Abdomen: soft. Nontender. Extremities:  No clubbing, cyanosis, or edema x 4. Vasculature: capillary refill < 3 seconds. Palpable dorsalis pedis pulses. Skin:  warm and dry. Psych:  Alert and oriented x3. Affect appropriate  Lymph:  No supraclavicular adenopathy. Neurologic:  No focal deficit. No seizures. Data: (All radiographs, tracings, PFTs, and imaging are personally viewed and interpreted unless otherwise noted). Hyperkalemia with potassium 6 to 5.7  Mild hyponatremia, 134. Chronically mildly low sodium  WBC elevated at 21.8, reactive in setting of surgery, prednisone and smoking use  Telemetry shows normal sinus rhythm  CXR 5/6: Bilateral pulmonary infiltrates, no pneumothorax visualized  Echo 3/9/2022: EF 55-60. Reported left ventricular size being normal and no wall motion abnormalities. Abnormal paradoxical motion consistent with postoperative status  VATS lung tissue: No bacteria or fungal growth.   Alveolar macrophages with positive staining for lipid accumulation consistent with amiodarone toxicity interstitial lung disease    Seen with multidisciplinary ICU team.  Meets Continued ICU Level Care Criteria:    [] Yes   [x] No - Transfer Planned to listed location: plans to transfer per CT surgery  [] HOSPITALIST CONTACTED- DR     Case and plan discussed with Dr. Mathieu Ceron. Electronically signed by Bita Roca MD  [address] [place] [address]  Patient seen by me including key components of medical care. Case discussed with resident physician. Case cussed with Dr. Lisha Wallsi. Status post VATS with wedge resection and biopsy. Suspicious for amiodarone pulmonary fibrosis. This may take 6 to 8 months to resolve. Await final pathology of biopsy. Italicized font, if present,  represents changes to the note made by me. CC time 25 minutes. Time was discontiguous. Time does not include procedure. Time does include my direct assessment of the patient and coordination of care. Time represents more than 50% of the time involved with patient care by the [address] [place] [address] team.  Electronically signed by Cyndie Ceron MD.

## 2022-05-06 NOTE — PROGRESS NOTES
CT/CV Surgery Progress Note    2022 7:44 AM  Surgeon:  Dr. Sunni Weiss     Subjective: Mr. Izzy Goodrich is resting comfortably up in chair on high flow O2, alert, and in no acute distress. Pt denies chest pressure, SOB, fever,chills, N/V/D.  ? Air leak when standing for transfer to chair. Chest tube output 77 ml since surgery. I/O last 3 completed shifts: In: 9902 [P.O.:450; I.V.:1900]  Out: 2952 [Urine:2875; Chest Tube:77]    Vital Signs: BP (!) 162/72   Pulse 55   Temp 97.5 °F (36.4 °C) (Bladder)   Resp 13   Ht 5' 8\" (1.727 m)   Wt 139 lb 15.9 oz (63.5 kg)   SpO2 96%   BMI 21.29 kg/m²    Temp (24hrs), Av.7 °F (36.5 °C), Min:96.4 °F (35.8 °C), Max:98.3 °F (36.8 °C)    Labs:   CBC: Recent Labs     22  0515 22  1224 22  0355   WBC 18.7* 24.6* 21.8*   HGB 10.7* 11.4* 12.2*   HCT 34.3* 36.1* 41.1*   MCV 91.5 90.9 94.7*    342 355   APTT  --  22.6  --    INR  --  1.02  --      BMP:   Recent Labs     22  1224 22  1227 22  0510 22  0748 22  1959 22  0355 22  0617   *  --  136  --   --  134*  --    K 5.0  --  4.9  --   --  6.0* 5.7*   CL 95*  --  98  --   --  96*  --    CO2 26  --  30  --   --  23  --    BUN 54*  --  51*  --   --  53*  --    CREATININE 1.6*  --  1.5*  --   --  1.5*  --    POCGLU  --    < >  --    < > 123*  --   --     < > = values in this interval not displayed. Imaging:  Chest X-Ray: 2022   Findings:   Large bore right chest catheter terminating at the right lung apex    unchanged in position. Right chest pacer device. Atrial appendage closure    device. Median sternotomy wires.       Infiltrates and airspace disease involving the central and lung bases    slightly increased within the right central lung. Mild improved aeration    of the left lung base. No pneumothorax identified.       Heart size is unchanged from prior. Some retrocardiac densities. Changes    of CABG.       No acute fracture.  Spondylosis of the thoracic spine.           Impression   1. Large bore right chest tube terminating within the right lung apex,    unchanged from prior. No pneumothorax. 2. Infiltrates and airspace disease diffusely throughout the bilateral    lungs greatest within the central and lung bases. Infiltrates of the right    central lung have slightly increased from the prior. Minimal improved    aeration of the left lung base.       This document has been electronically signed by: Courtney Turk DO, MBA on    05/06/2022 04:06 AM         Intake/Output Summary (Last 24 hours) at 5/6/2022 0744  Last data filed at 5/6/2022 0555  Gross per 24 hour   Intake 1900 ml   Output 1777 ml   Net 123 ml       Scheduled Meds:    sodium chloride flush  5-40 mL IntraVENous 2 times per day    metoprolol  2.5 mg IntraVENous Once    metoprolol  2.5 mg IntraVENous Once    metoprolol succinate  100 mg Oral Daily    fluconazole  200 mg Oral Daily    dilTIAZem  30 mg Oral 3 times per day    insulin lispro  0-6 Units SubCUTAneous TID WC    insulin lispro  0-3 Units SubCUTAneous Nightly    levalbuterol  1.25 mg Nebulization TID    predniSONE  40 mg Oral Daily    atovaquone  1,500 mg Oral Daily    furosemide  20 mg Oral Daily    digoxin  125 mcg Oral Daily    aspirin  81 mg Oral Daily    atorvastatin  40 mg Oral Nightly    cetirizine  10 mg Oral Daily    multivitamin  1 tablet Oral Daily    mupirocin   Topical TID    nystatin   Topical BID    pantoprazole  40 mg Oral QAM AC    rOPINIRole  0.5 mg Oral Nightly    traZODone  50 mg Oral Nightly     ROS: All neg unless specifically mentioned in subjective section.      Exam:  General Appearance: alert ,conversing, in no acute distress  Cardiovascular: normal rate, regular rhythm, normal S1 and S2, no murmurs, rubs, clicks, or gallops  Pulmonary/Chest: Diffuse rhonchi bilateral.  Neurological: alert, oriented, normal speech, no focal findings or movement disorder noted  Right Chest:  Chest tube in place, dressing dry/intact. No air leak appreciated on exam today. Assessment:   Patient Active Problem List   Diagnosis    Hyperlipidemia    Asthma    Smoker    Allergic rhinitis due to other allergen    Collagenous colitis    Lactose intolerance    HTN (hypertension)    COPD, mild (HCC)    Atrial fibrillation (HCC)    Atherosclerotic heart disease of native coronary artery with other forms of angina pectoris ([address]    S/P CABG x 3    Encounter for cardioversion procedure    S/P left atrial appendage ligation    Ischemic cardiomyopathy    Chronic bronchitis (HCC)    CHF (congestive heart failure), NYHA class II, chronic, systolic ([address]    Tachycardia-bradycardia ([address]    [employer] dual pacemaker     Pneumonia    Dyspnea and respiratory abnormalities    Acute and chronic respiratory failure with hypoxia (HCC)    Thrush    Nosebleed    SOB (shortness of breath)    Septicemia ([address]     Plan:   1. CXR reviewed- no ptx,  Continue daily CXR's   2. Probable transfer to stepdown later today.     The plan of care was discussed in detail with Dr. Ilsa Olguin     Electronically signed by Yovany Garibay PA-C on 5/6/2022 at 7:44 AM

## 2022-05-06 NOTE — CARE COORDINATION
5/6/22, 12:35 PM EDT    DISCHARGE ON GOING EVALUATION    Trudy Gomez day: 12  Location: -09/009-A Reason for admit: Pneumonia [J18.9]  SOB (shortness of breath) [R06.02]  Septicemia ([address] [A41.9]  Elevated troponin [R77.8]  Pneumonia due to infectious organism, unspecified laterality, unspecified part of lung [J18.9]   Procedure:   4/26 Bronch  5/5 RIGHT VATS WITH LUNG BIOPSY WITH RIGHT MIDDLE LOBE LUNG WEDGE RESECTION    Barriers to Discharge: POD #1. ICU post-op for monitoring. Remains on HFNC, 30L, 40% FIO2, sats 97%. Afebrile. SB 50's. Ox4. Right CT x1 to -20 sx with 77 ml out since surgery. Palliative Care following. Internal Med, CVS, Intensivist, ID, and Cardiology following. Dietitian following. Chest tube care, turcios, SCDs. IVF, asa, lipitor, mepron, zyrtec, digoxin, cardizem, po diflucan, proxac, po lasix 20 mg daily, SSI, toprol xl, bactroban, nystatin, protonix, prednisone, requip. PCP: Marily Zabala MD  Readmission Risk Score: 26.3 ( )%  Patient Goals/Plan/Treatment Preferences: [place] following. Wears 4L at rest and 6L with activity. Patient does not wish to return to [address] [place] [address].

## 2022-05-06 NOTE — PROGRESS NOTES
Attempting to call report to [id number] RN at ext [phone]. Nancy Seat states the room hasn't been cleaned yet. They will call back for report. Electronically signed by Lang Zeng RN on 5/6/2022 at 1:28 PM      ICU charge RN Frankey Railing aware. Electronically signed by Lang Zeng RN on 5/6/2022 at 1:32 PM      Report given to [facility], states room is not clean & will call me when ready. Electronically signed by Lang Zeng RN on 5/6/2022 at 2:23 PM      Transport requested, charge RN & RT aware.  Electronically signed by Lang Zeng RN on 5/6/2022 at 3:02 PM

## 2022-05-06 NOTE — PROGRESS NOTES
Attempted to call to get report from [facility] . No answer will attempt again shortly.

## 2022-05-06 NOTE — PLAN OF CARE
Problem: Discharge Planning  Goal: Discharge to home or other facility with appropriate resources  Flowsheets  Taken 5/5/2022 2234 by Gregory Jarvis RN  Discharge to home or other facility with appropriate resources:   Identify barriers to discharge with patient and caregiver   Identify discharge learning needs (meds, wound care, etc)  Taken 5/5/2022 2000 by Gregory Jarvis RN  Discharge to home or other facility with appropriate resources: Identify barriers to discharge with patient and caregiver    Problem: Safety - Adult  Goal: Free from fall injury  Flowsheets (Taken 5/5/2022 2234)  Free From Fall Injury:   Instruct family/caregiver on patient safety   Based on caregiver fall risk screen, instruct family/caregiver to ask for assistance with transferring infant if caregiver noted to have fall risk factors     Problem: ABCDS Injury Assessment  Goal: Absence of physical injury  Note: No physical injury noted. Problem: Respiratory - Adult  Goal: Clear lung sounds  Note: Lung sounds diminished. Monitoring for changes. Problem: Respiratory - Adult  Goal: Adequate oxygenation  Note: Remains on highflow, monitoring for decreasing oxygen needs.      Problem: Respiratory - Adult  Goal: Achieves optimal ventilation and oxygenation  Flowsheets  Taken 5/5/2022 2234 by Gregory Jarvis RN  Achieves optimal ventilation and oxygenation:   Assess for changes in respiratory status   Assess for changes in mentation and behavior   Encourage broncho-pulmonary hygiene including cough, deep breathe, incentive spirometry   Assess and instruct to report shortness of breath or any respiratory difficulty  Taken 5/5/2022 2000 by Gregory Jarvis RN  Achieves optimal ventilation and oxygenation: Assess for changes in respiratory status    Problem: Neurosensory - Adult  Goal: Achieves stable or improved neurological status  Flowsheets  Taken 5/5/2022 2234 by Gregory Jarvis RN  Achieves stable or improved neurological status: Assess for and report changes in neurological status  Taken 5/5/2022 2000 by Isiah Braga RN  Achieves stable or improved neurological status: Assess for and report changes in neurological status    Problem: Cardiovascular - Adult  Goal: Maintains optimal cardiac output and hemodynamic stability  Flowsheets  Taken 5/5/2022 2234 by Isiah Braga RN  Maintains optimal cardiac output and hemodynamic stability:   Monitor blood pressure and heart rate   Monitor urine output and notify Licensed Independent Practitioner for values outside of normal range   Assess for signs of decreased cardiac output  Taken 5/5/2022 2000 by Isiah Braga RN  Maintains optimal cardiac output and hemodynamic stability: Monitor blood pressure and heart rate    Problem: Skin/Tissue Integrity - Adult  Goal: Skin integrity remains intact  Flowsheets  Taken 5/5/2022 2234 by Isiah Braga RN  Skin Integrity Remains Intact:   Monitor for areas of redness and/or skin breakdown   Every 4-6 hours: If on nasal continuous positive airway pressure, respiratory therapy assesses nares and determine need for appliance change or resting period  Taken 5/5/2022 2000 by Isiah Braga RN  Skin Integrity Remains Intact: Monitor for areas of redness and/or skin breakdown    Problem: Skin/Tissue Integrity - Adult  Goal: Incisions, wounds, or drain sites healing without S/S of infection  Flowsheets  Taken 5/5/2022 2234 by Isiah Braga RN  Incisions, Wounds, or Drain Sites Healing Without Sign and Symptoms of Infection:   ADMISSION and DAILY: Assess and document risk factors for pressure ulcer development   Implement wound care per orders  Taken 5/5/2022 2000 by Isiah Braga RN  Incisions, Wounds, or Drain Sites Healing Without Sign and Symptoms of Infection: ADMISSION and DAILY: Assess and document risk factors for pressure ulcer development    Problem: Gastrointestinal - Adult  Goal: Minimal or absence of nausea and vomiting  Flowsheets  Taken 5/5/2022 2234  Minimal or absence of nausea and vomiting:   Administer IV fluids as ordered to ensure adequate hydration   Advance diet as tolerated, if ordered  Taken 5/5/2022 2000  Minimal or absence of nausea and vomiting: Provide nonpharmacologic comfort measures as appropriate     Problem: Gastrointestinal - Adult  Goal: Maintains or returns to baseline bowel function  Flowsheets  Taken 5/5/2022 2234 by Kimberley Neil RN  Maintains or returns to baseline bowel function:   Assess bowel function   Encourage oral fluids to ensure adequate hydration  Taken 5/5/2022 2000 by Kimberley Neil RN  Maintains or returns to baseline bowel function: Assess bowel function    Problem: Gastrointestinal - Adult  Goal: Maintains adequate nutritional intake  Flowsheets  Taken 5/5/2022 2234  Maintains adequate nutritional intake: Monitor intake and output, weight and lab values  Taken 5/5/2022 2000  Maintains adequate nutritional intake: Identify factors contributing to decreased intake, treat as appropriate     Problem: Genitourinary - Adult  Goal: Absence of urinary retention  Flowsheets  Taken 5/5/2022 2234 by Kimberley Neil RN  Absence of urinary retention: Discuss catheterization for long term situations as appropriate  Taken 5/5/2022 2000 by Kimberley Neil RN  Absence of urinary retention: Assess patients ability to void and empty bladder    Problem: Infection - Adult  Goal: Absence of infection at discharge  Flowsheets  Taken 5/5/2022 2234  Absence of infection at discharge: Assess and monitor for signs and symptoms of infection  Taken 5/5/2022 2000  Absence of infection at discharge: Assess and monitor for signs and symptoms of infection     Problem: Metabolic/Fluid and Electrolytes - Adult  Goal: Electrolytes maintained within normal limits  Flowsheets  Taken 5/5/2022 2234 by Kimberley Neil RN  Electrolytes maintained within normal limits: Monitor labs and assess patient for signs and symptoms of electrolyte imbalances  Taken 5/5/2022 2000 by Ofelia Ni RN  Electrolytes maintained within normal limits: Monitor labs and assess patient for signs and symptoms of electrolyte imbalances    Problem: Metabolic/Fluid and Electrolytes - Adult  Goal: Hemodynamic stability and optimal renal function maintained  Flowsheets  Taken 5/5/2022 2234  Hemodynamic stability and optimal renal function maintained:   Monitor labs and assess for signs and symptoms of volume excess or deficit   Monitor intake, output and patient weight   Encourage oral intake as appropriate  Taken 5/5/2022 2000  Hemodynamic stability and optimal renal function maintained: Monitor labs and assess for signs and symptoms of volume excess or deficit     Problem: Metabolic/Fluid and Electrolytes - Adult  Goal: Glucose maintained within prescribed range  Flowsheets  Taken 5/5/2022 2234 by Ofelia Ni RN  Glucose maintained within prescribed range:   Monitor blood glucose as ordered   Administer ordered medications to maintain glucose within target range  Taken 5/5/2022 2000 by Ofelia Ni RN  Glucose maintained within prescribed range: Monitor blood glucose as ordered    Problem: Hematologic - Adult  Goal: Maintains hematologic stability  Flowsheets  Taken 5/5/2022 2234  Maintains hematologic stability: Assess for signs and symptoms of bleeding or hemorrhage  Taken 5/5/2022 2000  Maintains hematologic stability: Assess for signs and symptoms of bleeding or hemorrhage     Problem: Chronic Conditions and Co-morbidities  Goal: Patient's chronic conditions and co-morbidity symptoms are monitored and maintained or improved  Flowsheets  Taken 5/5/2022 2234 by Rogelio Mahoney 34 - Patient's Chronic Conditions and Co-Morbidity Symptoms are Monitored and Maintained or Improved: Monitor and assess patient's chronic conditions and comorbid symptoms for stability, deterioration, or improvement  Taken 5/5/2022 2000 by Gregory Jarvis RN  Care Plan - Patient's Chronic Conditions and Co-Morbidity Symptoms are Monitored and Maintained or Improved: Monitor and assess patient's chronic conditions and comorbid symptoms for stability, deterioration, or improvement    Problem: Pain  Goal: Verbalizes/displays adequate comfort level or baseline comfort level  Flowsheets (Taken 5/5/2022 2234)  Verbalizes/displays adequate comfort level or baseline comfort level:   Encourage patient to monitor pain and request assistance   Assess pain using appropriate pain scale   Administer analgesics based on type and severity of pain and evaluate response     Problem: Nutrition Deficit:  Goal: Optimize nutritional status  Note: Advancing diet as tolerated. Monitoring. Problem: Skin/Tissue Integrity  Goal: Absence of new skin breakdown  Description: 1. Monitor for areas of redness and/or skin breakdown  2. Assess vascular access sites hourly  3. Every 4-6 hours minimum:  Change oxygen saturation probe site  4. Every 4-6 hours:  If on nasal continuous positive airway pressure, respiratory therapy assess nares and determine need for appliance change or resting period. Note: No new skin breakdown noted.

## 2022-05-06 NOTE — PROGRESS NOTES
Comprehensive Nutrition Assessment    Type and Reason for Visit:  Reassess    Nutrition Recommendations/Plan:   1. Continue current diet. 2. Continue Ensure Plus TID per pt request.  3. Consider MVI as tolerated     Malnutrition Assessment:  Malnutrition Status:  Severe malnutrition (05/06/22 1221)    Context:  Acute Illness     Findings of the 6 clinical characteristics of malnutrition:  Energy Intake:  Mild decrease in energy intake (Comment) (NPO off and on for procedures but eating well if meal received)  Weight Loss:   (19# or 12% since admit (12d) from pt. stated UBW of 158#  however various scales - monitoring)     Body Fat Loss: Moderate body fat loss Orbital   Muscle Mass Loss: Moderate muscle mass loss Temples (temporalis),Clavicles (pectoralis & deltoids)  Fluid Accumulation:  No significant fluid accumulation Extremities   Strength:  Not Performed    Nutrition Assessment:      Pt. nutritionally improving as evidenced by % however wt is down from usual dry wt per pt recall, off and on NPO status this admit d/t procedures,weight loss, malnourished, s/p VATS & lung Biopsy 5/5, remains on HFNC.   At risk for further nutrition compromise r/t admitted with pneumonia, bronch completed, and underlying medical condition (former smoker, A-fib, pacemaker, collagenous colitis, COPD, HTN, HLD    Nutrition Related Findings:    Pt. Report/Treatments/Miscellaneous: pt.seen this am; good appetite; ONS order was once again discontinued with NPO status for procedure - reordered and notified kitchen to add to todays meals; on HFNC; eats low salt diet at home; appears thin and pt.  Mentions noticeable weight loss this admit  GI Status: BM x2 5/5  Pertinent Labs: glucose 98  BUN 53  Creatinine 1.5  K+ 5.7    Pertinent Meds: lasix, MVI, humalog, prednisone, lokelma, norco    Wound Type:  (stage II buttocks, DTI ear left)       Current Nutrition Intake & Therapies:    Average Meal Intake: %  Average Supplements Intake: %  ADULT DIET; Regular; 3 carb choices (45 gm/meal); Low Sodium (2 gm)  ADULT ORAL NUTRITION SUPPLEMENT; Breakfast, Lunch, Dinner; Standard High Calorie/High Protein Oral Supplement    Anthropometric Measures:  Height: 5' 8\" (172.7 cm)  Ideal Body Weight (IBW): 154 lbs (70 kg)    Admission Body Weight: 164 lb (74.4 kg) (4/24/22, standing scale)  Current Body Weight: 139 lb (63 kg) (5/6 with no edema),   IBW. Weight Source: Bed Scale  Current BMI (kg/m2): 21.1  Usual Body Weight: 154 lb (69.9 kg) (EHR, 3/25/22, bedscale; per pt. ~158#)  % Weight Change (Calculated): 1.3                    BMI Categories: Normal Weight (BMI 18.5-24. 9)    Estimated Daily Nutrient Needs:  Energy Requirements Based On: Kcal/kg  Weight Used for Energy Requirements: Current (71kg)  Energy (kcal/day): 9840-2866 (25-30 kcals/kg)  Weight Used for Protein Requirements: Current (71kg)  Protein (g/day): 85+ (1.2+ grams/kg)       Nutrition Diagnosis:   · Severe malnutrition,In context of acute illness or injury related to inadequate protein-energy intake as evidenced by Criteria as identified in malnutrition assessment      Nutrition Interventions:   Food and/or Nutrient Delivery: Continue Current Diet,Continue Oral Nutrition Supplement  Nutrition Education/Counseling: Education initiated (5/6 discussed continuing ONS use at home which pt. has been doing; po at best effort)  Coordination of Nutrition Care: Continue to monitor while inpatient,Interdisciplinary Rounds       Goals:  Previous Goal Met: Progressing toward Goal(s)  Goals: PO intake 75% or greater       Nutrition Monitoring and Evaluation:   Behavioral-Environmental Outcomes: None Identified  Food/Nutrient Intake Outcomes: Food and Nutrient Intake,Supplement Intake  Physical Signs/Symptoms Outcomes: Biochemical Data,GI Status,Fluid Status or Edema,Meal Time Behavior,Nutrition Focused Physical Findings,Skin,Weight    Discharge Planning:     Too soon to determine [address] [place] [address] [address], [place], [place]  Contact: [phone]

## 2022-05-06 NOTE — PROGRESS NOTES
Progress note: Infectious diseases    Patient - Beth Schilder,  Age - 70 y.o.    - 1950      Room Number - 4D-09/009-A   MRN -  [id number]   [facility]  Date of Admission -  2022 12:37 PM    SUBJECTIVE:   He is in ICU on high flow oxygen  Right side chest tube with a leak    OBJECTIVE   VITALS    height is 5' 8\" (1.727 m) and weight is 139 lb 15.9 oz (63.5 kg). His temperature is 96.8 °F (36 °C). His blood pressure is 147/65 (abnormal) and his pulse is 55. His respiration is 14 and oxygen saturation is 99%. Wt Readings from Last 3 Encounters:   22 139 lb 15.9 oz (63.5 kg)   22 155 lb 12.8 oz (70.7 kg)   22 154 lb (69.9 kg)       I/O (24 Hours)    Intake/Output Summary (Last 24 hours) at 2022 1247  Last data filed at 2022 0555  Gross per 24 hour   Intake 1900 ml   Output 1077 ml   Net 823 ml       General Appearance  Awake, alert, oriented, on high flow oxygen. HEENT - normocephalic, atraumatic, pale conjunctiva,  anicteric sclera  Neck - Supple, no mass. Lungs -   Diminished breath sound, on high flow oxygen, right side chest tube. Cardiovascular - Heart sounds are normal.    Abdomen - soft, not distended, nontender,   Neurologic -oriented.   Skin - No bruising or bleeding  Extremities - + edema,    Open gluteal wound  MEDICATIONS:      albuterol  2.5 mg Nebulization BID    FLUoxetine  10 mg Oral Daily    sodium chloride flush  5-40 mL IntraVENous 2 times per day    metoprolol  2.5 mg IntraVENous Once    metoprolol  2.5 mg IntraVENous Once    metoprolol succinate  100 mg Oral Daily    fluconazole  200 mg Oral Daily    dilTIAZem  30 mg Oral 3 times per day    insulin lispro  0-6 Units SubCUTAneous TID WC    insulin lispro  0-3 Units SubCUTAneous Nightly    predniSONE  40 mg Oral Daily    atovaquone  1,500 mg Oral Daily    furosemide  20 mg Oral Daily    digoxin  125 mcg Oral Daily    aspirin  81 mg Oral Daily    atorvastatin  40 mg Oral Nightly    cetirizine  10 mg Oral Daily    multivitamin  1 tablet Oral Daily    mupirocin   Topical TID    nystatin   Topical BID    pantoprazole  40 mg Oral QAM AC    rOPINIRole  0.5 mg Oral Nightly    traZODone  50 mg Oral Nightly      sodium chloride      sodium chloride 50 mL/hr at 05/05/22 2146    dextrose       bisacodyl, ipratropium-albuterol, sodium chloride flush, sodium chloride, HYDROcodone-acetaminophen, dextrose, glucagon (rDNA), dextrose, glucose, metoprolol, ALPRAZolam, ondansetron **OR** ondansetron, acetaminophen      LABS:     CBC:   Recent Labs     05/04/22  0515 05/04/22  1224 05/06/22  0355   WBC 18.7* 24.6* 21.8*   HGB 10.7* 11.4* 12.2*    342 355     BMP:    Recent Labs     05/04/22  1224 05/05/22  0510 05/06/22  0355 05/06/22  0617   * 136 134*  --    K 5.0 4.9 6.0* 5.7*   CL 95* 98 96*  --    CO2 26 30 23  --    BUN 54* 51* 53*  --    CREATININE 1.6* 1.5* 1.5*  --    GLUCOSE 151* 97 120*  --      Calcium:  Recent Labs     05/06/22  0355   CALCIUM 8.6         Problem list of patient:     Patient Active Problem List   Diagnosis Code    Hyperlipidemia E78.5    Asthma J45.909    Smoker F17.200    Allergic rhinitis due to other allergen J30.89    Collagenous colitis K52.831    Lactose intolerance E73.9    HTN (hypertension) I10    COPD, mild ([address] J44.9    Atrial fibrillation ([facility] I48.91    Atherosclerotic heart disease of native coronary artery with other forms of angina pectoris ([facility] I25.118    S/P CABG x 3 Z95.1    Encounter for cardioversion procedure Z01.89    S/P left atrial appendage ligation Z98.890    Ischemic cardiomyopathy I25.5    Chronic bronchitis ([facility] J42    CHF (congestive heart failure), NYHA class II, chronic, systolic (HCC) G16.70    Tachycardia-bradycardia (HCC) I49.5    [employer] dual pacemaker  Z95.0    Pneumonia J18.9    Dyspnea and respiratory abnormalities R06.00, R06.89    Acute and chronic respiratory failure with hypoxia (HCC) J96.21    Thrush B37.0    Nosebleed R04.0    SOB (shortness of breath) R06.02    Septicemia (HCC) A41.9    Severe malnutrition (HCC) E43         ASSESSMENT/PLAN   Shortness of breath due to lung fibrosis:  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

## 2022-05-06 NOTE — PLAN OF CARE
Problem: Respiratory - Adult  Goal: Clear lung sounds  5/6/2022 0638 by Marleny Freire RCP  Outcome: Progressing     Problem: Respiratory - Adult  Goal: Adequate oxygenation  5/6/2022 0638 by Marleny Freire RCP  Outcome: Progressing     Problem: Respiratory - Adult  Goal: Achieves optimal ventilation and oxygenation  5/6/2022 0638 by Marleny Freire RCP  Outcome: Progressing     Patient continues on High Flow Nasal Cannula weaning as tolerated.

## 2022-05-06 NOTE — CARE COORDINATION
5/6/22, 7:24 AM EDT    DISCHARGE BARRIERS        Patient transferred to 4D. Report given to unit SW, Faustina, regarding discharge plan for this patient.

## 2022-05-07 PROBLEM — J96.01 ACUTE RESPIRATORY FAILURE WITH HYPOXIA (HCC): Status: ACTIVE | Noted: 2022-01-01

## 2022-05-07 NOTE — PLAN OF CARE
Problem: Respiratory - Adult  Goal: Clear lung sounds  5/7/2022 0951 by Albino Shaw RCP  Outcome: Progressing  Left upper is diminished/clear,   right= insp crackles     Problem: Respiratory - Adult  Goal: Adequate oxygenation  5/7/2022 0951 by Albino Shaw RCP  Outcome: Progressing  Decreased hfnc to 35% fio2  from 45%     Problem: Respiratory - Adult  Goal: Achieves optimal ventilation and oxygenation  Outcome: Progressing

## 2022-05-07 NOTE — PLAN OF CARE
Problem: Respiratory - Adult  Goal: Adequate oxygenation  5/7/2022 1947 by Jordon Calderon RCP  Outcome: Progressing  Note: Patient remains on HFNC. 30L 35%.  Will continue to monitor and wean as patient tolerates

## 2022-05-07 NOTE — PLAN OF CARE
Problem: Discharge Planning  Goal: Discharge to home or other facility with appropriate resources  Recent Flowsheet Documentation  Taken 5/6/2022 1955 by Romayne Crate, RN  Discharge to home or other facility with appropriate resources: Identify barriers to discharge with patient and caregiver     Problem: Safety - Adult  Goal: Free from fall injury  Outcome: Progressing     Problem: Respiratory - Adult  Goal: Clear lung sounds  5/6/2022 2030 by Stefania Gray RCP  Outcome: Progressing  Note: Patient had crackles through right lung. Will continue to monitor and wean o2 as patient tolerates     Problem: Respiratory - Adult  Goal: Adequate oxygenation  5/7/2022 0534 by Romayne Crate, RN  Outcome: Progressing  5/7/2022 0518 by Stefania Gray RCP  Outcome: Progressing  Note: Patient remains on HFNC at this time. Lung sounds were diminished with crackles throughout. Will continue with current therapy and wean o2 as patient tolerates.

## 2022-05-07 NOTE — PROGRESS NOTES
IM Progress Note  Dr Emily Coy covering Dr. Cy Moses  5/7/2022 4:46 PM      Patient name Corine Amaya  [id number]  PCP: Ruthann Theodore MD  Admit Date: 4/24/2022  Acct No. [de-identified]    Subjective: Interval History:   Patient has no new complaint    Diet: ADULT DIET; Regular; 3 carb choices (45 gm/meal); Low Sodium (2 gm)  ADULT ORAL NUTRITION SUPPLEMENT; Breakfast, Lunch, Dinner; Standard High Calorie/High Protein Oral Supplement    I/O last 3 completed shifts: In: 1706.1 [P.O.:150; I.V.:1556.1]  Out: 2417 [Urine:2325; Chest Tube:92]  I/O this shift: In: 825 [P.O.:480; I.V.:345]  Out: -         Admission weight: 155 lb (70.3 kg) as of 4/24/2022 12:37 PM  Wt Readings from Last 3 Encounters:   05/06/22 139 lb 15.9 oz (63.5 kg)   04/20/22 155 lb 12.8 oz (70.7 kg)   04/13/22 154 lb (69.9 kg)     Body mass index is 21.29 kg/m².     ROS   CVS;  no cp or palpitation  Resp: +SOB+ cough  Neuro:  No numbness or weakness or dizziness  Abd: no nausea or vomiting or abd pain      Medications:   Scheduled Meds:   albuterol  2.5 mg Nebulization BID    FLUoxetine  10 mg Oral Daily    sodium chloride flush  5-40 mL IntraVENous 2 times per day    metoprolol  2.5 mg IntraVENous Once    metoprolol  2.5 mg IntraVENous Once    metoprolol succinate  100 mg Oral Daily    fluconazole  200 mg Oral Daily    dilTIAZem  30 mg Oral 3 times per day    insulin lispro  0-6 Units SubCUTAneous TID WC    insulin lispro  0-3 Units SubCUTAneous Nightly    predniSONE  40 mg Oral Daily    atovaquone  1,500 mg Oral Daily    furosemide  20 mg Oral Daily    digoxin  125 mcg Oral Daily    aspirin  81 mg Oral Daily    atorvastatin  40 mg Oral Nightly    cetirizine  10 mg Oral Daily    multivitamin  1 tablet Oral Daily    mupirocin   Topical TID    nystatin   Topical BID    pantoprazole  40 mg Oral QAM AC    rOPINIRole  0.5 mg Oral Nightly    traZODone  50 mg Oral Nightly     Continuous Infusions:   sodium chloride      dextrose         Labs :     CBC:   Recent Labs     05/06/22  0355   WBC 21.8*   HGB 12.2*        BMP:    Recent Labs     05/06/22  0355 05/06/22  0617 05/06/22  1331 05/07/22  0434 05/07/22  1038   *  --  131* 131*  --    K 6.0*   < > 5.1 5.8* 4.7   CL 96*  --  91* 95*  --    CO2 23  --  22* 27  --    BUN 53*  --  53* 58*  --    CREATININE 1.5*  --  1.5* 1.6*  --    GLUCOSE 120*  --  153* 133*  --     < > = values in this interval not displayed. Hepatic:   No results for input(s): AST, ALT, ALB, BILITOT, ALKPHOS in the last 72 hours. Troponin: No results for input(s): TROPONINI in the last 72 hours. BNP: No results for input(s): BNP in the last 72 hours. Lipids: No results for input(s): CHOL, HDL in the last 72 hours. Invalid input(s): LDLCALCU  INR:   No results for input(s): INR in the last 72 hours.     Radiology    Objective:   Vitals: BP (!) 121/59   Pulse 55   Temp 97.9 °F (36.6 °C) (Oral)   Resp 18   Ht 5' 8\" (1.727 m)   Wt 139 lb 15.9 oz (63.5 kg)   SpO2 (!) 89%   BMI 21.29 kg/m²   HEENT: Head:pupils react  Neck: supple  Lungs: diminished air entry bilat ; Chest Tube on Rt  Heart: regular  Rhythm and tachycardic  Abdomen: soft BS heard NG NT  Extremities: warm  No edema  Neurologic:  Alert, oriented X3    Impression:   :   Acute on chronic hypoxic respiratory failure s/p bronch s/p VATS with lung biopsy and Rt middle lobe wedge resection done 5/5/22  Par A. 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  Coronary disease status post coronary bypass graft x3 LIMA to LAD SVG to RCA and SVG to obtuse marginal  Cardiomyopathy with improved ejection fraction last echo was 55 to 60% this year  Status post pacemaker for tachybradycardia syndrome  COPD  Significant deconditioning  Leukocytosis  Chronic kidney disease stage III  Lactic acidosis  Chronic anemia  Hypertension  Hyperlipidemia  Gram + cult coag neg and also + for rhinovirus  Pressure ulcer -POA  Hyperkalemia    Plan:    Cont to wean off High flow  Cont, bronchodilators incentive spirometry and   Cont to monitor HR while on AV blocking agents  PT OT  Had kayexalate earlier,  K+ improved    Luisito Fregoso MD, MD

## 2022-05-07 NOTE — PLAN OF CARE
Problem: Discharge Planning  Goal: Discharge to home or other facility with appropriate resources  Outcome: Progressing  Flowsheets (Taken 5/7/2022 1117)  Discharge to home or other facility with appropriate resources:   Identify barriers to discharge with patient and caregiver   Identify discharge learning needs (meds, wound care, etc)     Problem: Safety - Adult  Goal: Free from fall injury  5/7/2022 1117 by Shelly Abdul RN  Outcome: Progressing  Flowsheets  Taken 5/7/2022 1117  Free From Fall Injury: Instruct family/caregiver on patient safety  Taken 5/7/2022 1114  Free From Fall Injury: Instruct family/caregiver on patient safety     Problem: ABCDS Injury Assessment  Goal: Absence of physical injury  Outcome: Progressing  Note: No falls this shift     Problem: Respiratory - Adult  Goal: Clear lung sounds  5/7/2022 1117 by Shelly Abdul RN  Outcome: Progressing  Note: Clear and dim     Problem: Respiratory - Adult  Goal: Adequate oxygenation  5/7/2022 1117 by Shelly Abdul RN  Outcome: Progressing  Note: Continues on Heated High Flow nasal cannula     Problem: Respiratory - Adult  Goal: Achieves optimal ventilation and oxygenation  5/7/2022 1117 by Shelly Abdul RN  Flowsheets (Taken 5/7/2022 1117)  Achieves optimal ventilation and oxygenation:   Assess for changes in respiratory status   Position to facilitate oxygenation and minimize respiratory effort     Problem: Chronic Conditions and Co-morbidities  Goal: Patient's chronic conditions and co-morbidity symptoms are monitored and maintained or improved  Outcome: Progressing  Flowsheets (Taken 5/7/2022 1117)  Care Plan - Patient's Chronic Conditions and Co-Morbidity Symptoms are Monitored and Maintained or Improved: Monitor and assess patient's chronic conditions and comorbid symptoms for stability, deterioration, or improvement     Problem: Pain  Goal: Verbalizes/displays adequate comfort level or baseline comfort level  Outcome: Progressing  Flowsheets (Taken 5/7/2022 1117)  Verbalizes/displays adequate comfort level or baseline comfort level:   Encourage patient to monitor pain and request assistance   Assess pain using appropriate pain scale     Problem: Skin/Tissue Integrity - Adult  Goal: Skin integrity remains intact  5/7/2022 1117 by Carole Sinclair RN  Outcome: Progressing  Flowsheets (Taken 5/7/2022 1117)  Skin Integrity Remains Intact: Monitor for areas of redness and/or skin breakdown     Problem: Skin/Tissue Integrity - Adult  Goal: Incisions, wounds, or drain sites healing without S/S of infection  5/7/2022 0534 by Jose Manuel Salazar RN  Outcome: Progressing  Flowsheets (Taken 5/6/2022 1955)  Incisions, Wounds, or Drain Sites Healing Without Sign and Symptoms of Infection:   ADMISSION and DAILY: Assess and document risk factors for pressure ulcer development   TWICE DAILY: Assess and document skin integrity   TWICE DAILY: Assess and document dressing/incision, wound bed, drain sites and surrounding tissue     Problem: Gastrointestinal - Adult  Goal: Maintains or returns to baseline bowel function  Outcome: Progressing  Flowsheets (Taken 5/7/2022 1117)  Maintains or returns to baseline bowel function:   Assess bowel function   Encourage oral fluids to ensure adequate hydration     Problem: Genitourinary - Adult  Goal: Absence of urinary retention  Outcome: Progressing  Flowsheets  Taken 5/7/2022 1117  Absence of urinary retention: Discuss catheterization for long term situations as appropriate  Taken 5/7/2022 0800  Absence of urinary retention: Discuss catheterization for long term situations as appropriate     Problem: Infection - Adult  Goal: Absence of infection at discharge  Outcome: Progressing  Flowsheets (Taken 5/7/2022 1117)  Absence of infection at discharge:   Assess and monitor for signs and symptoms of infection   Monitor lab/diagnostic results     Problem: Metabolic/Fluid and Electrolytes - Adult  Goal: Electrolytes maintained within normal limits  Outcome: Progressing  Flowsheets (Taken 5/7/2022 1117)  Electrolytes maintained within normal limits: Monitor labs and assess patient for signs and symptoms of electrolyte imbalances     Problem: Metabolic/Fluid and Electrolytes - Adult  Goal: Hemodynamic stability and optimal renal function maintained  Outcome: Progressing  Flowsheets (Taken 5/7/2022 1117)  Hemodynamic stability and optimal renal function maintained:   Monitor labs and assess for signs and symptoms of volume excess or deficit   Monitor intake, output and patient weight     Problem: Nutrition Deficit:  Goal: Optimize nutritional status  Outcome: Adequate for Discharge     Problem: Neurosensory - Adult  Goal: Achieves stable or improved neurological status  Outcome: Adequate for Discharge     Problem: Cardiovascular - Adult  Goal: Maintains optimal cardiac output and hemodynamic stability  5/7/2022 1117 by Lisette Grider RN  Outcome: Adequate for Discharge     Problem: Gastrointestinal - Adult  Goal: Minimal or absence of nausea and vomiting  Outcome: Adequate for Discharge   Care plan reviewed with patient and family. Patient and family verbalize understanding of the plan of care and contribute to goal setting.

## 2022-05-07 NOTE — PLAN OF CARE
Problem: Respiratory - Adult  Goal: Clear lung sounds  5/6/2022 2030 by Carmen Carpenter RCP  Outcome: Progressing  Note: Patient had crackles through right lung.  Will continue to monitor and wean o2 as patient tolerates

## 2022-05-07 NOTE — PROGRESS NOTES
[facility]    Patient - Jody Nuno   MRN -  [id number]   [facility]   - 1950      Date of Admission -  2022 12:37 PM  Date of evaluation -  2022  Room - --A   Hospital Day - Josep Fermin MD Primary Care Physician - Veronica Corral MD     Problem List      Active Hospital Problems    Diagnosis Date Noted    Severe malnutrition ([address] Lanis Maizes 2022     Priority: Medium     Class: Acute    Septicemia ([address] [A41.9]      Priority: Medium    SOB (shortness of breath) [R06.02] 2022     Priority: Medium    Pneumonia [J18.9] 2022     Reason for Consult    Acute hypoxic respiratory failure  HPI   From HPI:  Vivica Cogan is a 69 y/o male former-smoker with PMH of CAD s/p CABG x3 (2021), ischemic cardiomyopathy, COPD, asthma, atrial fibrillation s/p left atrial appendage clip (2021), sick sinus syndrome s/p dual pacemaker placement (2021), HTN, HLD, colon polyps.      Patient presented [facility] ED on 22 from nursing home for complaints of increasing dyspnea. He was recently hospitalized last month with pneumonia. He did endorse recent chills. He was hypoxic on presentation with evidence of mild volume overload. He was placed on BiPAP initially. He had a noted leukocytosis, but was afebrile. CXR showed persistent and worsening acute on chronic interstitial disease at the lung bases. He was also noted to be in Afib with RVR. He was started on cardizem infusion with improvement. He was admitted under the care of Dr. Shiv Lofton. He received empiric antibiotics. Pulmonology was consulted. He received bronchodilators. His chronic prednisone was increased from 30 mg to 40 mg daily. He was later weaned to high flow nasal cannula. Infectious disease was consulted. He was maintained on cefepime empirically. Cardiology was consulted for Afib. Digoxin was added.  He was diuresed with lasix.      On 4/26/22 he underwent bronchoscopy. He had mucus production of the RUL, RML, RLL and right main bronchus. The RLL bronchus mucosa was erythematous. BAL was sent. This returned positive for candida albicans. Diflucan was started. Fluid analysis was also notable for macrophages and positive staining for lipid accumulation.      CT surgery was consulted 4/28/22 per patient/family request for consideration of open lung biopsy for confirmation of diagnosis of ILD. CT surgery Dr. Rod Ndiaye did evaluate, with plans for VATS and lung biopsy. He also recommended discontinuation of xarelto as well, given s/p ZAHRA closure.      Patient underwent VATS with lung biopsy with RML wedge resection on 5/5/22 with Dr. Rod Ndiaye. EBL was < 5 mL. There were no reported complications. Lung tissue biopsy and culture was obtained from RML.    Patient presented to ICU post-op for close monitoring. He remains hemodynamically stable, and oxygenating well on stable high flow nasal cannula requirements. \"    Past 24 Hours   - Transferred to stepdown from ICU 5/6/2022  - No acute events overnight   - Right-sided chest tube in place, no air leak visualized. - HFNC continues 35% 30LPM- 97%, otherwise vitals WNL.   - Hemodynamically stable  - SOB with slight improvement     -All systems reviewed   PMHx   Past Medical History      Diagnosis Date    Asthma     Atrial fibrillation with RVR ([address] 04/05/2021    [employer] dual pacemaker  04/15/2021    Collagenous colitis 2021    per scope 2021    Colon polyps 2021    dr Tosin Isabel    COPD, mild (HCC)     Eczema of hand     HTN (hypertension)     Hyperlipidemia     Smoker       Past Surgical History        Procedure Laterality Date    BRONCHOSCOPY N/A 4/26/2022    BRONCHOSCOPY WITH BAL performed by Shivani Turner MD at [address] COLONOSCOPY  06/10/2021    theresa ccolon polyps and collagenous colitis    CORONARY ARTERY BYPASS GRAFT  01/12/2021    cabg x 3 with lima and atrial appendage clip  dr Mitchell Sandoval N/A 01/12/2021    CABG  X 3 WITH DEJAH, Atrial Appendage Clip performed by Alber De Guzman MD at [facility] NASAL SINUS SURGERY  06/2008    polyps    OTHER SURGICAL HISTORY  DEC 7TH 2012 DR [address]NAN [address] [place]     IGS ENDOSCOPIC BI LAT MAXILLARY, ETHMOID, SPHENOID, FRONTAL SINUSOTOMY WITH SEPTOPLASTY AND TURBINOPLASTIES    SKIN CANCER EXCISION  09/2014    Left side of Forehead     THORACOSCOPY Right 5/5/2022    RIGHT VATS WITH LUNG BIOPSY WITH RIGHT MIDDLE LOBE LUNG WEDGE RESECTION performed by Alber De Guzman MD at [place] [address]  02/2017     Meds    Current Medications    albuterol  2.5 mg Nebulization BID    FLUoxetine  10 mg Oral Daily    sodium chloride flush  5-40 mL IntraVENous 2 times per day    metoprolol  2.5 mg IntraVENous Once    metoprolol  2.5 mg IntraVENous Once    metoprolol succinate  100 mg Oral Daily    fluconazole  200 mg Oral Daily    dilTIAZem  30 mg Oral 3 times per day    insulin lispro  0-6 Units SubCUTAneous TID WC    insulin lispro  0-3 Units SubCUTAneous Nightly    predniSONE  40 mg Oral Daily    atovaquone  1,500 mg Oral Daily    furosemide  20 mg Oral Daily    digoxin  125 mcg Oral Daily    aspirin  81 mg Oral Daily    atorvastatin  40 mg Oral Nightly    cetirizine  10 mg Oral Daily    multivitamin  1 tablet Oral Daily    mupirocin   Topical TID    nystatin   Topical BID    pantoprazole  40 mg Oral QAM AC    rOPINIRole  0.5 mg Oral Nightly    traZODone  50 mg Oral Nightly     bisacodyl, ipratropium-albuterol, sodium chloride flush, sodium chloride, HYDROcodone-acetaminophen, dextrose, glucagon (rDNA), dextrose, glucose, metoprolol, ALPRAZolam, ondansetron **OR** ondansetron, acetaminophen  IV Drips/Infusions   sodium chloride      dextrose       Home Medications  Medications Prior to Admission: albuterol (PROVENTIL) (2.5 MG/3ML) 0.083% nebulizer solution, Take 2.5 mg by nebulization 2 times daily  ipratropium-albuterol (DUONEB) 0.5-2.5 (3) MG/3ML SOLN nebulizer solution, Inhale 1 vial into the lungs every 4 hours as needed for Shortness of Breath  Sodium Chloride-Sodium Bicarb 2300-700 MG PACK, 1 spray by Nasal route in the morning, at noon, and at bedtime  FLUoxetine (PROZAC) 10 MG capsule, Take 10 mg by mouth daily (10 mg daily x 5 days - 4/24-4/28, then increase to 20 mg daily)  atovaquone (MEPRON) 750 MG/5ML suspension, Take 1,500 mg by mouth daily (10 mL daily)  predniSONE (DELTASONE) 20 MG tablet, Take 30 mg by mouth daily   ALPRAZolam (XANAX) 0.5 MG tablet, Take 0.5 mg by mouth every 12 hours as needed for Sleep. [DISCONTINUED] tuberculin (APLISOL) 5 UNIT/0.1ML injection, Inject 5 Units into the skin  [DISCONTINUED] nystatin (MYCOSTATIN) 516572 UNIT/GM cream, Apply topically 2 times daily Apply topically 2 times daily. [DISCONTINUED] oxymetazoline (AFRIN) 0.05 % nasal spray, 2 sprays by Nasal route 2 times daily  furosemide (LASIX) 20 MG tablet, Take 20 mg by mouth daily Sun, Wed  loratadine (CLARITIN) 10 MG tablet, Take 10 mg by mouth daily  metoprolol succinate (TOPROL XL) 100 MG extended release tablet, Take 1.5 tablets by mouth daily  tiotropium-olodaterol (STIOLTO) 2.5-2.5 MCG/ACT AERS, Inhale 2 puffs into the lungs daily  rivaroxaban (XARELTO) 15 MG TABS tablet, Take 1 tablet by mouth daily  mupirocin (BACTROBAN) 2 % ointment, Apply topically 3 times daily.   albuterol sulfate  (90 Base) MCG/ACT inhaler, INHALE 2 PUFFS INTO THE LUNGS EVERY 6 HOURS AS NEEDED FOR WHEEZING  traZODone (DESYREL) 50 MG tablet, Take 1 tablet by mouth nightly  rOPINIRole (REQUIP) 0.5 MG tablet, Take 1 tablet by mouth nightly  pantoprazole (PROTONIX) 40 MG tablet, Take 1 tablet by mouth every morning (before breakfast)  [DISCONTINUED] cetirizine (ZYRTEC) 10 MG tablet, Take 10 mg by mouth daily  atorvastatin (LIPITOR) 40 MG tablet, Take 1 tablet by mouth nightly  aspirin 81 MG chewable tablet, Take 1 tablet by mouth daily  Multiple Vitamins-Minerals (CENTRUM SILVER PO), Take 1 tablet by mouth daily   Diet    ADULT DIET; Regular; 3 carb choices (45 gm/meal); Low Sodium (2 gm)  ADULT ORAL NUTRITION SUPPLEMENT; Breakfast, Lunch, Dinner; Standard High Calorie/High Protein Oral Supplement  Allergies    Penicillins and Sulfa antibiotics  Social History     Social History     Socioeconomic History    Marital status:      Spouse name: Not on file    Number of children: Not on file    Years of education: Not on file    Highest education level: Not on file   Occupational History    Not on file   Tobacco Use    Smoking status: Former Smoker     Packs/day: 1.00     Years: 40.00     Pack years: 40.00     Types: Cigarettes     Quit date: 3/7/2022     Years since quittin.1    Smokeless tobacco: Never Used   Substance and Sexual Activity    Alcohol use: Not Currently     Alcohol/week: 0.0 standard drinks    Drug use: No    Sexual activity: Not on file   Other Topics Concern    Not on file   Social History Narrative    No barriers with medication affordability now that he has met deductible    Active with [facility]    Has O2 and supplies needed    No barriers with transportation     Social Determinants of Health     Financial Resource Strain: Low Risk     Difficulty of Paying Living Expenses: Not hard at all   Food Insecurity: No Food Insecurity    Worried About [address] in the Last Year: Never true    [facility] in the Last Year: Never true   Transportation Needs: No Transportation Needs    Lack of Transportation (Medical): No    Lack of Transportation (Non-Medical):  No   Physical Activity: Inactive    Days of Exercise per Week: 0 days    Minutes of Exercise per Session: 0 min   Stress:     Feeling of Stress : Not on file   Social Connections:     Frequency of Communication with Friends and Family: Not on file    Frequency of Social Gatherings with Friends and Family: Not on file    Attends Buddhist Services: Not on file    Active Member of Clubs or Organizations: Not on file    Attends Club or Organization Meetings: Not on file    Marital Status: Not on file   Intimate Partner Violence:     Fear of Current or Ex-Partner: Not on file    Emotionally Abused: Not on file    Physically Abused: Not on file    Sexually Abused: Not on file   Housing Stability: Unknown    Unable to Pay for Housing in the Last Year: No    Number of Places Lived in the Last Year: Not on file    Unstable Housing in the Last Year: No     Family History          Problem Relation Age of Onset    Heart Disease Mother     Heart Disease Father         cabg    Diabetes Brother     Heart Disease Brother        Vitals     height is 5' 8\" (1.727 m) and weight is 139 lb 15.9 oz (63.5 kg). His oral temperature is 98.8 °F (37.1 °C). His blood pressure is 129/60 and his pulse is 56. His respiration is 16 and oxygen saturation is 97%. Body mass index is 21.29 kg/m². I/O        Intake/Output Summary (Last 24 hours) at 5/7/2022 1057  Last data filed at 5/7/2022 0641  Gross per 24 hour   Intake 1706.06 ml   Output 1720 ml   Net -13.94 ml     I/O last 3 completed shifts: In: 1706.1 [P.O.:150; I.V.:1556.1]  Out: 2417 [Urine:2325; Chest Tube:92]   Patient Vitals for the past 96 hrs (Last 3 readings):   Weight   05/06/22 0355 139 lb 15.9 oz (63.5 kg)   05/05/22 0259 149 lb 11.1 oz (67.9 kg)     Exam   General Appearance  Awake, alert, chronically ill. Interactive and cooperative   HEENT - Normal, Head is normocephalic, atraumatic. EOMI, PERRLA, pale conjunctiva  Neck - Supple, symmetrical, trachea midline and Soft, trachea midline and straight  Lungs - Increased respiratory effort, normal breath sounds with right sided friction sounds attributed to chest tube. No wheezes, rales or rhonchi  Cardiovascular - Sinus bradycardia, regular rhythm.  Normal S1, S2, no murmurs  Abdomen - Soft, nontender, nondistended, no masses or organomegaly  Neurologic - CN II-XII are grossly intact.  There are no focal motor or sensory deficits  Skin - No bruising or bleeding  Extremities - No cyanosis, clubbing or edema    Labs  - Old records and notes have been reviewed in [facility]   CBC     Lab Results   Component Value Date    WBC 21.8 05/06/2022    RBC 4.34 05/06/2022    RBC 4.84 11/07/2011    HGB 12.2 05/06/2022    HCT 41.1 05/06/2022     05/06/2022    MCV 94.7 05/06/2022    MCH 28.1 05/06/2022    MCHC 29.7 05/06/2022    RDW 13.3 06/07/2018    NRBC 0 05/04/2022    NRBC 0 11/07/2011    SEGSPCT 80.0 05/04/2022    MONOPCT 4.8 05/04/2022    MONOSABS 0.9 05/04/2022    LYMPHSABS 2.1 05/04/2022    EOSABS 0.0 05/04/2022    BASOSABS 0.1 05/04/2022    DIFFTYPE see below 03/25/2022     BMP   Lab Results   Component Value Date     05/07/2022    K 5.8 05/07/2022    K 5.0 05/04/2022    CL 95 05/07/2022    CO2 27 05/07/2022    BUN 58 05/07/2022    CREATININE 1.6 05/07/2022    GLUCOSE 133 05/07/2022    GLUCOSE 94 11/07/2011    CALCIUM 8.1 05/07/2022    MG 1.9 04/28/2022     LFTS  Lab Results   Component Value Date    ALKPHOS 101 05/02/2022    ALT 20 05/02/2022    AST 20 05/02/2022    PROT 6.0 05/02/2022    BILITOT 0.4 05/02/2022    BILIDIR <0.2 03/26/2022    LABALBU 2.7 05/02/2022    LABALBU 4.3 11/07/2011     ABG   Lab Results   Component Value Date    PH 7.44 04/24/2022    PCO2 39 04/24/2022    PO2 191 04/24/2022    HCO3 26 04/24/2022    O2SAT 100 04/24/2022     @  Lab Results   Component Value Date    APTT 22.6 05/04/2022     INR   Lab Results   Component Value Date    INR 1.02 05/04/2022    INR 1.41 (H) 03/25/2022    INR 1.25 (H) 04/06/2021     Angio Convert Enzyme 8 - 52 U/L 21        PFTs     Cultures    (+) blood culture GPC in clusters    4/26/22  Bronchoscopy- will follow cultures/cytology   Endobronchial findings:   Trachea: Normal mucosa  Diana: Normal mucosa  Right main bronchus: Normal mucosa, mucus  Right upper lobe bronchus: Normal mucosa, mucus  Right Middle lobe bronchus: Normal mucosa, mucus  Right Lower lobe bronchus: erythematous mucosa  Left main bronchus: Normal mucosa  Left upper lobe bronchus: Normal mucosa  Left lower lobe bronchus: Normal mucosa     Rhinovirus Enterovirus PCR Detected Abnormal      AFB (-)  Virus cultures (-)  Cytology: FINAL RESULTS:   No malignant cells seen.      Specimen consists of benign appearing squamous      cells, alveolar macrophages, and debris. Flow cytometry: IMPRESSION:   1. Low viability specimen showing a predominance of granulocytes   without immunophenotypic aberrancy. 2. Rare phenotypically unremarkable T-cells with a normal CD4:CD8   ratio of 2:4. No abnormal B cell, plasma cell, T cell, or NK cell population   identified. See comment. Cytology:     FINAL RESULTS:   Right lower lobe of lung, bronchial washings, cytospin preparation:     Alveolar macrophages with positive staining for lipid accumulation.     Please see microscopic examination. Radiology    CXR  05/04/2022   Chest X-ray, 1 View   Similar bilateral airspace disease. 5/3/2022   XR CHEST PORTABLE   Stable chest.       5/2/22  XR CHEST PORTABLE   Improved aeration and decreased interstitial opacities compared to prior exam.          03/07/2022 --->  04/29/2022            CT chest     01/09/2021 ---> 03/15/2022            Assessment   1. Acute on chronic hypoxic respiratory failure -chronic home O2 of 4 L NC at rest and 6 L with activity. 2. Rapidly progressing Acute Interstitial pneumonia (AIP) DDX Amiodarone Toxicity vs RBILD vs OP since 03/7/2022 (No evidence ILD back in 2021)  3. Interstitial infiltrate, thickening interlobular septa  4. CAD s/p CABG   5. Afib/Aflutter  6. SSS s/p PPM  7. Ischemic cardiomyopathy  8. Full Code   Plan   1. Currently on HFNC FiO2 35, flow rate 30 with SPO2 >95%. Continue to wean O2 as tolerated for SPO2 >92%. 2.  VATS biopsy 5/5 revealed alveolar macrophages with positive staining for lipid accumulation consistent with amiodarone toxicity interstitial lung disease. Continue prednisone 40 mg p.o daily. 3.  CTS following for right-sided chest tube management. Air leak visualized 5/7/2022. Daily CXR. 4.  Continue home digoxin, Cardizem and metoprolol for A. fib/flutter. Xarelto discontinued per CTS recommendations, primary following. 5. Continue Atovaquone       Case discussed with nurse and patient/family. Questions and concerns addressed.   Meds and orders reviewed     Electronically signed by   Edouard Ragland DO on 5/7/2022 at 10:57 AM

## 2022-05-08 NOTE — PLAN OF CARE
Problem: Discharge Planning  Goal: Discharge to home or other facility with appropriate resources  Outcome: Progressing  Flowsheets  Taken 5/8/2022 1522  Discharge to home or other facility with appropriate resources:   Identify barriers to discharge with patient and caregiver   Identify discharge learning needs (meds, wound care, etc)  Taken 5/8/2022 0958  Discharge to home or other facility with appropriate resources:   Identify barriers to discharge with patient and caregiver   Arrange for needed discharge resources and transportation as appropriate     Problem: Safety - Adult  Goal: Free from fall injury  Outcome: Progressing  Flowsheets  Taken 5/8/2022 1522  Free From Fall Injury: Instruct family/caregiver on patient safety  Taken 5/8/2022 1502  Free From Fall Injury: Instruct family/caregiver on patient safety     Problem: ABCDS Injury Assessment  Goal: Absence of physical injury  Outcome: Progressing     Problem: Respiratory - Adult  Goal: Clear lung sounds  Outcome: Progressing  Goal: Adequate oxygenation  5/8/2022 1522 by Suzy Torres RN  Outcome: Progressing  5/8/2022 1034 by Jose Chu RCP  Outcome: Progressing  Goal: Achieves optimal ventilation and oxygenation  5/8/2022 1522 by Suzy Torres RN  Outcome: Progressing  Flowsheets (Taken 5/8/2022 1522)  Achieves optimal ventilation and oxygenation:   Assess for changes in respiratory status   Position to facilitate oxygenation and minimize respiratory effort   Assess for changes in mentation and behavior   Oxygen supplementation based on oxygen saturation or arterial blood gases  5/8/2022 1034 by Jose Chu RCP  Outcome: Progressing     Problem: Chronic Conditions and Co-morbidities  Goal: Patient's chronic conditions and co-morbidity symptoms are monitored and maintained or improved  Outcome: Progressing  Flowsheets  Taken 5/8/2022 [address] [place] [address] Patient's Chronic Conditions and Co-Morbidity Symptoms are Monitored and Maintained or Improved: Monitor and assess patient's chronic conditions and comorbid symptoms for stability, deterioration, or improvement  Taken 5/8/2022 0958  Care Plan - Patient's Chronic Conditions and Co-Morbidity Symptoms are Monitored and Maintained or Improved:   Monitor and assess patient's chronic conditions and comorbid symptoms for stability, deterioration, or improvement   Collaborate with multidisciplinary team to address chronic and comorbid conditions and prevent exacerbation or deterioration     Problem: Pain  Goal: Verbalizes/displays adequate comfort level or baseline comfort level  Outcome: Progressing  Flowsheets (Taken 5/8/2022 1522)  Verbalizes/displays adequate comfort level or baseline comfort level: Encourage patient to monitor pain and request assistance   Pain Assessment: 0-10  Pain Level: 5   Patient's Stated Pain Goal: 0 - No pain   Is pain goal met at this time? No     Non-Pharmaceutical Pain Intervention(s): Repositioned   Problem: Nutrition Deficit:  Goal: Optimize nutritional status  Outcome: Progressing     Problem: Skin/Tissue Integrity  Goal: Absence of new skin breakdown  Description: 1. Monitor for areas of redness and/or skin breakdown  2. Assess vascular access sites hourly  3. Every 4-6 hours minimum:  Change oxygen saturation probe site  4. Every 4-6 hours:  If on nasal continuous positive airway pressure, respiratory therapy assess nares and determine need for appliance change or resting period.   Outcome: Progressing     Problem: Neurosensory - Adult  Goal: Achieves stable or improved neurological status  Outcome: Progressing  Flowsheets  Taken 5/8/2022 1522  Achieves stable or improved neurological status: Assess for and report changes in neurological status  Taken 5/8/2022 0958  Achieves stable or improved neurological status: Assess for and report changes in neurological status     Problem: Cardiovascular - Adult  Goal: Maintains optimal cardiac output and hemodynamic stability  Outcome: Progressing  Flowsheets  Taken 5/8/2022 1522  Maintains optimal cardiac output and hemodynamic stability:   Monitor blood pressure and heart rate   Monitor urine output and notify Licensed Independent Practitioner for values outside of normal range  Taken 5/8/2022 0958  Maintains optimal cardiac output and hemodynamic stability: Monitor blood pressure and heart rate     Problem: Skin/Tissue Integrity - Adult  Goal: Skin integrity remains intact  Outcome: Progressing  Flowsheets  Taken 5/8/2022 1522  Skin Integrity Remains Intact:   Monitor for areas of redness and/or skin breakdown   Assess vascular access sites hourly  Taken 5/8/2022 1503  Skin Integrity Remains Intact: Monitor for areas of redness and/or skin breakdown  Taken 5/8/2022 4743  Skin Integrity Remains Intact: Monitor for areas of redness and/or skin breakdown  Goal: Incisions, wounds, or drain sites healing without S/S of infection  Outcome: Progressing  Flowsheets  Taken 5/8/2022 1522  Incisions, Wounds, or Drain Sites Healing Without Sign and Symptoms of Infection:   ADMISSION and DAILY: Assess and document risk factors for pressure ulcer development   TWICE DAILY: Assess and document skin integrity   TWICE DAILY: Assess and document dressing/incision, wound bed, drain sites and surrounding tissue  Taken 5/8/2022 1503  Incisions, Wounds, or Drain Sites Healing Without Sign and Symptoms of Infection: ADMISSION and DAILY: Assess and document risk factors for pressure ulcer development  Taken 5/8/2022 0958  Incisions, Wounds, or Drain Sites Healing Without Sign and Symptoms of Infection: ADMISSION and DAILY: Assess and document risk factors for pressure ulcer development     Problem: Gastrointestinal - Adult  Goal: Minimal or absence of nausea and vomiting  Outcome: Progressing  Flowsheets  Taken 5/8/2022 1522  Minimal or absence of nausea and vomiting: Administer IV fluids as ordered to ensure adequate hydration  Taken 5/8/2022 0958  Minimal or absence of nausea and vomiting: Administer IV fluids as ordered to ensure adequate hydration  Goal: Maintains adequate nutritional intake  Outcome: Progressing  Flowsheets  Taken 5/8/2022 1522  Maintains adequate nutritional intake: Monitor percentage of each meal consumed  Taken 5/8/2022 0958  Maintains adequate nutritional intake: Monitor percentage of each meal consumed     Problem: Genitourinary - Adult  Goal: Absence of urinary retention  Outcome: Progressing  Flowsheets  Taken 5/8/2022 1522  Absence of urinary retention: Assess patients ability to void and empty bladder  Taken 5/8/2022 9822  Absence of urinary retention: Assess patients ability to void and empty bladder     Problem: Infection - Adult  Goal: Absence of infection at discharge  Outcome: Progressing  Flowsheets  Taken 5/8/2022 1522  Absence of infection at discharge:   Assess and monitor for signs and symptoms of infection   Monitor lab/diagnostic results  Taken 5/8/2022 0958  Absence of infection at discharge:   Assess and monitor for signs and symptoms of infection   Monitor lab/diagnostic results  Goal: Absence of infection during hospitalization  Outcome: Progressing  Flowsheets  Taken 5/8/2022 1522  Absence of infection during hospitalization:   Assess and monitor for signs and symptoms of infection   Monitor lab/diagnostic results  Taken 5/8/2022 0958  Absence of infection during hospitalization:   Assess and monitor for signs and symptoms of infection   Monitor lab/diagnostic results     Problem: Metabolic/Fluid and Electrolytes - Adult  Goal: Electrolytes maintained within normal limits  Outcome: Progressing  Flowsheets  Taken 5/8/2022 1522  Electrolytes maintained within normal limits: Monitor labs and assess patient for signs and symptoms of electrolyte imbalances  Taken 5/8/2022 0958  Electrolytes maintained within normal limits:   Monitor labs and assess patient for signs and symptoms of electrolyte imbalances   Administer electrolyte replacement as ordered  Goal: Hemodynamic stability and optimal renal function maintained  Outcome: Progressing  Flowsheets  Taken 5/8/2022 1522  Hemodynamic stability and optimal renal function maintained:   Monitor labs and assess for signs and symptoms of volume excess or deficit   Monitor intake, output and patient weight   Monitor urine specific gravity, serum osmolarity and serum sodium as indicated or ordered  Taken 5/8/2022 0958  Hemodynamic stability and optimal renal function maintained:   Monitor labs and assess for signs and symptoms of volume excess or deficit   Monitor intake, output and patient weight  Goal: Glucose maintained within prescribed range  Outcome: Progressing  Flowsheets  Taken 5/8/2022 1522  Glucose maintained within prescribed range:   Monitor blood glucose as ordered   Administer ordered medications to maintain glucose within target range  Taken 5/8/2022 0958  Glucose maintained within prescribed range:   Monitor blood glucose as ordered   Assess for signs and symptoms of hyperglycemia and hypoglycemia     Problem: Hematologic - Adult  Goal: Maintains hematologic stability  Outcome: Progressing  Flowsheets  Taken 5/8/2022 1522  Maintains hematologic stability:   Assess for signs and symptoms of bleeding or hemorrhage   Monitor labs for bleeding or clotting disorders  Taken 5/8/2022 4381  Maintains hematologic stability:   Assess for signs and symptoms of bleeding or hemorrhage   Monitor labs for bleeding or clotting disorders   Care plan reviewed with patient. Patient verbalize understanding of the plan of care and contribute to goal setting.

## 2022-05-08 NOTE — PLAN OF CARE
Problem: Respiratory - Adult  Goal: Clear lung sounds  5/7/2022 2122 by Kings Watts RN  Outcome: Progressing     Problem: Respiratory - Adult  Goal: Adequate oxygenation  5/8/2022 1034 by Tae Terrazas RCP  Outcome: Progressing     Problem: Respiratory - Adult  Goal: Achieves optimal ventilation and oxygenation  5/8/2022 1034 by Tae Terrazas RCP  Outcome: Progressing    Titrating hfnc as tolerated  Left upper lobe is clear  Right has insp crackles

## 2022-05-08 NOTE — PROGRESS NOTES
IM Progress Note  Dr Suggs Hearing covering Dr. Landon Camacho  5/8/2022 1:38 PM      Patient name Danya Carrillo  [id number]  PCP: Ginna Fuentes MD  Admit Date: 4/24/2022  Acct No. [de-identified]    Subjective: Interval History:   Patient has no new complaint at this time    Diet: ADULT DIET; Regular; 3 carb choices (45 gm/meal); Low Sodium (2 gm)  ADULT ORAL NUTRITION SUPPLEMENT; Breakfast, Lunch, Dinner; Standard High Calorie/High Protein Oral Supplement    I/O last 3 completed shifts: In: 2881.1 [P.O.:980; I.V.:1901.1]  Out: 9392 [Urine:3725; Chest Tube:120]  I/O this shift:  In: 200 [P.O.:200]  Out: 1025 [Urine:1025]        Admission weight: 155 lb (70.3 kg) as of 4/24/2022 12:37 PM  Wt Readings from Last 3 Encounters:   05/08/22 163 lb (73.9 kg)   04/20/22 155 lb 12.8 oz (70.7 kg)   04/13/22 154 lb (69.9 kg)     Body mass index is 24.78 kg/m².     ROS   CVS;  no cp or palpitation  Resp: +SOB+ cough  Neuro:  No numbness or weakness or dizziness  Abd: no nausea or vomiting or abd pain      Medications:   Scheduled Meds:   albuterol  2.5 mg Nebulization BID    FLUoxetine  10 mg Oral Daily    sodium chloride flush  5-40 mL IntraVENous 2 times per day    metoprolol  2.5 mg IntraVENous Once    metoprolol  2.5 mg IntraVENous Once    metoprolol succinate  100 mg Oral Daily    fluconazole  200 mg Oral Daily    dilTIAZem  30 mg Oral 3 times per day    insulin lispro  0-6 Units SubCUTAneous TID WC    insulin lispro  0-3 Units SubCUTAneous Nightly    predniSONE  40 mg Oral Daily    atovaquone  1,500 mg Oral Daily    furosemide  20 mg Oral Daily    digoxin  125 mcg Oral Daily    aspirin  81 mg Oral Daily    atorvastatin  40 mg Oral Nightly    cetirizine  10 mg Oral Daily    multivitamin  1 tablet Oral Daily    mupirocin   Topical TID    nystatin   Topical BID    pantoprazole  40 mg Oral QAM AC    rOPINIRole  0.5 mg Oral Nightly    traZODone  50 mg Oral Nightly     Continuous Infusions:   sodium chloride      dextrose         Labs :     CBC:   Recent Labs     05/06/22  0355   WBC 21.8*   HGB 12.2*        BMP:    Recent Labs     05/06/22  1331 05/06/22  1331 05/07/22  0434 05/07/22  1038 05/08/22  0443   *  --  131*  --  134*   K 5.1   < > 5.8* 4.7 4.7   CL 91*  --  95*  --  97*   CO2 22*  --  27  --  30   BUN 53*  --  58*  --  57*   CREATININE 1.5*  --  1.6*  --  1.2   GLUCOSE 153*  --  133*  --  106    < > = values in this interval not displayed. Hepatic:   No results for input(s): AST, ALT, ALB, BILITOT, ALKPHOS in the last 72 hours. Troponin: No results for input(s): TROPONINI in the last 72 hours. BNP: No results for input(s): BNP in the last 72 hours. Lipids: No results for input(s): CHOL, HDL in the last 72 hours. Invalid input(s): LDLCALCU  INR:   No results for input(s): INR in the last 72 hours.     Radiology    Objective:   Vitals: BP (!) 159/64   Pulse 55   Temp 98.1 °F (36.7 °C) (Oral)   Resp 17   Ht 5' 8\" (1.727 m)   Wt 163 lb (73.9 kg)   SpO2 97%   BMI 24.78 kg/m²   HEENT: Head:pupils react  Neck: supple  Lungs: diminished air entry bilat ; Chest Tube on Rt  Heart: regular  Rhythm and tachycardic  Abdomen: soft BS heard NG NT  Extremities: warm  No edema  Neurologic:  Alert, oriented X3    Impression:   :   Acute on chronic hypoxic respiratory failure s/p bronch s/p VATS with lung biopsy and Rt middle lobe wedge resection done 5/5/22  Par A. 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 CVS managing chest tube. Cont, bronchodilators incentive spirometry and   Cont to monitor HR while on AV blocking agents  PT OT  Potassium levels remain stable. Dr Kamron Monaco resumes care in a.m.     Deepak Angeles MD, MD

## 2022-05-08 NOTE — PLAN OF CARE
Problem: Discharge Planning  Goal: Discharge to home or other facility with appropriate resources  5/7/2022 2122 by Chel Wilkerson RN  Outcome: Progressing  5/7/2022 1117 by Miranda Garcia RN  Outcome: Progressing  Flowsheets (Taken 5/7/2022 1117)  Discharge to home or other facility with appropriate resources:   Identify barriers to discharge with patient and caregiver   Identify discharge learning needs (meds, wound care, etc)     Problem: Safety - Adult  Goal: Free from fall injury  5/7/2022 2122 by Chel Wilkerson RN  Outcome: Progressing  5/7/2022 1117 by Miranda Garcia RN  Outcome: Progressing  Flowsheets  Taken 5/7/2022 1117  Free From Fall Injury: Instruct family/caregiver on patient safety  Taken 5/7/2022 1114  Free From Fall Injury: Instruct family/caregiver on patient safety     Problem: ABCDS Injury Assessment  Goal: Absence of physical injury  5/7/2022 2122 by Chel Wilkerson RN  Outcome: Progressing  5/7/2022 1117 by Miranda Garcia RN  Outcome: Progressing  Note: No falls this shift     Problem: Respiratory - Adult  Goal: Clear lung sounds  5/7/2022 2122 by Chel Wilkerson RN  Outcome: Progressing  5/7/2022 1117 by Miranda Garcia RN  Outcome: Progressing  Note: Clear and dim  5/7/2022 0951 by Alea Murrieta RCP  Outcome: Progressing  Goal: Adequate oxygenation  5/7/2022 2122 by Chel Wilkerson RN  Outcome: Progressing  5/7/2022 1947 by Taylor Murrieta RCP  Outcome: Progressing  Note: Patient remains on HFNC. 30L 35%.  Will continue to monitor and wean as patient tolerates  5/7/2022 1117 by Miranda Garcia RN  Outcome: Progressing  Note: Continues on Heated High Flow nasal cannula  5/7/2022 0951 by Alea Murrieta RCP  Outcome: Progressing  Goal: Achieves optimal ventilation and oxygenation  5/7/2022 2122 by Chel Wilkerson RN  Outcome: Progressing  5/7/2022 1117 by Miranda Garcia RN  Flowsheets (Taken 5/7/2022 1117)  Achieves optimal ventilation and oxygenation:   Assess for changes in respiratory status   Position to facilitate oxygenation and minimize respiratory effort  5/7/2022 0951 by Guanako Dickerson RCP  Outcome: Progressing  Flowsheets (Taken 5/6/2022 1955 by Spencer Dotson RN)  Achieves optimal ventilation and oxygenation:   Assess for changes in respiratory status   Assess for changes in mentation and behavior   Position to facilitate oxygenation and minimize respiratory effort     Problem: Chronic Conditions and Co-morbidities  Goal: Patient's chronic conditions and co-morbidity symptoms are monitored and maintained or improved  5/7/2022 2122 by Casper Cruz RN  Outcome: Progressing  5/7/2022 1117 by Colin Helton RN  Outcome: Progressing  Flowsheets (Taken 5/7/2022 1117)  Care Plan - Patient's Chronic Conditions and Co-Morbidity Symptoms are Monitored and Maintained or Improved: Monitor and assess patient's chronic conditions and comorbid symptoms for stability, deterioration, or improvement     Problem: Pain  Goal: Verbalizes/displays adequate comfort level or baseline comfort level  5/7/2022 2122 by Casper Cruz RN  Outcome: Progressing  5/7/2022 1117 by Colin Helton RN  Outcome: Progressing  Flowsheets (Taken 5/7/2022 1117)  Verbalizes/displays adequate comfort level or baseline comfort level:   Encourage patient to monitor pain and request assistance   Assess pain using appropriate pain scale     Problem: Skin/Tissue Integrity  Goal: Absence of new skin breakdown  Description: 1. Monitor for areas of redness and/or skin breakdown  2. Assess vascular access sites hourly  3. Every 4-6 hours minimum:  Change oxygen saturation probe site  4. Every 4-6 hours:  If on nasal continuous positive airway pressure, respiratory therapy assess nares and determine need for appliance change or resting period.   Outcome: Progressing     Problem: Neurosensory - Adult  Goal: Achieves stable or improved neurological status  5/7/2022 2122 by Creed Rinne, RN  Outcome: Progressing  5/7/2022 1117 by Estephania Cruz RN  Outcome: Adequate for Discharge     Problem: Cardiovascular - Adult  Goal: Maintains optimal cardiac output and hemodynamic stability  5/7/2022 2122 by Creed Rinne, RN  Outcome: Progressing  5/7/2022 1117 by Estephania Cruz RN  Outcome: Adequate for Discharge     Problem: Skin/Tissue Integrity - Adult  Goal: Skin integrity remains intact  5/7/2022 2122 by Creed Rinne, RN  Outcome: Progressing  5/7/2022 1117 by Estephania Cruz RN  Outcome: Progressing  Flowsheets (Taken 5/7/2022 1117)  Skin Integrity Remains Intact: Monitor for areas of redness and/or skin breakdown  Goal: Incisions, wounds, or drain sites healing without S/S of infection  Outcome: Progressing     Problem: Gastrointestinal - Adult  Goal: Minimal or absence of nausea and vomiting  5/7/2022 2122 by Creed Rinne, RN  Outcome: Progressing  5/7/2022 1117 by Estephania Cruz RN  Outcome: Adequate for Discharge  Goal: Maintains or returns to baseline bowel function  5/7/2022 2122 by Creed Rinne, RN  Outcome: Progressing  5/7/2022 1117 by Estephania Cruz RN  Outcome: Progressing  Flowsheets (Taken 5/7/2022 1117)  Maintains or returns to baseline bowel function:   Assess bowel function   Encourage oral fluids to ensure adequate hydration  Goal: Maintains adequate nutritional intake  Outcome: Progressing     Problem: Genitourinary - Adult  Goal: Absence of urinary retention  5/7/2022 2122 by Creed Rinne, RN  Outcome: Progressing  5/7/2022 1117 by Estephania Cruz RN  Outcome: Progressing  Flowsheets  Taken 5/7/2022 1117  Absence of urinary retention: Discuss catheterization for long term situations as appropriate  Taken 5/7/2022 0800  Absence of urinary retention: Discuss catheterization for long term situations as appropriate     Problem: Infection - Adult  Goal: Absence of infection at discharge  5/7/2022 2122 by Creed Rinne, RN  Outcome: Progressing  5/7/2022 1117 by Elle Dahl RN  Outcome: Progressing  Flowsheets (Taken 5/7/2022 1117)  Absence of infection at discharge:   Assess and monitor for signs and symptoms of infection   Monitor lab/diagnostic results  Goal: Absence of infection during hospitalization  Outcome: Progressing     Problem: Metabolic/Fluid and Electrolytes - Adult  Goal: Electrolytes maintained within normal limits  5/7/2022 2122 by Aaron Olea RN  Outcome: Progressing  5/7/2022 1117 by Elle Dahl RN  Outcome: Progressing  Flowsheets (Taken 5/7/2022 1117)  Electrolytes maintained within normal limits: Monitor labs and assess patient for signs and symptoms of electrolyte imbalances  Goal: Hemodynamic stability and optimal renal function maintained  5/7/2022 2122 by Aaron Olea RN  Outcome: Progressing  5/7/2022 1117 by Elle Dahl RN  Outcome: Progressing  Flowsheets (Taken 5/7/2022 1117)  Hemodynamic stability and optimal renal function maintained:   Monitor labs and assess for signs and symptoms of volume excess or deficit   Monitor intake, output and patient weight  Goal: Glucose maintained within prescribed range  Outcome: Progressing     Problem: Hematologic - Adult  Goal: Maintains hematologic stability  Outcome: Progressing

## 2022-05-08 NOTE — PROGRESS NOTES
Cardiovascular Surgery Progress Note      Stable O2 requirements  CXR no PTX, no significant effusion  Intermittent air leak in pleurivac  Keep tube in until leak resolves

## 2022-05-08 NOTE — PROGRESS NOTES
[facility]    Patient - Russ Hernandez   MRN -  [id number]   [facility]   - 1950      Date of Admission -  2022 12:37 PM  Date of evaluation -  2022  Room - --A   Hospital Day - Vivienne Luis MD Primary Care Physician - Rhonda Pool MD     Problem List      Active Hospital Problems    Diagnosis Date Noted    Interstitial lung disease [facility]) [J84.9]      Priority: Medium    Abnormal CT of the chest [R93.89]      Priority: Medium    Severe malnutrition ([address] [E43] 2022     Priority: Medium     Class: Acute    Septicemia ([address] [A41.9]      Priority: Medium    SOB (shortness of breath) [R06.02] 2022     Priority: Medium    Acute respiratory failure with hypoxia ([address] [J96.01] 2022    Pneumonia [J18.9] 2022     Reason for Consult    Acute hypoxic respiratory failure  HPI   From HPI:  Priscilla Herbert is a 71 y/o male former-smoker with PMH of CAD s/p CABG x3 (2021), ischemic cardiomyopathy, COPD, asthma, atrial fibrillation s/p left atrial appendage clip (2021), sick sinus syndrome s/p dual pacemaker placement (2021), HTN, HLD, colon polyps.      Patient presented to [facility] ED on 22 from nursing home for complaints of increasing dyspnea. He was recently hospitalized last month with pneumonia. He did endorse recent chills. He was hypoxic on presentation with evidence of mild volume overload. He was placed on BiPAP initially. He had a noted leukocytosis, but was afebrile. CXR showed persistent and worsening acute on chronic interstitial disease at the lung bases. He was also noted to be in Afib with RVR. He was started on cardizem infusion with improvement. He was admitted under the care of Dr. Veda Bowers. He received empiric antibiotics. Pulmonology was consulted. He received bronchodilators. His chronic prednisone was increased from 30 mg to 40 mg daily.  He was later weaned to high flow nasal cannula. Infectious disease was consulted. He was maintained on cefepime empirically. Cardiology was consulted for Afib. Digoxin was added. He was diuresed with lasix.      On 4/26/22 he underwent bronchoscopy. He had mucus production of the RUL, RML, RLL and right main bronchus. The RLL bronchus mucosa was erythematous. BAL was sent. This returned positive for candida albicans. Diflucan was started. Fluid analysis was also notable for macrophages and positive staining for lipid accumulation.      CT surgery was consulted 4/28/22 per patient/family request for consideration of open lung biopsy for confirmation of diagnosis of ILD. CT surgery Dr. Slim Olivares did evaluate, with plans for VATS and lung biopsy. He also recommended discontinuation of xarelto as well, given s/p ZAHRA closure.      Patient underwent VATS with lung biopsy with RML wedge resection on 5/5/22 with Dr. Slim Olivares. EBL was < 5 mL. There were no reported complications. Lung tissue biopsy and culture was obtained from RML.    Patient presented to ICU post-op for close monitoring. He remains hemodynamically stable, and oxygenating well on stable high flow nasal cannula requirements. \"    Past 24 Hours   - Transferred to stepdown from ICU 5/6/2022  - No acute events overnight   - Right-sided chest tube in place, intermittent air leak visualized. - HFNC continues 35% 35 LPM- 96%, otherwise vitals WNL.   - Hemodynamically stable  - SOB with slight improvement     -All systems reviewed   PMHx   Past Medical History      Diagnosis Date    Asthma     Atrial fibrillation with RVR ([address] 04/05/2021    [employer] dual pacemaker  04/15/2021    Collagenous colitis 2021    per scope 2021    Colon polyps 2021    dr Vikram Dave    COPD, mild (HCC)     Eczema of hand     HTN (hypertension)     Hyperlipidemia     Smoker       Past Surgical History        Procedure Laterality Date    BRONCHOSCOPY N/A 4/26/2022    BRONCHOSCOPY WITH BAL performed by Macrina Middleton MD at [address] COLONOSCOPY  06/10/2021    theresa ccolon polyps and collagenous colitis    CORONARY ARTERY BYPASS GRAFT  01/12/2021    cabg x 3 with lima and atrial appendage clip  dr Loraine Finn N/A 01/12/2021    CABG  X 3 WITH DEJAH, Atrial Appendage Clip performed by Papito Carpenter MD at [address] [place] [address] Road  06/2008    polyps    OTHER SURGICAL HISTORY  DEC 7TH 2012 DR GET DAY [place]     IGS ENDOSCOPIC BI LAT MAXILLARY, ETHMOID, SPHENOID, FRONTAL SINUSOTOMY WITH SEPTOPLASTY AND TURBINOPLASTIES    SKIN CANCER EXCISION  09/2014    Left side of Forehead     THORACOSCOPY Right 5/5/2022    RIGHT VATS WITH LUNG BIOPSY WITH RIGHT MIDDLE LOBE LUNG WEDGE RESECTION performed by Papito Carpenter MD at [place] [address]  02/2017     Meds    Current Medications    albuterol  2.5 mg Nebulization BID    FLUoxetine  10 mg Oral Daily    sodium chloride flush  5-40 mL IntraVENous 2 times per day    metoprolol  2.5 mg IntraVENous Once    metoprolol  2.5 mg IntraVENous Once    metoprolol succinate  100 mg Oral Daily    fluconazole  200 mg Oral Daily    dilTIAZem  30 mg Oral 3 times per day    insulin lispro  0-6 Units SubCUTAneous TID WC    insulin lispro  0-3 Units SubCUTAneous Nightly    predniSONE  40 mg Oral Daily    atovaquone  1,500 mg Oral Daily    furosemide  20 mg Oral Daily    digoxin  125 mcg Oral Daily    aspirin  81 mg Oral Daily    atorvastatin  40 mg Oral Nightly    cetirizine  10 mg Oral Daily    multivitamin  1 tablet Oral Daily    mupirocin   Topical TID    nystatin   Topical BID    pantoprazole  40 mg Oral QAM AC    rOPINIRole  0.5 mg Oral Nightly    traZODone  50 mg Oral Nightly     bisacodyl, ipratropium-albuterol, sodium chloride flush, sodium chloride, HYDROcodone-acetaminophen, dextrose, glucagon (rDNA), dextrose, glucose, metoprolol, ALPRAZolam, ondansetron **OR** ondansetron, acetaminophen  IV Drips/Infusions   sodium chloride      dextrose       Home Medications  Medications Prior to Admission: albuterol (PROVENTIL) (2.5 MG/3ML) 0.083% nebulizer solution, Take 2.5 mg by nebulization 2 times daily  ipratropium-albuterol (DUONEB) 0.5-2.5 (3) MG/3ML SOLN nebulizer solution, Inhale 1 vial into the lungs every 4 hours as needed for Shortness of Breath  Sodium Chloride-Sodium Bicarb 2300-700 MG PACK, 1 spray by Nasal route in the morning, at noon, and at bedtime  FLUoxetine (PROZAC) 10 MG capsule, Take 10 mg by mouth daily (10 mg daily x 5 days - 4/24-4/28, then increase to 20 mg daily)  atovaquone (MEPRON) 750 MG/5ML suspension, Take 1,500 mg by mouth daily (10 mL daily)  predniSONE (DELTASONE) 20 MG tablet, Take 30 mg by mouth daily   ALPRAZolam (XANAX) 0.5 MG tablet, Take 0.5 mg by mouth every 12 hours as needed for Sleep. [DISCONTINUED] tuberculin (APLISOL) 5 UNIT/0.1ML injection, Inject 5 Units into the skin  [DISCONTINUED] nystatin (MYCOSTATIN) 223063 UNIT/GM cream, Apply topically 2 times daily Apply topically 2 times daily. [DISCONTINUED] oxymetazoline (AFRIN) 0.05 % nasal spray, 2 sprays by Nasal route 2 times daily  furosemide (LASIX) 20 MG tablet, Take 20 mg by mouth daily Sun, Wed  loratadine (CLARITIN) 10 MG tablet, Take 10 mg by mouth daily  metoprolol succinate (TOPROL XL) 100 MG extended release tablet, Take 1.5 tablets by mouth daily  tiotropium-olodaterol (STIOLTO) 2.5-2.5 MCG/ACT AERS, Inhale 2 puffs into the lungs daily  rivaroxaban (XARELTO) 15 MG TABS tablet, Take 1 tablet by mouth daily  mupirocin (BACTROBAN) 2 % ointment, Apply topically 3 times daily.   albuterol sulfate  (90 Base) MCG/ACT inhaler, INHALE 2 PUFFS INTO THE LUNGS EVERY 6 HOURS AS NEEDED FOR WHEEZING  traZODone (DESYREL) 50 MG tablet, Take 1 tablet by mouth nightly  rOPINIRole (REQUIP) 0.5 MG tablet, Take 1 tablet by mouth nightly  pantoprazole (PROTONIX) 40 MG tablet, Take 1 tablet by mouth every morning (before breakfast)  [DISCONTINUED] cetirizine (ZYRTEC) 10 MG tablet, Take 10 mg by mouth daily  atorvastatin (LIPITOR) 40 MG tablet, Take 1 tablet by mouth nightly  aspirin 81 MG chewable tablet, Take 1 tablet by mouth daily  Multiple Vitamins-Minerals (CENTRUM SILVER PO), Take 1 tablet by mouth daily   Diet    ADULT DIET; Regular; 3 carb choices (45 gm/meal); Low Sodium (2 gm)  ADULT ORAL NUTRITION SUPPLEMENT; Breakfast, Lunch, Dinner; Standard High Calorie/High Protein Oral Supplement  Allergies    Penicillins and Sulfa antibiotics  Social History     Social History     Socioeconomic History    Marital status:      Spouse name: Not on file    Number of children: Not on file    Years of education: Not on file    Highest education level: Not on file   Occupational History    Not on file   Tobacco Use    Smoking status: Former Smoker     Packs/day: 1.00     Years: 40.00     Pack years: 40.00     Types: Cigarettes     Quit date: 3/7/2022     Years since quittin.1    Smokeless tobacco: Never Used   Substance and Sexual Activity    Alcohol use: Not Currently     Alcohol/week: 0.0 standard drinks    Drug use: No    Sexual activity: Not on file   Other Topics Concern    Not on file   Social History Narrative    No barriers with medication affordability now that he has met deductible    Active with [facility]    Has O2 and supplies needed    No barriers with transportation     Social Determinants of Health     Financial Resource Strain: Low Risk     Difficulty of Paying Living Expenses: Not hard at all   Food Insecurity: No Food Insecurity    Worried About [address] in the Last Year: Never true    [facility] in the Last Year: Never true   Transportation Needs: No Transportation Needs    Lack of Transportation (Medical): No    Lack of Transportation (Non-Medical):  No   Physical Activity: Inactive    Days of Exercise per Week: 0 days    Minutes of Exercise per Session: 0 min   Stress:     Feeling of Stress : Not on file   Social Connections:     Frequency of Communication with Friends and Family: Not on file    Frequency of Social Gatherings with Friends and Family: Not on file    Attends Adventism Services: Not on file    Active Member of [address] [place] [address] or Organizations: Not on file    Attends Club or Organization Meetings: Not on file    Marital Status: Not on file   Intimate Partner Violence:     Fear of Current or Ex-Partner: Not on file    Emotionally Abused: Not on file    Physically Abused: Not on file    Sexually Abused: Not on file   Housing Stability: Unknown    Unable to Pay for Housing in the Last Year: No    Number of [place] in the Last Year: Not on file    Unstable Housing in the Last Year: No     Family History          Problem Relation Age of Onset    Heart Disease Mother     Heart Disease Father         cabg    Diabetes Brother     Heart Disease Brother        Vitals     height is 5' 8\" (1.727 m) and weight is 163 lb (73.9 kg). His oral temperature is 97.8 °F (36.6 °C). His blood pressure is 148/67 (abnormal) and his pulse is 55. His respiration is 18 and oxygen saturation is 96%. Body mass index is 24.78 kg/m². I/O        Intake/Output Summary (Last 24 hours) at 5/8/2022 1030  Last data filed at 5/8/2022 0958  Gross per 24 hour   Intake 1375 ml   Output 2800 ml   Net -1425 ml     I/O last 3 completed shifts: In: 2881.1 [P.O.:980; I.V.:1901.1]  Out: 6177 [Urine:3725; Chest Tube:120]   Patient Vitals for the past 96 hrs (Last 3 readings):   Weight   05/08/22 0528 163 lb (73.9 kg)   05/06/22 0355 139 lb 15.9 oz (63.5 kg)   05/05/22 0259 149 lb 11.1 oz (67.9 kg)     Exam   General Appearance  Awake, alert, chronically ill. Interactive and cooperative   HEENT - Normal, Head is normocephalic, atraumatic.  EOMI, PERRLA, pale conjunctiva  Neck - Supple, symmetrical, trachea midline and Soft, trachea midline and straight  Lungs - Increased respiratory effort, normal breath sounds with right sided friction sounds attributed to chest tube. No wheezes, rales or rhonchi  Cardiovascular - Sinus bradycardia, regular rhythm. Normal S1, S2, no murmurs  Abdomen - Soft, nontender, nondistended, no masses or organomegaly  Neurologic - CN II-XII are grossly intact.  There are no focal motor or sensory deficits  Skin - No bruising or bleeding  Extremities - No cyanosis, clubbing or edema    Labs  - Old records and notes have been reviewed in [facility]   CBC     Lab Results   Component Value Date    WBC 21.8 05/06/2022    RBC 4.34 05/06/2022    RBC 4.84 11/07/2011    HGB 12.2 05/06/2022    HCT 41.1 05/06/2022     05/06/2022    MCV 94.7 05/06/2022    MCH 28.1 05/06/2022    MCHC 29.7 05/06/2022    RDW 13.3 06/07/2018    NRBC 0 05/04/2022    NRBC 0 11/07/2011    SEGSPCT 80.0 05/04/2022    MONOPCT 4.8 05/04/2022    MONOSABS 0.9 05/04/2022    LYMPHSABS 2.1 05/04/2022    EOSABS 0.0 05/04/2022    BASOSABS 0.1 05/04/2022    DIFFTYPE see below 03/25/2022     BMP   Lab Results   Component Value Date     05/08/2022    K 4.7 05/08/2022    K 5.0 05/04/2022    CL 97 05/08/2022    CO2 30 05/08/2022    BUN 57 05/08/2022    CREATININE 1.2 05/08/2022    GLUCOSE 106 05/08/2022    GLUCOSE 94 11/07/2011    CALCIUM 8.4 05/08/2022    MG 1.9 04/28/2022     LFTS  Lab Results   Component Value Date    ALKPHOS 101 05/02/2022    ALT 20 05/02/2022    AST 20 05/02/2022    PROT 6.0 05/02/2022    BILITOT 0.4 05/02/2022    BILIDIR <0.2 03/26/2022    LABALBU 2.7 05/02/2022    LABALBU 4.3 11/07/2011     ABG   Lab Results   Component Value Date    PH 7.44 04/24/2022    PCO2 39 04/24/2022    PO2 191 04/24/2022    HCO3 26 04/24/2022    O2SAT 100 04/24/2022     @  Lab Results   Component Value Date    APTT 22.6 05/04/2022     INR   Lab Results   Component Value Date    INR 1.02 05/04/2022    INR 1.41 (H) 03/25/2022    INR 1.25 (H) 04/06/2021     Angio Convert Enzyme 8 - 52 U/L 21        PFTs Cultures    (+) blood culture GPC in clusters    4/26/22  Bronchoscopy- will follow cultures/cytology   Endobronchial findings:   Trachea: Normal mucosa  Diana: Normal mucosa  Right main bronchus: Normal mucosa, mucus  Right upper lobe bronchus: Normal mucosa, mucus  Right Middle lobe bronchus: Normal mucosa, mucus  Right Lower lobe bronchus: erythematous mucosa  Left main bronchus: Normal mucosa  Left upper lobe bronchus: Normal mucosa  Left lower lobe bronchus: Normal mucosa     Rhinovirus Enterovirus PCR Detected Abnormal      AFB (-)  Virus cultures (-)  Cytology: FINAL RESULTS:   No malignant cells seen.      Specimen consists of benign appearing squamous      cells, alveolar macrophages, and debris. Flow cytometry: IMPRESSION:   1. Low viability specimen showing a predominance of granulocytes   without immunophenotypic aberrancy. 2. Rare phenotypically unremarkable T-cells with a normal CD4:CD8   ratio of 2:4. No abnormal B cell, plasma cell, T cell, or NK cell population   identified. See comment. Cytology:     FINAL RESULTS:   Right lower lobe of lung, bronchial washings, cytospin preparation:     Alveolar macrophages with positive staining for lipid accumulation.     Please see microscopic examination. Radiology    CXR  05/04/2022   Chest X-ray, 1 View   Similar bilateral airspace disease. 5/3/2022   XR CHEST PORTABLE   Stable chest.       5/2/22  XR CHEST PORTABLE   Improved aeration and decreased interstitial opacities compared to prior exam.          03/07/2022 --->  04/29/2022            CT chest     01/09/2021 ---> 03/15/2022            Assessment   1. Acute on chronic hypoxic respiratory failure -chronic home O2 of 4 L NC at rest and 6 L with activity. 2. Rapidly progressing Acute Interstitial pneumonia (AIP) DDX Amiodarone Toxicity vs RBILD vs OP since 03/7/2022 (No evidence ILD back in 2021)  3. Interstitial infiltrate, thickening interlobular septa  4. CAD s/p CABG   5. Afib/Aflutter  6. SSS s/p PPM  7. Ischemic cardiomyopathy  8. Full Code   Plan   1. Currently on HFNC FiO2 35, flow rate 30 with SPO2 >95%. Continue to wean O2 as tolerated for SPO2 >90%. 2. VATS biopsy 5/5 revealed alveolar macrophages with positive staining for lipid accumulation consistent with amiodarone toxicity interstitial lung disease. Continue prednisone 40 mg p.o daily. 3.  CTS following for right-sided chest tube management. Air leak visualized 5/8/2022. Daily CXR. 4.  Continue home digoxin, Cardizem and metoprolol for A. fib/flutter. Xarelto discontinued per CTS recommendations, primary following. 5. Continue Atovaquone   6. Continue Mepron for pneumocystis prophylaxis. Case discussed with nurse and patient/family. Questions and concerns addressed.   Meds and orders reviewed     Electronically signed by   Kathi Pascual DO on 5/8/2022 at 10:30 AM

## 2022-05-09 NOTE — PROGRESS NOTES
[facility] ICU STEPDOWN TELEMETRY 4K  Occupational Therapy  Daily Note  Time:   Time In: 3120  Time Out: 1350  Timed Code Treatment Minutes: 24 Minutes  Minutes: 24          Date: 2022  Patient Name: Gilberto Carlson,   Gender: male      Room: -002-A  MRN: [id number]  : 1950  (70 y.o.)  Referring Practitioner: Rosalie Guillen MD  Diagnosis: pneumonia  Additional Pertinent Hx: Per H&P:presented with increasing shortness of breath from the nursing home. Patient has been in a skilled nursing facility after his last admission last month in the hospital for pneumonia. Patient noticed his oxygen saturation to be less and hence was transferred here he has been placed on BiPAP with improvement in his saturation. He was also noted to be in A. fib with RVR. Pt s/pRIGHT VATS WITH LUNG BIOPSY WITH RIGHT MIDDLE LOBE LUNG WEDGE RESECTION on 22. Restrictions/Precautions:  Restrictions/Precautions: General Precautions,Fall Risk  Position Activity Restriction  Other position/activity restrictions: monitor O2 sats, CT     SUBJECTIVE: pt laying in bed upon arrival, pt agreeable to OT session, RN gave verbal approval for session     PAIN: 0/10:     Vitals: Nurse checked vitals prior to session    COGNITION: Slow Processing and Decreased Recall    ADL:   Bathing: Stand By Assistance. with pt sitting at the EOB to complete sponge bath with UB only this date, with extended rest breaks in between parts due to SOB  Upper Extremity Dressing: Minimal Assistance. for cord/line management, and for threading BUE into sleeves of gown. BALANCE:  Standing Balance: Contact Guard Assistance. with no AE, in anticipation of completing functional mob with pt standing for 30 seconds to 1 min    BED MOBILITY:  Supine to Sit: Stand By Assistance with the [facility] elevated    TRANSFERS:  Sit to Stand:  Contact Guard Assistance. from the EOB  Stand to Sit: Contact Guard Assistance.  back to the EOB    FUNCTIONAL MOBILITY:  Assistive Device: None  Assist Level:  Contact Guard Assistance. Distance: from the EOB to recliner    ASSESSMENT:     Activity Tolerance:  Patient tolerance of  treatment: good. Discharge Recommendations: Continue to assess pending progress  Equipment Recommendations: Equipment Needed: No  Other: defer to next level of care  Plan: Times per Week: 3-5x  Current Treatment Recommendations: Strengthening,Balance training,Functional mobility training,Endurance training,Patient/Caregiver education & training,Self-Care / ADL    Patient Education  Patient Education: Importance of Increasing Activity    Goals  Short Term Goals  Time Frame for Short term goals: by discharge  Short Term Goal 1: Pt will increase activity tolerance for functional mobility to/from [facility] or chair with SBA and Sp02 remaining >=88% in prep for ADL completion. Short Term Goal 2: Pt will complete BADL tasks with minimal assistance, incorporating ECT prn, to increase independence and ease with self care tasks. Short Term Goal 3: Pt will tolerate standing >1 minute with SBA and Sp02 >=88% in prep for toileting tasks. Following session, patient left in safe position with all fall risk precautions in place.

## 2022-05-09 NOTE — PLAN OF CARE
Problem: Respiratory - Adult  Goal: Adequate oxygenation  5/9/2022 0135 by Ricky Chacon RCP  Outcome: Progressing  Note: Patient remains on HFNC. Will continue to wean as patient tolerates.

## 2022-05-09 NOTE — CARE COORDINATION
Collaborative Discharge Planning    Balwinder Benito  :  1950  MRN:  [id number]    ADMIT DATE:  2022      Discharge Planning Discharge Planning  Type of Residence: [place] [facility])  Living Arrangements: Other (Comment)  Support Systems: Spouse/Significant Other  Current Services Prior To Admission: Sandra Darling 58 Medications: No  Meds-to-Beds: Does the patient want to have any new prescriptions delivered to bedside prior to discharge?: No  Type of Home Care Services: None  Patient expects to be discharged to[de-identified] LTAC  Follow Up Appointment: Best Day/Time : Monday AM  One/Two Story Residence: One story  History of falls?: No  White Board Notes /Social Work Whiteboard Notes  /Social Work Whiteboard: ; [address] for HF Oxygen weaning. No precert. Does not want to return to [facility].     Discharge [address]    Discharge Milestones and Delays: Clinical status   Weakening LTACH criteria; chest tube out today, HF 35% FIO2/25L continued, await lung biopsy results ([facility])        SIGNED:  Louis Lomeli RN   2022, 2:11 PM

## 2022-05-09 NOTE — PLAN OF CARE
Problem: Discharge Planning  Goal: Discharge to home or other facility with appropriate resources  5/8/2022 2325 by Hemant Alvarez RN  Outcome: Progressing  5/8/2022 1522 by Anila Granados RN  Outcome: Progressing  Flowsheets  Taken 5/8/2022 1522  Discharge to home or other facility with appropriate resources:   Identify barriers to discharge with patient and caregiver   Identify discharge learning needs (meds, wound care, etc)  Taken 5/8/2022 0958  Discharge to home or other facility with appropriate resources:   Identify barriers to discharge with patient and caregiver   Arrange for needed discharge resources and transportation as appropriate     Problem: Safety - Adult  Goal: Free from fall injury  5/8/2022 2325 by Hemant Alvarez RN  Outcome: Progressing  5/8/2022 1522 by Anila Granados RN  Outcome: Progressing  Flowsheets  Taken 5/8/2022 1522  Free From Fall Injury: Instruct family/caregiver on patient safety  Taken 5/8/2022 1502  Free From Fall Injury: Instruct family/caregiver on patient safety     Problem: ABCDS Injury Assessment  Goal: Absence of physical injury  5/8/2022 2325 by Hemant Alvarez RN  Outcome: Progressing  5/8/2022 1522 by Anila Granados RN  Outcome: Progressing     Problem: Respiratory - Adult  Goal: Clear lung sounds  5/8/2022 2325 by Hemant Alvarez RN  Outcome: Progressing  5/8/2022 1522 by Anila Granados RN  Outcome: Progressing  Goal: Adequate oxygenation  5/8/2022 2325 by Hemant Alvarez RN  Outcome: Progressing  5/8/2022 1522 by Anila Granados RN  Outcome: Progressing  5/8/2022 1034 by Salima Davies RCP  Outcome: Progressing  Goal: Achieves optimal ventilation and oxygenation  5/8/2022 2325 by Hemant Alvarez RN  Outcome: Progressing  5/8/2022 1522 by Anila Granados RN  Outcome: Progressing  Flowsheets (Taken 5/8/2022 1522)  Achieves optimal ventilation and oxygenation:   Assess for changes in respiratory status   Position to facilitate oxygenation and minimize respiratory effort Assess for changes in mentation and behavior   Oxygen supplementation based on oxygen saturation or arterial blood gases  5/8/2022 1034 by July Hernández RCP  Outcome: Progressing     Problem: Chronic Conditions and Co-morbidities  Goal: Patient's chronic conditions and co-morbidity symptoms are monitored and maintained or improved  5/8/2022 2325 by Beba Lee RN  Outcome: Progressing  5/8/2022 1522 by Diane Whaley RN  Outcome: Progressing  Flowsheets  Taken 5/8/2022 1522  Care Plan - Patient's Chronic Conditions and Co-Morbidity Symptoms are Monitored and Maintained or Improved: Monitor and assess patient's chronic conditions and comorbid symptoms for stability, deterioration, or improvement  Taken 5/8/2022 0958  Care Plan - Patient's Chronic Conditions and Co-Morbidity Symptoms are Monitored and Maintained or Improved:   Monitor and assess patient's chronic conditions and comorbid symptoms for stability, deterioration, or improvement   Collaborate with multidisciplinary team to address chronic and comorbid conditions and prevent exacerbation or deterioration     Problem: Pain  Goal: Verbalizes/displays adequate comfort level or baseline comfort level  5/8/2022 2325 by Beba Lee RN  Outcome: Progressing  5/8/2022 1522 by Diane Whaley RN  Outcome: Progressing  Flowsheets (Taken 5/8/2022 1522)  Verbalizes/displays adequate comfort level or baseline comfort level: Encourage patient to monitor pain and request assistance     Problem: Nutrition Deficit:  Goal: Optimize nutritional status  5/8/2022 2325 by Beba Lee RN  Outcome: Progressing  5/8/2022 1522 by Diane Whaley RN  Outcome: Progressing     Problem: Skin/Tissue Integrity  Goal: Absence of new skin breakdown  Description: 1. Monitor for areas of redness and/or skin breakdown  2. Assess vascular access sites hourly  3. Every 4-6 hours minimum:  Change oxygen saturation probe site  4.   Every 4-6 hours:  If on nasal continuous positive airway pressure, respiratory therapy assess nares and determine need for appliance change or resting period.   5/8/2022 2325 by Creed Rinne, RN  Outcome: Progressing  5/8/2022 1522 by Paul Wright RN  Outcome: Progressing     Problem: Neurosensory - Adult  Goal: Achieves stable or improved neurological status  5/8/2022 2325 by Creed Rinne, RN  Outcome: Progressing  5/8/2022 1522 by Paul Wright RN  Outcome: Progressing  Flowsheets  Taken 5/8/2022 1522  Achieves stable or improved neurological status: Assess for and report changes in neurological status  Taken 5/8/2022 0958  Achieves stable or improved neurological status: Assess for and report changes in neurological status     Problem: Cardiovascular - Adult  Goal: Maintains optimal cardiac output and hemodynamic stability  5/8/2022 2325 by Creed Rinne, RN  Outcome: Progressing  5/8/2022 1522 by Paul Wright RN  Outcome: Progressing  Flowsheets  Taken 5/8/2022 1522  Maintains optimal cardiac output and hemodynamic stability:   Monitor blood pressure and heart rate   Monitor urine output and notify Licensed Independent Practitioner for values outside of normal range  Taken 5/8/2022 0958  Maintains optimal cardiac output and hemodynamic stability: Monitor blood pressure and heart rate     Problem: Skin/Tissue Integrity - Adult  Goal: Skin integrity remains intact  5/8/2022 2325 by Creed Rinne, RN  Outcome: Progressing  5/8/2022 1522 by Paul Wright RN  Outcome: Progressing  Flowsheets  Taken 5/8/2022 1522  Skin Integrity Remains Intact:   Monitor for areas of redness and/or skin breakdown   Assess vascular access sites hourly  Taken 5/8/2022 1503  Skin Integrity Remains Intact: Monitor for areas of redness and/or skin breakdown  Taken 5/8/2022 0958  Skin Integrity Remains Intact: Monitor for areas of redness and/or skin breakdown  Goal: Incisions, wounds, or drain sites healing without S/S of infection  5/8/2022 2325 by Creed Rinne, RN  Outcome: Progressing  5/8/2022 1522 by Julee Lofton RN  Outcome: Progressing  Flowsheets  Taken 5/8/2022 1522  Incisions, Wounds, or Drain Sites Healing Without Sign and Symptoms of Infection:   ADMISSION and DAILY: Assess and document risk factors for pressure ulcer development   TWICE DAILY: Assess and document skin integrity   TWICE DAILY: Assess and document dressing/incision, wound bed, drain sites and surrounding tissue  Taken 5/8/2022 1503  Incisions, Wounds, or Drain Sites Healing Without Sign and Symptoms of Infection: ADMISSION and DAILY: Assess and document risk factors for pressure ulcer development  Taken 5/8/2022 0958  Incisions, Wounds, or Drain Sites Healing Without Sign and Symptoms of Infection: ADMISSION and DAILY: Assess and document risk factors for pressure ulcer development     Problem: Gastrointestinal - Adult  Goal: Minimal or absence of nausea and vomiting  5/8/2022 2325 by Kathryn Tafoya RN  Outcome: Progressing  5/8/2022 1522 by Julee Lofton RN  Outcome: Progressing  Flowsheets  Taken 5/8/2022 1522  Minimal or absence of nausea and vomiting: Administer IV fluids as ordered to ensure adequate hydration  Taken 5/8/2022 0958  Minimal or absence of nausea and vomiting: Administer IV fluids as ordered to ensure adequate hydration  Goal: Maintains or returns to baseline bowel function  5/8/2022 2325 by Kathryn Tafoya RN  Outcome: Progressing  5/8/2022 1522 by Julee Lofton RN  Flowsheets  Taken 5/8/2022 1522  Maintains or returns to baseline bowel function: Assess bowel function  Taken 5/8/2022 0958  Maintains or returns to baseline bowel function: Assess bowel function  Goal: Maintains adequate nutritional intake  5/8/2022 2325 by Kathryn Tafoya RN  Outcome: Progressing  5/8/2022 1522 by Julee Lofton RN  Outcome: Progressing  Flowsheets  Taken 5/8/2022 1522  Maintains adequate nutritional intake: Monitor percentage of each meal consumed  Taken 5/8/2022 0958  Maintains adequate nutritional intake: Monitor percentage of each meal consumed     Problem: Genitourinary - Adult  Goal: Absence of urinary retention  5/8/2022 2325 by Casper Cruz RN  Outcome: Progressing  5/8/2022 1522 by Ivett Colby RN  Outcome: Progressing  Flowsheets  Taken 5/8/2022 1522  Absence of urinary retention: Assess patients ability to void and empty bladder  Taken 5/8/2022 0958  Absence of urinary retention: Assess patients ability to void and empty bladder     Problem: Infection - Adult  Goal: Absence of infection at discharge  5/8/2022 2325 by Capser Cruz RN  Outcome: Progressing  5/8/2022 1522 by Ivett Colby RN  Outcome: Progressing  Flowsheets  Taken 5/8/2022 1522  Absence of infection at discharge:   Assess and monitor for signs and symptoms of infection   Monitor lab/diagnostic results  Taken 5/8/2022 0958  Absence of infection at discharge:   Assess and monitor for signs and symptoms of infection   Monitor lab/diagnostic results  Goal: Absence of infection during hospitalization  5/8/2022 2325 by Casper Cruz RN  Outcome: Progressing  5/8/2022 1522 by Ivett Colby RN  Outcome: Progressing  Flowsheets  Taken 5/8/2022 1522  Absence of infection during hospitalization:   Assess and monitor for signs and symptoms of infection   Monitor lab/diagnostic results  Taken 5/8/2022 0958  Absence of infection during hospitalization:   Assess and monitor for signs and symptoms of infection   Monitor lab/diagnostic results     Problem: Metabolic/Fluid and Electrolytes - Adult  Goal: Electrolytes maintained within normal limits  5/8/2022 2325 by Casper Cruz RN  Outcome: Progressing  5/8/2022 1522 by Ivett Colby RN  Outcome: Progressing  Flowsheets  Taken 5/8/2022 1522  Electrolytes maintained within normal limits: Monitor labs and assess patient for signs and symptoms of electrolyte imbalances  Taken 5/8/2022 0958  Electrolytes maintained within normal limits:   Monitor labs and assess patient for signs and symptoms of electrolyte imbalances   Administer electrolyte replacement as ordered  Goal: Hemodynamic stability and optimal renal function maintained  5/8/2022 2325 by Lindy Wang RN  Outcome: Progressing  5/8/2022 1522 by Krista Navarrete RN  Outcome: Progressing  Flowsheets  Taken 5/8/2022 1522  Hemodynamic stability and optimal renal function maintained:   Monitor labs and assess for signs and symptoms of volume excess or deficit   Monitor intake, output and patient weight   Monitor urine specific gravity, serum osmolarity and serum sodium as indicated or ordered  Taken 5/8/2022 0958  Hemodynamic stability and optimal renal function maintained:   Monitor labs and assess for signs and symptoms of volume excess or deficit   Monitor intake, output and patient weight  Goal: Glucose maintained within prescribed range  5/8/2022 2325 by Linyd Wang RN  Outcome: Progressing  5/8/2022 1522 by Krista Navarrete RN  Outcome: Progressing  Flowsheets  Taken 5/8/2022 1522  Glucose maintained within prescribed range:   Monitor blood glucose as ordered   Administer ordered medications to maintain glucose within target range  Taken 5/8/2022 0958  Glucose maintained within prescribed range:   Monitor blood glucose as ordered   Assess for signs and symptoms of hyperglycemia and hypoglycemia     Problem: Hematologic - Adult  Goal: Maintains hematologic stability  5/8/2022 2325 by Lindy Wang RN  Outcome: Progressing  5/8/2022 1522 by Krista Navarrete RN  Outcome: Progressing  Flowsheets  Taken 5/8/2022 1522  Maintains hematologic stability:   Assess for signs and symptoms of bleeding or hemorrhage   Monitor labs for bleeding or clotting disorders  Taken 5/8/2022 0958  Maintains hematologic stability:   Assess for signs and symptoms of bleeding or hemorrhage   Monitor labs for bleeding or clotting disorders

## 2022-05-09 NOTE — PROGRESS NOTES
IM Progress Note  Dr. Eliezer Gayle  5/9/2022 10:16 AM      Patient name Roxanna Mendoza  [id number]  PCP: Prosper Dial MD  Admit Date: 4/24/2022  Acct No. [de-identified]    Subjective: Interval History:   Breathing stable   On high flow 35% and 35 L  Chest tube in place and still with air leak    Diet: ADULT DIET; Regular; 3 carb choices (45 gm/meal); Low Sodium (2 gm)  ADULT ORAL NUTRITION SUPPLEMENT; Breakfast, Lunch, Dinner; Standard High Calorie/High Protein Oral Supplement    I/O last 3 completed shifts: In: 1110 [P.O.:1110]  Out: 3951 [Urine:3175; Chest Tube:90]  I/O this shift:  In: 20 [I.V.:20]  Out: -         Admission weight: 155 lb (70.3 kg) as of 4/24/2022 12:37 PM  Wt Readings from Last 3 Encounters:   05/09/22 159 lb 4.8 oz (72.3 kg)   04/20/22 155 lb 12.8 oz (70.7 kg)   04/13/22 154 lb (69.9 kg)     Body mass index is 24.22 kg/m².     ROS   [facility];  no cp or palpitation  Resp: +SOB+ cough  Neuro:  No numbness or weakness or dizziness  Abd: no nausea or vomiting or abd pain      Medications:   Scheduled Meds:   albuterol  2.5 mg Nebulization BID    FLUoxetine  10 mg Oral Daily    sodium chloride flush  5-40 mL IntraVENous 2 times per day    metoprolol  2.5 mg IntraVENous Once    metoprolol  2.5 mg IntraVENous Once    metoprolol succinate  100 mg Oral Daily    fluconazole  200 mg Oral Daily    dilTIAZem  30 mg Oral 3 times per day    insulin lispro  0-6 Units SubCUTAneous TID WC    insulin lispro  0-3 Units SubCUTAneous Nightly    predniSONE  40 mg Oral Daily    atovaquone  1,500 mg Oral Daily    furosemide  20 mg Oral Daily    digoxin  125 mcg Oral Daily    aspirin  81 mg Oral Daily    atorvastatin  40 mg Oral Nightly    cetirizine  10 mg Oral Daily    multivitamin  1 tablet Oral Daily    mupirocin   Topical TID    nystatin   Topical BID    pantoprazole  40 mg Oral QAM AC    rOPINIRole  0.5 mg Oral Nightly    traZODone  50 mg Oral Nightly     Continuous Infusions:   sodium chloride      dextrose         Labs :     CBC:   Recent Labs     05/09/22  0803   WBC 15.7*   HGB 11.8*        BMP:    Recent Labs     05/06/22  1331 05/06/22  1331 05/07/22  0434 05/07/22  1038 05/08/22  0443   *  --  131*  --  134*   K 5.1   < > 5.8* 4.7 4.7   CL 91*  --  95*  --  97*   CO2 22*  --  27  --  30   BUN 53*  --  58*  --  57*   CREATININE 1.5*  --  1.6*  --  1.2   GLUCOSE 153*  --  133*  --  106    < > = values in this interval not displayed. Hepatic:   No results for input(s): AST, ALT, ALB, BILITOT, ALKPHOS in the last 72 hours. Troponin: No results for input(s): TROPONINI in the last 72 hours. BNP: No results for input(s): BNP in the last 72 hours. Lipids: No results for input(s): CHOL, HDL in the last 72 hours. Invalid input(s): LDLCALCU  INR:   No results for input(s): INR in the last 72 hours.     Radiology    Objective:   Vitals: /71   Pulse 55   Temp 97.5 °F (36.4 °C) (Oral)   Resp 21   Ht 5' 8\" (1.727 m)   Wt 159 lb 4.8 oz (72.3 kg)   SpO2 98%   BMI 24.22 kg/m²   HEENT: Head:pupils react  Neck: supple  Lungs: diminished air entry bilat CT on Rt  Heart: regular  Rhythm and tachycardic  Abdomen: soft BS heard NG NT  Extremities: warm  No edema  Neurologic:  Alert, oriented X3    Impression:   :   Acute on chronic hypoxic respiratory failure s/p bronch s/p VATS with lung biopsy and Rt middle lobe wedge resection done 5/5/22  Par A. 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

## 2022-05-09 NOTE — PROGRESS NOTES
[facility]    Patient - Corrinne Gens   MRN -  [id number]   [facility]   - 1950      Date of Admission -  2022 12:37 PM  Date of evaluation -  2022  Room - --A   Hospital Day - 348 Magdaleno Zabala MD Primary Care Physician - Ras Machado MD     Problem List      Active Hospital Problems    Diagnosis Date Noted    Interstitial lung disease [facility]) [J84.9]      Priority: Medium    Abnormal CT of the chest [R93.89]      Priority: Medium    Severe malnutrition ([address] [E43] 2022     Priority: Medium     Class: Acute    Septicemia ([address] [A41.9]      Priority: Medium    SOB (shortness of breath) [R06.02] 2022     Priority: Medium    Acute respiratory failure with hypoxia ([address] [J96.01] 2022    Pneumonia [J18.9] 2022     Reason for Consult    Acute hypoxic respiratory failure  HPI   From HPI:  Milton Infante is a 69 y/o male former-smoker with PMH of CAD s/p CABG x3 (2021), ischemic cardiomyopathy, COPD, asthma, atrial fibrillation s/p left atrial appendage clip (2021), sick sinus syndrome s/p dual pacemaker placement (2021), HTN, HLD, colon polyps.      Patient presented to [facility] ED on 22 from nursing home for complaints of increasing dyspnea. He was recently hospitalized last month with pneumonia. He did endorse recent chills. He was hypoxic on presentation with evidence of mild volume overload. He was placed on BiPAP initially. He had a noted leukocytosis, but was afebrile. CXR showed persistent and worsening acute on chronic interstitial disease at the lung bases. He was also noted to be in Afib with RVR. He was started on cardizem infusion with improvement. He was admitted under the care of Dr. Ortega Alicea. He received empiric antibiotics. Pulmonology was consulted. He received bronchodilators. His chronic prednisone was increased from 30 mg to 40 mg daily.  He was later weaned to high flow nasal cannula. Infectious disease was consulted. He was maintained on cefepime empirically. Cardiology was consulted for Afib. Digoxin was added. He was diuresed with lasix.      On 4/26/22 he underwent bronchoscopy. He had mucus production of the RUL, RML, RLL and right main bronchus. The RLL bronchus mucosa was erythematous. BAL was sent. This returned positive for candida albicans. Diflucan was started. Fluid analysis was also notable for macrophages and positive staining for lipid accumulation.      CT surgery was consulted 4/28/22 per patient/family request for consideration of open lung biopsy for confirmation of diagnosis of ILD. CT surgery Dr. Madeline Joseph did evaluate, with plans for VATS and lung biopsy. He also recommended discontinuation of xarelto as well, given s/p ZAHRA closure.      Patient underwent VATS with lung biopsy with RML wedge resection on 5/5/22 with Dr. Madeline Joseph. EBL was < 5 mL. There were no reported complications. Lung tissue biopsy and culture was obtained from RML.    Patient presented to ICU post-op for close monitoring. He remains hemodynamically stable, and oxygenating well on stable high flow nasal cannula requirements. \"    Past 24 Hours   - On HFNC 35%  -Chest tube removed today  - Afebrile   - Hemodynamically stable  - SOB with slight improvement     -All systems reviewed   PMHx   Past Medical History      Diagnosis Date    Asthma     Atrial fibrillation with RVR ([address] 04/05/2021    [employer] dual pacemaker  04/15/2021    Collagenous colitis 2021    per scope 2021    Colon polyps 2021    dr Wanda Blank    COPD, mild (HCC)     Eczema of hand     HTN (hypertension)     Hyperlipidemia     Smoker       Past Surgical History        Procedure Laterality Date    BRONCHOSCOPY N/A 4/26/2022    BRONCHOSCOPY WITH BAL performed by Ismael Barber MD at [address] COLONOSCOPY  06/10/2021    theresa ccolon polyps and collagenous colitis    CORONARY ARTERY BYPASS GRAFT  01/12/2021    cabg x 3 with lima and atrial appendage clip  dr Shaan Barragan GRAFT N/A 01/12/2021    CABG  X 3 WITH DEJAH, Atrial Appendage Clip performed by Ishmael Washington MD at [address] [place] [address] Road  06/2008    polyps    OTHER SURGICAL HISTORY  DEC 7TH 2012 DR [address]N [address] [place]     IGS ENDOSCOPIC BI LAT MAXILLARY, ETHMOID, SPHENOID, FRONTAL SINUSOTOMY WITH SEPTOPLASTY AND TURBINOPLASTIES    SKIN CANCER EXCISION  09/2014    Left side of Forehead     THORACOSCOPY Right 5/5/2022    RIGHT VATS WITH LUNG BIOPSY WITH RIGHT MIDDLE LOBE LUNG WEDGE RESECTION performed by Ishmael Washington MD at [place] [address]  02/2017     Meds    Current Medications    [START ON 5/10/2022] predniSONE  30 mg Oral Daily    albuterol  2.5 mg Nebulization BID    FLUoxetine  10 mg Oral Daily    sodium chloride flush  5-40 mL IntraVENous 2 times per day    metoprolol  2.5 mg IntraVENous Once    metoprolol  2.5 mg IntraVENous Once    metoprolol succinate  100 mg Oral Daily    fluconazole  200 mg Oral Daily    dilTIAZem  30 mg Oral 3 times per day    insulin lispro  0-6 Units SubCUTAneous TID WC    insulin lispro  0-3 Units SubCUTAneous Nightly    atovaquone  1,500 mg Oral Daily    furosemide  20 mg Oral Daily    digoxin  125 mcg Oral Daily    aspirin  81 mg Oral Daily    atorvastatin  40 mg Oral Nightly    cetirizine  10 mg Oral Daily    multivitamin  1 tablet Oral Daily    mupirocin   Topical TID    nystatin   Topical BID    pantoprazole  40 mg Oral QAM AC    rOPINIRole  0.5 mg Oral Nightly    traZODone  50 mg Oral Nightly     guaiFENesin-dextromethorphan, bisacodyl, ipratropium-albuterol, sodium chloride flush, sodium chloride, HYDROcodone-acetaminophen, dextrose, glucagon (rDNA), dextrose, glucose, metoprolol, ALPRAZolam, ondansetron **OR** ondansetron, acetaminophen  IV Drips/Infusions   sodium chloride      dextrose       Home Medications  Medications Prior to Admission: albuterol (PROVENTIL) (2.5 MG/3ML) 0.083% nebulizer solution, Take 2.5 mg by nebulization 2 times daily  ipratropium-albuterol (DUONEB) 0.5-2.5 (3) MG/3ML SOLN nebulizer solution, Inhale 1 vial into the lungs every 4 hours as needed for Shortness of Breath  Sodium Chloride-Sodium Bicarb 2300-700 MG PACK, 1 spray by Nasal route in the morning, at noon, and at bedtime  FLUoxetine (PROZAC) 10 MG capsule, Take 10 mg by mouth daily (10 mg daily x 5 days - 4/24-4/28, then increase to 20 mg daily)  atovaquone (MEPRON) 750 MG/5ML suspension, Take 1,500 mg by mouth daily (10 mL daily)  predniSONE (DELTASONE) 20 MG tablet, Take 30 mg by mouth daily   ALPRAZolam (XANAX) 0.5 MG tablet, Take 0.5 mg by mouth every 12 hours as needed for Sleep. [DISCONTINUED] tuberculin (APLISOL) 5 UNIT/0.1ML injection, Inject 5 Units into the skin  [DISCONTINUED] nystatin (MYCOSTATIN) 992257 UNIT/GM cream, Apply topically 2 times daily Apply topically 2 times daily. [DISCONTINUED] oxymetazoline (AFRIN) 0.05 % nasal spray, 2 sprays by Nasal route 2 times daily  furosemide (LASIX) 20 MG tablet, Take 20 mg by mouth daily Sun, Wed  loratadine (CLARITIN) 10 MG tablet, Take 10 mg by mouth daily  metoprolol succinate (TOPROL XL) 100 MG extended release tablet, Take 1.5 tablets by mouth daily  tiotropium-olodaterol (STIOLTO) 2.5-2.5 MCG/ACT AERS, Inhale 2 puffs into the lungs daily  rivaroxaban (XARELTO) 15 MG TABS tablet, Take 1 tablet by mouth daily  mupirocin (BACTROBAN) 2 % ointment, Apply topically 3 times daily.   albuterol sulfate  (90 Base) MCG/ACT inhaler, INHALE 2 PUFFS INTO THE LUNGS EVERY 6 HOURS AS NEEDED FOR WHEEZING  traZODone (DESYREL) 50 MG tablet, Take 1 tablet by mouth nightly  rOPINIRole (REQUIP) 0.5 MG tablet, Take 1 tablet by mouth nightly  pantoprazole (PROTONIX) 40 MG tablet, Take 1 tablet by mouth every morning (before breakfast)  [DISCONTINUED] cetirizine (ZYRTEC) 10 MG tablet, Take 10 mg by mouth daily  atorvastatin (LIPITOR) 40 MG tablet, Take 1 tablet by mouth nightly  aspirin 81 MG chewable tablet, Take 1 tablet by mouth daily  Multiple Vitamins-Minerals (CENTRUM SILVER PO), Take 1 tablet by mouth daily   Diet    ADULT DIET; Regular; 3 carb choices (45 gm/meal); Low Sodium (2 gm)  ADULT ORAL NUTRITION SUPPLEMENT; Breakfast, Lunch, Dinner; Standard High Calorie/High Protein Oral Supplement  Allergies    Penicillins and Sulfa antibiotics  Social History     Social History     Socioeconomic History    Marital status:      Spouse name: Not on file    Number of children: Not on file    Years of education: Not on file    Highest education level: Not on file   Occupational History    Not on file   Tobacco Use    Smoking status: Former Smoker     Packs/day: 1.00     Years: 40.00     Pack years: 40.00     Types: Cigarettes     Quit date: 3/7/2022     Years since quittin.1    Smokeless tobacco: Never Used   Substance and Sexual Activity    Alcohol use: Not Currently     Alcohol/week: 0.0 standard drinks    Drug use: No    Sexual activity: Not on file   Other Topics Concern    Not on file   Social History Narrative    No barriers with medication affordability now that he has met deductible    Active with [facility]    Has O2 and supplies needed    No barriers with transportation     Social Determinants of Health     Financial Resource Strain: Low Risk     Difficulty of Paying Living Expenses: Not hard at all   Food Insecurity: No Food Insecurity    Worried About [address] in the Last Year: Never true    [facility] in the Last Year: Never true   Transportation Needs: No Transportation Needs    Lack of Transportation (Medical): No    Lack of Transportation (Non-Medical):  No   Physical Activity: Inactive    Days of Exercise per Week: 0 days    Minutes of Exercise per Session: 0 min   Stress:     Feeling of Stress : Not on file   Social Connections:     Frequency of Communication with Friends and Family: Not on file    Frequency of Social Gatherings with Friends and Family: Not on file    Attends Cheondoism Services: Not on file    Active Member of Clubs or Organizations: Not on file    Attends Club or Organization Meetings: Not on file    Marital Status: Not on file   Intimate Partner Violence:     Fear of Current or Ex-Partner: Not on file    Emotionally Abused: Not on file    Physically Abused: Not on file    Sexually Abused: Not on file   Housing Stability: Unknown    Unable to Pay for Housing in the Last Year: No    Number of [place] in the Last Year: Not on file    Unstable Housing in the Last Year: No     Family History          Problem Relation Age of Onset    Heart Disease Mother     Heart Disease Father         cabg    Diabetes Brother     Heart Disease Brother        Vitals     height is 5' 8\" (1.727 m) and weight is 159 lb 4.8 oz (72.3 kg). His oral temperature is 97.9 °F (36.6 °C). His blood pressure is 137/70 and his pulse is 54. His respiration is 22 and oxygen saturation is 97%. Body mass index is 24.22 kg/m². I/O        Intake/Output Summary (Last 24 hours) at 5/9/2022 1258  Last data filed at 5/9/2022 1249  Gross per 24 hour   Intake 460 ml   Output 2255 ml   Net -1795 ml     I/O last 3 completed shifts: In: 1110 [P.O.:1110]  Out: 3043 [Urine:3175; Chest Tube:90]   Patient Vitals for the past 96 hrs (Last 3 readings):   Weight   05/09/22 0600 159 lb 4.8 oz (72.3 kg)   05/08/22 0528 163 lb (73.9 kg)   05/06/22 0355 139 lb 15.9 oz (63.5 kg)     Exam   General Appearance  Awake, alert, chronically ill. Interactive and cooperative   HEENT - Normal, Head is normocephalic, atraumatic. EOMI, PERRLA, pale conjunctiva  Neck - Supple, symmetrical, trachea midline and Soft, trachea midline and straight  Lungs - Increased respiratory effort, No wheezes, rales or rhonchi  Cardiovascular - Sinus bradycardia, regular rhythm.  Normal S1, S2, no murmurs  Abdomen - Soft, nontender, nondistended, no masses or organomegaly  Neurologic - CN II-XII are grossly intact.  There are no focal motor or sensory deficits  Skin - No bruising or bleeding  Extremities - No cyanosis, clubbing or edema    Labs  - Old records and notes have been reviewed in [facility]   CBC     Lab Results   Component Value Date    WBC 15.7 05/09/2022    RBC 4.21 05/09/2022    RBC 4.84 11/07/2011    HGB 11.8 05/09/2022    HCT 39.1 05/09/2022     05/09/2022    MCV 92.9 05/09/2022    MCH 28.0 05/09/2022    MCHC 30.2 05/09/2022    RDW 13.3 06/07/2018    NRBC 0 05/09/2022    NRBC 0 11/07/2011    SEGSPCT 79.3 05/09/2022    MONOPCT 8.0 05/09/2022    MONOSABS 1.3 05/09/2022    LYMPHSABS 1.7 05/09/2022    EOSABS 0.0 05/09/2022    BASOSABS 0.0 05/09/2022    DIFFTYPE see below 03/25/2022     BMP   Lab Results   Component Value Date     05/08/2022    K 4.7 05/08/2022    K 5.0 05/04/2022    CL 97 05/08/2022    CO2 30 05/08/2022    BUN 57 05/08/2022    CREATININE 1.2 05/08/2022    GLUCOSE 106 05/08/2022    GLUCOSE 94 11/07/2011    CALCIUM 8.4 05/08/2022    MG 1.9 04/28/2022     LFTS  Lab Results   Component Value Date    ALKPHOS 101 05/02/2022    ALT 20 05/02/2022    AST 20 05/02/2022    PROT 6.0 05/02/2022    BILITOT 0.4 05/02/2022    BILIDIR <0.2 03/26/2022    LABALBU 2.7 05/02/2022    LABALBU 4.3 11/07/2011     ABG   Lab Results   Component Value Date    PH 7.44 04/24/2022    PCO2 39 04/24/2022    PO2 191 04/24/2022    HCO3 26 04/24/2022    O2SAT 100 04/24/2022     @  Lab Results   Component Value Date    APTT 22.6 05/04/2022     INR   Lab Results   Component Value Date    INR 1.02 05/04/2022    INR 1.41 (H) 03/25/2022    INR 1.25 (H) 04/06/2021     Angio Convert Enzyme 8 - 52 U/L 21        PFTs     Cultures    (+) blood culture GPC in clusters    4/26/22  Bronchoscopy- will follow cultures/cytology   Endobronchial findings:   Trachea: Normal mucosa  Diana: Normal mucosa  Right main bronchus: Normal mucosa, mucus  Right upper lobe bronchus: Normal mucosa, mucus  Right Middle lobe bronchus: Normal mucosa, mucus  Right Lower lobe bronchus: erythematous mucosa  Left main bronchus: Normal mucosa  Left upper lobe bronchus: Normal mucosa  Left lower lobe bronchus: Normal mucosa     Rhinovirus Enterovirus PCR Detected Abnormal      AFB (-)  Virus cultures (-)  Cytology: FINAL RESULTS:   No malignant cells seen.      Specimen consists of benign appearing squamous      cells, alveolar macrophages, and debris. Flow cytometry: IMPRESSION:   1. Low viability specimen showing a predominance of granulocytes   without immunophenotypic aberrancy. 2. Rare phenotypically unremarkable T-cells with a normal CD4:CD8   ratio of 2:4. No abnormal B cell, plasma cell, T cell, or NK cell population   identified. See comment. Cytology:     FINAL RESULTS:   Right lower lobe of lung, bronchial washings, cytospin preparation:     Alveolar macrophages with positive staining for lipid accumulation.     Please see microscopic examination. Radiology    CXR  05/04/2022   Chest X-ray, 1 View   Similar bilateral airspace disease. 5/3/2022   XR CHEST PORTABLE   Stable chest.       5/2/22  XR CHEST PORTABLE   Improved aeration and decreased interstitial opacities compared to prior exam.          03/07/2022 --->  04/29/2022            CT chest     01/09/2021 ---> 03/15/2022            Assessment   1 S/P VATS biopsy  2. Rapidly progressing Acute Interstitial pneumonia (AIP) DDX Amiodarone Toxicity vs RBILD vs OP since 03/7/2022 (No evidence ILD back in 2021)  3. Interstitial infiltrate, thickening interlobular septa  4. CAD s/p CABG   5. Afib/Aflutter  6. SSS s/p PPM  7. Ischemic cardiomyopathy  8. Full Code   Plan   1. Wean prednisone, down to 30 mg/d today  2. Lung bx sent fot consultation to [facility], waiting report. 3. to [place] as possible      Case discussed with nurse and patient/family.   Questions and concerns addressed.   Meds and orders reviewed     Electronically signed by   Stevie Souza MD on 5/9/2022 at 12:57 PM

## 2022-05-09 NOTE — CARE COORDINATION
5/9/22, 7:14 AM EDT    DISCHARGE BARRIERS        Patient transferred to . Report given to unit SW, Abigail, regarding discharge plan for this patient.

## 2022-05-09 NOTE — PLAN OF CARE
Problem: Discharge Planning  Goal: Discharge to home or other facility with appropriate resources  Outcome: Progressing  Flowsheets (Taken 5/9/2022 1849)  Discharge to home or other facility with appropriate resources:   Identify barriers to discharge with patient and caregiver   Identify discharge learning needs (meds, wound care, etc)   Refer to discharge planning if patient needs post-hospital services based on physician order or complex needs related to functional status, cognitive ability or social support system   Arrange for needed discharge resources and transportation as appropriate  Note: Pending home vs [facility] or ECF     Problem: Safety - Adult  Goal: Free from fall injury  Outcome: Progressing  Flowsheets  Taken 5/9/2022 1849  Free From Fall Injury:   Instruct family/caregiver on patient safety   Based on caregiver fall risk screen, instruct family/caregiver to ask for assistance with transferring infant if caregiver noted to have fall risk factors  Taken 5/9/2022 1029  Free From Fall Injury: Instruct family/caregiver on patient safety  Note: Hourly rounding, bed alarm and chair alarm, SBA, SOB on exertion, surgical pain, call light within reach      Problem: ABCDS Injury Assessment  Goal: Absence of physical injury  Outcome: Progressing     Problem: Respiratory - Adult  Goal: Clear lung sounds  5/9/2022 1849 by Mitzy Rosenthal RN  Outcome: Progressing  Note: Abnormal lung sounds, remains on HF, breathing tx, SOB on exertion, poor IS, abnormal CXR      Problem: Respiratory - Adult  Goal: Adequate oxygenation  5/9/2022 1849 by Mitzy Rosenthal RN  Outcome: Progressing  Note: Abnormal lung sounds, remains on HF, breathing tx, SOB on exertion, poor IS, abnormal CXR, continuous pulse ox      Problem: Respiratory - Adult  Goal: Achieves optimal ventilation and oxygenation  5/9/2022 1849 by Mitzy Rosenthal RN  Outcome: Progressing  Flowsheets (Taken 5/9/2022 1849)  Achieves optimal ventilation and oxygenation:   Assess for changes in respiratory status   Position to facilitate oxygenation and minimize respiratory effort   Initiate smoking cessation protocol as indicated   Assess the need for suctioning and aspirate as needed   Respiratory therapy support as indicated   Assess for changes in mentation and behavior   Oxygen supplementation based on oxygen saturation or arterial blood gases   Encourage broncho-pulmonary hygiene including cough, deep breathe, incentive spirometry   Assess and instruct to report shortness of breath or any respiratory difficulty     Problem: Neurosensory - Adult  Goal: Achieves stable or improved neurological status  Outcome: Progressing  Flowsheets (Taken 5/9/2022 1849)  Achieves stable or improved neurological status:   Assess for and report changes in neurological status   Monitor temperature, glucose, and sodium.  Initiate appropriate interventions as ordered     Problem: Cardiovascular - Adult  Goal: Maintains optimal cardiac output and hemodynamic stability  Outcome: Progressing  Flowsheets (Taken 5/9/2022 1849)  Maintains optimal cardiac output and hemodynamic stability:   Monitor blood pressure and heart rate   Monitor urine output and notify Licensed Independent Practitioner for values outside of normal range     Problem: Skin/Tissue Integrity - Adult  Goal: Skin integrity remains intact  Outcome: Progressing  Flowsheets  Taken 5/9/2022 1849  Skin Integrity Remains Intact: Monitor for areas of redness and/or skin breakdown  Taken 5/9/2022 1029  Skin Integrity Remains Intact: Monitor for areas of redness and/or skin breakdown     Problem: Skin/Tissue Integrity - Adult  Goal: Incisions, wounds, or drain sites healing without S/S of infection  Outcome: Progressing  Flowsheets  Taken 5/9/2022 1849  Incisions, Wounds, or Drain Sites Healing Without Sign and Symptoms of Infection:   ADMISSION and DAILY: Assess and document risk factors for pressure ulcer development   Implement wound care per orders  Taken 5/9/2022 1029  Incisions, Wounds, or Drain Sites Healing Without Sign and Symptoms of Infection:   TWICE DAILY: Assess and document skin integrity   ADMISSION and DAILY: Assess and document risk factors for pressure ulcer development     Problem: Gastrointestinal - Adult  Goal: Maintains or returns to baseline bowel function  Outcome: Progressing  Flowsheets (Taken 5/9/2022 1849)  Maintains or returns to baseline bowel function:   Assess bowel function   Administer IV fluids as ordered to ensure adequate hydration   Encourage oral fluids to ensure adequate hydration   Administer ordered medications as needed     Problem: Gastrointestinal - Adult  Goal: Maintains adequate nutritional intake  Outcome: Progressing  Flowsheets (Taken 5/9/2022 1849)  Maintains adequate nutritional intake:   Monitor percentage of each meal consumed   Identify factors contributing to decreased intake, treat as appropriate   Assist with meals as needed   Monitor intake and output, weight and lab values     Problem: Genitourinary - Adult  Goal: Absence of urinary retention  Outcome: Progressing  Flowsheets (Taken 5/9/2022 1849)  Absence of urinary retention:   Assess patients ability to void and empty bladder   Monitor intake/output and perform bladder scan as needed     Problem: Infection - Adult  Goal: Absence of infection at discharge  Outcome: Progressing  Flowsheets (Taken 5/9/2022 1849)  Absence of infection at discharge:   Assess and monitor for signs and symptoms of infection   Monitor lab/diagnostic results   Monitor all insertion sites i.e., indwelling lines, tubes and drains   Administer medications as ordered     Problem: Metabolic/Fluid and Electrolytes - Adult  Goal: Electrolytes maintained within normal limits  Outcome: Progressing  Flowsheets (Taken 5/9/2022 1849)  Electrolytes maintained within normal limits:   Monitor labs and assess patient for signs and symptoms of electrolyte imbalances Monitor response to electrolyte replacements, including repeat lab results as appropriate   Administer electrolyte replacement as ordered   Instruct patient on fluid and nutrition restrictions as appropriate     Problem: Metabolic/Fluid and Electrolytes - Adult  Goal: Glucose maintained within prescribed range  Flowsheets (Taken 5/9/2022 1849)  Glucose maintained within prescribed range:   Monitor blood glucose as ordered   Assess for signs and symptoms of hyperglycemia and hypoglycemia   Administer ordered medications to maintain glucose within target range   Assess barriers to adequate nutritional intake and initiate nutrition consult as needed     Problem: Hematologic - Adult  Goal: Maintains hematologic stability  Outcome: Progressing  Flowsheets (Taken 5/9/2022 1849)  Maintains hematologic stability:   Assess for signs and symptoms of bleeding or hemorrhage   Monitor labs for bleeding or clotting disorders     Problem: Chronic Conditions and Co-morbidities  Goal: Patient's chronic conditions and co-morbidity symptoms are monitored and maintained or improved  Outcome: Progressing  Flowsheets (Taken 5/9/2022 1849)  Care Plan - Patient's Chronic Conditions and Co-Morbidity Symptoms are Monitored and Maintained or Improved:   Monitor and assess patient's chronic conditions and comorbid symptoms for stability, deterioration, or improvement   Collaborate with multidisciplinary team to address chronic and comorbid conditions and prevent exacerbation or deterioration     Problem: Pain  Goal: Verbalizes/displays adequate comfort level or baseline comfort level  Outcome: Progressing  Flowsheets (Taken 5/9/2022 1849)  Verbalizes/displays adequate comfort level or baseline comfort level:   Encourage patient to monitor pain and request assistance   Assess pain using appropriate pain scale   Implement non-pharmacological measures as appropriate and evaluate response   Care plan reviewed with patient.   Patient verbalize understanding of the plan of care and contribute to goal setting.

## 2022-05-09 NOTE — PROGRESS NOTES
Progress note: Infectious diseases    Patient - Bhavya Tubbs,  Age - 70 y.o.    - 1950      Room Number - 4K-02/002-A   MRN -  [id number]   Acct # - [de-identified]  Date of Admission -  2022 12:37 PM    SUBJECTIVE:   He is in step down. chest tube removed. OBJECTIVE   VITALS    height is 5' 8\" (1.727 m) and weight is 159 lb 4.8 oz (72.3 kg). His oral temperature is 97.9 °F (36.6 °C). His blood pressure is 137/70 and his pulse is 54. His respiration is 22 and oxygen saturation is 97%. Wt Readings from Last 3 Encounters:   22 159 lb 4.8 oz (72.3 kg)   22 155 lb 12.8 oz (70.7 kg)   22 154 lb (69.9 kg)       I/O (24 Hours)    Intake/Output Summary (Last 24 hours) at 2022 1211  Last data filed at 2022 1040  Gross per 24 hour   Intake 460 ml   Output 1855 ml   Net -1395 ml       General Appearance  Awake, alert, oriented, on high flow oxygen. HEENT - normocephalic, atraumatic, pale conjunctiva,  anicteric sclera  Neck - Supple, no mass. Lungs -   Diminished breath sound, on high flow oxygen (25 litres/min)  Abdomen - soft, not distended, nontender,   Neurologic -oriented.   Skin - No bruising or bleeding  Extremities - + edema,    Open gluteal wound  MEDICATIONS:      albuterol  2.5 mg Nebulization BID    FLUoxetine  10 mg Oral Daily    sodium chloride flush  5-40 mL IntraVENous 2 times per day    metoprolol  2.5 mg IntraVENous Once    metoprolol  2.5 mg IntraVENous Once    metoprolol succinate  100 mg Oral Daily    fluconazole  200 mg Oral Daily    dilTIAZem  30 mg Oral 3 times per day    insulin lispro  0-6 Units SubCUTAneous TID     insulin lispro  0-3 Units SubCUTAneous Nightly    predniSONE  40 mg Oral Daily    atovaquone  1,500 mg Oral Daily    furosemide  20 mg Oral Daily    digoxin  125 mcg Oral Daily    aspirin  81 mg Oral Daily    atorvastatin  40 mg Oral Nightly    cetirizine  10 mg Oral Daily    multivitamin  1 tablet Oral Daily    mupirocin   Topical TID    nystatin   Topical BID    pantoprazole  40 mg Oral QAM AC    rOPINIRole  0.5 mg Oral Nightly    traZODone  50 mg Oral Nightly      sodium chloride      dextrose       guaiFENesin-dextromethorphan, bisacodyl, ipratropium-albuterol, sodium chloride flush, sodium chloride, HYDROcodone-acetaminophen, dextrose, glucagon (rDNA), dextrose, glucose, metoprolol, ALPRAZolam, ondansetron **OR** ondansetron, acetaminophen      LABS:     CBC:   Recent Labs     05/09/22  0803   WBC 15.7*   HGB 11.8*        BMP:    Recent Labs     05/06/22  1331 05/06/22  1331 05/07/22  0434 05/07/22  1038 05/08/22  0443   *  --  131*  --  134*   K 5.1   < > 5.8* 4.7 4.7   CL 91*  --  95*  --  97*   CO2 22*  --  27  --  30   BUN 53*  --  58*  --  57*   CREATININE 1.5*  --  1.6*  --  1.2   GLUCOSE 153*  --  133*  --  106    < > = values in this interval not displayed.      Calcium:  Recent Labs     05/08/22  0443   CALCIUM 8.4*         Problem list of patient:     Patient Active Problem List   Diagnosis Code    Hyperlipidemia E78.5    Asthma J45.909    Smoker F17.200    Allergic rhinitis due to other allergen J30.89    Collagenous colitis K52.831    Lactose intolerance E73.9    HTN (hypertension) I10    COPD, mild (HCC) J44.9    Atrial fibrillation (HCC) I48.91    Atherosclerotic heart disease of native coronary artery with other forms of angina pectoris ([address] I25.118    S/P CABG x 3 Z95.1    Encounter for cardioversion procedure Z01.89    S/P left atrial appendage ligation Z98.890    Ischemic cardiomyopathy I25.5    Chronic bronchitis ([address] J42    CHF (congestive heart failure), NYHA class II, chronic, systolic (HCC) J66.58    Tachycardia-bradycardia ([address] I49.5    [employer] dual pacemaker  Z95.0    Pneumonia J18.9    Dyspnea and respiratory abnormalities R06.00, R06.89    Acute respiratory failure with hypoxia ([address] J96.01    Thrush B37.0    Nosebleed R04.0    SOB (shortness of breath) R06.02    Septicemia (HCC) A41.9    Severe malnutrition (HCC) E43    Interstitial lung disease (HCC) J84.9    Abnormal CT of the chest R93.89         ASSESSMENT/PLAN   Shortness of breath due to lung fibrosis:  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 Plan for [facility].   Pathology pending    Karthikeyan Wesley MD, MD, FACP 5/9/2022 12:11 PM

## 2022-05-09 NOTE — PROGRESS NOTES
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 Patient has been in a skilled nursing facility after his last admission last month in the hospital for pneumonia. Patient noticed his oxygen saturation to be less and hence was transferred here he has been placed on BiPAP with improvement in his saturation. He was also noted to be in A. fib with RVR. He denies any chest pain or palpitations. His breathing is much better since his been on the BiPAP. Chest x-ray noted. Cardiac enzymes are minimally elevated. White count is also elevated. He did complain of chills before he presented to the ER. His temperature was 99.6 on admission     Prior Level of Function:  Type of Home: Facility [facility])  Home Equipment: Oxygen,Walker, rolling        ADL Assistance: Needs assistance  Ambulation Assistance: Needs assistance  Transfer Assistance: Needs assistance  Additional Comments: Pt reported continued to require O2 at [facility] since was last discharged from [facility]. Pt reported was able to walk with RW although would desaturate quickly.     Restrictions/Precautions:  Restrictions/Precautions: General Precautions,Fall Risk  Position Activity Restriction  Other position/activity restrictions: monitor O2 sats, CT     SUBJECTIVE: cooperative, agreeable for up to chair to eat breakfast    PAIN: no c/o pain    Vitals: Oxygen: HFNC 35L/min with O2 sats initially 100% but dec to 84% with up to chair but with time recovered to inc to 90's. OBJECTIVE:  Bed Mobility:  Rolling to Right: Stand By Assistance   Supine to Sit: Contact Guard Assistance  Scooting: Contact Guard Assistance  HOB up 20 degrees, use BR, inc time to complete  Transfers:  Sit to Stand: CGA  Stand to [address] for hand placement  Ambulation:  Contact Guard Assistance  Distance: 3'x1  Surface: Level Tile  Device:No Device  Gait Deviations:  Slow Nelly, Decreased Step Length Bilaterally, Mild Path Deviations, Unsteady Gait and easily SOB and required monitoring O2 sats-see above    Balance:  Static Sitting Balance:  Supervision  Static Standing Balance: Contact Guard Assistance, 0-1UE support    Exercise:  Reviewed seated BLE therex to cont throughout the day with slow ROM through the exercises to dec SOB and fatigue but inc strength-pt verbalized understanding. Exercises were completed for increased independence with functional mobility. Functional Outcome Measures: Completed  AM-PAC Inpatient Mobility without Stair Climbing Raw Score : 15  AM-PAC Inpatient without Stair Climbing T-Scale Score : 43.03    ASSESSMENT:  Assessment: Patient progressing toward established goals. and pt cont on HFNC with dec in O2 sats with SOB to go bed to chair, slow transitions with primarily CGA for safe mobility, generalized weakness and deconditioning, recommend cont PT to inc pt functional mobility  Activity Tolerance:  Patient tolerance of  treatment: fair.       Equipment Recommendations:Equipment Needed: No  Discharge Recommendations: Continue to assess pending progress, Patient would benefit from continued PT at discharge and Inpatient Therapy Stay  Plan: Specific Instructions for Next Treatment: therex and mobility  Current Treatment Recommendations: Strengthening,Endurance training,Therapeutic activities,Home exercise program,Safety education & training,Patient/Caregiver education & training  Plan:  (3-5x GM)  Specific Instructions for Next Treatment: therex and mobility    Patient Education  Patient Education: Home Exercise Program, Bed Mobility, Transfers, Gait    Goals:  Patient goals : get stronger  Short Term Goals  Time Frame for Short term goals: at discharge  Short term goal 1: Pt to be Supervision for supine <> sit to get in/out of bed  Short term goal 2: Pt to tolerate sitting edge of bed with dynamic task completion >5 min with Supervision and maintain O2 sat >88%  Short term goal 3: transfer with S to get in/out of chairs  Short term goal 4: amb >25'x1 without AD and SBA to walk safely in room  Long Term Goals  Time Frame for Long term goals : not set due to short ELOS    Following session, patient left in safe position with all fall risk precautions in place.

## 2022-05-09 NOTE — PROGRESS NOTES
CT/CV Surgery Progress Note    2022 7:37 AM  Surgeon:  Dr. Jenifer Maurer     Subjective: Mr. Ladd Seen is resting comfortably up in chair on high flow O2 at 35, alert and in no acute distress. Pt denies chest pressure, SOB, fever,chills, N/V/D.      RIGHT VATS WITH LUNG BIOPSY WITH RIGHT MIDDLE LOBE LUNG WEDGE RESECTION  POD # 4  I/O last 3 completed shifts: In: 1110 [P.O.:1110]  Out: 3265 [Urine:3175; Chest Tube:90]    Vital Signs: /71   Pulse 55   Temp 99 °F (37.2 °C) (Oral)   Resp 16   Ht 5' 8\" (1.727 m)   Wt 159 lb 4.8 oz (72.3 kg)   SpO2 100%   BMI 24.22 kg/m²    Temp (24hrs), Av.9 °F (36.6 °C), Min:97.5 °F (36.4 °C), Max:99 °F (37.2 °C)    Labs:   CBC: No results for input(s): WBC, HGB, HCT, MCV, PLT, APTT, PROTIME, INR in the last 72 hours. BMP:   Recent Labs     22  1331 22  1931 22  0434 22  0721 22  1038 22  1140 22  0443 22  0718 22   *  --  131*  --   --   --  134*  --   --    K 5.1  --  5.8*  --  4.7  --  4.7  --   --    CL 91*  --  95*  --   --   --  97*  --   --    CO2 22*  --  27  --   --   --  30  --   --    BUN 53*  --  58*  --   --   --  57*  --   --    CREATININE 1.5*  --  1.6*  --   --   --  1.2  --   --    POCGLU  --    < >  --    < >  --    < >  --    < > 168*    < > = values in this interval not displayed. Imaging:  Chest X-Ray: 2022      Findings:       Upright examination was performed.       Again seen are right chest tube, dual lead cardiac pacemaker, CABG changes    and atrial left atrial appendage clip. Bilateral alveolar and interstitial densities are unchanged. No pneumothorax or large pleural effusion. Stable cardiomediastinal silhouette and bony structures.           Impression   Impression:   1. No significant interval change in bilateral opacities secondary to    infection and/or edema.       This document has been electronically signed by: Lorene Hinson DO on    2022 04:09 AM Intake/Output Summary (Last 24 hours) at 5/9/2022 0737  Last data filed at 5/8/2022 2337  Gross per 24 hour   Intake 760 ml   Output 2405 ml   Net -1645 ml       Scheduled Meds:    albuterol  2.5 mg Nebulization BID    FLUoxetine  10 mg Oral Daily    sodium chloride flush  5-40 mL IntraVENous 2 times per day    metoprolol  2.5 mg IntraVENous Once    metoprolol  2.5 mg IntraVENous Once    metoprolol succinate  100 mg Oral Daily    fluconazole  200 mg Oral Daily    dilTIAZem  30 mg Oral 3 times per day    insulin lispro  0-6 Units SubCUTAneous TID WC    insulin lispro  0-3 Units SubCUTAneous Nightly    predniSONE  40 mg Oral Daily    atovaquone  1,500 mg Oral Daily    furosemide  20 mg Oral Daily    digoxin  125 mcg Oral Daily    aspirin  81 mg Oral Daily    atorvastatin  40 mg Oral Nightly    cetirizine  10 mg Oral Daily    multivitamin  1 tablet Oral Daily    mupirocin   Topical TID    nystatin   Topical BID    pantoprazole  40 mg Oral QAM AC    rOPINIRole  0.5 mg Oral Nightly    traZODone  50 mg Oral Nightly     ROS: All neg unless specifically mentioned in subjective section. Exam:  General Appearance: alert ,conversing, in no acute distress  Cardiovascular: normal rate, regular rhythm, normal S1 and S2, no murmurs, rubs, clicks, or gallops  Pulmonary/Chest: Diffuse rhonchi bilateral.  Neurological: alert, oriented, normal speech, no focal findings or movement disorder noted  Right Chest:  Chest tube in place, dressing dry/intact. No air leak appreciated on exam today.     Assessment:   Patient Active Problem List   Diagnosis    Hyperlipidemia    Asthma    Smoker    Allergic rhinitis due to other allergen    Collagenous colitis    Lactose intolerance    HTN (hypertension)    COPD, mild (HCC)    Atrial fibrillation (HCC)    Atherosclerotic heart disease of native coronary artery with other forms of angina pectoris ([facility] [address]    S/P CABG x 3    Encounter for cardioversion procedure    S/P left atrial appendage ligation    Ischemic cardiomyopathy    Chronic bronchitis (HCC)    CHF (congestive heart failure), NYHA class II, chronic, systolic (HCC)    Tachycardia-bradycardia (HCC)    [employer] dual pacemaker     Pneumonia    Dyspnea and respiratory abnormalities    Acute respiratory failure with hypoxia (HCC)    Thrush    Nosebleed    SOB (shortness of breath)    Septicemia (HCC)    Severe malnutrition (HCC)    Interstitial lung disease (HCC)    Abnormal CT of the chest     Plan:   1. CXR reviewed- no ptx,  Continue daily CXR's   2. Chest tube put to water seal.  Possible removal later today.     The plan of care was discussed in detail with Dr. Jenifer Maurer     Electronically signed by Janet Miles PA-C on 5/9/2022 at 7:37 AM

## 2022-05-10 NOTE — PROGRESS NOTES
IM Progress Note  Dr. Stephanie Sullivan  5/10/2022 11:32 AM      Patient name Gilberto Carlson  [id number]  PCP: Ally Medina MD  Admit Date: 2022  Acct No. [de-identified]    Subjective: Interval History:   Pt was able to be weaned off high flow and was on nasal O2   Now on NRB as CXR sugg of pneumothorax      Diet: ADULT DIET; Regular; 3 carb choices (45 gm/meal); Low Sodium (2 gm)  ADULT ORAL NUTRITION SUPPLEMENT; Breakfast, Lunch, Dinner; Standard High Calorie/High Protein Oral Supplement    I/O last 3 completed shifts: In: 1320 [P.O.:1300; I.V.:20]  Out: 3425 [[facility]]  I/O this shift:  In: -   Out: 900 [Urine:900]        Admission weight: 155 lb (70.3 kg) as of 2022 12:37 PM  Wt Readings from Last 3 Encounters:   05/10/22 159 lb 1.6 oz (72.2 kg)   22 155 lb 12.8 oz (70.7 kg)   22 154 lb (69.9 kg)     Body mass index is 24.19 kg/m².     ROS   CVS;  no cp or palpitation  Resp: +SOB+ cough  Neuro:  No numbness or weakness or dizziness  Abd: no nausea or vomiting or abd pain      Medications:   Scheduled Meds:   predniSONE  30 mg Oral Daily    albuterol  2.5 mg Nebulization BID    FLUoxetine  10 mg Oral Daily    sodium chloride flush  5-40 mL IntraVENous 2 times per day    metoprolol  2.5 mg IntraVENous Once    metoprolol  2.5 mg IntraVENous Once    metoprolol succinate  100 mg Oral Daily    fluconazole  200 mg Oral Daily    dilTIAZem  30 mg Oral 3 times per day    insulin lispro  0-6 Units SubCUTAneous TID WC    insulin lispro  0-3 Units SubCUTAneous Nightly    atovaquone  1,500 mg Oral Daily    furosemide  20 mg Oral Daily    digoxin  125 mcg Oral Daily    aspirin  81 mg Oral Daily    atorvastatin  40 mg Oral Nightly    cetirizine  10 mg Oral Daily    multivitamin  1 tablet Oral Daily    mupirocin   Topical TID    nystatin   Topical BID    pantoprazole  40 mg Oral QAM AC    rOPINIRole  0.5 mg Oral Nightly    traZODone  50 mg Oral Nightly     Continuous Infusions:   sodium chloride      dextrose         Labs :     CBC:   Recent Labs     05/09/22  0803 05/10/22  0507   WBC 15.7* 13.1*   HGB 11.8* 10.8*    303     BMP:    Recent Labs     05/08/22  0443   *   K 4.7   CL 97*   CO2 30   BUN 57*   CREATININE 1.2   GLUCOSE 106     Hepatic:   No results for input(s): AST, ALT, ALB, BILITOT, ALKPHOS in the last 72 hours. Troponin: No results for input(s): TROPONINI in the last 72 hours. BNP: No results for input(s): BNP in the last 72 hours. Lipids: No results for input(s): CHOL, HDL in the last 72 hours. Invalid input(s): LDLCALCU  INR:   No results for input(s): INR in the last 72 hours.     Radiology    Objective:   Vitals: /68   Pulse 58   Temp 97.7 °F (36.5 °C) (Oral)   Resp 18   Ht 5' 8\" (1.727 m)   Wt 159 lb 1.6 oz (72.2 kg)   SpO2 92%   BMI 24.19 kg/m²   HEENT: Head:pupils react  Neck: supple  Lungs: diminished air entry bilat CT on Rt  Heart: regular  Rhythm and tachycardic  Abdomen: soft BS heard NG NT  Extremities: warm  No edema  Neurologic:  Alert, oriented X3    Impression:   :   Acute on chronic hypoxic respiratory failure s/p bronch s/p VATS with lung biopsy and Rt middle lobe wedge resection done 5/5/22, CTT now removed  Par A. 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

## 2022-05-10 NOTE — RT PROTOCOL NOTE
RT Inhaler-Nebulizer Bronchodilator Protocol Note    There is a bronchodilator order in the chart from a provider indicating to follow the RT Bronchodilator Protocol and there is an Initiate RT Inhaler-Nebulizer Bronchodilator Protocol order as well (see protocol at bottom of note). CXR Findings:  XR CHEST PORTABLE    Result Date: 5/9/2022  Impression: 1. No significant interval change in bilateral opacities secondary to infection and/or edema. This document has been electronically signed by: Abdirashid Hinson DO on 05/09/2022 04:09 AM      The findings from the last RT Protocol Assessment were as follows:   History Pulmonary Disease: Chronic pulmonary disease  Respiratory Pattern: Dyspnea on exertion or RR 21-25 bpm  Breath Sounds: Slightly diminished and/or crackles  Cough: Strong, spontaneous, non-productive  Indication for Bronchodilator Therapy: Decreased or absent breath sounds,On home bronchodilators  Bronchodilator Assessment Score: 6    Aerosolized bronchodilator medication orders have been revised according to the RT Inhaler-Nebulizer Bronchodilator Protocol below. Respiratory Therapist to perform RT Therapy Protocol Assessment initially then follow the protocol. Repeat RT Therapy Protocol Assessment PRN for score 0-3 or on second treatment, BID, and PRN for scores above 3. No Indications - adjust the frequency to every 6 hours PRN wheezing or bronchospasm, if no treatments needed after 48 hours then discontinue using Per Protocol order mode. If indication present, adjust the RT bronchodilator orders based on the Bronchodilator Assessment Score as indicated below.   Use Inhaler orders unless patient has one or more of the following: on home nebulizer, not able to hold breath for 10 seconds, is not alert and oriented, cannot activate and use MDI correctly, or respiratory rate 25 breaths per minute or more, then use the equivalent nebulizer order(s) with same Frequency and PRN reasons based on the score. If a patient is on this medication at home then do not decrease Frequency below that used at home. 0-3 - enter or revise RT bronchodilator order(s) to equivalent RT Bronchodilator order with Frequency of every 4 hours PRN for wheezing or increased work of breathing using Per Protocol order mode. 4-6 - enter or revise RT Bronchodilator order(s) to two equivalent RT bronchodilator orders with one order with BID Frequency and one order with Frequency of every 4 hours PRN wheezing or increased work of breathing using Per Protocol order mode. 7-10 - enter or revise RT Bronchodilator order(s) to two equivalent RT bronchodilator orders with one order with TID Frequency and one order with Frequency of every 4 hours PRN wheezing or increased work of breathing using Per Protocol order mode. 11-13 - enter or revise RT Bronchodilator order(s) to one equivalent RT bronchodilator order with QID Frequency and an Albuterol order with Frequency of every 4 hours PRN wheezing or increased work of breathing using Per Protocol order mode. Greater than 13 - enter or revise RT Bronchodilator order(s) to one equivalent RT bronchodilator order with every 4 hours Frequency and an Albuterol order with Frequency of every 2 hours PRN wheezing or increased work of breathing using Per Protocol order mode. RT to enter RT Home Evaluation for COPD & MDI Assessment order using Per Protocol order mode.     Electronically signed by Charles Alejandro RCP on 5/10/2022 at 7:55 AM

## 2022-05-10 NOTE — PLAN OF CARE
Problem: Respiratory - Adult  Goal: Clear lung sounds  5/9/2022 2019 by eBto Vargas RCP  Outcome: Progressing     Will continue treatments as ordered to improve lung aeration and WOB. Problem: Respiratory - Adult  Goal: Adequate oxygenation  5/9/2022 2019 by Beto Vargas RCP  Outcome: Progressing    Patient just switched over from high flow to NC. Oxygenating well on 4L spo2 98%. Will continue to monitor. Problem: Respiratory - Adult  Goal: Achieves optimal ventilation and oxygenation  5/9/2022 2019 by Beto Vargas RCP  Outcome: Progressing    Patient oxygenating appropriately with no increased WOB at this time.

## 2022-05-10 NOTE — PROGRESS NOTES
later weaned to high flow nasal cannula. Infectious disease was consulted. He was maintained on cefepime empirically. Cardiology was consulted for Afib. Digoxin was added. He was diuresed with lasix.      On 4/26/22 he underwent bronchoscopy. He had mucus production of the RUL, RML, RLL and right main bronchus. The RLL bronchus mucosa was erythematous. BAL was sent. This returned positive for candida albicans. Diflucan was started. Fluid analysis was also notable for macrophages and positive staining for lipid accumulation.      CT surgery was consulted 4/28/22 per patient/family request for consideration of open lung biopsy for confirmation of diagnosis of ILD. CT surgery Dr. Sunni Weiss did evaluate, with plans for VATS and lung biopsy. He also recommended discontinuation of xarelto as well, given s/p ZAHRA closure.      Patient underwent VATS with lung biopsy with RML wedge resection on 5/5/22 with Dr. Sunni Weiss. EBL was < 5 mL. There were no reported complications. Lung tissue biopsy and culture was obtained from RML.    Patient presented to ICU post-op for close monitoring. He remains hemodynamically stable, and oxygenating well on stable high flow nasal cannula requirements. \"    Past 24 Hours   -On 3 LPM via NC   -CXR this AM with 15% PTX  -Denies increased SOB or chest pain   -CT removed yesterday by CTS  -All systems reviewed   PMHx   Past Medical History      Diagnosis Date    Asthma     Atrial fibrillation with RVR ([facility] 04/05/2021    [employer] dual pacemaker  04/15/2021    Collagenous colitis 2021    per scope 2021    Colon polyps 2021    dr Michelle Puga    COPD, mild (HCC)     Eczema of hand     HTN (hypertension)     Hyperlipidemia     Smoker       Past Surgical History        Procedure Laterality Date    BRONCHOSCOPY N/A 4/26/2022    BRONCHOSCOPY WITH BAL performed by Emmett Gordon MD at [address] COLONOSCOPY  06/10/2021    theresa ccolon polyps and collagenous colitis    CORONARY ARTERY BYPASS GRAFT  01/12/2021    cabg x 3 with lima and atrial appendage clip  dr Nain Mosqueda N/A 01/12/2021    CABG  X 3 WITH DEJAH, Atrial Appendage Clip performed by Al Reed MD at [facility] NASAL SINUS SURGERY  06/2008    polyps    OTHER SURGICAL HISTORY  DEC 7TH 2012 DR [address]NAN [address] [place]     IGS ENDOSCOPIC BI LAT MAXILLARY, ETHMOID, SPHENOID, FRONTAL SINUSOTOMY WITH SEPTOPLASTY AND TURBINOPLASTIES    SKIN CANCER EXCISION  09/2014    Left side of Forehead     THORACOSCOPY Right 5/5/2022    RIGHT VATS WITH LUNG BIOPSY WITH RIGHT MIDDLE LOBE LUNG WEDGE RESECTION performed by Al Reed MD at [place] [address]  02/2017     Meds    Current Medications    predniSONE  30 mg Oral Daily    albuterol  2.5 mg Nebulization BID    FLUoxetine  10 mg Oral Daily    sodium chloride flush  5-40 mL IntraVENous 2 times per day    metoprolol  2.5 mg IntraVENous Once    metoprolol  2.5 mg IntraVENous Once    metoprolol succinate  100 mg Oral Daily    fluconazole  200 mg Oral Daily    dilTIAZem  30 mg Oral 3 times per day    insulin lispro  0-6 Units SubCUTAneous TID WC    insulin lispro  0-3 Units SubCUTAneous Nightly    atovaquone  1,500 mg Oral Daily    furosemide  20 mg Oral Daily    digoxin  125 mcg Oral Daily    aspirin  81 mg Oral Daily    atorvastatin  40 mg Oral Nightly    cetirizine  10 mg Oral Daily    multivitamin  1 tablet Oral Daily    mupirocin   Topical TID    nystatin   Topical BID    pantoprazole  40 mg Oral QAM AC    rOPINIRole  0.5 mg Oral Nightly    traZODone  50 mg Oral Nightly     guaiFENesin-dextromethorphan, bisacodyl, ipratropium-albuterol, sodium chloride flush, sodium chloride, HYDROcodone-acetaminophen, dextrose, glucagon (rDNA), dextrose, glucose, metoprolol, ALPRAZolam, ondansetron **OR** ondansetron, acetaminophen  IV Drips/Infusions   sodium chloride      dextrose       Home Medications  Medications Prior to Admission: albuterol (PROVENTIL) (2.5 MG/3ML) 0.083% nebulizer solution, Take 2.5 mg by nebulization 2 times daily  ipratropium-albuterol (DUONEB) 0.5-2.5 (3) MG/3ML SOLN nebulizer solution, Inhale 1 vial into the lungs every 4 hours as needed for Shortness of Breath  Sodium Chloride-Sodium Bicarb 2300-700 MG PACK, 1 spray by Nasal route in the morning, at noon, and at bedtime  FLUoxetine (PROZAC) 10 MG capsule, Take 10 mg by mouth daily (10 mg daily x 5 days - 4/24-4/28, then increase to 20 mg daily)  atovaquone (MEPRON) 750 MG/5ML suspension, Take 1,500 mg by mouth daily (10 mL daily)  predniSONE (DELTASONE) 20 MG tablet, Take 30 mg by mouth daily   ALPRAZolam (XANAX) 0.5 MG tablet, Take 0.5 mg by mouth every 12 hours as needed for Sleep. [DISCONTINUED] tuberculin (APLISOL) 5 UNIT/0.1ML injection, Inject 5 Units into the skin  [DISCONTINUED] nystatin (MYCOSTATIN) 795744 UNIT/GM cream, Apply topically 2 times daily Apply topically 2 times daily. [DISCONTINUED] oxymetazoline (AFRIN) 0.05 % nasal spray, 2 sprays by Nasal route 2 times daily  furosemide (LASIX) 20 MG tablet, Take 20 mg by mouth daily Sun, Wed  loratadine (CLARITIN) 10 MG tablet, Take 10 mg by mouth daily  metoprolol succinate (TOPROL XL) 100 MG extended release tablet, Take 1.5 tablets by mouth daily  tiotropium-olodaterol (STIOLTO) 2.5-2.5 MCG/ACT AERS, Inhale 2 puffs into the lungs daily  rivaroxaban (XARELTO) 15 MG TABS tablet, Take 1 tablet by mouth daily  mupirocin (BACTROBAN) 2 % ointment, Apply topically 3 times daily.   albuterol sulfate  (90 Base) MCG/ACT inhaler, INHALE 2 PUFFS INTO THE LUNGS EVERY 6 HOURS AS NEEDED FOR WHEEZING  traZODone (DESYREL) 50 MG tablet, Take 1 tablet by mouth nightly  rOPINIRole (REQUIP) 0.5 MG tablet, Take 1 tablet by mouth nightly  pantoprazole (PROTONIX) 40 MG tablet, Take 1 tablet by mouth every morning (before breakfast)  [DISCONTINUED] cetirizine (ZYRTEC) 10 MG tablet, Take 10 mg by mouth daily  atorvastatin (LIPITOR) 40 MG tablet, Take 1 tablet by mouth nightly  aspirin 81 MG chewable tablet, Take 1 tablet by mouth daily  Multiple Vitamins-Minerals (CENTRUM SILVER PO), Take 1 tablet by mouth daily   Diet    ADULT DIET; Regular; 3 carb choices (45 gm/meal); Low Sodium (2 gm)  ADULT ORAL NUTRITION SUPPLEMENT; Breakfast, Lunch, Dinner; Standard High Calorie/High Protein Oral Supplement  Allergies    Penicillins and Sulfa antibiotics  Social History     Social History     Socioeconomic History    Marital status:      Spouse name: Not on file    Number of children: Not on file    Years of education: Not on file    Highest education level: Not on file   Occupational History    Not on file   Tobacco Use    Smoking status: Former Smoker     Packs/day: 1.00     Years: 40.00     Pack years: 40.00     Types: Cigarettes     Quit date: 3/7/2022     Years since quittin.1    Smokeless tobacco: Never Used   Substance and Sexual Activity    Alcohol use: Not Currently     Alcohol/week: 0.0 standard drinks    Drug use: No    Sexual activity: Not on file   Other Topics Concern    Not on file   Social History Narrative    No barriers with medication affordability now that he has met deductible    Active with [facility]    Has O2 and supplies needed    No barriers with transportation     Social Determinants of Health     Financial Resource Strain: Low Risk     Difficulty of Paying Living Expenses: Not hard at all   Food Insecurity: No Food Insecurity    Worried About [address] in the Last Year: Never true    [facility] in the Last Year: Never true   Transportation Needs: No Transportation Needs    Lack of Transportation (Medical): No    Lack of Transportation (Non-Medical):  No   Physical Activity: Inactive    Days of Exercise per Week: 0 days    Minutes of Exercise per Session: 0 min   Stress:     Feeling of Stress : Not on file   Social Connections:     Frequency of Communication with Friends and Family: Not on file    Frequency of Social Gatherings with Friends and Family: Not on file    Attends Christian Services: Not on file    Active Member of Clubs or Organizations: Not on file    Attends Club or Organization Meetings: Not on file    Marital Status: Not on file   Intimate Partner Violence:     Fear of Current or Ex-Partner: Not on file    Emotionally Abused: Not on file    Physically Abused: Not on file    Sexually Abused: Not on file   Housing Stability: Unknown    Unable to Pay for Housing in the Last Year: No    Number of [place] in the Last Year: Not on file    Unstable Housing in the Last Year: No     Family History          Problem Relation Age of Onset    Heart Disease Mother     Heart Disease Father         cabg    Diabetes Brother     Heart Disease Brother        Vitals     height is 5' 8\" (1.727 m) and weight is 159 lb 1.6 oz (72.2 kg). His oral temperature is 97.7 °F (36.5 °C). His blood pressure is 128/68 and his pulse is 58. His respiration is 18 and oxygen saturation is 92%. Body mass index is 24.19 kg/m². I/O        Intake/Output Summary (Last 24 hours) at 5/10/2022 0947  Last data filed at 5/10/2022 0718  Gross per 24 hour   Intake 1060 ml   Output 3025 ml   Net -1965 ml     I/O last 3 completed shifts: In: 1320 [P.O.:1300; I.V.:20]  Out: 6293 [Urine:3425]   Patient Vitals for the past 96 hrs (Last 3 readings):   Weight   05/10/22 0347 159 lb 1.6 oz (72.2 kg)   05/09/22 0600 159 lb 4.8 oz (72.3 kg)   05/08/22 0528 163 lb (73.9 kg)     Exam   Physical Exam   Constitutional: No distress on O2 per NC. Patient appears chronically ill. Head: Normocephalic and atraumatic. Mouth/Throat: Oropharynx is clear and moist.  No oral thrush. Eyes: Conjunctivae are normal. Pupils are equal, round. No scleral icterus. Neck: Neck supple. No tracheal deviation present. Cardiovascular: S1 and S2 with no murmur.   No peripheral edema  Pulmonary/Chest: Increased effort with bilateral air entry, clear breath sounds. No stridor. No respiratory distress. Patient exhibits no tenderness. Abdominal: Soft. Bowel sounds audible. No distension or tenderness to palp. Musculoskeletal: Moves all extremities  Neurological: Patient is alert and oriented to person, place, and time. Skin: Warm and dry.        Labs  - Old records and notes have been reviewed in [facility]   CBC     Lab Results   Component Value Date    WBC 13.1 05/10/2022    RBC 3.87 05/10/2022    RBC 4.84 11/07/2011    HGB 10.8 05/10/2022    HCT 35.3 05/10/2022     05/10/2022    MCV 91.2 05/10/2022    MCH 27.9 05/10/2022    MCHC 30.6 05/10/2022    RDW 13.3 06/07/2018    NRBC 0 05/10/2022    NRBC 0 11/07/2011    SEGSPCT 81.3 05/10/2022    MONOPCT 7.4 05/10/2022    MONOSABS 1.0 05/10/2022    LYMPHSABS 1.2 05/10/2022    EOSABS 0.0 05/10/2022    BASOSABS 0.0 05/10/2022    DIFFTYPE see below 03/25/2022     BMP   Lab Results   Component Value Date     05/08/2022    K 4.7 05/08/2022    K 5.0 05/04/2022    CL 97 05/08/2022    CO2 30 05/08/2022    BUN 57 05/08/2022    CREATININE 1.2 05/08/2022    GLUCOSE 106 05/08/2022    GLUCOSE 94 11/07/2011    CALCIUM 8.4 05/08/2022    MG 1.9 04/28/2022     LFTS  Lab Results   Component Value Date    ALKPHOS 101 05/02/2022    ALT 20 05/02/2022    AST 20 05/02/2022    PROT 6.0 05/02/2022    BILITOT 0.4 05/02/2022    BILIDIR <0.2 03/26/2022    LABALBU 2.7 05/02/2022    LABALBU 4.3 11/07/2011     ABG   Lab Results   Component Value Date    PH 7.44 04/24/2022    PCO2 39 04/24/2022    PO2 191 04/24/2022    HCO3 26 04/24/2022    O2SAT 100 04/24/2022     @  Lab Results   Component Value Date    APTT 22.6 05/04/2022     INR   Lab Results   Component Value Date    INR 1.02 05/04/2022    INR 1.41 (H) 03/25/2022    INR 1.25 (H) 04/06/2021     Angio Convert Enzyme 8 - 52 U/L 21        PFTs     Cultures    (+) blood culture GPC in clusters    4/26/22  Bronchoscopy- will follow cultures/cytology   Endobronchial findings:   Trachea: Normal mucosa  Diana: Normal mucosa  Right main bronchus: Normal mucosa, mucus  Right upper lobe bronchus: Normal mucosa, mucus  Right Middle lobe bronchus: Normal mucosa, mucus  Right Lower lobe bronchus: erythematous mucosa  Left main bronchus: Normal mucosa  Left upper lobe bronchus: Normal mucosa  Left lower lobe bronchus: Normal mucosa     Rhinovirus Enterovirus PCR Detected Abnormal      AFB (-)  Virus cultures (-)  Cytology: FINAL RESULTS:   No malignant cells seen.      Specimen consists of benign appearing squamous      cells, alveolar macrophages, and debris. Flow cytometry: IMPRESSION:   1. Low viability specimen showing a predominance of granulocytes   without immunophenotypic aberrancy. 2. Rare phenotypically unremarkable T-cells with a normal CD4:CD8   ratio of 2:4. No abnormal B cell, plasma cell, T cell, or NK cell population   identified. See comment. Cytology:     FINAL RESULTS:   Right lower lobe of lung, bronchial washings, cytospin preparation:     Alveolar macrophages with positive staining for lipid accumulation.     Please see microscopic examination. Radiology    CXR    XR CHEST (2 VW)   5/10/2022   FINDINGS: There are postoperative changes. There has been interval removal of the right-sided chest tube. There is a small right apical pneumothorax occupying 15% of the right hemithorax. The heart size is normal in this patient status post previous sternotomy, pacemaker placement and left atrial appendage clip in place. .  The mediastinum is not widened. There is thickening of the interstitial lung markings especially, in the right and left lower lobes. There may be a small right pleural effusion. There is a probable calcified granuloma at the lung bases. .  The pulmonary vascularity is increased. There is mild thoracic spondylosis. Impression:  1.  Interval removal of the right-sided chest tube. 15% pneumothorax in the right lung apex. 2. Postoperative changes. Status post coronary bypass surgery, pacemaker placement and left atrial appendage clip in place. 3.. Thickening of the interstitial lung markings especially in the right and left lower lobes. 4. Possible small right pleural effusion. Radiographs of the chest 1 view   05/09/2022   Findings: Upright examination was performed. Again seen are right chest tube, dual lead cardiac pacemaker, CABG changes and atrial left atrial appendage clip. Bilateral alveolar and interstitial densities are unchanged. No pneumothorax or large pleural effusion. Stable cardiomediastinal silhouette and bony structures. Impression:   1. No significant interval change in bilateral opacities secondary to infection and/or edema. 5/3/2022   XR CHEST PORTABLE   Stable chest.        03/07/2022 --->  04/29/2022            CT chest     01/09/2021 ---> 03/15/2022            Assessment   -S/P VATS biopsy  -Rapidly progressing Acute Interstitial pneumonia (AIP) DDX Amiodarone Toxicity vs RBILD vs OP since 03/7/2022 (No evidence ILD back in 2021)  -Interstitial infiltrate, thickening interlobular septa  -PTX   -CAD s/p CABG   -Afib/Aflutter  -SSS s/p PPM  -Ischemic cardiomyopathy  -Full Code   Plan   -Slowly weaning prednisone, down to 30 mg day  -continue on Mepron for PCP prophylaxis   -Lung bx sent for consultation to [facility], waiting report  -Monitor SpO2 wean supplemental O2 to maintain SpO2 >90%  -Portable CXR now to monitor PTX.  -start on 15 LPM NRB until repeat CXR completed         Case discussed with nurse and patient/family. Questions and concerns addressed. Meds and orders reviewed     Electronically signed by   ARCADIO Francisco - CNP on 5/10/2022 at 9:47 AM    Addendum     Portable CXR reviewed stable 15 % Right apical PTX   Pt.  Remained on 15lpm via NRB denied chest pain or increased SOB will repeat at 1600     Electronically

## 2022-05-10 NOTE — PLAN OF CARE
Problem: Respiratory - Adult  Goal: Clear lung sounds  5/10/2022 0756 by Omar Rabago RCP  Outcome: Progressing  Note: Txs to help improve lung aeration.

## 2022-05-10 NOTE — PROGRESS NOTES
Progress note: Infectious diseases    Patient - Althea Langley,  Age - 70 y.o.    - 1950      Room Number - 4K-02/002-A   MRN -  [id number]   Acct # - [de-identified]  Date of Admission -  2022 12:37 PM    SUBJECTIVE:   He has right side chest pain. OBJECTIVE   VITALS    height is 5' 8\" (1.727 m) and weight is 159 lb 1.6 oz (72.2 kg). His oral temperature is 97.7 °F (36.5 °C). His blood pressure is 128/68 and his pulse is 58. His respiration is 18 and oxygen saturation is 92%. Wt Readings from Last 3 Encounters:   05/10/22 159 lb 1.6 oz (72.2 kg)   22 155 lb 12.8 oz (70.7 kg)   22 154 lb (69.9 kg)       I/O (24 Hours)    Intake/Output Summary (Last 24 hours) at 5/10/2022 1046  Last data filed at 5/10/2022 0718  Gross per 24 hour   Intake 1060 ml   Output 2550 ml   Net -1490 ml       General Appearance  Awake, alert, oriented, on high flow oxygen. HEENT - normocephalic, atraumatic, pale conjunctiva,  anicteric sclera  Neck - Supple, no mass. Lungs -   Diminished breath sound, on mask. Abdomen - soft, not distended, nontender,   Neurologic -oriented.   Skin - No bruising or bleeding  Extremities - + edema,    Open gluteal wound  MEDICATIONS:      predniSONE  30 mg Oral Daily    albuterol  2.5 mg Nebulization BID    FLUoxetine  10 mg Oral Daily    sodium chloride flush  5-40 mL IntraVENous 2 times per day    metoprolol  2.5 mg IntraVENous Once    metoprolol  2.5 mg IntraVENous Once    metoprolol succinate  100 mg Oral Daily    fluconazole  200 mg Oral Daily    dilTIAZem  30 mg Oral 3 times per day    insulin lispro  0-6 Units SubCUTAneous TID     insulin lispro  0-3 Units SubCUTAneous Nightly    atovaquone  1,500 mg Oral Daily    furosemide  20 mg Oral Daily    digoxin  125 mcg Oral Daily    aspirin  81 mg Oral Daily    atorvastatin  40 mg Oral Nightly    cetirizine  10 mg Oral Daily    multivitamin  1 tablet Oral Daily    mupirocin   Topical TID    nystatin   Topical BID    pantoprazole  40 mg Oral QAM AC    rOPINIRole  0.5 mg Oral Nightly    traZODone  50 mg Oral Nightly      sodium chloride      dextrose       guaiFENesin-dextromethorphan, bisacodyl, ipratropium-albuterol, sodium chloride flush, sodium chloride, HYDROcodone-acetaminophen, dextrose, glucagon (rDNA), dextrose, glucose, metoprolol, ALPRAZolam, ondansetron **OR** ondansetron, acetaminophen      LABS:     CBC:   Recent Labs     05/09/22  0803 05/10/22  0507   WBC 15.7* 13.1*   HGB 11.8* 10.8*    303     BMP:    Recent Labs     05/08/22  0443   *   K 4.7   CL 97*   CO2 30   BUN 57*   CREATININE 1.2   GLUCOSE 106     Calcium:  Recent Labs     05/08/22  0443   CALCIUM 8.4*         Problem list of patient:     Patient Active Problem List   Diagnosis Code    Hyperlipidemia E78.5    Asthma J45.909    Smoker F17.200    Allergic rhinitis due to other allergen J30.89    Collagenous colitis K52.831    Lactose intolerance E73.9    HTN (hypertension) I10    COPD, mild ([facility]) J44.9    Atrial fibrillation ([facility]) I48.91    Atherosclerotic heart disease of native coronary artery with other forms of angina pectoris ([facility]) I25.118    S/P CABG x 3 Z95.1    Encounter for cardioversion procedure Z01.89    S/P left atrial appendage ligation Z98.890    Ischemic cardiomyopathy I25.5    Chronic bronchitis ([facility]) J42    CHF (congestive heart failure), NYHA class II, chronic, systolic ([facility]) S33.25    Tachycardia-bradycardia ([facility]) I49.5    [employer] dual pacemaker  Z95.0    Pneumonia J18.9    Dyspnea and respiratory abnormalities R06.00, R06.89    Acute respiratory failure with hypoxia ([facility]) J96.01    Thrush B37.0    Nosebleed R04.0    SOB (shortness of breath) R06.02    Septicemia ([facility]) A41.9    Severe malnutrition ([facility]) E43    Interstitial lung disease ([facility]) J84.9    Abnormal CT of the chest R93.89         ASSESSMENT/PLAN   Shortness of breath due to lung fibrosis:  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 Right side pneumothorax.   Bekah Cote MD, MD, FACP 5/10/2022 10:46 AM

## 2022-05-10 NOTE — PROGRESS NOTES
CT/CV Surgery Progress Note    5/10/2022 7:52 AM  Surgeon:  Dr. Nano Banegas     Subjective: Mr. Diony Mittal is resting comfortably up in bed on 2L NC, alert and in no acute distress. Pt denies chest pressure, SOB, fever,chills, N/V/D. He feels overall better this morning. RIGHT VATS WITH LUNG BIOPSY WITH RIGHT MIDDLE LOBE LUNG WEDGE RESECTION  POD # 5    I/O last 3 completed shifts: In: 1320 [P.O.:1300; I.V.:20]  Out: 3425 [Urine:3425]    Vital Signs: BP (!) 154/73   Pulse 55   Temp 97.5 °F (36.4 °C) (Oral)   Resp 18   Ht 5' 8\" (1.727 m)   Wt 159 lb 1.6 oz (72.2 kg)   SpO2 95%   BMI 24.19 kg/m²    Temp (24hrs), Av.7 °F (36.5 °C), Min:97.4 °F (36.3 °C), Max:98.1 °F (36.7 °C)    Labs:   CBC:   Recent Labs     22  0803 05/10/22  0507   WBC 15.7* 13.1*   HGB 11.8* 10.8*   HCT 39.1* 35.3*   MCV 92.9 91.2    303     BMP:   Recent Labs     22  1038 22  1140 22  0443 22  0718 22  2036   NA  --   --  134*  --   --    K 4.7  --  4.7  --   --    CL  --   --  97*  --   --    CO2  --   --  30  --   --    BUN  --   --  57*  --   --    CREATININE  --   --  1.2  --   --    POCGLU  --    < >  --    < > 162*    < > = values in this interval not displayed.      Imaging:  Chest X-Ray: 5/10/2022                  Intake/Output Summary (Last 24 hours) at 5/10/2022 0752  Last data filed at 5/10/2022 0718  Gross per 24 hour   Intake 1320 ml   Output 3025 ml   Net -1705 ml       Scheduled Meds:    predniSONE  30 mg Oral Daily    albuterol  2.5 mg Nebulization BID    FLUoxetine  10 mg Oral Daily    sodium chloride flush  5-40 mL IntraVENous 2 times per day    metoprolol  2.5 mg IntraVENous Once    metoprolol  2.5 mg IntraVENous Once    metoprolol succinate  100 mg Oral Daily    fluconazole  200 mg Oral Daily    dilTIAZem  30 mg Oral 3 times per day    insulin lispro  0-6 Units SubCUTAneous TID WC    insulin lispro  0-3 Units SubCUTAneous Nightly    atovaquone  1,500 mg Oral Daily    furosemide  20 mg Oral Daily    digoxin  125 mcg Oral Daily    aspirin  81 mg Oral Daily    atorvastatin  40 mg Oral Nightly    cetirizine  10 mg Oral Daily    multivitamin  1 tablet Oral Daily    mupirocin   Topical TID    nystatin   Topical BID    pantoprazole  40 mg Oral QAM AC    rOPINIRole  0.5 mg Oral Nightly    traZODone  50 mg Oral Nightly     ROS: All neg unless specifically mentioned in subjective section. Exam:  General Appearance: alert ,conversing, in no acute distress  Cardiovascular: normal rate, regular rhythm, normal S1 and S2, no murmurs, rubs, clicks, or gallops  Pulmonary/Chest: Diffuse rhonchi bilateral lung bases. Neurological: alert, oriented, normal speech, no focal findings or movement disorder noted  Right Chest:  Chest tube removed, minimal serous drainage on dressing. No SQ crepitus to palpation noted. Assessment:   Patient Active Problem List   Diagnosis    Hyperlipidemia    Asthma    Smoker    Allergic rhinitis due to other allergen    Collagenous colitis    Lactose intolerance    HTN (hypertension)    COPD, mild (HCC)    Atrial fibrillation ([address]    Atherosclerotic heart disease of native coronary artery with other forms of angina pectoris ([address]    S/P CABG x 3    Encounter for cardioversion procedure    S/P left atrial appendage ligation    Ischemic cardiomyopathy    Chronic bronchitis (HCC)    CHF (congestive heart failure), NYHA class II, chronic, systolic ([address]    Tachycardia-bradycardia ([address]    [employer] dual pacemaker     Pneumonia    Dyspnea and respiratory abnormalities    Acute respiratory failure with hypoxia (HCC)    Thrush    Nosebleed    SOB (shortness of breath)    Septicemia (HCC)    Severe malnutrition (HCC)    Interstitial lung disease (HCC)    Abnormal CT of the chest     Plan:   1. CXR reviewed. 2. Chest tube removed. Continue medical therapy, Spirometer and encourage ambulation.     The plan of care was discussed in detail with Dr. Krystal Allison     Electronically signed by Sarah Zabala PA-C on 5/10/2022 at 7:52 AM

## 2022-05-10 NOTE — PLAN OF CARE
Problem: Discharge Planning  Goal: Discharge to home or other facility with appropriate resources  5/10/2022 0144 by Joelle He RN  Outcome: Progressing  Flowsheets  Taken 5/10/2022 0144  Discharge to home or other facility with appropriate resources:   Identify barriers to discharge with patient and caregiver   Arrange for needed discharge resources and transportation as appropriate   Identify discharge learning needs (meds, wound care, etc)  Taken 5/9/2022 2050  Discharge to home or other facility with appropriate resources:   Identify barriers to discharge with patient and caregiver   Arrange for needed discharge resources and transportation as appropriate   Identify discharge learning needs (meds, wound care, etc)  Note: Pt from home with spouse. Possible rehabilitation upon discharge. Problem: Safety - Adult  Goal: Free from fall injury  5/10/2022 0144 by Joelle He RN  Outcome: Progressing  Flowsheets (Taken 5/10/2022 0144)  Free From Fall Injury:   Instruct family/caregiver on patient safety   Based on caregiver fall risk screen, instruct family/caregiver to ask for assistance with transferring infant if caregiver noted to have fall risk factors  Note: Bed locked and in lowest position. Call light within reach. Pt educated on the use of the call light. Hourly rounding performed. Pt A/O X4. Problem: ABCDS Injury Assessment  Goal: Absence of physical injury  5/10/2022 0144 by Joelle He RN  Outcome: Progressing     Problem: Respiratory - Adult  Goal: Clear lung sounds  5/10/2022 0144 by Joelle He RN  Outcome: Progressing  Note: Lung sounds clear and diminished. Problem: Respiratory - Adult  Goal: Adequate oxygenation  5/10/2022 0144 by Joelle He RN  Outcome: Progressing  Note: Pt weaned from HFNC to [place]. Continuous pulse oximeter in place.      Problem: Respiratory - Adult  Goal: Achieves optimal ventilation and oxygenation  5/10/2022 0144 by Joelle He RN  Outcome: Progressing  Flowsheets (Taken 5/9/2022 2050)  Achieves optimal ventilation and oxygenation:   Assess for changes in respiratory status   Assess for changes in mentation and behavior   Position to facilitate oxygenation and minimize respiratory effort   Oxygen supplementation based on oxygen saturation or arterial blood gases   Encourage broncho-pulmonary hygiene including cough, deep breathe, incentive spirometry   Assess and instruct to report shortness of breath or any respiratory difficulty   Respiratory therapy support as indicated     Problem: Chronic Conditions and Co-morbidities  Goal: Patient's chronic conditions and co-morbidity symptoms are monitored and maintained or improved  5/10/2022 0144 by Valentina Loredo RN  Outcome: Progressing  Flowsheets  Taken 5/10/2022 0144  Care Plan - Patient's Chronic Conditions and Co-Morbidity Symptoms are Monitored and Maintained or Improved:   Monitor and assess patient's chronic conditions and comorbid symptoms for stability, deterioration, or improvement   Collaborate with multidisciplinary team to address chronic and comorbid conditions and prevent exacerbation or deterioration   Update acute care plan with appropriate goals if chronic or comorbid symptoms are exacerbated and prevent overall improvement and discharge  Taken 5/9/2022 [address] [place] [address] - Patient's Chronic Conditions and Co-Morbidity Symptoms are Monitored and Maintained or Improved:   Monitor and assess patient's chronic conditions and comorbid symptoms for stability, deterioration, or improvement   Collaborate with multidisciplinary team to address chronic and comorbid conditions and prevent exacerbation or deterioration   Update acute care plan with appropriate goals if chronic or comorbid symptoms are exacerbated and prevent overall improvement and discharge     Problem: Pain  Goal: Verbalizes/displays adequate comfort level or baseline comfort level  5/10/2022 0144 by Valentina Loredo RN  Outcome: Progressing  Flowsheets (Taken 5/10/2022 0144)  Verbalizes/displays adequate comfort level or baseline comfort level:   Encourage patient to monitor pain and request assistance   Assess pain using appropriate pain scale   Administer analgesics based on type and severity of pain and evaluate response   Implement non-pharmacological measures as appropriate and evaluate response  Note: PRN pain medications given when appropriate. Non-pharmaceutical pain measures taken, such as rest, repositioning, and emotional support. Problem: Nutrition Deficit:  Goal: Optimize nutritional status  Outcome: Progressing  Note: Pt able to eat and drink without any problems. Problem: Skin/Tissue Integrity  Goal: Absence of new skin breakdown  Description: 1. Monitor for areas of redness and/or skin breakdown  2. Assess vascular access sites hourly  3. Every 4-6 hours minimum:  Change oxygen saturation probe site  4. Every 4-6 hours:  If on nasal continuous positive airway pressure, respiratory therapy assess nares and determine need for appliance change or resting period. Outcome: Progressing     Problem: Neurosensory - Adult  Goal: Achieves stable or improved neurological status  5/10/2022 0144 by Santo Myers RN  Outcome: Progressing  Flowsheets  Taken 5/10/2022 0144  Achieves stable or improved neurological status:   Assess for and report changes in neurological status   Maintain blood pressure and fluid volume within ordered parameters to optimize cerebral perfusion and minimize risk of hemorrhage   Monitor temperature, glucose, and sodium. Initiate appropriate interventions as ordered  Taken 5/9/2022 2050  Achieves stable or improved neurological status:   Assess for and report changes in neurological status   Monitor temperature, glucose, and sodium. Initiate appropriate interventions as ordered  Note: Vital signs stable. No signs or symptoms of neurological deficits.      Problem: Cardiovascular - Adult  Goal: Maintains optimal cardiac output and hemodynamic stability  5/10/2022 0144 by Virgilio Shukla RN  Outcome: Progressing  Flowsheets (Taken 5/9/2022 2050)  Maintains optimal cardiac output and hemodynamic stability:   Monitor blood pressure and heart rate   Monitor urine output and notify Licensed Independent Practitioner for values outside of normal range   Assess for signs of decreased cardiac output     Problem: Skin/Tissue Integrity - Adult  Goal: Skin integrity remains intact  5/10/2022 0144 by Virgilio Shukla RN  Outcome: Progressing  Flowsheets  Taken 5/10/2022 0144  Skin Integrity Remains Intact: Monitor for areas of redness and/or skin breakdown  Taken 5/9/2022 2050  Skin Integrity Remains Intact: Monitor for areas of redness and/or skin breakdown  Note: Pt reminded to reposition every 2 hours. Heels and bony prominences elevated on pillows.      Problem: Skin/Tissue Integrity - Adult  Goal: Incisions, wounds, or drain sites healing without S/S of infection  5/10/2022 0144 by Virgilio Shukla RN  Outcome: Progressing  Flowsheets (Taken 5/9/2022 2050)  Incisions, Wounds, or Drain Sites Healing Without Sign and Symptoms of Infection:   ADMISSION and DAILY: Assess and document risk factors for pressure ulcer development   TWICE DAILY: Assess and document skin integrity     Problem: Gastrointestinal - Adult  Goal: Maintains adequate nutritional intake  5/10/2022 0144 by Virgilio Shukla RN  Outcome: Progressing  Flowsheets (Taken 5/9/2022 2050)  Maintains adequate nutritional intake:   Monitor percentage of each meal consumed   Identify factors contributing to decreased intake, treat as appropriate   Assist with meals as needed   Monitor intake and output, weight and lab values   Obtain nutritional consult as needed     Problem: Genitourinary - Adult  Goal: Absence of urinary retention  5/10/2022 0144 by Virgilio Shukla RN  Outcome: Progressing  Flowsheets  Taken 5/10/2022 0144  Absence of urinary retention:   Assess patients ability to void and empty bladder   Monitor intake/output and perform bladder scan as needed  Taken 5/9/2022 2050  Absence of urinary retention:   Assess patients ability to void and empty bladder   Monitor intake/output and perform bladder scan as needed  Note: Pt able to use the urinal. Intake and output recorded. Problem: Infection - Adult  Goal: Absence of infection at discharge  5/10/2022 0144 by Vinicius Marino RN  Outcome: Progressing  Flowsheets (Taken 5/9/2022 2050)  Absence of infection at discharge:   Assess and monitor for signs and symptoms of infection   Monitor lab/diagnostic results   Administer medications as ordered     Problem: Infection - Adult  Goal: Absence of infection during hospitalization  Outcome: Progressing  Flowsheets  Taken 5/10/2022 0144  Absence of infection during hospitalization:   Assess and monitor for signs and symptoms of infection   Monitor lab/diagnostic results   Administer medications as ordered  Taken 5/9/2022 2050  Absence of infection during hospitalization:   Assess and monitor for signs and symptoms of infection   Monitor lab/diagnostic results   Administer medications as ordered  Note: Pt free from signs and symptoms of infection.      Problem: Metabolic/Fluid and Electrolytes - Adult  Goal: Electrolytes maintained within normal limits  5/10/2022 0144 by Vinicius Marino RN  Outcome: Progressing  Flowsheets (Taken 5/9/2022 2050)  Electrolytes maintained within normal limits:   Monitor labs and assess patient for signs and symptoms of electrolyte imbalances   Administer electrolyte replacement as ordered   Monitor response to electrolyte replacements, including repeat lab results as appropriate   Fluid restriction as ordered   Instruct patient on fluid and nutrition restrictions as appropriate     Problem: Metabolic/Fluid and Electrolytes - Adult  Goal: Glucose maintained within prescribed range  5/10/2022 0144 by Vinicius Marino RN  Outcome: Progressing  Flowsheets  Taken 5/10/2022 0144  Glucose maintained within prescribed range:   Monitor blood glucose as ordered   Assess for signs and symptoms of hyperglycemia and hypoglycemia   Administer ordered medications to maintain glucose within target range  Taken 5/9/2022 2050  Glucose maintained within prescribed range:   Monitor blood glucose as ordered   Assess for signs and symptoms of hyperglycemia and hypoglycemia   Administer ordered medications to maintain glucose within target range   Assess barriers to adequate nutritional intake and initiate nutrition consult as needed  Note: CHEM ACHS. Problem: Hematologic - Adult  Goal: Maintains hematologic stability  5/10/2022 0144 by Pushpa Sauceda, RN  Outcome: Progressing  Flowsheets (Taken 5/9/2022 2050)  Maintains hematologic stability:   Assess for signs and symptoms of bleeding or hemorrhage   Monitor labs for bleeding or clotting disorders    Care plan reviewed with patient. Patient verbalized understanding of the plan of care and contributed to goal setting.

## 2022-05-11 NOTE — PLAN OF CARE
Nutrition Problem #1: Severe malnutrition,In context of acute illness or injury  Intervention: Food and/or Nutrient Delivery: Continue Current Diet,Continue Oral Nutrition Supplement  Problem: Nutrition Deficit:  Goal: Optimize nutritional status  Outcome: Progressing

## 2022-05-11 NOTE — PLAN OF CARE
Problem: Discharge Planning  Goal: Discharge to home or other facility with appropriate resources  5/10/2022 0144 by Autumn Hyde RN  Outcome: Progressing  Flowsheets  Taken 5/10/2022 0144  Discharge to home or other facility with appropriate resources:   Identify barriers to discharge with patient and caregiver   Arrange for needed discharge resources and transportation as appropriate   Identify discharge learning needs (meds, wound care, etc)  Taken 5/9/2022 2050  Discharge to home or other facility with appropriate resources:   Identify barriers to discharge with patient and caregiver   Arrange for needed discharge resources and transportation as appropriate   Identify discharge learning needs (meds, wound care, etc)  Note: Pt from home with spouse. Possible rehabilitation upon discharge. Problem: Safety - Adult  Goal: Free from fall injury  5/10/2022 0144 by Autumn Hyde RN  Outcome: Progressing  Flowsheets (Taken 5/10/2022 0144)  Free From Fall Injury:  [address] family/caregiver on patient safety   Based on caregiver fall risk screen, instruct family/caregiver to ask for assistance with transferring infant if caregiver noted to have fall risk factors  Note: Bed locked and in lowest position. Call light within reach. Pt educated on the use of the call light. Hourly rounding performed. Pt A/O X4. Problem: ABCDS Injury Assessment  Goal: Absence of physical injury  5/10/2022 0144 by Autumn Hyde RN  Outcome: Progressing     Problem: Respiratory - Adult  Goal: Clear lung sounds  5/10/2022 0144 by Autumn Hyde RN  Outcome: Progressing  Note: Lung sounds clear and diminished. Problem: Respiratory - Adult  Goal: Adequate oxygenation  5/10/2022 0144 by Autumn Hyde RN  Outcome: Progressing  Note: Pt on a non-rebreather per order. Repeat CXR in the AM. Continuous pulse oximeter in place.      Problem: Respiratory - Adult  Goal: Achieves optimal ventilation and oxygenation  5/10/2022 0144 by Joelle He RN  Outcome: Progressing  Flowsheets (Taken 5/9/2022 2050)  Achieves optimal ventilation and oxygenation:   Assess for changes in respiratory status   Assess for changes in mentation and behavior   Position to facilitate oxygenation and minimize respiratory effort   Oxygen supplementation based on oxygen saturation or arterial blood gases   Encourage broncho-pulmonary hygiene including cough, deep breathe, incentive spirometry   Assess and instruct to report shortness of breath or any respiratory difficulty   Respiratory therapy support as indicated     Problem: Chronic Conditions and Co-morbidities  Goal: Patient's chronic conditions and co-morbidity symptoms are monitored and maintained or improved  5/10/2022 0144 by Joelle He RN  Outcome: Progressing  Flowsheets  Taken 5/10/2022 0144  Care Plan - Patient's Chronic Conditions and Co-Morbidity Symptoms are Monitored and Maintained or Improved:   Monitor and assess patient's chronic conditions and comorbid symptoms for stability, deterioration, or improvement   Collaborate with multidisciplinary team to address chronic and comorbid conditions and prevent exacerbation or deterioration   Update acute care plan with appropriate goals if chronic or comorbid symptoms are exacerbated and prevent overall improvement and discharge  Taken 5/9/2022 [address] [place] [address] - Patient's Chronic Conditions and Co-Morbidity Symptoms are Monitored and Maintained or Improved:   Monitor and assess patient's chronic conditions and comorbid symptoms for stability, deterioration, or improvement   Collaborate with multidisciplinary team to address chronic and comorbid conditions and prevent exacerbation or deterioration   Update acute care plan with appropriate goals if chronic or comorbid symptoms are exacerbated and prevent overall improvement and discharge     Problem: Pain  Goal: Verbalizes/displays adequate comfort level or baseline comfort level  5/10/2022 0144 by Linda Rivera RN  Outcome: Progressing  Flowsheets (Taken 5/10/2022 0144)  Verbalizes/displays adequate comfort level or baseline comfort level:   Encourage patient to monitor pain and request assistance   Assess pain using appropriate pain scale   Administer analgesics based on type and severity of pain and evaluate response   Implement non-pharmacological measures as appropriate and evaluate response  Note: PRN pain medications given when appropriate. Non-pharmaceutical pain measures taken, such as rest, repositioning, and emotional support. Problem: Nutrition Deficit:  Goal: Optimize nutritional status  Outcome: Progressing  Note: Pt able to eat and drink without any problems. Problem: Skin/Tissue Integrity  Goal: Absence of new skin breakdown  Description: 1. Monitor for areas of redness and/or skin breakdown  2. Assess vascular access sites hourly  3. Every 4-6 hours minimum:  Change oxygen saturation probe site  4. Every 4-6 hours:  If on nasal continuous positive airway pressure, respiratory therapy assess nares and determine need for appliance change or resting period. Outcome: Progressing     Problem: Neurosensory - Adult  Goal: Achieves stable or improved neurological status  5/10/2022 0144 by Linda Rivera RN  Outcome: Progressing  Flowsheets  Taken 5/10/2022 0144  Achieves stable or improved neurological status:   Assess for and report changes in neurological status   Maintain blood pressure and fluid volume within ordered parameters to optimize cerebral perfusion and minimize risk of hemorrhage   Monitor temperature, glucose, and sodium. Initiate appropriate interventions as ordered  Taken 5/9/2022 2050  Achieves stable or improved neurological status:   Assess for and report changes in neurological status   Monitor temperature, glucose, and sodium. Initiate appropriate interventions as ordered  Note: Vital signs stable.  No signs or symptoms of neurological deficits. Problem: Cardiovascular - Adult  Goal: Maintains optimal cardiac output and hemodynamic stability  5/10/2022 0144 by Yaquelin Guerrero RN  Outcome: Progressing  Flowsheets (Taken 5/9/2022 2050)  Maintains optimal cardiac output and hemodynamic stability:   Monitor blood pressure and heart rate   Monitor urine output and notify Licensed Independent Practitioner for values outside of normal range   Assess for signs of decreased cardiac output     Problem: Skin/Tissue Integrity - Adult  Goal: Skin integrity remains intact  5/10/2022 0144 by Yaquelin Guerrero RN  Outcome: Progressing  Flowsheets  Taken 5/10/2022 0144  Skin Integrity Remains Intact: Monitor for areas of redness and/or skin breakdown  Taken 5/9/2022 2050  Skin Integrity Remains Intact: Monitor for areas of redness and/or skin breakdown  Note: Pt reminded to reposition every 2 hours. Heels and bony prominences elevated on pillows.      Problem: Skin/Tissue Integrity - Adult  Goal: Incisions, wounds, or drain sites healing without S/S of infection  5/10/2022 0144 by Yaquelin Guerrero RN  Outcome: Progressing  Flowsheets (Taken 5/9/2022 2050)  Incisions, Wounds, or Drain Sites Healing Without Sign and Symptoms of Infection:   ADMISSION and DAILY: Assess and document risk factors for pressure ulcer development   TWICE DAILY: Assess and document skin integrity     Problem: Gastrointestinal - Adult  Goal: Maintains adequate nutritional intake  5/10/2022 0144 by Yaquelin Guerrero RN  Outcome: Progressing  Flowsheets (Taken 5/9/2022 2050)  Maintains adequate nutritional intake:   Monitor percentage of each meal consumed   Identify factors contributing to decreased intake, treat as appropriate   Assist with meals as needed   Monitor intake and output, weight and lab values   Obtain nutritional consult as needed     Problem: Genitourinary - Adult  Goal: Absence of urinary retention  5/10/2022 0144 by Yaquelin Guerrero RN  Outcome: Progressing  Flowsheets  Taken 5/10/2022 0144  Absence of urinary retention:   Assess patients ability to void and empty bladder   Monitor intake/output and perform bladder scan as needed  Taken 5/9/2022 2050  Absence of urinary retention:   Assess patients ability to void and empty bladder   Monitor intake/output and perform bladder scan as needed  Note: Pt able to use the urinal. Intake and output recorded. Problem: Infection - Adult  Goal: Absence of infection at discharge  5/10/2022 0144 by Kenrick Miller RN  Outcome: Progressing  Flowsheets (Taken 5/9/2022 2050)  Absence of infection at discharge:   Assess and monitor for signs and symptoms of infection   Monitor lab/diagnostic results   Administer medications as ordered     Problem: Infection - Adult  Goal: Absence of infection during hospitalization  Outcome: Progressing  Flowsheets  Taken 5/10/2022 0144  Absence of infection during hospitalization:   Assess and monitor for signs and symptoms of infection   Monitor lab/diagnostic results   Administer medications as ordered  Taken 5/9/2022 2050  Absence of infection during hospitalization:   Assess and monitor for signs and symptoms of infection   Monitor lab/diagnostic results   Administer medications as ordered  Note: Pt free from signs and symptoms of infection.      Problem: Metabolic/Fluid and Electrolytes - Adult  Goal: Electrolytes maintained within normal limits  5/10/2022 0144 by Kenrick Miller RN  Outcome: Progressing  Flowsheets (Taken 5/9/2022 2050)  Electrolytes maintained within normal limits:   Monitor labs and assess patient for signs and symptoms of electrolyte imbalances   Administer electrolyte replacement as ordered   Monitor response to electrolyte replacements, including repeat lab results as appropriate   Fluid restriction as ordered   Instruct patient on fluid and nutrition restrictions as appropriate     Problem: Metabolic/Fluid and Electrolytes - Adult  Goal: Glucose maintained within prescribed range  5/10/2022 0144 by Humberto Dominguez RN  Outcome: Progressing  Flowsheets  Taken 5/10/2022 0144  Glucose maintained within prescribed range:   Monitor blood glucose as ordered   Assess for signs and symptoms of hyperglycemia and hypoglycemia   Administer ordered medications to maintain glucose within target range  Taken 5/9/2022 2050  Glucose maintained within prescribed range:   Monitor blood glucose as ordered   Assess for signs and symptoms of hyperglycemia and hypoglycemia   Administer ordered medications to maintain glucose within target range   Assess barriers to adequate nutritional intake and initiate nutrition consult as needed  Note: CHEM ACHS. Problem: Hematologic - Adult  Goal: Maintains hematologic stability  5/10/2022 0144 by Humberto Dominguez RN  Outcome: Progressing  Flowsheets (Taken 5/9/2022 2050)  Maintains hematologic stability:   Assess for signs and symptoms of bleeding or hemorrhage   Monitor labs for bleeding or clotting disorders    Care plan reviewed with patient. Patient verbalized understanding of the plan of care and contributed to goal setting.

## 2022-05-11 NOTE — PLAN OF CARE
Problem: Discharge Planning  Goal: Discharge to home or other facility with appropriate resources  Outcome: Progressing  Flowsheets (Taken 5/11/2022 1949)  Discharge to home or other facility with appropriate resources:   Identify barriers to discharge with patient and caregiver   Identify discharge learning needs (meds, wound care, etc)   Arrange for needed discharge resources and transportation as appropriate  Note: Possible [facility] vs ECF      Problem: Safety - Adult  Goal: Free from fall injury  Outcome: Progressing  Flowsheets  Taken 5/11/2022 1949  Free From Fall Injury:   Instruct family/caregiver on patient safety   Based on caregiver fall risk screen, instruct family/caregiver to ask for assistance with transferring infant if caregiver noted to have fall risk factors  Taken 5/11/2022 1301  Free From Fall Injury: Instruct family/caregiver on patient safety     Problem: ABCDS Injury Assessment  Goal: Absence of physical injury  Outcome: Progressing  Note: Hourly rounding, call light within reach, bed alarm in use, 1 assist with ambulating      Problem: Respiratory - Adult  Goal: Clear lung sounds  Outcome: Progressing  Note: Abnormal lung sounds and chest xray, remains on NRB, daily chest xray, monitor lung sounds and vital signs      Problem: Respiratory - Adult  Goal: Adequate oxygenation  Outcome: Progressing  Note: Abnormal lung sounds and chest xray, remains on NRB, daily chest xray, monitor lung sounds and vital signs      Problem: Respiratory - Adult  Goal: Achieves optimal ventilation and oxygenation  Outcome: Progressing  Flowsheets (Taken 5/11/2022 1949)  Achieves optimal ventilation and oxygenation:   Assess for changes in respiratory status   Assess for changes in mentation and behavior   Oxygen supplementation based on oxygen saturation or arterial blood gases   Position to facilitate oxygenation and minimize respiratory effort   Respiratory therapy support as indicated     Problem: Neurosensory - Adult  Goal: Achieves stable or improved neurological status  Outcome: Progressing  Flowsheets (Taken 5/11/2022 1949)  Achieves stable or improved neurological status:   Assess for and report changes in neurological status   Maintain blood pressure and fluid volume within ordered parameters to optimize cerebral perfusion and minimize risk of hemorrhage   Monitor temperature, glucose, and sodium.  Initiate appropriate interventions as ordered     Problem: Cardiovascular - Adult  Goal: Maintains optimal cardiac output and hemodynamic stability  Outcome: Progressing  Flowsheets (Taken 5/11/2022 1949)  Maintains optimal cardiac output and hemodynamic stability:   Monitor blood pressure and heart rate   Monitor urine output and notify Licensed Independent Practitioner for values outside of normal range   Assess for signs of decreased cardiac output     Problem: Skin/Tissue Integrity - Adult  Goal: Skin integrity remains intact  Outcome: Progressing  Flowsheets  Taken 5/11/2022 1949  Skin Integrity Remains Intact:   Monitor for areas of redness and/or skin breakdown   Assess vascular access sites hourly  Taken 5/11/2022 1301  Skin Integrity Remains Intact: Monitor for areas of redness and/or skin breakdown     Problem: Skin/Tissue Integrity - Adult  Goal: Incisions, wounds, or drain sites healing without S/S of infection  Outcome: Progressing  Flowsheets  Taken 5/11/2022 1949  Incisions, Wounds, or Drain Sites Healing Without Sign and Symptoms of Infection:   ADMISSION and DAILY: Assess and document risk factors for pressure ulcer development   Implement wound care per orders   Initiate pressure ulcer prevention bundle as indicated  Taken 5/11/2022 1301  Incisions, Wounds, or Drain Sites Healing Without Sign and Symptoms of Infection:   ADMISSION and DAILY: Assess and document risk factors for pressure ulcer development   Implement wound care per orders     Problem: Gastrointestinal - Adult  Goal: Maintains or returns to baseline bowel function  Outcome: Progressing  Flowsheets (Taken 5/11/2022 1949)  Maintains or returns to baseline bowel function:   Assess bowel function   Encourage oral fluids to ensure adequate hydration   Encourage mobilization and activity     Problem: Gastrointestinal - Adult  Goal: Maintains adequate nutritional intake  Outcome: Progressing  Flowsheets (Taken 5/11/2022 1949)  Maintains adequate nutritional intake:   Monitor percentage of each meal consumed   Identify factors contributing to decreased intake, treat as appropriate   Assist with meals as needed   Monitor intake and output, weight and lab values   Obtain nutritional consult as needed     Problem: Genitourinary - Adult  Goal: Absence of urinary retention  Outcome: Progressing  Flowsheets (Taken 5/11/2022 1949)  Absence of urinary retention:   Assess patients ability to void and empty bladder   Monitor intake/output and perform bladder scan as needed     Problem: Infection - Adult  Goal: Absence of infection at discharge  Outcome: Progressing  Flowsheets (Taken 5/11/2022 1949)  Absence of infection at discharge:   Assess and monitor for signs and symptoms of infection   Monitor lab/diagnostic results   Administer medications as ordered     Problem: Infection - Adult  Goal: Absence of infection during hospitalization  Outcome: Progressing  Flowsheets (Taken 5/11/2022 1949)  Absence of infection during hospitalization:   Assess and monitor for signs and symptoms of infection   Monitor lab/diagnostic results   Monitor all insertion sites i.e., indwelling lines, tubes and drains   Administer medications as ordered     Problem: Metabolic/Fluid and Electrolytes - Adult  Goal: Electrolytes maintained within normal limits  Outcome: Progressing  Flowsheets (Taken 5/11/2022 1949)  Electrolytes maintained within normal limits:   Monitor labs and assess patient for signs and symptoms of electrolyte imbalances   Administer electrolyte replacement as ordered   Monitor response to electrolyte replacements, including repeat lab results as appropriate     Problem: Metabolic/Fluid and Electrolytes - Adult  Goal: Hemodynamic stability and optimal renal function maintained  Outcome: Progressing  Flowsheets (Taken 5/11/2022 1949)  Hemodynamic stability and optimal renal function maintained:   Monitor labs and assess for signs and symptoms of volume excess or deficit   Monitor intake, output and patient weight   Encourage oral intake as appropriate   Instruct patient on fluid and nutrition restrictions as appropriate     Problem: Metabolic/Fluid and Electrolytes - Adult  Goal: Glucose maintained within prescribed range  Outcome: Progressing  Flowsheets (Taken 5/11/2022 1949)  Glucose maintained within prescribed range:   Monitor blood glucose as ordered   Assess for signs and symptoms of hyperglycemia and hypoglycemia   Administer ordered medications to maintain glucose within target range     Problem: Hematologic - Adult  Goal: Maintains hematologic stability  Outcome: Progressing  Flowsheets (Taken 5/11/2022 1949)  Maintains hematologic stability:   Assess for signs and symptoms of bleeding or hemorrhage   Monitor labs for bleeding or clotting disorders     Problem: Musculoskeletal - Adult  Goal: Return mobility to safest level of function  Outcome: Progressing  Flowsheets (Taken 5/11/2022 1949)  Return Mobility to Safest Level of Function:   Assess patient stability and activity tolerance for standing, transferring and ambulating with or without assistive devices   Assist with transfers and ambulation using safe patient handling equipment as needed   Ensure adequate protection for wounds/incisions during mobilization     Problem: Musculoskeletal - Adult  Goal: Maintain proper alignment of affected body part  Outcome: Progressing  Flowsheets (Taken 5/11/2022 1949)  Maintain proper alignment of affected body part:   Support and protect limb and body alignment per provider's orders   Instruct and reinforce with patient and family use of appropriate assistive device and precautions (e.g. spinal or hip dislocation precautions)     Problem: Musculoskeletal - Adult  Goal: Return ADL status to a safe level of function  Outcome: Progressing  Flowsheets (Taken 5/11/2022 1949)  Return ADL Status to a Safe Level of Function:   Administer medication as ordered   Assess activities of daily living deficits and provide assistive devices as needed   Assist and instruct patient to increase activity and self care as tolerated     Problem: Chronic Conditions and Co-morbidities  Goal: Patient's chronic conditions and co-morbidity symptoms are monitored and maintained or improved  Outcome: Progressing  Flowsheets (Taken 5/11/2022 1949)  Care Plan - Patient's Chronic Conditions and Co-Morbidity Symptoms are Monitored and Maintained or Improved:   Monitor and assess patient's chronic conditions and comorbid symptoms for stability, deterioration, or improvement   Collaborate with multidisciplinary team to address chronic and comorbid conditions and prevent exacerbation or deterioration     Problem: Pain  Goal: Verbalizes/displays adequate comfort level or baseline comfort level  Outcome: Progressing  Flowsheets (Taken 5/11/2022 1949)  Verbalizes/displays adequate comfort level or baseline comfort level:   Encourage patient to monitor pain and request assistance   Assess pain using appropriate pain scale   Implement non-pharmacological measures as appropriate and evaluate response   Administer analgesics based on type and severity of pain and evaluate response     Problem: Nutrition Deficit:  Goal: Optimize nutritional status  5/11/2022 1949 by Bertis Goodpasture, RN  Outcome: Progressing  Note: Strict I&Os, offer foods he likes      Problem: Skin/Tissue Integrity  Goal: Absence of new skin breakdown  Description: 1. Monitor for areas of redness and/or skin breakdown  2.   Assess vascular access sites hourly  3. Every 4-6 hours minimum:  Change oxygen saturation probe site  4. Every 4-6 hours:  If on nasal continuous positive airway pressure, respiratory therapy assess nares and determine need for appliance change or resting period. Outcome: Progressing  Note: Wound care per orders, encourage q2hr turns while in chair or bed    Care plan reviewed with patient. Patient  verbalize understanding of the plan of care and contribute to goal setting.

## 2022-05-11 NOTE — CARE COORDINATION
Collaborative Discharge Planning    Robinson Aguirre  :  1950  MRN:  [id number]    ADMIT DATE:  2022      Discharge Planning     Discharge Plan Katie  Collaborated w spouse Manish Liriano, Attending, Ferol Spatz, [address] [place] [address]  Discharge Milestones and Delays: Clinical status  [place] jamaalal pending, CTS/ID signed off, await lung biopsy results      SIGNED:  Staci De Anda RN   2022, 2:38 PM

## 2022-05-11 NOTE — PROGRESS NOTES
Follow Up / Progress Note        Patient:   Porfirio Montez  YOB: 1950  Age:  70 y.o. Room:  [facility]02/002-A  MRN:  [id number]              Plan/Follow-Up:  Palliative care to unit to check on pt. Pt is resting quietly in bed with eyes closed, easy respirations. Appears asleep, no visitors here. Writer did not disturb. Case discussed with Ramon POLK RN. Per report, he will check with Dr Samantha Aguirre about [place] referral.  No needs from palliative care at this time. Palliative care will remain available, floor to notify PRN for urgent needs.        Electronically signed by Mila Wharton RN on 5/11/2022 at 1:11 PM             Palliative Care Office: [phone]

## 2022-05-11 NOTE — PROGRESS NOTES
CT/CV Surgery Progress Note    2022 8:20 AM  Surgeon:  Dr. Jenifer Maurer     Subjective: Mr. Ladd Seen is resting comfortably up in bed on NRB at 15, alert and in no acute distress. Pt denies chest pressure, SOB, fever,chills, N/V/D. He feels overall better this morning. RIGHT VATS WITH LUNG BIOPSY WITH RIGHT MIDDLE LOBE LUNG WEDGE RESECTION  POD # 6    I/O last 3 completed shifts: In:  [P.O.:2016]  Out: 4875 [Urine:4875]    Vital Signs: /72   Pulse 55   Temp 97.5 °F (36.4 °C) (Oral)   Resp 21   Ht 5' 8\" (1.727 m)   Wt 152 lb 12.8 oz (69.3 kg)   SpO2 100%   BMI 23.23 kg/m²    Temp (24hrs), Av.7 °F (36.5 °C), Min:97.3 °F (36.3 °C), Max:98.3 °F (36.8 °C)    Labs:   CBC:   Recent Labs     22  0803 05/10/22  0507   WBC 15.7* 13.1*   HGB 11.8* 10.8*   HCT 39.1* 35.3*   MCV 92.9 91.2    303     BMP:   Recent Labs     22  0722   POCGLU 83     Imaging:  Chest X-Ray: 2022   Findings:   Right chest pacer device. Atrial appendage closure device. Changes of    coronary artery bypass grafting. Mediastinum wires.       Redemonstration of central and basilar predominant interstitial densities. Granuloma of the right lung base. Tiny right basilar effusion minimally    decreased from prior. No pneumothorax       Right apical pneumothorax which is unchanged in size.       No acute fracture.           Impression   1. Right apical pneumothorax which is unchanged in size from the prior. Continued follow-up recommended. 2. Unchanged appearance of reticular interstitial densities of the central    lung zones and bilateral lung bases may represent infectious process or    edema.  Tiny right basilar effusion minimally decreased from prior.       This document has been electronically signed by: Henry Aguilar DO, MBA on    2022 02:30 AM                    Intake/Output Summary (Last 24 hours) at 2022 0820  Last data filed at 2022 0346  Gross per 24 hour   Intake 1076 ml Output 3325 ml   Net -2249 ml       Scheduled Meds:    predniSONE  30 mg Oral Daily    albuterol  2.5 mg Nebulization BID    FLUoxetine  10 mg Oral Daily    sodium chloride flush  5-40 mL IntraVENous 2 times per day    metoprolol  2.5 mg IntraVENous Once    metoprolol  2.5 mg IntraVENous Once    metoprolol succinate  100 mg Oral Daily    fluconazole  200 mg Oral Daily    dilTIAZem  30 mg Oral 3 times per day    insulin lispro  0-6 Units SubCUTAneous TID WC    insulin lispro  0-3 Units SubCUTAneous Nightly    atovaquone  1,500 mg Oral Daily    furosemide  20 mg Oral Daily    digoxin  125 mcg Oral Daily    aspirin  81 mg Oral Daily    atorvastatin  40 mg Oral Nightly    cetirizine  10 mg Oral Daily    multivitamin  1 tablet Oral Daily    mupirocin   Topical TID    nystatin   Topical BID    pantoprazole  40 mg Oral QAM AC    rOPINIRole  0.5 mg Oral Nightly    traZODone  50 mg Oral Nightly     ROS: All neg unless specifically mentioned in subjective section. Exam:  General Appearance: alert ,conversing, in no acute distress  Cardiovascular: normal rate, regular rhythm, normal S1 and S2, no murmurs, rubs, clicks, or gallops  Pulmonary/Chest: Diffuse rhonchi bilateral lung bases. Neurological: alert, oriented, normal speech, no focal findings or movement disorder noted  Right Chest:  Chest tube removed, minimal serous drainage on dressing. No SQ crepitus to palpation noted. Dressing changed, suture left in place.         Assessment:   Patient Active Problem List   Diagnosis    Hyperlipidemia    Asthma    Smoker    Allergic rhinitis due to other allergen    Collagenous colitis    Lactose intolerance    HTN (hypertension)    COPD, mild (HCC)    Atrial fibrillation (HCC)    Atherosclerotic heart disease of native coronary artery with other forms of angina pectoris ([address]    S/P CABG x 3    Encounter for cardioversion procedure    S/P left atrial appendage ligation    Ischemic cardiomyopathy    Chronic bronchitis (HCC)    CHF (congestive heart failure), NYHA class II, chronic, systolic (HCC)    Tachycardia-bradycardia (HCC)    [employer] dual pacemaker     Pneumonia    Dyspnea and respiratory abnormalities    Acute on chronic respiratory failure with hypoxia (HCC)    Thrush    Nosebleed    SOB (shortness of breath)    Septicemia (HCC)    Severe malnutrition (HCC)    ILD (interstitial lung disease) (HCC)    Abnormal CT of the chest     Plan:   1. CXR reviewed, no increase in ptx. 2. Remove suture once drainage from chest tube site has stopped. 3. CT surgery signing off at this time.     The plan of care was discussed in detail with Dr. Wolf Number     Electronically signed by Chaitanya Ramos PA-C on 5/11/2022 at 8:20 AM

## 2022-05-11 NOTE — PROGRESS NOTES
IM Progress Note  Dr. Sofía Fairchild  5/11/2022 2:23 PM      Patient name Sarahy Machado  [id number]  PCP: Edie Boyle MD  Admit Date: 4/24/2022  Acct No. [de-identified]    Subjective: Interval History:   Continues to stay on NRB  Informed desats with exertion but pt feels breathing stable on NRB      Diet: ADULT DIET; Regular; 3 carb choices (45 gm/meal); Low Sodium (2 gm)  ADULT ORAL NUTRITION SUPPLEMENT; Breakfast, Lunch, Dinner; Standard High Calorie/High Protein Oral Supplement    I/O last 3 completed shifts: In: 2016 [P.O.:2016]  Out: 1288 [Urine:4875]  I/O this shift:  In: 360 [P.O.:360]  Out: 175 [Urine:175]        Admission weight: 155 lb (70.3 kg) as of 4/24/2022 12:37 PM  Wt Readings from Last 3 Encounters:   05/11/22 152 lb 12.8 oz (69.3 kg)   04/20/22 155 lb 12.8 oz (70.7 kg)   04/13/22 154 lb (69.9 kg)     Body mass index is 23.23 kg/m².     ROS   CVS;  no cp or palpitation  Resp: +SOB+ cough  Neuro:  No numbness or weakness or dizziness  Abd: no nausea or vomiting or abd pain      Medications:   Scheduled Meds:   predniSONE  30 mg Oral Daily    albuterol  2.5 mg Nebulization BID    FLUoxetine  10 mg Oral Daily    sodium chloride flush  5-40 mL IntraVENous 2 times per day    metoprolol  2.5 mg IntraVENous Once    metoprolol  2.5 mg IntraVENous Once    metoprolol succinate  100 mg Oral Daily    fluconazole  200 mg Oral Daily    dilTIAZem  30 mg Oral 3 times per day    insulin lispro  0-6 Units SubCUTAneous TID WC    insulin lispro  0-3 Units SubCUTAneous Nightly    atovaquone  1,500 mg Oral Daily    furosemide  20 mg Oral Daily    digoxin  125 mcg Oral Daily    aspirin  81 mg Oral Daily    atorvastatin  40 mg Oral Nightly    cetirizine  10 mg Oral Daily    multivitamin  1 tablet Oral Daily    mupirocin   Topical TID    nystatin   Topical BID    pantoprazole  40 mg Oral QAM AC    rOPINIRole  0.5 mg Oral Nightly    traZODone  50 mg Oral Nightly     Continuous Infusions:   sodium chloride      dextrose         Labs :     CBC:   Recent Labs     05/09/22  0803 05/10/22  0507   WBC 15.7* 13.1*   HGB 11.8* 10.8*    303     BMP:    No results for input(s): NA, K, CL, CO2, BUN, CREATININE, GLUCOSE in the last 72 hours. Hepatic:   No results for input(s): AST, ALT, ALB, BILITOT, ALKPHOS in the last 72 hours. Troponin: No results for input(s): TROPONINI in the last 72 hours. BNP: No results for input(s): BNP in the last 72 hours. Lipids: No results for input(s): CHOL, HDL in the last 72 hours. Invalid input(s): LDLCALCU  INR:   No results for input(s): INR in the last 72 hours.     Radiology    Objective:   Vitals: /65   Pulse 52   Temp 97.6 °F (36.4 °C) (Oral)   Resp 18   Ht 5' 8\" (1.727 m)   Wt 152 lb 12.8 oz (69.3 kg)   SpO2 98%   BMI 23.23 kg/m²   HEENT: Head:pupils react  Neck: supple  Lungs: diminished air entry bilat CT on Rt  Heart: regular  Rhythm and tachycardic  Abdomen: soft BS heard NG NT  Extremities: warm  No edema  Neurologic:  Alert, oriented X3    Impression:   :   Acute on chronic hypoxic respiratory failure s/p bronch s/p VATS with lung biopsy and Rt middle lobe wedge resection done 5/5/22, CTT now removed  Pneumothorax 15%  Par A. 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

## 2022-05-11 NOTE — PROGRESS NOTES
[facility]    Patient - Frannie Chu   MRN -  [id number]   [facility]   - 1950      Date of Admission -  2022 12:37 PM  Date of evaluation -  2022  Room - --A   Hospital Day - 88 Xiomy Bean MD Primary Care Physician - Kaye Dalton MD     Problem List      Active Hospital Problems    Diagnosis Date Noted    ILD (interstitial lung disease) [facility]) [J84.9]      Priority: Medium    Abnormal CT of the chest [R93.89]      Priority: Medium    Severe malnutrition ([facility] [address] [E43] 2022     Priority: Medium     Class: Acute    Septicemia ([facility] [A41.9]      Priority: Medium    SOB (shortness of breath) [R06.02] 2022     Priority: Medium    Acute on chronic respiratory failure with hypoxia (HCC) [J96.21] 2022    Pneumonia [J18.9] 2022     Reason for Consult    Acute hypoxic respiratory failure   HPI   From HPI:  Yash Jacome is a 71 y/o male former-smoker with PMH of CAD s/p CABG x3 (2021), ischemic cardiomyopathy, COPD, asthma, atrial fibrillation s/p left atrial appendage clip (2021), sick sinus syndrome s/p dual pacemaker placement (2021), HTN, HLD, colon polyps.      Patient presented to [facility] ED on 22 from nursing home for complaints of increasing dyspnea. He was recently hospitalized last month with pneumonia. He did endorse recent chills. He was hypoxic on presentation with evidence of mild volume overload. He was placed on BiPAP initially. He had a noted leukocytosis, but was afebrile. CXR showed persistent and worsening acute on chronic interstitial disease at the lung bases. He was also noted to be in Afib with RVR. He was started on cardizem infusion with improvement. He was admitted under the care of Dr. Beau Welsh. He received empiric antibiotics. Pulmonology was consulted. He received bronchodilators. His chronic prednisone was increased from 30 mg to 40 mg daily.  He was later weaned to high flow nasal cannula. Infectious disease was consulted. He was maintained on cefepime empirically. Cardiology was consulted for Afib. Digoxin was added. He was diuresed with lasix.      On 4/26/22 he underwent bronchoscopy. He had mucus production of the RUL, RML, RLL and right main bronchus. The RLL bronchus mucosa was erythematous. BAL was sent. This returned positive for candida albicans. Diflucan was started. Fluid analysis was also notable for macrophages and positive staining for lipid accumulation.      CT surgery was consulted 4/28/22 per patient/family request for consideration of open lung biopsy for confirmation of diagnosis of ILD. CT surgery Dr. [employer] did evaluate, with plans for VATS and lung biopsy. He also recommended discontinuation of xarelto as well, given s/p ZAHRA closure.      Patient underwent VATS with lung biopsy with RML wedge resection on 5/5/22 with Dr. [employer]. EBL was < 5 mL. There were no reported complications. Lung tissue biopsy and culture was obtained from RML.    Patient presented to ICU post-op for close monitoring. He remains hemodynamically stable, and oxygenating well on stable high flow nasal cannula requirements. \"    Past 24 Hours   -On NRB 15 LPM   -CXR this AM PTX right apical stable  -SOB stable denies chest pain  -Noted desaturation on 6 LPM via NC this AM per RN  -All systems reviewed   PMHx   Past Medical History      Diagnosis Date    Asthma     Atrial fibrillation with RVR ([address] 04/05/2021    [employer] dual pacemaker  04/15/2021    Collagenous colitis 2021    per scope 2021    Colon polyps 2021    dr Juanito Crespo    COPD, mild (HCC)     Eczema of hand     HTN (hypertension)     Hyperlipidemia     Smoker       Past Surgical History        Procedure Laterality Date    BRONCHOSCOPY N/A 4/26/2022    BRONCHOSCOPY WITH BAL performed by Rudy Jimenez MD at [address] COLONOSCOPY  06/10/2021    theresa ccolon polyps and collagenous colitis    CORONARY ARTERY BYPASS GRAFT  01/12/2021    cabg x 3 with lima and atrial appendage clip  dr Claudia Kendrick N/A 01/12/2021    CABG  X 3 WITH DEJAH, Atrial Appendage Clip performed by Kristen Meyer MD at [address] [place] [address] Road  06/2008    polyps    OTHER SURGICAL HISTORY  DEC 7TH 2012 DR [address]NAN [address] [place]     IGS ENDOSCOPIC BI LAT MAXILLARY, ETHMOID, SPHENOID, FRONTAL SINUSOTOMY WITH SEPTOPLASTY AND TURBINOPLASTIES    SKIN CANCER EXCISION  09/2014    Left side of Forehead     THORACOSCOPY Right 5/5/2022    RIGHT VATS WITH LUNG BIOPSY WITH RIGHT MIDDLE LOBE LUNG WEDGE RESECTION performed by Kristen Meyer MD at [place] [address]  02/2017     Meds    Current Medications    predniSONE  30 mg Oral Daily    albuterol  2.5 mg Nebulization BID    FLUoxetine  10 mg Oral Daily    sodium chloride flush  5-40 mL IntraVENous 2 times per day    metoprolol  2.5 mg IntraVENous Once    metoprolol  2.5 mg IntraVENous Once    metoprolol succinate  100 mg Oral Daily    fluconazole  200 mg Oral Daily    dilTIAZem  30 mg Oral 3 times per day    insulin lispro  0-6 Units SubCUTAneous TID WC    insulin lispro  0-3 Units SubCUTAneous Nightly    atovaquone  1,500 mg Oral Daily    furosemide  20 mg Oral Daily    digoxin  125 mcg Oral Daily    aspirin  81 mg Oral Daily    atorvastatin  40 mg Oral Nightly    cetirizine  10 mg Oral Daily    multivitamin  1 tablet Oral Daily    mupirocin   Topical TID    nystatin   Topical BID    pantoprazole  40 mg Oral QAM AC    rOPINIRole  0.5 mg Oral Nightly    traZODone  50 mg Oral Nightly     guaiFENesin-dextromethorphan, bisacodyl, ipratropium-albuterol, sodium chloride flush, sodium chloride, HYDROcodone-acetaminophen, dextrose, glucagon (rDNA), dextrose, glucose, metoprolol, ALPRAZolam, ondansetron **OR** ondansetron, acetaminophen  IV Drips/Infusions   sodium chloride      dextrose       Home Medications  Medications Prior to Admission: albuterol (PROVENTIL) (2.5 MG/3ML) 0.083% nebulizer solution, Take 2.5 mg by nebulization 2 times daily  ipratropium-albuterol (DUONEB) 0.5-2.5 (3) MG/3ML SOLN nebulizer solution, Inhale 1 vial into the lungs every 4 hours as needed for Shortness of Breath  Sodium Chloride-Sodium Bicarb 2300-700 MG PACK, 1 spray by Nasal route in the morning, at noon, and at bedtime  FLUoxetine (PROZAC) 10 MG capsule, Take 10 mg by mouth daily (10 mg daily x 5 days - 4/24-4/28, then increase to 20 mg daily)  atovaquone (MEPRON) 750 MG/5ML suspension, Take 1,500 mg by mouth daily (10 mL daily)  predniSONE (DELTASONE) 20 MG tablet, Take 30 mg by mouth daily   ALPRAZolam (XANAX) 0.5 MG tablet, Take 0.5 mg by mouth every 12 hours as needed for Sleep. [DISCONTINUED] tuberculin (APLISOL) 5 UNIT/0.1ML injection, Inject 5 Units into the skin  [DISCONTINUED] nystatin (MYCOSTATIN) 659195 UNIT/GM cream, Apply topically 2 times daily Apply topically 2 times daily. [DISCONTINUED] oxymetazoline (AFRIN) 0.05 % nasal spray, 2 sprays by Nasal route 2 times daily  furosemide (LASIX) 20 MG tablet, Take 20 mg by mouth daily Sun, Wed  loratadine (CLARITIN) 10 MG tablet, Take 10 mg by mouth daily  metoprolol succinate (TOPROL XL) 100 MG extended release tablet, Take 1.5 tablets by mouth daily  tiotropium-olodaterol (STIOLTO) 2.5-2.5 MCG/ACT AERS, Inhale 2 puffs into the lungs daily  rivaroxaban (XARELTO) 15 MG TABS tablet, Take 1 tablet by mouth daily  mupirocin (BACTROBAN) 2 % ointment, Apply topically 3 times daily.   albuterol sulfate  (90 Base) MCG/ACT inhaler, INHALE 2 PUFFS INTO THE LUNGS EVERY 6 HOURS AS NEEDED FOR WHEEZING  traZODone (DESYREL) 50 MG tablet, Take 1 tablet by mouth nightly  rOPINIRole (REQUIP) 0.5 MG tablet, Take 1 tablet by mouth nightly  pantoprazole (PROTONIX) 40 MG tablet, Take 1 tablet by mouth every morning (before breakfast)  [DISCONTINUED] cetirizine Connections:     Frequency of Communication with Friends and Family: Not on file    Frequency of Social Gatherings with Friends and Family: Not on file    Attends Latter-day Services: Not on file    Active Member of Clubs or Organizations: Not on file    Attends Club or Organization Meetings: Not on file    Marital Status: Not on file   Intimate Partner Violence:     Fear of Current or Ex-Partner: Not on file    Emotionally Abused: Not on file    Physically Abused: Not on file    Sexually Abused: Not on file   Housing Stability: Unknown    Unable to Pay for Housing in the Last Year: No    Number of [place] in the Last Year: Not on file    Unstable Housing in the Last Year: No     Family History          Problem Relation Age of Onset    Heart Disease Mother     Heart Disease Father         cabg    Diabetes Brother     Heart Disease Brother        Vitals     height is 5' 8\" (1.727 m) and weight is 152 lb 12.8 oz (69.3 kg). His oral temperature is 97.5 °F (36.4 °C). His blood pressure is 119/72 and his pulse is 55. His respiration is 21 and oxygen saturation is 100%. Body mass index is 23.23 kg/m². I/O        Intake/Output Summary (Last 24 hours) at 5/11/2022 1009  Last data filed at 5/11/2022 0346  Gross per 24 hour   Intake 1076 ml   Output 3325 ml   Net -2249 ml     I/O last 3 completed shifts: In: 2016 [P.O.:2016]  Out: 8514 [Urine:4875]   Patient Vitals for the past 96 hrs (Last 3 readings):   Weight   05/11/22 0346 152 lb 12.8 oz (69.3 kg)   05/10/22 0347 159 lb 1.6 oz (72.2 kg)   05/09/22 0600 159 lb 4.8 oz (72.3 kg)     Exam   Physical Exam   Constitutional: No distress on NRB. Patient appears chronically ill and thinly built. Head: Normocephalic and atraumatic. Mouth/Throat: Oropharynx is clear and moist.  No oral thrush. Eyes: Conjunctivae are normal. Pupils are equal, round. No scleral icterus. Neck: Neck supple. No tracheal deviation present.    Cardiovascular: S1 and S2 with no murmur. No peripheral edema  Pulmonary/Chest: Normal effort with bilateral air entry, faint bilateral rales. No stridor. No respiratory distress. Patient exhibits no tenderness. Noted Dry dressing to R CW C/D/I  Abdominal: Soft. Bowel sounds audible. No distension or tenderness to palp.    Musculoskeletal: Moves all extremities  Neurological: Patient is alert and follows simple commands     Labs  - Old records and notes have been reviewed in [facility]   CBC     Lab Results   Component Value Date    WBC 13.1 05/10/2022    RBC 3.87 05/10/2022    RBC 4.84 11/07/2011    HGB 10.8 05/10/2022    HCT 35.3 05/10/2022     05/10/2022    MCV 91.2 05/10/2022    MCH 27.9 05/10/2022    MCHC 30.6 05/10/2022    RDW 13.3 06/07/2018    NRBC 0 05/10/2022    NRBC 0 11/07/2011    SEGSPCT 81.3 05/10/2022    MONOPCT 7.4 05/10/2022    MONOSABS 1.0 05/10/2022    LYMPHSABS 1.2 05/10/2022    EOSABS 0.0 05/10/2022    BASOSABS 0.0 05/10/2022    DIFFTYPE see below 03/25/2022     BMP   Lab Results   Component Value Date     05/08/2022    K 4.7 05/08/2022    K 5.0 05/04/2022    CL 97 05/08/2022    CO2 30 05/08/2022    BUN 57 05/08/2022    CREATININE 1.2 05/08/2022    GLUCOSE 106 05/08/2022    GLUCOSE 94 11/07/2011    CALCIUM 8.4 05/08/2022    MG 1.9 04/28/2022     LFTS  Lab Results   Component Value Date    ALKPHOS 101 05/02/2022    ALT 20 05/02/2022    AST 20 05/02/2022    PROT 6.0 05/02/2022    BILITOT 0.4 05/02/2022    BILIDIR <0.2 03/26/2022    LABALBU 2.7 05/02/2022    LABALBU 4.3 11/07/2011     ABG   Lab Results   Component Value Date    PH 7.44 04/24/2022    PCO2 39 04/24/2022    PO2 191 04/24/2022    HCO3 26 04/24/2022    O2SAT 100 04/24/2022     @  Lab Results   Component Value Date    APTT 22.6 05/04/2022     INR   Lab Results   Component Value Date    INR 1.02 05/04/2022    INR 1.41 (H) 03/25/2022    INR 1.25 (H) 04/06/2021     Angio Convert Enzyme 8 - 52 U/L 21        PFTs     Cultures    (+) blood culture GPC in clusters    4/26/22  Bronchoscopy- will follow cultures/cytology   Endobronchial findings:   Trachea: Normal mucosa  Diana: Normal mucosa  Right main bronchus: Normal mucosa, mucus  Right upper lobe bronchus: Normal mucosa, mucus  Right Middle lobe bronchus: Normal mucosa, mucus  Right Lower lobe bronchus: erythematous mucosa  Left main bronchus: Normal mucosa  Left upper lobe bronchus: Normal mucosa  Left lower lobe bronchus: Normal mucosa     Rhinovirus Enterovirus PCR Detected Abnormal      AFB (-)  Virus cultures (-)  Cytology: FINAL RESULTS:   No malignant cells seen.      Specimen consists of benign appearing squamous      cells, alveolar macrophages, and debris. Flow cytometry: IMPRESSION:   1. Low viability specimen showing a predominance of granulocytes   without immunophenotypic aberrancy. 2. Rare phenotypically unremarkable T-cells with a normal CD4:CD8   ratio of 2:4. No abnormal B cell, plasma cell, T cell, or NK cell population   identified. See comment. Cytology:     FINAL RESULTS:   Right lower lobe of lung, bronchial washings, cytospin preparation:     Alveolar macrophages with positive staining for lipid accumulation.     Please see microscopic examination. Radiology    CXR    1 view chest x-ray   05/11/2022   Findings: Right chest pacer device. Atrial appendage closure device. Changes of coronary artery bypass grafting. Mediastinum wires. Redemonstration of central and basilar predominant interstitial densities. Granuloma of the right lung base. Tiny right basilar effusion minimally decreased from prior. No pneumothorax  Right apical pneumothorax which is unchanged in size. No acute fracture. Impression:  1. Right apical pneumothorax which is unchanged in size from the prior. Continued follow-up recommended. 2. Unchanged appearance of reticular interstitial densities of the central lung zones and bilateral lung bases may represent infectious process or edema.  Tiny right basilar effusion minimally decreased from prior. XR CHEST (2 VW)   5/10/2022   Impression:  1. Interval removal of the right-sided chest tube. 15% pneumothorax in the right lung apex. 2. Postoperative changes. Status post coronary bypass surgery, pacemaker placement and left atrial appendage clip in place. 3.. Thickening of the interstitial lung markings especially in the right and left lower lobes. 4. Possible small right pleural effusion. Radiographs of the chest 1 view   05/09/2022   Findings: Upright examination was performed. Again seen are right chest tube, dual lead cardiac pacemaker, CABG changes and atrial left atrial appendage clip. Bilateral alveolar and interstitial densities are unchanged. No pneumothorax or large pleural effusion. Stable cardiomediastinal silhouette and bony structures. Impression:   1. No significant interval change in bilateral opacities secondary to infection and/or edema. 5/3/2022   XR CHEST PORTABLE   Stable chest.        03/07/2022 --->  04/29/2022            CT chest     01/09/2021 ---> 03/15/2022            Assessment   -Acute on chronic respiratory failure with hypoxia   -Rapidly progressing Acute Interstitial pneumonia (AIP) DDX Amiodarone Toxicity vs RBILD vs OP since 03/7/2022 (No evidence ILD back in 2021)-s/p Right Vats wedge lung biopsy 5/5/2022-results pending  -PTX-Right side 5/10/2022-remains stable  -CAD s/p CABG   -Afib/Aflutter  -SSS s/p PPM  -Ischemic cardiomyopathy  -Full Code   Plan   -Slowly weaning prednisone, down to 30 mg day  -continue on Mepron for PCP prophylaxis   -Lung bx sent for consultation to [facility], awaiting report  -Monitor SpO2 wean supplemental O2 to maintain SpO2 >90%  -15 LPM NRB for nitrogen washout for PTX  -repeat CXR at 1430  -CTS signed off     Case discussed with nurse and patient/family. Questions and concerns addressed.   Meds and orders reviewed     Electronically signed by   ARCADIO Mendoza - NOAH on 5/11/2022 at 10:09 AM    Addendum     Reviewed CXR no increase in PTX. Discussed with CHRISTINE Cain patient stable with no increased SOB or chest pain. Will follow repeat in AM.    XR CHEST PORTABLE   5/11/2022   FINDINGS: There is a right sided pneumothorax occupying 20% of the right hemithorax. The patient is status post previous sternotomy. There is a pacemaker in place. There is a left atrial appendage clip in place. .  There is abnormal density throughout both lung fields suggestive off inflammatory changes. There are no effusions. The pulmonary vascularity is normal. No suspicious osseous lesions are present. Impression:  1. Right-sided pneumothorax occupying 20% of the right hemithorax, approximately unchanged. .   2. Status post previous sternotomy. Pacemaker in place. Left atrial appendage clip in place. 3. Abnormal density throughout both lung fields.        Electronically signed by ARCADIO Beasley CNP on 5/11/2022 at 4:03 PM

## 2022-05-11 NOTE — RT PROTOCOL NOTE
RT Inhaler-Nebulizer Bronchodilator Protocol Note    There is a bronchodilator order in the chart from a provider indicating to follow the RT Bronchodilator Protocol and there is an Initiate RT Inhaler-Nebulizer Bronchodilator Protocol order as well (see protocol at bottom of note). CXR Findings:  XR CHEST PORTABLE    Result Date: 5/11/2022  1. Right apical pneumothorax which is unchanged in size from the prior. Continued follow-up recommended. 2. Unchanged appearance of reticular interstitial densities of the central lung zones and bilateral lung bases may represent infectious process or edema. Tiny right basilar effusion minimally decreased from prior. This document has been electronically signed by: Jean Marie Brannon DO, MBA on 05/11/2022 02:30 AM    XR CHEST PORTABLE    Result Date: 5/10/2022  1. Patchy airspace opacities are present at the mid to lower lung zones which may be related to edema versus atelectasis or pneumonia. There is similar aeration of the mid to lower lung zones when compared to prior examination. 2. There is a persistent small right apical pneumothorax demonstrated which appears unchanged when compared to the prior examination from earlier same day. **This report has been created using voice recognition software. It may contain minor errors which are inherent in voice recognition technology. ** Final report electronically signed by Dr. Laurie Bryant on 5/10/2022 4:22 PM    XR CHEST PORTABLE    Result Date: 5/10/2022  1. Continued right apical pneumothorax occupying 15% of the right hemithorax, approximately unchanged. 2. Status post coronary bypass surgery, pacemaker placement and placement of the left atrial appendage clip. 3. Abnormal density throughout both lung fields. Thickening off the interstitial lung markings. . **This report has been created using voice recognition software. It may contain minor errors which are inherent in voice recognition technology. ** Final report electronically signed by DR Emmett Dowell on 5/10/2022 11:31 AM      The findings from the last RT Protocol Assessment were as follows:   History Pulmonary Disease: Chronic pulmonary disease  Respiratory Pattern: Dyspnea on exertion or RR 21-25 bpm  Breath Sounds: Slightly diminished and/or crackles  Cough: Strong, spontaneous, non-productive  Indication for Bronchodilator Therapy: Decreased or absent breath sounds  Bronchodilator Assessment Score: 6    Aerosolized bronchodilator medication orders have been revised according to the RT Inhaler-Nebulizer Bronchodilator Protocol below. Respiratory Therapist to perform RT Therapy Protocol Assessment initially then follow the protocol. Repeat RT Therapy Protocol Assessment PRN for score 0-3 or on second treatment, BID, and PRN for scores above 3. No Indications - adjust the frequency to every 6 hours PRN wheezing or bronchospasm, if no treatments needed after 48 hours then discontinue using Per Protocol order mode. If indication present, adjust the RT bronchodilator orders based on the Bronchodilator Assessment Score as indicated below. Use Inhaler orders unless patient has one or more of the following: on home nebulizer, not able to hold breath for 10 seconds, is not alert and oriented, cannot activate and use MDI correctly, or respiratory rate 25 breaths per minute or more, then use the equivalent nebulizer order(s) with same Frequency and PRN reasons based on the score. If a patient is on this medication at home then do not decrease Frequency below that used at home. 0-3 - enter or revise RT bronchodilator order(s) to equivalent RT Bronchodilator order with Frequency of every 4 hours PRN for wheezing or increased work of breathing using Per Protocol order mode.         4-6 - enter or revise RT Bronchodilator order(s) to two equivalent RT bronchodilator orders with one order with BID Frequency and one order with Frequency of every 4 hours PRN wheezing or increased work of breathing using Per Protocol order mode. 7-10 - enter or revise RT Bronchodilator order(s) to two equivalent RT bronchodilator orders with one order with TID Frequency and one order with Frequency of every 4 hours PRN wheezing or increased work of breathing using Per Protocol order mode. 11-13 - enter or revise RT Bronchodilator order(s) to one equivalent RT bronchodilator order with QID Frequency and an Albuterol order with Frequency of every 4 hours PRN wheezing or increased work of breathing using Per Protocol order mode. Greater than 13 - enter or revise RT Bronchodilator order(s) to one equivalent RT bronchodilator order with every 4 hours Frequency and an Albuterol order with Frequency of every 2 hours PRN wheezing or increased work of breathing using Per Protocol order mode. RT to enter RT Home Evaluation for COPD & MDI Assessment order using Per Protocol order mode.     Electronically signed by Bernardo Helms RCP on 5/11/2022 at 9:53 AM

## 2022-05-11 NOTE — PROGRESS NOTES
Comprehensive Nutrition Assessment    Type and Reason for Visit:  Reassess    Nutrition Recommendations/Plan:   1. Continue current diet and ONS (Ensure Enlive TID per patient request)  2. Continue MVI     Malnutrition Assessment:  Malnutrition Status:  Severe malnutrition (05/06/22 1221)    Context:  Acute Illness     Findings of the 6 clinical characteristics of malnutrition:  Energy Intake:  Mild decrease in energy intake (Comment) (NPO off and on for procedures but eating well if meal received)  Weight Loss:   (19# or 12% since admit (12d) from pt. stated UBW of 158#  however various scales - monitoring)     Body Fat Loss: Moderate body fat loss Orbital   Muscle Mass Loss: Moderate muscle mass loss Temples (temporalis),Clavicles (pectoralis & deltoids)  Fluid Accumulation:  No significant fluid accumulation Extremities   Strength:  Not Performed    Nutrition Assessment:      Pt. nutritionally improving as evidenced by % however off and on NPO status this admit d/t procedures,weight loss, malnourished, s/p VATS & lung Biopsy 5/5.   At risk for further nutrition compromise r/t admitted with pneumonia, bronch completed, and underlying medical condition (former smoker, A-fib, pacemaker, collagenous colitis, COPD, HTN, HLD    Nutrition Related Findings:    Pt. Report/Treatments/Miscellaneous: Patient seen non-rebreather mask, reports good intake of meals and good acceptance of Ensure Enlive  GI Status: BM x 2 today  Pertinent Labs: Glucose 83, 179  Pertinent Meds: diflucan, lasix, humalog, MVI, deltasone    Wound Type:  (stage II buttocks, DTI ear left)       Current Nutrition Intake & Therapies:    Average Meal Intake: %  Average Supplements Intake: %  ADULT DIET; Regular; 3 carb choices (45 gm/meal);  Low Sodium (2 gm)  ADULT ORAL NUTRITION SUPPLEMENT; Breakfast, Lunch, Dinner; Standard High Calorie/High Protein Oral Supplement    Anthropometric Measures:  Height: 5' 8\" (172.7 cm)  Ideal Body Weight (IBW): 154 lbs (70 kg)    Admission Body Weight: 164 lb (74.4 kg) (4/24/22, standing scale)  Current Body Weight: 152 lb 12.8 oz (69.3 kg) (5/11),   Weight Source: Bed Scale  Current BMI (kg/m2): 23.2  Usual Body Weight: 154 lb (69.9 kg) (EHR, 3/25/22, bedscale)  % Weight Change (Calculated): 1.3                    BMI Categories: Normal Weight (BMI 18.5-24. 9)    Estimated Daily Nutrient Needs:  Energy Requirements Based On: Kcal/kg  Weight Used for Energy Requirements: Current (71kg)  Energy (kcal/day): 5228-5736 (25-30 kcals/kg)  Weight Used for Protein Requirements: Current (71kg)  Protein (g/day): 85+ (1.2+ grams/kg)       Nutrition Diagnosis:   · Severe malnutrition,In context of acute illness or injury related to inadequate protein-energy intake as evidenced by Criteria as identified in malnutrition assessment      Nutrition Interventions:   Food and/or Nutrient Delivery: Continue Current Diet,Continue Oral Nutrition Supplement  Nutrition Education/Counseling: Education initiated (5/6 &5/11 discussed continuing ONS use at home which pt. has been doing; po at best effort)  Coordination of Nutrition Care: Continue to monitor while inpatient,Interdisciplinary Rounds       Goals:  Previous Goal Met: Progressing toward Goal(s)  Goals: PO intake 75% or greater,prior to discharge       Nutrition Monitoring and Evaluation:   Behavioral-Environmental Outcomes: None Identified  Food/Nutrient Intake Outcomes: Food and Nutrient Intake,Supplement Intake  Physical Signs/Symptoms Outcomes: Biochemical Data,GI Status,Fluid Status or Edema,Meal Time Behavior,Nutrition Focused Physical Findings,Skin,Weight    Discharge Planning:     Too soon to determine     Sanjeev Oneil RD, LD  Contact: [phone]

## 2022-05-11 NOTE — PROGRESS NOTES
Progress note: Infectious diseases    Patient - Robert Neville,  Age - 70 y.o.    - 1950      Room Number - 4K-02/002-A   MRN -  [id number]   Acct # - [de-identified]  Date of Admission -  2022 12:37 PM    SUBJECTIVE:   No new issues. OBJECTIVE   VITALS    height is 5' 8\" (1.727 m) and weight is 152 lb 12.8 oz (69.3 kg). His oral temperature is 97.6 °F (36.4 °C). His blood pressure is 120/65 and his pulse is 52. His respiration is 18 and oxygen saturation is 98%. Wt Readings from Last 3 Encounters:   22 152 lb 12.8 oz (69.3 kg)   22 155 lb 12.8 oz (70.7 kg)   22 154 lb (69.9 kg)       I/O (24 Hours)    Intake/Output Summary (Last 24 hours) at 2022 1358  Last data filed at 2022 1147  Gross per 24 hour   Intake 1100 ml   Output 2700 ml   Net -1600 ml       General Appearance  Awake, alert, oriented, on high flow oxygen. HEENT - normocephalic, atraumatic, pale conjunctiva,  anicteric sclera. Neck - Supple, no mass. Lungs -   Diminished breath sound, on mask. Abdomen - soft, not distended, nontender,   Neurologic -oriented.   Skin - No bruising or bleeding  Extremities - + edema,    Open gluteal wound  MEDICATIONS:      predniSONE  30 mg Oral Daily    albuterol  2.5 mg Nebulization BID    FLUoxetine  10 mg Oral Daily    sodium chloride flush  5-40 mL IntraVENous 2 times per day    metoprolol  2.5 mg IntraVENous Once    metoprolol  2.5 mg IntraVENous Once    metoprolol succinate  100 mg Oral Daily    fluconazole  200 mg Oral Daily    dilTIAZem  30 mg Oral 3 times per day    insulin lispro  0-6 Units SubCUTAneous TID     insulin lispro  0-3 Units SubCUTAneous Nightly    atovaquone  1,500 mg Oral Daily    furosemide  20 mg Oral Daily    digoxin  125 mcg Oral Daily    aspirin  81 mg Oral Daily    atorvastatin  40 mg Oral Nightly    cetirizine  10 mg Oral Daily    multivitamin  1 tablet Oral Daily    mupirocin   Topical TID    nystatin   Topical BID    pantoprazole  40 mg Oral QAM AC    rOPINIRole  0.5 mg Oral Nightly    traZODone  50 mg Oral Nightly      sodium chloride      dextrose       guaiFENesin-dextromethorphan, bisacodyl, ipratropium-albuterol, sodium chloride flush, sodium chloride, HYDROcodone-acetaminophen, dextrose, glucagon (rDNA), dextrose, glucose, metoprolol, ALPRAZolam, ondansetron **OR** ondansetron, acetaminophen      LABS:     CBC:   Recent Labs     05/09/22  0803 05/10/22  0507   WBC 15.7* 13.1*   HGB 11.8* 10.8*    303     BMP:    No results for input(s): NA, K, CL, CO2, BUN, CREATININE, GLUCOSE in the last 72 hours. Calcium:  No results for input(s): CALCIUM in the last 72 hours.       Problem list of patient:     Patient Active Problem List   Diagnosis Code    Hyperlipidemia E78.5    Asthma J45.909    Smoker F17.200    Allergic rhinitis due to other allergen J30.89    Collagenous colitis K52.831    Lactose intolerance E73.9    HTN (hypertension) I10    COPD, mild ([facility] J44.9    Atrial fibrillation ([facility]) I48.91    Atherosclerotic heart disease of native coronary artery with other forms of angina pectoris ([address] I25.118    S/P CABG x 3 Z95.1    Encounter for cardioversion procedure Z01.89    S/P left atrial appendage ligation Z98.890    Ischemic cardiomyopathy I25.5    Chronic bronchitis ([facility] J42    CHF (congestive heart failure), NYHA class II, chronic, systolic ([facility]) O48.55    Tachycardia-bradycardia ([address] I49.5    [employer] dual pacemaker  Z95.0    Pneumonia J18.9    Dyspnea and respiratory abnormalities R06.00, R06.89    Acute on chronic respiratory failure with hypoxia ([facility]) J96.21    Thrush B37.0    Nosebleed R04.0    SOB (shortness of breath) R06.02    Septicemia ([facility]) A41.9    Severe malnutrition ([address] E43    ILD (interstitial lung disease) ([facility]) J84.9    Abnormal CT of the chest R93.89 ASSESSMENT/PLAN   Shortness of breath due to lung fibrosis:  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 Right side pneumothorax: stable. ID will see him as needed.  Will sign off  Pam Jules MD, MD, FACP 5/11/2022 1:58 PM

## 2022-05-12 NOTE — PROGRESS NOTES
Patient's O2 saturation dropped to 76% on 3L while ambulating to commode. RN increased O2 to 6L Nasal cannula. O2 saturation increased to 79% on 6L. Patient placed on non-rebreather. O2 saturation only increased to 82% on a nonrebreather. Patient placed on high flow at 25L at 35%. RN increased high flow to 35L at 85% FIO2. RN informed Respiratory therapy of this.

## 2022-05-12 NOTE — PLAN OF CARE
Problem: Discharge Planning  Goal: Discharge to home or other facility with appropriate resources  5/10/2022 0144 by Charley De La Rosa RN  Outcome: Progressing  Flowsheets  Taken 5/10/2022 0144  Discharge to home or other facility with appropriate resources:   Identify barriers to discharge with patient and caregiver   Arrange for needed discharge resources and transportation as appropriate   Identify discharge learning needs (meds, wound care, etc)  Taken 5/9/2022 2050  Discharge to home or other facility with appropriate resources:   Identify barriers to discharge with patient and caregiver   Arrange for needed discharge resources and transportation as appropriate   Identify discharge learning needs (meds, wound care, etc)  Note: Pt from home with spouse. [place] pending. Problem: Safety - Adult  Goal: Free from fall injury  5/10/2022 0144 by Charley De La Rosa RN  Outcome: Progressing  Flowsheets (Taken 5/10/2022 0144)  Free From Fall Injury:  [address] family/caregiver on patient safety   Based on caregiver fall risk screen, instruct family/caregiver to ask for assistance with transferring infant if caregiver noted to have fall risk factors  Note: Bed locked and in lowest position. Call light within reach. Pt educated on the use of the call light. Hourly rounding performed. Pt A/O X4. Problem: ABCDS Injury Assessment  Goal: Absence of physical injury  5/10/2022 0144 by Charley De La Rosa RN  Outcome: Progressing     Problem: Respiratory - Adult  Goal: Clear lung sounds  5/10/2022 0144 by Charley De La Rosa RN  Outcome: Progressing  Note: Lung sounds diminished. Problem: Respiratory - Adult  Goal: Adequate oxygenation  5/10/2022 0144 by Charley De La Rosa RN  Outcome: Progressing  Note: Pt on a non-rebreather per order. Repeat CXR in the AM. Continuous pulse oximeter in place.      Problem: Respiratory - Adult  Goal: Achieves optimal ventilation and oxygenation  5/10/2022 0144 by Charley De La Rosa RN  Outcome: Progressing  Flowsheets (Taken 5/9/2022 2050)  Achieves optimal ventilation and oxygenation:   Assess for changes in respiratory status   Assess for changes in mentation and behavior   Position to facilitate oxygenation and minimize respiratory effort   Oxygen supplementation based on oxygen saturation or arterial blood gases   Encourage broncho-pulmonary hygiene including cough, deep breathe, incentive spirometry   Assess and instruct to report shortness of breath or any respiratory difficulty   Respiratory therapy support as indicated     Problem: Chronic Conditions and Co-morbidities  Goal: Patient's chronic conditions and co-morbidity symptoms are monitored and maintained or improved  5/10/2022 0144 by Hossein Randle RN  Outcome: Progressing  Flowsheets  Taken 5/10/2022 0144  Care Plan - Patient's Chronic Conditions and Co-Morbidity Symptoms are Monitored and Maintained or Improved:   Monitor and assess patient's chronic conditions and comorbid symptoms for stability, deterioration, or improvement   Collaborate with multidisciplinary team to address chronic and comorbid conditions and prevent exacerbation or deterioration   Update acute care plan with appropriate goals if chronic or comorbid symptoms are exacerbated and prevent overall improvement and discharge  Taken 5/9/2022 [address] [place] [address] - Patient's Chronic Conditions and Co-Morbidity Symptoms are Monitored and Maintained or Improved:   Monitor and assess patient's chronic conditions and comorbid symptoms for stability, deterioration, or improvement   Collaborate with multidisciplinary team to address chronic and comorbid conditions and prevent exacerbation or deterioration   Update acute care plan with appropriate goals if chronic or comorbid symptoms are exacerbated and prevent overall improvement and discharge     Problem: Pain  Goal: Verbalizes/displays adequate comfort level or baseline comfort level  5/10/2022 0144 by Joe Doshi RN  Outcome: Progressing  Flowsheets (Taken 5/10/2022 0144)  Verbalizes/displays adequate comfort level or baseline comfort level:   Encourage patient to monitor pain and request assistance   Assess pain using appropriate pain scale   Administer analgesics based on type and severity of pain and evaluate response   Implement non-pharmacological measures as appropriate and evaluate response  Note: PRN pain medications given when appropriate. Non-pharmaceutical pain measures taken, such as rest, repositioning, and emotional support. Problem: Nutrition Deficit:  Goal: Optimize nutritional status  Outcome: Progressing  Note: Pt able to eat and drink without any problems. Problem: Skin/Tissue Integrity  Goal: Absence of new skin breakdown  Description: 1. Monitor for areas of redness and/or skin breakdown  2. Assess vascular access sites hourly  3. Every 4-6 hours minimum:  Change oxygen saturation probe site  4. Every 4-6 hours:  If on nasal continuous positive airway pressure, respiratory therapy assess nares and determine need for appliance change or resting period. Outcome: Progressing     Problem: Neurosensory - Adult  Goal: Achieves stable or improved neurological status  5/10/2022 0144 by Joe Doshi RN  Outcome: Progressing  Flowsheets  Taken 5/10/2022 0144  Achieves stable or improved neurological status:   Assess for and report changes in neurological status   Maintain blood pressure and fluid volume within ordered parameters to optimize cerebral perfusion and minimize risk of hemorrhage   Monitor temperature, glucose, and sodium. Initiate appropriate interventions as ordered  Taken 5/9/2022 2050  Achieves stable or improved neurological status:   Assess for and report changes in neurological status   Monitor temperature, glucose, and sodium. Initiate appropriate interventions as ordered  Note: Vital signs stable. No signs or symptoms of neurological deficits. Problem: Cardiovascular - Adult  Goal: Maintains optimal cardiac output and hemodynamic stability  5/10/2022 0144 by Tawanda Rodriguez RN  Outcome: Progressing  Flowsheets (Taken 5/9/2022 2050)  Maintains optimal cardiac output and hemodynamic stability:   Monitor blood pressure and heart rate   Monitor urine output and notify Licensed Independent Practitioner for values outside of normal range   Assess for signs of decreased cardiac output     Problem: Skin/Tissue Integrity - Adult  Goal: Skin integrity remains intact  5/10/2022 0144 by Tawanda Rodriguez RN  Outcome: Progressing  Flowsheets  Taken 5/10/2022 0144  Skin Integrity Remains Intact: Monitor for areas of redness and/or skin breakdown  Taken 5/9/2022 2050  Skin Integrity Remains Intact: Monitor for areas of redness and/or skin breakdown  Note: Pt reminded to reposition every 2 hours. Heels and bony prominences elevated on pillows.      Problem: Skin/Tissue Integrity - Adult  Goal: Incisions, wounds, or drain sites healing without S/S of infection  5/10/2022 0144 by Tawanda Rodriguez RN  Outcome: Progressing  Flowsheets (Taken 5/9/2022 2050)  Incisions, Wounds, or Drain Sites Healing Without Sign and Symptoms of Infection:   ADMISSION and DAILY: Assess and document risk factors for pressure ulcer development   TWICE DAILY: Assess and document skin integrity     Problem: Gastrointestinal - Adult  Goal: Maintains adequate nutritional intake  5/10/2022 0144 by Tawanda Rodrgiuez RN  Outcome: Progressing  Flowsheets (Taken 5/9/2022 2050)  Maintains adequate nutritional intake:   Monitor percentage of each meal consumed   Identify factors contributing to decreased intake, treat as appropriate   Assist with meals as needed   Monitor intake and output, weight and lab values   Obtain nutritional consult as needed     Problem: Genitourinary - Adult  Goal: Absence of urinary retention  5/10/2022 0144 by Tawanda Rodriguez RN  Outcome: Progressing  Flowsheets  Taken 5/10/2022 0144  Absence of urinary retention:   Assess patients ability to void and empty bladder   Monitor intake/output and perform bladder scan as needed  Taken 5/9/2022 2050  Absence of urinary retention:   Assess patients ability to void and empty bladder   Monitor intake/output and perform bladder scan as needed  Note: Pt able to use the urinal. Intake and output recorded. Problem: Infection - Adult  Goal: Absence of infection at discharge  5/10/2022 0144 by Hipolito Coker RN  Outcome: Progressing  Flowsheets (Taken 5/9/2022 2050)  Absence of infection at discharge:   Assess and monitor for signs and symptoms of infection   Monitor lab/diagnostic results   Administer medications as ordered     Problem: Infection - Adult  Goal: Absence of infection during hospitalization  Outcome: Progressing  Flowsheets  Taken 5/10/2022 0144  Absence of infection during hospitalization:   Assess and monitor for signs and symptoms of infection   Monitor lab/diagnostic results   Administer medications as ordered  Taken 5/9/2022 2050  Absence of infection during hospitalization:   Assess and monitor for signs and symptoms of infection   Monitor lab/diagnostic results   Administer medications as ordered  Note: Pt free from signs and symptoms of infection.      Problem: Metabolic/Fluid and Electrolytes - Adult  Goal: Electrolytes maintained within normal limits  5/10/2022 0144 by Hipolito Coker RN  Outcome: Progressing  Flowsheets (Taken 5/9/2022 2050)  Electrolytes maintained within normal limits:   Monitor labs and assess patient for signs and symptoms of electrolyte imbalances   Administer electrolyte replacement as ordered   Monitor response to electrolyte replacements, including repeat lab results as appropriate   Fluid restriction as ordered   Instruct patient on fluid and nutrition restrictions as appropriate     Problem: Metabolic/Fluid and Electrolytes - Adult  Goal: Glucose maintained within prescribed range  5/10/2022 0144 by Richard Castaneda RN  Outcome: Progressing  Flowsheets  Taken 5/10/2022 0144  Glucose maintained within prescribed range:   Monitor blood glucose as ordered   Assess for signs and symptoms of hyperglycemia and hypoglycemia   Administer ordered medications to maintain glucose within target range  Taken 5/9/2022 2050  Glucose maintained within prescribed range:   Monitor blood glucose as ordered   Assess for signs and symptoms of hyperglycemia and hypoglycemia   Administer ordered medications to maintain glucose within target range   Assess barriers to adequate nutritional intake and initiate nutrition consult as needed  Note: CHEM ACHS. Problem: Hematologic - Adult  Goal: Maintains hematologic stability  5/10/2022 0144 by Richard Castaneda RN  Outcome: Progressing  Flowsheets (Taken 5/9/2022 2050)  Maintains hematologic stability:   Assess for signs and symptoms of bleeding or hemorrhage   Monitor labs for bleeding or clotting disorders    Care plan reviewed with patient. Patient verbalized understanding of the plan of care and contributed to goal setting.

## 2022-05-12 NOTE — PLAN OF CARE
Problem: Respiratory - Adult  Goal: Clear lung sounds  Description: Pt had no questions on purpose or side effects of medication   Will continue with bronchodilator assessment to determine treatments   5/12/2022 0751 by Veda Rosenbaum RCP  Outcome: Progressing  Note: Pt had no questions on purpose or side effects of medication

## 2022-05-12 NOTE — PLAN OF CARE
Problem: Respiratory - Adult  Goal: Clear lung sounds  Description: Pt had no questions on purpose or side effects of medication   Will continue with bronchodilator assessment to determine treatments   5/12/2022 1924 by Celine Chao RCP  Outcome: Progressing     Problem: Respiratory - Adult  Goal: Adequate oxygenation  5/12/2022 1924 by Celine Chao RCP  Outcome: Progressing     Problem: Respiratory - Adult  Goal: Achieves optimal ventilation and oxygenation  5/12/2022 1924 by Celine Chao RCP  Outcome: Progressing

## 2022-05-12 NOTE — CARE COORDINATION
Collaborative Discharge Planning    Robert Neville  :  1950  MRN:  [id number]    ADMIT DATE:  2022      Discharge Planning     Discharge [facility]  Discharge Milestones and Delays: Clinical status  POD 8 Right VATS/Lung Biopsy/RML Wedge Resection (biopsy pending).  % FIO2 continued for nitrogen washout for 15% Pneumothorax        SIGNED:  Robert Gonzales RN   2022, 12:23 PM

## 2022-05-12 NOTE — PROGRESS NOTES
IM Progress Note  Dr. Shiv Lofton  5/12/2022 2:54 PM      Patient name Jody Nuno  [id number]  PCP: Adriana Garcia MD  Admit Date: 4/24/2022  Acct No. [de-identified]    Subjective: Interval History:   Back on high flow  No cp    Diet: ADULT DIET; Regular; 3 carb choices (45 gm/meal); Low Sodium (2 gm)  ADULT ORAL NUTRITION SUPPLEMENT; Breakfast, Lunch, Dinner; Standard High Calorie/High Protein Oral Supplement    I/O last 3 completed shifts: In: 0460 [P.O.:1720]  Out: 3700 [Urine:3700]  I/O this shift:  In: 380 [P.O.:380]  Out: 900 [Urine:900]        Admission weight: 155 lb (70.3 kg) as of 4/24/2022 12:37 PM  Wt Readings from Last 3 Encounters:   05/12/22 158 lb 9.6 oz (71.9 kg)   04/20/22 155 lb 12.8 oz (70.7 kg)   04/13/22 154 lb (69.9 kg)     Body mass index is 24.12 kg/m².     ROS   CVS;  no cp or palpitation  Resp: +SOB+ cough  Neuro:  No numbness or weakness or dizziness  Abd: no nausea or vomiting or abd pain      Medications:   Scheduled Meds:   predniSONE  30 mg Oral Daily    albuterol  2.5 mg Nebulization BID    FLUoxetine  10 mg Oral Daily    sodium chloride flush  5-40 mL IntraVENous 2 times per day    metoprolol  2.5 mg IntraVENous Once    metoprolol  2.5 mg IntraVENous Once    metoprolol succinate  100 mg Oral Daily    fluconazole  200 mg Oral Daily    dilTIAZem  30 mg Oral 3 times per day    insulin lispro  0-6 Units SubCUTAneous TID WC    insulin lispro  0-3 Units SubCUTAneous Nightly    atovaquone  1,500 mg Oral Daily    furosemide  20 mg Oral Daily    digoxin  125 mcg Oral Daily    aspirin  81 mg Oral Daily    atorvastatin  40 mg Oral Nightly    cetirizine  10 mg Oral Daily    multivitamin  1 tablet Oral Daily    mupirocin   Topical TID    nystatin   Topical BID    pantoprazole  40 mg Oral QAM AC    rOPINIRole  0.5 mg Oral Nightly    traZODone  50 mg Oral Nightly     Continuous Infusions:   sodium chloride      dextrose         Labs :     CBC:   Recent Labs 05/10/22  0507   WBC 13.1*   HGB 10.8*        BMP:    No results for input(s): NA, K, CL, CO2, BUN, CREATININE, GLUCOSE in the last 72 hours. Hepatic:   No results for input(s): AST, ALT, ALB, BILITOT, ALKPHOS in the last 72 hours. Troponin: No results for input(s): TROPONINI in the last 72 hours. BNP: No results for input(s): BNP in the last 72 hours. Lipids: No results for input(s): CHOL, HDL in the last 72 hours. Invalid input(s): LDLCALCU  INR:   No results for input(s): INR in the last 72 hours.     Radiology    Objective:   Vitals: /63   Pulse 55   Temp 97.8 °F (36.6 °C) (Oral)   Resp 18   Ht 5' 8\" (1.727 m)   Wt 158 lb 9.6 oz (71.9 kg)   SpO2 100%   BMI 24.12 kg/m²   HEENT: Head:pupils react  Neck: supple  Lungs: diminished air entry bilat CT on Rt  Heart: regular  Rhythm and tachycardic  Abdomen: soft BS heard NG NT  Extremities: warm  No edema  Neurologic:  Alert, oriented X3    Impression:   :   Acute on chronic hypoxic respiratory failure s/p bronch s/p VATS with lung biopsy and Rt middle lobe wedge resection done 5/5/22, CTT now removed  Pneumothorax 15%  Par A. 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

## 2022-05-12 NOTE — PROGRESS NOTES
[place] [facility]    Patient - [facility]   MRN -  [id number]   [facility]   - 1950      Date of Admission -  2022 12:37 PM  Date of evaluation -  2022  Room - --A   Hospital Day - [address], [place] Primary Care Physician - Jennifer Wills MD     Problem List      Active Hospital Problems    Diagnosis Date Noted    ILD (interstitial lung disease) [facility]) [J84.9]      Priority: Medium    Abnormal CT of the chest [R93.89]      Priority: Medium    Severe malnutrition ([facility] [address] [E43] 2022     Priority: Medium     Class: Acute    Septicemia ([facility] [address] [A41.9]      Priority: Medium    SOB (shortness of breath) [R06.02] 2022     Priority: Medium    Acute on chronic respiratory failure with hypoxia (HCC) [J96.21] 2022    Pneumonia [J18.9] 2022     Reason for Consult    Acute hypoxic respiratory failure  HPI   From HPI:  Rebecca Montano is a 69 y/o male former-smoker with PMH of CAD s/p CABG x3 (2021), ischemic cardiomyopathy, COPD, asthma, atrial fibrillation s/p left atrial appendage clip (2021), sick sinus syndrome s/p dual pacemaker placement (2021), HTN, HLD, colon polyps.      Patient presented to [facility] ED on 22 from nursing home for complaints of increasing dyspnea. He was recently hospitalized last month with pneumonia. He did endorse recent chills. He was hypoxic on presentation with evidence of mild volume overload. He was placed on BiPAP initially. He had a noted leukocytosis, but was afebrile. CXR showed persistent and worsening acute on chronic interstitial disease at the lung bases. He was also noted to be in Afib with RVR. He was started on cardizem infusion with improvement. He was admitted under the care of Dr. Felicia Currie. He received empiric antibiotics. Pulmonology was consulted. He received bronchodilators. His chronic prednisone was increased from 30 mg to 40 mg daily.  He was later weaned to high flow nasal cannula. Infectious disease was consulted. He was maintained on cefepime empirically. Cardiology was consulted for Afib. Digoxin was added. He was diuresed with lasix.      On 4/26/22 he underwent bronchoscopy. He had mucus production of the RUL, RML, RLL and right main bronchus. The RLL bronchus mucosa was erythematous. BAL was sent. This returned positive for candida albicans. Diflucan was started. Fluid analysis was also notable for macrophages and positive staining for lipid accumulation.      CT surgery was consulted 4/28/22 per patient/family request for consideration of open lung biopsy for confirmation of diagnosis of ILD. CT surgery Dr. Sunni Weiss did evaluate, with plans for VATS and lung biopsy. He also recommended discontinuation of xarelto as well, given s/p ZAHRA closure.      Patient underwent VATS with lung biopsy with RML wedge resection on 5/5/22 with Dr. Sunni Weiss. EBL was < 5 mL. There were no reported complications. Lung tissue biopsy and culture was obtained from RML.    Patient presented to ICU post-op for close monitoring. He remains hemodynamically stable, and oxygenating well on stable high flow nasal cannula requirements. \"    Past 24 Hours   -CXR this AM with noted improvement in PTX   -On NC this AM 97% on 5 LPM   -reports SOB stable denies chest pain   -All systems reviewed   PMHx   Past Medical History      Diagnosis Date    Asthma     Atrial fibrillation with RVR ([address] 04/05/2021    [employer] dual pacemaker  04/15/2021    Collagenous colitis 2021    per scope 2021    Colon polyps 2021    dr Michelle Puga    COPD, mild (HCC)     Eczema of hand     HTN (hypertension)     Hyperlipidemia     Smoker       Past Surgical History        Procedure Laterality Date    BRONCHOSCOPY N/A 4/26/2022    BRONCHOSCOPY WITH BAL performed by Emmett Gordon MD at [address] COLONOSCOPY  06/10/2021    theresa ccolon polyps and collagenous Medications  Medications Prior to Admission: albuterol (PROVENTIL) (2.5 MG/3ML) 0.083% nebulizer solution, Take 2.5 mg by nebulization 2 times daily  ipratropium-albuterol (DUONEB) 0.5-2.5 (3) MG/3ML SOLN nebulizer solution, Inhale 1 vial into the lungs every 4 hours as needed for Shortness of Breath  Sodium Chloride-Sodium Bicarb 2300-700 MG PACK, 1 spray by Nasal route in the morning, at noon, and at bedtime  FLUoxetine (PROZAC) 10 MG capsule, Take 10 mg by mouth daily (10 mg daily x 5 days - 4/24-4/28, then increase to 20 mg daily)  atovaquone (MEPRON) 750 MG/5ML suspension, Take 1,500 mg by mouth daily (10 mL daily)  predniSONE (DELTASONE) 20 MG tablet, Take 30 mg by mouth daily   ALPRAZolam (XANAX) 0.5 MG tablet, Take 0.5 mg by mouth every 12 hours as needed for Sleep. [DISCONTINUED] tuberculin (APLISOL) 5 UNIT/0.1ML injection, Inject 5 Units into the skin  [DISCONTINUED] nystatin (MYCOSTATIN) 562056 UNIT/GM cream, Apply topically 2 times daily Apply topically 2 times daily. [DISCONTINUED] oxymetazoline (AFRIN) 0.05 % nasal spray, 2 sprays by Nasal route 2 times daily  furosemide (LASIX) 20 MG tablet, Take 20 mg by mouth daily Sun, Wed  loratadine (CLARITIN) 10 MG tablet, Take 10 mg by mouth daily  metoprolol succinate (TOPROL XL) 100 MG extended release tablet, Take 1.5 tablets by mouth daily  tiotropium-olodaterol (STIOLTO) 2.5-2.5 MCG/ACT AERS, Inhale 2 puffs into the lungs daily  rivaroxaban (XARELTO) 15 MG TABS tablet, Take 1 tablet by mouth daily  mupirocin (BACTROBAN) 2 % ointment, Apply topically 3 times daily.   albuterol sulfate  (90 Base) MCG/ACT inhaler, INHALE 2 PUFFS INTO THE LUNGS EVERY 6 HOURS AS NEEDED FOR WHEEZING  traZODone (DESYREL) 50 MG tablet, Take 1 tablet by mouth nightly  rOPINIRole (REQUIP) 0.5 MG tablet, Take 1 tablet by mouth nightly  pantoprazole (PROTONIX) 40 MG tablet, Take 1 tablet by mouth every morning (before breakfast)  [DISCONTINUED] cetirizine (ZYRTEC) 10 MG tablet, Take 10 mg by mouth daily  atorvastatin (LIPITOR) 40 MG tablet, Take 1 tablet by mouth nightly  aspirin 81 MG chewable tablet, Take 1 tablet by mouth daily  Multiple Vitamins-Minerals (CENTRUM SILVER PO), Take 1 tablet by mouth daily   Diet    ADULT DIET; Regular; 3 carb choices (45 gm/meal); Low Sodium (2 gm)  ADULT ORAL NUTRITION SUPPLEMENT; Breakfast, Lunch, Dinner; Standard High Calorie/High Protein Oral Supplement  Allergies    Penicillins and Sulfa antibiotics  Social History     Social History     Socioeconomic History    Marital status:      Spouse name: Not on file    Number of children: Not on file    Years of education: Not on file    Highest education level: Not on file   Occupational History    Not on file   Tobacco Use    Smoking status: Former Smoker     Packs/day: 1.00     Years: 40.00     Pack years: 40.00     Types: Cigarettes     Quit date: 3/7/2022     Years since quittin.1    Smokeless tobacco: Never Used   Substance and Sexual Activity    Alcohol use: Not Currently     Alcohol/week: 0.0 standard drinks    Drug use: No    Sexual activity: Not on file   Other Topics Concern    Not on file   Social History Narrative    No barriers with medication affordability now that he has met deductible    Active with [facility]    Has O2 and supplies needed    No barriers with transportation     Social Determinants of Health     Financial Resource Strain: Low Risk     Difficulty of Paying Living Expenses: Not hard at all   Food Insecurity: No Food Insecurity    Worried About [address] in the Last Year: Never true    [facility] in the Last Year: Never true   Transportation Needs: No Transportation Needs    Lack of Transportation (Medical): No    Lack of Transportation (Non-Medical):  No   Physical Activity: Inactive    Days of Exercise per Week: 0 days    Minutes of Exercise per Session: 0 min   Stress:     Feeling of Stress : Not on file   Social Connections:     Frequency of Communication with Friends and Family: Not on file    Frequency of Social Gatherings with Friends and Family: Not on file    Attends [employer] Services: Not on file    Active Member of Clubs or Organizations: Not on file    Attends Club or Organization Meetings: Not on file    Marital Status: Not on file   Intimate Partner Violence:     Fear of Current or Ex-Partner: Not on file    Emotionally Abused: Not on file    Physically Abused: Not on file    Sexually Abused: Not on file   Housing Stability: Unknown    Unable to Pay for Housing in the Last Year: No    Number of [place] in the Last Year: Not on file    Unstable Housing in the Last Year: No     Family History          Problem Relation Age of Onset    Heart Disease Mother     Heart Disease Father         cabg    Diabetes Brother     Heart Disease Brother        Vitals     height is 5' 8\" (1.727 m) and weight is 158 lb 9.6 oz (71.9 kg). His oral temperature is 97.5 °F (36.4 °C). His blood pressure is 141/67 (abnormal) and his pulse is 55. His respiration is 18 and oxygen saturation is 100%. Body mass index is 24.12 kg/m². I/O        Intake/Output Summary (Last 24 hours) at 5/12/2022 1118  Last data filed at 5/12/2022 1013  Gross per 24 hour   Intake 1240 ml   Output 2375 ml   Net -1135 ml     I/O last 3 completed shifts: In: 3275 [P.O.:1720]  Out: 3700 [Urine:3700]   Patient Vitals for the past 96 hrs (Last 3 readings):   Weight   05/12/22 0347 158 lb 9.6 oz (71.9 kg)   05/11/22 0346 152 lb 12.8 oz (69.3 kg)   05/10/22 0347 159 lb 1.6 oz (72.2 kg)     Exam   Physical Exam   Constitutional: No distress on NRB. Patient appears chronically ill and thinly built. Head: Normocephalic and atraumatic. Mouth/Throat: Oropharynx is clear and moist.  No oral thrush. Eyes: Conjunctivae are normal. Pupils are equal, round. No scleral icterus. Neck: Neck supple. No tracheal deviation present.    Cardiovascular: S1 and S2 with no murmur. No peripheral edema  Pulmonary/Chest: Normal effort with bilateral air entry, faint bilateral rales. No stridor. No respiratory distress. Patient exhibits no tenderness. Noted dry dressing R CW C/D/I  Abdominal: Soft. Bowel sounds audible. No distension or tenderness to palp.    Musculoskeletal: Moves all extremities  Neurological: Patient is alert and oriented to follows simple commands     Labs  - Old records and notes have been reviewed in [facility]   CBC     Lab Results   Component Value Date    WBC 13.1 05/10/2022    RBC 3.87 05/10/2022    RBC 4.84 11/07/2011    HGB 10.8 05/10/2022    HCT 35.3 05/10/2022     05/10/2022    MCV 91.2 05/10/2022    MCH 27.9 05/10/2022    MCHC 30.6 05/10/2022    RDW 13.3 06/07/2018    NRBC 0 05/10/2022    NRBC 0 11/07/2011    SEGSPCT 81.3 05/10/2022    MONOPCT 7.4 05/10/2022    MONOSABS 1.0 05/10/2022    LYMPHSABS 1.2 05/10/2022    EOSABS 0.0 05/10/2022    BASOSABS 0.0 05/10/2022    DIFFTYPE see below 03/25/2022     BMP   Lab Results   Component Value Date     05/08/2022    K 4.7 05/08/2022    K 5.0 05/04/2022    CL 97 05/08/2022    CO2 30 05/08/2022    BUN 57 05/08/2022    CREATININE 1.2 05/08/2022    GLUCOSE 106 05/08/2022    GLUCOSE 94 11/07/2011    CALCIUM 8.4 05/08/2022    MG 1.9 04/28/2022     LFTS  Lab Results   Component Value Date    ALKPHOS 101 05/02/2022    ALT 20 05/02/2022    AST 20 05/02/2022    PROT 6.0 05/02/2022    BILITOT 0.4 05/02/2022    BILIDIR <0.2 03/26/2022    LABALBU 2.7 05/02/2022    LABALBU 4.3 11/07/2011     ABG   Lab Results   Component Value Date    PH 7.44 04/24/2022    PCO2 39 04/24/2022    PO2 191 04/24/2022    HCO3 26 04/24/2022    O2SAT 100 04/24/2022     @  Lab Results   Component Value Date    APTT 22.6 05/04/2022     INR   Lab Results   Component Value Date    INR 1.02 05/04/2022    INR 1.41 (H) 03/25/2022    INR 1.25 (H) 04/06/2021     Angio Convert Enzyme 8 - 52 U/L 21        PFTs     Cultures    (+) blood culture GPC in clusters    4/26/22  Bronchoscopy- will follow cultures/cytology   Endobronchial findings:   Trachea: Normal mucosa  Diana: Normal mucosa  Right main bronchus: Normal mucosa, mucus  Right upper lobe bronchus: Normal mucosa, mucus  Right Middle lobe bronchus: Normal mucosa, mucus  Right Lower lobe bronchus: erythematous mucosa  Left main bronchus: Normal mucosa  Left upper lobe bronchus: Normal mucosa  Left lower lobe bronchus: Normal mucosa     Rhinovirus Enterovirus PCR Detected Abnormal      AFB (-)  Virus cultures (-)  Cytology: FINAL RESULTS:   No malignant cells seen.      Specimen consists of benign appearing squamous      cells, alveolar macrophages, and debris. Flow cytometry: IMPRESSION:   1. Low viability specimen showing a predominance of granulocytes   without immunophenotypic aberrancy. 2. Rare phenotypically unremarkable T-cells with a normal CD4:CD8   ratio of 2:4. No abnormal B cell, plasma cell, T cell, or NK cell population   identified. See comment. Cytology:     FINAL RESULTS:   Right lower lobe of lung, bronchial washings, cytospin preparation:     Alveolar macrophages with positive staining for lipid accumulation.     Please see microscopic examination. Radiology    CXR    1 view chest x-ray    05/12/2022   Bilateral pulmonary infiltrates, without gross interval change. Radiology Addendum     The study was reviewed again at the request of the referring provider. There is a right apical pneumothorax occupying 15% of the right    hemithorax, slightly smaller since previous study dated 11 May 2022 at    1435 hours. The patient is status post previous sternotomy, pacemaker placement and    placement of left atrial appendage clip. There is abnormal density throughout both lung fields. 1 view chest x-ray   05/11/2022   Findings: Right chest pacer device. Atrial appendage closure device. Changes of coronary artery bypass grafting. Mediastinum wires. Redemonstration of central and basilar predominant interstitial densities. Granuloma of the right lung base. Tiny right basilar effusion minimally decreased from prior. No pneumothorax  Right apical pneumothorax which is unchanged in size. No acute fracture. Impression:  1. Right apical pneumothorax which is unchanged in size from the prior. Continued follow-up recommended. 2. Unchanged appearance of reticular interstitial densities of the central lung zones and bilateral lung bases may represent infectious process or edema. Tiny right basilar effusion minimally decreased from prior. XR CHEST (2 VW)   5/10/2022   Impression:  1. Interval removal of the right-sided chest tube. 15% pneumothorax in the right lung apex. 2. Postoperative changes. Status post coronary bypass surgery, pacemaker placement and left atrial appendage clip in place. 3.. Thickening of the interstitial lung markings especially in the right and left lower lobes. 4. Possible small right pleural effusion. Radiographs of the chest 1 view   05/09/2022   Findings: Upright examination was performed. Again seen are right chest tube, dual lead cardiac pacemaker, CABG changes and atrial left atrial appendage clip. Bilateral alveolar and interstitial densities are unchanged. No pneumothorax or large pleural effusion. Stable cardiomediastinal silhouette and bony structures. Impression:   1. No significant interval change in bilateral opacities secondary to infection and/or edema.        5/3/2022   XR CHEST PORTABLE   Stable chest.        03/07/2022 --->  04/29/2022            CT chest     01/09/2021 ---> 03/15/2022            Assessment   -Acute on chronic respiratory failure with hypoxia   -Rapidly progressing Acute Interstitial pneumonia (AIP) DDX Amiodarone Toxicity vs RBILD vs OP since 03/7/2022 (No evidence ILD back in 2021)-s/p Right Vats wedge lung biopsy 5/5/2022-results pending  -PTX-Right side 5/10/2022-remains stable  -CAD s/p CABG   -Afib/Aflutter  -SSS s/p PPM  -Ischemic cardiomyopathy  -Full Code   Plan   -Slowly weaning prednisone, down to 30 mg day   -continue on Mepron for PCP prophylaxis   -Lung bx sent for consultation to [facility], awaiting report  -Monitor SpO2 wean supplemental O2 to maintain SpO2 >90%  -Informed nursing can stop NRB as PTX has shown slight improvement and has remained asymptomatic   -will follow daily CXR  -CTS signed off   -Spoke with FELICITAS Navarro pt. Accepted to [facility] awaiting bed, OK from pulmonary standpoint for transfer once bed available. Case discussed with nurse and patient/family. Questions and concerns addressed. Meds and orders reviewed     Electronically signed by   ARCADIO Haywood - CNP on 5/12/2022 at 11:18 AM    Vitals:    05/12/22 0807 05/12/22 1212 05/12/22 1332 05/12/22 1430   BP: (!) 141/67 125/69  108/63   Pulse: 55 55  55   Resp: 18 18     Temp: 97.5 °F (36.4 °C) 97.8 °F (36.6 °C)     TempSrc: Oral Oral     SpO2: 100% 97% 99% 100%   Weight:       Height:           No changes, no improvement. Lung biopsy report pending  On prednisone 30 mg/d but no beneficial effect noted, slow wean--down to 30%  Quick to desaturate and stay desaturated, currently on HFNC 70%  CXR--improvement L apical PTX    Patient seen and examined independently by me. Above discussed and I agree with  CNP note Also see my additional comments. Labs, cultures, and radiographs when available were reviewed. Changes were made in the orders as necessary. I discussed patient concerns with Maria Fernanda HICKEY and instructions were given. Respiratory care issues addressed. Please see our orders for the updated patient care plan.     Electronically signed by     Ede Brannon MD on 5/12/2022 at 4:20 PM

## 2022-05-12 NOTE — PLAN OF CARE
Problem: Respiratory - Adult  Goal: Clear lung sounds  Description: Pt had no questions on purpose or side effects of medication   Will continue with bronchodilator assessment to determine treatments   5/12/2022 1702 by Darlene Bhardwaj RCP  Outcome: Progressing     Problem: Respiratory - Adult  Goal: Adequate oxygenation  Outcome: Progressing     Problem: Respiratory - Adult  Goal: Achieves optimal ventilation and oxygenation  Outcome: Progressing

## 2022-05-13 NOTE — CARE COORDINATION
5/13/22, 10:01 AM EDT  Plans [place] today; collaborated w Attending, Crow Waite, [address] Liaison, patient and spouse Meg Nam    Patient goals/plan/ treatment preferences discussed by  and . Patient goals/plan/ treatment preferences reviewed with patient/ family. Patient/ family verbalize understanding of discharge plan and are in agreement with goal/plan/treatment preferences. Understanding was demonstrated using the teach back method. AVS provided by RN at time of discharge, which includes all necessary medical information pertaining to the patients current course of illness, treatment, post-discharge goals of care, and treatment preferences.      Services At/After Discharge: LTAC

## 2022-05-13 NOTE — PROGRESS NOTES
[facility]    Patient - Rickey De La Fuente   MRN -  [id number]   [facility] - [de-identified]   - 1950      Date of Admission -  2022 12:37 PM  Date of evaluation -  2022  Room - --A   Hospital Day - MD Cresencio Primary Care Physician - No primary care provider on file. Problem List      Active Hospital Problems    Diagnosis Date Noted    ILD (interstitial lung disease) ([facility] [J84.9]      Priority: Medium    Abnormal CT of the chest [R93.89]      Priority: Medium    Severe malnutrition ([facility] [E43] 2022     Priority: Medium     Class: Acute    Septicemia ([facility] [A41.9]      Priority: Medium    SOB (shortness of breath) [R06.02] 2022     Priority: Medium    Acute on chronic respiratory failure with hypoxia (HCC) [J96.21] 2022    Pneumonia [J18.9] 2022     Reason for Consult    Acute hypoxic respiratory failure  HPI   From HPI:  Diane Doss is a 71 y/o male former-smoker with PMH of CAD s/p CABG x3 (2021), ischemic cardiomyopathy, COPD, asthma, atrial fibrillation s/p left atrial appendage clip (2021), sick sinus syndrome s/p dual pacemaker placement (2021), HTN, HLD, colon polyps.      Patient presented [facility] ED on [facility] from nursing home for complaints of increasing dyspnea. He was recently hospitalized last month with pneumonia. He did endorse recent chills. He was hypoxic on presentation with evidence of mild volume overload. He was placed on BiPAP initially. He had a noted leukocytosis, but was afebrile. CXR showed persistent and worsening acute on chronic interstitial disease at the lung bases. He was also noted to be in Afib with RVR. He was started on cardizem infusion with improvement. He was admitted under the care of Dr. Randall Cristobal. He received empiric antibiotics. Pulmonology was consulted. He received bronchodilators.  His chronic prednisone was increased from 30 mg to 40 mg daily. He was later weaned to high flow nasal cannula. Infectious disease was consulted. He was maintained on cefepime empirically. Cardiology was consulted for Afib. Digoxin was added. He was diuresed with lasix.      On 4/26/22 he underwent bronchoscopy. He had mucus production of the RUL, RML, RLL and right main bronchus. The RLL bronchus mucosa was erythematous. BAL was sent. This returned positive for candida albicans. Diflucan was started. Fluid analysis was also notable for macrophages and positive staining for lipid accumulation.      CT surgery was consulted 4/28/22 per patient/family request for consideration of open lung biopsy for confirmation of diagnosis of ILD. CT surgery Dr. Davie Cardona did evaluate, with plans for VATS and lung biopsy. He also recommended discontinuation of xarelto as well, given s/p ZAHRA closure.      Patient underwent VATS with lung biopsy with RML wedge resection on 5/5/22 with Dr. Davie Cardona. EBL was < 5 mL. There were no reported complications. Lung tissue biopsy and culture was obtained from RML.    Patient presented to ICU post-op for close monitoring. He remains hemodynamically stable, and oxygenating well on stable high flow nasal cannula requirements. \"    Past 24 Hours   -On NC 5L, comfortable  -Discussed with Dr Alaina Perdomo, ok to transfer, continue prednisone 30 mg/d x a week then decrease to 20 mg/d  -Lung biopsy report reviewed, no evidence of malignancy, granulomatous formation, eosinophilic infiltrate, some foamy macrophages seen--non specific but not inconsistent with APT  -Prozac increased to 20 mg/d yesterday  -All systems reviewed   PMHx   Past Medical History      Diagnosis Date    Asthma     Atrial fibrillation with RVR ([address] 04/05/2021    [employer] dual pacemaker  04/15/2021    Collagenous colitis 2021    per scope 2021    Colon polyps 2021    dr Deny Knowles    COPD, mild ([address]     Eczema of hand     HTN (hypertension)     Hyperlipidemia     Smoker       Past Surgical History        Procedure Laterality Date    BRONCHOSCOPY N/A 4/26/2022    BRONCHOSCOPY WITH BAL performed by Cosimo Boas, MD at [address] COLONOSCOPY  06/10/2021    theresa ccolon polyps and collagenous colitis    CORONARY ARTERY BYPASS GRAFT  01/12/2021    cabg x 3 with lima and atrial appendage clip  dr Debbi Beebe N/A 01/12/2021    CABG  X 3 WITH DEJAH, Atrial Appendage Clip performed by Meme Martin MD at [address] [place] [address] Road  06/2008    polyps    OTHER SURGICAL HISTORY  DEC 7TH 2012 DR GET DAVISAS [place]     IGS ENDOSCOPIC BI LAT MAXILLARY, ETHMOID, SPHENOID, FRONTAL SINUSOTOMY WITH SEPTOPLASTY AND TURBINOPLASTIES    SKIN CANCER EXCISION  09/2014    Left side of Forehead     THORACOSCOPY Right 5/5/2022    RIGHT VATS WITH LUNG BIOPSY WITH RIGHT MIDDLE LOBE LUNG WEDGE RESECTION performed by Meme Martin MD at [place] [address]  02/2017     Meds    Current Medications    FLUoxetine  20 mg Oral Daily    predniSONE  30 mg Oral Daily    albuterol  2.5 mg Nebulization BID    sodium chloride flush  5-40 mL IntraVENous 2 times per day    metoprolol  2.5 mg IntraVENous Once    metoprolol  2.5 mg IntraVENous Once    metoprolol succinate  100 mg Oral Daily    fluconazole  200 mg Oral Daily    dilTIAZem  30 mg Oral 3 times per day    insulin lispro  0-6 Units SubCUTAneous TID WC    insulin lispro  0-3 Units SubCUTAneous Nightly    atovaquone  1,500 mg Oral Daily    furosemide  20 mg Oral Daily    digoxin  125 mcg Oral Daily    aspirin  81 mg Oral Daily    atorvastatin  40 mg Oral Nightly    cetirizine  10 mg Oral Daily    multivitamin  1 tablet Oral Daily    mupirocin   Topical TID    nystatin   Topical BID    pantoprazole  40 mg Oral QAM AC    rOPINIRole  0.5 mg Oral Nightly    traZODone  50 mg Oral Nightly     guaiFENesin-dextromethorphan, bisacodyl, ipratropium-albuterol, sodium chloride flush, sodium chloride, HYDROcodone-acetaminophen, dextrose, glucagon (rDNA), dextrose, glucose, metoprolol, ALPRAZolam, ondansetron **OR** ondansetron, acetaminophen  IV Drips/Infusions   sodium chloride      dextrose       Home Medications  Medications Prior to Admission: albuterol (PROVENTIL) (2.5 MG/3ML) 0.083% nebulizer solution, Take 2.5 mg by nebulization 2 times daily  ipratropium-albuterol (DUONEB) 0.5-2.5 (3) MG/3ML SOLN nebulizer solution, Inhale 1 vial into the lungs every 4 hours as needed for Shortness of Breath  FLUoxetine (PROZAC) 10 MG capsule, Take 10 mg by mouth daily (10 mg daily x 5 days - 4/24-4/28, then increase to 20 mg daily)  [DISCONTINUED] Sodium Chloride-Sodium Bicarb 2300-700 MG PACK, 1 spray by Nasal route in the morning, at noon, and at bedtime  atovaquone (MEPRON) 750 MG/5ML suspension, Take 1,500 mg by mouth daily (10 mL daily)  [DISCONTINUED] tuberculin (APLISOL) 5 UNIT/0.1ML injection, Inject 5 Units into the skin  [DISCONTINUED] predniSONE (DELTASONE) 20 MG tablet, Take 30 mg by mouth daily   [DISCONTINUED] ALPRAZolam (XANAX) 0.5 MG tablet, Take 0.5 mg by mouth every 12 hours as needed for Sleep. [DISCONTINUED] nystatin (MYCOSTATIN) 072988 UNIT/GM cream, Apply topically 2 times daily Apply topically 2 times daily. [DISCONTINUED] oxymetazoline (AFRIN) 0.05 % nasal spray, 2 sprays by Nasal route 2 times daily  furosemide (LASIX) 20 MG tablet, Take 20 mg by mouth daily Sun, Wed  loratadine (CLARITIN) 10 MG tablet, Take 10 mg by mouth daily  [DISCONTINUED] metoprolol succinate (TOPROL XL) 100 MG extended release tablet, Take 1.5 tablets by mouth daily  mupirocin (BACTROBAN) 2 % ointment, Apply topically 3 times daily.   [DISCONTINUED] tiotropium-olodaterol (STIOLTO) 2.5-2.5 MCG/ACT AERS, Inhale 2 puffs into the lungs daily  [DISCONTINUED] rivaroxaban (XARELTO) 15 MG TABS tablet, Take 1 tablet by mouth daily  [DISCONTINUED] albuterol sulfate  (90 Base) MCG/ACT inhaler, INHALE 2 PUFFS INTO THE LUNGS EVERY 6 HOURS AS NEEDED FOR WHEEZING  traZODone (DESYREL) 50 MG tablet, Take 1 tablet by mouth nightly  rOPINIRole (REQUIP) 0.5 MG tablet, Take 1 tablet by mouth nightly  pantoprazole (PROTONIX) 40 MG tablet, Take 1 tablet by mouth every morning (before breakfast)  [DISCONTINUED] cetirizine (ZYRTEC) 10 MG tablet, Take 10 mg by mouth daily  atorvastatin (LIPITOR) 40 MG tablet, Take 1 tablet by mouth nightly  aspirin 81 MG chewable tablet, Take 1 tablet by mouth daily  Multiple Vitamins-Minerals (CENTRUM SILVER PO), Take 1 tablet by mouth daily   Diet    ADULT DIET; Regular; 3 carb choices (45 gm/meal);  Low Sodium (2 gm)  ADULT ORAL NUTRITION SUPPLEMENT; Breakfast, Lunch, Dinner; Standard High Calorie/High Protein Oral Supplement  Allergies    Penicillins and Sulfa antibiotics  Social History     Social History     Socioeconomic History    Marital status:      Spouse name: Not on file    Number of children: Not on file    Years of education: Not on file    Highest education level: Not on file   Occupational History    Not on file   Tobacco Use    Smoking status: Former Smoker     Packs/day: 1.00     Years: 40.00     Pack years: 40.00     Types: Cigarettes     Quit date: 3/7/2022     Years since quittin.1    Smokeless tobacco: Never Used   Substance and Sexual Activity    Alcohol use: Not Currently     Alcohol/week: 0.0 standard drinks    Drug use: No    Sexual activity: Not on file   Other Topics Concern    Not on file   Social History Narrative    No barriers with medication affordability now that he has met deductible    Active with [facility]    Has O2 and supplies needed    No barriers with transportation     Social Determinants of Health     Financial Resource Strain: Low Risk     Difficulty of Paying Living Expenses: Not hard at all   Food Insecurity: No Food Insecurity    Worried About [address] [employer] in the Last Year: Never true    Danielle [employer] in the Last Year: Never true   Transportation Needs: No Transportation Needs    Lack of Transportation (Medical): No    Lack of Transportation (Non-Medical): No   Physical Activity: Inactive    Days of Exercise per Week: 0 days    Minutes of Exercise per Session: 0 min   Stress:     Feeling of Stress : Not on file   Social Connections:     Frequency of Communication with Friends and Family: Not on file    Frequency of Social Gatherings with Friends and Family: Not on file    Attends Zoroastrianism Services: Not on file    Active Member of [address] [place] [address] [employer] or Organizations: Not on file    Attends Club or Organization Meetings: Not on file    Marital Status: Not on file   Intimate Partner Violence:     Fear of Current or Ex-Partner: Not on file    Emotionally Abused: Not on file    Physically Abused: Not on file    Sexually Abused: Not on file   Housing Stability: Unknown    Unable to Pay for Housing in the Last Year: No    Number of [place] in the Last Year: Not on file    Unstable Housing in the Last Year: No     Family History          Problem Relation Age of Onset    Heart Disease Mother     Heart Disease Father         cabg    Diabetes Brother     Heart Disease Brother        Vitals     height is 5' 8\" (1.727 m) and weight is 156 lb 3.2 oz (70.9 kg). His oral temperature is 97.5 °F (36.4 °C). His blood pressure is 117/66 and his pulse is 55. His respiration is 14 and oxygen saturation is 96%. Body mass index is 23.75 kg/m². I/O        Intake/Output Summary (Last 24 hours) at 5/13/2022 1145  Last data filed at 5/13/2022 1008  Gross per 24 hour   Intake 1784 ml   Output 1725 ml   Net 59 ml     I/O last 3 completed shifts:   In: 2464 [P.O.:2454; I.V.:10]  Out: 8981 [Urine:3325]   Patient Vitals for the past 96 hrs (Last 3 readings):   Weight   05/13/22 0615 156 lb 3.2 oz (70.9 kg)   05/12/22 0347 158 lb 9.6 oz (71.9 kg)   05/11/22 0346 152 lb 12.8 oz (69.3 kg)     Exam   Physical Exam   Constitutional: No distress on NRB. Patient appears chronically ill and thinly built. Head: Normocephalic and atraumatic. Mouth/Throat: Oropharynx is clear and moist.  No oral thrush. Eyes: Conjunctivae are normal. Pupils are equal, round. No scleral icterus. Neck: Neck supple. No tracheal deviation present. Cardiovascular: S1 and S2 with no murmur. No peripheral edema  Pulmonary/Chest: Normal effort with bilateral air entry, faint bilateral rales. No stridor. No respiratory distress. Patient exhibits no tenderness. Noted dry dressing R CW C/D/I  Abdominal: Soft. Bowel sounds audible. No distension or tenderness to palp.    Musculoskeletal: Moves all extremities  Neurological: Patient is alert and oriented to follows simple commands     Labs  - Old records and notes have been reviewed in [facility]   CBC     Lab Results   Component Value Date    WBC 13.1 05/10/2022    RBC 3.87 05/10/2022    RBC 4.84 11/07/2011    HGB 10.8 05/10/2022    HCT 35.3 05/10/2022     05/10/2022    MCV 91.2 05/10/2022    MCH 27.9 05/10/2022    MCHC 30.6 05/10/2022    RDW 13.3 06/07/2018    NRBC 0 05/10/2022    NRBC 0 11/07/2011    SEGSPCT 81.3 05/10/2022    MONOPCT 7.4 05/10/2022    MONOSABS 1.0 05/10/2022    LYMPHSABS 1.2 05/10/2022    EOSABS 0.0 05/10/2022    BASOSABS 0.0 05/10/2022    DIFFTYPE see below 03/25/2022     BMP   Lab Results   Component Value Date     05/08/2022    K 4.7 05/08/2022    K 5.0 05/04/2022    CL 97 05/08/2022    CO2 30 05/08/2022    BUN 57 05/08/2022    CREATININE 1.2 05/08/2022    GLUCOSE 106 05/08/2022    GLUCOSE 94 11/07/2011    CALCIUM 8.4 05/08/2022    MG 1.9 04/28/2022     LFTS  Lab Results   Component Value Date    ALKPHOS 101 05/02/2022    ALT 20 05/02/2022    AST 20 05/02/2022    PROT 6.0 05/02/2022    BILITOT 0.4 05/02/2022    BILIDIR <0.2 03/26/2022    LABALBU 2.7 05/02/2022    LABALBU 4.3 11/07/2011     ABG   Lab Results   Component Value Date    PH 7.44 04/24/2022    PCO2 39 04/24/2022    PO2 191 04/24/2022    HCO3 26 04/24/2022    O2SAT 100 04/24/2022     @  Lab Results   Component Value Date    APTT 22.6 05/04/2022     INR   Lab Results   Component Value Date    INR 1.02 05/04/2022    INR 1.41 (H) 03/25/2022    INR 1.25 (H) 04/06/2021     Angio Convert Enzyme 8 - 52 U/L 21        PFTs     Cultures    (+) blood culture GPC in clusters    4/26/22  Bronchoscopy- will follow cultures/cytology   Endobronchial findings:   Trachea: Normal mucosa  Diana: Normal mucosa  Right main bronchus: Normal mucosa, mucus  Right upper lobe bronchus: Normal mucosa, mucus  Right Middle lobe bronchus: Normal mucosa, mucus  Right Lower lobe bronchus: erythematous mucosa  Left main bronchus: Normal mucosa  Left upper lobe bronchus: Normal mucosa  Left lower lobe bronchus: Normal mucosa     Rhinovirus Enterovirus PCR Detected Abnormal      AFB (-)  Virus cultures (-)  Cytology: FINAL RESULTS:   No malignant cells seen.      Specimen consists of benign appearing squamous      cells, alveolar macrophages, and debris. Flow cytometry: IMPRESSION:   1. Low viability specimen showing a predominance of granulocytes   without immunophenotypic aberrancy. 2. Rare phenotypically unremarkable T-cells with a normal CD4:CD8   ratio of 2:4. No abnormal B cell, plasma cell, T cell, or NK cell population   identified. See comment. Cytology:     FINAL RESULTS:   Right lower lobe of lung, bronchial washings, cytospin preparation:     Alveolar macrophages with positive staining for lipid accumulation.     Please see microscopic examination. Radiology    CXR    1 view chest x-ray    05/12/2022   Bilateral pulmonary infiltrates, without gross interval change. Radiology Addendum     The study was reviewed again at the request of the referring provider. There is a right apical pneumothorax occupying 15% of the right    hemithorax, slightly smaller since previous study dated 11 May 2022 at    1435 hours.       The patient is status post previous sternotomy, pacemaker placement and    placement of left atrial appendage clip. There is abnormal density throughout both lung fields. 1 view chest x-ray   05/11/2022   Findings: Right chest pacer device. Atrial appendage closure device. Changes of coronary artery bypass grafting. Mediastinum wires. Redemonstration of central and basilar predominant interstitial densities. Granuloma of the right lung base. Tiny right basilar effusion minimally decreased from prior. No pneumothorax  Right apical pneumothorax which is unchanged in size. No acute fracture. Impression:  1. Right apical pneumothorax which is unchanged in size from the prior. Continued follow-up recommended. 2. Unchanged appearance of reticular interstitial densities of the central lung zones and bilateral lung bases may represent infectious process or edema. Tiny right basilar effusion minimally decreased from prior. XR CHEST (2 VW)   5/10/2022   Impression:  1. Interval removal of the right-sided chest tube. 15% pneumothorax in the right lung apex. 2. Postoperative changes. Status post coronary bypass surgery, pacemaker placement and left atrial appendage clip in place. 3.. Thickening of the interstitial lung markings especially in the right and left lower lobes. 4. Possible small right pleural effusion. Radiographs of the chest 1 view   05/09/2022   Findings: Upright examination was performed. Again seen are right chest tube, dual lead cardiac pacemaker, CABG changes and atrial left atrial appendage clip. Bilateral alveolar and interstitial densities are unchanged. No pneumothorax or large pleural effusion. Stable cardiomediastinal silhouette and bony structures. Impression:   1. No significant interval change in bilateral opacities secondary to infection and/or edema.        5/3/2022   XR CHEST PORTABLE   Stable chest.        03/07/2022 --->  04/29/2022            CT chest     01/09/2021 ---> 03/15/2022            Assessment   -Acute on chronic respiratory failure with hypoxia   -Rapidly progressing Acute Interstitial pneumonia (AIP) DDX Amiodarone Toxicity vs RBILD vs OP since 03/7/2022 (No evidence ILD back in 2021)-s/p Right Vats wedge lung biopsy 5/5/2022-results pending  -PTX-Right side 5/10/2022-remains stable  -CAD s/p CABG   -Afib/Aflutter  -SSS s/p PPM  -Ischemic cardiomyopathy  -Full Code   Plan   -Ok to transfer  -F/U pulm upon DC  -Taper to 20 mg of prednisone a day over the next week and keep at 20 mg/d    Case discussed with nurse and patient/family. Questions and concerns addressed.   Meds and orders reviewed     Electronically signed by   Dell Montoya MD on 5/13/2022 at 11:45 AM

## 2022-05-13 NOTE — PROGRESS NOTES
IM Progress Note  Dr. Eliezer Gayle  5/13/2022 10:42 AM      Patient name Roxanna Mendoza  [id number]  PCP: No primary care provider on file. Admit Date: 4/24/2022  Acct No. [de-identified]    Subjective: Interval History:   Has been on 4L NC since this am  Breathing stable  Biopsy results noted    Diet: ADULT DIET; Regular; 3 carb choices (45 gm/meal); Low Sodium (2 gm)  ADULT ORAL NUTRITION SUPPLEMENT; Breakfast, Lunch, Dinner; Standard High Calorie/High Protein Oral Supplement    I/O last 3 completed shifts: In: 2464 [P.O.:2454; I.V.:10]  Out: 6718 [Urine:3325]  I/O this shift:  In: 200 [P.O.:200]  Out: -         Admission weight: 155 lb (70.3 kg) as of 4/24/2022 12:37 PM  Wt Readings from Last 3 Encounters:   05/13/22 156 lb 3.2 oz (70.9 kg)   04/20/22 155 lb 12.8 oz (70.7 kg)   04/13/22 154 lb (69.9 kg)     Body mass index is 23.75 kg/m².     ROS   CVS;  no cp or palpitation  Resp: +SOB+ cough  Neuro:  No numbness or weakness or dizziness  Abd: no nausea or vomiting or abd pain      Medications:   Scheduled Meds:   FLUoxetine  20 mg Oral Daily    predniSONE  30 mg Oral Daily    albuterol  2.5 mg Nebulization BID    sodium chloride flush  5-40 mL IntraVENous 2 times per day    metoprolol  2.5 mg IntraVENous Once    metoprolol  2.5 mg IntraVENous Once    metoprolol succinate  100 mg Oral Daily    fluconazole  200 mg Oral Daily    dilTIAZem  30 mg Oral 3 times per day    insulin lispro  0-6 Units SubCUTAneous TID WC    insulin lispro  0-3 Units SubCUTAneous Nightly    atovaquone  1,500 mg Oral Daily    furosemide  20 mg Oral Daily    digoxin  125 mcg Oral Daily    aspirin  81 mg Oral Daily    atorvastatin  40 mg Oral Nightly    cetirizine  10 mg Oral Daily    multivitamin  1 tablet Oral Daily    mupirocin   Topical TID    nystatin   Topical BID    pantoprazole  40 mg Oral QAM AC    rOPINIRole  0.5 mg Oral Nightly    traZODone  50 mg Oral Nightly     Continuous Infusions:   sodium chloride      dextrose         Labs :     CBC:   No results for input(s): WBC, HGB, PLT in the last 72 hours. BMP:    No results for input(s): NA, K, CL, CO2, BUN, CREATININE, GLUCOSE in the last 72 hours. Hepatic:   No results for input(s): AST, ALT, ALB, BILITOT, ALKPHOS in the last 72 hours. Troponin: No results for input(s): TROPONINI in the last 72 hours. BNP: No results for input(s): BNP in the last 72 hours. Lipids: No results for input(s): CHOL, HDL in the last 72 hours. Invalid input(s): LDLCALCU  INR:   No results for input(s): INR in the last 72 hours.     Radiology    Objective:   Vitals: /64   Pulse 56   Temp 97.6 °F (36.4 °C) (Oral)   Resp 18   Ht 5' 8\" (1.727 m)   Wt 156 lb 3.2 oz (70.9 kg)   SpO2 100%   BMI 23.75 kg/m²   HEENT: Head:pupils react  Neck: supple  Lungs: diminished air entry bilat  CT out  Heart: regular  Rhythm and tachycardic  Abdomen: soft BS heard NG NT  Extremities: warm  No edema  Neurologic:  Alert, oriented X3    Impression:   :   Acute on chronic hypoxic respiratory failure s/p bronch s/p VATS with lung biopsy and Rt middle lobe wedge resection done 5/5/22, CTT now removed  Pneumothorax 15%  Par A. 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  Coronary disease status post coronary bypass graft x3 LIMA to LAD SVG to RCA and SVG to obtuse marginal  Cardiomyopathy with improved ejection fraction last echo was 54 to 60% this year  Status post pacemaker for tachybradycardia syndrome  COPD  Significant deconditioning  Leukocytosis  Chronic kidney disease stage III  Lactic acidosis  Chronic anemia  Hypertension  Hyperlipidemia  Gram + cult coag neg and also + for rhinovirus  Pressure ulcer -POA  Hyperkalemia resolved    Plan:    Cont to wean O2  D/w Pulm and keep on steroids for a while   Records being sent to lung transplant center in [facility]  Transfer to [facility]        Reid Lieberman MD, MD

## 2022-05-13 NOTE — PLAN OF CARE
Problem: Respiratory - Adult  Goal: Clear lung sounds  Description: Pt had no questions on purpose or side effects of medication   Will continue with bronchodilator assessment to determine treatments   5/13/2022 0748 by Zhane Santo RCP  Outcome: Progressing     Problem: Respiratory - Adult  Goal: Adequate oxygenation  5/13/2022 0748 by Zhane Santo RCP  Outcome: Progressing     Problem: Respiratory - Adult  Goal: Achieves optimal ventilation and oxygenation  5/13/2022 0748 by Zhane Santo RCP  Outcome: Progressing

## 2022-05-13 NOTE — RT PROTOCOL NOTE
RT Nebulizer Bronchodilator Protocol Note    There is a bronchodilator order in the chart from a provider indicating to follow the RT Bronchodilator Protocol and there is an Initiate RT Bronchodilator Protocol order as well (see protocol at bottom of note). CXR Findings:  XR CHEST PORTABLE    Result Date: 5/13/2022  Bilateral pulmonary infiltrates, without significant change. This document has been electronically signed by: Juan Ureña MD on 05/13/2022 03:20 AM    XR CHEST PORTABLE    Addendum Date: 5/12/2022    ADDENDUM #1  The study was reviewed again at the request of the referring provider. There is a right apical pneumothorax occupying 15% of the right hemithorax, slightly smaller since previous study dated 11 May 2022 at 1435 hours. The patient is status post previous sternotomy, pacemaker placement and placement of left atrial appendage clip. There is abnormal density throughout both lung fields. Final report electronically signed by DR Jad Clement on 5/12/2022 8:22 AM    ORIGINAL REPORT 1 view chest x-ray Comparison: CR,SR - XR CHEST PORTABLE - 05/11/2022 02:32 PM EDT Findings: Calcified granuloma within the right lower lung. Airspace opacity and interstitial thickening within the bilateral lungs, similar to the prior study. Cardiac pacemaker in place. Normal heart size. No acute fracture. Status post sternotomy. Result Date: 5/12/2022  Bilateral pulmonary infiltrates, without gross interval change. This document has been electronically signed by: Juan Ureña MD on 05/12/2022 04:49 AM Interpreted by: Juan Ureña MD Signed by: Juan Ureña MD 5/12/22 Final result    XR CHEST PORTABLE    Result Date: 5/11/2022  1. Right-sided pneumothorax occupying 20% of the right hemithorax, approximately unchanged. . 2. Status post previous sternotomy. Pacemaker in place. Left atrial appendage clip in place. 3. Abnormal density throughout both lung fields.  **This report has been created using voice recognition software. It may contain minor errors which are inherent in voice recognition technology. ** Final report electronically signed by DR Bipin Gomez on 5/11/2022 3:31 PM      The findings from the last RT Protocol Assessment were as follows:  Smoking: Chronic pulmonary disease  Respiratory Pattern: Dyspnea on exertion or RR 21-25 bpm  Breath Sounds: Slightly diminished and/or crackles  Cough: Strong, spontaneous, non-productive  Indication for Bronchodilator Therapy: Decreased or absent breath sounds  Bronchodilator Assessment Score: 6    Aerosolized bronchodilator medication orders have been revised according to the RT Nebulizer Bronchodilator Protocol below. Respiratory Therapist to perform RT Therapy Protocol Assessment initially then follow the protocol. Repeat RT Therapy Protocol Assessment PRN for score 0-3 or on second treatment, BID, and PRN for scores above 3. No Indications - adjust the frequency to every 6 hours PRN wheezing or bronchospasm, if no treatments needed after 48 hours then discontinue using Per Protocol order mode. If indication present, adjust the RT bronchodilator orders based on the Bronchodilator Assessment Score as indicated below. If a patient is on this medication at home then do not decrease Frequency below that used at home. 0-3 - enter or revise RT bronchodilator order(s) to equivalent RT Bronchodilator order with Frequency of every 4 hours PRN for wheezing or increased work of breathing using Per Protocol order mode. 4-6 - enter or revise RT Bronchodilator order(s) to two equivalent RT bronchodilator orders with one order with BID Frequency and one order with Frequency of every 4 hours PRN wheezing or increased work of breathing using Per Protocol order mode.          7-10 - enter or revise RT Bronchodilator order(s) to two equivalent RT bronchodilator orders with one order with TID Frequency and one order with Frequency of every 4 hours PRN wheezing or increased work of breathing using Per Protocol order mode. 11-13 - enter or revise RT Bronchodilator order(s) to one equivalent RT bronchodilator order with QID Frequency and an Albuterol order with Frequency of every 4 hours PRN wheezing or increased work of breathing using Per Protocol order mode. Greater than 13 - enter or revise RT Bronchodilator order(s) to one equivalent RT bronchodilator order with every 4 hours Frequency and an Albuterol order with Frequency of every 2 hours PRN wheezing or increased work of breathing using Per Protocol order mode. RT to enter RT Home Evaluation for COPD & MDI Assessment order using Per Protocol order mode.     Electronically signed by Erin Solomon RCP on 5/13/2022 at 7:47 AM

## 2022-05-13 NOTE — DISCHARGE INSTR - DIET

## 2022-05-13 NOTE — PLAN OF CARE
Problem: Discharge Planning  Goal: Discharge to home or other facility with appropriate resources  Outcome: Progressing  Flowsheets  Taken 5/13/2022 0155  Discharge to home or other facility with appropriate resources:   Identify barriers to discharge with patient and caregiver   Identify discharge learning needs (meds, wound care, etc)   Refer to discharge planning if patient needs post-hospital services based on physician order or complex needs related to functional status, cognitive ability or social support system   Arrange for needed discharge resources and transportation as appropriate  Taken 5/12/2022 2000  Discharge to home or other facility with appropriate resources:   Identify barriers to discharge with patient and caregiver   Arrange for needed discharge resources and transportation as appropriate   Identify discharge learning needs (meds, wound care, etc)  Note: Planning discharge to [facility] when medically stable     Problem: Safety - Adult  Goal: Free from fall injury  Outcome: Progressing  Flowsheets  Taken 5/13/2022 0155  Free From Fall Injury:   Instruct family/caregiver on patient safety   Based on caregiver fall risk screen, instruct family/caregiver to ask for assistance with transferring infant if caregiver noted to have fall risk factors  Taken 5/13/2022 0057  Free From Fall Injury:   Based on caregiver fall risk screen, instruct family/caregiver to ask for assistance with transferring infant if caregiver noted to have fall risk factors   Instruct family/caregiver on patient safety  Note: Call light in reach, pt using appropiately, bed alarm on, gripper socks utilized     Problem: ABCDS Injury Assessment  Goal: Absence of physical injury  Outcome: Progressing  Note: Pt free from physical injury     Problem: Respiratory - Adult  Goal: Clear lung sounds  Description: Pt had no questions on purpose or side effects of medication   Will continue with bronchodilator assessment to determine treatments   5/13/2022 0155 by Tu Schulz RN  Outcome: Progressing  Note: Wheezing and crackles noted throughout shift, pt on high flow but is being weaned, pt tolerating well     Problem: Respiratory - Adult  Goal: Adequate oxygenation  5/13/2022 0155 by Tu Schulz RN  Outcome: Progressing  Note: Oxygenation within normal limits, high flow weaned down.      Problem: Respiratory - Adult  Goal: Achieves optimal ventilation and oxygenation  5/13/2022 0155 by Tu Schulz RN  Outcome: Progressing  Flowsheets (Taken 5/12/2022 2000)  Achieves optimal ventilation and oxygenation:   Assess for changes in respiratory status   Assess for changes in mentation and behavior   Oxygen supplementation based on oxygen saturation or arterial blood gases   Position to facilitate oxygenation and minimize respiratory effort     Problem: Chronic Conditions and Co-morbidities  Goal: Patient's chronic conditions and co-morbidity symptoms are monitored and maintained or improved  Outcome: Progressing  Flowsheets (Taken 5/12/2022 2000)  Care Plan - Patient's Chronic Conditions and Co-Morbidity Symptoms are Monitored and Maintained or Improved:   Monitor and assess patient's chronic conditions and comorbid symptoms for stability, deterioration, or improvement   Collaborate with multidisciplinary team to address chronic and comorbid conditions and prevent exacerbation or deterioration  Note: Breathing monitored, oxygen weaned when appropriate     Problem: Pain  Goal: Verbalizes/displays adequate comfort level or baseline comfort level  Outcome: Progressing  Flowsheets (Taken 5/11/2022 2047 by Pushpa Sauceda RN)  Verbalizes/displays adequate comfort level or baseline comfort level:   Encourage patient to monitor pain and request assistance   Assess pain using appropriate pain scale   Administer analgesics based on type and severity of pain and evaluate response   Implement non-pharmacological measures as appropriate and evaluate response  Note: Pt denying pain this shift     Problem: Nutrition Deficit:  Goal: Optimize nutritional status  Outcome: Progressing  Note: Pt on a carb diet with nutritional supplements     Problem: Skin/Tissue Integrity  Goal: Absence of new skin breakdown  Description: 1. Monitor for areas of redness and/or skin breakdown  2. Assess vascular access sites hourly  3. Every 4-6 hours minimum:  Change oxygen saturation probe site  4. Every 4-6 hours:  If on nasal continuous positive airway pressure, respiratory therapy assess nares and determine need for appliance change or resting period. Outcome: Progressing  Note: Pt encouraged and helped with repositioning, pillows used to offload. Problem: Neurosensory - Adult  Goal: Achieves stable or improved neurological status  Outcome: Progressing  Flowsheets  Taken 5/13/2022 0155  Achieves stable or improved neurological status:   Assess for and report changes in neurological status   Monitor temperature, glucose, and sodium. Initiate appropriate interventions as ordered  Taken 5/12/2022 2000  Achieves stable or improved neurological status: Assess for and report changes in neurological status  Note: Pt alert and oriented, vitals stable, blood work monitored. Problem: Cardiovascular - Adult  Goal: Maintains optimal cardiac output and hemodynamic stability  Outcome: Progressing  Flowsheets (Taken 5/12/2022 2000)  Maintains optimal cardiac output and hemodynamic stability:   Monitor blood pressure and heart rate   Monitor urine output and notify Licensed Independent Practitioner for values outside of normal range   Assess for signs of decreased cardiac output  Note: Vitals stable, urine output adequate.      Problem: Skin/Tissue Integrity - Adult  Goal: Skin integrity remains intact  Outcome: Progressing  Flowsheets  Taken 5/13/2022 0058  Skin Integrity Remains Intact: Monitor for areas of redness and/or skin breakdown  Taken 5/12/2022 2000  Skin Integrity Remains Intact: Monitor for areas of redness and/or skin breakdown  Note: Pt able to turn self in bed, pt encouraged by staff to turn every two hours, skin assessed each shift     Problem: Gastrointestinal - Adult  Goal: Minimal or absence of nausea and vomiting  Outcome: Progressing  Flowsheets (Taken 5/11/2022 2047 by Suzie Gale, RN)  Minimal or absence of nausea and vomiting:   Administer IV fluids as ordered to ensure adequate hydration   Provide nonpharmacologic comfort measures as appropriate   Advance diet as tolerated, if ordered  Note: No nausea or vomiting this shift     Problem: Gastrointestinal - Adult  Goal: Maintains or returns to baseline bowel function  Outcome: Progressing  Flowsheets (Taken 5/11/2022 2047 by Suzie Gale, RN)  Maintains or returns to baseline bowel function:   Assess bowel function   Encourage oral fluids to ensure adequate hydration   Administer ordered medications as needed   Encourage mobilization and activity     Problem: Gastrointestinal - Adult  Goal: Maintains adequate nutritional intake  Outcome: Progressing  Flowsheets (Taken 5/11/2022 2047 by Suzie Gale, RN)  Maintains adequate nutritional intake:   Monitor percentage of each meal consumed   Assist with meals as needed   Monitor intake and output, weight and lab values     Problem: Genitourinary - Adult  Goal: Absence of urinary retention  Outcome: Progressing  Flowsheets (Taken 5/11/2022 2047 by Suzie Gale, RN)  Absence of urinary retention:   Assess patients ability to void and empty bladder   Monitor intake/output and perform bladder scan as needed  Note: Pt voiding adequate amount     Problem: Infection - Adult  Goal: Absence of infection at discharge  Outcome: Progressing  Flowsheets (Taken 5/12/2022 2000)  Absence of infection at discharge:   Assess and monitor for signs and symptoms of infection   Monitor lab/diagnostic results   Monitor all insertion sites i.e., indwelling lines, tubes and drains Problem: Infection - Adult  Goal: Absence of infection during hospitalization  Outcome: Progressing  Flowsheets (Taken 5/12/2022 2000)  Absence of infection during hospitalization:   Assess and monitor for signs and symptoms of infection   Monitor lab/diagnostic results   Monitor all insertion sites i.e., indwelling lines, tubes and drains     Problem: Metabolic/Fluid and Electrolytes - Adult  Goal: Electrolytes maintained within normal limits  Outcome: Progressing  Flowsheets (Taken 5/12/2022 2000)  Electrolytes maintained within normal limits:   Monitor labs and assess patient for signs and symptoms of electrolyte imbalances   Administer electrolyte replacement as ordered   Monitor response to electrolyte replacements, including repeat lab results as appropriate  Note: Oral fluids encouraged, labs monitored daily     Problem: Metabolic/Fluid and Electrolytes - Adult  Goal: Hemodynamic stability and optimal renal function maintained  Outcome: Progressing  Flowsheets (Taken 5/12/2022 2000)  Hemodynamic stability and optimal renal function maintained:   Monitor labs and assess for signs and symptoms of volume excess or deficit   Monitor intake, output and patient weight   Monitor urine specific gravity, serum osmolarity and serum sodium as indicated or ordered  Note: Pt voiding adequate amount, vitals stable, daily weights completed     Problem: Metabolic/Fluid and Electrolytes - Adult  Goal: Glucose maintained within prescribed range  Outcome: Progressing  Flowsheets (Taken 5/12/2022 2000)  Glucose maintained within prescribed range:   Monitor blood glucose as ordered   Assess for signs and symptoms of hyperglycemia and hypoglycemia   Administer ordered medications to maintain glucose within target range  Note: Blood sugars checked 4x a day, insulin given as needed     Problem: Hematologic - Adult  Goal: Maintains hematologic stability  Outcome: Progressing  Flowsheets (Taken 5/12/2022 2000)  Maintains hematologic stability: Assess for signs and symptoms of bleeding or hemorrhage  Note: No signs of bleeding noted     Problem: Musculoskeletal - Adult  Goal: Return mobility to safest level of function  Outcome: Progressing  Flowsheets (Taken 5/12/2022 2000)  Return Mobility to Safest Level of Function:   Assess patient stability and activity tolerance for standing, transferring and ambulating with or without assistive devices   Assist with transfers and ambulation using safe patient handling equipment as needed     Problem: Musculoskeletal - Adult  Goal: Maintain proper alignment of affected body part  Outcome: Progressing  Flowsheets (Taken 5/12/2022 2000)  Maintain proper alignment of affected body part: Support and protect limb and body alignment per provider's orders     Problem: Musculoskeletal - Adult  Goal: Return ADL status to a safe level of function  Outcome: Progressing  Flowsheets (Taken 5/12/2022 2000)  Return ADL Status to a Safe Level of Function:   Administer medication as ordered   Assess activities of daily living deficits and provide assistive devices as needed     Problem: Skin/Tissue Integrity - Adult  Goal: Incisions, wounds, or drain sites healing without S/S of infection  Outcome: Completed  Flowsheets  Taken 5/13/2022 0058  Incisions, Wounds, or Drain Sites Healing Without Sign and Symptoms of Infection: TWICE DAILY: Assess and document dressing/incision, wound bed, drain sites and surrounding tissue  Taken 5/12/2022 2000  Incisions, Wounds, or Drain Sites Healing Without Sign and Symptoms of Infection: TWICE DAILY: Assess and document skin integrity     Care plan reviewed with patient. Patient verbalizes understanding of the plan of care and contributes to goal setting.

## 2022-05-13 NOTE — DISCHARGE SUMMARY
Discharge Summary    Date:5/13/2022        Patient Bernard Beebe     YOB: 1950     Age:71 y.o. Admit Date:4/24/2022   Admission Condition:fair   Discharged Condition:stable  Discharge Date: 05/13/22     Discharge Diagnoses   Acute on chronic hypoxic respiratory failure s/p bronch s/p VATS with lung biopsy and Rt middle lobe wedge resection done 5/5/22, CTT now removed  Pneumothorax 15%  Par A. 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  Coronary disease status post coronary bypass graft x3 LIMA to LAD SVG to RCA and SVG to obtuse marginal  Cardiomyopathy with improved ejection fraction last echo was 55 to 60% this year  Status post pacemaker for tachybradycardia syndrome  COPD  Significant deconditioning  Leukocytosis  Chronic kidney disease stage III  Lactic acidosis  Chronic anemia  Hypertension  Hyperlipidemia  Gram + cult coag neg and also + for rhinovirus  Pressure ulcer -POA  Hyperkalemia resolved    Hospital Stay   Narrative of Hospital Course:   66-year-old male with past medical history of atrial fibrillation on chronic anticoagulation coronary disease status post coronary bypass graft in 2021 recently diagnosed with possible interstitial lung disease secondary to amiodarone could not confirm his bronchoscopy and never had any lung biopsy who has been on chronic steroids with a taper along with PCP prophylaxis presented with increasing shortness of breath from the nursing home. Patient has been in a skilled nursing facility after his last admission last month in the hospital for pneumonia. Patient noticed his oxygen saturation to be less and hence was transferred here he has been placed on BiPAP with improvement in his saturation. He was also noted to be in A. fib with RVR.   Pulmonary cardiology and infectious disease were all consulted. Patient was transitioned to high flow oxygen. He did require periodic AV blocking agents for his heart rate control. Eventually Lanoxin was added. With improvement in his heart rate. Patient was also positive for rhinovirus. Cardiovascular surgery was consulted for possible lung biopsy. Cardiovascular surgery recommended stopping Xarelto as patient has had atrial appendage clipping. On May 5 patient did undergo right VATS procedure with lung biopsy and right middle lobe lung wedge resection. Patient had chest tube on right side postop. He did have some air leak. Eventually chest tube was removed. He did have 15% pneumothorax on follow-up chest x-ray showed showed slow improvement. He still had difficulty weaning off oxygen. Patient was on and off on high flow oxygen. He was eventually back down to 4 L of oxygen on the morning of transfer. His lung biopsy reviewed at an outside facility was more in favor of acute lung injury and based on his history suspected amiodarone toxicity is a possibility. Patient continues to stay on oral steroids which should be tapered slowly over the period of time. Pulmonary did inform that his information has been sent to lung transplant center at [facility] who will review his records and contact us. In the meantime patient is significantly deconditioned and will require transfer to another facility for both physical therapy and continued weaning of his oxygen. Patient will be discharged to [facility] if okay with consultants. We will continue to monitor his follow-up chest x-ray. Condition was stable at discharge.     Consultants:   IP CONSULT TO PULMONOLOGY  IP CONSULT TO CARDIOLOGY  IP CONSULT TO SOCIAL WORK  PALLIATIVE CARE EVAL  IP CONSULT TO INFECTIOUS DISEASES  IP CONSULT TO CARDIOLOGY  IP CONSULT TO CARDIOTHORACIC SURGERY    Time Spent on Discharge:  40 minutes were spent in patient examination, evaluation, counseling as well as medication reconciliation, prescriptions for required medications, discharge plan and follow up.       Surgeries/Procedures Performed:  Procedure(s):  RIGHT VATS WITH LUNG BIOPSY WITH RIGHT MIDDLE LOBE LUNG WEDGE RESECTION       Significant Diagnostic Studies:   Recent Labs:  CBC:   Lab Results   Component Value Date    WBC 13.1 05/10/2022    RBC 3.87 05/10/2022    RBC 4.84 11/07/2011    HGB 10.8 05/10/2022    HCT 35.3 05/10/2022    MCV 91.2 05/10/2022    MCH 27.9 05/10/2022    MCHC 30.6 05/10/2022    RDW 13.3 06/07/2018     05/10/2022     BMP:    Lab Results   Component Value Date    GLUCOSE 106 05/08/2022    GLUCOSE 94 11/07/2011     05/08/2022    K 4.7 05/08/2022    K 5.0 05/04/2022    CL 97 05/08/2022    CO2 30 05/08/2022    ANIONGAP 7.0 05/08/2022    BUN 57 05/08/2022    CREATININE 1.2 05/08/2022    CALCIUM 8.4 05/08/2022    LABGLOM 60 05/08/2022     HFP:    Lab Results   Component Value Date    PROT 6.0 05/02/2022     CMP:    Lab Results   Component Value Date    GLUCOSE 106 05/08/2022    GLUCOSE 94 11/07/2011     05/08/2022    K 4.7 05/08/2022    K 5.0 05/04/2022    CL 97 05/08/2022    CO2 30 05/08/2022    BUN 57 05/08/2022    CREATININE 1.2 05/08/2022    ANIONGAP 7.0 05/08/2022    ALKPHOS 101 05/02/2022    ALT 20 05/02/2022    AST 20 05/02/2022    BILITOT 0.4 05/02/2022    LABALBU 2.7 05/02/2022    LABALBU 4.3 11/07/2011    LABGLOM 60 05/08/2022    PROT 6.0 05/02/2022    CALCIUM 8.4 05/08/2022     PT/INR:    Lab Results   Component Value Date    INR 1.02 05/04/2022     PTT:   Lab Results   Component Value Date    APTT 22.6 05/04/2022     FLP:    Lab Results   Component Value Date    CHOL 126 01/07/2021    TRIG 96 01/07/2021    HDL 36 01/07/2021     U/A:    Lab Results   Component Value Date    COLORU YELLOW 04/05/2021    SPECGRAV 1.008 01/10/2021    PHUR 5.0 04/05/2021    PROTEINU NEGATIVE 04/05/2021    GLUCOSEU NEGATIVE 04/05/2021    KETUA NEGATIVE 04/05/2021    BILIRUBINUR NEGATIVE 04/05/2021 UROBILINOGEN 0.2 04/05/2021    NITRU NEGATIVE 04/05/2021    LEUKOCYTESUR NEGATIVE 04/05/2021     TSH:    Lab Results   Component Value Date    TSH 2.230 03/25/2022       Radiology Last 7 Days:  XR CHEST (2 VW)    Result Date: 5/10/2022  1. Interval removal of the right-sided chest tube. 15% pneumothorax in the right lung apex. 2. Postoperative changes. Status post coronary bypass surgery, pacemaker placement and left atrial appendage clip in place. 3.. Thickening of the interstitial lung markings especially in the right and left lower lobes. 4. Possible small right pleural effusion. **This report has been created using voice recognition software. It may contain minor errors which are inherent in voice recognition technology. ** Final report electronically signed by DR Jabari Elkins on 5/10/2022 8:02 AM    XR CHEST PORTABLE    Result Date: 5/13/2022  Bilateral pulmonary infiltrates, without significant change. This document has been electronically signed by: Sriram Sotelo MD on 05/13/2022 03:20 AM    XR CHEST PORTABLE    Addendum Date: 5/12/2022    ADDENDUM #1  The study was reviewed again at the request of the referring provider. There is a right apical pneumothorax occupying 15% of the right hemithorax, slightly smaller since previous study dated 11 May 2022 at 1435 hours. The patient is status post previous sternotomy, pacemaker placement and placement of left atrial appendage clip. There is abnormal density throughout both lung fields. Final report electronically signed by DR Jabari Elkins on 5/12/2022 8:22 AM  ORIGINAL REPORT 1 view chest x-ray Comparison: CR,SR - XR CHEST PORTABLE - 05/11/2022 02:32 PM EDT Findings: Calcified granuloma within the right lower lung. Airspace opacity and interstitial thickening within the bilateral lungs, similar to the prior study. Cardiac pacemaker in place. Normal heart size. No acute fracture. Status post sternotomy.      Result Date: 5/12/2022  Bilateral pulmonary infiltrates, without gross interval change. This document has been electronically signed by: Danni Melendez MD on 05/12/2022 04:49 AM Interpreted by: Danni Melendez MD Signed by: Danni Melendez MD 5/12/22 Final result    XR CHEST PORTABLE    Result Date: 5/11/2022  1. Right-sided pneumothorax occupying 20% of the right hemithorax, approximately unchanged. . 2. Status post previous sternotomy. Pacemaker in place. Left atrial appendage clip in place. 3. Abnormal density throughout both lung fields. **This report has been created using voice recognition software. It may contain minor errors which are inherent in voice recognition technology. ** Final report electronically signed by DR Ava Maurer on 5/11/2022 3:31 PM    XR CHEST PORTABLE    Result Date: 5/11/2022  1. Right apical pneumothorax which is unchanged in size from the prior. Continued follow-up recommended. 2. Unchanged appearance of reticular interstitial densities of the central lung zones and bilateral lung bases may represent infectious process or edema. Tiny right basilar effusion minimally decreased from prior. This document has been electronically signed by: Erica Carter DO, MBA on 05/11/2022 02:30 AM    XR CHEST PORTABLE    Result Date: 5/10/2022  1. Patchy airspace opacities are present at the mid to lower lung zones which may be related to edema versus atelectasis or pneumonia. There is similar aeration of the mid to lower lung zones when compared to prior examination. 2. There is a persistent small right apical pneumothorax demonstrated which appears unchanged when compared to the prior examination from earlier same day. **This report has been created using voice recognition software. It may contain minor errors which are inherent in voice recognition technology. ** Final report electronically signed by Dr. Jesus Montero on 5/10/2022 4:22 PM    XR CHEST PORTABLE    Result Date: 5/10/2022  1.  Continued right apical pneumothorax occupying 15% of the right hemithorax, approximately unchanged. 2. Status post coronary bypass surgery, pacemaker placement and placement of the left atrial appendage clip. 3. Abnormal density throughout both lung fields. Thickening off the interstitial lung markings. . **This report has been created using voice recognition software. It may contain minor errors which are inherent in voice recognition technology. ** Final report electronically signed by DR Edie Frey on 5/10/2022 11:31 AM    XR CHEST PORTABLE    Result Date: 5/9/2022  Impression: 1. No significant interval change in bilateral opacities secondary to infection and/or edema. This document has been electronically signed by: Katiana Coleman. DO Joya on 05/09/2022 04:09 AM    XR CHEST PORTABLE    Result Date: 5/8/2022  1. Right chest tube in unchanged position from the prior. 2. Improved aeration involving the bilateral lung apices. Residual interstitial densities and airspace disease greatest involving the central lung zones and lung bases. This document has been electronically signed by: Pina Crandall DO, MBA on 05/08/2022 02:01 AM    Lung Biopsy results   Clinical Information: LUNG INFILTRATES     FINAL DIAGNOSIS:   Lung, right middle lobe, wedge biopsy:     Emphysema and organizing acute lung injury (see microscopic   description).     Please see consultant's report from [facility] located under   [facility] tab, Pathology, in [employer]. Specimen:   BIOPSY OF LUNG, RIGHT MIDDLE LOBE       Gross Examination:   The container is labeled Cody Hartman, right middle lobe.  Received fresh   is a wedge biopsy of lung measuring 4.5 x 2 x about 1.5 cm.  The   serosal surface is pink-tan.  There are no palpable masses.  The   surface is inked blue.  Sections through the specimen reveal a pink-tan   cut surface.  There are no solid masses.  Representative sections are   submitted in three cassettes.  ss.  ALP/DKR:[id number]       Microscopic Examination:    This case was seen in consultation by Dr. Lora Sabillon MD at   the [facility] following is his diagnosis comment:     Sections show two forms of organizing acute lung injury. Ted Miller first is   the organizing phase of diffuse alveolar damage (DJD), characterized by   extensive alveolar septal fibroblast proliferation along with prominent   type II pneumocytes (some with foamy cytoplasm).  There are rare   fibrinous exudates but no hyaline membranes.  The second is organizing   pneumonia, characterized by polypoid intra-alveolar fibroblastic plugs   along with mild interstitial chronic inflammation.  A few   intra-arterial thrombi are also present; these are a common secondary   finding in acute lung injury (especially DAD).  The air spaces contain   foamy macrophages as well as pigmented macrophages.  No granulomas or   malignant cells are identified. Lucetta Record is no evidence of significant   tissue eosinophilia. Lucetta Record is no evidence of vasculitis/capillaritis,   aspirated particulate material or viral inclusions. The differential diagnosis of acute lung injury is very wide.  Most   cases represent the abnormal residuum of a prior infectious pneumonia,   including viral infection such as COVID-19.  There are many other   potential etiologies diagnosed mainly on the basis of the clinical   history, lab tests, serology and microbiologic cultures. Earleen Ewings include   toxic ingestants/inhalants, drug toxicity, connective tissue disease   and particulate matter aspiration, among others.  Histologic features   in lung biopsies only rarely reveal features pathognomonic for a   specific etiology.      Regarding the question of amiodarone toxicity, no pathologic findings   are pathognomonic of amiodarone exposure or toxicity.  The usual   findings included foamy macrophages and acute lung injury or a cellular   interstitial pneumonia, which are seen here but can also be seen in   other causes of acute lung injury.  Histology plays only a limited role   in deciding whether a given case of acute lung injury is caused by   amiodarone are not.  Perhaps the most important role of histology is to   exclude other causes that might be evident on histologic grounds, such   as infection or vasculitis.  Such changes are not present in this case. Please refer to the outside consultation report for additional   information. [id number]                                                       <Sign Out Dr. Jj Graham D.O., F.C.A.P. NVML/ [address] [address]  Printed on:  5/13/2022   [address] [place] [address]   [facility] ABHIJEETCHERYL HUBBARD CARINETAM II.KARLA, [address]   Original print date: 05/13/2022      Specimen Collected: 05/05/22 13:49              Discharge Plan   Disposition: Long-Term Acute Care    Provider Follow-Up:   Frantz Cherry MD  [address] [facility] [address]  [address] [place] [address] [address] [place] [address]  [address]  [phone]                 Patient Instructions   Diet: regular diet    Activity: activity as tolerated    Other Instructions:       Discharge Medications         Medication List      START taking these medications    bisacodyl 5 MG EC tablet  Commonly known as: DULCOLAX  Take 1 tablet by mouth daily as needed for Constipation     digoxin 125 MCG tablet  Commonly known as: LANOXIN  Take 1 tablet by mouth daily  Start taking on: May 14, 2022     dilTIAZem 30 MG tablet  Commonly known as: CARDIZEM  Take 1 tablet by mouth every 8 hours     guaiFENesin-dextromethorphan 100-10 MG/5ML syrup  Commonly known as: ROBITUSSIN DM  Take 10 mLs by mouth every 6 hours as needed for Cough     HYDROcodone-acetaminophen 5-325 MG per tablet  Commonly known as: NORCO  Take 1 tablet by mouth every 4 hours as needed for Pain for up to 3 days.      metoprolol 5 MG/5ML Soln injection  Commonly known as: LOPRESSOR  Infuse 5 mLs intravenously every 4 hours as needed (tachycardia HR greater than 110)     ondansetron 4 MG disintegrating tablet  Commonly known as: ZOFRAN-ODT  Take 1 tablet by mouth every 8 hours as needed for Nausea or Vomiting        CHANGE how you take these medications    albuterol (2.5 MG/3ML) 0.083% nebulizer solution  Commonly known as: PROVENTIL  What changed: Another medication with the same name was removed. Continue taking this medication, and follow the directions you see here. ALPRAZolam 0.5 MG tablet  Commonly known as: XANAX  Take 1 tablet by mouth nightly as needed for Sleep for up to 30 days. What changed: when to take this     metoprolol succinate 100 MG extended release tablet  Commonly known as: TOPROL XL  Take 1 tablet by mouth daily  Start taking on: May 14, 2022  What changed: how much to take     nystatin 119606 UNIT/GM cream  Commonly known as: MYCOSTATIN  Apply topically 2 times daily. What changed:   · how to take this  · when to take this     predniSONE 10 MG tablet  Commonly known as: DELTASONE  30 mg po daily for 2 weeks then decrease to 20mg daily  What changed:   · medication strength  · how much to take  · how to take this  · when to take this  · additional instructions        CONTINUE taking these medications    aspirin 81 MG chewable tablet  Take 1 tablet by mouth daily     atorvastatin 40 MG tablet  Commonly known as: LIPITOR  Take 1 tablet by mouth nightly     CENTRUM SILVER PO     FLUoxetine 10 MG capsule  Commonly known as: PROZAC     furosemide 20 MG tablet  Commonly known as: LASIX     ipratropium-albuterol 0.5-2.5 (3) MG/3ML Soln nebulizer solution  Commonly known as: DUONEB     loratadine 10 MG tablet  Commonly known as: CLARITIN     Mepron 750 MG/5ML suspension  Generic drug: atovaquone     mupirocin 2 % ointment  Commonly known as: BACTROBAN  Apply topically 3 times daily.      pantoprazole 40 MG tablet  Commonly known as: PROTONIX  Take 1 tablet by mouth every morning (before breakfast)     rOPINIRole 0.5 MG

## 2022-05-13 NOTE — PROGRESS NOTES
[facility]    Patient - Russ Hernandez   MRN -  [id number]   [facility]   - 1950      Date of Admission -  2022 12:37 PM  Date of evaluation -  2022  Room - --A   Hospital Day - MD Cresencio Primary Care Physician - No primary care provider on file. Problem List      Active Hospital Problems    Diagnosis Date Noted    ILD (interstitial lung disease) ([facility] [J84.9]      Priority: Medium    Abnormal CT of the chest [R93.89]      Priority: Medium    Severe malnutrition ([facility] [E43] 2022     Priority: Medium     Class: Acute    Septicemia ([facility] [A41.9]      Priority: Medium    SOB (shortness of breath) [R06.02] 2022     Priority: Medium    Acute on chronic respiratory failure with hypoxia (HCC) [J96.21] 2022    Pneumonia [J18.9] 2022     Reason for Consult    Acute hypoxic respiratory failure  HPI   From HPI:  Naman Oneal is a 69 y/o male former-smoker with PMH of CAD s/p CABG x3 (2021), ischemic cardiomyopathy, COPD, asthma, atrial fibrillation s/p left atrial appendage clip (2021), sick sinus syndrome s/p dual pacemaker placement (2021), HTN, HLD, colon polyps.      Patient presented to [facility] ED on [facility] from nursing home for complaints of increasing dyspnea. He was recently hospitalized last month with pneumonia. He did endorse recent chills. He was hypoxic on presentation with evidence of mild volume overload. He was placed on BiPAP initially. He had a noted leukocytosis, but was afebrile. CXR showed persistent and worsening acute on chronic interstitial disease at the lung bases. He was also noted to be in Afib with RVR. He was started on cardizem infusion with improvement. He was admitted under the care of Dr. Veda Bowers. He received empiric antibiotics. Pulmonology was consulted. He received bronchodilators.  His chronic prednisone was increased from 30 mg to 40 mg daily. He was later weaned to high flow nasal cannula. Infectious disease was consulted. He was maintained on cefepime empirically. Cardiology was consulted for Afib. Digoxin was added. He was diuresed with lasix.      On 4/26/22 he underwent bronchoscopy. He had mucus production of the RUL, RML, RLL and right main bronchus. The RLL bronchus mucosa was erythematous. BAL was sent. This returned positive for candida albicans. Diflucan was started. Fluid analysis was also notable for macrophages and positive staining for lipid accumulation.      CT surgery was consulted 4/28/22 per patient/family request for consideration of open lung biopsy for confirmation of diagnosis of ILD. CT surgery Dr. Lisha Wallis did evaluate, with plans for VATS and lung biopsy. He also recommended discontinuation of xarelto as well, given s/p ZAHRA closure.      Patient underwent VATS with lung biopsy with RML wedge resection on 5/5/22 with Dr. Lisha Wallis. EBL was < 5 mL. There were no reported complications. Lung tissue biopsy and culture was obtained from RML.    Patient presented to ICU post-op for close monitoring. He remains hemodynamically stable, and oxygenating well on stable high flow nasal cannula requirements. \"    Past 24 Hours   -On 4 LPM SpO2 100% advised to wean   -Reviewed biopsy results with patient and wife at bedside  -CXR improved PTX  -SOB stable denies CP or significant sputum production  -All systems reviewed   PMHx   Past Medical History      Diagnosis Date    Asthma     Atrial fibrillation with RVR ([address] 04/05/2021    [employer] dual pacemaker  04/15/2021    Collagenous colitis 2021    per scope 2021    Colon polyps 2021    dr Heber Ceja    COPD, mild ([address]     Eczema of hand     HTN (hypertension)     Hyperlipidemia     Smoker       Past Surgical History        Procedure Laterality Date    BRONCHOSCOPY N/A 4/26/2022    BRONCHOSCOPY WITH BAL performed by Ismael Bobo MD at [facility]  COLONOSCOPY  06/10/2021    theresa ccolon polyps and collagenous colitis    CORONARY ARTERY BYPASS GRAFT  01/12/2021    cabg x 3 with lima and atrial appendage clip  dr Krystal Allison   [address] N/A 01/12/2021    CABG  X 3 WITH DEJAH, Atrial Appendage Clip performed by Minda Urbina MD at [address] [place] [address] Road  06/2008    polyps    OTHER SURGICAL HISTORY  DEC 7TH 2012 DR GET [place]     IGS ENDOSCOPIC BI LAT MAXILLARY, ETHMOID, SPHENOID, FRONTAL SINUSOTOMY WITH SEPTOPLASTY AND TURBINOPLASTIES    SKIN CANCER EXCISION  09/2014    Left side of Forehead     THORACOSCOPY Right 5/5/2022    RIGHT VATS WITH LUNG BIOPSY WITH RIGHT MIDDLE LOBE LUNG WEDGE RESECTION performed by Minda Urbina MD at [address]  02/2017     Meds    Current Medications    FLUoxetine  20 mg Oral Daily    predniSONE  30 mg Oral Daily    albuterol  2.5 mg Nebulization BID    sodium chloride flush  5-40 mL IntraVENous 2 times per day    metoprolol  2.5 mg IntraVENous Once    metoprolol  2.5 mg IntraVENous Once    metoprolol succinate  100 mg Oral Daily    fluconazole  200 mg Oral Daily    dilTIAZem  30 mg Oral 3 times per day    insulin lispro  0-6 Units SubCUTAneous TID WC    insulin lispro  0-3 Units SubCUTAneous Nightly    atovaquone  1,500 mg Oral Daily    furosemide  20 mg Oral Daily    digoxin  125 mcg Oral Daily    aspirin  81 mg Oral Daily    atorvastatin  40 mg Oral Nightly    cetirizine  10 mg Oral Daily    multivitamin  1 tablet Oral Daily    mupirocin   Topical TID    nystatin   Topical BID    pantoprazole  40 mg Oral QAM AC    rOPINIRole  0.5 mg Oral Nightly    traZODone  50 mg Oral Nightly     guaiFENesin-dextromethorphan, bisacodyl, ipratropium-albuterol, sodium chloride flush, sodium chloride, HYDROcodone-acetaminophen, dextrose, glucagon (rDNA), dextrose, glucose, metoprolol, ALPRAZolam, ondansetron **OR** ondansetron, acetaminophen  IV Drips/Infusions   sodium chloride      dextrose       Home Medications  Medications Prior to Admission: albuterol (PROVENTIL) (2.5 MG/3ML) 0.083% nebulizer solution, Take 2.5 mg by nebulization 2 times daily  ipratropium-albuterol (DUONEB) 0.5-2.5 (3) MG/3ML SOLN nebulizer solution, Inhale 1 vial into the lungs every 4 hours as needed for Shortness of Breath  Sodium Chloride-Sodium Bicarb 2300-700 MG PACK, 1 spray by Nasal route in the morning, at noon, and at bedtime  FLUoxetine (PROZAC) 10 MG capsule, Take 10 mg by mouth daily (10 mg daily x 5 days - 4/24-4/28, then increase to 20 mg daily)  atovaquone (MEPRON) 750 MG/5ML suspension, Take 1,500 mg by mouth daily (10 mL daily)  predniSONE (DELTASONE) 20 MG tablet, Take 30 mg by mouth daily   ALPRAZolam (XANAX) 0.5 MG tablet, Take 0.5 mg by mouth every 12 hours as needed for Sleep. [DISCONTINUED] tuberculin (APLISOL) 5 UNIT/0.1ML injection, Inject 5 Units into the skin  [DISCONTINUED] nystatin (MYCOSTATIN) 359161 UNIT/GM cream, Apply topically 2 times daily Apply topically 2 times daily. [DISCONTINUED] oxymetazoline (AFRIN) 0.05 % nasal spray, 2 sprays by Nasal route 2 times daily  furosemide (LASIX) 20 MG tablet, Take 20 mg by mouth daily Sun, Wed  loratadine (CLARITIN) 10 MG tablet, Take 10 mg by mouth daily  metoprolol succinate (TOPROL XL) 100 MG extended release tablet, Take 1.5 tablets by mouth daily  tiotropium-olodaterol (STIOLTO) 2.5-2.5 MCG/ACT AERS, Inhale 2 puffs into the lungs daily  rivaroxaban (XARELTO) 15 MG TABS tablet, Take 1 tablet by mouth daily  mupirocin (BACTROBAN) 2 % ointment, Apply topically 3 times daily.   albuterol sulfate  (90 Base) MCG/ACT inhaler, INHALE 2 PUFFS INTO THE LUNGS EVERY 6 HOURS AS NEEDED FOR WHEEZING  traZODone (DESYREL) 50 MG tablet, Take 1 tablet by mouth nightly  rOPINIRole (REQUIP) 0.5 MG tablet, Take 1 tablet by mouth nightly  pantoprazole (PROTONIX) 40 MG tablet, Take 1 tablet by mouth every morning (before breakfast)  [DISCONTINUED] cetirizine (ZYRTEC) 10 MG tablet, Take 10 mg by mouth daily  atorvastatin (LIPITOR) 40 MG tablet, Take 1 tablet by mouth nightly  aspirin 81 MG chewable tablet, Take 1 tablet by mouth daily  Multiple Vitamins-Minerals (CENTRUM SILVER PO), Take 1 tablet by mouth daily   Diet    ADULT DIET; Regular; 3 carb choices (45 gm/meal); Low Sodium (2 gm)  ADULT ORAL NUTRITION SUPPLEMENT; Breakfast, Lunch, Dinner; Standard High Calorie/High Protein Oral Supplement  Allergies    Penicillins and Sulfa antibiotics  Social History     Social History     Socioeconomic History    Marital status:      Spouse name: Not on file    Number of children: Not on file    Years of education: Not on file    Highest education level: Not on file   Occupational History    Not on file   Tobacco Use    Smoking status: Former Smoker     Packs/day: 1.00     Years: 40.00     Pack years: 40.00     Types: Cigarettes     Quit date: 3/7/2022     Years since quittin.1    Smokeless tobacco: Never Used   Substance and Sexual Activity    Alcohol use: Not Currently     Alcohol/week: 0.0 standard drinks    Drug use: No    Sexual activity: Not on file   Other Topics Concern    Not on file   Social History Narrative    No barriers with medication affordability now that he has met deductible    Active with [facility]    Has O2 and supplies needed    No barriers with transportation     Social Determinants of Health     Financial Resource Strain: Low Risk     Difficulty of Paying Living Expenses: Not hard at all   Food Insecurity: No Food Insecurity    Worried About [address] in the Last Year: Never true    [facility] in the Last Year: Never true   Transportation Needs: No Transportation Needs    Lack of Transportation (Medical): No    Lack of Transportation (Non-Medical):  No   Physical Activity: Inactive    Days of Exercise per Week: 0 days    Minutes of Exercise per Session: 0 min   Stress:  Feeling of Stress : Not on file   Social Connections:     Frequency of Communication with Friends and Family: Not on file    Frequency of Social Gatherings with Friends and Family: Not on file    Attends Buddhist Services: Not on file    Active Member of Clubs or Organizations: Not on file    Attends Club or Organization Meetings: Not on file    Marital Status: Not on file   Intimate Partner Violence:     Fear of Current or Ex-Partner: Not on file    Emotionally Abused: Not on file    Physically Abused: Not on file    Sexually Abused: Not on file   Housing Stability: Unknown    Unable to Pay for Housing in the Last Year: No    Number of [place] in the Last Year: Not on file    Unstable Housing in the Last Year: No     Family History          Problem Relation Age of Onset    Heart Disease Mother     Heart Disease Father         cabg    Diabetes Brother     Heart Disease Brother        Vitals     height is 5' 8\" (1.727 m) and weight is 156 lb 3.2 oz (70.9 kg). His oral temperature is 97.6 °F (36.4 °C). His blood pressure is 110/64 and his pulse is 56. His respiration is 18 and oxygen saturation is 100%. Body mass index is 23.75 kg/m². I/O        Intake/Output Summary (Last 24 hours) at 5/13/2022 0902  Last data filed at 5/13/2022 0612  Gross per 24 hour   Intake 1764 ml   Output 1875 ml   Net -111 ml     I/O last 3 completed shifts: In: 2464 [P.O.:2454; I.V.:10]  Out: 1072 [Urine:3325]   Patient Vitals for the past 96 hrs (Last 3 readings):   Weight   05/13/22 0615 156 lb 3.2 oz (70.9 kg)   05/12/22 0347 158 lb 9.6 oz (71.9 kg)   05/11/22 0346 152 lb 12.8 oz (69.3 kg)     Exam   Physical Exam   Constitutional: No distress on O2 per NC. Patient appears chronically ill and thinly built  Head: Normocephalic and atraumatic. Mouth/Throat: Oropharynx is clear and moist.  No oral thrush. Eyes: Conjunctivae are normal. Pupils are equal, round. No scleral icterus. Neck: Neck supple.  No tracheal deviation present. Cardiovascular: S1 and S2 with no murmur. No peripheral edema  Pulmonary/Chest: Normal effort with bilateral air entry, bilateral rales. No stridor. No respiratory distress. Patient exhibits no tenderness. Abdominal: Soft. Bowel sounds audible. No distension or tenderness to palp. Musculoskeletal: Moves all extremities  Neurological: Patient is alert and oriented to person, place, and time.      Labs  - Old records and notes have been reviewed in [facility]   CBC     Lab Results   Component Value Date    WBC 13.1 05/10/2022    RBC 3.87 05/10/2022    RBC 4.84 11/07/2011    HGB 10.8 05/10/2022    HCT 35.3 05/10/2022     05/10/2022    MCV 91.2 05/10/2022    MCH 27.9 05/10/2022    MCHC 30.6 05/10/2022    RDW 13.3 06/07/2018    NRBC 0 05/10/2022    NRBC 0 11/07/2011    SEGSPCT 81.3 05/10/2022    MONOPCT 7.4 05/10/2022    MONOSABS 1.0 05/10/2022    LYMPHSABS 1.2 05/10/2022    EOSABS 0.0 05/10/2022    BASOSABS 0.0 05/10/2022    DIFFTYPE see below 03/25/2022     BMP   Lab Results   Component Value Date     05/08/2022    K 4.7 05/08/2022    K 5.0 05/04/2022    CL 97 05/08/2022    CO2 30 05/08/2022    BUN 57 05/08/2022    CREATININE 1.2 05/08/2022    GLUCOSE 106 05/08/2022    GLUCOSE 94 11/07/2011    CALCIUM 8.4 05/08/2022    MG 1.9 04/28/2022     LFTS  Lab Results   Component Value Date    ALKPHOS 101 05/02/2022    ALT 20 05/02/2022    AST 20 05/02/2022    PROT 6.0 05/02/2022    BILITOT 0.4 05/02/2022    BILIDIR <0.2 03/26/2022    LABALBU 2.7 05/02/2022    LABALBU 4.3 11/07/2011     ABG   Lab Results   Component Value Date    PH 7.44 04/24/2022    PCO2 39 04/24/2022    PO2 191 04/24/2022    HCO3 26 04/24/2022    O2SAT 100 04/24/2022     @  Lab Results   Component Value Date    APTT 22.6 05/04/2022     INR   Lab Results   Component Value Date    INR 1.02 05/04/2022    INR 1.41 (H) 03/25/2022    INR 1.25 (H) 04/06/2021     Angio Convert Enzyme 8 - 52 U/L 21        ECHO complete  6/15/2021    Conclusions      Summary   Normal left ventricular size and systolic function. There were no regional wall motion abnormalities. Wall thickness was within normal limits. Ejection fraction was estimated at 45-50%. Doppler parameters were consistent with abnormal left ventricular   relaxation (grade 1 diastolic dysfunction). IVC size is within normal limits with normal respiratory phasic changes. Signature      ----------------------------------------------------------------   Electronically signed by Yue Marti MD (Interpreting   physician) on 06/15/2021 at 04:26 PM   ----------------------------------------------------------------    ECHO limited  3/9/2022    Conclusions      Summary   Left ventricle size is normal.   Normal left ventricular wall thickness. There were no regional wall motion abnormalities. Ejection fraction is visually estimated in the range of 55% to 60%. Abnormal (paradoxical) motion consistent with post-operative status. IVC size is within normal limits with normal respiratory phasic changes.       Signature      ----------------------------------------------------------------   Electronically signed by Moraima Chow MD (Interpreting   physician) on 03/09/2022 at 04:56 PM   ----------------------------------------------------------------           PFTs     Cultures    (+) blood culture GPC in clusters    4/26/22  Bronchoscopy- will follow cultures/cytology   Endobronchial findings:   Trachea: Normal mucosa  Diana: Normal mucosa  Right main bronchus: Normal mucosa, mucus  Right upper lobe bronchus: Normal mucosa, mucus  Right Middle lobe bronchus: Normal mucosa, mucus  Right Lower lobe bronchus: erythematous mucosa  Left main bronchus: Normal mucosa  Left upper lobe bronchus: Normal mucosa  Left lower lobe bronchus: Normal mucosa     Rhinovirus Enterovirus PCR Detected Abnormal      AFB (-)  Virus cultures (-)  Cytology: FINAL RESULTS:   No malignant cells seen.      Specimen consists of benign appearing squamous      cells, alveolar macrophages, and debris. Flow cytometry: IMPRESSION:   1. Low viability specimen showing a predominance of granulocytes   without immunophenotypic aberrancy. 2. Rare phenotypically unremarkable T-cells with a normal CD4:CD8   ratio of 2:4. No abnormal B cell, plasma cell, T cell, or NK cell population   identified. See comment. Cytology:     FINAL RESULTS:   Right lower lobe of lung, bronchial washings, cytospin preparation:     Alveolar macrophages with positive staining for lipid accumulation.     Please see microscopic examination. VATs wedge Biopsy from 5/5/2022    By Dr. Ellen Richter: LUNG INFILTRATES     FINAL DIAGNOSIS:   Lung, right middle lobe, wedge biopsy:     Emphysema and organizing acute lung injury (see microscopic   description).     Please see consultant's report from [facility] located under   [facility] tab, Pathology, in [place]. Specimen:   BIOPSY OF LUNG, RIGHT MIDDLE LOBE       Gross Examination:   The container is labeled Fe Cavanaugh, right middle lobe.  Received fresh   is a wedge biopsy of lung measuring 4.5 x 2 x about 1.5 cm.  The   serosal surface is pink-tan.  There are no palpable masses.  The   surface is inked blue.  Sections through the specimen reveal a pink-tan   cut surface.  There are no solid masses.  Representative sections are   submitted in three cassettes.  ss.  ALP/DKR:[id number]       Microscopic Examination:    This case was seen in consultation by Dr. Neena Garcia MD at   the [address] [place] [address] following is his diagnosis comment:     Sections show two forms of organizing acute lung injury. Fredda Friendly first is   the organizing phase of diffuse alveolar damage (DJD), characterized by   extensive alveolar septal fibroblast proliferation along with prominent   type II pneumocytes (some with foamy cytoplasm).  There are rare fibrinous exudates but no hyaline membranes.  The second is organizing   pneumonia, characterized by polypoid intra-alveolar fibroblastic plugs   along with mild interstitial chronic inflammation.  A few   intra-arterial thrombi are also present; these are a common secondary   finding in acute lung injury (especially DAD).  The air spaces contain   foamy macrophages as well as pigmented macrophages.  No granulomas or   malignant cells are identified. Isabel Yarely is no evidence of significant   tissue eosinophilia. Isabel Yarely is no evidence of vasculitis/capillaritis,   aspirated particulate material or viral inclusions. The differential diagnosis of acute lung injury is very wide.  Most   cases represent the abnormal residuum of a prior infectious pneumonia,   including viral infection such as COVID-19.  There are many other   potential etiologies diagnosed mainly on the basis of the clinical   history, lab tests, serology and microbiologic cultures. Margeret Kimmy include   toxic ingestants/inhalants, drug toxicity, connective tissue disease   and particulate matter aspiration, among others.  Histologic features   in lung biopsies only rarely reveal features pathognomonic for a   specific etiology. Regarding the question of amiodarone toxicity, no pathologic findings   are pathognomonic of amiodarone exposure or toxicity.  The usual   findings included foamy macrophages and acute lung injury or a cellular   interstitial pneumonia, which are seen here but can also be seen in   other causes of acute lung injury.  Histology plays only a limited role   in deciding whether a given case of acute lung injury is caused by   amiodarone are not.  Perhaps the most important role of histology is to   exclude other causes that might be evident on histologic grounds, such   as infection or vasculitis.  Such changes are not present in this case. Please refer to the outside consultation report for additional   information. Radiology    CXR    1 view chest x-ray    05/13/2022   Findings: Calcified granuloma within the right lower lung. Airspace opacity and interstitial thickening within the bilateral lungs without change. Normal heart size. Cardiac pacemaker in place. Prior sternotomy. No acute fracture. Impression:  Bilateral pulmonary infiltrates, without significant change. 1 view chest x-ray    05/12/2022   Bilateral pulmonary infiltrates, without gross interval change. Radiology Addendum     The study was reviewed again at the request of the referring provider. There is a right apical pneumothorax occupying 15% of the right    hemithorax, slightly smaller since previous study dated 11 May 2022 at    1435 hours. The patient is status post previous sternotomy, pacemaker placement and    placement of left atrial appendage clip. There is abnormal density throughout both lung fields. 1 view chest x-ray   05/11/2022   Findings: Right chest pacer device. Atrial appendage closure device. Changes of coronary artery bypass grafting. Mediastinum wires. Redemonstration of central and basilar predominant interstitial densities. Granuloma of the right lung base. Tiny right basilar effusion minimally decreased from prior. No pneumothorax  Right apical pneumothorax which is unchanged in size. No acute fracture. Impression:  1. Right apical pneumothorax which is unchanged in size from the prior. Continued follow-up recommended. 2. Unchanged appearance of reticular interstitial densities of the central lung zones and bilateral lung bases may represent infectious process or edema. Tiny right basilar effusion minimally decreased from prior. XR CHEST (2 VW)   5/10/2022   Impression:  1. Interval removal of the right-sided chest tube. 15% pneumothorax in the right lung apex. 2. Postoperative changes. Status post coronary bypass surgery, pacemaker placement and left atrial appendage clip in place. 3.. Thickening of the interstitial lung markings especially in the right and left lower lobes. 4. Possible small right pleural effusion. Radiographs of the chest 1 view   05/09/2022   Findings: Upright examination was performed. Again seen are right chest tube, dual lead cardiac pacemaker, CABG changes and atrial left atrial appendage clip. Bilateral alveolar and interstitial densities are unchanged. No pneumothorax or large pleural effusion. Stable cardiomediastinal silhouette and bony structures. Impression:   1. No significant interval change in bilateral opacities secondary to infection and/or edema. 5/3/2022   XR CHEST PORTABLE   Stable chest.        03/07/2022 --->  04/29/2022            CT chest     01/09/2021 ---> 03/15/2022            Assessment   -Acute on chronic respiratory failure with hypoxia   -Rapidly progressing Acute Interstitial pneumonia (AIP) DDX Amiodarone Toxicity vs RBILD vs OP since 03/7/2022 (No evidence ILD back in 2021)-s/p Right Vats wedge lung biopsy 5/5/2022-results pending  -PTX-Right side 5/10/2022-remains stable  -CAD s/p CABG   -Afib/Aflutter  -SSS s/p PPM  -Ischemic cardiomyopathy  -Full Code   Plan   -Slowly weaning prednisone, down to 30 mg day plan to reduce to 20 mg next week if continues to tolerate  -continue on Mepron for PCP prophylaxis   -Lung bx sent for consultation to [facility], reviewed report with patient and wife c/w amiodarone toxicity, discussed next options due to significant hypoxia with exertion may still require [facility] as he had noted desaturations with NRB on exertion per nursing staff, discussed possible lung transplant evaluation patient agreeable would prefer referral be made to [facility]   -Monitor SpO2 wean supplemental O2 to maintain SpO2 >90%  -will follow CXR in AM  -CTS signed off   -Dispo to [facility] awaiting bed, OK from pulmonary standpoint for transfer once bed available. Case discussed with nurse and patient/family.   Questions and concerns addressed.   Meds and orders reviewed     Electronically signed by   ARCADIO Vanegas CNP on 5/13/2022 at 9:02 AM

## 2022-06-06 NOTE — TELEPHONE ENCOUNTER
Wife Rupali Robert left message to cancel appointment 6/7 patient is currently admitted to [facility]     Returned call to wife   Will track patient for discharge

## 2022-06-27 ENCOUNTER — TELEPHONE (OUTPATIENT)
Dept: FAMILY MEDICINE CLINIC | Age: 72
End: 2022-06-27

## 2022-06-27 NOTE — TELEPHONE ENCOUNTER
Jose Asher dropped off a [facility] [employer] Form req doctor to fill out. Please call Jose Asher once completed to P/U.     Form placed on cart

## 2022-07-07 NOTE — TELEPHONE ENCOUNTER
Navarro Francois called asking if this paper is done yet. Formerly [facility] is wanting it.

## 2022-09-06 ENCOUNTER — TELEPHONE (OUTPATIENT)
Dept: CARDIOLOGY CLINIC | Age: 72
End: 2022-09-06

## 2022-09-06 NOTE — TELEPHONE ENCOUNTER
I accessed patient's chart on 2022 because wife if calling in \"needing a statement regarding death otherwise they are going to make her pay for his .\"  I advised  that Dr. Juju Mcwilliams has completed some forms for insurance and that if they are needing something from Dr. Dawson Barragan they(insurance) needs to contact our office.

## 2022-09-08 ENCOUNTER — TELEPHONE (OUTPATIENT)
Dept: CARDIOTHORACIC SURGERY | Age: 72
End: 2022-09-08

## 2022-09-08 NOTE — TELEPHONE ENCOUNTER
I accessed patient's chart on 9/8 to provide medical records to Judd Rodriguez, for the purpose of life insurance coverage requested for the patient's wife and beneficiary.

## 2023-01-29 NOTE — PROGRESS NOTES
Ina Jacques 60  PHYSICAL THERAPY MISSED TREATMENT NOTE  STRZ ICU STEPDOWN TELEMETRY 4K    Date: 2022  Patient Name: Beth Schilder        MRN: [id number]   : 1950  (75 y.o.)  Gender: male                REASON FOR MISSED TREATMENT:  Hold treatment per nursing request.  Pt having respiratory issues and going in/out of A-fib per RN report. Will check back tomorrow. Rosanna Black.  Olga Xavier, [address] 8 30-Dec-2022

## 2023-04-17 NOTE — CARE COORDINATION
Ambulatory Care Coordination Note  3/23/2022  CM Risk Score: 4  Charlson 10 Year Mortality Risk Score: 79%     ACC: Sheron Prince, RN    Summary Note: Spoke with Zach Carranza. Introduced self/role. He was admitted to [facility] and was CTN referral for enrollment to provide support and education to help manage chronic conditions. Currently active with [facility] nursing only. Declines need for therapy  Currently active with [facility] CHF clinic  PFT's scheduled for next month  Had follow up with PCP yesterday  Has follow up appts with pulmonology and cardiology scheduled. Will mail out zone tool for CHF and COPD to use as references  Has all needed nebulizer and O2 supplies. Currently on 3L of O2 and 4L with activity. Has concentrator and canisters  Discussed high cost medications as Anora inhaler was going to cost $300. States because he met his deductible, he was able to get inhaler for $40 which he states he can afford.    Taking nebs and inhalers as ordered    Plan  -educate on chronic condition management to prevent exacerbations and possible hospitalizations  -educate on zone tools being placed in mail  -collaborate with CHF clinic as needed  -encourage daily weight tracking and reporting  -assist with high cost medications if needed  -collaborate with [facility] As needed  -reinforce importance of early symptom recognition and reporting to prevent exacerbation  Congestive Heart Failure Assessment    Do you understand a low sodium diet?: Yes  Do you understand how to read food labels?: Yes  How many restaurant meals do you eat per week?: 1-2  Do you salt your food before tasting it?: No         Symptoms:  CHF associated angina: Neg, CHF associated dyspnea on exertion: Pos, CHF associated fatigue: Pos, CHF associated orthostatic hypotension: Neg, CHF associated PND: Neg, CHF associated shortness of breath: Pos (Comment: recent exacerbation)      Symptom course: improving     ,   COPD Assessment    Does the patient understand April 17, 2023      Melquiades Tam  [address] [facility] [address]        Dear Melquiades,       April 17, 2023    To  Melquiades Tam  [address] [facility] [address]    Your team at [facility] cares about your health. We have reviewed your chart and based on our findings; we are making the following recommendations to better manage your health.     You are in particular need of attention regarding the following:     PREVENTATIVE VISIT: Physical    If you have already completed these items, please contact the clinic via phone or   MyChart so your care team can review and update your records. Thank you for   choosing [facility] for your healthcare needs. For any questions,   concerns, or to schedule an appointment please contact our clinic.    Healthy Regards,      Your [facility] Care Team            Sincerely,        Sejal Hutchinson MD           envrionmental exposure?: Yes  Is the patient able to verbalize Rescue vs. Long Acting medications?: Yes  Does the patient have a nebulizer?: Yes  Does the patient use a space with inhaled medications?: No            Symptoms:     Symptom course: stable  Breathlessness: exertion, minimal exertion, rest  Change in chronic cough?: Increased  Change in sputum?: Increased  Self Monitoring - SaO2: Yes  Have you had a recent diagnosis of pneumonia either by PCP or at a hospital?: Yes at Hospital  Have you seen your PCP for follow up or do you have an appointment scheduled?: Yes  Are you taking your antibiotics as prescribed?: Yes  Symptom Course: improving      and   General Assessment    Do you have any symptoms that are causing concern?: Yes  Progression since Onset: Gradually Improving  Reported Symptoms: Other (Comment: pneumonia)           Ambulatory Care Coordination Assessment    Care Coordination Protocol  Program Enrollment: Complex Care  Referral from Primary Care Provider: No  Week 1 - Initial Assessment     Do you have all of your prescriptions and are they filled?: Yes  Barriers to medication adherence: None  Are you able to afford your medications?: Yes  How often do you have trouble taking your medications the way you have been told to take them?: I always take them as prescribed. Do you have Home O2 Therapy?: Yes   Oxygen Regimen: Continuous Flow - Enter rate/FIO2: 3   Method of Delivery: Nasal Cannula, Concentrater, Has cylinders in home      Ability to seek help/take action for Emergent Urgent situations i.e. fire, crime, inclement weather or health crisis. : Independent  Ability to ambulate to restroom: Independent  Ability handle personal hygeine needs (bathing/dressing/grooming): Independent  Ability to manage Medications:  Independent  Ability to prepare Food Preparation: Needs Assistance  Ability to maintain home (clean home, laundry): Needs Assistance  Ability to drive and/or has transportation: Needs Assistance  Ability to do shopping: Needs Assistance  Ability to manage finances: Independent  Is patient able to live independently?: Yes     Current Housing: Private Residence        Per the Fall Risk Screening, did the patient have 2 or more falls or 1 fall with injury in the past year?: No        Are you experiencing loss of meaning?: No  Are you experiencing loss of hope and peace?: No     Thinking about your patient's physical health needs, are there any symptoms or problems (risk indicators) you are unsure about that require further investigation?: No identified areas of uncertainly or problems already being investigated   Are the patients physical health problems impacting on their mental well-being?: No identified areas of concern   Are there any problems with your patients lifestyle behaviors (alcohol, drugs, diet, exercise) that are impacting on physical or mental well-being?: No identified areas of concern   Do you have any other concerns about your patients mental well-being?  How would you rate their severity and impact on the patient?: No identified areas of concern   How would you rate their home environment in terms of safety and stability (including domestic violence, insecure housing, neighbor harassment)?: Consistently safe, supportive, stable, no identified problems   How do daily activities impact on the patient's well-being? (include current or anticipated unemployment, work, caregiving, access to transportation or other): No identified problems or perceived positive benefits   How would you rate their social network (family, work, friends)?: Good participation with social networks   How would you rate their financial resources (including ability to afford all required medical care)?: Financially secure, resources adequate, no identified problems   How wells does the patient now understand their health and well-being (symptoms, signs or risk factors) and what they need to do to manage their health?: Reasonable to good understanding and already engages in managing health or is willing to undertake better management   How well do you think your patient can engage in healthcare discussions? (Barriers include language, deafness, aphasia, alcohol or drug problems, learning difficulties, concentration): Clear and open communication, no identified barriers   Do other services need to be involved to help this patient?: Other care/services in place and adequate   Are current services involved with this patient well-coordinated? (Include coordination with other services you are now recommendation): All required care/services in place and well-coordinated   Suggested Interventions and Community Resources   Disease Specific Clinic: Completed (Comment: Active with CHF clinic)   Home Health Services: Completed   Medi Set or Pill Pack: Completed   Occupational Therapy: Declined   Palliative Care: Not Started   Physical Therapy: Declined   Registered Dietician: Not Started   Senior Services: Not Started   Social Work: Not Started   Transportation Services: Not Started   Zone Management Tools: In Process                  Prior to Admission medications    Medication Sig Start Date End Date Taking? Authorizing Provider   umeclidinium-vilanterol [facility] ELLIPTA) 62.5-25 MCG/INH AEPB inhaler Inhale 1 puff into the lungs daily 3/22/22  Yes Valentine Agudelo MD   albuterol sulfate  (90 Base) MCG/ACT inhaler INHALE 2 PUFFS INTO THE LUNGS EVERY 6 HOURS AS NEEDED FOR WHEEZING 3/18/22  Yes Smitha Burris MD   furosemide (LASIX) 20 MG tablet Take 1 tablet by mouth daily 3/17/22  Yes Smitha Burris MD   ALPRAZolam [facility]) 0.5 MG tablet Take 1 tablet by mouth 2 times daily as needed for Sleep for up to 30 days.  3/17/22 4/16/22 Yes Smitha Burris MD   rivaroxaban (XARELTO) 20 MG TABS tablet Take 1 tablet by mouth daily (with breakfast) 3/17/22  Yes Smitha Burris MD   predniSONE (DELTASONE) 20 MG tablet Take 2 tablets by mouth daily for 28 days 3/17/22 4/14/22 Yes ARCADIO Medrano CNP   [facility]) 750 MG/5ML suspension Take 10 mLs by mouth daily 3/17/22 4/16/22 Yes ARCADIO Medrano CNP   traZODone (DESYREL) 50 MG tablet Take 1 tablet by mouth nightly 3/16/22  Yes Kyara Solomon MD   rOPINIRole (REQUIP) 0.5 MG tablet Take 1 tablet by mouth nightly 3/16/22  Yes Kyara Solomon MD   sacubitril-valsartan (ENTRESTO) 24-26 MG per tablet Take 1 tablet by mouth 2 times daily 3/16/22  Yes Kyara Solomon MD   pantoprazole (PROTONIX) 40 MG tablet Take 1 tablet by mouth every morning (before breakfast) 3/17/22  Yes Kyara Solomon MD   cetirizine (ZYRTEC) 10 MG tablet Take 10 mg by mouth daily   Yes Historical Provider, MD   triamcinolone (KENALOG) 0.1 % ointment Apply topically Daily Apply topically once daily   Yes Historical Provider, MD   atorvastatin (LIPITOR) 40 MG tablet Take 1 tablet by mouth nightly 8/24/21  Yes ARCADIO Douglas CNP   metoprolol succinate (TOPROL XL) 100 MG extended release tablet Take 1 tablet by mouth daily 8/24/21  Yes ARCADIO Douglas CNP   aspirin 81 MG chewable tablet Take 1 tablet by mouth daily 1/20/21  Yes Cordelia Mckeon PA-C   Multiple Vitamins-Minerals (CENTRUM SILVER PO) Take 1 tablet by mouth daily    Yes Historical Provider, MD       Future Appointments   Date Time Provider Ramo Graham   4/6/2022  7:00 AM STR PULMONARY FUNCTION ROOM 1 [facility]   4/7/2022 11:00 AM Anne Swanson PA-C [address] [facility] - [facility] AM [place] [address]   4/8/2022  1:20 PM Kyara Solomon MD [facility] [address]   4/20/2022 10:40 AM Ceci Shaw, [address]   5/24/2022  1:30 PM ARCADIO Cruz CNP N [facility] AM [place]   6/7/2022  2:00 PM ARCADIO Shha CNP N [facility] [place]   8/30/2022  1:00 PM SCHEDULE, SRPS PACER NURSE N [facility] PAC[facility] P [facility] [place]   11/8/2022  2:00 PM ARCADIO Shah - CNP N SRPX CHF [facility] - [place] [facility]

## 2025-02-11 NOTE — PROGRESS NOTES
Patient has been successfully weaned from Mechanical Ventilation. RSBI before extubation was 46 and SpO2 of 95 on 40% FiO2. Patient extubated and placed on 5 liters/min via nasal cannula. Post extubation SpO2 is 93% with HR  85 bpm and RR 24 breaths/min. Patient had mild cough that was non-productive. Extubation Well tolerated by patient. Vira Libman 11-Feb-2025 16:01

## 2025-03-28 NOTE — PROGRESS NOTES
Interval History: Had pain throughout the day scoring 7-8/10, needing 3 doses of PRN morphine whole day and nauseated at night, resolved with zofran once.Tolerating drinking well.    Scheduled Meds:   acetaminophen  15 mg/kg Oral Q8H    acyclovir  5 mg/kg Intravenous Q8H     Continuous Infusions:   0.9% NaCl   Intravenous Continuous 100 mL/hr at 03/28/25 0600 Rate Verify at 03/28/25 0600     PRN Meds:  Current Facility-Administered Medications:     (Magic mouthwash) 1:1:1 diphenhydrAMINE(Benadryl) 12.5mg/5ml liq, aluminum & magnesium hydroxide-simethicone (Maalox), LIDOcaine viscous 2%, 10 mL, Swish & Spit, Q6H PRN    ondansetron, 4 mg, Oral, Q8H PRN    oxyCODONE, 5 mg, Oral, Q4H PRN    polyethylene glycol, 17 g, Oral, Daily PRN    Objective:     Vital Signs (Most Recent):  Temp: 97.9 °F (36.6 °C) (03/28/25 0823)  Pulse: 92 (03/28/25 0823)  Resp: 18 (03/28/25 0823)  BP: 125/77 (03/28/25 0823)  SpO2: 99 % (03/28/25 0823) Vital Signs (24h Range):  Temp:  [97.9 °F (36.6 °C)-99.3 °F (37.4 °C)] 97.9 °F (36.6 °C)  Pulse:  [] 92  Resp:  [18-20] 18  SpO2:  [98 %-100 %] 99 %  BP: (116-131)/(74-96) 125/77     Patient Vitals for the past 72 hrs (Last 3 readings):   Weight   03/26/25 1700 53.2 kg (117 lb 4.6 oz)     Body mass index is 18.96 kg/m².    Intake/Output - Last 3 Shifts         03/26 0700 03/27 0659 03/27 0700 03/28 0659 03/28 0700 03/29 0659    P.O. 120 930 600    I.V. (mL/kg) 1066 (20) 2020.5 (38)     IV Piggyback 355.2 373.5     Total Intake(mL/kg) 1541.2 (29) 3324 (62.5) 600 (11.3)    Urine (mL/kg/hr)   800 (3.1)    Stool   0    Total Output   800    Net +1541.2 +3324 -200           Urine Occurrence 1 x 2 x     Stool Occurrence   1 x            Lines/Drains/Airways       Peripheral Intravenous Line  Duration                  Peripheral IV - Single Lumen 03/26/25 1800 22 G Left Forearm 1 day                          Physical Exam  Vitals and nursing note reviewed. Exam conducted with a chaperone present.  Progress Note  Date:4/1/2022       [facility]:[id number]  Patient Name:Diony Rodriguez     YOB: 1950     Age:71 y.o. Subjective    Subjective:  Symptoms:  Stable. He reports shortness of breath. No chest pain or diarrhea. Diet:  Adequate intake. Activity level: Impaired due to weakness. Pain:  He reports no pain. No current facility-administered medications for this encounter. Current Outpatient Medications   Medication Sig Dispense Refill    tiotropium-olodaterol (STIOLTO) 2.5-2.5 MCG/ACT AERS Inhale 2 puffs into the lungs daily 1 each 1    rivaroxaban (XARELTO) 15 MG TABS tablet Take 1 tablet by mouth daily 42 tablet 1    oxymetazoline (AFRIN) 0.05 % nasal spray 2 sprays by Nasal route 3 times daily for 8 doses 1 each 3    mupirocin (BACTROBAN) 2 % ointment Apply topically 3 times daily. 30 g 0    nystatin (MYCOSTATIN) 996654 UNIT/ML suspension Take 5 mLs by mouth 4 times daily for 7 days 60 mL 0    furosemide (LASIX) 20 MG tablet One  Tab every Sunday and  wednesday  only 30 tablet 1    albuterol sulfate  (90 Base) MCG/ACT inhaler INHALE 2 PUFFS INTO THE LUNGS EVERY 6 HOURS AS NEEDED FOR WHEEZING 54 g 3    ALPRAZolam (XANAX) 0.5 MG tablet Take 1 tablet by mouth 2 times daily as needed for Sleep for up to 30 days.  60 tablet 0    predniSONE (DELTASONE) 20 MG tablet Take 2 tablets by mouth daily for 28 days 56 tablet 0    atovaquone (MEPRON) 750 MG/5ML suspension Take 10 mLs by mouth daily 300 mL 0    traZODone (DESYREL) 50 MG tablet Take 1 tablet by mouth nightly 30 tablet 0    rOPINIRole (REQUIP) 0.5 MG tablet Take 1 tablet by mouth nightly 90 tablet 3    pantoprazole (PROTONIX) 40 MG tablet Take 1 tablet by mouth every morning (before breakfast) 30 tablet 3    cetirizine (ZYRTEC) 10 MG tablet Take 10 mg by mouth daily      atorvastatin (LIPITOR) 40 MG tablet Take 1 tablet by mouth nightly 90 tablet 3    metoprolol succinate (TOPROL XL) 100 MG extended "  Constitutional:       General: She is not in acute distress.     Appearance: Normal appearance. She is normal weight.   HENT:      Head: Normocephalic.      Nose: Nose normal. No congestion or rhinorrhea.      Mouth/Throat:      Lips: Lesions (lower lip and inside of the lower lip) present, but decreased in size.      Mouth: Mucous membranes are moist.      Tongue: Lesions (tip of the tongue) present, decreased in size, less erythema      Pharynx: No oropharyngeal exudate or posterior oropharyngeal erythema.      Gum: Inner cheek and upper gum swelling with some redness in both sides  Eyes:      General:         Right eye: No discharge.         Left eye: No discharge.      Conjunctiva/sclera: Conjunctivae normal.      Pupils: Pupils are equal, round, and reactive to light.   Cardiovascular:      Rate and Rhythm: Normal rate.      Pulses: Normal pulses.      Heart sounds: Normal heart sounds. No murmur heard.  Pulmonary:      Effort: Pulmonary effort is normal. No respiratory distress.      Breath sounds: Normal breath sounds. No wheezing.   Abdominal:      General: Abdomen is flat. Bowel sounds are normal. There is no distension.      Palpations: Abdomen is soft. There is no mass.   Musculoskeletal:         General: No swelling or tenderness. Normal range of motion.   Skin:     General: Skin is warm.      Capillary Refill: Capillary refill takes less than 2 seconds.      Coloration: Skin is not pale.      Findings: No erythema.   Neurological:      General: No focal deficit present.      Mental Status: She is alert and oriented to person, place, and time.      Cranial Nerves: No cranial nerve deficit.      Gait: Gait normal.   Psychiatric:         Mood and Affect: Mood normal.         Behavior: Behavior normal.     Significant Labs:  No results for input(s): "POCTGLUCOSE" in the last 48 hours.    Recent Lab Results       None            Significant Imaging:  none  " release tablet Take 1 tablet by mouth daily 90 tablet 3    aspirin 81 MG chewable tablet Take 1 tablet by mouth daily 30 tablet 3    Multiple Vitamins-Minerals (CENTRUM SILVER PO) Take 1 tablet by mouth daily         Review of Systems   Constitutional: Positive for fatigue. Negative for fever. HENT: Positive for nosebleeds (better and on the left). Negative for congestion and sinus pain. Respiratory: Positive for shortness of breath. Negative for apnea, chest tightness and wheezing. Cardiovascular: Negative for chest pain. Gastrointestinal: Negative for abdominal distention, abdominal pain, constipation and diarrhea. Genitourinary: Negative for difficulty urinating and flank pain. Musculoskeletal: Positive for back pain. Negative for arthralgias. Neurological: Negative for dizziness. Hematological: Negative for adenopathy. Objective         Vitals Last 24 Hours:  TEMPERATURE:  Temp  Av.1 °F (36.7 °C)  Min: 98 °F (36.7 °C)  Max: 98.2 °F (36.8 °C)  RESPIRATIONS RANGE: Resp  Av.7  Min: 16  Max: 18  PULSE OXIMETRY RANGE: SpO2  Av.8 %  Min: 90 %  Max: 94 %  PULSE RANGE: Pulse  Av  Min: 56  Max: 60  BLOOD PRESSURE RANGE: Systolic (22ZSA), BJY:982 , Min:130 , OHC:897   ; Diastolic (88RNA), MQM:74, Min:61, Max:65    I/O (24Hr): Intake/Output Summary (Last 24 hours) at 2022 1901  Last data filed at 2022 2784  Gross per 24 hour   Intake 50 ml   Output 975 ml   Net -925 ml     Objective:  General Appearance:  Comfortable. Vital signs: (most recent): Blood pressure 138/65, pulse 59, temperature 98 °F (36.7 °C), temperature source Oral, resp. rate 16, height 5' 8\" (1.727 m), weight 154 lb 15.7 oz (70.3 kg), SpO2 92 %. Vital signs are normal.  No fever. Output: Producing urine and producing stool. HEENT: Normal HEENT exam.    Lungs:  Normal effort and normal respiratory rate. There are decreased breath sounds. No wheezes. Heart: Normal rate. Regular rhythm. S1 normal and S2 normal.  No murmur. Abdomen: Abdomen is soft and non-distended. Bowel sounds are normal.   There is no abdominal tenderness. Extremities: Normal range of motion. There is no dependent edema. Neurological: Patient is alert and oriented to person, place and time. Patient has normal reflexes. Labs/Imaging/Diagnostics    Labs:  CBC:No results for input(s): WBC, RBC, HGB, HCT, MCV, RDW, PLT in the last 72 hours. CHEMISTRIES:  Recent Labs     03/30/22  0807      K 4.2   CL 99   CO2 24   BUN 41*   CREATININE 1.6*   GLUCOSE 89     PT/INR:No results for input(s): PROTIME, INR in the last 72 hours. APTT:No results for input(s): APTT in the last 72 hours. LIVER PROFILE:No results for input(s): AST, ALT, BILIDIR, BILITOT, ALKPHOS in the last 72 hours. Imaging Last 24 Hours:  XR CHEST (2 VW)    Result Date: 3/31/2022  PROCEDURE: XR CHEST (2 VW) CLINICAL INFORMATION: Hypoxia. COMPARISON: Chest x-ray 3/29/2022. TECHNIQUE: PA and lateral views of the chest performed. FINDINGS: Lines/tubes: Right chest permanent pacemaker is stable. Left atrial appendage clip is unchanged. Median sternotomy wires are stable. Heart/mediastinum: The heart size and mediastinal contours are unchanged. Lungs: Patchy multifocal airspace opacities most pronounced in the lower lung zones are stable. A calcified right lower lobe granuloma is unchanged. No pleural effusion or pneumothorax is observed. Bones: Diffuse osteopenia is present. The visualized skeletal structures appear intact. Stable multifocal airspace opacities most pronounced in the lower lobes. **This report has been created using voice recognition software. It may contain minor errors which are inherent in voice recognition technology. ** Final report electronically signed by Dr Zev Garcia on 3/31/2022 2:56 PM    Assessment//Plan           Hospital Problems           Last Modified POA    * (Principal) Acute and chronic respiratory failure with hypoxia ([address] 3/25/2022 Yes    Atherosclerotic heart disease of native coronary artery with other forms of angina pectoris ([address] 3/26/2022 Yes    Chronic bronchitis ([address] 3/26/2022 Yes    HTN (hypertension) 3/26/2022 Yes    Atrial fibrillation ([address] 3/26/2022 Yes    Pneumonia 3/26/2022 Yes    Thrush 3/26/2022 Yes    Nosebleed 3/31/2022 Yes        Assessment:    Condition: In stable condition. Improving. 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 Plan:   Transfer Plan:   to  [facility]  today. Per physical therapy. Continue respiratory treatments. Consults: physical therapy and occupational therapy. Advance diet as tolerated. Administer medications as ordered. (  To  Nursing home  Today     needs pt and ot and rest        antibiotic another  Week and pdn at  40 mg  For another 3 weeks as felt amiodarone toxicity cause  Of  Interstitial lung disease      review of  Chart and medications and discharge summary as spent 35 minutes on disposition of the patient ).        Electronically signed by Beto Theodore MD on 4/1/22 at 7:01 PM EDT

## 2025-05-29 NOTE — PLAN OF CARE
Problem: Tissue Perfusion - Cardiopulmonary, Altered:  Goal: Absence of angina  Description: Absence of angina  Outcome: Met This Shift  No c/o angina for shift. Problem: Tissue Perfusion - Cardiopulmonary, Altered:  Goal: Hemodynamic stability will improve  Description: Hemodynamic stability will improve  Outcome: Met This Shift  VS- WNL. Patient SOB on exertion. Problem: Falls - Risk of:  Goal: Will remain free from falls  Description: Will remain free from falls  Outcome: Met This Shift  Fall assessment completed. Patient using call light appropriately to call for assistance with ambulation to bathroom. Personal items within reach. Patient is also compliant with use of non-skid slippers. Problem: Falls - Risk of:  Goal: Absence of physical injury  Description: Absence of physical injury  Outcome: Met This Shift  No falls noted this shift. Patient ambulates with x1 staff assistance without difficulty. Family member at bedside, spent the day. Bed kept in low position. Safe environment maintained. Bedside table & call light in reach. Uses call light appropriately when needing assistance. Problem: Discharge Planning:  Goal: Participates in care planning  Description: Participates in care planning  Outcome: Ongoing  Patient participates in care planning. Problem: Discharge Planning:  Goal: Discharged to appropriate level of care  Description: Discharged to appropriate level of care  Outcome: Ongoing  Discharge plan is in process. Plan discharge home with family. Problem: Anxiety/Stress:  Goal: Level of anxiety will decrease  Description: Level of anxiety will decrease  Outcome: Ongoing     Patient anxious at times. Problem: Cardiac Output - Decreased:  Goal: Hemodynamic stability will improve  Description: Hemodynamic stability will improve  Outcome: Ongoing  VS- WNL.      Problem: Gas Exchange - Impaired:  Goal: Levels of oxygenation will improve  Description: Levels of oxygenation Procedure:  Colonoscopy    Provider:  Dr. David Dubois  Location:  [place]  Date/Time:  6/16/25 10:00AM  Diagnosis:  colon cancer screening      RX for prep sent to the pharmacy    Pre-procedure orders placed:N/A    Instructions sent to patient via [facility].  Instructions were mailed to patient also per his request.   will improve  Outcome: Ongoing  Patients oxygen saturation 90-94 % on 3 L02 per Nasal [place]. No shortness of breath noted. Lung sounds dim. , able C&DB as ordered. Problem: Pain:  Goal: Pain level will decrease  Description: Pain level will decrease  Outcome: Ongoing  Patient states pain relief from PRN pain medications. Pain reassessed one hour post PRN pain medication given. Patient rates pain 0 on NICANOR 0-10 scale. Problem: Pain:  Goal: Recognizes and communicates pain  Description: Recognizes and communicates pain  Outcome: Ongoing  Patient able to recognized and verbalized pain level. Problem: Sleep Pattern Disturbance:  Goal: Appears well-rested  Description: Appears well-rested  Outcome: Ongoing  Patient able to rest/sleep. Care plan reviewed with patient and family. Patient and family verbalize understanding of the plan of care and contribute to goal setting.

## (undated) DEVICE — SUTURE VCRL SZ 2 L27IN ABSRB VLT L65MM TP-1 1/2 CIR J649G

## (undated) DEVICE — APPLICATOR MEDICATED 26 CC SOLUTION HI LT ORNG CHLORAPREP

## (undated) DEVICE — SYSTEM ENDOSCP VES HARV W/ TOOL CANN SEAL SHT PRT BLNT TIP

## (undated) DEVICE — FOGARTY - HYDRAGRIP SURGICAL - CLAMP INSERTS: Brand: FOGARTY SOFTJAW

## (undated) DEVICE — SET ADMIN 25ML L117IN PMP MOD CK VLV RLER CLMP 2 SMRTSITE

## (undated) DEVICE — SPONGE DRN W4XL4IN RAYON/POLYESTER 6 PLY NONWOVEN PRECUT

## (undated) DEVICE — TROCAR: Brand: KII FIOS FIRST ENTRY

## (undated) DEVICE — SCANLAN® VASCU-STATT® II PLUS S-USE BULLDOG CLAMP W/FIRM PRESS GENTLE-JAW™- MAXI ANGLED 45° (ORANGE), CLAMPING PRESS 165-175 G (2/STERILE PKG): Brand: SCANLAN® VASCU-STATT® II PLUS S-USE BULLDOG CLAMP W/FIRM PRESS GENTLE-JAW™

## (undated) DEVICE — DRAPE SLUSH DISC W44XL66IN ST FOR RND BSIN HUSH SLUSH SYS

## (undated) DEVICE — DRESSING FOAM DISK DIA1IN H 7MM HYDRPHLC CHG IMPREG IN SL

## (undated) DEVICE — GLOVE ORANGE PI 8   MSG9080

## (undated) DEVICE — ADHESIVE SKIN CLSR 0.7ML TOP DERMBND ADV

## (undated) DEVICE — TUBING PRSS 36 M F

## (undated) DEVICE — KIT BLWR MISTER 5P 15L W/ TBNG SET IRRIG MIST TO IMPROVE

## (undated) DEVICE — CAUTERY TIP POLISHER: Brand: DEVON

## (undated) DEVICE — Z DUP USE 2217550 KIT ENDSCPC CLNNG BOWL PURE PRMM WATER BASIN ENZMTC SPNGE DT

## (undated) DEVICE — GLOVE ORANGE PI 7   MSG9070

## (undated) DEVICE — DRESSING TRNSPAR W2XL2.75IN FLM SHT SEMIPERMEABLE WIND

## (undated) DEVICE — PACK,UNIVERSAL,NO GOWNS: Brand: MEDLINE

## (undated) DEVICE — RETRACTOR SURG INSRT SUT HLD OCTOBASE

## (undated) DEVICE — STRIP,CLOSURE,WOUND,MEDI-STRIP,1/2X4: Brand: MEDLINE

## (undated) DEVICE — YANKAUER,BULB TIP,W/O VENT,RIGID,STERILE: Brand: MEDLINE

## (undated) DEVICE — PENCIL SMK EVAC ALL IN 1 DSGN ENH VISIBILITY IMPROVED AIR

## (undated) DEVICE — TAPE SURG W4INXL11YD 2IN PERF LINERLESS NONWOVEN MEDFIX EZ

## (undated) DEVICE — GLOVE SURG SZ 65 L12IN THK75MIL DK GRN LTX FREE

## (undated) DEVICE — RELOAD STPL L45MM M THCK REINF FOR SIGNIA STPLR TRI-STPL

## (undated) DEVICE — GLOVE SURG SZ 6 THK91MIL LTX FREE SYN POLYISOPRENE ANTI

## (undated) DEVICE — GLOVE ORANGE PI 7 1/2   MSG9075

## (undated) DEVICE — Device: Brand: MOUTHPIECE

## (undated) DEVICE — COVER US PRB W5XL96IN LTX W/ GEL

## (undated) DEVICE — Z DUPLICATE USE 2431315 SET INSUF TBNG HI FLO W/ SMK EVAC FOR PNEUMOCLEAR

## (undated) DEVICE — SOLUTION IV 250ML 0.9% SOD CHL PH 5 INJ USP VIAFLX PLAS

## (undated) DEVICE — SENSOR OXMTR SM AD DISP FOR INVOS SYS

## (undated) DEVICE — GLOVE SURG SZ 65 THK91MIL LTX FREE SYN POLYISOPRENE

## (undated) DEVICE — Device: Brand: SUCTION TIP

## (undated) DEVICE — Z INACTIVE USE 2662641 SOLUTION IV 1000ML 140MEQ/L SOD 5MEQ/L K 3MEQ/L MG 27MEQ/L

## (undated) DEVICE — Y-TYPE BLOOD/SOLUTION SET WITH STANDARD BLOOD FILTER, 170 TO 260 MICRON FILTER, INJECTION SITE, MALE LUER LOCK ADAPTER WITH RETRACTABLE COLLAR: Brand: INTERLINK

## (undated) DEVICE — DECANTER FLD 9IN ST BG FOR ASEP TRNSF OF FLD

## (undated) DEVICE — GOWN,SIRUS,NONRNF,SETINSLV,XL,20/CS: Brand: MEDLINE

## (undated) DEVICE — TUBING IV STOPCOCK 48 CM 3 W

## (undated) DEVICE — SOLUTION IV 100ML 0.9% SOD CHL PLAS CONT USP VIAFLX 1 PER

## (undated) DEVICE — E-Z CLEAN, NON-STICK, PTFE COATED, ELECTROSURGICAL BLADE ELECTRODE, MODIFIED EXTENDED INSULATION, 4 INCH (10.2 CM): Brand: MEGADYNE

## (undated) DEVICE — DRESSING TRNSPAR W5XL4.5IN FLM SHT SEMIPERMEABLE WIND

## (undated) DEVICE — PROBE VASC STD 1-1.5 MM 15 CM OLV

## (undated) DEVICE — PACK-MAJOR

## (undated) DEVICE — 3M™ IOBAN™ 2 ANTIMICROBIAL INCISE DRAPE 6650EZ: Brand: IOBAN™ 2

## (undated) DEVICE — MARKER,SKIN,WI/RULER AND LABELS: Brand: MEDLINE

## (undated) DEVICE — SET CATH 20GA L1.5IN RAD ART POLYUR RADPQ W/ INTEGR 0.018IN

## (undated) DEVICE — SUTURE PERMAHAND SZ 0 L30IN NONABSORBABLE BLK L30MM PSL 3/8 590H

## (undated) DEVICE — PAD,NON-ADHERENT,3X8,STERILE,LF,1/PK: Brand: MEDLINE

## (undated) DEVICE — PUNCH STD 7.75IN 3.6MM ST DISP AORTIC

## (undated) DEVICE — SOLUTION IV 500ML 0.9% SOD CHL PH 5 INJ USP VIAFLX PLAS

## (undated) DEVICE — GAUZE,SPONGE,8"X4",12PLY,XRAY,STRL,LF: Brand: MEDLINE

## (undated) DEVICE — DRAPE,INSTRUMENT,MAGNETIC,10X16: Brand: MEDLINE

## (undated) DEVICE — SHELL STPL PWR FOR SIGNIA HNDL STPL SYS

## (undated) DEVICE — 3M™ STERI-DRAPE™ INSTRUMENT POUCH 1018: Brand: STERI-DRAPE™

## (undated) DEVICE — CANNULA PERF L5.5IN DIA9FR AORT ROOT AG STD TIP W/ VENT LN

## (undated) DEVICE — LUER-LOK 360°: Brand: CONNECTA, LUER-LOK

## (undated) DEVICE — EXCEL 10FT (3.05 M) INSUFFLATION TUBING SET WITH 0.1 MICRON FILTER: Brand: EXCEL

## (undated) DEVICE — BLADE OPHTH GRN ROUNDED TIP 1 SIDE SHRP GRINDLESS MINI-BLDE

## (undated) DEVICE — YANKAUER,POOLE TIP,STERILE,50/CS: Brand: MEDLINE

## (undated) DEVICE — SUTURE MCRYL + SZ 3-0 L27IN ABSRB UD PS1 L24MM 3/8 CIR REV MCP936H

## (undated) DEVICE — DRAIN SURG SGL COLL PT TB FOR ATS BG OASIS

## (undated) DEVICE — SUTURE SOFSILK SZ 2 L30IN NONABSORBABLE WHT V-26 L37MM 1/2 CS746

## (undated) DEVICE — KIT VEN DRNGE VAC ACCSRY PERF VAVD STOCK W/ SPEC TRAP

## (undated) DEVICE — 3M(TM) MEDIPORE(TM) H SOFT CLOTH TAPE 2861: Brand: 3M™ MEDIPORE™

## (undated) DEVICE — GOWN,SIRUS,NON REINFRCD,LARGE,SET IN SL: Brand: MEDLINE

## (undated) DEVICE — SPONGE LAP W18XL18IN WHT COT 4 PLY FLD STRUNG RADPQ DISP ST

## (undated) DEVICE — APPLICATOR MEDICATED 26 CC SOLUTION CLR STRL CHLORAPREP

## (undated) DEVICE — LEAD PACE L475MM CHNL A OR V MYOCARDIAL STEROID ELUT SIL

## (undated) DEVICE — SUTURE VCRL + SZ 2-0 L27IN ABSRB CLR CT-1 1/2 CIR TAPERCUT VCP259H

## (undated) DEVICE — SYRINGE 10ML FLUSH ST PREFILLED W/SALINE

## (undated) DEVICE — APPLIER LIG CLP M L11IN TI STR RNG HNDL FOR 20 CLP DISP

## (undated) DEVICE — SUTURE PROL 7-0 L24IN NONABSORBABLE BLU VISI-BLACK L9.3MM M8304

## (undated) DEVICE — Device

## (undated) DEVICE — BASIC SINGLE BASIN BTC-LF: Brand: MEDLINE INDUSTRIES, INC.

## (undated) DEVICE — PAD GEN USE BORDERED ADH 14IN 2IN AND 12IN 4IN GZ UNIV ST

## (undated) DEVICE — GAUZE,SPONGE,3"X3",12PLY,STERILE,LF,2'S: Brand: MEDLINE

## (undated) DEVICE — DUAL STAGE VENOUS RETURN CANNULA: Brand: EDWARDS LIFESCIENCES DUAL STAGE VENOUS DRAINAGE CANNULA

## (undated) DEVICE — SUTURE PROL 7-0 L24IN NONABSORBABLE BLU L9.3MM CC 3/8 CIR M8704

## (undated) DEVICE — SET AUTOTRNS C175ML BOWL BTM OUTLT RESERVOIRXTRA

## (undated) DEVICE — STAPLER INT L16CM STD UNIV RELD DISP TRI-STAPLE ENDO GIA

## (undated) DEVICE — CONNECTOR HAD 1/2X1/2IN EQL STR

## (undated) DEVICE — PRESSURE DISPLAY SET 45IN COIL DIAPH STOPCOCK M LUERLOCK

## (undated) DEVICE — GLOVE ORANGE PI 8 1/2   MSG9085

## (undated) DEVICE — THORACIC CATHETER,STRAIGHT: Brand: ARGYLE

## (undated) DEVICE — SOLUTION ANTIFOG VIS SYS CLEARIFY LAPSCP

## (undated) DEVICE — SURGICAL PROCEDURE PACK OXGNTR ST MARYS

## (undated) DEVICE — CANNULA PERF ART HI FLO VENT DIL TIP J TIP 3/8IN CONN 22FR

## (undated) DEVICE — APPLIER CLP L9.375IN APER 2.1MM CLS L3.8MM 20 SM TI CLP

## (undated) DEVICE — PACK PROCEDURE SURG SET UP SRMC

## (undated) DEVICE — UNIVERSAL MONOPOLAR LAPAROSCOPIC CABLE 10FT, 4MM PIN CONNECTOR: Brand: CONMED

## (undated) DEVICE — SUTURE PERMA-HAND SZ 2-0 L30IN NONABSORBABLE BLK L26MM SH K833H

## (undated) DEVICE — GOWN,AURORA,NON-REINFORCED,2XL: Brand: MEDLINE

## (undated) DEVICE — SUTURE SZ 7 L18IN NONABSORBABLE SIL CCS L48MM 1/2 CIR STRNM M655G

## (undated) DEVICE — GAUZE,SPONGE,4"X4",12PLY,STERILE,LF,2'S: Brand: MEDLINE